# Patient Record
Sex: FEMALE | Race: WHITE | Employment: PART TIME | URBAN - METROPOLITAN AREA
[De-identification: names, ages, dates, MRNs, and addresses within clinical notes are randomized per-mention and may not be internally consistent; named-entity substitution may affect disease eponyms.]

---

## 2017-02-02 ENCOUNTER — HOSPITAL ENCOUNTER (EMERGENCY)
Age: 47
Discharge: HOME | End: 2017-02-02
Attending: EMERGENCY MEDICINE
Payer: COMMERCIAL

## 2017-02-02 VITALS
SYSTOLIC BLOOD PRESSURE: 132 MMHG | HEIGHT: 62 IN | OXYGEN SATURATION: 94 % | DIASTOLIC BLOOD PRESSURE: 71 MMHG | HEART RATE: 70 BPM | BODY MASS INDEX: 38.91 KG/M2 | TEMPERATURE: 97.8 F | WEIGHT: 211.42 LBS

## 2017-02-02 DIAGNOSIS — R10.32 ABDOMINAL PAIN, LLQ (LEFT LOWER QUADRANT): Primary | ICD-10-CM

## 2017-02-02 LAB
ALBUMIN SERPL BCP-MCNC: 3.6 G/DL (ref 3.5–5)
ALBUMIN/GLOB SERPL: 1.1 {RATIO} (ref 1.1–2.2)
ALP SERPL-CCNC: 63 U/L (ref 45–117)
ALT SERPL-CCNC: 42 U/L (ref 12–78)
ANION GAP BLD CALC-SCNC: 9 MMOL/L (ref 5–15)
APPEARANCE UR: CLEAR
AST SERPL W P-5'-P-CCNC: 40 U/L (ref 15–37)
BACTERIA URNS QL MICRO: NEGATIVE /HPF
BASOPHILS # BLD AUTO: 0 K/UL (ref 0–0.1)
BASOPHILS # BLD: 0 % (ref 0–1)
BILIRUB SERPL-MCNC: 0.3 MG/DL (ref 0.2–1)
BILIRUB UR QL: NEGATIVE
BUN SERPL-MCNC: 8 MG/DL (ref 6–20)
BUN/CREAT SERPL: 12 (ref 12–20)
CALCIUM SERPL-MCNC: 8.4 MG/DL (ref 8.5–10.1)
CHLORIDE SERPL-SCNC: 104 MMOL/L (ref 97–108)
CO2 SERPL-SCNC: 28 MMOL/L (ref 21–32)
COLOR UR: NORMAL
CREAT SERPL-MCNC: 0.69 MG/DL (ref 0.55–1.02)
DIFFERENTIAL METHOD BLD: ABNORMAL
EOSINOPHIL # BLD: 0.2 K/UL (ref 0–0.4)
EOSINOPHIL NFR BLD: 4 % (ref 0–7)
EPITH CASTS URNS QL MICRO: NORMAL /LPF
ERYTHROCYTE [DISTWIDTH] IN BLOOD BY AUTOMATED COUNT: 16.6 % (ref 11.5–14.5)
GLOBULIN SER CALC-MCNC: 3.4 G/DL (ref 2–4)
GLUCOSE BLD STRIP.AUTO-MCNC: 163 MG/DL (ref 65–100)
GLUCOSE SERPL-MCNC: 194 MG/DL (ref 65–100)
GLUCOSE UR STRIP.AUTO-MCNC: NEGATIVE MG/DL
HCT VFR BLD AUTO: 28.6 % (ref 35–47)
HGB BLD-MCNC: 9 G/DL (ref 11.5–16)
HGB UR QL STRIP: NEGATIVE
HYALINE CASTS URNS QL MICRO: NORMAL /LPF (ref 0–5)
KETONES UR QL STRIP.AUTO: NEGATIVE MG/DL
LEUKOCYTE ESTERASE UR QL STRIP.AUTO: NEGATIVE
LIPASE SERPL-CCNC: 159 U/L (ref 73–393)
LYMPHOCYTES # BLD AUTO: 29 % (ref 12–49)
LYMPHOCYTES # BLD: 1.4 K/UL (ref 0.8–3.5)
MAGNESIUM SERPL-MCNC: 2 MG/DL (ref 1.6–2.4)
MCH RBC QN AUTO: 21.2 PG (ref 26–34)
MCHC RBC AUTO-ENTMCNC: 31.5 G/DL (ref 30–36.5)
MCV RBC AUTO: 67.5 FL (ref 80–99)
MONOCYTES # BLD: 0.2 K/UL (ref 0–1)
MONOCYTES NFR BLD AUTO: 5 % (ref 5–13)
NEUTS SEG # BLD: 2.9 K/UL (ref 1.8–8)
NEUTS SEG NFR BLD AUTO: 62 % (ref 32–75)
NITRITE UR QL STRIP.AUTO: NEGATIVE
PH UR STRIP: 6.5 [PH] (ref 5–8)
PLATELET # BLD AUTO: 164 K/UL (ref 150–400)
POTASSIUM SERPL-SCNC: 3.6 MMOL/L (ref 3.5–5.1)
PROT SERPL-MCNC: 7 G/DL (ref 6.4–8.2)
PROT UR STRIP-MCNC: NEGATIVE MG/DL
RBC # BLD AUTO: 4.24 M/UL (ref 3.8–5.2)
RBC #/AREA URNS HPF: NORMAL /HPF (ref 0–5)
RBC MORPH BLD: ABNORMAL
RBC MORPH BLD: ABNORMAL
SERVICE CMNT-IMP: ABNORMAL
SODIUM SERPL-SCNC: 141 MMOL/L (ref 136–145)
SP GR UR REFRACTOMETRY: 1.01 (ref 1–1.03)
UA: UC IF INDICATED,UAUC: NORMAL
UROBILINOGEN UR QL STRIP.AUTO: 0.2 EU/DL (ref 0.2–1)
WBC # BLD AUTO: 4.7 K/UL (ref 3.6–11)
WBC URNS QL MICRO: NORMAL /HPF (ref 0–4)

## 2017-02-02 PROCEDURE — 96361 HYDRATE IV INFUSION ADD-ON: CPT

## 2017-02-02 PROCEDURE — 96375 TX/PRO/DX INJ NEW DRUG ADDON: CPT

## 2017-02-02 PROCEDURE — 81001 URINALYSIS AUTO W/SCOPE: CPT

## 2017-02-02 PROCEDURE — 96374 THER/PROPH/DIAG INJ IV PUSH: CPT

## 2017-02-02 PROCEDURE — 83690 ASSAY OF LIPASE: CPT | Performed by: EMERGENCY MEDICINE

## 2017-02-02 PROCEDURE — 74011250636 HC RX REV CODE- 250/636: Performed by: EMERGENCY MEDICINE

## 2017-02-02 PROCEDURE — 36415 COLL VENOUS BLD VENIPUNCTURE: CPT

## 2017-02-02 PROCEDURE — 83735 ASSAY OF MAGNESIUM: CPT

## 2017-02-02 PROCEDURE — 99284 EMERGENCY DEPT VISIT MOD MDM: CPT

## 2017-02-02 PROCEDURE — 82962 GLUCOSE BLOOD TEST: CPT

## 2017-02-02 PROCEDURE — 80053 COMPREHEN METABOLIC PANEL: CPT

## 2017-02-02 PROCEDURE — 96376 TX/PRO/DX INJ SAME DRUG ADON: CPT

## 2017-02-02 PROCEDURE — 85025 COMPLETE CBC W/AUTO DIFF WBC: CPT

## 2017-02-02 RX ORDER — HYDROMORPHONE HYDROCHLORIDE 1 MG/ML
1 INJECTION, SOLUTION INTRAMUSCULAR; INTRAVENOUS; SUBCUTANEOUS ONCE
Status: COMPLETED | OUTPATIENT
Start: 2017-02-02 | End: 2017-02-02

## 2017-02-02 RX ORDER — HYDROMORPHONE HYDROCHLORIDE 1 MG/ML
1 INJECTION, SOLUTION INTRAMUSCULAR; INTRAVENOUS; SUBCUTANEOUS
Status: DISCONTINUED | OUTPATIENT
Start: 2017-02-02 | End: 2017-02-02 | Stop reason: CLARIF

## 2017-02-02 RX ORDER — METRONIDAZOLE 500 MG/1
500 TABLET ORAL 2 TIMES DAILY
Qty: 20 TAB | Refills: 0 | Status: SHIPPED | OUTPATIENT
Start: 2017-02-02 | End: 2017-02-12

## 2017-02-02 RX ORDER — HYDROMORPHONE HYDROCHLORIDE 1 MG/ML
1 INJECTION, SOLUTION INTRAMUSCULAR; INTRAVENOUS; SUBCUTANEOUS
Status: COMPLETED | OUTPATIENT
Start: 2017-02-02 | End: 2017-02-02

## 2017-02-02 RX ORDER — CIPROFLOXACIN 500 MG/1
500 TABLET ORAL 2 TIMES DAILY
Qty: 20 TAB | Refills: 0 | Status: SHIPPED | OUTPATIENT
Start: 2017-02-02 | End: 2017-02-12

## 2017-02-02 RX ORDER — SODIUM CHLORIDE 0.9 % (FLUSH) 0.9 %
5-10 SYRINGE (ML) INJECTION AS NEEDED
Status: DISCONTINUED | OUTPATIENT
Start: 2017-02-02 | End: 2017-02-02 | Stop reason: HOSPADM

## 2017-02-02 RX ORDER — KETOROLAC TROMETHAMINE 30 MG/ML
30 INJECTION, SOLUTION INTRAMUSCULAR; INTRAVENOUS ONCE
Status: COMPLETED | OUTPATIENT
Start: 2017-02-02 | End: 2017-02-02

## 2017-02-02 RX ORDER — ONDANSETRON 2 MG/ML
4 INJECTION INTRAMUSCULAR; INTRAVENOUS
Status: COMPLETED | OUTPATIENT
Start: 2017-02-02 | End: 2017-02-02

## 2017-02-02 RX ADMIN — ONDANSETRON HYDROCHLORIDE 4 MG: 2 INJECTION, SOLUTION INTRAMUSCULAR; INTRAVENOUS at 09:12

## 2017-02-02 RX ADMIN — HYDROMORPHONE HYDROCHLORIDE 1 MG: 1 INJECTION, SOLUTION INTRAMUSCULAR; INTRAVENOUS; SUBCUTANEOUS at 11:25

## 2017-02-02 RX ADMIN — HYDROMORPHONE HYDROCHLORIDE 1 MG: 1 INJECTION, SOLUTION INTRAMUSCULAR; INTRAVENOUS; SUBCUTANEOUS at 10:04

## 2017-02-02 RX ADMIN — KETOROLAC TROMETHAMINE 30 MG: 30 INJECTION, SOLUTION INTRAMUSCULAR at 09:12

## 2017-02-02 RX ADMIN — SODIUM CHLORIDE 1000 ML: 900 INJECTION, SOLUTION INTRAVENOUS at 09:13

## 2017-02-02 NOTE — DISCHARGE INSTRUCTIONS
Follow up with your primary care doctor in the next week. Take the ciprofloxacin and flagyl as prescribed. Return to the ED if you are unable to be seen in clinic or if your symptoms change or worsen in nature. Abdominal Pain: Care Instructions  Your Care Instructions    Abdominal pain has many possible causes. Some aren't serious and get better on their own in a few days. Others need more testing and treatment. If your pain continues or gets worse, you need to be rechecked and may need more tests to find out what is wrong. You may need surgery to correct the problem. Don't ignore new symptoms, such as fever, nausea and vomiting, urination problems, pain that gets worse, and dizziness. These may be signs of a more serious problem. Your doctor may have recommended a follow-up visit in the next 8 to 12 hours. If you are not getting better, you may need more tests or treatment. The doctor has checked you carefully, but problems can develop later. If you notice any problems or new symptoms, get medical treatment right away. Follow-up care is a key part of your treatment and safety. Be sure to make and go to all appointments, and call your doctor if you are having problems. It's also a good idea to know your test results and keep a list of the medicines you take. How can you care for yourself at home? · Rest until you feel better. · To prevent dehydration, drink plenty of fluids, enough so that your urine is light yellow or clear like water. Choose water and other caffeine-free clear liquids until you feel better. If you have kidney, heart, or liver disease and have to limit fluids, talk with your doctor before you increase the amount of fluids you drink. · If your stomach is upset, eat mild foods, such as rice, dry toast or crackers, bananas, and applesauce. Try eating several small meals instead of two or three large ones.   · Wait until 48 hours after all symptoms have gone away before you have spicy foods, alcohol, and drinks that contain caffeine. · Do not eat foods that are high in fat. · Avoid anti-inflammatory medicines such as aspirin, ibuprofen (Advil, Motrin), and naproxen (Aleve). These can cause stomach upset. Talk to your doctor if you take daily aspirin for another health problem. When should you call for help? Call 911 anytime you think you may need emergency care. For example, call if:  · You passed out (lost consciousness). · You pass maroon or very bloody stools. · You vomit blood or what looks like coffee grounds. · You have new, severe belly pain. Call your doctor now or seek immediate medical care if:  · Your pain gets worse, especially if it becomes focused in one area of your belly. · You have a new or higher fever. · Your stools are black and look like tar, or they have streaks of blood. · You have unexpected vaginal bleeding. · You have symptoms of a urinary tract infection. These may include:  ¨ Pain when you urinate. ¨ Urinating more often than usual.  ¨ Blood in your urine. · You are dizzy or lightheaded, or you feel like you may faint. Watch closely for changes in your health, and be sure to contact your doctor if:  · You are not getting better after 1 day (24 hours). Where can you learn more? Go to http://gladys-alia.info/. Enter I631 in the search box to learn more about \"Abdominal Pain: Care Instructions. \"  Current as of: May 27, 2016  Content Version: 11.1  © 9258-3597 [employer]. Care instructions adapted under license by [employer] (which disclaims liability or warranty for this information). If you have questions about a medical condition or this instruction, always ask your healthcare professional. [address] any warranty or liability for your use of this information.

## 2017-02-02 NOTE — LETTER
[facility] EMERGENCY DEPT 
[address] [place] [address] [address] [id number] [phone] Work/School Note Date: 2/2/2017 To Whom It May concern: 
 
Cyndee Marte was seen and treated today in the emergency room by the following provider(s): 
Attending Provider: Jermaine Beavers MD 
Resident: Christian Mann. Cnydee Marte may return to work on 2/6/17. Sincerely, 
 
 
 
 
Christian Mann

## 2017-02-02 NOTE — ED PROVIDER NOTES
HPI Comments: 56 yo female with hx of diverticulitis, DMII, colon resection, and GERD presents with abdominal pain and nausea/vomiting of 3 days duration. She says abdominal pain is in LLQ and midepigastric region, described as crampy. No relieving factors and pain is exacerbated by movement. Pain does not radiate. She has had multiple abdominal CT scans in the past as she has had multiple intra-abdominal abnormalities, including, but not limited to fistulas, colon resection, C. Diff infection, etc.    Patient is a 55 y.o. female presenting with abdominal pain and allergic reaction. The history is provided by the patient. No  was used. Abdominal Pain    This is a new problem. The current episode started more than 2 days ago. The problem has been gradually worsening. The pain is associated with vomiting. The pain is located in the LLQ and epigastric region. The quality of the pain is dull and aching. Associated symptoms include a fever, diarrhea, nausea and vomiting. Pertinent negatives include no dysuria, no headaches, no arthralgias and no chest pain. The pain is worsened by certain positions. The pain is relieved by nothing. Allergic Reaction    Associated symptoms include nausea and vomiting. Pertinent negatives include no confusion and no shortness of breath.         Past Medical History:   Diagnosis Date    Anal fissure     Anisocoria     Asthma      LAST EPISODE     Back pain     Cerumen impaction     Chronic kidney disease      hx uti in past    Coagulation defects      ocassional rectal bleeding due to anal fissure    Colovaginal fistula     Diabetes (HCC)      NIDDM    Diabetes ([facility] [address]     Diverticulitis     Diverticulosis     Enlarged tonsils     Frequent UTI     GERD (gastroesophageal reflux disease)     H/O endoscopy      with dilation    HA (headache)     Hepatic steatosis     Hx of colonoscopy with polypectomy      benign    Hypertension     Ill-defined condition      FREQUENT HIVES    Ill-defined condition      HX ELEVATED LIVER ENZYMES    Morbid obesity (HCC)     Nausea & vomiting      during diverticulitis flare    Obesity     Otitis media     Pneumonia      about 15 yrs ago    Psychiatric disorder      ANXIETY    Recurrent tonsillitis     Sinusitis     Transfusion history ~ age 35     postop hysterectomy    Unspecified sleep apnea      snores ( not diagnosed yet)     Urticaria     Urticaria        Past Surgical History:   Procedure Laterality Date    Hx breast reduction       blake.  Hx gyn            Hx gyn       cervical conization    Hx pelvic laparoscopy      Hx farheen and bso      Hx other surgical       Sphincterotomy    Hx heent       SINUS SURGERY LEFT X2    Hx heent       SINUS SURGERY ON RIGHT X2    Hx gi  12     LAPAROSCOPIC HAND ASSISTED  POSS OPEN SIGMOID COLECTOMY POSS TEMPORARY DIVERTING LOOP ILEOSTOMY;  (no illeostomy needed)    Hx urological  12      CYSTOSCOPY INSERTION URETERAL CATHETERS - Cystoscopy Insertion of bilateral ureteral stents         Family History:   Problem Relation Age of Onset    Diabetes Mother     Cancer Mother      NON-HODGKINS LYMPHOMA    Anesth Problems Mother      PONV    Diabetes Father     Heart Disease Father      CAD - STENTS, PACEMAKER    Arrhythmia Father        Social History     Social History    Marital status:      Spouse name: N/A    Number of children: N/A    Years of education: N/A     Occupational History    Not on file. Social History Main Topics    Smoking status: Never Smoker    Smokeless tobacco: Never Used    Alcohol use Yes      Comment: occ.  Drug use: No    Sexual activity: Not on file     Other Topics Concern    Not on file     Social History Narrative         ALLERGIES: Aspirin; Prilosec [omeprazole magnesium];  Codeine; Morphine; and Percocet [oxycodone-acetaminophen]    Review of Systems   Constitutional: Positive for chills and fever. HENT: Negative for congestion and sore throat. Eyes: Negative for pain. Respiratory: Negative for cough and shortness of breath. Cardiovascular: Negative for chest pain. Gastrointestinal: Positive for abdominal pain, diarrhea, nausea and vomiting. Negative for blood in stool. (-) hematemesis    Endocrine: Negative for polyuria. Genitourinary: Negative for dysuria. Musculoskeletal: Negative for arthralgias. Skin: Positive for rash. Allergic/Immunologic: Negative for environmental allergies. Neurological: Negative for headaches. Psychiatric/Behavioral: Negative for confusion. All other systems reviewed and are negative. Vitals:    02/02/17 0945 02/02/17 1000 02/02/17 1045 02/02/17 1115   BP: 154/89 163/75 (!) 155/123 (!) 166/96   Pulse:       Temp:       SpO2: 95% 94% 95% 97%   Weight:       Height:                Physical Exam   Constitutional: She is oriented to person, place, and time. She appears well-developed and well-nourished. No distress. HENT:   Head: Normocephalic and atraumatic. Eyes: Pupils are equal, round, and reactive to light. Neck: Neck supple. Cardiovascular: Normal rate and regular rhythm. Pulmonary/Chest: Effort normal and breath sounds normal. No respiratory distress. She has no wheezes. Abdominal:   Obese, mild epigastric and LLQ tenderness without rebound or guarding, +BS   Musculoskeletal: Normal range of motion. She exhibits no edema. Neurological: She is alert and oriented to person, place, and time. Skin: No rash noted. She is not diaphoretic. Nursing note and vitals reviewed. MDM  Number of Diagnoses or Management Options  Diagnosis management comments: DDX: diverticulitis, colitis, C.diff colitis, viral syndrome    Will treat pain and nausea, obtain basic labs, send C.diff if pt gives us stool sample.   Will defer on imaging at this point as she has had multiple CT scans recently and does not clinically have a surgical abdomen. S/S likely suggestive of colitis, will plan to treat accordingly. Amount and/or Complexity of Data Reviewed  Clinical lab tests: ordered  Tests in the radiology section of CPT®: ordered  Tests in the medicine section of CPT®: ordered      ED Course   12:00  Pt's symptoms have improved. Work-up negative for abnormality. Discharged with antibiotics for colitis. Procedures    LABORATORY TESTS:  Recent Results (from the past 12 hour(s))   METABOLIC PANEL, COMPREHENSIVE    Collection Time: 02/02/17  8:57 AM   Result Value Ref Range    Sodium 141 136 - 145 mmol/L    Potassium 3.6 3.5 - 5.1 mmol/L    Chloride 104 97 - 108 mmol/L    CO2 28 21 - 32 mmol/L    Anion gap 9 5 - 15 mmol/L    Glucose 194 (H) 65 - 100 mg/dL    BUN 8 6 - 20 MG/DL    Creatinine 0.69 0.55 - 1.02 MG/DL    BUN/Creatinine ratio 12 12 - 20      GFR est AA >60 >60 ml/min/1.73m2    GFR est non-AA >60 >60 ml/min/1.73m2    Calcium 8.4 (L) 8.5 - 10.1 MG/DL    Bilirubin, total 0.3 0.2 - 1.0 MG/DL    ALT (SGPT) 42 12 - 78 U/L    AST (SGOT) 40 (H) 15 - 37 U/L    Alk. phosphatase 63 45 - 117 U/L    Protein, total 7.0 6.4 - 8.2 g/dL    Albumin 3.6 3.5 - 5.0 g/dL    Globulin 3.4 2.0 - 4.0 g/dL    A-G Ratio 1.1 1.1 - 2.2     CBC WITH AUTOMATED DIFF    Collection Time: 02/02/17  8:57 AM   Result Value Ref Range    WBC 4.7 3.6 - 11.0 K/uL    RBC 4.24 3.80 - 5.20 M/uL    HGB 9.0 (L) 11.5 - 16.0 g/dL    HCT 28.6 (L) 35.0 - 47.0 %    MCV 67.5 (L) 80.0 - 99.0 FL    MCH 21.2 (L) 26.0 - 34.0 PG    MCHC 31.5 30.0 - 36.5 g/dL    RDW 16.6 (H) 11.5 - 14.5 %    PLATELET 476 125 - 056 K/uL    NEUTROPHILS 62 32 - 75 %    LYMPHOCYTES 29 12 - 49 %    MONOCYTES 5 5 - 13 %    EOSINOPHILS 4 0 - 7 %    BASOPHILS 0 0 - 1 %    ABS. NEUTROPHILS 2.9 1.8 - 8.0 K/UL    ABS. LYMPHOCYTES 1.4 0.8 - 3.5 K/UL    ABS. MONOCYTES 0.2 0.0 - 1.0 K/UL    ABS. EOSINOPHILS 0.2 0.0 - 0.4 K/UL    ABS.  BASOPHILS 0.0 0.0 - 0.1 K/UL    RBC COMMENTS MICROCYTOSIS  1+        RBC COMMENTS ANISOCYTOSIS  1+        DF SMEAR SCANNED     MAGNESIUM    Collection Time: 02/02/17  8:57 AM   Result Value Ref Range    Magnesium 2.0 1.6 - 2.4 mg/dL   LIPASE    Collection Time: 02/02/17  8:57 AM   Result Value Ref Range    Lipase 159 73 - 393 U/L   URINALYSIS W/ REFLEX CULTURE    Collection Time: 02/02/17 11:11 AM   Result Value Ref Range    Color YELLOW/STRAW      Appearance CLEAR CLEAR      Specific gravity 1.009 1.003 - 1.030      pH (UA) 6.5 5.0 - 8.0      Protein NEGATIVE  NEG mg/dL    Glucose NEGATIVE  NEG mg/dL    Ketone NEGATIVE  NEG mg/dL    Bilirubin NEGATIVE  NEG      Blood NEGATIVE  NEG      Urobilinogen 0.2 0.2 - 1.0 EU/dL    Nitrites NEGATIVE  NEG      Leukocyte Esterase NEGATIVE  NEG      WBC 0-4 0 - 4 /hpf    RBC 0-5 0 - 5 /hpf    Epithelial cells FEW FEW /lpf    Bacteria NEGATIVE  NEG /hpf    UA:UC IF INDICATED CULTURE NOT INDICATED BY UA RESULT CNI      Hyaline cast 0-2 0 - 5 /lpf   GLUCOSE, POC    Collection Time: 02/02/17 11:46 AM   Result Value Ref Range    Glucose (POC) 163 (H) 65 - 100 mg/dL    Performed by Harwood Buerger (ED tech)        IMAGING RESULTS:  No orders to display       MEDICATIONS GIVEN:  Medications   sodium chloride (NS) flush 5-10 mL (not administered)   ketorolac (TORADOL) injection 30 mg (30 mg IntraVENous Given 2/2/17 0912)   sodium chloride 0.9 % bolus infusion 1,000 mL (1,000 mL IntraVENous New Bag 2/2/17 0913)   ondansetron (ZOFRAN) injection 4 mg (4 mg IntraVENous Given 2/2/17 0912)   HYDROmorphone (PF) (DILAUDID) injection 1 mg (1 mg IntraVENous Given 2/2/17 1004)   HYDROmorphone (PF) (DILAUDID) injection 1 mg (1 mg IntraVENous Given 2/2/17 1125)       IMPRESSION:  1. Abdominal pain, LLQ (left lower quadrant)        PLAN:  1. Cipro and Flagyl for 10 days  2.  Follow up with PCP    Current Discharge Medication List      START taking these medications    Details   ciprofloxacin HCl (CIPRO) 500 mg tablet Take 1 Tab by mouth two (2) times a day for 10 days. Qty: 20 Tab, Refills: 0      metroNIDAZOLE (FLAGYL) 500 mg tablet Take 1 Tab by mouth two (2) times a day for 10 days. Indications: colitis  Qty: 20 Tab, Refills: 0         CONTINUE these medications which have NOT CHANGED    Details   famotidine (PEPCID) 20 mg tablet Take 20 mg by mouth daily. fexofenadine (ALLEGRA) 60 mg tablet Take 120 mg by mouth daily. cetirizine (ZYRTEC) 10 mg tablet Take 20 mg by mouth every evening. hydrocortisone-pramoxine (ANALPRAM HC 2.5%-1%) 2.5-1 % rectal cream Insert  into rectum three (3) times daily. Qty: 30 g, Refills: 0      diphenhydrAMINE (BENADRYL) 25 mg capsule Take 50 mg by mouth every four (4) hours as needed. EPINEPHrine (EPIPEN) 0.3 mg/0.3 mL (1:1,000) injection 0.3 mL by IntraMUSCular route once as needed for up to 1 dose. Qty: 0.3 mL, Refills: 1      estradiol (ESTRACE) 1 mg tablet Take 1 mg by mouth daily. omeprazole (PRILOSEC) 20 mg capsule Take 40 mg by mouth daily. amLODIPine (NORVASC) 5 mg tablet Take 5 mg by mouth daily. hydrochlorothiazide (HYDRODIURIL) 25 mg tablet Take 25 mg by mouth daily. metFORMIN (GLUCOPHAGE) 500 mg tablet Take 500 mg by mouth daily (with breakfast). albuterol (PROVENTIL, VENTOLIN) 90 mcg/Actuation inhaler Take 2 Puffs by inhalation every six (6) hours as needed. 2.   Follow-up Information     Follow up With Details Comments [address] In 1 week If symptoms worsen [id number] [facility] [id number] [phone]          Return to ED if worse               ------------------------------------------  Begin Attending Documentation  ------------------------------------------    Attending Attestation: I was not present during the patient's evaluation by the resident.   I personally evaluated the patient including the history and physical. I have read the resident's note and agree with their history, physical and plan.    HPI: Donna Danielle is a 55 y.o. female with a PMHx of HTN, Diverticulosis, GERD, CKD, Morbid obesity, Urticaria, Diverticulitis, DM, and Anxiety who presents ambulatory to the ED c/o gradually worsening, L-sided abdominal pain that radiates to her L-sided back with associated diarrhea, nausea, vomiting, fever, and chills x 3 days. Pt reports her abdominal pain worsened today which prompted ED visit. Pt admits she has had this pain before when she had a colovaginal fistula. Pt notes she was passing stool through her vaginal canal at that time but denies any abnormal BM's at this time besides the diarrhea. Pt states she has also been presenting with diffuse pruritic hives x 2 days noting she saw her PCP yesterday who gave her topical solution that has moderately helped clear up the rash. Of note, pt admits to having history of chronic hives which has caused her to have \"an upset stomach\" noting she has been prescribed prednisone multiple times. Pt states she had a cholecystectomy at the end of November 2016 noting she was told her liver was enlarged at that time. Pt admits to also have a hx of diverticulitis and CDiff noting her last time being admitted for CDiff was in September of 2016. Pt denies any hematemesis, blood in stool, constipation, dysuria, dizziness, chest pain, dyspnea, recent antibiotic use. PCP: BEBA Joel    There are no other complaints, changes or physical findings at this time. PE:  Gen: NAD, WD/WN   Heart: nl S1, S2, no m/r/g   Lungs: CTAB, no w/r/r   Abd: soft, diffuse left abdominal tenderness, epigastric tenderness, no guarding or rebound tenderness   Ext: no swelling   Skin: no rashes or hives   Neuro: grossly intact, no focal deficits    Assessment: Patient presents to ED with abdominal pain and diarrhea. Differential includes colitis, diverticulitis, UTI, pancreatitis, gastritis, choledocholithiasis, c dff.   She appears well on exam.  Doubt acute intraabdominal surgical process at this time including obstruction and abscess. Patient has had numerous CT scans in the past and would like to avoid additional imaging at this time if possible. I think this is reasonable given current exam.  Will check CBC, CMP, lipase, UA and provide symptomatic treatment and reevaluate.     Diagnosis:    Brittany Funez MD.    ------------------------------------------  End Attending Documentation  ------------------------------------------

## 2017-03-16 ENCOUNTER — HOSPITAL ENCOUNTER (EMERGENCY)
Age: 47
Discharge: HOME | End: 2017-03-16
Attending: EMERGENCY MEDICINE | Admitting: EMERGENCY MEDICINE
Payer: COMMERCIAL

## 2017-03-16 ENCOUNTER — APPOINTMENT (OUTPATIENT)
Dept: CT IMAGING | Age: 47
End: 2017-03-16
Attending: EMERGENCY MEDICINE
Payer: COMMERCIAL

## 2017-03-16 VITALS
RESPIRATION RATE: 16 BRPM | TEMPERATURE: 97.9 F | BODY MASS INDEX: 38.72 KG/M2 | WEIGHT: 210.4 LBS | DIASTOLIC BLOOD PRESSURE: 85 MMHG | HEIGHT: 62 IN | OXYGEN SATURATION: 96 % | SYSTOLIC BLOOD PRESSURE: 153 MMHG | HEART RATE: 77 BPM

## 2017-03-16 DIAGNOSIS — R10.11 ABDOMINAL PAIN, RIGHT UPPER QUADRANT: Primary | ICD-10-CM

## 2017-03-16 DIAGNOSIS — N39.0 URINARY TRACT INFECTION WITHOUT HEMATURIA, SITE UNSPECIFIED: ICD-10-CM

## 2017-03-16 DIAGNOSIS — R10.9 ACUTE ABDOMINAL PAIN IN RIGHT FLANK: ICD-10-CM

## 2017-03-16 DIAGNOSIS — R21 RASH: ICD-10-CM

## 2017-03-16 LAB
ALBUMIN SERPL BCP-MCNC: 3.8 G/DL (ref 3.5–5)
ALBUMIN/GLOB SERPL: 1 {RATIO} (ref 1.1–2.2)
ALP SERPL-CCNC: 75 U/L (ref 45–117)
ALT SERPL-CCNC: 50 U/L (ref 12–78)
AMMONIA PLAS-SCNC: 32 UMOL/L
ANION GAP BLD CALC-SCNC: 9 MMOL/L (ref 5–15)
APPEARANCE UR: CLEAR
APTT PPP: 28.5 SEC (ref 22.1–32.5)
AST SERPL W P-5'-P-CCNC: 44 U/L (ref 15–37)
ATRIAL RATE: 70 BPM
BACTERIA URNS QL MICRO: NEGATIVE /HPF
BASOPHILS # BLD AUTO: 0 K/UL (ref 0–0.1)
BASOPHILS # BLD: 0 % (ref 0–1)
BILIRUB SERPL-MCNC: 0.4 MG/DL (ref 0.2–1)
BILIRUB UR QL: NEGATIVE
BUN SERPL-MCNC: 15 MG/DL (ref 6–20)
BUN/CREAT SERPL: 23 (ref 12–20)
CALCIUM SERPL-MCNC: 9.5 MG/DL (ref 8.5–10.1)
CALCULATED P AXIS, ECG09: -7 DEGREES
CALCULATED R AXIS, ECG10: 7 DEGREES
CALCULATED T AXIS, ECG11: -19 DEGREES
CHLORIDE SERPL-SCNC: 101 MMOL/L (ref 97–108)
CO2 SERPL-SCNC: 28 MMOL/L (ref 21–32)
COLOR UR: ABNORMAL
CREAT SERPL-MCNC: 0.64 MG/DL (ref 0.55–1.02)
DIAGNOSIS, 93000: NORMAL
DIFFERENTIAL METHOD BLD: ABNORMAL
EOSINOPHIL # BLD: 0.2 K/UL (ref 0–0.4)
EOSINOPHIL NFR BLD: 4 % (ref 0–7)
EPITH CASTS URNS QL MICRO: ABNORMAL /LPF
ERYTHROCYTE [DISTWIDTH] IN BLOOD BY AUTOMATED COUNT: 16.9 % (ref 11.5–14.5)
GLOBULIN SER CALC-MCNC: 4 G/DL (ref 2–4)
GLUCOSE SERPL-MCNC: 162 MG/DL (ref 65–100)
GLUCOSE UR STRIP.AUTO-MCNC: NEGATIVE MG/DL
HCT VFR BLD AUTO: 30.5 % (ref 35–47)
HGB BLD-MCNC: 9.4 G/DL (ref 11.5–16)
HGB UR QL STRIP: NEGATIVE
HYALINE CASTS URNS QL MICRO: ABNORMAL /LPF (ref 0–5)
INR PPP: 1 (ref 0.9–1.1)
KETONES UR QL STRIP.AUTO: NEGATIVE MG/DL
LEUKOCYTE ESTERASE UR QL STRIP.AUTO: ABNORMAL
LIPASE SERPL-CCNC: 190 U/L (ref 73–393)
LYMPHOCYTES # BLD AUTO: 31 % (ref 12–49)
LYMPHOCYTES # BLD: 1.9 K/UL (ref 0.8–3.5)
MCH RBC QN AUTO: 20.7 PG (ref 26–34)
MCHC RBC AUTO-ENTMCNC: 30.8 G/DL (ref 30–36.5)
MCV RBC AUTO: 67.2 FL (ref 80–99)
MONOCYTES # BLD: 0.4 K/UL (ref 0–1)
MONOCYTES NFR BLD AUTO: 6 % (ref 5–13)
NEUTS SEG # BLD: 3.5 K/UL (ref 1.8–8)
NEUTS SEG NFR BLD AUTO: 59 % (ref 32–75)
NITRITE UR QL STRIP.AUTO: NEGATIVE
P-R INTERVAL, ECG05: 156 MS
PH UR STRIP: 6 [PH] (ref 5–8)
PLATELET # BLD AUTO: 209 K/UL (ref 150–400)
POTASSIUM SERPL-SCNC: 3.4 MMOL/L (ref 3.5–5.1)
PROT SERPL-MCNC: 7.8 G/DL (ref 6.4–8.2)
PROT UR STRIP-MCNC: NEGATIVE MG/DL
PROTHROMBIN TIME: 10.4 SEC (ref 9–11.1)
Q-T INTERVAL, ECG07: 572 MS
QRS DURATION, ECG06: 96 MS
QTC CALCULATION (BEZET), ECG08: 617 MS
RBC # BLD AUTO: 4.54 M/UL (ref 3.8–5.2)
RBC #/AREA URNS HPF: ABNORMAL /HPF (ref 0–5)
RBC MORPH BLD: ABNORMAL
SODIUM SERPL-SCNC: 138 MMOL/L (ref 136–145)
SP GR UR REFRACTOMETRY: 1.01 (ref 1–1.03)
THERAPEUTIC RANGE,PTTT: NORMAL SECS (ref 58–77)
TROPONIN I SERPL-MCNC: <0.04 NG/ML
UROBILINOGEN UR QL STRIP.AUTO: 0.2 EU/DL (ref 0.2–1)
VENTRICULAR RATE, ECG03: 70 BPM
WBC # BLD AUTO: 6 K/UL (ref 3.6–11)
WBC URNS QL MICRO: ABNORMAL /HPF (ref 0–4)

## 2017-03-16 PROCEDURE — 74011250636 HC RX REV CODE- 250/636: Performed by: EMERGENCY MEDICINE

## 2017-03-16 PROCEDURE — 81001 URINALYSIS AUTO W/SCOPE: CPT | Performed by: EMERGENCY MEDICINE

## 2017-03-16 PROCEDURE — 84484 ASSAY OF TROPONIN QUANT: CPT | Performed by: EMERGENCY MEDICINE

## 2017-03-16 PROCEDURE — 83690 ASSAY OF LIPASE: CPT | Performed by: EMERGENCY MEDICINE

## 2017-03-16 PROCEDURE — 99284 EMERGENCY DEPT VISIT MOD MDM: CPT

## 2017-03-16 PROCEDURE — 74177 CT ABD & PELVIS W/CONTRAST: CPT

## 2017-03-16 PROCEDURE — 96376 TX/PRO/DX INJ SAME DRUG ADON: CPT

## 2017-03-16 PROCEDURE — 74011250637 HC RX REV CODE- 250/637: Performed by: EMERGENCY MEDICINE

## 2017-03-16 PROCEDURE — 87086 URINE CULTURE/COLONY COUNT: CPT | Performed by: EMERGENCY MEDICINE

## 2017-03-16 PROCEDURE — 36415 COLL VENOUS BLD VENIPUNCTURE: CPT | Performed by: EMERGENCY MEDICINE

## 2017-03-16 PROCEDURE — 80053 COMPREHEN METABOLIC PANEL: CPT | Performed by: EMERGENCY MEDICINE

## 2017-03-16 PROCEDURE — 96375 TX/PRO/DX INJ NEW DRUG ADDON: CPT

## 2017-03-16 PROCEDURE — 85025 COMPLETE CBC W/AUTO DIFF WBC: CPT | Performed by: EMERGENCY MEDICINE

## 2017-03-16 PROCEDURE — 85730 THROMBOPLASTIN TIME PARTIAL: CPT | Performed by: EMERGENCY MEDICINE

## 2017-03-16 PROCEDURE — 74011636637 HC RX REV CODE- 636/637: Performed by: EMERGENCY MEDICINE

## 2017-03-16 PROCEDURE — 74011000258 HC RX REV CODE- 258: Performed by: EMERGENCY MEDICINE

## 2017-03-16 PROCEDURE — 96374 THER/PROPH/DIAG INJ IV PUSH: CPT

## 2017-03-16 PROCEDURE — 74011636320 HC RX REV CODE- 636/320: Performed by: EMERGENCY MEDICINE

## 2017-03-16 PROCEDURE — 93005 ELECTROCARDIOGRAM TRACING: CPT

## 2017-03-16 PROCEDURE — 85610 PROTHROMBIN TIME: CPT | Performed by: EMERGENCY MEDICINE

## 2017-03-16 PROCEDURE — 82140 ASSAY OF AMMONIA: CPT | Performed by: EMERGENCY MEDICINE

## 2017-03-16 RX ORDER — PREDNISONE 20 MG/1
60 TABLET ORAL DAILY
Qty: 12 TAB | Refills: 0 | Status: SHIPPED | OUTPATIENT
Start: 2017-03-16 | End: 2017-03-20

## 2017-03-16 RX ORDER — SODIUM CHLORIDE 0.9 % (FLUSH) 0.9 %
10 SYRINGE (ML) INJECTION
Status: COMPLETED | OUTPATIENT
Start: 2017-03-16 | End: 2017-03-16

## 2017-03-16 RX ORDER — ONDANSETRON 2 MG/ML
4 INJECTION INTRAMUSCULAR; INTRAVENOUS
Status: COMPLETED | OUTPATIENT
Start: 2017-03-16 | End: 2017-03-16

## 2017-03-16 RX ORDER — DIPHENHYDRAMINE HCL 25 MG
25 CAPSULE ORAL
Qty: 20 CAP | Refills: 0 | Status: SHIPPED | OUTPATIENT
Start: 2017-03-16 | End: 2017-03-21

## 2017-03-16 RX ORDER — PREDNISONE 20 MG/1
60 TABLET ORAL
Status: COMPLETED | OUTPATIENT
Start: 2017-03-16 | End: 2017-03-16

## 2017-03-16 RX ORDER — CEPHALEXIN 500 MG/1
500 CAPSULE ORAL 3 TIMES DAILY
Qty: 21 CAP | Refills: 0 | Status: SHIPPED | OUTPATIENT
Start: 2017-03-16 | End: 2017-03-23

## 2017-03-16 RX ORDER — DIPHENHYDRAMINE HCL 25 MG
25 CAPSULE ORAL
Status: COMPLETED | OUTPATIENT
Start: 2017-03-16 | End: 2017-03-16

## 2017-03-16 RX ORDER — HYDROMORPHONE HYDROCHLORIDE 1 MG/ML
1 INJECTION, SOLUTION INTRAMUSCULAR; INTRAVENOUS; SUBCUTANEOUS
Status: COMPLETED | OUTPATIENT
Start: 2017-03-16 | End: 2017-03-16

## 2017-03-16 RX ORDER — HYDROMORPHONE HYDROCHLORIDE 1 MG/ML
0.5 INJECTION, SOLUTION INTRAMUSCULAR; INTRAVENOUS; SUBCUTANEOUS
Status: COMPLETED | OUTPATIENT
Start: 2017-03-16 | End: 2017-03-16

## 2017-03-16 RX ADMIN — HYDROMORPHONE HYDROCHLORIDE 1 MG: 1 INJECTION, SOLUTION INTRAMUSCULAR; INTRAVENOUS; SUBCUTANEOUS at 06:50

## 2017-03-16 RX ADMIN — HYDROMORPHONE HYDROCHLORIDE 0.5 MG: 1 INJECTION, SOLUTION INTRAMUSCULAR; INTRAVENOUS; SUBCUTANEOUS at 09:33

## 2017-03-16 RX ADMIN — Medication 10 ML: at 08:11

## 2017-03-16 RX ADMIN — SODIUM CHLORIDE 100 ML: 900 INJECTION, SOLUTION INTRAVENOUS at 08:11

## 2017-03-16 RX ADMIN — PREDNISONE 60 MG: 20 TABLET ORAL at 06:50

## 2017-03-16 RX ADMIN — DIPHENHYDRAMINE HYDROCHLORIDE 25 MG: 25 CAPSULE ORAL at 06:50

## 2017-03-16 RX ADMIN — IOPAMIDOL 100 ML: 755 INJECTION, SOLUTION INTRAVENOUS at 08:11

## 2017-03-16 RX ADMIN — ONDANSETRON 4 MG: 2 INJECTION INTRAMUSCULAR; INTRAVENOUS at 06:50

## 2017-03-16 NOTE — DISCHARGE INSTRUCTIONS
Abdominal Pain: Care Instructions  Your Care Instructions    Abdominal pain has many possible causes. Some aren't serious and get better on their own in a few days. Others need more testing and treatment. If your pain continues or gets worse, you need to be rechecked and may need more tests to find out what is wrong. You may need surgery to correct the problem. Don't ignore new symptoms, such as fever, nausea and vomiting, urination problems, pain that gets worse, and dizziness. These may be signs of a more serious problem. Your doctor may have recommended a follow-up visit in the next 8 to 12 hours. If you are not getting better, you may need more tests or treatment. The doctor has checked you carefully, but problems can develop later. If you notice any problems or new symptoms, get medical treatment right away. Follow-up care is a key part of your treatment and safety. Be sure to make and go to all appointments, and call your doctor if you are having problems. It's also a good idea to know your test results and keep a list of the medicines you take. How can you care for yourself at home? · Rest until you feel better. · To prevent dehydration, drink plenty of fluids, enough so that your urine is light yellow or clear like water. Choose water and other caffeine-free clear liquids until you feel better. If you have kidney, heart, or liver disease and have to limit fluids, talk with your doctor before you increase the amount of fluids you drink. · If your stomach is upset, eat mild foods, such as rice, dry toast or crackers, bananas, and applesauce. Try eating several small meals instead of two or three large ones. · Wait until 48 hours after all symptoms have gone away before you have spicy foods, alcohol, and drinks that contain caffeine. · Do not eat foods that are high in fat. · Avoid anti-inflammatory medicines such as aspirin, ibuprofen (Advil, Motrin), and naproxen (Aleve).  These can cause stomach upset. Talk to your doctor if you take daily aspirin for another health problem. When should you call for help? Call 911 anytime you think you may need emergency care. For example, call if:  · You passed out (lost consciousness). · You pass maroon or very bloody stools. · You vomit blood or what looks like coffee grounds. · You have new, severe belly pain. Call your doctor now or seek immediate medical care if:  · Your pain gets worse, especially if it becomes focused in one area of your belly. · You have a new or higher fever. · Your stools are black and look like tar, or they have streaks of blood. · You have unexpected vaginal bleeding. · You have symptoms of a urinary tract infection. These may include:  ¨ Pain when you urinate. ¨ Urinating more often than usual.  ¨ Blood in your urine. · You are dizzy or lightheaded, or you feel like you may faint. Watch closely for changes in your health, and be sure to contact your doctor if:  · You are not getting better after 1 day (24 hours). Where can you learn more? Go to http://gladysRevettoalia.info/. Enter H254 in the search box to learn more about \"Abdominal Pain: Care Instructions. \"  Current as of: May 27, 2016  Content Version: 11.1  © 3892-3256 [employer]. Care instructions adapted under license by [employer] (which disclaims liability or warranty for this information). If you have questions about a medical condition or this instruction, always ask your healthcare professional. [place] [address] any warranty or liability for your use of this information. Rash: Care Instructions  Your Care Instructions  A rash is any irritation or inflammation of the skin. Rashes have many possible causes, including allergy, infection, illness, heat, and emotional stress. Follow-up care is a key part of your treatment and safety.  Be sure to make and go to all appointments, and call your doctor if you are having problems. Its also a good idea to know your test results and keep a list of the medicines you take. How can you care for yourself at home? · Wash the area with water only. Soap can make dryness and itching worse. Pat dry. · Put cold, wet cloths on the rash to reduce itching. · Keep cool, and stay out of the sun. · Leave the rash open to the air as much of the time as possible. · Sometimes petroleum jelly (Vaseline) can help relieve the discomfort caused by a rash. A moisturizing lotion, such as Cetaphil, also may help. Calamine lotion may help for rashes caused by contact with something (such as a plant or soap) that irritated the skin. Use it 3 or 4 times a day. · If your doctor prescribed a cream, use it as directed. If your doctor prescribed medicine, take it exactly as directed. · If your rash itches so badly that it interferes with your normal activities, take an over-the-counter antihistamine, such as diphenhydramine (Benadryl) or loratadine (Claritin). Read and follow all instructions on the label. When should you call for help? Call your doctor now or seek immediate medical care if:  · You have signs of infection, such as:  ¨ Increased pain, swelling, warmth, or redness. ¨ Red streaks leading from the area. ¨ Pus draining from the area. ¨ A fever. · You have joint pain along with the rash. Watch closely for changes in your health, and be sure to contact your doctor if:  · Your rash is changing or getting worse. For example, call if you have pain along with the rash, the rash is spreading, or you have new blisters. · You do not get better after 1 week. Where can you learn more? Go to http://gladys-alia.info/. Enter K558 in the search box to learn more about \"Rash: Care Instructions. \"  Current as of: February 5, 2016  Content Version: 11.1  © 9722-3326 [employer].  Care instructions adapted under license by [employer] (which disclaims liability or warranty for this information). If you have questions about a medical condition or this instruction, always ask your healthcare professional. [address] any warranty or liability for your use of this information.

## 2017-03-16 NOTE — ED TRIAGE NOTES
Patient arrives to ED with c/o right side flank pain which radiates to her lower back, patient describes pain as sharp to dull, patient also c/o decreased urine out put, at this time patient now c/o a red rash to bilat hands, arms and vaginal area.  + nausea

## 2017-03-16 NOTE — ED PROVIDER NOTES
Patient is a 55 y.o. female presenting with flank pain and vomiting. Flank Pain      Vomiting           55y F with recent diagnosis of liver disease, anemia here with R sided abd pain. Is mostly in the R flank and R upper abd. Pain is constant. Nothing makes it better or worse. No vomiting or nausea. No hematuria. Is having some decreased urine output. Pain does not radiate to the left side. Pain is sharp at times as well as dull. No chest pain. No trouble breathing. Has a hx of recurrent hives and feels that she has just started to break out in an itchy rash today c/w prior episodes of hives. No breathing problems. No funny feelings in her throat. No leg pain/swelling. Past Medical History:   Diagnosis Date    Anal fissure     Anisocoria     Asthma     LAST EPISODE     Back pain     Cerumen impaction     Chronic kidney disease     hx uti in past    Coagulation defects     ocassional rectal bleeding due to anal fissure    Colovaginal fistula     Diabetes (HCC)     NIDDM    Diabetes ([facility] [address]     Diverticulitis     Diverticulosis     Enlarged tonsils     Frequent UTI     GERD (gastroesophageal reflux disease)     H/O endoscopy     with dilation    HA (headache)     Hepatic steatosis     Hx of colonoscopy with polypectomy     benign    Hypertension     Ill-defined condition     FREQUENT HIVES    Ill-defined condition     HX ELEVATED LIVER ENZYMES    Morbid obesity (HCC)     Nausea & vomiting     during diverticulitis flare    Obesity     Otitis media     Pneumonia     about 15 yrs ago    Psychiatric disorder     ANXIETY    Recurrent tonsillitis     Sinusitis     Transfusion history ~ age 35    postop hysterectomy    Unspecified sleep apnea     snores ( not diagnosed yet)     Urticaria     Urticaria        Past Surgical History:   Procedure Laterality Date    HX BREAST REDUCTION  1992    blake.     HX GI  7/31/12    LAPAROSCOPIC HAND ASSISTED  POSS OPEN SIGMOID COLECTOMY POSS TEMPORARY DIVERTING LOOP ILEOSTOMY;  (no illeostomy needed)    HX GYN           HX GYN      cervical conization    HX HEENT      SINUS SURGERY LEFT X2    HX HEENT      SINUS SURGERY ON RIGHT X2    HX OTHER SURGICAL      Sphincterotomy    HX PELVIC LAPAROSCOPY      HX EMMANUEL AND BSO      HX UROLOGICAL  12     CYSTOSCOPY INSERTION URETERAL CATHETERS - Cystoscopy Insertion of bilateral ureteral stents         Family History:   Problem Relation Age of Onset    Diabetes Mother     Cancer Mother      NON-HODGKINS LYMPHOMA    Anesth Problems Mother      PONV    Diabetes Father     Heart Disease Father      CAD - STENTS, PACEMAKER    Arrhythmia Father        Social History     Social History    Marital status:      Spouse name: N/A    Number of children: N/A    Years of education: N/A     Occupational History    Not on file. Social History Main Topics    Smoking status: Never Smoker    Smokeless tobacco: Never Used    Alcohol use Yes      Comment: occ.  Drug use: No    Sexual activity: Not on file     Other Topics Concern    Not on file     Social History Narrative         ALLERGIES: Aspirin; Prilosec [omeprazole magnesium]; Codeine; Morphine; and Percocet [oxycodone-acetaminophen]    Review of Systems   Gastrointestinal: Positive for vomiting. Genitourinary: Positive for flank pain. Review of Systems   Constitutional: (-) weight loss. HEENT: (-) stiff neck   Eyes: (-) discharge. Respiratory: (-) for cough. Cardiovascular: (-) syncope. Gastrointestinal: (-) blood in stool. Genitourinary: (-) hematuria. Musculoskeletal: (-) myalgias. Neurological: (-) seizure. Skin: (-) petechiae  Lymph/Immunologic: (-) enlarged lymph nodes  All other systems reviewed and are negative.         Vitals:    17 0541   Pulse: 77   Resp: 16   Temp: 97.9 °F (36.6 °C)   SpO2: 96%   Weight: 95.4 kg (210 lb 6.4 oz)   Height: 5' 2\" (1.575 m)            Physical Exam Nursing note and vitals reviewed. Constitutional: oriented to person, place, and time. appears well-developed and obese. No distress. Head: Normocephalic and atraumatic. Sclera anicteric  Nose: No rhinorrhea  Mouth/Throat: Oropharynx is clear and moist. Pharynx normal  Eyes: Conjunctivae are normal. Pupils are equal, round, and reactive to light. Right eye exhibits no discharge. Left eye exhibits no discharge. Neck: Painless normal range of motion. Neck supple. No LAD. Cardiovascular: Normal rate, regular rhythm, normal heart sounds and intact distal pulses. Exam reveals no gallop and no friction rub. No murmur heard. Pulmonary/Chest:  No respiratory distress. No wheezes. No rales. No rhonchi. No increased work of breathing. No accessory muscle use. Good air exchange throughout. Abdominal: soft, mildly tender in the RUQ, no rebound or guarding. No hepatosplenomegaly. Normal bowel sounds throughout. Back: no tenderness to palpation, no deformities, no CVA tenderness  Extremities/Musculoskeletal: Normal range of motion. no tenderness. No edema. Distal extremities are neurovasc intact. Lymphadenopathy:   No adenopathy. Neurological:  Alert and oriented to person, place, and time. Coordination normal. CN 2-12 intact. Motor and sensory function intact. Skin: Skin is warm and dry. No rash noted. No pallor. MDM 55y F here with R-sided abd pain and R flank pain. Recent dx of liver disease. Unclear etiology. Will need labs and imaging. ED Course       Procedures      ED EKG interpretation:  Rhythm: normal sinus rhythm; and regular . Rate (approx.): 70; Axis: normal; P wave: normal; QRS interval: normal ; ST/T wave: non-specific changes; This EKG was interpreted by Jackson Spencer MD,ED Provider.

## 2017-03-16 NOTE — LETTER
[address] 
[address] [phone] [id number] 
[phone] Work/School Note Date: 3/16/2017 To Whom It May concern: 
 
Kiko Herman was seen and treated today in the emergency room by the following provider(s): 
Attending Provider: Bobo Turk MD. Kiko Herman may return to work on 3/20/2017. Sincerely, Bobo Turk MD

## 2017-03-17 LAB
BACTERIA SPEC CULT: NORMAL
CC UR VC: NORMAL
SERVICE CMNT-IMP: NORMAL

## 2017-04-20 ENCOUNTER — HOSPITAL ENCOUNTER (EMERGENCY)
Age: 47
Discharge: HOME | End: 2017-04-20
Attending: EMERGENCY MEDICINE
Payer: COMMERCIAL

## 2017-04-20 VITALS
SYSTOLIC BLOOD PRESSURE: 167 MMHG | BODY MASS INDEX: 38.05 KG/M2 | RESPIRATION RATE: 16 BRPM | OXYGEN SATURATION: 99 % | HEART RATE: 82 BPM | WEIGHT: 206.79 LBS | TEMPERATURE: 98.1 F | HEIGHT: 62 IN | DIASTOLIC BLOOD PRESSURE: 87 MMHG

## 2017-04-20 DIAGNOSIS — R51.9 NONINTRACTABLE HEADACHE, UNSPECIFIED CHRONICITY PATTERN, UNSPECIFIED HEADACHE TYPE: Primary | ICD-10-CM

## 2017-04-20 DIAGNOSIS — B34.9 VIRAL ILLNESS: ICD-10-CM

## 2017-04-20 DIAGNOSIS — J02.9 SORE THROAT: ICD-10-CM

## 2017-04-20 LAB
APPEARANCE UR: CLEAR
BACTERIA URNS QL MICRO: ABNORMAL /HPF
BILIRUB UR QL: NEGATIVE
COLOR UR: ABNORMAL
DEPRECATED S PYO AG THROAT QL EIA: NEGATIVE
EPITH CASTS URNS QL MICRO: ABNORMAL /LPF
FLUAV AG NPH QL IA: NEGATIVE
FLUBV AG NOSE QL IA: NEGATIVE
GLUCOSE UR STRIP.AUTO-MCNC: 250 MG/DL
HGB UR QL STRIP: NEGATIVE
KETONES UR QL STRIP.AUTO: NEGATIVE MG/DL
LEUKOCYTE ESTERASE UR QL STRIP.AUTO: ABNORMAL
NITRITE UR QL STRIP.AUTO: NEGATIVE
PH UR STRIP: 6.5 [PH] (ref 5–8)
PROT UR STRIP-MCNC: NEGATIVE MG/DL
RBC #/AREA URNS HPF: ABNORMAL /HPF (ref 0–5)
SP GR UR REFRACTOMETRY: 1.02 (ref 1–1.03)
UA: UC IF INDICATED,UAUC: ABNORMAL
UROBILINOGEN UR QL STRIP.AUTO: 0.2 EU/DL (ref 0.2–1)
WBC URNS QL MICRO: ABNORMAL /HPF (ref 0–4)

## 2017-04-20 PROCEDURE — 96372 THER/PROPH/DIAG INJ SC/IM: CPT

## 2017-04-20 PROCEDURE — 87086 URINE CULTURE/COLONY COUNT: CPT | Performed by: EMERGENCY MEDICINE

## 2017-04-20 PROCEDURE — 74011250636 HC RX REV CODE- 250/636: Performed by: EMERGENCY MEDICINE

## 2017-04-20 PROCEDURE — 87070 CULTURE OTHR SPECIMN AEROBIC: CPT | Performed by: EMERGENCY MEDICINE

## 2017-04-20 PROCEDURE — 74011250637 HC RX REV CODE- 250/637: Performed by: EMERGENCY MEDICINE

## 2017-04-20 PROCEDURE — 87880 STREP A ASSAY W/OPTIC: CPT | Performed by: EMERGENCY MEDICINE

## 2017-04-20 PROCEDURE — 81001 URINALYSIS AUTO W/SCOPE: CPT | Performed by: EMERGENCY MEDICINE

## 2017-04-20 PROCEDURE — 87804 INFLUENZA ASSAY W/OPTIC: CPT | Performed by: EMERGENCY MEDICINE

## 2017-04-20 PROCEDURE — 99283 EMERGENCY DEPT VISIT LOW MDM: CPT

## 2017-04-20 RX ORDER — HYDROMORPHONE HYDROCHLORIDE 1 MG/ML
1 INJECTION, SOLUTION INTRAMUSCULAR; INTRAVENOUS; SUBCUTANEOUS
Status: COMPLETED | OUTPATIENT
Start: 2017-04-20 | End: 2017-04-20

## 2017-04-20 RX ORDER — ONDANSETRON 4 MG/1
4 TABLET, ORALLY DISINTEGRATING ORAL
Status: COMPLETED | OUTPATIENT
Start: 2017-04-20 | End: 2017-04-20

## 2017-04-20 RX ORDER — BUTALBITAL, ACETAMINOPHEN AND CAFFEINE 300; 40; 50 MG/1; MG/1; MG/1
1 CAPSULE ORAL
Qty: 12 CAP | Refills: 0 | Status: SHIPPED | OUTPATIENT
Start: 2017-04-20 | End: 2017-04-27

## 2017-04-20 RX ADMIN — ONDANSETRON 4 MG: 4 TABLET, ORALLY DISINTEGRATING ORAL at 04:56

## 2017-04-20 RX ADMIN — HYDROMORPHONE HYDROCHLORIDE 1 MG: 1 INJECTION, SOLUTION INTRAMUSCULAR; INTRAVENOUS; SUBCUTANEOUS at 06:39

## 2017-04-20 RX ADMIN — HYDROMORPHONE HYDROCHLORIDE 1 MG: 1 INJECTION, SOLUTION INTRAMUSCULAR; INTRAVENOUS; SUBCUTANEOUS at 04:56

## 2017-04-20 NOTE — ED PROVIDER NOTES
HPI Comments: Sigifredo Cunha is a 52 y.o. female with PMhx significant for HTN, DM, and asthma who presents ambulatory to the ED with cc of 8/10 gradually worsening constant throbbing anterior HA upon waking this morning. She also c/o associated sore throat, productive cough, right lower back pain, difficulty swallowing, bilateral ear pain, nausea, nasal congestion, generalized body aches, chills, and increased urinary frequency. The pt notes that she was recently treated for UTI, stating that she completed both cephalexin and Levaquin which were prescribed at different times. She notes that she completed the Levaquin 4 days ago. She notes taking motrin, tylenol, and benadryl for her symptoms with no relief. She states that she used her inhaler at 2 AM this morning. She states that her blood pressure has recently been elevated. The pt reports recent sick contact with her daughter who was recently diagnosed with influenza. She states that she receives iron infusions, noting that she last received it 2 days ago. She states that she is given prednisone for her infusions and reports h/o body aches with them. She also reports that she recently received a liver biopsy, noting that she has an enlarged liver. She state that she has a h/o HA. The pt specifically denies fever, abdominal pain, or dysuria at this time. SHx: -tobacco, +EtOH, -illicit drug use    PCP: BEBA Marquez    There are no other complaints, changes or physical findings at this time. The history is provided by the patient. No  was used.         Past Medical History:   Diagnosis Date    Anal fissure     Anisocoria     Asthma     LAST EPISODE     Back pain     Cerumen impaction     Chronic kidney disease     hx uti in past    Coagulation defects     ocassional rectal bleeding due to anal fissure    Colovaginal fistula     Diabetes (HCC)     NIDDM    Diabetes ([facility] [address]     Diverticulitis     Diverticulosis  Enlarged tonsils     Frequent UTI     GERD (gastroesophageal reflux disease)     H/O endoscopy     with dilation    HA (headache)     Hepatic steatosis     Hx of colonoscopy with polypectomy     benign    Hypertension     Ill-defined condition     FREQUENT HIVES    Ill-defined condition     HX ELEVATED LIVER ENZYMES    Morbid obesity (HCC)     Nausea & vomiting     during diverticulitis flare    Obesity     Otitis media     Pneumonia     about 15 yrs ago    Psychiatric disorder     ANXIETY    Recurrent tonsillitis     Sinusitis     Transfusion history ~ age 35    postop hysterectomy    Unspecified sleep apnea     snores ( not diagnosed yet)     Urticaria     Urticaria        Past Surgical History:   Procedure Laterality Date    HX BREAST REDUCTION      blake.  HX GI  12    LAPAROSCOPIC HAND ASSISTED  POSS OPEN SIGMOID COLECTOMY POSS TEMPORARY DIVERTING LOOP ILEOSTOMY;  (no illeostomy needed)    HX GYN           HX GYN      cervical conization    HX HEENT      SINUS SURGERY LEFT X2    HX HEENT      SINUS SURGERY ON RIGHT X2    HX OTHER SURGICAL      Sphincterotomy    HX PELVIC LAPAROSCOPY      HX EMMANUEL AND BSO      HX UROLOGICAL  12     CYSTOSCOPY INSERTION URETERAL CATHETERS - Cystoscopy Insertion of bilateral ureteral stents         Family History:   Problem Relation Age of Onset    Diabetes Mother     Cancer Mother      NON-HODGKINS LYMPHOMA    Anesth Problems Mother      PONV    Diabetes Father     Heart Disease Father      CAD - STENTS, PACEMAKER    Arrhythmia Father        Social History     Social History    Marital status:      Spouse name: N/A    Number of children: N/A    Years of education: N/A     Occupational History    Not on file. Social History Main Topics    Smoking status: Never Smoker    Smokeless tobacco: Never Used    Alcohol use Yes      Comment: occ.     Drug use: Not on file    Sexual activity: Not on file Other Topics Concern    Not on file     Social History Narrative         ALLERGIES: Aspirin; Prilosec [omeprazole magnesium]; Codeine; Morphine; and Percocet [oxycodone-acetaminophen]    Review of Systems   Constitutional: Positive for chills. Negative for activity change, appetite change, fatigue and fever. HENT: Positive for congestion (nasal), ear pain (bilateral), sore throat and trouble swallowing. Negative for rhinorrhea. Respiratory: Positive for cough. Negative for shortness of breath and wheezing. Cardiovascular: Negative. Negative for chest pain and leg swelling. Gastrointestinal: Positive for nausea. Negative for abdominal distention, abdominal pain, constipation, diarrhea and vomiting. Endocrine: Negative. Genitourinary: Positive for frequency. Negative for difficulty urinating, dysuria, menstrual problem, vaginal bleeding and vaginal discharge. Musculoskeletal: Positive for back pain (right lower) and myalgias (generalized). Negative for arthralgias and joint swelling. Skin: Negative. Negative for rash. Neurological: Positive for headaches (anterior). Negative for dizziness, weakness and light-headedness. Psychiatric/Behavioral: Negative. Vitals:    04/20/17 0305 04/20/17 0548   BP: (!) 172/96 167/87   Pulse: 79 82   Resp: 18 16   Temp: 98.1 °F (36.7 °C)    SpO2: 100% 99%   Weight: 93.8 kg (206 lb 12.7 oz)    Height: 5' 2\" (1.575 m)             Physical Exam   Constitutional: She is oriented to person, place, and time. She appears well-developed and well-nourished. Non-toxic, in no acute distress. Afebrile on exam. Maintaining stable vital signs. HENT:   Head: Atraumatic. Mouth/Throat: No uvula swelling. No oropharyngeal exudate. Uvula midline. No tonsillar enlargement or asymmetry. Eyes: EOM are normal.   Cardiovascular: Normal rate, regular rhythm, normal heart sounds and intact distal pulses. Exam reveals no gallop and no friction rub.     No murmur heard.  Pulmonary/Chest: Effort normal and breath sounds normal. No respiratory distress. She has no wheezes. She has no rales. She exhibits no tenderness. Abdominal: Soft. Bowel sounds are normal. She exhibits no distension and no mass. There is no tenderness. There is no rebound and no guarding. Musculoskeletal: Normal range of motion. She exhibits no edema or tenderness. Lymphadenopathy:     She has cervical adenopathy (mild). Neurological: She is oriented to person, place, and time. EOM intact, pupils direct and consensual, reflexes intact, CN II-XII grossly intact, strength equal and symmetric, alert and oriented. No photophobia noted. Skin: Skin is warm. Psychiatric: She has a normal mood and affect. Nursing note and vitals reviewed. MDM  Number of Diagnoses or Management Options  Nonintractable headache, unspecified chronicity pattern, unspecified headache type:   Sore throat:   Viral illness:   Diagnosis management comments:   DDx: resolving UTI, influenza, viral syndrome with myalgias and HA likely due to former. Low suspicion for strep throat. Will obtain urine, nasal swab, throat swab, and treat patient's HA and myalgias. Amount and/or Complexity of Data Reviewed  Clinical lab tests: ordered and reviewed  Review and summarize past medical records: yes    Patient Progress  Patient progress: stable    ED Course       Procedures    PROGRESS NOTE:  Pt had improvement in her symptoms prior to discharge.   Yovani Osborne MD    LABORATORY TESTS:  Recent Results (from the past 12 hour(s))   URINALYSIS W/ REFLEX CULTURE    Collection Time: 04/20/17  3:45 AM   Result Value Ref Range    Color YELLOW/STRAW      Appearance CLEAR CLEAR      Specific gravity 1.022 1.003 - 1.030      pH (UA) 6.5 5.0 - 8.0      Protein NEGATIVE  NEG mg/dL    Glucose 250 (A) NEG mg/dL    Ketone NEGATIVE  NEG mg/dL    Bilirubin NEGATIVE  NEG      Blood NEGATIVE  NEG      Urobilinogen 0.2 0.2 - 1.0 EU/dL Nitrites NEGATIVE  NEG      Leukocyte Esterase SMALL (A) NEG      WBC 5-10 0 - 4 /hpf    RBC 0-5 0 - 5 /hpf    Epithelial cells MODERATE (A) FEW /lpf    Bacteria 1+ (A) NEG /hpf    UA:UC IF INDICATED URINE CULTURE ORDERED (A) CNI     STREP AG SCREEN, GROUP A    Collection Time: 04/20/17  4:37 AM   Result Value Ref Range    Group A Strep Ag ID NEGATIVE  NEG     INFLUENZA A & B AG (RAPID TEST)    Collection Time: 04/20/17  4:37 AM   Result Value Ref Range    Influenza A Antigen NEGATIVE  NEG      Influenza B Antigen NEGATIVE  NEG         MEDICATIONS GIVEN:  Medications   HYDROmorphone (PF) (DILAUDID) injection 1 mg (1 mg IntraMUSCular Given 4/20/17 0456)   ondansetron (ZOFRAN ODT) tablet 4 mg (4 mg Oral Given 4/20/17 0456)   HYDROmorphone (PF) (DILAUDID) injection 1 mg (1 mg IntraMUSCular Given 4/20/17 0639)       IMPRESSION:  1. Nonintractable headache, unspecified chronicity pattern, unspecified headache type    2. Viral illness    3. Sore throat        PLAN:  1. Discharge Medication List as of 4/20/2017  7:08 AM      START taking these medications    Details   butalbital-acetaminophen-caff (FIORICET) -40 mg per capsule Take 1 Cap by mouth every six (6) hours as needed for Headache. Max Daily Amount: 4 Caps., Print, Disp-12 Cap, R-0         CONTINUE these medications which have NOT CHANGED    Details   famotidine (PEPCID) 20 mg tablet Take 20 mg by mouth daily. , Historical Med      fexofenadine (ALLEGRA) 60 mg tablet Take 120 mg by mouth daily. , Historical Med      cetirizine (ZYRTEC) 10 mg tablet Take 20 mg by mouth every evening., Historical Med      hydrocortisone-pramoxine (ANALPRAM HC 2.5%-1%) 2.5-1 % rectal cream Insert  into rectum three (3) times daily. , Print, Disp-30 g, R-0      EPINEPHrine (EPIPEN) 0.3 mg/0.3 mL (1:1,000) injection 0.3 mL by IntraMUSCular route once as needed for up to 1 dose., Print, Disp-0.3 mL, R-1      estradiol (ESTRACE) 1 mg tablet Take 1 mg by mouth daily. , Historical Med omeprazole (PRILOSEC) 20 mg capsule Take 40 mg by mouth daily. , Historical Med      amLODIPine (NORVASC) 5 mg tablet Take 5 mg by mouth daily. , Historical Med      hydrochlorothiazide (HYDRODIURIL) 25 mg tablet Take 25 mg by mouth daily. , Historical Med      metFORMIN (GLUCOPHAGE) 500 mg tablet Take 500 mg by mouth daily (with breakfast). , Historical Med      albuterol (PROVENTIL, VENTOLIN) 90 mcg/Actuation inhaler Take 2 Puffs by inhalation every six (6) hours as needed., Historical Med           2. Follow-up Information     Follow up With Details Comments [address] BEBA Aparicio In 3 days  [address] [facility]  [address]  [phone]      [facility] EMERGENCY DEPT  If symptoms worsen [address]  [facility] [address]   [address] [address] [phone]        Return to ED if worse       DISCHARGE NOTE:  7:10 AM  The patient has been re-evaluated and is ready for discharge. Reviewed available results with patient. Counseled patient on diagnosis and care plan. Patient has expressed understanding, and all questions have been answered. Patient agrees with plan and agrees to follow up as recommended, or return to the ED if their symptoms worsen. Discharge instructions have been provided and explained to the patient, along with reasons to return to the ED. This note is prepared by Shine Murillo, acting as Scribe for Zeus Almodovar MD.    Zeus Almodovar MD: The scribe's documentation has been prepared under my direction and personally reviewed by me in its entirety. I confirm that the note above accurately reflects all work, treatment, procedures, and medical decision making performed by me.

## 2017-04-20 NOTE — DISCHARGE INSTRUCTIONS
Headache: Care Instructions  Your Care Instructions    Headaches have many possible causes. Most headaches aren't a sign of a more serious problem, and they will get better on their own. Home treatment may help you feel better faster. The doctor has checked you carefully, but problems can develop later. If you notice any problems or new symptoms, get medical treatment right away. Follow-up care is a key part of your treatment and safety. Be sure to make and go to all appointments, and call your doctor if you are having problems. It's also a good idea to know your test results and keep a list of the medicines you take. How can you care for yourself at home? · Do not drive if you have taken a prescription pain medicine. · Rest in a quiet, dark room until your headache is gone. Close your eyes and try to relax or go to sleep. Don't watch TV or read. · Put a cold, moist cloth or cold pack on the painful area for 10 to 20 minutes at a time. Put a thin cloth between the cold pack and your skin. · Use a warm, moist towel or a heating pad set on low to relax tight shoulder and neck muscles. · Have someone gently massage your neck and shoulders. · Take pain medicines exactly as directed. ¨ If the doctor gave you a prescription medicine for pain, take it as prescribed. ¨ If you are not taking a prescription pain medicine, ask your doctor if you can take an over-the-counter medicine. · Be careful not to take pain medicine more often than the instructions allow, because you may get worse or more frequent headaches when the medicine wears off. · Do not ignore new symptoms that occur with a headache, such as a fever, weakness or numbness, vision changes, or confusion. These may be signs of a more serious problem. To prevent headaches  · Keep a headache diary so you can figure out what triggers your headaches. Avoiding triggers may help you prevent headaches.  Record when each headache began, how long it lasted, and what the pain was like (throbbing, aching, stabbing, or dull). Write down any other symptoms you had with the headache, such as nausea, flashing lights or dark spots, or sensitivity to bright light or loud noise. Note if the headache occurred near your period. List anything that might have triggered the headache, such as certain foods (chocolate, cheese, wine) or odors, smoke, bright light, stress, or lack of sleep. · Find healthy ways to deal with stress. Headaches are most common during or right after stressful times. Take time to relax before and after you do something that has caused a headache in the past.  · Try to keep your muscles relaxed by keeping good posture. Check your jaw, face, neck, and shoulder muscles for tension, and try relaxing them. When sitting at a desk, change positions often, and stretch for 30 seconds each hour. · Get plenty of sleep and exercise. · Eat regularly and well. Long periods without food can trigger a headache. · Treat yourself to a massage. Some people find that regular massages are very helpful in relieving tension. · Limit caffeine by not drinking too much coffee, tea, or soda. But don't quit caffeine suddenly, because that can also give you headaches. · Reduce eyestrain from computers by blinking frequently and looking away from the computer screen every so often. Make sure you have proper eyewear and that your monitor is set up properly, about an arm's length away. · Seek help if you have depression or anxiety. Your headaches may be linked to these conditions. Treatment can both prevent headaches and help with symptoms of anxiety or depression. When should you call for help? Call 911 anytime you think you may need emergency care. For example, call if:  · You have signs of a stroke. These may include:  ¨ Sudden numbness, paralysis, or weakness in your face, arm, or leg, especially on only one side of your body. ¨ Sudden vision changes.   ¨ Sudden trouble speaking. ¨ Sudden confusion or trouble understanding simple statements. ¨ Sudden problems with walking or balance. ¨ A sudden, severe headache that is different from past headaches. Call your doctor now or seek immediate medical care if:  · You have a new or worse headache. · Your headache gets much worse. Where can you learn more? Go to http://gladsy-alia.info/. Enter M271 in the search box to learn more about \"Headache: Care Instructions. \"  Current as of: October 14, 2016  Content Version: 11.2  © 9198-3555 [employer]. Care instructions adapted under license by [employer] (which disclaims liability or warranty for this information). If you have questions about a medical condition or this instruction, always ask your healthcare professional. [address] any warranty or liability for your use of this information. Rapid Strep Test: About This Test  What is it? A rapid strep test checks the bacteria in your throat to see if strep is the cause of your sore throat. Why is this test done? It may be done so your doctor can find out right away whether you have strep throat. There is another test for strep, called a throat culture, but that test takes a few days to get the results. How can you prepare for the test?  You don't need to do anything before you have this test.  What happens during the test?  · You will be asked to tilt your head back and open your mouth as wide as possible. · Your doctor will press your tongue down with a flat stick (tongue depressor) and then examine your mouth and throat. · A clean cotton swab will be rubbed over the back of your throat, around your tonsils, and over any red areas or sores to collect a sample. How long does the test take? · The test takes less than a minute. · Results are available in 10 to 15 minutes. When should you call for help?   Call your doctor now or seek immediate medical care if:  · Your pain gets worse on one side of your throat, or you have trouble opening your mouth. · You have a new or higher fever, or you have a fever with a stiff neck or severe headache. · Swallowing becomes harder, or you have any trouble breathing. · You are sensitive to light or feel very sleepy or confused. Watch closely for changes in your health, and be sure to contact your doctor if:  · You do not start to feel better after 2 days. Follow-up care is a key part of your treatment and safety. Be sure to make and go to all appointments, and call your doctor if you are having problems. It's also a good idea to keep a list of the medicines you take. Ask your doctor when you can expect to have your test results. Where can you learn more? Go to http://gladys-alia.info/. Enter B356 in the search box to learn more about \"Rapid Strep Test: About This Test.\"  Current as of: July 29, 2016  Content Version: 11.2  © 0046-8705 [employer]. Care instructions adapted under license by [employer] (which disclaims liability or warranty for this information). If you have questions about a medical condition or this instruction, always ask your healthcare professional. [address] any warranty or liability for your use of this information.

## 2017-04-20 NOTE — ED NOTES
Dr. Emily Damon reviewed discharge instructions with the patient. The patient verbalized understanding. Pt understands she is unable to drive d/t narcotics. Pt states her daughter is asleep in the car but will not have her daughter walk into the ER. Pt ambulatory out of department with steady gait.

## 2017-04-20 NOTE — LETTER
[address] 
[facility] EMERGENCY DEPT 
[address] [place] [address] [id number] [phone] Work/School Note Date: 4/20/2017 To Whom It May concern: 
 
Dieter Joseph was seen and treated today in the emergency room by the following provider(s): 
Attending Provider: Nazia Garvin MD. Dieter Joseph will be out of work today and tomorrow, April 20-21st. 
 
 
Sincerely, Nazia Garvin MD

## 2017-04-21 LAB
BACTERIA SPEC CULT: NORMAL
CC UR VC: NORMAL
SERVICE CMNT-IMP: NORMAL

## 2017-04-22 LAB
BACTERIA SPEC CULT: NORMAL
SERVICE CMNT-IMP: NORMAL

## 2017-04-27 ENCOUNTER — HOSPITAL ENCOUNTER (EMERGENCY)
Age: 47
Discharge: HOME | End: 2017-04-27
Attending: EMERGENCY MEDICINE
Payer: COMMERCIAL

## 2017-04-27 VITALS
SYSTOLIC BLOOD PRESSURE: 156 MMHG | HEART RATE: 72 BPM | DIASTOLIC BLOOD PRESSURE: 93 MMHG | RESPIRATION RATE: 18 BRPM | BODY MASS INDEX: 37.85 KG/M2 | TEMPERATURE: 98 F | HEIGHT: 62 IN | OXYGEN SATURATION: 96 % | WEIGHT: 205.69 LBS

## 2017-04-27 DIAGNOSIS — R10.84 ABDOMINAL PAIN, GENERALIZED: Primary | ICD-10-CM

## 2017-04-27 LAB
ALBUMIN SERPL BCP-MCNC: 4 G/DL (ref 3.5–5)
ALBUMIN/GLOB SERPL: 1.1 {RATIO} (ref 1.1–2.2)
ALP SERPL-CCNC: 69 U/L (ref 45–117)
ALT SERPL-CCNC: 52 U/L (ref 12–78)
ANION GAP BLD CALC-SCNC: 12 MMOL/L (ref 5–15)
APPEARANCE UR: CLEAR
AST SERPL W P-5'-P-CCNC: 49 U/L (ref 15–37)
BASOPHILS # BLD AUTO: 0.1 K/UL (ref 0–0.1)
BASOPHILS # BLD: 1 % (ref 0–1)
BILIRUB SERPL-MCNC: 0.5 MG/DL (ref 0.2–1)
BILIRUB UR QL: NEGATIVE
BUN SERPL-MCNC: 5 MG/DL (ref 6–20)
BUN/CREAT SERPL: 8 (ref 12–20)
CALCIUM SERPL-MCNC: 9.6 MG/DL (ref 8.5–10.1)
CHLORIDE SERPL-SCNC: 102 MMOL/L (ref 97–108)
CO2 SERPL-SCNC: 26 MMOL/L (ref 21–32)
COLOR UR: ABNORMAL
CREAT SERPL-MCNC: 0.64 MG/DL (ref 0.55–1.02)
DIFFERENTIAL METHOD BLD: ABNORMAL
EOSINOPHIL # BLD: 0.2 K/UL (ref 0–0.4)
EOSINOPHIL NFR BLD: 4 % (ref 0–7)
ERYTHROCYTE [DISTWIDTH] IN BLOOD BY AUTOMATED COUNT: 24.5 % (ref 11.5–14.5)
GLOBULIN SER CALC-MCNC: 3.7 G/DL (ref 2–4)
GLUCOSE SERPL-MCNC: 194 MG/DL (ref 65–100)
GLUCOSE UR STRIP.AUTO-MCNC: 100 MG/DL
HCT VFR BLD AUTO: 35.5 % (ref 35–47)
HGB BLD-MCNC: 11.6 G/DL (ref 11.5–16)
HGB UR QL STRIP: NEGATIVE
KETONES UR QL STRIP.AUTO: NEGATIVE MG/DL
LEUKOCYTE ESTERASE UR QL STRIP.AUTO: NEGATIVE
LIPASE SERPL-CCNC: 158 U/L (ref 73–393)
LYMPHOCYTES # BLD AUTO: 26 % (ref 12–49)
LYMPHOCYTES # BLD: 1.5 K/UL (ref 0.8–3.5)
MCH RBC QN AUTO: 24.2 PG (ref 26–34)
MCHC RBC AUTO-ENTMCNC: 32.7 G/DL (ref 30–36.5)
MCV RBC AUTO: 74 FL (ref 80–99)
MONOCYTES # BLD: 0.3 K/UL (ref 0–1)
MONOCYTES NFR BLD AUTO: 5 % (ref 5–13)
NEUTS SEG # BLD: 3.8 K/UL (ref 1.8–8)
NEUTS SEG NFR BLD AUTO: 64 % (ref 32–75)
NITRITE UR QL STRIP.AUTO: NEGATIVE
PH UR STRIP: 6 [PH] (ref 5–8)
PLATELET # BLD AUTO: 190 K/UL (ref 150–400)
POTASSIUM SERPL-SCNC: 3.3 MMOL/L (ref 3.5–5.1)
PROT SERPL-MCNC: 7.7 G/DL (ref 6.4–8.2)
PROT UR STRIP-MCNC: NEGATIVE MG/DL
RBC # BLD AUTO: 4.8 M/UL (ref 3.8–5.2)
RBC MORPH BLD: ABNORMAL
SODIUM SERPL-SCNC: 140 MMOL/L (ref 136–145)
SP GR UR REFRACTOMETRY: 1.01 (ref 1–1.03)
UROBILINOGEN UR QL STRIP.AUTO: 0.2 EU/DL (ref 0.2–1)
WBC # BLD AUTO: 5.9 K/UL (ref 3.6–11)

## 2017-04-27 PROCEDURE — 81003 URINALYSIS AUTO W/O SCOPE: CPT | Performed by: EMERGENCY MEDICINE

## 2017-04-27 PROCEDURE — 36415 COLL VENOUS BLD VENIPUNCTURE: CPT | Performed by: EMERGENCY MEDICINE

## 2017-04-27 PROCEDURE — 96376 TX/PRO/DX INJ SAME DRUG ADON: CPT

## 2017-04-27 PROCEDURE — 83690 ASSAY OF LIPASE: CPT | Performed by: PHYSICIAN ASSISTANT

## 2017-04-27 PROCEDURE — 80053 COMPREHEN METABOLIC PANEL: CPT | Performed by: EMERGENCY MEDICINE

## 2017-04-27 PROCEDURE — 96361 HYDRATE IV INFUSION ADD-ON: CPT

## 2017-04-27 PROCEDURE — 85025 COMPLETE CBC W/AUTO DIFF WBC: CPT | Performed by: EMERGENCY MEDICINE

## 2017-04-27 PROCEDURE — 96375 TX/PRO/DX INJ NEW DRUG ADDON: CPT

## 2017-04-27 PROCEDURE — 96374 THER/PROPH/DIAG INJ IV PUSH: CPT

## 2017-04-27 PROCEDURE — 99283 EMERGENCY DEPT VISIT LOW MDM: CPT

## 2017-04-27 PROCEDURE — 74011250636 HC RX REV CODE- 250/636: Performed by: PHYSICIAN ASSISTANT

## 2017-04-27 RX ORDER — ONDANSETRON 2 MG/ML
4 INJECTION INTRAMUSCULAR; INTRAVENOUS ONCE
Status: COMPLETED | OUTPATIENT
Start: 2017-04-27 | End: 2017-04-27

## 2017-04-27 RX ORDER — ONDANSETRON 4 MG/1
4 TABLET, ORALLY DISINTEGRATING ORAL
Qty: 20 TAB | Refills: 0 | Status: SHIPPED | OUTPATIENT
Start: 2017-04-27 | End: 2017-07-11

## 2017-04-27 RX ORDER — DIPHENHYDRAMINE HYDROCHLORIDE 50 MG/ML
25 INJECTION, SOLUTION INTRAMUSCULAR; INTRAVENOUS
Status: COMPLETED | OUTPATIENT
Start: 2017-04-27 | End: 2017-04-27

## 2017-04-27 RX ORDER — FAMOTIDINE 20 MG/1
20 TABLET, FILM COATED ORAL 2 TIMES DAILY
Qty: 20 TAB | Refills: 0 | Status: SHIPPED | OUTPATIENT
Start: 2017-04-27 | End: 2017-05-07

## 2017-04-27 RX ORDER — DICYCLOMINE HYDROCHLORIDE 20 MG/1
20 TABLET ORAL EVERY 6 HOURS
Qty: 20 TAB | Refills: 0 | Status: SHIPPED | OUTPATIENT
Start: 2017-04-27 | End: 2017-05-02

## 2017-04-27 RX ORDER — HYDROMORPHONE HYDROCHLORIDE 1 MG/ML
1 INJECTION, SOLUTION INTRAMUSCULAR; INTRAVENOUS; SUBCUTANEOUS ONCE
Status: COMPLETED | OUTPATIENT
Start: 2017-04-27 | End: 2017-04-27

## 2017-04-27 RX ADMIN — DIPHENHYDRAMINE HYDROCHLORIDE 25 MG: 50 INJECTION, SOLUTION INTRAMUSCULAR; INTRAVENOUS at 15:10

## 2017-04-27 RX ADMIN — HYDROMORPHONE HYDROCHLORIDE 1 MG: 1 INJECTION, SOLUTION INTRAMUSCULAR; INTRAVENOUS; SUBCUTANEOUS at 16:13

## 2017-04-27 RX ADMIN — HYDROMORPHONE HYDROCHLORIDE 1 MG: 1 INJECTION, SOLUTION INTRAMUSCULAR; INTRAVENOUS; SUBCUTANEOUS at 14:43

## 2017-04-27 RX ADMIN — ONDANSETRON HYDROCHLORIDE 4 MG: 2 INJECTION, SOLUTION INTRAMUSCULAR; INTRAVENOUS at 14:40

## 2017-04-27 RX ADMIN — SODIUM CHLORIDE 1000 ML: 900 INJECTION, SOLUTION INTRAVENOUS at 14:46

## 2017-04-27 NOTE — LETTER
[address] 
[facility] EMERGENCY DEPT 
[address] [place] [address] Danna Pacheco [id number] 
[phone] Work/School Note Date: 4/27/2017 To Whom It May concern: 
 
Farrukh Hilton was seen and treated today in the emergency room by the following provider(s): 
Attending Provider: Shavon Suárez. MD Reginaldo 
Physician Assistant: Luis Ureña. Shilpa Li. Farrukh Hilton may return to work on 4/29/2017. Sincerely, 
 
 
 
 
Luis Ureña.  Shilpa Li

## 2017-04-27 NOTE — DISCHARGE INSTRUCTIONS

## 2017-04-27 NOTE — ED NOTES
Patient discharged by [facility] Negre[facility]. Patient provided with discharge instructions Rx and instructions on follow up care. Patient out of ED ambulatory accompanied by family.

## 2017-04-27 NOTE — ED NOTES
Pt presents with stabbing pain to abd since yesterday evening. She states that while at work she noted hives on her arms and face and she took benadryl. Recent UTI and was treated with levaquin. Completed therapy 1 week ago. She reports several episodes of diarrhea today with nausea and bloating. She has been seeing a hematologist d/t low H&H and taking therapy for increasing rbcs. Patient unaware of drug name.

## 2017-04-27 NOTE — ED PROVIDER NOTES
HPI Comments: Yosef Justin is a 52 y.o. female with PMhx significant for HTN, diverticulosis, GERD, recurrent UTI's, NIDDM, and hepatic steatosis who presents ambulatory to the ED with cc of acute on chronic RUQ abdominal pain since yesterday with associated nausea and diarrhea. Pt states that she has been experiencing chronic RUQ abdominal pain of unknown etiology x \"months. \" She states that she has previously undergone a liver biopsy and multiple workups with no definitive diagnosis, however, she was informed symptoms may be due to pancreatitis. She states yesterday the pain began to progressively worsen, described as becoming sharp/stabby and wrapping around to the back, for which she took a leftover pain pill she had from a prior visit with moderate relief. However, pt states symptoms returned this morning and have remained constant since. She reports additional symptom of hives that began today but have since resolved PTA without medicinal intervention, noting she has been prescribed Prednisone multiple times in the near past for similar symptoms in addition to several instances of anaphylaxis. Additionally, she reports recent use of Cephalexin and Levaquin following prior diagnoses of UTI's, which she gets frequently. She notes she has been experiencing some bilateral lower extremity swelling upon waking in the morning that improves over the course of the day. She denies any vomiting, CP, SOB, or fever, melena, constipation, inability to pass flatus. PShx significant for cholecystectomy     PCP: BEBA Carl    There are no other complaints, changes or physical findings at this time. The history is provided by the patient. No  was used.         Past Medical History:   Diagnosis Date    Anal fissure     Anisocoria     Asthma     LAST EPISODE     Back pain     Cerumen impaction     Chronic kidney disease     hx uti in past    Coagulation defects     ocassional rectal bleeding due to anal fissure    Colovaginal fistula     Diabetes (HCC)     NIDDM    Diabetes ([facility] [address]     Diverticulitis     Diverticulosis     Enlarged tonsils     Frequent UTI     GERD (gastroesophageal reflux disease)     H/O endoscopy     with dilation    HA (headache)     Hepatic steatosis     Hx of colonoscopy with polypectomy     benign    Hypertension     Ill-defined condition     FREQUENT HIVES    Ill-defined condition     HX ELEVATED LIVER ENZYMES    Morbid obesity (HCC)     Nausea & vomiting     during diverticulitis flare    Obesity     Otitis media     Pneumonia     about 15 yrs ago    Psychiatric disorder     ANXIETY    Recurrent tonsillitis     Sinusitis     Transfusion history ~ age 35    postop hysterectomy    Unspecified sleep apnea     snores ( not diagnosed yet)     Urticaria     Urticaria        Past Surgical History:   Procedure Laterality Date    HX BREAST REDUCTION      blake.  HX GI  12    LAPAROSCOPIC HAND ASSISTED  POSS OPEN SIGMOID COLECTOMY POSS TEMPORARY DIVERTING LOOP ILEOSTOMY;  (no illeostomy needed)    HX GYN           HX GYN      cervical conization    HX HEENT      SINUS SURGERY LEFT X2    HX HEENT      SINUS SURGERY ON RIGHT X2    HX OTHER SURGICAL      Sphincterotomy    HX PELVIC LAPAROSCOPY      HX EMMANUEL AND BSO      HX UROLOGICAL  12     CYSTOSCOPY INSERTION URETERAL CATHETERS - Cystoscopy Insertion of bilateral ureteral stents         Family History:   Problem Relation Age of Onset    Diabetes Mother     Cancer Mother      NON-HODGKINS LYMPHOMA    Anesth Problems Mother      PONV    Diabetes Father     Heart Disease Father      CAD - STENTS, PACEMAKER    Arrhythmia Father        Social History     Social History    Marital status:      Spouse name: N/A    Number of children: N/A    Years of education: N/A     Occupational History    Not on file.      Social History Main Topics    Smoking status: Never Smoker    Smokeless tobacco: Never Used    Alcohol use Yes      Comment: Rarely    Drug use: No    Sexual activity: No     Other Topics Concern    Not on file     Social History Narrative         ALLERGIES: Aspirin; Prilosec [omeprazole magnesium]; Codeine; Morphine; and Percocet [oxycodone-acetaminophen]    Review of Systems   Constitutional: Negative. Negative for activity change, appetite change, chills, diaphoresis, fever and unexpected weight change. HENT: Negative for congestion, hearing loss, rhinorrhea, sinus pressure, sneezing, sore throat and trouble swallowing. Eyes: Negative for pain, redness, itching and visual disturbance. Respiratory: Negative for cough, shortness of breath and wheezing. Cardiovascular: Positive for leg swelling (in am). Negative for chest pain and palpitations. Gastrointestinal: Positive for abdominal pain, diarrhea and nausea. Negative for abdominal distention, constipation and vomiting. Genitourinary: Negative for dysuria. Musculoskeletal: Negative for arthralgias, gait problem and myalgias. Skin: Positive for rash (this am, resolved). Negative for color change, pallor and wound. Neurological: Negative for tremors, weakness, light-headedness, numbness and headaches. All other systems reviewed and are negative. Vitals:    04/27/17 1212 04/27/17 1323   BP: 167/89 (!) 156/93   Pulse:  72   Resp: 18 18   Temp: 98 °F (36.7 °C)    SpO2: 98% 96%   Weight: 93.3 kg (205 lb 11 oz)    Height: 5' 2\" (1.575 m)             Physical Exam   Constitutional: She is oriented to person, place, and time. Vital signs are normal. She appears well-developed and well-nourished. No distress. 52 y.o.  female in NAD  Communicates appropriately and in full sentences   HENT:   Head: Normocephalic and atraumatic.    Right Ear: External ear normal.   Left Ear: External ear normal.   Eyes: Conjunctivae are normal. Pupils are equal, round, and reactive to light. Right eye exhibits no discharge. Left eye exhibits no discharge. No scleral icterus. Neck: Normal range of motion. Neck supple. No tracheal deviation present. Cardiovascular: Normal rate, regular rhythm, normal heart sounds and intact distal pulses. Exam reveals no gallop and no friction rub. No murmur heard. Pulmonary/Chest: Effort normal and breath sounds normal. No respiratory distress. She has no wheezes. Abdominal: Soft. Bowel sounds are normal. She exhibits no distension. There is tenderness (minimum) in the right upper quadrant. There is no rigidity, no rebound, no guarding, no CVA tenderness, no tenderness at McBurney's point and negative Rutherford's sign. No hernia. There are well healing cholecystectomy scars; no grimacing throughout exam; normoactive bowel sounds x 4  Minimal RUQ tenderness elicited - per chart review similar to past presentations   Musculoskeletal: Normal range of motion. She exhibits no edema, tenderness or deformity. No neurologic, motor, vascular, or compartment embarrassment observed on exam. No focal neurologic deficits. Neurological: She is alert and oriented to person, place, and time. No cranial nerve deficit. Coordination normal.   Skin: Skin is warm and dry. No rash noted. She is not diaphoretic. No erythema. No pallor. Psychiatric: She has a normal mood and affect. Nursing note and vitals reviewed. MDM  Number of Diagnoses or Management Options  Abdominal pain, generalized:   Diagnosis management comments: DDx: hepatitis, pancreatitis, cholecystitis, appendicitis, diverticulitis, obstruction, UTI, pyelonephritis, gastroenteritis, gastritis, SBO, colitis, IBD, mesenteric ischemia, AAA, ureteral stone, constipation. Though some of these considerations can be excluded by history and physical exam, others may require additional testing such as laboratory and imaging.  Will begin by assessing a CBC, CMP, UA and reevaluating patient to perform serial abdominal exams to determine whether a CT is indicated. Upon re-examination, the patient has no focal abdominal tenderness to palpation. The patient has a normal WBC and signs and symptoms are consistent with a non-surgical cause of abdominal pain. The patient has been instructed to return to the ER if the abdominal pain has not improved within 24 hours or if they have any further change in condition for a CT scan of the abdomen and pelvis. The diagnosis, test results, medications, return instructions and follow up have been discussed with the patient. The patient has been given the opportunity to ask questions. The patient agrees and expresses understanding of the diagnosis, follow up and return instructions. The patient expresses understanding that although testing today is negative that a surgical issue could still develop and that follow up for a CT scan is essential if the symptoms have not improved in 24 hours. Amount and/or Complexity of Data Reviewed  Clinical lab tests: ordered and reviewed  Review and summarize past medical records: yes    Patient Progress  Patient progress: stable    ED Course       Procedures    I reviewed our electronic medical record system for any past medical records that were available that may contribute to the patients current condition, the nursing notes and and vital signs from today's visit     Nursing notes will be reviewed as they become available in realtime while the pt is in the ED. Progress Note:  2:04 PM  The patients presenting problems have been discussed, and they are in agreement with the care plan formulated and outlined with them. I have encouraged them to ask questions as they arise throughout their visit. Pt does have a ride home, so will provide a dose of  Dilaudid and Zofran while in the ED for pain. Progress Note:  3:10 PM  Pt states that her pain has moderate improved. Awaiting lipase results. Will continue to monitor.     4:00 PM  Provider re-evaluated pt. Patient appears well,with improvement of presenting symptoms  in ED. Provider discussed all available diagnostics, diagnosis, and treatment plan. Thoroughly discussed worrisome signs/symptoms in which pt should immediately return to ED, otherwise follow up with PCP- list given. Patient conveys understanding and agreement to all of the above. All patient's questions were answered by provider. LABORATORY TESTS:  Recent Results (from the past 12 hour(s))   URINALYSIS W/ RFLX MICROSCOPIC    Collection Time: 04/27/17 12:23 PM   Result Value Ref Range    Color YELLOW/STRAW      Appearance CLEAR CLEAR      Specific gravity 1.006 1.003 - 1.030      pH (UA) 6.0 5.0 - 8.0      Protein NEGATIVE  NEG mg/dL    Glucose 100 (A) NEG mg/dL    Ketone NEGATIVE  NEG mg/dL    Bilirubin NEGATIVE  NEG      Blood NEGATIVE  NEG      Urobilinogen 0.2 0.2 - 1.0 EU/dL    Nitrites NEGATIVE  NEG      Leukocyte Esterase NEGATIVE  NEG     CBC WITH AUTOMATED DIFF    Collection Time: 04/27/17  2:19 PM   Result Value Ref Range    WBC 5.9 3.6 - 11.0 K/uL    RBC 4.80 3.80 - 5.20 M/uL    HGB 11.6 11.5 - 16.0 g/dL    HCT 35.5 35.0 - 47.0 %    MCV 74.0 (L) 80.0 - 99.0 FL    MCH 24.2 (L) 26.0 - 34.0 PG    MCHC 32.7 30.0 - 36.5 g/dL    RDW 24.5 (H) 11.5 - 14.5 %    PLATELET 395 770 - 810 K/uL    NEUTROPHILS 64 32 - 75 %    LYMPHOCYTES 26 12 - 49 %    MONOCYTES 5 5 - 13 %    EOSINOPHILS 4 0 - 7 %    BASOPHILS 1 0 - 1 %    ABS. NEUTROPHILS 3.8 1.8 - 8.0 K/UL    ABS. LYMPHOCYTES 1.5 0.8 - 3.5 K/UL    ABS. MONOCYTES 0.3 0.0 - 1.0 K/UL    ABS. EOSINOPHILS 0.2 0.0 - 0.4 K/UL    ABS.  BASOPHILS 0.1 0.0 - 0.1 K/UL    RBC COMMENTS ANISOCYTOSIS  3+        RBC COMMENTS MICROCYTOSIS  PRESENT        RBC COMMENTS HYPOCHROMIA  1+        DF SMEAR SCANNED     METABOLIC PANEL, COMPREHENSIVE    Collection Time: 04/27/17  2:19 PM   Result Value Ref Range    Sodium 140 136 - 145 mmol/L    Potassium 3.3 (L) 3.5 - 5.1 mmol/L    Chloride 102 97 - 108 mmol/L    CO2 26 21 - 32 mmol/L    Anion gap 12 5 - 15 mmol/L    Glucose 194 (H) 65 - 100 mg/dL    BUN 5 (L) 6 - 20 MG/DL    Creatinine 0.64 0.55 - 1.02 MG/DL    BUN/Creatinine ratio 8 (L) 12 - 20      GFR est AA >60 >60 ml/min/1.73m2    GFR est non-AA >60 >60 ml/min/1.73m2    Calcium 9.6 8.5 - 10.1 MG/DL    Bilirubin, total 0.5 0.2 - 1.0 MG/DL    ALT (SGPT) 52 12 - 78 U/L    AST (SGOT) 49 (H) 15 - 37 U/L    Alk. phosphatase 69 45 - 117 U/L    Protein, total 7.7 6.4 - 8.2 g/dL    Albumin 4.0 3.5 - 5.0 g/dL    Globulin 3.7 2.0 - 4.0 g/dL    A-G Ratio 1.1 1.1 - 2.2     LIPASE    Collection Time: 04/27/17  2:19 PM   Result Value Ref Range    Lipase 158 73 - 393 U/L       MEDICATIONS GIVEN:  Medications   ondansetron (ZOFRAN) injection 4 mg (4 mg IntraVENous Given 4/27/17 1440)   sodium chloride 0.9 % bolus infusion 1,000 mL (0 mL IntraVENous IV Completed 4/27/17 1546)   HYDROmorphone (PF) (DILAUDID) injection 1 mg (1 mg IntraVENous Given 4/27/17 1443)   diphenhydrAMINE (BENADRYL) injection 25 mg (25 mg IntraVENous Given 4/27/17 1510)   HYDROmorphone (PF) (DILAUDID) injection 1 mg (1 mg IntraVENous Given 4/27/17 1613)       IMPRESSION:  1. Abdominal pain, generalized        PLAN:  1. Discharge Medication List as of 4/27/2017  3:52 PM      START taking these medications    Details   dicyclomine (BENTYL) 20 mg tablet Take 1 Tab by mouth every six (6) hours for 20 doses. , Normal, Disp-20 Tab, R-0      ondansetron (ZOFRAN ODT) 4 mg disintegrating tablet Take 1 Tab by mouth every eight (8) hours as needed for Nausea., Normal, Disp-20 Tab, R-0         CONTINUE these medications which have CHANGED    Details   famotidine (PEPCID) 20 mg tablet Take 1 Tab by mouth two (2) times a day for 10 days. , Normal, Disp-20 Tab, R-0         CONTINUE these medications which have NOT CHANGED    Details   fexofenadine (ALLEGRA) 60 mg tablet Take 120 mg by mouth daily. , Historical Med      cetirizine (ZYRTEC) 10 mg tablet Take 20 mg by mouth every evening., Historical Med      estradiol (ESTRACE) 1 mg tablet Take 1 mg by mouth daily. , Historical Med      omeprazole (PRILOSEC) 20 mg capsule Take 40 mg by mouth daily. , Historical Med      amLODIPine (NORVASC) 5 mg tablet Take 5 mg by mouth daily. , Historical Med      hydrochlorothiazide (HYDRODIURIL) 25 mg tablet Take 25 mg by mouth daily. , Historical Med      metFORMIN (GLUCOPHAGE) 500 mg tablet Take 500 mg by mouth daily (with breakfast). , Historical Med      albuterol (PROVENTIL, VENTOLIN) 90 mcg/Actuation inhaler Take 2 Puffs by inhalation every six (6) hours as needed., Historical Med      EPINEPHrine (EPIPEN) 0.3 mg/0.3 mL (1:1,000) injection 0.3 mL by IntraMUSCular route once as needed for up to 1 dose., Print, Disp-0.3 mL, R-1           2. Follow-up Information     Follow up With Details Comments [address] BE[facility] Aparicio Schedule an appointment as soon as possible for a visit in 2 days As needed, If symptoms worsen, Possible further evaluation and treatment [id number] [id number]      [facility] EMERGENCY DEPT Go to As needed, If symptoms worsen [address] [facility] [address] [facility] [address] Pradeep Villar MD Schedule an appointment as soon as possible for a visit in 2 days As needed, If symptoms worsen, Possible further evaluation and treatment [facility] [address] [address]  [phone]          Return to ED if worse     Discharge Note:  4:01 PM  The patient has been re-evaluated and is ready for discharge. Reviewed available results with patient. Counseled patient on diagnosis and care plan. Patient has expressed understanding, and all questions have been answered. Patient agrees with plan and agrees to follow up as recommended, or return to the ED if their symptoms worsen.  Discharge instructions have been provided and explained to the patient, along with reasons to return to the ED. Attestation: This note is prepared by David Ocampo, acting as Scribe for LanternCRMRAMONITA. LanternCRMRAMONITA: The scribe's documentation has been prepared under my direction and personally reviewed by me in its entirety. I confirm that the note above accurately reflects all work, treatment, procedures, and medical decision making performed by me. This note will not be viewable in [address]

## 2017-07-10 ENCOUNTER — HOSPITAL ENCOUNTER (EMERGENCY)
Age: 47
Discharge: HOME | End: 2017-07-11
Attending: EMERGENCY MEDICINE | Admitting: EMERGENCY MEDICINE
Payer: SELF-PAY

## 2017-07-10 ENCOUNTER — APPOINTMENT (OUTPATIENT)
Dept: CT IMAGING | Age: 47
End: 2017-07-10
Attending: EMERGENCY MEDICINE
Payer: SELF-PAY

## 2017-07-10 DIAGNOSIS — R19.7 DIARRHEA, UNSPECIFIED TYPE: Primary | ICD-10-CM

## 2017-07-10 DIAGNOSIS — R10.84 ABDOMINAL PAIN, GENERALIZED: ICD-10-CM

## 2017-07-10 LAB
ALBUMIN SERPL BCP-MCNC: 3.9 G/DL (ref 3.5–5)
ALBUMIN/GLOB SERPL: 1 {RATIO} (ref 1.1–2.2)
ALP SERPL-CCNC: 69 U/L (ref 45–117)
ALT SERPL-CCNC: 67 U/L (ref 12–78)
ANION GAP BLD CALC-SCNC: 12 MMOL/L (ref 5–15)
AST SERPL W P-5'-P-CCNC: 51 U/L (ref 15–37)
BASOPHILS # BLD AUTO: 0 K/UL (ref 0–0.1)
BASOPHILS # BLD: 0 % (ref 0–1)
BILIRUB SERPL-MCNC: 0.7 MG/DL (ref 0.2–1)
BUN SERPL-MCNC: 6 MG/DL (ref 6–20)
BUN/CREAT SERPL: 8 (ref 12–20)
CALCIUM SERPL-MCNC: 9.2 MG/DL (ref 8.5–10.1)
CHLORIDE SERPL-SCNC: 101 MMOL/L (ref 97–108)
CO2 SERPL-SCNC: 23 MMOL/L (ref 21–32)
CREAT SERPL-MCNC: 0.8 MG/DL (ref 0.55–1.02)
EOSINOPHIL # BLD: 0.2 K/UL (ref 0–0.4)
EOSINOPHIL NFR BLD: 4 % (ref 0–7)
ERYTHROCYTE [DISTWIDTH] IN BLOOD BY AUTOMATED COUNT: 18 % (ref 11.5–14.5)
GLOBULIN SER CALC-MCNC: 4 G/DL (ref 2–4)
GLUCOSE SERPL-MCNC: 254 MG/DL (ref 65–100)
HCT VFR BLD AUTO: 41.9 % (ref 35–47)
HGB BLD-MCNC: 14.2 G/DL (ref 11.5–16)
LYMPHOCYTES # BLD AUTO: 20 % (ref 12–49)
LYMPHOCYTES # BLD: 1.1 K/UL (ref 0.8–3.5)
MCH RBC QN AUTO: 27.9 PG (ref 26–34)
MCHC RBC AUTO-ENTMCNC: 33.9 G/DL (ref 30–36.5)
MCV RBC AUTO: 82.3 FL (ref 80–99)
MONOCYTES # BLD: 0.2 K/UL (ref 0–1)
MONOCYTES NFR BLD AUTO: 4 % (ref 5–13)
NEUTS SEG # BLD: 4 K/UL (ref 1.8–8)
NEUTS SEG NFR BLD AUTO: 72 % (ref 32–75)
PLATELET # BLD AUTO: 134 K/UL (ref 150–400)
POTASSIUM SERPL-SCNC: 3.3 MMOL/L (ref 3.5–5.1)
PROT SERPL-MCNC: 7.9 G/DL (ref 6.4–8.2)
RBC # BLD AUTO: 5.09 M/UL (ref 3.8–5.2)
SODIUM SERPL-SCNC: 136 MMOL/L (ref 136–145)
WBC # BLD AUTO: 5.5 K/UL (ref 3.6–11)

## 2017-07-10 PROCEDURE — 99284 EMERGENCY DEPT VISIT MOD MDM: CPT

## 2017-07-10 PROCEDURE — 36415 COLL VENOUS BLD VENIPUNCTURE: CPT | Performed by: EMERGENCY MEDICINE

## 2017-07-10 PROCEDURE — 74011250636 HC RX REV CODE- 250/636: Performed by: EMERGENCY MEDICINE

## 2017-07-10 PROCEDURE — 74177 CT ABD & PELVIS W/CONTRAST: CPT

## 2017-07-10 PROCEDURE — 74011636320 HC RX REV CODE- 636/320: Performed by: EMERGENCY MEDICINE

## 2017-07-10 PROCEDURE — 74011000258 HC RX REV CODE- 258: Performed by: EMERGENCY MEDICINE

## 2017-07-10 PROCEDURE — 96375 TX/PRO/DX INJ NEW DRUG ADDON: CPT

## 2017-07-10 PROCEDURE — 96374 THER/PROPH/DIAG INJ IV PUSH: CPT

## 2017-07-10 PROCEDURE — 85025 COMPLETE CBC W/AUTO DIFF WBC: CPT | Performed by: EMERGENCY MEDICINE

## 2017-07-10 PROCEDURE — 96361 HYDRATE IV INFUSION ADD-ON: CPT

## 2017-07-10 PROCEDURE — 80053 COMPREHEN METABOLIC PANEL: CPT | Performed by: EMERGENCY MEDICINE

## 2017-07-10 RX ORDER — DIPHENHYDRAMINE HYDROCHLORIDE 50 MG/ML
25 INJECTION, SOLUTION INTRAMUSCULAR; INTRAVENOUS
Status: COMPLETED | OUTPATIENT
Start: 2017-07-10 | End: 2017-07-10

## 2017-07-10 RX ORDER — ONDANSETRON 2 MG/ML
4 INJECTION INTRAMUSCULAR; INTRAVENOUS
Status: COMPLETED | OUTPATIENT
Start: 2017-07-10 | End: 2017-07-10

## 2017-07-10 RX ORDER — SODIUM CHLORIDE 0.9 % (FLUSH) 0.9 %
10 SYRINGE (ML) INJECTION
Status: COMPLETED | OUTPATIENT
Start: 2017-07-10 | End: 2017-07-10

## 2017-07-10 RX ORDER — HYDROMORPHONE HYDROCHLORIDE 1 MG/ML
0.5 INJECTION, SOLUTION INTRAMUSCULAR; INTRAVENOUS; SUBCUTANEOUS
Status: COMPLETED | OUTPATIENT
Start: 2017-07-10 | End: 2017-07-10

## 2017-07-10 RX ADMIN — DIPHENHYDRAMINE HYDROCHLORIDE 25 MG: 50 INJECTION, SOLUTION INTRAMUSCULAR; INTRAVENOUS at 23:48

## 2017-07-10 RX ADMIN — SODIUM CHLORIDE 1000 ML: 900 INJECTION, SOLUTION INTRAVENOUS at 23:08

## 2017-07-10 RX ADMIN — HYDROMORPHONE HYDROCHLORIDE 0.5 MG: 1 INJECTION, SOLUTION INTRAMUSCULAR; INTRAVENOUS; SUBCUTANEOUS at 23:09

## 2017-07-10 RX ADMIN — SODIUM CHLORIDE 100 ML: 900 INJECTION, SOLUTION INTRAVENOUS at 23:23

## 2017-07-10 RX ADMIN — Medication 10 ML: at 23:23

## 2017-07-10 RX ADMIN — ONDANSETRON 4 MG: 2 INJECTION INTRAMUSCULAR; INTRAVENOUS at 23:09

## 2017-07-10 RX ADMIN — IOPAMIDOL 100 ML: 755 INJECTION, SOLUTION INTRAVENOUS at 23:23

## 2017-07-11 VITALS
OXYGEN SATURATION: 95 % | BODY MASS INDEX: 38.83 KG/M2 | SYSTOLIC BLOOD PRESSURE: 159 MMHG | RESPIRATION RATE: 16 BRPM | WEIGHT: 211 LBS | HEART RATE: 84 BPM | TEMPERATURE: 98.1 F | HEIGHT: 62 IN | DIASTOLIC BLOOD PRESSURE: 79 MMHG

## 2017-07-11 RX ORDER — DICYCLOMINE HYDROCHLORIDE 10 MG/1
10 CAPSULE ORAL 4 TIMES DAILY
Qty: 20 CAP | Refills: 0 | Status: SHIPPED | OUTPATIENT
Start: 2017-07-11 | End: 2017-07-16

## 2017-07-11 RX ORDER — ONDANSETRON 4 MG/1
4 TABLET, ORALLY DISINTEGRATING ORAL
Qty: 8 TAB | Refills: 0 | Status: SHIPPED | OUTPATIENT
Start: 2017-07-11 | End: 2017-08-04

## 2017-07-11 NOTE — ED NOTES
Back from CT. Pt. C/O itching on hands and feet. Few red hives noted on upper chest and feet. No respiratory distress. Dr. Deangelo Montenegro notified and order for Benadryl received.

## 2017-07-11 NOTE — ED TRIAGE NOTES
Pt arrives from home c/o generalized abd pain with nausea and diarrhea that started last Thursday. Pt reports she has been treated twice in the past month for Diverticulitis. Pt reports diarrhea x5 daily. Pt states her s/s are similar to when she had C-diff.

## 2017-07-11 NOTE — DISCHARGE INSTRUCTIONS
Abdominal Pain: Care Instructions  Your Care Instructions    Abdominal pain has many possible causes. Some aren't serious and get better on their own in a few days. Others need more testing and treatment. If your pain continues or gets worse, you need to be rechecked and may need more tests to find out what is wrong. You may need surgery to correct the problem. Don't ignore new symptoms, such as fever, nausea and vomiting, urination problems, pain that gets worse, and dizziness. These may be signs of a more serious problem. Your doctor may have recommended a follow-up visit in the next 8 to 12 hours. If you are not getting better, you may need more tests or treatment. The doctor has checked you carefully, but problems can develop later. If you notice any problems or new symptoms, get medical treatment right away. Follow-up care is a key part of your treatment and safety. Be sure to make and go to all appointments, and call your doctor if you are having problems. It's also a good idea to know your test results and keep a list of the medicines you take. How can you care for yourself at home? · Rest until you feel better. · To prevent dehydration, drink plenty of fluids, enough so that your urine is light yellow or clear like water. Choose water and other caffeine-free clear liquids until you feel better. If you have kidney, heart, or liver disease and have to limit fluids, talk with your doctor before you increase the amount of fluids you drink. · If your stomach is upset, eat mild foods, such as rice, dry toast or crackers, bananas, and applesauce. Try eating several small meals instead of two or three large ones. · Wait until 48 hours after all symptoms have gone away before you have spicy foods, alcohol, and drinks that contain caffeine. · Do not eat foods that are high in fat. · Avoid anti-inflammatory medicines such as aspirin, ibuprofen (Advil, Motrin), and naproxen (Aleve).  These can cause stomach upset. Talk to your doctor if you take daily aspirin for another health problem. When should you call for help? Call 911 anytime you think you may need emergency care. For example, call if:  · You passed out (lost consciousness). · You pass maroon or very bloody stools. · You vomit blood or what looks like coffee grounds. · You have new, severe belly pain. Call your doctor now or seek immediate medical care if:  · Your pain gets worse, especially if it becomes focused in one area of your belly. · You have a new or higher fever. · Your stools are black and look like tar, or they have streaks of blood. · You have unexpected vaginal bleeding. · You have symptoms of a urinary tract infection. These may include:  ¨ Pain when you urinate. ¨ Urinating more often than usual.  ¨ Blood in your urine. · You are dizzy or lightheaded, or you feel like you may faint. Watch closely for changes in your health, and be sure to contact your doctor if:  · You are not getting better after 1 day (24 hours). Where can you learn more? Go to http://gladysGL 2oursalia.info/. Enter H141 in the search box to learn more about \"Abdominal Pain: Care Instructions. \"  Current as of: March 20, 2017  Content Version: 11.3  © 1533-7092 [employer]. Care instructions adapted under license by [employer] (which disclaims liability or warranty for this information). If you have questions about a medical condition or this instruction, always ask your healthcare professional. [address] any warranty or liability for your use of this information. Diarrhea: Care Instructions  Your Care Instructions    Diarrhea is loose, watery stools (bowel movements). The exact cause is often hard to find. Sometimes diarrhea is your body's way of getting rid of what caused an upset stomach. Viruses, food poisoning, and many medicines can cause diarrhea.  Some people get diarrhea in response to emotional stress, anxiety, or certain foods. Almost everyone has diarrhea now and then. It usually isn't serious, and your stools will return to normal soon. The important thing to do is replace the fluids you have lost, so you can prevent dehydration. The doctor has checked you carefully, but problems can develop later. If you notice any problems or new symptoms, get medical treatment right away. Follow-up care is a key part of your treatment and safety. Be sure to make and go to all appointments, and call your doctor if you are having problems. It's also a good idea to know your test results and keep a list of the medicines you take. How can you care for yourself at home? · Watch for signs of dehydration, which means your body has lost too much water. Dehydration is a serious condition and should be treated right away. Signs of dehydration are:  ¨ Increasing thirst and dry eyes and mouth. ¨ Feeling faint or lightheaded. ¨ Darker urine, and a smaller amount of urine than normal.  · To prevent dehydration, drink plenty of fluids, enough so that your urine is light yellow or clear like water. Choose water and other caffeine-free clear liquids until you feel better. If you have kidney, heart, or liver disease and have to limit fluids, talk with your doctor before you increase the amount of fluids you drink. · Begin eating small amounts of mild foods the next day, if you feel like it. ¨ Try yogurt that has live cultures of Lactobacillus. (Check the label.)  ¨ Avoid spicy foods, fruits, alcohol, and caffeine until 48 hours after all symptoms are gone. ¨ Avoid chewing gum that contains sorbitol. ¨ Avoid dairy products (except for yogurt with Lactobacillus) while you have diarrhea and for 3 days after symptoms are gone. · The doctor may recommend that you take over-the-counter medicine, such as loperamide (Imodium), if you still have diarrhea after 6 hours.  Read and follow all instructions on the label. Do not use this medicine if you have bloody diarrhea, a high fever, or other signs of serious illness. Call your doctor if you think you are having a problem with your medicine. When should you call for help? Call 911 anytime you think you may need emergency care. For example, call if:  · You passed out (lost consciousness). · Your stools are maroon or very bloody. Call your doctor now or seek immediate medical care if:  · You are dizzy or lightheaded, or you feel like you may faint. · Your stools are black and look like tar, or they have streaks of blood. · You have new or worse belly pain. · You have symptoms of dehydration, such as:  ¨ Dry eyes and a dry mouth. ¨ Passing only a little dark urine. ¨ Feeling thirstier than usual.  · You have a new or higher fever. Watch closely for changes in your health, and be sure to contact your doctor if:  · Your diarrhea is getting worse. · You see pus in the diarrhea. · You are not getting better after 2 days (48 hours). Where can you learn more? Go to http://gladys-alia.info/. Enter U853 in the search box to learn more about \"Diarrhea: Care Instructions. \"  Current as of: March 20, 2017  Content Version: 11.3  © 9684-5316 [employer]. Care instructions adapted under license by [employer] (which disclaims liability or warranty for this information). If you have questions about a medical condition or this instruction, always ask your healthcare professional. [place] [address] any warranty or liability for your use of this information.

## 2017-07-11 NOTE — ED NOTES
Pt. States itching has resolved; no hives noted. Discharge  Instructions given and reviewed with pt. Pt. Verbalized understanding. Steady gait on discharge. Home with family whom is driving.

## 2017-07-11 NOTE — ED PROVIDER NOTES
HPI Comments: 52 y.o. female with past medical history significant for diverticulitis, colon resection, anal fissures, c-diff, DM, Asthma, CKD,  who presents from home with chief complaint of abdominal pain. Pt reports 1.5 month hx of abdominal pain. She notes the pain is most significant to LLQ radiating up, yousif umbilically and described as \"tightness\". Symptoms have gradually worsened over the past 2 weeks and is most severe tonight. Pt also notes an acute onset of diarrhea x 4 days ago. She reports that the stool is dark but not black/tarry. She has had 3 episodes of diarrhea this evening. She has taken 5 Immodium today without relief. Pt states she has been on a couple courses of Cipro and Flagyl within the last few weeks. She completed her most recent course 10 days ago. Pt further c/o fever, nausea and elevated blood pressure. her BP was \"200/108\" this evening. She denies fever, vomiting, blood in stool or any other acute medical concerns at this time. Old chart review: Pt evaluated in ED multiple times for symptoms significant for abdominal pain/LLQ pain. Pt most recently evaluated 17 at [facility]. No imaging performed due to lack of tenderness on exam. Pt discharged home on Bentyl, Pepcid and Zofran. PCP: BEBA Wang  GI: Wandy Acevedo M.D. Note written by Nalini Owens, as dictated by Miles Reeves MD 12:01 AM    The history is provided by the patient. No  was used.         Past Medical History:   Diagnosis Date    Anal fissure     Anisocoria     Asthma     LAST EPISODE     Back pain     Cerumen impaction     Chronic kidney disease     hx uti in past    Coagulation defects     ocassional rectal bleeding due to anal fissure    Colovaginal fistula     Diabetes (HCC)     NIDDM    Diabetes ([facility] [address]     Diverticulitis     Diverticulosis     Enlarged tonsils     Frequent UTI     GERD (gastroesophageal reflux disease)     H/O endoscopy     with dilation    HA (headache)     Hepatic steatosis     Hx of colonoscopy with polypectomy     benign    Hypertension     Ill-defined condition     FREQUENT HIVES    Ill-defined condition     HX ELEVATED LIVER ENZYMES    Morbid obesity (HCC)     Nausea & vomiting     during diverticulitis flare    Obesity     Otitis media     Pneumonia     about 15 yrs ago    Psychiatric disorder     ANXIETY    Recurrent tonsillitis     Sinusitis     Transfusion history ~ age 35    postop hysterectomy    Unspecified sleep apnea     snores ( not diagnosed yet)     Urticaria     Urticaria        Past Surgical History:   Procedure Laterality Date    HX BREAST REDUCTION      blake.  HX GI  12    LAPAROSCOPIC HAND ASSISTED  POSS OPEN SIGMOID COLECTOMY POSS TEMPORARY DIVERTING LOOP ILEOSTOMY;  (no illeostomy needed)    HX GYN           HX GYN      cervical conization    HX HEENT      SINUS SURGERY LEFT X2    HX HEENT      SINUS SURGERY ON RIGHT X2    HX OTHER SURGICAL      Sphincterotomy    HX PELVIC LAPAROSCOPY      HX EMMANUEL AND BSO      HX UROLOGICAL  12     CYSTOSCOPY INSERTION URETERAL CATHETERS - Cystoscopy Insertion of bilateral ureteral stents         Family History:   Problem Relation Age of Onset    Diabetes Mother     Cancer Mother      NON-HODGKINS LYMPHOMA    Anesth Problems Mother      PONV    Diabetes Father     Heart Disease Father      CAD - STENTS, PACEMAKER    Arrhythmia Father        Social History     Social History    Marital status:      Spouse name: N/A    Number of children: N/A    Years of education: N/A     Occupational History    Not on file.      Social History Main Topics    Smoking status: Never Smoker    Smokeless tobacco: Never Used    Alcohol use Yes      Comment: Rarely    Drug use: No    Sexual activity: No     Other Topics Concern    Not on file     Social History Narrative         ALLERGIES: Aspirin; Prilosec [omeprazole magnesium]; Codeine; Morphine; and Percocet [oxycodone-acetaminophen]    Review of Systems   Constitutional: Positive for chills. Negative for fever. HENT: Negative for facial swelling. Eyes: Negative for visual disturbance. Respiratory: Negative for chest tightness. Cardiovascular: Negative for chest pain. Gastrointestinal: Positive for abdominal pain (LLQ), diarrhea and nausea. Genitourinary: Negative for dysuria. Musculoskeletal: Negative for arthralgias. Skin: Negative for rash. Neurological: Negative for dizziness. Hematological: Negative for adenopathy. Psychiatric/Behavioral: Negative for suicidal ideas. Vitals:    07/10/17 2157 07/10/17 2233 07/10/17 2300   BP: (!) 192/95 (!) 152/94 161/80   Pulse: 99 84    Resp: 20 18 16   Temp: 97.9 °F (36.6 °C)     SpO2: 97% 97% 97%   Weight: 95.7 kg (211 lb)     Height: 5' 2\" (1.575 m)              Physical Exam   Constitutional: She is oriented to person, place, and time. She appears well-developed and well-nourished. No distress. Obese   HENT:   Head: Normocephalic and atraumatic. Mouth/Throat: Oropharynx is clear and moist.   Eyes: Pupils are equal, round, and reactive to light. No scleral icterus. Neck: Normal range of motion. Neck supple. No thyromegaly present. Cardiovascular: Normal rate, regular rhythm, normal heart sounds and intact distal pulses. No murmur heard. Pulmonary/Chest: Effort normal and breath sounds normal. No respiratory distress. Abdominal: Soft. Bowel sounds are normal. She exhibits no distension. There is generalized tenderness. No focal tenderness   Musculoskeletal: Normal range of motion. She exhibits no edema. Neurological: She is alert and oriented to person, place, and time. Skin: Skin is warm and dry. No rash noted. She is not diaphoretic. Nursing note and vitals reviewed.    Note written by Nalini Patel, as dictated by Juana Jessica MD 12:04 AM    [facility]  Number of Diagnoses or Management Options  Abdominal pain, generalized:   Diarrhea, unspecified type:   Diagnosis management comments: A:  abd pain and diarrhea. Completed cipro/flagyl 10 days ago for diverticulitis. Pt worried she could have c. Diff. ED Course       Procedures     Labs and CT scan in ED unremarkable. Unable to produce stool sample in ED. Highly doubt c. Diff. Likely abx induced diarrhea. Stable for discharge home. No add'l abx  Try probiotics. F/u with PCP.

## 2017-07-11 NOTE — ED NOTES
Pt resting on stretcher with call bell within reach awaiting provider. Family at the bedside. Will continue to monitor.

## 2017-08-03 ENCOUNTER — HOSPITAL ENCOUNTER (EMERGENCY)
Age: 47
Discharge: HOME | End: 2017-08-04
Attending: EMERGENCY MEDICINE
Payer: SELF-PAY

## 2017-08-03 ENCOUNTER — APPOINTMENT (OUTPATIENT)
Dept: CT IMAGING | Age: 47
End: 2017-08-03
Attending: PHYSICIAN ASSISTANT
Payer: SELF-PAY

## 2017-08-03 VITALS
OXYGEN SATURATION: 99 % | HEIGHT: 62 IN | WEIGHT: 20.84 LBS | TEMPERATURE: 97.8 F | DIASTOLIC BLOOD PRESSURE: 93 MMHG | SYSTOLIC BLOOD PRESSURE: 168 MMHG | BODY MASS INDEX: 3.83 KG/M2 | HEART RATE: 70 BPM | RESPIRATION RATE: 16 BRPM

## 2017-08-03 DIAGNOSIS — R10.84 ABDOMINAL PAIN, GENERALIZED: ICD-10-CM

## 2017-08-03 DIAGNOSIS — K62.5 RECTAL BLEEDING: Primary | ICD-10-CM

## 2017-08-03 LAB
ALBUMIN SERPL BCP-MCNC: 4.2 G/DL (ref 3.5–5)
ALBUMIN/GLOB SERPL: 1.1 {RATIO} (ref 1.1–2.2)
ALP SERPL-CCNC: 65 U/L (ref 45–117)
ALT SERPL-CCNC: 71 U/L (ref 12–78)
ANION GAP BLD CALC-SCNC: 10 MMOL/L (ref 5–15)
APPEARANCE UR: CLEAR
APTT PPP: 28.7 SEC (ref 22.1–32.5)
AST SERPL W P-5'-P-CCNC: 58 U/L (ref 15–37)
BACTERIA URNS QL MICRO: NEGATIVE /HPF
BASOPHILS # BLD AUTO: 0 K/UL (ref 0–0.1)
BASOPHILS # BLD: 0 % (ref 0–1)
BILIRUB SERPL-MCNC: 0.4 MG/DL (ref 0.2–1)
BILIRUB UR QL: NEGATIVE
BUN SERPL-MCNC: 8 MG/DL (ref 6–20)
BUN/CREAT SERPL: 11 (ref 12–20)
CALCIUM SERPL-MCNC: 9.6 MG/DL (ref 8.5–10.1)
CHLORIDE SERPL-SCNC: 104 MMOL/L (ref 97–108)
CO2 SERPL-SCNC: 27 MMOL/L (ref 21–32)
COLOR UR: NORMAL
CREAT SERPL-MCNC: 0.71 MG/DL (ref 0.55–1.02)
EOSINOPHIL # BLD: 0.2 K/UL (ref 0–0.4)
EOSINOPHIL NFR BLD: 3 % (ref 0–7)
EPITH CASTS URNS QL MICRO: NORMAL /LPF
ERYTHROCYTE [DISTWIDTH] IN BLOOD BY AUTOMATED COUNT: 15 % (ref 11.5–14.5)
GLOBULIN SER CALC-MCNC: 3.7 G/DL (ref 2–4)
GLUCOSE SERPL-MCNC: 203 MG/DL (ref 65–100)
GLUCOSE UR STRIP.AUTO-MCNC: NEGATIVE MG/DL
HCT VFR BLD AUTO: 38.9 % (ref 35–47)
HGB BLD-MCNC: 13.6 G/DL (ref 11.5–16)
HGB UR QL STRIP: NEGATIVE
HYALINE CASTS URNS QL MICRO: NORMAL /LPF (ref 0–5)
INR PPP: 1 (ref 0.9–1.1)
KETONES UR QL STRIP.AUTO: NEGATIVE MG/DL
LEUKOCYTE ESTERASE UR QL STRIP.AUTO: NEGATIVE
LIPASE SERPL-CCNC: 265 U/L (ref 73–393)
LYMPHOCYTES # BLD AUTO: 32 % (ref 12–49)
LYMPHOCYTES # BLD: 2 K/UL (ref 0.8–3.5)
MCH RBC QN AUTO: 30.5 PG (ref 26–34)
MCHC RBC AUTO-ENTMCNC: 35 G/DL (ref 30–36.5)
MCV RBC AUTO: 87.2 FL (ref 80–99)
MONOCYTES # BLD: 0.3 K/UL (ref 0–1)
MONOCYTES NFR BLD AUTO: 5 % (ref 5–13)
NEUTS SEG # BLD: 3.5 K/UL (ref 1.8–8)
NEUTS SEG NFR BLD AUTO: 60 % (ref 32–75)
NITRITE UR QL STRIP.AUTO: NEGATIVE
PH UR STRIP: 5.5 [PH] (ref 5–8)
PLATELET # BLD AUTO: 177 K/UL (ref 150–400)
POTASSIUM SERPL-SCNC: 3.6 MMOL/L (ref 3.5–5.1)
PROT SERPL-MCNC: 7.9 G/DL (ref 6.4–8.2)
PROT UR STRIP-MCNC: NEGATIVE MG/DL
PROTHROMBIN TIME: 10.4 SEC (ref 9–11.1)
RBC # BLD AUTO: 4.46 M/UL (ref 3.8–5.2)
RBC #/AREA URNS HPF: NORMAL /HPF (ref 0–5)
SODIUM SERPL-SCNC: 141 MMOL/L (ref 136–145)
SP GR UR REFRACTOMETRY: 1.02 (ref 1–1.03)
THERAPEUTIC RANGE,PTTT: NORMAL SECS (ref 58–77)
UA: UC IF INDICATED,UAUC: NORMAL
UROBILINOGEN UR QL STRIP.AUTO: 0.2 EU/DL (ref 0.2–1)
WBC # BLD AUTO: 6 K/UL (ref 3.6–11)
WBC URNS QL MICRO: NORMAL /HPF (ref 0–4)

## 2017-08-03 PROCEDURE — 85025 COMPLETE CBC W/AUTO DIFF WBC: CPT | Performed by: EMERGENCY MEDICINE

## 2017-08-03 PROCEDURE — 74176 CT ABD & PELVIS W/O CONTRAST: CPT

## 2017-08-03 PROCEDURE — 96374 THER/PROPH/DIAG INJ IV PUSH: CPT

## 2017-08-03 PROCEDURE — 96376 TX/PRO/DX INJ SAME DRUG ADON: CPT

## 2017-08-03 PROCEDURE — 81001 URINALYSIS AUTO W/SCOPE: CPT | Performed by: PHYSICIAN ASSISTANT

## 2017-08-03 PROCEDURE — 99283 EMERGENCY DEPT VISIT LOW MDM: CPT

## 2017-08-03 PROCEDURE — 74011250636 HC RX REV CODE- 250/636: Performed by: PHYSICIAN ASSISTANT

## 2017-08-03 PROCEDURE — 80053 COMPREHEN METABOLIC PANEL: CPT | Performed by: EMERGENCY MEDICINE

## 2017-08-03 PROCEDURE — 96375 TX/PRO/DX INJ NEW DRUG ADDON: CPT

## 2017-08-03 PROCEDURE — 85730 THROMBOPLASTIN TIME PARTIAL: CPT | Performed by: EMERGENCY MEDICINE

## 2017-08-03 PROCEDURE — 85610 PROTHROMBIN TIME: CPT | Performed by: EMERGENCY MEDICINE

## 2017-08-03 PROCEDURE — 96361 HYDRATE IV INFUSION ADD-ON: CPT

## 2017-08-03 PROCEDURE — 36415 COLL VENOUS BLD VENIPUNCTURE: CPT | Performed by: EMERGENCY MEDICINE

## 2017-08-03 PROCEDURE — 83690 ASSAY OF LIPASE: CPT | Performed by: PHYSICIAN ASSISTANT

## 2017-08-03 RX ORDER — SODIUM CHLORIDE 0.9 % (FLUSH) 0.9 %
10 SYRINGE (ML) INJECTION
Status: DISCONTINUED | OUTPATIENT
Start: 2017-08-03 | End: 2017-08-04

## 2017-08-03 RX ADMIN — SODIUM CHLORIDE 1000 ML: 900 INJECTION, SOLUTION INTRAVENOUS at 23:23

## 2017-08-03 NOTE — LETTER
[address] 
[address] [facility] [id number] 
[phone] Work/School Note Date: 8/3/2017 To Whom It May concern: 
 
Lisa Frias was seen and treated today in the emergency room by the following provider(s): 
Attending Provider: Umair Clay MD 
Physician Assistant: BEBA Martinez. Lisa Frias may return to work on 8/5/17 or sooner if symptoms improve. Sincerely, Nicanor Reddy RN

## 2017-08-04 LAB — HEMOCCULT STL QL: POSITIVE

## 2017-08-04 PROCEDURE — 82272 OCCULT BLD FECES 1-3 TESTS: CPT | Performed by: PHYSICIAN ASSISTANT

## 2017-08-04 PROCEDURE — 74011250636 HC RX REV CODE- 250/636: Performed by: PHYSICIAN ASSISTANT

## 2017-08-04 RX ORDER — DIPHENHYDRAMINE HYDROCHLORIDE 50 MG/ML
INJECTION, SOLUTION INTRAMUSCULAR; INTRAVENOUS
Status: DISCONTINUED
Start: 2017-08-04 | End: 2017-08-04 | Stop reason: HOSPADM

## 2017-08-04 RX ORDER — ONDANSETRON 4 MG/1
4 TABLET, ORALLY DISINTEGRATING ORAL
Qty: 12 TAB | Refills: 0 | Status: SHIPPED | OUTPATIENT
Start: 2017-08-04 | End: 2017-08-14

## 2017-08-04 RX ORDER — DIPHENHYDRAMINE HYDROCHLORIDE 50 MG/ML
50 INJECTION, SOLUTION INTRAMUSCULAR; INTRAVENOUS ONCE
Status: COMPLETED | OUTPATIENT
Start: 2017-08-04 | End: 2017-08-04

## 2017-08-04 RX ORDER — ONDANSETRON 2 MG/ML
4 INJECTION INTRAMUSCULAR; INTRAVENOUS
Status: COMPLETED | OUTPATIENT
Start: 2017-08-04 | End: 2017-08-04

## 2017-08-04 RX ORDER — HYDROCODONE BITARTRATE AND ACETAMINOPHEN 5; 325 MG/1; MG/1
1 TABLET ORAL
Qty: 15 TAB | Refills: 0 | Status: SHIPPED | OUTPATIENT
Start: 2017-08-04 | End: 2017-10-06

## 2017-08-04 RX ORDER — HYDROMORPHONE HYDROCHLORIDE 1 MG/ML
1 INJECTION, SOLUTION INTRAMUSCULAR; INTRAVENOUS; SUBCUTANEOUS
Status: COMPLETED | OUTPATIENT
Start: 2017-08-04 | End: 2017-08-04

## 2017-08-04 RX ADMIN — ONDANSETRON 4 MG: 2 INJECTION INTRAMUSCULAR; INTRAVENOUS at 02:12

## 2017-08-04 RX ADMIN — DIPHENHYDRAMINE HYDROCHLORIDE 50 MG: 50 INJECTION, SOLUTION INTRAMUSCULAR; INTRAVENOUS at 01:15

## 2017-08-04 RX ADMIN — ONDANSETRON 4 MG: 2 INJECTION INTRAMUSCULAR; INTRAVENOUS at 00:18

## 2017-08-04 RX ADMIN — HYDROMORPHONE HYDROCHLORIDE 1 MG: 1 INJECTION, SOLUTION INTRAMUSCULAR; INTRAVENOUS; SUBCUTANEOUS at 02:12

## 2017-08-04 RX ADMIN — HYDROMORPHONE HYDROCHLORIDE 1 MG: 1 INJECTION, SOLUTION INTRAMUSCULAR; INTRAVENOUS; SUBCUTANEOUS at 00:18

## 2017-08-04 NOTE — ED NOTES
Patient given discharge instructions and verbalized understanding; PIV discontinued. Patient ambulatory out of department with steady gait accompanied by daughter.

## 2017-08-04 NOTE — DISCHARGE INSTRUCTIONS
Abdominal Pain: Care Instructions  Your Care Instructions    Abdominal pain has many possible causes. Some aren't serious and get better on their own in a few days. Others need more testing and treatment. If your pain continues or gets worse, you need to be rechecked and may need more tests to find out what is wrong. You may need surgery to correct the problem. Don't ignore new symptoms, such as fever, nausea and vomiting, urination problems, pain that gets worse, and dizziness. These may be signs of a more serious problem. Your doctor may have recommended a follow-up visit in the next 8 to 12 hours. If you are not getting better, you may need more tests or treatment. The doctor has checked you carefully, but problems can develop later. If you notice any problems or new symptoms, get medical treatment right away. Follow-up care is a key part of your treatment and safety. Be sure to make and go to all appointments, and call your doctor if you are having problems. It's also a good idea to know your test results and keep a list of the medicines you take. How can you care for yourself at home? · Rest until you feel better. · To prevent dehydration, drink plenty of fluids, enough so that your urine is light yellow or clear like water. Choose water and other caffeine-free clear liquids until you feel better. If you have kidney, heart, or liver disease and have to limit fluids, talk with your doctor before you increase the amount of fluids you drink. · If your stomach is upset, eat mild foods, such as rice, dry toast or crackers, bananas, and applesauce. Try eating several small meals instead of two or three large ones. · Wait until 48 hours after all symptoms have gone away before you have spicy foods, alcohol, and drinks that contain caffeine. · Do not eat foods that are high in fat. · Avoid anti-inflammatory medicines such as aspirin, ibuprofen (Advil, Motrin), and naproxen (Aleve).  These can cause stomach upset. Talk to your doctor if you take daily aspirin for another health problem. When should you call for help? Call 911 anytime you think you may need emergency care. For example, call if:  · You passed out (lost consciousness). · You pass maroon or very bloody stools. · You vomit blood or what looks like coffee grounds. · You have new, severe belly pain. Call your doctor now or seek immediate medical care if:  · Your pain gets worse, especially if it becomes focused in one area of your belly. · You have a new or higher fever. · Your stools are black and look like tar, or they have streaks of blood. · You have unexpected vaginal bleeding. · You have symptoms of a urinary tract infection. These may include:  ¨ Pain when you urinate. ¨ Urinating more often than usual.  ¨ Blood in your urine. · You are dizzy or lightheaded, or you feel like you may faint. Watch closely for changes in your health, and be sure to contact your doctor if:  · You are not getting better after 1 day (24 hours). Where can you learn more? Go to http://gladys-alia.info/. Enter D091 in the search box to learn more about \"Abdominal Pain: Care Instructions. \"  Current as of: March 20, 2017  Content Version: 11.3  © 8805-1566 [employer]. Care instructions adapted under license by [employer] (which disclaims liability or warranty for this information). If you have questions about a medical condition or this instruction, always ask your healthcare professional. [place] [address] any warranty or liability for your use of this information. Rectal Bleeding: Care Instructions  Your Care Instructions  Rectal bleeding in small amounts is common. You may see red spotting on toilet paper or drops of blood in the toilet. Rectal bleeding has many possible causes, from something as minor as hemorrhoids to something as serious as colon cancer.  You may need more tests to find the cause of your bleeding. Follow-up care is a key part of your treatment and safety. Be sure to make and go to all appointments, and call your doctor if you are having problems. Its also a good idea to know your test results and keep a list of the medicines you take. How can you care for yourself at home? · Avoid aspirin and other nonsteroidal anti-inflammatory drugs (NSAIDs), such as ibuprofen (Advil, Motrin) and naproxen (Aleve). They can cause you to bleed more. Ask your doctor if you can take acetaminophen (Tylenol). Read and follow all instructions on the label. · Use a stool softener that contains bran or psyllium. You can save money by buying bran or psyllium (available in bulk at most health food stores) and sprinkling it on foods or stirring it into fruit juice. You can also use a product such as Metamucil or Citrucel. · Take your medicines exactly as directed. Call your doctor if you think you are having a problem with your medicine. When should you call for help? Call 911 anytime you think you may need emergency care. For example, call if:  · You passed out (lost consciousness). · You pass maroon or very bloody stools. · You vomit blood or what looks like coffee grounds. Call your doctor now or seek immediate medical care if:  · You have severe belly pain. · You have increased bleeding. · You are dizzy or lightheaded, or you feel like you may faint. · Your stools are black and tarlike. Watch closely for changes in your health, and be sure to contact your doctor if:  · You lose weight and do not know why. · You feel tired all the time. · Your bleeding does not decrease after 2 days. Where can you learn more? Go to http://gladys-alia.info/. Enter I891 in the search box to learn more about \"Rectal Bleeding: Care Instructions. \"  Current as of: August 9, 2016  Content Version: 11.3  © 9736-2240 [employer].  Care instructions adapted under license by [employer] (which disclaims liability or warranty for this information). If you have questions about a medical condition or this instruction, always ask your healthcare professional. [address] any warranty or liability for your use of this information. We hope that we have addressed all of your medical concerns. The examination and treatment you received in the Emergency Department were for an emergent problem and were not intended as complete care. It is important that you follow up with your healthcare provider(s) for ongoing care. If your symptoms worsen or do not improve as expected, and you are unable to reach your usual health care provider(s), you should return to the Emergency Department. Today's healthcare is undergoing tremendous change, and patient satisfaction surveys are one of the many tools to assess the quality of medical care. You may receive a survey from the [employer] regarding your experience in the Emergency Department. I hope that your experience has been completely positive, particularly the medical care that I provided. As such, please participate in the survey; anything less than excellent does not meet my expectations or intentions. [address] [facility] and [address] [place] [address] participate in nationally recognized quality of care measures. If your blood pressure is greater than 120/80, as reported below, we urge that you seek medical care to address the potential of high blood pressure, commonly known as hypertension. Hypertension can be hereditary or can be caused by certain medical conditions, pain, stress, or \"white coat syndrome. \"       Please make an appointment with your health care provider(s) for follow up of your Emergency Department visit.        VITALS:   Patient Vitals for the past 8 hrs:   Temp Pulse Resp BP SpO2   08/03/17 2234 97.8 °F (36.6 °C) 70 16 (!) 168/93 99 %          Thank you for allowing us to provide you with medical care today. We realize that you have many choices for your emergency care needs. Please choose us in the future for any continued health care needs. Judge Ana Heltonmarcos Jimenez, [address] [place] [address] 20.   Office: [phone]            Recent Results (from the past 24 hour(s))   CBC WITH AUTOMATED DIFF    Collection Time: 08/03/17 10:40 PM   Result Value Ref Range    WBC 6.0 3.6 - 11.0 K/uL    RBC 4.46 3.80 - 5.20 M/uL    HGB 13.6 11.5 - 16.0 g/dL    HCT 38.9 35.0 - 47.0 %    MCV 87.2 80.0 - 99.0 FL    MCH 30.5 26.0 - 34.0 PG    MCHC 35.0 30.0 - 36.5 g/dL    RDW 15.0 (H) 11.5 - 14.5 %    PLATELET 000 441 - 840 K/uL    NEUTROPHILS 60 32 - 75 %    LYMPHOCYTES 32 12 - 49 %    MONOCYTES 5 5 - 13 %    EOSINOPHILS 3 0 - 7 %    BASOPHILS 0 0 - 1 %    ABS. NEUTROPHILS 3.5 1.8 - 8.0 K/UL    ABS. LYMPHOCYTES 2.0 0.8 - 3.5 K/UL    ABS. MONOCYTES 0.3 0.0 - 1.0 K/UL    ABS. EOSINOPHILS 0.2 0.0 - 0.4 K/UL    ABS. BASOPHILS 0.0 0.0 - 0.1 K/UL   METABOLIC PANEL, COMPREHENSIVE    Collection Time: 08/03/17 10:40 PM   Result Value Ref Range    Sodium 141 136 - 145 mmol/L    Potassium 3.6 3.5 - 5.1 mmol/L    Chloride 104 97 - 108 mmol/L    CO2 27 21 - 32 mmol/L    Anion gap 10 5 - 15 mmol/L    Glucose 203 (H) 65 - 100 mg/dL    BUN 8 6 - 20 MG/DL    Creatinine 0.71 0.55 - 1.02 MG/DL    BUN/Creatinine ratio 11 (L) 12 - 20      GFR est AA >60 >60 ml/min/1.73m2    GFR est non-AA >60 >60 ml/min/1.73m2    Calcium 9.6 8.5 - 10.1 MG/DL    Bilirubin, total 0.4 0.2 - 1.0 MG/DL    ALT (SGPT) 71 12 - 78 U/L    AST (SGOT) 58 (H) 15 - 37 U/L    Alk.  phosphatase 65 45 - 117 U/L    Protein, total 7.9 6.4 - 8.2 g/dL    Albumin 4.2 3.5 - 5.0 g/dL    Globulin 3.7 2.0 - 4.0 g/dL    A-G Ratio 1.1 1.1 - 2.2     PROTHROMBIN TIME + INR    Collection Time: 08/03/17 10:40 PM   Result Value Ref Range    INR 1.0 0.9 - 1.1      Prothrombin time 10.4 9.0 - 11.1 sec   PTT    Collection Time: 08/03/17 10:40 PM   Result Value Ref Range    aPTT 28.7 22.1 - 32.5 sec    aPTT, therapeutic range     58.0 - 77.0 SECS   LIPASE    Collection Time: 08/03/17 10:40 PM   Result Value Ref Range    Lipase 265 73 - 393 U/L   URINALYSIS W/ REFLEX CULTURE    Collection Time: 08/03/17 11:24 PM   Result Value Ref Range    Color YELLOW/STRAW      Appearance CLEAR CLEAR      Specific gravity 1.021 1.003 - 1.030      pH (UA) 5.5 5.0 - 8.0      Protein NEGATIVE  NEG mg/dL    Glucose NEGATIVE  NEG mg/dL    Ketone NEGATIVE  NEG mg/dL    Bilirubin NEGATIVE  NEG      Blood NEGATIVE  NEG      Urobilinogen 0.2 0.2 - 1.0 EU/dL    Nitrites NEGATIVE  NEG      Leukocyte Esterase NEGATIVE  NEG      WBC 0-4 0 - 4 /hpf    RBC 0-5 0 - 5 /hpf    Epithelial cells FEW FEW /lpf    Bacteria NEGATIVE  NEG /hpf    UA:UC IF INDICATED CULTURE NOT INDICATED BY UA RESULT CNI      Hyaline cast 2-5 0 - 5 /lpf   OCCULT BLOOD, STOOL    Collection Time: 08/04/17 12:42 AM   Result Value Ref Range    Occult blood, stool POSITIVE (A) NEG         Ct Abd Pelv Wo Cont    Result Date: 8/4/2017  INDICATION: ABD PAIN COMPARISON: July 10, 2017 TECHNIQUE: Noncontrast thin axial images were obtained through the abdomen and pelvis. Coronal and sagittal reconstructions were generated. CT dose reduction was achieved through use of a standardized protocol tailored for this examination and automatic exposure control for dose modulation. Adaptive statistical iterative reconstruction (ASIR) was utilized. FINDINGS: LUNG BASES: No abnormality. LIVER: Borderline enlarged, with underlying steatosis. GALLBLADDER: Surgically absent. SPLEEN: Prominent but unchanged. PANCREAS: No mass or ductal dilatation. ADRENALS: No mass. KIDNEYS: No mass, calculus, or hydronephrosis. GI TRACT:  Surgical anastomosis sigmoid colon. No bowel obstruction. Difficult to assess bowel wall thickening without oral contrast material. PERITONEUM: No free air or free fluid. APPENDIX: Unremarkable. RETROPERITONEUM: No lymphadenopathy or aortic aneurysm. ADDITIONAL COMMENTS: N/A. URINARY BLADDER: Unremarkable. REPRODUCTIVE ORGANS: Unremarkable. LYMPH NODES:  None enlarged. FREE FLUID:  None. BONES: No destructive bone lesion. ADDITIONAL COMMENTS: N/A. IMPRESSION: Unchanged hepatic steatosis and borderline hepatic enlargement. Postsurgical changes sigmoid colon.  No nephrolithiasis or hydronephrosis

## 2017-08-04 NOTE — ED TRIAGE NOTES
Triage note: Patient arriving c/o rectal bleeding and abdominal pain. Patient reports maroon colored stools with blood clots. Patient is not on blood thinners. Patient reports taking Immodium and Bentyl without relief. Has been on Flagyl and Cipro for diverticulitis flare for the last month.

## 2017-08-04 NOTE — ED PROVIDER NOTES
HPI Comments: 52 y.o. female with past medical history significant for diverticulitis, colon resection, anal fissures, c-diff, DM, Asthma, CKD,  who presents from home with chief complaint of abdominal pain and rectal bleeding. Pt reports 1.5 month hx of abdominal pain; pain is generalized. Pt has hx of anal fissures and chronic diarrhea and intermittent bleeding which she states is worse. Was on Bactrim and Flagyl for diverticulosis as a precaution but stopped secondary to diarrhea. Attempted to call GI today but was not able to be seen. Also followed by Dr Kimo Connolly after colon resection. Denies fever, vomiting, blood in stool or any other acute medical concerns at this time.      Old chart review: Pt evaluated in ED multiple times for symptoms significant for abdominal pain/LLQ pain.      PCP: BEBA Low  GI: Sarah Teixeira M.D. Colon rectal Dr Kimo Connolly. Patient is a 52 y.o. female presenting with abdominal pain and anal bleeding. The history is provided by the patient. Abdominal Pain    This is a recurrent problem. The problem occurs constantly. The problem has been gradually worsening. The pain is located in the generalized abdominal region. The pain is at a severity of 8/10. The pain is moderate. Associated symptoms include diarrhea, nausea and myalgias. Pertinent negatives include no fever, no dysuria, no frequency, no hematuria, no headaches and no chest pain. Nothing worsens the pain. The pain is relieved by nothing. Rectal Bleeding    Associated symptoms include abdominal pain, nausea and diarrhea. Pertinent negatives include no dysuria, no abdominal distention and no fever.         Past Medical History:   Diagnosis Date    Anal fissure     Anisocoria     Asthma     LAST EPISODE     Back pain     Cerumen impaction     Chronic kidney disease     hx uti in past    Coagulation defects     ocassional rectal bleeding due to anal fissure    Colovaginal fistula  Diabetes ([facility] [address]     NIDDM    Diabetes ([facility] [address]     Diverticulitis     Diverticulosis     Enlarged tonsils     Frequent UTI     GERD (gastroesophageal reflux disease)     H/O endoscopy     with dilation    HA (headache)     Hepatic steatosis     Hx of colonoscopy with polypectomy     benign    Hypertension     Ill-defined condition     FREQUENT HIVES    Ill-defined condition     HX ELEVATED LIVER ENZYMES    Morbid obesity (HCC)     Nausea & vomiting     during diverticulitis flare    Obesity     Otitis media     Pneumonia     about 15 yrs ago    Psychiatric disorder     ANXIETY    Recurrent tonsillitis     Sinusitis     Transfusion history ~ age 35    postop hysterectomy    Unspecified sleep apnea     snores ( not diagnosed yet)     Urticaria     Urticaria        Past Surgical History:   Procedure Laterality Date    HX BREAST REDUCTION      blake.  HX GI  12    LAPAROSCOPIC HAND ASSISTED  POSS OPEN SIGMOID COLECTOMY POSS TEMPORARY DIVERTING LOOP ILEOSTOMY;  (no illeostomy needed)    HX GYN           HX GYN      cervical conization    HX HEENT      SINUS SURGERY LEFT X2    HX HEENT      SINUS SURGERY ON RIGHT X2    HX OTHER SURGICAL      Sphincterotomy    HX PELVIC LAPAROSCOPY      HX EMMANUEL AND BSO      HX UROLOGICAL  12     CYSTOSCOPY INSERTION URETERAL CATHETERS - Cystoscopy Insertion of bilateral ureteral stents         Family History:   Problem Relation Age of Onset    Diabetes Mother     Cancer Mother      NON-HODGKINS LYMPHOMA    Anesth Problems Mother      PONV    Diabetes Father     Heart Disease Father      CAD - STENTS, PACEMAKER    Arrhythmia Father        Social History     Social History    Marital status:      Spouse name: N/A    Number of children: N/A    Years of education: N/A     Occupational History    Not on file.      Social History Main Topics    Smoking status: Never Smoker    Smokeless tobacco: Never Used   Martín Mano Alcohol use Yes      Comment: Rarely    Drug use: No    Sexual activity: No     Other Topics Concern    Not on file     Social History Narrative         ALLERGIES: Aspirin; Prilosec [omeprazole magnesium]; Codeine; Contrast agent [iodine]; Morphine; and Percocet [oxycodone-acetaminophen]    Review of Systems   Constitutional: Negative for fever. HENT: Negative for ear discharge. Eyes: Negative for photophobia, pain, discharge and visual disturbance. Respiratory: Negative for apnea, cough, chest tightness and shortness of breath. Cardiovascular: Negative for chest pain, palpitations and leg swelling. Gastrointestinal: Positive for abdominal pain, anal bleeding, blood in stool, diarrhea and nausea. Negative for abdominal distention. Genitourinary: Negative for difficulty urinating, dysuria, flank pain, frequency and hematuria. Musculoskeletal: Positive for myalgias. Negative for gait problem, joint swelling and neck pain. Skin: Negative for color change and pallor. Neurological: Negative for syncope, weakness, numbness and headaches. Psychiatric/Behavioral: Negative for behavioral problems and confusion. The patient is not nervous/anxious. Vitals:    08/03/17 2234   BP: (!) 168/93   Pulse: 70   Resp: 16   Temp: 97.8 °F (36.6 °C)   SpO2: 99%   Weight: 9.453 kg (20 lb 13.4 oz)   Height: 5' 2\" (1.575 m)            Physical Exam   Constitutional: She is oriented to person, place, and time. She appears well-developed and well-nourished. HENT:   Head: Normocephalic and atraumatic. Right Ear: External ear normal.   Left Ear: External ear normal.   Nose: Nose normal.   Mouth/Throat: Oropharynx is clear and moist.   Eyes: Conjunctivae and EOM are normal. Pupils are equal, round, and reactive to light. Right eye exhibits no discharge. Left eye exhibits no discharge. Neck: Normal range of motion. Neck supple.    Cardiovascular: Normal rate, regular rhythm, normal heart sounds and intact distal pulses. Pulmonary/Chest: Effort normal and breath sounds normal.   Abdominal: Soft. Bowel sounds are normal. She exhibits no distension. There is tenderness (Generalized). There is no rebound and no guarding. Genitourinary: Rectal exam shows guaiac positive stool. Genitourinary Comments: Rectal exam: +hemorrhoid; non tender; no ruddy blood noted on exam    Musculoskeletal: Normal range of motion. She exhibits no edema or tenderness. Neurological: She is alert and oriented to person, place, and time. No cranial nerve deficit. Coordination normal.   Skin: Skin is warm and dry. No rash noted. Psychiatric: She has a normal mood and affect. Her behavior is normal. Judgment and thought content normal.   Nursing note and vitals reviewed. MDM  Number of Diagnoses or Management Options  Abdominal pain, generalized:   Rectal bleeding:   Diagnosis management comments: 51 yo female with rectal bleeding and abd pain; labs and imaging with no acute findings; pt with +guiac; discussed with Colon rectal who will see pt in am; pt initially wishing for admission but then feels she can be discharged home to see colon rectal; VSS; appears well; abd soft. BEBA Marley         Amount and/or Complexity of Data Reviewed  Clinical lab tests: ordered and reviewed  Tests in the radiology section of CPT®: ordered and reviewed  Decide to obtain previous medical records or to obtain history from someone other than the patient: yes  Review and summarize past medical records: yes  Discuss the patient with other providers: yes  Independent visualization of images, tracings, or specimens: yes      ED Course       Procedures    Patient has been reassessed. Feeling better. Awaiting labs. BEBA Marley     Pt feels she needs to be admitted if she is unable to be seen in followup; will call on call for Dr Cruz Price. Spoke to Dr Nely Mayfield; he is able to see in office tomorrow.      Pt wishes to be admitted; discussed with her we can consult Hospitalist but she may be admitted to observation; pt agrees. Pt has not changed her mind and will be discharged to followup with Colon rectal in am.     Discussed case with attending Physician David Campbell. Agrees with care and will D/C with follow up. Patient has been reassessed. Reviewed labs, medications and radiographics with patient. Ready to discharge home. 2:48 AM  Patient's results have been reviewed with them. Patient and/or family have verbally conveyed their understanding and agreement of the patient's signs, symptoms, diagnosis, treatment and prognosis and additionally agree to follow up as recommended or return to the Emergency Room should their condition change prior to follow-up. Discharge instructions have also been provided to the patient with some educational information regarding their diagnosis as well a list of reasons why they would want to return to the ER prior to their follow-up appointment should their condition change.   BEBA Muro

## 2017-10-05 ENCOUNTER — HOSPITAL ENCOUNTER (OUTPATIENT)
Age: 47
Setting detail: OBSERVATION
Discharge: HOME | End: 2017-10-06
Attending: EMERGENCY MEDICINE | Admitting: INTERNAL MEDICINE
Payer: COMMERCIAL

## 2017-10-05 ENCOUNTER — APPOINTMENT (OUTPATIENT)
Dept: CT IMAGING | Age: 47
End: 2017-10-05
Attending: EMERGENCY MEDICINE
Payer: COMMERCIAL

## 2017-10-05 DIAGNOSIS — E87.20 LACTIC ACIDOSIS: ICD-10-CM

## 2017-10-05 DIAGNOSIS — R10.84 ABDOMINAL PAIN, GENERALIZED: ICD-10-CM

## 2017-10-05 DIAGNOSIS — A04.72 PSEUDOMEMBRANOUS COLITIS: Primary | ICD-10-CM

## 2017-10-05 PROBLEM — R19.7 DIARRHEA: Status: ACTIVE | Noted: 2017-10-05

## 2017-10-05 LAB
ALBUMIN SERPL-MCNC: 3.9 G/DL (ref 3.5–5)
ALBUMIN/GLOB SERPL: 1 {RATIO} (ref 1.1–2.2)
ALP SERPL-CCNC: 52 U/L (ref 45–117)
ALT SERPL-CCNC: 52 U/L (ref 12–78)
ANION GAP SERPL CALC-SCNC: 10 MMOL/L (ref 5–15)
APPEARANCE UR: CLEAR
AST SERPL-CCNC: 20 U/L (ref 15–37)
BACTERIA URNS QL MICRO: NEGATIVE /HPF
BASOPHILS # BLD: 0 K/UL (ref 0–0.1)
BASOPHILS NFR BLD: 0 % (ref 0–1)
BILIRUB SERPL-MCNC: 0.4 MG/DL (ref 0.2–1)
BILIRUB UR QL: NEGATIVE
BUN SERPL-MCNC: 12 MG/DL (ref 6–20)
BUN/CREAT SERPL: 15 (ref 12–20)
CALCIUM SERPL-MCNC: 9.7 MG/DL (ref 8.5–10.1)
CHLORIDE SERPL-SCNC: 99 MMOL/L (ref 97–108)
CO2 SERPL-SCNC: 24 MMOL/L (ref 21–32)
COLOR UR: ABNORMAL
CREAT SERPL-MCNC: 0.8 MG/DL (ref 0.55–1.02)
EOSINOPHIL # BLD: 0 K/UL (ref 0–0.4)
EOSINOPHIL NFR BLD: 0 % (ref 0–7)
EPITH CASTS URNS QL MICRO: ABNORMAL /LPF
ERYTHROCYTE [DISTWIDTH] IN BLOOD BY AUTOMATED COUNT: 12.8 % (ref 11.5–14.5)
GLOBULIN SER CALC-MCNC: 3.9 G/DL (ref 2–4)
GLUCOSE SERPL-MCNC: 275 MG/DL (ref 65–100)
GLUCOSE UR STRIP.AUTO-MCNC: >1000 MG/DL
HCT VFR BLD AUTO: 36.5 % (ref 35–47)
HGB BLD-MCNC: 13.1 G/DL (ref 11.5–16)
HGB UR QL STRIP: NEGATIVE
HYALINE CASTS URNS QL MICRO: ABNORMAL /LPF (ref 0–5)
KETONES UR QL STRIP.AUTO: NEGATIVE MG/DL
LACTATE SERPL-SCNC: 3.4 MMOL/L (ref 0.4–2)
LEUKOCYTE ESTERASE UR QL STRIP.AUTO: NEGATIVE
LIPASE SERPL-CCNC: 283 U/L (ref 73–393)
LYMPHOCYTES # BLD: 0.7 K/UL (ref 0.8–3.5)
LYMPHOCYTES NFR BLD: 7 % (ref 12–49)
MCH RBC QN AUTO: 29.8 PG (ref 26–34)
MCHC RBC AUTO-ENTMCNC: 35.9 G/DL (ref 30–36.5)
MCV RBC AUTO: 83.1 FL (ref 80–99)
MONOCYTES # BLD: 0.2 K/UL (ref 0–1)
MONOCYTES NFR BLD: 2 % (ref 5–13)
NEUTS SEG # BLD: 8.6 K/UL (ref 1.8–8)
NEUTS SEG NFR BLD: 91 % (ref 32–75)
NITRITE UR QL STRIP.AUTO: NEGATIVE
PH UR STRIP: 6 [PH] (ref 5–8)
PLATELET # BLD AUTO: 196 K/UL (ref 150–400)
POTASSIUM SERPL-SCNC: 3.7 MMOL/L (ref 3.5–5.1)
PROT SERPL-MCNC: 7.8 G/DL (ref 6.4–8.2)
PROT UR STRIP-MCNC: NEGATIVE MG/DL
RBC # BLD AUTO: 4.39 M/UL (ref 3.8–5.2)
RBC #/AREA URNS HPF: ABNORMAL /HPF (ref 0–5)
RBC MORPH BLD: ABNORMAL
SODIUM SERPL-SCNC: 133 MMOL/L (ref 136–145)
SP GR UR REFRACTOMETRY: >1.03 (ref 1–1.03)
UA: UC IF INDICATED,UAUC: ABNORMAL
UROBILINOGEN UR QL STRIP.AUTO: 0.2 EU/DL (ref 0.2–1)
WBC # BLD AUTO: 9.5 K/UL (ref 3.6–11)
WBC URNS QL MICRO: ABNORMAL /HPF (ref 0–4)

## 2017-10-05 PROCEDURE — 96375 TX/PRO/DX INJ NEW DRUG ADDON: CPT

## 2017-10-05 PROCEDURE — 80053 COMPREHEN METABOLIC PANEL: CPT | Performed by: EMERGENCY MEDICINE

## 2017-10-05 PROCEDURE — 96374 THER/PROPH/DIAG INJ IV PUSH: CPT

## 2017-10-05 PROCEDURE — 74011250636 HC RX REV CODE- 250/636: Performed by: EMERGENCY MEDICINE

## 2017-10-05 PROCEDURE — 85025 COMPLETE CBC W/AUTO DIFF WBC: CPT | Performed by: EMERGENCY MEDICINE

## 2017-10-05 PROCEDURE — 65270000029 HC RM PRIVATE

## 2017-10-05 PROCEDURE — 74011250637 HC RX REV CODE- 250/637: Performed by: EMERGENCY MEDICINE

## 2017-10-05 PROCEDURE — 94640 AIRWAY INHALATION TREATMENT: CPT

## 2017-10-05 PROCEDURE — 96365 THER/PROPH/DIAG IV INF INIT: CPT

## 2017-10-05 PROCEDURE — 36415 COLL VENOUS BLD VENIPUNCTURE: CPT | Performed by: EMERGENCY MEDICINE

## 2017-10-05 PROCEDURE — 99218 HC RM OBSERVATION: CPT

## 2017-10-05 PROCEDURE — 74011000250 HC RX REV CODE- 250: Performed by: EMERGENCY MEDICINE

## 2017-10-05 PROCEDURE — 81001 URINALYSIS AUTO W/SCOPE: CPT | Performed by: EMERGENCY MEDICINE

## 2017-10-05 PROCEDURE — 99284 EMERGENCY DEPT VISIT MOD MDM: CPT

## 2017-10-05 PROCEDURE — 83605 ASSAY OF LACTIC ACID: CPT | Performed by: EMERGENCY MEDICINE

## 2017-10-05 PROCEDURE — 83690 ASSAY OF LIPASE: CPT | Performed by: EMERGENCY MEDICINE

## 2017-10-05 PROCEDURE — 74011000250 HC RX REV CODE- 250: Performed by: INTERNAL MEDICINE

## 2017-10-05 PROCEDURE — 74176 CT ABD & PELVIS W/O CONTRAST: CPT

## 2017-10-05 PROCEDURE — 96361 HYDRATE IV INFUSION ADD-ON: CPT

## 2017-10-05 PROCEDURE — 74011250636 HC RX REV CODE- 250/636: Performed by: INTERNAL MEDICINE

## 2017-10-05 RX ORDER — SODIUM CHLORIDE 0.9 % (FLUSH) 0.9 %
5-10 SYRINGE (ML) INJECTION AS NEEDED
Status: DISCONTINUED | OUTPATIENT
Start: 2017-10-05 | End: 2017-10-06 | Stop reason: HOSPADM

## 2017-10-05 RX ORDER — SODIUM CHLORIDE 9 MG/ML
125 INJECTION, SOLUTION INTRAVENOUS CONTINUOUS
Status: DISCONTINUED | OUTPATIENT
Start: 2017-10-05 | End: 2017-10-06 | Stop reason: HOSPADM

## 2017-10-05 RX ORDER — ACETYLCYSTEINE 200 MG/ML
200 SOLUTION ORAL; RESPIRATORY (INHALATION) EVERY 12 HOURS
Status: DISCONTINUED | OUTPATIENT
Start: 2017-10-05 | End: 2017-10-06 | Stop reason: HOSPADM

## 2017-10-05 RX ORDER — SODIUM CHLORIDE 9 MG/ML
100 INJECTION, SOLUTION INTRAVENOUS CONTINUOUS
Status: DISCONTINUED | OUTPATIENT
Start: 2017-10-05 | End: 2017-10-05

## 2017-10-05 RX ORDER — ACETAMINOPHEN 500 MG
1000 TABLET ORAL
Status: COMPLETED | OUTPATIENT
Start: 2017-10-05 | End: 2017-10-05

## 2017-10-05 RX ORDER — HYDROCHLOROTHIAZIDE 25 MG/1
25 TABLET ORAL DAILY
Status: DISCONTINUED | OUTPATIENT
Start: 2017-10-06 | End: 2017-10-05

## 2017-10-05 RX ORDER — FAMOTIDINE 20 MG/1
20 TABLET, FILM COATED ORAL 2 TIMES DAILY
Status: DISCONTINUED | OUTPATIENT
Start: 2017-10-06 | End: 2017-10-06 | Stop reason: HOSPADM

## 2017-10-05 RX ORDER — AMLODIPINE BESYLATE 5 MG/1
5 TABLET ORAL DAILY
Status: DISCONTINUED | OUTPATIENT
Start: 2017-10-06 | End: 2017-10-06 | Stop reason: HOSPADM

## 2017-10-05 RX ORDER — IPRATROPIUM BROMIDE AND ALBUTEROL SULFATE 2.5; .5 MG/3ML; MG/3ML
3 SOLUTION RESPIRATORY (INHALATION)
Status: DISCONTINUED | OUTPATIENT
Start: 2017-10-06 | End: 2017-10-06 | Stop reason: HOSPADM

## 2017-10-05 RX ORDER — INSULIN LISPRO 100 [IU]/ML
INJECTION, SOLUTION INTRAVENOUS; SUBCUTANEOUS
Status: DISCONTINUED | OUTPATIENT
Start: 2017-10-06 | End: 2017-10-06 | Stop reason: HOSPADM

## 2017-10-05 RX ORDER — HYDROCODONE BITARTRATE AND ACETAMINOPHEN 5; 325 MG/1; MG/1
1 TABLET ORAL
Status: DISCONTINUED | OUTPATIENT
Start: 2017-10-05 | End: 2017-10-06 | Stop reason: HOSPADM

## 2017-10-05 RX ORDER — PANTOPRAZOLE SODIUM 40 MG/1
40 TABLET, DELAYED RELEASE ORAL
Status: DISCONTINUED | OUTPATIENT
Start: 2017-10-06 | End: 2017-10-05

## 2017-10-05 RX ORDER — SODIUM CHLORIDE 9 MG/ML
1000 INJECTION, SOLUTION INTRAVENOUS ONCE
Status: COMPLETED | OUTPATIENT
Start: 2017-10-05 | End: 2017-10-05

## 2017-10-05 RX ORDER — FACIAL-BODY WIPES
10 EACH TOPICAL DAILY PRN
Status: DISCONTINUED | OUTPATIENT
Start: 2017-10-05 | End: 2017-10-05

## 2017-10-05 RX ORDER — METRONIDAZOLE 500 MG/100ML
500 INJECTION, SOLUTION INTRAVENOUS EVERY 8 HOURS
Status: DISCONTINUED | OUTPATIENT
Start: 2017-10-06 | End: 2017-10-06 | Stop reason: HOSPADM

## 2017-10-05 RX ORDER — ONDANSETRON 2 MG/ML
4 INJECTION INTRAMUSCULAR; INTRAVENOUS
Status: DISCONTINUED | OUTPATIENT
Start: 2017-10-05 | End: 2017-10-06 | Stop reason: HOSPADM

## 2017-10-05 RX ORDER — SODIUM CHLORIDE 0.9 % (FLUSH) 0.9 %
5-10 SYRINGE (ML) INJECTION EVERY 8 HOURS
Status: DISCONTINUED | OUTPATIENT
Start: 2017-10-05 | End: 2017-10-06 | Stop reason: HOSPADM

## 2017-10-05 RX ORDER — ALBUTEROL SULFATE 0.83 MG/ML
2.5 SOLUTION RESPIRATORY (INHALATION)
Status: DISCONTINUED | OUTPATIENT
Start: 2017-10-05 | End: 2017-10-06 | Stop reason: HOSPADM

## 2017-10-05 RX ORDER — ONDANSETRON 2 MG/ML
4 INJECTION INTRAMUSCULAR; INTRAVENOUS
Status: COMPLETED | OUTPATIENT
Start: 2017-10-05 | End: 2017-10-05

## 2017-10-05 RX ORDER — METRONIDAZOLE 500 MG/100ML
500 INJECTION, SOLUTION INTRAVENOUS
Status: DISCONTINUED | OUTPATIENT
Start: 2017-10-05 | End: 2017-10-05

## 2017-10-05 RX ORDER — IPRATROPIUM BROMIDE AND ALBUTEROL SULFATE 2.5; .5 MG/3ML; MG/3ML
3 SOLUTION RESPIRATORY (INHALATION)
Status: COMPLETED | OUTPATIENT
Start: 2017-10-05 | End: 2017-10-05

## 2017-10-05 RX ORDER — SODIUM CHLORIDE 9 MG/ML
1000 INJECTION, SOLUTION INTRAVENOUS CONTINUOUS
Status: DISCONTINUED | OUTPATIENT
Start: 2017-10-05 | End: 2017-10-06 | Stop reason: HOSPADM

## 2017-10-05 RX ORDER — MAGNESIUM SULFATE 100 %
4 CRYSTALS MISCELLANEOUS AS NEEDED
Status: DISCONTINUED | OUTPATIENT
Start: 2017-10-05 | End: 2017-10-06 | Stop reason: HOSPADM

## 2017-10-05 RX ORDER — HYDRALAZINE HYDROCHLORIDE 20 MG/ML
10 INJECTION INTRAMUSCULAR; INTRAVENOUS
Status: DISCONTINUED | OUTPATIENT
Start: 2017-10-05 | End: 2017-10-06 | Stop reason: HOSPADM

## 2017-10-05 RX ORDER — DEXTROSE 50 % IN WATER (D50W) INTRAVENOUS SYRINGE
12.5-25 AS NEEDED
Status: DISCONTINUED | OUTPATIENT
Start: 2017-10-05 | End: 2017-10-06 | Stop reason: HOSPADM

## 2017-10-05 RX ORDER — HYDROMORPHONE HYDROCHLORIDE 2 MG/ML
1 INJECTION, SOLUTION INTRAMUSCULAR; INTRAVENOUS; SUBCUTANEOUS ONCE
Status: COMPLETED | OUTPATIENT
Start: 2017-10-05 | End: 2017-10-05

## 2017-10-05 RX ORDER — HYDROMORPHONE HYDROCHLORIDE 2 MG/ML
0.5 INJECTION, SOLUTION INTRAMUSCULAR; INTRAVENOUS; SUBCUTANEOUS
Status: DISCONTINUED | OUTPATIENT
Start: 2017-10-05 | End: 2017-10-06

## 2017-10-05 RX ADMIN — IPRATROPIUM BROMIDE AND ALBUTEROL SULFATE 3 ML: .5; 3 SOLUTION RESPIRATORY (INHALATION) at 21:18

## 2017-10-05 RX ADMIN — ONDANSETRON 4 MG: 2 INJECTION INTRAMUSCULAR; INTRAVENOUS at 21:18

## 2017-10-05 RX ADMIN — SODIUM CHLORIDE 1000 ML: 900 INJECTION, SOLUTION INTRAVENOUS at 20:41

## 2017-10-05 RX ADMIN — ACETAMINOPHEN 1000 MG: 500 TABLET, FILM COATED ORAL at 21:18

## 2017-10-05 RX ADMIN — ONDANSETRON 4 MG: 2 INJECTION INTRAMUSCULAR; INTRAVENOUS at 23:20

## 2017-10-05 RX ADMIN — SODIUM CHLORIDE 1000 ML: 900 INJECTION, SOLUTION INTRAVENOUS at 23:20

## 2017-10-05 RX ADMIN — METRONIDAZOLE 500 MG: 500 INJECTION, SOLUTION INTRAVENOUS at 23:21

## 2017-10-05 RX ADMIN — HYDROMORPHONE HYDROCHLORIDE 1 MG: 2 INJECTION INTRAMUSCULAR; INTRAVENOUS; SUBCUTANEOUS at 20:41

## 2017-10-05 RX ADMIN — HYDROMORPHONE HYDROCHLORIDE 0.5 MG: 2 INJECTION INTRAMUSCULAR; INTRAVENOUS; SUBCUTANEOUS at 23:21

## 2017-10-05 NOTE — IP AVS SNAPSHOT
[address] [facility] [address] 
[phone] Patient: Ailin Murdock MRN: [id number] YZM:[id number]/44/7932 You are allergic to the following Allergen Reactions Aspirin Shortness of Breath Prilosec (Omeprazole Magnesium) Anaphylaxis CHERRY FLAVORED; PT TAKES REGULAR PRILOSEC AND IS OK Codeine Hives Itching Contrast Agent (Iodine) Itching Pt. Had itching after IV contrast with the last exam.  Benadryl was given Morphine Itching Severe itching Percocet (Oxycodone-Acetaminophen) Hives Recent Documentation Height Weight BMI OB Status Smoking Status 1.575 m 94.5 kg 38.1 kg/m2 Hysterectomy Never Smoker Unresulted Labs Order Current Status CULTURE, MRSA In process Emergency Contacts Name Discharge Info Relation Home Work Mobile Velton Quick N/A  AT THIS TIME [6] Parent [1] [phone] [address] [facility] CAREGIVER [3] Child [2] [phone] About your hospitalization You were admitted on:  October 5, 2017 You last received care in the:  [facility] 3 MED TELE You were discharged on:  October 6, 2017 Unit phone number:  [phone] Why you were hospitalized Your primary diagnosis was:  Not on File Your diagnoses also included:  Diarrhea Providers Seen During Your Hospitalizations Provider Role Specialty Primary office phone Concepción Rodriguez DO Attending Provider Emergency Medicine [phone] Rashi Singleton MD Attending Provider Internal Medicine [phone] Your Primary Care Physician (PCP) Primary Care Physician Office Phone Office Fax Danielle German [phone] Follow-up Information Follow up With Details Comments Contact Info [address] [address] 
[phone] Current Discharge Medication List  
  
 START taking these medications Dose & Instructions Dispensing Information Comments Morning Noon Evening Bedtime  
 acetylcysteine 200 mg/mL (20 %) solution Commonly known as:  MUCOMYST Your last dose was: Your next dose is:    
   
   
 Dose:  200 mg  
1 mL by Nebulization route every twelve (12) hours as needed. Quantity:  10 mL Refills:  0  
     
   
   
   
  
 butalbital-acetaminophen-caff -40 mg per capsule Commonly known as:  Modacruz Your last dose was: Your next dose is:    
   
   
 Dose:  1-2 Cap Take 1-2 Caps by mouth every eight (8) hours as needed for Pain or Headache. Max Daily Amount: 6 Caps. Quantity:  20 Cap Refills:  0 CONTINUE these medications which have NOT CHANGED Dose & Instructions Dispensing Information Comments Morning Noon Evening Bedtime  
 albuterol 90 mcg/actuation inhaler Commonly known as:  Zeeshan Sterling Your last dose was: Your next dose is:    
   
   
 Dose:  2 Puff Take 2 Puffs by inhalation every six (6) hours as needed. Refills:  0  
     
   
   
   
  
 cetirizine 10 mg tablet Commonly known as:  ZYRTEC Your last dose was: Your next dose is:    
   
   
 Dose:  20 mg Take 20 mg by mouth every evening. Refills:  0 EPINEPHrine 0.3 mg/0.3 mL injection Commonly known as:  Marshall Gage Your last dose was: Your next dose is:    
   
   
 Dose:  0.3 mg  
0.3 mL by IntraMUSCular route once as needed for up to 1 dose. Quantity:  0.3 mL Refills:  1  
     
   
   
   
  
 estradiol 1 mg tablet Commonly known as:  ESTRACE Your last dose was: Your next dose is:    
   
   
 Dose:  1 mg Take 1 mg by mouth daily. Refills:  0  
     
   
   
   
  
 fexofenadine 60 mg tablet Commonly known as:  Pedro Pablo Crespo Your last dose was:     
   
Your next dose is:    
   
   
 Dose:  120 mg  
 Take 120 mg by mouth daily. Refills:  0  
     
   
   
   
  
 hydroCHLOROthiazide 25 mg tablet Commonly known as:  HYDRODIURIL Your last dose was: Your next dose is:    
   
   
 Dose:  25 mg Take 25 mg by mouth daily. Refills:  0  
     
   
   
   
  
 metFORMIN 500 mg tablet Commonly known as:  GLUCOPHAGE Your last dose was: Your next dose is:    
   
   
 Dose:  500 mg Take 500 mg by mouth daily (with breakfast). Refills:  0 NORVASC 5 mg tablet Generic drug:  amLODIPine Your last dose was: Your next dose is:    
   
   
 Dose:  5 mg Take 5 mg by mouth daily. Refills:  0 PriLOSEC 20 mg capsule Generic drug:  omeprazole Your last dose was: Your next dose is:    
   
   
 Dose:  40 mg Take 40 mg by mouth daily. Refills:  0 STOP taking these medications HYDROcodone-acetaminophen 5-325 mg per tablet Commonly known as:  Wayna Glaze Where to Get Your Medications Information on where to get these meds will be given to you by the nurse or doctor. ! Ask your nurse or doctor about these medications  
  acetylcysteine 200 mg/mL (20 %) solution  
 butalbital-acetaminophen-caff -40 mg per capsule Discharge Instructions Continue taking your vancomycin that you have at home, you can continue taking your prednisone for your bronchitis as well, avoid all other antibiotics if you can. Discharge Orders None Introducing [facility]! Marcellus Cabrera introduces [email or web address] patient portal. Now you can access parts of your medical record, email your doctor's office, and request medication refills online. 1. In your internet browser, go to [email or web address] 2. Click on the First Time User? Click Here link in the Sign In box. You will see the New Member Sign Up page. 3. Enter your [employer] Access Code exactly as it appears below. You will not need to use this code after youve completed the sign-up process. If you do not sign up before the expiration date, you must request a new code. · [employer] Access Code: [facility] Expires: 10/8/2017 11:04 PM 
 
4. Enter the last four digits of your Social Security Number (xxxx) and Date of Birth (mm/dd/yyyy) as indicated and click Submit. You will be taken to the next sign-up page. 5. Create a [employer] ID. This will be your [employer] login ID and cannot be changed, so think of one that is secure and easy to remember. 6. Create a [employer] password. You can change your password at any time. 7. Enter your Password Reset Question and Answer. This can be used at a later time if you forget your password. 8. Enter your e-mail address. You will receive e-mail notification when new information is available in [address] 9. Click Sign Up. You can now view and download portions of your medical record. 10. Click the Download Summary menu link to download a portable copy of your medical information. If you have questions, please visit the Frequently Asked Questions section of the [employer] website. Remember, [employer] is NOT to be used for urgent needs. For medical emergencies, dial 911. Now available from your iPhone and Android! General Information Please provide this summary of care documentation to your next provider. Patient Signature:  ____________________________________________________________ Date:  ____________________________________________________________  
  
[employer] Provider Signature:  ____________________________________________________________ Date:  ____________________________________________________________

## 2017-10-06 VITALS
BODY MASS INDEX: 38.34 KG/M2 | RESPIRATION RATE: 18 BRPM | SYSTOLIC BLOOD PRESSURE: 122 MMHG | HEART RATE: 74 BPM | DIASTOLIC BLOOD PRESSURE: 63 MMHG | WEIGHT: 208.34 LBS | TEMPERATURE: 97.7 F | HEIGHT: 62 IN | OXYGEN SATURATION: 95 %

## 2017-10-06 LAB
ANION GAP SERPL CALC-SCNC: 9 MMOL/L (ref 5–15)
BASOPHILS # BLD: 0 K/UL (ref 0–0.1)
BASOPHILS NFR BLD: 0 % (ref 0–1)
BUN SERPL-MCNC: 11 MG/DL (ref 6–20)
BUN/CREAT SERPL: 15 (ref 12–20)
CALCIUM SERPL-MCNC: 8.6 MG/DL (ref 8.5–10.1)
CHLORIDE SERPL-SCNC: 103 MMOL/L (ref 97–108)
CO2 SERPL-SCNC: 23 MMOL/L (ref 21–32)
CREAT SERPL-MCNC: 0.71 MG/DL (ref 0.55–1.02)
EOSINOPHIL # BLD: 0 K/UL (ref 0–0.4)
EOSINOPHIL NFR BLD: 0 % (ref 0–7)
ERYTHROCYTE [DISTWIDTH] IN BLOOD BY AUTOMATED COUNT: 12.9 % (ref 11.5–14.5)
GLUCOSE BLD STRIP.AUTO-MCNC: 219 MG/DL (ref 65–100)
GLUCOSE BLD STRIP.AUTO-MCNC: 235 MG/DL (ref 65–100)
GLUCOSE BLD STRIP.AUTO-MCNC: 279 MG/DL (ref 65–100)
GLUCOSE SERPL-MCNC: 251 MG/DL (ref 65–100)
HCT VFR BLD AUTO: 32.2 % (ref 35–47)
HGB BLD-MCNC: 11.5 G/DL (ref 11.5–16)
LYMPHOCYTES # BLD: 0.7 K/UL (ref 0.8–3.5)
LYMPHOCYTES NFR BLD: 9 % (ref 12–49)
MCH RBC QN AUTO: 30.2 PG (ref 26–34)
MCHC RBC AUTO-ENTMCNC: 35.7 G/DL (ref 30–36.5)
MCV RBC AUTO: 84.5 FL (ref 80–99)
MONOCYTES # BLD: 0.4 K/UL (ref 0–1)
MONOCYTES NFR BLD: 4 % (ref 5–13)
NEUTS SEG # BLD: 7.1 K/UL (ref 1.8–8)
NEUTS SEG NFR BLD: 87 % (ref 32–75)
PLATELET # BLD AUTO: 177 K/UL (ref 150–400)
POTASSIUM SERPL-SCNC: 3.4 MMOL/L (ref 3.5–5.1)
RBC # BLD AUTO: 3.81 M/UL (ref 3.8–5.2)
SERVICE CMNT-IMP: ABNORMAL
SODIUM SERPL-SCNC: 135 MMOL/L (ref 136–145)
WBC # BLD AUTO: 8.2 K/UL (ref 3.6–11)

## 2017-10-06 PROCEDURE — 74011250637 HC RX REV CODE- 250/637: Performed by: INTERNAL MEDICINE

## 2017-10-06 PROCEDURE — 99218 HC RM OBSERVATION: CPT

## 2017-10-06 PROCEDURE — 85025 COMPLETE CBC W/AUTO DIFF WBC: CPT | Performed by: INTERNAL MEDICINE

## 2017-10-06 PROCEDURE — 74011000250 HC RX REV CODE- 250: Performed by: INTERNAL MEDICINE

## 2017-10-06 PROCEDURE — 96366 THER/PROPH/DIAG IV INF ADDON: CPT

## 2017-10-06 PROCEDURE — 94640 AIRWAY INHALATION TREATMENT: CPT

## 2017-10-06 PROCEDURE — 96376 TX/PRO/DX INJ SAME DRUG ADON: CPT

## 2017-10-06 PROCEDURE — 74011250636 HC RX REV CODE- 250/636: Performed by: INTERNAL MEDICINE

## 2017-10-06 PROCEDURE — 82962 GLUCOSE BLOOD TEST: CPT

## 2017-10-06 PROCEDURE — 36415 COLL VENOUS BLD VENIPUNCTURE: CPT | Performed by: INTERNAL MEDICINE

## 2017-10-06 PROCEDURE — 77030029684 HC NEB SM VOL KT MONA -A

## 2017-10-06 PROCEDURE — 94664 DEMO&/EVAL PT USE INHALER: CPT

## 2017-10-06 PROCEDURE — 74011636637 HC RX REV CODE- 636/637: Performed by: INTERNAL MEDICINE

## 2017-10-06 PROCEDURE — 80048 BASIC METABOLIC PNL TOTAL CA: CPT | Performed by: INTERNAL MEDICINE

## 2017-10-06 RX ORDER — BUTALBITAL, ACETAMINOPHEN AND CAFFEINE 300; 40; 50 MG/1; MG/1; MG/1
1-2 CAPSULE ORAL
Qty: 20 CAP | Refills: 0 | Status: SHIPPED | OUTPATIENT
Start: 2017-10-06 | End: 2017-10-11

## 2017-10-06 RX ORDER — BUTALBITAL, ACETAMINOPHEN AND CAFFEINE 50; 325; 40 MG/1; MG/1; MG/1
1 TABLET ORAL
Status: DISCONTINUED | OUTPATIENT
Start: 2017-10-06 | End: 2017-10-06 | Stop reason: HOSPADM

## 2017-10-06 RX ORDER — HYDROMORPHONE HYDROCHLORIDE 1 MG/ML
0.5 INJECTION, SOLUTION INTRAMUSCULAR; INTRAVENOUS; SUBCUTANEOUS
Status: DISCONTINUED | OUTPATIENT
Start: 2017-10-06 | End: 2017-10-06 | Stop reason: HOSPADM

## 2017-10-06 RX ORDER — ACETYLCYSTEINE 200 MG/ML
200 SOLUTION ORAL; RESPIRATORY (INHALATION)
Qty: 10 ML | Refills: 0 | Status: SHIPPED | OUTPATIENT
Start: 2017-10-06 | End: 2017-10-11

## 2017-10-06 RX ADMIN — HYDROCODONE BITARTRATE AND ACETAMINOPHEN 1 TABLET: 5; 325 TABLET ORAL at 06:26

## 2017-10-06 RX ADMIN — VANCOMYCIN HYDROCHLORIDE 125 MG: 1 INJECTION, POWDER, LYOPHILIZED, FOR SOLUTION INTRAVENOUS at 07:03

## 2017-10-06 RX ADMIN — Medication 10 ML: at 13:45

## 2017-10-06 RX ADMIN — Medication 1 CAPSULE: at 08:24

## 2017-10-06 RX ADMIN — Medication 10 ML: at 07:04

## 2017-10-06 RX ADMIN — SODIUM CHLORIDE 125 ML/HR: 900 INJECTION, SOLUTION INTRAVENOUS at 11:28

## 2017-10-06 RX ADMIN — FAMOTIDINE 20 MG: 20 TABLET, FILM COATED ORAL at 08:24

## 2017-10-06 RX ADMIN — ONDANSETRON 4 MG: 2 INJECTION INTRAMUSCULAR; INTRAVENOUS at 03:49

## 2017-10-06 RX ADMIN — HYDROMORPHONE HYDROCHLORIDE 0.5 MG: 1 INJECTION, SOLUTION INTRAMUSCULAR; INTRAVENOUS; SUBCUTANEOUS at 09:14

## 2017-10-06 RX ADMIN — METRONIDAZOLE 500 MG: 500 INJECTION, SOLUTION INTRAVENOUS at 08:26

## 2017-10-06 RX ADMIN — VANCOMYCIN HYDROCHLORIDE 125 MG: 1 INJECTION, POWDER, LYOPHILIZED, FOR SOLUTION INTRAVENOUS at 00:49

## 2017-10-06 RX ADMIN — IPRATROPIUM BROMIDE AND ALBUTEROL SULFATE 3 ML: .5; 3 SOLUTION RESPIRATORY (INHALATION) at 11:50

## 2017-10-06 RX ADMIN — BUTALBITAL, ACETAMINOPHEN AND CAFFEINE 1 TABLET: 50; 325; 40 TABLET ORAL at 13:42

## 2017-10-06 RX ADMIN — INSULIN LISPRO 3 UNITS: 100 INJECTION, SOLUTION INTRAVENOUS; SUBCUTANEOUS at 12:55

## 2017-10-06 RX ADMIN — INSULIN LISPRO 3 UNITS: 100 INJECTION, SOLUTION INTRAVENOUS; SUBCUTANEOUS at 08:23

## 2017-10-06 RX ADMIN — HYDROCODONE BITARTRATE AND ACETAMINOPHEN 1 TABLET: 5; 325 TABLET ORAL at 00:28

## 2017-10-06 RX ADMIN — IPRATROPIUM BROMIDE AND ALBUTEROL SULFATE 3 ML: .5; 3 SOLUTION RESPIRATORY (INHALATION) at 03:02

## 2017-10-06 RX ADMIN — HYDROMORPHONE HYDROCHLORIDE 0.5 MG: 1 INJECTION, SOLUTION INTRAMUSCULAR; INTRAVENOUS; SUBCUTANEOUS at 04:39

## 2017-10-06 RX ADMIN — IPRATROPIUM BROMIDE AND ALBUTEROL SULFATE 3 ML: .5; 3 SOLUTION RESPIRATORY (INHALATION) at 07:43

## 2017-10-06 RX ADMIN — SODIUM CHLORIDE 125 ML/HR: 900 INJECTION, SOLUTION INTRAVENOUS at 02:03

## 2017-10-06 RX ADMIN — ACETYLCYSTEINE 200 MG: 200 SOLUTION ORAL; RESPIRATORY (INHALATION) at 07:43

## 2017-10-06 RX ADMIN — AMLODIPINE BESYLATE 5 MG: 5 TABLET ORAL at 08:24

## 2017-10-06 NOTE — ED NOTES
Medicated PO for headache at 7/10. Updated on plan of care and patient denies any further questions at this time.

## 2017-10-06 NOTE — PROGRESS NOTES
Primary Nurse Quique Mathew RN and Lisette Amanda RN performed a dual skin assessment on this patient No impairment noted  Niall score is 21

## 2017-10-06 NOTE — DISCHARGE INSTRUCTIONS
Continue taking your vancomycin that you have at home, you can continue taking your prednisone for your bronchitis as well, avoid all other antibiotics if you can.

## 2017-10-06 NOTE — ED TRIAGE NOTES
The patient reports abdominal cramping and pain, recently diagnosed with bronchitis, and treated with PO Prednisone. Patient has had C Diff as well, for to months. Reports she thinks the Prednisone has caused a flare up. Denies urinary sx. Patient is taking PO Vanc at home for treatment, and remains on the steroid taper. Patient denies chest pain or shortness of breath, and denies dizziness. Patient placed on contact precautions. Patient is resting comfortably, bed in the lowest position, side rails raised, call bell in hand, lights dim. Instructed patient to not get up without assistance, and to ring the call bell for any questions or concerns. Updated patient on the plan of care.

## 2017-10-06 NOTE — PROGRESS NOTES
ADULT PROTOCOL: JET AEROSOL ASSESSMENT    Patient  Cheryl Mullins     52 y.o.   female     10/6/2017  3:37 AM    Breath Sounds Pre Procedure: Right Breath Sounds: Diminished                               Left Breath Sounds: Diminished    Breath Sounds Post Procedure: Right Breath Sounds: Diminished                                 Left Breath Sounds: Diminished    Breathing pattern: Pre procedure Breathing Pattern: Regular          Post procedure Breathing Pattern: Regular    Heart Rate: Pre procedure Pulse: 82           Post procedure Pulse: 101    Resp Rate: Pre procedure Respirations: 20           Post procedure Respirations: 20    Peak Flow: Pre bronchodilator   no          Post bronchodilator   no    FVC/FEV1: n/a    Incentive Spirometry:   n/a          Cough: Pre procedure Cough: Non-productive               Post procedure Cough: Non-productive    Suctioned: NO    Sputum: Pre procedure  none                 Post procedure  none    Oxygen: O2 Device: Room air   FiO2 (%) room air     Changed: NO    SpO2: Pre procedure SpO2: 97 %   without oxygen              Post procedure SpO2: 98 %  without oxygen    Nebulizer Therapy: Current medications Aerosolized Medications: DuoNeb      Changed: NO    Smoking History:unknown    Problem List:   Patient Active Problem List   Diagnosis Code    Thrush B37.0    Postoperative complication S19. 9XXA    Acute respiratory failure ([facility] [address] J96.00    Hypertension I10    Steroid-induced diabetes ([facility] [address] E09.9, T38.0X5A    Diarrhea R19.7       Respiratory Therapist: RT Darío

## 2017-10-06 NOTE — H&P
Hospitalist Admission Note    NAME: Ameena Kearney   :  1970   MRN:  [id number]     Date/Time:  10/5/2017 11:34 PM    Patient PCP: BEBA Camilo  ________________________________________________________________________    My assessment of this patient's clinical condition and my plan of care is as follows. Assessment / Plan:  Diarrhea concerning for recurrent Cdiff  Abd pain  Lactic acidosis  -completed tapered PO vancomycin several days ago. Pt started on Zpack and PO steroids for acute bronchitis. 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  -adding gladis-q, IV dilaudid/norco prn  -repeat lactate  -GI consultation ordered    Acute bronchitis  -lungs clear on CT A/P  -will hold abx and steroids given clear lungs and likely recurrent cdiff infection  -scheduled and prn nebs, mucomyst nebs  -IVF    GERD  -hold PPI due to hx of cdiff  -pepcid prn    HTN  -hold HCTZ due to mild hyponatremia  -cont' amlodipine, hydralazine prn    T2DM  -hold metformin  -SSI    Code Status: Full  Surrogate Decision Maker: daughter  DVT Prophylaxis: SCDs  GI Prophylaxis: not indicated          Subjective:   CHIEF COMPLAINT: abd pain and diarrhae    HISTORY OF PRESENT ILLNESS:     Otilio Jhaveri is a 52 y.o.  female w pmhx significant for hx of Cdiff x2, recently completed tapered PO vancomycin several days ago with stool starting to formed. Pt present to ED complaining of recurrent watery diarrhea, associated with campy abd pain. Pt states she recently went to her PCP and was started on Zpack and prednisone 60mg daily 2 days ago.   Pt started having diarrhea earlier today with abd pain and symptoms very similar to when she was diagnosed with cdiff in the past.  Pt since then had discontinued her steroid and abx today. She denied any associated fever, chills, cp, sob, palpitations, n/v/d. In the ED, vitals: T 98.2, P 87, /84, SpO2 99% on RA. Pertinent labs:  Na 133, Glc 275, lactate 3.4, wbc 9.5, . CT abd/pelv showed splenomegaly and milt hepatomegaly and hepatic steatosis. We were asked to admit for work up and evaluation of the above problems. Past Medical History:   Diagnosis Date    Anal fissure     Anisocoria     Asthma     LAST EPISODE     Back pain     Cerumen impaction     Chronic kidney disease     hx uti in past    Coagulation defects     ocassional rectal bleeding due to anal fissure    Colovaginal fistula     Diabetes (HCC)     NIDDM    Diabetes ([facility] [address]     Diverticulitis     Diverticulosis     Enlarged tonsils     Frequent UTI     GERD (gastroesophageal reflux disease)     H/O endoscopy     with dilation    HA (headache)     Hepatic steatosis     Hx of colonoscopy with polypectomy     benign    Hypertension     Ill-defined condition     FREQUENT HIVES    Ill-defined condition     HX ELEVATED LIVER ENZYMES    Morbid obesity (HCC)     Nausea & vomiting     during diverticulitis flare    Obesity     Otitis media     Pneumonia     about 15 yrs ago    Psychiatric disorder     ANXIETY    Recurrent tonsillitis     Sinusitis     Transfusion history ~ age 35    postop hysterectomy    Unspecified sleep apnea     snores ( not diagnosed yet)     Urticaria     Urticaria         Past Surgical History:   Procedure Laterality Date    HX BREAST REDUCTION      blake.     HX GI  12    LAPAROSCOPIC HAND ASSISTED  POSS OPEN SIGMOID COLECTOMY POSS TEMPORARY DIVERTING LOOP ILEOSTOMY;  (no illeostomy needed)    HX GYN           HX GYN      cervical conization    HX HEENT      SINUS SURGERY LEFT X2    HX HEENT      SINUS SURGERY ON RIGHT X2    HX OTHER SURGICAL  11/12    Sphincterotomy    HX PELVIC LAPAROSCOPY      HX EMMANUEL AND BSO      HX UROLOGICAL  7/31/12     CYSTOSCOPY INSERTION URETERAL CATHETERS - Cystoscopy Insertion of bilateral ureteral stents       Social History   Substance Use Topics    Smoking status: Never Smoker    Smokeless tobacco: Never Used    Alcohol use Yes      Comment: Rarely        Family History   Problem Relation Age of Onset    Diabetes Mother     Cancer Mother      NON-HODGKINS LYMPHOMA    Anesth Problems Mother      PONV    Diabetes Father     Heart Disease Father      CAD - STENTS, PACEMAKER    Arrhythmia Father      Allergies   Allergen Reactions    Aspirin Shortness of Breath    Prilosec [Omeprazole Magnesium] Anaphylaxis     CHERRY FLAVORED; PT TAKES REGULAR PRILOSEC AND IS OK    Codeine Hives and Itching    Contrast Agent [Iodine] Itching     Pt. Had itching after IV contrast with the last exam.  Benadryl was given    Morphine Itching     Severe itching    Percocet [Oxycodone-Acetaminophen] Hives        Prior to Admission medications    Medication Sig Start Date End Date Taking? Authorizing Provider   HYDROcodone-acetaminophen (NORCO) 5-325 mg per tablet Take 1 Tab by mouth every four (4) hours as needed for Pain. Max Daily Amount: 6 Tabs. 8/4/17   BEBA Livingston   fexofenadine (ALLEGRA) 60 mg tablet Take 120 mg by mouth daily. Historical Provider   cetirizine (ZYRTEC) 10 mg tablet Take 20 mg by mouth every evening. Historical Provider   EPINEPHrine (EPIPEN) 0.3 mg/0.3 mL (1:1,000) injection 0.3 mL by IntraMUSCular route once as needed for up to 1 dose. 11/2/15   April ALISE Yao MD   estradiol (ESTRACE) 1 mg tablet Take 1 mg by mouth daily. Historical Provider   omeprazole (PRILOSEC) 20 mg capsule Take 40 mg by mouth daily. Historical Provider   amLODIPine (NORVASC) 5 mg tablet Take 5 mg by mouth daily.     Historical Provider   hydrochlorothiazide (HYDRODIURIL) 25 mg tablet Take 25 mg by mouth daily. Historical Provider   metFORMIN (GLUCOPHAGE) 500 mg tablet Take 500 mg by mouth daily (with breakfast). Historical Provider   albuterol (PROVENTIL, VENTOLIN) 90 mcg/Actuation inhaler Take 2 Puffs by inhalation every six (6) hours as needed. Indy Marin MD       REVIEW OF SYSTEMS:     I am not able to complete the review of systems because:    The patient is intubated and sedated    The patient has altered mental status due to his acute medical problems    The patient has baseline aphasia from prior stroke(s)    The patient has baseline dementia and is not reliable historian    The patient is in acute medical distress and unable to provide information           Total of 12 systems reviewed as follows:       POSITIVE= BOLD text  Negative = text not underlined  General:  fever, chills, sweats, generalized weakness, weight loss/gain,      loss of appetite   Eyes:    blurred vision, eye pain, loss of vision, double vision  ENT:    rhinorrhea, pharyngitis   Respiratory:   cough, sputum production, SOB, VALENTE, wheezing, pleuritic pain   Cardiology:   chest pain, palpitations, orthopnea, PND, edema, syncope   Gastrointestinal:  abdominal pain , N/V, diarrhea, dysphagia, constipation, bleeding   Genitourinary:  frequency, urgency, dysuria, hematuria, incontinence   Muskuloskeletal :  arthralgia, myalgia, back pain  Hematology:  easy bruising, nose or gum bleeding, lymphadenopathy   Dermatological: rash, ulceration, pruritis, color change / jaundice  Endocrine:   hot flashes or polydipsia   Neurological:  headache, dizziness, confusion, focal weakness, paresthesia,     Speech difficulties, memory loss, gait difficulty  Psychological: Feelings of anxiety, depression, agitation    Objective:   VITALS:    Visit Vitals    /50    Pulse 87    Temp 98.2 °F (36.8 °C)    Resp 18    Ht 5' 2\" (1.575 m)    Wt 94.5 kg (208 lb 5.4 oz)    SpO2 97%    BMI 38.1 kg/m2       PHYSICAL EXAM:    General:    Alert, cooperative, no distress, appears stated age. HEENT: Atraumatic, anicteric sclerae, pink conjunctivae     No oral ulcers, mucosa moist, throat clear, dentition fair  Neck:  Supple, symmetrical,  thyroid: non tender  Lungs:   Clear to auscultation bilaterally. No Wheezing or Rhonchi. No rales. Chest wall:  No tenderness  No Accessory muscle use. Heart:   Regular  rhythm,  No  murmur   No edema  Abdomen:   +tender on mild palpation. BS+  Extremities: No cyanosis. No clubbing,      Skin turgor normal, Capillary refill normal, Radial dial pulse 2+  Skin:     Not pale. Not Jaundiced  No rashes   Psych:  Good insight. Not depressed. Not anxious or agitated. Neurologic: EOMs intact. No facial asymmetry. No aphasia or slurred speech. Symmetrical strength, Sensation grossly intact. Alert and oriented X 4.     _______________________________________________________________________  Care Plan discussed with:    Comments   Patient x    Family  x daughter   RN     Care Manager                    Consultant:  x ED physician   _______________________________________________________________________  Expected  Disposition:   Home with Family    HH/PT/OT/RN    SNF/LTC    EILEEN    ________________________________________________________________________  TOTAL TIME:  72 Minutes    Critical Care Provided     Minutes non procedure based      Comments     Reviewed previous records   >50% of visit spent in counseling and coordination of care  Discussion with patient and/or family and questions answered       ________________________________________________________________________  Signed: Rose Magaña MD    Procedures: see electronic medical records for all procedures/Xrays and details which were not copied into this note but were reviewed prior to creation of Plan.     LAB DATA REVIEWED:    Recent Results (from the past 24 hour(s))   CBC WITH AUTOMATED DIFF    Collection Time: 10/05/17  8:40 PM Result Value Ref Range    WBC 9.5 3.6 - 11.0 K/uL    RBC 4.39 3.80 - 5.20 M/uL    HGB 13.1 11.5 - 16.0 g/dL    HCT 36.5 35.0 - 47.0 %    MCV 83.1 80.0 - 99.0 FL    MCH 29.8 26.0 - 34.0 PG    MCHC 35.9 30.0 - 36.5 g/dL    RDW 12.8 11.5 - 14.5 %    PLATELET 910 066 - 111 K/uL    NEUTROPHILS 91 (H) 32 - 75 %    LYMPHOCYTES 7 (L) 12 - 49 %    MONOCYTES 2 (L) 5 - 13 %    EOSINOPHILS 0 0 - 7 %    BASOPHILS 0 0 - 1 %    ABS. NEUTROPHILS 8.6 (H) 1.8 - 8.0 K/UL    ABS. LYMPHOCYTES 0.7 (L) 0.8 - 3.5 K/UL    ABS. MONOCYTES 0.2 0.0 - 1.0 K/UL    ABS. EOSINOPHILS 0.0 0.0 - 0.4 K/UL    ABS. BASOPHILS 0.0 0.0 - 0.1 K/UL    RBC COMMENTS NORMOCYTIC, NORMOCHROMIC     METABOLIC PANEL, COMPREHENSIVE    Collection Time: 10/05/17  8:40 PM   Result Value Ref Range    Sodium 133 (L) 136 - 145 mmol/L    Potassium 3.7 3.5 - 5.1 mmol/L    Chloride 99 97 - 108 mmol/L    CO2 24 21 - 32 mmol/L    Anion gap 10 5 - 15 mmol/L    Glucose 275 (H) 65 - 100 mg/dL    BUN 12 6 - 20 MG/DL    Creatinine 0.80 0.55 - 1.02 MG/DL    BUN/Creatinine ratio 15 12 - 20      GFR est AA >60 >60 ml/min/1.73m2    GFR est non-AA >60 >60 ml/min/1.73m2    Calcium 9.7 8.5 - 10.1 MG/DL    Bilirubin, total 0.4 0.2 - 1.0 MG/DL    ALT (SGPT) 52 12 - 78 U/L    AST (SGOT) 20 15 - 37 U/L    Alk.  phosphatase 52 45 - 117 U/L    Protein, total 7.8 6.4 - 8.2 g/dL    Albumin 3.9 3.5 - 5.0 g/dL    Globulin 3.9 2.0 - 4.0 g/dL    A-G Ratio 1.0 (L) 1.1 - 2.2     LACTIC ACID    Collection Time: 10/05/17  8:40 PM   Result Value Ref Range    Lactic acid 3.4 (HH) 0.4 - 2.0 MMOL/L   LIPASE    Collection Time: 10/05/17  8:40 PM   Result Value Ref Range    Lipase 283 73 - 393 U/L   URINALYSIS W/ REFLEX CULTURE    Collection Time: 10/05/17 10:34 PM   Result Value Ref Range    Color YELLOW/STRAW      Appearance CLEAR CLEAR      Specific gravity >1.030 (H) 1.003 - 1.030    pH (UA) 6.0 5.0 - 8.0      Protein NEGATIVE  NEG mg/dL    Glucose >1000 (A) NEG mg/dL    Ketone NEGATIVE  NEG mg/dL Bilirubin NEGATIVE  NEG      Blood NEGATIVE  NEG      Urobilinogen 0.2 0.2 - 1.0 EU/dL    Nitrites NEGATIVE  NEG      Leukocyte Esterase NEGATIVE  NEG      UA:UC IF INDICATED CULTURE NOT INDICATED BY UA RESULT CNI      WBC 0-4 0 - 4 /hpf    RBC 0-5 0 - 5 /hpf    Epithelial cells FEW FEW /lpf    Bacteria NEGATIVE  NEG /hpf    Hyaline cast 0-2 0 - 5 /lpf

## 2017-10-06 NOTE — CONSULTS
GI Consultation Note Darleen Minaya)    NAME: Iwona Youssef : 1970 MRN: [id number]   ATTG: Umang Smith MD PCP: BEBA Paige  Date/Time:  10/6/2017 12:44 PM  Subjective:   REASON FOR CONSULT:      Madhuri Cruz is a 52 y.o.  female with hx recent C. Dif infection treated with Vancomycin following diverticulitis treated previously with Cipro/Flagyl, who I was asked to see for evaluation of abd pain and diarrhea. She presented to ER with c/o recurrent watery diarrhea associated with lower abdominal cramping without F/C, N/V, GIB, or dysphagia. She denies ameliorating or exacerbating factors. She had initiated a taper of her recently prescribed Vancomycin on her own given that she had completed a normal appearing colonoscopy 10/3/17 noting only sigmoid diverticulosis. Biopsies done randomly in the colon then are still pending. She notes recent start 2d earlier of abx as Z-pack and course of steroids started for bronchitis by her PCP. She is also on PPI for her GERD. She felt her sx were similar to how hwer C.dif occurred previously so she stopped her abx and Prednisone. ER eval here noted VSS, afebrile WBC 9.5, Hgb 13.1. CT was unremarkable except for HMG and hepatic steatosis. Here she has had a semi-formed BM and is tolerating PO. Her pain is better and well controlled.     Past Medical History:   Diagnosis Date    Anal fissure     Anisocoria     Asthma     LAST EPISODE     Back pain     Cerumen impaction     Chronic kidney disease     hx uti in past    Coagulation defects     ocassional rectal bleeding due to anal fissure    Colovaginal fistula     Diabetes (HCC)     NIDDM    Diabetes ([facility] [address]     Diverticulitis     Diverticulosis     Enlarged tonsils     Frequent UTI     GERD (gastroesophageal reflux disease)     H/O endoscopy     with dilation    HA (headache)     Hepatic steatosis     Hx of colonoscopy with polypectomy     benign    Hypertension     Ill-defined condition     FREQUENT HIVES    Ill-defined condition     HX ELEVATED LIVER ENZYMES    Morbid obesity (HCC)     Nausea & vomiting     during diverticulitis flare    Obesity     Otitis media     Pneumonia     about 15 yrs ago    Psychiatric disorder     ANXIETY    Recurrent tonsillitis     Sinusitis     Transfusion history ~ age 35    postop hysterectomy    Unspecified sleep apnea     snores ( not diagnosed yet)     Urticaria     Urticaria       Past Surgical History:   Procedure Laterality Date    HX BREAST REDUCTION      blake.  HX GI  12    LAPAROSCOPIC HAND ASSISTED  POSS OPEN SIGMOID COLECTOMY POSS TEMPORARY DIVERTING LOOP ILEOSTOMY;  (no illeostomy needed)    HX GYN           HX GYN      cervical conization    HX HEENT      SINUS SURGERY LEFT X2    HX HEENT      SINUS SURGERY ON RIGHT X2    HX OTHER SURGICAL      Sphincterotomy    HX PELVIC LAPAROSCOPY      HX EMMANUEL AND BSO      HX UROLOGICAL  12     CYSTOSCOPY INSERTION URETERAL CATHETERS - Cystoscopy Insertion of bilateral ureteral stents     Social History   Substance Use Topics    Smoking status: Never Smoker    Smokeless tobacco: Never Used    Alcohol use Yes      Comment: Rarely      Family History   Problem Relation Age of Onset    Diabetes Mother     Cancer Mother      NON-HODGKINS LYMPHOMA    Anesth Problems Mother      PONV    Diabetes Father     Heart Disease Father      CAD - STENTS, PACEMAKER    Arrhythmia Father       Allergies   Allergen Reactions    Aspirin Shortness of Breath    Prilosec [Omeprazole Magnesium] Anaphylaxis     CHERRY FLAVORED; PT TAKES REGULAR PRILOSEC AND IS OK    Codeine Hives and Itching    Contrast Agent [Iodine] Itching     Pt.  Had itching after IV contrast with the last exam.  Benadryl was given    Morphine Itching     Severe itching    Percocet [Oxycodone-Acetaminophen] Hives      Home Medications:  Prior to Admission Medications   Prescriptions Last Dose Informant Patient Reported? Taking? EPINEPHrine (EPIPEN) 0.3 mg/0.3 mL (1:1,000) injection   No No   Si.3 mL by IntraMUSCular route once as needed for up to 1 dose. HYDROcodone-acetaminophen (NORCO) 5-325 mg per tablet   No No   Sig: Take 1 Tab by mouth every four (4) hours as needed for Pain. Max Daily Amount: 6 Tabs. albuterol (PROVENTIL, VENTOLIN) 90 mcg/Actuation inhaler   Yes No   Sig: Take 2 Puffs by inhalation every six (6) hours as needed. amLODIPine (NORVASC) 5 mg tablet   Yes No   Sig: Take 5 mg by mouth daily. cetirizine (ZYRTEC) 10 mg tablet   Yes No   Sig: Take 20 mg by mouth every evening. estradiol (ESTRACE) 1 mg tablet   Yes No   Sig: Take 1 mg by mouth daily. fexofenadine (ALLEGRA) 60 mg tablet   Yes No   Sig: Take 120 mg by mouth daily. hydrochlorothiazide (HYDRODIURIL) 25 mg tablet   Yes No   Sig: Take 25 mg by mouth daily. metFORMIN (GLUCOPHAGE) 500 mg tablet   Yes No   Sig: Take 500 mg by mouth daily (with breakfast). omeprazole (PRILOSEC) 20 mg capsule   Yes No   Sig: Take 40 mg by mouth daily.       Facility-Administered Medications: None     Hospital medications:  Current Facility-Administered Medications   Medication Dose Route Frequency    HYDROmorphone (PF) (DILAUDID) injection 0.5 mg  0.5 mg IntraVENous Q4H PRN    vancomycin 50 mg/mL oral solution (compounded) 125 mg  125 mg Oral Q6H    0.9% sodium chloride infusion 1,000 mL  1,000 mL IntraVENous CONTINUOUS    albuterol-ipratropium (DUO-NEB) 2.5 MG-0.5 MG/3 ML  3 mL Nebulization Q4H RT    albuterol (PROVENTIL VENTOLIN) nebulizer solution 2.5 mg  2.5 mg Nebulization Q4H PRN    acetylcysteine (MUCOMYST) 200 mg/mL (20 %) solution 200 mg  200 mg Nebulization Q12H    metroNIDAZOLE (FLAGYL) IVPB premix 500 mg  500 mg IntraVENous Q8H    sodium chloride (NS) flush 5-10 mL  5-10 mL IntraVENous Q8H    sodium chloride (NS) flush 5-10 mL  5-10 mL IntraVENous PRN    ondansetron (ZOFRAN) injection 4 mg  4 mg IntraVENous Q4H PRN    amLODIPine (NORVASC) tablet 5 mg  5 mg Oral DAILY    HYDROcodone-acetaminophen (NORCO) 5-325 mg per tablet 1 Tab  1 Tab Oral Q6H PRN    insulin lispro (HUMALOG) injection   SubCUTAneous AC&HS    glucose chewable tablet 16 g  4 Tab Oral PRN    dextrose (D50W) injection syrg 12.5-25 g  12.5-25 g IntraVENous PRN    glucagon (GLUCAGEN) injection 1 mg  1 mg IntraMUSCular PRN    famotidine (PEPCID) tablet 20 mg  20 mg Oral BID    lactobac ac& pc-s.therm-b.anim (RICHARD Q/RISAQUAD)  1 Cap Oral DAILY    0.9% sodium chloride infusion  125 mL/hr IntraVENous CONTINUOUS    hydrALAZINE (APRESOLINE) 20 mg/mL injection 10 mg  10 mg IntraVENous Q6H PRN     REVIEW OF SYSTEMS:     [x]    Total of 11 systems reviewed as follows:  Const:   negative fever, negative chills, negative weight loss  Eyes:   negative diplopia or visual changes, negative eye pain  ENT:   negative coryza, negative sore throat  Resp:   negative cough, hemoptysis, dyspnea  Cards:  negative for chest pain, palpitations, lower extremity edema  :  negative for frequency, dysuria and hematuria  Skin:   negative for rash and pruritus  Heme:   negative for easy bruising and gum/nose bleeding  MS:  negative for myalgias, arthralgias, back pain and muscle weakness  Neurolo:  negative for headaches, dizziness, vertigo, memory problems   Psych:  negative for feelings of anxiety, depression     Pertinent Positives include :    Objective:   VITALS:    Visit Vitals    /63 (BP 1 Location: Left arm, BP Patient Position: At rest)    Pulse 74    Temp 97.7 °F (36.5 °C)    Resp 18    Ht 5' 2\" (1.575 m)    Wt 94.5 kg (208 lb 5.4 oz)    SpO2 95%    BMI 38.1 kg/m2     Temp (24hrs), Av.9 °F (36.6 °C), Min:97.6 °F (36.4 °C), Max:98.2 °F (36.8 °C)    PHYSICAL EXAM:   General:    Alert, cooperative, no distress, appears stated age. Head:   Normocephalic, without obvious abnormality, atraumatic. Eyes:   Conjunctivae clear, anicteric sclerae. Pupils are equal  Nose:  Nares normal. No drainage or sinus tenderness. Throat:    Lips, mucosa, and tongue normal.  No Thrush  Neck:  Supple, symmetrical,  no adenopathy, thyroid: non tender  Back:    Symmetric,  No CVA tenderness. Lungs:   CTA bilaterally. No wheezing/rhonchi/rales. Chest wall:  No tenderness or deformity. No Accessory muscle use. Heart:   Regular rate and rhythm,  no murmur, rub or gallop. Abdomen:   Soft, non-tender. Not distended. Bowel sounds normal. No masses  Extremities: Atraumatic, No cyanosis. No edema. No clubbing  Skin:     Texture, turgor normal. No rashes/lesions/jaundice  Lymph: Cervical, supraclavicular normal.  Psych:  Good insight. Not depressed. Not anxious or agitated. Neurologic: EOMs intact. Normal  strength, A/O X 3. LAB DATA REVIEWED:    Recent Results (from the past 48 hour(s))   CBC WITH AUTOMATED DIFF    Collection Time: 10/05/17  8:40 PM   Result Value Ref Range    WBC 9.5 3.6 - 11.0 K/uL    RBC 4.39 3.80 - 5.20 M/uL    HGB 13.1 11.5 - 16.0 g/dL    HCT 36.5 35.0 - 47.0 %    MCV 83.1 80.0 - 99.0 FL    MCH 29.8 26.0 - 34.0 PG    MCHC 35.9 30.0 - 36.5 g/dL    RDW 12.8 11.5 - 14.5 %    PLATELET 593 679 - 633 K/uL    NEUTROPHILS 91 (H) 32 - 75 %    LYMPHOCYTES 7 (L) 12 - 49 %    MONOCYTES 2 (L) 5 - 13 %    EOSINOPHILS 0 0 - 7 %    BASOPHILS 0 0 - 1 %    ABS. NEUTROPHILS 8.6 (H) 1.8 - 8.0 K/UL    ABS. LYMPHOCYTES 0.7 (L) 0.8 - 3.5 K/UL    ABS. MONOCYTES 0.2 0.0 - 1.0 K/UL    ABS. EOSINOPHILS 0.0 0.0 - 0.4 K/UL    ABS.  BASOPHILS 0.0 0.0 - 0.1 K/UL    RBC COMMENTS NORMOCYTIC, NORMOCHROMIC     METABOLIC PANEL, COMPREHENSIVE    Collection Time: 10/05/17  8:40 PM   Result Value Ref Range    Sodium 133 (L) 136 - 145 mmol/L    Potassium 3.7 3.5 - 5.1 mmol/L    Chloride 99 97 - 108 mmol/L    CO2 24 21 - 32 mmol/L    Anion gap 10 5 - 15 mmol/L    Glucose 275 (H) 65 - 100 mg/dL    BUN 12 6 - 20 MG/DL    Creatinine 0.80 0.55 - 1.02 MG/DL    BUN/Creatinine ratio 15 12 - 20 GFR est AA >60 >60 ml/min/1.73m2    GFR est non-AA >60 >60 ml/min/1.73m2    Calcium 9.7 8.5 - 10.1 MG/DL    Bilirubin, total 0.4 0.2 - 1.0 MG/DL    ALT (SGPT) 52 12 - 78 U/L    AST (SGOT) 20 15 - 37 U/L    Alk.  phosphatase 52 45 - 117 U/L    Protein, total 7.8 6.4 - 8.2 g/dL    Albumin 3.9 3.5 - 5.0 g/dL    Globulin 3.9 2.0 - 4.0 g/dL    A-G Ratio 1.0 (L) 1.1 - 2.2     LACTIC ACID    Collection Time: 10/05/17  8:40 PM   Result Value Ref Range    Lactic acid 3.4 (HH) 0.4 - 2.0 MMOL/L   LIPASE    Collection Time: 10/05/17  8:40 PM   Result Value Ref Range    Lipase 283 73 - 393 U/L   URINALYSIS W/ REFLEX CULTURE    Collection Time: 10/05/17 10:34 PM   Result Value Ref Range    Color YELLOW/STRAW      Appearance CLEAR CLEAR      Specific gravity >1.030 (H) 1.003 - 1.030    pH (UA) 6.0 5.0 - 8.0      Protein NEGATIVE  NEG mg/dL    Glucose >1000 (A) NEG mg/dL    Ketone NEGATIVE  NEG mg/dL    Bilirubin NEGATIVE  NEG      Blood NEGATIVE  NEG      Urobilinogen 0.2 0.2 - 1.0 EU/dL    Nitrites NEGATIVE  NEG      Leukocyte Esterase NEGATIVE  NEG      UA:UC IF INDICATED CULTURE NOT INDICATED BY UA RESULT CNI      WBC 0-4 0 - 4 /hpf    RBC 0-5 0 - 5 /hpf    Epithelial cells FEW FEW /lpf    Bacteria NEGATIVE  NEG /hpf    Hyaline cast 0-2 0 - 5 /lpf   GLUCOSE, POC    Collection Time: 10/06/17 12:59 AM   Result Value Ref Range    Glucose (POC) 279 (H) 65 - 100 mg/dL    Performed by [address] [place] [address] ([facility])    METABOLIC PANEL, BASIC    Collection Time: 10/06/17  3:51 AM   Result Value Ref Range    Sodium 135 (L) 136 - 145 mmol/L    Potassium 3.4 (L) 3.5 - 5.1 mmol/L    Chloride 103 97 - 108 mmol/L    CO2 23 21 - 32 mmol/L    Anion gap 9 5 - 15 mmol/L    Glucose 251 (H) 65 - 100 mg/dL    BUN 11 6 - 20 MG/DL    Creatinine 0.71 0.55 - 1.02 MG/DL    BUN/Creatinine ratio 15 12 - 20      GFR est AA >60 >60 ml/min/1.73m2    GFR est non-AA >60 >60 ml/min/1.73m2    Calcium 8.6 8.5 - 10.1 MG/DL   CBC WITH AUTOMATED DIFF    Collection Time: 10/06/17  3:51 AM   Result Value Ref Range    WBC 8.2 3.6 - 11.0 K/uL    RBC 3.81 3.80 - 5.20 M/uL    HGB 11.5 11.5 - 16.0 g/dL    HCT 32.2 (L) 35.0 - 47.0 %    MCV 84.5 80.0 - 99.0 FL    MCH 30.2 26.0 - 34.0 PG    MCHC 35.7 30.0 - 36.5 g/dL    RDW 12.9 11.5 - 14.5 %    PLATELET 806 547 - 786 K/uL    NEUTROPHILS 87 (H) 32 - 75 %    LYMPHOCYTES 9 (L) 12 - 49 %    MONOCYTES 4 (L) 5 - 13 %    EOSINOPHILS 0 0 - 7 %    BASOPHILS 0 0 - 1 %    ABS. NEUTROPHILS 7.1 1.8 - 8.0 K/UL    ABS. LYMPHOCYTES 0.7 (L) 0.8 - 3.5 K/UL    ABS. MONOCYTES 0.4 0.0 - 1.0 K/UL    ABS. EOSINOPHILS 0.0 0.0 - 0.4 K/UL    ABS. BASOPHILS 0.0 0.0 - 0.1 K/UL   GLUCOSE, POC    Collection Time: 10/06/17  7:47 AM   Result Value Ref Range    Glucose (POC) 219 (H) 65 - 100 mg/dL    Performed by Lavern Treviño    GLUCOSE, POC    Collection Time: 10/06/17 11:55 AM   Result Value Ref Range    Glucose (POC) 235 (H) 65 - 100 mg/dL    Performed by [facility]       IMAGING RESULTS:   [x]      I have personally reviewed the actual   []    CXR  [x]    CT  []     US  CT ABD/PELV with PO Contrast only 10/5/17  FINDINGS:   LUNG BASES: Clear. INCIDENTALLY IMAGED HEART AND MEDIASTINUM: Unremarkable. LIVER: The liver is isodense to spleen which is related to mild hepatic  steatosis. It is mildly enlarged. GALLBLADDER: Status post cholecystectomy. SPLEEN: Mild enlargement. A calcifications likely related to incidental  granulomas disease. PANCREAS: No mass or ductal dilatation. ADRENALS: Unremarkable. KIDNEYS/URETERS: No mass, calculus, or hydronephrosis. STOMACH: Unremarkable. SMALL BOWEL: No dilatation or wall thickening. COLON: No dilatation or wall thickening. A surgical anastomosis is present in  the sigmoid colon. APPENDIX: Unremarkable. PERITONEUM: No ascites or pneumoperitoneum. RETROPERITONEUM: No lymphadenopathy or aortic aneurysm. REPRODUCTIVE ORGANS: Status post hysterectomy. URINARY BLADDER: No mass or calculus.   BONES: No destructive bone lesion. IMPRESSION:  1. Mild hepatomegaly and hepatic steatosis. 2. Splenomegaly. Recommendations/Plan:      Active Problems:    Diarrhea (10/5/2017)       ___________________________________________________  RECOMMENDATIONS:    53yo F with hx c.diff colitis associated with variable lower abd pain. Her treatment is incomplete as she started tapering her Vancomycin as OP prior to her colonoscopy, which was unremarkable on review, but pathology bx are pending. Ct and labs here do not support active infection and she is having near normal BMs now. I do not think this is recurrence of her C.dif at this time. Plan:  1) Complete prior Vancomycin taper  2) Avoid other abx including Z-pack  3) May complete steroid taper for her presumed viral bronchitis  4) Continue PO  5) Continue Probiotics as OP  6) Check pending stool studies (C.dif, O&P)  7) Will need f/u with her Primary GI Michelle Sanders MD) in next 2-3 weeks to assess for improvement  8) Stop IV narcotic analgesics  9) Hold PPI if possible    Could be discharged to home from GI standpoint.     Discussed Code Status:    [x]    Full Code      []    DNR    ___________________________________________________  Care Plan discussed with:    [x]    Patient   []    Family   [x]    Nursing   [x]    Attending  Total Time :  50  minutes   ___________________________________________________  GI: Flo Freed MD

## 2017-10-06 NOTE — CDMP QUERY
Dr. Onofre Clarke :    The [address] [place] [address] has issued a statement indicating that, \"Individuals who are overweight, obese, or morbidly obese are at an increased risk for certain medical conditions when compared to persons of normal weight. Therefore, these conditions are always clinically significant and reportable when documented by the provider. \"    Review of the documentation for this patient demonstrates the clinical indicators of a BMI of 38.1, height of 5'2\" and a weight of 208 lbs. Please clarify if the patient has a diagnosis associated with those findings:    =>Obesity  (BMI: 30-39.9)  =>Morbid Obesity (BMI: >40.0)  =>Overweight  (BMI: 25-29.9)  =>Other weight status (specify status)  =>Unable to determine    Thank You,   [email or web address]  [phone]

## 2017-10-06 NOTE — DISCHARGE SUMMARY
Hospitalist Discharge Summary     Patient ID:  Mc Silvestre  [id number]  76 y.o.  1970    PCP on record: BEBA Hilario    Admit date: 10/5/2017  Discharge date: 10/6/2017      DISCHARGE DIAGNOSES  DISCHARGE SUMMARY/HOSPITAL COURSE: for full details see H&P, daily progress notes, labs, consult notes. Diarrhea   Hx of C diff  Abd pain  Lactic acidosis  -improved, patient had formed stool  -per GI less likely recurrence of C diff, recommend continuing outpatient vancomycin taper as prescribed  -avoid antibiotics, stop azithromycin that was prescribed  -f/u with Dr. Ranjit Crooks from GI in few weeks  -avoid opiates for abdominal pain     Acute bronchitis  -patient having more upper respiratory symptoms currently  -continue prescribed prednisone taper  -stop azithromycin  -patient responded well to mucolytic nebs    Headache  -migraine vs sinus related  -trial fioricet      GERD  HTN  T2DM    Code Status: Full      CONSULTATIONS:  IP CONSULT TO GASTROENTEROLOGY    Excerpted HPI from H&P of Elton Baez MD:  Aurelio Palencia is a 52 y.o.  female w pmhx significant for hx of Cdiff x2, recently completed tapered PO vancomycin several days ago with stool starting to formed. Pt present to ED complaining of recurrent watery diarrhea, associated with campy abd pain. Pt states she recently went to her PCP and was started on Zpack and prednisone 60mg daily 2 days ago. Pt started having diarrhea earlier today with abd pain and symptoms very similar to when she was diagnosed with cdiff in the past.  Pt since then had discontinued her steroid and abx today. She denied any associated fever, chills, cp, sob, palpitations, n/v/d.  _______________________________________________________________________  Patient seen and examined by me on discharge day.   Pertinent Findings:  Gen:    Not in distress  Neuro:  Alert, calm  _______________________________________________________________________  Pj Miller MEDICATIONS:   Discharge Medication List as of 10/6/2017  1:52 PM      START taking these medications    Details   acetylcysteine (MUCOMYST) 200 mg/mL (20 %) solution 1 mL by Nebulization route every twelve (12) hours as needed. , Print, Disp-10 mL, R-0      butalbital-acetaminophen-caff (FIORICET) -40 mg per capsule Take 1-2 Caps by mouth every eight (8) hours as needed for Pain or Headache. Max Daily Amount: 6 Caps., Print, Disp-20 Cap, R-0         CONTINUE these medications which have NOT CHANGED    Details   fexofenadine (ALLEGRA) 60 mg tablet Take 120 mg by mouth daily. , Historical Med      cetirizine (ZYRTEC) 10 mg tablet Take 20 mg by mouth every evening., Historical Med      EPINEPHrine (EPIPEN) 0.3 mg/0.3 mL (1:1,000) injection 0.3 mL by IntraMUSCular route once as needed for up to 1 dose., Print, Disp-0.3 mL, R-1      estradiol (ESTRACE) 1 mg tablet Take 1 mg by mouth daily. , Historical Med      omeprazole (PRILOSEC) 20 mg capsule Take 40 mg by mouth daily. , Historical Med      amLODIPine (NORVASC) 5 mg tablet Take 5 mg by mouth daily. , Historical Med      hydrochlorothiazide (HYDRODIURIL) 25 mg tablet Take 25 mg by mouth daily. , Historical Med      metFORMIN (GLUCOPHAGE) 500 mg tablet Take 500 mg by mouth daily (with breakfast). , Historical Med      albuterol (PROVENTIL, VENTOLIN) 90 mcg/Actuation inhaler Take 2 Puffs by inhalation every six (6) hours as needed., Historical Med         STOP taking these medications       HYDROcodone-acetaminophen (NORCO) 5-325 mg per tablet Comments:   Reason for Stopping:               My Recommended Diet, Activity, Wound Care, and follow-up labs are listed in the patient's Discharge Insturctions which I have personally completed and reviewed.     _______________________________________________________________________  DISPOSITION:    Home with Family: x   Home with HH/PT/OT/RN:    SNF/LTC:    EILEEN:    OTHER:        Condition at Discharge: Stable  _______________________________________________________________________  Follow up with:   PCP : BEBA Hernandez  Follow-up Information     Follow up With Details Comments [address] [id number]  [phone]                Total time in minutes spent coordinating this discharge (includes going over instructions, follow-up, prescriptions, and preparing report for sign off to her PCP) :  40 minutes    Signed:  Sarthak Castillo MD

## 2017-10-06 NOTE — ED NOTES
TRANSFER - OUT REPORT:    Verbal report given to [facility] RN(name) on Angy Herring  being transferred to [facility] (unit) for routine progression of care       Report consisted of patients Situation, Background, Assessment and   Recommendations(SBAR). Information from the following report(s) SBAR, Kardex, ED Summary and MAR was reviewed with the receiving nurse. Lines:   Peripheral IV 10/05/17 Right Antecubital (Active)   Site Assessment Clean, dry, & intact 10/5/2017 11:20 PM   Phlebitis Assessment 0 10/5/2017 11:20 PM   Infiltration Assessment 0 10/5/2017 11:20 PM   Dressing Status Clean, dry, & intact 10/5/2017 11:20 PM   Hub Color/Line Status Pink 10/5/2017 11:20 PM        Opportunity for questions and clarification was provided.       Patient transported with:   [place]

## 2017-10-06 NOTE — ED PROVIDER NOTES
[facility].  EMERGENCY DEPARTMENT HISTORY AND PHYSICAL EXAM       Date of Service: 10/5/2017   Patient Name: Cierra James   YOB: 1970  Medical Record Number: [id number]    History of Presenting Illness     Chief Complaint   Patient presents with    Abdominal Pain     pt reports \"mid abdominal pain since this am 8/10\"     Diarrhea     x6 today ; pt states \"I have had C Diff for the past 2 months and I had been doing better but I was put on steroids for bronchitis by my MD 2 days ago and since then I think it has messed with my stomach and C -Diff seems to have come back. \"         History Provided By:  patient    Additional History:   Cierra James is a 52 y.o. female with PMHx significant for HTN, CKD, and DM who presents ambulatory to the ED with cc of 6-7 episodes of diarrhea that started this morning. She also c/o associated cramping low abdominal pain. She reports that her current symptoms feel similar to her recent diagnosis of C.diff, but her abdominal pain has been more severe. She states that she has been recently hospitalized x 2 for C.diff, and she finished a course of Vancomycin PO 3-4 days ago. She states that she was feeling relief until she was started on Prednisone and a Z-pack by her PCP for a diagnosis of bronchitis 3 days ago. She specifically denies fevers or other complaints at this time. Social Hx: - Tobacco, + EtOH, - Illicit Drugs    There are no other complaints, changes or physical findings at this time.     Primary Care Provider: BEBA Marie   Specialist: N/A    Past History     Past Medical History:   Past Medical History:   Diagnosis Date    Anal fissure     Anisocoria     Asthma     LAST EPISODE     Back pain     Cerumen impaction     Chronic kidney disease     hx uti in past    Coagulation defects     ocassional rectal bleeding due to anal fissure    Colovaginal fistula     Diabetes (HCC)     NIDDM  Diabetes ([facility] [address]     Diverticulitis     Diverticulosis     Enlarged tonsils     Frequent UTI     GERD (gastroesophageal reflux disease)     H/O endoscopy     with dilation    HA (headache)     Hepatic steatosis     Hx of colonoscopy with polypectomy     benign    Hypertension     Ill-defined condition     FREQUENT HIVES    Ill-defined condition     HX ELEVATED LIVER ENZYMES    Morbid obesity (HCC)     Nausea & vomiting     during diverticulitis flare    Obesity     Otitis media     Pneumonia     about 15 yrs ago    Psychiatric disorder     ANXIETY    Recurrent tonsillitis     Sinusitis     Transfusion history ~ age 35    postop hysterectomy    Unspecified sleep apnea     snores ( not diagnosed yet)     Urticaria     Urticaria         Past Surgical History:   Past Surgical History:   Procedure Laterality Date    HX BREAST REDUCTION      blake.  HX GI  12    LAPAROSCOPIC HAND ASSISTED  POSS OPEN SIGMOID COLECTOMY POSS TEMPORARY DIVERTING LOOP ILEOSTOMY;  (no illeostomy needed)    HX GYN           HX GYN      cervical conization    HX HEENT      SINUS SURGERY LEFT X2    HX HEENT      SINUS SURGERY ON RIGHT X2    HX OTHER SURGICAL      Sphincterotomy    HX PELVIC LAPAROSCOPY      HX EMMANUEL AND BSO      HX UROLOGICAL  12     CYSTOSCOPY INSERTION URETERAL CATHETERS - Cystoscopy Insertion of bilateral ureteral stents        Family History:   Family History   Problem Relation Age of Onset    Diabetes Mother     Cancer Mother      NON-HODGKINS LYMPHOMA    Anesth Problems Mother      PONV    Diabetes Father     Heart Disease Father      CAD - STENTS, PACEMAKER    Arrhythmia Father         Social History:   Social History   Substance Use Topics    Smoking status: Never Smoker    Smokeless tobacco: Never Used    Alcohol use Yes      Comment: Rarely        Allergies:    Allergies   Allergen Reactions    Aspirin Shortness of Breath    Prilosec [Omeprazole Magnesium] Anaphylaxis     CHERRY FLAVORED; PT TAKES REGULAR PRILOSEC AND IS OK    Codeine Hives and Itching    Contrast Agent [Iodine] Itching     Pt. Had itching after IV contrast with the last exam.  Benadryl was given    Morphine Itching     Severe itching    Percocet [Oxycodone-Acetaminophen] Hives         Review of Systems   Review of Systems   Constitutional: Negative for chills and fever. HENT: Negative for congestion and sore throat. Eyes: Negative for visual disturbance. Respiratory: Negative for cough and shortness of breath. Cardiovascular: Negative for chest pain and leg swelling. Gastrointestinal: Positive for abdominal pain and diarrhea. Negative for blood in stool and nausea. Endocrine: Negative for polyuria. Genitourinary: Negative for dysuria, flank pain, vaginal bleeding and vaginal discharge. Musculoskeletal: Negative for myalgias. Skin: Negative for rash. Allergic/Immunologic: Negative for immunocompromised state. Neurological: Negative for weakness and headaches. Psychiatric/Behavioral: Negative for confusion. Physical Exam  Physical Exam   Constitutional: She is oriented to person, place, and time. She appears well-developed and well-nourished. HENT:   Head: Normocephalic and atraumatic. Moist mucous membranes   Eyes: Conjunctivae are normal. Pupils are equal, round, and reactive to light. Right eye exhibits no discharge. Left eye exhibits no discharge. Neck: Normal range of motion. Neck supple. No tracheal deviation present. Cardiovascular: Normal rate, regular rhythm and normal heart sounds. No murmur heard. Pulmonary/Chest: Effort normal and breath sounds normal. No respiratory distress. She has no wheezes. She has no rales. Speaking in full sentences. Lungs are clear. Abdominal: Soft. Bowel sounds are normal. She exhibits distension. There is generalized tenderness. There is no rebound and no guarding.    Musculoskeletal: Normal range of motion. She exhibits no edema, tenderness or deformity. Neurological: She is alert and oriented to person, place, and time. Skin: Skin is warm and dry. No rash noted. No erythema. Psychiatric: Her behavior is normal.   Nursing note and vitals reviewed. Medical Decision Making   I am the first provider for this patient. I reviewed the vital signs, available nursing notes, past medical history, past surgical history, family history and social history. Old Medical Records: Old medical records. Nursing notes. Provider Notes:   Pt here with recurrent C.diff and no acute abdomen. Will order CT to rule out other acute toxic processes or toxic megacolon. Disposition pending lab work up, symptomatic management. Pt will likely need PO Vancomycin and Flagyl again. Will need hospitalization if patient's symptoms are not controlled or if there is lab abnormalities. ED Course:  7:36 PM   Initial assessment performed. The patients presenting problems have been discussed, and they are in agreement with the care plan formulated and outlined with them. I have encouraged them to ask questions as they arise throughout their visit. Progress Note:  7:40 PM  The patient was offered a cxray to evaluate her lingering bronchitis symptoms, but she declines at this time. Pt agrees with the plan for a CT at this time. Recommended that the patient stop her Z-pack given the patient had no recorded fever, abnormal lung sounds, or a definitive diagnosis of PNA. Pt was also recommended to stop her prescription of steroids. Written by SCOOBY Shaw, as dictated by Maurice Ignacio DO. Consult Note:  7:56 PM  Maurice Ignacio DO spoke with Noemi Santizo M.D.,  Specialty: GI  Discussed pt's hx, disposition, and available diagnostic and imaging results. Reviewed care plans. Consultant agrees with plans as outlined.  Dr. Milton Dorado states that it would not be unreasonable to admit the patient for observation and possibly set up a fecal transplant. He reports if the patient's symptoms have improved, and her CT is negative, she may be discharged with close follow up. Progress Note:  9:40 PM  The patient was re-evaluated, and she states that she continues to feel unwell. Pt's lactic acid is 3.0. Will consult hospitalist for admission. Will start the patient on oral Vancomycin. Written by Patrick Pereira ED Scribe, as dictated by Mich Bush DO.    CONSULT NOTE:   9:44 PM  Mich Bush DO spoke with José Luis Ruggiero MD,   Specialty: Hospitalist  Discussed pt's hx, disposition, and available diagnostic and imaging results. Reviewed care plans. Consultant will evaluate pt for admission. Written by SCOOBY Miles, as dictated by Mich Bush DO. Diagnostic Study Results     Labs -      Recent Results (from the past 12 hour(s))   CBC WITH AUTOMATED DIFF    Collection Time: 10/05/17  8:40 PM   Result Value Ref Range    WBC 9.5 3.6 - 11.0 K/uL    RBC 4.39 3.80 - 5.20 M/uL    HGB 13.1 11.5 - 16.0 g/dL    HCT 36.5 35.0 - 47.0 %    MCV 83.1 80.0 - 99.0 FL    MCH 29.8 26.0 - 34.0 PG    MCHC 35.9 30.0 - 36.5 g/dL    RDW 12.8 11.5 - 14.5 %    PLATELET 153 810 - 208 K/uL    NEUTROPHILS 91 (H) 32 - 75 %    LYMPHOCYTES 7 (L) 12 - 49 %    MONOCYTES 2 (L) 5 - 13 %    EOSINOPHILS 0 0 - 7 %    BASOPHILS 0 0 - 1 %    ABS. NEUTROPHILS 8.6 (H) 1.8 - 8.0 K/UL    ABS. LYMPHOCYTES 0.7 (L) 0.8 - 3.5 K/UL    ABS. MONOCYTES 0.2 0.0 - 1.0 K/UL    ABS. EOSINOPHILS 0.0 0.0 - 0.4 K/UL    ABS.  BASOPHILS 0.0 0.0 - 0.1 K/UL    RBC COMMENTS NORMOCYTIC, NORMOCHROMIC     METABOLIC PANEL, COMPREHENSIVE    Collection Time: 10/05/17  8:40 PM   Result Value Ref Range    Sodium 133 (L) 136 - 145 mmol/L    Potassium 3.7 3.5 - 5.1 mmol/L    Chloride 99 97 - 108 mmol/L    CO2 24 21 - 32 mmol/L    Anion gap 10 5 - 15 mmol/L    Glucose 275 (H) 65 - 100 mg/dL    BUN 12 6 - 20 MG/DL    Creatinine 0.80 0.55 - 1.02 MG/DL    BUN/Creatinine ratio 15 12 - 20      GFR est AA >60 >60 ml/min/1.73m2    GFR est non-AA >60 >60 ml/min/1.73m2    Calcium 9.7 8.5 - 10.1 MG/DL    Bilirubin, total 0.4 0.2 - 1.0 MG/DL    ALT (SGPT) 52 12 - 78 U/L    AST (SGOT) 20 15 - 37 U/L    Alk. phosphatase 52 45 - 117 U/L    Protein, total 7.8 6.4 - 8.2 g/dL    Albumin 3.9 3.5 - 5.0 g/dL    Globulin 3.9 2.0 - 4.0 g/dL    A-G Ratio 1.0 (L) 1.1 - 2.2     LACTIC ACID    Collection Time: 10/05/17  8:40 PM   Result Value Ref Range    Lactic acid 3.4 (HH) 0.4 - 2.0 MMOL/L   LIPASE    Collection Time: 10/05/17  8:40 PM   Result Value Ref Range    Lipase 283 73 - 393 U/L   URINALYSIS W/ REFLEX CULTURE    Collection Time: 10/05/17 10:34 PM   Result Value Ref Range    Color YELLOW/STRAW      Appearance CLEAR CLEAR      Specific gravity >1.030 (H) 1.003 - 1.030    pH (UA) 6.0 5.0 - 8.0      Protein NEGATIVE  NEG mg/dL    Glucose >1000 (A) NEG mg/dL    Ketone NEGATIVE  NEG mg/dL    Bilirubin NEGATIVE  NEG      Blood NEGATIVE  NEG      Urobilinogen 0.2 0.2 - 1.0 EU/dL    Nitrites NEGATIVE  NEG      Leukocyte Esterase NEGATIVE  NEG      UA:UC IF INDICATED CULTURE NOT INDICATED BY UA RESULT CNI      WBC 0-4 0 - 4 /hpf    RBC 0-5 0 - 5 /hpf    Epithelial cells FEW FEW /lpf    Bacteria NEGATIVE  NEG /hpf    Hyaline cast 0-2 0 - 5 /lpf   GLUCOSE, POC    Collection Time: 10/06/17 12:59 AM   Result Value Ref Range    Glucose (POC) 279 (H) 65 - 100 mg/dL    Performed by Lyudmila Espino (PCT)        Radiologic Studies -  The following have been ordered and reviewed:  CT Results  (Last 48 hours)               10/05/17 2026  CT ABD PELV WO CONT Final result    Impression:  IMPRESSION:       1. Mild hepatomegaly and hepatic steatosis. 2. Splenomegaly. Narrative:  EXAM:  CT ABD PELV WO CONT       INDICATION: Abdominal pain       COMPARISON: 8/3/2017       CONTRAST:  None. TECHNIQUE:    Thin axial images were obtained through the abdomen and pelvis. Coronal and   sagittal reconstructions were generated.  Oral contrast was not administered. CT   dose reduction was achieved through use of a standardized protocol tailored for   this examination and automatic exposure control for dose modulation. The absence of intravenous contrast material reduces the sensitivity for   evaluation of the solid parenchymal organs of the abdomen. FINDINGS:    LUNG BASES: Clear. INCIDENTALLY IMAGED HEART AND MEDIASTINUM: Unremarkable. LIVER: The liver is isodense to spleen which is related to mild hepatic   steatosis. It is mildly enlarged. GALLBLADDER: Status post cholecystectomy. SPLEEN: Mild enlargement. A calcifications likely related to incidental   granulomas disease. PANCREAS: No mass or ductal dilatation. ADRENALS: Unremarkable. KIDNEYS/URETERS: No mass, calculus, or hydronephrosis. STOMACH: Unremarkable. SMALL BOWEL: No dilatation or wall thickening. COLON: No dilatation or wall thickening. A surgical anastomosis is present in   the sigmoid colon. APPENDIX: Unremarkable. PERITONEUM: No ascites or pneumoperitoneum. RETROPERITONEUM: No lymphadenopathy or aortic aneurysm. REPRODUCTIVE ORGANS: Status post hysterectomy. URINARY BLADDER: No mass or calculus. BONES: No destructive bone lesion. ADDITIONAL COMMENTS: N/A                 Vital Signs-Reviewed the patient's vital signs.    Patient Vitals for the past 12 hrs:   Temp Pulse Resp BP SpO2   10/06/17 0015 - - - 150/80 94 %   10/06/17 0001 - - - 153/86 94 %   10/05/17 2345 - - - 138/79 93 %   10/05/17 2330 - - - 134/76 90 %   10/05/17 2323 - - 18 154/50 97 %   10/05/17 2322 - - - 154/80 91 %   10/05/17 1834 98.2 °F (36.8 °C) 87 18 158/84 99 %       Medications Given in the ED:  Medications   0.9% sodium chloride infusion 1,000 mL (1,000 mL IntraVENous New Bag 10/5/17 2320)   albuterol-ipratropium (DUO-NEB) 2.5 MG-0.5 MG/3 ML (not administered)   albuterol (PROVENTIL VENTOLIN) nebulizer solution 2.5 mg (not administered)   acetylcysteine (MUCOMYST) 200 mg/mL (20 %) solution 200 mg (not administered)   vancomycin 50 mg/mL oral solution (compounded) 125 mg (125 mg Oral Given 10/6/17 0049)   metroNIDAZOLE (FLAGYL) IVPB premix 500 mg (500 mg IntraVENous New Bag 10/5/17 2321)   sodium chloride (NS) flush 5-10 mL ( IntraVENous Canceled Entry 10/5/17 2300)   sodium chloride (NS) flush 5-10 mL (not administered)   HYDROmorphone (DILAUDID) injection 0.5 mg (0.5 mg IntraVENous Given 10/5/17 2321)   ondansetron (ZOFRAN) injection 4 mg (4 mg IntraVENous Given 10/5/17 2320)   amLODIPine (NORVASC) tablet 5 mg (not administered)   HYDROcodone-acetaminophen (NORCO) 5-325 mg per tablet 1 Tab (1 Tab Oral Given 10/6/17 0028)   insulin lispro (HUMALOG) injection (not administered)   glucose chewable tablet 16 g (not administered)   dextrose (D50W) injection syrg 12.5-25 g (not administered)   glucagon (GLUCAGEN) injection 1 mg (not administered)   famotidine (PEPCID) tablet 20 mg (not administered)   lactobac ac& pc-s.therm-b.anim (RICHARD Q/RISAQUAD) (not administered)   0.9% sodium chloride infusion (not administered)   hydrALAZINE (APRESOLINE) 20 mg/mL injection 10 mg (not administered)   0.9% sodium chloride infusion 1,000 mL (0 mL IntraVENous IV Completed 10/5/17 2320)   HYDROmorphone (DILAUDID) injection 1 mg (1 mg IntraVENous Given 10/5/17 2041)   albuterol-ipratropium (DUO-NEB) 2.5 MG-0.5 MG/3 ML (3 mL Nebulization Given 10/5/17 2118)   ondansetron (ZOFRAN) injection 4 mg (4 mg IntraVENous Given 10/5/17 2118)   acetaminophen (TYLENOL) tablet 1,000 mg (1,000 mg Oral Given 10/5/17 2118)       Diagnosis   Clinical Impression:   1. Pseudomembranous colitis    2. Abdominal pain, generalized    3. Lactic acidosis         Plan:  1: Admit to Hospitalist    Disposition Note:  Admit Note:  9:44 PM  Pt is being admitted by Reina Estrada MD. The results of their tests and reason(s) for their admission have been discussed with pt and/or available family.  They convey agreement and understanding for the need to be admitted and for admission diagnosis. Attestations:     Rosina Pastrana, attest that this documentation has been prepared under the direction and in the presence of [employer]DO.  10/5/2017 9:42 PM    I, [employer] Data CorporationDO, personally performed the services described in this documentation. All medical record entries made by the scribe were at my direction and in my presence. I have reviewed the chart and discharge instructions and agree that the record reflects my personal performance and is accurate and complete.

## 2017-10-06 NOTE — PROGRESS NOTES
Bedside and Verbal shift change report given to Ronn Thompson (oncoming nurse) by Aileen Ewing (offgoing nurse). Report included the following information SBAR, Kardex, Intake/Output, MAR and Recent Results. Zone Phone for oncoming shift:   [phone]    Shift Summary: Patient rested quietly throughout the night. C/O pain to ABD, PRN pain medication given. LDAs               Peripheral IV 10/05/17 Right Antecubital (Active)   Site Assessment Clean, dry, & intact 10/6/2017  1:15 AM   Phlebitis Assessment 0 10/6/2017  1:15 AM   Infiltration Assessment 0 10/6/2017  1:15 AM   Dressing Status Clean, dry, & intact 10/6/2017  1:15 AM   Dressing Type Tape;Transparent 10/6/2017  1:15 AM   Hub Color/Line Status Pink; Infusing 10/6/2017  1:15 AM                        Intake & Output     Last Bowel Movement Last Bowel Movement Date: 10/05/17   Glucose Checks [] N/A  [x] AC/HS  [] Q6  Concerns:   Nutrition Active Orders   Diet    DIET DIABETIC CONSISTENT CARB Regular       Consults []PT  []OT  []Speech  []Case Management   Cardiac Monitoring [x]N/A []Yes Expires:

## 2017-10-06 NOTE — PROGRESS NOTES
Pharmacy - Clostridium Difficile MUE    Pharmacy review of clostridium difficile therapy for this 52 y.o. female. Current Antibiotics: Vancomycin 125 mg Q8H  Significant Cultures: ND  Recent Labs      10/06/17   0351  10/05/17   2040   CREA  0.71  0.80   BUN  11  12   WBC  8.2  9.5     Temp (24hrs), Av.9 °F (36.6 °C), Min:97.6 °F (36.4 °C), Max:98.2 °F (36.8 °C)            Impression/Plan: Vancomycin oral changed to 125 Q6H.       Thank you,  Ramonita Stephens, [facility]

## 2017-10-07 LAB
BACTERIA SPEC CULT: NORMAL
BACTERIA SPEC CULT: NORMAL
SERVICE CMNT-IMP: NORMAL

## 2017-10-10 ENCOUNTER — HOSPITAL ENCOUNTER (OUTPATIENT)
Age: 47
Setting detail: OBSERVATION
Discharge: HOME | End: 2017-10-11
Attending: EMERGENCY MEDICINE | Admitting: INTERNAL MEDICINE
Payer: COMMERCIAL

## 2017-10-10 ENCOUNTER — APPOINTMENT (OUTPATIENT)
Dept: GENERAL RADIOLOGY | Age: 47
End: 2017-10-10
Attending: EMERGENCY MEDICINE
Payer: COMMERCIAL

## 2017-10-10 DIAGNOSIS — R10.84 ABDOMINAL PAIN, GENERALIZED: ICD-10-CM

## 2017-10-10 DIAGNOSIS — A04.72 C. DIFFICILE DIARRHEA: Primary | ICD-10-CM

## 2017-10-10 PROBLEM — E11.9 TYPE 2 DIABETES MELLITUS (HCC): Status: ACTIVE | Noted: 2017-10-10

## 2017-10-10 PROBLEM — J40 BRONCHITIS: Status: ACTIVE | Noted: 2017-10-10

## 2017-10-10 PROBLEM — I10 ACCELERATED HYPERTENSION: Status: ACTIVE | Noted: 2017-10-10

## 2017-10-10 LAB
ALBUMIN SERPL-MCNC: 3.7 G/DL (ref 3.5–5)
ALBUMIN/GLOB SERPL: 0.9 {RATIO} (ref 1.1–2.2)
ALP SERPL-CCNC: 72 U/L (ref 45–117)
ALT SERPL-CCNC: 59 U/L (ref 12–78)
ANION GAP SERPL CALC-SCNC: 12 MMOL/L (ref 5–15)
APPEARANCE UR: CLEAR
AST SERPL-CCNC: 22 U/L (ref 15–37)
BACTERIA URNS QL MICRO: NEGATIVE /HPF
BASOPHILS # BLD: 0 K/UL (ref 0–0.1)
BASOPHILS NFR BLD: 0 % (ref 0–1)
BILIRUB SERPL-MCNC: 0.4 MG/DL (ref 0.2–1)
BILIRUB UR QL: NEGATIVE
BUN SERPL-MCNC: 13 MG/DL (ref 6–20)
BUN/CREAT SERPL: 16 (ref 12–20)
CALCIUM SERPL-MCNC: 9.8 MG/DL (ref 8.5–10.1)
CHLORIDE SERPL-SCNC: 98 MMOL/L (ref 97–108)
CO2 SERPL-SCNC: 24 MMOL/L (ref 21–32)
COLOR UR: ABNORMAL
CREAT SERPL-MCNC: 0.79 MG/DL (ref 0.55–1.02)
EOSINOPHIL # BLD: 0.1 K/UL (ref 0–0.4)
EOSINOPHIL NFR BLD: 1 % (ref 0–7)
EPITH CASTS URNS QL MICRO: ABNORMAL /LPF
ERYTHROCYTE [DISTWIDTH] IN BLOOD BY AUTOMATED COUNT: 12.7 % (ref 11.5–14.5)
GLOBULIN SER CALC-MCNC: 4.2 G/DL (ref 2–4)
GLUCOSE BLD STRIP.AUTO-MCNC: 205 MG/DL (ref 65–100)
GLUCOSE BLD STRIP.AUTO-MCNC: 247 MG/DL (ref 65–100)
GLUCOSE SERPL-MCNC: 275 MG/DL (ref 65–100)
GLUCOSE UR STRIP.AUTO-MCNC: >1000 MG/DL
HCG UR QL: NEGATIVE
HCT VFR BLD AUTO: 39.8 % (ref 35–47)
HGB BLD-MCNC: 14.3 G/DL (ref 11.5–16)
HGB UR QL STRIP: NEGATIVE
KETONES UR QL STRIP.AUTO: NEGATIVE MG/DL
LACTATE SERPL-SCNC: 1.3 MMOL/L (ref 0.4–2)
LACTATE SERPL-SCNC: 4.5 MMOL/L (ref 0.4–2)
LEUKOCYTE ESTERASE UR QL STRIP.AUTO: NEGATIVE
LIPASE SERPL-CCNC: 352 U/L (ref 73–393)
LYMPHOCYTES # BLD: 1.7 K/UL (ref 0.8–3.5)
LYMPHOCYTES NFR BLD: 14 % (ref 12–49)
MAGNESIUM SERPL-MCNC: 2 MG/DL (ref 1.6–2.4)
MCH RBC QN AUTO: 29.8 PG (ref 26–34)
MCHC RBC AUTO-ENTMCNC: 35.9 G/DL (ref 30–36.5)
MCV RBC AUTO: 82.9 FL (ref 80–99)
MONOCYTES # BLD: 0.7 K/UL (ref 0–1)
MONOCYTES NFR BLD: 5 % (ref 5–13)
MUCOUS THREADS URNS QL MICRO: ABNORMAL /LPF
NEUTS SEG # BLD: 9.6 K/UL (ref 1.8–8)
NEUTS SEG NFR BLD: 80 % (ref 32–75)
NITRITE UR QL STRIP.AUTO: NEGATIVE
PH UR STRIP: 6.5 [PH] (ref 5–8)
PLATELET # BLD AUTO: 248 K/UL (ref 150–400)
POTASSIUM SERPL-SCNC: 3.3 MMOL/L (ref 3.5–5.1)
PROT SERPL-MCNC: 7.9 G/DL (ref 6.4–8.2)
PROT UR STRIP-MCNC: NEGATIVE MG/DL
RBC # BLD AUTO: 4.8 M/UL (ref 3.8–5.2)
RBC #/AREA URNS HPF: ABNORMAL /HPF (ref 0–5)
SERVICE CMNT-IMP: ABNORMAL
SERVICE CMNT-IMP: ABNORMAL
SODIUM SERPL-SCNC: 134 MMOL/L (ref 136–145)
SP GR UR REFRACTOMETRY: 1.03 (ref 1–1.03)
UA: UC IF INDICATED,UAUC: ABNORMAL
UROBILINOGEN UR QL STRIP.AUTO: 0.2 EU/DL (ref 0.2–1)
WBC # BLD AUTO: 12.1 K/UL (ref 3.6–11)
WBC URNS QL MICRO: ABNORMAL /HPF (ref 0–4)
YEAST URNS QL MICRO: PRESENT

## 2017-10-10 PROCEDURE — 96372 THER/PROPH/DIAG INJ SC/IM: CPT

## 2017-10-10 PROCEDURE — 96376 TX/PRO/DX INJ SAME DRUG ADON: CPT

## 2017-10-10 PROCEDURE — 74011250636 HC RX REV CODE- 250/636: Performed by: EMERGENCY MEDICINE

## 2017-10-10 PROCEDURE — 36415 COLL VENOUS BLD VENIPUNCTURE: CPT | Performed by: EMERGENCY MEDICINE

## 2017-10-10 PROCEDURE — 81001 URINALYSIS AUTO W/SCOPE: CPT | Performed by: EMERGENCY MEDICINE

## 2017-10-10 PROCEDURE — 99218 HC RM OBSERVATION: CPT

## 2017-10-10 PROCEDURE — 99284 EMERGENCY DEPT VISIT MOD MDM: CPT

## 2017-10-10 PROCEDURE — 74000 XR ABD (KUB): CPT

## 2017-10-10 PROCEDURE — 82962 GLUCOSE BLOOD TEST: CPT

## 2017-10-10 PROCEDURE — 96375 TX/PRO/DX INJ NEW DRUG ADDON: CPT

## 2017-10-10 PROCEDURE — 74011250637 HC RX REV CODE- 250/637: Performed by: INTERNAL MEDICINE

## 2017-10-10 PROCEDURE — 94640 AIRWAY INHALATION TREATMENT: CPT

## 2017-10-10 PROCEDURE — 83735 ASSAY OF MAGNESIUM: CPT | Performed by: EMERGENCY MEDICINE

## 2017-10-10 PROCEDURE — 85025 COMPLETE CBC W/AUTO DIFF WBC: CPT | Performed by: EMERGENCY MEDICINE

## 2017-10-10 PROCEDURE — 83605 ASSAY OF LACTIC ACID: CPT | Performed by: EMERGENCY MEDICINE

## 2017-10-10 PROCEDURE — 74011250637 HC RX REV CODE- 250/637: Performed by: EMERGENCY MEDICINE

## 2017-10-10 PROCEDURE — 74011000250 HC RX REV CODE- 250: Performed by: INTERNAL MEDICINE

## 2017-10-10 PROCEDURE — 65660000000 HC RM CCU STEPDOWN

## 2017-10-10 PROCEDURE — 96361 HYDRATE IV INFUSION ADD-ON: CPT

## 2017-10-10 PROCEDURE — 80053 COMPREHEN METABOLIC PANEL: CPT | Performed by: EMERGENCY MEDICINE

## 2017-10-10 PROCEDURE — 81025 URINE PREGNANCY TEST: CPT | Performed by: EMERGENCY MEDICINE

## 2017-10-10 PROCEDURE — 74011250636 HC RX REV CODE- 250/636: Performed by: INTERNAL MEDICINE

## 2017-10-10 PROCEDURE — 96374 THER/PROPH/DIAG INJ IV PUSH: CPT

## 2017-10-10 PROCEDURE — 83690 ASSAY OF LIPASE: CPT | Performed by: EMERGENCY MEDICINE

## 2017-10-10 PROCEDURE — 74011636637 HC RX REV CODE- 636/637: Performed by: INTERNAL MEDICINE

## 2017-10-10 RX ORDER — CETIRIZINE HCL 10 MG
10 TABLET ORAL
Status: DISCONTINUED | OUTPATIENT
Start: 2017-10-10 | End: 2017-10-11 | Stop reason: HOSPADM

## 2017-10-10 RX ORDER — ALBUTEROL SULFATE 2.5 MG/.5ML
2.5 SOLUTION RESPIRATORY (INHALATION)
Status: DISCONTINUED | OUTPATIENT
Start: 2017-10-10 | End: 2017-10-10 | Stop reason: SDUPTHER

## 2017-10-10 RX ORDER — HYDROMORPHONE HYDROCHLORIDE 1 MG/ML
0.5 INJECTION, SOLUTION INTRAMUSCULAR; INTRAVENOUS; SUBCUTANEOUS
Status: DISCONTINUED | OUTPATIENT
Start: 2017-10-10 | End: 2017-10-10

## 2017-10-10 RX ORDER — PREDNISONE 10 MG/1
10 TABLET ORAL SEE ADMIN INSTRUCTIONS
COMMUNITY
End: 2018-06-19

## 2017-10-10 RX ORDER — DIPHENHYDRAMINE HCL 25 MG
25 CAPSULE ORAL
COMMUNITY
End: 2018-06-19

## 2017-10-10 RX ORDER — LOSARTAN POTASSIUM 50 MG/1
100 TABLET ORAL DAILY
Status: DISCONTINUED | OUTPATIENT
Start: 2017-10-11 | End: 2017-10-10 | Stop reason: CLARIF

## 2017-10-10 RX ORDER — SODIUM CHLORIDE 9 MG/ML
50 INJECTION, SOLUTION INTRAVENOUS CONTINUOUS
Status: DISCONTINUED | OUTPATIENT
Start: 2017-10-10 | End: 2017-10-10

## 2017-10-10 RX ORDER — ACETAMINOPHEN 500 MG
1000 TABLET ORAL
COMMUNITY
End: 2019-02-07

## 2017-10-10 RX ORDER — HYDROMORPHONE HYDROCHLORIDE 2 MG/ML
1 INJECTION, SOLUTION INTRAMUSCULAR; INTRAVENOUS; SUBCUTANEOUS
Status: DISCONTINUED | OUTPATIENT
Start: 2017-10-10 | End: 2017-10-10

## 2017-10-10 RX ORDER — HYDROCODONE BITARTRATE AND ACETAMINOPHEN 5; 325 MG/1; MG/1
1 TABLET ORAL
Status: COMPLETED | OUTPATIENT
Start: 2017-10-10 | End: 2017-10-10

## 2017-10-10 RX ORDER — ENOXAPARIN SODIUM 100 MG/ML
40 INJECTION SUBCUTANEOUS EVERY 24 HOURS
Status: DISCONTINUED | OUTPATIENT
Start: 2017-10-10 | End: 2017-10-11 | Stop reason: HOSPADM

## 2017-10-10 RX ORDER — HYDROMORPHONE HYDROCHLORIDE 2 MG/ML
1 INJECTION, SOLUTION INTRAMUSCULAR; INTRAVENOUS; SUBCUTANEOUS
Status: COMPLETED | OUTPATIENT
Start: 2017-10-10 | End: 2017-10-10

## 2017-10-10 RX ORDER — ALBUTEROL SULFATE 2.5 MG/.5ML
2.5 SOLUTION RESPIRATORY (INHALATION) EVERY 12 HOURS
COMMUNITY
End: 2019-02-07

## 2017-10-10 RX ORDER — HYDROCODONE BITARTRATE AND ACETAMINOPHEN 5; 325 MG/1; MG/1
1 TABLET ORAL
Status: DISCONTINUED | OUTPATIENT
Start: 2017-10-10 | End: 2017-10-11

## 2017-10-10 RX ORDER — BUTALBITAL, ACETAMINOPHEN AND CAFFEINE 50; 325; 40 MG/1; MG/1; MG/1
1 TABLET ORAL
Status: DISCONTINUED | OUTPATIENT
Start: 2017-10-10 | End: 2017-10-11 | Stop reason: HOSPADM

## 2017-10-10 RX ORDER — LOSARTAN POTASSIUM AND HYDROCHLOROTHIAZIDE 12.5; 1 MG/1; MG/1
1 TABLET ORAL DAILY
COMMUNITY
End: 2021-03-26 | Stop reason: SDUPTHER

## 2017-10-10 RX ORDER — ACETAMINOPHEN 325 MG/1
650 TABLET ORAL
Status: DISCONTINUED | OUTPATIENT
Start: 2017-10-10 | End: 2017-10-11 | Stop reason: HOSPADM

## 2017-10-10 RX ORDER — SODIUM CHLORIDE 0.9 % (FLUSH) 0.9 %
5-10 SYRINGE (ML) INJECTION AS NEEDED
Status: DISCONTINUED | OUTPATIENT
Start: 2017-10-10 | End: 2017-10-11 | Stop reason: HOSPADM

## 2017-10-10 RX ORDER — HYDROMORPHONE HYDROCHLORIDE 2 MG/ML
0.5 INJECTION, SOLUTION INTRAMUSCULAR; INTRAVENOUS; SUBCUTANEOUS
Status: DISCONTINUED | OUTPATIENT
Start: 2017-10-10 | End: 2017-10-10

## 2017-10-10 RX ORDER — FAMOTIDINE 20 MG/1
20 TABLET, FILM COATED ORAL 2 TIMES DAILY
Status: DISCONTINUED | OUTPATIENT
Start: 2017-10-10 | End: 2017-10-11 | Stop reason: HOSPADM

## 2017-10-10 RX ORDER — SODIUM CHLORIDE 0.9 % (FLUSH) 0.9 %
5-10 SYRINGE (ML) INJECTION EVERY 8 HOURS
Status: DISCONTINUED | OUTPATIENT
Start: 2017-10-10 | End: 2017-10-11 | Stop reason: HOSPADM

## 2017-10-10 RX ORDER — DEXTROSE 50 % IN WATER (D50W) INTRAVENOUS SYRINGE
12.5-25 AS NEEDED
Status: DISCONTINUED | OUTPATIENT
Start: 2017-10-10 | End: 2017-10-11 | Stop reason: HOSPADM

## 2017-10-10 RX ORDER — HYDROMORPHONE HYDROCHLORIDE 2 MG/ML
0.5 INJECTION, SOLUTION INTRAMUSCULAR; INTRAVENOUS; SUBCUTANEOUS ONCE
Status: COMPLETED | OUTPATIENT
Start: 2017-10-10 | End: 2017-10-10

## 2017-10-10 RX ORDER — ONDANSETRON 2 MG/ML
4 INJECTION INTRAMUSCULAR; INTRAVENOUS
Status: COMPLETED | OUTPATIENT
Start: 2017-10-10 | End: 2017-10-10

## 2017-10-10 RX ORDER — ONDANSETRON 2 MG/ML
4 INJECTION INTRAMUSCULAR; INTRAVENOUS
Status: DISCONTINUED | OUTPATIENT
Start: 2017-10-10 | End: 2017-10-11 | Stop reason: HOSPADM

## 2017-10-10 RX ORDER — HYDRALAZINE HYDROCHLORIDE 20 MG/ML
5 INJECTION INTRAMUSCULAR; INTRAVENOUS
Status: DISCONTINUED | OUTPATIENT
Start: 2017-10-10 | End: 2017-10-11 | Stop reason: HOSPADM

## 2017-10-10 RX ORDER — INSULIN LISPRO 100 [IU]/ML
INJECTION, SOLUTION INTRAVENOUS; SUBCUTANEOUS
Status: DISCONTINUED | OUTPATIENT
Start: 2017-10-10 | End: 2017-10-11 | Stop reason: HOSPADM

## 2017-10-10 RX ORDER — VANCOMYCIN HYDROCHLORIDE 125 MG/1
125 CAPSULE ORAL
COMMUNITY
End: 2018-03-13

## 2017-10-10 RX ORDER — MAGNESIUM SULFATE 100 %
4 CRYSTALS MISCELLANEOUS AS NEEDED
Status: DISCONTINUED | OUTPATIENT
Start: 2017-10-10 | End: 2017-10-11 | Stop reason: HOSPADM

## 2017-10-10 RX ORDER — VALSARTAN 160 MG/1
160 TABLET ORAL DAILY
Status: DISCONTINUED | OUTPATIENT
Start: 2017-10-11 | End: 2017-10-11 | Stop reason: HOSPADM

## 2017-10-10 RX ORDER — ALBUTEROL SULFATE 0.83 MG/ML
2.5 SOLUTION RESPIRATORY (INHALATION)
Status: DISCONTINUED | OUTPATIENT
Start: 2017-10-10 | End: 2017-10-11

## 2017-10-10 RX ADMIN — ONDANSETRON 4 MG: 2 INJECTION INTRAMUSCULAR; INTRAVENOUS at 15:01

## 2017-10-10 RX ADMIN — HYDROCODONE BITARTRATE AND ACETAMINOPHEN 1 TABLET: 5; 325 TABLET ORAL at 23:50

## 2017-10-10 RX ADMIN — ONDANSETRON 4 MG: 2 INJECTION INTRAMUSCULAR; INTRAVENOUS at 10:08

## 2017-10-10 RX ADMIN — HYDROCODONE BITARTRATE AND ACETAMINOPHEN 1 TABLET: 5; 325 TABLET ORAL at 18:59

## 2017-10-10 RX ADMIN — VANCOMYCIN HYDROCHLORIDE 500 MG: 1 INJECTION, POWDER, LYOPHILIZED, FOR SOLUTION INTRAVENOUS at 18:59

## 2017-10-10 RX ADMIN — ENOXAPARIN SODIUM 40 MG: 40 INJECTION SUBCUTANEOUS at 18:03

## 2017-10-10 RX ADMIN — ALBUTEROL SULFATE 2.5 MG: 2.5 SOLUTION RESPIRATORY (INHALATION) at 15:07

## 2017-10-10 RX ADMIN — ALBUTEROL SULFATE 2.5 MG: 2.5 SOLUTION RESPIRATORY (INHALATION) at 20:47

## 2017-10-10 RX ADMIN — Medication 10 ML: at 21:48

## 2017-10-10 RX ADMIN — HYDROMORPHONE HYDROCHLORIDE 0.5 MG: 2 INJECTION INTRAMUSCULAR; INTRAVENOUS; SUBCUTANEOUS at 15:00

## 2017-10-10 RX ADMIN — HYDROCODONE BITARTRATE AND ACETAMINOPHEN 1 TABLET: 5; 325 TABLET ORAL at 12:00

## 2017-10-10 RX ADMIN — SODIUM CHLORIDE 1000 ML: 900 INJECTION, SOLUTION INTRAVENOUS at 10:08

## 2017-10-10 RX ADMIN — FAMOTIDINE 20 MG: 20 TABLET, FILM COATED ORAL at 18:03

## 2017-10-10 RX ADMIN — HYDROMORPHONE HYDROCHLORIDE 0.5 MG: 2 INJECTION INTRAMUSCULAR; INTRAVENOUS; SUBCUTANEOUS at 12:56

## 2017-10-10 RX ADMIN — HYDROMORPHONE HYDROCHLORIDE 1 MG: 2 INJECTION INTRAMUSCULAR; INTRAVENOUS; SUBCUTANEOUS at 10:08

## 2017-10-10 RX ADMIN — INSULIN LISPRO 2 UNITS: 100 INJECTION, SOLUTION INTRAVENOUS; SUBCUTANEOUS at 21:47

## 2017-10-10 RX ADMIN — HYDROMORPHONE HYDROCHLORIDE 1 MG: 2 INJECTION INTRAMUSCULAR; INTRAVENOUS; SUBCUTANEOUS at 11:07

## 2017-10-10 RX ADMIN — INSULIN LISPRO 3 UNITS: 100 INJECTION, SOLUTION INTRAVENOUS; SUBCUTANEOUS at 16:43

## 2017-10-10 RX ADMIN — VANCOMYCIN HYDROCHLORIDE 500 MG: 1 INJECTION, POWDER, LYOPHILIZED, FOR SOLUTION INTRAVENOUS at 23:49

## 2017-10-10 RX ADMIN — SODIUM CHLORIDE 50 ML/HR: 900 INJECTION, SOLUTION INTRAVENOUS at 14:54

## 2017-10-10 RX ADMIN — Medication 10 ML: at 18:07

## 2017-10-10 NOTE — IP AVS SNAPSHOT
[address] [facility] 
[phone] Patient: Conor Brewster MRN: [id number] You are allergic to the following Allergen Reactions Aspirin Shortness of Breath Prilosec (Omeprazole Magnesium) Anaphylaxis CHERRY FLAVORED; PT TAKES REGULAR PRILOSEC AND IS OK Codeine Hives Itching Contrast Agent (Iodine) Itching Pt. Had itching after IV contrast with the last exam.  Benadryl was given Morphine Itching Severe itching Percocet (Oxycodone-Acetaminophen) Hives Recent Documentation Weight BMI OB Status Smoking Status 95 kg 38.31 kg/m2 Hysterectomy Never Smoker Emergency Contacts Name Discharge Info Relation Home Work Mobile Steve Hankins N/A  AT THIS TIME [6] Parent [1] [phone] [address] [facility] CAREGIVER [3] Child [2] [phone] About your hospitalization You were admitted on:  October 10, 2017 You last received care in the:  [facility] 2 CARDIOPULMONARY CARE You were discharged on:  October 11, 2017 Unit phone number:  [phone] Why you were hospitalized Your primary diagnosis was:  Clostridium Difficile Diarrhea Your diagnoses also included:  Bronchitis, Accelerated Hypertension, Type 2 Diabetes Mellitus (Hcc), Diarrhea, Hypertension, Acute Respiratory Failure (Hcc) Providers Seen During Your Hospitalizations Provider Role Specialty Primary office phone Delphine Mock MD Attending Provider Emergency Medicine [phone] Octavio Her MD Attending Provider Internal Medicine [phone] Your Primary Care Physician (PCP) Primary Care Physician Office Phone Office Fax Elisabeth Slaughter [phone] Follow-up Information Follow up With Details Comments Contact Info BEBA Paige Schedule an appointment as soon as possible for a visit to be seen within 2 weeks (Patient to call and schedule a 2-week follow-up appointment) [address] [place] [address] [address] [address] 
[phone] Rosas Pereira In 2 weeks Patient to call and schedule a 2 week follow-up appointment [phone] Current Discharge Medication List  
  
START taking these medications Dose & Instructions Dispensing Information Comments Morning Noon Evening Bedtime  
 dicyclomine 10 mg capsule Commonly known as:  BENTYL Your next dose is:  TODAY Dose:  10 mg Take 1 Cap by mouth three (3) times daily. Quantity:  90 Cap Refills:  0  
     
   
   
  
   
  
  
 famotidine 20 mg tablet Commonly known as:  PEPCID Your next dose is:  TODAY Dose:  20 mg Take 1 Tab by mouth two (2) times a day. Quantity:  60 Tab Refills:  0 CONTINUE these medications which have NOT CHANGED Dose & Instructions Dispensing Information Comments Morning Noon Evening Bedtime  
 acetaminophen 500 mg tablet Commonly known as:  TYLENOL Dose:  1000 mg Take 1,000 mg by mouth every six (6) hours as needed for Pain. Refills:  0  
     
   
   
   
  
 albuterol 90 mcg/actuation inhaler Commonly known as:  Jayne Moreau Dose:  2 Puff Take 2 Puffs by inhalation every six (6) hours as needed. Refills:  0  
     
   
   
   
  
 albuterol sulfate 2.5 mg/0.5 mL Nebu nebulizer solution Commonly known as:  PROVENTIL;VENTOLIN Your next dose is:  TODAY Dose:  2.5 mg  
2.5 mg by Nebulization route every twelve (12) hours. Patient mixes this agent with acetylcysteine (20%) nebulizer solution for breathing treatment. Refills:  0  
     
   
   
  
   
  
 BENADRYL 25 mg capsule Generic drug:  diphenhydrAMINE Dose:  25 mg Take 25 mg by mouth every six (6) hours as needed (congestion). Refills:  0 cetirizine 10 mg tablet Commonly known as:  ZYRTEC Dose:  10 mg Take 10 mg by mouth nightly as needed for Allergies. Refills:  0 EPINEPHrine 0.3 mg/0.3 mL injection Commonly known as:  Katheren Spindle Dose:  0.3 mg  
0.3 mL by IntraMUSCular route once as needed for up to 1 dose. Quantity:  0.3 mL Refills:  1  
     
   
   
   
  
 estradiol 1 mg tablet Commonly known as:  ESTRACE Your next dose is:  TODAY Dose:  1 mg Take 1 mg by mouth daily. Refills:  0  
     
   
   
  
   
  
 losartan-hydroCHLOROthiazide 100-12.5 mg per tablet Commonly known as:  HYZAAR Your next dose is:  TODAY Dose:  1 Tab Take 1 Tab by mouth daily. Refills:  0  
     
   
   
  
   
  
 metFORMIN 500 mg tablet Commonly known as:  GLUCOPHAGE Dose:  500 mg Take 500 mg by mouth daily (with breakfast). Refills:  0  
     
   
   
   
  
 predniSONE 10 mg dose pack Commonly known as:  STERAPRED DS Your next dose is:  TOMORROW Dose:  10 mg Take 10 mg by mouth See Admin Instructions. See administration instruction per 10mg dose pack Refills:  0  
     
  
   
   
   
  
 vancomycin 125 mg capsule Commonly known as:  [employer] Dose:  125 mg Take 125 mg by mouth every three (3) days. Refills:  0  
     
   
   
   
  
 VSL#3 PO Your next dose is:  TODAY Dose:  1 Packet Take 1 Packet by mouth daily. Refills:  0 STOP taking these medications   
 acetylcysteine 200 mg/mL (20 %) solution Commonly known as:  MUCOMYST  
   
  
 butalbital-acetaminophen-caff -40 mg per capsule Commonly known as:  FIORICET  
   
  
 hydroCHLOROthiazide 25 mg tablet Commonly known as:  HYDRODIURIL PriLOSEC 20 mg capsule Generic drug:  omeprazole Where to Get Your Medications Information on where to get these meds will be given to you by the nurse or doctor. ! Ask your nurse or doctor about these medications  
  dicyclomine 10 mg capsule  
 famotidine 20 mg tablet Discharge Instructions Patient Discharge Instructions Pt Name  Sd Prasad Date of Birth 1970 Age  52 y.o. Medical Record Number  [id number] PCP BEBA Walton Admit date:  10/10/2017 @    [address] [place] [address] Room Number  5406/64 Date of Discharge 10/11/2017 Admission Diagnoses:     Clostridium difficile diarrhea Allergies Allergen Reactions  Aspirin Shortness of Breath  Prilosec [Omeprazole Magnesium] Anaphylaxis CHERRY FLAVORED; PT TAKES REGULAR PRILOSEC AND IS OK  Codeine Hives and Itching  Contrast Agent [Iodine] Itching Pt. Had itching after IV contrast with the last exam.  Benadryl was given  Morphine Itching Severe itching  Percocet [Oxycodone-Acetaminophen] Hives You were admitted to [address] [place] [address] for  Clostridium difficile diarrhea YOUR OTHER MEDICAL DIAGNOSES INCLUDE (BUT NOT LIMITED TO ): 
Present on Admission:  Clostridium difficile diarrhea  Bronchitis  Accelerated hypertension  Type 2 diabetes mellitus ([address]  Diarrhea  Hypertension  Acute respiratory failure ([address] DIET:  Diabetic Diet Recommended activity: Activity as tolerated Follow up : Follow-up Information Follow up With Details Comments Contact Info Dolores Adhikari MD Schedule an appointment as soon as possible for a visit to be seen within 2 weeks [address] 
[phone] BEBA Walton Schedule an appointment as soon as possible for a visit to be seen within 2 weeks [address] [place] [address] 
[phone] Dicyclomine (By mouth) Dicyclomine (dye-SYE-kloe-meen) Treats irritable bowel syndrome. Brand Name(s): Bentyl There may be other brand names for this medicine. When This Medicine Should Not Be Used: You should not use this medicine if you have had an allergic reaction to dicyclomine. Do not use this medicine if you have glaucoma, myasthenia gravis, or trouble urinating because of a blockage (such as an enlarged prostate). Make sure your doctor knows about all digestion problems you have, including reflux esophagitis (GERD), or severe ulcerative colitis. You should not use this medicine if you are breast feeding. This medicine should not be given to infants less than 6 months old. How to Use This Medicine:  
Capsule, Tablet, Long Acting Tablet, Liquid · Take your medicine as directed. Your dose may need to be changed several times to find what works best for you. · Swallow the extended-release tablet whole. Do not break, crush or chew. · Measure the oral liquid medicine with a marked measuring spoon, oral syringe, or medicine cup. If a dose is missed: · Take a dose as soon as you remember. If it is almost time for your next dose, wait until then and take a regular dose. Do not take extra medicine to make up for a missed dose. How to Store and Dispose of This Medicine: · Store the medicine in a closed container at room temperature, away from heat, moisture, and direct light. Do not freeze the oral liquid. · Ask your pharmacist, doctor, or health caregiver about the best way to dispose of any outdated medicine or medicine no longer needed. · Keep all medicine out of the reach of children. Never share your medicine with anyone. Drugs and Foods to Avoid: Ask your doctor or pharmacist before using any other medicine, including over-the-counter medicines, vitamins, and herbal products. · Make sure your doctor knows if you are using amantadine (Symmetrel®), digoxin (Lanoxin®), or a belladonna medicine such as atropine, hyoscyamine, scopolamine, Anaspaz®, Arco-Lase® Plus, or Donnatal®.  Tell your doctor if you are using an MAO inhibitor such as Eldepryl®, Marplan®, [place], or Parnate®. · Tell your doctor if you are also using narcotic pain medicine, medicine for heart rhythm problems, an antihistamine, medicine to treat depression, phenothiazines (medicines to treat certain mental problems or severe nausea or vomiting), or a steroid medicine. Meperidine (Demerol®) is a narcotic pain medicine. Some medicines for heart rhythm problems are disopyramide, procainamide, quinidine, Cardioquin®, Norpace®, Procanbid®, or [address]. Diphenhydramine (Benadryl®) is an antihistamine. Some phenothiazine medicines are Compazine®, Mellaril®, Phenergan®, Serentil®, Tacaryl®, Thorazine®, or Trilafon®. Some medicines to treat depression are amitriptyline, nortriptyline, Norpramin®, or Vivactil®. Cortisone and prednisone are steroid medicines. · You should not use dicyclomine within 2 to 3 hours of taking antacids (Maalox®, Mylanta®) or medicine to stop diarrhea. Warnings While Using This Medicine: · Make sure your doctor knows if you are pregnant, if you have liver disease, kidney disease, or overactive thyroid. Tell your doctor about any heart or blood vessel problems you have, including heart rhythm problems, congestive heart failure, or high blood pressure. Make sure your doctor knows if you have ongoing diarrhea, or an ileostomy or colostomy. Tell your doctor if you have autonomic neuropathy (a nerve problem), or a hiatal hernia (problems with your esophagus). · This medicine may make you sweat less. Your body could get too hot if you do not sweat enough. Stay out of hot places. Try to stay indoors or somewhere cool during hot weather. If you have a fever, call your doctor for advice. If your body gets too hot, you might feel dizzy, weak, tired, or confused. You might have an upset stomach or vomit. Call your doctor if you are too hot and cannot cool down. · This medicine may make you drowsy.  Avoid driving, using machines, or doing anything else that could be dangerous if you are not alert. · Your eyes may be more sensitive to bright light while you are using this medicine. You may want to wear sunglasses in bright sunlight. Possible Side Effects While Using This Medicine:  
Call your doctor right away if you notice any of these side effects: · Allergic reaction: Itching or hives, swelling in your face or hands, swelling or tingling in your mouth or throat, chest tightness, trouble breathing · Fast or uneven heartbeat. · Restlessness, agitation, or confusion. · Severe dizziness or light-headedness. If you notice these less serious side effects, talk with your doctor: · Decrease in how much or how often you urinate. · Drowsiness or weakness. · Dry mouth. · Nausea, vomiting, constipation, or stomach pain. · Trouble focusing or other vision changes. If you notice other side effects that you think are caused by this medicine, tell your doctor. Call your doctor for medical advice about side effects. You may report side effects to FDA at 4-479-UTM-1522 © 2017 [facility] Information is for End User's use only and may not be sold, redistributed or otherwise used for commercial purposes. The above information is an  only. It is not intended as medical advice for individual conditions or treatments. Talk to your doctor, nurse or pharmacist before following any medical regimen to see if it is safe and effective for you. · It is important that you take the medication exactly as they are prescribed. · Keep your medication in the bottles provided by the pharmacist and keep a list of the medication names, dosages, and times to be taken in your wallet. · Do not take other medications without consulting your doctor.   
 
 
ADDITIONAL INFORMATION: If you experience any of the following symptoms or have any health problem not listed below, then please call your primary care physician or return to the emergency room if you cannot get hold of your doctor: Fever, chills, nausea, vomiting, diarrhea, change in mentation, falling, bleeding, shortness of breath. I understand that if any problems occur once I am discharged, I am supposed to call my Primary care physician for further care or seek help in the Emergency Department at the nearest Healthcare facility. I have had an opportunity to discuss my clinical issues with my doctor and nursing staff. I understand and acknowledge receipt of the above instructions. Physician's or R.N.'s Signature                                                            Date/Time Patient or Representative Signature                                                 Date/Time Discharge Orders None [employer] Announcement We are excited to announce that we are making your provider's discharge notes available to you in [employer]. You will see these notes when they are completed and signed by the physician that discharged you from your recent hospital stay. If you have any questions or concerns about any information you see in Z[employer]ish[employer], please call the Health Information Department where you were seen or reach out to your Primary Care Provider for more information about your plan of care. Introducing hospitals & HEALTH SERVICES! Dear Rinku Love: Thank you for requesting a [employer] account. Our records indicate that you already have an active [employer] account. You can access your account anytime at [email or web address] Did you know that you can access your hospital and ER discharge instructions at any time in MyChart? You can also review all of your test results from your hospital stay or ER visit. Additional Information If you have questions, please visit the Frequently Asked Questions section of the [employer] website at [email or web address] "[employer]"/Oviceversat/. Remember, MyChart is NOT to be used for urgent needs. For medical emergencies, dial 911. Now available from your iPhone and Android! General Information Please provide this summary of care documentation to your next provider. Patient Signature:  ____________________________________________________________ Date:  ____________________________________________________________  
  
Rei Sport Provider Signature:  ____________________________________________________________ Date:  ____________________________________________________________

## 2017-10-10 NOTE — ED NOTES
TRANSFER - OUT REPORT:    Verbal report given to Cynthia (name) on Ameena Kearney  being transferred to [address] [place] [address] (unit) for routine progression of care       Report consisted of patients Situation, Background, Assessment and   Recommendations(SBAR). Information from the following report(s) SBAR, Kardex, [facility] and Recent Results was reviewed with the receiving nurse. Lines:   Peripheral IV 10/10/17 Left Antecubital (Active)   Site Assessment Clean, dry, & intact 10/10/2017  9:56 AM   Phlebitis Assessment 0 10/10/2017  9:56 AM   Infiltration Assessment 0 10/10/2017  9:56 AM   Dressing Status Clean, dry, & intact 10/10/2017  9:56 AM   Dressing Type Transparent 10/10/2017  9:56 AM   Hub Color/Line Status Pink;Capped;Flushed 10/10/2017  9:56 AM        Opportunity for questions and clarification was provided.

## 2017-10-10 NOTE — PROGRESS NOTES
Pharmacy Clarification of Prior to Admission Medication Regimen     The patient was interviewed regarding clarification of the prior to admission medication regimen. Daughter was present in room and obtained permission from patient to discuss drug regimen with visitor(s) present. Patient was questioned regarding use of any other inhalers, topical products, over the counter medications, herbal medications, vitamin products or ophthalmic/nasal/otic medication use. Information Obtained From: Patient, Prescription Bottles, RX Query    Pertinent Pharmacy Findings:   vancomycin (VANCOCIN) 125 mg capsule: Patient stated that she started this regimen on 2017. Patient stated that she has been on a 'tapered dose' for this agent. Patient stated 'I have been taking this [agent] for about 5 weeks now'. The patient is now taking 125 mg (1 capsule) every 3 days (for 15 days). The patient's previous (and completed) taper instructions were: 125 mg QID for 14 days, 125 mg BID for 7 days, 125 mg daily for 7 days, and 125 mg every other day for 6 days.  predniSONE (STERAPRED DS) 10 mg dose pack: Patient stated she took 60 mg of this agent on 10/9/2017.  butalbital-acetaminophen-caff (FIORICET) -40 mg per capsule: Patient stated that she has the paper prescription for this agent at home, but has not gotten this agent filled as of 10/10/2017.  EPINEPHrine (EPIPEN) 0.3 mg/0.3 mL (1:1,000) injection: Patient stated that she has this agent at home, but is not currently taking.  cetirizine (ZYRTEC) 10 mg tablet: Patient stated that she has this OTC agent at home, but is not currently taking. PTA medication list was corrected to the following:     Prior to Admission Medications   Prescriptions Last Dose Informant Patient Reported? Taking? EPINEPHrine (EPIPEN) 0.3 mg/0.3 mL (1:1,000) injection Not Taking at Unknown time Self No No   Si.3 mL by IntraMUSCular route once as needed for up to 1 dose.    LACTOBACIL 2-S.THERMO-BIFIDO 1 (VSL#3 PO) 10/9/2017 at Unknown time Self Yes Yes   Sig: Take 1 Packet by mouth daily. acetaminophen (TYLENOL) 500 mg tablet 10/10/2017 at Unknown time Self Yes Yes   Sig: Take 1,000 mg by mouth every six (6) hours as needed for Pain. acetylcysteine (MUCOMYST) 200 mg/mL (20 %) solution 10/10/2017 at Unknown time Self No Yes   Si mL by Nebulization route every twelve (12) hours as needed. albuterol (PROVENTIL, VENTOLIN) 90 mcg/Actuation inhaler 10/9/2017 at Unknown time Self Yes Yes   Sig: Take 2 Puffs by inhalation every six (6) hours as needed. albuterol sulfate (PROVENTIL;VENTOLIN) 2.5 mg/0.5 mL nebu nebulizer solution 10/10/2017 at Unknown time Self Yes Yes   Si.5 mg by Nebulization route every twelve (12) hours. Patient mixes this agent with acetylcysteine (20%) nebulizer solution for breathing treatment. butalbital-acetaminophen-caff (FIORICET) -40 mg per capsule Not Taking at Unknown time Self No No   Sig: Take 1-2 Caps by mouth every eight (8) hours as needed for Pain or Headache. Max Daily Amount: 6 Caps. cetirizine (ZYRTEC) 10 mg tablet Not Taking at Unknown time Self Yes No   Sig: Take 10 mg by mouth nightly as needed for Allergies. diphenhydrAMINE (BENADRYL) 25 mg capsule 10/10/2017 at Unknown time Self Yes Yes   Sig: Take 25 mg by mouth every six (6) hours as needed (congestion). estradiol (ESTRACE) 1 mg tablet 10/9/2017 at Unknown time Self Yes Yes   Sig: Take 1 mg by mouth daily. losartan-hydroCHLOROthiazide (HYZAAR) 100-12.5 mg per tablet 10/10/2017 at Unknown time Self Yes Yes   Sig: Take 1 Tab by mouth daily. metFORMIN (GLUCOPHAGE) 500 mg tablet 10/9/2017 at Unknown time Self Yes Yes   Sig: Take 500 mg by mouth daily (with breakfast). omeprazole (PRILOSEC) 20 mg capsule 10/10/2017 at Unknown time Self Yes Yes   Sig: Take 40 mg by mouth daily.    predniSONE (STERAPRED DS) 10 mg dose pack 10/9/2017 at Unknown time Self Yes Yes   Sig: Take 10 mg by mouth See Admin Instructions. See administration instruction per 10mg dose pack   vancomycin (VANCOCIN) 125 mg capsule 10/9/2017 at Unknown time Self Yes Yes   Sig: Take 125 mg by mouth every three (3) days.       Facility-Administered Medications: None          Thank you,  Loc Herrera [facility]  Medication History Pharmacy Technician

## 2017-10-10 NOTE — ED NOTES
Pt states she received pain medications that lasted for \"minute\" but reports pain is back. Dr Antonella Frankel aware.

## 2017-10-10 NOTE — ED PROVIDER NOTES
[facility].  EMERGENCY DEPARTMENT HISTORY AND PHYSICAL EXAM       Date of Service: 10/10/2017   Patient Name: Val Duncan   YOB: 1970  Medical Record Number: [id number]    History of Presenting Illness     Chief Complaint   Patient presents with    Diarrhea     reports recently diagnosed with c diff and feels like the diarrhea has returned        History Provided By:  patient    Additional History:   Val Duncan is a 52 y.o. female with PMhx significant for HTN, DM, GERD, CKD, C diff, and recurrent diverticulitis who presents ambulatory to the ED with cc of generalized ABD pain and diarrhea x ~4-6 weeks. She also c/o mild intermittent rectal bleeding since onset. Pt reports she was diagnosed with diverticulitis ~2 months ago after having similar symptoms, and was given Cipro and flagyl, which she took with temporary improvement. She states she was then admitted to [facility] ~1 month ago after being evaluated for return of symptoms, was diagnosed with C diff, and started on PO vancomycin. Pt states she has been taking PO vancomycin with intermittent improvement in her symptoms since, and began occasionally having formed stool with BM's ~2 weeks ago. She reports she was diagnosed with bronchitis by her PCP (10/5/17) and started on azithromycin and prednisone 60 mg daily; pt reports return of diarrhea after starting her medication. Pt notes hx of colon resection. She states she took Tylenol ~1.5 hours ago with mild improvement in her symptoms. Pt specifically denies nausea and vomiting. Social Hx: - Tobacco, + EtOH, - Illicit Drugs    There are no other complaints, changes or physical findings at this time.     Primary Care Provider: BEBA Horne   GI: Reymundo Villalta MD    Past History     Past Medical History:   Past Medical History:   Diagnosis Date    Anal fissure     Anisocoria     Asthma     LAST EPISODE     Back pain  Cerumen impaction     Chronic kidney disease     hx uti in past    Coagulation defects     ocassional rectal bleeding due to anal fissure    Colovaginal fistula     Diabetes (HCC)     NIDDM    Diabetes ([address]     Diverticulitis     Diverticulosis     Enlarged tonsils     Frequent UTI     GERD (gastroesophageal reflux disease)     H/O endoscopy     with dilation    HA (headache)     Hepatic steatosis     Hx of colonoscopy with polypectomy     benign    Hypertension     Ill-defined condition     FREQUENT HIVES    Ill-defined condition     HX ELEVATED LIVER ENZYMES    Morbid obesity (HCC)     Nausea & vomiting     during diverticulitis flare    Obesity     Otitis media     Pneumonia     about 15 yrs ago    Psychiatric disorder     ANXIETY    Recurrent tonsillitis     Sinusitis     Transfusion history ~ age 35    postop hysterectomy    Unspecified sleep apnea     snores ( not diagnosed yet)     Urticaria     Urticaria         Past Surgical History:   Past Surgical History:   Procedure Laterality Date    HX BREAST REDUCTION      blake.     HX GI  12    LAPAROSCOPIC HAND ASSISTED  POSS OPEN SIGMOID COLECTOMY POSS TEMPORARY DIVERTING LOOP ILEOSTOMY;  (no illeostomy needed)    HX GYN           HX GYN      cervical conization    HX HEENT      SINUS SURGERY LEFT X2    HX HEENT      SINUS SURGERY ON RIGHT X2    HX OTHER SURGICAL      Sphincterotomy    HX PELVIC LAPAROSCOPY      HX EMMANUEL AND BSO      HX UROLOGICAL  12     CYSTOSCOPY INSERTION URETERAL CATHETERS - Cystoscopy Insertion of bilateral ureteral stents        Family History:   Family History   Problem Relation Age of Onset    Diabetes Mother     Cancer Mother      NON-HODGKINS LYMPHOMA    Anesth Problems Mother      PONV    Diabetes Father     Heart Disease Father      CAD - STENTS, PACEMAKER    Arrhythmia Father         Social History:   Social History   Substance Use Topics    Smoking status: Never Smoker    Smokeless tobacco: Never Used    Alcohol use Yes      Comment: Rarely        Allergies: Allergies   Allergen Reactions    Aspirin Shortness of Breath    Prilosec [Omeprazole Magnesium] Anaphylaxis     CHERRY FLAVORED; PT TAKES REGULAR PRILOSEC AND IS OK    Codeine Hives and Itching    Contrast Agent [Iodine] Itching     Pt. Had itching after IV contrast with the last exam.  Benadryl was given    Morphine Itching     Severe itching    Percocet [Oxycodone-Acetaminophen] Hives         Review of Systems   Review of Systems   Constitutional: Negative for chills, diaphoresis, fever and unexpected weight change. HENT: Negative for rhinorrhea and sore throat. Eyes: Negative for pain. Respiratory: Negative for shortness of breath, wheezing and stridor. Cardiovascular: Negative for chest pain and leg swelling. Gastrointestinal: Positive for abdominal pain (generalized), anal bleeding (intermittent) and diarrhea. Negative for blood in stool, nausea and vomiting. Genitourinary: Negative for difficulty urinating, dysuria and flank pain. Musculoskeletal: Negative for back pain and neck stiffness. Skin: Negative for rash. Neurological: Negative for seizures, syncope, weakness, light-headedness and headaches. Psychiatric/Behavioral: Negative for confusion. Physical Exam  Physical Exam   Constitutional: She is oriented to person, place, and time. She appears well-developed and well-nourished. No distress. Elevated BMI   HENT:   Nose: Nose normal.   Mouth/Throat: Oropharynx is clear and moist. No oropharyngeal exudate. Eyes: Conjunctivae and EOM are normal. Pupils are equal, round, and reactive to light. Right eye exhibits no discharge. Left eye exhibits no discharge. No scleral icterus. Neck: Normal range of motion. Neck supple. No JVD present. Cardiovascular: Normal rate, regular rhythm, normal heart sounds and intact distal pulses. No murmur heard.   Pulmonary/Chest: Effort normal and breath sounds normal. No stridor. No respiratory distress. She has no wheezes. She has no rales. Abdominal: Soft. Bowel sounds are normal. She exhibits no distension. There is generalized tenderness. There is no rebound and no guarding. Musculoskeletal: She exhibits no edema or tenderness. Neurological: She is alert and oriented to person, place, and time. Skin: Skin is warm and dry. No rash noted. She is not diaphoretic. Psychiatric: She has a normal mood and affect. Nursing note and vitals reviewed. Medical Decision Making   I am the first provider for this patient. I reviewed the vital signs, available nursing notes, past medical history, past surgical history, family history and social history. Old Medical Records: Old medical records. Nursing notes. Previous radiology studies. ED Course:  9:08 AM   Initial assessment performed. The patients presenting problems have been discussed, and they are in agreement with the care plan formulated and outlined with them. I have encouraged them to ask questions as they arise throughout their visit. Progress Notes:     CONSULT NOTE:   12:10 PM  Marcelo Pretty MD spoke with Tobi Puga MD,   Specialty: Hospitalist  Discussed pt's hx, disposition, and available diagnostic and imaging results. Reviewed care plans. Consultant will evaluate pt for admission. Diagnostic Study Results     Labs -      Recent Results (from the past 12 hour(s))   CBC WITH AUTOMATED DIFF    Collection Time: 10/10/17  9:51 AM   Result Value Ref Range    WBC 12.1 (H) 3.6 - 11.0 K/uL    RBC 4.80 3.80 - 5.20 M/uL    HGB 14.3 11.5 - 16.0 g/dL    HCT 39.8 35.0 - 47.0 %    MCV 82.9 80.0 - 99.0 FL    MCH 29.8 26.0 - 34.0 PG    MCHC 35.9 30.0 - 36.5 g/dL    RDW 12.7 11.5 - 14.5 %    PLATELET 499 431 - 818 K/uL    NEUTROPHILS 80 (H) 32 - 75 %    LYMPHOCYTES 14 12 - 49 %    MONOCYTES 5 5 - 13 %    EOSINOPHILS 1 0 - 7 %    BASOPHILS 0 0 - 1 %    ABS. NEUTROPHILS 9.6 (H) 1.8 - 8.0 K/UL    ABS. LYMPHOCYTES 1.7 0.8 - 3.5 K/UL    ABS. MONOCYTES 0.7 0.0 - 1.0 K/UL    ABS. EOSINOPHILS 0.1 0.0 - 0.4 K/UL    ABS. BASOPHILS 0.0 0.0 - 0.1 K/UL   METABOLIC PANEL, COMPREHENSIVE    Collection Time: 10/10/17  9:51 AM   Result Value Ref Range    Sodium 134 (L) 136 - 145 mmol/L    Potassium 3.3 (L) 3.5 - 5.1 mmol/L    Chloride 98 97 - 108 mmol/L    CO2 24 21 - 32 mmol/L    Anion gap 12 5 - 15 mmol/L    Glucose 275 (H) 65 - 100 mg/dL    BUN 13 6 - 20 MG/DL    Creatinine 0.79 0.55 - 1.02 MG/DL    BUN/Creatinine ratio 16 12 - 20      GFR est AA >60 >60 ml/min/1.73m2    GFR est non-AA >60 >60 ml/min/1.73m2    Calcium 9.8 8.5 - 10.1 MG/DL    Bilirubin, total 0.4 0.2 - 1.0 MG/DL    ALT (SGPT) 59 12 - 78 U/L    AST (SGOT) 22 15 - 37 U/L    Alk.  phosphatase 72 45 - 117 U/L    Protein, total 7.9 6.4 - 8.2 g/dL    Albumin 3.7 3.5 - 5.0 g/dL    Globulin 4.2 (H) 2.0 - 4.0 g/dL    A-G Ratio 0.9 (L) 1.1 - 2.2     LACTIC ACID    Collection Time: 10/10/17  9:51 AM   Result Value Ref Range    Lactic acid 4.5 (HH) 0.4 - 2.0 MMOL/L   LIPASE    Collection Time: 10/10/17  9:51 AM   Result Value Ref Range    Lipase 352 73 - 393 U/L   MAGNESIUM    Collection Time: 10/10/17  9:51 AM   Result Value Ref Range    Magnesium 2.0 1.6 - 2.4 mg/dL   URINALYSIS W/ REFLEX CULTURE    Collection Time: 10/10/17  9:51 AM   Result Value Ref Range    Color YELLOW/STRAW      Appearance CLEAR CLEAR      Specific gravity 1.027 1.003 - 1.030      pH (UA) 6.5 5.0 - 8.0      Protein NEGATIVE  NEG mg/dL    Glucose >1000 (A) NEG mg/dL    Ketone NEGATIVE  NEG mg/dL    Bilirubin NEGATIVE  NEG      Blood NEGATIVE  NEG      Urobilinogen 0.2 0.2 - 1.0 EU/dL    Nitrites NEGATIVE  NEG      Leukocyte Esterase NEGATIVE  NEG      WBC 0-4 0 - 4 /hpf    RBC 0-5 0 - 5 /hpf    Epithelial cells FEW FEW /lpf    Bacteria NEGATIVE  NEG /hpf    UA:UC IF INDICATED CULTURE NOT INDICATED BY UA RESULT CNI      Mucus 1+ (A) NEG /lpf    Yeast PRESENT (A) NEG     HCG URINE, QL    Collection Time: 10/10/17  9:51 AM   Result Value Ref Range    HCG urine, Ql. NEGATIVE  NEG         Radiologic Studies -  The following have been ordered and reviewed:  XR ABD (KUB)    (Results Pending)     CXR Results  (Last 48 hours)    None          Vital Signs-Reviewed the patient's vital signs. Patient Vitals for the past 12 hrs:   Temp Pulse Resp BP SpO2   10/10/17 1202 - 78 18 (!) 171/109 -   10/10/17 0930 - - - (!) 185/100 98 %   10/10/17 0915 - - - (!) 182/98 100 %   10/10/17 0857 - 76 - - 100 %   10/10/17 0855 98 °F (36.7 °C) - 18 (!) 194/94 -       Medications Given in the ED:  Medications   sodium chloride 0.9 % bolus infusion 1,000 mL (0 mL IntraVENous IV Completed 10/10/17 1108)   ondansetron (ZOFRAN) injection 4 mg (4 mg IntraVENous Given 10/10/17 1008)   HYDROmorphone (DILAUDID) injection 1 mg (1 mg IntraVENous Given 10/10/17 1008)   HYDROmorphone (DILAUDID) injection 1 mg (1 mg IntraVENous Given 10/10/17 1107)   HYDROcodone-acetaminophen (NORCO) 5-325 mg per tablet 1 Tab (1 Tab Oral Given 10/10/17 1200)       Pulse Oximetry Analysis - Normal 100% on RA     Diagnosis   Clinical Impression:   1. C. difficile diarrhea    2. Abdominal pain, generalized         Plan:  1: Admit to Hospitalist    Disposition Note:  ADMIT NOTE:  12:10 PM  The patient is being admitted to the hospital.  The results of their tests and reasons for their admission have been discussed with the patient and/or available family. They convey agreement and understanding for the need to be admitted and for their admission diagnosis. _______________________________   Attestations: This note is prepared by Sunil Huerta, acting as Scribe for Jovan Hicks MD.    Jovan Hicks MD: The scribe's documentation has been prepared under my direction and personally reviewed by me in its entirety.  I confirm that the note above accurately reflects all work, treatment, procedures, and medical decision making performed by me.  _______________________________

## 2017-10-10 NOTE — ED NOTES
Pt reports pain decreased to 4/10 in abd but reports 7/10 headache Pt states \"whatever they gave me last week worked great. \"

## 2017-10-10 NOTE — PROGRESS NOTES
Bedside and Verbal shift change report received from [place] [place] [address] (offgoing nurse). Report included the following information SBAR, Kardex, ED Summary, Intake/Output, MAR, Recent Results and Cardiac Rhythm NSR.

## 2017-10-10 NOTE — PROGRESS NOTES
TRANSFER - IN REPORT:    Verbal report received from Christie(name) on Paulina Cano  being received from ED(unit) for routine progression of care      Report consisted of patients Situation, Background, Assessment and   Recommendations(SBAR). Information from the following report(s) SBAR, Kardex, ED Summary, Procedure Summary, Intake/Output, MAR and Accordion was reviewed with the receiving nurse. Opportunity for questions and clarification was provided. Assessment completed upon patients arrival to unit and care assumed. Will prepare room      1730    Primary Nurse Fay Jain and Florencia Chavez RN performed a dual skin assessment on this patient No impairment noted  Niall score is 21    Patient arrived in room with complaints of abdominal pain and headache. Patient has Daryel Biddle available for pain management. Patient was under the impression that dilaudid was available, nurse explained that there were no longer orders for dilaudid she requested that the MD be called. Dr. Chrissie Haas paged, no new orders for pain medication. Explained to patient that dilaudid cannot be administered until we have stool samples and understand her medical issues. Patient very understanding and accepted Norco for pain relief.

## 2017-10-10 NOTE — ED TRIAGE NOTES
Pt. Presents to Ed today for complaints of dirrahea and abdominal pain. Pt. Has been diagnosed with c-diff and is being treated. Pt. With recent admission last week. Pt. Alert and oriented x4. Pt. Resting comfortably in bed with call bell in reach.

## 2017-10-10 NOTE — ED NOTES
Pt. Ambulated to bathroom at this time. Patient with no difficulties. PT. Placed back in position of comfort with call bell in reach.

## 2017-10-10 NOTE — H&P
Hospitalist Admission Note    NAME: Eliza Mckeon   :  1970   MRN:  [id number]     Date/Time:  10/10/2017 1:59 PM    Patient PCP: BEBA Hatfield  ________________________________________________________________________    My assessment of this patient's clinical condition and my plan of care is as follows. Assessment / Plan:  Leukocytosis & lactic acidosis with Diarrhea POA  In settings of h/o recurrent c.diff. She was recently admitted to hospital and was discharged with continuing her tapering dose of her PO vancomycin  Will restart treatment dose of vancomycin at 500mg Q6H  IVF (gentle due to acc HTN)  Awaiting KUB  Repeat stool studies  GI consult, may need stool transplant  Serial LA    Accelerated HTN  Continue ARBs  Hold HCTZ while on IVF  PRN IV hydralazine  Treat pain as this could be related to pain    Recent  Bronchitis   Continue to taper steroids   nebs    Type 2 diabetes mellitus   Hold metformin  SSI for now    Code Status: Full  Surrogate Decision Maker: Mother    DVT Prophylaxis: Lovenox    Baseline: functional        Subjective:   CHIEF COMPLAINT: diarrhea and abdominal pain    HISTORY OF PRESENT ILLNESS:     Travis Sevilla is a 52 y.o.  female who presents with abdominal pain and diarrhea. As per patient, she was treated for diverticulitis in August of this years and same month later she was admitted at [facility] for c.diff. Considering 2nd episode of c.diff (first one 1 year ago), she was discharged on prolong taper of PO vancomycin. She then was recently admitted to [facility] for c.diff but closer to weekend, she wanted to go home and sent home with completion of her PO vancomycin. Since then patient's pain got worse and complaints of 9/10 pain, all over the belly, intermittent. Pt also reported worsening diarrhea with 5-6 episodes per day associated with nausea. Pt reported fever around 100 over the weekend.  Pt also reported to be diagnosed with bronchitis recently and on tapering dose of steroids. Pt denies any chest pain, problems urination, shortness of breath. We were asked to admit for work up and evaluation of the above problems. Past Medical History:   Diagnosis Date    Anal fissure     Anisocoria     Asthma     LAST EPISODE     Back pain     Cerumen impaction     Chronic kidney disease     hx uti in past    Coagulation defects     ocassional rectal bleeding due to anal fissure    Colovaginal fistula     Diabetes (HCC)     NIDDM    Diabetes ([address]     Diverticulitis     Diverticulosis     Enlarged tonsils     Frequent UTI     GERD (gastroesophageal reflux disease)     H/O endoscopy     with dilation    HA (headache)     Hepatic steatosis     Hx of colonoscopy with polypectomy     benign    Hypertension     Ill-defined condition     FREQUENT HIVES    Ill-defined condition     HX ELEVATED LIVER ENZYMES    Morbid obesity (HCC)     Nausea & vomiting     during diverticulitis flare    Obesity     Otitis media     Pneumonia     about 15 yrs ago    Psychiatric disorder     ANXIETY    Recurrent tonsillitis     Sinusitis     Transfusion history ~ age 35    postop hysterectomy    Unspecified sleep apnea     snores ( not diagnosed yet)     Urticaria     Urticaria         Past Surgical History:   Procedure Laterality Date    HX BREAST REDUCTION      blake.     HX GI  12    LAPAROSCOPIC HAND ASSISTED  POSS OPEN SIGMOID COLECTOMY POSS TEMPORARY DIVERTING LOOP ILEOSTOMY;  (no illeostomy needed)    HX GYN           HX GYN      cervical conization    HX HEENT      SINUS SURGERY LEFT X2    HX HEENT      SINUS SURGERY ON RIGHT X2    HX OTHER SURGICAL      Sphincterotomy    HX PELVIC LAPAROSCOPY      HX EMMANUEL AND BSO      HX UROLOGICAL  12     CYSTOSCOPY INSERTION URETERAL CATHETERS - Cystoscopy Insertion of bilateral ureteral stents       Social History   Substance Use Topics    Smoking status: Never Smoker    Smokeless tobacco: Never Used    Alcohol use Yes      Comment: Rarely        Family History   Problem Relation Age of Onset    Diabetes Mother     Cancer Mother      NON-HODGKINS LYMPHOMA    Anesth Problems Mother      PONV    Diabetes Father     Heart Disease Father      CAD - STENTS, PACEMAKER    Arrhythmia Father      Allergies   Allergen Reactions    Aspirin Shortness of Breath    Prilosec [Omeprazole Magnesium] Anaphylaxis     CHERRY FLAVORED; PT TAKES REGULAR PRILOSEC AND IS OK    Codeine Hives and Itching    Contrast Agent [Iodine] Itching     Pt. Had itching after IV contrast with the last exam.  Benadryl was given    Morphine Itching     Severe itching    Percocet [Oxycodone-Acetaminophen] Hives        Prior to Admission medications    Medication Sig Start Date End Date Taking? Authorizing Provider   losartan-hydroCHLOROthiazide (HYZAAR) 100-12.5 mg per tablet Take 1 Tab by mouth daily. Yes Historical Provider   albuterol sulfate (PROVENTIL;VENTOLIN) 2.5 mg/0.5 mL nebu nebulizer solution 2.5 mg by Nebulization route every twelve (12) hours. Patient mixes this agent with acetylcysteine (20%) nebulizer solution for breathing treatment. Yes Historical Provider   acetaminophen (TYLENOL) 500 mg tablet Take 1,000 mg by mouth every six (6) hours as needed for Pain. Yes Historical Provider   diphenhydrAMINE (BENADRYL) 25 mg capsule Take 25 mg by mouth every six (6) hours as needed (congestion). Yes Historical Provider   vancomycin (VANCOCIN) 125 mg capsule Take 125 mg by mouth every three (3) days. Yes Historical Provider   predniSONE (STERAPRED DS) 10 mg dose pack Take 10 mg by mouth See Admin Instructions. See administration instruction per 10mg dose pack   Yes Historical Provider   LACTOBACIL 2-S. THERMO-BIFIDO 1 (VSL#3 PO) Take 1 Packet by mouth daily.    Yes Historical Provider   acetylcysteine (MUCOMYST) 200 mg/mL (20 %) solution 1 mL by Nebulization route every twelve (12) hours as needed. 10/6/17  Yes Brown Fink MD   estradiol (ESTRACE) 1 mg tablet Take 1 mg by mouth daily. Yes Historical Provider   omeprazole (PRILOSEC) 20 mg capsule Take 40 mg by mouth daily. Yes Historical Provider   metFORMIN (GLUCOPHAGE) 500 mg tablet Take 500 mg by mouth daily (with breakfast). Yes Historical Provider   albuterol (PROVENTIL, VENTOLIN) 90 mcg/Actuation inhaler Take 2 Puffs by inhalation every six (6) hours as needed. Yes Indy Marin MD   butalbital-acetaminophen-caff (FIORICET) -40 mg per capsule Take 1-2 Caps by mouth every eight (8) hours as needed for Pain or Headache. Max Daily Amount: 6 Caps. 10/6/17   Brown Fink MD   cetirizine (ZYRTEC) 10 mg tablet Take 10 mg by mouth nightly as needed for Allergies. Historical Provider   EPINEPHrine (EPIPEN) 0.3 mg/0.3 mL (1:1,000) injection 0.3 mL by IntraMUSCular route once as needed for up to 1 dose. 11/2/15   April N MD Najma       REVIEW OF SYSTEMS:     I am not able to complete the review of systems because:    The patient is intubated and sedated    The patient has altered mental status due to his acute medical problems    The patient has baseline aphasia from prior stroke(s)    The patient has baseline dementia and is not reliable historian    The patient is in acute medical distress and unable to provide information           Total of 12 systems reviewed as follows:       POSITIVE= underlined text  Negative = text not underlined  General:  fever, chills, sweats, generalized weakness, weight loss/gain,      loss of appetite   Eyes:    blurred vision, eye pain, loss of vision, double vision  ENT:    rhinorrhea, pharyngitis   Respiratory:   cough, sputum production, SOB, VALENTE, wheezing, pleuritic pain   Cardiology:   chest pain, palpitations, orthopnea, PND, edema, syncope   Gastrointestinal:  abdominal pain , N/V, diarrhea, dysphagia, constipation, bleeding   Genitourinary:  frequency, urgency, dysuria, hematuria, incontinence   Muskuloskeletal :  arthralgia, myalgia, back pain  Hematology:  easy bruising, nose or gum bleeding, lymphadenopathy   Dermatological: rash, ulceration, pruritis, color change / jaundice  Endocrine:   hot flashes or polydipsia   Neurological:  headache, dizziness, confusion, focal weakness, paresthesia,     Speech difficulties, memory loss, gait difficulty  Psychological: Feelings of anxiety, depression, agitation    Objective:   VITALS:    Visit Vitals    BP (!) 171/109 (BP 1 Location: Right arm, BP Patient Position: Sitting)    Pulse 78    Temp 98 °F (36.7 °C)    Resp 18    Wt 95 kg (209 lb 7 oz)    SpO2 98%    BMI 38.31 kg/m2       PHYSICAL EXAM:    General:    Alert, cooperative, no distress, appears stated age. HEENT: Atraumatic, anicteric sclerae, pink conjunctivae     No oral ulcers, mucosa dry, throat clear, dentition fair  Neck:  Supple, symmetrical,  thyroid: non tender  Lungs:   Clear to auscultation bilaterally. No Wheezing or Rhonchi. No rales. Chest wall:  No tenderness  No Accessory muscle use. Heart:   Regular  rhythm,  No  murmur   No edema  Abdomen:   Soft, non-tender. Not distended. Bowel sounds normal  Extremities: No cyanosis. No clubbing,      Skin turgor normal, Capillary refill normal, Radial dial pulse 2+  Skin:     Not pale. Not Jaundiced  No rashes   Psych:  Good insight. Not depressed. Not anxious or agitated. Neurologic: EOMs intact. No facial asymmetry. No aphasia or slurred speech. Symmetrical strength, Sensation grossly intact.  Alert and oriented X 4.     _______________________________________________________________________  Care Plan discussed with:    Comments   Patient y    Family      RN y    Care Manager                    Consultant:  binu ED physician   _______________________________________________________________________  Expected  Disposition:   Home with Family y   HH/PT/OT/RN    SNF/LTC    EILEEN ________________________________________________________________________  TOTAL TIME: 60 Minutes    Critical Care Provided     Minutes non procedure based      Comments    y Reviewed previous records   >50% of visit spent in counseling and coordination of care y Discussion with patient and questions answered       ________________________________________________________________________  Signed: Theresa Fragoso MD    Procedures: see electronic medical records for all procedures/Xrays and details which were not copied into this note but were reviewed prior to creation of Plan. LAB DATA REVIEWED:    Recent Results (from the past 24 hour(s))   CBC WITH AUTOMATED DIFF    Collection Time: 10/10/17  9:51 AM   Result Value Ref Range    WBC 12.1 (H) 3.6 - 11.0 K/uL    RBC 4.80 3.80 - 5.20 M/uL    HGB 14.3 11.5 - 16.0 g/dL    HCT 39.8 35.0 - 47.0 %    MCV 82.9 80.0 - 99.0 FL    MCH 29.8 26.0 - 34.0 PG    MCHC 35.9 30.0 - 36.5 g/dL    RDW 12.7 11.5 - 14.5 %    PLATELET 486 664 - 610 K/uL    NEUTROPHILS 80 (H) 32 - 75 %    LYMPHOCYTES 14 12 - 49 %    MONOCYTES 5 5 - 13 %    EOSINOPHILS 1 0 - 7 %    BASOPHILS 0 0 - 1 %    ABS. NEUTROPHILS 9.6 (H) 1.8 - 8.0 K/UL    ABS. LYMPHOCYTES 1.7 0.8 - 3.5 K/UL    ABS. MONOCYTES 0.7 0.0 - 1.0 K/UL    ABS. EOSINOPHILS 0.1 0.0 - 0.4 K/UL    ABS. BASOPHILS 0.0 0.0 - 0.1 K/UL   METABOLIC PANEL, COMPREHENSIVE    Collection Time: 10/10/17  9:51 AM   Result Value Ref Range    Sodium 134 (L) 136 - 145 mmol/L    Potassium 3.3 (L) 3.5 - 5.1 mmol/L    Chloride 98 97 - 108 mmol/L    CO2 24 21 - 32 mmol/L    Anion gap 12 5 - 15 mmol/L    Glucose 275 (H) 65 - 100 mg/dL    BUN 13 6 - 20 MG/DL    Creatinine 0.79 0.55 - 1.02 MG/DL    BUN/Creatinine ratio 16 12 - 20      GFR est AA >60 >60 ml/min/1.73m2    GFR est non-AA >60 >60 ml/min/1.73m2    Calcium 9.8 8.5 - 10.1 MG/DL    Bilirubin, total 0.4 0.2 - 1.0 MG/DL    ALT (SGPT) 59 12 - 78 U/L    AST (SGOT) 22 15 - 37 U/L    Alk.  phosphatase 72 45 - 117 U/L Protein, total 7.9 6.4 - 8.2 g/dL    Albumin 3.7 3.5 - 5.0 g/dL    Globulin 4.2 (H) 2.0 - 4.0 g/dL    A-G Ratio 0.9 (L) 1.1 - 2.2     LACTIC ACID    Collection Time: 10/10/17  9:51 AM   Result Value Ref Range    Lactic acid 4.5 (HH) 0.4 - 2.0 MMOL/L   LIPASE    Collection Time: 10/10/17  9:51 AM   Result Value Ref Range    Lipase 352 73 - 393 U/L   MAGNESIUM    Collection Time: 10/10/17  9:51 AM   Result Value Ref Range    Magnesium 2.0 1.6 - 2.4 mg/dL   URINALYSIS W/ REFLEX CULTURE    Collection Time: 10/10/17  9:51 AM   Result Value Ref Range    Color YELLOW/STRAW      Appearance CLEAR CLEAR      Specific gravity 1.027 1.003 - 1.030      pH (UA) 6.5 5.0 - 8.0      Protein NEGATIVE  NEG mg/dL    Glucose >1000 (A) NEG mg/dL    Ketone NEGATIVE  NEG mg/dL    Bilirubin NEGATIVE  NEG      Blood NEGATIVE  NEG      Urobilinogen 0.2 0.2 - 1.0 EU/dL    Nitrites NEGATIVE  NEG      Leukocyte Esterase NEGATIVE  NEG      WBC 0-4 0 - 4 /hpf    RBC 0-5 0 - 5 /hpf    Epithelial cells FEW FEW /lpf    Bacteria NEGATIVE  NEG /hpf    UA:UC IF INDICATED CULTURE NOT INDICATED BY UA RESULT CNI      Mucus 1+ (A) NEG /lpf    Yeast PRESENT (A) NEG     HCG URINE, QL    Collection Time: 10/10/17  9:51 AM   Result Value Ref Range    HCG urine, Ql. NEGATIVE  NEG

## 2017-10-10 NOTE — ED NOTES
Pt. With complaints of increasing pain at this time. Verbal orders from Dr. Nico Ray for 0.5 g IV dilaudid at this time.

## 2017-10-10 NOTE — CONSULTS
GI Consultation Note Norma Saleh)    NAME: Eliza Mckeon : 1970 MRN: [id number]   ATTG: SHERYL Katz MD Prim Gerson Luis MD PCP: BEBA Hatfield  Date/Time:  10/10/2017 5:58 PM  Subjective:   REASON FOR CONSULT:      Brigida Mcgregor is a 52 y.o.  female with hx recent C. Dif infection treated with Vancomycin following diverticulitis treated previously with Cipro/Flagyl, who I was asked to see for evaluation of possible recurrent c.dif induced diarrhea. She presented to ER with c/o recurrent watery diarrhea associated with lower abdominal cramping without N/V, GIB, or dysphagia. She states she has had fevers. She denies ameliorating or exacerbating factors. She is noted to have undergone a normal appearing colonoscopy 10/3/17 noting only sigmoid diverticulosis with normal biopsies done randomly in the colon showing no colitis or pseudomembranes. She is also on PPI for her GERD. She had restarted her taper of recently prescribed Vancomycin at our behest at time of her discharge 10/6/17. CT then on 10/6/17 was unremarkable except for HMG and hepatic steatosis. She was not told she needed to stay as IP then as she was tolerating PO and was not having diarrhea. She felt her sx this time were similar to how her C.dif occurred previously. ER eval here noted hypertension, afebrile WBC 9.5, Hgb 12. An abdominal image noted suggestion of constipation. Here she has not a BM and is requesting pain medication as narcotic analgesia. She is noted to have an extensive history of abdominal pain complaints that have been repeatedly evaluated and told she had abdominal scarring at time of her prior cholecystectomy.       Past Medical History:   Diagnosis Date    Anal fissure     Anisocoria     Asthma     LAST EPISODE     Back pain     Cerumen impaction     Chronic kidney disease     hx uti in past    Coagulation defects     ocassional rectal bleeding due to anal fissure    Colovaginal fistula  Diabetes ([facility] [address]     NIDDM    Diabetes ([facility] [address]     Diverticulitis     Diverticulosis     Enlarged tonsils     Frequent UTI     GERD (gastroesophageal reflux disease)     H/O endoscopy     with dilation    HA (headache)     Hepatic steatosis     Hx of colonoscopy with polypectomy     benign    Hypertension     Ill-defined condition     FREQUENT HIVES    Ill-defined condition     HX ELEVATED LIVER ENZYMES    Morbid obesity (HCC)     Nausea & vomiting     during diverticulitis flare    Obesity     Otitis media     Pneumonia     about 15 yrs ago    Psychiatric disorder     ANXIETY    Recurrent tonsillitis     Sinusitis     Transfusion history ~ age 35    postop hysterectomy    Unspecified sleep apnea     snores ( not diagnosed yet)     Urticaria     Urticaria       Past Surgical History:   Procedure Laterality Date    HX BREAST REDUCTION      blake.     HX GI  12    LAPAROSCOPIC HAND ASSISTED  POSS OPEN SIGMOID COLECTOMY POSS TEMPORARY DIVERTING LOOP ILEOSTOMY;  (no illeostomy needed)    HX GYN           HX GYN      cervical conization    HX HEENT      SINUS SURGERY LEFT X2    HX HEENT      SINUS SURGERY ON RIGHT X2    HX OTHER SURGICAL      Sphincterotomy    HX PELVIC LAPAROSCOPY      HX EMMANUEL AND BSO      HX UROLOGICAL  12     CYSTOSCOPY INSERTION URETERAL CATHETERS - Cystoscopy Insertion of bilateral ureteral stents     Social History   Substance Use Topics    Smoking status: Never Smoker    Smokeless tobacco: Never Used    Alcohol use Yes      Comment: Rarely      Family History   Problem Relation Age of Onset    Diabetes Mother     Cancer Mother      NON-HODGKINS LYMPHOMA    Anesth Problems Mother      PONV    Diabetes Father     Heart Disease Father      CAD - STENTS, PACEMAKER    Arrhythmia Father       Allergies   Allergen Reactions    Aspirin Shortness of Breath    Prilosec [Omeprazole Magnesium] Anaphylaxis     CHERRY FLAVORED; PT TAKES REGULAR PRILOSEC AND IS OK    Codeine Hives and Itching    Contrast Agent [Iodine] Itching     Pt. Had itching after IV contrast with the last exam.  Benadryl was given    Morphine Itching     Severe itching    Percocet [Oxycodone-Acetaminophen] Hives      Home Medications:  Prior to Admission Medications   Prescriptions Last Dose Informant Patient Reported? Taking? EPINEPHrine (EPIPEN) 0.3 mg/0.3 mL (1:1,000) injection Not Taking at Unknown time Self No No   Si.3 mL by IntraMUSCular route once as needed for up to 1 dose. LACTOBACIL 2-S. THERMO-BIFIDO 1 (VSL#3 PO) 10/9/2017 at Unknown time Self Yes Yes   Sig: Take 1 Packet by mouth daily. acetaminophen (TYLENOL) 500 mg tablet 10/10/2017 at Unknown time Self Yes Yes   Sig: Take 1,000 mg by mouth every six (6) hours as needed for Pain. acetylcysteine (MUCOMYST) 200 mg/mL (20 %) solution 10/10/2017 at Unknown time Self No Yes   Si mL by Nebulization route every twelve (12) hours as needed. albuterol (PROVENTIL, VENTOLIN) 90 mcg/Actuation inhaler 10/9/2017 at Unknown time Self Yes Yes   Sig: Take 2 Puffs by inhalation every six (6) hours as needed. albuterol sulfate (PROVENTIL;VENTOLIN) 2.5 mg/0.5 mL nebu nebulizer solution 10/10/2017 at Unknown time Self Yes Yes   Si.5 mg by Nebulization route every twelve (12) hours. Patient mixes this agent with acetylcysteine (20%) nebulizer solution for breathing treatment. butalbital-acetaminophen-caff (FIORICET) -40 mg per capsule Not Taking at Unknown time Self No No   Sig: Take 1-2 Caps by mouth every eight (8) hours as needed for Pain or Headache. Max Daily Amount: 6 Caps. cetirizine (ZYRTEC) 10 mg tablet Not Taking at Unknown time Self Yes No   Sig: Take 10 mg by mouth nightly as needed for Allergies. diphenhydrAMINE (BENADRYL) 25 mg capsule 10/10/2017 at Unknown time Self Yes Yes   Sig: Take 25 mg by mouth every six (6) hours as needed (congestion).    estradiol (ESTRACE) 1 mg tablet 10/9/2017 at Unknown time Self Yes Yes   Sig: Take 1 mg by mouth daily. losartan-hydroCHLOROthiazide (HYZAAR) 100-12.5 mg per tablet 10/10/2017 at Unknown time Self Yes Yes   Sig: Take 1 Tab by mouth daily. metFORMIN (GLUCOPHAGE) 500 mg tablet 10/9/2017 at Unknown time Self Yes Yes   Sig: Take 500 mg by mouth daily (with breakfast). omeprazole (PRILOSEC) 20 mg capsule 10/10/2017 at Unknown time Self Yes Yes   Sig: Take 40 mg by mouth daily. predniSONE (STERAPRED DS) 10 mg dose pack 10/9/2017 at Unknown time Self Yes Yes   Sig: Take 10 mg by mouth See Admin Instructions. See administration instruction per 10mg dose pack   vancomycin (VANCOCIN) 125 mg capsule 10/9/2017 at Unknown time Self Yes Yes   Sig: Take 125 mg by mouth every three (3) days.       Facility-Administered Medications: None     Hospital medications:  Current Facility-Administered Medications   Medication Dose Route Frequency    vancomycin 50 mg/mL oral solution (compounded) 500 mg  500 mg Oral Q6H    hydrALAZINE (APRESOLINE) 20 mg/mL injection 5 mg  5 mg IntraVENous Q6H PRN    butalbital-acetaminophen-caffeine (FIORICET, ESGIC) -40 mg per tablet 1 Tab  1 Tab Oral Q6H PRN    albuterol CONCENTRATE 2.5mg/0.5 mL neb soln  2.5 mg Nebulization Q6H RT    cetirizine (ZYRTEC) tablet 10 mg  10 mg Oral QHS PRN    insulin lispro (HUMALOG) injection   SubCUTAneous AC&HS    glucose chewable tablet 16 g  4 Tab Oral PRN    dextrose (D50W) injection syrg 12.5-25 g  12.5-25 g IntraVENous PRN    glucagon (GLUCAGEN) injection 1 mg  1 mg IntraMUSCular PRN    famotidine (PEPCID) tablet 20 mg  20 mg Oral BID    [START ON 10/11/2017] predniSONE (DELTASONE) tablet 30 mg  30 mg Oral DAILY WITH BREAKFAST    sodium chloride (NS) flush 5-10 mL  5-10 mL IntraVENous Q8H    sodium chloride (NS) flush 5-10 mL  5-10 mL IntraVENous PRN    acetaminophen (TYLENOL) tablet 650 mg  650 mg Oral Q6H PRN    ondansetron (ZOFRAN) injection 4 mg  4 mg IntraVENous Q4H PRN    enoxaparin (LOVENOX) injection 40 mg  40 mg SubCUTAneous Q24H    [START ON 10/11/2017] valsartan (DIOVAN) tablet 160 mg  160 mg Oral DAILY    HYDROcodone-acetaminophen (NORCO) 5-325 mg per tablet 1 Tab  1 Tab Oral Q6H PRN     REVIEW OF SYSTEMS:     [x]    Total of 11 systems reviewed as follows:  Const:   negative negative chills, negative weight loss  Eyes:   negative diplopia or visual changes, negative eye pain  ENT:   negative coryza, negative sore throat  Resp:   negative cough, hemoptysis, dyspnea  Cards:  negative for chest pain, palpitations, lower extremity edema  :  negative for frequency, dysuria and hematuria  Skin:   negative for rash and pruritus  Heme:   negative for easy bruising and gum/nose bleeding  MS:  negative for myalgias, arthralgias, back pain and muscle weakness  Neurolo:  negative for headaches, dizziness, vertigo, memory problems   Psych:  negative for feelings of anxiety, depression     Pertinent Positives include : subjective fever    Objective:   VITALS:    Visit Vitals    /80 (BP 1 Location: Right arm, BP Patient Position: At rest)    Pulse 64    Temp 97.9 °F (36.6 °C)    Resp 18    Wt 95 kg (209 lb 7 oz)    SpO2 97%    BMI 38.31 kg/m2     Temp (24hrs), Av °F (36.7 °C), Min:97.9 °F (36.6 °C), Max:98 °F (36.7 °C)    PHYSICAL EXAM:   General:    Alert, cooperative, no distress, appears stated age. Head:   Normocephalic, without obvious abnormality, atraumatic. Eyes:   Conjunctivae clear, anicteric sclerae. Pupils are equal  Nose:  Nares normal. No drainage or sinus tenderness. Throat:    Lips, mucosa, and tongue normal.  No Thrush  Neck:  Supple, symmetrical,  no adenopathy, thyroid: non tender  Back:    Symmetric,  No CVA tenderness. Lungs:   CTA bilaterally. No wheezing/rhonchi/rales. Chest wall:  No tenderness or deformity. No Accessory muscle use. Heart:   Regular rate and rhythm,  no murmur, rub or gallop.   Abdomen:   Soft, periumbilical/LLQ tenderness. Not distended. NABS. No masses  Extremities: Atraumatic, No cyanosis. No edema. No clubbing  Skin:     Texture, turgor normal. No rashes/lesions/jaundice  Lymph: Cervical, supraclavicular normal.  Psych:  Good insight. Not depressed. Not anxious or agitated. Neurologic: EOMs intact. Normal  strength, A/O X 3. LAB DATA REVIEWED:    Recent Results (from the past 48 hour(s))   CBC WITH AUTOMATED DIFF    Collection Time: 10/10/17  9:51 AM   Result Value Ref Range    WBC 12.1 (H) 3.6 - 11.0 K/uL    RBC 4.80 3.80 - 5.20 M/uL    HGB 14.3 11.5 - 16.0 g/dL    HCT 39.8 35.0 - 47.0 %    MCV 82.9 80.0 - 99.0 FL    MCH 29.8 26.0 - 34.0 PG    MCHC 35.9 30.0 - 36.5 g/dL    RDW 12.7 11.5 - 14.5 %    PLATELET 595 572 - 419 K/uL    NEUTROPHILS 80 (H) 32 - 75 %    LYMPHOCYTES 14 12 - 49 %    MONOCYTES 5 5 - 13 %    EOSINOPHILS 1 0 - 7 %    BASOPHILS 0 0 - 1 %    ABS. NEUTROPHILS 9.6 (H) 1.8 - 8.0 K/UL    ABS. LYMPHOCYTES 1.7 0.8 - 3.5 K/UL    ABS. MONOCYTES 0.7 0.0 - 1.0 K/UL    ABS. EOSINOPHILS 0.1 0.0 - 0.4 K/UL    ABS. BASOPHILS 0.0 0.0 - 0.1 K/UL   METABOLIC PANEL, COMPREHENSIVE    Collection Time: 10/10/17  9:51 AM   Result Value Ref Range    Sodium 134 (L) 136 - 145 mmol/L    Potassium 3.3 (L) 3.5 - 5.1 mmol/L    Chloride 98 97 - 108 mmol/L    CO2 24 21 - 32 mmol/L    Anion gap 12 5 - 15 mmol/L    Glucose 275 (H) 65 - 100 mg/dL    BUN 13 6 - 20 MG/DL    Creatinine 0.79 0.55 - 1.02 MG/DL    BUN/Creatinine ratio 16 12 - 20      GFR est AA >60 >60 ml/min/1.73m2    GFR est non-AA >60 >60 ml/min/1.73m2    Calcium 9.8 8.5 - 10.1 MG/DL    Bilirubin, total 0.4 0.2 - 1.0 MG/DL    ALT (SGPT) 59 12 - 78 U/L    AST (SGOT) 22 15 - 37 U/L    Alk.  phosphatase 72 45 - 117 U/L    Protein, total 7.9 6.4 - 8.2 g/dL    Albumin 3.7 3.5 - 5.0 g/dL    Globulin 4.2 (H) 2.0 - 4.0 g/dL    A-G Ratio 0.9 (L) 1.1 - 2.2     LACTIC ACID    Collection Time: 10/10/17  9:51 AM   Result Value Ref Range    Lactic acid 4.5 (HH) 0.4 - 2.0 MMOL/L   LIPASE    Collection Time: 10/10/17  9:51 AM   Result Value Ref Range    Lipase 352 73 - 393 U/L   MAGNESIUM    Collection Time: 10/10/17  9:51 AM   Result Value Ref Range    Magnesium 2.0 1.6 - 2.4 mg/dL   URINALYSIS W/ REFLEX CULTURE    Collection Time: 10/10/17  9:51 AM   Result Value Ref Range    Color YELLOW/STRAW      Appearance CLEAR CLEAR      Specific gravity 1.027 1.003 - 1.030      pH (UA) 6.5 5.0 - 8.0      Protein NEGATIVE  NEG mg/dL    Glucose >1000 (A) NEG mg/dL    Ketone NEGATIVE  NEG mg/dL    Bilirubin NEGATIVE  NEG      Blood NEGATIVE  NEG      Urobilinogen 0.2 0.2 - 1.0 EU/dL    Nitrites NEGATIVE  NEG      Leukocyte Esterase NEGATIVE  NEG      WBC 0-4 0 - 4 /hpf    RBC 0-5 0 - 5 /hpf    Epithelial cells FEW FEW /lpf    Bacteria NEGATIVE  NEG /hpf    UA:UC IF INDICATED CULTURE NOT INDICATED BY UA RESULT CNI      Mucus 1+ (A) NEG /lpf    Yeast PRESENT (A) NEG     HCG URINE, QL    Collection Time: 10/10/17  9:51 AM   Result Value Ref Range    HCG urine, Ql. NEGATIVE  NEG     LACTIC ACID    Collection Time: 10/10/17  1:46 PM   Result Value Ref Range    Lactic acid 1.3 0.4 - 2.0 MMOL/L   GLUCOSE, POC    Collection Time: 10/10/17  4:29 PM   Result Value Ref Range    Glucose (POC) 205 (H) 65 - 100 mg/dL    Performed by Wale Hawkins      IMAGING RESULTS:   [x]      I have personally reviewed the actual   [x]    AbdXR  []    CT  []     US  XR ABD (KUB) 10/10/17   FINDINGS: A supine radiograph of the abdomen shows mild constipation. No soft  tissue masses or pathologic calcifications are identified. The bones and soft  tissues are within normal limits. Clips are present in both upper quadrants. In  addition, a ring of staples overlies the upper mid pelvis. IMPRESSION: Mild constipation.   No evidence for obstruction    Recommendations/Plan:      Active Problems:    Clostridium difficile diarrhea (10/10/2017)      Bronchitis (10/10/2017)      Accelerated hypertension (10/10/2017)      Type 2 diabetes mellitus ([address] (10/10/2017)       ___________________________________________________  RECOMMENDATIONS:    55yo F with hx c.diff colitis associated with variable lower/periumbilical abd pain. Her colonoscopy, on 10/3/17 was unremarkable with normal  bx her recent CT without inflammation and today's Abd Xray suggest constipation, not diarrhea. The cumulative findings do not support active infection. I do not think this is recurrence of her C.dif at this time. I am concerned that her repeated admissions here are requiring narcotic analgesia with no findings to support their need by imaging or recent colonoscopy. I suspect that her pain is more likely related to chronic abdominal pain in association with adhesions/scarring and functional bowel disorder  Plan:  1) Complete Vancomycin taper  2) Avoid other abx including Z-pack  3) May complete steroid taper for her presumed viral bronchitis  4) Continue PO  5) Continue Probiotics as OP  6) Check pending stool studies (C.dif, O&P)  7) Will need f/u with her Primary GI Marcellus Hernandez MD) in next 2-3 weeks to assess for improvement  8) Stop IV narcotic analgesics and I  against narcotic analgesia in any form. I have told the patient the same.    9) Hold PPI if possible    Discussed Code Status:    [x]    Full Code      []    DNR    ___________________________________________________  Care Plan discussed with:    [x]    Patient   []    Family   [x]    Nursing   [x]    Attending  Total Time :  50   minutes   ___________________________________________________  GI: Silverio Harris MD

## 2017-10-11 VITALS
OXYGEN SATURATION: 97 % | DIASTOLIC BLOOD PRESSURE: 96 MMHG | WEIGHT: 209.44 LBS | RESPIRATION RATE: 18 BRPM | BODY MASS INDEX: 38.31 KG/M2 | SYSTOLIC BLOOD PRESSURE: 165 MMHG | HEART RATE: 75 BPM | TEMPERATURE: 97.9 F

## 2017-10-11 LAB
ALBUMIN SERPL-MCNC: 3.1 G/DL (ref 3.5–5)
ALBUMIN/GLOB SERPL: 0.9 {RATIO} (ref 1.1–2.2)
ALP SERPL-CCNC: 52 U/L (ref 45–117)
ALT SERPL-CCNC: 51 U/L (ref 12–78)
ANION GAP SERPL CALC-SCNC: 10 MMOL/L (ref 5–15)
AST SERPL-CCNC: 36 U/L (ref 15–37)
BASOPHILS # BLD: 0 K/UL (ref 0–0.1)
BASOPHILS NFR BLD: 0 % (ref 0–1)
BILIRUB SERPL-MCNC: 0.3 MG/DL (ref 0.2–1)
BUN SERPL-MCNC: 10 MG/DL (ref 6–20)
BUN/CREAT SERPL: 16 (ref 12–20)
CALCIUM SERPL-MCNC: 8.9 MG/DL (ref 8.5–10.1)
CHLORIDE SERPL-SCNC: 103 MMOL/L (ref 97–108)
CO2 SERPL-SCNC: 26 MMOL/L (ref 21–32)
CREAT SERPL-MCNC: 0.61 MG/DL (ref 0.55–1.02)
EOSINOPHIL # BLD: 0.1 K/UL (ref 0–0.4)
EOSINOPHIL NFR BLD: 1 % (ref 0–7)
ERYTHROCYTE [DISTWIDTH] IN BLOOD BY AUTOMATED COUNT: 12.9 % (ref 11.5–14.5)
GLOBULIN SER CALC-MCNC: 3.4 G/DL (ref 2–4)
GLUCOSE BLD STRIP.AUTO-MCNC: 185 MG/DL (ref 65–100)
GLUCOSE BLD STRIP.AUTO-MCNC: 364 MG/DL (ref 65–100)
GLUCOSE SERPL-MCNC: 209 MG/DL (ref 65–100)
HCT VFR BLD AUTO: 35.3 % (ref 35–47)
HGB BLD-MCNC: 12.5 G/DL (ref 11.5–16)
LYMPHOCYTES # BLD: 2.3 K/UL (ref 0.8–3.5)
LYMPHOCYTES NFR BLD: 28 % (ref 12–49)
MCH RBC QN AUTO: 29.8 PG (ref 26–34)
MCHC RBC AUTO-ENTMCNC: 35.4 G/DL (ref 30–36.5)
MCV RBC AUTO: 84.2 FL (ref 80–99)
MONOCYTES # BLD: 0.5 K/UL (ref 0–1)
MONOCYTES NFR BLD: 6 % (ref 5–13)
NEUTS SEG # BLD: 5.2 K/UL (ref 1.8–8)
NEUTS SEG NFR BLD: 65 % (ref 32–75)
PLATELET # BLD AUTO: 197 K/UL (ref 150–400)
POTASSIUM SERPL-SCNC: 3.3 MMOL/L (ref 3.5–5.1)
PROT SERPL-MCNC: 6.5 G/DL (ref 6.4–8.2)
RBC # BLD AUTO: 4.19 M/UL (ref 3.8–5.2)
SERVICE CMNT-IMP: ABNORMAL
SERVICE CMNT-IMP: ABNORMAL
SODIUM SERPL-SCNC: 139 MMOL/L (ref 136–145)
WBC # BLD AUTO: 8.1 K/UL (ref 3.6–11)

## 2017-10-11 PROCEDURE — 82962 GLUCOSE BLOOD TEST: CPT

## 2017-10-11 PROCEDURE — 74011250636 HC RX REV CODE- 250/636: Performed by: INTERNAL MEDICINE

## 2017-10-11 PROCEDURE — 74011250637 HC RX REV CODE- 250/637: Performed by: INTERNAL MEDICINE

## 2017-10-11 PROCEDURE — 74011636637 HC RX REV CODE- 636/637: Performed by: NURSE PRACTITIONER

## 2017-10-11 PROCEDURE — 94640 AIRWAY INHALATION TREATMENT: CPT

## 2017-10-11 PROCEDURE — 36415 COLL VENOUS BLD VENIPUNCTURE: CPT | Performed by: INTERNAL MEDICINE

## 2017-10-11 PROCEDURE — 99218 HC RM OBSERVATION: CPT

## 2017-10-11 PROCEDURE — 80053 COMPREHEN METABOLIC PANEL: CPT | Performed by: INTERNAL MEDICINE

## 2017-10-11 PROCEDURE — 96376 TX/PRO/DX INJ SAME DRUG ADON: CPT

## 2017-10-11 PROCEDURE — 74011636637 HC RX REV CODE- 636/637: Performed by: INTERNAL MEDICINE

## 2017-10-11 PROCEDURE — 85025 COMPLETE CBC W/AUTO DIFF WBC: CPT | Performed by: INTERNAL MEDICINE

## 2017-10-11 PROCEDURE — 74011000250 HC RX REV CODE- 250: Performed by: INTERNAL MEDICINE

## 2017-10-11 PROCEDURE — 74011250637 HC RX REV CODE- 250/637: Performed by: NURSE PRACTITIONER

## 2017-10-11 RX ORDER — ALBUTEROL SULFATE 0.83 MG/ML
2.5 SOLUTION RESPIRATORY (INHALATION)
Status: DISCONTINUED | OUTPATIENT
Start: 2017-10-11 | End: 2017-10-11 | Stop reason: HOSPADM

## 2017-10-11 RX ORDER — DICYCLOMINE HYDROCHLORIDE 10 MG/1
10 CAPSULE ORAL 3 TIMES DAILY
Status: DISCONTINUED | OUTPATIENT
Start: 2017-10-11 | End: 2017-10-11 | Stop reason: HOSPADM

## 2017-10-11 RX ORDER — DICYCLOMINE HYDROCHLORIDE 10 MG/1
10 CAPSULE ORAL 3 TIMES DAILY
Qty: 90 CAP | Refills: 0 | Status: SHIPPED | OUTPATIENT
Start: 2017-10-11 | End: 2018-08-09

## 2017-10-11 RX ORDER — FAMOTIDINE 20 MG/1
20 TABLET, FILM COATED ORAL 2 TIMES DAILY
Qty: 60 TAB | Refills: 0 | Status: SHIPPED | OUTPATIENT
Start: 2017-10-11 | End: 2018-08-09

## 2017-10-11 RX ORDER — POTASSIUM CHLORIDE 20 MEQ/1
20 TABLET, EXTENDED RELEASE ORAL
Status: COMPLETED | OUTPATIENT
Start: 2017-10-11 | End: 2017-10-11

## 2017-10-11 RX ADMIN — FAMOTIDINE 20 MG: 20 TABLET, FILM COATED ORAL at 08:32

## 2017-10-11 RX ADMIN — PREDNISONE 30 MG: 20 TABLET ORAL at 08:32

## 2017-10-11 RX ADMIN — HYDROCODONE BITARTRATE AND ACETAMINOPHEN 1 TABLET: 5; 325 TABLET ORAL at 13:16

## 2017-10-11 RX ADMIN — HYDROCODONE BITARTRATE AND ACETAMINOPHEN 1 TABLET: 5; 325 TABLET ORAL at 05:30

## 2017-10-11 RX ADMIN — INSULIN HUMAN 10 UNITS: 100 INJECTION, SOLUTION PARENTERAL at 13:15

## 2017-10-11 RX ADMIN — POTASSIUM CHLORIDE 20 MEQ: 1500 TABLET, EXTENDED RELEASE ORAL at 08:31

## 2017-10-11 RX ADMIN — VALSARTAN 160 MG: 160 TABLET ORAL at 08:32

## 2017-10-11 RX ADMIN — ONDANSETRON 4 MG: 2 INJECTION INTRAMUSCULAR; INTRAVENOUS at 13:16

## 2017-10-11 RX ADMIN — INSULIN LISPRO 2 UNITS: 100 INJECTION, SOLUTION INTRAVENOUS; SUBCUTANEOUS at 08:32

## 2017-10-11 RX ADMIN — Medication 10 ML: at 05:30

## 2017-10-11 RX ADMIN — BUTALBITAL, ACETAMINOPHEN AND CAFFEINE 1 TABLET: 50; 325; 40 TABLET ORAL at 08:37

## 2017-10-11 RX ADMIN — VANCOMYCIN HYDROCHLORIDE 500 MG: 1 INJECTION, POWDER, LYOPHILIZED, FOR SOLUTION INTRAVENOUS at 05:29

## 2017-10-11 RX ADMIN — Medication 10 ML: at 13:28

## 2017-10-11 RX ADMIN — ALBUTEROL SULFATE 2.5 MG: 2.5 SOLUTION RESPIRATORY (INHALATION) at 09:00

## 2017-10-11 RX ADMIN — VANCOMYCIN HYDROCHLORIDE 500 MG: 1 INJECTION, POWDER, LYOPHILIZED, FOR SOLUTION INTRAVENOUS at 13:22

## 2017-10-11 NOTE — PROGRESS NOTES
Bora Wang To whom it may concern:       RE: Jose Rafael Duran (YOB: 1970)        Dear Sir/:    This is to certify that the above referenced patient was hospitalized at [facility] from 10/10/2017 through 10/12/2017. She is expected to go home and follow up with her Primary Care physician. It is recommended that Ms. Jose Rafael Duran should be excused from work until October 12, 2017. She may return to work on October 13, 2017 without any restrictions. If you have questions please feel to contact the [facility] hospitalist office at [facility] at [phone].      Sincerely       ________________________________________  ELISEO Clemons, FNP-C, APRN-BC  Hospitalist, [address], [place], [address] [place]  Tel: [phone]

## 2017-10-11 NOTE — DISCHARGE SUMMARY
Hospitalist Discharge Summary     Patient ID:  Dieter Ortega  [id number]  82 y.o.  1970    PCP on record: Kash Quinton, [address]    Admit date: 10/10/2017  Discharge date and time: 10/11/2017      DISCHARGE DIAGNOSIS:  Leukocytosis & lactic acidosis with Diarrhea POA  recurrent c.diff. Accelerated HTN  Recent  Bronchitis   Type 2 diabetes mellitus     CONSULTATIONS:  IP CONSULT TO HOSPITALIST  IP CONSULT TO GASTROENTEROLOGY    Excerpted HPI from H&P of Desmond Oliva MD:  Hodan Raya is a 52 y.o.  female who presents with abdominal pain and diarrhea. As per patient, she was treated for diverticulitis in August of this years and same month later she was admitted at [facility] for c.diff. Considering 2nd episode of c.diff (first one 1 year ago), she was discharged on prolong taper of PO vancomycin. She then was recently admitted to [facility] for c.diff but closer to weekend, she wanted to go home and sent home with completion of her PO vancomycin. Since then patient's pain got worse and complaints of 9/10 pain, all over the belly, intermittent. Pt also reported worsening diarrhea with 5-6 episodes per day associated with nausea. Pt reported fever around 100 over the weekend. Pt also reported to be diagnosed with bronchitis recently and on tapering dose of steroids. Pt denies any chest pain, problems urination, shortness of breath.    ______________________________________________________________________  DISCHARGE SUMMARY/HOSPITAL COURSE:  for full details see H&P, daily progress notes, labs, consult notes. Leukocytosis & lactic acidosis with Diarrhea POA  In settings of h/o recurrent c.diff. ABD Pain  - She was recently admitted to hospital and was discharged with continuing her tapering dose of her PO vancomycin  * vancomycin (VANCOCIN) 125 mg capsule: Patient stated that she started this regimen on 9/7/2017. Patient stated that she has been on a 'tapered dose' for this agent. Patient stated 'I have been taking this [agent] for about 5 weeks now'. The patient is now taking 125 mg (1 capsule) every 3 days (for 15 days). The patient's previous (and completed) taper instructions were: 125 mg QID for 14 days, 125 mg BID for 7 days, 125 mg daily for 7 days, and 125 mg every other day for 6 days. *  predniSONE (STERAPRED DS) 10 mg dose pack: Patient stated she took 60 mg of this agent on 10/9/2017.  - recommend the pt to complete the medication as instructed. - pt is tolerating diabetic diet without difficulty. No n/v   - KUB: Mild constipation. No evidence for obstruction. No diarrhea since the admission. One small bm. Pt indicated that it looked really dark. Advised the pt to follow up with Dr. Domi Longo  - may take Bentyl 10mg 1 tab 2-3 times a day  - avoid PPI. - continue with probiotic  - 10/3 bx from colonoscopy was normal  - Chronic abdominal pain r/t prior surgeries and functional etiology per Dr. Romeo Garcia     Accelerated HTN  - continue with Hyzaar     Recent  Bronchitis   - Continue to taper steroids      Type 2 diabetes mellitus   - metformin    Left Ear pain  - exam within normal range. Advised to take motrin with food as need. She is also on prednisone. Remind the pt that steroid will help with controlling inflammation        _______________________________________________________________________  Patient seen and examined by me on discharge day. Pertinent Findings:  Gen:    Not in distress  Chest: Clear lungs  CVS:   Regular rhythm. No edema  Abd:  Soft, not distended, not tender  Neuro:  Alert, orient x 4  _______________________________________________________________________  DISCHARGE MEDICATIONS:   Current Discharge Medication List      START taking these medications    Details   dicyclomine (BENTYL) 10 mg capsule Take 1 Cap by mouth three (3) times daily.   Qty: 90 Cap, Refills: 0      famotidine (PEPCID) 20 mg tablet Take 1 Tab by mouth two (2) times a day.  Qty: 60 Tab, Refills: 0         CONTINUE these medications which have NOT CHANGED    Details   losartan-hydroCHLOROthiazide (HYZAAR) 100-12.5 mg per tablet Take 1 Tab by mouth daily. albuterol sulfate (PROVENTIL;VENTOLIN) 2.5 mg/0.5 mL nebu nebulizer solution 2.5 mg by Nebulization route every twelve (12) hours. Patient mixes this agent with acetylcysteine (20%) nebulizer solution for breathing treatment. acetaminophen (TYLENOL) 500 mg tablet Take 1,000 mg by mouth every six (6) hours as needed for Pain. diphenhydrAMINE (BENADRYL) 25 mg capsule Take 25 mg by mouth every six (6) hours as needed (congestion). vancomycin (VANCOCIN) 125 mg capsule Take 125 mg by mouth every three (3) days. predniSONE (STERAPRED DS) 10 mg dose pack Take 10 mg by mouth See Admin Instructions. See administration instruction per 10mg dose pack      LACTOBACIL 2-S. THERMO-BIFIDO 1 (VSL#3 PO) Take 1 Packet by mouth daily. estradiol (ESTRACE) 1 mg tablet Take 1 mg by mouth daily. metFORMIN (GLUCOPHAGE) 500 mg tablet Take 500 mg by mouth daily (with breakfast). albuterol (PROVENTIL, VENTOLIN) 90 mcg/Actuation inhaler Take 2 Puffs by inhalation every six (6) hours as needed. cetirizine (ZYRTEC) 10 mg tablet Take 10 mg by mouth nightly as needed for Allergies. EPINEPHrine (EPIPEN) 0.3 mg/0.3 mL (1:1,000) injection 0.3 mL by IntraMUSCular route once as needed for up to 1 dose.   Qty: 0.3 mL, Refills: 1         STOP taking these medications       acetylcysteine (MUCOMYST) 200 mg/mL (20 %) solution Comments:   Reason for Stopping:         omeprazole (PRILOSEC) 20 mg capsule Comments:   Reason for Stopping:         butalbital-acetaminophen-caff (FIORICET) -40 mg per capsule Comments:   Reason for Stopping:         hydrochlorothiazide (HYDRODIURIL) 25 mg tablet Comments:   Reason for Stopping:               My Recommended Diet, Activity, Wound Care, and follow-up labs are listed in the patient's Discharge Insturctions which I have personally completed and reviewed.     _______________________________________________________________________  DISPOSITION:    Home with Family: y   Home with HH/PT/OT/RN:    SNF/LTC:    EILEEN:    OTHER:        Condition at Discharge:  Stable  _______________________________________________________________________  Follow up with:   PCP : BEBA Horne  Follow-up Information     Follow up With Details Comments [address] BE[address] Schedule an appointment as soon as possible for a visit to be seen within 2 weeks (Patient to call and schedule a 2-week follow-up appointment) [facility] [address] In 2 weeks Patient to call and schedule a 2 week follow-up appointment [phone]              Total time in minutes spent coordinating this discharge (includes going over instructions, follow-up, prescriptions, and preparing report for sign off to her PCP) :  30 minutes    Signed:  Belkis Henry NP

## 2017-10-11 NOTE — PROGRESS NOTES
Problem: Falls - Risk of  Goal: *Absence of Falls  Document Eric Fall Risk and appropriate interventions in the flowsheet.    Outcome: Progressing Towards Goal  Fall Risk Interventions:              Medication Interventions: Evaluate medications/consider consulting pharmacy, Teach patient to arise slowly, Patient to call before getting OOB

## 2017-10-11 NOTE — PROGRESS NOTES
Bedside and Verbal shift change report received from [facility]. Report included the following information SBAR, Kardex and Recent Results.

## 2017-10-11 NOTE — PROGRESS NOTES
Discharge instructions reviewed with pt to her satisfaction. rxs for new meds given to pt. Iv and tele removed.

## 2017-10-11 NOTE — PROGRESS NOTES
Problem: Falls - Risk of  Goal: *Absence of Falls  Document Eric Fall Risk and appropriate interventions in the flowsheet.    Outcome: Progressing Towards Goal  Fall Risk Interventions:              Medication Interventions: Patient to call before getting OOB

## 2017-10-11 NOTE — DISCHARGE INSTRUCTIONS
Patient Discharge Instructions     Pt Name  Angel Luis Mcintosh   Date of Birth 1970   Age  52 y.o. Medical Record Number  [id number]   PCP BEBA Manley    Admit date:  10/10/2017 @    [address] [place] [address]    Room Number  7600/52   Date of Discharge 10/11/2017     Admission Diagnoses:     Clostridium difficile diarrhea          Allergies   Allergen Reactions    Aspirin Shortness of Breath    Prilosec [Omeprazole Magnesium] Anaphylaxis     CHERRY FLAVORED; PT TAKES REGULAR PRILOSEC AND IS OK    Codeine Hives and Itching    Contrast Agent [Iodine] Itching     Pt. Had itching after IV contrast with the last exam.  Benadryl was given    Morphine Itching     Severe itching    Percocet [Oxycodone-Acetaminophen] Hives        You were admitted to [address] [place] [address] for  Clostridium difficile diarrhea    YOUR OTHER MEDICAL DIAGNOSES INCLUDE (BUT NOT LIMITED TO ):  Present on Admission:   Clostridium difficile diarrhea   Bronchitis   Accelerated hypertension   Type 2 diabetes mellitus ([address]   Diarrhea   Hypertension   Acute respiratory failure (HCC)      DIET:  Diabetic Diet   Recommended activity: Activity as tolerated  Follow up : Follow-up Information     Follow up With Details Comments Neil Rock MD Schedule an appointment as soon as possible for a visit to be seen within 2 weeks [address]unn [address] [facility] [address]  [address] BEBA Ruiz Schedule an appointment as soon as possible for a visit to be seen within 2 weeks [place] [phone]          Dicyclomine (By mouth)   Dicyclomine (dye-SYE-kloe-meen)  Treats irritable bowel syndrome. Brand Name(s): Bentyl   There may be other brand names for this medicine. When This Medicine Should Not Be Used: You should not use this medicine if you have had an allergic reaction to dicyclomine.  Do not use this medicine if you have glaucoma, myasthenia gravis, or trouble urinating because of a blockage (such as an enlarged prostate). Make sure your doctor knows about all digestion problems you have, including reflux esophagitis (GERD), or severe ulcerative colitis. You should not use this medicine if you are breast feeding. This medicine should not be given to infants less than 6 months old. How to Use This Medicine:   Capsule, Tablet, Long Acting Tablet, Liquid  · Take your medicine as directed. Your dose may need to be changed several times to find what works best for you. · Swallow the extended-release tablet whole. Do not break, crush or chew. · Measure the oral liquid medicine with a marked measuring spoon, oral syringe, or medicine cup. If a dose is missed:   · Take a dose as soon as you remember. If it is almost time for your next dose, wait until then and take a regular dose. Do not take extra medicine to make up for a missed dose. How to Store and Dispose of This Medicine:   · Store the medicine in a closed container at room temperature, away from heat, moisture, and direct light. Do not freeze the oral liquid. · Ask your pharmacist, doctor, or health caregiver about the best way to dispose of any outdated medicine or medicine no longer needed. · Keep all medicine out of the reach of children. Never share your medicine with anyone. Drugs and Foods to Avoid:   Ask your doctor or pharmacist before using any other medicine, including over-the-counter medicines, vitamins, and herbal products. · Make sure your doctor knows if you are using amantadine (Symmetrel®), digoxin (Lanoxin®), or a belladonna medicine such as atropine, hyoscyamine, scopolamine, Anaspaz®, Arco-Lase® Plus, or Donnatal®. Tell your doctor if you are using an MAO inhibitor such as Eldepryl®, Marplan®, [place], or Parnate®.   · Tell your doctor if you are also using narcotic pain medicine, medicine for heart rhythm problems, an antihistamine, medicine to treat depression, phenothiazines (medicines to treat certain mental problems or severe nausea or vomiting), or a steroid medicine. Meperidine (Demerol®) is a narcotic pain medicine. Some medicines for heart rhythm problems are disopyramide, procainamide, quinidine, Cardioquin®, Norpace®, Procanbid®, or Synetta Feathers. Diphenhydramine (Benadryl®) is an antihistamine. Some phenothiazine medicines are Compazine®, Mellaril®, Phenergan®, Serentil®, Tacaryl®, Thorazine®, or Trilafon®. Some medicines to treat depression are amitriptyline, nortriptyline, Norpramin®, or Vivactil®. Cortisone and prednisone are steroid medicines. · You should not use dicyclomine within 2 to 3 hours of taking antacids (Maalox®, Mylanta®) or medicine to stop diarrhea. Warnings While Using This Medicine:   · Make sure your doctor knows if you are pregnant, if you have liver disease, kidney disease, or overactive thyroid. Tell your doctor about any heart or blood vessel problems you have, including heart rhythm problems, congestive heart failure, or high blood pressure. Make sure your doctor knows if you have ongoing diarrhea, or an ileostomy or colostomy. Tell your doctor if you have autonomic neuropathy (a nerve problem), or a hiatal hernia (problems with your esophagus). · This medicine may make you sweat less. Your body could get too hot if you do not sweat enough. Stay out of hot places. Try to stay indoors or somewhere cool during hot weather. If you have a fever, call your doctor for advice. If your body gets too hot, you might feel dizzy, weak, tired, or confused. You might have an upset stomach or vomit. Call your doctor if you are too hot and cannot cool down. · This medicine may make you drowsy. Avoid driving, using machines, or doing anything else that could be dangerous if you are not alert. · Your eyes may be more sensitive to bright light while you are using this medicine. You may want to wear sunglasses in bright sunlight.   Possible Side Effects While Using This Medicine:   Call your doctor right away if you notice any of these side effects:  · Allergic reaction: Itching or hives, swelling in your face or hands, swelling or tingling in your mouth or throat, chest tightness, trouble breathing  · Fast or uneven heartbeat. · Restlessness, agitation, or confusion. · Severe dizziness or light-headedness. If you notice these less serious side effects, talk with your doctor:   · Decrease in how much or how often you urinate. · Drowsiness or weakness. · Dry mouth. · Nausea, vomiting, constipation, or stomach pain. · Trouble focusing or other vision changes. If you notice other side effects that you think are caused by this medicine, tell your doctor. Call your doctor for medical advice about side effects. You may report side effects to FDA at 7-594-FDA-6387  © 2017 [address] Information is for End User's use only and may not be sold, redistributed or otherwise used for commercial purposes. The above information is an  only. It is not intended as medical advice for individual conditions or treatments. Talk to your doctor, nurse or pharmacist before following any medical regimen to see if it is safe and effective for you. · It is important that you take the medication exactly as they are prescribed. · Keep your medication in the bottles provided by the pharmacist and keep a list of the medication names, dosages, and times to be taken in your wallet. · Do not take other medications without consulting your doctor. ADDITIONAL INFORMATION: If you experience any of the following symptoms or have any health problem not listed below, then please call your primary care physician or return to the emergency room if you cannot get hold of your doctor: Fever, chills, nausea, vomiting, diarrhea, change in mentation, falling, bleeding, shortness of breath.       I understand that if any problems occur once I am discharged, I am supposed to call my Primary care physician for further care or seek help in the Emergency Department at the nearest Healthcare facility. I have had an opportunity to discuss my clinical issues with my doctor and nursing staff. I understand and acknowledge receipt of the above instructions.                                                                                                                                            Physician's or R.N.'s Signature                                                            Date/Time                                                                                                                                              Patient or Representative Signature                                                 Date/Time

## 2017-10-11 NOTE — PROGRESS NOTES
GI Progress Note Vita Guerrier  NAME:Rose Marie Hoover :1970 DUM:[id number]   ATTG: SHERYL Hare MD  Prim GI: Ca Reyes MD PCP: Raquel Collet, PA  Date/Time:  10/11/2017 1:58 PM   Assessment:   · Chronic abdominal pain- related to prior surgeries and functional etiology. No colitis or infection noted at this time. Bx on colonoscopy 10/3/17 were normal and imaging 10/6/17 was normal     Plan:   · Stop narcotic analgesia  · Provide dicyclomine 10mg 1 tab PO BID-TID  · Will need f/u with her usual GI Ca Reyes MD)  · Could be discharged home today   Will sign off  Subjective:   Discussed with RN events overnight. Feels better. Single semi-formed BM today. Tolerating all PO    Complaint Y/N Description   Abdominal Pain y improving   Hematemesis n    Hematochezia n    Melena n    Constipation n    Diarrhea n    Dyspepsia n    Dysphagia n    Jaundiced n    Nausea/vomiting n      Review of Systems:  Symptom Y/N Comments  Symptom Y/N Comments   Fever/Chills n   Chest Pain n    Cough n   Headaches n    Sputum n   Joint Pain n    SOB/VALENTE n   Pruritis/Rash n    Tolerating Diet y   Other       Could NOT obtain due to:      Objective:   VITALS:   Last 24hrs VS reviewed since prior progress note. Most recent are:  Visit Vitals    BP (!) 165/96 (BP 1 Location: Left arm, BP Patient Position: At rest)    Pulse 75    Temp 97.9 °F (36.6 °C)    Resp 18    Wt 95 kg (209 lb 7 oz)    SpO2 97%    BMI 38.31 kg/m2       Intake/Output Summary (Last 24 hours) at 10/11/17 1358  Last data filed at 10/11/17 0837   Gross per 24 hour   Intake              240 ml   Output                0 ml   Net              240 ml     PHYSICAL EXAM:  General: WD, WN. Alert, cooperative, no acute distress    HEENT: NC, Atraumatic. PERRL. Anicteric sclerae. Lungs:  CTA Bilaterally. No Wheezing/Rhonchi/Rales.   Heart:  Regular  rhythm,  No murmur/Rub/Gallops  Abdomen: Soft, Non distended, Non tender.  +Bowel sounds  Extremities: No c/c/e  Neurologic:  CN 2-12 gi, A/O X 3. No acute neurological distress   Psych:   Good insight. Not anxious nor agitated. Lab and Radiology Data Reviewed: (see below)    Medications Reviewed: (see below)  PMH/SH reviewed - no change compared to H&P  ________________________________________________________________________  Total time spent with patient: 15 minutes ________________________________________________________________________  Care Plan discussed with:  Patient y   Family  y   RN y              Consultant:  binu Mitchell MD     Procedures: see electronic medical records for all procedures/Xrays and details which were not copied into this note but were reviewed prior to creation of Plan. LABS:  Recent Labs      10/11/17   0411  10/10/17   0951   WBC  8.1  12.1*   HGB  12.5  14.3   HCT  35.3  39.8   PLT  197  248     Recent Labs      10/11/17   0411  10/10/17   0951   NA  139  134*   K  3.3*  3.3*   CL  103  98   CO2  26  24   BUN  10  13   CREA  0.61  0.79   GLU  209*  275*   CA  8.9  9.8   MG   --   2.0     Recent Labs      10/11/17   0411  10/10/17   0951   SGOT  36  22   AP  52  72   TP  6.5  7.9   ALB  3.1*  3.7   GLOB  3.4  4.2*   LPSE   --   352     No results for input(s): INR, PTP, APTT in the last 72 hours. No lab exists for component: INREXT   No results for input(s): FE, TIBC, PSAT, FERR in the last 72 hours. No results found for: FOL, RBCF  No results for input(s): PH, PCO2, PO2 in the last 72 hours. No results for input(s): CPK, CKMB in the last 72 hours.     No lab exists for component: TROPONINI  Lab Results   Component Value Date/Time    Color YELLOW/STRAW 10/10/2017 09:51 AM    Appearance CLEAR 10/10/2017 09:51 AM    Specific gravity 1.027 10/10/2017 09:51 AM    Specific gravity >1.030 10/05/2017 10:34 PM    pH (UA) 6.5 10/10/2017 09:51 AM    Protein NEGATIVE  10/10/2017 09:51 AM    Glucose >1000 10/10/2017 09:51 AM    Ketone NEGATIVE  10/10/2017 09:51 AM    Bilirubin NEGATIVE  10/10/2017 09:51 AM    Urobilinogen 0.2 10/10/2017 09:51 AM    Nitrites NEGATIVE  10/10/2017 09:51 AM    Leukocyte Esterase NEGATIVE  10/10/2017 09:51 AM    Epithelial cells FEW 10/10/2017 09:51 AM    Bacteria NEGATIVE  10/10/2017 09:51 AM    WBC 0-4 10/10/2017 09:51 AM    RBC 0-5 10/10/2017 09:51 AM       MEDICATIONS:  Current Facility-Administered Medications   Medication Dose Route Frequency    albuterol (PROVENTIL VENTOLIN) nebulizer solution 2.5 mg  2.5 mg Nebulization Q4H PRN    vancomycin 50 mg/mL oral solution (compounded) 500 mg  500 mg Oral Q6H    hydrALAZINE (APRESOLINE) 20 mg/mL injection 5 mg  5 mg IntraVENous Q6H PRN    butalbital-acetaminophen-caffeine (FIORICET, ESGIC) -40 mg per tablet 1 Tab  1 Tab Oral Q6H PRN    cetirizine (ZYRTEC) tablet 10 mg  10 mg Oral QHS PRN    insulin lispro (HUMALOG) injection   SubCUTAneous AC&HS    glucose chewable tablet 16 g  4 Tab Oral PRN    dextrose (D50W) injection syrg 12.5-25 g  12.5-25 g IntraVENous PRN    glucagon (GLUCAGEN) injection 1 mg  1 mg IntraMUSCular PRN    famotidine (PEPCID) tablet 20 mg  20 mg Oral BID    predniSONE (DELTASONE) tablet 30 mg  30 mg Oral DAILY WITH BREAKFAST    sodium chloride (NS) flush 5-10 mL  5-10 mL IntraVENous Q8H    sodium chloride (NS) flush 5-10 mL  5-10 mL IntraVENous PRN    acetaminophen (TYLENOL) tablet 650 mg  650 mg Oral Q6H PRN    ondansetron (ZOFRAN) injection 4 mg  4 mg IntraVENous Q4H PRN    enoxaparin (LOVENOX) injection 40 mg  40 mg SubCUTAneous Q24H    valsartan (DIOVAN) tablet 160 mg  160 mg Oral DAILY    HYDROcodone-acetaminophen (NORCO) 5-325 mg per tablet 1 Tab  1 Tab Oral Q6H PRN

## 2017-10-11 NOTE — PROGRESS NOTES
ADULT PROTOCOL: JET AEROSOL ASSESSMENT    Patient  Ranjit Ferguson     52 y.o.   female     10/11/2017  9:29 AM    Breath Sounds Pre Procedure: Right Breath Sounds: Clear                               Left Breath Sounds: Clear    Breath Sounds Post Procedure: Right Breath Sounds: Clear                                 Left Breath Sounds: Clear    Breathing pattern: Pre procedure Breathing Pattern: Regular          Post procedure Breathing Pattern: Regular    Heart Rate: Pre procedure Pulse: 68           Post procedure Pulse: 80    Resp Rate: Pre procedure Respirations: 16           Post procedure Respirations: 16            Cough: Pre procedure Cough: Non-productive               Post procedure Cough: Non-productive      Oxygen: O2 Device: Room air   room air     Changed: NO    SpO2: Pre procedure SpO2: 97 %   without oxygen              Post procedure SpO2: 97 %  without oxygen    Nebulizer Therapy: Current medications Aerosolized Medications: Albuterol      Changed: NO    Smoking History: never    Problem List:   Patient Active Problem List   Diagnosis Code    Thrush B37.0    Postoperative complication G29. 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

## 2018-01-20 ENCOUNTER — HOSPITAL ENCOUNTER (EMERGENCY)
Age: 48
Discharge: HOME | End: 2018-01-20
Attending: EMERGENCY MEDICINE
Payer: COMMERCIAL

## 2018-01-20 ENCOUNTER — APPOINTMENT (OUTPATIENT)
Dept: CT IMAGING | Age: 48
End: 2018-01-20
Attending: EMERGENCY MEDICINE
Payer: COMMERCIAL

## 2018-01-20 VITALS
HEART RATE: 79 BPM | WEIGHT: 214.51 LBS | BODY MASS INDEX: 39.47 KG/M2 | HEIGHT: 62 IN | SYSTOLIC BLOOD PRESSURE: 149 MMHG | DIASTOLIC BLOOD PRESSURE: 79 MMHG | TEMPERATURE: 98.2 F | RESPIRATION RATE: 18 BRPM | OXYGEN SATURATION: 96 %

## 2018-01-20 LAB
ALBUMIN SERPL-MCNC: 3.9 G/DL (ref 3.5–5)
ALBUMIN/GLOB SERPL: 1.1 {RATIO} (ref 1.1–2.2)
ALP SERPL-CCNC: 56 U/L (ref 45–117)
ALT SERPL-CCNC: 43 U/L (ref 12–78)
ANION GAP SERPL CALC-SCNC: 10 MMOL/L (ref 5–15)
APPEARANCE UR: CLEAR
AST SERPL-CCNC: 32 U/L (ref 15–37)
BACTERIA URNS QL MICRO: NEGATIVE /HPF
BASOPHILS # BLD: 0 K/UL (ref 0–0.1)
BASOPHILS NFR BLD: 0 % (ref 0–1)
BILIRUB SERPL-MCNC: 0.5 MG/DL (ref 0.2–1)
BILIRUB UR QL: NEGATIVE
BUN SERPL-MCNC: 4 MG/DL (ref 6–20)
BUN/CREAT SERPL: 7 (ref 12–20)
CALCIUM SERPL-MCNC: 9.4 MG/DL (ref 8.5–10.1)
CHLORIDE SERPL-SCNC: 103 MMOL/L (ref 97–108)
CO2 SERPL-SCNC: 25 MMOL/L (ref 21–32)
COLOR UR: ABNORMAL
CREAT SERPL-MCNC: 0.55 MG/DL (ref 0.55–1.02)
EOSINOPHIL # BLD: 0.3 K/UL (ref 0–0.4)
EOSINOPHIL NFR BLD: 5 % (ref 0–7)
EPITH CASTS URNS QL MICRO: ABNORMAL /LPF
ERYTHROCYTE [DISTWIDTH] IN BLOOD BY AUTOMATED COUNT: 12.9 % (ref 11.5–14.5)
GLOBULIN SER CALC-MCNC: 3.4 G/DL (ref 2–4)
GLUCOSE SERPL-MCNC: 177 MG/DL (ref 65–100)
GLUCOSE UR STRIP.AUTO-MCNC: 500 MG/DL
HCT VFR BLD AUTO: 34.1 % (ref 35–47)
HGB BLD-MCNC: 12.1 G/DL (ref 11.5–16)
HGB UR QL STRIP: NEGATIVE
HYALINE CASTS URNS QL MICRO: ABNORMAL /LPF (ref 0–5)
KETONES UR QL STRIP.AUTO: NEGATIVE MG/DL
LACTATE SERPL-SCNC: 2.6 MMOL/L (ref 0.4–2)
LEUKOCYTE ESTERASE UR QL STRIP.AUTO: ABNORMAL
LYMPHOCYTES # BLD: 2 K/UL (ref 0.8–3.5)
LYMPHOCYTES NFR BLD: 29 % (ref 12–49)
MCH RBC QN AUTO: 29.6 PG (ref 26–34)
MCHC RBC AUTO-ENTMCNC: 35.5 G/DL (ref 30–36.5)
MCV RBC AUTO: 83.4 FL (ref 80–99)
MONOCYTES # BLD: 0.4 K/UL (ref 0–1)
MONOCYTES NFR BLD: 6 % (ref 5–13)
NEUTS SEG # BLD: 4.1 K/UL (ref 1.8–8)
NEUTS SEG NFR BLD: 60 % (ref 32–75)
NITRITE UR QL STRIP.AUTO: NEGATIVE
PH UR STRIP: 6 [PH] (ref 5–8)
PLATELET # BLD AUTO: 178 K/UL (ref 150–400)
POTASSIUM SERPL-SCNC: 3.4 MMOL/L (ref 3.5–5.1)
PROT SERPL-MCNC: 7.3 G/DL (ref 6.4–8.2)
PROT UR STRIP-MCNC: NEGATIVE MG/DL
RBC # BLD AUTO: 4.09 M/UL (ref 3.8–5.2)
RBC #/AREA URNS HPF: ABNORMAL /HPF (ref 0–5)
SODIUM SERPL-SCNC: 138 MMOL/L (ref 136–145)
SP GR UR REFRACTOMETRY: 1.01 (ref 1–1.03)
UA: UC IF INDICATED,UAUC: ABNORMAL
UROBILINOGEN UR QL STRIP.AUTO: 0.2 EU/DL (ref 0.2–1)
WBC # BLD AUTO: 6.8 K/UL (ref 3.6–11)
WBC URNS QL MICRO: ABNORMAL /HPF (ref 0–4)

## 2018-01-20 PROCEDURE — 81001 URINALYSIS AUTO W/SCOPE: CPT | Performed by: EMERGENCY MEDICINE

## 2018-01-20 PROCEDURE — 80053 COMPREHEN METABOLIC PANEL: CPT | Performed by: EMERGENCY MEDICINE

## 2018-01-20 PROCEDURE — 83605 ASSAY OF LACTIC ACID: CPT | Performed by: EMERGENCY MEDICINE

## 2018-01-20 PROCEDURE — 96375 TX/PRO/DX INJ NEW DRUG ADDON: CPT

## 2018-01-20 PROCEDURE — 74011250636 HC RX REV CODE- 250/636

## 2018-01-20 PROCEDURE — 36415 COLL VENOUS BLD VENIPUNCTURE: CPT | Performed by: EMERGENCY MEDICINE

## 2018-01-20 PROCEDURE — 87086 URINE CULTURE/COLONY COUNT: CPT | Performed by: EMERGENCY MEDICINE

## 2018-01-20 PROCEDURE — 74176 CT ABD & PELVIS W/O CONTRAST: CPT

## 2018-01-20 PROCEDURE — 99285 EMERGENCY DEPT VISIT HI MDM: CPT

## 2018-01-20 PROCEDURE — 85025 COMPLETE CBC W/AUTO DIFF WBC: CPT | Performed by: EMERGENCY MEDICINE

## 2018-01-20 PROCEDURE — 74011250636 HC RX REV CODE- 250/636: Performed by: EMERGENCY MEDICINE

## 2018-01-20 PROCEDURE — 96374 THER/PROPH/DIAG INJ IV PUSH: CPT

## 2018-01-20 RX ORDER — HYDROMORPHONE HYDROCHLORIDE 2 MG/ML
1 INJECTION, SOLUTION INTRAMUSCULAR; INTRAVENOUS; SUBCUTANEOUS
Status: DISCONTINUED | OUTPATIENT
Start: 2018-01-20 | End: 2018-01-20 | Stop reason: HOSPADM

## 2018-01-20 RX ORDER — ONDANSETRON 2 MG/ML
4 INJECTION INTRAMUSCULAR; INTRAVENOUS
Status: COMPLETED | OUTPATIENT
Start: 2018-01-20 | End: 2018-01-20

## 2018-01-20 RX ORDER — HYDROMORPHONE HYDROCHLORIDE 2 MG/ML
1 INJECTION, SOLUTION INTRAMUSCULAR; INTRAVENOUS; SUBCUTANEOUS
Status: COMPLETED | OUTPATIENT
Start: 2018-01-20 | End: 2018-01-20

## 2018-01-20 RX ORDER — DOXYCYCLINE HYCLATE 100 MG
100 TABLET ORAL 2 TIMES DAILY
Qty: 20 TAB | Refills: 0 | Status: SHIPPED | OUTPATIENT
Start: 2018-01-20 | End: 2018-01-30

## 2018-01-20 RX ORDER — DIPHENHYDRAMINE HYDROCHLORIDE 50 MG/ML
25 INJECTION, SOLUTION INTRAMUSCULAR; INTRAVENOUS
Status: COMPLETED | OUTPATIENT
Start: 2018-01-20 | End: 2018-01-20

## 2018-01-20 RX ORDER — HYDROMORPHONE HYDROCHLORIDE 2 MG/ML
INJECTION, SOLUTION INTRAMUSCULAR; INTRAVENOUS; SUBCUTANEOUS
Status: COMPLETED
Start: 2018-01-20 | End: 2018-01-20

## 2018-01-20 RX ORDER — HYDROMORPHONE HYDROCHLORIDE 2 MG/1
2 TABLET ORAL
Qty: 15 TAB | Refills: 0 | Status: SHIPPED | OUTPATIENT
Start: 2018-01-20 | End: 2018-01-28

## 2018-01-20 RX ADMIN — HYDROMORPHONE HYDROCHLORIDE 1 MG: 2 INJECTION INTRAMUSCULAR; INTRAVENOUS; SUBCUTANEOUS at 04:16

## 2018-01-20 RX ADMIN — HYDROMORPHONE HYDROCHLORIDE 1 MG: 2 INJECTION, SOLUTION INTRAMUSCULAR; INTRAVENOUS; SUBCUTANEOUS at 05:58

## 2018-01-20 RX ADMIN — ONDANSETRON HYDROCHLORIDE 4 MG: 2 INJECTION, SOLUTION INTRAMUSCULAR; INTRAVENOUS at 04:16

## 2018-01-20 RX ADMIN — DIPHENHYDRAMINE HYDROCHLORIDE 25 MG: 50 INJECTION, SOLUTION INTRAMUSCULAR; INTRAVENOUS at 04:59

## 2018-01-20 NOTE — DISCHARGE INSTRUCTIONS
Infection After Surgery: Care Instructions  Your Care Instructions  After surgery, an infection is always possible. It doesn't mean that the surgery didn't go well. Because an infection can be serious, your doctor has taken steps to manage it. Your doctor checked the infection and cleaned it if necessary. He or she may have made an opening in the area so that the pus can drain out. You may have gauze in the cut so that the area will stay open and keep draining. You may need antibiotics. You will need to follow up with your doctor to make sure the infection has gone away. Follow-up care is a key part of your treatment and safety. Be sure to make and go to all appointments, and call your doctor if you are having problems. It's also a good idea to know your test results and keep a list of the medicines you take. How can you care for yourself at home? · Make sure your surgeon knows that you saw a doctor about the infection. · If your doctor prescribed antibiotics, take them as directed. Do not stop taking them just because you feel better. You need to take the full course of antibiotics. · Ask your doctor if you can take an over-the-counter pain medicine, such as acetaminophen (Tylenol), ibuprofen (Advil, Motrin), or naproxen (Aleve). Be safe with medicines. Read and follow all instructions on the label. · Do not take two or more pain medicines at the same time unless the doctor told you to. Many pain medicines have acetaminophen, which is Tylenol. Too much acetaminophen (Tylenol) can be harmful. · Prop up the area on a pillow anytime you sit or lie down during the next 3 days. Try to keep it above the level of your heart. This will help reduce swelling. · Keep the skin clean and dry. · If you have a bandage, keep it clean and dry. · You may have a dressing over the cut (incision). A dressing helps the incision heal and protects it. Your doctor will tell you how to take care of this.  You can expect drainage from the wound. · If your doctor told you how to care for your incision, follow your doctor's instructions. If you did not get instructions, follow this general advice:  ¨ Wash around the incision with clean water 2 times a day. Don't use hydrogen peroxide or alcohol, which can slow healing. When should you call for help? Call your doctor now or seek immediate medical care if:  ? · You have signs that your infection is getting worse, such as:  ¨ Increased pain, swelling, warmth, or redness in the area. ¨ Red streaks leading from the area. ¨ Pus draining from the wound. ¨ A new or higher fever. ? Watch closely for changes in your health, and be sure to contact your doctor if you have any problems. Where can you learn more? Go to http://gladys-alia.info/. Enter C340 in the search box to learn more about \"Infection After Surgery: Care Instructions. \"  Current as of: March 20, 2017  Content Version: 11.4  © 0418-1113 [employer]. Care instructions adapted under license by [employer] (which disclaims liability or warranty for this information). If you have questions about a medical condition or this instruction, always ask your healthcare professional. [address] any warranty or liability for your use of this information.

## 2018-01-20 NOTE — ED NOTES
Dr. Colin Winston reviewed discharge instructions with the patient. The patient verbalized understanding.

## 2018-01-20 NOTE — ED PROVIDER NOTES
EMERGENCY DEPARTMENT HISTORY AND PHYSICAL EXAM      Date: 1/20/2018  Patient Name: Shanelle Estrada    History of Presenting Illness     Chief Complaint   Patient presents with    Abdominal Pain     Patient had surgery 2 weeks ago, hernia repair. Broke out with hives right after the surgery on prednisone dose christine. consequently her diabetes was out of control and now her incision does not seem to be healing and have pus coming out of it. She spoke to her surgeon who told her to come to the ED. History Provided By: Patient    HPI: Shanelle Estrada, 52 y.o. female with PMHx significant for HTN, diverticulosis, GERD, asthma, DM, diverticulitis, presents ambulatory to the ED with cc of a periumbilical abdominal pain at her incision site radiating up to her epigastrium today. She reports associated symptoms of nausea and abdominal distension with bleeding and pus at the incision site. The pt had an umbilical hernia repair x 2 weeks ago by Dr. Jasbir Weinstein at [facility]. She states the first week after, she had felt better, but then started experiencing diffuse hives \"from my head to toe\" which she had experienced before. She was given prednisone 60 mg x 5 days (non-taper) to which she had finished yesterday. She notes after the use of prednisone, it had elevated her blood sugar, and she feels worse. She had called her PCP office, who had wanted to rx her an antibiotic, but the pt was hesitant, noting she had recently had C. Diff a few months ago. She denies any known allergies to antibiotics. PCP: BEBA Ramírez    There are no other complaints, changes, or physical findings at this time.     Current Facility-Administered Medications   Medication Dose Route Frequency Provider Last Rate Last Dose    diphenhydrAMINE (BENADRYL) injection 25 mg  25 mg IntraVENous NOW Twila Romano MD         Current Outpatient Prescriptions   Medication Sig Dispense Refill    doxycycline (VIBRA-TABS) 100 mg tablet Take 1 Tab by mouth two (2) times a day for 10 days. 20 Tab 0    dicyclomine (BENTYL) 10 mg capsule Take 1 Cap by mouth three (3) times daily. 90 Cap 0    famotidine (PEPCID) 20 mg tablet Take 1 Tab by mouth two (2) times a day. 60 Tab 0    losartan-hydroCHLOROthiazide (HYZAAR) 100-12.5 mg per tablet Take 1 Tab by mouth daily.  albuterol sulfate (PROVENTIL;VENTOLIN) 2.5 mg/0.5 mL nebu nebulizer solution 2.5 mg by Nebulization route every twelve (12) hours. Patient mixes this agent with acetylcysteine (20%) nebulizer solution for breathing treatment.  acetaminophen (TYLENOL) 500 mg tablet Take 1,000 mg by mouth every six (6) hours as needed for Pain.  diphenhydrAMINE (BENADRYL) 25 mg capsule Take 25 mg by mouth every six (6) hours as needed (congestion).  vancomycin (VANCOCIN) 125 mg capsule Take 125 mg by mouth every three (3) days.  predniSONE (STERAPRED DS) 10 mg dose pack Take 10 mg by mouth See Admin Instructions. See administration instruction per 10mg dose pack      LACTOBACIL 2-S. THERMO-BIFIDO 1 (VSL#3 PO) Take 1 Packet by mouth daily.  cetirizine (ZYRTEC) 10 mg tablet Take 10 mg by mouth nightly as needed for Allergies.  EPINEPHrine (EPIPEN) 0.3 mg/0.3 mL (1:1,000) injection 0.3 mL by IntraMUSCular route once as needed for up to 1 dose. 0.3 mL 1    estradiol (ESTRACE) 1 mg tablet Take 1 mg by mouth daily.  metFORMIN (GLUCOPHAGE) 500 mg tablet Take 500 mg by mouth daily (with breakfast).  albuterol (PROVENTIL, VENTOLIN) 90 mcg/Actuation inhaler Take 2 Puffs by inhalation every six (6) hours as needed.          Past History     Past Medical History:  Past Medical History:   Diagnosis Date    Anal fissure     Anisocoria     Asthma     LAST EPISODE     Back pain     Cerumen impaction     Chronic kidney disease     hx uti in past    Coagulation defects     ocassional rectal bleeding due to anal fissure    Colovaginal fistula     Diabetes (HCC)     NIDDM  Diabetes ([address]     Diverticulitis     Diverticulosis     Enlarged tonsils     Frequent UTI     GERD (gastroesophageal reflux disease)     H/O endoscopy     with dilation    HA (headache)     Hepatic steatosis     Hx of colonoscopy with polypectomy     benign    Hypertension     Ill-defined condition     FREQUENT HIVES    Ill-defined condition     HX ELEVATED LIVER ENZYMES    Morbid obesity (HCC)     Nausea & vomiting     during diverticulitis flare    Obesity     Otitis media     Pneumonia     about 15 yrs ago    Psychiatric disorder     ANXIETY    Recurrent tonsillitis     Sinusitis     Transfusion history ~ age 35    postop hysterectomy    Unspecified sleep apnea     snores ( not diagnosed yet)     Urticaria     Urticaria        Past Surgical History:  Past Surgical History:   Procedure Laterality Date    ABDOMEN SURGERY PROC UNLISTED  2018    hernia repair at [facility]    [address]    [place].  HX GI  12    LAPAROSCOPIC HAND ASSISTED  POSS OPEN SIGMOID COLECTOMY POSS TEMPORARY DIVERTING LOOP ILEOSTOMY;  (no illeostomy needed)    HX GYN           HX GYN      cervical conization    HX HEENT      SINUS SURGERY LEFT X2    HX HEENT      SINUS SURGERY ON RIGHT X2    HX OTHER SURGICAL      Sphincterotomy    HX PELVIC LAPAROSCOPY      HX EMMANUEL AND BSO      HX UROLOGICAL  12     CYSTOSCOPY INSERTION URETERAL CATHETERS - Cystoscopy Insertion of bilateral ureteral stents       Family History:  Family History   Problem Relation Age of Onset    Diabetes Mother     Cancer Mother      NON-HODGKINS LYMPHOMA    Anesth Problems Mother      PONV    Diabetes Father     Heart Disease Father      CAD - STENTS, PACEMAKER    Arrhythmia Father        Social History:  Social History   Substance Use Topics    Smoking status: Never Smoker    Smokeless tobacco: Never Used    Alcohol use Yes      Comment: Rarely       Allergies:   Allergies   Allergen Reactions  Aspirin Shortness of Breath    Prilosec [Omeprazole Magnesium] Anaphylaxis     CHERRY FLAVORED; PT TAKES REGULAR PRILOSEC AND IS OK    Codeine Hives and Itching    Contrast Agent [Iodine] Itching     Pt. Had itching after IV contrast with the last exam.  Benadryl was given    Morphine Itching     Severe itching    Percocet [Oxycodone-Acetaminophen] Hives       Review of Systems   Review of Systems   Constitutional: Negative for activity change, chills and fever. HENT: Negative for congestion and sore throat. Eyes: Negative for pain and redness. Respiratory: Negative for cough, chest tightness and shortness of breath. Cardiovascular: Negative for chest pain and palpitations. Gastrointestinal: Positive for abdominal distention, abdominal pain and nausea. Negative for diarrhea and vomiting. Genitourinary: Negative for dysuria, frequency and urgency. Musculoskeletal: Negative for back pain and neck pain. Skin: Negative for rash. Neurological: Negative for syncope, light-headedness and headaches. Psychiatric/Behavioral: Negative for confusion. All other systems reviewed and are negative. Physical Exam   Physical Exam   Constitutional: She is oriented to person, place, and time. No distress. Obese   HENT:   Head: Normocephalic. Nose: Nose normal.   Mouth/Throat: Oropharynx is clear and moist. No oropharyngeal exudate. Eyes: Conjunctivae are normal. Pupils are equal, round, and reactive to light. No scleral icterus. Neck: Normal range of motion. Neck supple. No JVD present. No tracheal deviation present. No thyromegaly present. Cardiovascular: Normal rate, regular rhythm and intact distal pulses. Exam reveals no gallop and no friction rub. No murmur heard. Pulmonary/Chest: Effort normal and breath sounds normal. No stridor. No respiratory distress. She has no wheezes. She has no rales. Abdominal: Soft. Bowel sounds are normal. She exhibits no distension.  There is tenderness. There is no rebound and no guarding. 3 cm vertical surgical incision in periumbilical area; mild erythema at superior aspect with some crusting; no active draining or bleeding; mild diffuse tenderness   Musculoskeletal: Normal range of motion. She exhibits no edema. Lymphadenopathy:     She has no cervical adenopathy. Neurological: She is alert and oriented to person, place, and time. No cranial nerve deficit. She exhibits normal muscle tone. Coordination normal.   Skin: Skin is warm and dry. No rash noted. She is not diaphoretic. No erythema. Psychiatric: She has a normal mood and affect. Her behavior is normal.   Nursing note and vitals reviewed. Diagnostic Study Results     Labs -  Recent Results (from the past 12 hour(s))   URINALYSIS W/ REFLEX CULTURE    Collection Time: 01/20/18  2:59 AM   Result Value Ref Range    Color YELLOW/STRAW      Appearance CLEAR CLEAR      Specific gravity 1.014 1.003 - 1.030      pH (UA) 6.0 5.0 - 8.0      Protein NEGATIVE  NEG mg/dL    Glucose 500 (A) NEG mg/dL    Ketone NEGATIVE  NEG mg/dL    Bilirubin NEGATIVE  NEG      Blood NEGATIVE  NEG      Urobilinogen 0.2 0.2 - 1.0 EU/dL    Nitrites NEGATIVE  NEG      Leukocyte Esterase TRACE (A) NEG      WBC 5-10 0 - 4 /hpf    RBC 0-5 0 - 5 /hpf    Epithelial cells FEW FEW /lpf    Bacteria NEGATIVE  NEG /hpf    UA:UC IF INDICATED URINE CULTURE ORDERED (A) CNI      Hyaline cast 0-2 0 - 5 /lpf   CBC WITH AUTOMATED DIFF    Collection Time: 01/20/18  3:59 AM   Result Value Ref Range    WBC 6.8 3.6 - 11.0 K/uL    RBC 4.09 3.80 - 5.20 M/uL    HGB 12.1 11.5 - 16.0 g/dL    HCT 34.1 (L) 35.0 - 47.0 %    MCV 83.4 80.0 - 99.0 FL    MCH 29.6 26.0 - 34.0 PG    MCHC 35.5 30.0 - 36.5 g/dL    RDW 12.9 11.5 - 14.5 %    PLATELET 513 881 - 407 K/uL    NEUTROPHILS 60 32 - 75 %    LYMPHOCYTES 29 12 - 49 %    MONOCYTES 6 5 - 13 %    EOSINOPHILS 5 0 - 7 %    BASOPHILS 0 0 - 1 %    ABS. NEUTROPHILS 4.1 1.8 - 8.0 K/UL    ABS.  LYMPHOCYTES 2.0 0.8 - 3.5 K/UL    ABS. MONOCYTES 0.4 0.0 - 1.0 K/UL    ABS. EOSINOPHILS 0.3 0.0 - 0.4 K/UL    ABS. BASOPHILS 0.0 0.0 - 0.1 K/UL   METABOLIC PANEL, COMPREHENSIVE    Collection Time: 01/20/18  3:59 AM   Result Value Ref Range    Sodium 138 136 - 145 mmol/L    Potassium 3.4 (L) 3.5 - 5.1 mmol/L    Chloride 103 97 - 108 mmol/L    CO2 25 21 - 32 mmol/L    Anion gap 10 5 - 15 mmol/L    Glucose 177 (H) 65 - 100 mg/dL    BUN 4 (L) 6 - 20 MG/DL    Creatinine 0.55 0.55 - 1.02 MG/DL    BUN/Creatinine ratio 7 (L) 12 - 20      GFR est AA >60 >60 ml/min/1.73m2    GFR est non-AA >60 >60 ml/min/1.73m2    Calcium 9.4 8.5 - 10.1 MG/DL    Bilirubin, total 0.5 0.2 - 1.0 MG/DL    ALT (SGPT) 43 12 - 78 U/L    AST (SGOT) 32 15 - 37 U/L    Alk. phosphatase 56 45 - 117 U/L    Protein, total 7.3 6.4 - 8.2 g/dL    Albumin 3.9 3.5 - 5.0 g/dL    Globulin 3.4 2.0 - 4.0 g/dL    A-G Ratio 1.1 1.1 - 2.2         Radiologic Studies -   CT Results  (Last 48 hours)               01/20/18 0433  CT ABD PELV WO CONT Final result    Impression:  IMPRESSION:   1. No nephrolithiasis or hydronephrosis. 2. Small amount of ill-defined fluid and subcutaneous stranding the anterior   abdominal wall at the incision site. This may represent cellulitis. 3. Hepatic steatosis. Narrative:  INDICATION: Abd pain, fever, post-op, no recent CT       COMPARISON: October 5, 2017       TECHNIQUE:    Noncontrast thin axial images were obtained through the abdomen and pelvis. Coronal and sagittal reconstructions were generated. CT dose reduction was   achieved through use of a standardized protocol tailored for this examination   and automatic exposure control for dose modulation. FINDINGS:    LUNG BASES: No abnormality. LIVER: Enlarged. Diffuse hepatic steatosis. GALLBLADDER: Surgically absent. SPLEEN: Upper normal for size. PANCREAS: No mass or ductal dilatation. ADRENALS: No mass. KIDNEYS: No mass, calculus, or hydronephrosis.    GI TRACT:  No bowel obstruction. Difficult to assess bowel wall thickening given   lack of oral contrast material.   PERITONEUM: No free air or free fluid. APPENDIX: Unremarkable. RETROPERITONEUM: No lymphadenopathy or aortic aneurysm. ADDITIONAL COMMENTS: Small amount of ill-defined fluid density in the anterior   abdominal wall at the incision site/scar. URINARY BLADDER: Unremarkable. REPRODUCTIVE ORGANS: Unremarkable. LYMPH NODES:  None enlarged. FREE FLUID:  None. BONES: No destructive bone lesion. ADDITIONAL COMMENTS: N/A. Breana Gutierrez Medical Decision Making   I am the first provider for this patient. I reviewed the vital signs, available nursing notes, past medical history, past surgical history, family history and social history. Vital Signs-Reviewed the patient's vital signs. Patient Vitals for the past 12 hrs:   Temp Pulse Resp BP SpO2   01/20/18 0252 98.2 °F (36.8 °C) 79 18 (!) 156/129 100 %     Records Reviewed: Nursing Notes and Old Medical Records    Provider Notes (Medical Decision Making):   DDx: Wound dehiscence, post op infection    ED Course:   Initial assessment performed. The patients presenting problems have been discussed, and they are in agreement with the care plan formulated and outlined with them. I have encouraged them to ask questions as they arise throughout their visit. PROGRESS NOTE:  4:52 AM  Nursing staff states the pt has hives from the dilaudid and would like something for the itching. Disposition:  DISCHARGE NOTE  4:56 AM  The patient has been re-evaluated and is ready for discharge. Reviewed available results with patient. Counseled patient on diagnosis and care plan. Patient has expressed understanding, and all questions have been answered. Patient agrees with plan and agrees to follow up as recommended, or return to the ED if their symptoms worsen.  Discharge instructions have been provided and explained to the patient, along with reasons to return to the ED. Pt well appearing and afebrile; abd soft; vss; ct abd pelvis with cellulitis at wound site; no increased wbc; dc with doxy x 10 days; pt agreed to follow up with her surgeon on Monday. Sonya Frye MD      PLAN:  1. Discharge  Current Discharge Medication List      START taking these medications    Details   doxycycline (VIBRA-TABS) 100 mg tablet Take 1 Tab by mouth two (2) times a day for 10 days. Qty: 20 Tab, Refills: 0           2. Follow-up Information     Follow up With Details Comments [address] [facility] Mell Jones MD Schedule an appointment as soon as possible for a visit in 2 days  [address] [place], [place] [address]anai [facility]Saint [facility]  [phone]          Return to ED if worse     Diagnosis     Clinical Impression:   1. Cellulitis, wound, post-operative, initial encounter        Attestations: This note is prepared by Claudette Lone, acting as Scribe for Sonya Frye MD.    Sonya Frye MD: The scribe's documentation has been prepared under my direction and personally reviewed by me in its entirety. I confirm that the note above accurately reflects all work, treatment, procedures, and medical decision making performed by me.

## 2018-01-21 LAB
BACTERIA SPEC CULT: NORMAL
CC UR VC: NORMAL
SERVICE CMNT-IMP: NORMAL

## 2018-01-28 ENCOUNTER — HOSPITAL ENCOUNTER (EMERGENCY)
Age: 48
Discharge: HOME | End: 2018-01-28
Attending: EMERGENCY MEDICINE
Payer: COMMERCIAL

## 2018-01-28 ENCOUNTER — APPOINTMENT (OUTPATIENT)
Dept: CT IMAGING | Age: 48
End: 2018-01-28
Attending: EMERGENCY MEDICINE
Payer: COMMERCIAL

## 2018-01-28 VITALS
RESPIRATION RATE: 16 BRPM | HEART RATE: 81 BPM | BODY MASS INDEX: 38.83 KG/M2 | OXYGEN SATURATION: 100 % | SYSTOLIC BLOOD PRESSURE: 148 MMHG | DIASTOLIC BLOOD PRESSURE: 71 MMHG | WEIGHT: 212.3 LBS | TEMPERATURE: 97.7 F

## 2018-01-28 DIAGNOSIS — L03.311 CELLULITIS OF ABDOMINAL WALL: Primary | ICD-10-CM

## 2018-01-28 LAB
ALBUMIN SERPL-MCNC: 3.5 G/DL (ref 3.5–5)
ALBUMIN/GLOB SERPL: 0.9 {RATIO} (ref 1.1–2.2)
ALP SERPL-CCNC: 55 U/L (ref 45–117)
ALT SERPL-CCNC: 35 U/L (ref 12–78)
ANION GAP SERPL CALC-SCNC: 8 MMOL/L (ref 5–15)
APPEARANCE UR: CLEAR
AST SERPL-CCNC: 23 U/L (ref 15–37)
BACTERIA URNS QL MICRO: NEGATIVE /HPF
BASOPHILS # BLD: 0 K/UL (ref 0–0.1)
BASOPHILS NFR BLD: 0 % (ref 0–1)
BILIRUB SERPL-MCNC: 0.6 MG/DL (ref 0.2–1)
BILIRUB UR QL: NEGATIVE
BUN SERPL-MCNC: 4 MG/DL (ref 6–20)
BUN/CREAT SERPL: 7 (ref 12–20)
CALCIUM SERPL-MCNC: 9 MG/DL (ref 8.5–10.1)
CHLORIDE SERPL-SCNC: 101 MMOL/L (ref 97–108)
CO2 SERPL-SCNC: 27 MMOL/L (ref 21–32)
COLOR UR: ABNORMAL
CREAT SERPL-MCNC: 0.61 MG/DL (ref 0.55–1.02)
DIFFERENTIAL METHOD BLD: ABNORMAL
EOSINOPHIL # BLD: 0.2 K/UL (ref 0–0.4)
EOSINOPHIL NFR BLD: 3 % (ref 0–7)
EPITH CASTS URNS QL MICRO: ABNORMAL /LPF
ERYTHROCYTE [DISTWIDTH] IN BLOOD BY AUTOMATED COUNT: 12.9 % (ref 11.5–14.5)
GLOBULIN SER CALC-MCNC: 3.8 G/DL (ref 2–4)
GLUCOSE SERPL-MCNC: 200 MG/DL (ref 65–100)
GLUCOSE UR STRIP.AUTO-MCNC: 100 MG/DL
HCT VFR BLD AUTO: 33.3 % (ref 35–47)
HGB BLD-MCNC: 12 G/DL (ref 11.5–16)
HGB UR QL STRIP: NEGATIVE
HYALINE CASTS URNS QL MICRO: ABNORMAL /LPF (ref 0–5)
IMM GRANULOCYTES # BLD: 0 K/UL (ref 0–0.04)
IMM GRANULOCYTES NFR BLD AUTO: 0 % (ref 0–0.5)
KETONES UR QL STRIP.AUTO: NEGATIVE MG/DL
LACTATE SERPL-SCNC: 1.7 MMOL/L (ref 0.4–2)
LEUKOCYTE ESTERASE UR QL STRIP.AUTO: ABNORMAL
LYMPHOCYTES # BLD: 2 K/UL (ref 0.8–3.5)
LYMPHOCYTES NFR BLD: 28 % (ref 12–49)
MCH RBC QN AUTO: 30.2 PG (ref 26–34)
MCHC RBC AUTO-ENTMCNC: 36 G/DL (ref 30–36.5)
MCV RBC AUTO: 83.7 FL (ref 80–99)
MONOCYTES # BLD: 0.4 K/UL (ref 0–1)
MONOCYTES NFR BLD: 5 % (ref 5–13)
NEUTS SEG # BLD: 4.4 K/UL (ref 1.8–8)
NEUTS SEG NFR BLD: 63 % (ref 32–75)
NITRITE UR QL STRIP.AUTO: NEGATIVE
NRBC # BLD: 0 K/UL (ref 0–0.01)
NRBC BLD-RTO: 0 PER 100 WBC
PH UR STRIP: 6 [PH] (ref 5–8)
PLATELET # BLD AUTO: 153 K/UL (ref 150–400)
PMV BLD AUTO: 9.3 FL (ref 8.9–12.9)
POTASSIUM SERPL-SCNC: 3.5 MMOL/L (ref 3.5–5.1)
PROT SERPL-MCNC: 7.3 G/DL (ref 6.4–8.2)
PROT UR STRIP-MCNC: NEGATIVE MG/DL
RBC # BLD AUTO: 3.98 M/UL (ref 3.8–5.2)
RBC #/AREA URNS HPF: ABNORMAL /HPF (ref 0–5)
SODIUM SERPL-SCNC: 136 MMOL/L (ref 136–145)
SP GR UR REFRACTOMETRY: 1.01 (ref 1–1.03)
UA: UC IF INDICATED,UAUC: ABNORMAL
UROBILINOGEN UR QL STRIP.AUTO: 0.2 EU/DL (ref 0.2–1)
WBC # BLD AUTO: 7.1 K/UL (ref 3.6–11)
WBC URNS QL MICRO: ABNORMAL /HPF (ref 0–4)

## 2018-01-28 PROCEDURE — 81001 URINALYSIS AUTO W/SCOPE: CPT | Performed by: EMERGENCY MEDICINE

## 2018-01-28 PROCEDURE — 96375 TX/PRO/DX INJ NEW DRUG ADDON: CPT

## 2018-01-28 PROCEDURE — 36415 COLL VENOUS BLD VENIPUNCTURE: CPT | Performed by: EMERGENCY MEDICINE

## 2018-01-28 PROCEDURE — 87086 URINE CULTURE/COLONY COUNT: CPT | Performed by: EMERGENCY MEDICINE

## 2018-01-28 PROCEDURE — 74011250636 HC RX REV CODE- 250/636: Performed by: EMERGENCY MEDICINE

## 2018-01-28 PROCEDURE — 74176 CT ABD & PELVIS W/O CONTRAST: CPT

## 2018-01-28 PROCEDURE — 80053 COMPREHEN METABOLIC PANEL: CPT | Performed by: EMERGENCY MEDICINE

## 2018-01-28 PROCEDURE — 74011000258 HC RX REV CODE- 258: Performed by: EMERGENCY MEDICINE

## 2018-01-28 PROCEDURE — 85025 COMPLETE CBC W/AUTO DIFF WBC: CPT | Performed by: EMERGENCY MEDICINE

## 2018-01-28 PROCEDURE — 96374 THER/PROPH/DIAG INJ IV PUSH: CPT

## 2018-01-28 PROCEDURE — 99284 EMERGENCY DEPT VISIT MOD MDM: CPT

## 2018-01-28 PROCEDURE — 96376 TX/PRO/DX INJ SAME DRUG ADON: CPT

## 2018-01-28 PROCEDURE — 83605 ASSAY OF LACTIC ACID: CPT | Performed by: EMERGENCY MEDICINE

## 2018-01-28 RX ORDER — HYDROMORPHONE HYDROCHLORIDE 2 MG/ML
1 INJECTION, SOLUTION INTRAMUSCULAR; INTRAVENOUS; SUBCUTANEOUS
Status: COMPLETED | OUTPATIENT
Start: 2018-01-28 | End: 2018-01-28

## 2018-01-28 RX ORDER — ONDANSETRON 2 MG/ML
4 INJECTION INTRAMUSCULAR; INTRAVENOUS
Status: COMPLETED | OUTPATIENT
Start: 2018-01-28 | End: 2018-01-28

## 2018-01-28 RX ORDER — HYDROMORPHONE HYDROCHLORIDE 2 MG/1
2 TABLET ORAL
Qty: 10 TAB | Refills: 0 | Status: SHIPPED | OUTPATIENT
Start: 2018-01-28 | End: 2018-02-27 | Stop reason: ALTCHOICE

## 2018-01-28 RX ADMIN — CEFTRIAXONE SODIUM 1 G: 1 INJECTION, POWDER, FOR SOLUTION INTRAVENOUS at 12:53

## 2018-01-28 RX ADMIN — HYDROMORPHONE HYDROCHLORIDE 1 MG: 2 INJECTION, SOLUTION INTRAMUSCULAR; INTRAVENOUS; SUBCUTANEOUS at 10:44

## 2018-01-28 RX ADMIN — HYDROMORPHONE HYDROCHLORIDE 1 MG: 2 INJECTION, SOLUTION INTRAMUSCULAR; INTRAVENOUS; SUBCUTANEOUS at 12:53

## 2018-01-28 RX ADMIN — ONDANSETRON HYDROCHLORIDE 4 MG: 2 INJECTION, SOLUTION INTRAMUSCULAR; INTRAVENOUS at 10:12

## 2018-01-28 NOTE — DISCHARGE INSTRUCTIONS
Cellulitis: Care Instructions  Your Care Instructions    Cellulitis is a skin infection. It often occurs after a break in the skin from a scrape, cut, bite, or puncture, or after a rash. The doctor has checked you carefully, but problems can develop later. If you notice any problems or new symptoms, get medical treatment right away. Follow-up care is a key part of your treatment and safety. Be sure to make and go to all appointments, and call your doctor if you are having problems. It's also a good idea to know your test results and keep a list of the medicines you take. How can you care for yourself at home? · Take your antibiotics as directed. Do not stop taking them just because you feel better. You need to take the full course of antibiotics. · Prop up the infected area on pillows to reduce pain and swelling. Try to keep the area above the level of your heart as often as you can. · If your doctor told you how to care for your wound, follow your doctor's instructions. If you did not get instructions, follow this general advice:  ¨ Wash the wound with clean water 2 times a day. Don't use hydrogen peroxide or alcohol, which can slow healing. ¨ You may cover the wound with a thin layer of petroleum jelly, such as Vaseline, and a nonstick bandage. ¨ Apply more petroleum jelly and replace the bandage as needed. · Be safe with medicines. Take pain medicines exactly as directed. ¨ If the doctor gave you a prescription medicine for pain, take it as prescribed. ¨ If you are not taking a prescription pain medicine, ask your doctor if you can take an over-the-counter medicine. To prevent cellulitis in the future  · Try to prevent cuts, scrapes, or other injuries to your skin. Cellulitis most often occurs where there is a break in the skin. · If you get a scrape, cut, mild burn, or bite, wash the wound with clean water as soon as you can to help avoid infection.  Don't use hydrogen peroxide or alcohol, which can slow healing. · If you have swelling in your legs (edema), support stockings and good skin care may help prevent leg sores and cellulitis. · Take care of your feet, especially if you have diabetes or other conditions that increase the risk of infection. Wear shoes and socks. Do not go barefoot. If you have athlete's foot or other skin problems on your feet, talk to your doctor about how to treat them. When should you call for help? Call your doctor now or seek immediate medical care if:  ? · You have signs that your infection is getting worse, such as:  ¨ Increased pain, swelling, warmth, or redness. ¨ Red streaks leading from the area. ¨ Pus draining from the area. ¨ A fever. ? · You get a rash. ? Watch closely for changes in your health, and be sure to contact your doctor if:  ? · You are not getting better after 1 day (24 hours). ? · You do not get better as expected. Where can you learn more? Go to http://gladys-alia.info/. Lahoma Claude in the search box to learn more about \"Cellulitis: Care Instructions. \"  Current as of: October 13, 2016  Content Version: 11.4  © 1378-5525 [employer]. Care instructions adapted under license by [employer] (which disclaims liability or warranty for this information). If you have questions about a medical condition or this instruction, always ask your healthcare professional. [place] [address] any warranty or liability for your use of this information.

## 2018-01-28 NOTE — ED NOTES
Pt has had negative cdiff samples through her gi doctor and through lab bill  But we are practicing contact for pt due to her diarrhea loose

## 2018-01-28 NOTE — ED NOTES
Patient identified and read over and explained discharge instructions with time for questions by attending MD/PA. Patient has verbalized understanding of discharge instructions.    Await ride

## 2018-01-28 NOTE — ED PROVIDER NOTES
EMERGENCY DEPARTMENT HISTORY AND PHYSICAL EXAM      Date: 1/28/2018  Patient Name: Sheba Bocanegra    History of Presenting Illness     Chief Complaint   Patient presents with    Fever     x one week; post op three weeks from hernia repair       History Provided By: Patient    HPI: Sheba Bocanegra, 52 y.o. female with PMHx significant for HTN, and DM, presents ambulatory to the ED with cc of fever for a couple days. Pt notes additional symptoms of nausea and diarrhea. She reports that last night she had a fever of 102.8 F. Pt notes that she has been rotating Tylenol and Ibuprofen, and that she is out of pain medication. She reports that she is 3 weeks post op from a ventral hernia repair with Dr. Janay Haley. Pt notes that she is abdominal abdominal pain along the incision site, and notes that she has redness around the area. She reports that she was at [facility] ED on 1/20/2018 for similar symptoms. Pt notes that her symptoms are not improving. After the ED visit she reports that she called the office where she had her surgery done and saw a PA. Pt removed the packing and had an ultrasound done which showed that she had an infection and fluid. The PA switched pt from doxycycline to keflex which pt is still taking. Pt called today and was told that she may need IV antibiotics so she came to ED. She reports that she had a bout of c diff a couple months ago. Pt denies vomiting, coughing, flu like symptoms, dysuria, or urinary issues. PCP: BEBA Chapman     There are no other complaints, changes, or physical findings at this time.     Current Facility-Administered Medications   Medication Dose Route Frequency Provider Last Rate Last Dose    cefTRIAXone (ROCEPHIN) 1 g in lidocaine (PF) (XYLOCAINE) 10 mg/mL (1 %) IM injection  1 g IntraMUSCular ONCE Delfin Martínez MD         Current Outpatient Prescriptions   Medication Sig Dispense Refill    HYDROmorphone (DILAUDID) 2 mg tablet Take 1 Tab by mouth every four (4) hours as needed for Pain. Max Daily Amount: 12 mg. 10 Tab 0    doxycycline (VIBRA-TABS) 100 mg tablet Take 1 Tab by mouth two (2) times a day for 10 days. 20 Tab 0    dicyclomine (BENTYL) 10 mg capsule Take 1 Cap by mouth three (3) times daily. 90 Cap 0    famotidine (PEPCID) 20 mg tablet Take 1 Tab by mouth two (2) times a day. 60 Tab 0    losartan-hydroCHLOROthiazide (HYZAAR) 100-12.5 mg per tablet Take 1 Tab by mouth daily.  albuterol sulfate (PROVENTIL;VENTOLIN) 2.5 mg/0.5 mL nebu nebulizer solution 2.5 mg by Nebulization route every twelve (12) hours. Patient mixes this agent with acetylcysteine (20%) nebulizer solution for breathing treatment.  acetaminophen (TYLENOL) 500 mg tablet Take 1,000 mg by mouth every six (6) hours as needed for Pain.  diphenhydrAMINE (BENADRYL) 25 mg capsule Take 25 mg by mouth every six (6) hours as needed (congestion).  vancomycin (VANCOCIN) 125 mg capsule Take 125 mg by mouth every three (3) days.  predniSONE (STERAPRED DS) 10 mg dose pack Take 10 mg by mouth See Admin Instructions. See administration instruction per 10mg dose pack      LACTOBACIL 2-S. THERMO-BIFIDO 1 (VSL#3 PO) Take 1 Packet by mouth daily.  cetirizine (ZYRTEC) 10 mg tablet Take 10 mg by mouth nightly as needed for Allergies.  EPINEPHrine (EPIPEN) 0.3 mg/0.3 mL (1:1,000) injection 0.3 mL by IntraMUSCular route once as needed for up to 1 dose. 0.3 mL 1    estradiol (ESTRACE) 1 mg tablet Take 1 mg by mouth daily.  metFORMIN (GLUCOPHAGE) 500 mg tablet Take 500 mg by mouth daily (with breakfast).  albuterol (PROVENTIL, VENTOLIN) 90 mcg/Actuation inhaler Take 2 Puffs by inhalation every six (6) hours as needed.          Past History     Past Medical History:  Past Medical History:   Diagnosis Date    Anal fissure     Anisocoria     Asthma     LAST EPISODE     Back pain     Cerumen impaction     Chronic kidney disease     hx uti in past    Coagulation defects     ocassional rectal bleeding due to anal fissure    Colovaginal fistula     Diabetes (HCC)     NIDDM    Diabetes (HCC)     Diverticulitis     Diverticulosis     Enlarged tonsils     Frequent UTI     GERD (gastroesophageal reflux disease)     H/O endoscopy     with dilation    HA (headache)     Hepatic steatosis     Hx of colonoscopy with polypectomy     benign    Hypertension     Ill-defined condition     FREQUENT HIVES    Ill-defined condition     HX ELEVATED LIVER ENZYMES    Morbid obesity (HCC)     Nausea & vomiting     during diverticulitis flare    Obesity     Otitis media     Pneumonia     about 15 yrs ago    Psychiatric disorder     ANXIETY    Recurrent tonsillitis     Sinusitis     Transfusion history ~ age 35    postop hysterectomy    Unspecified sleep apnea     snores ( not diagnosed yet)     Urticaria     Urticaria        Past Surgical History:  Past Surgical History:   Procedure Laterality Date    ABDOMEN SURGERY PROC UNLISTED  2018    hernia repair at [facility]    [address]    [place].     HX GI  12    LAPAROSCOPIC HAND ASSISTED  POSS OPEN SIGMOID COLECTOMY POSS TEMPORARY DIVERTING LOOP ILEOSTOMY;  (no illeostomy needed)    HX GYN           HX GYN      cervical conization    HX HEENT      SINUS SURGERY LEFT X2    HX HEENT      SINUS SURGERY ON RIGHT X2    HX OTHER SURGICAL      Sphincterotomy    HX PELVIC LAPAROSCOPY      HX EMMANUEL AND BSO      HX UROLOGICAL  12     CYSTOSCOPY INSERTION URETERAL CATHETERS - Cystoscopy Insertion of bilateral ureteral stents       Family History:  Family History   Problem Relation Age of Onset    Diabetes Mother     Cancer Mother      NON-HODGKINS LYMPHOMA    Anesth Problems Mother      PONV    Diabetes Father     Heart Disease Father      CAD - STENTS, PACEMAKER    Arrhythmia Father        Social History:  Social History   Substance Use Topics    Smoking status: Never Smoker    Smokeless tobacco: Never Used    Alcohol use Yes      Comment: Rarely       Allergies: Allergies   Allergen Reactions    Aspirin Shortness of Breath    Prilosec [Omeprazole Magnesium] Anaphylaxis     CHERRY FLAVORED; PT TAKES REGULAR PRILOSEC AND IS OK    Codeine Hives and Itching    Contrast Agent [Iodine] Itching     Pt. Had itching after IV contrast with the last exam.  Benadryl was given    Morphine Itching     Severe itching    Percocet [Oxycodone-Acetaminophen] Hives         Review of Systems   Review of Systems   Constitutional: Positive for fever. Negative for activity change and chills. HENT: Negative for congestion and sore throat. Eyes: Negative for pain and redness. Respiratory: Negative for cough, chest tightness and shortness of breath. Cardiovascular: Negative for chest pain and palpitations. Gastrointestinal: Positive for abdominal pain, diarrhea and nausea. Negative for vomiting. Genitourinary: Negative for dysuria, frequency and urgency. Musculoskeletal: Negative for back pain and neck pain. Skin: Positive for color change (redness around abdomen). Negative for rash. Neurological: Negative for syncope, light-headedness and headaches. Psychiatric/Behavioral: Negative for confusion. All other systems reviewed and are negative. Physical Exam   Physical Exam   Constitutional: She is oriented to person, place, and time. She appears well-developed and well-nourished. No distress. HENT:   Head: Normocephalic. Nose: Nose normal.   Mouth/Throat: Oropharynx is clear and moist. No oropharyngeal exudate. Eyes: Conjunctivae are normal. Pupils are equal, round, and reactive to light. No scleral icterus. Neck: Normal range of motion. Neck supple. No JVD present. No tracheal deviation present. No thyromegaly present. Cardiovascular: Normal rate, regular rhythm and intact distal pulses. Exam reveals no gallop and no friction rub. No murmur heard.   Pulmonary/Chest: Effort normal and breath sounds normal. No stridor. No respiratory distress. She has no wheezes. She has no rales. Abdominal: Soft. Bowel sounds are normal. She exhibits no distension. There is no tenderness. There is no rebound and no guarding. 4 cm vertical surgical incision just superior to her umbilicus  Small 5 mm portion of superior aspect of the wound is open with purulent drainage  10 cm halo of erythema surrounding the wound with moderate tenderness over the entire area   Musculoskeletal: Normal range of motion. She exhibits no edema. Lymphadenopathy:     She has no cervical adenopathy. Neurological: She is alert and oriented to person, place, and time. No cranial nerve deficit. She exhibits normal muscle tone. Coordination normal.   Skin: Skin is warm and dry. No rash noted. She is not diaphoretic. There is erythema. Psychiatric: She has a normal mood and affect. Her behavior is normal.   Nursing note and vitals reviewed. Diagnostic Study Results     Labs -     Recent Results (from the past 12 hour(s))   CBC WITH AUTOMATED DIFF    Collection Time: 01/28/18  9:54 AM   Result Value Ref Range    WBC 7.1 3.6 - 11.0 K/uL    RBC 3.98 3.80 - 5.20 M/uL    HGB 12.0 11.5 - 16.0 g/dL    HCT 33.3 (L) 35.0 - 47.0 %    MCV 83.7 80.0 - 99.0 FL    MCH 30.2 26.0 - 34.0 PG    MCHC 36.0 30.0 - 36.5 g/dL    RDW 12.9 11.5 - 14.5 %    PLATELET 618 882 - 293 K/uL    MPV 9.3 8.9 - 12.9 FL    NRBC 0.0 0  WBC    ABSOLUTE NRBC 0.00 0.00 - 0.01 K/uL    NEUTROPHILS 63 32 - 75 %    LYMPHOCYTES 28 12 - 49 %    MONOCYTES 5 5 - 13 %    EOSINOPHILS 3 0 - 7 %    BASOPHILS 0 0 - 1 %    IMMATURE GRANULOCYTES 0 0.0 - 0.5 %    ABS. NEUTROPHILS 4.4 1.8 - 8.0 K/UL    ABS. LYMPHOCYTES 2.0 0.8 - 3.5 K/UL    ABS. MONOCYTES 0.4 0.0 - 1.0 K/UL    ABS. EOSINOPHILS 0.2 0.0 - 0.4 K/UL    ABS. BASOPHILS 0.0 0.0 - 0.1 K/UL    ABS. IMM.  GRANS. 0.0 0.00 - 0.04 K/UL    DF AUTOMATED     METABOLIC PANEL, COMPREHENSIVE    Collection Time: 01/28/18  9:54 AM   Result Value Ref Range    Sodium 136 136 - 145 mmol/L    Potassium 3.5 3.5 - 5.1 mmol/L    Chloride 101 97 - 108 mmol/L    CO2 27 21 - 32 mmol/L    Anion gap 8 5 - 15 mmol/L    Glucose 200 (H) 65 - 100 mg/dL    BUN 4 (L) 6 - 20 MG/DL    Creatinine 0.61 0.55 - 1.02 MG/DL    BUN/Creatinine ratio 7 (L) 12 - 20      GFR est AA >60 >60 ml/min/1.73m2    GFR est non-AA >60 >60 ml/min/1.73m2    Calcium 9.0 8.5 - 10.1 MG/DL    Bilirubin, total 0.6 0.2 - 1.0 MG/DL    ALT (SGPT) 35 12 - 78 U/L    AST (SGOT) 23 15 - 37 U/L    Alk. phosphatase 55 45 - 117 U/L    Protein, total 7.3 6.4 - 8.2 g/dL    Albumin 3.5 3.5 - 5.0 g/dL    Globulin 3.8 2.0 - 4.0 g/dL    A-G Ratio 0.9 (L) 1.1 - 2.2     LACTIC ACID    Collection Time: 01/28/18  9:54 AM   Result Value Ref Range    Lactic acid 1.7 0.4 - 2.0 MMOL/L       Radiologic Studies -     CT Results  (Last 48 hours)               01/28/18 1023  CT ABD PELV WO CONT Final result    Impression:  IMPRESSION:   Small periumbilical anterior abdominal wall fluid collection has increased in   size. Hepatic steatosis. Status post hysterectomy. No acute intra-abdominal   abnormality. Of technical note, this is the patient's 19th abdomen CT in the [facility]   system. Narrative:  EXAM:  CT ABD PELV WO CONT       INDICATION: Abdominal pain; worsening abdominal wall cellulitis and pain, recent   hernia repair, fever       COMPARISON: 1/20/2018       CONTRAST:  None. TECHNIQUE:    Thin axial images were obtained through the abdomen and pelvis. Coronal and   sagittal reconstructions were generated. Oral contrast was not administered. CT   dose reduction was achieved through use of a standardized protocol tailored for   this examination and automatic exposure control for dose modulation. The absence of intravenous contrast material reduces the sensitivity for   evaluation of the solid parenchymal organs of the abdomen.         FINDINGS:    LUNG BASES: Clear.   INCIDENTALLY IMAGED HEART AND MEDIASTINUM: Unremarkable. LIVER: Hepatic steatosis is noted. GALLBLADDER: Surgically absent. SPLEEN: Calcified granuloma is noted. PANCREAS: No mass or ductal dilatation. ADRENALS: Unremarkable. KIDNEYS/URETERS: No mass, calculus, or hydronephrosis. STOMACH: Unremarkable. SMALL BOWEL: No dilatation or wall thickening. COLON: No dilatation or wall thickening. APPENDIX: Unremarkable. PERITONEUM: No ascites or pneumoperitoneum. RETROPERITONEUM: No lymphadenopathy or aortic aneurysm. REPRODUCTIVE ORGANS: The uterus is surgically absent. URINARY BLADDER: No mass or calculus. BONES: No destructive bone lesion. ADDITIONAL COMMENTS: Periumbilical subcutaneous fluid collection now measures   2.7 x 3.6 cm, previously measuring 2.3 x 2.8 cm. Medical Decision Making   I am the first provider for this patient. I reviewed the vital signs, available nursing notes, past medical history, past surgical history, family history and social history. Vital Signs-Reviewed the patient's vital signs. Patient Vitals for the past 12 hrs:   Temp Pulse Resp BP SpO2   01/28/18 1051 - 81 16 146/74 100 %   01/28/18 1015 - - - - 99 %   01/28/18 1000 - - - 146/89 97 %   01/28/18 0946 - 82 - - 100 %   01/28/18 0937 97.7 °F (36.5 °C) 82 18 (!) 173/95 99 %       Pulse Oximetry Analysis - 99% on room air    Cardiac Monitor:   Rate: 82 bpm  Rhythm: Normal Sinus Rhythm     Records Reviewed: Old Medical Records    Provider Notes (Medical Decision Making):   DDx: cellulitis, intraabdominal abscess, UTI    ED Course:   Initial assessment performed. The patients presenting problems have been discussed, and they are in agreement with the care plan formulated and outlined with them. I have encouraged them to ask questions as they arise throughout their visit. 9:58 AM  Old records reviewed.  Tabatha Hwang MD saw pt on 1/20 for similar symptoms and did a CT abdomen which showed only small amount of ill-defined fluid and subcutaneous stranding over anterior abdominal wall of the incision site. WBC was 6.8. Pt was sent home with doxy 100 mg BID. Written by SCOOBY Hernandez, as dictated by Tabatha Hwang MD.     CONSULT NOTE:  10:58 AM  Tabatha Hwang MD spoke with Dr. Fine Kg: General Surgery  Discussed pt's hx, disposition, and available diagnostic and imaging results. Dr. Robert Monique will call Dr. Rosanna Dillon. Written by SCOOBY Hernandez, as dictated by Tabatha Hwang MD.    CONSULT NOTE:  11:02 Melida Coelho MD spoke with Dr. Lolita Almodovar  Specialty: General Surgery  Discussed pt's hx, disposition, and available diagnostic and imaging results. Reviewed care plans. Consultant agrees with plans as outlined. Dr. Rosanna Dillon recommends to have pt continue antibiotics and to follow up in the office. Dr. Rosanna Dillon suspects that the fluid collection is a seroma, and also suspects drug seeking behavior in patient. CT results and normal labs were reviewed with Dr. Rosanna Dillon. Written by SCOOBY Hernandez, as dictated by Tabatha Hwang MD.    Disposition:  DISCHARGE NOTE:  11:19 AM  The patient is ready for discharge. The patient's signs, symptoms, diagnosis, and discharge instructions have been discussed and the patient has conveyed their understanding. The patient is to follow up as recommended or return to the ER should their symptoms worsen. Plan has been discussed and the patient is in agreement. Pt well appearing; afebrile here; normal vs at discharge; no increased wbc; mild abdominal wall cellulitis; no sig increase in size of abd wall fluid collection; d/w surgery; d/c home with gen surg follow up; Tabatha Hwang MD      PLAN:  1. Current Discharge Medication List      CONTINUE these medications which have CHANGED    Details   HYDROmorphone (DILAUDID) 2 mg tablet Take 1 Tab by mouth every four (4) hours as needed for Pain.  Max Daily Amount: 12 mg.  Qty: 10 Tab, Refills: 0    Associated Diagnoses: Cellulitis, wound, post-operative, initial encounter           2. Follow-up Information     Follow up With Details Comments [address] J[address] Jones MD Call in 1 day  [address] [facility]  [address]  [phone]          Return to ED if worse     Diagnosis     Clinical Impression:   1. Cellulitis of abdominal wall    2. Cellulitis, wound, post-operative, initial encounter        Attestations: This note is prepared by Mich Ryder, acting as Scribe for MD Alex Underwood MD: The scribe's documentation has been prepared under my direction and personally reviewed by me in its entirety. I confirm that the note above accurately reflects all work, treatment, procedures, and medical decision making performed by me. This note will not be viewable in [address]

## 2018-01-29 LAB
BACTERIA SPEC CULT: NORMAL
CC UR VC: NORMAL
SERVICE CMNT-IMP: NORMAL

## 2018-02-27 ENCOUNTER — HOSPITAL ENCOUNTER (EMERGENCY)
Age: 48
Discharge: HOME | End: 2018-02-27
Attending: EMERGENCY MEDICINE
Payer: COMMERCIAL

## 2018-02-27 ENCOUNTER — APPOINTMENT (OUTPATIENT)
Dept: CT IMAGING | Age: 48
End: 2018-02-27
Attending: EMERGENCY MEDICINE
Payer: COMMERCIAL

## 2018-02-27 VITALS
BODY MASS INDEX: 38.26 KG/M2 | HEART RATE: 76 BPM | WEIGHT: 207.89 LBS | TEMPERATURE: 98.9 F | RESPIRATION RATE: 14 BRPM | HEIGHT: 62 IN | SYSTOLIC BLOOD PRESSURE: 137 MMHG | OXYGEN SATURATION: 97 % | DIASTOLIC BLOOD PRESSURE: 76 MMHG

## 2018-02-27 DIAGNOSIS — M54.12 ACUTE CERVICAL RADICULOPATHY: ICD-10-CM

## 2018-02-27 DIAGNOSIS — R10.12 ABDOMINAL PAIN, LUQ (LEFT UPPER QUADRANT): ICD-10-CM

## 2018-02-27 DIAGNOSIS — K76.0 HEPATIC STEATOSIS: ICD-10-CM

## 2018-02-27 DIAGNOSIS — E87.20 LACTIC ACIDOSIS: ICD-10-CM

## 2018-02-27 DIAGNOSIS — R10.11 ABDOMINAL PAIN, RIGHT UPPER QUADRANT: Primary | ICD-10-CM

## 2018-02-27 DIAGNOSIS — L03.311 CELLULITIS, ABDOMINAL WALL: ICD-10-CM

## 2018-02-27 LAB
ALBUMIN SERPL-MCNC: 3.7 G/DL (ref 3.5–5)
ALBUMIN/GLOB SERPL: 1 {RATIO} (ref 1.1–2.2)
ALP SERPL-CCNC: 56 U/L (ref 45–117)
ALT SERPL-CCNC: 40 U/L (ref 12–78)
ANION GAP SERPL CALC-SCNC: 8 MMOL/L (ref 5–15)
APPEARANCE UR: ABNORMAL
AST SERPL-CCNC: 36 U/L (ref 15–37)
BACTERIA URNS QL MICRO: NEGATIVE /HPF
BASOPHILS # BLD: 0 K/UL (ref 0–0.1)
BASOPHILS NFR BLD: 1 % (ref 0–1)
BILIRUB SERPL-MCNC: 0.6 MG/DL (ref 0.2–1)
BILIRUB UR QL: NEGATIVE
BUN SERPL-MCNC: 7 MG/DL (ref 6–20)
BUN/CREAT SERPL: 10 (ref 12–20)
CALCIUM SERPL-MCNC: 9.1 MG/DL (ref 8.5–10.1)
CHLORIDE SERPL-SCNC: 102 MMOL/L (ref 97–108)
CO2 SERPL-SCNC: 28 MMOL/L (ref 21–32)
COLOR UR: ABNORMAL
CREAT SERPL-MCNC: 0.68 MG/DL (ref 0.55–1.02)
DIFFERENTIAL METHOD BLD: NORMAL
EOSINOPHIL # BLD: 0.3 K/UL (ref 0–0.4)
EOSINOPHIL NFR BLD: 5 % (ref 0–7)
EPITH CASTS URNS QL MICRO: ABNORMAL /LPF
ERYTHROCYTE [DISTWIDTH] IN BLOOD BY AUTOMATED COUNT: 13.1 % (ref 11.5–14.5)
GLOBULIN SER CALC-MCNC: 3.6 G/DL (ref 2–4)
GLUCOSE SERPL-MCNC: 182 MG/DL (ref 65–100)
GLUCOSE UR STRIP.AUTO-MCNC: NEGATIVE MG/DL
HCT VFR BLD AUTO: 35.2 % (ref 35–47)
HGB BLD-MCNC: 12.7 G/DL (ref 11.5–16)
HGB UR QL STRIP: NEGATIVE
HYALINE CASTS URNS QL MICRO: ABNORMAL /LPF (ref 0–5)
IMM GRANULOCYTES # BLD: 0 K/UL (ref 0–0.04)
IMM GRANULOCYTES NFR BLD AUTO: 0 % (ref 0–0.5)
INR PPP: 1 (ref 0.9–1.1)
KETONES UR QL STRIP.AUTO: NEGATIVE MG/DL
LACTATE SERPL-SCNC: 2.4 MMOL/L (ref 0.4–2)
LACTATE SERPL-SCNC: 2.6 MMOL/L (ref 0.4–2)
LEUKOCYTE ESTERASE UR QL STRIP.AUTO: NEGATIVE
LIPASE SERPL-CCNC: 183 U/L (ref 73–393)
LYMPHOCYTES # BLD: 1.6 K/UL (ref 0.8–3.5)
LYMPHOCYTES NFR BLD: 30 % (ref 12–49)
MCH RBC QN AUTO: 29.8 PG (ref 26–34)
MCHC RBC AUTO-ENTMCNC: 36.1 G/DL (ref 30–36.5)
MCV RBC AUTO: 82.6 FL (ref 80–99)
MONOCYTES # BLD: 0.3 K/UL (ref 0–1)
MONOCYTES NFR BLD: 5 % (ref 5–13)
NEUTS SEG # BLD: 3.1 K/UL (ref 1.8–8)
NEUTS SEG NFR BLD: 59 % (ref 32–75)
NITRITE UR QL STRIP.AUTO: POSITIVE
NRBC # BLD: 0 K/UL (ref 0–0.01)
NRBC BLD-RTO: 0 PER 100 WBC
PH UR STRIP: 6 [PH] (ref 5–8)
PLATELET # BLD AUTO: 156 K/UL (ref 150–400)
PMV BLD AUTO: 9.5 FL (ref 8.9–12.9)
POTASSIUM SERPL-SCNC: 3.5 MMOL/L (ref 3.5–5.1)
PROT SERPL-MCNC: 7.3 G/DL (ref 6.4–8.2)
PROT UR STRIP-MCNC: NEGATIVE MG/DL
PROTHROMBIN TIME: 10.2 SEC (ref 9–11.1)
RBC # BLD AUTO: 4.26 M/UL (ref 3.8–5.2)
RBC #/AREA URNS HPF: ABNORMAL /HPF (ref 0–5)
SODIUM SERPL-SCNC: 138 MMOL/L (ref 136–145)
SP GR UR REFRACTOMETRY: 1.01 (ref 1–1.03)
UROBILINOGEN UR QL STRIP.AUTO: 1 EU/DL (ref 0.2–1)
WBC # BLD AUTO: 5.3 K/UL (ref 3.6–11)
WBC URNS QL MICRO: ABNORMAL /HPF (ref 0–4)

## 2018-02-27 PROCEDURE — 96375 TX/PRO/DX INJ NEW DRUG ADDON: CPT

## 2018-02-27 PROCEDURE — 87040 BLOOD CULTURE FOR BACTERIA: CPT | Performed by: EMERGENCY MEDICINE

## 2018-02-27 PROCEDURE — 99284 EMERGENCY DEPT VISIT MOD MDM: CPT

## 2018-02-27 PROCEDURE — 81001 URINALYSIS AUTO W/SCOPE: CPT | Performed by: EMERGENCY MEDICINE

## 2018-02-27 PROCEDURE — 74011250636 HC RX REV CODE- 250/636: Performed by: EMERGENCY MEDICINE

## 2018-02-27 PROCEDURE — 74011636320 HC RX REV CODE- 636/320: Performed by: EMERGENCY MEDICINE

## 2018-02-27 PROCEDURE — 74176 CT ABD & PELVIS W/O CONTRAST: CPT

## 2018-02-27 PROCEDURE — 83690 ASSAY OF LIPASE: CPT | Performed by: EMERGENCY MEDICINE

## 2018-02-27 PROCEDURE — 36415 COLL VENOUS BLD VENIPUNCTURE: CPT | Performed by: EMERGENCY MEDICINE

## 2018-02-27 PROCEDURE — 85610 PROTHROMBIN TIME: CPT | Performed by: EMERGENCY MEDICINE

## 2018-02-27 PROCEDURE — 96376 TX/PRO/DX INJ SAME DRUG ADON: CPT

## 2018-02-27 PROCEDURE — 96361 HYDRATE IV INFUSION ADD-ON: CPT

## 2018-02-27 PROCEDURE — 80053 COMPREHEN METABOLIC PANEL: CPT | Performed by: EMERGENCY MEDICINE

## 2018-02-27 PROCEDURE — 96374 THER/PROPH/DIAG INJ IV PUSH: CPT

## 2018-02-27 PROCEDURE — 85025 COMPLETE CBC W/AUTO DIFF WBC: CPT | Performed by: EMERGENCY MEDICINE

## 2018-02-27 PROCEDURE — 83605 ASSAY OF LACTIC ACID: CPT | Performed by: EMERGENCY MEDICINE

## 2018-02-27 RX ORDER — FENTANYL CITRATE 50 UG/ML
50 INJECTION, SOLUTION INTRAMUSCULAR; INTRAVENOUS
Status: COMPLETED | OUTPATIENT
Start: 2018-02-27 | End: 2018-02-27

## 2018-02-27 RX ORDER — MEPERIDINE HYDROCHLORIDE 50 MG/1
50 TABLET ORAL
Qty: 20 TAB | Refills: 0 | Status: SHIPPED | OUTPATIENT
Start: 2018-02-27 | End: 2018-06-19

## 2018-02-27 RX ORDER — DOXYCYCLINE HYCLATE 100 MG
100 TABLET ORAL 2 TIMES DAILY
Qty: 14 TAB | Refills: 0 | Status: SHIPPED | OUTPATIENT
Start: 2018-02-27 | End: 2018-03-06

## 2018-02-27 RX ORDER — DIPHENHYDRAMINE HYDROCHLORIDE 50 MG/ML
25 INJECTION, SOLUTION INTRAMUSCULAR; INTRAVENOUS
Status: COMPLETED | OUTPATIENT
Start: 2018-02-27 | End: 2018-02-27

## 2018-02-27 RX ORDER — ONDANSETRON 2 MG/ML
4 INJECTION INTRAMUSCULAR; INTRAVENOUS
Status: COMPLETED | OUTPATIENT
Start: 2018-02-27 | End: 2018-02-27

## 2018-02-27 RX ORDER — METHOCARBAMOL 750 MG/1
750 TABLET, FILM COATED ORAL
Qty: 20 TAB | Refills: 0 | Status: SHIPPED | OUTPATIENT
Start: 2018-02-27 | End: 2018-06-19

## 2018-02-27 RX ADMIN — ONDANSETRON 4 MG: 2 INJECTION INTRAMUSCULAR; INTRAVENOUS at 16:21

## 2018-02-27 RX ADMIN — FENTANYL CITRATE 50 MCG: 50 INJECTION, SOLUTION INTRAMUSCULAR; INTRAVENOUS at 13:33

## 2018-02-27 RX ADMIN — ONDANSETRON HYDROCHLORIDE 4 MG: 2 INJECTION, SOLUTION INTRAMUSCULAR; INTRAVENOUS at 13:33

## 2018-02-27 RX ADMIN — DIPHENHYDRAMINE HYDROCHLORIDE 25 MG: 50 INJECTION, SOLUTION INTRAMUSCULAR; INTRAVENOUS at 14:00

## 2018-02-27 RX ADMIN — MEPERIDINE HYDROCHLORIDE 50 MG: 25 INJECTION, SOLUTION INTRAMUSCULAR; INTRAVENOUS; SUBCUTANEOUS at 16:21

## 2018-02-27 RX ADMIN — DIATRIZOATE MEGLUMINE AND DIATRIZOATE SODIUM 30 ML: 660; 100 LIQUID ORAL; RECTAL at 13:32

## 2018-02-27 RX ADMIN — METHYLPREDNISOLONE SODIUM SUCCINATE 125 MG: 125 INJECTION, POWDER, FOR SOLUTION INTRAMUSCULAR; INTRAVENOUS at 14:00

## 2018-02-27 RX ADMIN — SODIUM CHLORIDE 1000 ML: 900 INJECTION, SOLUTION INTRAVENOUS at 14:46

## 2018-02-27 NOTE — ED NOTES
Pt c/o itching and hives after receiving fentanyl. Fentanyl added as an allergy. Pt denies difficulty swallowing.

## 2018-02-27 NOTE — LETTER
[facility] EMER[facility]ENCY [facility]PT 
[address] [place] [address] [address] [id number] [phone] Work/School Note Date: 2/27/2018 To Whom It May concern: 
 
Wendy Sebastian was seen and treated today in the emergency room by the following provider(s): 
Attending Provider: Adi Buckley MD. Wendy Sebastian may return to work on 03/01/2018. Sincerely, Adi Buckley MD

## 2018-02-27 NOTE — ED NOTES
Pt ambulatory to the ER c/o abdominal pain behind her hernia incision site. Pt states she has had an infection behind her incision site before and this feels similar. Pt c/o redness and pain x 3 days.

## 2018-02-27 NOTE — DISCHARGE INSTRUCTIONS
Abdominal Pain: Care Instructions  Your Care Instructions    Abdominal pain has many possible causes. Some aren't serious and get better on their own in a few days. Others need more testing and treatment. If your pain continues or gets worse, you need to be rechecked and may need more tests to find out what is wrong. You may need surgery to correct the problem. Don't ignore new symptoms, such as fever, nausea and vomiting, urination problems, pain that gets worse, and dizziness. These may be signs of a more serious problem. Your doctor may have recommended a follow-up visit in the next 8 to 12 hours. If you are not getting better, you may need more tests or treatment. The doctor has checked you carefully, but problems can develop later. If you notice any problems or new symptoms, get medical treatment right away. Follow-up care is a key part of your treatment and safety. Be sure to make and go to all appointments, and call your doctor if you are having problems. It's also a good idea to know your test results and keep a list of the medicines you take. How can you care for yourself at home? · Rest until you feel better. · To prevent dehydration, drink plenty of fluids, enough so that your urine is light yellow or clear like water. Choose water and other caffeine-free clear liquids until you feel better. If you have kidney, heart, or liver disease and have to limit fluids, talk with your doctor before you increase the amount of fluids you drink. · If your stomach is upset, eat mild foods, such as rice, dry toast or crackers, bananas, and applesauce. Try eating several small meals instead of two or three large ones. · Wait until 48 hours after all symptoms have gone away before you have spicy foods, alcohol, and drinks that contain caffeine. · Do not eat foods that are high in fat. · Avoid anti-inflammatory medicines such as aspirin, ibuprofen (Advil, Motrin), and naproxen (Aleve).  These can cause stomach upset. Talk to your doctor if you take daily aspirin for another health problem. When should you call for help? Call 911 anytime you think you may need emergency care. For example, call if:  ? · You passed out (lost consciousness). ? · You pass maroon or very bloody stools. ? · You vomit blood or what looks like coffee grounds. ? · You have new, severe belly pain. ?Call your doctor now or seek immediate medical care if:  ? · Your pain gets worse, especially if it becomes focused in one area of your belly. ? · You have a new or higher fever. ? · Your stools are black and look like tar, or they have streaks of blood. ? · You have unexpected vaginal bleeding. ? · You have symptoms of a urinary tract infection. These may include:  ¨ Pain when you urinate. ¨ Urinating more often than usual.  ¨ Blood in your urine. ? · You are dizzy or lightheaded, or you feel like you may faint. ? Watch closely for changes in your health, and be sure to contact your doctor if:  ? · You are not getting better after 1 day (24 hours). Where can you learn more? Go to http://gladysKaraokeSmart.coalia.info/. Enter D519 in the search box to learn more about \"Abdominal Pain: Care Instructions. \"  Current as of: March 20, 2017  Content Version: 11.4  © 3830-4976 [employer]. Care instructions adapted under license by [employer] (which disclaims liability or warranty for this information). If you have questions about a medical condition or this instruction, always ask your healthcare professional. [place] [address] any warranty or liability for your use of this information. Cellulitis: Care Instructions  Your Care Instructions    Cellulitis is a skin infection. It often occurs after a break in the skin from a scrape, cut, bite, or puncture, or after a rash. The doctor has checked you carefully, but problems can develop later.  If you notice any problems or new symptoms, get medical treatment right away. Follow-up care is a key part of your treatment and safety. Be sure to make and go to all appointments, and call your doctor if you are having problems. It's also a good idea to know your test results and keep a list of the medicines you take. How can you care for yourself at home? · Take your antibiotics as directed. Do not stop taking them just because you feel better. You need to take the full course of antibiotics. · Prop up the infected area on pillows to reduce pain and swelling. Try to keep the area above the level of your heart as often as you can. · If your doctor told you how to care for your wound, follow your doctor's instructions. If you did not get instructions, follow this general advice:  ¨ Wash the wound with clean water 2 times a day. Don't use hydrogen peroxide or alcohol, which can slow healing. ¨ You may cover the wound with a thin layer of petroleum jelly, such as Vaseline, and a nonstick bandage. ¨ Apply more petroleum jelly and replace the bandage as needed. · Be safe with medicines. Take pain medicines exactly as directed. ¨ If the doctor gave you a prescription medicine for pain, take it as prescribed. ¨ If you are not taking a prescription pain medicine, ask your doctor if you can take an over-the-counter medicine. To prevent cellulitis in the future  · Try to prevent cuts, scrapes, or other injuries to your skin. Cellulitis most often occurs where there is a break in the skin. · If you get a scrape, cut, mild burn, or bite, wash the wound with clean water as soon as you can to help avoid infection. Don't use hydrogen peroxide or alcohol, which can slow healing. · If you have swelling in your legs (edema), support stockings and good skin care may help prevent leg sores and cellulitis. · Take care of your feet, especially if you have diabetes or other conditions that increase the risk of infection. Wear shoes and socks.  Do not go barefoot. If you have athlete's foot or other skin problems on your feet, talk to your doctor about how to treat them. When should you call for help? Call your doctor now or seek immediate medical care if:  ? · You have signs that your infection is getting worse, such as:  ¨ Increased pain, swelling, warmth, or redness. ¨ Red streaks leading from the area. ¨ Pus draining from the area. ¨ A fever. ? · You get a rash. ? Watch closely for changes in your health, and be sure to contact your doctor if:  ? · You are not getting better after 1 day (24 hours). ? · You do not get better as expected. Where can you learn more? Go to http://gladys-alia.info/. Manoj Merino in the search box to learn more about \"Cellulitis: Care Instructions. \"  Current as of: October 13, 2016  Content Version: 11.4  © 2166-9990 [employer]. Care instructions adapted under license by [employer] (which disclaims liability or warranty for this information). If you have questions about a medical condition or this instruction, always ask your healthcare professional. [place] [address] any warranty or liability for your use of this information. Pinched Nerve in the Neck: Care Instructions  Your Care Instructions  A pinched nerve in the neck happens when a vertebra or disc in the upper part of your spine is damaged. This damage can happen because of an injury. Or it can just happen with age. The changes caused by the damage may put pressure on a nearby nerve root, pinching it. This causes symptoms such as sharp pain in your neck, shoulder, arm, hand, or back. You may also have tingling or numbness. Sometimes it makes your arm weaker. The symptoms are usually worse when you turn your head or strain your neck. For many people, the symptoms get better over time and finally go away. Early treatment usually includes medicines for pain and swelling.  Sometimes physical therapy and special exercises may help. Follow-up care is a key part of your treatment and safety. Be sure to make and go to all appointments, and call your doctor if you are having problems. It's also a good idea to know your test results and keep a list of the medicines you take. How can you care for yourself at home? · Be safe with medicines. Read and follow all instructions on the label. ¨ If the doctor gave you a prescription medicine for pain, take it as prescribed. ¨ If you are not taking a prescription pain medicine, ask your doctor if you can take an over-the-counter medicine. · Try using a heating pad on a low or medium setting for 15 to 20 minutes every 2 or 3 hours. Try a warm shower in place of one session with the heating pad. You can also buy single-use heat wraps that last up to 8 hours. · You can also try an ice pack for 10 to 15 minutes every 2 to 3 hours. There isn't strong evidence that either heat or ice will help. But you can try them to see if they help you. · Don't spend too long in one position. Take short breaks to move around and change positions. · Wear a seat belt and shoulder harness when you are in a car. · Sleep with a pillow under your head and neck that keeps your neck straight. · If you were given a neck brace (cervical collar) to limit neck motion, wear it as instructed for as many days as your doctor tells you to. Do not wear it longer than you were told to. Wearing a brace for too long can lead to neck stiffness and can weaken the neck muscles. · Follow your doctor's instructions for gentle neck-stretching exercises. · Do not smoke. Smoking can slow healing of your discs. If you need help quitting, talk to your doctor about stop-smoking programs and medicines. These can increase your chances of quitting for good. · Avoid strenuous work or exercise until your doctor says it is okay. When should you call for help? Call 911 anytime you think you may need emergency care.  For example, call if:  ? · You are unable to move an arm or a leg at all. ?Call your doctor now or seek immediate medical care if:  ? · You have new or worse symptoms in your arms, legs, chest, belly, or buttocks. Symptoms may include:  ¨ Numbness or tingling. ¨ Weakness. ¨ Pain. ? · You lose bladder or bowel control. ? Watch closely for changes in your health, and be sure to contact your doctor if:  ? · You are not getting better as expected. Where can you learn more? Go to http://gladys-alia.info/. Enter C631 in the search box to learn more about \"Pinched Nerve in the Neck: Care Instructions. \"  Current as of: March 21, 2017  Content Version: 11.4  © 9679-4043 [employer]. Care instructions adapted under license by [employer] (which disclaims liability or warranty for this information). If you have questions about a medical condition or this instruction, always ask your healthcare professional. [place] [address] any warranty or liability for your use of this information. Nonalcoholic Steatohepatitis (ROME): Care Instructions  Your Care Instructions    Nonalcoholic steatohepatitis (ROME) is liver inflammation. It is caused by a buildup of fat in the liver. The fat buildup is not caused by drinking alcohol. Because of the inflammation, the liver does not work as well as it should. ROME is part of a group of liver diseases called nonalcoholic fatty liver disease. In these diseases, fat builds up in the liver and sometimes causes liver damage. This damage can get worse over time. Follow-up care is a key part of your treatment and safety. Be sure to make and go to all appointments, and call your doctor if you are having problems. It's also a good idea to know your test results and keep a list of the medicines you take. How can you care for yourself at home? · Stay at a healthy weight.  Or if you need to, slowly get to a healthy weight. · Control your cholesterol. Talk to your doctor about ways to lower your cholesterol, if needed. You might try getting active, taking medicines, and making healthy changes to your diet. · Eat healthy foods. This includes fruits, vegetables, lean meats and dairy, and whole grains. · If you have diabetes, keep your blood sugar at your target level. · Get at least 30 minutes of exercise on most days of the week. Walking is a good choice. You also may want to do other activities, such as running, swimming, cycling, or playing tennis or team sports. · Limit alcohol, or do not drink. Alcohol can damage the liver and cause health problems. When should you call for help? Call 911 anytime you think you may need emergency care. For example, call if:  ? · You have trouble breathing. ? · You vomit blood or what looks like coffee grounds. ?Call your doctor now or seek immediate medical care if:  ? · You feel very sleepy or confused. ? · You have new or worse belly pain. ? · You have a fever. ? · There is a new or increasing yellow tint to your skin or the whites of your eyes. ? · You have any abnormal bleeding, such as:  ¨ Nosebleeds. ¨ Vaginal bleeding that is different (heavier, more frequent, at a different time of the month) than what you are used to. ¨ Bloody or black stools, or rectal bleeding. ¨ Bloody or pink urine. ? Watch closely for changes in your health, and be sure to contact your doctor if:  ? · Your belly is getting bigger. ? · You are gaining weight. ? · You have any problems. Where can you learn more? Go to http://gladys-alia.info/. Enter A561 in the search box to learn more about \"Nonalcoholic Steatohepatitis (ROME): Care Instructions. \"  Current as of: May 12, 2017  Content Version: 11.4  © 2017-6182 [employer]. Care instructions adapted under license by [employer] (which disclaims liability or warranty for this information). If you have questions about a medical condition or this instruction, always ask your healthcare professional. [address] any warranty or liability for your use of this information. Thank you! Thank you for allowing us to provide you with excellent care today. We hope we addressed all of your concerns and needs. We strive to provide excellent quality care in the Emergency Department. You will receive a survey after your visit to evaluate the care you were provided. Please rate us a level 5 (excellent), as anything less than excellent does not meet our goals. If you feel that you have not received excellent quality care or timely care, please ask to speak to the nurse manager. Please choose us in the future for your continued health care needs. ------------------------------------------------------------------------------------------------------------  The exam and treatment you received in the Emergency Department were for an urgent problem and are not intended as complete care. It is important that you follow up with a doctor, nurse practitioner, or physician assistant for ongoing care. If your symptoms become worse or you do not improve as expected and you are unable to reach your usual health care provider, you should return to the Emergency Department. We are available 24 hours a day. Please take your discharge instructions with you when you go to your follow-up appointment. If you have any problem arranging a follow-up appointment, contact the Emergency Department immediately. If a prescription has been provided, please have it filled as soon as possible to prevent a delay in treatment. Read the entire medication instruction sheet provided to you by the pharmacy.  If you have any questions or reservations about taking the medication due to side effects or interactions with other medications, please call your primary care physician or contact the ER to speak with the charge nurse. Make an appointment with your family doctor or the physician you were referred to for follow-up of this visit as instructed on your discharge paperwork, as this is mandatory follow-up. Return to the ER if you are unable to be seen or if you are unable to be seen in a timely manner. If you have any problem arranging the follow-up visit, contact the Emergency Department immediately.

## 2018-02-27 NOTE — ED PROVIDER NOTES
EMERGENCY DEPARTMENT HISTORY AND PHYSICAL EXAM      Date: 2/27/2018  Patient Name: Lito Shaw    History of Presenting Illness     Chief Complaint   Patient presents with    Post OP Complication     pt reported she had a hernia repair that got infected so she had a second surgery x3 weeks ago. She is now having pain to the area with intermittent fevers and believes she has developed another infection. History Provided By: Patient    HPI: Lito Shaw, 52 y.o. female with PMHx significant for hypertension, diverticulosis, diverticulitis, GERD, frequent UTIs, hysterectomy, CKD, and NIDDM, presents ambulatory to the ED with cc of fever of 101 and redness and tenderness around her umbilical hernia incision site. Patient states she had the surgery 7 weeks ago for her umbilical hernia. She states 3 weeks ago (4 weeks after the surgery) she started to notice fever, nausea, tenderness around her incision site, and green/yellow drainage from the site. Patient states she sought evaluation and had a CT at that time and was diagnosed with an abcess that required surgery. Patient states over the past couple of days, she has had fever as high as 101, increasing redness around the incision site, and shooting 9/10 pain in the incision site. Patient states she now changes her packing herself now and has also noticed some slight yellow/green discharge on the bandages. Patient is additionally complaining of right arm weakness and mild numbness over the past week with some right upper arm pain. She denies any neck or back pain. Patient reports some \"pressure\" with urination, but denies any diarrhea, constipation, chest pain, or shortness of breath. PCP: BEBA Armenta    There are no other complaints, changes, or physical findings at this time.     Current Outpatient Prescriptions   Medication Sig Dispense Refill    meperidine (DEMEROL) 50 mg tablet Take 1 Tab by mouth every four (4) hours as needed for Pain. Max Daily Amount: 300 mg. 20 Tab 0    methocarbamol (ROBAXIN-750) 750 mg tablet Take 1 Tab by mouth four (4) times daily as needed. 20 Tab 0    doxycycline (VIBRA-TABS) 100 mg tablet Take 1 Tab by mouth two (2) times a day for 7 days. 14 Tab 0    dicyclomine (BENTYL) 10 mg capsule Take 1 Cap by mouth three (3) times daily. 90 Cap 0    famotidine (PEPCID) 20 mg tablet Take 1 Tab by mouth two (2) times a day. 60 Tab 0    losartan-hydroCHLOROthiazide (HYZAAR) 100-12.5 mg per tablet Take 1 Tab by mouth daily.  albuterol sulfate (PROVENTIL;VENTOLIN) 2.5 mg/0.5 mL nebu nebulizer solution 2.5 mg by Nebulization route every twelve (12) hours. Patient mixes this agent with acetylcysteine (20%) nebulizer solution for breathing treatment.  acetaminophen (TYLENOL) 500 mg tablet Take 1,000 mg by mouth every six (6) hours as needed for Pain.  diphenhydrAMINE (BENADRYL) 25 mg capsule Take 25 mg by mouth every six (6) hours as needed (congestion).  vancomycin (VANCOCIN) 125 mg capsule Take 125 mg by mouth every three (3) days.  predniSONE (STERAPRED DS) 10 mg dose pack Take 10 mg by mouth See Admin Instructions. See administration instruction per 10mg dose pack      LACTOBACIL 2-S. THERMO-BIFIDO 1 (VSL#3 PO) Take 1 Packet by mouth daily.  cetirizine (ZYRTEC) 10 mg tablet Take 10 mg by mouth nightly as needed for Allergies.  EPINEPHrine (EPIPEN) 0.3 mg/0.3 mL (1:1,000) injection 0.3 mL by IntraMUSCular route once as needed for up to 1 dose. 0.3 mL 1    estradiol (ESTRACE) 1 mg tablet Take 1 mg by mouth daily.  metFORMIN (GLUCOPHAGE) 500 mg tablet Take 500 mg by mouth daily (with breakfast).  albuterol (PROVENTIL, VENTOLIN) 90 mcg/Actuation inhaler Take 2 Puffs by inhalation every six (6) hours as needed.        Past History     Past Medical History:  Past Medical History:   Diagnosis Date    Anal fissure     Anisocoria     Asthma     LAST EPISODE     Back pain  Cerumen impaction     Chronic kidney disease     hx uti in past    Coagulation defects     ocassional rectal bleeding due to anal fissure    Colovaginal fistula     Diabetes (HCC)     NIDDM    Diabetes ([address]     Diverticulitis     Diverticulosis     Enlarged tonsils     Frequent UTI     GERD (gastroesophageal reflux disease)     H/O endoscopy     with dilation    HA (headache)     Hepatic steatosis     Hx of colonoscopy with polypectomy     benign    Hypertension     Ill-defined condition     FREQUENT HIVES    Ill-defined condition     HX ELEVATED LIVER ENZYMES    Morbid obesity (HCC)     Nausea & vomiting     during diverticulitis flare    Obesity     Otitis media     Pneumonia     about 15 yrs ago    Psychiatric disorder     ANXIETY    Recurrent tonsillitis     Sinusitis     Transfusion history ~ age 35    postop hysterectomy    Unspecified sleep apnea     snores ( not diagnosed yet)     Urticaria     Urticaria        Past Surgical History:  Past Surgical History:   Procedure Laterality Date    ABDOMEN SURGERY PROC UNLISTED  2018    [facility] repair at [facility]    [address]    [place].     HX GI  12    LAPAROSCOPIC HAND ASSISTED  POSS OPEN SIGMOID COLECTOMY POSS TEMPORARY DIVERTING LOOP ILEOSTOMY;  (no illeostomy needed)    HX GYN           HX GYN      cervical conization    HX HEENT      SINUS SURGERY LEFT X2    HX HEENT      SINUS SURGERY ON RIGHT X2    HX OTHER SURGICAL      Sphincterotomy    HX PELVIC LAPAROSCOPY      HX EMMANUEL AND BSO      HX UROLOGICAL  12     CYSTOSCOPY INSERTION URETERAL CATHETERS - Cystoscopy Insertion of bilateral ureteral stents       Family History:  Family History   Problem Relation Age of Onset    Diabetes Mother     Cancer Mother      NON-HODGKINS LYMPHOMA    Anesth Problems Mother      PONV    Diabetes Father     Heart Disease Father      CAD - STENTS, PACEMAKER    Arrhythmia Father        Social History:  Social History   Substance Use Topics    Smoking status: Never Smoker    Smokeless tobacco: Never Used    Alcohol use Yes      Comment: Rarely       Allergies: Allergies   Allergen Reactions    Aspirin Shortness of Breath    Prilosec [Omeprazole Magnesium] Anaphylaxis     CHERRY FLAVORED; PT TAKES REGULAR PRILOSEC AND IS OK    Codeine Hives and Itching    Contrast Agent [Iodine] Itching     Pt. Had itching after IV contrast with the last exam.  Benadryl was given    Morphine Itching     Severe itching    Fentanyl Rash    Percocet [Oxycodone-Acetaminophen] Hives     Review of Systems   Review of Systems   Constitutional: Positive for fever. HENT: Negative for congestion. Eyes: Negative for visual disturbance. Respiratory: Negative for shortness of breath. Cardiovascular: Negative for chest pain. Gastrointestinal: Negative for constipation and diarrhea. Endocrine: Negative for heat intolerance. Genitourinary: Negative. Musculoskeletal: Negative for back pain. Positive for right upper arm pain   Skin:        Positive for pain and redness around umbilical hernia incision site   Allergic/Immunologic: Negative for immunocompromised state. Neurological: Positive for weakness (right arm) and numbness (mild, right arm). Hematological: Does not bruise/bleed easily. Psychiatric/Behavioral: Negative. All other systems reviewed and are negative. Physical Exam   Physical Exam   Constitutional: She is oriented to person, place, and time. She appears well-developed and well-nourished. No distress. Elevated BMI   HENT:   Head: Normocephalic and atraumatic. Eyes: EOM are normal. Pupils are equal, round, and reactive to light. Neck: Normal range of motion. Neck supple. Cardiovascular: Normal rate, regular rhythm and normal heart sounds. Pulmonary/Chest: Effort normal and breath sounds normal. No respiratory distress. Abdominal: Soft.  Bowel sounds are normal. She exhibits no mass. There is tenderness in the right upper quadrant and left upper quadrant. Musculoskeletal: Normal range of motion. She exhibits no edema. Tenderness to right upper arm   Neurological: She is alert and oriented to person, place, and time. Coordination normal.   Right hand with slightly decreased  strength, Decreased sensation right upper extremity   Skin: Skin is warm and dry. Umbilical hernia site clean and dry, tender to palpation with erythema around the wound   Psychiatric: She has a normal mood and affect. Her behavior is normal.   Nursing note and vitals reviewed. Diagnostic Study Results     Labs -     Recent Results (from the past 12 hour(s))   CBC WITH AUTOMATED DIFF    Collection Time: 02/27/18  1:25 PM   Result Value Ref Range    WBC 5.3 3.6 - 11.0 K/uL    RBC 4.26 3.80 - 5.20 M/uL    HGB 12.7 11.5 - 16.0 g/dL    HCT 35.2 35.0 - 47.0 %    MCV 82.6 80.0 - 99.0 FL    MCH 29.8 26.0 - 34.0 PG    MCHC 36.1 30.0 - 36.5 g/dL    RDW 13.1 11.5 - 14.5 %    PLATELET 075 714 - 545 K/uL    MPV 9.5 8.9 - 12.9 FL    NRBC 0.0 0  WBC    ABSOLUTE NRBC 0.00 0.00 - 0.01 K/uL    NEUTROPHILS 59 32 - 75 %    LYMPHOCYTES 30 12 - 49 %    MONOCYTES 5 5 - 13 %    EOSINOPHILS 5 0 - 7 %    BASOPHILS 1 0 - 1 %    IMMATURE GRANULOCYTES 0 0.0 - 0.5 %    ABS. NEUTROPHILS 3.1 1.8 - 8.0 K/UL    ABS. LYMPHOCYTES 1.6 0.8 - 3.5 K/UL    ABS. MONOCYTES 0.3 0.0 - 1.0 K/UL    ABS. EOSINOPHILS 0.3 0.0 - 0.4 K/UL    ABS. BASOPHILS 0.0 0.0 - 0.1 K/UL    ABS. IMM.  GRANS. 0.0 0.00 - 0.04 K/UL    DF AUTOMATED     PROTHROMBIN TIME + INR    Collection Time: 02/27/18  1:25 PM   Result Value Ref Range    INR 1.0 0.9 - 1.1      Prothrombin time 10.2 9.0 - 99.1 sec   METABOLIC PANEL, COMPREHENSIVE    Collection Time: 02/27/18  1:25 PM   Result Value Ref Range    Sodium 138 136 - 145 mmol/L    Potassium 3.5 3.5 - 5.1 mmol/L    Chloride 102 97 - 108 mmol/L    CO2 28 21 - 32 mmol/L    Anion gap 8 5 - 15 mmol/L    Glucose 182 (H) 65 - 100 mg/dL    BUN 7 6 - 20 MG/DL    Creatinine 0.68 0.55 - 1.02 MG/DL    BUN/Creatinine ratio 10 (L) 12 - 20      GFR est AA >60 >60 ml/min/1.73m2    GFR est non-AA >60 >60 ml/min/1.73m2    Calcium 9.1 8.5 - 10.1 MG/DL    Bilirubin, total 0.6 0.2 - 1.0 MG/DL    ALT (SGPT) 40 12 - 78 U/L    AST (SGOT) 36 15 - 37 U/L    Alk. phosphatase 56 45 - 117 U/L    Protein, total 7.3 6.4 - 8.2 g/dL    Albumin 3.7 3.5 - 5.0 g/dL    Globulin 3.6 2.0 - 4.0 g/dL    A-G Ratio 1.0 (L) 1.1 - 2.2     LIPASE    Collection Time: 02/27/18  1:25 PM   Result Value Ref Range    Lipase 183 73 - 393 U/L   LACTIC ACID    Collection Time: 02/27/18  1:25 PM   Result Value Ref Range    Lactic acid 2.6 (HH) 0.4 - 2.0 MMOL/L   URINALYSIS W/ RFLX MICROSCOPIC    Collection Time: 02/27/18  2:50 PM   Result Value Ref Range    Color EBONY      Appearance CLOUDY (A) CLEAR      Specific gravity 1.009 1.003 - 1.030      pH (UA) 6.0 5.0 - 8.0      Protein NEGATIVE  NEG mg/dL    Glucose NEGATIVE  NEG mg/dL    Ketone NEGATIVE  NEG mg/dL    Bilirubin NEGATIVE  NEG      Blood NEGATIVE  NEG      Urobilinogen 1.0 0.2 - 1.0 EU/dL    Nitrites POSITIVE (A) NEG      Leukocyte Esterase NEGATIVE  NEG      WBC 0-4 0 - 4 /hpf    RBC 0-5 0 - 5 /hpf    Epithelial cells MODERATE (A) FEW /lpf    Bacteria NEGATIVE  NEG /hpf    Hyaline cast 0-2 0 - 5 /lpf       Radiologic Studies -   CT ABD PELV WO CONT   Final Result        CT Results  (Last 48 hours)               02/27/18 1423  CT ABD PELV WO CONT Final result    Impression:  IMPRESSION:   Decreased periumbilical subcutaneous fluid collection. No acute intra-abdominal   abnormality. Hepatic steatosis. Status post cholecystectomy and hysterectomy. Of technical note, this the patient's 20th abdomen CT in the [facility].            Narrative:  EXAM:  CT ABD PELV WO CONT       INDICATION: Recent hernia repair and infection, now with abdominal pain and   intermittent fevers       COMPARISON: 1/28/2018 CONTRAST:  None. TECHNIQUE:    Thin axial images were obtained through the abdomen and pelvis. Coronal and   sagittal reconstructions were generated. Oral contrast was administered. CT dose   reduction was achieved through use of a standardized protocol tailored for this   examination and automatic exposure control for dose modulation. The absence of intravenous contrast material reduces the sensitivity for   evaluation of the solid parenchymal organs of the abdomen. FINDINGS:    LUNG BASES: Clear. INCIDENTALLY IMAGED HEART AND MEDIASTINUM: Unremarkable. LIVER: Hepatic steatosis is noted. GALLBLADDER: Surgically absent. SPLEEN: No mass. PANCREAS: No mass or ductal dilatation. ADRENALS: Unremarkable. KIDNEYS/URETERS: No mass, calculus, or hydronephrosis. STOMACH: Unremarkable. SMALL BOWEL: No dilatation or wall thickening. COLON: No dilatation or wall thickening. APPENDIX: Unremarkable. PERITONEUM: No ascites or pneumoperitoneum. RETROPERITONEUM: No lymphadenopathy or aortic aneurysm. REPRODUCTIVE ORGANS: The uterus is surgically absent. URINARY BLADDER: No mass or calculus. BONES: No destructive bone lesion. ADDITIONAL COMMENTS: The periumbilical subcutaneous fluid collection has   decreased in size, now measuring 11 mm. Medical Decision Making   I am the first provider for this patient. I reviewed the vital signs, available nursing notes, past medical history, past surgical history, family history and social history. Vital Signs-Reviewed the patient's vital signs.   Patient Vitals for the past 12 hrs:   Temp Pulse Resp BP SpO2   02/27/18 1430 - - - (!) 151/95 96 %   02/27/18 1400 - - - 152/87 98 %   02/27/18 1344 - 78 16 163/77 -   02/27/18 1343 - 72 16 165/74 -   02/27/18 1342 - 71 16 160/75 -   02/27/18 1334 - 72 16 135/86 98 %   02/27/18 1330 - - - 135/86 96 %   02/27/18 1230 - - - 150/82 96 %   02/27/18 1202 98.9 °F (37.2 °C) 74 16 (!) 162/91 99 %     Records Reviewed: Nursing Notes and Old Medical Records    Provider Notes (Medical Decision Making):   DDx: Cellulitis, Infected wound, UTI, Diverticulitis, Radiculopathy, Musculoskeletal pain    CRITICAL CARE NOTE :    5:03 PM  IMPENDING DETERIORATION -Metabolic and Renal  ASSOCIATED RISK FACTORS - Hypotension, Metabolic changes and Dehydration  MANAGEMENT- Bedside Assessment and Supervision of Care  INTERPRETATION -  CT Scan and Blood Pressure  INTERVENTIONS - hemodynamic mngmt and Metobolic interventions  CASE REVIEW - Nursing=  TREATMENT RESPONSE -Improved  PERFORMED BY - Self    NOTES   :  I have spent 35 minutes of critical care time involved in lab review, consultations with specialist, family decision- making, bedside attention and documentation. During this entire length of time I was immediately available to the patient . Matias Vazquez MD     ED Course:   Initial assessment performed. The patients presenting problems have been discussed, and they are in agreement with the care plan formulated and outlined with them. I have encouraged them to ask questions as they arise throughout their visit. Progress Note:  3:30 PM  Re-evaluated patient. Patient is still having pain. Will attempt to give her Demerol. Disposition:  Discharge Note:  5:02 PM  The pt is ready for discharge. The pt's signs, symptoms, diagnosis, and discharge instructions have been discussed and pt has conveyed their understanding. The pt is to follow up as recommended or return to ER should their symptoms worsen. Plan has been discussed and pt is in agreement. PLAN:  1. Discharge  Current Discharge Medication List      START taking these medications    Details   meperidine (DEMEROL) 50 mg tablet Take 1 Tab by mouth every four (4) hours as needed for Pain. Max Daily Amount: 300 mg. Qty: 20 Tab, Refills: 0    Associated Diagnoses: Abdominal pain, right upper quadrant;  Abdominal pain, LUQ (left upper quadrant) methocarbamol (ROBAXIN-750) 750 mg tablet Take 1 Tab by mouth four (4) times daily as needed. Qty: 20 Tab, Refills: 0      doxycycline (VIBRA-TABS) 100 mg tablet Take 1 Tab by mouth two (2) times a day for 7 days. Qty: 14 Tab, Refills: 0           2. Follow-up Information     Follow up With Details Comments [address] Aparicio In 2 days As needed [place] [id number]      see your Surgeon  As needed     [facility] EMERGENCY DEPT  If symptoms worsen [address] [place] [address]  [phone]        Return to ED if worse     Diagnosis     Clinical Impression:   1. Abdominal pain, right upper quadrant    2. Abdominal pain, LUQ (left upper quadrant)    3. Cellulitis, abdominal wall    4. Acute cervical radiculopathy    5. Hepatic steatosis    6. Lactic acidosis        Attestations: This note is prepared by Ana Rosa Gupta, acting as Scribe for MD Matias Jolly MD: The scribe's documentation has been prepared under my direction and personally reviewed by me in its entirety. I confirm that the note above accurately reflects all work, treatment, procedures, and medical decision making performed by me.

## 2018-03-05 LAB
BACTERIA SPEC CULT: NORMAL
BACTERIA SPEC CULT: NORMAL
SERVICE CMNT-IMP: NORMAL
SERVICE CMNT-IMP: NORMAL

## 2018-03-13 ENCOUNTER — HOSPITAL ENCOUNTER (EMERGENCY)
Age: 48
Discharge: HOME | End: 2018-03-13
Attending: EMERGENCY MEDICINE
Payer: COMMERCIAL

## 2018-03-13 ENCOUNTER — APPOINTMENT (OUTPATIENT)
Dept: CT IMAGING | Age: 48
End: 2018-03-13
Attending: EMERGENCY MEDICINE
Payer: COMMERCIAL

## 2018-03-13 VITALS
WEIGHT: 200 LBS | HEART RATE: 64 BPM | DIASTOLIC BLOOD PRESSURE: 108 MMHG | HEIGHT: 62 IN | SYSTOLIC BLOOD PRESSURE: 149 MMHG | TEMPERATURE: 97.4 F | OXYGEN SATURATION: 94 % | RESPIRATION RATE: 18 BRPM | BODY MASS INDEX: 36.8 KG/M2

## 2018-03-13 DIAGNOSIS — R11.2 NON-INTRACTABLE VOMITING WITH NAUSEA, UNSPECIFIED VOMITING TYPE: ICD-10-CM

## 2018-03-13 DIAGNOSIS — R10.84 ABDOMINAL PAIN, GENERALIZED: Primary | ICD-10-CM

## 2018-03-13 LAB
ALBUMIN SERPL-MCNC: 3.4 G/DL (ref 3.5–5)
ALBUMIN/GLOB SERPL: 1 {RATIO} (ref 1.1–2.2)
ALP SERPL-CCNC: 61 U/L (ref 45–117)
ALT SERPL-CCNC: 39 U/L (ref 12–78)
ANION GAP SERPL CALC-SCNC: 11 MMOL/L (ref 5–15)
AST SERPL-CCNC: 33 U/L (ref 15–37)
BASOPHILS # BLD: 0 K/UL (ref 0–0.1)
BASOPHILS NFR BLD: 0 % (ref 0–1)
BILIRUB SERPL-MCNC: 0.4 MG/DL (ref 0.2–1)
BUN SERPL-MCNC: 7 MG/DL (ref 6–20)
BUN/CREAT SERPL: 11 (ref 12–20)
CALCIUM SERPL-MCNC: 8.9 MG/DL (ref 8.5–10.1)
CHLORIDE SERPL-SCNC: 103 MMOL/L (ref 97–108)
CO2 SERPL-SCNC: 24 MMOL/L (ref 21–32)
CREAT SERPL-MCNC: 0.66 MG/DL (ref 0.55–1.02)
DIFFERENTIAL METHOD BLD: ABNORMAL
EOSINOPHIL # BLD: 0.2 K/UL (ref 0–0.4)
EOSINOPHIL NFR BLD: 4 % (ref 0–7)
ERYTHROCYTE [DISTWIDTH] IN BLOOD BY AUTOMATED COUNT: 12.8 % (ref 11.5–14.5)
GLOBULIN SER CALC-MCNC: 3.4 G/DL (ref 2–4)
GLUCOSE SERPL-MCNC: 207 MG/DL (ref 65–100)
HCT VFR BLD AUTO: 33.7 % (ref 35–47)
HGB BLD-MCNC: 12.4 G/DL (ref 11.5–16)
IMM GRANULOCYTES # BLD: 0 K/UL (ref 0–0.04)
IMM GRANULOCYTES NFR BLD AUTO: 1 % (ref 0–0.5)
LACTATE SERPL-SCNC: 1.9 MMOL/L (ref 0.4–2)
LACTATE SERPL-SCNC: 2.4 MMOL/L (ref 0.4–2)
LIPASE SERPL-CCNC: 239 U/L (ref 73–393)
LYMPHOCYTES # BLD: 1.6 K/UL (ref 0.8–3.5)
LYMPHOCYTES NFR BLD: 32 % (ref 12–49)
MAGNESIUM SERPL-MCNC: 1.8 MG/DL (ref 1.6–2.4)
MCH RBC QN AUTO: 30.6 PG (ref 26–34)
MCHC RBC AUTO-ENTMCNC: 36.8 G/DL (ref 30–36.5)
MCV RBC AUTO: 83.2 FL (ref 80–99)
MONOCYTES # BLD: 0.3 K/UL (ref 0–1)
MONOCYTES NFR BLD: 5 % (ref 5–13)
NEUTS SEG # BLD: 2.8 K/UL (ref 1.8–8)
NEUTS SEG NFR BLD: 57 % (ref 32–75)
NRBC # BLD: 0 K/UL (ref 0–0.01)
NRBC BLD-RTO: 0 PER 100 WBC
PLATELET # BLD AUTO: 171 K/UL (ref 150–400)
PMV BLD AUTO: 9.5 FL (ref 8.9–12.9)
POTASSIUM SERPL-SCNC: 3.4 MMOL/L (ref 3.5–5.1)
PROT SERPL-MCNC: 6.8 G/DL (ref 6.4–8.2)
RBC # BLD AUTO: 4.05 M/UL (ref 3.8–5.2)
SODIUM SERPL-SCNC: 138 MMOL/L (ref 136–145)
WBC # BLD AUTO: 5 K/UL (ref 3.6–11)

## 2018-03-13 PROCEDURE — 74176 CT ABD & PELVIS W/O CONTRAST: CPT

## 2018-03-13 PROCEDURE — 74011250636 HC RX REV CODE- 250/636: Performed by: EMERGENCY MEDICINE

## 2018-03-13 PROCEDURE — 99283 EMERGENCY DEPT VISIT LOW MDM: CPT

## 2018-03-13 PROCEDURE — 85025 COMPLETE CBC W/AUTO DIFF WBC: CPT | Performed by: EMERGENCY MEDICINE

## 2018-03-13 PROCEDURE — 83735 ASSAY OF MAGNESIUM: CPT | Performed by: EMERGENCY MEDICINE

## 2018-03-13 PROCEDURE — 96375 TX/PRO/DX INJ NEW DRUG ADDON: CPT

## 2018-03-13 PROCEDURE — 83605 ASSAY OF LACTIC ACID: CPT | Performed by: EMERGENCY MEDICINE

## 2018-03-13 PROCEDURE — 80053 COMPREHEN METABOLIC PANEL: CPT | Performed by: EMERGENCY MEDICINE

## 2018-03-13 PROCEDURE — 96376 TX/PRO/DX INJ SAME DRUG ADON: CPT

## 2018-03-13 PROCEDURE — 36415 COLL VENOUS BLD VENIPUNCTURE: CPT | Performed by: EMERGENCY MEDICINE

## 2018-03-13 PROCEDURE — 87040 BLOOD CULTURE FOR BACTERIA: CPT | Performed by: EMERGENCY MEDICINE

## 2018-03-13 PROCEDURE — 96361 HYDRATE IV INFUSION ADD-ON: CPT

## 2018-03-13 PROCEDURE — 96374 THER/PROPH/DIAG INJ IV PUSH: CPT

## 2018-03-13 PROCEDURE — 83690 ASSAY OF LIPASE: CPT | Performed by: EMERGENCY MEDICINE

## 2018-03-13 RX ORDER — ONDANSETRON 2 MG/ML
4 INJECTION INTRAMUSCULAR; INTRAVENOUS
Status: COMPLETED | OUTPATIENT
Start: 2018-03-13 | End: 2018-03-13

## 2018-03-13 RX ORDER — KETOROLAC TROMETHAMINE 30 MG/ML
15 INJECTION, SOLUTION INTRAMUSCULAR; INTRAVENOUS
Status: COMPLETED | OUTPATIENT
Start: 2018-03-13 | End: 2018-03-13

## 2018-03-13 RX ORDER — ACETAMINOPHEN 500 MG
1000 TABLET ORAL ONCE
Status: DISCONTINUED | OUTPATIENT
Start: 2018-03-13 | End: 2018-03-13 | Stop reason: HOSPADM

## 2018-03-13 RX ORDER — HYDROMORPHONE HYDROCHLORIDE 1 MG/ML
0.5 INJECTION, SOLUTION INTRAMUSCULAR; INTRAVENOUS; SUBCUTANEOUS
Status: COMPLETED | OUTPATIENT
Start: 2018-03-13 | End: 2018-03-13

## 2018-03-13 RX ORDER — ONDANSETRON 4 MG/1
4 TABLET, ORALLY DISINTEGRATING ORAL
Qty: 10 TAB | Refills: 0 | Status: SHIPPED | OUTPATIENT
Start: 2018-03-13 | End: 2018-05-05

## 2018-03-13 RX ADMIN — ONDANSETRON 4 MG: 2 INJECTION INTRAMUSCULAR; INTRAVENOUS at 07:32

## 2018-03-13 RX ADMIN — ONDANSETRON 4 MG: 2 INJECTION INTRAMUSCULAR; INTRAVENOUS at 09:55

## 2018-03-13 RX ADMIN — HYDROMORPHONE HYDROCHLORIDE 0.5 MG: 1 INJECTION, SOLUTION INTRAMUSCULAR; INTRAVENOUS; SUBCUTANEOUS at 07:32

## 2018-03-13 RX ADMIN — SODIUM CHLORIDE 1000 ML: 900 INJECTION, SOLUTION INTRAVENOUS at 09:55

## 2018-03-13 RX ADMIN — SODIUM CHLORIDE 1000 ML: 900 INJECTION, SOLUTION INTRAVENOUS at 07:33

## 2018-03-13 RX ADMIN — KETOROLAC TROMETHAMINE 15 MG: 30 INJECTION, SOLUTION INTRAMUSCULAR at 09:55

## 2018-03-13 NOTE — ED NOTES
Pt resting in bed, on monitor, IVs est and blood work sent to lab, provider at bedside for eval, NADN, VSS, WCTM.

## 2018-03-13 NOTE — DISCHARGE INSTRUCTIONS
Abdominal Pain: Care Instructions  Your Care Instructions    Abdominal pain has many possible causes. Some aren't serious and get better on their own in a few days. Others need more testing and treatment. If your pain continues or gets worse, you need to be rechecked and may need more tests to find out what is wrong. You may need surgery to correct the problem. Don't ignore new symptoms, such as fever, nausea and vomiting, urination problems, pain that gets worse, and dizziness. These may be signs of a more serious problem. Your doctor may have recommended a follow-up visit in the next 8 to 12 hours. If you are not getting better, you may need more tests or treatment. The doctor has checked you carefully, but problems can develop later. If you notice any problems or new symptoms, get medical treatment right away. Follow-up care is a key part of your treatment and safety. Be sure to make and go to all appointments, and call your doctor if you are having problems. It's also a good idea to know your test results and keep a list of the medicines you take. How can you care for yourself at home? · Rest until you feel better. · To prevent dehydration, drink plenty of fluids, enough so that your urine is light yellow or clear like water. Choose water and other caffeine-free clear liquids until you feel better. If you have kidney, heart, or liver disease and have to limit fluids, talk with your doctor before you increase the amount of fluids you drink. · If your stomach is upset, eat mild foods, such as rice, dry toast or crackers, bananas, and applesauce. Try eating several small meals instead of two or three large ones. · Wait until 48 hours after all symptoms have gone away before you have spicy foods, alcohol, and drinks that contain caffeine. · Do not eat foods that are high in fat. · Avoid anti-inflammatory medicines such as aspirin, ibuprofen (Advil, Motrin), and naproxen (Aleve).  These can cause stomach upset. Talk to your doctor if you take daily aspirin for another health problem. When should you call for help? Call 911 anytime you think you may need emergency care. For example, call if:  ? · You passed out (lost consciousness). ? · You pass maroon or very bloody stools. ? · You vomit blood or what looks like coffee grounds. ? · You have new, severe belly pain. ?Call your doctor now or seek immediate medical care if:  ? · Your pain gets worse, especially if it becomes focused in one area of your belly. ? · You have a new or higher fever. ? · Your stools are black and look like tar, or they have streaks of blood. ? · You have unexpected vaginal bleeding. ? · You have symptoms of a urinary tract infection. These may include:  ¨ Pain when you urinate. ¨ Urinating more often than usual.  ¨ Blood in your urine. ? · You are dizzy or lightheaded, or you feel like you may faint. ? Watch closely for changes in your health, and be sure to contact your doctor if:  ? · You are not getting better after 1 day (24 hours). Where can you learn more? Go to http://gl[employer]ys[employer] Eventsalia.info/. Enter Z207 in the search box to learn more about \"Abdominal Pain: Care Instructions. \"  Current as of: March 20, 2017  Content Version: 11.4  © 2022-5323 [employer]. Care instructions adapted under license by [employer] (which disclaims liability or warranty for this information). If you have questions about a medical condition or this instruction, always ask your healthcare professional. [place] [address] any warranty or liability for your use of this information. Nausea and Vomiting: Care Instructions  Your Care Instructions    When you are nauseated, you may feel weak and sweaty and notice a lot of saliva in your mouth. Nausea often leads to vomiting.  Most of the time you do not need to worry about nausea and vomiting, but they can be signs of other illnesses. Two common causes of nausea and vomiting are stomach flu and food poisoning. Nausea and vomiting from viral stomach flu will usually start to improve within 24 hours. Nausea and vomiting from food poisoning may last from 12 to 48 hours. The doctor has checked you carefully, but problems can develop later. If you notice any problems or new symptoms, get medical treatment right away. Follow-up care is a key part of your treatment and safety. Be sure to make and go to all appointments, and call your doctor if you are having problems. It's also a good idea to know your test results and keep a list of the medicines you take. How can you care for yourself at home? · To prevent dehydration, drink plenty of fluids, enough so that your urine is light yellow or clear like water. Choose water and other caffeine-free clear liquids until you feel better. If you have kidney, heart, or liver disease and have to limit fluids, talk with your doctor before you increase the amount of fluids you drink. · Rest in bed until you feel better. · When you are able to eat, try clear soups, mild foods, and liquids until all symptoms are gone for 12 to 48 hours. Other good choices include dry toast, crackers, cooked cereal, and gelatin dessert, such as Jell-O. When should you call for help? Call 911 anytime you think you may need emergency care. For example, call if:  ? · You passed out (lost consciousness). ?Call your doctor now or seek immediate medical care if:  ? · You have symptoms of dehydration, such as:  ¨ Dry eyes and a dry mouth. ¨ Passing only a little dark urine. ¨ Feeling thirstier than usual.   ? · You have new or worsening belly pain. ? · You have a new or higher fever. ? · You vomit blood or what looks like coffee grounds. ? Watch closely for changes in your health, and be sure to contact your doctor if:  ? · You have ongoing nausea and vomiting. ? · Your vomiting is getting worse.    ? · Your vomiting lasts longer than 2 days. ? · You are not getting better as expected. Where can you learn more? Go to http://gladys-alia.info/. Enter 25 [id number] in the search box to learn more about \"Nausea and Vomiting: Care Instructions. \"  Current as of: March 20, 2017  Content Version: 11.4  © 9539-4082 [employer]. Care instructions adapted under license by [employer] (which disclaims liability or warranty for this information). If you have questions about a medical condition or this instruction, always ask your healthcare professional. [place] [address] any warranty or liability for your use of this information.

## 2018-03-13 NOTE — LETTER
NOTIFICATION RETURN TO WORK / SCHOOL 
 
3/13/2018 12:00 PM 
 
Ms. [place] [address] [facility] [place] [id number] To Whom It May Concern: 
 
Frank Brandon is currently under the care of [facility]. She will return to work/school on: 3/14/2018 If there are questions or concerns please have the patient contact our office. Sincerely, 
 
 
 
Dr. Tamela Mcmanus

## 2018-03-13 NOTE — ED TRIAGE NOTES
Patient comes in from home. She states she has had nausea, vomiting and abdominal pain for the past two days.  Of note she had an umbilical hernia that MD stated they weren't sure what it was so he placed a needle in and kayden back pus.     :Hx: colon resection from diverticulitis

## 2018-03-13 NOTE — ED PROVIDER NOTES
HPI Comments: Ms. Hillary Paul is a 40-year-old female with extensive past medical history including hypertension, diverticulosis, status post colectomy, and obesity, GERD, CKD, anxiety, asthma, diabetes, hepatic steatosis, colovaginal fistula, chronically poor healing umbilical hernia, presenting with complaints of abdominal pain, nausea, vomiting, and bowel movements alternating between firm and liquid stool. The patient endorses that nausea, began 2 days ago, vomiting approximately every 2 hours, she states she's been taking loperamide for liquid stool, with moderate response. She states she continues to pack her chronically healing umbilical hernia, denies fevers, chills, chest pain or shortness of breath. Patient denies hematemesis, or blood in her stool. She states abdominal pain is generalized, nonradiating. Patient is a 52 y.o. female presenting with nausea and vomiting. The history is provided by the patient. No  was used. Nausea    This is a new problem. The current episode started more than 2 days ago. The problem occurs more than 10 times per day. The problem has not changed since onset. The emesis has an appearance of stomach contents. There has been no fever. Associated symptoms include abdominal pain and diarrhea. Pertinent negatives include no chills, no fever, no sweats, no headaches, no arthralgias, no myalgias, no cough, no URI and no headaches. The patient is not pregnant. Risk factors include recent antibiotics. Her past medical history is significant for bowel resection, recent abdominal surgery and DM. Her pertinent negatives include no irritable bowel syndrome, no inflammatory bowel disease, no short gut syndrome and no gastric bypass. Vomiting    This is a new problem. The current episode started more than 2 days ago. The problem occurs more than 10 times per day. The problem has not changed since onset. The emesis has an appearance of stomach contents.  There has been no fever. Associated symptoms include abdominal pain and diarrhea. Pertinent negatives include no chills, no fever, no sweats, no headaches, no arthralgias, no myalgias, no cough, no URI and no headaches. The patient is not pregnant. Her past medical history is significant for bowel resection, recent abdominal surgery and DM. Her pertinent negatives include no irritable bowel syndrome, no inflammatory bowel disease, no short gut syndrome and no gastric bypass. Past Medical History:   Diagnosis Date    Anal fissure     Anisocoria     Asthma     LAST EPISODE     Back pain     Cerumen impaction     Chronic kidney disease     hx uti in past    Coagulation defects     ocassional rectal bleeding due to anal fissure    Colovaginal fistula     Diabetes (HCC)     NIDDM    Diabetes ([facility] [address]     Diverticulitis     Diverticulosis     Enlarged tonsils     Frequent UTI     GERD (gastroesophageal reflux disease)     H/O endoscopy     with dilation    HA (headache)     Hepatic steatosis     Hx of colonoscopy with polypectomy     benign    Hypertension     Ill-defined condition     FREQUENT HIVES    Ill-defined condition     HX ELEVATED LIVER ENZYMES    Morbid obesity (HCC)     Nausea & vomiting     during diverticulitis flare    Obesity     Otitis media     Pneumonia     about 15 yrs ago    Psychiatric disorder     ANXIETY    Recurrent tonsillitis     Sinusitis     Transfusion history ~ age 35    postop hysterectomy    Unspecified sleep apnea     snores ( not diagnosed yet)     Urticaria     Urticaria        Past Surgical History:   Procedure Laterality Date    ABDOMEN SURGERY PROC UNLISTED  2018    hernia repair at [facility]    [address]    [place].     HX GI  12    LAPAROSCOPIC HAND ASSISTED  POSS OPEN SIGMOID COLECTOMY POSS TEMPORARY DIVERTING LOOP ILEOSTOMY;  (no illeostomy needed)    HX GYN           HX GYN      cervical conization    HX HEENT      SINUS SURGERY LEFT X2    HX HEENT      SINUS SURGERY ON RIGHT X2    HX OTHER SURGICAL  11/12    Sphincterotomy    HX PELVIC LAPAROSCOPY      HX EMMANUEL AND BSO      HX UROLOGICAL  7/31/12     CYSTOSCOPY INSERTION URETERAL CATHETERS - Cystoscopy Insertion of bilateral ureteral stents         Family History:   Problem Relation Age of Onset    Diabetes Mother     Cancer Mother      NON-HODGKINS LYMPHOMA    Anesth Problems Mother      PONV    Diabetes Father     Heart Disease Father      CAD - STENTS, PACEMAKER    Arrhythmia Father        Social History     Social History    Marital status:      Spouse name: N/A    Number of children: N/A    Years of education: N/A     Occupational History    Not on file. Social History Main Topics    Smoking status: Never Smoker    Smokeless tobacco: Never Used    Alcohol use Yes      Comment: Rarely    Drug use: No    Sexual activity: No     Other Topics Concern    Not on file     Social History Narrative         ALLERGIES: Aspirin; Prilosec [omeprazole magnesium]; Codeine; Contrast agent [iodine]; Morphine; Fentanyl; and Percocet [oxycodone-acetaminophen]    Review of Systems   Constitutional: Negative for activity change, chills and fever. HENT: Negative for nosebleeds, sore throat, trouble swallowing and voice change. Eyes: Negative for visual disturbance. Respiratory: Negative for cough, chest tightness and shortness of breath. Cardiovascular: Negative for chest pain and palpitations. Gastrointestinal: Positive for abdominal pain, diarrhea, nausea and vomiting. Negative for abdominal distention, anal bleeding, blood in stool, constipation and rectal pain. Genitourinary: Negative for difficulty urinating, dysuria, hematuria, urgency and vaginal discharge. Musculoskeletal: Negative for arthralgias, back pain, myalgias, neck pain and neck stiffness. Skin: Negative for color change. Allergic/Immunologic: Negative for immunocompromised state. Neurological: Negative for dizziness, seizures, syncope, weakness, light-headedness, numbness and headaches. Psychiatric/Behavioral: Negative for behavioral problems, confusion, hallucinations, self-injury and suicidal ideas. Vitals:    03/13/18 0721   BP: 151/87   Pulse: 65   Resp: 18   Temp: 97.4 °F (36.3 °C)   SpO2: 98%   Weight: 90.7 kg (200 lb)   Height: 5' 2\" (1.575 m)            Physical Exam   Constitutional: She is oriented to person, place, and time. She appears well-developed and well-nourished. No distress. HENT:   Head: Normocephalic and atraumatic. Eyes: Pupils are equal, round, and reactive to light. Neck: Normal range of motion. Neck supple. Cardiovascular: Normal rate, regular rhythm and normal heart sounds. Exam reveals no gallop and no friction rub. No murmur heard. Pulmonary/Chest: Effort normal and breath sounds normal. No respiratory distress. She has no wheezes. Abdominal: Soft. Normal appearance and bowel sounds are normal. She exhibits no distension. There is generalized tenderness. There is no rebound and no guarding. Musculoskeletal: Normal range of motion. Neurological: She is alert and oriented to person, place, and time. Skin: Skin is warm. No rash noted. She is not diaphoretic. Psychiatric: She has a normal mood and affect. Her behavior is normal. Judgment and thought content normal.   Nursing note and vitals reviewed. [facility]      ED Course     This is a 59-year-old female with past medical history, review of systems, physical exam as above, presenting with complaints of abdominal pain, nausea, vomiting, diarrhea, this history of diverticulosis, diverticulitis, status post colectomy, with chronically healing umbilical hernia scar. Physical exam remarkable for a middle-aged female in no acute distress, noted be afebrile, without tachycardia.  She has generalized abdominal tenderness, without rebound or guarding, periumbilical hernia, packed with fresh gauze, no surrounding erythema, or significant discharge. Differential includes gastroenteritis, colitis, C. difficile, UTI, worsening abscess formation. Plan to provide pain control, antiemetics, fluid bolus, obtain CMP, CBC, UA, lipase, lactic acid and a CT scan of the abdomen. We will make a disposition based on the patient's diagnostics and response to therapy. Procedures      9:09 AM  Patient with elevated lactate, otherwise unremarkable imaging and lab work, continues to complain of abdominal pain. Waiting BM, will provide APAP and reassess. 11:37 AM  Repeated lactate wnl, patient w/u additional vomiting or diarrhea. Will Dc home with PCP follow up and return precautions.

## 2018-03-18 LAB
BACTERIA SPEC CULT: NORMAL
SERVICE CMNT-IMP: NORMAL

## 2018-03-26 ENCOUNTER — HOSPITAL ENCOUNTER (EMERGENCY)
Age: 48
Discharge: HOME | End: 2018-03-26
Attending: EMERGENCY MEDICINE
Payer: COMMERCIAL

## 2018-03-26 ENCOUNTER — APPOINTMENT (OUTPATIENT)
Dept: CT IMAGING | Age: 48
End: 2018-03-26
Attending: EMERGENCY MEDICINE
Payer: COMMERCIAL

## 2018-03-26 VITALS
HEIGHT: 62 IN | RESPIRATION RATE: 12 BRPM | OXYGEN SATURATION: 100 % | DIASTOLIC BLOOD PRESSURE: 77 MMHG | WEIGHT: 210.32 LBS | BODY MASS INDEX: 38.7 KG/M2 | TEMPERATURE: 97.8 F | HEART RATE: 69 BPM | SYSTOLIC BLOOD PRESSURE: 140 MMHG

## 2018-03-26 DIAGNOSIS — R03.0 ELEVATED BLOOD PRESSURE READING: ICD-10-CM

## 2018-03-26 DIAGNOSIS — K57.32 DIVERTICULITIS OF LARGE INTESTINE WITHOUT PERFORATION OR ABSCESS WITHOUT BLEEDING: Primary | ICD-10-CM

## 2018-03-26 LAB
ALBUMIN SERPL-MCNC: 3.6 G/DL (ref 3.5–5)
ALBUMIN/GLOB SERPL: 1.1 {RATIO} (ref 1.1–2.2)
ALP SERPL-CCNC: 51 U/L (ref 45–117)
ALT SERPL-CCNC: 30 U/L (ref 12–78)
ANION GAP SERPL CALC-SCNC: 8 MMOL/L (ref 5–15)
AST SERPL-CCNC: 37 U/L (ref 15–37)
ATRIAL RATE: 73 BPM
BASOPHILS # BLD: 0 K/UL (ref 0–0.1)
BASOPHILS NFR BLD: 0 % (ref 0–1)
BILIRUB SERPL-MCNC: 0.4 MG/DL (ref 0.2–1)
BUN SERPL-MCNC: 10 MG/DL (ref 6–20)
BUN/CREAT SERPL: 11 (ref 12–20)
CALCIUM SERPL-MCNC: 9.2 MG/DL (ref 8.5–10.1)
CALCULATED P AXIS, ECG09: 0 DEGREES
CALCULATED R AXIS, ECG10: 8 DEGREES
CALCULATED T AXIS, ECG11: 17 DEGREES
CHLORIDE SERPL-SCNC: 103 MMOL/L (ref 97–108)
CO2 SERPL-SCNC: 26 MMOL/L (ref 21–32)
CREAT SERPL-MCNC: 0.87 MG/DL (ref 0.55–1.02)
DIAGNOSIS, 93000: NORMAL
DIFFERENTIAL METHOD BLD: ABNORMAL
EOSINOPHIL # BLD: 0.2 K/UL (ref 0–0.4)
EOSINOPHIL NFR BLD: 4 % (ref 0–7)
ERYTHROCYTE [DISTWIDTH] IN BLOOD BY AUTOMATED COUNT: 13.2 % (ref 11.5–14.5)
GLOBULIN SER CALC-MCNC: 3.3 G/DL (ref 2–4)
GLUCOSE SERPL-MCNC: 280 MG/DL (ref 65–100)
HCT VFR BLD AUTO: 33.8 % (ref 35–47)
HGB BLD-MCNC: 12 G/DL (ref 11.5–16)
IMM GRANULOCYTES # BLD: 0 K/UL (ref 0–0.04)
IMM GRANULOCYTES NFR BLD AUTO: 1 % (ref 0–0.5)
LIPASE SERPL-CCNC: 359 U/L (ref 73–393)
LYMPHOCYTES # BLD: 1.5 K/UL (ref 0.8–3.5)
LYMPHOCYTES NFR BLD: 32 % (ref 12–49)
MCH RBC QN AUTO: 29.9 PG (ref 26–34)
MCHC RBC AUTO-ENTMCNC: 35.5 G/DL (ref 30–36.5)
MCV RBC AUTO: 84.1 FL (ref 80–99)
MONOCYTES # BLD: 0.2 K/UL (ref 0–1)
MONOCYTES NFR BLD: 5 % (ref 5–13)
NEUTS SEG # BLD: 2.7 K/UL (ref 1.8–8)
NEUTS SEG NFR BLD: 58 % (ref 32–75)
NRBC # BLD: 0 K/UL (ref 0–0.01)
NRBC BLD-RTO: 0 PER 100 WBC
P-R INTERVAL, ECG05: 162 MS
PLATELET # BLD AUTO: 195 K/UL (ref 150–400)
PMV BLD AUTO: 10.2 FL (ref 8.9–12.9)
POTASSIUM SERPL-SCNC: 3.5 MMOL/L (ref 3.5–5.1)
PROT SERPL-MCNC: 6.9 G/DL (ref 6.4–8.2)
Q-T INTERVAL, ECG07: 426 MS
QRS DURATION, ECG06: 96 MS
QTC CALCULATION (BEZET), ECG08: 469 MS
RBC # BLD AUTO: 4.02 M/UL (ref 3.8–5.2)
SODIUM SERPL-SCNC: 137 MMOL/L (ref 136–145)
VENTRICULAR RATE, ECG03: 73 BPM
WBC # BLD AUTO: 4.7 K/UL (ref 3.6–11)

## 2018-03-26 PROCEDURE — 74176 CT ABD & PELVIS W/O CONTRAST: CPT

## 2018-03-26 PROCEDURE — 96376 TX/PRO/DX INJ SAME DRUG ADON: CPT

## 2018-03-26 PROCEDURE — 36415 COLL VENOUS BLD VENIPUNCTURE: CPT | Performed by: EMERGENCY MEDICINE

## 2018-03-26 PROCEDURE — 74011250637 HC RX REV CODE- 250/637: Performed by: EMERGENCY MEDICINE

## 2018-03-26 PROCEDURE — 83690 ASSAY OF LIPASE: CPT | Performed by: EMERGENCY MEDICINE

## 2018-03-26 PROCEDURE — 93005 ELECTROCARDIOGRAM TRACING: CPT

## 2018-03-26 PROCEDURE — 96365 THER/PROPH/DIAG IV INF INIT: CPT

## 2018-03-26 PROCEDURE — 74011250636 HC RX REV CODE- 250/636: Performed by: EMERGENCY MEDICINE

## 2018-03-26 PROCEDURE — 74011636320 HC RX REV CODE- 636/320: Performed by: EMERGENCY MEDICINE

## 2018-03-26 PROCEDURE — 74011000258 HC RX REV CODE- 258: Performed by: EMERGENCY MEDICINE

## 2018-03-26 PROCEDURE — 96375 TX/PRO/DX INJ NEW DRUG ADDON: CPT

## 2018-03-26 PROCEDURE — 74011250636 HC RX REV CODE- 250/636

## 2018-03-26 PROCEDURE — 99285 EMERGENCY DEPT VISIT HI MDM: CPT

## 2018-03-26 PROCEDURE — 85025 COMPLETE CBC W/AUTO DIFF WBC: CPT | Performed by: EMERGENCY MEDICINE

## 2018-03-26 PROCEDURE — 74011000250 HC RX REV CODE- 250: Performed by: EMERGENCY MEDICINE

## 2018-03-26 PROCEDURE — 80053 COMPREHEN METABOLIC PANEL: CPT | Performed by: EMERGENCY MEDICINE

## 2018-03-26 RX ORDER — METRONIDAZOLE 500 MG/1
500 TABLET ORAL 2 TIMES DAILY
Qty: 22 TAB | Refills: 0 | Status: SHIPPED | OUTPATIENT
Start: 2018-03-26 | End: 2018-04-06

## 2018-03-26 RX ORDER — ONDANSETRON 2 MG/ML
INJECTION INTRAMUSCULAR; INTRAVENOUS
Status: COMPLETED
Start: 2018-03-26 | End: 2018-03-26

## 2018-03-26 RX ORDER — CIPROFLOXACIN 500 MG/1
500 TABLET ORAL 2 TIMES DAILY
Qty: 22 TAB | Refills: 0 | Status: SHIPPED | OUTPATIENT
Start: 2018-03-26 | End: 2018-04-06

## 2018-03-26 RX ORDER — ONDANSETRON 2 MG/ML
4 INJECTION INTRAMUSCULAR; INTRAVENOUS
Status: COMPLETED | OUTPATIENT
Start: 2018-03-26 | End: 2018-03-26

## 2018-03-26 RX ORDER — IBUPROFEN 400 MG/1
800 TABLET ORAL
Status: COMPLETED | OUTPATIENT
Start: 2018-03-26 | End: 2018-03-26

## 2018-03-26 RX ORDER — METRONIDAZOLE 250 MG/1
500 TABLET ORAL
Status: COMPLETED | OUTPATIENT
Start: 2018-03-26 | End: 2018-03-26

## 2018-03-26 RX ORDER — CIPROFLOXACIN 500 MG/1
500 TABLET ORAL
Status: COMPLETED | OUTPATIENT
Start: 2018-03-26 | End: 2018-03-26

## 2018-03-26 RX ORDER — KETOROLAC TROMETHAMINE 30 MG/ML
15 INJECTION, SOLUTION INTRAMUSCULAR; INTRAVENOUS
Status: DISCONTINUED | OUTPATIENT
Start: 2018-03-26 | End: 2018-03-26 | Stop reason: HOSPADM

## 2018-03-26 RX ORDER — CLONIDINE HYDROCHLORIDE 0.1 MG/1
0.1 TABLET ORAL
Status: COMPLETED | OUTPATIENT
Start: 2018-03-26 | End: 2018-03-26

## 2018-03-26 RX ORDER — DIPHENHYDRAMINE HYDROCHLORIDE 50 MG/ML
12.5 INJECTION, SOLUTION INTRAMUSCULAR; INTRAVENOUS
Status: DISCONTINUED | OUTPATIENT
Start: 2018-03-26 | End: 2018-03-26 | Stop reason: HOSPADM

## 2018-03-26 RX ADMIN — CIPROFLOXACIN HYDROCHLORIDE 500 MG: 500 TABLET, FILM COATED ORAL at 11:35

## 2018-03-26 RX ADMIN — IBUPROFEN 800 MG: 400 TABLET ORAL at 11:35

## 2018-03-26 RX ADMIN — ONDANSETRON 4 MG: 2 INJECTION INTRAMUSCULAR; INTRAVENOUS at 08:55

## 2018-03-26 RX ADMIN — ONDANSETRON 4 MG: 2 INJECTION INTRAMUSCULAR; INTRAVENOUS at 11:35

## 2018-03-26 RX ADMIN — DIATRIZOATE MEGLUMINE AND DIATRIZOATE SODIUM 30 ML: 600; 100 SOLUTION ORAL; RECTAL at 08:30

## 2018-03-26 RX ADMIN — LIDOCAINE HYDROCHLORIDE 96 MG: 20 INJECTION, SOLUTION INTRAVENOUS at 09:38

## 2018-03-26 RX ADMIN — CLONIDINE HYDROCHLORIDE 0.1 MG: 0.1 TABLET ORAL at 11:00

## 2018-03-26 RX ADMIN — METRONIDAZOLE 500 MG: 250 TABLET ORAL at 11:35

## 2018-03-26 NOTE — ED TRIAGE NOTES
Patient arrives to ED with a report of 2 hernia repairs over the last few months and complaint of abdominal pain at the incision site with some drainage and blood. Patient reports it is sore at site Patient reports having C. Diff due to the antibiotics and continues to have nausea and diarrhea. Patient states that her \"blood pressures out of control and swelling to face x a few weeks\" and unsure if this is related.

## 2018-03-26 NOTE — ED NOTES
MD Nayeli Guillory reviewed discharge instructions with the patient. The patient verbalized understanding. Patient discharged home with vitals at baseline. Patient ambulatory out of ED with steady gait. No further complaints noted at this time. Still unable to obtain urine specimen, however, MD is okay with patient being discharged.

## 2018-03-26 NOTE — LETTER
[address] 
[facility] EMERGENCY DEPT 
[address] [place] [address] [id number] [phone] Work/School Note Date: 3/26/2018 To Whom It May concern: 
 
Srinivasan Bosch was seen and treated today in the emergency room by the following provider(s): 
Attending Provider: Ashley Laird. Jess Shelley MD. Srinivasan Bosch may return to work on 3/28/18. Sincerely, Ashley Laird.  Jess Shelley MD

## 2018-03-26 NOTE — ED PROVIDER NOTES
EMERGENCY DEPARTMENT HISTORY AND PHYSICAL EXAM      Date: 3/26/2018  Patient Name: Sheba Bocanegra    History of Presenting Illness     Chief Complaint   Patient presents with    Abdominal Pain     Pt ambulatory to triage with c/o abdominal pain. Pt had hernia repair surgery in January and February. Pt states she has had many complications related to the surgery. Pt reports nausea and diarrhea.  Hypertension     Pt states she took her bp this morning and she was 200s/100s. Reports headaches and feels like her face is \"swollen. History Provided By: Patient    HPI: Sheba Bocanegra, 50 y.o. female with PMHx significant for HTN, diverticulosis, CKD, DM, urticaria, C-diff, and hepatic steatosis, and PSHx of hernia repair and colectomy, presents ambulatory to the ED with cc of constant abdominal pain above the umbilicus at the hernia repair incision site with serosanguinous drainage, as well as LLQ abdominal pain x several weeks. Pt rates current abdominal pain 7/10, describes as achy, and denies modifying factors. She notes associated nausea, diarrhea, and constant mild generalized achy HA. Pt reports she has been taking imodium for diarrhea with some relief, denies further medication for sx. She endorses hx of hernia repair in January 2018 with post-op complications/pain since, including abscess and C-diff. Pt reports she had her hernia surgery at [facility] per Dr. Janay Haley with general surgery. She notes she saw one of Dr. Mariola Velez colleagues last week and was advised to clean out the surgical opening in her abdomen with a Q-tip, which she has been doing every day for the past week. Pt reports she has an appointment with Dr. Janay Haley in two weeks. She endorses hx of similar LLQ abdominal pain with diverticulitis requiring colon resection. Pt also c/o elevated BP, 200/111 today per home measure, x one week. She notes her PCP just added amlodipine to her regime of one other HTN medication.  Pt states she took her HTN medication this morning. She notes allergy to IV contrast. Pt specifically denies fever/chills, cold sx, SOB, cough, CP, constipation, vomiting, or urinary sx. PCP: BEBA Cox    There are no other complaints, changes, or physical findings at this time. Current Outpatient Prescriptions   Medication Sig Dispense Refill    ondansetron (ZOFRAN ODT) 4 mg disintegrating tablet Take 1 Tab by mouth every eight (8) hours as needed for Nausea. 10 Tab 0    meperidine (DEMEROL) 50 mg tablet Take 1 Tab by mouth every four (4) hours as needed for Pain. Max Daily Amount: 300 mg. 20 Tab 0    methocarbamol (ROBAXIN-750) 750 mg tablet Take 1 Tab by mouth four (4) times daily as needed. 20 Tab 0    dicyclomine (BENTYL) 10 mg capsule Take 1 Cap by mouth three (3) times daily. 90 Cap 0    famotidine (PEPCID) 20 mg tablet Take 1 Tab by mouth two (2) times a day. 60 Tab 0    losartan-hydroCHLOROthiazide (HYZAAR) 100-12.5 mg per tablet Take 1 Tab by mouth daily.  albuterol sulfate (PROVENTIL;VENTOLIN) 2.5 mg/0.5 mL nebu nebulizer solution 2.5 mg by Nebulization route every twelve (12) hours. Patient mixes this agent with acetylcysteine (20%) nebulizer solution for breathing treatment.  acetaminophen (TYLENOL) 500 mg tablet Take 1,000 mg by mouth every six (6) hours as needed for Pain.  diphenhydrAMINE (BENADRYL) 25 mg capsule Take 25 mg by mouth every six (6) hours as needed (congestion).  predniSONE (STERAPRED DS) 10 mg dose pack Take 10 mg by mouth See Admin Instructions. See administration instruction per 10mg dose pack      LACTOBACIL 2-S. THERMO-BIFIDO 1 (VSL#3 PO) Take 1 Packet by mouth daily.  cetirizine (ZYRTEC) 10 mg tablet Take 10 mg by mouth nightly as needed for Allergies.  EPINEPHrine (EPIPEN) 0.3 mg/0.3 mL (1:1,000) injection 0.3 mL by IntraMUSCular route once as needed for up to 1 dose. 0.3 mL 1    estradiol (ESTRACE) 1 mg tablet Take 1 mg by mouth daily.       albuterol (PROVENTIL, VENTOLIN) 90 mcg/Actuation inhaler Take 2 Puffs by inhalation every six (6) hours as needed. Past History     Past Medical History:  Past Medical History:   Diagnosis Date    Anal fissure     Anisocoria     Asthma     LAST EPISODE     Back pain     Cerumen impaction     Chronic kidney disease     hx uti in past    Coagulation defects     ocassional rectal bleeding due to anal fissure    Colovaginal fistula     Diabetes (HCC)     NIDDM    Diabetes ([address]     Diverticulitis     Diverticulosis     Enlarged tonsils     Frequent UTI     GERD (gastroesophageal reflux disease)     H/O endoscopy     with dilation    HA (headache)     Hepatic steatosis     Hx of colonoscopy with polypectomy     benign    Hypertension     Ill-defined condition     FREQUENT HIVES    Ill-defined condition     HX ELEVATED LIVER ENZYMES    Morbid obesity (HCC)     Nausea & vomiting     during diverticulitis flare    Obesity     Otitis media     Pneumonia     about 15 yrs ago    Psychiatric disorder     ANXIETY    Recurrent tonsillitis     Sinusitis     Transfusion history ~ age 35    postop hysterectomy    Unspecified sleep apnea     snores ( not diagnosed yet)     Urticaria     Urticaria        Past Surgical History:  Past Surgical History:   Procedure Laterality Date    ABDOMEN SURGERY PROC UNLISTED  2018    hernia repair at [facility]    [address]    [place]     HX GI  12    LAPAROSCOPIC HAND ASSISTED  POSS OPEN SIGMOID COLECTOMY POSS TEMPORARY DIVERTING LOOP ILEOSTOMY;  (no illeostomy needed)    HX GYN           HX GYN      cervical conization    HX HEENT      SINUS SURGERY LEFT X2    HX HEENT      SINUS SURGERY ON RIGHT X2    HX OTHER SURGICAL      Sphincterotomy    HX PELVIC LAPAROSCOPY      HX EMMANUEL AND BSO      HX UROLOGICAL  12     CYSTOSCOPY INSERTION URETERAL CATHETERS - Cystoscopy Insertion of bilateral ureteral stents Family History:  Family History   Problem Relation Age of Onset    Diabetes Mother     Cancer Mother      NON-HODGKINS LYMPHOMA    Anesth Problems Mother      PONV    Diabetes Father     Heart Disease Father      CAD - STENTS, PACEMAKER    Arrhythmia Father        Social History:  Social History   Substance Use Topics    Smoking status: Never Smoker    Smokeless tobacco: Never Used    Alcohol use Yes      Comment: Rarely       Allergies: Allergies   Allergen Reactions    Aspirin Shortness of Breath    Prilosec [Omeprazole Magnesium] Anaphylaxis     CHERRY FLAVORED; PT TAKES REGULAR PRILOSEC AND IS OK    Codeine Hives and Itching    Contrast Agent [Iodine] Itching     Pt. Had itching after IV contrast with the last exam.  Benadryl was given    Morphine Itching     Severe itching    Fentanyl Rash    Percocet [Oxycodone-Acetaminophen] Hives         Review of Systems   Review of Systems   Constitutional: Negative for chills and fever. HENT: Negative for congestion. Eyes: Negative for visual disturbance. Respiratory: Negative for cough, chest tightness and shortness of breath. Cardiovascular: Negative for chest pain and leg swelling.        + elevated BP   Gastrointestinal: Positive for abdominal pain (above umbilicus (with serosanguinous drainage), LLQ), diarrhea and nausea. Negative for constipation and vomiting. Endocrine: Negative for polyuria. Genitourinary: Negative for dysuria and frequency. Musculoskeletal: Negative for myalgias. Skin: Negative for color change. See GI   Allergic/Immunologic: Negative for immunocompromised state. Neurological: Positive for headaches. Negative for numbness. All other systems reviewed and are negative. Physical Exam   Physical Exam    Nursing note and vitals reviewed.   General appearance: non-toxic, NAD  Eyes: PERRL, EOMI, conjunctiva normal, anicteric sclera  HEENT: mucous membranes tacky, lips dry, oropharynx is clear, neck supple  Pulmonary: clear to auscultation bilaterally  Cardiac: normal rate and regular rhythm, no murmurs, gallops, or rubs, 2+DP pulses, 2+ radial pulses  Abdomen: soft, LLQ TTP, nondistended, bowel sounds present; pinpoint defect cephalad to the umbilicus with minimal bloody to serosanguinous discharge, no surrounding erythema or cellulitis   MSK: no pre-tibial edema  Neuro: Alert, answers questions appropriately, CN II-XII intact, moves all 4 extremities  Skin: capillary refill brisk; skin dry    Diagnostic Study Results     Labs -     Recent Results (from the past 12 hour(s))   CBC WITH AUTOMATED DIFF    Collection Time: 03/26/18  7:08 AM   Result Value Ref Range    WBC 4.7 3.6 - 11.0 K/uL    RBC 4.02 3.80 - 5.20 M/uL    HGB 12.0 11.5 - 16.0 g/dL    HCT 33.8 (L) 35.0 - 47.0 %    MCV 84.1 80.0 - 99.0 FL    MCH 29.9 26.0 - 34.0 PG    MCHC 35.5 30.0 - 36.5 g/dL    RDW 13.2 11.5 - 14.5 %    PLATELET 394 761 - 217 K/uL    MPV 10.2 8.9 - 12.9 FL    NRBC 0.0 0  WBC    ABSOLUTE NRBC 0.00 0.00 - 0.01 K/uL    NEUTROPHILS 58 32 - 75 %    LYMPHOCYTES 32 12 - 49 %    MONOCYTES 5 5 - 13 %    EOSINOPHILS 4 0 - 7 %    BASOPHILS 0 0 - 1 %    IMMATURE GRANULOCYTES 1 (H) 0.0 - 0.5 %    ABS. NEUTROPHILS 2.7 1.8 - 8.0 K/UL    ABS. LYMPHOCYTES 1.5 0.8 - 3.5 K/UL    ABS. MONOCYTES 0.2 0.0 - 1.0 K/UL    ABS. EOSINOPHILS 0.2 0.0 - 0.4 K/UL    ABS. BASOPHILS 0.0 0.0 - 0.1 K/UL    ABS. IMM.  GRANS. 0.0 0.00 - 0.04 K/UL    DF AUTOMATED     METABOLIC PANEL, COMPREHENSIVE    Collection Time: 03/26/18  7:08 AM   Result Value Ref Range    Sodium 137 136 - 145 mmol/L    Potassium 3.5 3.5 - 5.1 mmol/L    Chloride 103 97 - 108 mmol/L    CO2 26 21 - 32 mmol/L    Anion gap 8 5 - 15 mmol/L    Glucose 280 (H) 65 - 100 mg/dL    BUN 10 6 - 20 MG/DL    Creatinine 0.87 0.55 - 1.02 MG/DL    BUN/Creatinine ratio 11 (L) 12 - 20      GFR est AA >60 >60 ml/min/1.73m2    GFR est non-AA >60 >60 ml/min/1.73m2    Calcium 9.2 8.5 - 10.1 MG/DL    Bilirubin, total 0.4 0.2 - 1.0 MG/DL    ALT (SGPT) 30 12 - 78 U/L    AST (SGOT) 37 15 - 37 U/L    Alk. phosphatase 51 45 - 117 U/L    Protein, total 6.9 6.4 - 8.2 g/dL    Albumin 3.6 3.5 - 5.0 g/dL    Globulin 3.3 2.0 - 4.0 g/dL    A-G Ratio 1.1 1.1 - 2.2     LIPASE    Collection Time: 03/26/18  7:08 AM   Result Value Ref Range    Lipase 359 73 - 393 U/L   EKG, 12 LEAD, INITIAL    Collection Time: 03/26/18  7:59 AM   Result Value Ref Range    Ventricular Rate 73 BPM    Atrial Rate 73 BPM    P-R Interval 162 ms    QRS Duration 96 ms    Q-T Interval 426 ms    QTC Calculation (Bezet) 469 ms    Calculated P Axis 0 degrees    Calculated R Axis 8 degrees    Calculated T Axis 17 degrees    Diagnosis       Normal sinus rhythm  Nonspecific T wave abnormality  Prolonged QT  Abnormal ECG         Radiologic Studies -   CT Results  (Last 48 hours)               03/26/18 0929  CT ABD PELV WO CONT Final result    Impression:  IMPRESSION: Diverticulitis. Narrative:  INDICATION:  LLQ pain, hx diverticulitis, hx ventral hernia repair,   ?post-operative fluid collection  Abdominal pain        EXAM: CT Abdomen and CT Pelvis without contrast.   CT dose reduction was achieved through use of a standardized protocol tailored   for this examination and automatic exposure control for dose modulation. COMPARISON: 3/13/2018. FINDINGS:        There is new minor diverticulitis in the left lower quadrant with no abscess or   perforation. No urinary tract stones are seen. There is no hydroureteronephrosis. The kidneys   are normal in size. There is no perirenal fluid or ascites. Liver shows steatosis. Pancreas, adrenal glands, spleen and aorta show no   significant enlargement. No inflammation is seen. There is no pneumoperitoneum   or significant adenopathy. The bladder is not distended. The distal ureters are not dilated. There is no   apparent pelvic mass.  The appendix is normal.       There is no ventral hernia repair fluid collection. Medical Decision Making   I am the first provider for this patient. I reviewed the vital signs, available nursing notes, past medical history, past surgical history, family history and social history. Vital Signs-Reviewed the patient's vital signs. Patient Vitals for the past 12 hrs:   Temp Pulse Resp BP SpO2   03/26/18 1200 - 69 - 144/70 -   03/26/18 1102 - - - 169/90 -   03/26/18 1100 - 69 - 169/90 -   03/26/18 1045 - 74 - (!) 181/93 -   03/26/18 1015 - 74 - (!) 163/96 -   03/26/18 1000 - 73 - (!) 160/97 -   03/26/18 0937 - 76 12 (!) 175/96 -   03/26/18 0915 - 69 - 155/85 -   03/26/18 0901 - 75 13 - -   03/26/18 0900 - - - 153/86 -   03/26/18 0859 - 72 12 153/83 -   03/26/18 0651 97.8 °F (36.6 °C) 72 18 (!) 176/92 100 %     EKG interpretation: (Preliminary) 0803  Rhythm: normal sinus rhythm; and regular . Rate (approx.): 73; Axis: normal; ST/T wave: nonspecific T wave abnormality; no ST elevation, no ST depression; unchanged since 3/16/17 EKG. NH interval 162 ms, QRS 96 ms,  ms, QTc 469 ms. Written by Venancio Astorga ED Scribe, as dictated by Manuel Lizama MD.    Records Reviewed: Nursing Notes and Old Medical Records    Provider Notes (Medical Decision Making):   DDx: dehydration, accelerated HTN, chronic abdominal pain, intraabdominal adhesion, diverticulitis. Lower suspicion for colitis, abdominal wall abscess, or SBO. Given IV lidocaine for pain control; declined toradol, multiple drug allergies listed, and pt advised that ED is now hydromorphone free. ED Course:   Initial assessment performed. The patients presenting problems have been discussed, and they are in agreement with the care plan formulated and outlined with them. I have encouraged them to ask questions as they arise throughout their visit.     CONSULT NOTE:   11:10 AM  Luis Alfredo Tam MD spoke with Dr. Damari Velez,   Specialty: GI  Discussed pt's hx, disposition, and available diagnostic and imaging results. Reviewed care plans. Consultant agrees with plans as outlined. Dr. Josee Lopez recommends treating diverticulitis 12 days, cipro and flagyl, with probiotics beginning 2-3 days before Abx finish to decrease risk of recurrent c diff, but should treat diverticulitis findings. Written by Key Duran, ED Scribe, as dictated by Zully James MD.    Disposition:  DISCHARGE NOTE  12:48 PM  The patient has been re-evaluated and is ready for discharge. Reviewed available results with patient. Counseled pt on diagnosis and care plan. Pt has expressed understanding, and all questions have been answered. Pt agrees with plan and agrees to F/U as recommended, or return to the ED if their sxs worsen. Discharge instructions have been provided and explained to the pt, along with reasons to return to the ED. Written by Key Duran, SCOOBY Scribe, as dictated by Zully James MD.    PLAN:  1. Current Discharge Medication List      CONTINUE these medications which have NOT CHANGED    Details   ondansetron (ZOFRAN ODT) 4 mg disintegrating tablet Take 1 Tab by mouth every eight (8) hours as needed for Nausea. Qty: 10 Tab, Refills: 0      meperidine (DEMEROL) 50 mg tablet Take 1 Tab by mouth every four (4) hours as needed for Pain. Max Daily Amount: 300 mg. Qty: 20 Tab, Refills: 0    Associated Diagnoses: Abdominal pain, right upper quadrant; Abdominal pain, LUQ (left upper quadrant)      methocarbamol (ROBAXIN-750) 750 mg tablet Take 1 Tab by mouth four (4) times daily as needed. Qty: 20 Tab, Refills: 0      dicyclomine (BENTYL) 10 mg capsule Take 1 Cap by mouth three (3) times daily. Qty: 90 Cap, Refills: 0      famotidine (PEPCID) 20 mg tablet Take 1 Tab by mouth two (2) times a day. Qty: 60 Tab, Refills: 0      losartan-hydroCHLOROthiazide (HYZAAR) 100-12.5 mg per tablet Take 1 Tab by mouth daily.       albuterol sulfate (PROVENTIL;VENTOLIN) 2.5 mg/0.5 mL nebu nebulizer solution 2.5 mg by Nebulization route every twelve (12) hours. Patient mixes this agent with acetylcysteine (20%) nebulizer solution for breathing treatment. acetaminophen (TYLENOL) 500 mg tablet Take 1,000 mg by mouth every six (6) hours as needed for Pain. diphenhydrAMINE (BENADRYL) 25 mg capsule Take 25 mg by mouth every six (6) hours as needed (congestion). predniSONE (STERAPRED DS) 10 mg dose pack Take 10 mg by mouth See Admin Instructions. See administration instruction per 10mg dose pack      LACTOBACIL 2-S. THERMO-BIFIDO 1 (VSL#3 PO) Take 1 Packet by mouth daily. cetirizine (ZYRTEC) 10 mg tablet Take 10 mg by mouth nightly as needed for Allergies. EPINEPHrine (EPIPEN) 0.3 mg/0.3 mL (1:1,000) injection 0.3 mL by IntraMUSCular route once as needed for up to 1 dose. Qty: 0.3 mL, Refills: 1      estradiol (ESTRACE) 1 mg tablet Take 1 mg by mouth daily. albuterol (PROVENTIL, VENTOLIN) 90 mcg/Actuation inhaler Take 2 Puffs by inhalation every six (6) hours as needed. 2.   Follow-up Information     Follow up With Details Comments [address] [facility] [address] Schedule an appointment as soon as possible for a visit in 3 days As needed [address] EMERGENCY DEPT Go in 1 day If symptoms worsen [facility] [address] Schedule an appointment as soon as possible for a visit in 1 week          Return to ED if worse     Diagnosis     Clinical Impression:   1. Diverticulitis of large intestine without perforation or abscess without bleeding    2. Elevated blood pressure reading        Attestations: This note is prepared by Diandra Del Rio, acting as Scribe for Emmanuel Jackson MD.    Emmanuel Jackson MD: The scribe's documentation has been prepared under my direction and personally reviewed by me in its entirety.  I confirm that the note above accurately reflects all work, treatment, procedures, and medical decision making performed by me.

## 2018-03-26 NOTE — ED NOTES
Attempting to medicate pt for pain, states she is allergic to Toradol, it \"makes my eyes spasm and gives me a rash on my hands\", pt states she was at [address] last week when this happened, informed dr Dave Forte and new orders received, pt repeatedly asking for other pain medications such as Fentanyl, informed pt that she listed this as an allergy previously, pt states \"i think I can take that\", pt states she will think about whether or not she wants the medicines prescribed by MD and let me know; pt updated on plan of care, states she cannot void at this time

## 2018-03-26 NOTE — DISCHARGE INSTRUCTIONS
Learning About Diverticulosis and Diverticulitis  What are diverticulosis and diverticulitis? In diverticulosis and diverticulitis, pouches called diverticula form in the wall of the large intestine, or colon. · In diverticulosis, the pouches do not cause any pain or other symptoms. · In diverticulitis, the pouches get inflamed or infected and cause symptoms. Doctors aren't sure what causes these pouches in the colon. But they think that a low-fiber diet may play a role. Without fiber to add bulk to the stool, the colon has to work harder than normal to push the stool forward. The pressure from this may cause pouches to form in weak spots along the colon. Some people with diverticulosis get diverticulitis. But experts don't know why this happens. What are the symptoms? · In diverticulosis, most people don't have symptoms. But pouches sometimes bleed. · In diverticulitis, symptoms may last from a few hours to a week or more. They include:  ¨ Belly pain. This is usually in the lower left side. It is sometimes worse when you move. This is the most common symptom. ¨ Fever and chills. ¨ Bloating and gas. ¨ Diarrhea or constipation. ¨ Nausea and sometimes vomiting. ¨ Not feeling like eating. How can you prevent these problems? You may be able to lower your chance of getting diverticulitis. You can do this by taking steps to prevent constipation. · Eat fruits, vegetables, beans, and whole grains every day. These foods are high in fiber. · Drink plenty of fluids (enough so that your urine is light yellow or clear like water). If you have kidney, heart, or liver disease and have to limit fluids, talk with your doctor before you increase the amount of fluids you drink. · Get at least 30 minutes of exercise on most days of the week. Walking is a good choice. You also may want to do other activities, such as running, swimming, cycling, or playing tennis or team sports.   · Take a fiber supplement, such as Citrucel or Metamucil, every day if needed. Read and follow all instructions on the label. · Schedule time each day for a bowel movement. Having a daily routine may help. Take your time and do not strain when having a bowel movement. Some people avoid nuts, seeds, berries, and popcorn. They believe that these foods might get trapped in the diverticula and cause pain. But there is no proof that these foods cause diverticulitis or make it worse. How are these problems treated? · The best way to treat diverticulosis is to avoid constipation. (See the tips above.)  · Treatment for diverticulitis includes antibiotics and often a change in your diet. You may need only liquids at first. Your doctor may suggest pain medicines for pain or belly cramps. In some cases, surgery may be needed. Follow-up care is a key part of your treatment and safety. Be sure to make and go to all appointments, and call your doctor if you are having problems. It's also a good idea to know your test results and keep a list of the medicines you take. Where can you learn more? Go to http://gladys-alia.info/. Enter X305 in the search box to learn more about \"Learning About Diverticulosis and Diverticulitis. \"  Current as of: May 12, 2017  Content Version: 11.4  © 6462-3676 [employer]. Care instructions adapted under license by [employer] (which disclaims liability or warranty for this information). If you have questions about a medical condition or this instruction, always ask your healthcare professional. [place] [address] any warranty or liability for your use of this information. Elevated Blood Pressure: Care Instructions  Your Care Instructions    Blood pressure is a measure of how hard the blood pushes against the walls of your arteries. It's normal for blood pressure to go up and down throughout the day.  But if it stays up over time, you have high blood pressure. Two numbers tell you your blood pressure. The first number is the systolic pressure. It shows how hard the blood pushes when your heart is pumping. The second number is the diastolic pressure. It shows how hard the blood pushes between heartbeats, when your heart is relaxed and filling with blood. An ideal blood pressure in adults is less than 120/80 (say \"120 over 80\"). High blood pressure is 140/90 or higher. You have high blood pressure if your top number is 140 or higher or your bottom number is 90 or higher, or both. The main test for high blood pressure is simple, fast, and painless. To diagnose high blood pressure, your doctor will test your blood pressure at different times. After testing your blood pressure, your doctor may ask you to test it again when you are home. If you are diagnosed with high blood pressure, you can work with your doctor to make a long-term plan to manage it. Follow-up care is a key part of your treatment and safety. Be sure to make and go to all appointments, and call your doctor if you are having problems. It's also a good idea to know your test results and keep a list of the medicines you take. How can you care for yourself at home? · Do not smoke. Smoking increases your risk for heart attack and stroke. If you need help quitting, talk to your doctor about stop-smoking programs and medicines. These can increase your chances of quitting for good. · Stay at a healthy weight. · Try to limit how much sodium you eat to less than 2,300 milligrams (mg) a day. Your doctor may ask you to try to eat less than 1,500 mg a day. · Be physically active. Get at least 30 minutes of exercise on most days of the week. Walking is a good choice. You also may want to do other activities, such as running, swimming, cycling, or playing tennis or team sports. · Avoid or limit alcohol. Talk to your doctor about whether you can drink any alcohol.   · Eat plenty of fruits, vegetables, and low-fat dairy products. Eat less saturated and total fats. · Learn how to check your blood pressure at home. When should you call for help? Call your doctor now or seek immediate medical care if:  ? · Your blood pressure is much higher than normal (such as 180/110 or higher). ? · You think high blood pressure is causing symptoms such as:  ¨ Severe headache. ¨ Blurry vision. ? Watch closely for changes in your health, and be sure to contact your doctor if:  ? · You do not get better as expected. Where can you learn more? Go to http://gladys-alia.info/. Enter O861 in the search box to learn more about \"Elevated Blood Pressure: Care Instructions. \"  Current as of: September 21, 2016  Content Version: 11.4  © 2265-3303 [employer]. Care instructions adapted under license by [employer] (which disclaims liability or warranty for this information). If you have questions about a medical condition or this instruction, always ask your healthcare professional. [address] any warranty or liability for your use of this information.

## 2018-04-18 ENCOUNTER — HOSPITAL ENCOUNTER (EMERGENCY)
Age: 48
Discharge: HOME | End: 2018-04-18
Attending: EMERGENCY MEDICINE
Payer: COMMERCIAL

## 2018-04-18 ENCOUNTER — APPOINTMENT (OUTPATIENT)
Dept: CT IMAGING | Age: 48
End: 2018-04-18
Attending: PHYSICIAN ASSISTANT
Payer: COMMERCIAL

## 2018-04-18 VITALS
HEIGHT: 62 IN | DIASTOLIC BLOOD PRESSURE: 78 MMHG | TEMPERATURE: 98.2 F | RESPIRATION RATE: 16 BRPM | OXYGEN SATURATION: 95 % | BODY MASS INDEX: 38.62 KG/M2 | SYSTOLIC BLOOD PRESSURE: 128 MMHG | HEART RATE: 70 BPM | WEIGHT: 209.88 LBS

## 2018-04-18 DIAGNOSIS — N39.0 URINARY TRACT INFECTION WITHOUT HEMATURIA, SITE UNSPECIFIED: Primary | ICD-10-CM

## 2018-04-18 LAB
ALBUMIN SERPL-MCNC: 4.2 G/DL (ref 3.5–5)
ALBUMIN/GLOB SERPL: 1.3 {RATIO} (ref 1.1–2.2)
ALP SERPL-CCNC: 60 U/L (ref 45–117)
ALT SERPL-CCNC: 34 U/L (ref 12–78)
ANION GAP SERPL CALC-SCNC: 8 MMOL/L (ref 5–15)
APPEARANCE UR: CLEAR
AST SERPL-CCNC: 31 U/L (ref 15–37)
BACTERIA URNS QL MICRO: NEGATIVE /HPF
BASOPHILS # BLD: 0 K/UL (ref 0–0.1)
BASOPHILS NFR BLD: 0 % (ref 0–1)
BILIRUB SERPL-MCNC: 0.6 MG/DL (ref 0.2–1)
BILIRUB UR QL: NEGATIVE
BUN SERPL-MCNC: 7 MG/DL (ref 6–20)
BUN/CREAT SERPL: 9 (ref 12–20)
CALCIUM SERPL-MCNC: 9.9 MG/DL (ref 8.5–10.1)
CHLORIDE SERPL-SCNC: 102 MMOL/L (ref 97–108)
CO2 SERPL-SCNC: 27 MMOL/L (ref 21–32)
COLOR UR: ABNORMAL
CREAT SERPL-MCNC: 0.76 MG/DL (ref 0.55–1.02)
DIFFERENTIAL METHOD BLD: ABNORMAL
EOSINOPHIL # BLD: 0.3 K/UL (ref 0–0.4)
EOSINOPHIL NFR BLD: 5 % (ref 0–7)
EPITH CASTS URNS QL MICRO: NORMAL /LPF
ERYTHROCYTE [DISTWIDTH] IN BLOOD BY AUTOMATED COUNT: 13.4 % (ref 11.5–14.5)
GLOBULIN SER CALC-MCNC: 3.3 G/DL (ref 2–4)
GLUCOSE SERPL-MCNC: 220 MG/DL (ref 65–100)
GLUCOSE UR STRIP.AUTO-MCNC: 100 MG/DL
HCT VFR BLD AUTO: 34.3 % (ref 35–47)
HGB BLD-MCNC: 12.4 G/DL (ref 11.5–16)
HGB UR QL STRIP: NEGATIVE
IMM GRANULOCYTES # BLD: 0 K/UL (ref 0–0.04)
IMM GRANULOCYTES NFR BLD AUTO: 1 % (ref 0–0.5)
KETONES UR QL STRIP.AUTO: NEGATIVE MG/DL
LACTATE BLD-SCNC: 1.4 MMOL/L (ref 0.4–2)
LACTATE SERPL-SCNC: 2.8 MMOL/L (ref 0.4–2)
LEUKOCYTE ESTERASE UR QL STRIP.AUTO: ABNORMAL
LIPASE SERPL-CCNC: 239 U/L (ref 73–393)
LYMPHOCYTES # BLD: 1.7 K/UL (ref 0.8–3.5)
LYMPHOCYTES NFR BLD: 30 % (ref 12–49)
MCH RBC QN AUTO: 30 PG (ref 26–34)
MCHC RBC AUTO-ENTMCNC: 36.2 G/DL (ref 30–36.5)
MCV RBC AUTO: 82.9 FL (ref 80–99)
MONOCYTES # BLD: 0.3 K/UL (ref 0–1)
MONOCYTES NFR BLD: 5 % (ref 5–13)
NEUTS SEG # BLD: 3.3 K/UL (ref 1.8–8)
NEUTS SEG NFR BLD: 60 % (ref 32–75)
NITRITE UR QL STRIP.AUTO: POSITIVE
NRBC # BLD: 0 K/UL (ref 0–0.01)
NRBC BLD-RTO: 0 PER 100 WBC
PH UR STRIP: 5 [PH] (ref 5–8)
PLATELET # BLD AUTO: 185 K/UL (ref 150–400)
PMV BLD AUTO: 9.7 FL (ref 8.9–12.9)
POTASSIUM SERPL-SCNC: 3.1 MMOL/L (ref 3.5–5.1)
PROT SERPL-MCNC: 7.5 G/DL (ref 6.4–8.2)
PROT UR STRIP-MCNC: ABNORMAL MG/DL
RBC # BLD AUTO: 4.14 M/UL (ref 3.8–5.2)
RBC #/AREA URNS HPF: NORMAL /HPF (ref 0–5)
SODIUM SERPL-SCNC: 137 MMOL/L (ref 136–145)
SP GR UR REFRACTOMETRY: 1.01 (ref 1–1.03)
UROBILINOGEN UR QL STRIP.AUTO: 2 EU/DL (ref 0.2–1)
WBC # BLD AUTO: 5.5 K/UL (ref 3.6–11)
WBC URNS QL MICRO: NORMAL /HPF (ref 0–4)

## 2018-04-18 PROCEDURE — 99284 EMERGENCY DEPT VISIT MOD MDM: CPT

## 2018-04-18 PROCEDURE — 74011000250 HC RX REV CODE- 250: Performed by: EMERGENCY MEDICINE

## 2018-04-18 PROCEDURE — 36415 COLL VENOUS BLD VENIPUNCTURE: CPT | Performed by: PHYSICIAN ASSISTANT

## 2018-04-18 PROCEDURE — 85025 COMPLETE CBC W/AUTO DIFF WBC: CPT | Performed by: PHYSICIAN ASSISTANT

## 2018-04-18 PROCEDURE — 81001 URINALYSIS AUTO W/SCOPE: CPT | Performed by: PHYSICIAN ASSISTANT

## 2018-04-18 PROCEDURE — 74011250636 HC RX REV CODE- 250/636: Performed by: PHYSICIAN ASSISTANT

## 2018-04-18 PROCEDURE — 83605 ASSAY OF LACTIC ACID: CPT

## 2018-04-18 PROCEDURE — 96375 TX/PRO/DX INJ NEW DRUG ADDON: CPT

## 2018-04-18 PROCEDURE — 80053 COMPREHEN METABOLIC PANEL: CPT | Performed by: PHYSICIAN ASSISTANT

## 2018-04-18 PROCEDURE — 74011250637 HC RX REV CODE- 250/637: Performed by: EMERGENCY MEDICINE

## 2018-04-18 PROCEDURE — 83605 ASSAY OF LACTIC ACID: CPT | Performed by: PHYSICIAN ASSISTANT

## 2018-04-18 PROCEDURE — 83690 ASSAY OF LIPASE: CPT | Performed by: PHYSICIAN ASSISTANT

## 2018-04-18 PROCEDURE — 96361 HYDRATE IV INFUSION ADD-ON: CPT

## 2018-04-18 PROCEDURE — 96374 THER/PROPH/DIAG INJ IV PUSH: CPT

## 2018-04-18 PROCEDURE — 74176 CT ABD & PELVIS W/O CONTRAST: CPT

## 2018-04-18 PROCEDURE — 96376 TX/PRO/DX INJ SAME DRUG ADON: CPT

## 2018-04-18 PROCEDURE — 74011250636 HC RX REV CODE- 250/636: Performed by: EMERGENCY MEDICINE

## 2018-04-18 RX ORDER — SODIUM CHLORIDE 9 MG/ML
50 INJECTION, SOLUTION INTRAVENOUS
Status: DISCONTINUED | OUTPATIENT
Start: 2018-04-18 | End: 2018-04-18

## 2018-04-18 RX ORDER — POTASSIUM CHLORIDE 20 MEQ/1
40 TABLET, EXTENDED RELEASE ORAL
Status: COMPLETED | OUTPATIENT
Start: 2018-04-18 | End: 2018-04-18

## 2018-04-18 RX ORDER — MORPHINE SULFATE 10 MG/ML
4 INJECTION, SOLUTION INTRAMUSCULAR; INTRAVENOUS
Status: COMPLETED | OUTPATIENT
Start: 2018-04-18 | End: 2018-04-18

## 2018-04-18 RX ORDER — SODIUM CHLORIDE 0.9 % (FLUSH) 0.9 %
10 SYRINGE (ML) INJECTION
Status: DISCONTINUED | OUTPATIENT
Start: 2018-04-18 | End: 2018-04-18 | Stop reason: HOSPADM

## 2018-04-18 RX ORDER — CEFDINIR 300 MG/1
300 CAPSULE ORAL 2 TIMES DAILY
Qty: 14 CAP | Refills: 0 | Status: SHIPPED | OUTPATIENT
Start: 2018-04-18 | End: 2018-06-19

## 2018-04-18 RX ORDER — DIPHENHYDRAMINE HYDROCHLORIDE 50 MG/ML
50 INJECTION, SOLUTION INTRAMUSCULAR; INTRAVENOUS
Status: COMPLETED | OUTPATIENT
Start: 2018-04-18 | End: 2018-04-18

## 2018-04-18 RX ORDER — ONDANSETRON 2 MG/ML
4 INJECTION INTRAMUSCULAR; INTRAVENOUS
Status: COMPLETED | OUTPATIENT
Start: 2018-04-18 | End: 2018-04-18

## 2018-04-18 RX ORDER — CEPHALEXIN 250 MG/1
500 CAPSULE ORAL
Status: COMPLETED | OUTPATIENT
Start: 2018-04-18 | End: 2018-04-18

## 2018-04-18 RX ORDER — SODIUM CHLORIDE 9 MG/ML
1000 INJECTION, SOLUTION INTRAVENOUS ONCE
Status: COMPLETED | OUTPATIENT
Start: 2018-04-18 | End: 2018-04-18

## 2018-04-18 RX ORDER — MORPHINE SULFATE 4 MG/ML
2 INJECTION INTRAVENOUS
Status: COMPLETED | OUTPATIENT
Start: 2018-04-18 | End: 2018-04-18

## 2018-04-18 RX ADMIN — ONDANSETRON 4 MG: 2 INJECTION INTRAMUSCULAR; INTRAVENOUS at 11:10

## 2018-04-18 RX ADMIN — SODIUM CHLORIDE 1000 ML: 900 INJECTION, SOLUTION INTRAVENOUS at 12:25

## 2018-04-18 RX ADMIN — CEPHALEXIN 500 MG: 250 CAPSULE ORAL at 13:13

## 2018-04-18 RX ADMIN — POTASSIUM CHLORIDE 40 MEQ: 20 TABLET, EXTENDED RELEASE ORAL at 13:13

## 2018-04-18 RX ADMIN — SODIUM CHLORIDE 10 MG: 9 INJECTION INTRAMUSCULAR; INTRAVENOUS; SUBCUTANEOUS at 13:13

## 2018-04-18 RX ADMIN — MORPHINE SULFATE 2 MG: 4 INJECTION INTRAVENOUS at 11:12

## 2018-04-18 RX ADMIN — SODIUM CHLORIDE 1000 ML: 900 INJECTION, SOLUTION INTRAVENOUS at 11:06

## 2018-04-18 RX ADMIN — MORPHINE SULFATE 4 MG: 10 INJECTION INTRAVENOUS at 13:13

## 2018-04-18 RX ADMIN — DIPHENHYDRAMINE HYDROCHLORIDE 50 MG: 50 INJECTION, SOLUTION INTRAMUSCULAR; INTRAVENOUS at 11:08

## 2018-04-18 NOTE — ED NOTES
Patient states abdominal pain for the past 4 days, states feels like a recent episode of diverticulitis.

## 2018-04-18 NOTE — DISCHARGE INSTRUCTIONS

## 2018-04-18 NOTE — ED NOTES
Dr. Briseida Hampton reviewed discharge instructions with the patient. The patient verbalized understanding. Patient ambulatory out of ED with discharge paperwork in hand.

## 2018-04-18 NOTE — LETTER
[address] 
[facility] EMERGENCY DEPT 
[address] [place] [address] [id number] [phone] Work/School Note Date: 4/18/2018 To Whom It May concern: 
 
Priyanka Reid was seen and treated today in the emergency room by the following provider(s): 
Attending Provider: Rossy Milner DO. Priyanka Reid may return to work on Friday, 4/20/18. Sincerely, Rossy Milner DO

## 2018-05-04 ENCOUNTER — HOSPITAL ENCOUNTER (EMERGENCY)
Age: 48
Discharge: HOME | End: 2018-05-05
Attending: EMERGENCY MEDICINE
Payer: COMMERCIAL

## 2018-05-04 ENCOUNTER — APPOINTMENT (OUTPATIENT)
Dept: CT IMAGING | Age: 48
End: 2018-05-04
Attending: EMERGENCY MEDICINE
Payer: COMMERCIAL

## 2018-05-04 DIAGNOSIS — K42.9 UMBILICAL HERNIA WITHOUT OBSTRUCTION AND WITHOUT GANGRENE: ICD-10-CM

## 2018-05-04 DIAGNOSIS — R11.2 NAUSEA AND VOMITING, INTRACTABILITY OF VOMITING NOT SPECIFIED, UNSPECIFIED VOMITING TYPE: Primary | ICD-10-CM

## 2018-05-04 DIAGNOSIS — K76.0 HEPATIC STEATOSIS: ICD-10-CM

## 2018-05-04 LAB
ALBUMIN SERPL-MCNC: 4.2 G/DL (ref 3.5–5)
ALBUMIN/GLOB SERPL: 1.1 {RATIO} (ref 1.1–2.2)
ALP SERPL-CCNC: 61 U/L (ref 45–117)
ALT SERPL-CCNC: 59 U/L (ref 12–78)
ANION GAP SERPL CALC-SCNC: 11 MMOL/L (ref 5–15)
APPEARANCE UR: CLEAR
AST SERPL-CCNC: 59 U/L (ref 15–37)
BACTERIA URNS QL MICRO: NEGATIVE /HPF
BASOPHILS # BLD: 0 K/UL (ref 0–0.1)
BASOPHILS NFR BLD: 0 % (ref 0–1)
BILIRUB SERPL-MCNC: 0.7 MG/DL (ref 0.2–1)
BILIRUB UR QL: NEGATIVE
BUN SERPL-MCNC: 15 MG/DL (ref 6–20)
BUN/CREAT SERPL: 18 (ref 12–20)
CALCIUM SERPL-MCNC: 9.7 MG/DL (ref 8.5–10.1)
CHLORIDE SERPL-SCNC: 103 MMOL/L (ref 97–108)
CO2 SERPL-SCNC: 25 MMOL/L (ref 21–32)
COLOR UR: ABNORMAL
CREAT SERPL-MCNC: 0.85 MG/DL (ref 0.55–1.02)
DIFFERENTIAL METHOD BLD: ABNORMAL
EOSINOPHIL # BLD: 0.1 K/UL (ref 0–0.4)
EOSINOPHIL NFR BLD: 1 % (ref 0–7)
EPITH CASTS URNS QL MICRO: ABNORMAL /LPF
ERYTHROCYTE [DISTWIDTH] IN BLOOD BY AUTOMATED COUNT: 13.4 % (ref 11.5–14.5)
GLOBULIN SER CALC-MCNC: 3.8 G/DL (ref 2–4)
GLUCOSE SERPL-MCNC: 248 MG/DL (ref 65–100)
GLUCOSE UR STRIP.AUTO-MCNC: 500 MG/DL
HCT VFR BLD AUTO: 41.6 % (ref 35–47)
HGB BLD-MCNC: 14.9 G/DL (ref 11.5–16)
HGB UR QL STRIP: NEGATIVE
HYALINE CASTS URNS QL MICRO: ABNORMAL /LPF (ref 0–5)
IMM GRANULOCYTES # BLD: 0 K/UL (ref 0–0.04)
IMM GRANULOCYTES NFR BLD AUTO: 0 % (ref 0–0.5)
KETONES UR QL STRIP.AUTO: ABNORMAL MG/DL
LACTATE SERPL-SCNC: 3.4 MMOL/L (ref 0.4–2)
LEUKOCYTE ESTERASE UR QL STRIP.AUTO: ABNORMAL
LIPASE SERPL-CCNC: 332 U/L (ref 73–393)
LYMPHOCYTES # BLD: 0.6 K/UL (ref 0.8–3.5)
LYMPHOCYTES NFR BLD: 7 % (ref 12–49)
MCH RBC QN AUTO: 29.7 PG (ref 26–34)
MCHC RBC AUTO-ENTMCNC: 35.8 G/DL (ref 30–36.5)
MCV RBC AUTO: 82.9 FL (ref 80–99)
MONOCYTES # BLD: 0.2 K/UL (ref 0–1)
MONOCYTES NFR BLD: 3 % (ref 5–13)
NEUTS SEG # BLD: 7.7 K/UL (ref 1.8–8)
NEUTS SEG NFR BLD: 89 % (ref 32–75)
NITRITE UR QL STRIP.AUTO: NEGATIVE
NRBC # BLD: 0 K/UL (ref 0–0.01)
NRBC BLD-RTO: 0 PER 100 WBC
PH UR STRIP: 6 [PH] (ref 5–8)
PLATELET # BLD AUTO: 190 K/UL (ref 150–400)
PMV BLD AUTO: 9.4 FL (ref 8.9–12.9)
POTASSIUM SERPL-SCNC: 4.4 MMOL/L (ref 3.5–5.1)
PROT SERPL-MCNC: 8 G/DL (ref 6.4–8.2)
PROT UR STRIP-MCNC: NEGATIVE MG/DL
RBC # BLD AUTO: 5.02 M/UL (ref 3.8–5.2)
RBC #/AREA URNS HPF: ABNORMAL /HPF (ref 0–5)
SODIUM SERPL-SCNC: 139 MMOL/L (ref 136–145)
SP GR UR REFRACTOMETRY: 1.02 (ref 1–1.03)
UROBILINOGEN UR QL STRIP.AUTO: 0.2 EU/DL (ref 0.2–1)
WBC # BLD AUTO: 8.7 K/UL (ref 3.6–11)
WBC URNS QL MICRO: ABNORMAL /HPF (ref 0–4)

## 2018-05-04 PROCEDURE — 96361 HYDRATE IV INFUSION ADD-ON: CPT

## 2018-05-04 PROCEDURE — 36415 COLL VENOUS BLD VENIPUNCTURE: CPT | Performed by: EMERGENCY MEDICINE

## 2018-05-04 PROCEDURE — 81001 URINALYSIS AUTO W/SCOPE: CPT | Performed by: EMERGENCY MEDICINE

## 2018-05-04 PROCEDURE — 85025 COMPLETE CBC W/AUTO DIFF WBC: CPT | Performed by: EMERGENCY MEDICINE

## 2018-05-04 PROCEDURE — 83605 ASSAY OF LACTIC ACID: CPT | Performed by: EMERGENCY MEDICINE

## 2018-05-04 PROCEDURE — 83690 ASSAY OF LIPASE: CPT | Performed by: EMERGENCY MEDICINE

## 2018-05-04 PROCEDURE — 99284 EMERGENCY DEPT VISIT MOD MDM: CPT

## 2018-05-04 PROCEDURE — 74176 CT ABD & PELVIS W/O CONTRAST: CPT

## 2018-05-04 PROCEDURE — 74011250636 HC RX REV CODE- 250/636: Performed by: EMERGENCY MEDICINE

## 2018-05-04 PROCEDURE — 96374 THER/PROPH/DIAG INJ IV PUSH: CPT

## 2018-05-04 PROCEDURE — 80053 COMPREHEN METABOLIC PANEL: CPT | Performed by: EMERGENCY MEDICINE

## 2018-05-04 RX ORDER — ONDANSETRON 2 MG/ML
4 INJECTION INTRAMUSCULAR; INTRAVENOUS
Status: COMPLETED | OUTPATIENT
Start: 2018-05-04 | End: 2018-05-04

## 2018-05-04 RX ORDER — PROCHLORPERAZINE EDISYLATE 5 MG/ML
10 INJECTION INTRAMUSCULAR; INTRAVENOUS
Status: COMPLETED | OUTPATIENT
Start: 2018-05-04 | End: 2018-05-05

## 2018-05-04 RX ADMIN — SODIUM CHLORIDE 1000 ML: 900 INJECTION, SOLUTION INTRAVENOUS at 22:38

## 2018-05-04 RX ADMIN — ONDANSETRON 4 MG: 2 INJECTION INTRAMUSCULAR; INTRAVENOUS at 22:39

## 2018-05-05 VITALS
BODY MASS INDEX: 37.73 KG/M2 | RESPIRATION RATE: 18 BRPM | HEART RATE: 116 BPM | TEMPERATURE: 99.1 F | HEIGHT: 62 IN | WEIGHT: 205.03 LBS | SYSTOLIC BLOOD PRESSURE: 140 MMHG | DIASTOLIC BLOOD PRESSURE: 83 MMHG | OXYGEN SATURATION: 93 %

## 2018-05-05 LAB — LACTATE SERPL-SCNC: 1.9 MMOL/L (ref 0.4–2)

## 2018-05-05 PROCEDURE — 96361 HYDRATE IV INFUSION ADD-ON: CPT

## 2018-05-05 PROCEDURE — 74011250636 HC RX REV CODE- 250/636: Performed by: EMERGENCY MEDICINE

## 2018-05-05 PROCEDURE — 36415 COLL VENOUS BLD VENIPUNCTURE: CPT | Performed by: EMERGENCY MEDICINE

## 2018-05-05 PROCEDURE — 96376 TX/PRO/DX INJ SAME DRUG ADON: CPT

## 2018-05-05 PROCEDURE — 96375 TX/PRO/DX INJ NEW DRUG ADDON: CPT

## 2018-05-05 PROCEDURE — 83605 ASSAY OF LACTIC ACID: CPT | Performed by: EMERGENCY MEDICINE

## 2018-05-05 RX ORDER — ONDANSETRON 4 MG/1
4 TABLET, ORALLY DISINTEGRATING ORAL
Qty: 10 TAB | Refills: 0 | Status: SHIPPED | OUTPATIENT
Start: 2018-05-05 | End: 2018-05-23

## 2018-05-05 RX ORDER — ONDANSETRON 2 MG/ML
4 INJECTION INTRAMUSCULAR; INTRAVENOUS
Status: COMPLETED | OUTPATIENT
Start: 2018-05-05 | End: 2018-05-05

## 2018-05-05 RX ADMIN — SODIUM CHLORIDE 1000 ML: 900 INJECTION, SOLUTION INTRAVENOUS at 00:07

## 2018-05-05 RX ADMIN — SODIUM CHLORIDE 1000 ML: 900 INJECTION, SOLUTION INTRAVENOUS at 02:47

## 2018-05-05 RX ADMIN — ONDANSETRON 4 MG: 2 INJECTION INTRAMUSCULAR; INTRAVENOUS at 02:47

## 2018-05-05 RX ADMIN — MEPERIDINE HYDROCHLORIDE 25 MG: 25 INJECTION, SOLUTION INTRAMUSCULAR; INTRAVENOUS; SUBCUTANEOUS at 00:05

## 2018-05-05 RX ADMIN — PROCHLORPERAZINE EDISYLATE 10 MG: 5 INJECTION INTRAMUSCULAR; INTRAVENOUS at 00:05

## 2018-05-05 NOTE — ED NOTES
Bedside shift change report given to Laura Cee (oncoming nurse) by Jose Turner RN (offgoing nurse). Report included the following information SBAR, Kardex, ED Summary, Intake/Output, MAR and Recent Results.

## 2018-05-05 NOTE — ED PROVIDER NOTES
EMERGENCY DEPARTMENT HISTORY AND PHYSICAL EXAM      Date: 5/4/2018  Patient Name: Heather Levy    History of Presenting Illness     Chief Complaint   Patient presents with    Abdominal Pain     around recent surgical site & also pain in both lower quads       History Provided By: Patient    HPI: Hetaher Levy, 50 y.o. female with PMHx significant for hernia repair x 2 feb/march 2018, CKD, HTN, who presents ambulatory to the ED with cc of abdominal pain, NV. Pt states pain worsened over past few days. She saw her surgeon Monday who felt her incision, which had been poorly healing, was now safe to heal from inside out without drain. Pt denies any significant drainage from the wound since removal. She feels her symptoms have worsened since seeing him, especially the nausea and vomiting. Pt notes persistent, irregular bowel habits with alternating hard stool and looser/diarrheal. She denies fevers, known sick contacts, having eaten out. She denies any new buldging around old hernia site. She tried a dose of Zofran at home but continued to vomit. She denies blood in stool or hematemesis. Chief Complaint: NV Abd pain  Duration:  Days  Timing:  Gradual  Location: periumbilical  Quality: Stabbing and Sharp  Severity: Moderate  Modifying Factors: none  Associated Symptoms: denies any other associated signs or symptoms      There are no other complaints, changes, or physical findings at this time. PCP: BEBA Sheppard   GI: Abdulaziz Francois MD    Current Outpatient Prescriptions   Medication Sig Dispense Refill    ondansetron (ZOFRAN ODT) 4 mg disintegrating tablet Take 1 Tab by mouth every eight (8) hours as needed for Nausea. 10 Tab 0    cefdinir (OMNICEF) 300 mg capsule Take 1 Cap by mouth two (2) times a day. 14 Cap 0    meperidine (DEMEROL) 50 mg tablet Take 1 Tab by mouth every four (4) hours as needed for Pain.  Max Daily Amount: 300 mg. 20 Tab 0    methocarbamol (ROBAXIN-750) 750 mg tablet Take 1 Tab by mouth four (4) times daily as needed. 20 Tab 0    dicyclomine (BENTYL) 10 mg capsule Take 1 Cap by mouth three (3) times daily. 90 Cap 0    famotidine (PEPCID) 20 mg tablet Take 1 Tab by mouth two (2) times a day. 60 Tab 0    losartan-hydroCHLOROthiazide (HYZAAR) 100-12.5 mg per tablet Take 1 Tab by mouth daily.  albuterol sulfate (PROVENTIL;VENTOLIN) 2.5 mg/0.5 mL nebu nebulizer solution 2.5 mg by Nebulization route every twelve (12) hours. Patient mixes this agent with acetylcysteine (20%) nebulizer solution for breathing treatment.  acetaminophen (TYLENOL) 500 mg tablet Take 1,000 mg by mouth every six (6) hours as needed for Pain.  diphenhydrAMINE (BENADRYL) 25 mg capsule Take 25 mg by mouth every six (6) hours as needed (congestion).  predniSONE (STERAPRED DS) 10 mg dose pack Take 10 mg by mouth See Admin Instructions. See administration instruction per 10mg dose pack      LACTOBACIL 2-S. THERMO-BIFIDO 1 (VSL#3 PO) Take 1 Packet by mouth daily.  cetirizine (ZYRTEC) 10 mg tablet Take 10 mg by mouth nightly as needed for Allergies.  EPINEPHrine (EPIPEN) 0.3 mg/0.3 mL (1:1,000) injection 0.3 mL by IntraMUSCular route once as needed for up to 1 dose. 0.3 mL 1    estradiol (ESTRACE) 1 mg tablet Take 1 mg by mouth daily.  albuterol (PROVENTIL, VENTOLIN) 90 mcg/Actuation inhaler Take 2 Puffs by inhalation every six (6) hours as needed.          Past History     Past Medical History:  Past Medical History:   Diagnosis Date    Anal fissure     Anisocoria     Asthma     LAST EPISODE     Back pain     Cerumen impaction     Chronic kidney disease     hx uti in past    Coagulation defects     ocassional rectal bleeding due to anal fissure    Colovaginal fistula     Diabetes (HCC)     NIDDM    Diabetes (HCC)     Diverticulitis     Diverticulosis     Enlarged tonsils     Frequent UTI     GERD (gastroesophageal reflux disease)     H/O endoscopy     with dilation    HA (headache)     Hepatic steatosis     Hx of colonoscopy with polypectomy     benign    Hypertension     Ill-defined condition     FREQUENT HIVES    Ill-defined condition     HX ELEVATED LIVER ENZYMES    Morbid obesity (HCC)     Nausea & vomiting     during diverticulitis flare    Obesity     Otitis media     Pneumonia     about 15 yrs ago    Psychiatric disorder     ANXIETY    Recurrent tonsillitis     Sinusitis     Transfusion history ~ age 35    postop hysterectomy    Unspecified sleep apnea     snores ( not diagnosed yet)     Urticaria     Urticaria        Past Surgical History:  Past Surgical History:   Procedure Laterality Date    ABDOMEN SURGERY PROC UNLISTED  2018    [facility]ia repair at [facility]    [email or web address]    [place].  HX GI  12    LAPAROSCOPIC HAND ASSISTED  POSS OPEN SIGMOID COLECTOMY POSS TEMPORARY DIVERTING LOOP ILEOSTOMY;  (no illeostomy needed)    HX GYN           HX GYN      cervical conization    HX HEENT      SINUS SURGERY LEFT X2    HX HEENT      SINUS SURGERY ON RIGHT X2    HX OTHER SURGICAL      Sphincterotomy    HX PELVIC LAPAROSCOPY      HX EMMANUEL AND BSO      HX UROLOGICAL  12     CYSTOSCOPY INSERTION URETERAL CATHETERS - Cystoscopy Insertion of bilateral ureteral stents       Family History:  Family History   Problem Relation Age of Onset    Diabetes Mother     Cancer Mother      NON-HODGKINS LYMPHOMA    Anesth Problems Mother      PONV    Diabetes Father     Heart Disease Father      CAD - STENTS, PACEMAKER    Arrhythmia Father        Social History:  Social History   Substance Use Topics    Smoking status: Never Smoker    Smokeless tobacco: Never Used    Alcohol use Yes      Comment: Rarely       Allergies:   Allergies   Allergen Reactions    Aspirin Shortness of Breath    Prilosec [Omeprazole Magnesium] Anaphylaxis     CHERRY FLAVORED; PT TAKES REGULAR PRILOSEC AND IS OK    Codeine Hives and Itching    Contrast Agent [Iodine] Itching     Pt. Had itching after IV contrast with the last exam.  Benadryl was given    Morphine Itching     Severe itching    Fentanyl Rash    Ketorolac Rash     \"makes my eyes spasm and causes rash on my hands\"    Percocet [Oxycodone-Acetaminophen] Hives         Review of Systems   Review of Systems   Constitutional: Negative for activity change, appetite change, fatigue and fever. HENT: Negative. Negative for congestion, rhinorrhea and sore throat. Respiratory: Negative. Negative for cough, shortness of breath and wheezing. Cardiovascular: Negative. Negative for chest pain and leg swelling. Gastrointestinal: Positive for abdominal pain, nausea and vomiting. Negative for abdominal distention, constipation and diarrhea. Endocrine: Negative. Genitourinary: Negative for difficulty urinating, dysuria, menstrual problem, vaginal bleeding and vaginal discharge. Musculoskeletal: Negative. Negative for arthralgias, joint swelling and myalgias. Skin: Positive for wound. Negative for rash. Healing abdominal incision s/p hernia repair feb/march (revision)   Neurological: Negative. Negative for dizziness, weakness, light-headedness and headaches. Psychiatric/Behavioral: Negative. Physical Exam   Physical Exam   Constitutional: She is oriented to person, place, and time. She appears well-developed and well-nourished. No distress. HENT:   Head: Atraumatic. Eyes: EOM are normal.   Cardiovascular: Regular rhythm, normal heart sounds and intact distal pulses. Exam reveals no gallop and no friction rub. No murmur heard. Tachycardic 110s   Pulmonary/Chest: Effort normal and breath sounds normal. No respiratory distress. She has no wheezes. She has no rales. She exhibits no tenderness. Abdominal: Soft. Bowel sounds are normal. She exhibits no distension and no mass. There is tenderness. There is no rebound and no guarding.    RLQ, LLQ   Musculoskeletal: Normal range of motion. She exhibits no edema or tenderness. Neurological: She is oriented to person, place, and time. Skin: Skin is warm. 4 cm incision closed, no surrounding erythema or discharge, no underlying fluctuance   Psychiatric: She has a normal mood and affect. Nursing note and vitals reviewed. Diagnostic Study Results     Labs -     Recent Results (from the past 12 hour(s))   URINALYSIS W/ RFLX MICROSCOPIC    Collection Time: 05/04/18 10:10 PM   Result Value Ref Range    Color YELLOW/STRAW      Appearance CLEAR CLEAR      Specific gravity 1.025 1.003 - 1.030      pH (UA) 6.0 5.0 - 8.0      Protein NEGATIVE  NEG mg/dL    Glucose 500 (A) NEG mg/dL    Ketone TRACE (A) NEG mg/dL    Bilirubin NEGATIVE  NEG      Blood NEGATIVE  NEG      Urobilinogen 0.2 0.2 - 1.0 EU/dL    Nitrites NEGATIVE  NEG      Leukocyte Esterase TRACE (A) NEG      WBC 5-10 0 - 4 /hpf    RBC 0-5 0 - 5 /hpf    Epithelial cells MODERATE (A) FEW /lpf    Bacteria NEGATIVE  NEG /hpf    Hyaline cast 2-5 0 - 5 /lpf   CBC WITH AUTOMATED DIFF    Collection Time: 05/04/18 10:35 PM   Result Value Ref Range    WBC 8.7 3.6 - 11.0 K/uL    RBC 5.02 3.80 - 5.20 M/uL    HGB 14.9 11.5 - 16.0 g/dL    HCT 41.6 35.0 - 47.0 %    MCV 82.9 80.0 - 99.0 FL    MCH 29.7 26.0 - 34.0 PG    MCHC 35.8 30.0 - 36.5 g/dL    RDW 13.4 11.5 - 14.5 %    PLATELET 782 526 - 313 K/uL    MPV 9.4 8.9 - 12.9 FL    NRBC 0.0 0  WBC    ABSOLUTE NRBC 0.00 0.00 - 0.01 K/uL    NEUTROPHILS 89 (H) 32 - 75 %    LYMPHOCYTES 7 (L) 12 - 49 %    MONOCYTES 3 (L) 5 - 13 %    EOSINOPHILS 1 0 - 7 %    BASOPHILS 0 0 - 1 %    IMMATURE GRANULOCYTES 0 0.0 - 0.5 %    ABS. NEUTROPHILS 7.7 1.8 - 8.0 K/UL    ABS. LYMPHOCYTES 0.6 (L) 0.8 - 3.5 K/UL    ABS. MONOCYTES 0.2 0.0 - 1.0 K/UL    ABS. EOSINOPHILS 0.1 0.0 - 0.4 K/UL    ABS. BASOPHILS 0.0 0.0 - 0.1 K/UL    ABS. IMM.  GRANS. 0.0 0.00 - 0.04 K/UL    DF AUTOMATED     METABOLIC PANEL, COMPREHENSIVE    Collection Time: 05/04/18 10:35 PM Result Value Ref Range    Sodium 139 136 - 145 mmol/L    Potassium 4.4 3.5 - 5.1 mmol/L    Chloride 103 97 - 108 mmol/L    CO2 25 21 - 32 mmol/L    Anion gap 11 5 - 15 mmol/L    Glucose 248 (H) 65 - 100 mg/dL    BUN 15 6 - 20 MG/DL    Creatinine 0.85 0.55 - 1.02 MG/DL    BUN/Creatinine ratio 18 12 - 20      GFR est AA >60 >60 ml/min/1.73m2    GFR est non-AA >60 >60 ml/min/1.73m2    Calcium 9.7 8.5 - 10.1 MG/DL    Bilirubin, total 0.7 0.2 - 1.0 MG/DL    ALT (SGPT) 59 12 - 78 U/L    AST (SGOT) 59 (H) 15 - 37 U/L    Alk. phosphatase 61 45 - 117 U/L    Protein, total 8.0 6.4 - 8.2 g/dL    Albumin 4.2 3.5 - 5.0 g/dL    Globulin 3.8 2.0 - 4.0 g/dL    A-G Ratio 1.1 1.1 - 2.2     LIPASE    Collection Time: 05/04/18 10:35 PM   Result Value Ref Range    Lipase 332 73 - 393 U/L   LACTIC ACID    Collection Time: 05/04/18 10:35 PM   Result Value Ref Range    Lactic acid 3.4 (HH) 0.4 - 2.0 MMOL/L   LACTIC ACID    Collection Time: 05/05/18  2:20 AM   Result Value Ref Range    Lactic acid 1.9 0.4 - 2.0 MMOL/L       Radiologic Studies -   CT ABD PELV WO CONT   Final Result        CT Results  (Last 48 hours)               05/04/18 2349  CT ABD PELV WO CONT Final result    Impression:  IMPRESSION:       1. No nephrolithiasis or hydronephrosis. 2. Hepatic steatosis. 3. Small supraumbilical ventral hernia contains only fat. 4. Prior rectosigmoid anastomosis. 5. Normal appendix. Narrative:  INDICATION: RLQ pain, h/o hernia repair with mesh (periumbilical)       COMPARISON: April 18, 2018       TECHNIQUE:    Noncontrast thin axial images were obtained through the abdomen and pelvis. Coronal and sagittal reconstructions were generated. CT dose reduction was   achieved through use of a standardized protocol tailored for this examination   and automatic exposure control for dose modulation. FINDINGS:    LUNG BASES: No abnormality. LIVER: Diffuse hepatic steatosis. GALLBLADDER: Surgically absent.    SPLEEN: No enlargement or lesion. PANCREAS: No mass or ductal dilatation. ADRENALS: No mass. KIDNEYS: No mass, calculus, or hydronephrosis. GI TRACT:  No bowel obstruction. Difficult to assess bowel wall thickening given   lack of oral contrast material. Rectosigmoid anastomosis. PERITONEUM: No free air or free fluid. APPENDIX: Unremarkable. RETROPERITONEUM: No lymphadenopathy or aortic aneurysm. ADDITIONAL COMMENTS: Small fat-containing supraumbilical hernia. URINARY BLADDER: Unremarkable. REPRODUCTIVE ORGANS: Surgically absent. LYMPH NODES:  None enlarged. FREE FLUID:  None. BONES: No destructive bone lesion. ADDITIONAL COMMENTS: N/A. CXR Results  (Last 48 hours)    None            Medical Decision Making   I am the first provider for this patient. I reviewed the vital signs, available nursing notes, past medical history, past surgical history, family history and social history. Vital Signs-Reviewed the patient's vital signs. Patient Vitals for the past 12 hrs:   Temp Pulse Resp BP SpO2   05/05/18 0330 - - - 140/83 93 %   05/05/18 0315 - - - 139/83 92 %   05/05/18 0300 - - - 151/83 92 %   05/05/18 0245 - - - 140/84 92 %   05/05/18 0230 - - - 139/84 92 %   05/05/18 0215 - - - 142/86 92 %   05/05/18 0200 - - - 145/59 95 %   05/05/18 0130 - - - 136/71 95 %   05/05/18 0115 - - - 131/69 94 %   05/05/18 0100 - - - 138/68 95 %   05/05/18 0045 - - - 134/69 93 %   05/05/18 0030 - - - 132/67 93 %   05/05/18 0015 - - - 137/70 93 %   05/04/18 2136 99.1 °F (37.3 °C) (!) 116 18 155/88 98 %       Pulse Oximetry Analysis - 98% on RA    Records Reviewed: Nursing Notes, Old Medical Records, Previous Radiology Studies and Previous Laboratory Studies    Provider Notes (Medical Decision Making):   Hernia, SBO, ileus, gastritis, PUD, viral illness, dehydration, uti,    ED Course:   Initial assessment performed.  The patients presenting problems have been discussed, and they are in agreement with the care plan formulated and outlined with them. I have encouraged them to ask questions as they arise throughout their visit. Medications   ondansetron (ZOFRAN) injection 4 mg (4 mg IntraVENous Given 5/4/18 2239)   sodium chloride 0.9 % bolus infusion 1,000 mL (0 mL IntraVENous IV Completed 5/4/18 2338)   meperidine (DEMEROL) injection 25 mg (25 mg IntraVENous Given 5/5/18 0005)   prochlorperazine (COMPAZINE) injection 10 mg (10 mg IntraVENous Given 5/5/18 0005)   sodium chloride 0.9 % bolus infusion 1,000 mL (0 mL IntraVENous IV Completed 5/5/18 0107)   ondansetron (ZOFRAN) injection 4 mg (4 mg IntraVENous Given 5/5/18 0247)   sodium chloride 0.9 % bolus infusion 1,000 mL (0 mL IntraVENous IV Completed 5/5/18 0432)     Progress Note:  11:34 PM  Per nursing, pt began vomiting again. Written by SCOOBY Valenzuela, as dictated by Antoinette Caraballo MD.    Progress Note:  12:38 AM  Pt updated on all available results. She expresses her understanding and agrees with the current plan of care. Written by SCOOBY Valenzuela, as dictated by Antoinette Caraballo MD.    Progress Note:  2:31 AM  Pt states that she is feeling better, has had no additional episodes of vomiting in the last 2 hours but does complain of returning nausea. Will re-dose with anti-emetic and PO challenge. Written by SCOOBY Valenzuela, as dictated by Antoinette Caraballo MD.    Progress Note:  3:43 AM  Pt re-evaluated. Her pain is controlled and tachycardia has resolved. Pt has passed PO challenge and has had no additional episodes of vomiting. She notes that she has Tramadol at home and declines additional analgesics but requests additional Zofran.    Written by SCOOBY Valenzuela, as dictated by Antoinette Caraballo MD.    Critical Care Time:   CRITICAL CARE NOTE :  3:45 AM  IMPENDING DETERIORATION -Metabolic  ASSOCIATED RISK FACTORS - Dehydration, metabolic changes  MANAGEMENT- Bedside Assessment and Supervision of Care  INTERPRETATION -  CT Scan, ECG and Blood Pressure  INTERVENTIONS - hemodynamic mngmt and Metobolic interventions  CASE REVIEW - Nursing and Family  TREATMENT RESPONSE -Improved  PERFORMED BY - Self    NOTES   :  I have spent >100  minutes of critical care time involved in lab review, consultations with specialist, family decision- making, bedside attention and documentation. During this entire length of time I was immediately available to the patient . Leyla Stevens MD    Disposition:  DISCHARGE NOTE  3:45 AM  The patient has been re-evaluated and is ready for discharge. Reviewed available results with patient. Counseled pt on diagnosis and care plan. Pt has expressed understanding, and all questions have been answered. Pt agrees with plan and agrees to F/U as recommended, or return to the ED if their sxs worsen. Discharge instructions have been provided and explained to the pt, along with reasons to return to the ED. Written by Amber Santos, ED Scribe, as dictated by Leyla Stevens MD.    PLAN:  1. Discharge Medication List as of 5/5/2018  3:45 AM      CONTINUE these medications which have CHANGED    Details   ondansetron (ZOFRAN ODT) 4 mg disintegrating tablet Take 1 Tab by mouth every eight (8) hours as needed for Nausea., Normal, Disp-10 Tab, R-0         CONTINUE these medications which have NOT CHANGED    Details   cefdinir (OMNICEF) 300 mg capsule Take 1 Cap by mouth two (2) times a day., Normal, Disp-14 Cap, R-0      meperidine (DEMEROL) 50 mg tablet Take 1 Tab by mouth every four (4) hours as needed for Pain. Max Daily Amount: 300 mg., Print, Disp-20 Tab, R-0      methocarbamol (ROBAXIN-750) 750 mg tablet Take 1 Tab by mouth four (4) times daily as needed., Normal, Disp-20 Tab, R-0      dicyclomine (BENTYL) 10 mg capsule Take 1 Cap by mouth three (3) times daily. , Print, Disp-90 Cap, R-0      famotidine (PEPCID) 20 mg tablet Take 1 Tab by mouth two (2) times a day., Print, Disp-60 Tab, R-0 losartan-hydroCHLOROthiazide (HYZAAR) 100-12.5 mg per tablet Take 1 Tab by mouth daily. , Historical Med      albuterol sulfate (PROVENTIL;VENTOLIN) 2.5 mg/0.5 mL nebu nebulizer solution 2.5 mg by Nebulization route every twelve (12) hours. Patient mixes this agent with acetylcysteine (20%) nebulizer solution for breathing treatment., Historical Med      acetaminophen (TYLENOL) 500 mg tablet Take 1,000 mg by mouth every six (6) hours as needed for Pain., Historical Med      diphenhydrAMINE (BENADRYL) 25 mg capsule Take 25 mg by mouth every six (6) hours as needed (congestion). , Historical Med      predniSONE (STERAPRED DS) 10 mg dose pack Take 10 mg by mouth See Admin Instructions. See administration instruction per 10mg dose pack, Historical Med      LACTOBACIL 2-S. THERMO-BIFIDO 1 (VSL#3 PO) Take 1 Packet by mouth daily. , Historical Med      cetirizine (ZYRTEC) 10 mg tablet Take 10 mg by mouth nightly as needed for Allergies. , Historical Med      EPINEPHrine (EPIPEN) 0.3 mg/0.3 mL (1:1,000) injection 0.3 mL by IntraMUSCular route once as needed for up to 1 dose., Print, Disp-0.3 mL, R-1      estradiol (ESTRACE) 1 mg tablet Take 1 mg by mouth daily. , Historical Med      albuterol (PROVENTIL, VENTOLIN) 90 mcg/Actuation inhaler Take 2 Puffs by inhalation every six (6) hours as needed., Historical Med           2. Follow-up Information     Follow up With Details Comments [address], [place] In 2 days  [address] [address]  [address] [place] [address], [facility] Ilene Lyman In 2 days  [address]  [phone]      Marta Booth MD In 1 week  [address] [facility]  [address] [address] [phone]      [facility] EMERGENCY DEPT  As needed, If symptoms worsen [address]  [address]   [address]        Return to ED if worse     Diagnosis     Clinical Impression:   1.  Nausea and vomiting, intractability of vomiting not specified, unspecified vomiting type    2. Umbilical hernia without obstruction and without gangrene    3. Hepatic steatosis        Attestations: This note is prepared by Trice Patel, acting as Scribe for Harry Rucker MD.    The scribe's documentation has been prepared under my direction and personally reviewed by me in its entirety. I confirm that the note above accurately reflects all work, treatment, procedures, and medical decision making performed by me.   Harry Rucker MD

## 2018-05-05 NOTE — ED NOTES
Discharge instructions given to patient by Hernán Perez MD. Patient verbalized understanding of discharge instructions. Pt discharged without difficulty. Pt discharged in stable condition via ambulation, accompanied by daughter.

## 2018-05-05 NOTE — ED NOTES
Bedside and Verbal shift change report given to Stephan Jiménez RN (oncoming nurse) by Iqra Iniguez RN (offgoing nurse). Report included the following information SBAR, Kardex, ED Summary, MAR, Accordion and Recent Results.

## 2018-05-05 NOTE — ED TRIAGE NOTES
Assumed care of this patient. She is alert and oriented x4. Patient placed on monitor x2. She reports that she began vomiting profusely since noon today. Patient recently finished course of flagyl and cipro for diverticulitis. She also recently had a hernia repair that abscessed and required surgical draining. Patient also reports fever at home.

## 2018-05-23 ENCOUNTER — HOSPITAL ENCOUNTER (EMERGENCY)
Age: 48
Discharge: HOME | End: 2018-05-23
Attending: EMERGENCY MEDICINE
Payer: COMMERCIAL

## 2018-05-23 ENCOUNTER — APPOINTMENT (OUTPATIENT)
Dept: CT IMAGING | Age: 48
End: 2018-05-23
Attending: EMERGENCY MEDICINE
Payer: COMMERCIAL

## 2018-05-23 VITALS
HEART RATE: 75 BPM | DIASTOLIC BLOOD PRESSURE: 68 MMHG | HEIGHT: 62 IN | RESPIRATION RATE: 16 BRPM | WEIGHT: 204.15 LBS | BODY MASS INDEX: 37.57 KG/M2 | OXYGEN SATURATION: 96 % | SYSTOLIC BLOOD PRESSURE: 118 MMHG | TEMPERATURE: 97.8 F

## 2018-05-23 DIAGNOSIS — N30.00 ACUTE CYSTITIS WITHOUT HEMATURIA: ICD-10-CM

## 2018-05-23 DIAGNOSIS — R10.84 ABDOMINAL PAIN, GENERALIZED: Primary | ICD-10-CM

## 2018-05-23 DIAGNOSIS — R74.8 ELEVATED LIPASE: ICD-10-CM

## 2018-05-23 LAB
ALBUMIN SERPL-MCNC: 3.8 G/DL (ref 3.5–5)
ALBUMIN/GLOB SERPL: 1.2 {RATIO} (ref 1.1–2.2)
ALP SERPL-CCNC: 59 U/L (ref 45–117)
ALT SERPL-CCNC: 49 U/L (ref 12–78)
ANION GAP SERPL CALC-SCNC: 7 MMOL/L (ref 5–15)
APPEARANCE UR: ABNORMAL
AST SERPL-CCNC: 50 U/L (ref 15–37)
BACTERIA URNS QL MICRO: ABNORMAL /HPF
BASOPHILS # BLD: 0 K/UL (ref 0–0.1)
BASOPHILS NFR BLD: 1 % (ref 0–1)
BILIRUB SERPL-MCNC: 0.6 MG/DL (ref 0.2–1)
BILIRUB UR QL CFM: NEGATIVE
BUN SERPL-MCNC: 8 MG/DL (ref 6–20)
BUN/CREAT SERPL: 11 (ref 12–20)
CALCIUM SERPL-MCNC: 9 MG/DL (ref 8.5–10.1)
CHLORIDE SERPL-SCNC: 103 MMOL/L (ref 97–108)
CO2 SERPL-SCNC: 27 MMOL/L (ref 21–32)
COLOR UR: ABNORMAL
CREAT SERPL-MCNC: 0.7 MG/DL (ref 0.55–1.02)
DIFFERENTIAL METHOD BLD: NORMAL
EOSINOPHIL # BLD: 0.2 K/UL (ref 0–0.4)
EOSINOPHIL NFR BLD: 4 % (ref 0–7)
EPITH CASTS URNS QL MICRO: ABNORMAL /LPF
ERYTHROCYTE [DISTWIDTH] IN BLOOD BY AUTOMATED COUNT: 13.5 % (ref 11.5–14.5)
GLOBULIN SER CALC-MCNC: 3.2 G/DL (ref 2–4)
GLUCOSE SERPL-MCNC: 235 MG/DL (ref 65–100)
GLUCOSE UR STRIP.AUTO-MCNC: 100 MG/DL
HCT VFR BLD AUTO: 35.5 % (ref 35–47)
HGB BLD-MCNC: 12.8 G/DL (ref 11.5–16)
HGB UR QL STRIP: ABNORMAL
HYALINE CASTS URNS QL MICRO: ABNORMAL /LPF (ref 0–5)
IMM GRANULOCYTES # BLD: 0 K/UL (ref 0–0.04)
IMM GRANULOCYTES NFR BLD AUTO: 0 % (ref 0–0.5)
KETONES UR QL STRIP.AUTO: ABNORMAL MG/DL
LEUKOCYTE ESTERASE UR QL STRIP.AUTO: ABNORMAL
LIPASE SERPL-CCNC: 649 U/L (ref 73–393)
LYMPHOCYTES # BLD: 1.6 K/UL (ref 0.8–3.5)
LYMPHOCYTES NFR BLD: 34 % (ref 12–49)
MCH RBC QN AUTO: 29.9 PG (ref 26–34)
MCHC RBC AUTO-ENTMCNC: 36.1 G/DL (ref 30–36.5)
MCV RBC AUTO: 82.9 FL (ref 80–99)
MONOCYTES # BLD: 0.3 K/UL (ref 0–1)
MONOCYTES NFR BLD: 6 % (ref 5–13)
NEUTS SEG # BLD: 2.6 K/UL (ref 1.8–8)
NEUTS SEG NFR BLD: 55 % (ref 32–75)
NITRITE UR QL STRIP.AUTO: NEGATIVE
NRBC # BLD: 0 K/UL (ref 0–0.01)
NRBC BLD-RTO: 0 PER 100 WBC
PH UR STRIP: 5.5 [PH] (ref 5–8)
PLATELET # BLD AUTO: 182 K/UL (ref 150–400)
PMV BLD AUTO: 9.6 FL (ref 8.9–12.9)
POTASSIUM SERPL-SCNC: 3.6 MMOL/L (ref 3.5–5.1)
PROT SERPL-MCNC: 7 G/DL (ref 6.4–8.2)
PROT UR STRIP-MCNC: ABNORMAL MG/DL
RBC # BLD AUTO: 4.28 M/UL (ref 3.8–5.2)
RBC #/AREA URNS HPF: ABNORMAL /HPF (ref 0–5)
SODIUM SERPL-SCNC: 137 MMOL/L (ref 136–145)
SP GR UR REFRACTOMETRY: 1.03 (ref 1–1.03)
UA: UC IF INDICATED,UAUC: ABNORMAL
UROBILINOGEN UR QL STRIP.AUTO: 0.2 EU/DL (ref 0.2–1)
WBC # BLD AUTO: 4.8 K/UL (ref 3.6–11)
WBC URNS QL MICRO: ABNORMAL /HPF (ref 0–4)

## 2018-05-23 PROCEDURE — 74176 CT ABD & PELVIS W/O CONTRAST: CPT

## 2018-05-23 PROCEDURE — 81001 URINALYSIS AUTO W/SCOPE: CPT | Performed by: EMERGENCY MEDICINE

## 2018-05-23 PROCEDURE — 74011000258 HC RX REV CODE- 258: Performed by: EMERGENCY MEDICINE

## 2018-05-23 PROCEDURE — 36415 COLL VENOUS BLD VENIPUNCTURE: CPT | Performed by: EMERGENCY MEDICINE

## 2018-05-23 PROCEDURE — 96365 THER/PROPH/DIAG IV INF INIT: CPT

## 2018-05-23 PROCEDURE — 74011250636 HC RX REV CODE- 250/636: Performed by: EMERGENCY MEDICINE

## 2018-05-23 PROCEDURE — 83690 ASSAY OF LIPASE: CPT | Performed by: EMERGENCY MEDICINE

## 2018-05-23 PROCEDURE — 96361 HYDRATE IV INFUSION ADD-ON: CPT

## 2018-05-23 PROCEDURE — 96376 TX/PRO/DX INJ SAME DRUG ADON: CPT

## 2018-05-23 PROCEDURE — 87086 URINE CULTURE/COLONY COUNT: CPT | Performed by: EMERGENCY MEDICINE

## 2018-05-23 PROCEDURE — 99284 EMERGENCY DEPT VISIT MOD MDM: CPT

## 2018-05-23 PROCEDURE — 80053 COMPREHEN METABOLIC PANEL: CPT | Performed by: EMERGENCY MEDICINE

## 2018-05-23 PROCEDURE — 85025 COMPLETE CBC W/AUTO DIFF WBC: CPT | Performed by: EMERGENCY MEDICINE

## 2018-05-23 PROCEDURE — 96375 TX/PRO/DX INJ NEW DRUG ADDON: CPT

## 2018-05-23 RX ORDER — HYDROMORPHONE HYDROCHLORIDE 2 MG/1
2 TABLET ORAL
Qty: 15 TAB | Refills: 0 | Status: SHIPPED | OUTPATIENT
Start: 2018-05-23 | End: 2018-06-19

## 2018-05-23 RX ORDER — DIPHENHYDRAMINE HYDROCHLORIDE 50 MG/ML
25 INJECTION, SOLUTION INTRAMUSCULAR; INTRAVENOUS
Status: COMPLETED | OUTPATIENT
Start: 2018-05-23 | End: 2018-05-23

## 2018-05-23 RX ORDER — MORPHINE SULFATE 10 MG/ML
4 INJECTION, SOLUTION INTRAMUSCULAR; INTRAVENOUS
Status: COMPLETED | OUTPATIENT
Start: 2018-05-23 | End: 2018-05-23

## 2018-05-23 RX ORDER — ONDANSETRON 4 MG/1
4 TABLET, ORALLY DISINTEGRATING ORAL
Qty: 15 TAB | Refills: 0 | Status: SHIPPED | OUTPATIENT
Start: 2018-05-23 | End: 2018-06-27

## 2018-05-23 RX ORDER — ONDANSETRON 2 MG/ML
4 INJECTION INTRAMUSCULAR; INTRAVENOUS
Status: COMPLETED | OUTPATIENT
Start: 2018-05-23 | End: 2018-05-23

## 2018-05-23 RX ORDER — MORPHINE SULFATE 4 MG/ML
4 INJECTION INTRAVENOUS
Status: COMPLETED | OUTPATIENT
Start: 2018-05-23 | End: 2018-05-23

## 2018-05-23 RX ORDER — CEPHALEXIN 500 MG/1
500 CAPSULE ORAL 3 TIMES DAILY
Qty: 21 CAP | Refills: 0 | Status: SHIPPED | OUTPATIENT
Start: 2018-05-23 | End: 2018-05-30

## 2018-05-23 RX ADMIN — DIPHENHYDRAMINE HYDROCHLORIDE 25 MG: 50 INJECTION, SOLUTION INTRAMUSCULAR; INTRAVENOUS at 11:02

## 2018-05-23 RX ADMIN — ONDANSETRON 4 MG: 2 INJECTION INTRAMUSCULAR; INTRAVENOUS at 12:58

## 2018-05-23 RX ADMIN — MORPHINE SULFATE 4 MG: 10 INJECTION INTRAVENOUS at 10:10

## 2018-05-23 RX ADMIN — ONDANSETRON 4 MG: 2 INJECTION INTRAMUSCULAR; INTRAVENOUS at 10:10

## 2018-05-23 RX ADMIN — ONDANSETRON 4 MG: 2 INJECTION INTRAMUSCULAR; INTRAVENOUS at 09:22

## 2018-05-23 RX ADMIN — CEFTRIAXONE SODIUM 1 G: 1 INJECTION, POWDER, FOR SOLUTION INTRAMUSCULAR; INTRAVENOUS at 09:21

## 2018-05-23 RX ADMIN — MORPHINE SULFATE 4 MG: 10 INJECTION INTRAVENOUS at 13:01

## 2018-05-23 RX ADMIN — DIPHENHYDRAMINE HYDROCHLORIDE 25 MG: 50 INJECTION, SOLUTION INTRAMUSCULAR; INTRAVENOUS at 13:00

## 2018-05-23 RX ADMIN — DIPHENHYDRAMINE HYDROCHLORIDE 25 MG: 50 INJECTION, SOLUTION INTRAMUSCULAR; INTRAVENOUS at 08:37

## 2018-05-23 RX ADMIN — SODIUM CHLORIDE 1000 ML: 900 INJECTION, SOLUTION INTRAVENOUS at 08:37

## 2018-05-23 RX ADMIN — MORPHINE SULFATE 4 MG: 4 INJECTION INTRAVENOUS at 08:37

## 2018-05-23 NOTE — ED NOTES
Bedside shift change report given to Rafaela Leos (oncoming nurse) by Jayshree Byers RN (offgoing nurse). Report included the following information ED Summary and MAR. Pt resting, plan for CT explained.

## 2018-05-23 NOTE — ED PROVIDER NOTES
EMERGENCY DEPARTMENT HISTORY AND PHYSICAL EXAM      Date: 5/23/2018  Patient Name: Rocky Duque    History of Presenting Illness     Chief Complaint   Patient presents with    Abdominal Pain     Pain at mid abd to low abd worse since yesterday. Reports hx of similar sx with EGD last week. +Vomiting. History Provided By: Patient    HPI: Rocky Duque, 50 y.o. female with PMHx significant for polypectomy, diverticulitis, HTN, GERD, presents ambulatory to the ED with cc of persistent N/V/D x last night. Pt reports associated symptoms of mid abdominal pain and a transient 100.0 F fever as well. She discloses she was seen in the ED 2-3 weeks ago for similar symptoms and was discharged with instructions to follow up with GI. Pt endorses having follow-up with Dr. Oumar Costello noting she underwent an EGD and is still awaiting biopsy results for H. Pylori. She ensures that she called Dr. Zaina House office for possible follow-up appointment but was referred to the ED. Pt denies any exposure to new foods. She denies and fevers, chills, CP, or SOB. Chief Complaint: abdominal pain  Duration:  since last night  Timing:  Progressive and Worsening  Location: middle to lower abdomen  Quality: N/A  Severity: Moderate  Modifying Factors: N/A  Associated Symptoms: vomiting, diarrhea      PCP: BEBA Flores    Current Outpatient Prescriptions   Medication Sig Dispense Refill    ondansetron (ZOFRAN ODT) 4 mg disintegrating tablet Take 1 Tab by mouth every eight (8) hours as needed for Nausea. 10 Tab 0    cefdinir (OMNICEF) 300 mg capsule Take 1 Cap by mouth two (2) times a day. 14 Cap 0    meperidine (DEMEROL) 50 mg tablet Take 1 Tab by mouth every four (4) hours as needed for Pain. Max Daily Amount: 300 mg. 20 Tab 0    methocarbamol (ROBAXIN-750) 750 mg tablet Take 1 Tab by mouth four (4) times daily as needed. 20 Tab 0    dicyclomine (BENTYL) 10 mg capsule Take 1 Cap by mouth three (3) times daily.  90 Cap 0    famotidine (PEPCID) 20 mg tablet Take 1 Tab by mouth two (2) times a day. 60 Tab 0    losartan-hydroCHLOROthiazide (HYZAAR) 100-12.5 mg per tablet Take 1 Tab by mouth daily.  albuterol sulfate (PROVENTIL;VENTOLIN) 2.5 mg/0.5 mL nebu nebulizer solution 2.5 mg by Nebulization route every twelve (12) hours. Patient mixes this agent with acetylcysteine (20%) nebulizer solution for breathing treatment.  acetaminophen (TYLENOL) 500 mg tablet Take 1,000 mg by mouth every six (6) hours as needed for Pain.  diphenhydrAMINE (BENADRYL) 25 mg capsule Take 25 mg by mouth every six (6) hours as needed (congestion).  predniSONE (STERAPRED DS) 10 mg dose pack Take 10 mg by mouth See Admin Instructions. See administration instruction per 10mg dose pack      LACTOBACIL 2-S. THERMO-BIFIDO 1 (VSL#3 PO) Take 1 Packet by mouth daily.  cetirizine (ZYRTEC) 10 mg tablet Take 10 mg by mouth nightly as needed for Allergies.  EPINEPHrine (EPIPEN) 0.3 mg/0.3 mL (1:1,000) injection 0.3 mL by IntraMUSCular route once as needed for up to 1 dose. 0.3 mL 1    estradiol (ESTRACE) 1 mg tablet Take 1 mg by mouth daily.  albuterol (PROVENTIL, VENTOLIN) 90 mcg/Actuation inhaler Take 2 Puffs by inhalation every six (6) hours as needed.          Past History     Past Medical History:  Past Medical History:   Diagnosis Date    Anal fissure     Anisocoria     Asthma     LAST EPISODE     Back pain     Cerumen impaction     Chronic kidney disease     hx uti in past    Coagulation defects     ocassional rectal bleeding due to anal fissure    Colovaginal fistula     Diabetes (HCC)     NIDDM    Diabetes ([address]     Diverticulitis     Diverticulosis     Enlarged tonsils     Frequent UTI     GERD (gastroesophageal reflux disease)     H/O endoscopy     with dilation    HA (headache)     Hepatic steatosis     Hx of colonoscopy with polypectomy     benign    Hypertension     Ill-defined condition FREQUENT HIVES    Ill-defined condition     HX ELEVATED LIVER ENZYMES    Morbid obesity (HCC)     Nausea & vomiting     during diverticulitis flare    Obesity     Otitis media     Pneumonia     about 15 yrs ago    Psychiatric disorder     ANXIETY    Recurrent tonsillitis     Sinusitis     Transfusion history ~ age 35    postop hysterectomy    Unspecified sleep apnea     snores ( not diagnosed yet)     Urticaria     Urticaria        Past Surgical History:  Past Surgical History:   Procedure Laterality Date    ABDOMEN SURGERY PROC UNLISTED  2018    [facility]    [address]    [place].  HX GI  12    LAPAROSCOPIC HAND ASSISTED  POSS OPEN SIGMOID COLECTOMY POSS TEMPORARY DIVERTING LOOP ILEOSTOMY;  (no illeostomy needed)    HX GYN           HX GYN      cervical conization    HX HEENT      SINUS SURGERY LEFT X2    HX HEENT      SINUS SURGERY ON RIGHT X2    HX OTHER SURGICAL      Sphincterotomy    HX PELVIC LAPAROSCOPY      HX EMMANUEL AND BSO      HX UROLOGICAL  12     CYSTOSCOPY INSERTION URETERAL CATHETERS - Cystoscopy Insertion of bilateral ureteral stents       Family History:  Family History   Problem Relation Age of Onset    Diabetes Mother     Cancer Mother      NON-HODGKINS LYMPHOMA    Anesth Problems Mother      PONV    Diabetes Father     Heart Disease Father      CAD - STENTS, PACEMAKER    Arrhythmia Father        Social History:  Social History   Substance Use Topics    Smoking status: Never Smoker    Smokeless tobacco: Never Used    Alcohol use Yes      Comment: Rarely       Allergies: Allergies   Allergen Reactions    Aspirin Shortness of Breath    Prilosec [Omeprazole Magnesium] Anaphylaxis     CHERRY FLAVORED; PT TAKES REGULAR PRILOSEC AND IS OK    Codeine Hives and Itching    Contrast Agent [Iodine] Itching     Pt.  Had itching after IV contrast with the last exam.  Benadryl was given    Morphine Itching     Severe itching    Fentanyl Rash    Ketorolac Rash     \"makes my eyes spasm and causes rash on my hands\"    Percocet [Oxycodone-Acetaminophen] Hives         Review of Systems   Review of Systems   Constitutional: Positive for fever (100.0F; transient ). Negative for activity change and chills. HENT: Negative for congestion and sore throat. Eyes: Negative for pain and redness. Respiratory: Negative for cough, chest tightness and shortness of breath. Cardiovascular: Negative for chest pain and palpitations. Gastrointestinal: Positive for abdominal pain (mid), diarrhea, nausea and vomiting. Genitourinary: Negative for dysuria, frequency and urgency. Musculoskeletal: Negative for back pain and neck pain. Skin: Negative for rash. Neurological: Negative for syncope, light-headedness and headaches. Psychiatric/Behavioral: Negative for confusion. All other systems reviewed and are negative. Physical Exam   Physical Exam   Constitutional: She is oriented to person, place, and time. She appears well-developed and well-nourished. No distress. HENT:   Head: Normocephalic. Nose: Nose normal.   Mouth/Throat: Oropharynx is clear and moist. No oropharyngeal exudate. Eyes: Conjunctivae are normal. Pupils are equal, round, and reactive to light. No scleral icterus. Neck: Normal range of motion. Neck supple. No JVD present. No tracheal deviation present. No thyromegaly present. Cardiovascular: Normal rate, regular rhythm and intact distal pulses. Exam reveals no gallop and no friction rub. No murmur heard. Pulmonary/Chest: Effort normal and breath sounds normal. No stridor. No respiratory distress. She has no wheezes. She has no rales. Abdominal: Soft. Bowel sounds are normal. She exhibits no distension. There is tenderness (minimal diffuse lower abdominal tenderness; left greater than right ). There is no rebound and no guarding. Musculoskeletal: Normal range of motion.  She exhibits no edema.   Lymphadenopathy:     She has no cervical adenopathy. Neurological: She is alert and oriented to person, place, and time. No cranial nerve deficit. She exhibits normal muscle tone. Coordination normal.   Skin: Skin is warm and dry. No rash noted. She is not diaphoretic. No erythema. Psychiatric: She has a normal mood and affect. Her behavior is normal.   Nursing note and vitals reviewed. Diagnostic Study Results     Labs -     Recent Results (from the past 12 hour(s))   CBC WITH AUTOMATED DIFF    Collection Time: 05/23/18  8:22 AM   Result Value Ref Range    WBC 4.8 3.6 - 11.0 K/uL    RBC 4.28 3.80 - 5.20 M/uL    HGB 12.8 11.5 - 16.0 g/dL    HCT 35.5 35.0 - 47.0 %    MCV 82.9 80.0 - 99.0 FL    MCH 29.9 26.0 - 34.0 PG    MCHC 36.1 30.0 - 36.5 g/dL    RDW 13.5 11.5 - 14.5 %    PLATELET 013 328 - 778 K/uL    MPV 9.6 8.9 - 12.9 FL    NRBC 0.0 0  WBC    ABSOLUTE NRBC 0.00 0.00 - 0.01 K/uL    NEUTROPHILS 55 32 - 75 %    LYMPHOCYTES 34 12 - 49 %    MONOCYTES 6 5 - 13 %    EOSINOPHILS 4 0 - 7 %    BASOPHILS 1 0 - 1 %    IMMATURE GRANULOCYTES 0 0.0 - 0.5 %    ABS. NEUTROPHILS 2.6 1.8 - 8.0 K/UL    ABS. LYMPHOCYTES 1.6 0.8 - 3.5 K/UL    ABS. MONOCYTES 0.3 0.0 - 1.0 K/UL    ABS. EOSINOPHILS 0.2 0.0 - 0.4 K/UL    ABS. BASOPHILS 0.0 0.0 - 0.1 K/UL    ABS. IMM. GRANS. 0.0 0.00 - 0.04 K/UL    DF AUTOMATED     METABOLIC PANEL, COMPREHENSIVE    Collection Time: 05/23/18  8:22 AM   Result Value Ref Range    Sodium 137 136 - 145 mmol/L    Potassium 3.6 3.5 - 5.1 mmol/L    Chloride 103 97 - 108 mmol/L    CO2 27 21 - 32 mmol/L    Anion gap 7 5 - 15 mmol/L    Glucose 235 (H) 65 - 100 mg/dL    BUN 8 6 - 20 MG/DL    Creatinine 0.70 0.55 - 1.02 MG/DL    BUN/Creatinine ratio 11 (L) 12 - 20      GFR est AA >60 >60 ml/min/1.73m2    GFR est non-AA >60 >60 ml/min/1.73m2    Calcium 9.0 8.5 - 10.1 MG/DL    Bilirubin, total 0.6 0.2 - 1.0 MG/DL    ALT (SGPT) 49 12 - 78 U/L    AST (SGOT) 50 (H) 15 - 37 U/L    Alk. phosphatase 59 45 - 117 U/L    Protein, total 7.0 6.4 - 8.2 g/dL    Albumin 3.8 3.5 - 5.0 g/dL    Globulin 3.2 2.0 - 4.0 g/dL    A-G Ratio 1.2 1.1 - 2.2     LIPASE    Collection Time: 05/23/18  8:22 AM   Result Value Ref Range    Lipase 649 (H) 73 - 393 U/L   URINALYSIS W/ REFLEX CULTURE    Collection Time: 05/23/18  8:40 AM   Result Value Ref Range    Color DARK YELLOW      Appearance CLOUDY (A) CLEAR      Specific gravity 1.029 1.003 - 1.030      pH (UA) 5.5 5.0 - 8.0      Protein TRACE (A) NEG mg/dL    Glucose 100 (A) NEG mg/dL    Ketone TRACE (A) NEG mg/dL    Blood SMALL (A) NEG      Urobilinogen 0.2 0.2 - 1.0 EU/dL    Nitrites NEGATIVE  NEG      Leukocyte Esterase MODERATE (A) NEG      WBC  0 - 4 /hpf    RBC 10-20 0 - 5 /hpf    Epithelial cells MODERATE (A) FEW /lpf    Bacteria 2+ (A) NEG /hpf    UA:UC IF INDICATED URINE CULTURE ORDERED (A) CNI      Hyaline cast 5-10 0 - 5 /lpf   BILIRUBIN, CONFIRM    Collection Time: 05/23/18  8:40 AM   Result Value Ref Range    Bilirubin UA, confirm NEGATIVE  NEG         Radiologic Studies -   CT Results  (Last 48 hours)               05/23/18 1146  CT ABD PELV WO CONT Final result    Impression:  IMPRESSION:       1. Enlarged fatty liver   2. Splenomegaly   3. There is slight stranding within the small bowel mesentery but there is no   stranding around the pancreas. No drainable peripancreatic fluid collection   4. Diverticulosis left colon status post sigmoid resection. No evidence of acute   diverticulitis   5. Small midline hernia containing fat,           Narrative:  EXAM:  CT ABD PELV WO CONT       INDICATION: Diverticulitis; lower abd pain; incidental elevated lipase; s/p   recent egd       COMPARISON: May 4, 2018       CONTRAST:  None. TECHNIQUE:    Thin axial images were obtained through the abdomen and pelvis. Coronal and   sagittal reconstructions were generated. Oral contrast was not administered.  CT   dose reduction was achieved through use of a standardized protocol tailored for   this examination and automatic exposure control for dose modulation. The absence of intravenous contrast material reduces the sensitivity for   evaluation of the solid parenchymal organs of the abdomen. FINDINGS:    LUNG BASES: Clear. INCIDENTALLY IMAGED HEART AND MEDIASTINUM: Unremarkable. LIVER: Liver is enlarged and decreased in density compatible with fatty   infiltration   GALLBLADDER: Surgically absent   SPLEEN: Spleen is enlarged measuring 18.8 cm   PANCREAS: No mass or ductal dilatation. There is slight stranding in the small   bowel mesentery but no discrete stranding surrounding the pancreas   ADRENALS: Unremarkable. KIDNEYS/URETERS: No mass, calculus, or hydronephrosis. STOMACH: Unremarkable. SMALL BOWEL: No dilatation or wall thickening. COLON: Patient is post sigmoid resection. Few scattered colonic diverticula. No   evidence of acute diverticulitis   APPENDIX: Unremarkable. PERITONEUM: No ascites or pneumoperitoneum. RETROPERITONEUM: No lymphadenopathy or aortic aneurysm. Aorta is atherosclerotic   REPRODUCTIVE ORGANS: Surgically absent   URINARY BLADDER: Decompressed   BONES: No destructive bone lesion. Hemangioma L4 unchanged   ADDITIONAL COMMENTS: Small midline hernia containing fat likely at site of   previous incision                 Medical Decision Making   I am the first provider for this patient. I reviewed the vital signs, available nursing notes, past medical history, past surgical history, family history and social history. Vital Signs-Reviewed the patient's vital signs.   Patient Vitals for the past 12 hrs:   Temp Pulse Resp BP SpO2   05/23/18 1100 - 75 16 128/78 99 %   05/23/18 1000 - - - 118/66 96 %   05/23/18 0900 - - - 123/68 96 %   05/23/18 0824 - - - - 99 %   05/23/18 0812 - - - 127/81 -   05/23/18 0802 97.8 °F (36.6 °C) 68 16 126/49 99 %       Pulse Oximetry Analysis - 99% on room air    Cardiac Monitor:   Rate: 68 bpm  Rhythm: Normal Sinus Rhythm        Records Reviewed: Nursing Notes, Old Medical Records, Previous Radiology Studies and Previous Laboratory Studies    Provider Notes (Medical Decision Making):     DDx: Diverticulitis, Chronic abdominal pain, Constipation, C-diff    ED Course:   Initial assessment performed. The patients presenting problems have been discussed, and they are in agreement with the care plan formulated and outlined with them. I have encouraged them to ask questions as they arise throughout their visit. Progress Notes:     12:58 PM   The pt has been re-evaluated. She expresses that her sx have improved with tx in the ED and is requesting to be discharged home at this time. Pt well appearing; afebrilect abd pelvis without acute process; +uti; dc with abx; pt tolerating po without difficulty; unknown cause of mild elevation of lipase; pancreas normal appearing on ct; vss; dc home with gi and pcp follow up; Tremayne Rocha MD      Critical Care Time: 0 Minutes    Disposition:  Discharge Note:  12:58 PM  The patient is ready for discharge. The patient's signs, symptoms, diagnosis, and discharge instruction have been discussed and the patient has conveyed their understanding. The patient is to follow up as recommended or return to the ER should their symptoms worsen. Plan has been discussed and the patient is in agreement. Written by Carlos Sheikh ED Scribe, as dictated by Tremayne Rocha MD    PLAN:  1. Current Discharge Medication List        2. Follow-up Information     Follow up With Details Comments [address], [place] Schedule an appointment as soon as possible for a visit in 1 week  [address]      BEBA Eckert Schedule an appointment as soon as possible for a visit in 1 day  [place] [phone]          Return to ED if worse     Diagnosis     Clinical Impression:   1.  Abdominal pain, generalized    2. Acute cystitis without hematuria    3. Elevated lipase        Attestations:    Attestation: This note is prepared by Julián Sheikh, acting as Scribe for Damon Gloria MD.      Damon Gloria MD: The scribe's documentation has been prepared under my direction and personally reviewed by me in its entirety. I confirm that the note above accurately reflects all work, treatment, procedures, and medical decision making performed by me.

## 2018-05-23 NOTE — LETTER
[address] 
\[facility] [place] [facility]\"" EMERGENCY DEPT 
[address] [facility] [place] [address] 
[phone] Work/School Note Date: 5/23/2018 To Whom It May concern: 
 
Adam Ny was seen and treated today in the emergency room by the following provider(s): 
Attending Provider: Satinder Schmid MD. Adam Ny may return to work on 05/28/2018. Sincerely, Satinder Schmid MD

## 2018-05-23 NOTE — DISCHARGE INSTRUCTIONS
Abdominal Pain: Care Instructions  Your Care Instructions    Abdominal pain has many possible causes. Some aren't serious and get better on their own in a few days. Others need more testing and treatment. If your pain continues or gets worse, you need to be rechecked and may need more tests to find out what is wrong. You may need surgery to correct the problem. Don't ignore new symptoms, such as fever, nausea and vomiting, urination problems, pain that gets worse, and dizziness. These may be signs of a more serious problem. Your doctor may have recommended a follow-up visit in the next 8 to 12 hours. If you are not getting better, you may need more tests or treatment. The doctor has checked you carefully, but problems can develop later. If you notice any problems or new symptoms, get medical treatment right away. Follow-up care is a key part of your treatment and safety. Be sure to make and go to all appointments, and call your doctor if you are having problems. It's also a good idea to know your test results and keep a list of the medicines you take. How can you care for yourself at home? · Rest until you feel better. · To prevent dehydration, drink plenty of fluids, enough so that your urine is light yellow or clear like water. Choose water and other caffeine-free clear liquids until you feel better. If you have kidney, heart, or liver disease and have to limit fluids, talk with your doctor before you increase the amount of fluids you drink. · If your stomach is upset, eat mild foods, such as rice, dry toast or crackers, bananas, and applesauce. Try eating several small meals instead of two or three large ones. · Wait until 48 hours after all symptoms have gone away before you have spicy foods, alcohol, and drinks that contain caffeine. · Do not eat foods that are high in fat. · Avoid anti-inflammatory medicines such as aspirin, ibuprofen (Advil, Motrin), and naproxen (Aleve).  These can cause stomach upset. Talk to your doctor if you take daily aspirin for another health problem. When should you call for help? Call 911 anytime you think you may need emergency care. For example, call if:  ? · You passed out (lost consciousness). ? · You pass maroon or very bloody stools. ? · You vomit blood or what looks like coffee grounds. ? · You have new, severe belly pain. ?Call your doctor now or seek immediate medical care if:  ? · Your pain gets worse, especially if it becomes focused in one area of your belly. ? · You have a new or higher fever. ? · Your stools are black and look like tar, or they have streaks of blood. ? · You have unexpected vaginal bleeding. ? · You have symptoms of a urinary tract infection. These may include:  ¨ Pain when you urinate. ¨ Urinating more often than usual.  ¨ Blood in your urine. ? · You are dizzy or lightheaded, or you feel like you may faint. ? Watch closely for changes in your health, and be sure to contact your doctor if:  ? · You are not getting better after 1 day (24 hours). Where can you learn more? Go to http://[employer]alia.info/. Enter Y633 in the search box to learn more about \"Abdominal Pain: Care Instructions. \"  Current as of: March 20, 2017  Content Version: 11.4  © 0639-8851 [employer]. Care instructions adapted under license by [employer] (which disclaims liability or warranty for this information). If you have questions about a medical condition or this instruction, always ask your healthcare professional. [place] [address] any warranty or liability for your use of this information. Urinary Tract Infection in Women: Care Instructions  Your Care Instructions    A urinary tract infection, or UTI, is a general term for an infection anywhere between the kidneys and the urethra (where urine comes out). Most UTIs are bladder infections.  They often cause pain or burning when you urinate. UTIs are caused by bacteria and can be cured with antibiotics. Be sure to complete your treatment so that the infection goes away. Follow-up care is a key part of your treatment and safety. Be sure to make and go to all appointments, and call your doctor if you are having problems. It's also a good idea to know your test results and keep a list of the medicines you take. How can you care for yourself at home? · Take your antibiotics as directed. Do not stop taking them just because you feel better. You need to take the full course of antibiotics. · Drink extra water and other fluids for the next day or two. This may help wash out the bacteria that are causing the infection. (If you have kidney, heart, or liver disease and have to limit fluids, talk with your doctor before you increase your fluid intake.)  · Avoid drinks that are carbonated or have caffeine. They can irritate the bladder. · Urinate often. Try to empty your bladder each time. · To relieve pain, take a hot bath or lay a heating pad set on low over your lower belly or genital area. Never go to sleep with a heating pad in place. To prevent UTIs  · Drink plenty of water each day. This helps you urinate often, which clears bacteria from your system. (If you have kidney, heart, or liver disease and have to limit fluids, talk with your doctor before you increase your fluid intake.)  · Urinate when you need to. · Urinate right after you have sex. · Change sanitary pads often. · Avoid douches, bubble baths, feminine hygiene sprays, and other feminine hygiene products that have deodorants. · After going to the bathroom, wipe from front to back. When should you call for help? Call your doctor now or seek immediate medical care if:  ? · Symptoms such as fever, chills, nausea, or vomiting get worse or appear for the first time. ? · You have new pain in your back just below your rib cage. This is called flank pain.    ? · There is new blood or pus in your urine. ? · You have any problems with your antibiotic medicine. ? Watch closely for changes in your health, and be sure to contact your doctor if:  ? · You are not getting better after taking an antibiotic for 2 days. ? · Your symptoms go away but then come back. Where can you learn more? Go to http://gladys-alia.info/. Enter Z078 in the search box to learn more about \"Urinary Tract Infection in Women: Care Instructions. \"  Current as of: May 12, 2017  Content Version: 11.4  © 7867-8958 [employer]. Care instructions adapted under license by [employer] (which disclaims liability or warranty for this information). If you have questions about a medical condition or this instruction, always ask your healthcare professional. [address] any warranty or liability for your use of this information.

## 2018-05-23 NOTE — ED NOTES
Pt reports sx seem worse again with nausea and pain across mid upper abdomen; Dr Sariah Santiago notified.

## 2018-05-23 NOTE — ROUTINE PROCESS
Dr Chuck Marie reviewed discharge instructions with the patient. The patient verbalized understanding. Alert and stable for discharge. Will assist to wheelchair and to [address] [place] [address] for daughter to drive home.

## 2018-05-24 LAB
BACTERIA SPEC CULT: NORMAL
CC UR VC: NORMAL
SERVICE CMNT-IMP: NORMAL

## 2018-05-30 ENCOUNTER — HOSPITAL ENCOUNTER (EMERGENCY)
Age: 48
Discharge: HOME | End: 2018-05-30
Attending: EMERGENCY MEDICINE
Payer: COMMERCIAL

## 2018-05-30 VITALS
TEMPERATURE: 98.4 F | DIASTOLIC BLOOD PRESSURE: 78 MMHG | BODY MASS INDEX: 38.05 KG/M2 | SYSTOLIC BLOOD PRESSURE: 137 MMHG | HEART RATE: 76 BPM | HEIGHT: 62 IN | OXYGEN SATURATION: 97 % | RESPIRATION RATE: 18 BRPM | WEIGHT: 206.79 LBS

## 2018-05-30 DIAGNOSIS — R11.2 NON-INTRACTABLE VOMITING WITH NAUSEA, UNSPECIFIED VOMITING TYPE: Primary | ICD-10-CM

## 2018-05-30 LAB
ALBUMIN SERPL-MCNC: 4.2 G/DL (ref 3.5–5)
ALBUMIN/GLOB SERPL: 1.1 {RATIO} (ref 1.1–2.2)
ALP SERPL-CCNC: 61 U/L (ref 45–117)
ALT SERPL-CCNC: 49 U/L (ref 12–78)
ANION GAP SERPL CALC-SCNC: 10 MMOL/L (ref 5–15)
APPEARANCE UR: CLEAR
AST SERPL-CCNC: 45 U/L (ref 15–37)
BACTERIA URNS QL MICRO: NEGATIVE /HPF
BILIRUB SERPL-MCNC: 0.7 MG/DL (ref 0.2–1)
BILIRUB UR QL: NEGATIVE
BUN SERPL-MCNC: 6 MG/DL (ref 6–20)
BUN/CREAT SERPL: 8 (ref 12–20)
CALCIUM SERPL-MCNC: 10.1 MG/DL (ref 8.5–10.1)
CHLORIDE SERPL-SCNC: 101 MMOL/L (ref 97–108)
CO2 SERPL-SCNC: 27 MMOL/L (ref 21–32)
COLOR UR: NORMAL
CREAT SERPL-MCNC: 0.71 MG/DL (ref 0.55–1.02)
EPITH CASTS URNS QL MICRO: NORMAL /LPF
ERYTHROCYTE [DISTWIDTH] IN BLOOD BY AUTOMATED COUNT: 13.5 % (ref 11.5–14.5)
GLOBULIN SER CALC-MCNC: 3.7 G/DL (ref 2–4)
GLUCOSE SERPL-MCNC: 196 MG/DL (ref 65–100)
GLUCOSE UR STRIP.AUTO-MCNC: NEGATIVE MG/DL
HCT VFR BLD AUTO: 38.5 % (ref 35–47)
HGB BLD-MCNC: 13.5 G/DL (ref 11.5–16)
HGB UR QL STRIP: NEGATIVE
HYALINE CASTS URNS QL MICRO: NORMAL /LPF (ref 0–5)
KETONES UR QL STRIP.AUTO: NEGATIVE MG/DL
LEUKOCYTE ESTERASE UR QL STRIP.AUTO: NEGATIVE
LIPASE SERPL-CCNC: 522 U/L (ref 73–393)
MCH RBC QN AUTO: 29.4 PG (ref 26–34)
MCHC RBC AUTO-ENTMCNC: 35.1 G/DL (ref 30–36.5)
MCV RBC AUTO: 83.9 FL (ref 80–99)
NITRITE UR QL STRIP.AUTO: NEGATIVE
NRBC # BLD: 0 K/UL (ref 0–0.01)
NRBC BLD-RTO: 0 PER 100 WBC
PH UR STRIP: 7.5 [PH] (ref 5–8)
PLATELET # BLD AUTO: 176 K/UL (ref 150–400)
PMV BLD AUTO: 9.3 FL (ref 8.9–12.9)
POTASSIUM SERPL-SCNC: 4 MMOL/L (ref 3.5–5.1)
PROT SERPL-MCNC: 7.9 G/DL (ref 6.4–8.2)
PROT UR STRIP-MCNC: NEGATIVE MG/DL
RBC # BLD AUTO: 4.59 M/UL (ref 3.8–5.2)
RBC #/AREA URNS HPF: NORMAL /HPF (ref 0–5)
SODIUM SERPL-SCNC: 138 MMOL/L (ref 136–145)
SP GR UR REFRACTOMETRY: 1.01 (ref 1–1.03)
UA: UC IF INDICATED,UAUC: NORMAL
UROBILINOGEN UR QL STRIP.AUTO: 0.2 EU/DL (ref 0.2–1)
WBC # BLD AUTO: 5.5 K/UL (ref 3.6–11)
WBC URNS QL MICRO: NORMAL /HPF (ref 0–4)

## 2018-05-30 PROCEDURE — 99283 EMERGENCY DEPT VISIT LOW MDM: CPT

## 2018-05-30 PROCEDURE — 85027 COMPLETE CBC AUTOMATED: CPT

## 2018-05-30 PROCEDURE — 74011250636 HC RX REV CODE- 250/636: Performed by: EMERGENCY MEDICINE

## 2018-05-30 PROCEDURE — 74011250636 HC RX REV CODE- 250/636: Performed by: PHYSICIAN ASSISTANT

## 2018-05-30 PROCEDURE — 96374 THER/PROPH/DIAG INJ IV PUSH: CPT

## 2018-05-30 PROCEDURE — 83690 ASSAY OF LIPASE: CPT

## 2018-05-30 PROCEDURE — 80053 COMPREHEN METABOLIC PANEL: CPT

## 2018-05-30 PROCEDURE — 96375 TX/PRO/DX INJ NEW DRUG ADDON: CPT

## 2018-05-30 PROCEDURE — 74011250637 HC RX REV CODE- 250/637: Performed by: PHYSICIAN ASSISTANT

## 2018-05-30 PROCEDURE — 81001 URINALYSIS AUTO W/SCOPE: CPT

## 2018-05-30 PROCEDURE — 36415 COLL VENOUS BLD VENIPUNCTURE: CPT

## 2018-05-30 PROCEDURE — 96361 HYDRATE IV INFUSION ADD-ON: CPT

## 2018-05-30 RX ORDER — PROMETHAZINE HYDROCHLORIDE 25 MG/1
25 TABLET ORAL
Status: COMPLETED | OUTPATIENT
Start: 2018-05-30 | End: 2018-05-30

## 2018-05-30 RX ORDER — PROMETHAZINE HYDROCHLORIDE 25 MG/1
25 TABLET ORAL
Qty: 12 TAB | Refills: 0 | Status: SHIPPED | OUTPATIENT
Start: 2018-05-30 | End: 2018-06-19

## 2018-05-30 RX ORDER — DIPHENHYDRAMINE HYDROCHLORIDE 50 MG/ML
25 INJECTION, SOLUTION INTRAMUSCULAR; INTRAVENOUS
Status: COMPLETED | OUTPATIENT
Start: 2018-05-30 | End: 2018-05-30

## 2018-05-30 RX ORDER — FENTANYL CITRATE 50 UG/ML
100 INJECTION, SOLUTION INTRAMUSCULAR; INTRAVENOUS
Status: DISCONTINUED | OUTPATIENT
Start: 2018-05-30 | End: 2018-05-30

## 2018-05-30 RX ORDER — METOCLOPRAMIDE HYDROCHLORIDE 5 MG/ML
10 INJECTION INTRAMUSCULAR; INTRAVENOUS
Status: COMPLETED | OUTPATIENT
Start: 2018-05-30 | End: 2018-05-30

## 2018-05-30 RX ORDER — DIPHENHYDRAMINE HCL 50 MG
50 CAPSULE ORAL
Status: DISCONTINUED | OUTPATIENT
Start: 2018-05-30 | End: 2018-05-30

## 2018-05-30 RX ORDER — PROMETHAZINE HYDROCHLORIDE 25 MG/1
25 TABLET ORAL
Status: DISCONTINUED | OUTPATIENT
Start: 2018-05-30 | End: 2018-05-30 | Stop reason: HOSPADM

## 2018-05-30 RX ORDER — FENTANYL CITRATE 50 UG/ML
50 INJECTION, SOLUTION INTRAMUSCULAR; INTRAVENOUS
Status: COMPLETED | OUTPATIENT
Start: 2018-05-30 | End: 2018-05-30

## 2018-05-30 RX ORDER — ONDANSETRON 4 MG/1
4 TABLET, ORALLY DISINTEGRATING ORAL
Status: COMPLETED | OUTPATIENT
Start: 2018-05-30 | End: 2018-05-30

## 2018-05-30 RX ADMIN — FENTANYL CITRATE 50 MCG: 50 INJECTION, SOLUTION INTRAMUSCULAR; INTRAVENOUS at 15:55

## 2018-05-30 RX ADMIN — ONDANSETRON 4 MG: 4 TABLET, ORALLY DISINTEGRATING ORAL at 14:08

## 2018-05-30 RX ADMIN — PROMETHAZINE HYDROCHLORIDE 25 MG: 25 TABLET ORAL at 14:31

## 2018-05-30 RX ADMIN — METOCLOPRAMIDE 10 MG: 5 INJECTION, SOLUTION INTRAMUSCULAR; INTRAVENOUS at 15:59

## 2018-05-30 RX ADMIN — SODIUM CHLORIDE 1000 ML: 900 INJECTION, SOLUTION INTRAVENOUS at 14:48

## 2018-05-30 RX ADMIN — DIPHENHYDRAMINE HYDROCHLORIDE 25 MG: 50 INJECTION, SOLUTION INTRAMUSCULAR; INTRAVENOUS at 15:58

## 2018-05-30 NOTE — ED TRIAGE NOTES
Pt brought back to triage for vitals reassessment     BEBA Soriano into triage to update pt on current status.

## 2018-05-30 NOTE — ED NOTES
Assumed care of pt from triage. Pt is A&O x 4. Pt reports CC of abdominal pain with N/V on and of for 3 weeks. Pt reports pain has started to radiate to bilat flank areas. Pt reports she has been treated for a UTI over the past week and is currently taking abx. Pt resting on stretcher in POC on monitor x 2 VSS. No acute distress noted at this time.

## 2018-05-30 NOTE — DISCHARGE INSTRUCTIONS
Nausea and Vomiting: Care Instructions  Your Care Instructions    When you are nauseated, you may feel weak and sweaty and notice a lot of saliva in your mouth. Nausea often leads to vomiting. Most of the time you do not need to worry about nausea and vomiting, but they can be signs of other illnesses. Two common causes of nausea and vomiting are stomach flu and food poisoning. Nausea and vomiting from viral stomach flu will usually start to improve within 24 hours. Nausea and vomiting from food poisoning may last from 12 to 48 hours. The doctor has checked you carefully, but problems can develop later. If you notice any problems or new symptoms, get medical treatment right away. Follow-up care is a key part of your treatment and safety. Be sure to make and go to all appointments, and call your doctor if you are having problems. It's also a good idea to know your test results and keep a list of the medicines you take. How can you care for yourself at home? · To prevent dehydration, drink plenty of fluids, enough so that your urine is light yellow or clear like water. Choose water and other caffeine-free clear liquids until you feel better. If you have kidney, heart, or liver disease and have to limit fluids, talk with your doctor before you increase the amount of fluids you drink. · Rest in bed until you feel better. · When you are able to eat, try clear soups, mild foods, and liquids until all symptoms are gone for 12 to 48 hours. Other good choices include dry toast, crackers, cooked cereal, and gelatin dessert, such as Jell-O. When should you call for help? Call 911 anytime you think you may need emergency care. For example, call if:  ? · You passed out (lost consciousness). ?Call your doctor now or seek immediate medical care if:  ? · You have symptoms of dehydration, such as:  ¨ Dry eyes and a dry mouth. ¨ Passing only a little dark urine.   ¨ Feeling thirstier than usual.   ? · You have new or worsening belly pain. ? · You have a new or higher fever. ? · You vomit blood or what looks like coffee grounds. ? Watch closely for changes in your health, and be sure to contact your doctor if:  ? · You have ongoing nausea and vomiting. ? · Your vomiting is getting worse. ? · Your vomiting lasts longer than 2 days. ? · You are not getting better as expected. Where can you learn more? Go to http://gladys-alia.info/. Enter 25 [id number] in the search box to learn more about \"Nausea and Vomiting: Care Instructions. \"  Current as of: March 20, 2017  Content Version: 11.4  © 6501-8734 [employer]. Care instructions adapted under license by [employer] (which disclaims liability or warranty for this information). If you have questions about a medical condition or this instruction, always ask your healthcare professional. [address] any warranty or liability for your use of this information.

## 2018-05-30 NOTE — ED NOTES
Pt discharged by Dr. Michelle Martinez. Pt provided with discharge instructions Rx and instructions on follow up care. Pt out of ED ambulatory without difficulty.

## 2018-05-30 NOTE — ED PROVIDER NOTES
PROVIDER IN TRIAGE NOTE:  12:37 PM  Lalitha Huddleston PA-C has evaluated the patient as the Provider in Triage (PIT) for vomiting and back pain. They have reviewed the vital signs and the triage nurse assessment. They have talked with the patient and any available family and advised that the appropriate studies have been ordered to initiate the work up based on the clinical presentation during the assessment. The pt has been advised that they will be accommodated in the Main ED as soon as possible. The pt has been requested to contact the triage nurse or PIT immediately if they experiences any changes in their condition during this brief waiting period. EMERGENCY DEPARTMENT HISTORY AND PHYSICAL EXAM      Date: 5/30/2018  Patient Name: Kye Negro    History of Presenting Illness     Chief Complaint   Patient presents with    Vomiting     pt reports she was seen in ED recently for UTI, began vomiting again yesterday and back pain that radiates to front    Back Pain       History Provided By: Patient and medical records    HPI: Kye Negro, 50 y.o. female with PMHx significant for HTN, diabetes, and CKD, presents ambulatory to ED [facility] ED with cc of persistent nausea, multiple episodes of vomiting, and associated generalized back pain and generalized abdominal pain since yesterday. She reports having a history of similar symptoms, and she was evaluated in the ED multiple times for the symptoms. Per medical records, the patient was evaluated on 5/23/2018 for the same symptoms, and she had a full abdominal work up, including a CT A/P with no acute findings. The patient was diagnosed with a UTI, and she was discharged home with Keflex, Zofran, and Dilaudid 2 mg with no relief. She reports taking OTC Azo, with no relief in symptoms. She reports a history of GERD, and she takes Prilosec daily.  She specifically denies fevers, vaginal bleeding, vaginal discharge, or other complaints at this time.    Pt has been evaluated by GI, and her work up has been inconclusive thus far. There are no other complaints, changes, or physical findings at this time. PCP: BEBA Flores    Current Outpatient Prescriptions   Medication Sig Dispense Refill    promethazine (PHENERGAN) 25 mg tablet Take 1 Tab by mouth every six (6) hours as needed for Nausea. 12 Tab 0    HYDROmorphone (DILAUDID) 2 mg tablet Take 1 Tab by mouth every four (4) hours as needed for Pain. Max Daily Amount: 12 mg. 15 Tab 0    cephALEXin (KEFLEX) 500 mg capsule Take 1 Cap by mouth three (3) times daily for 7 days. 21 Cap 0    ondansetron (ZOFRAN ODT) 4 mg disintegrating tablet Take 1 Tab by mouth every eight (8) hours as needed for Nausea. 15 Tab 0    cefdinir (OMNICEF) 300 mg capsule Take 1 Cap by mouth two (2) times a day. 14 Cap 0    meperidine (DEMEROL) 50 mg tablet Take 1 Tab by mouth every four (4) hours as needed for Pain. Max Daily Amount: 300 mg. 20 Tab 0    methocarbamol (ROBAXIN-750) 750 mg tablet Take 1 Tab by mouth four (4) times daily as needed. 20 Tab 0    dicyclomine (BENTYL) 10 mg capsule Take 1 Cap by mouth three (3) times daily. 90 Cap 0    famotidine (PEPCID) 20 mg tablet Take 1 Tab by mouth two (2) times a day. 60 Tab 0    losartan-hydroCHLOROthiazide (HYZAAR) 100-12.5 mg per tablet Take 1 Tab by mouth daily.  albuterol sulfate (PROVENTIL;VENTOLIN) 2.5 mg/0.5 mL nebu nebulizer solution 2.5 mg by Nebulization route every twelve (12) hours. Patient mixes this agent with acetylcysteine (20%) nebulizer solution for breathing treatment.  acetaminophen (TYLENOL) 500 mg tablet Take 1,000 mg by mouth every six (6) hours as needed for Pain.  diphenhydrAMINE (BENADRYL) 25 mg capsule Take 25 mg by mouth every six (6) hours as needed (congestion).  predniSONE (STERAPRED DS) 10 mg dose pack Take 10 mg by mouth See Admin Instructions.  See administration instruction per 10mg dose pack      LACTOBACIL 2-S.THERMO-BIFIDO 1 (VSL#3 PO) Take 1 Packet by mouth daily.  cetirizine (ZYRTEC) 10 mg tablet Take 10 mg by mouth nightly as needed for Allergies.  EPINEPHrine (EPIPEN) 0.3 mg/0.3 mL (1:1,000) injection 0.3 mL by IntraMUSCular route once as needed for up to 1 dose. 0.3 mL 1    estradiol (ESTRACE) 1 mg tablet Take 1 mg by mouth daily.  albuterol (PROVENTIL, VENTOLIN) 90 mcg/Actuation inhaler Take 2 Puffs by inhalation every six (6) hours as needed. Past History     Past Medical History:  Past Medical History:   Diagnosis Date    Anal fissure     Anisocoria     Asthma     LAST EPISODE     Back pain     Cerumen impaction     Chronic kidney disease     hx uti in past    Coagulation defects     ocassional rectal bleeding due to anal fissure    Colovaginal fistula     Diabetes (HCC)     NIDDM    Diabetes ([facility] [address]     Diverticulitis     Diverticulosis     Enlarged tonsils     Frequent UTI     GERD (gastroesophageal reflux disease)     H/O endoscopy     with dilation    HA (headache)     Hepatic steatosis     Hx of colonoscopy with polypectomy     benign    Hypertension     Ill-defined condition     FREQUENT HIVES    Ill-defined condition     HX ELEVATED LIVER ENZYMES    Morbid obesity (HCC)     Nausea & vomiting     during diverticulitis flare    Obesity     Otitis media     Pneumonia     about 15 yrs ago    Psychiatric disorder     ANXIETY    Recurrent tonsillitis     Sinusitis     Transfusion history ~ age 35    postop hysterectomy    Unspecified sleep apnea     snores ( not diagnosed yet)     Urticaria     Urticaria        Past Surgical History:  Past Surgical History:   Procedure Laterality Date    ABDOMEN SURGERY PROC UNLISTED  01/06/2018    hernia repair at [facility]    [address]    [place].     HX GI  7/31/12    LAPAROSCOPIC HAND ASSISTED  POSS OPEN SIGMOID COLECTOMY POSS TEMPORARY DIVERTING LOOP ILEOSTOMY;  (no illeostomy needed)    HX GYN     HX GYN      cervical conization    HX HEENT      SINUS SURGERY LEFT X2    HX HEENT      SINUS SURGERY ON RIGHT X2    HX OTHER SURGICAL      Sphincterotomy    HX PELVIC LAPAROSCOPY      HX EMMANUEL AND BSO      HX UROLOGICAL  12     CYSTOSCOPY INSERTION URETERAL CATHETERS - Cystoscopy Insertion of bilateral ureteral stents       Family History:  Family History   Problem Relation Age of Onset    Diabetes Mother     Cancer Mother      NON-HODGKINS LYMPHOMA    Anesth Problems Mother      PONV    Diabetes Father     Heart Disease Father      CAD - STENTS, PACEMAKER    Arrhythmia Father        Social History:  Social History   Substance Use Topics    Smoking status: Never Smoker    Smokeless tobacco: Never Used    Alcohol use Yes      Comment: Rarely       Allergies: Allergies   Allergen Reactions    Aspirin Shortness of Breath    Prilosec [Omeprazole Magnesium] Anaphylaxis     CHERRY FLAVORED; PT TAKES REGULAR PRILOSEC AND IS OK    Codeine Hives and Itching    Contrast Agent [Iodine] Itching     Pt. Had itching after IV contrast with the last exam.  Benadryl was given    Morphine Itching     Severe itching    Fentanyl Rash    Ketorolac Rash     \"makes my eyes spasm and causes rash on my hands\"    Percocet [Oxycodone-Acetaminophen] Hives       Review of Systems   Review of Systems   Constitutional: Negative for chills and fever. HENT: Negative. Eyes: Negative. Respiratory: Negative for cough and shortness of breath. Cardiovascular: Negative for chest pain. Gastrointestinal: Positive for abdominal pain, nausea and vomiting. Negative for constipation and diarrhea. Genitourinary: Negative. Negative for vaginal bleeding and vaginal discharge. Musculoskeletal: Positive for back pain. Skin: Negative. Neurological: Negative for weakness and numbness. All other systems reviewed and are negative.     Physical Exam   Physical Exam   Constitutional: She is oriented to person, place, and time. She appears well-developed and well-nourished. HENT:   Head: Normocephalic and atraumatic. Eyes: Conjunctivae and EOM are normal.   Neck: Normal range of motion. Neck supple. Cardiovascular: Normal rate and regular rhythm. Pulmonary/Chest: Effort normal and breath sounds normal. No respiratory distress. Abdominal: Soft. She exhibits no distension. There is tenderness in the right upper quadrant, right lower quadrant, epigastric area and suprapubic area. There is CVA tenderness (BL). Musculoskeletal: Normal range of motion. Neurological: She is alert and oriented to person, place, and time. Skin: Skin is warm and dry. Psychiatric: She has a normal mood and affect. Nursing note and vitals reviewed.     Diagnostic Study Results   Labs -     Recent Results (from the past 12 hour(s))   URINALYSIS W/ REFLEX CULTURE    Collection Time: 05/30/18 12:52 PM   Result Value Ref Range    Color YELLOW/STRAW      Appearance CLEAR CLEAR      Specific gravity 1.008 1.003 - 1.030      pH (UA) 7.5 5.0 - 8.0      Protein NEGATIVE  NEG mg/dL    Glucose NEGATIVE  NEG mg/dL    Ketone NEGATIVE  NEG mg/dL    Bilirubin NEGATIVE  NEG      Blood NEGATIVE  NEG      Urobilinogen 0.2 0.2 - 1.0 EU/dL    Nitrites NEGATIVE  NEG      Leukocyte Esterase NEGATIVE  NEG      WBC 0-4 0 - 4 /hpf    RBC 0-5 0 - 5 /hpf    Epithelial cells FEW FEW /lpf    Bacteria NEGATIVE  NEG /hpf    UA:UC IF INDICATED CULTURE NOT INDICATED BY UA RESULT CNI      Hyaline cast 0-2 0 - 5 /lpf   CBC W/O DIFF    Collection Time: 05/30/18  1:35 PM   Result Value Ref Range    WBC 5.5 3.6 - 11.0 K/uL    RBC 4.59 3.80 - 5.20 M/uL    HGB 13.5 11.5 - 16.0 g/dL    HCT 38.5 35.0 - 47.0 %    MCV 83.9 80.0 - 99.0 FL    MCH 29.4 26.0 - 34.0 PG    MCHC 35.1 30.0 - 36.5 g/dL    RDW 13.5 11.5 - 14.5 %    PLATELET 389 234 - 738 K/uL    MPV 9.3 8.9 - 12.9 FL    NRBC 0.0 0  WBC    ABSOLUTE NRBC 0.00 0.00 - 4.96 K/uL   METABOLIC PANEL, COMPREHENSIVE    Collection Time: 05/30/18  1:35 PM   Result Value Ref Range    Sodium 138 136 - 145 mmol/L    Potassium 4.0 3.5 - 5.1 mmol/L    Chloride 101 97 - 108 mmol/L    CO2 27 21 - 32 mmol/L    Anion gap 10 5 - 15 mmol/L    Glucose 196 (H) 65 - 100 mg/dL    BUN 6 6 - 20 MG/DL    Creatinine 0.71 0.55 - 1.02 MG/DL    BUN/Creatinine ratio 8 (L) 12 - 20      GFR est AA >60 >60 ml/min/1.73m2    GFR est non-AA >60 >60 ml/min/1.73m2    Calcium 10.1 8.5 - 10.1 MG/DL    Bilirubin, total 0.7 0.2 - 1.0 MG/DL    ALT (SGPT) 49 12 - 78 U/L    AST (SGOT) 45 (H) 15 - 37 U/L    Alk. phosphatase 61 45 - 117 U/L    Protein, total 7.9 6.4 - 8.2 g/dL    Albumin 4.2 3.5 - 5.0 g/dL    Globulin 3.7 2.0 - 4.0 g/dL    A-G Ratio 1.1 1.1 - 2.2     LIPASE    Collection Time: 05/30/18  1:35 PM   Result Value Ref Range    Lipase 522 (H) 73 - 393 U/L       Medical Decision Making   I am the first provider for this patient. I reviewed the vital signs, available nursing notes, past medical history, past surgical history, family history and social history. Vital Signs-Reviewed the patient's vital signs. Patient Vitals for the past 12 hrs:   Temp Pulse Resp BP SpO2   05/30/18 1420 98.4 °F (36.9 °C) - 18 137/78 97 %   05/30/18 1246 97.9 °F (36.6 °C) 76 16 (!) 149/98 99 %       Records Reviewed: Nursing Notes, Old Medical Records, Previous Radiology Studies and Previous Laboratory Studies    Provider Notes (Medical Decision Making):   Patient presents with nausea and vomiting. Differential includes gastritis/GERD, pancreatitis cholelithiasis, cholecystitis, hepatitis, renal pathology, ACS, gastroenteritis, infection such as UTI/PNA. Will obtain EKG, labs and possibly imaging. ED Course:   Initial assessment performed. The patients presenting problems have been discussed, and they are in agreement with the care plan formulated and outlined with them.   I have encouraged them to ask questions as they arise throughout their visit.    Progress Note:  2:49 PM  Updated the patient on her ED work up. The patient's UA is negative for infection, her labs including her lipase, have improved from prior ED visit. Pt's LFTs are negative. Pt has had multiple CT scans in the past, and there have been no acute findings. Pt's abdominal exam is non-focal. Provided the patient with a lot of re-assurance about her negative work up. Pt conveys her understanding of these findings. Will treat symptomatically and re-assess. Written by Luis Berman ED Scribe, as dictated by Nando Brady MD.    Progress Note:  3:53 PM  The patient was re-evaluated, and she has had improvement in symptoms after ED treatment. Will discharge. Written by SCOOBY Arriaga, as dictated by Nando Brady MD.    Critical Care Time: 0 minutes    Disposition:  Discharge Note:  3:53 PM  The pt is ready for discharge. The pt's signs, symptoms, diagnosis, and discharge instructions have been discussed and pt has conveyed their understanding. The pt is to follow up as recommended or return to ER should their symptoms worsen. Plan has been discussed and pt is in agreement. PLAN:  1. Discharge Medication List as of 5/30/2018  3:51 PM      START taking these medications    Details   promethazine (PHENERGAN) 25 mg tablet Take 1 Tab by mouth every six (6) hours as needed for Nausea., Normal, Disp-12 Tab, R-0         CONTINUE these medications which have NOT CHANGED    Details   HYDROmorphone (DILAUDID) 2 mg tablet Take 1 Tab by mouth every four (4) hours as needed for Pain. Max Daily Amount: 12 mg., Print, Disp-15 Tab, R-0      cephALEXin (KEFLEX) 500 mg capsule Take 1 Cap by mouth three (3) times daily for 7 days. , Normal, Disp-21 Cap, R-0      ondansetron (ZOFRAN ODT) 4 mg disintegrating tablet Take 1 Tab by mouth every eight (8) hours as needed for Nausea., Normal, Disp-15 Tab, R-0      cefdinir (OMNICEF) 300 mg capsule Take 1 Cap by mouth two (2) times a day., Normal, Disp-14 Cap, R-0      meperidine (DEMEROL) 50 mg tablet Take 1 Tab by mouth every four (4) hours as needed for Pain. Max Daily Amount: 300 mg., Print, Disp-20 Tab, R-0      methocarbamol (ROBAXIN-750) 750 mg tablet Take 1 Tab by mouth four (4) times daily as needed., Normal, Disp-20 Tab, R-0      dicyclomine (BENTYL) 10 mg capsule Take 1 Cap by mouth three (3) times daily. , Print, Disp-90 Cap, R-0      famotidine (PEPCID) 20 mg tablet Take 1 Tab by mouth two (2) times a day., Print, Disp-60 Tab, R-0      losartan-hydroCHLOROthiazide (HYZAAR) 100-12.5 mg per tablet Take 1 Tab by mouth daily. , Historical Med      albuterol sulfate (PROVENTIL;VENTOLIN) 2.5 mg/0.5 mL nebu nebulizer solution 2.5 mg by Nebulization route every twelve (12) hours. Patient mixes this agent with acetylcysteine (20%) nebulizer solution for breathing treatment., Historical Med      acetaminophen (TYLENOL) 500 mg tablet Take 1,000 mg by mouth every six (6) hours as needed for Pain., Historical Med      diphenhydrAMINE (BENADRYL) 25 mg capsule Take 25 mg by mouth every six (6) hours as needed (congestion). , Historical Med      predniSONE (STERAPRED DS) 10 mg dose pack Take 10 mg by mouth See Admin Instructions. See administration instruction per 10mg dose pack, Historical Med      LACTOBACIL 2-S. THERMO-BIFIDO 1 (VSL#3 PO) Take 1 Packet by mouth daily. , Historical Med      cetirizine (ZYRTEC) 10 mg tablet Take 10 mg by mouth nightly as needed for Allergies. , Historical Med      EPINEPHrine (EPIPEN) 0.3 mg/0.3 mL (1:1,000) injection 0.3 mL by IntraMUSCular route once as needed for up to 1 dose., Print, Disp-0.3 mL, R-1      estradiol (ESTRACE) 1 mg tablet Take 1 mg by mouth daily. , Historical Med      albuterol (PROVENTIL, VENTOLIN) 90 mcg/Actuation inhaler Take 2 Puffs by inhalation every six (6) hours as needed., Historical Med           2.    Follow-up Information     Follow up With Details Comments [address]  As needed [id number] [place] [address]  [phone]          Return to ED if worse     Diagnosis     Clinical Impression:   1. Non-intractable vomiting with nausea, unspecified vomiting type        Attestations: This note is prepared by Dotty Austin, acting as a Scribe for Mars Mackey MD.    Mars Mackey MD: The scribe's documentation has been prepared under my direction and personally reviewed by me in its entirety. I confirm that the notes above accurately reflects all work, treatment, procedures, and medical decision making performed by me. This note will not be viewable in [address]

## 2018-06-19 ENCOUNTER — APPOINTMENT (OUTPATIENT)
Dept: CT IMAGING | Age: 48
End: 2018-06-19
Attending: EMERGENCY MEDICINE
Payer: COMMERCIAL

## 2018-06-19 ENCOUNTER — HOSPITAL ENCOUNTER (EMERGENCY)
Age: 48
Discharge: HOME | End: 2018-06-19
Attending: EMERGENCY MEDICINE
Payer: COMMERCIAL

## 2018-06-19 VITALS
TEMPERATURE: 98.1 F | RESPIRATION RATE: 20 BRPM | HEIGHT: 62 IN | HEART RATE: 72 BPM | BODY MASS INDEX: 37.36 KG/M2 | WEIGHT: 203.04 LBS | OXYGEN SATURATION: 92 % | DIASTOLIC BLOOD PRESSURE: 71 MMHG | SYSTOLIC BLOOD PRESSURE: 119 MMHG

## 2018-06-19 DIAGNOSIS — N30.00 ACUTE CYSTITIS WITHOUT HEMATURIA: ICD-10-CM

## 2018-06-19 DIAGNOSIS — R10.84 ABDOMINAL PAIN, GENERALIZED: Primary | ICD-10-CM

## 2018-06-19 LAB
ALBUMIN SERPL-MCNC: 4.1 G/DL (ref 3.5–5)
ALBUMIN/GLOB SERPL: 1.2 {RATIO} (ref 1.1–2.2)
ALP SERPL-CCNC: 61 U/L (ref 45–117)
ALT SERPL-CCNC: 57 U/L (ref 12–78)
ANION GAP SERPL CALC-SCNC: 10 MMOL/L (ref 5–15)
APPEARANCE UR: ABNORMAL
AST SERPL-CCNC: 60 U/L (ref 15–37)
BACTERIA URNS QL MICRO: ABNORMAL /HPF
BASOPHILS # BLD: 0 K/UL (ref 0–0.1)
BASOPHILS NFR BLD: 1 % (ref 0–1)
BILIRUB SERPL-MCNC: 0.8 MG/DL (ref 0.2–1)
BILIRUB UR QL: NEGATIVE
BUN SERPL-MCNC: 9 MG/DL (ref 6–20)
BUN/CREAT SERPL: 12 (ref 12–20)
CALCIUM SERPL-MCNC: 9.9 MG/DL (ref 8.5–10.1)
CHLORIDE SERPL-SCNC: 104 MMOL/L (ref 97–108)
CO2 SERPL-SCNC: 26 MMOL/L (ref 21–32)
COLOR UR: ABNORMAL
CREAT SERPL-MCNC: 0.73 MG/DL (ref 0.55–1.02)
DIFFERENTIAL METHOD BLD: NORMAL
EOSINOPHIL # BLD: 0.2 K/UL (ref 0–0.4)
EOSINOPHIL NFR BLD: 4 % (ref 0–7)
EPITH CASTS URNS QL MICRO: ABNORMAL /LPF
ERYTHROCYTE [DISTWIDTH] IN BLOOD BY AUTOMATED COUNT: 12.6 % (ref 11.5–14.5)
GLOBULIN SER CALC-MCNC: 3.5 G/DL (ref 2–4)
GLUCOSE SERPL-MCNC: 242 MG/DL (ref 65–100)
GLUCOSE UR STRIP.AUTO-MCNC: 100 MG/DL
HCT VFR BLD AUTO: 36.1 % (ref 35–47)
HGB BLD-MCNC: 13.1 G/DL (ref 11.5–16)
HGB UR QL STRIP: NEGATIVE
IMM GRANULOCYTES # BLD: 0 K/UL (ref 0–0.04)
IMM GRANULOCYTES NFR BLD AUTO: 0 % (ref 0–0.5)
KETONES UR QL STRIP.AUTO: NEGATIVE MG/DL
LACTATE SERPL-SCNC: 1.4 MMOL/L (ref 0.4–2)
LEUKOCYTE ESTERASE UR QL STRIP.AUTO: ABNORMAL
LIPASE SERPL-CCNC: 236 U/L (ref 73–393)
LYMPHOCYTES # BLD: 1.4 K/UL (ref 0.8–3.5)
LYMPHOCYTES NFR BLD: 30 % (ref 12–49)
MCH RBC QN AUTO: 29.5 PG (ref 26–34)
MCHC RBC AUTO-ENTMCNC: 36.3 G/DL (ref 30–36.5)
MCV RBC AUTO: 81.3 FL (ref 80–99)
MONOCYTES # BLD: 0.3 K/UL (ref 0–1)
MONOCYTES NFR BLD: 6 % (ref 5–13)
NEUTS SEG # BLD: 2.8 K/UL (ref 1.8–8)
NEUTS SEG NFR BLD: 59 % (ref 32–75)
NITRITE UR QL STRIP.AUTO: POSITIVE
NRBC # BLD: 0 K/UL (ref 0–0.01)
NRBC BLD-RTO: 0 PER 100 WBC
PH UR STRIP: 6.5 [PH] (ref 5–8)
PLATELET # BLD AUTO: 169 K/UL (ref 150–400)
PMV BLD AUTO: 9.8 FL (ref 8.9–12.9)
POTASSIUM SERPL-SCNC: 3.5 MMOL/L (ref 3.5–5.1)
PROT SERPL-MCNC: 7.6 G/DL (ref 6.4–8.2)
PROT UR STRIP-MCNC: NEGATIVE MG/DL
RBC # BLD AUTO: 4.44 M/UL (ref 3.8–5.2)
RBC #/AREA URNS HPF: ABNORMAL /HPF (ref 0–5)
SODIUM SERPL-SCNC: 140 MMOL/L (ref 136–145)
SP GR UR REFRACTOMETRY: 1.02 (ref 1–1.03)
UA: UC IF INDICATED,UAUC: ABNORMAL
UROBILINOGEN UR QL STRIP.AUTO: 1 EU/DL (ref 0.2–1)
WBC # BLD AUTO: 4.7 K/UL (ref 3.6–11)
WBC URNS QL MICRO: ABNORMAL /HPF (ref 0–4)

## 2018-06-19 PROCEDURE — 96367 TX/PROPH/DG ADDL SEQ IV INF: CPT

## 2018-06-19 PROCEDURE — 74176 CT ABD & PELVIS W/O CONTRAST: CPT

## 2018-06-19 PROCEDURE — 85025 COMPLETE CBC W/AUTO DIFF WBC: CPT | Performed by: PHYSICIAN ASSISTANT

## 2018-06-19 PROCEDURE — 96375 TX/PRO/DX INJ NEW DRUG ADDON: CPT

## 2018-06-19 PROCEDURE — 74011250636 HC RX REV CODE- 250/636

## 2018-06-19 PROCEDURE — 81001 URINALYSIS AUTO W/SCOPE: CPT | Performed by: EMERGENCY MEDICINE

## 2018-06-19 PROCEDURE — 96376 TX/PRO/DX INJ SAME DRUG ADON: CPT

## 2018-06-19 PROCEDURE — 96365 THER/PROPH/DIAG IV INF INIT: CPT

## 2018-06-19 PROCEDURE — 36415 COLL VENOUS BLD VENIPUNCTURE: CPT | Performed by: PHYSICIAN ASSISTANT

## 2018-06-19 PROCEDURE — 83690 ASSAY OF LIPASE: CPT

## 2018-06-19 PROCEDURE — 83605 ASSAY OF LACTIC ACID: CPT | Performed by: EMERGENCY MEDICINE

## 2018-06-19 PROCEDURE — 80053 COMPREHEN METABOLIC PANEL: CPT

## 2018-06-19 PROCEDURE — 74011000258 HC RX REV CODE- 258: Performed by: EMERGENCY MEDICINE

## 2018-06-19 PROCEDURE — 87086 URINE CULTURE/COLONY COUNT: CPT | Performed by: EMERGENCY MEDICINE

## 2018-06-19 PROCEDURE — 99284 EMERGENCY DEPT VISIT MOD MDM: CPT

## 2018-06-19 PROCEDURE — 74011250636 HC RX REV CODE- 250/636: Performed by: EMERGENCY MEDICINE

## 2018-06-19 PROCEDURE — 96361 HYDRATE IV INFUSION ADD-ON: CPT

## 2018-06-19 RX ORDER — NALOXONE HYDROCHLORIDE 0.4 MG/ML
0.2 INJECTION, SOLUTION INTRAMUSCULAR; INTRAVENOUS; SUBCUTANEOUS ONCE
Status: COMPLETED | OUTPATIENT
Start: 2018-06-19 | End: 2018-06-19

## 2018-06-19 RX ORDER — ONDANSETRON 2 MG/ML
4 INJECTION INTRAMUSCULAR; INTRAVENOUS
Status: COMPLETED | OUTPATIENT
Start: 2018-06-19 | End: 2018-06-19

## 2018-06-19 RX ORDER — DIPHENHYDRAMINE HYDROCHLORIDE 50 MG/ML
25 INJECTION, SOLUTION INTRAMUSCULAR; INTRAVENOUS
Status: COMPLETED | OUTPATIENT
Start: 2018-06-19 | End: 2018-06-19

## 2018-06-19 RX ORDER — HYDROMORPHONE HYDROCHLORIDE 1 MG/ML
0.5 INJECTION, SOLUTION INTRAMUSCULAR; INTRAVENOUS; SUBCUTANEOUS
Status: COMPLETED | OUTPATIENT
Start: 2018-06-19 | End: 2018-06-19

## 2018-06-19 RX ORDER — HYDROMORPHONE HYDROCHLORIDE 2 MG/1
2 TABLET ORAL
Qty: 10 TAB | Refills: 0 | Status: SHIPPED | OUTPATIENT
Start: 2018-06-19 | End: 2018-08-09

## 2018-06-19 RX ORDER — NALOXONE HYDROCHLORIDE 0.4 MG/ML
0.4 INJECTION, SOLUTION INTRAMUSCULAR; INTRAVENOUS; SUBCUTANEOUS ONCE
Status: COMPLETED | OUTPATIENT
Start: 2018-06-19 | End: 2018-06-19

## 2018-06-19 RX ORDER — MORPHINE SULFATE 4 MG/ML
4 INJECTION INTRAVENOUS
Status: COMPLETED | OUTPATIENT
Start: 2018-06-19 | End: 2018-06-19

## 2018-06-19 RX ORDER — NALOXONE HYDROCHLORIDE 0.4 MG/ML
INJECTION, SOLUTION INTRAMUSCULAR; INTRAVENOUS; SUBCUTANEOUS
Status: COMPLETED
Start: 2018-06-19 | End: 2018-06-19

## 2018-06-19 RX ORDER — CEPHALEXIN 500 MG/1
500 CAPSULE ORAL 4 TIMES DAILY
Qty: 28 CAP | Refills: 0 | Status: SHIPPED | OUTPATIENT
Start: 2018-06-19 | End: 2018-06-27

## 2018-06-19 RX ORDER — NALOXONE HYDROCHLORIDE 1 MG/ML
0.2 INJECTION INTRAMUSCULAR; INTRAVENOUS; SUBCUTANEOUS
Status: DISCONTINUED | OUTPATIENT
Start: 2018-06-19 | End: 2018-06-19

## 2018-06-19 RX ORDER — HYDROMORPHONE HYDROCHLORIDE 1 MG/ML
1 INJECTION, SOLUTION INTRAMUSCULAR; INTRAVENOUS; SUBCUTANEOUS
Status: COMPLETED | OUTPATIENT
Start: 2018-06-19 | End: 2018-06-19

## 2018-06-19 RX ADMIN — CEFTRIAXONE SODIUM 1 G: 1 INJECTION, POWDER, FOR SOLUTION INTRAMUSCULAR; INTRAVENOUS at 16:39

## 2018-06-19 RX ADMIN — SODIUM CHLORIDE 1000 ML: 900 INJECTION, SOLUTION INTRAVENOUS at 15:13

## 2018-06-19 RX ADMIN — PROMETHAZINE HYDROCHLORIDE 25 MG: 25 INJECTION, SOLUTION INTRAMUSCULAR; INTRAVENOUS at 17:12

## 2018-06-19 RX ADMIN — MORPHINE SULFATE 4 MG: 4 INJECTION INTRAVENOUS at 15:09

## 2018-06-19 RX ADMIN — NALOXONE HYDROCHLORIDE 0.2 MG: 0.4 INJECTION, SOLUTION INTRAMUSCULAR; INTRAVENOUS; SUBCUTANEOUS at 19:41

## 2018-06-19 RX ADMIN — NALOXONE HYDROCHLORIDE 0.4 MG: 0.4 INJECTION, SOLUTION INTRAMUSCULAR; INTRAVENOUS; SUBCUTANEOUS at 20:51

## 2018-06-19 RX ADMIN — MORPHINE SULFATE 4 MG: 4 INJECTION INTRAVENOUS at 16:13

## 2018-06-19 RX ADMIN — ONDANSETRON 4 MG: 2 INJECTION, SOLUTION INTRAMUSCULAR; INTRAVENOUS at 15:06

## 2018-06-19 RX ADMIN — NALOXONE HYDROCHLORIDE 0.2 MG: 0.4 INJECTION, SOLUTION INTRAMUSCULAR; INTRAVENOUS; SUBCUTANEOUS at 20:16

## 2018-06-19 RX ADMIN — HYDROMORPHONE HYDROCHLORIDE 0.5 MG: 1 INJECTION, SOLUTION INTRAMUSCULAR; INTRAVENOUS; SUBCUTANEOUS at 19:07

## 2018-06-19 RX ADMIN — HYDROMORPHONE HYDROCHLORIDE 1 MG: 1 INJECTION, SOLUTION INTRAMUSCULAR; INTRAVENOUS; SUBCUTANEOUS at 16:42

## 2018-06-19 RX ADMIN — DIPHENHYDRAMINE HYDROCHLORIDE 25 MG: 50 INJECTION, SOLUTION INTRAMUSCULAR; INTRAVENOUS at 15:10

## 2018-06-19 NOTE — ED NOTES
Patient's O2 dropping into low-mid 80s following Dilaudid administration. Patient placed on 2L/min NC. Patient very lethargic, will continue to monitor carefully.

## 2018-06-19 NOTE — ED NOTES
Assumed care of pt from triage. Pt reports she had a colon resection was a couple years ago. Had cdiff 6 months ago due to diverticulitits and was on several antibitotics. Constipation and diarrhea intermittently. Last BM last night soft. Pt has been vomiting since yesterday. Have only ate toast but vomited that. Pt has hx of UTI's, 2 weeks ago. Does not have urologist.  Still having burning on urination. Pt reports last episode was a few minutes ago yellow/white in color.   Left lower abd pain radiating to back/

## 2018-06-19 NOTE — ED NOTES
Bedside and Verbal shift change received from [facility], RN. Report included the following information: SBAR, ED Summary, MAR and Recent Results.

## 2018-06-19 NOTE — ED NOTES
Bedside shift change report given to Zunilda Marie RN (oncoming nurse) by Mehdi Olea (offgoing nurse). Report included the following information SBAR, Kardex, ED Summary and MAR     .

## 2018-06-19 NOTE — DISCHARGE INSTRUCTIONS
Abdominal Pain: Care Instructions  Your Care Instructions    Abdominal pain has many possible causes. Some aren't serious and get better on their own in a few days. Others need more testing and treatment. If your pain continues or gets worse, you need to be rechecked and may need more tests to find out what is wrong. You may need surgery to correct the problem. Don't ignore new symptoms, such as fever, nausea and vomiting, urination problems, pain that gets worse, and dizziness. These may be signs of a more serious problem. Your doctor may have recommended a follow-up visit in the next 8 to 12 hours. If you are not getting better, you may need more tests or treatment. The doctor has checked you carefully, but problems can develop later. If you notice any problems or new symptoms, get medical treatment right away. Follow-up care is a key part of your treatment and safety. Be sure to make and go to all appointments, and call your doctor if you are having problems. It's also a good idea to know your test results and keep a list of the medicines you take. How can you care for yourself at home? · Rest until you feel better. · To prevent dehydration, drink plenty of fluids, enough so that your urine is light yellow or clear like water. Choose water and other caffeine-free clear liquids until you feel better. If you have kidney, heart, or liver disease and have to limit fluids, talk with your doctor before you increase the amount of fluids you drink. · If your stomach is upset, eat mild foods, such as rice, dry toast or crackers, bananas, and applesauce. Try eating several small meals instead of two or three large ones. · Wait until 48 hours after all symptoms have gone away before you have spicy foods, alcohol, and drinks that contain caffeine. · Do not eat foods that are high in fat. · Avoid anti-inflammatory medicines such as aspirin, ibuprofen (Advil, Motrin), and naproxen (Aleve).  These can cause stomach upset. Talk to your doctor if you take daily aspirin for another health problem. When should you call for help? Call 911 anytime you think you may need emergency care. For example, call if:  ? · You passed out (lost consciousness). ? · You pass maroon or very bloody stools. ? · You vomit blood or what looks like coffee grounds. ? · You have new, severe belly pain. ?Call your doctor now or seek immediate medical care if:  ? · Your pain gets worse, especially if it becomes focused in one area of your belly. ? · You have a new or higher fever. ? · Your stools are black and look like tar, or they have streaks of blood. ? · You have unexpected vaginal bleeding. ? · You have symptoms of a urinary tract infection. These may include:  ¨ Pain when you urinate. ¨ Urinating more often than usual.  ¨ Blood in your urine. ? · You are dizzy or lightheaded, or you feel like you may faint. ? Watch closely for changes in your health, and be sure to contact your doctor if:  ? · You are not getting better after 1 day (24 hours). Where can you learn more? Go to http://gladys-alia.info/. Enter P258 in the search box to learn more about \"Abdominal Pain: Care Instructions. \"  Current as of: March 20, 2017  Content Version: 11.4  © 2455-6871 [employer]. Care instructions adapted under license by [employer] (which disclaims liability or warranty for this information). If you have questions about a medical condition or this instruction, always ask your healthcare professional. [place] [address] any warranty or liability for your use of this information.

## 2018-06-19 NOTE — ED PROVIDER NOTES
I have seen and evaluated this patient in the Express Care portion of triage for abdominal and flank pain. The patients care will begin now and orders have been placed. This patient will be seen and provided further care in the Emergency Room. Jessica Peña PA-C    EMERGENCY DEPARTMENT HISTORY AND PHYSICAL EXAM      Date: 6/19/2018  Patient Name: Yovani Vaughn    History of Presenting Illness     Chief Complaint   Patient presents with    Abdominal Pain     pt reports left lower abdominal pain x1 week     Flank Pain     left sided flank pain x1 week     Bladder Infection     pt reports history of UTIs with dysuria POA       History Provided By: Patient    HPI: Yovani Vaughn, 50 y.o. female with PMHx significant for diverticulosis, chronic kidney disease and colonoscopy, presents to the ED with cc of left abdominal and left flank pain over the past couple weeks. She has been on antibiotics but the pain came back after 1-2 days. Pt reports chills, vomiting since yesterday and diarrhea with approximately 10 bowel movements yesterday. She specifically denies any fevers, nausea,chest pain, shortness of breath, headache, rash, sweating or weight loss. Yovani Vaughn is a 50 y.o. female with hx of diverticulitis, HTN, GERD, CKD, and DM who presents ambulatory to ED [facility] ED with CC of LLQ pain and left flank pain x ~1 week. She reports nausea, vomiting, and intermittent diarrhea since onset; pt notes she had ~10 BM's yesterday (6/18/18). Pt reports hx of similar symptoms \"a few weeks ago,\" states she was diagnosed with UTI, and finished a course of antibiotic with ~1-2 days of improvement. She also reports hx of c diff. Pt reports hx of hernia repair. She specifically denies any fevers, chills, chest pain, shortness of breath, headache, rash, sweating or weight loss. There are no other complaints, changes, or physical findings at this time.     PCP: BEBA Goyal    Current Outpatient Prescriptions   Medication Sig Dispense Refill    ondansetron (ZOFRAN ODT) 4 mg disintegrating tablet Take 1 Tab by mouth every eight (8) hours as needed for Nausea. 15 Tab 0    losartan-hydroCHLOROthiazide (HYZAAR) 100-12.5 mg per tablet Take 1 Tab by mouth daily.  albuterol sulfate (PROVENTIL;VENTOLIN) 2.5 mg/0.5 mL nebu nebulizer solution 2.5 mg by Nebulization route every twelve (12) hours. Patient mixes this agent with acetylcysteine (20%) nebulizer solution for breathing treatment.  cetirizine (ZYRTEC) 10 mg tablet Take 10 mg by mouth nightly as needed for Allergies.  estradiol (ESTRACE) 1 mg tablet Take 1 mg by mouth daily.  albuterol (PROVENTIL, VENTOLIN) 90 mcg/Actuation inhaler Take 2 Puffs by inhalation every six (6) hours as needed.  dicyclomine (BENTYL) 10 mg capsule Take 1 Cap by mouth three (3) times daily. 90 Cap 0    famotidine (PEPCID) 20 mg tablet Take 1 Tab by mouth two (2) times a day. 60 Tab 0    acetaminophen (TYLENOL) 500 mg tablet Take 1,000 mg by mouth every six (6) hours as needed for Pain.  LACTOBACIL 2-S. THERMO-BIFIDO 1 (VSL#3 PO) Take 1 Packet by mouth daily.  EPINEPHrine (EPIPEN) 0.3 mg/0.3 mL (1:1,000) injection 0.3 mL by IntraMUSCular route once as needed for up to 1 dose.  0.3 mL 1       Past History     Past Medical History:  Past Medical History:   Diagnosis Date    Anal fissure     Anisocoria     Asthma     LAST EPISODE     Back pain     Cerumen impaction     Chronic kidney disease     hx uti in past    Coagulation defects     ocassional rectal bleeding due to anal fissure    Colovaginal fistula     Diabetes (HCC)     NIDDM    Diabetes ([facility] [address]     Diverticulitis     Diverticulosis     Enlarged tonsils     Frequent UTI     GERD (gastroesophageal reflux disease)     H/O endoscopy     with dilation    HA (headache)     Hepatic steatosis     Hx of colonoscopy with polypectomy     benign    Hypertension     Ill-defined condition     FREQUENT HIVES    Ill-defined condition     HX ELEVATED LIVER ENZYMES    Morbid obesity (HCC)     Nausea & vomiting     during diverticulitis flare    Obesity     Otitis media     Pneumonia     about 15 yrs ago    Psychiatric disorder     ANXIETY    Recurrent tonsillitis     Sinusitis     Transfusion history ~ age 35    postop hysterectomy    Unspecified sleep apnea     snores ( not diagnosed yet)     Urticaria     Urticaria        Past Surgical History:  Past Surgical History:   Procedure Laterality Date    ABDOMEN SURGERY PROC UNLISTED  2018    hernia repair at [facility]    [address]    [place].  HX GI  12    LAPAROSCOPIC HAND ASSISTED  POSS OPEN SIGMOID COLECTOMY POSS TEMPORARY DIVERTING LOOP ILEOSTOMY;  (no illeostomy needed)    HX GYN           HX GYN      cervical conization    HX HEENT      SINUS SURGERY LEFT X2    HX HEENT      SINUS SURGERY ON RIGHT X2    HX OTHER SURGICAL      Sphincterotomy    HX PELVIC LAPAROSCOPY      HX EMMANUEL AND BSO      HX UROLOGICAL  12     CYSTOSCOPY INSERTION URETERAL CATHETERS - Cystoscopy Insertion of bilateral ureteral stents       Family History:  Family History   Problem Relation Age of Onset    Diabetes Mother     Cancer Mother      NON-HODGKINS LYMPHOMA    Anesth Problems Mother      PONV    Diabetes Father     Heart Disease Father      CAD - STENTS, PACEMAKER    Arrhythmia Father        Social History:  Social History   Substance Use Topics    Smoking status: Never Smoker    Smokeless tobacco: Never Used    Alcohol use Yes      Comment: Rarely       Allergies: Allergies   Allergen Reactions    Aspirin Shortness of Breath    Prilosec [Omeprazole Magnesium] Anaphylaxis     CHERRY FLAVORED; PT TAKES REGULAR PRILOSEC AND IS OK    Codeine Hives and Itching    Contrast Agent [Iodine] Itching     Pt.  Had itching after IV contrast with the last exam.  Benadryl was given    Morphine Itching     Severe itching    Fentanyl Rash    Ketorolac Rash     \"makes my eyes spasm and causes rash on my hands\"    Percocet [Oxycodone-Acetaminophen] Hives         Review of Systems   Review of Systems   Constitutional: Negative. Negative for activity change, appetite change, chills, fatigue, fever and unexpected weight change. HENT: Negative. Negative for congestion, hearing loss, rhinorrhea, sneezing and voice change. Eyes: Negative. Negative for pain and visual disturbance. Respiratory: Negative. Negative for apnea, cough, choking, chest tightness and shortness of breath. Cardiovascular: Negative. Negative for chest pain and palpitations. Gastrointestinal: Positive for abdominal pain (LLQ), diarrhea, nausea and vomiting. Negative for abdominal distention and blood in stool. Genitourinary: Positive for flank pain (left). Negative for difficulty urinating, frequency and urgency. No discharge   Musculoskeletal: Negative for arthralgias, myalgias and neck stiffness. Skin: Negative. Negative for color change and rash. Neurological: Negative. Negative for dizziness, seizures, syncope, speech difficulty, weakness, numbness and headaches. Hematological: Negative for adenopathy. Psychiatric/Behavioral: Negative. Negative for agitation, behavioral problems, dysphoric mood and suicidal ideas. The patient is not nervous/anxious. Physical Exam   Physical Exam   Constitutional: She is oriented to person, place, and time. She appears well-developed and well-nourished. No distress. HENT:   Head: Normocephalic and atraumatic. Mouth/Throat: Oropharynx is clear and moist. No oropharyngeal exudate. Eyes: Conjunctivae and EOM are normal. Pupils are equal, round, and reactive to light. Right eye exhibits no discharge. Left eye exhibits no discharge. Neck: Normal range of motion. Neck supple. Cardiovascular: Normal rate, regular rhythm and intact distal pulses.   Exam reveals no gallop and no friction rub. No murmur heard. Pulmonary/Chest: Effort normal and breath sounds normal. No respiratory distress. She has no wheezes. She has no rales. She exhibits no tenderness. Abdominal: Soft. Bowel sounds are normal. She exhibits no distension and no mass. There is tenderness in the left lower quadrant. There is no rebound and no guarding. Musculoskeletal: Normal range of motion. She exhibits no edema. Lymphadenopathy:     She has no cervical adenopathy. Neurological: She is alert and oriented to person, place, and time. No cranial nerve deficit. Coordination normal.   Skin: Skin is warm and dry. No rash noted. No erythema. Psychiatric: She has a normal mood and affect. Nursing note and vitals reviewed. Diagnostic Study Results     Labs -     Recent Results (from the past 12 hour(s))   CBC WITH AUTOMATED DIFF    Collection Time: 06/19/18  1:26 PM   Result Value Ref Range    WBC 4.7 3.6 - 11.0 K/uL    RBC 4.44 3.80 - 5.20 M/uL    HGB 13.1 11.5 - 16.0 g/dL    HCT 36.1 35.0 - 47.0 %    MCV 81.3 80.0 - 99.0 FL    MCH 29.5 26.0 - 34.0 PG    MCHC 36.3 30.0 - 36.5 g/dL    RDW 12.6 11.5 - 14.5 %    PLATELET 910 407 - 883 K/uL    MPV 9.8 8.9 - 12.9 FL    NRBC 0.0 0  WBC    ABSOLUTE NRBC 0.00 0.00 - 0.01 K/uL    NEUTROPHILS 59 32 - 75 %    LYMPHOCYTES 30 12 - 49 %    MONOCYTES 6 5 - 13 %    EOSINOPHILS 4 0 - 7 %    BASOPHILS 1 0 - 1 %    IMMATURE GRANULOCYTES 0 0.0 - 0.5 %    ABS. NEUTROPHILS 2.8 1.8 - 8.0 K/UL    ABS. LYMPHOCYTES 1.4 0.8 - 3.5 K/UL    ABS. MONOCYTES 0.3 0.0 - 1.0 K/UL    ABS. EOSINOPHILS 0.2 0.0 - 0.4 K/UL    ABS. BASOPHILS 0.0 0.0 - 0.1 K/UL    ABS. IMM.  GRANS. 0.0 0.00 - 0.04 K/UL    DF AUTOMATED     LIPASE    Collection Time: 06/19/18  1:26 PM   Result Value Ref Range    Lipase 236 73 - 765 U/L   METABOLIC PANEL, COMPREHENSIVE    Collection Time: 06/19/18  1:26 PM   Result Value Ref Range    Sodium 140 136 - 145 mmol/L    Potassium 3.5 3.5 - 5.1 mmol/L Chloride 104 97 - 108 mmol/L    CO2 26 21 - 32 mmol/L    Anion gap 10 5 - 15 mmol/L    Glucose 242 (H) 65 - 100 mg/dL    BUN 9 6 - 20 MG/DL    Creatinine 0.73 0.55 - 1.02 MG/DL    BUN/Creatinine ratio 12 12 - 20      GFR est AA >60 >60 ml/min/1.73m2    GFR est non-AA >60 >60 ml/min/1.73m2    Calcium 9.9 8.5 - 10.1 MG/DL    Bilirubin, total 0.8 0.2 - 1.0 MG/DL    ALT (SGPT) 57 12 - 78 U/L    AST (SGOT) 60 (H) 15 - 37 U/L    Alk. phosphatase 61 45 - 117 U/L    Protein, total 7.6 6.4 - 8.2 g/dL    Albumin 4.1 3.5 - 5.0 g/dL    Globulin 3.5 2.0 - 4.0 g/dL    A-G Ratio 1.2 1.1 - 2.2     URINALYSIS W/ REFLEX CULTURE    Collection Time: 06/19/18  2:26 PM   Result Value Ref Range    Color EBONY      Appearance CLOUDY (A) CLEAR      Specific gravity 1.021 1.003 - 1.030      pH (UA) 6.5 5.0 - 8.0      Protein NEGATIVE  NEG mg/dL    Glucose 100 (A) NEG mg/dL    Ketone NEGATIVE  NEG mg/dL    Bilirubin NEGATIVE  NEG      Blood NEGATIVE  NEG      Urobilinogen 1.0 0.2 - 1.0 EU/dL    Nitrites POSITIVE (A) NEG      Leukocyte Esterase MODERATE (A) NEG      WBC 5-10 0 - 4 /hpf    RBC 5-10 0 - 5 /hpf    Epithelial cells MODERATE (A) FEW /lpf    Bacteria 1+ (A) NEG /hpf    UA:UC IF INDICATED URINE CULTURE ORDERED (A) CNI     LACTIC ACID    Collection Time: 06/19/18  3:04 PM   Result Value Ref Range    Lactic acid 1.4 0.4 - 2.0 MMOL/L       Radiologic Studies -   CT Results  (Last 48 hours)               06/19/18 1621  CT ABD PELV WO CONT Final result    Impression:  IMPRESSION:   Stable splenomegaly   No acute abdominal or pelvic process seen. Narrative:  EXAM:  CT ABD PELV WO CONT       INDICATION: Lower Abdominal and left flank pain for one week. History of UTIs   with dysuria. COMPARISON: 5/23/2018       CONTRAST:  None. TECHNIQUE:    Thin axial images were obtained through the abdomen and pelvis. Coronal and   sagittal reconstructions were generated. Oral contrast was not administered.  CT   dose reduction was achieved through use of a standardized protocol tailored for   this examination and automatic exposure control for dose modulation. The absence of intravenous contrast material reduces the sensitivity for   evaluation of the solid parenchymal organs of the abdomen. FINDINGS:    LUNG BASES: Clear. INCIDENTALLY IMAGED HEART AND MEDIASTINUM: Unremarkable. LIVER: No mass or biliary dilatation. GALLBLADDER: Prior cholecystectomy. SPLEEN: Splenic granuloma. Mild splenomegaly, unchanged   PANCREAS: No mass or ductal dilatation. ADRENALS: Unremarkable. KIDNEYS/URETERS: No mass, calculus, or hydronephrosis. STOMACH: Unremarkable. SMALL BOWEL: No dilatation or wall thickening. COLON: No dilatation or wall thickening. Sigmoid anastomosis on image 70. APPENDIX: Unremarkable. Axial image 60. PERITONEUM: No ascites or pneumoperitoneum. RETROPERITONEUM: No lymphadenopathy or aortic aneurysm. REPRODUCTIVE ORGANS: Prior hysterectomy   URINARY BLADDER: No mass or calculus. BONES: No destructive bone lesion. ADDITIONAL COMMENTS: Fat-containing umbilical hernia with neck measurement of 13   mm and outer diameter of 3.6 cm. Medical Decision Making   I am the first provider for this patient. I reviewed the vital signs, available nursing notes, past medical history, past surgical history, family history and social history. Vital Signs-Reviewed the patient's vital signs. Patient Vitals for the past 12 hrs:   Temp Pulse Resp BP SpO2   06/19/18 1455 - 79 - (!) 146/93 95 %   06/19/18 1300 98.1 °F (36.7 °C) 81 20 (!) 143/93 96 %       Pulse Oximetry Analysis - 96% on RA    Records Reviewed: Nursing Notes, Old Medical Records, Previous Radiology Studies and Previous Laboratory Studies    Provider Notes (Medical Decision Making):     DDx: gastritis, gastroenteritis, electrolyte abnormality, c dif, diverticulitis    Will assess with basic labs, lipase, and lactate.     ED Course: Initial assessment performed. The patients presenting problems have been discussed, and they are in agreement with the care plan formulated and outlined with them. I have encouraged them to ask questions as they arise throughout their visit. 3:50 PM   Pt re-evaluated, results reviewed; pt has UTI, will treat with Rocephin.    3:52 PM  I have just reevaluated the patient. I have reviewed Her vital signs and determined there is currently no worsening in their condition or physical exam.  Results have been reviewed with them and their questions have been answered. We will continue to review further results as they come available. 4:02 PM  Pt re-evaluated, states her pain is starting to return. Will re-dose with additional pain medication. 7:02 PM  Pt re-evaluated; no additional episodes of diarrhea in ED. Pt currently pain free. Stable for discharge. 7:44 PM  Pt becoming somnolent, falling asleep and dropping O2 saturation. Will order dose of narcan and re-evaluate. 8:14 PM  Pt re-evaluated, still somnolent, dropping O2 saturation. Will order additional dose of narcan. Disposition:  7:03 PM  Theotis Stair  results have been reviewed with her. She has been counseled regarding her diagnosis. She verbally conveys understanding and agreement of the signs, symptoms, diagnosis, treatment and prognosis and additionally agrees to follow up as recommended with BEBA Shoemaker in 24 - 48 hours. She also agrees with the care-plan and conveys that all of her questions have been answered. I have also put together some discharge instructions for her that include: 1) educational information regarding their diagnosis, 2) how to care for their diagnosis at home, as well a 3) list of reasons why they would want to return to the ED prior to their follow-up appointment, should their condition change. PLAN:  1.    Current Discharge Medication List      START taking these medications    Details HYDROmorphone (DILAUDID) 2 mg tablet Take 1 Tab by mouth every six (6) hours as needed for Pain for up to 10 doses. Max Daily Amount: 8 mg. Indications: Severe Pain  Qty: 10 Tab, Refills: 0    Associated Diagnoses: Abdominal pain, generalized      cephALEXin (KEFLEX) 500 mg capsule Take 1 Cap by mouth four (4) times daily for 7 days. Qty: 28 Cap, Refills: 0           2. Follow-up Information     Follow up With Details Comments [address] BEBA Aparicio Call in 2 days As needed, If symptoms worsen [place] [id number]  [phone]          Return to ED if worse     Diagnosis     Clinical Impression:   1. Abdominal pain, generalized    2. Acute cystitis without hematuria        Attestations: This note is prepared by Shayla Nolen, acting as Scribe for Spencer Fragoso MD.    Spencer Fragoso MD: The scribe's documentation has been prepared under my direction and personally reviewed by me in its entirety. I confirm that the note above accurately reflects all work, treatment, procedures, and medical decision making performed by me.

## 2018-06-20 NOTE — ED NOTES
Patient did not tolerate room air. Additional dose of Narcan administered. Patient on 1L/min O2 at this time. MD aware. Will continue to monitor.

## 2018-06-20 NOTE — ED NOTES
Patient maintaining O2 sats on room air and is more alert/oriented upon final assessment. Discharge instructions provided by Dr. Diogenes Khanna, patient verbalizes understanding.

## 2018-06-21 LAB
BACTERIA SPEC CULT: NORMAL
CC UR VC: NORMAL
SERVICE CMNT-IMP: NORMAL

## 2018-06-27 ENCOUNTER — HOSPITAL ENCOUNTER (EMERGENCY)
Age: 48
Discharge: HOME | End: 2018-06-27
Attending: EMERGENCY MEDICINE
Payer: COMMERCIAL

## 2018-06-27 VITALS
SYSTOLIC BLOOD PRESSURE: 138 MMHG | DIASTOLIC BLOOD PRESSURE: 82 MMHG | HEART RATE: 65 BPM | TEMPERATURE: 98.6 F | BODY MASS INDEX: 37.32 KG/M2 | WEIGHT: 202.82 LBS | HEIGHT: 62 IN | RESPIRATION RATE: 18 BRPM | OXYGEN SATURATION: 97 %

## 2018-06-27 DIAGNOSIS — K64.9 HEMORRHOIDS, UNSPECIFIED HEMORRHOID TYPE: ICD-10-CM

## 2018-06-27 DIAGNOSIS — R10.9 LEFT LATERAL ABDOMINAL PAIN: Primary | ICD-10-CM

## 2018-06-27 DIAGNOSIS — R11.2 NON-INTRACTABLE VOMITING WITH NAUSEA, UNSPECIFIED VOMITING TYPE: ICD-10-CM

## 2018-06-27 LAB
ALBUMIN SERPL-MCNC: 4.4 G/DL (ref 3.5–5)
ALBUMIN/GLOB SERPL: 1.2 {RATIO} (ref 1.1–2.2)
ALP SERPL-CCNC: 65 U/L (ref 45–117)
ALT SERPL-CCNC: 57 U/L (ref 12–78)
ANION GAP SERPL CALC-SCNC: 7 MMOL/L (ref 5–15)
AST SERPL-CCNC: 53 U/L (ref 15–37)
BASOPHILS # BLD: 0 K/UL (ref 0–0.1)
BASOPHILS NFR BLD: 0 % (ref 0–1)
BILIRUB SERPL-MCNC: 0.7 MG/DL (ref 0.2–1)
BUN SERPL-MCNC: 7 MG/DL (ref 6–20)
BUN/CREAT SERPL: 9 (ref 12–20)
CALCIUM SERPL-MCNC: 10.6 MG/DL (ref 8.5–10.1)
CHLORIDE SERPL-SCNC: 103 MMOL/L (ref 97–108)
CO2 SERPL-SCNC: 28 MMOL/L (ref 21–32)
CREAT SERPL-MCNC: 0.74 MG/DL (ref 0.55–1.02)
DIFFERENTIAL METHOD BLD: ABNORMAL
EOSINOPHIL # BLD: 0.2 K/UL (ref 0–0.4)
EOSINOPHIL NFR BLD: 4 % (ref 0–7)
ERYTHROCYTE [DISTWIDTH] IN BLOOD BY AUTOMATED COUNT: 12.8 % (ref 11.5–14.5)
GLOBULIN SER CALC-MCNC: 3.6 G/DL (ref 2–4)
GLUCOSE SERPL-MCNC: 163 MG/DL (ref 65–100)
HCT VFR BLD AUTO: 38.5 % (ref 35–47)
HGB BLD-MCNC: 13.7 G/DL (ref 11.5–16)
IMM GRANULOCYTES # BLD: 0 K/UL (ref 0–0.04)
IMM GRANULOCYTES NFR BLD AUTO: 1 % (ref 0–0.5)
LIPASE SERPL-CCNC: 298 U/L (ref 73–393)
LYMPHOCYTES # BLD: 1.8 K/UL (ref 0.8–3.5)
LYMPHOCYTES NFR BLD: 27 % (ref 12–49)
MCH RBC QN AUTO: 29.1 PG (ref 26–34)
MCHC RBC AUTO-ENTMCNC: 35.6 G/DL (ref 30–36.5)
MCV RBC AUTO: 81.9 FL (ref 80–99)
MONOCYTES # BLD: 0.3 K/UL (ref 0–1)
MONOCYTES NFR BLD: 5 % (ref 5–13)
NEUTS SEG # BLD: 4.3 K/UL (ref 1.8–8)
NEUTS SEG NFR BLD: 64 % (ref 32–75)
NRBC # BLD: 0 K/UL (ref 0–0.01)
NRBC BLD-RTO: 0 PER 100 WBC
PLATELET # BLD AUTO: 191 K/UL (ref 150–400)
PMV BLD AUTO: 9.5 FL (ref 8.9–12.9)
POTASSIUM SERPL-SCNC: 3.7 MMOL/L (ref 3.5–5.1)
PROT SERPL-MCNC: 8 G/DL (ref 6.4–8.2)
RBC # BLD AUTO: 4.7 M/UL (ref 3.8–5.2)
SODIUM SERPL-SCNC: 138 MMOL/L (ref 136–145)
WBC # BLD AUTO: 6.7 K/UL (ref 3.6–11)

## 2018-06-27 PROCEDURE — 96361 HYDRATE IV INFUSION ADD-ON: CPT

## 2018-06-27 PROCEDURE — 99284 EMERGENCY DEPT VISIT MOD MDM: CPT

## 2018-06-27 PROCEDURE — 85025 COMPLETE CBC W/AUTO DIFF WBC: CPT | Performed by: PHYSICIAN ASSISTANT

## 2018-06-27 PROCEDURE — 80053 COMPREHEN METABOLIC PANEL: CPT | Performed by: PHYSICIAN ASSISTANT

## 2018-06-27 PROCEDURE — 74011250636 HC RX REV CODE- 250/636: Performed by: EMERGENCY MEDICINE

## 2018-06-27 PROCEDURE — 36415 COLL VENOUS BLD VENIPUNCTURE: CPT | Performed by: PHYSICIAN ASSISTANT

## 2018-06-27 PROCEDURE — 83690 ASSAY OF LIPASE: CPT | Performed by: EMERGENCY MEDICINE

## 2018-06-27 PROCEDURE — 96374 THER/PROPH/DIAG INJ IV PUSH: CPT

## 2018-06-27 RX ORDER — ONDANSETRON 2 MG/ML
4 INJECTION INTRAMUSCULAR; INTRAVENOUS
Status: COMPLETED | OUTPATIENT
Start: 2018-06-27 | End: 2018-06-27

## 2018-06-27 RX ORDER — METFORMIN HYDROCHLORIDE 500 MG/1
500 TABLET ORAL
COMMUNITY
End: 2019-02-12

## 2018-06-27 RX ORDER — ONDANSETRON 4 MG/1
4 TABLET, ORALLY DISINTEGRATING ORAL
Qty: 10 TAB | Refills: 0 | Status: SHIPPED | OUTPATIENT
Start: 2018-06-27 | End: 2018-08-09

## 2018-06-27 RX ORDER — PRAMOXINE HYDROCHLORIDE 10 MG/G
AEROSOL, FOAM TOPICAL
Qty: 1 CAN | Refills: 0 | Status: SHIPPED | OUTPATIENT
Start: 2018-06-27 | End: 2019-04-01

## 2018-06-27 RX ADMIN — SODIUM CHLORIDE 1000 ML: 900 INJECTION, SOLUTION INTRAVENOUS at 20:51

## 2018-06-27 RX ADMIN — ONDANSETRON 4 MG: 2 INJECTION INTRAMUSCULAR; INTRAVENOUS at 20:51

## 2018-06-27 NOTE — ED PROVIDER NOTES
PROVIDER IN TRIAGE NOTE:  5:50 PM  Charla Jasmine PA-C has evaluated the patient as the Provider in Triage (PIT). Pt presents to the Emergency Dept with c/o abdominal pain, N/V, and rectal bleeding. Pt with h/o diverticulitis and h/o abdominal surgery. They have reviewed the vital signs and the triage nurse assessment. They have talked with the patient and any available family and advised that the appropriate studies have been ordered to initiate the work up based on the clinical presentation during the assessment. The pt has been advised that they will be accommodated in the Main ED as soon as possible. The pt has been requested to contact the triage nurse or PIT immediately if they experiences any changes in their condition during this brief waiting period. EMERGENCY DEPARTMENT HISTORY AND PHYSICAL EXAM      Date: 6/27/2018  Patient Name: Rocky Duque    History of Presenting Illness     Chief Complaint   Patient presents with    Abdominal Pain     pt reports I \"have diverticulitis and I think this is a flare up\" ; pt reports pain as LLQ x3 weeks radiating into mid abdomen and left back     Rectal Bleeding     onset last night; pt reports \"drops in the toliet that are bright red\"    Diarrhea     pt reports n/v/d all day ; pt reports history of C-Diff    Vomiting       History Provided By: Patient and medical records    HPI: Rocky Duque, 50 y.o. female with PMHx significant for HTN, DM, and CKD, presents ambulatory to the ED with cc of intermittent episodes of nagging, sharp, left-sided and periumbilical abdominal pain with associated nausea and multiple episodes of vomiting since yesterday. She reports having 5 episodes of vomiting yesterday. She also c/o intermittent episodes of diarrhea x 1 week. She reports that her last bowel movement was formed.  Per medical records, the patient has a history of the same complaints, and she has been diagnosed with a UTI several times over the past 3 months and prescribed Keflex. Per medical records, the patient's most recent CT A/P was negative for any significant findings. She also c/o intermittent episodes of rectal bleeding with associated rectal pain x 1 week. She reports symptoms associated with her hemorrhoids. She reports a history of anal fissures. She reports that she has a surgical history significant for a sigmoid colon resection and hernia repair x 2. She notes that she was also recently diagnosed with diverticulitis 3 months ago, and she was treated with Cipro and Flagyl. She states that she is closely followed by Urology. She reports a history of C diff in 10/2017. She specifically denies chest pain, cough, diarrhea, fevers, dysuria, hematuria, or other complaints at this time. There are no other complaints, changes, or physical findings at this time. PCP: BEBA Omalley    Current Outpatient Prescriptions   Medication Sig Dispense Refill    metFORMIN (GLUCOPHAGE) 500 mg tablet Take 500 mg by mouth daily (with breakfast).  ondansetron (ZOFRAN ODT) 4 mg disintegrating tablet Take 1 Tab by mouth every eight (8) hours as needed for Nausea. 10 Tab 0    Pramoxine (PROCTOFOAM) 1 % topical foam Apply  to affected area three (3) times daily as needed for Hemorrhoids. 1 Can 0    cephALEXin (KEFLEX) 500 mg capsule Take 1 Cap by mouth four (4) times daily for 7 days. 28 Cap 0    dicyclomine (BENTYL) 10 mg capsule Take 1 Cap by mouth three (3) times daily. 90 Cap 0    famotidine (PEPCID) 20 mg tablet Take 1 Tab by mouth two (2) times a day. 60 Tab 0    losartan-hydroCHLOROthiazide (HYZAAR) 100-12.5 mg per tablet Take 1 Tab by mouth daily.  albuterol sulfate (PROVENTIL;VENTOLIN) 2.5 mg/0.5 mL nebu nebulizer solution 2.5 mg by Nebulization route every twelve (12) hours. Patient mixes this agent with acetylcysteine (20%) nebulizer solution for breathing treatment.       acetaminophen (TYLENOL) 500 mg tablet Take 1,000 mg by mouth every six (6) hours as needed for Pain.  LACTOBACIL 2-S. THERMO-BIFIDO 1 (VSL#3 PO) Take 1 Packet by mouth daily.  cetirizine (ZYRTEC) 10 mg tablet Take 10 mg by mouth nightly as needed for Allergies.  EPINEPHrine (EPIPEN) 0.3 mg/0.3 mL (1:1,000) injection 0.3 mL by IntraMUSCular route once as needed for up to 1 dose. 0.3 mL 1    estradiol (ESTRACE) 1 mg tablet Take 1 mg by mouth daily.  albuterol (PROVENTIL, VENTOLIN) 90 mcg/Actuation inhaler Take 2 Puffs by inhalation every six (6) hours as needed.  HYDROmorphone (DILAUDID) 2 mg tablet Take 1 Tab by mouth every six (6) hours as needed for Pain for up to 10 doses. Max Daily Amount: 8 mg. Indications: Severe Pain 10 Tab 0    naloxone (NARCAN) 2 mg/actuation spry Use 1 spray intranasally into 1 nostril. Use a new Narcan nasal spray for subsequent doses and administer into alternating nostrils. May repeat every 2 to 3 minutes as needed.  1 Device 2       Past History     Past Medical History:  Past Medical History:   Diagnosis Date    Anal fissure     Anisocoria     Asthma     LAST EPISODE     Back pain     Cerumen impaction     Chronic kidney disease     hx uti in past    Coagulation defects     ocassional rectal bleeding due to anal fissure    Colovaginal fistula     Diabetes (HCC)     NIDDM    Diabetes ([facility] [address]     Diverticulitis     Diverticulosis     Enlarged tonsils     Frequent UTI     GERD (gastroesophageal reflux disease)     H/O endoscopy     with dilation    HA (headache)     Hepatic steatosis     Hx of colonoscopy with polypectomy     benign    Hypertension     Ill-defined condition     FREQUENT HIVES    Ill-defined condition     HX ELEVATED LIVER ENZYMES    Morbid obesity (HCC)     Nausea & vomiting     during diverticulitis flare    Obesity     Otitis media     Pneumonia     about 15 yrs ago    Psychiatric disorder     ANXIETY    Recurrent tonsillitis     Sinusitis     Transfusion history ~ age 35 postop hysterectomy    Unspecified sleep apnea     snores ( not diagnosed yet)     Urticaria     Urticaria        Past Surgical History:  Past Surgical History:   Procedure Laterality Date    ABDOMEN SURGERY PROC UNLISTED  2018    hernia repair at [facility]    [address]    [place].  HX GI  12    LAPAROSCOPIC HAND ASSISTED  POSS OPEN SIGMOID COLECTOMY POSS TEMPORARY DIVERTING LOOP ILEOSTOMY;  (no illeostomy needed)    HX GYN           HX GYN      cervical conization    HX HEENT      SINUS SURGERY LEFT X2    HX HEENT      SINUS SURGERY ON RIGHT X2    HX OTHER SURGICAL      Sphincterotomy    HX PELVIC LAPAROSCOPY      HX EMMANUEL AND BSO      HX UROLOGICAL  12     CYSTOSCOPY INSERTION URETERAL CATHETERS - Cystoscopy Insertion of bilateral ureteral stents       Family History:  Family History   Problem Relation Age of Onset    Diabetes Mother     Cancer Mother      NON-HODGKINS LYMPHOMA    Anesth Problems Mother      PONV    Diabetes Father     Heart Disease Father      CAD - STENTS, PACEMAKER    Arrhythmia Father        Social History:  Social History   Substance Use Topics    Smoking status: Never Smoker    Smokeless tobacco: Never Used    Alcohol use Yes      Comment: Rarely       Allergies: Allergies   Allergen Reactions    Aspirin Shortness of Breath    Prilosec [Omeprazole Magnesium] Anaphylaxis     CHERRY FLAVORED; PT TAKES REGULAR PRILOSEC AND IS OK    Codeine Hives and Itching    Contrast Agent [Iodine] Itching     Pt. Had itching after IV contrast with the last exam.  Benadryl was given    Morphine Itching     Severe itching    Fentanyl Rash    Ketorolac Rash     \"makes my eyes spasm and causes rash on my hands\"    Percocet [Oxycodone-Acetaminophen] Hives       Review of Systems   Review of Systems   Constitutional: Negative for chills, fatigue and fever. HENT: Negative for congestion, rhinorrhea and sore throat.     Eyes: Negative for pain, discharge and visual disturbance. Respiratory: Negative for cough, chest tightness, shortness of breath and wheezing. Cardiovascular: Negative for chest pain, palpitations and leg swelling. Gastrointestinal: Positive for abdominal pain, anal bleeding, diarrhea, nausea, rectal pain and vomiting. Negative for constipation. Genitourinary: Negative for dysuria, frequency and hematuria. Musculoskeletal: Negative for arthralgias, back pain and myalgias. Skin: Negative for rash. Neurological: Negative for dizziness, weakness, light-headedness and headaches. Psychiatric/Behavioral: Negative. Physical Exam   Physical Exam   Constitutional: She is oriented to person, place, and time. She appears well-developed and well-nourished. No distress. HENT:   Head: Normocephalic and atraumatic. Eyes: EOM are normal. Right eye exhibits no discharge. Left eye exhibits no discharge. No scleral icterus. Neck: Normal range of motion. Neck supple. No tracheal deviation present. Cardiovascular: Normal rate, regular rhythm, normal heart sounds and intact distal pulses. Exam reveals no gallop and no friction rub. No murmur heard. Pulmonary/Chest: Effort normal and breath sounds normal. No respiratory distress. She has no wheezes. She has no rales. Abdominal: Soft. She exhibits no distension. There is tenderness in the epigastric area. There is no rebound and no guarding. Genitourinary:   Genitourinary Comments: Rectal Exam: Skin tags; one external hemorrhoid, nonthrombosed. Musculoskeletal: Normal range of motion. She exhibits no edema. Lymphadenopathy:     She has no cervical adenopathy. Neurological: She is alert and oriented to person, place, and time. No focal neuro deficits   Skin: Skin is warm and dry. No rash noted. Psychiatric: She has a normal mood and affect. Nursing note and vitals reviewed.     Diagnostic Study Results   Labs -     Recent Results (from the past 12 hour(s))   CBC WITH AUTOMATED DIFF    Collection Time: 06/27/18  6:59 PM   Result Value Ref Range    WBC 6.7 3.6 - 11.0 K/uL    RBC 4.70 3.80 - 5.20 M/uL    HGB 13.7 11.5 - 16.0 g/dL    HCT 38.5 35.0 - 47.0 %    MCV 81.9 80.0 - 99.0 FL    MCH 29.1 26.0 - 34.0 PG    MCHC 35.6 30.0 - 36.5 g/dL    RDW 12.8 11.5 - 14.5 %    PLATELET 775 841 - 407 K/uL    MPV 9.5 8.9 - 12.9 FL    NRBC 0.0 0  WBC    ABSOLUTE NRBC 0.00 0.00 - 0.01 K/uL    NEUTROPHILS 64 32 - 75 %    LYMPHOCYTES 27 12 - 49 %    MONOCYTES 5 5 - 13 %    EOSINOPHILS 4 0 - 7 %    BASOPHILS 0 0 - 1 %    IMMATURE GRANULOCYTES 1 (H) 0.0 - 0.5 %    ABS. NEUTROPHILS 4.3 1.8 - 8.0 K/UL    ABS. LYMPHOCYTES 1.8 0.8 - 3.5 K/UL    ABS. MONOCYTES 0.3 0.0 - 1.0 K/UL    ABS. EOSINOPHILS 0.2 0.0 - 0.4 K/UL    ABS. BASOPHILS 0.0 0.0 - 0.1 K/UL    ABS. IMM. GRANS. 0.0 0.00 - 0.04 K/UL    DF AUTOMATED     METABOLIC PANEL, COMPREHENSIVE    Collection Time: 06/27/18  6:59 PM   Result Value Ref Range    Sodium 138 136 - 145 mmol/L    Potassium 3.7 3.5 - 5.1 mmol/L    Chloride 103 97 - 108 mmol/L    CO2 28 21 - 32 mmol/L    Anion gap 7 5 - 15 mmol/L    Glucose 163 (H) 65 - 100 mg/dL    BUN 7 6 - 20 MG/DL    Creatinine 0.74 0.55 - 1.02 MG/DL    BUN/Creatinine ratio 9 (L) 12 - 20      GFR est AA >60 >60 ml/min/1.73m2    GFR est non-AA >60 >60 ml/min/1.73m2    Calcium 10.6 (H) 8.5 - 10.1 MG/DL    Bilirubin, total 0.7 0.2 - 1.0 MG/DL    ALT (SGPT) 57 12 - 78 U/L    AST (SGOT) 53 (H) 15 - 37 U/L    Alk. phosphatase 65 45 - 117 U/L    Protein, total 8.0 6.4 - 8.2 g/dL    Albumin 4.4 3.5 - 5.0 g/dL    Globulin 3.6 2.0 - 4.0 g/dL    A-G Ratio 1.2 1.1 - 2.2     LIPASE    Collection Time: 06/27/18  6:59 PM   Result Value Ref Range    Lipase 298 73 - 393 U/L       Medical Decision Making   I am the first provider for this patient. I reviewed the vital signs, available nursing notes, past medical history, past surgical history, family history and social history.     Vital Signs-Reviewed the patient's vital signs. Patient Vitals for the past 12 hrs:   Temp Pulse Resp BP SpO2   06/27/18 2200 - 65 18 138/82 97 %   06/27/18 2130 - 67 18 125/78 96 %   06/27/18 2100 - 69 18 124/81 98 %   06/27/18 2048 - 73 18 130/72 97 %   06/27/18 1750 98.6 °F (37 °C) 86 20 (!) 177/107 99 %       Records Reviewed: Nursing Notes, Old Medical Records, Previous Radiology Studies and Previous Laboratory Studies    Provider Notes (Medical Decision Making):   DDx: GERD, gastritis, PUD, diverticulitis, nephrolithiasis, UTI, cholecystitis, choledocholithiasis, pancreatitis, GI bleed, hemorrhoids. Disposition: 50 y.o. F presents to the ED with abdominal pain and nausea/vomiting. She is afebrile and well-appearing on exam. Labs are unremarkable. She has had multiple presentations to ED for similar symptoms with 9 CT scans in the past 1 year. Most recent CT A/P was on 6/19/2018 with no acute findings. Low suspicion for acute intra abdominal process at this time including diverticulitis. Will have the patient follow up with her GI physician for further management. ED Course:   Initial assessment performed. The patients presenting problems have been discussed, and they are in agreement with the care plan formulated and outlined with them. I have encouraged them to ask questions as they arise throughout their visit. Procedure Note - Rectal Exam:   9:56 PM  Performed by: Dickson Gaviria MD  Chaperoned by: ED RN  Rectal exam performed. The procedure took 1-15 minutes, and pt tolerated well. Progress Note:  9:56 PM  The patient was re-evaluated, and she reports that she is feeling better. Will discharge home with symptomatic treatment. She states that she will follow up with her GI physician. Discussed results, prescriptions and follow up plan with patient. Provided customary return to ED instructions. Patient expressed understanding.   Lam Hayes MD    Critical Care Time: 0 minutes    Disposition:  Discharge Note:  9:57 PM  The pt is ready for discharge. The pt's signs, symptoms, diagnosis, and discharge instructions have been discussed and pt has conveyed their understanding. The pt is to follow up as recommended or return to ER should their symptoms worsen. Plan has been discussed and pt is in agreement. PLAN:  1. Discharge Medication List as of 6/27/2018  9:53 PM      START taking these medications    Details   Pramoxine (PROCTOFOAM) 1 % topical foam Apply  to affected area three (3) times daily as needed for Hemorrhoids. , Normal, Disp-1 Can, R-0         CONTINUE these medications which have CHANGED    Details   ondansetron (ZOFRAN ODT) 4 mg disintegrating tablet Take 1 Tab by mouth every eight (8) hours as needed for Nausea., Normal, Disp-10 Tab, R-0         CONTINUE these medications which have NOT CHANGED    Details   metFORMIN (GLUCOPHAGE) 500 mg tablet Take 500 mg by mouth daily (with breakfast). , Historical Med      cephALEXin (KEFLEX) 500 mg capsule Take 1 Cap by mouth four (4) times daily for 7 days. , Normal, Disp-28 Cap, R-0      dicyclomine (BENTYL) 10 mg capsule Take 1 Cap by mouth three (3) times daily. , Print, Disp-90 Cap, R-0      famotidine (PEPCID) 20 mg tablet Take 1 Tab by mouth two (2) times a day., Print, Disp-60 Tab, R-0      losartan-hydroCHLOROthiazide (HYZAAR) 100-12.5 mg per tablet Take 1 Tab by mouth daily. , Historical Med      albuterol sulfate (PROVENTIL;VENTOLIN) 2.5 mg/0.5 mL nebu nebulizer solution 2.5 mg by Nebulization route every twelve (12) hours. Patient mixes this agent with acetylcysteine (20%) nebulizer solution for breathing treatment., Historical Med      acetaminophen (TYLENOL) 500 mg tablet Take 1,000 mg by mouth every six (6) hours as needed for Pain., Historical Med      LACTOBACIL 2-S. THERMO-BIFIDO 1 (VSL#3 PO) Take 1 Packet by mouth daily. , Historical Med      cetirizine (ZYRTEC) 10 mg tablet Take 10 mg by mouth nightly as needed for Allergies. , Historical Med      EPINEPHrine (EPIPEN) 0.3 mg/0.3 mL (1:1,000) injection 0.3 mL by IntraMUSCular route once as needed for up to 1 dose., Print, Disp-0.3 mL, R-1      estradiol (ESTRACE) 1 mg tablet Take 1 mg by mouth daily. , Historical Med      albuterol (PROVENTIL, VENTOLIN) 90 mcg/Actuation inhaler Take 2 Puffs by inhalation every six (6) hours as needed., Historical Med      HYDROmorphone (DILAUDID) 2 mg tablet Take 1 Tab by mouth every six (6) hours as needed for Pain for up to 10 doses. Max Daily Amount: 8 mg. Indications: Severe Pain, Print, Disp-10 Tab, R-0      naloxone (NARCAN) 2 mg/actuation spry Use 1 spray intranasally into 1 nostril. Use a new Narcan nasal spray for subsequent doses and administer into alternating nostrils. May repeat every 2 to 3 minutes as needed. , Print, Disp-1 Device, R-2           2. Follow-up Information     Follow up With Details Comments [address], [place] In 3 days GI [address] In 3 days  [place] [address]  [phone]      [facility] EMERGENCY DEPT  As needed, If symptoms worsen [address] [place] [address]  [phone]        Return to ED if worse     Diagnosis     Clinical Impression:   1. Left lateral abdominal pain    2. Non-intractable vomiting with nausea, unspecified vomiting type    3. Hemorrhoids, unspecified hemorrhoid type        Attestations: This note is prepared by Abbie Salas, acting as a Scribe for Aries Purcell MD.    Aries Purcell MD: The scribe's documentation has been prepared under my direction and personally reviewed by me in its entirety. I confirm that the notes above accurately reflects all work, treatment, procedures, and medical decision making performed by me. This note will not be viewable in [address]

## 2018-06-28 NOTE — ED NOTES
Pt c/o LLQ abd pain x3 weeks worse last night. H/o diverticulitis. Also reports N/V/D, \"loose and then more formed again, with some drops of blood in the toilet today that were bright red. \" h/o Cdiffe per patient. Recently on abt per urologist fro bladder infection.

## 2018-06-28 NOTE — DISCHARGE INSTRUCTIONS
Abdominal Pain: Care Instructions  Your Care Instructions    Abdominal pain has many possible causes. Some aren't serious and get better on their own in a few days. Others need more testing and treatment. If your pain continues or gets worse, you need to be rechecked and may need more tests to find out what is wrong. You may need surgery to correct the problem. Don't ignore new symptoms, such as fever, nausea and vomiting, urination problems, pain that gets worse, and dizziness. These may be signs of a more serious problem. Your doctor may have recommended a follow-up visit in the next 8 to 12 hours. If you are not getting better, you may need more tests or treatment. The doctor has checked you carefully, but problems can develop later. If you notice any problems or new symptoms, get medical treatment right away. Follow-up care is a key part of your treatment and safety. Be sure to make and go to all appointments, and call your doctor if you are having problems. It's also a good idea to know your test results and keep a list of the medicines you take. How can you care for yourself at home? · Rest until you feel better. · To prevent dehydration, drink plenty of fluids, enough so that your urine is light yellow or clear like water. Choose water and other caffeine-free clear liquids until you feel better. If you have kidney, heart, or liver disease and have to limit fluids, talk with your doctor before you increase the amount of fluids you drink. · If your stomach is upset, eat mild foods, such as rice, dry toast or crackers, bananas, and applesauce. Try eating several small meals instead of two or three large ones. · Wait until 48 hours after all symptoms have gone away before you have spicy foods, alcohol, and drinks that contain caffeine. · Do not eat foods that are high in fat. · Avoid anti-inflammatory medicines such as aspirin, ibuprofen (Advil, Motrin), and naproxen (Aleve).  These can cause stomach upset. Talk to your doctor if you take daily aspirin for another health problem. When should you call for help? Call 911 anytime you think you may need emergency care. For example, call if:  ? · You passed out (lost consciousness). ? · You pass maroon or very bloody stools. ? · You vomit blood or what looks like coffee grounds. ? · You have new, severe belly pain. ?Call your doctor now or seek immediate medical care if:  ? · Your pain gets worse, especially if it becomes focused in one area of your belly. ? · You have a new or higher fever. ? · Your stools are black and look like tar, or they have streaks of blood. ? · You have unexpected vaginal bleeding. ? · You have symptoms of a urinary tract infection. These may include:  ¨ Pain when you urinate. ¨ Urinating more often than usual.  ¨ Blood in your urine. ? · You are dizzy or lightheaded, or you feel like you may faint. ? Watch closely for changes in your health, and be sure to contact your doctor if:  ? · You are not getting better after 1 day (24 hours). Where can you learn more? Go to http://gladysWicronalia.info/. Enter P874 in the search box to learn more about \"Abdominal Pain: Care Instructions. \"  Current as of: March 20, 2017  Content Version: 11.4  © 0338-7786 [employer]. Care instructions adapted under license by [employer] (which disclaims liability or warranty for this information). If you have questions about a medical condition or this instruction, always ask your healthcare professional. [place] [address] any warranty or liability for your use of this information. Nausea and Vomiting: Care Instructions  Your Care Instructions    When you are nauseated, you may feel weak and sweaty and notice a lot of saliva in your mouth. Nausea often leads to vomiting.  Most of the time you do not need to worry about nausea and vomiting, but they can be signs of other illnesses. Two common causes of nausea and vomiting are stomach flu and food poisoning. Nausea and vomiting from viral stomach flu will usually start to improve within 24 hours. Nausea and vomiting from food poisoning may last from 12 to 48 hours. The doctor has checked you carefully, but problems can develop later. If you notice any problems or new symptoms, get medical treatment right away. Follow-up care is a key part of your treatment and safety. Be sure to make and go to all appointments, and call your doctor if you are having problems. It's also a good idea to know your test results and keep a list of the medicines you take. How can you care for yourself at home? · To prevent dehydration, drink plenty of fluids, enough so that your urine is light yellow or clear like water. Choose water and other caffeine-free clear liquids until you feel better. If you have kidney, heart, or liver disease and have to limit fluids, talk with your doctor before you increase the amount of fluids you drink. · Rest in bed until you feel better. · When you are able to eat, try clear soups, mild foods, and liquids until all symptoms are gone for 12 to 48 hours. Other good choices include dry toast, crackers, cooked cereal, and gelatin dessert, such as Jell-O. When should you call for help? Call 911 anytime you think you may need emergency care. For example, call if:  ? · You passed out (lost consciousness). ?Call your doctor now or seek immediate medical care if:  ? · You have symptoms of dehydration, such as:  ¨ Dry eyes and a dry mouth. ¨ Passing only a little dark urine. ¨ Feeling thirstier than usual.   ? · You have new or worsening belly pain. ? · You have a new or higher fever. ? · You vomit blood or what looks like coffee grounds. ? Watch closely for changes in your health, and be sure to contact your doctor if:  ? · You have ongoing nausea and vomiting. ? · Your vomiting is getting worse.    ? · Your vomiting lasts longer than 2 days. ? · You are not getting better as expected. Where can you learn more? Go to http://gladys-alia.info/. Enter 25 [id number] in the search box to learn more about \"Nausea and Vomiting: Care Instructions. \"  Current as of: March 20, 2017  Content Version: 11.4  © 0627-8064 [employer]. Care instructions adapted under license by [employer] (which disclaims liability or warranty for this information). If you have questions about a medical condition or this instruction, always ask your healthcare professional. [place] [address] any warranty or liability for your use of this information. Hemorrhoids: Care Instructions  Your Care Instructions    Hemorrhoids are enlarged veins that develop in the anal canal. Bleeding during bowel movements, itching, swelling, and rectal pain are the most common symptoms. They can be uncomfortable at times, but hemorrhoids rarely are a serious problem. You can treat most hemorrhoids with simple changes to your diet and bowel habits. These changes include eating more fiber and not straining to pass stools. Most hemorrhoids do not need surgery or other treatment unless they are very large and painful or bleed a lot. Follow-up care is a key part of your treatment and safety. Be sure to make and go to all appointments, and call your doctor if you are having problems. It's also a good idea to know your test results and keep a list of the medicines you take. How can you care for yourself at home? · Sit in a few inches of warm water (sitz bath) 3 times a day and after bowel movements. The warm water helps with pain and itching. · Put ice on your anal area several times a day for 10 minutes at a time. Put a thin cloth between the ice and your skin. Follow this by placing a warm, wet towel on the area for another 10 to 20 minutes. · Take pain medicines exactly as directed.   ¨ If the doctor gave you a prescription medicine for pain, take it as prescribed. ¨ If you are not taking a prescription pain medicine, ask your doctor if you can take an over-the-counter medicine. · Keep the anal area clean, but be gentle. Use water and a fragrance-free soap, such as [place], or use baby wipes or medicated pads, such as Tucks. · Wear cotton underwear and loose clothing to decrease moisture in the anal area. · Eat more fiber. Include foods such as whole-grain breads and cereals, raw vegetables, raw and dried fruits, and beans. · Drink plenty of fluids, enough so that your urine is light yellow or clear like water. If you have kidney, heart, or liver disease and have to limit fluids, talk with your doctor before you increase the amount of fluids you drink. · Use a stool softener that contains bran or psyllium. You can save money by buying bran or psyllium (available in bulk at most health food stores) and sprinkling it on foods or stirring it into fruit juice. Or you can use a product such as Metamucil or Hydrocil. · Practice healthy bowel habits. ¨ Go to the bathroom as soon as you have the urge. ¨ Avoid straining to pass stools. Relax and give yourself time to let things happen naturally. ¨ Do not hold your breath while passing stools. ¨ Do not read while sitting on the toilet. Get off the toilet as soon as you have finished. · Take your medicines exactly as prescribed. Call your doctor if you think you are having a problem with your medicine. When should you call for help? Call 911 anytime you think you may need emergency care. For example, call if:  ? · You pass maroon or very bloody stools. ?Call your doctor now or seek immediate medical care if:  ? · You have increased pain. ? · You have increased bleeding. ? Watch closely for changes in your health, and be sure to contact your doctor if:  ? · Your symptoms have not improved after 3 or 4 days. Where can you learn more?   Go to http://gladys-alia.info/. Enter F228 in the search box to learn more about \"Hemorrhoids: Care Instructions. \"  Current as of: May 12, 2017  Content Version: 11.4  © 9061-5214 [employer]. Care instructions adapted under license by [employer] (which disclaims liability or warranty for this information). If you have questions about a medical condition or this instruction, always ask your healthcare professional. [place] [address] any warranty or liability for your use of this information.

## 2018-06-28 NOTE — ED NOTES
.Discharge instructions reviewed with patient and given to pt per MD Carmelina Leal. Pt able to return/verbalize discharge instructions. Copy of discharge instructions given. RX given to pt. Pt condition stable, no further complaints. Pt out of ER, accompanied by self. Ambulatory, steady gait. Wheelchair offered & pt declined. Patient out of ED prior to obtaining discharge vitals. Belongings & discharge paperwork out of ED with patient.

## 2018-08-09 ENCOUNTER — HOSPITAL ENCOUNTER (EMERGENCY)
Age: 48
Discharge: HOME | End: 2018-08-09
Attending: EMERGENCY MEDICINE
Payer: SELF-PAY

## 2018-08-09 VITALS
HEIGHT: 62 IN | WEIGHT: 202.82 LBS | RESPIRATION RATE: 16 BRPM | TEMPERATURE: 98.2 F | HEART RATE: 74 BPM | BODY MASS INDEX: 37.32 KG/M2 | OXYGEN SATURATION: 93 % | SYSTOLIC BLOOD PRESSURE: 116 MMHG | DIASTOLIC BLOOD PRESSURE: 72 MMHG

## 2018-08-09 DIAGNOSIS — N30.00 ACUTE CYSTITIS WITHOUT HEMATURIA: Primary | ICD-10-CM

## 2018-08-09 LAB
ALBUMIN SERPL-MCNC: 4.1 G/DL (ref 3.5–5)
ALBUMIN/GLOB SERPL: 1.2 {RATIO} (ref 1.1–2.2)
ALP SERPL-CCNC: 63 U/L (ref 45–117)
ALT SERPL-CCNC: 52 U/L (ref 12–78)
ANION GAP SERPL CALC-SCNC: 10 MMOL/L (ref 5–15)
APPEARANCE UR: ABNORMAL
AST SERPL-CCNC: 45 U/L (ref 15–37)
BACTERIA URNS QL MICRO: ABNORMAL /HPF
BASOPHILS # BLD: 0 K/UL (ref 0–0.1)
BASOPHILS NFR BLD: 1 % (ref 0–1)
BILIRUB SERPL-MCNC: 0.5 MG/DL (ref 0.2–1)
BILIRUB UR QL CFM: NEGATIVE
BUN SERPL-MCNC: 11 MG/DL (ref 6–20)
BUN/CREAT SERPL: 13 (ref 12–20)
CALCIUM SERPL-MCNC: 9.4 MG/DL (ref 8.5–10.1)
CHLORIDE SERPL-SCNC: 100 MMOL/L (ref 97–108)
CO2 SERPL-SCNC: 27 MMOL/L (ref 21–32)
COLOR UR: ABNORMAL
CREAT SERPL-MCNC: 0.85 MG/DL (ref 0.55–1.02)
DIFFERENTIAL METHOD BLD: NORMAL
EOSINOPHIL # BLD: 0.3 K/UL (ref 0–0.4)
EOSINOPHIL NFR BLD: 4 % (ref 0–7)
EPITH CASTS URNS QL MICRO: ABNORMAL /LPF
ERYTHROCYTE [DISTWIDTH] IN BLOOD BY AUTOMATED COUNT: 13.3 % (ref 11.5–14.5)
GLOBULIN SER CALC-MCNC: 3.5 G/DL (ref 2–4)
GLUCOSE SERPL-MCNC: 201 MG/DL (ref 65–100)
GLUCOSE UR STRIP.AUTO-MCNC: 100 MG/DL
HCT VFR BLD AUTO: 36.7 % (ref 35–47)
HGB BLD-MCNC: 13.1 G/DL (ref 11.5–16)
HGB UR QL STRIP: NEGATIVE
HYALINE CASTS URNS QL MICRO: ABNORMAL /LPF (ref 0–5)
IMM GRANULOCYTES # BLD: 0 K/UL (ref 0–0.04)
IMM GRANULOCYTES NFR BLD AUTO: 0 % (ref 0–0.5)
KETONES UR QL STRIP.AUTO: 15 MG/DL
LEUKOCYTE ESTERASE UR QL STRIP.AUTO: ABNORMAL
LYMPHOCYTES # BLD: 2.1 K/UL (ref 0.8–3.5)
LYMPHOCYTES NFR BLD: 32 % (ref 12–49)
MCH RBC QN AUTO: 29 PG (ref 26–34)
MCHC RBC AUTO-ENTMCNC: 35.7 G/DL (ref 30–36.5)
MCV RBC AUTO: 81.2 FL (ref 80–99)
MONOCYTES # BLD: 0.3 K/UL (ref 0–1)
MONOCYTES NFR BLD: 5 % (ref 5–13)
NEUTS SEG # BLD: 3.8 K/UL (ref 1.8–8)
NEUTS SEG NFR BLD: 59 % (ref 32–75)
NITRITE UR QL STRIP.AUTO: POSITIVE
NRBC # BLD: 0 K/UL (ref 0–0.01)
NRBC BLD-RTO: 0 PER 100 WBC
PH UR STRIP: 5 [PH] (ref 5–8)
PLATELET # BLD AUTO: 182 K/UL (ref 150–400)
PMV BLD AUTO: 9.9 FL (ref 8.9–12.9)
POTASSIUM SERPL-SCNC: 3.8 MMOL/L (ref 3.5–5.1)
PROT SERPL-MCNC: 7.6 G/DL (ref 6.4–8.2)
PROT UR STRIP-MCNC: ABNORMAL MG/DL
RBC # BLD AUTO: 4.52 M/UL (ref 3.8–5.2)
RBC #/AREA URNS HPF: ABNORMAL /HPF (ref 0–5)
SODIUM SERPL-SCNC: 137 MMOL/L (ref 136–145)
SP GR UR REFRACTOMETRY: 1.02 (ref 1–1.03)
UA: UC IF INDICATED,UAUC: ABNORMAL
UROBILINOGEN UR QL STRIP.AUTO: 2 EU/DL (ref 0.2–1)
WBC # BLD AUTO: 6.5 K/UL (ref 3.6–11)
WBC URNS QL MICRO: ABNORMAL /HPF (ref 0–4)

## 2018-08-09 PROCEDURE — 36415 COLL VENOUS BLD VENIPUNCTURE: CPT | Performed by: EMERGENCY MEDICINE

## 2018-08-09 PROCEDURE — 74011250636 HC RX REV CODE- 250/636: Performed by: EMERGENCY MEDICINE

## 2018-08-09 PROCEDURE — 85025 COMPLETE CBC W/AUTO DIFF WBC: CPT | Performed by: EMERGENCY MEDICINE

## 2018-08-09 PROCEDURE — 96365 THER/PROPH/DIAG IV INF INIT: CPT

## 2018-08-09 PROCEDURE — 99284 EMERGENCY DEPT VISIT MOD MDM: CPT

## 2018-08-09 PROCEDURE — 80053 COMPREHEN METABOLIC PANEL: CPT | Performed by: EMERGENCY MEDICINE

## 2018-08-09 PROCEDURE — 74011250637 HC RX REV CODE- 250/637: Performed by: EMERGENCY MEDICINE

## 2018-08-09 PROCEDURE — 81001 URINALYSIS AUTO W/SCOPE: CPT | Performed by: EMERGENCY MEDICINE

## 2018-08-09 PROCEDURE — 96375 TX/PRO/DX INJ NEW DRUG ADDON: CPT

## 2018-08-09 PROCEDURE — 87086 URINE CULTURE/COLONY COUNT: CPT | Performed by: EMERGENCY MEDICINE

## 2018-08-09 PROCEDURE — 74011250636 HC RX REV CODE- 250/636

## 2018-08-09 RX ORDER — ONDANSETRON 2 MG/ML
INJECTION INTRAMUSCULAR; INTRAVENOUS
Status: COMPLETED
Start: 2018-08-09 | End: 2018-08-09

## 2018-08-09 RX ORDER — CIPROFLOXACIN 500 MG/1
500 TABLET ORAL 2 TIMES DAILY
Qty: 20 TAB | Refills: 0 | Status: SHIPPED | OUTPATIENT
Start: 2018-08-09 | End: 2018-08-19

## 2018-08-09 RX ORDER — LEVOFLOXACIN 500 MG/1
500 TABLET, FILM COATED ORAL
Status: COMPLETED | OUTPATIENT
Start: 2018-08-09 | End: 2018-08-09

## 2018-08-09 RX ORDER — METRONIDAZOLE 500 MG/1
500 TABLET ORAL 2 TIMES DAILY
Qty: 20 TAB | Refills: 0 | Status: SHIPPED | OUTPATIENT
Start: 2018-08-09 | End: 2018-08-19

## 2018-08-09 RX ORDER — METRONIDAZOLE 500 MG/100ML
500 INJECTION, SOLUTION INTRAVENOUS
Status: COMPLETED | OUTPATIENT
Start: 2018-08-09 | End: 2018-08-09

## 2018-08-09 RX ORDER — MORPHINE SULFATE 4 MG/ML
4 INJECTION INTRAVENOUS
Status: COMPLETED | OUTPATIENT
Start: 2018-08-09 | End: 2018-08-09

## 2018-08-09 RX ORDER — HYDROCODONE BITARTRATE AND ACETAMINOPHEN 5; 325 MG/1; MG/1
1 TABLET ORAL
Qty: 10 TAB | Refills: 0 | Status: SHIPPED | OUTPATIENT
Start: 2018-08-09 | End: 2018-09-24

## 2018-08-09 RX ORDER — DIPHENHYDRAMINE HYDROCHLORIDE 50 MG/ML
25 INJECTION, SOLUTION INTRAMUSCULAR; INTRAVENOUS
Status: COMPLETED | OUTPATIENT
Start: 2018-08-09 | End: 2018-08-09

## 2018-08-09 RX ORDER — ONDANSETRON 2 MG/ML
4 INJECTION INTRAMUSCULAR; INTRAVENOUS
Status: COMPLETED | OUTPATIENT
Start: 2018-08-09 | End: 2018-08-09

## 2018-08-09 RX ADMIN — DIPHENHYDRAMINE HYDROCHLORIDE 25 MG: 50 INJECTION, SOLUTION INTRAMUSCULAR; INTRAVENOUS at 11:48

## 2018-08-09 RX ADMIN — METRONIDAZOLE 500 MG: 500 INJECTION, SOLUTION INTRAVENOUS at 11:48

## 2018-08-09 RX ADMIN — MORPHINE SULFATE 4 MG: 4 INJECTION INTRAVENOUS at 11:47

## 2018-08-09 RX ADMIN — ONDANSETRON 4 MG: 2 INJECTION, SOLUTION INTRAMUSCULAR; INTRAVENOUS at 11:56

## 2018-08-09 RX ADMIN — ONDANSETRON 4 MG: 2 INJECTION INTRAMUSCULAR; INTRAVENOUS at 11:56

## 2018-08-09 RX ADMIN — LEVOFLOXACIN 500 MG: 500 TABLET, FILM COATED ORAL at 11:47

## 2018-08-09 NOTE — DISCHARGE INSTRUCTIONS
Urinary Tract Infection in Women: Care Instructions  Your Care Instructions    A urinary tract infection, or UTI, is a general term for an infection anywhere between the kidneys and the urethra (where urine comes out). Most UTIs are bladder infections. They often cause pain or burning when you urinate. UTIs are caused by bacteria and can be cured with antibiotics. Be sure to complete your treatment so that the infection goes away. Follow-up care is a key part of your treatment and safety. Be sure to make and go to all appointments, and call your doctor if you are having problems. It's also a good idea to know your test results and keep a list of the medicines you take. How can you care for yourself at home? · Take your antibiotics as directed. Do not stop taking them just because you feel better. You need to take the full course of antibiotics. · Drink extra water and other fluids for the next day or two. This may help wash out the bacteria that are causing the infection. (If you have kidney, heart, or liver disease and have to limit fluids, talk with your doctor before you increase your fluid intake.)  · Avoid drinks that are carbonated or have caffeine. They can irritate the bladder. · Urinate often. Try to empty your bladder each time. · To relieve pain, take a hot bath or lay a heating pad set on low over your lower belly or genital area. Never go to sleep with a heating pad in place. To prevent UTIs  · Drink plenty of water each day. This helps you urinate often, which clears bacteria from your system. (If you have kidney, heart, or liver disease and have to limit fluids, talk with your doctor before you increase your fluid intake.)  · Urinate when you need to. · Urinate right after you have sex. · Change sanitary pads often. · Avoid douches, bubble baths, feminine hygiene sprays, and other feminine hygiene products that have deodorants.   · After going to the bathroom, wipe from front to back.  When should you call for help? Call your doctor now or seek immediate medical care if:    · Symptoms such as fever, chills, nausea, or vomiting get worse or appear for the first time.     · You have new pain in your back just below your rib cage. This is called flank pain.     · There is new blood or pus in your urine.     · You have any problems with your antibiotic medicine.    Watch closely for changes in your health, and be sure to contact your doctor if:    · You are not getting better after taking an antibiotic for 2 days.     · Your symptoms go away but then come back. Where can you learn more? Go to http://gladys-alia.info/. Enter D170 in the search box to learn more about \"Urinary Tract Infection in Women: Care Instructions. \"  Current as of: May 12, 2017  Content Version: 11.7  © 6638-1823 [employer]. Care instructions adapted under license by [employer] (which disclaims liability or warranty for this information). If you have questions about a medical condition or this instruction, always ask your healthcare professional. [address] any warranty or liability for your use of this information.

## 2018-08-09 NOTE — ED PROVIDER NOTES
EMERGENCY DEPARTMENT HISTORY AND PHYSICAL EXAM      Date: 8/9/2018  Patient Name: Conor Brewster    History of Presenting Illness     Chief Complaint   Patient presents with    Suprapubic Pain     Onset 2 weeks ago with increase in pain 2 days ago. Pain radiates to lower back. History Provided By: Patient    HPI: Conor Brewster, 50 y.o. female with PMHx significant for HTN, diverticulosis, GERD, DM, colovaginal fistula, presents ambulatory to the ED with cc of urinary and suprapubic abdominal pain x 2 weeks, worsening x 2 days. She reports associated symptoms of back pain and chills. Per medical records, the pt had been seen 10 times within the past 6 months and notes she was dx'd with UTI and pyelonephritis. She states she had f/u'd with a urologist, who had performed a cystoscopy, noting the results were reassuring. The urologist had then sent her urine for a culture and noted bacteria had grown. She was then placed on Macrobid and given 2 doses of fluconazole. The pt had then been told she had a colovaginal fistula, to which she had contacted her surgeon (Dr. Ramses Laws), who had scheduled a \"vaginal dye scan\" on 8/13/18. Pt had attempted to treat her pain with tramadol with no relief. Pt denies any fevers. Of note, the pt reports she has an available ride home after treatment today. There are no other complaints, changes, or physical findings at this time. PCP: BEBA Guido    Current Facility-Administered Medications   Medication Dose Route Frequency Provider Last Rate Last Dose    metroNIDAZOLE (FLAGYL) IVPB premix 500 mg  500 mg IntraVENous NOW Joe Morales  mL/hr at 08/09/18 1148 500 mg at 08/09/18 1148     Current Outpatient Prescriptions   Medication Sig Dispense Refill    ciprofloxacin HCl (CIPRO) 500 mg tablet Take 1 Tab by mouth two (2) times a day for 10 days. 20 Tab 0    metroNIDAZOLE (FLAGYL) 500 mg tablet Take 1 Tab by mouth two (2) times a day for 10 days. 20 Tab 0    HYDROcodone-acetaminophen (NORCO) 5-325 mg per tablet Take 1 Tab by mouth every six (6) hours as needed for Pain. Max Daily Amount: 4 Tabs. 10 Tab 0    metFORMIN (GLUCOPHAGE) 500 mg tablet Take 500 mg by mouth daily (with breakfast).  ondansetron (ZOFRAN ODT) 4 mg disintegrating tablet Take 1 Tab by mouth every eight (8) hours as needed for Nausea. 10 Tab 0    Pramoxine (PROCTOFOAM) 1 % topical foam Apply  to affected area three (3) times daily as needed for Hemorrhoids. 1 Can 0    HYDROmorphone (DILAUDID) 2 mg tablet Take 1 Tab by mouth every six (6) hours as needed for Pain for up to 10 doses. Max Daily Amount: 8 mg. Indications: Severe Pain 10 Tab 0    naloxone (NARCAN) 2 mg/actuation spry Use 1 spray intranasally into 1 nostril. Use a new Narcan nasal spray for subsequent doses and administer into alternating nostrils. May repeat every 2 to 3 minutes as needed. 1 Device 2    dicyclomine (BENTYL) 10 mg capsule Take 1 Cap by mouth three (3) times daily. 90 Cap 0    famotidine (PEPCID) 20 mg tablet Take 1 Tab by mouth two (2) times a day. 60 Tab 0    losartan-hydroCHLOROthiazide (HYZAAR) 100-12.5 mg per tablet Take 1 Tab by mouth daily.  albuterol sulfate (PROVENTIL;VENTOLIN) 2.5 mg/0.5 mL nebu nebulizer solution 2.5 mg by Nebulization route every twelve (12) hours. Patient mixes this agent with acetylcysteine (20%) nebulizer solution for breathing treatment.  acetaminophen (TYLENOL) 500 mg tablet Take 1,000 mg by mouth every six (6) hours as needed for Pain.  LACTOBACIL 2-S. THERMO-BIFIDO 1 (VSL#3 PO) Take 1 Packet by mouth daily.  cetirizine (ZYRTEC) 10 mg tablet Take 10 mg by mouth nightly as needed for Allergies.  EPINEPHrine (EPIPEN) 0.3 mg/0.3 mL (1:1,000) injection 0.3 mL by IntraMUSCular route once as needed for up to 1 dose. 0.3 mL 1    estradiol (ESTRACE) 1 mg tablet Take 1 mg by mouth daily.       albuterol (PROVENTIL, VENTOLIN) 90 mcg/Actuation inhaler Take 2 Puffs by inhalation every six (6) hours as needed. Past History     Past Medical History:  Past Medical History:   Diagnosis Date    Anal fissure     Anisocoria     Asthma     LAST EPISODE     Back pain     Cerumen impaction     Chronic kidney disease     hx uti in past    Coagulation defects     ocassional rectal bleeding due to anal fissure    Colovaginal fistula     Diabetes (HCC)     NIDDM    Diabetes ([address]     Diverticulitis     Diverticulosis     Enlarged tonsils     Frequent UTI     GERD (gastroesophageal reflux disease)     H/O endoscopy     with dilation    HA (headache)     Hepatic steatosis     Hx of colonoscopy with polypectomy     benign    Hypertension     Ill-defined condition     FREQUENT HIVES    Ill-defined condition     HX ELEVATED LIVER ENZYMES    Morbid obesity (HCC)     Nausea & vomiting     during diverticulitis flare    Obesity     Otitis media     Pneumonia     about 15 yrs ago    Psychiatric disorder     ANXIETY    Recurrent tonsillitis     Sinusitis     Transfusion history ~ age 35    postop hysterectomy    Unspecified sleep apnea     snores ( not diagnosed yet)     Urticaria     Urticaria        Past Surgical History:  Past Surgical History:   Procedure Laterality Date    ABDOMEN SURGERY PROC UNLISTED  2018    hernia repair at [facility]    [address]    [place].     HX GI  12    LAPAROSCOPIC HAND ASSISTED  POSS OPEN SIGMOID COLECTOMY POSS TEMPORARY DIVERTING LOOP ILEOSTOMY;  (no illeostomy needed)    HX GYN           HX GYN      cervical conization    HX HEENT      SINUS SURGERY LEFT X2    HX HEENT      SINUS SURGERY ON RIGHT X2    HX OTHER SURGICAL      Sphincterotomy    HX PELVIC LAPAROSCOPY      HX EMMANUEL AND BSO      HX UROLOGICAL  12     CYSTOSCOPY INSERTION URETERAL CATHETERS - Cystoscopy Insertion of bilateral ureteral stents       Family History:  Family History   Problem Relation Age of Onset    Diabetes Mother     Cancer Mother      NON-HODGKINS LYMPHOMA    Anesth Problems Mother      PONV    Diabetes Father     Heart Disease Father      CAD - STENTS, PACEMAKER    Arrhythmia Father        Social History:  Social History   Substance Use Topics    Smoking status: Never Smoker    Smokeless tobacco: Never Used    Alcohol use Yes      Comment: Rarely       Allergies: Allergies   Allergen Reactions    Aspirin Shortness of Breath    Prilosec [Omeprazole Magnesium] Anaphylaxis     CHERRY FLAVORED; PT TAKES REGULAR PRILOSEC AND IS OK    Codeine Hives and Itching    Contrast Agent [Iodine] Itching     Pt. Had itching after IV contrast with the last exam.  Benadryl was given    Morphine Itching     Severe itching. Fine to take benadryl    Fentanyl Rash    Ketorolac Rash     \"makes my eyes spasm and causes rash on my hands\"    Percocet [Oxycodone-Acetaminophen] Hives     Review of Systems   Review of Systems   Constitutional: Positive for chills. Negative for fever. Respiratory: Negative for cough and shortness of breath. Cardiovascular: Negative for chest pain. Gastrointestinal: Positive for abdominal pain. Negative for constipation, diarrhea, nausea and vomiting. Genitourinary: Positive for dysuria. Musculoskeletal: Positive for back pain. Neurological: Negative for weakness and numbness. All other systems reviewed and are negative. Physical Exam   Physical Exam   Constitutional: She is oriented to person, place, and time. She appears well-developed and well-nourished. HENT:   Head: Normocephalic and atraumatic. Eyes: Conjunctivae and EOM are normal.   Neck: Normal range of motion. Neck supple. Cardiovascular: Normal rate and regular rhythm. Pulmonary/Chest: Effort normal and breath sounds normal. No respiratory distress. Abdominal: Soft. She exhibits no distension. There is tenderness in the suprapubic area. Musculoskeletal: Normal range of motion. Neurological: She is alert and oriented to person, place, and time. Skin: Skin is warm and dry. Psychiatric: She has a normal mood and affect. Nursing note and vitals reviewed. Diagnostic Study Results     Labs -  Recent Results (from the past 12 hour(s))   URINALYSIS W/ REFLEX CULTURE    Collection Time: 08/09/18  9:59 AM   Result Value Ref Range    Color ORANGE      Appearance CLOUDY (A) CLEAR      Specific gravity 1.018 1.003 - 1.030      pH (UA) 5.0 5.0 - 8.0      Protein TRACE (A) NEG mg/dL    Glucose 100 (A) NEG mg/dL    Ketone 15 (A) NEG mg/dL    Blood NEGATIVE  NEG      Urobilinogen 2.0 (H) 0.2 - 1.0 EU/dL    Nitrites POSITIVE (A) NEG      Leukocyte Esterase MODERATE (A) NEG      UA:UC IF INDICATED URINE CULTURE ORDERED (A) CNI      WBC 10-20 0 - 4 /hpf    RBC 0-5 0 - 5 /hpf    Epithelial cells FEW FEW /lpf    Bacteria 1+ (A) NEG /hpf    Hyaline cast 0-2 0 - 5 /lpf   BILIRUBIN, CONFIRM    Collection Time: 08/09/18  9:59 AM   Result Value Ref Range    Bilirubin UA, confirm NEGATIVE  NEG     CBC WITH AUTOMATED DIFF    Collection Time: 08/09/18 10:55 AM   Result Value Ref Range    WBC 6.5 3.6 - 11.0 K/uL    RBC 4.52 3.80 - 5.20 M/uL    HGB 13.1 11.5 - 16.0 g/dL    HCT 36.7 35.0 - 47.0 %    MCV 81.2 80.0 - 99.0 FL    MCH 29.0 26.0 - 34.0 PG    MCHC 35.7 30.0 - 36.5 g/dL    RDW 13.3 11.5 - 14.5 %    PLATELET 378 586 - 768 K/uL    MPV 9.9 8.9 - 12.9 FL    NRBC 0.0 0  WBC    ABSOLUTE NRBC 0.00 0.00 - 0.01 K/uL    NEUTROPHILS 59 32 - 75 %    LYMPHOCYTES 32 12 - 49 %    MONOCYTES 5 5 - 13 %    EOSINOPHILS 4 0 - 7 %    BASOPHILS 1 0 - 1 %    IMMATURE GRANULOCYTES 0 0.0 - 0.5 %    ABS. NEUTROPHILS 3.8 1.8 - 8.0 K/UL    ABS. LYMPHOCYTES 2.1 0.8 - 3.5 K/UL    ABS. MONOCYTES 0.3 0.0 - 1.0 K/UL    ABS. EOSINOPHILS 0.3 0.0 - 0.4 K/UL    ABS. BASOPHILS 0.0 0.0 - 0.1 K/UL    ABS. IMM.  GRANS. 0.0 0.00 - 0.04 K/UL    DF AUTOMATED     METABOLIC PANEL, COMPREHENSIVE    Collection Time: 08/09/18 10:55 AM   Result Value Ref Range    Sodium 137 136 - 145 mmol/L    Potassium 3.8 3.5 - 5.1 mmol/L    Chloride 100 97 - 108 mmol/L    CO2 27 21 - 32 mmol/L    Anion gap 10 5 - 15 mmol/L    Glucose 201 (H) 65 - 100 mg/dL    BUN 11 6 - 20 MG/DL    Creatinine 0.85 0.55 - 1.02 MG/DL    BUN/Creatinine ratio 13 12 - 20      GFR est AA >60 >60 ml/min/1.73m2    GFR est non-AA >60 >60 ml/min/1.73m2    Calcium 9.4 8.5 - 10.1 MG/DL    Bilirubin, total 0.5 0.2 - 1.0 MG/DL    ALT (SGPT) 52 12 - 78 U/L    AST (SGOT) 45 (H) 15 - 37 U/L    Alk. phosphatase 63 45 - 117 U/L    Protein, total 7.6 6.4 - 8.2 g/dL    Albumin 4.1 3.5 - 5.0 g/dL    Globulin 3.5 2.0 - 4.0 g/dL    A-G Ratio 1.2 1.1 - 2.2       Medical Decision Making   I am the first provider for this patient. I reviewed the vital signs, available nursing notes, past medical history, past surgical history, family history and social history. Vital Signs-Reviewed the patient's vital signs. Patient Vitals for the past 12 hrs:   Temp Pulse Resp BP SpO2   08/09/18 1130 - - - 111/73 96 %   08/09/18 1118 - - - 113/65 95 %   08/09/18 0951 98.2 °F (36.8 °C) 74 16 141/88 100 %      Pulse Oximetry Analysis - 94% on RA    Cardiac Monitor:   Rate: 71 bpm  Rhythm: Normal Sinus Rhythm     Records Reviewed: Nursing Notes, Old Medical Records, Previous Radiology Studies and Previous Laboratory Studies    Provider Notes (Medical Decision Making):   Patient was presents with dysuria. DDx: Acute cystitis, pyleonephritis, ureteral stone, STI. Will obtain UA. Discussed with the patient diagnosis and test results and all questioned fully answered. They understand the importance of staying well hydrated, taking antibiotics as prescribed to completion and using OTC pyridium as desired. She will PCP if any problems arise. ED Course:   Initial assessment performed. The patients presenting problems have been discussed, and they are in agreement with the care plan formulated and outlined with them.   I have encouraged them to ask questions as they arise throughout their visit. Critical Care Time:   0    Disposition:  DISCHARGE NOTE  12:11 PM  The patient has been re-evaluated and is ready for discharge. Reviewed available results with patient. Counseled patient on diagnosis and care plan. Patient has expressed understanding, and all questions have been answered. Patient agrees with plan and agrees to follow up as recommended, or return to the ED if their symptoms worsen. Discharge instructions have been provided and explained to the patient, along with reasons to return to the ED. PLAN:  1. Discharge  Current Discharge Medication List      START taking these medications    Details   ciprofloxacin HCl (CIPRO) 500 mg tablet Take 1 Tab by mouth two (2) times a day for 10 days. Qty: 20 Tab, Refills: 0    Associated Diagnoses: Acute cystitis without hematuria      metroNIDAZOLE (FLAGYL) 500 mg tablet Take 1 Tab by mouth two (2) times a day for 10 days. Qty: 20 Tab, Refills: 0    Associated Diagnoses: Acute cystitis without hematuria      HYDROcodone-acetaminophen (NORCO) 5-325 mg per tablet Take 1 Tab by mouth every six (6) hours as needed for Pain. Max Daily Amount: 4 Tabs. Qty: 10 Tab, Refills: 0    Associated Diagnoses: Acute cystitis without hematuria           2. Follow-up Information     Follow up With Details Comments [address] Schedule an appointment as soon as possible for a visit  [place] [phone]          Return to ED if worse     Diagnosis     Clinical Impression:   1. Acute cystitis without hematuria        Attestations: This note is prepared by Jacqueline Herbert, acting as Scribe for Lobo Read M.D. Lobo Read M.D: The scribe's documentation has been prepared under my direction and personally reviewed by me in its entirety.  I confirm that the note above accurately reflects all work, treatment, procedures, and medical decision making performed by me.

## 2018-08-10 ENCOUNTER — HOSPITAL ENCOUNTER (EMERGENCY)
Age: 48
Discharge: HOME | End: 2018-08-10
Attending: EMERGENCY MEDICINE
Payer: SELF-PAY

## 2018-08-10 VITALS
OXYGEN SATURATION: 94 % | SYSTOLIC BLOOD PRESSURE: 115 MMHG | BODY MASS INDEX: 37.53 KG/M2 | DIASTOLIC BLOOD PRESSURE: 63 MMHG | HEART RATE: 85 BPM | HEIGHT: 62 IN | TEMPERATURE: 98.2 F | WEIGHT: 203.93 LBS | RESPIRATION RATE: 18 BRPM

## 2018-08-10 DIAGNOSIS — M54.9 BILATERAL BACK PAIN, UNSPECIFIED BACK LOCATION, UNSPECIFIED CHRONICITY: ICD-10-CM

## 2018-08-10 DIAGNOSIS — N82.4 COLOVAGINAL FISTULA: ICD-10-CM

## 2018-08-10 DIAGNOSIS — R10.2 SUPRAPUBIC PAIN: Primary | ICD-10-CM

## 2018-08-10 DIAGNOSIS — R73.9 ELEVATED BLOOD SUGAR: ICD-10-CM

## 2018-08-10 DIAGNOSIS — R30.0 DYSURIA: ICD-10-CM

## 2018-08-10 LAB
ALBUMIN SERPL-MCNC: 3.9 G/DL (ref 3.5–5)
ALBUMIN/GLOB SERPL: 1.1 {RATIO} (ref 1.1–2.2)
ALP SERPL-CCNC: 66 U/L (ref 45–117)
ALT SERPL-CCNC: 51 U/L (ref 12–78)
ANION GAP SERPL CALC-SCNC: 10 MMOL/L (ref 5–15)
APPEARANCE UR: CLEAR
AST SERPL-CCNC: 37 U/L (ref 15–37)
BACTERIA SPEC CULT: NORMAL
BACTERIA URNS QL MICRO: NEGATIVE /HPF
BASOPHILS # BLD: 0 K/UL (ref 0–0.1)
BASOPHILS NFR BLD: 0 % (ref 0–1)
BILIRUB SERPL-MCNC: 0.5 MG/DL (ref 0.2–1)
BILIRUB UR QL: NEGATIVE
BUN SERPL-MCNC: 11 MG/DL (ref 6–20)
BUN/CREAT SERPL: 11 (ref 12–20)
CALCIUM SERPL-MCNC: 9.5 MG/DL (ref 8.5–10.1)
CC UR VC: NORMAL
CHLORIDE SERPL-SCNC: 99 MMOL/L (ref 97–108)
CO2 SERPL-SCNC: 27 MMOL/L (ref 21–32)
COLOR UR: YELLOW
CREAT SERPL-MCNC: 0.97 MG/DL (ref 0.55–1.02)
DIFFERENTIAL METHOD BLD: NORMAL
EOSINOPHIL # BLD: 0.1 K/UL (ref 0–0.4)
EOSINOPHIL NFR BLD: 3 % (ref 0–7)
EPITH CASTS URNS QL MICRO: ABNORMAL /LPF
ERYTHROCYTE [DISTWIDTH] IN BLOOD BY AUTOMATED COUNT: 13.4 % (ref 11.5–14.5)
GLOBULIN SER CALC-MCNC: 3.6 G/DL (ref 2–4)
GLUCOSE SERPL-MCNC: 277 MG/DL (ref 65–100)
GLUCOSE UR STRIP.AUTO-MCNC: >1000 MG/DL
HCG UR QL: NEGATIVE
HCT VFR BLD AUTO: 36.8 % (ref 35–47)
HGB BLD-MCNC: 13 G/DL (ref 11.5–16)
HGB UR QL STRIP: NEGATIVE
HYALINE CASTS URNS QL MICRO: ABNORMAL /LPF (ref 0–5)
IMM GRANULOCYTES # BLD: 0 K/UL (ref 0–0.04)
IMM GRANULOCYTES NFR BLD AUTO: 0 % (ref 0–0.5)
KETONES UR QL STRIP.AUTO: NEGATIVE MG/DL
LEUKOCYTE ESTERASE UR QL STRIP.AUTO: ABNORMAL
LYMPHOCYTES # BLD: 1.5 K/UL (ref 0.8–3.5)
LYMPHOCYTES NFR BLD: 30 % (ref 12–49)
MCH RBC QN AUTO: 28.9 PG (ref 26–34)
MCHC RBC AUTO-ENTMCNC: 35.3 G/DL (ref 30–36.5)
MCV RBC AUTO: 81.8 FL (ref 80–99)
MONOCYTES # BLD: 0.2 K/UL (ref 0–1)
MONOCYTES NFR BLD: 5 % (ref 5–13)
NEUTS SEG # BLD: 3 K/UL (ref 1.8–8)
NEUTS SEG NFR BLD: 62 % (ref 32–75)
NITRITE UR QL STRIP.AUTO: POSITIVE
NRBC # BLD: 0 K/UL (ref 0–0.01)
NRBC BLD-RTO: 0 PER 100 WBC
PH UR STRIP: 6 [PH] (ref 5–8)
PLATELET # BLD AUTO: 178 K/UL (ref 150–400)
PMV BLD AUTO: 9.5 FL (ref 8.9–12.9)
POTASSIUM SERPL-SCNC: 3.3 MMOL/L (ref 3.5–5.1)
PROT SERPL-MCNC: 7.5 G/DL (ref 6.4–8.2)
PROT UR STRIP-MCNC: NEGATIVE MG/DL
RBC # BLD AUTO: 4.5 M/UL (ref 3.8–5.2)
RBC #/AREA URNS HPF: ABNORMAL /HPF (ref 0–5)
SERVICE CMNT-IMP: NORMAL
SODIUM SERPL-SCNC: 136 MMOL/L (ref 136–145)
SP GR UR REFRACTOMETRY: 1.02 (ref 1–1.03)
UA: UC IF INDICATED,UAUC: ABNORMAL
UROBILINOGEN UR QL STRIP.AUTO: 1 EU/DL (ref 0.2–1)
WBC # BLD AUTO: 4.9 K/UL (ref 3.6–11)
WBC URNS QL MICRO: ABNORMAL /HPF (ref 0–4)

## 2018-08-10 PROCEDURE — 99284 EMERGENCY DEPT VISIT MOD MDM: CPT

## 2018-08-10 PROCEDURE — 81001 URINALYSIS AUTO W/SCOPE: CPT | Performed by: EMERGENCY MEDICINE

## 2018-08-10 PROCEDURE — 96374 THER/PROPH/DIAG INJ IV PUSH: CPT

## 2018-08-10 PROCEDURE — 96361 HYDRATE IV INFUSION ADD-ON: CPT

## 2018-08-10 PROCEDURE — 80053 COMPREHEN METABOLIC PANEL: CPT | Performed by: EMERGENCY MEDICINE

## 2018-08-10 PROCEDURE — 36415 COLL VENOUS BLD VENIPUNCTURE: CPT | Performed by: EMERGENCY MEDICINE

## 2018-08-10 PROCEDURE — 96375 TX/PRO/DX INJ NEW DRUG ADDON: CPT

## 2018-08-10 PROCEDURE — 74011250636 HC RX REV CODE- 250/636: Performed by: EMERGENCY MEDICINE

## 2018-08-10 PROCEDURE — 85025 COMPLETE CBC W/AUTO DIFF WBC: CPT | Performed by: EMERGENCY MEDICINE

## 2018-08-10 PROCEDURE — 74011250637 HC RX REV CODE- 250/637: Performed by: EMERGENCY MEDICINE

## 2018-08-10 PROCEDURE — 96376 TX/PRO/DX INJ SAME DRUG ADON: CPT

## 2018-08-10 PROCEDURE — 81025 URINE PREGNANCY TEST: CPT

## 2018-08-10 RX ORDER — PHENAZOPYRIDINE HYDROCHLORIDE 200 MG/1
200 TABLET, FILM COATED ORAL 3 TIMES DAILY
Qty: 6 TAB | Refills: 0 | Status: SHIPPED | OUTPATIENT
Start: 2018-08-10 | End: 2018-08-12

## 2018-08-10 RX ORDER — ONDANSETRON 2 MG/ML
4 INJECTION INTRAMUSCULAR; INTRAVENOUS
Status: COMPLETED | OUTPATIENT
Start: 2018-08-10 | End: 2018-08-10

## 2018-08-10 RX ORDER — MORPHINE SULFATE 4 MG/ML
4 INJECTION INTRAVENOUS
Status: COMPLETED | OUTPATIENT
Start: 2018-08-10 | End: 2018-08-10

## 2018-08-10 RX ORDER — DIPHENHYDRAMINE HYDROCHLORIDE 50 MG/ML
25 INJECTION, SOLUTION INTRAMUSCULAR; INTRAVENOUS
Status: COMPLETED | OUTPATIENT
Start: 2018-08-10 | End: 2018-08-10

## 2018-08-10 RX ORDER — HYDROCODONE BITARTRATE AND ACETAMINOPHEN 7.5; 325 MG/1; MG/1
1 TABLET ORAL
Status: COMPLETED | OUTPATIENT
Start: 2018-08-10 | End: 2018-08-10

## 2018-08-10 RX ORDER — MORPHINE SULFATE 2 MG/ML
2 INJECTION, SOLUTION INTRAMUSCULAR; INTRAVENOUS
Status: COMPLETED | OUTPATIENT
Start: 2018-08-10 | End: 2018-08-10

## 2018-08-10 RX ADMIN — MORPHINE SULFATE 4 MG: 4 INJECTION INTRAVENOUS at 06:50

## 2018-08-10 RX ADMIN — SODIUM CHLORIDE 1000 ML: 900 INJECTION, SOLUTION INTRAVENOUS at 06:00

## 2018-08-10 RX ADMIN — MORPHINE SULFATE 2 MG: 2 INJECTION, SOLUTION INTRAMUSCULAR; INTRAVENOUS at 07:16

## 2018-08-10 RX ADMIN — ONDANSETRON HYDROCHLORIDE 4 MG: 2 INJECTION, SOLUTION INTRAMUSCULAR; INTRAVENOUS at 05:49

## 2018-08-10 RX ADMIN — HYDROCODONE BITARTRATE AND ACETAMINOPHEN 1 TABLET: 7.5; 325 TABLET ORAL at 05:49

## 2018-08-10 RX ADMIN — DIPHENHYDRAMINE HYDROCHLORIDE 25 MG: 50 INJECTION, SOLUTION INTRAMUSCULAR; INTRAVENOUS at 06:50

## 2018-08-10 NOTE — ED NOTES
MD Leola Obrien reviewed discharge instructions with the patient. The patient verbalized understanding. Patient discharged home on previous shift. Patient leaving with stable vitals. Patient ambulatory out of ED with discharge instructions in hand. Opportunity for questions and clarification provided. No further complaints noted at this time.

## 2018-08-10 NOTE — ED PROVIDER NOTES
EMERGENCY DEPARTMENT HISTORY AND PHYSICAL EXAM      Date: 8/10/2018  Patient Name: Malcolm Schumacher    History of Presenting Illness     Chief Complaint   Patient presents with    Flank Pain       History Provided By: Patient    HPI: Malcolm Schumacher, 50 y.o. female with PMHx significant for CKD, ulcerative colitis, diverticulitis, DM, presents ambulatory to the ED with cc of sharp, intermittent flank pain that has been on and off for the past few days. Pt reports she has chills and generalized abdominal pain. She notes that she has a  \"specks of feces\" in her urine. The patient reports that she recently had a cytoscopy done by Dr. Lolis Cope with no significant findings. She states that Dr. Blondie Koyanagi found feces on her speculum in a f/u appointment. She notes that she is currently taking fluconazole, Macrobid and started taking cipro yesterday. Per Chart Review, pt was seen yesterday by Dr. Marcella Jacob and given IV and oral antibiotics. Pt adds that she was referred to surgeon Dr. Sheba Staley for a vagogram this Monday, 8/13/18, but Dr. Sheba Staley was unreachable as he is out of town. She relays that the plan for Monday's appointment was to locate the colovaginal fistula. Pt  States she had a similar procedure done 5 years ago, when she had a sigmoid colon removed due to a large fistula. She specifically denies any fever, V/D, cough, chest pain and SOB. Chief Complaint: Flank Pain  Duration: Few Days  Timing:  Intermittent  Location: Flank  Quality: Sharp  Severity: Moderate  Associated Symptoms: Chills, abdominal pain    There are no other complaints, changes, or physical findings at this time. PCP: BEBA Scruggs    Current Outpatient Prescriptions   Medication Sig Dispense Refill    phenazopyridine (PYRIDIUM) 200 mg tablet Take 1 Tab by mouth three (3) times daily for 6 doses. 6 Tab 0    ciprofloxacin HCl (CIPRO) 500 mg tablet Take 1 Tab by mouth two (2) times a day for 10 days.  20 Tab 0    metroNIDAZOLE (FLAGYL) 500 mg tablet Take 1 Tab by mouth two (2) times a day for 10 days. 20 Tab 0    HYDROcodone-acetaminophen (NORCO) 5-325 mg per tablet Take 1 Tab by mouth every six (6) hours as needed for Pain. Max Daily Amount: 4 Tabs. 10 Tab 0    metFORMIN (GLUCOPHAGE) 500 mg tablet Take 500 mg by mouth daily (with breakfast).  Pramoxine (PROCTOFOAM) 1 % topical foam Apply  to affected area three (3) times daily as needed for Hemorrhoids. 1 Can 0    naloxone (NARCAN) 2 mg/actuation spry Use 1 spray intranasally into 1 nostril. Use a new Narcan nasal spray for subsequent doses and administer into alternating nostrils. May repeat every 2 to 3 minutes as needed. 1 Device 2    losartan-hydroCHLOROthiazide (HYZAAR) 100-12.5 mg per tablet Take 1 Tab by mouth daily.  albuterol sulfate (PROVENTIL;VENTOLIN) 2.5 mg/0.5 mL nebu nebulizer solution 2.5 mg by Nebulization route every twelve (12) hours. Patient mixes this agent with acetylcysteine (20%) nebulizer solution for breathing treatment.  acetaminophen (TYLENOL) 500 mg tablet Take 1,000 mg by mouth every six (6) hours as needed for Pain.  LACTOBACIL 2-S. THERMO-BIFIDO 1 (VSL#3 PO) Take 1 Packet by mouth daily.  cetirizine (ZYRTEC) 10 mg tablet Take 10 mg by mouth nightly as needed for Allergies.  EPINEPHrine (EPIPEN) 0.3 mg/0.3 mL (1:1,000) injection 0.3 mL by IntraMUSCular route once as needed for up to 1 dose. 0.3 mL 1    estradiol (ESTRACE) 1 mg tablet Take 1 mg by mouth daily.  albuterol (PROVENTIL, VENTOLIN) 90 mcg/Actuation inhaler Take 2 Puffs by inhalation every six (6) hours as needed.          Past History     Past Medical History:  Past Medical History:   Diagnosis Date    Anal fissure     Anisocoria     Asthma     LAST EPISODE     Back pain     Cerumen impaction     Chronic kidney disease     hx uti in past    Coagulation defects     ocassional rectal bleeding due to anal fissure    Colovaginal fistula     Diabetes (HCC)     NIDDM    Diabetes ([facility] [address]     Diverticulitis     Diverticulosis     Enlarged tonsils     Frequent UTI     GERD (gastroesophageal reflux disease)     H/O endoscopy     with dilation    HA (headache)     Hepatic steatosis     Hx of colonoscopy with polypectomy     benign    Hypertension     Ill-defined condition     FREQUENT HIVES    Ill-defined condition     HX ELEVATED LIVER ENZYMES    Morbid obesity (HCC)     Nausea & vomiting     during diverticulitis flare    Obesity     Otitis media     Pneumonia     about 15 yrs ago    Psychiatric disorder     ANXIETY    Recurrent tonsillitis     Sinusitis     Transfusion history ~ age 35    postop hysterectomy    Unspecified sleep apnea     snores ( not diagnosed yet)     Urticaria     Urticaria        Past Surgical History:  Past Surgical History:   Procedure Laterality Date    ABDOMEN SURGERY PROC UNLISTED  2018    hernia repair at [facility]    [address]    [place]     HX GI  12    LAPAROSCOPIC HAND ASSISTED  POSS OPEN SIGMOID COLECTOMY POSS TEMPORARY DIVERTING LOOP ILEOSTOMY;  (no illeostomy needed)    HX GYN           HX GYN      cervical conization    HX HEENT      SINUS SURGERY LEFT X2    HX HEENT      SINUS SURGERY ON RIGHT X2    HX OTHER SURGICAL      Sphincterotomy    HX PELVIC LAPAROSCOPY      HX EMMANUEL AND BSO      HX UROLOGICAL  12     CYSTOSCOPY INSERTION URETERAL CATHETERS - Cystoscopy Insertion of bilateral ureteral stents       Family History:  Family History   Problem Relation Age of Onset    Diabetes Mother     Cancer Mother      NON-HODGKINS LYMPHOMA    Anesth Problems Mother      PONV    Diabetes Father     Heart Disease Father      CAD - STENTS, PACEMAKER    Arrhythmia Father        Social History:  Social History   Substance Use Topics    Smoking status: Never Smoker    Smokeless tobacco: Never Used    Alcohol use Yes      Comment: Rarely Allergies: Allergies   Allergen Reactions    Aspirin Shortness of Breath    Prilosec [Omeprazole Magnesium] Anaphylaxis     CHERRY FLAVORED; PT TAKES REGULAR PRILOSEC AND IS OK    Codeine Hives and Itching    Contrast Agent [Iodine] Itching     Pt. Had itching after IV contrast with the last exam.  Benadryl was given    Morphine Itching     Severe itching. Fine to take benadryl    Fentanyl Rash    Ketorolac Rash     \"makes my eyes spasm and causes rash on my hands\"    Percocet [Oxycodone-Acetaminophen] Hives         Review of Systems   Review of Systems   Constitutional: Positive for chills. Negative for activity change, appetite change, fatigue and fever. HENT: Negative. Negative for congestion, rhinorrhea and sore throat. Respiratory: Negative. Negative for cough, shortness of breath and wheezing. Cardiovascular: Negative. Negative for chest pain and leg swelling. Gastrointestinal: Positive for abdominal pain (generalized) and nausea. Negative for abdominal distention, constipation, diarrhea and vomiting. Endocrine: Negative. Genitourinary: Positive for flank pain. Negative for difficulty urinating, dysuria, menstrual problem, vaginal bleeding and vaginal discharge. Musculoskeletal: Negative for arthralgias, joint swelling and myalgias. Skin: Negative. Negative for rash. Neurological: Negative. Negative for dizziness, weakness, light-headedness and headaches. Psychiatric/Behavioral: Negative. Physical Exam   Physical Exam   Constitutional: She is oriented to person, place, and time. Vital signs are normal. She appears well-developed and well-nourished. Non-toxic appearance. Afebrile. Stable vitals. Non-toxic. HENT:   Head: Atraumatic. Eyes: EOM are normal.   Cardiovascular: Normal rate, regular rhythm, normal heart sounds and intact distal pulses. Exam reveals no gallop and no friction rub. No murmur heard.   Pulmonary/Chest: Effort normal and breath sounds normal. No respiratory distress. She has no wheezes. She has no rales. She exhibits no tenderness. Abdominal: Soft. Bowel sounds are normal. She exhibits no distension and no mass. There is no tenderness. There is no rebound and no guarding. Very mild distal suprapubic tenderness. Musculoskeletal: Normal range of motion. She exhibits no edema or tenderness. Neurological: She is oriented to person, place, and time. Skin: Skin is warm. Psychiatric: She has a normal mood and affect. Nursing note and vitals reviewed.       Diagnostic Study Results     Labs -     Recent Results (from the past 12 hour(s))   URINALYSIS W/ REFLEX CULTURE    Collection Time: 08/10/18  5:31 AM   Result Value Ref Range    Color YELLOW      Appearance CLEAR CLEAR      Specific gravity 1.022 1.003 - 1.030      pH (UA) 6.0 5.0 - 8.0      Protein NEGATIVE  NEG mg/dL    Glucose >1000 (A) NEG mg/dL    Ketone NEGATIVE  NEG mg/dL    Bilirubin NEGATIVE  NEG      Blood NEGATIVE  NEG      Urobilinogen 1.0 0.2 - 1.0 EU/dL    Nitrites POSITIVE (A) NEG      Leukocyte Esterase TRACE (A) NEG      WBC 0-4 0 - 4 /hpf    RBC 0-5 0 - 5 /hpf    Epithelial cells FEW FEW /lpf    Bacteria NEGATIVE  NEG /hpf    UA:UC IF INDICATED CULTURE NOT INDICATED BY UA RESULT CNI      Hyaline cast 0-2 0 - 5 /lpf   HCG URINE, QL. - POC    Collection Time: 08/10/18  5:48 AM   Result Value Ref Range    Pregnancy test,urine (POC) NEGATIVE  NEG     CBC WITH AUTOMATED DIFF    Collection Time: 08/10/18  6:03 AM   Result Value Ref Range    WBC 4.9 3.6 - 11.0 K/uL    RBC 4.50 3.80 - 5.20 M/uL    HGB 13.0 11.5 - 16.0 g/dL    HCT 36.8 35.0 - 47.0 %    MCV 81.8 80.0 - 99.0 FL    MCH 28.9 26.0 - 34.0 PG    MCHC 35.3 30.0 - 36.5 g/dL    RDW 13.4 11.5 - 14.5 %    PLATELET 282 143 - 859 K/uL    MPV 9.5 8.9 - 12.9 FL    NRBC 0.0 0  WBC    ABSOLUTE NRBC 0.00 0.00 - 0.01 K/uL    NEUTROPHILS 62 32 - 75 %    LYMPHOCYTES 30 12 - 49 %    MONOCYTES 5 5 - 13 %    EOSINOPHILS 3 0 - 7 %    BASOPHILS 0 0 - 1 %    IMMATURE GRANULOCYTES 0 0.0 - 0.5 %    ABS. NEUTROPHILS 3.0 1.8 - 8.0 K/UL    ABS. LYMPHOCYTES 1.5 0.8 - 3.5 K/UL    ABS. MONOCYTES 0.2 0.0 - 1.0 K/UL    ABS. EOSINOPHILS 0.1 0.0 - 0.4 K/UL    ABS. BASOPHILS 0.0 0.0 - 0.1 K/UL    ABS. IMM. GRANS. 0.0 0.00 - 0.04 K/UL    DF AUTOMATED     METABOLIC PANEL, COMPREHENSIVE    Collection Time: 08/10/18  6:03 AM   Result Value Ref Range    Sodium 136 136 - 145 mmol/L    Potassium 3.3 (L) 3.5 - 5.1 mmol/L    Chloride 99 97 - 108 mmol/L    CO2 27 21 - 32 mmol/L    Anion gap 10 5 - 15 mmol/L    Glucose 277 (H) 65 - 100 mg/dL    BUN 11 6 - 20 MG/DL    Creatinine 0.97 0.55 - 1.02 MG/DL    BUN/Creatinine ratio 11 (L) 12 - 20      GFR est AA >60 >60 ml/min/1.73m2    GFR est non-AA >60 >60 ml/min/1.73m2    Calcium 9.5 8.5 - 10.1 MG/DL    Bilirubin, total 0.5 0.2 - 1.0 MG/DL    ALT (SGPT) 51 12 - 78 U/L    AST (SGOT) 37 15 - 37 U/L    Alk. phosphatase 66 45 - 117 U/L    Protein, total 7.5 6.4 - 8.2 g/dL    Albumin 3.9 3.5 - 5.0 g/dL    Globulin 3.6 2.0 - 4.0 g/dL    A-G Ratio 1.1 1.1 - 2.2         Radiologic Studies -   No orders to display     CT Results  (Last 48 hours)    None        CXR Results  (Last 48 hours)    None            Medical Decision Making   I am the first provider for this patient. I reviewed the vital signs, available nursing notes, past medical history, past surgical history, family history and social history. Vital Signs-Reviewed the patient's vital signs. Patient Vitals for the past 12 hrs:   Temp Pulse Resp BP SpO2   08/10/18 0715 - - - 115/63 94 %   08/10/18 0600 - - - 115/90 96 %   08/10/18 0522 98.2 °F (36.8 °C) 85 18 135/83 97 %       Pulse Oximetry Analysis - 97% on RA    Cardiac Monitor:   Rate: 97 bpm  Rhythm: Normal Sinus Rhythm      Records Reviewed: Nursing Notes and Old Medical Records    Provider Notes (Medical Decision Making):   Pt with known colovaginal fistula here with concerns for possible UTI.  Pt is well tied into UROGYN with upcoming procedure with Dr Amie Zheng Monday to further delineate fistula. We discussed focusing on any acute changes such as significant UTI rather than addressing her chronic issues unless indicated. Pt very comfortable with plan to obtain UA and labs and provide supportive care in the ED. ED Course:   Initial assessment performed. The patients presenting problems have been discussed, and they are in agreement with the care plan formulated and outlined with them. I have encouraged them to ask questions as they arise throughout their visit. Disposition:  Discharge Note:  7:13 AM  The pt is ready for discharge. The pt's signs, symptoms, diagnosis, and discharge instructions have been discussed and pt has conveyed their understanding. The pt is to follow up as recommended or return to ER should their symptoms worsen. Plan has been discussed and pt is in agreement. PLAN:  1. Discharge  Discharge Medication List as of 8/10/2018  7:11 AM      START taking these medications    Details   phenazopyridine (PYRIDIUM) 200 mg tablet Take 1 Tab by mouth three (3) times daily for 6 doses. , Normal, Disp-6 Tab, R-0         CONTINUE these medications which have NOT CHANGED    Details   ciprofloxacin HCl (CIPRO) 500 mg tablet Take 1 Tab by mouth two (2) times a day for 10 days. , Normal, Disp-20 Tab, R-0      metroNIDAZOLE (FLAGYL) 500 mg tablet Take 1 Tab by mouth two (2) times a day for 10 days. , Normal, Disp-20 Tab, R-0      HYDROcodone-acetaminophen (NORCO) 5-325 mg per tablet Take 1 Tab by mouth every six (6) hours as needed for Pain. Max Daily Amount: 4 Tabs., Print, Disp-10 Tab, R-0      metFORMIN (GLUCOPHAGE) 500 mg tablet Take 500 mg by mouth daily (with breakfast). , Historical Med      Pramoxine (PROCTOFOAM) 1 % topical foam Apply  to affected area three (3) times daily as needed for Hemorrhoids. , Normal, Disp-1 Can, R-0      naloxone (NARCAN) 2 mg/actuation spry Use 1 spray intranasally into 1 nostril. Use a new Narcan nasal spray for subsequent doses and administer into alternating nostrils. May repeat every 2 to 3 minutes as needed. , Print, Disp-1 Device, R-2      losartan-hydroCHLOROthiazide (HYZAAR) 100-12.5 mg per tablet Take 1 Tab by mouth daily. , Historical Med      albuterol sulfate (PROVENTIL;VENTOLIN) 2.5 mg/0.5 mL nebu nebulizer solution 2.5 mg by Nebulization route every twelve (12) hours. Patient mixes this agent with acetylcysteine (20%) nebulizer solution for breathing treatment., Historical Med      acetaminophen (TYLENOL) 500 mg tablet Take 1,000 mg by mouth every six (6) hours as needed for Pain., Historical Med      LACTOBACIL 2-S. THERMO-BIFIDO 1 (VSL#3 PO) Take 1 Packet by mouth daily. , Historical Med      cetirizine (ZYRTEC) 10 mg tablet Take 10 mg by mouth nightly as needed for Allergies. , Historical Med      EPINEPHrine (EPIPEN) 0.3 mg/0.3 mL (1:1,000) injection 0.3 mL by IntraMUSCular route once as needed for up to 1 dose., Print, Disp-0.3 mL, R-1      estradiol (ESTRACE) 1 mg tablet Take 1 mg by mouth daily. , Historical Med      albuterol (PROVENTIL, VENTOLIN) 90 mcg/Actuation inhaler Take 2 Puffs by inhalation every six (6) hours as needed., Historical Med           2. Follow-up Information     Follow up With Details Comments Contact Info    Carlos Alvarado MD  Monday as scheduled [address] [facility]  [phone] [id number] [phone]      [facility] EMERGENCY DEPT  As needed, If symptoms worsen or you develop fevers [address]   [address] [phone]        Return to ED if worse     Diagnosis     Clinical Impression:   1. Suprapubic pain    2. Bilateral back pain, unspecified back location, unspecified chronicity    3. Elevated blood sugar    4. Colovaginal fistula    5. Dysuria        Attestations:     This note is prepared by Malou Cueva, acting as scribe for MD Wilian Keys MD: The scribe's documentation has been prepared under my direction and personally reviewed by me in its entirety. I confirm that the note above accurately reflects all work, treatment, procedures, and medical decision making performed by me.

## 2018-08-13 ENCOUNTER — HOSPITAL ENCOUNTER (OUTPATIENT)
Dept: GENERAL RADIOLOGY | Age: 48
Discharge: HOME | End: 2018-08-13
Attending: COLON & RECTAL SURGERY

## 2018-08-13 DIAGNOSIS — N82.4 COLOVAGINAL FISTULA: ICD-10-CM

## 2018-08-13 DIAGNOSIS — N39.0 CHRONIC UTI: ICD-10-CM

## 2018-08-15 ENCOUNTER — HOSPITAL ENCOUNTER (EMERGENCY)
Age: 48
Discharge: HOME | End: 2018-08-15
Attending: EMERGENCY MEDICINE
Payer: SELF-PAY

## 2018-08-15 ENCOUNTER — APPOINTMENT (OUTPATIENT)
Dept: CT IMAGING | Age: 48
End: 2018-08-15
Attending: EMERGENCY MEDICINE
Payer: SELF-PAY

## 2018-08-15 VITALS
HEART RATE: 72 BPM | SYSTOLIC BLOOD PRESSURE: 116 MMHG | OXYGEN SATURATION: 93 % | HEIGHT: 62 IN | WEIGHT: 204.37 LBS | DIASTOLIC BLOOD PRESSURE: 66 MMHG | RESPIRATION RATE: 14 BRPM | TEMPERATURE: 98.1 F | BODY MASS INDEX: 37.61 KG/M2

## 2018-08-15 DIAGNOSIS — R10.30 LOWER ABDOMINAL PAIN: Primary | ICD-10-CM

## 2018-08-15 LAB
ALBUMIN SERPL-MCNC: 4 G/DL (ref 3.5–5)
ALBUMIN/GLOB SERPL: 1.1 {RATIO} (ref 1.1–2.2)
ALP SERPL-CCNC: 69 U/L (ref 45–117)
ALT SERPL-CCNC: 45 U/L (ref 12–78)
ANION GAP SERPL CALC-SCNC: 9 MMOL/L (ref 5–15)
APPEARANCE UR: CLEAR
AST SERPL-CCNC: 37 U/L (ref 15–37)
BACTERIA URNS QL MICRO: NEGATIVE /HPF
BASOPHILS # BLD: 0 K/UL (ref 0–0.1)
BASOPHILS NFR BLD: 0 % (ref 0–1)
BILIRUB SERPL-MCNC: 0.5 MG/DL (ref 0.2–1)
BILIRUB UR QL: NEGATIVE
BUN SERPL-MCNC: 6 MG/DL (ref 6–20)
BUN/CREAT SERPL: 6 (ref 12–20)
CALCIUM SERPL-MCNC: 10.1 MG/DL (ref 8.5–10.1)
CHLORIDE SERPL-SCNC: 100 MMOL/L (ref 97–108)
CO2 SERPL-SCNC: 28 MMOL/L (ref 21–32)
COLOR UR: ABNORMAL
CREAT SERPL-MCNC: 0.95 MG/DL (ref 0.55–1.02)
DIFFERENTIAL METHOD BLD: NORMAL
EOSINOPHIL # BLD: 0.2 K/UL (ref 0–0.4)
EOSINOPHIL NFR BLD: 3 % (ref 0–7)
EPITH CASTS URNS QL MICRO: ABNORMAL /LPF
ERYTHROCYTE [DISTWIDTH] IN BLOOD BY AUTOMATED COUNT: 13.5 % (ref 11.5–14.5)
GLOBULIN SER CALC-MCNC: 3.7 G/DL (ref 2–4)
GLUCOSE SERPL-MCNC: 309 MG/DL (ref 65–100)
GLUCOSE UR STRIP.AUTO-MCNC: >1000 MG/DL
HCT VFR BLD AUTO: 35.2 % (ref 35–47)
HGB BLD-MCNC: 12.2 G/DL (ref 11.5–16)
HGB UR QL STRIP: NEGATIVE
IMM GRANULOCYTES # BLD: 0 K/UL (ref 0–0.04)
IMM GRANULOCYTES NFR BLD AUTO: 0 % (ref 0–0.5)
KETONES UR QL STRIP.AUTO: NEGATIVE MG/DL
LACTATE SERPL-SCNC: 2.4 MMOL/L (ref 0.4–2)
LACTATE SERPL-SCNC: 3.1 MMOL/L (ref 0.4–2)
LEUKOCYTE ESTERASE UR QL STRIP.AUTO: NEGATIVE
LYMPHOCYTES # BLD: 1.5 K/UL (ref 0.8–3.5)
LYMPHOCYTES NFR BLD: 33 % (ref 12–49)
MCH RBC QN AUTO: 28.7 PG (ref 26–34)
MCHC RBC AUTO-ENTMCNC: 34.7 G/DL (ref 30–36.5)
MCV RBC AUTO: 82.8 FL (ref 80–99)
MONOCYTES # BLD: 0.2 K/UL (ref 0–1)
MONOCYTES NFR BLD: 5 % (ref 5–13)
NEUTS SEG # BLD: 2.7 K/UL (ref 1.8–8)
NEUTS SEG NFR BLD: 58 % (ref 32–75)
NITRITE UR QL STRIP.AUTO: NEGATIVE
NRBC # BLD: 0 K/UL (ref 0–0.01)
NRBC BLD-RTO: 0 PER 100 WBC
PH UR STRIP: 6 [PH] (ref 5–8)
PLATELET # BLD AUTO: 168 K/UL (ref 150–400)
PMV BLD AUTO: 9.5 FL (ref 8.9–12.9)
POTASSIUM SERPL-SCNC: 3.4 MMOL/L (ref 3.5–5.1)
PROT SERPL-MCNC: 7.7 G/DL (ref 6.4–8.2)
PROT UR STRIP-MCNC: NEGATIVE MG/DL
RBC # BLD AUTO: 4.25 M/UL (ref 3.8–5.2)
RBC #/AREA URNS HPF: ABNORMAL /HPF (ref 0–5)
SODIUM SERPL-SCNC: 137 MMOL/L (ref 136–145)
SP GR UR REFRACTOMETRY: 1.01 (ref 1–1.03)
UA: UC IF INDICATED,UAUC: ABNORMAL
UROBILINOGEN UR QL STRIP.AUTO: 0.2 EU/DL (ref 0.2–1)
WBC # BLD AUTO: 4.6 K/UL (ref 3.6–11)
WBC URNS QL MICRO: ABNORMAL /HPF (ref 0–4)

## 2018-08-15 PROCEDURE — 96374 THER/PROPH/DIAG INJ IV PUSH: CPT

## 2018-08-15 PROCEDURE — 36415 COLL VENOUS BLD VENIPUNCTURE: CPT | Performed by: EMERGENCY MEDICINE

## 2018-08-15 PROCEDURE — 96375 TX/PRO/DX INJ NEW DRUG ADDON: CPT

## 2018-08-15 PROCEDURE — 81001 URINALYSIS AUTO W/SCOPE: CPT | Performed by: EMERGENCY MEDICINE

## 2018-08-15 PROCEDURE — 74176 CT ABD & PELVIS W/O CONTRAST: CPT

## 2018-08-15 PROCEDURE — 83605 ASSAY OF LACTIC ACID: CPT | Performed by: EMERGENCY MEDICINE

## 2018-08-15 PROCEDURE — 99285 EMERGENCY DEPT VISIT HI MDM: CPT

## 2018-08-15 PROCEDURE — 80053 COMPREHEN METABOLIC PANEL: CPT | Performed by: EMERGENCY MEDICINE

## 2018-08-15 PROCEDURE — 74011250636 HC RX REV CODE- 250/636: Performed by: EMERGENCY MEDICINE

## 2018-08-15 PROCEDURE — 74011636320 HC RX REV CODE- 636/320: Performed by: EMERGENCY MEDICINE

## 2018-08-15 PROCEDURE — 96361 HYDRATE IV INFUSION ADD-ON: CPT

## 2018-08-15 PROCEDURE — 85025 COMPLETE CBC W/AUTO DIFF WBC: CPT | Performed by: EMERGENCY MEDICINE

## 2018-08-15 RX ORDER — ONDANSETRON 2 MG/ML
4 INJECTION INTRAMUSCULAR; INTRAVENOUS
Status: COMPLETED | OUTPATIENT
Start: 2018-08-15 | End: 2018-08-15

## 2018-08-15 RX ORDER — MORPHINE SULFATE 2 MG/ML
4 INJECTION, SOLUTION INTRAMUSCULAR; INTRAVENOUS
Status: COMPLETED | OUTPATIENT
Start: 2018-08-15 | End: 2018-08-15

## 2018-08-15 RX ORDER — DICYCLOMINE HYDROCHLORIDE 20 MG/1
20 TABLET ORAL EVERY 6 HOURS
Qty: 20 TAB | Refills: 0 | Status: SHIPPED | OUTPATIENT
Start: 2018-08-15 | End: 2018-08-20

## 2018-08-15 RX ORDER — ONDANSETRON 4 MG/1
4 TABLET, ORALLY DISINTEGRATING ORAL
Qty: 16 TAB | Refills: 0 | Status: SHIPPED | OUTPATIENT
Start: 2018-08-15 | End: 2018-10-08

## 2018-08-15 RX ORDER — DIPHENHYDRAMINE HYDROCHLORIDE 50 MG/ML
25 INJECTION, SOLUTION INTRAMUSCULAR; INTRAVENOUS
Status: COMPLETED | OUTPATIENT
Start: 2018-08-15 | End: 2018-08-15

## 2018-08-15 RX ORDER — PROCHLORPERAZINE EDISYLATE 5 MG/ML
10 INJECTION INTRAMUSCULAR; INTRAVENOUS
Status: COMPLETED | OUTPATIENT
Start: 2018-08-15 | End: 2018-08-15

## 2018-08-15 RX ADMIN — DIPHENHYDRAMINE HYDROCHLORIDE 25 MG: 50 INJECTION, SOLUTION INTRAMUSCULAR; INTRAVENOUS at 12:25

## 2018-08-15 RX ADMIN — SODIUM CHLORIDE 1000 ML: 900 INJECTION, SOLUTION INTRAVENOUS at 13:26

## 2018-08-15 RX ADMIN — ONDANSETRON 4 MG: 2 INJECTION, SOLUTION INTRAMUSCULAR; INTRAVENOUS at 12:23

## 2018-08-15 RX ADMIN — PROCHLORPERAZINE EDISYLATE 10 MG: 5 INJECTION INTRAMUSCULAR; INTRAVENOUS at 13:26

## 2018-08-15 RX ADMIN — MORPHINE SULFATE 4 MG: 2 INJECTION, SOLUTION INTRAMUSCULAR; INTRAVENOUS at 12:36

## 2018-08-15 RX ADMIN — DIATRIZOATE MEGLUMINE AND DIATRIZOATE SODIUM 30 ML: 600; 100 SOLUTION ORAL; RECTAL at 13:30

## 2018-08-15 NOTE — ED PROVIDER NOTES
EMERGENCY DEPARTMENT HISTORY AND PHYSICAL EXAM      Date: 8/15/2018  Patient Name: Cathy Recinos    History of Presenting Illness     Chief Complaint   Patient presents with    Other     reports that she has a uro-rectal fistula and has been experiencing a lot of vaginal discharge which is mucous, reports that that she is having increased back pain and was supposed to have surgery this past Monday, but states that she was not able to have it done because she has an iodine allergy and needs to be pre-medicated       History Provided By: Patient    HPI: Cathy Recinos, 50 y.o. female with PMHx significant for HTN, diverticulosis, anal fissure, NIDDM, colovaginal fistula, UTI, presents ambulatory to the ED with cc of yellow mucous vaginal discharge with associated constant diarrhea, urinary urgency, increased back pain and 9/10 aching lower abdominal pain with distention and dysuria. Pt states that she was supposed to have a vagogram 2 days ago but wasn't able to due to her iodine allergy and needed to be pre-medicated. She also has a hx of C. Diff. There are no other complaints, changes, or physical findings at this time. PCP: BEBA Silver    Current Outpatient Prescriptions   Medication Sig Dispense Refill    ondansetron (ZOFRAN ODT) 4 mg disintegrating tablet Take 1 Tab by mouth every eight (8) hours as needed for Nausea. 16 Tab 0    dicyclomine (BENTYL) 20 mg tablet Take 1 Tab by mouth every six (6) hours for 20 doses. 20 Tab 0    ciprofloxacin HCl (CIPRO) 500 mg tablet Take 1 Tab by mouth two (2) times a day for 10 days. 20 Tab 0    metroNIDAZOLE (FLAGYL) 500 mg tablet Take 1 Tab by mouth two (2) times a day for 10 days. 20 Tab 0    metFORMIN (GLUCOPHAGE) 500 mg tablet Take 500 mg by mouth daily (with breakfast).  losartan-hydroCHLOROthiazide (HYZAAR) 100-12.5 mg per tablet Take 1 Tab by mouth daily.       albuterol sulfate (PROVENTIL;VENTOLIN) 2.5 mg/0.5 mL nebu nebulizer solution 2.5 mg by Nebulization route every twelve (12) hours. Patient mixes this agent with acetylcysteine (20%) nebulizer solution for breathing treatment.  EPINEPHrine (EPIPEN) 0.3 mg/0.3 mL (1:1,000) injection 0.3 mL by IntraMUSCular route once as needed for up to 1 dose. 0.3 mL 1    albuterol (PROVENTIL, VENTOLIN) 90 mcg/Actuation inhaler Take 2 Puffs by inhalation every six (6) hours as needed.  HYDROcodone-acetaminophen (NORCO) 5-325 mg per tablet Take 1 Tab by mouth every six (6) hours as needed for Pain. Max Daily Amount: 4 Tabs. 10 Tab 0    Pramoxine (PROCTOFOAM) 1 % topical foam Apply  to affected area three (3) times daily as needed for Hemorrhoids. 1 Can 0    naloxone (NARCAN) 2 mg/actuation spry Use 1 spray intranasally into 1 nostril. Use a new Narcan nasal spray for subsequent doses and administer into alternating nostrils. May repeat every 2 to 3 minutes as needed. 1 Device 2    acetaminophen (TYLENOL) 500 mg tablet Take 1,000 mg by mouth every six (6) hours as needed for Pain.  LACTOBACIL 2-S. THERMO-BIFIDO 1 (VSL#3 PO) Take 1 Packet by mouth daily.  estradiol (ESTRACE) 1 mg tablet Take 1 mg by mouth daily.          Past History     Past Medical History:  Past Medical History:   Diagnosis Date    Anal fissure     Anisocoria     Asthma     LAST EPISODE     Back pain     Cerumen impaction     Chronic kidney disease     hx uti in past    Coagulation defects     ocassional rectal bleeding due to anal fissure    Colovaginal fistula     Diabetes (HCC)     NIDDM    Diabetes ([facility] [address]     Diverticulitis     Diverticulosis     Enlarged tonsils     Frequent UTI     GERD (gastroesophageal reflux disease)     H/O endoscopy     with dilation    HA (headache)     Hepatic steatosis     Hx of colonoscopy with polypectomy     benign    Hypertension     Ill-defined condition     FREQUENT HIVES    Ill-defined condition     HX ELEVATED LIVER ENZYMES    Morbid obesity ([address]     Nausea & vomiting     during diverticulitis flare    Obesity     Otitis media     Pneumonia     about 15 yrs ago    Psychiatric disorder     ANXIETY    Recurrent tonsillitis     Sinusitis     Transfusion history ~ age 35    postop hysterectomy    Unspecified sleep apnea     snores ( not diagnosed yet)     Urticaria     Urticaria        Past Surgical History:  Past Surgical History:   Procedure Laterality Date    ABDOMEN SURGERY PROC UNLISTED  2018    hernia repair at [address]    [address]    [place].  HX GI  12    LAPAROSCOPIC HAND ASSISTED  POSS OPEN SIGMOID COLECTOMY POSS TEMPORARY DIVERTING LOOP ILEOSTOMY;  (no illeostomy needed)    HX GYN           HX GYN      cervical conization    HX HEENT      SINUS SURGERY LEFT X2    HX HEENT      SINUS SURGERY ON RIGHT X2    HX OTHER SURGICAL      Sphincterotomy    HX PELVIC LAPAROSCOPY      HX EMMANUEL AND BSO      HX UROLOGICAL  12     CYSTOSCOPY INSERTION URETERAL CATHETERS - Cystoscopy Insertion of bilateral ureteral stents       Family History:  Family History   Problem Relation Age of Onset    Diabetes Mother     Cancer Mother      NON-HODGKINS LYMPHOMA    Anesth Problems Mother      PONV    Diabetes Father     Heart Disease Father      CAD - STENTS, PACEMAKER    Arrhythmia Father        Social History:  Social History   Substance Use Topics    Smoking status: Never Smoker    Smokeless tobacco: Never Used    Alcohol use Yes      Comment: Rarely       Allergies: Allergies   Allergen Reactions    Aspirin Shortness of Breath    Prilosec [Omeprazole Magnesium] Anaphylaxis     CHERRY FLAVORED; PT TAKES REGULAR PRILOSEC AND IS OK    Codeine Hives and Itching    Contrast Agent [Iodine] Itching     Pt. Had itching after IV contrast with the last exam.  Benadryl was given    Morphine Itching     Severe itching.  Fine to take benadryl    Fentanyl Rash    Ketorolac Rash     \"makes my eyes spasm and causes rash on my hands\"    Percocet [Oxycodone-Acetaminophen] Hives         Review of Systems   Review of Systems   Constitutional: Negative for fatigue and fever. HENT: Negative. Eyes: Negative. Respiratory: Negative for shortness of breath and wheezing. Cardiovascular: Negative for chest pain and leg swelling. Gastrointestinal: Positive for abdominal distention, abdominal pain (lower) and diarrhea. Negative for blood in stool, constipation, nausea and vomiting. Endocrine: Negative. Genitourinary: Positive for dysuria, urgency and vaginal discharge. Negative for difficulty urinating. Musculoskeletal: Positive for back pain (lower). Skin: Negative for rash. Allergic/Immunologic: Negative. Neurological: Negative for weakness and numbness. Hematological: Negative. Psychiatric/Behavioral: Negative. Physical Exam   Physical Exam   Constitutional: She is oriented to person, place, and time. She appears well-developed and well-nourished. HENT:   Head: Normocephalic and atraumatic. Mouth/Throat: Mucous membranes are normal.   Eyes: EOM are normal. Pupils are equal, round, and reactive to light. Neck: Normal range of motion. No JVD present. No tracheal deviation present. Cardiovascular: Normal rate, regular rhythm, normal heart sounds and intact distal pulses. Exam reveals no gallop and no friction rub. No murmur heard. Pulmonary/Chest: Effort normal and breath sounds normal. No stridor. She has no wheezes. She has no rales. Abdominal: Soft. Bowel sounds are normal. She exhibits no distension and no mass. There is tenderness (generalized lower). There is no guarding. Musculoskeletal: Normal range of motion. She exhibits no edema or tenderness. Neurological: She is alert and oriented to person, place, and time. Skin: Skin is warm and dry. No rash noted. Psychiatric: She has a normal mood and affect.  Her behavior is normal. Judgment and thought content normal. Diagnostic Study Results     Labs -     Recent Results (from the past 12 hour(s))   METABOLIC PANEL, COMPREHENSIVE    Collection Time: 08/15/18 12:08 PM   Result Value Ref Range    Sodium 137 136 - 145 mmol/L    Potassium 3.4 (L) 3.5 - 5.1 mmol/L    Chloride 100 97 - 108 mmol/L    CO2 28 21 - 32 mmol/L    Anion gap 9 5 - 15 mmol/L    Glucose 309 (H) 65 - 100 mg/dL    BUN 6 6 - 20 MG/DL    Creatinine 0.95 0.55 - 1.02 MG/DL    BUN/Creatinine ratio 6 (L) 12 - 20      GFR est AA >60 >60 ml/min/1.73m2    GFR est non-AA >60 >60 ml/min/1.73m2    Calcium 10.1 8.5 - 10.1 MG/DL    Bilirubin, total 0.5 0.2 - 1.0 MG/DL    ALT (SGPT) 45 12 - 78 U/L    AST (SGOT) 37 15 - 37 U/L    Alk. phosphatase 69 45 - 117 U/L    Protein, total 7.7 6.4 - 8.2 g/dL    Albumin 4.0 3.5 - 5.0 g/dL    Globulin 3.7 2.0 - 4.0 g/dL    A-G Ratio 1.1 1.1 - 2.2     CBC WITH AUTOMATED DIFF    Collection Time: 08/15/18 12:08 PM   Result Value Ref Range    WBC 4.6 3.6 - 11.0 K/uL    RBC 4.25 3.80 - 5.20 M/uL    HGB 12.2 11.5 - 16.0 g/dL    HCT 35.2 35.0 - 47.0 %    MCV 82.8 80.0 - 99.0 FL    MCH 28.7 26.0 - 34.0 PG    MCHC 34.7 30.0 - 36.5 g/dL    RDW 13.5 11.5 - 14.5 %    PLATELET 581 074 - 847 K/uL    MPV 9.5 8.9 - 12.9 FL    NRBC 0.0 0  WBC    ABSOLUTE NRBC 0.00 0.00 - 0.01 K/uL    NEUTROPHILS 58 32 - 75 %    LYMPHOCYTES 33 12 - 49 %    MONOCYTES 5 5 - 13 %    EOSINOPHILS 3 0 - 7 %    BASOPHILS 0 0 - 1 %    IMMATURE GRANULOCYTES 0 0.0 - 0.5 %    ABS. NEUTROPHILS 2.7 1.8 - 8.0 K/UL    ABS. LYMPHOCYTES 1.5 0.8 - 3.5 K/UL    ABS. MONOCYTES 0.2 0.0 - 1.0 K/UL    ABS. EOSINOPHILS 0.2 0.0 - 0.4 K/UL    ABS. BASOPHILS 0.0 0.0 - 0.1 K/UL    ABS. IMM.  GRANS. 0.0 0.00 - 0.04 K/UL    DF AUTOMATED     LACTIC ACID    Collection Time: 08/15/18 12:08 PM   Result Value Ref Range    Lactic acid 3.1 (HH) 0.4 - 2.0 MMOL/L   URINALYSIS W/ REFLEX CULTURE    Collection Time: 08/15/18 12:08 PM   Result Value Ref Range    Color YELLOW/STRAW      Appearance CLEAR CLEAR      Specific gravity 1.010 1.003 - 1.030      pH (UA) 6.0 5.0 - 8.0      Protein NEGATIVE  NEG mg/dL    Glucose >1000 (A) NEG mg/dL    Ketone NEGATIVE  NEG mg/dL    Bilirubin NEGATIVE  NEG      Blood NEGATIVE  NEG      Urobilinogen 0.2 0.2 - 1.0 EU/dL    Nitrites NEGATIVE  NEG      Leukocyte Esterase NEGATIVE  NEG      WBC 0-4 0 - 4 /hpf    RBC 0-5 0 - 5 /hpf    Epithelial cells FEW FEW /lpf    Bacteria NEGATIVE  NEG /hpf    UA:UC IF INDICATED CULTURE NOT INDICATED BY UA RESULT CNI     LACTIC ACID    Collection Time: 08/15/18  4:03 PM   Result Value Ref Range    Lactic acid 2.4 (HH) 0.4 - 2.0 MMOL/L       Radiologic Studies -     CT Results  (Last 48 hours)               08/15/18 1527  CT ABD PELV WO CONT Final result    Impression:  IMPRESSION:   No significant change. No acute abnormality identified. Narrative:  EXAM:  CT ABD PELV WO CONT       INDICATION: Abdominal pain       COMPARISON: 6/19/2018       CONTRAST:  None. TECHNIQUE:    Thin axial images were obtained through the abdomen and pelvis. Coronal and   sagittal reconstructions were generated. Oral contrast was not administered. CT   dose reduction was achieved through use of a standardized protocol tailored for   this examination and automatic exposure control for dose modulation. The absence of intravenous contrast material reduces the sensitivity for   evaluation of the solid parenchymal organs of the abdomen. FINDINGS:    LUNG BASES: Clear. INCIDENTALLY IMAGED HEART AND MEDIASTINUM: Unremarkable. LIVER: No mass or biliary dilatation. Mild hepatomegaly is stable   GALLBLADDER: Surgically absent. SPLEEN: No mass. Mild splenomegaly is stable   PANCREAS: No mass or ductal dilatation. ADRENALS: Unremarkable. KIDNEYS/URETERS: No mass, calculus, or hydronephrosis. STOMACH: Unremarkable. SMALL BOWEL: No dilatation or wall thickening. COLON: Patient status post sigmoid colectomy   APPENDIX: Unremarkable. PERITONEUM: No ascites or pneumoperitoneum. RETROPERITONEUM: No lymphadenopathy or aortic aneurysm. REPRODUCTIVE ORGANS: Patient status post hysterectomy   URINARY BLADDER: No mass or calculus. BONES: No destructive bone lesion. ADDITIONAL COMMENTS: There is a small umbilical hernia containing fat                 Medical Decision Making   I am the first provider for this patient. I reviewed the vital signs, available nursing notes, past medical history, past surgical history, family history and social history. Vital Signs-Reviewed the patient's vital signs. Patient Vitals for the past 12 hrs:   Temp Pulse Resp BP SpO2   08/15/18 1543 - - - 116/66 -   08/15/18 1500 - - - 109/59 93 %   08/15/18 1430 - - - 113/65 92 %   08/15/18 1400 - - - 124/74 93 %   08/15/18 1330 - - - 136/68 94 %   08/15/18 1300 - - - 139/75 92 %   08/15/18 1230 - - - 144/79 96 %   08/15/18 1202 - - - - 97 %   08/15/18 1200 - - - 151/79 -   08/15/18 1144 98.1 °F (36.7 °C) 72 14 146/82 99 %       Pulse Oximetry Analysis - 99% on room air    Cardiac Monitor:   Rate: 72 bpm  Rhythm: Normal Sinus Rhythm 146/82     Records Reviewed: Nursing Notes and Old Medical Records    Provider Notes (Medical Decision Making):   Pt presenting with acute on chronic lower abdominal pain, has been back and forth with diverticulitis, recurrent UTI, possible colovaginal fistula that is being worked up at this time by colorectal surgery. Pt with lower abdominal tenderness but no acute abdomen. DDx include recurrent colitis, UTI, pyelonephritis, intraabdominal abscess, chronic abdominal pain. ED Course:   Initial assessment performed. The patients presenting problems have been discussed, and they are in agreement with the care plan formulated and outlined with them. I have encouraged them to ask questions as they arise throughout their visit.     CONSULT NOTE:  1:23 PM  Rhea Marin DO spoke with Elvis Sims MD  Specialty: Colorectal Surgery  Discussed patient's hx, disposition, and available diagnostic and imaging results. Reviewed care plans. Consultant agrees with plans as outlined. If labwork and CT scan negative today, patient can be seen at her outpatient follow up. Disposition:  DISCHARGE NOTE  4:47 PM  The patient has been re-evaluated and is ready for discharge. Reviewed available results with patient. Counseled patient on diagnosis and care plan. Patient has expressed understanding, and all questions have been answered. Patient agrees with plan and agrees to follow up as recommended, or return to the ED if their symptoms worsen. Discharge instructions have been provided and explained to the patient, along with reasons to return to the ED. PLAN:  1. Current Discharge Medication List      START taking these medications    Details   ondansetron (ZOFRAN ODT) 4 mg disintegrating tablet Take 1 Tab by mouth every eight (8) hours as needed for Nausea. Qty: 16 Tab, Refills: 0      dicyclomine (BENTYL) 20 mg tablet Take 1 Tab by mouth every six (6) hours for 20 doses. Qty: 20 Tab, Refills: 0           2. Follow-up Information     Follow up With Details Comments Contact Info    Lissette Alanis MD Schedule an appointment as soon as possible for a visit  [facility] Schedule an appointment as soon as possible for a visit  [place] [address]          Return to ED if worse     Diagnosis     Clinical Impression:   1. Lower abdominal pain        Attestations: This note is prepared by Stephen Gonzales, acting as Scribe for Kiarra Salas DO. Kiarra Salas DO: The scribe's documentation has been prepared under my direction and personally reviewed by me in its entirety. I confirm that the note above accurately reflects all work, treatment, procedures, and medical decision making performed by me.

## 2018-08-15 NOTE — ED NOTES
Jordyn Duran MD and notified that patient said she was feeling nauseous. Tali Orlando MD to put in orders.

## 2018-08-22 ENCOUNTER — HOSPITAL ENCOUNTER (OUTPATIENT)
Dept: GENERAL RADIOLOGY | Age: 48
Discharge: HOME | End: 2018-08-22
Attending: COLON & RECTAL SURGERY
Payer: SELF-PAY

## 2018-08-22 PROCEDURE — 74011636320 HC RX REV CODE- 636/320: Performed by: RADIOLOGY

## 2018-08-22 PROCEDURE — 74775 X-RAY EXAM OF PERINEUM: CPT

## 2018-08-22 RX ADMIN — IOHEXOL 140 ML: 240 INJECTION, SOLUTION INTRATHECAL; INTRAVASCULAR; INTRAVENOUS; ORAL at 14:00

## 2018-09-18 ENCOUNTER — HOSPITAL ENCOUNTER (EMERGENCY)
Age: 48
Discharge: HOME | End: 2018-09-19
Attending: EMERGENCY MEDICINE
Payer: SELF-PAY

## 2018-09-18 DIAGNOSIS — R10.2 PELVIC PAIN IN FEMALE: Primary | ICD-10-CM

## 2018-09-18 DIAGNOSIS — N30.00 ACUTE CYSTITIS WITHOUT HEMATURIA: ICD-10-CM

## 2018-09-18 LAB
APPEARANCE UR: CLEAR
BACTERIA URNS QL MICRO: NEGATIVE /HPF
BASOPHILS # BLD: 0 K/UL (ref 0–0.1)
BASOPHILS NFR BLD: 1 % (ref 0–1)
BILIRUB UR QL: NEGATIVE
COLOR UR: ABNORMAL
DIFFERENTIAL METHOD BLD: ABNORMAL
EOSINOPHIL # BLD: 0.2 K/UL (ref 0–0.4)
EOSINOPHIL NFR BLD: 3 % (ref 0–7)
EPITH CASTS URNS QL MICRO: ABNORMAL /LPF
ERYTHROCYTE [DISTWIDTH] IN BLOOD BY AUTOMATED COUNT: 13.6 % (ref 11.5–14.5)
GLUCOSE UR STRIP.AUTO-MCNC: 100 MG/DL
HCT VFR BLD AUTO: 36.9 % (ref 35–47)
HEMOCCULT STL QL: NEGATIVE
HGB BLD-MCNC: 13.5 G/DL (ref 11.5–16)
HGB UR QL STRIP: NEGATIVE
HYALINE CASTS URNS QL MICRO: ABNORMAL /LPF (ref 0–5)
IMM GRANULOCYTES # BLD: 0 K/UL (ref 0–0.04)
IMM GRANULOCYTES NFR BLD AUTO: 0 % (ref 0–0.5)
KETONES UR QL STRIP.AUTO: ABNORMAL MG/DL
LEUKOCYTE ESTERASE UR QL STRIP.AUTO: ABNORMAL
LYMPHOCYTES # BLD: 1.7 K/UL (ref 0.8–3.5)
LYMPHOCYTES NFR BLD: 26 % (ref 12–49)
MCH RBC QN AUTO: 29.6 PG (ref 26–34)
MCHC RBC AUTO-ENTMCNC: 36.6 G/DL (ref 30–36.5)
MCV RBC AUTO: 80.9 FL (ref 80–99)
MONOCYTES # BLD: 0.3 K/UL (ref 0–1)
MONOCYTES NFR BLD: 5 % (ref 5–13)
NEUTS SEG # BLD: 4.3 K/UL (ref 1.8–8)
NEUTS SEG NFR BLD: 66 % (ref 32–75)
NITRITE UR QL STRIP.AUTO: POSITIVE
NRBC # BLD: 0 K/UL (ref 0–0.01)
NRBC BLD-RTO: 0 PER 100 WBC
PH UR STRIP: 5 [PH] (ref 5–8)
PLATELET # BLD AUTO: 222 K/UL (ref 150–400)
PMV BLD AUTO: 9.9 FL (ref 8.9–12.9)
PROT UR STRIP-MCNC: NEGATIVE MG/DL
RBC # BLD AUTO: 4.56 M/UL (ref 3.8–5.2)
RBC #/AREA URNS HPF: ABNORMAL /HPF (ref 0–5)
SP GR UR REFRACTOMETRY: 1.02 (ref 1–1.03)
UA: UC IF INDICATED,UAUC: ABNORMAL
UROBILINOGEN UR QL STRIP.AUTO: 1 EU/DL (ref 0.2–1)
WBC # BLD AUTO: 6.6 K/UL (ref 3.6–11)
WBC URNS QL MICRO: ABNORMAL /HPF (ref 0–4)

## 2018-09-18 PROCEDURE — 82272 OCCULT BLD FECES 1-3 TESTS: CPT | Performed by: EMERGENCY MEDICINE

## 2018-09-18 PROCEDURE — 36415 COLL VENOUS BLD VENIPUNCTURE: CPT | Performed by: EMERGENCY MEDICINE

## 2018-09-18 PROCEDURE — 74011000255 HC RX REV CODE- 255: Performed by: EMERGENCY MEDICINE

## 2018-09-18 PROCEDURE — 74011250636 HC RX REV CODE- 250/636: Performed by: EMERGENCY MEDICINE

## 2018-09-18 PROCEDURE — 96361 HYDRATE IV INFUSION ADD-ON: CPT

## 2018-09-18 PROCEDURE — 87086 URINE CULTURE/COLONY COUNT: CPT | Performed by: EMERGENCY MEDICINE

## 2018-09-18 PROCEDURE — 96375 TX/PRO/DX INJ NEW DRUG ADDON: CPT

## 2018-09-18 PROCEDURE — 83605 ASSAY OF LACTIC ACID: CPT | Performed by: EMERGENCY MEDICINE

## 2018-09-18 PROCEDURE — 80053 COMPREHEN METABOLIC PANEL: CPT | Performed by: EMERGENCY MEDICINE

## 2018-09-18 PROCEDURE — 85025 COMPLETE CBC W/AUTO DIFF WBC: CPT | Performed by: EMERGENCY MEDICINE

## 2018-09-18 PROCEDURE — 99285 EMERGENCY DEPT VISIT HI MDM: CPT

## 2018-09-18 PROCEDURE — 81001 URINALYSIS AUTO W/SCOPE: CPT | Performed by: EMERGENCY MEDICINE

## 2018-09-18 RX ORDER — DIPHENHYDRAMINE HYDROCHLORIDE 50 MG/ML
25 INJECTION, SOLUTION INTRAMUSCULAR; INTRAVENOUS
Status: COMPLETED | OUTPATIENT
Start: 2018-09-18 | End: 2018-09-18

## 2018-09-18 RX ORDER — BARIUM SULFATE 20 MG/ML
900 SUSPENSION ORAL
Status: COMPLETED | OUTPATIENT
Start: 2018-09-18 | End: 2018-09-18

## 2018-09-18 RX ORDER — FENTANYL CITRATE 50 UG/ML
50 INJECTION, SOLUTION INTRAMUSCULAR; INTRAVENOUS
Status: COMPLETED | OUTPATIENT
Start: 2018-09-18 | End: 2018-09-18

## 2018-09-18 RX ORDER — ONDANSETRON 2 MG/ML
4 INJECTION INTRAMUSCULAR; INTRAVENOUS
Status: COMPLETED | OUTPATIENT
Start: 2018-09-18 | End: 2018-09-18

## 2018-09-18 RX ADMIN — FENTANYL CITRATE 50 MCG: 50 INJECTION, SOLUTION INTRAMUSCULAR; INTRAVENOUS at 23:22

## 2018-09-18 RX ADMIN — DIPHENHYDRAMINE HYDROCHLORIDE 25 MG: 50 INJECTION, SOLUTION INTRAMUSCULAR; INTRAVENOUS at 23:21

## 2018-09-18 RX ADMIN — ONDANSETRON 4 MG: 2 INJECTION INTRAMUSCULAR; INTRAVENOUS at 23:21

## 2018-09-18 RX ADMIN — SODIUM CHLORIDE 1000 ML: 900 INJECTION, SOLUTION INTRAVENOUS at 23:19

## 2018-09-18 RX ADMIN — BARIUM SULFATE 900 ML: 20 SUSPENSION ORAL at 23:35

## 2018-09-18 NOTE — LETTER
[address] 
\[facility] [place] [facility]\"" EMERGENCY DEPT 
[address] [id number] [phone] Work/School Note Date: 9/18/2018 To Whom It May concern: 
 
Rika Newsome was seen and treated today in the emergency room by the following provider(s): 
Attending Provider: John Jimenez MD. Rika Newsome may return to work on 9/20/18.  
 
Sincerely, 
 
 
 
 
John Jimenez MD

## 2018-09-19 ENCOUNTER — APPOINTMENT (OUTPATIENT)
Dept: CT IMAGING | Age: 48
End: 2018-09-19
Attending: EMERGENCY MEDICINE
Payer: SELF-PAY

## 2018-09-19 VITALS
TEMPERATURE: 97.8 F | SYSTOLIC BLOOD PRESSURE: 124 MMHG | WEIGHT: 202.6 LBS | HEART RATE: 82 BPM | BODY MASS INDEX: 37.28 KG/M2 | HEIGHT: 62 IN | RESPIRATION RATE: 16 BRPM | DIASTOLIC BLOOD PRESSURE: 96 MMHG | OXYGEN SATURATION: 97 %

## 2018-09-19 LAB
ALBUMIN SERPL-MCNC: 4.3 G/DL (ref 3.5–5)
ALBUMIN/GLOB SERPL: 1.3 {RATIO} (ref 1.1–2.2)
ALP SERPL-CCNC: 67 U/L (ref 45–117)
ALT SERPL-CCNC: 48 U/L (ref 12–78)
ANION GAP SERPL CALC-SCNC: 11 MMOL/L (ref 5–15)
AST SERPL-CCNC: 41 U/L (ref 15–37)
BILIRUB SERPL-MCNC: 0.6 MG/DL (ref 0.2–1)
BUN SERPL-MCNC: 9 MG/DL (ref 6–20)
BUN/CREAT SERPL: 12 (ref 12–20)
CALCIUM SERPL-MCNC: 9.5 MG/DL (ref 8.5–10.1)
CHLORIDE SERPL-SCNC: 102 MMOL/L (ref 97–108)
CO2 SERPL-SCNC: 23 MMOL/L (ref 21–32)
CREAT SERPL-MCNC: 0.75 MG/DL (ref 0.55–1.02)
GLOBULIN SER CALC-MCNC: 3.4 G/DL (ref 2–4)
GLUCOSE SERPL-MCNC: 227 MG/DL (ref 65–100)
LACTATE SERPL-SCNC: 1.6 MMOL/L (ref 0.4–2)
POTASSIUM SERPL-SCNC: 3.6 MMOL/L (ref 3.5–5.1)
PROT SERPL-MCNC: 7.7 G/DL (ref 6.4–8.2)
SODIUM SERPL-SCNC: 136 MMOL/L (ref 136–145)

## 2018-09-19 PROCEDURE — 74011000258 HC RX REV CODE- 258: Performed by: EMERGENCY MEDICINE

## 2018-09-19 PROCEDURE — 74176 CT ABD & PELVIS W/O CONTRAST: CPT

## 2018-09-19 PROCEDURE — 96376 TX/PRO/DX INJ SAME DRUG ADON: CPT

## 2018-09-19 PROCEDURE — 96365 THER/PROPH/DIAG IV INF INIT: CPT

## 2018-09-19 PROCEDURE — 74011250636 HC RX REV CODE- 250/636

## 2018-09-19 PROCEDURE — 74011250636 HC RX REV CODE- 250/636: Performed by: EMERGENCY MEDICINE

## 2018-09-19 PROCEDURE — 96375 TX/PRO/DX INJ NEW DRUG ADDON: CPT

## 2018-09-19 PROCEDURE — 74011250637 HC RX REV CODE- 250/637

## 2018-09-19 RX ORDER — ONDANSETRON 4 MG/1
4 TABLET, ORALLY DISINTEGRATING ORAL
Qty: 10 TAB | Refills: 0 | Status: SHIPPED | OUTPATIENT
Start: 2018-09-19 | End: 2018-10-08

## 2018-09-19 RX ORDER — CEPHALEXIN 500 MG/1
500 CAPSULE ORAL 4 TIMES DAILY
Qty: 28 CAP | Refills: 0 | Status: SHIPPED | OUTPATIENT
Start: 2018-09-19 | End: 2018-09-26

## 2018-09-19 RX ORDER — ONDANSETRON 2 MG/ML
INJECTION INTRAMUSCULAR; INTRAVENOUS
Status: COMPLETED
Start: 2018-09-19 | End: 2018-09-19

## 2018-09-19 RX ORDER — HYDROCODONE BITARTRATE AND ACETAMINOPHEN 7.5; 325 MG/1; MG/1
1 TABLET ORAL
Qty: 20 TAB | Refills: 0 | Status: SHIPPED
Start: 2018-09-19 | End: 2018-09-24

## 2018-09-19 RX ORDER — MORPHINE SULFATE 2 MG/ML
4 INJECTION, SOLUTION INTRAMUSCULAR; INTRAVENOUS
Status: COMPLETED | OUTPATIENT
Start: 2018-09-19 | End: 2018-09-19

## 2018-09-19 RX ORDER — DIPHENHYDRAMINE HCL 25 MG
CAPSULE ORAL
Status: COMPLETED
Start: 2018-09-19 | End: 2018-09-19

## 2018-09-19 RX ORDER — HYDROCODONE BITARTRATE AND ACETAMINOPHEN 7.5; 325 MG/1; MG/1
1 TABLET ORAL
Qty: 20 TAB | Refills: 0 | Status: SHIPPED | OUTPATIENT
Start: 2018-09-19 | End: 2018-09-19

## 2018-09-19 RX ORDER — MORPHINE SULFATE 4 MG/ML
INJECTION, SOLUTION INTRAMUSCULAR; INTRAVENOUS
Status: COMPLETED
Start: 2018-09-19 | End: 2018-09-19

## 2018-09-19 RX ADMIN — ONDANSETRON HYDROCHLORIDE: 2 INJECTION, SOLUTION INTRAMUSCULAR; INTRAVENOUS at 01:24

## 2018-09-19 RX ADMIN — CEFTRIAXONE 1 G: 1 INJECTION, POWDER, FOR SOLUTION INTRAMUSCULAR; INTRAVENOUS at 00:34

## 2018-09-19 RX ADMIN — DIPHENHYDRAMINE HYDROCHLORIDE: 25 CAPSULE ORAL at 02:36

## 2018-09-19 RX ADMIN — MORPHINE SULFATE 4 MG: 2 INJECTION, SOLUTION INTRAMUSCULAR; INTRAVENOUS at 01:24

## 2018-09-19 NOTE — ED NOTES
Asssumed care of pt . Pt placed on monitors x 3. Pt reports pain increasing. Pt reports mild nausea. Pt medicated per MAR. Needs met at this time. WCTM.

## 2018-09-19 NOTE — DISCHARGE INSTRUCTIONS
Pelvic Pain: Care Instructions  Your Care Instructions    Pelvic pain, or pain in the lower belly, can have many causes. Often pelvic pain is not serious and gets better in a few days. If your pain continues or gets worse, you may need tests and treatment. Tell your doctor about any new symptoms. These may be signs of a serious problem. Follow-up care is a key part of your treatment and safety. Be sure to make and go to all appointments, and call your doctor if you are having problems. It's also a good idea to know your test results and keep a list of the medicines you take. How can you care for yourself at home? · Rest until you feel better. Lie down, and raise your legs by placing a pillow under your knees. · Drink plenty of fluids. You may find that small, frequent sips are easier on your stomach than if you drink a lot at once. Avoid drinks with carbonation or caffeine, such as soda pop, tea, or coffee. · Try eating several small meals instead of 2 or 3 large ones. Eat mild foods, such as rice, dry toast or crackers, bananas, and applesauce. Avoid fatty and spicy foods, other fruits, and alcohol until 48 hours after your symptoms have gone away. · Take an over-the-counter pain medicine, such as acetaminophen (Tylenol), ibuprofen (Advil, Motrin), or naproxen (Aleve). Read and follow all instructions on the label. · Do not take two or more pain medicines at the same time unless the doctor told you to. Many pain medicines have acetaminophen, which is Tylenol. Too much acetaminophen (Tylenol) can be harmful. · You can put a heating pad, a warm cloth, or moist heat on your belly to relieve pain. When should you call for help?   Call your doctor now or seek immediate medical care if:    · You have a new or higher fever.     · You have unusual vaginal bleeding.     · You have new or worse belly or pelvic pain.     · You have vaginal discharge that has increased in amount or smells bad.    Watch closely for changes in your health, and be sure to contact your doctor if:    · You do not get better as expected. Where can you learn more? Go to http://gladys-alia.info/. Enter [id number] in the search box to learn more about \"Pelvic Pain: Care Instructions. \"  Current as of: October 6, 2017  Content Version: 11.7  © 7570-3287 [employer]. Care instructions adapted under license by [employer] (which disclaims liability or warranty for this information). If you have questions about a medical condition or this instruction, always ask your healthcare professional. [place] [address] any warranty or liability for your use of this information. Urinary Tract Infection in Women: Care Instructions  Your Care Instructions    A urinary tract infection, or UTI, is a general term for an infection anywhere between the kidneys and the urethra (where urine comes out). Most UTIs are bladder infections. They often cause pain or burning when you urinate. UTIs are caused by bacteria and can be cured with antibiotics. Be sure to complete your treatment so that the infection goes away. Follow-up care is a key part of your treatment and safety. Be sure to make and go to all appointments, and call your doctor if you are having problems. It's also a good idea to know your test results and keep a list of the medicines you take. How can you care for yourself at home? · Take your antibiotics as directed. Do not stop taking them just because you feel better. You need to take the full course of antibiotics. · Drink extra water and other fluids for the next day or two. This may help wash out the bacteria that are causing the infection. (If you have kidney, heart, or liver disease and have to limit fluids, talk with your doctor before you increase your fluid intake.)  · Avoid drinks that are carbonated or have caffeine. They can irritate the bladder. · Urinate often.  Try to empty your bladder each time. · To relieve pain, take a hot bath or lay a heating pad set on low over your lower belly or genital area. Never go to sleep with a heating pad in place. To prevent UTIs  · Drink plenty of water each day. This helps you urinate often, which clears bacteria from your system. (If you have kidney, heart, or liver disease and have to limit fluids, talk with your doctor before you increase your fluid intake.)  · Urinate when you need to. · Urinate right after you have sex. · Change sanitary pads often. · Avoid douches, bubble baths, feminine hygiene sprays, and other feminine hygiene products that have deodorants. · After going to the bathroom, wipe from front to back. When should you call for help? Call your doctor now or seek immediate medical care if:    · Symptoms such as fever, chills, nausea, or vomiting get worse or appear for the first time.     · You have new pain in your back just below your rib cage. This is called flank pain.     · There is new blood or pus in your urine.     · You have any problems with your antibiotic medicine.    Watch closely for changes in your health, and be sure to contact your doctor if:    · You are not getting better after taking an antibiotic for 2 days.     · Your symptoms go away but then come back. Where can you learn more? Go to http://gladys-alia.info/. Enter Y410 in the search box to learn more about \"Urinary Tract Infection in Women: Care Instructions. \"  Current as of: May 12, 2017  Content Version: 11.7  © 4197-6895 [employer]. Care instructions adapted under license by [employer] (which disclaims liability or warranty for this information). If you have questions about a medical condition or this instruction, always ask your healthcare professional. [address] any warranty or liability for your use of this information.

## 2018-09-19 NOTE — ED NOTES
Dr Sulaiman Betancur has reviewed discharge instructions with the patient. The patient verbalized understanding. Pt. A&Ox4, respirations even and unlabored. VS stable as noted in flowsheet. Pt Declined wheelchair assist from department; paperwork in hand.

## 2018-09-19 NOTE — ED PROVIDER NOTES
EMERGENCY DEPARTMENT HISTORY AND PHYSICAL EXAM           Date: 2018  Patient Name: Gaetano Perez    History of Presenting Illness     Chief Complaint   Patient presents with    Rectal Bleeding     x 3 days. Patient currently being treated withPO medication for colon-rectal fistula.  Urinary Pain       History Provided By: Patient    HPI: Gaetano Perez is a 50 y.o. female, pmhx colovaginal fistula / HTN / DM / anal fissures / hemorrhoids, who presents ambulatory to the ED c/o intermittent episodes of bloody diarrhea with associated nausea over the last 3 days. Pt reports additional diffuse suprapubic abd pain and dysuria. She states her sxs are similar to her previous UTIs. Pt reports a hx of ongoing GI issues secondary to colovaginal fistula. She additionally reports a hx of diverticulitis, noting she was C.diff positive after 2 rounds of Cipro / Flagyl in 2017. She denies any current ABX tx. Pt states \"I had 4 months of UTIs and was then dx'd with a colovaginal fistula on cystoscopy. \" She reports having surgery to fix the fistula, but expresses concern it may have returned or worsened. Pt otherwise specifically denies any recent fevers, chills, vomiting, diarrhea, CP, SOB, or headache. PCP: BEBA Jay   OB/GYN: Krunal Cruz  GI: Frandy Kyle    PMHx: Significant for colovaginal fistula, DM, HTN, asthma, diverticulosis, GERD, UTI, hepatic steatosis, anal fissures, hemorrhoids  PSHx: Significant for sigmoid colectomy, hysterectomy, , cervical conization, hernia repair, cystoscopy  Social Hx: -tobacco, -EtOH, -Illicit Drugs    There are no other complaints, changes, or physical findings at this time.      Current Facility-Administered Medications   Medication Dose Route Frequency Provider Last Rate Last Dose    ondansetron (ZOFRAN) 4 mg/2 mL injection             morphine 4 mg/mL injection             diphenhydrAMINE (BENADRYL) 25 mg capsule              Current Outpatient Prescriptions   Medication Sig Dispense Refill    cephALEXin (KEFLEX) 500 mg capsule Take 1 Cap by mouth four (4) times daily for 7 days. 28 Cap 0    ondansetron (ZOFRAN ODT) 4 mg disintegrating tablet Take 1 Tab by mouth every eight (8) hours as needed for Nausea. 10 Tab 0    HYDROcodone-acetaminophen (NORCO) 7.5-325 mg per tablet Take 1 Tab by mouth every six (6) hours as needed for Pain. Max Daily Amount: 4 Tabs. 20 Tab 0    ondansetron (ZOFRAN ODT) 4 mg disintegrating tablet Take 1 Tab by mouth every eight (8) hours as needed for Nausea. 16 Tab 0    HYDROcodone-acetaminophen (NORCO) 5-325 mg per tablet Take 1 Tab by mouth every six (6) hours as needed for Pain. Max Daily Amount: 4 Tabs. 10 Tab 0    metFORMIN (GLUCOPHAGE) 500 mg tablet Take 500 mg by mouth daily (with breakfast).  Pramoxine (PROCTOFOAM) 1 % topical foam Apply  to affected area three (3) times daily as needed for Hemorrhoids. 1 Can 0    naloxone (NARCAN) 2 mg/actuation spry Use 1 spray intranasally into 1 nostril. Use a new Narcan nasal spray for subsequent doses and administer into alternating nostrils. May repeat every 2 to 3 minutes as needed. 1 Device 2    losartan-hydroCHLOROthiazide (HYZAAR) 100-12.5 mg per tablet Take 1 Tab by mouth daily.  albuterol sulfate (PROVENTIL;VENTOLIN) 2.5 mg/0.5 mL nebu nebulizer solution 2.5 mg by Nebulization route every twelve (12) hours. Patient mixes this agent with acetylcysteine (20%) nebulizer solution for breathing treatment.  acetaminophen (TYLENOL) 500 mg tablet Take 1,000 mg by mouth every six (6) hours as needed for Pain.  LACTOBACIL 2-S. THERMO-BIFIDO 1 (VSL#3 PO) Take 1 Packet by mouth daily.  EPINEPHrine (EPIPEN) 0.3 mg/0.3 mL (1:1,000) injection 0.3 mL by IntraMUSCular route once as needed for up to 1 dose. 0.3 mL 1    estradiol (ESTRACE) 1 mg tablet Take 1 mg by mouth daily.       albuterol (PROVENTIL, VENTOLIN) 90 mcg/Actuation inhaler Take 2 Puffs by inhalation every six (6) hours as needed. Past History     Past Medical History:  Past Medical History:   Diagnosis Date    Anal fissure     Anisocoria     Asthma     LAST EPISODE     Back pain     Cerumen impaction     Chronic kidney disease     hx uti in past    Coagulation defects     ocassional rectal bleeding due to anal fissure    Colovaginal fistula     Diabetes (HCC)     NIDDM    Diabetes ([facility] [address]     Diverticulitis     Diverticulosis     Enlarged tonsils     Frequent UTI     GERD (gastroesophageal reflux disease)     H/O endoscopy     with dilation    HA (headache)     Hepatic steatosis     Hx of colonoscopy with polypectomy     benign    Hypertension     Ill-defined condition     FREQUENT HIVES    Ill-defined condition     HX ELEVATED LIVER ENZYMES    Morbid obesity (HCC)     Nausea & vomiting     during diverticulitis flare    Obesity     Otitis media     Pneumonia     about 15 yrs ago    Psychiatric disorder     ANXIETY    Recurrent tonsillitis     Sinusitis     Transfusion history ~ age 35    postop hysterectomy    Unspecified sleep apnea     snores ( not diagnosed yet)     Urticaria     Urticaria        Past Surgical History:  Past Surgical History:   Procedure Laterality Date    ABDOMEN SURGERY PROC UNLISTED  2018    hernia repair at [facility]    [address]    [place].     HX GI  12    LAPAROSCOPIC HAND ASSISTED  POSS OPEN SIGMOID COLECTOMY POSS TEMPORARY DIVERTING LOOP ILEOSTOMY;  (no illeostomy needed)    HX GYN           HX GYN      cervical conization    HX HEENT      SINUS SURGERY LEFT X2    HX HEENT      SINUS SURGERY ON RIGHT X2    HX OTHER SURGICAL      Sphincterotomy    HX PELVIC LAPAROSCOPY      HX EMMANUEL AND BSO      HX UROLOGICAL  12     CYSTOSCOPY INSERTION URETERAL CATHETERS - Cystoscopy Insertion of bilateral ureteral stents       Family History:  Family History   Problem Relation Age of Onset    Diabetes Mother     Cancer Mother      NON-HODGKINS LYMPHOMA    Anesth Problems Mother      PONV    Diabetes Father     Heart Disease Father      CAD - STENTS, PACEMAKER    Arrhythmia Father        Social History:  Social History   Substance Use Topics    Smoking status: Never Smoker    Smokeless tobacco: Never Used    Alcohol use Yes      Comment: Rarely       Allergies: Allergies   Allergen Reactions    Aspirin Shortness of Breath    Prilosec [Omeprazole Magnesium] Anaphylaxis     CHERRY FLAVORED; PT TAKES REGULAR PRILOSEC AND IS OK    Codeine Hives and Itching    Contrast Agent [Iodine] Itching     Pt. Had itching after IV contrast with the last exam.  Benadryl was given    Morphine Itching     Severe itching. Fine to take benadryl    Fentanyl Rash    Ketorolac Rash     \"makes my eyes spasm and causes rash on my hands\"    Percocet [Oxycodone-Acetaminophen] Hives         Review of Systems   Review of Systems   Constitutional: Negative. Negative for fever. Eyes: Negative. Respiratory: Negative. Negative for shortness of breath. Cardiovascular: Negative for chest pain. Gastrointestinal: Positive for abdominal pain, anal bleeding, diarrhea and nausea. Negative for vomiting. Endocrine: Negative. Genitourinary: Positive for dysuria. Negative for difficulty urinating and hematuria. Musculoskeletal: Negative. Skin: Negative. Allergic/Immunologic: Negative. Neurological: Negative. Psychiatric/Behavioral: Negative for suicidal ideas. All other systems reviewed and are negative. Physical Exam   Physical Exam   Constitutional: She is oriented to person, place, and time. She appears well-developed and well-nourished. No distress. HENT:   Head: Normocephalic and atraumatic. Nose: Nose normal.   Eyes: Conjunctivae and EOM are normal. No scleral icterus. Neck: Normal range of motion. No tracheal deviation present.    Cardiovascular: Normal rate, regular rhythm, normal heart sounds and intact distal pulses. Exam reveals no friction rub. No murmur heard. Pulmonary/Chest: Effort normal and breath sounds normal. No stridor. No respiratory distress. She has no wheezes. She has no rales. Abdominal: Soft. Bowel sounds are normal. She exhibits no distension. There is tenderness in the suprapubic area. There is no rigidity, no rebound and no guarding. Genitourinary: Rectal exam shows external hemorrhoid and tenderness. Rectal exam shows no fissure and anal tone normal.         Musculoskeletal: Normal range of motion. She exhibits no tenderness. Neurological: She is alert and oriented to person, place, and time. No cranial nerve deficit. Skin: Skin is warm and dry. No rash noted. She is not diaphoretic. Psychiatric: She has a normal mood and affect. Her speech is normal and behavior is normal. Judgment and thought content normal. Cognition and memory are normal.   Nursing note and vitals reviewed. Diagnostic Study Results     Labs -     Recent Results (from the past 12 hour(s))   CBC WITH AUTOMATED DIFF    Collection Time: 09/18/18 11:16 PM   Result Value Ref Range    WBC 6.6 3.6 - 11.0 K/uL    RBC 4.56 3.80 - 5.20 M/uL    HGB 13.5 11.5 - 16.0 g/dL    HCT 36.9 35.0 - 47.0 %    MCV 80.9 80.0 - 99.0 FL    MCH 29.6 26.0 - 34.0 PG    MCHC 36.6 (H) 30.0 - 36.5 g/dL    RDW 13.6 11.5 - 14.5 %    PLATELET 803 419 - 171 K/uL    MPV 9.9 8.9 - 12.9 FL    NRBC 0.0 0  WBC    ABSOLUTE NRBC 0.00 0.00 - 0.01 K/uL    NEUTROPHILS 66 32 - 75 %    LYMPHOCYTES 26 12 - 49 %    MONOCYTES 5 5 - 13 %    EOSINOPHILS 3 0 - 7 %    BASOPHILS 1 0 - 1 %    IMMATURE GRANULOCYTES 0 0.0 - 0.5 %    ABS. NEUTROPHILS 4.3 1.8 - 8.0 K/UL    ABS. LYMPHOCYTES 1.7 0.8 - 3.5 K/UL    ABS. MONOCYTES 0.3 0.0 - 1.0 K/UL    ABS. EOSINOPHILS 0.2 0.0 - 0.4 K/UL    ABS. BASOPHILS 0.0 0.0 - 0.1 K/UL    ABS. IMM.  GRANS. 0.0 0.00 - 0.04 K/UL    DF AUTOMATED     METABOLIC PANEL, COMPREHENSIVE    Collection Time: 09/18/18 11:16 PM   Result Value Ref Range    Sodium 136 136 - 145 mmol/L    Potassium 3.6 3.5 - 5.1 mmol/L    Chloride 102 97 - 108 mmol/L    CO2 23 21 - 32 mmol/L    Anion gap 11 5 - 15 mmol/L    Glucose 227 (H) 65 - 100 mg/dL    BUN 9 6 - 20 MG/DL    Creatinine 0.75 0.55 - 1.02 MG/DL    BUN/Creatinine ratio 12 12 - 20      GFR est AA >60 >60 ml/min/1.73m2    GFR est non-AA >60 >60 ml/min/1.73m2    Calcium 9.5 8.5 - 10.1 MG/DL    Bilirubin, total 0.6 0.2 - 1.0 MG/DL    ALT (SGPT) 48 12 - 78 U/L    AST (SGOT) 41 (H) 15 - 37 U/L    Alk. phosphatase 67 45 - 117 U/L    Protein, total 7.7 6.4 - 8.2 g/dL    Albumin 4.3 3.5 - 5.0 g/dL    Globulin 3.4 2.0 - 4.0 g/dL    A-G Ratio 1.3 1.1 - 2.2     URINALYSIS W/ REFLEX CULTURE    Collection Time: 09/18/18 11:16 PM   Result Value Ref Range    Color ORANGE      Appearance CLEAR CLEAR      Specific gravity 1.017 1.003 - 1.030      pH (UA) 5.0 5.0 - 8.0      Protein NEGATIVE  NEG mg/dL    Glucose 100 (A) NEG mg/dL    Ketone TRACE (A) NEG mg/dL    Bilirubin NEGATIVE  NEG      Blood NEGATIVE  NEG      Urobilinogen 1.0 0.2 - 1.0 EU/dL    Nitrites POSITIVE (A) NEG      Leukocyte Esterase MODERATE (A) NEG      UA:UC IF INDICATED URINE CULTURE ORDERED (A) CNI      WBC 5-10 0 - 4 /hpf    RBC 0-5 0 - 5 /hpf    Epithelial cells FEW FEW /lpf    Bacteria NEGATIVE  NEG /hpf    Hyaline cast 2-5 0 - 5 /lpf   LACTIC ACID    Collection Time: 09/18/18 11:16 PM   Result Value Ref Range    Lactic acid 1.6 0.4 - 2.0 MMOL/L   OCCULT BLOOD, STOOL    Collection Time: 09/18/18 11:16 PM   Result Value Ref Range    Occult blood, stool NEGATIVE  NEG         Radiologic Studies -     CT Results  (Last 48 hours)               09/19/18 0231  CT ABD PELV WO CONT Final result    Impression:  IMPRESSION:   No evidence of acute abdominal or pelvic process. No significant change from the   prior study. Narrative:  INDICATION: Pelvic pain. History of rectovaginal fistula.        COMPARISON: August 15, 2018       TECHNIQUE:    Thin axial images were obtained through the abdomen and pelvis. Coronal and   sagittal reconstructions were generated. Oral contrast was administered. CT dose   reduction was achieved through use of a standardized protocol tailored for this   examination and automatic exposure control for dose modulation. The absence of intravenous contrast material reduces the sensitivity for   evaluation of the solid parenchymal organs of the abdomen. FINDINGS:    LUNG BASES: Clear. INCIDENTALLY IMAGED HEART AND MEDIASTINUM: Unremarkable. LIVER: No mass or biliary dilatation. GALLBLADDER: Surgically absent. SPLEEN: No mass. PANCREAS: No mass or ductal dilatation. ADRENALS: Unremarkable. KIDNEYS/URETERS: No mass, calculus, or hydronephrosis. STOMACH: Unremarkable. SMALL BOWEL: No dilatation or wall thickening. COLON: No dilatation or wall thickening. APPENDIX: Unremarkable. PERITONEUM: No ascites or pneumoperitoneum. Small fat-containing ventral hernia,   unchanged. RETROPERITONEUM: No lymphadenopathy or aortic aneurysm. REPRODUCTIVE ORGANS: Status post hysterectomy. URINARY BLADDER: No mass or calculus. BONES: No destructive bone lesion. ADDITIONAL COMMENTS: N/A                 Medical Decision Making   I am the first provider for this patient. I reviewed the vital signs, available nursing notes, past medical history, past surgical history, family history and social history. Vital Signs-Reviewed the patient's vital signs.   Patient Vitals for the past 12 hrs:   Temp Pulse Resp BP SpO2   09/19/18 0030 - 74 21 (!) 147/91 95 %   09/19/18 0015 - 72 22 133/81 96 %   09/19/18 0010 - - - 139/74 -   09/18/18 2331 - - - 131/70 96 %   09/18/18 2230 - - - 134/78 97 %   09/18/18 2212 98.7 °F (37.1 °C) 81 16 (!) 160/96 98 %       Pulse Oximetry Analysis - 98% on RA    Cardiac Monitor:   Rate: 86 bpm  Rhythm: Normal Sinus Rhythm     Records Reviewed: Nursing Notes, Old Medical Records, Previous electrocardiograms, Previous Radiology Studies and Previous Laboratory Studies    Provider Notes (Medical Decision Making):     DDX:  Recurrent colo-vaginal fistula, uti, colitis    Plan:  Ua, labs, abd ct, analgesic, antiemetic    Impression:  Pelvic pain, uti    ED Course:   Initial assessment performed. The patients presenting problems have been discussed, and they are in agreement with the care plan formulated and outlined with them. I have encouraged them to ask questions as they arise throughout their visit. I reviewed our electronic medical record system for any past medical records that were available that may contribute to the patients current condition, the nursing notes and and vital signs from today's visit    Nursing notes will be reviewed as they become available in realtime while the pt has been in the ED. Kemar Echols MD    I personally reviewed pt's imaging. Official read by radiology noted above. Kemar Echols MD    Procedure Note - Rectal Exam:   11:07 PM  Performed by: Kemar Echols MD   Chaperoned by: Renée Lamas RN  Rectal exam performed. Minimal brown stool was collected. Stool was collected and sent to the lab for Hemoccult testing. Other findings:  Rectal skin tag/decompressed external hemorrhoids  The procedure took 1-15 minutes, and pt tolerated well. PROGRESS NOTE:  12:23 AM  Pt's stool study negative. UA with evidence of UTI; will tx with ceftriaxone while awaiting pending CT study. Written by SCOOBY Hollidayibshena, as dictated by Kemar Echols MD    3:54 AM  Progress note:  Pt noted to be feeling better, ready for discharge. Discussed lab and imaging findings with pt, specifically noting no evidence of fistula on ct, + uti. Pt will follow up as instructed. All questions have been answered, pt voiced understanding and agreement with plan.      If narcotics were prescribed, pt was advised not to drive or operate heavy machinery. If abx were prescribed, pt advised that diarrhea and rash are possible side effects of the medications. Specific return precautions provided in addition to instructions for pt to return to the ED immediately should sx worsen at any time. Linn Angulo MD    Downtime notation:    Pt noted to receive care in the ED during monthly downtime. Imaging and lab work may not be readily available in EMR. Hard copy print outs of labs have been reviewed, imaging results (via \"sticky notes\" from Radiologist) have also been reviewed. Linn Angulo MD    Diagnosis     Clinical Impression:   1. Pelvic pain in female    2. Acute cystitis without hematuria        PLAN:  1. Current Discharge Medication List      START taking these medications    Details   cephALEXin (KEFLEX) 500 mg capsule Take 1 Cap by mouth four (4) times daily for 7 days. Qty: 28 Cap, Refills: 0      !! ondansetron (ZOFRAN ODT) 4 mg disintegrating tablet Take 1 Tab by mouth every eight (8) hours as needed for Nausea. Qty: 10 Tab, Refills: 0      HYDROcodone-acetaminophen (NORCO) 7.5-325 mg per tablet Take 1 Tab by mouth every six (6) hours as needed for Pain. Max Daily Amount: 4 Tabs. Qty: 20 Tab, Refills: 0    Associated Diagnoses: Pelvic pain in female       !! - Potential duplicate medications found. Please discuss with provider. CONTINUE these medications which have NOT CHANGED    Details   !! ondansetron (ZOFRAN ODT) 4 mg disintegrating tablet Take 1 Tab by mouth every eight (8) hours as needed for Nausea. Qty: 16 Tab, Refills: 0      HYDROcodone-acetaminophen (NORCO) 5-325 mg per tablet Take 1 Tab by mouth every six (6) hours as needed for Pain. Max Daily Amount: 4 Tabs. Qty: 10 Tab, Refills: 0    Associated Diagnoses: Acute cystitis without hematuria       !! - Potential duplicate medications found. Please discuss with provider.         2.   Follow-up Information     Follow up With Details Comments Contact Info    Emeka Jj [place], [place] Schedule an appointment as soon as possible for a visit in 2 days  [address]      Judy Waddell MD Call today  [address] [facility]  [phone]      Your Uro-gyn MD Call today          Return to ED if worse     Disposition:    DISCHARGE NOTE:  3:57 AM  The patient's results have been reviewed with family and/or caregiver. They verbally convey their understanding and agreement of the patient's signs, symptoms, diagnosis, treatment, and prognosis. They additionally agree to follow up as recommended in the discharge instructions or to return to the Emergency Room should the patient's condition change prior to their follow-up appointment. The family and/or caregiver verbally agrees with the care-plan and all of their questions have been answered. The discharge instructions have also been provided to the them along with educational information regarding the patient's diagnosis and a list of reasons why the patient would want to return to the ER prior to their follow-up appointment should their condition change. Written by SCOOBY Ordoñez, as dictated by Hari Vanegas MD.             Attestations: This note is prepared by Holly Velarde, acting as MD Hari Reeves MD : The scribe's documentation has been prepared under my direction and personally reviewed by me in its entirety. I confirm that the note above accurately reflects all work, treatment, procedures, and medical decision making performed by me. This note will not be viewable in [address]

## 2018-09-20 LAB
BACTERIA SPEC CULT: NORMAL
CC UR VC: NORMAL
SERVICE CMNT-IMP: NORMAL

## 2018-09-24 ENCOUNTER — HOSPITAL ENCOUNTER (EMERGENCY)
Age: 48
Discharge: HOME | End: 2018-09-24
Attending: EMERGENCY MEDICINE
Payer: SELF-PAY

## 2018-09-24 VITALS
OXYGEN SATURATION: 94 % | DIASTOLIC BLOOD PRESSURE: 87 MMHG | SYSTOLIC BLOOD PRESSURE: 136 MMHG | TEMPERATURE: 98.4 F | HEIGHT: 61 IN | WEIGHT: 202.6 LBS | RESPIRATION RATE: 16 BRPM | BODY MASS INDEX: 38.25 KG/M2 | HEART RATE: 77 BPM

## 2018-09-24 DIAGNOSIS — R30.0 DYSURIA: ICD-10-CM

## 2018-09-24 DIAGNOSIS — R10.32 ABDOMINAL PAIN, LLQ (LEFT LOWER QUADRANT): Primary | ICD-10-CM

## 2018-09-24 LAB
APPEARANCE UR: CLEAR
BACTERIA URNS QL MICRO: NEGATIVE /HPF
BILIRUB UR QL: NEGATIVE
COLOR UR: ABNORMAL
EPITH CASTS URNS QL MICRO: ABNORMAL /LPF
GLUCOSE UR STRIP.AUTO-MCNC: NEGATIVE MG/DL
HGB UR QL STRIP: NEGATIVE
HYALINE CASTS URNS QL MICRO: ABNORMAL /LPF (ref 0–5)
KETONES UR QL STRIP.AUTO: ABNORMAL MG/DL
LEUKOCYTE ESTERASE UR QL STRIP.AUTO: ABNORMAL
NITRITE UR QL STRIP.AUTO: POSITIVE
PH UR STRIP: 5.5 [PH] (ref 5–8)
PROT UR STRIP-MCNC: NEGATIVE MG/DL
RBC #/AREA URNS HPF: ABNORMAL /HPF (ref 0–5)
SP GR UR REFRACTOMETRY: 1.01 (ref 1–1.03)
UA: UC IF INDICATED,UAUC: ABNORMAL
UROBILINOGEN UR QL STRIP.AUTO: 1 EU/DL (ref 0.2–1)
WBC URNS QL MICRO: ABNORMAL /HPF (ref 0–4)

## 2018-09-24 PROCEDURE — 74011250637 HC RX REV CODE- 250/637: Performed by: EMERGENCY MEDICINE

## 2018-09-24 PROCEDURE — 81001 URINALYSIS AUTO W/SCOPE: CPT | Performed by: EMERGENCY MEDICINE

## 2018-09-24 PROCEDURE — 96374 THER/PROPH/DIAG INJ IV PUSH: CPT

## 2018-09-24 PROCEDURE — 99283 EMERGENCY DEPT VISIT LOW MDM: CPT

## 2018-09-24 PROCEDURE — 74011250636 HC RX REV CODE- 250/636: Performed by: EMERGENCY MEDICINE

## 2018-09-24 RX ORDER — TRAMADOL HYDROCHLORIDE 50 MG/1
50 TABLET ORAL
Qty: 10 TAB | Refills: 0 | Status: SHIPPED | OUTPATIENT
Start: 2018-09-24 | End: 2019-01-08

## 2018-09-24 RX ORDER — ONDANSETRON 2 MG/ML
4 INJECTION INTRAMUSCULAR; INTRAVENOUS
Status: COMPLETED | OUTPATIENT
Start: 2018-09-24 | End: 2018-09-24

## 2018-09-24 RX ORDER — TRAMADOL HYDROCHLORIDE 50 MG/1
50 TABLET ORAL
Status: COMPLETED | OUTPATIENT
Start: 2018-09-24 | End: 2018-09-24

## 2018-09-24 RX ADMIN — TRAMADOL HYDROCHLORIDE 50 MG: 50 TABLET, FILM COATED ORAL at 04:37

## 2018-09-24 RX ADMIN — ONDANSETRON 4 MG: 2 INJECTION, SOLUTION INTRAMUSCULAR; INTRAVENOUS at 03:29

## 2018-09-24 NOTE — ED PROVIDER NOTES
EMERGENCY DEPARTMENT HISTORY AND PHYSICAL EXAM      Date: 2018  Patient Name: Sheldon Christianson    History of Presenting Illness     Chief Complaint   Patient presents with    Back Pain     seen in ED 1 week ago and dx'd with UTI (4th month of UTI's - just finished round of Keflex), saw Urologist who believes pt has colo-vaginal fistula and was referred back to her GI but has not had appt yet    Diarrhea    Urinary Pain       History Provided By: Patient    HPI: Sheldon Christianson is a 50 y.o. female, pmhx colovaginal fistula, HTN, DM, anal fissures, hemorrhoids, who presents ambulatory to the ED c/o persistent, burning dysuria x2 days. Pt reports associated persistent nausea, chills and back pain. The pt was recently evaluated in the ED last week, where she was placed on Keflex and Zofran for an UTI. Per chart review, pt's cultures were negative. She additionally reports x10 episodes of diarrhea yesterday. She endorses taking probiotics with the recent antibiotics, noting she has a hx of C diff. specifically denies any recent fever, vomiting, diarrhea, abd pain, CP, SOB, lightheadedness, dizziness, numbness, weakness, tingling, BLE swelling, HA, heart palpitations, changes in BM, changes in PO intake, melena, hematochezia, cough, or congestion. PCP: BEBA Man   OB/GYN: Yancy Norton  GI: Solomon Maciel    PMHx: Significant for colovaginal fistula, HTN, diverticulosis, GERD, CKD, sleep apnea, DM, diverticulitis, hepatic steatosis, anal fissures, hemorrhoids  PSHx: Significant for sigmoid colectomy,  Section, Sphincterotomy, cystoscopy, hernia repair  Social Hx: -tobacco, -EtOH, -Illicit Drugs     There are no other complaints, changes, or physical findings at this time. Current Outpatient Prescriptions   Medication Sig Dispense Refill    traMADol (ULTRAM) 50 mg tablet Take 1 Tab by mouth every six (6) hours as needed for Pain.  Max Daily Amount: 200 mg. 10 Tab 0    cephALEXin (KEFLEX) 500 mg capsule Take 1 Cap by mouth four (4) times daily for 7 days. 28 Cap 0    ondansetron (ZOFRAN ODT) 4 mg disintegrating tablet Take 1 Tab by mouth every eight (8) hours as needed for Nausea. 10 Tab 0    ondansetron (ZOFRAN ODT) 4 mg disintegrating tablet Take 1 Tab by mouth every eight (8) hours as needed for Nausea. 16 Tab 0    metFORMIN (GLUCOPHAGE) 500 mg tablet Take 500 mg by mouth daily (with breakfast).  Pramoxine (PROCTOFOAM) 1 % topical foam Apply  to affected area three (3) times daily as needed for Hemorrhoids. 1 Can 0    naloxone (NARCAN) 2 mg/actuation spry Use 1 spray intranasally into 1 nostril. Use a new Narcan nasal spray for subsequent doses and administer into alternating nostrils. May repeat every 2 to 3 minutes as needed. 1 Device 2    losartan-hydroCHLOROthiazide (HYZAAR) 100-12.5 mg per tablet Take 1 Tab by mouth daily.  albuterol sulfate (PROVENTIL;VENTOLIN) 2.5 mg/0.5 mL nebu nebulizer solution 2.5 mg by Nebulization route every twelve (12) hours. Patient mixes this agent with acetylcysteine (20%) nebulizer solution for breathing treatment.  acetaminophen (TYLENOL) 500 mg tablet Take 1,000 mg by mouth every six (6) hours as needed for Pain.  LACTOBACIL 2-S. THERMO-BIFIDO 1 (VSL#3 PO) Take 1 Packet by mouth daily.  EPINEPHrine (EPIPEN) 0.3 mg/0.3 mL (1:1,000) injection 0.3 mL by IntraMUSCular route once as needed for up to 1 dose. 0.3 mL 1    estradiol (ESTRACE) 1 mg tablet Take 1 mg by mouth daily.  albuterol (PROVENTIL, VENTOLIN) 90 mcg/Actuation inhaler Take 2 Puffs by inhalation every six (6) hours as needed.          Past History     Past Medical History:  Past Medical History:   Diagnosis Date    Anal fissure     Anisocoria     Asthma     LAST EPISODE     Back pain     Cerumen impaction     Chronic kidney disease     hx uti in past    Coagulation defects     ocassional rectal bleeding due to anal fissure    Colovaginal fistula     Diabetes (HCC)     NIDDM    Diabetes ([facility] [address]     Diverticulitis     Diverticulosis     Enlarged tonsils     Frequent UTI     GERD (gastroesophageal reflux disease)     H/O endoscopy     with dilation    HA (headache)     Hepatic steatosis     Hx of colonoscopy with polypectomy     benign    Hypertension     Ill-defined condition     FREQUENT HIVES    Ill-defined condition     HX ELEVATED LIVER ENZYMES    Morbid obesity (HCC)     Nausea & vomiting     during diverticulitis flare    Obesity     Otitis media     Pneumonia     about 15 yrs ago    Psychiatric disorder     ANXIETY    Recurrent tonsillitis     Sinusitis     Transfusion history ~ age 35    postop hysterectomy    Unspecified sleep apnea     snores ( not diagnosed yet)     Urticaria     Urticaria        Past Surgical History:  Past Surgical History:   Procedure Laterality Date    ABDOMEN SURGERY PROC UNLISTED  2018    hernia repair at [facility]    [address]    [place].     HX GI  12    LAPAROSCOPIC HAND ASSISTED  POSS OPEN SIGMOID COLECTOMY POSS TEMPORARY DIVERTING LOOP ILEOSTOMY;  (no illeostomy needed)    HX GYN           HX GYN      cervical conization    HX HEENT      SINUS SURGERY LEFT X2    HX HEENT      SINUS SURGERY ON RIGHT X2    HX OTHER SURGICAL      Sphincterotomy    HX PELVIC LAPAROSCOPY      HX EMMANUEL AND BSO      HX UROLOGICAL  12     CYSTOSCOPY INSERTION URETERAL CATHETERS - Cystoscopy Insertion of bilateral ureteral stents       Family History:  Family History   Problem Relation Age of Onset    Diabetes Mother     Cancer Mother      NON-HODGKINS LYMPHOMA    Anesth Problems Mother      PONV    Diabetes Father     Heart Disease Father      CAD - STENTS, PACEMAKER    Arrhythmia Father        Social History:  Social History   Substance Use Topics    Smoking status: Never Smoker    Smokeless tobacco: Never Used    Alcohol use Yes      Comment: Rarely Allergies: Allergies   Allergen Reactions    Aspirin Shortness of Breath    Prilosec [Omeprazole Magnesium] Anaphylaxis     CHERRY FLAVORED; PT TAKES REGULAR PRILOSEC AND IS OK    Codeine Hives and Itching    Contrast Agent [Iodine] Itching     Pt. Had itching after IV contrast with the last exam.  Benadryl was given    Morphine Itching     Severe itching. Fine to take benadryl    Fentanyl Rash    Ketorolac Rash     \"makes my eyes spasm and causes rash on my hands\"    Percocet [Oxycodone-Acetaminophen] Hives         Review of Systems   Review of Systems   Constitutional: Positive for chills. Negative for fever. HENT: Negative for congestion, ear pain, rhinorrhea and sore throat. Respiratory: Negative for cough and shortness of breath. Cardiovascular: Negative for chest pain, palpitations and leg swelling. Gastrointestinal: Positive for diarrhea and nausea. Negative for abdominal pain, constipation and vomiting. No melena  No hematochezia   Endocrine: Negative for polyuria. Genitourinary: Positive for dysuria. Negative for frequency and hematuria. Musculoskeletal: Positive for back pain. Neurological: Negative for dizziness, weakness, light-headedness, numbness and headaches. No tingling   All other systems reviewed and are negative. Physical Exam   Physical Exam   Nursing note and vitals reviewed.     General appearance - overweight, well appearing, and in no distress  Eyes - pupils equal and reactive, extraocular eye movements intact  ENT - mucous membranes moist, pharynx normal without lesions  Neck - supple, no significant adenopathy; non-tender to palpation  Chest - clear to auscultation, no wheezes, rales or rhonchi; non-tender to palpation  Heart - normal rate and regular rhythm, S1 and S2 normal, no murmurs noted  Abdomen - soft, tender LLQ, nondistended, no masses or organomegaly  Musculoskeletal - no joint tenderness, deformity or swelling; normal ROM  Extremities - peripheral pulses normal, no pedal edema  Skin - normal coloration and turgor, no rashes  Neurological - alert, oriented x3, normal speech, no focal findings or movement disorder noted  Written by Filiberto Beltre ED Scribe, as dictated by Rafita Carlson MD    Diagnostic Study Results     Labs -     Recent Results (from the past 12 hour(s))   URINALYSIS W/ REFLEX CULTURE    Collection Time: 09/24/18  3:13 AM   Result Value Ref Range    Color ORANGE      Appearance CLEAR CLEAR      Specific gravity 1.013 1.003 - 1.030      pH (UA) 5.5 5.0 - 8.0      Protein NEGATIVE  NEG mg/dL    Glucose NEGATIVE  NEG mg/dL    Ketone TRACE (A) NEG mg/dL    Bilirubin NEGATIVE  NEG      Blood NEGATIVE  NEG      Urobilinogen 1.0 0.2 - 1.0 EU/dL    Nitrites POSITIVE (A) NEG      Leukocyte Esterase SMALL (A) NEG      WBC 0-4 0 - 4 /hpf    RBC 0-5 0 - 5 /hpf    Epithelial cells FEW FEW /lpf    Bacteria NEGATIVE  NEG /hpf    UA:UC IF INDICATED CULTURE NOT INDICATED BY UA RESULT CNI      Hyaline cast 0-2 0 - 5 /lpf       Radiologic Studies -   No orders to display     CT Results  (Last 48 hours)    None        CXR Results  (Last 48 hours)    None            Medical Decision Making   I am the first provider for this patient. I reviewed the vital signs, available nursing notes, past medical history, past surgical history, family history and social history. Vital Signs-Reviewed the patient's vital signs. Patient Vitals for the past 12 hrs:   Temp Pulse Resp BP SpO2   09/24/18 0534 - - - 136/87 94 %   09/24/18 0224 98.4 °F (36.9 °C) 77 16 138/88 98 %       Pulse Oximetry Analysis - 98% on RA    Cardiac Monitor:   Rate: 77bpm  Rhythm: Normal Sinus Rhythm        Records Reviewed: Nursing Notes, Old Medical Records, Previous Radiology Studies and Previous Laboratory Studies    Provider Notes (Medical Decision Making):     DDx: UTI, Muscle strain, chronic pain     ED Course:   Initial assessment performed.  The patients presenting problems have been discussed, and they are in agreement with the care plan formulated and outlined with them. I have encouraged them to ask questions as they arise throughout their visit. Inquiry completed in prescription monitoring database. Patient information reveals 30 of controlled substance prescriptions written by 20 providers over the preceding 24 months. Progress note:  6:02 AM  Pt noted to be feeling better, ready for discharge. Updated pt and/or family on all final lab findings. Advised the pt to continue to take the prescribed antibiotic from her previous ED visit. Will follow up as instructed. All questions have been answered, pt voiced understanding and agreement with plan. Specific return precautions provided as well as instructions to return to the ED should sx worsen at any time. Vital signs stable for discharge. Written by Donna Bernal ED Scribe, as dictated by Fabiana Yao MD    Critical Care Time:     none    Disposition:    Discharge Note:  6:04 AM  The pt is ready for discharge. The pt's signs, symptoms, diagnosis, and discharge instructions have been discussed and pt has conveyed their understanding. The pt is to follow up as recommended or return to ER should their symptoms worsen. Plan has been discussed and pt is in agreement. PLAN:  1. Current Discharge Medication List      START taking these medications    Details   traMADol (ULTRAM) 50 mg tablet Take 1 Tab by mouth every six (6) hours as needed for Pain. Max Daily Amount: 200 mg.   Qty: 10 Tab, Refills: 0    Associated Diagnoses: Abdominal pain, LLQ (left lower quadrant)         STOP taking these medications       HYDROcodone-acetaminophen (NORCO) 7.5-325 mg per tablet Comments:   Reason for Stopping:         HYDROcodone-acetaminophen (NORCO) 5-325 mg per tablet Comments:   Reason for Stoppin.   Follow-up Information     Follow up With Details Comments Contact Info    [facility] EMERGENCY DEPT  If symptoms worsen [address] [address]  [place], [place] In 2 days  [address]  [phone]          Return to ED if worse     Diagnosis     Clinical Impression:   1. Abdominal pain, LLQ (left lower quadrant)    2. Dysuria        Attestations: This note is prepared by Good Gil, acting as Scribe for MD Fabiana Richardson MD : The scribe's documentation has been prepared under my direction and personally reviewed by me in its entirety. I confirm that the note above accurately reflects all work, treatment, procedures, and medical decision making performed by me. This note will not be viewable in [address]

## 2018-09-24 NOTE — ED NOTES
Bedside and Verbal shift change report given to Corrine Romero RN (oncoming nurse) by Kari Jules Rn (offgoing nurse). Report included the following information SBAR, Kardex, ED Summary, MAR and Med Rec Status.

## 2018-09-24 NOTE — LETTER
[address] 
[facility] EMERGENCY DEPT 
[address] [place] [address] [address] [id number] [phone] Work/School Note Date: 9/24/2018 To Whom It May concern: 
 
Gaetano Perez was seen and treated today in the emergency room by the following provider(s): 
Attending Provider: Shira Olmstead MD. Gaetano Perez should be excused from work on 9/24/2018. Sincerely, Shira Olmstead MD

## 2018-09-24 NOTE — DISCHARGE INSTRUCTIONS
Abdominal Pain: Care Instructions  Your Care Instructions    Abdominal pain has many possible causes. Some aren't serious and get better on their own in a few days. Others need more testing and treatment. If your pain continues or gets worse, you need to be rechecked and may need more tests to find out what is wrong. You may need surgery to correct the problem. Don't ignore new symptoms, such as fever, nausea and vomiting, urination problems, pain that gets worse, and dizziness. These may be signs of a more serious problem. Your doctor may have recommended a follow-up visit in the next 8 to 12 hours. If you are not getting better, you may need more tests or treatment. The doctor has checked you carefully, but problems can develop later. If you notice any problems or new symptoms, get medical treatment right away. Follow-up care is a key part of your treatment and safety. Be sure to make and go to all appointments, and call your doctor if you are having problems. It's also a good idea to know your test results and keep a list of the medicines you take. How can you care for yourself at home? · Rest until you feel better. · To prevent dehydration, drink plenty of fluids, enough so that your urine is light yellow or clear like water. Choose water and other caffeine-free clear liquids until you feel better. If you have kidney, heart, or liver disease and have to limit fluids, talk with your doctor before you increase the amount of fluids you drink. · If your stomach is upset, eat mild foods, such as rice, dry toast or crackers, bananas, and applesauce. Try eating several small meals instead of two or three large ones. · Wait until 48 hours after all symptoms have gone away before you have spicy foods, alcohol, and drinks that contain caffeine. · Do not eat foods that are high in fat. · Avoid anti-inflammatory medicines such as aspirin, ibuprofen (Advil, Motrin), and naproxen (Aleve).  These can cause stomach upset. Talk to your doctor if you take daily aspirin for another health problem. When should you call for help? Call 911 anytime you think you may need emergency care. For example, call if:    · You passed out (lost consciousness).     · You pass maroon or very bloody stools.     · You vomit blood or what looks like coffee grounds.     · You have new, severe belly pain.    Call your doctor now or seek immediate medical care if:    · Your pain gets worse, especially if it becomes focused in one area of your belly.     · You have a new or higher fever.     · Your stools are black and look like tar, or they have streaks of blood.     · You have unexpected vaginal bleeding.     · You have symptoms of a urinary tract infection. These may include:  ¨ Pain when you urinate. ¨ Urinating more often than usual.  ¨ Blood in your urine.     · You are dizzy or lightheaded, or you feel like you may faint.    Watch closely for changes in your health, and be sure to contact your doctor if:    · You are not getting better after 1 day (24 hours). Where can you learn more? Go to http://gladysSynferencealia.info/. Enter T734 in the search box to learn more about \"Abdominal Pain: Care Instructions. \"  Current as of: November 20, 2017  Content Version: 11.7  © 0983-7550 [employer]. Care instructions adapted under license by [employer] (which disclaims liability or warranty for this information). If you have questions about a medical condition or this instruction, always ask your healthcare professional. [place] [address] any warranty or liability for your use of this information. Painful Urination (Dysuria): Care Instructions  Your Care Instructions  Burning pain with urination (dysuria) is a common symptom of a urinary tract infection or other urinary problems. The bladder may become inflamed. This can cause pain when the bladder fills and empties.  You may also feel pain if the tube that carries urine from the bladder to the outside of the body (urethra) gets irritated or infected. Sexually transmitted infections (STIs) also may cause pain when you urinate. Sometimes the pain can be caused by things other than an infection. The urethra can be irritated by soaps, perfumes, or foreign objects in the urethra. Kidney stones can cause pain when they pass through the urethra. The cause may be hard to find. You may need tests. Treatment for painful urination depends on the cause. Follow-up care is a key part of your treatment and safety. Be sure to make and go to all appointments, and call your doctor if you are having problems. It's also a good idea to know your test results and keep a list of the medicines you take. How can you care for yourself at home? · Drink extra water for the next day or two. This will help make the urine less concentrated. (If you have kidney, heart, or liver disease and have to limit fluids, talk with your doctor before you increase the amount of fluids you drink.)  · Avoid drinks that are carbonated or have caffeine. They can irritate the bladder. · Urinate often. Try to empty your bladder each time. For women:  · Urinate right after you have sex. · After going to the bathroom, wipe from front to back. · Avoid douches, bubble baths, and feminine hygiene sprays. And avoid other feminine hygiene products that have deodorants. When should you call for help? Call your doctor now or seek immediate medical care if:    · You have new symptoms, such as fever, nausea, or vomiting.     · You have new or worse symptoms of a urinary problem. For example:  ¨ You have blood or pus in your urine. ¨ You have chills or body aches. ¨ It hurts worse to urinate. ¨ You have groin or belly pain.   ¨ You have pain in your back just below your rib cage (the flank area).    Watch closely for changes in your health, and be sure to contact your doctor if you have any problems. Where can you learn more? Go to http://gladys-alia.info/. Enter C788 in the search box to learn more about \"Painful Urination (Dysuria): Care Instructions. \"  Current as of: May 12, 2017  Content Version: 11.7  © 5490-4976 [employer]. Care instructions adapted under license by [employer] (which disclaims liability or warranty for this information). If you have questions about a medical condition or this instruction, always ask your healthcare professional. [address] any warranty or liability for your use of this information.

## 2018-09-24 NOTE — ED NOTES
Pt discharged by MD Yao. Pt provided with discharge instructions Rx and instructions on follow up care. Pt out of ED in stable condition accompanied by daughter.

## 2018-10-08 ENCOUNTER — HOSPITAL ENCOUNTER (EMERGENCY)
Age: 48
Discharge: HOME | End: 2018-10-08
Attending: EMERGENCY MEDICINE
Payer: SELF-PAY

## 2018-10-08 ENCOUNTER — APPOINTMENT (OUTPATIENT)
Dept: ULTRASOUND IMAGING | Age: 48
End: 2018-10-08
Attending: EMERGENCY MEDICINE
Payer: SELF-PAY

## 2018-10-08 VITALS
HEIGHT: 62 IN | BODY MASS INDEX: 37.77 KG/M2 | SYSTOLIC BLOOD PRESSURE: 122 MMHG | OXYGEN SATURATION: 96 % | TEMPERATURE: 97.8 F | WEIGHT: 205.25 LBS | HEART RATE: 76 BPM | DIASTOLIC BLOOD PRESSURE: 68 MMHG | RESPIRATION RATE: 16 BRPM

## 2018-10-08 DIAGNOSIS — R10.11 ABDOMINAL PAIN, RIGHT UPPER QUADRANT: Primary | ICD-10-CM

## 2018-10-08 DIAGNOSIS — K76.0 HEPATIC STEATOSIS: ICD-10-CM

## 2018-10-08 DIAGNOSIS — K52.9 GASTROENTERITIS: ICD-10-CM

## 2018-10-08 DIAGNOSIS — R21 RASH: ICD-10-CM

## 2018-10-08 LAB
ALBUMIN SERPL-MCNC: 4 G/DL (ref 3.5–5)
ALBUMIN/GLOB SERPL: 1.1 {RATIO} (ref 1.1–2.2)
ALP SERPL-CCNC: 60 U/L (ref 45–117)
ALT SERPL-CCNC: 38 U/L (ref 12–78)
ANION GAP SERPL CALC-SCNC: 13 MMOL/L (ref 5–15)
APPEARANCE UR: CLEAR
AST SERPL-CCNC: 33 U/L (ref 15–37)
BACTERIA URNS QL MICRO: ABNORMAL /HPF
BASOPHILS # BLD: 0 K/UL (ref 0–0.1)
BASOPHILS NFR BLD: 1 % (ref 0–1)
BILIRUB SERPL-MCNC: 0.5 MG/DL (ref 0.2–1)
BILIRUB UR QL CFM: NEGATIVE
BUN SERPL-MCNC: 8 MG/DL (ref 6–20)
BUN/CREAT SERPL: 11 (ref 12–20)
CALCIUM SERPL-MCNC: 9.3 MG/DL (ref 8.5–10.1)
CHLORIDE SERPL-SCNC: 102 MMOL/L (ref 97–108)
CK MB CFR SERPL CALC: NORMAL % (ref 0–2.5)
CK MB SERPL-MCNC: <1 NG/ML (ref 5–25)
CK SERPL-CCNC: 109 U/L (ref 26–192)
CO2 SERPL-SCNC: 23 MMOL/L (ref 21–32)
COLOR UR: ABNORMAL
CREAT SERPL-MCNC: 0.75 MG/DL (ref 0.55–1.02)
DIFFERENTIAL METHOD BLD: NORMAL
EOSINOPHIL # BLD: 0.3 K/UL (ref 0–0.4)
EOSINOPHIL NFR BLD: 5 % (ref 0–7)
EPITH CASTS URNS QL MICRO: ABNORMAL /LPF
ERYTHROCYTE [DISTWIDTH] IN BLOOD BY AUTOMATED COUNT: 13.8 % (ref 11.5–14.5)
GLOBULIN SER CALC-MCNC: 3.8 G/DL (ref 2–4)
GLUCOSE SERPL-MCNC: 209 MG/DL (ref 65–100)
GLUCOSE UR STRIP.AUTO-MCNC: 100 MG/DL
HCT VFR BLD AUTO: 38.2 % (ref 35–47)
HGB BLD-MCNC: 13.3 G/DL (ref 11.5–16)
HGB UR QL STRIP: NEGATIVE
IMM GRANULOCYTES # BLD: 0 K/UL (ref 0–0.04)
IMM GRANULOCYTES NFR BLD AUTO: 0 % (ref 0–0.5)
KETONES UR QL STRIP.AUTO: 15 MG/DL
LEUKOCYTE ESTERASE UR QL STRIP.AUTO: ABNORMAL
LIPASE SERPL-CCNC: 199 U/L (ref 73–393)
LYMPHOCYTES # BLD: 1.4 K/UL (ref 0.8–3.5)
LYMPHOCYTES NFR BLD: 27 % (ref 12–49)
MAGNESIUM SERPL-MCNC: 1.8 MG/DL (ref 1.6–2.4)
MCH RBC QN AUTO: 28.9 PG (ref 26–34)
MCHC RBC AUTO-ENTMCNC: 34.8 G/DL (ref 30–36.5)
MCV RBC AUTO: 83 FL (ref 80–99)
MONOCYTES # BLD: 0.3 K/UL (ref 0–1)
MONOCYTES NFR BLD: 6 % (ref 5–13)
NEUTS SEG # BLD: 3.2 K/UL (ref 1.8–8)
NEUTS SEG NFR BLD: 61 % (ref 32–75)
NITRITE UR QL STRIP.AUTO: POSITIVE
NRBC # BLD: 0 K/UL (ref 0–0.01)
NRBC BLD-RTO: 0 PER 100 WBC
PH UR STRIP: 5 [PH] (ref 5–8)
PLATELET # BLD AUTO: 179 K/UL (ref 150–400)
PMV BLD AUTO: 9.6 FL (ref 8.9–12.9)
POTASSIUM SERPL-SCNC: 3.6 MMOL/L (ref 3.5–5.1)
PROT SERPL-MCNC: 7.8 G/DL (ref 6.4–8.2)
PROT UR STRIP-MCNC: NEGATIVE MG/DL
RBC # BLD AUTO: 4.6 M/UL (ref 3.8–5.2)
RBC #/AREA URNS HPF: ABNORMAL /HPF (ref 0–5)
SODIUM SERPL-SCNC: 138 MMOL/L (ref 136–145)
SP GR UR REFRACTOMETRY: 1.02 (ref 1–1.03)
TROPONIN I SERPL-MCNC: <0.05 NG/ML
UROBILINOGEN UR QL STRIP.AUTO: 2 EU/DL (ref 0.2–1)
WBC # BLD AUTO: 5.2 K/UL (ref 3.6–11)
WBC URNS QL MICRO: ABNORMAL /HPF (ref 0–4)

## 2018-10-08 PROCEDURE — 84484 ASSAY OF TROPONIN QUANT: CPT | Performed by: EMERGENCY MEDICINE

## 2018-10-08 PROCEDURE — 83690 ASSAY OF LIPASE: CPT | Performed by: EMERGENCY MEDICINE

## 2018-10-08 PROCEDURE — 74011250637 HC RX REV CODE- 250/637: Performed by: EMERGENCY MEDICINE

## 2018-10-08 PROCEDURE — 96374 THER/PROPH/DIAG INJ IV PUSH: CPT

## 2018-10-08 PROCEDURE — 36415 COLL VENOUS BLD VENIPUNCTURE: CPT | Performed by: EMERGENCY MEDICINE

## 2018-10-08 PROCEDURE — 96376 TX/PRO/DX INJ SAME DRUG ADON: CPT

## 2018-10-08 PROCEDURE — 76705 ECHO EXAM OF ABDOMEN: CPT

## 2018-10-08 PROCEDURE — 81001 URINALYSIS AUTO W/SCOPE: CPT | Performed by: EMERGENCY MEDICINE

## 2018-10-08 PROCEDURE — 82550 ASSAY OF CK (CPK): CPT | Performed by: EMERGENCY MEDICINE

## 2018-10-08 PROCEDURE — 83735 ASSAY OF MAGNESIUM: CPT | Performed by: EMERGENCY MEDICINE

## 2018-10-08 PROCEDURE — 74011250636 HC RX REV CODE- 250/636: Performed by: EMERGENCY MEDICINE

## 2018-10-08 PROCEDURE — 96361 HYDRATE IV INFUSION ADD-ON: CPT

## 2018-10-08 PROCEDURE — 96375 TX/PRO/DX INJ NEW DRUG ADDON: CPT

## 2018-10-08 PROCEDURE — 99284 EMERGENCY DEPT VISIT MOD MDM: CPT

## 2018-10-08 PROCEDURE — 85025 COMPLETE CBC W/AUTO DIFF WBC: CPT | Performed by: EMERGENCY MEDICINE

## 2018-10-08 PROCEDURE — 80053 COMPREHEN METABOLIC PANEL: CPT | Performed by: EMERGENCY MEDICINE

## 2018-10-08 RX ORDER — MORPHINE SULFATE 4 MG/ML
4 INJECTION, SOLUTION INTRAMUSCULAR; INTRAVENOUS
Status: COMPLETED | OUTPATIENT
Start: 2018-10-08 | End: 2018-10-08

## 2018-10-08 RX ORDER — MORPHINE SULFATE 2 MG/ML
2 INJECTION, SOLUTION INTRAMUSCULAR; INTRAVENOUS
Status: COMPLETED | OUTPATIENT
Start: 2018-10-08 | End: 2018-10-08

## 2018-10-08 RX ORDER — DIPHENHYDRAMINE HYDROCHLORIDE 50 MG/ML
25 INJECTION, SOLUTION INTRAMUSCULAR; INTRAVENOUS
Status: COMPLETED | OUTPATIENT
Start: 2018-10-08 | End: 2018-10-08

## 2018-10-08 RX ORDER — HYDROXYZINE 25 MG/1
25 TABLET, FILM COATED ORAL
Status: COMPLETED | OUTPATIENT
Start: 2018-10-08 | End: 2018-10-08

## 2018-10-08 RX ORDER — ONDANSETRON 2 MG/ML
4 INJECTION INTRAMUSCULAR; INTRAVENOUS
Status: DISCONTINUED | OUTPATIENT
Start: 2018-10-08 | End: 2018-10-08 | Stop reason: HOSPADM

## 2018-10-08 RX ORDER — HYDROXYZINE 25 MG/1
25 TABLET, FILM COATED ORAL
Qty: 20 TAB | Refills: 0 | Status: SHIPPED | OUTPATIENT
Start: 2018-10-08 | End: 2018-10-18

## 2018-10-08 RX ORDER — BUTALBITAL, ACETAMINOPHEN AND CAFFEINE 300; 40; 50 MG/1; MG/1; MG/1
1 CAPSULE ORAL
Qty: 20 CAP | Refills: 0 | Status: SHIPPED | OUTPATIENT
Start: 2018-10-08 | End: 2019-02-07

## 2018-10-08 RX ORDER — ONDANSETRON 2 MG/ML
4 INJECTION INTRAMUSCULAR; INTRAVENOUS
Status: COMPLETED | OUTPATIENT
Start: 2018-10-08 | End: 2018-10-08

## 2018-10-08 RX ORDER — ONDANSETRON 4 MG/1
4 TABLET, ORALLY DISINTEGRATING ORAL
Qty: 10 TAB | Refills: 0 | Status: SHIPPED | OUTPATIENT
Start: 2018-10-08 | End: 2019-01-08

## 2018-10-08 RX ADMIN — DIPHENHYDRAMINE HYDROCHLORIDE 25 MG: 50 INJECTION, SOLUTION INTRAMUSCULAR; INTRAVENOUS at 08:56

## 2018-10-08 RX ADMIN — HYDROXYZINE HYDROCHLORIDE 25 MG: 25 TABLET, FILM COATED ORAL at 11:41

## 2018-10-08 RX ADMIN — SODIUM CHLORIDE 1000 ML: 900 INJECTION, SOLUTION INTRAVENOUS at 08:56

## 2018-10-08 RX ADMIN — MORPHINE SULFATE 2 MG: 2 INJECTION, SOLUTION INTRAMUSCULAR; INTRAVENOUS at 11:13

## 2018-10-08 RX ADMIN — ONDANSETRON 4 MG: 2 INJECTION, SOLUTION INTRAMUSCULAR; INTRAVENOUS at 08:56

## 2018-10-08 RX ADMIN — MORPHINE SULFATE 2 MG: 2 INJECTION, SOLUTION INTRAMUSCULAR; INTRAVENOUS at 09:56

## 2018-10-08 RX ADMIN — MORPHINE SULFATE 4 MG: 4 INJECTION, SOLUTION INTRAMUSCULAR; INTRAVENOUS at 08:56

## 2018-10-08 NOTE — ED PROVIDER NOTES
EMERGENCY DEPARTMENT HISTORY AND PHYSICAL EXAM      Date: 10/8/2018  Patient Name: Radhika Hendrickson    History of Presenting Illness     Chief Complaint   Patient presents with    Abdominal Pain     pt reports diarrhea, vomiting RUQ pain x2 days, also has rash to face    Vomiting    Diarrhea    Skin Problem       History Provided By: Patient    HPI: Radhika Hendrickson, 50 y.o. female with PMHx significant for HTN, GERD, CKD, asthma, DM, presents ambulatory to the ED with cc of acute on chronic, intermittent episodes of all-over abdominal pain that is worse in her RUQ, she rates it an 8/10 on the pain scale, present x months but worse over the past 2 weeks. She reports associated sx of diarrhea x 2 weeks (10 episodes per day), nausea, vomiting (last vomited at 3 AM yesterday), chills, and on-going dysuria x 4 months. She also reports a rash to her face and groin x a few days. Pt reports a hx of similar episodes of abdominal pain over the past few months and has multiple ED visits for the same cc. She does have a GI specialist and a Urologist. 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 She reports hx of cholecystectomy and umbilical hernia repair. She states that she feels dehydrated. She reports hx of C diff multiple times, was on Abx recently for a UTI (keflex and bactrim). She denies any new lotions or topical creams or new medications. She says the rash does not itch. She also reports hx of fatty liver disease. She does drink occasionally but does not smoke. She has a ride home. She denies weakness, lightheadedness, fever, CP, cough. There are no other complaints, changes, or physical findings at this time.     PCP: BEBA Contreras    Current Facility-Administered Medications   Medication Dose Route Frequency Provider Last Rate Last Dose    ondansetron (ZOFRAN) injection 4 mg  4 mg IntraVENous NOW Red Mccoy MD        hydrOXYzine HCl (ATARAX) tablet 25 mg  25 mg Oral NOW Cruz Nieves MD         Current Outpatient Prescriptions   Medication Sig Dispense Refill    butalbital-acetaminophen-caff (FIORICET) -40 mg per capsule Take 1 Cap by mouth every four (4) hours as needed for Pain. 20 Cap 0    ondansetron (ZOFRAN ODT) 4 mg disintegrating tablet Take 1 Tab by mouth every eight (8) hours as needed for Nausea. 10 Tab 0    hydrOXYzine HCl (ATARAX) 25 mg tablet Take 1 Tab by mouth every six (6) hours as needed for Itching for up to 10 days. 20 Tab 0    traMADol (ULTRAM) 50 mg tablet Take 1 Tab by mouth every six (6) hours as needed for Pain. Max Daily Amount: 200 mg. 10 Tab 0    metFORMIN (GLUCOPHAGE) 500 mg tablet Take 500 mg by mouth daily (with breakfast).  Pramoxine (PROCTOFOAM) 1 % topical foam Apply  to affected area three (3) times daily as needed for Hemorrhoids. 1 Can 0    naloxone (NARCAN) 2 mg/actuation spry Use 1 spray intranasally into 1 nostril. Use a new Narcan nasal spray for subsequent doses and administer into alternating nostrils. May repeat every 2 to 3 minutes as needed. 1 Device 2    losartan-hydroCHLOROthiazide (HYZAAR) 100-12.5 mg per tablet Take 1 Tab by mouth daily.  albuterol sulfate (PROVENTIL;VENTOLIN) 2.5 mg/0.5 mL nebu nebulizer solution 2.5 mg by Nebulization route every twelve (12) hours. Patient mixes this agent with acetylcysteine (20%) nebulizer solution for breathing treatment.  acetaminophen (TYLENOL) 500 mg tablet Take 1,000 mg by mouth every six (6) hours as needed for Pain.  LACTOBACIL 2-S. THERMO-BIFIDO 1 (VSL#3 PO) Take 1 Packet by mouth daily.  EPINEPHrine (EPIPEN) 0.3 mg/0.3 mL (1:1,000) injection 0.3 mL by IntraMUSCular route once as needed for up to 1 dose. 0.3 mL 1    estradiol (ESTRACE) 1 mg tablet Take 1 mg by mouth daily.       albuterol (PROVENTIL, VENTOLIN) 90 mcg/Actuation inhaler Take 2 Puffs by inhalation every six (6) hours as needed. Past History     Past Medical History:  Past Medical History:   Diagnosis Date    Anal fissure     Anisocoria     Asthma     LAST EPISODE     Back pain     Cerumen impaction     Chronic kidney disease     hx uti in past    Coagulation defects     ocassional rectal bleeding due to anal fissure    Colovaginal fistula     Diabetes (HCC)     NIDDM    Diabetes ([facility] [address]     Diverticulitis     Diverticulosis     Enlarged tonsils     Frequent UTI     GERD (gastroesophageal reflux disease)     H/O endoscopy     with dilation    HA (headache)     Hepatic steatosis     Hx of colonoscopy with polypectomy     benign    Hypertension     Ill-defined condition     FREQUENT HIVES    Ill-defined condition     HX ELEVATED LIVER ENZYMES    Morbid obesity (HCC)     Nausea & vomiting     during diverticulitis flare    Obesity     Otitis media     Pneumonia     about 15 yrs ago    Psychiatric disorder     ANXIETY    Recurrent tonsillitis     Sinusitis     Transfusion history ~ age 35    postop hysterectomy    Unspecified sleep apnea     snores ( not diagnosed yet)     Urticaria     Urticaria        Past Surgical History:  Past Surgical History:   Procedure Laterality Date    ABDOMEN SURGERY PROC UNLISTED  2018    hernia repair at [facility]    [address]    [place]     HX GI  12    LAPAROSCOPIC HAND ASSISTED  POSS OPEN SIGMOID COLECTOMY POSS TEMPORARY DIVERTING LOOP ILEOSTOMY;  (no illeostomy needed)    HX GYN           HX GYN      cervical conization    HX HEENT      SINUS SURGERY LEFT X2    HX HEENT      SINUS SURGERY ON RIGHT X2    HX OTHER SURGICAL      Sphincterotomy    HX PELVIC LAPAROSCOPY      HX EMMANUEL AND BSO      HX UROLOGICAL  12     CYSTOSCOPY INSERTION URETERAL CATHETERS - Cystoscopy Insertion of bilateral ureteral stents       Family History:  Family History   Problem Relation Age of Onset    Diabetes Mother     Cancer Mother NON-HODGKINS LYMPHOMA    Anesth Problems Mother      PONV    Diabetes Father     Heart Disease Father      CAD - STENTS, PACEMAKER    Arrhythmia Father        Social History:  Social History   Substance Use Topics    Smoking status: Never Smoker    Smokeless tobacco: Never Used    Alcohol use Yes      Comment: Rarely       Allergies: Allergies   Allergen Reactions    Aspirin Shortness of Breath    Prilosec [Omeprazole Magnesium] Anaphylaxis     CHERRY FLAVORED; PT TAKES REGULAR PRILOSEC AND IS OK    Codeine Hives and Itching    Contrast Agent [Iodine] Itching     Pt. Had itching after IV contrast with the last exam.  Benadryl was given    Morphine Itching     Severe itching. Fine to take benadryl    Fentanyl Rash    Ketorolac Rash     \"makes my eyes spasm and causes rash on my hands\"    Percocet [Oxycodone-Acetaminophen] Hives         Review of Systems   Review of Systems   Constitutional: Positive for chills. HENT: Negative for congestion. Eyes: Negative for visual disturbance. Respiratory: Negative for chest tightness. Cardiovascular: Negative for chest pain. Gastrointestinal: Positive for abdominal pain, diarrhea, nausea and vomiting. Endocrine: Negative for heat intolerance. Genitourinary: Positive for dysuria. Musculoskeletal: Negative for back pain. Skin: Positive for rash. Allergic/Immunologic: Negative for immunocompromised state. Neurological: Negative for dizziness. Hematological: Does not bruise/bleed easily. Psychiatric/Behavioral: Negative. All other systems reviewed and are negative. Physical Exam   Physical Exam   Constitutional: She is oriented to person, place, and time. She appears well-developed and well-nourished. No distress. NAD   HENT:   Head: Normocephalic and atraumatic. Eyes: EOM are normal. Pupils are equal, round, and reactive to light. Neck: Normal range of motion. Neck supple.    Cardiovascular: Normal rate, regular rhythm and normal heart sounds. Pulmonary/Chest: Effort normal and breath sounds normal. No respiratory distress. Abdominal: Soft. Bowel sounds are normal. She exhibits no mass. RUQ tenderness   Musculoskeletal: Normal range of motion. She exhibits no edema. Neurological: She is alert and oriented to person, place, and time. Coordination normal.   Skin: Skin is warm and dry. Erythematous, macular rash to frontal scalp and R inner thigh   Psychiatric: She has a normal mood and affect. Her behavior is normal.   Nursing note and vitals reviewed. Diagnostic Study Results     Labs -     Recent Results (from the past 12 hour(s))   CBC WITH AUTOMATED DIFF    Collection Time: 10/08/18  8:55 AM   Result Value Ref Range    WBC 5.2 3.6 - 11.0 K/uL    RBC 4.60 3.80 - 5.20 M/uL    HGB 13.3 11.5 - 16.0 g/dL    HCT 38.2 35.0 - 47.0 %    MCV 83.0 80.0 - 99.0 FL    MCH 28.9 26.0 - 34.0 PG    MCHC 34.8 30.0 - 36.5 g/dL    RDW 13.8 11.5 - 14.5 %    PLATELET 469 106 - 124 K/uL    MPV 9.6 8.9 - 12.9 FL    NRBC 0.0 0  WBC    ABSOLUTE NRBC 0.00 0.00 - 0.01 K/uL    NEUTROPHILS 61 32 - 75 %    LYMPHOCYTES 27 12 - 49 %    MONOCYTES 6 5 - 13 %    EOSINOPHILS 5 0 - 7 %    BASOPHILS 1 0 - 1 %    IMMATURE GRANULOCYTES 0 0.0 - 0.5 %    ABS. NEUTROPHILS 3.2 1.8 - 8.0 K/UL    ABS. LYMPHOCYTES 1.4 0.8 - 3.5 K/UL    ABS. MONOCYTES 0.3 0.0 - 1.0 K/UL    ABS. EOSINOPHILS 0.3 0.0 - 0.4 K/UL    ABS. BASOPHILS 0.0 0.0 - 0.1 K/UL    ABS. IMM.  GRANS. 0.0 0.00 - 0.04 K/UL    DF AUTOMATED     METABOLIC PANEL, COMPREHENSIVE    Collection Time: 10/08/18  8:55 AM   Result Value Ref Range    Sodium 138 136 - 145 mmol/L    Potassium 3.6 3.5 - 5.1 mmol/L    Chloride 102 97 - 108 mmol/L    CO2 23 21 - 32 mmol/L    Anion gap 13 5 - 15 mmol/L    Glucose 209 (H) 65 - 100 mg/dL    BUN 8 6 - 20 MG/DL    Creatinine 0.75 0.55 - 1.02 MG/DL    BUN/Creatinine ratio 11 (L) 12 - 20      GFR est AA >60 >60 ml/min/1.73m2    GFR est non-AA >60 >60 ml/min/1.73m2 Calcium 9.3 8.5 - 10.1 MG/DL    Bilirubin, total 0.5 0.2 - 1.0 MG/DL    ALT (SGPT) 38 12 - 78 U/L    AST (SGOT) 33 15 - 37 U/L    Alk. phosphatase 60 45 - 117 U/L    Protein, total 7.8 6.4 - 8.2 g/dL    Albumin 4.0 3.5 - 5.0 g/dL    Globulin 3.8 2.0 - 4.0 g/dL    A-G Ratio 1.1 1.1 - 2.2     LIPASE    Collection Time: 10/08/18  8:55 AM   Result Value Ref Range    Lipase 199 73 - 393 U/L   CK W/ CKMB & INDEX    Collection Time: 10/08/18  8:55 AM   Result Value Ref Range     26 - 192 U/L    CK - MB <1.0 <3.6 NG/ML    CK-MB Index Cannot be calculated 0 - 2.5     TROPONIN I    Collection Time: 10/08/18  8:55 AM   Result Value Ref Range    Troponin-I, Qt. <0.05 <0.05 ng/mL   MAGNESIUM    Collection Time: 10/08/18  8:55 AM   Result Value Ref Range    Magnesium 1.8 1.6 - 2.4 mg/dL   URINALYSIS W/ RFLX MICROSCOPIC    Collection Time: 10/08/18  9:58 AM   Result Value Ref Range    Color RED      Appearance CLEAR CLEAR      Specific gravity 1.018 1.003 - 1.030      pH (UA) 5.0 5.0 - 8.0      Protein NEGATIVE  NEG mg/dL    Glucose 100 (A) NEG mg/dL    Ketone 15 (A) NEG mg/dL    Blood NEGATIVE  NEG      Urobilinogen 2.0 (H) 0.2 - 1.0 EU/dL    Nitrites POSITIVE (A) NEG      Leukocyte Esterase MODERATE (A) NEG      WBC 0-4 0 - 4 /hpf    RBC 0-5 0 - 5 /hpf    Epithelial cells FEW FEW /lpf    Bacteria 1+ (A) NEG /hpf   BILIRUBIN, CONFIRM    Collection Time: 10/08/18  9:58 AM   Result Value Ref Range    Bilirubin UA, confirm NEGATIVE  NEG         Radiologic Studies -   US ABD LTD   Final Result     Initial Result:     Impression:     Impression: Hepatic steatosis. Status post cholecystectomy. No acute  abnormality.       Narrative:     Indication: Right upper quadrant pain, vomiting    Real-time sonographic imaging of the right upper quadrant was performed. The  liver is echogenic compatible with hepatic steatosis. There is no evidence of  mass lesion or biliary dilatation. The gallbladder is surgically absent.  Common  bile duct is normal in size.  The right kidney is normal in size and appearance  without evidence of mass lesion, hydronephrosis, or calcification. The  visualized portion of the head and body of the pancreas is unremarkable. No free  fluid. Medical Decision Making   I am the first provider for this patient. I reviewed the vital signs, available nursing notes, past medical history, past surgical history, family history and social history. Vital Signs-Reviewed the patient's vital signs. Patient Vitals for the past 12 hrs:   Temp Pulse Resp BP SpO2   10/08/18 1030 - - - 141/83 95 %   10/08/18 1000 - - - 147/85 98 %   10/08/18 0930 - - - 139/75 94 %   10/08/18 0900 - - - (!) 141/91 98 %   10/08/18 0806 97.8 °F (36.6 °C) 76 16 135/79 99 %       Records Reviewed: Old Medical Records    Provider Notes (Medical Decision Making):   DDx: gastritis, pancreatitis, reflux, PUD, UTI, kidney stones, contact dermatitis, allergic reaction, gastroenteritis, C difficile    ED Course:   Initial assessment performed. The patients presenting problems have been discussed, and they are in agreement with the care plan formulated and outlined with them. I have encouraged them to ask questions as they arise throughout their visit.     Medications   ondansetron (ZOFRAN) injection 4 mg (not administered)   hydrOXYzine HCl (ATARAX) tablet 25 mg (not administered)   sodium chloride 0.9 % bolus infusion 1,000 mL (0 mL IntraVENous IV Completed 10/8/18 1000)   ondansetron (ZOFRAN) injection 4 mg (4 mg IntraVENous Given 10/8/18 0856)   morphine injection 4 mg (4 mg IntraVENous Given 10/8/18 0856)   diphenhydrAMINE (BENADRYL) injection 25 mg (25 mg IntraVENous Given 10/8/18 0856)   morphine injection 2 mg (2 mg IntraVENous Given 10/8/18 0956)   morphine injection 2 mg (2 mg IntraVENous Given 10/8/18 1113)        Progress Note:  8:13 AM  Per chart review, patient has recent ED visits as follows:  Seen at [facility] ED on 9/18 for pelvic pain, had an CT scan done at that time  Seen at ED [facility] ED 9/24 for LLQ pain  Se[facility] on 9/27 for diarrhea  Seen at [facility] on 10/4, reason unknown    Progress Note:  9:26 AM  Her pain is now down to a 6/10. Progress Note:  11:02 AM  Pt states her pain has increased again. Disposition:  Discharge Note:  11:40 AM  The pt is ready for discharge. The pt's signs, symptoms, diagnosis, and discharge instructions have been discussed and pt has conveyed their understanding. The pt is to follow up as recommended or return to ER should their symptoms worsen. Plan has been discussed and pt is in agreement. This note is prepared by Dennie Pallas, acting as a Scribe for MD Supriya Rucker MD: The scribe's documentation has been prepared under my direction and personally reviewed by me in its entirety. I confirm that the notes above accurately reflects all work, treatment, procedures, and medical decision making performed by me. PLAN:  1. Current Discharge Medication List      START taking these medications    Details   butalbital-acetaminophen-caff (FIORICET) -40 mg per capsule Take 1 Cap by mouth every four (4) hours as needed for Pain. Qty: 20 Cap, Refills: 0      hydrOXYzine HCl (ATARAX) 25 mg tablet Take 1 Tab by mouth every six (6) hours as needed for Itching for up to 10 days. Qty: 20 Tab, Refills: 0         CONTINUE these medications which have CHANGED    Details   ondansetron (ZOFRAN ODT) 4 mg disintegrating tablet Take 1 Tab by mouth every eight (8) hours as needed for Nausea. Qty: 10 Tab, Refills: 0           2.    Follow-up Information     Follow up With Details Comments [address] BEBA Aparicio In 2 days As needed [address]  [phone]      Mathieu Knight MD  As needed [address]  [address]  [facility] [address], DO  As needed [address]  [address] [address]  [phone]      [facility] EMERGENCY DEPT  If symptoms worsen [address] [address]  [phone]        Return to ED if worse     Diagnosis     Clinical Impression:   1. Abdominal pain, right upper quadrant    2. Gastroenteritis    3. Rash    4. Hepatic steatosis        Attestations: This note is prepared by Dennie Pallas, acting as a Scribe for MD Supriya Rucker MD: The scribe's documentation has been prepared under my direction and personally reviewed by me in its entirety. I confirm that the notes above accurately reflects all work, treatment, procedures, and medical decision making performed by me.

## 2018-10-08 NOTE — LETTER
[facility] EMERGENCY DEPT 
[address] [id number] [phone] Work/School Note Date: 10/8/2018 To Whom It May concern: 
 
Radhika Hendrickson was seen and treated today in the emergency room by the following provider(s): 
Attending Provider: Red Mccoy MD. Radhika Hendrickson may return to work on 10/10/2018. Sincerely, Red Mccoy MD

## 2018-10-08 NOTE — LETTER
[address] 
[facility] EMERGENCY DEPT 
[address] [place] [address] [address] [id number] [phone] Work/School Note Date: 10/8/2018 To Whom It May concern: 
 
Anai López was seen and treated today in the emergency room by the following provider(s): 
Attending Provider: Irina Quinonez MD. Anai López may return to work on 10/09/2018. Sincerely, Irina Quinonez MD

## 2018-10-08 NOTE — DISCHARGE INSTRUCTIONS
Abdominal Pain: Care Instructions  Your Care Instructions    Abdominal pain has many possible causes. Some aren't serious and get better on their own in a few days. Others need more testing and treatment. If your pain continues or gets worse, you need to be rechecked and may need more tests to find out what is wrong. You may need surgery to correct the problem. Don't ignore new symptoms, such as fever, nausea and vomiting, urination problems, pain that gets worse, and dizziness. These may be signs of a more serious problem. Your doctor may have recommended a follow-up visit in the next 8 to 12 hours. If you are not getting better, you may need more tests or treatment. The doctor has checked you carefully, but problems can develop later. If you notice any problems or new symptoms, get medical treatment right away. Follow-up care is a key part of your treatment and safety. Be sure to make and go to all appointments, and call your doctor if you are having problems. It's also a good idea to know your test results and keep a list of the medicines you take. How can you care for yourself at home? · Rest until you feel better. · To prevent dehydration, drink plenty of fluids, enough so that your urine is light yellow or clear like water. Choose water and other caffeine-free clear liquids until you feel better. If you have kidney, heart, or liver disease and have to limit fluids, talk with your doctor before you increase the amount of fluids you drink. · If your stomach is upset, eat mild foods, such as rice, dry toast or crackers, bananas, and applesauce. Try eating several small meals instead of two or three large ones. · Wait until 48 hours after all symptoms have gone away before you have spicy foods, alcohol, and drinks that contain caffeine. · Do not eat foods that are high in fat. · Avoid anti-inflammatory medicines such as aspirin, ibuprofen (Advil, Motrin), and naproxen (Aleve).  These can cause stomach upset. Talk to your doctor if you take daily aspirin for another health problem. When should you call for help? Call 911 anytime you think you may need emergency care. For example, call if:    · You passed out (lost consciousness).     · You pass maroon or very bloody stools.     · You vomit blood or what looks like coffee grounds.     · You have new, severe belly pain.    Call your doctor now or seek immediate medical care if:    · Your pain gets worse, especially if it becomes focused in one area of your belly.     · You have a new or higher fever.     · Your stools are black and look like tar, or they have streaks of blood.     · You have unexpected vaginal bleeding.     · You have symptoms of a urinary tract infection. These may include:  ¨ Pain when you urinate. ¨ Urinating more often than usual.  ¨ Blood in your urine.     · You are dizzy or lightheaded, or you feel like you may faint.    Watch closely for changes in your health, and be sure to contact your doctor if:    · You are not getting better after 1 day (24 hours). Where can you learn more? Go to http://gladysDaojiaalia.info/. Enter I221 in the search box to learn more about \"Abdominal Pain: Care Instructions. \"  Current as of: November 20, 2017  Content Version: 11.8  © 0699-1416 [employer]. Care instructions adapted under license by [employer] (which disclaims liability or warranty for this information). If you have questions about a medical condition or this instruction, always ask your healthcare professional. [place] [address] any warranty or liability for your use of this information. Nonalcoholic Steatohepatitis (ROME): Care Instructions  Your Care Instructions    Nonalcoholic steatohepatitis (ROME) is liver inflammation. It is caused by a buildup of fat in the liver. The fat buildup is not caused by drinking alcohol.  Because of the inflammation, the liver does not work as well as it should. ROME is part of a group of liver diseases called nonalcoholic fatty liver disease. In these diseases, fat builds up in the liver and sometimes causes liver damage. This damage can get worse over time. Follow-up care is a key part of your treatment and safety. Be sure to make and go to all appointments, and call your doctor if you are having problems. It's also a good idea to know your test results and keep a list of the medicines you take. How can you care for yourself at home? · Stay at a healthy weight. Or if you need to, slowly get to a healthy weight. · Control your cholesterol. Talk to your doctor about ways to lower your cholesterol, if needed. You might try getting active, taking medicines, and making healthy changes to your diet. · Eat healthy foods. This includes fruits, vegetables, lean meats and dairy, and whole grains. · If you have diabetes, keep your blood sugar at your target level. · Get at least 30 minutes of exercise on most days of the week. Walking is a good choice. You also may want to do other activities, such as running, swimming, cycling, or playing tennis or team sports. · Limit alcohol, or do not drink. Alcohol can damage the liver and cause health problems. When should you call for help? Call 911 anytime you think you may need emergency care. For example, call if:    · You have trouble breathing.     · You vomit blood or what looks like coffee grounds.    Call your doctor now or seek immediate medical care if:    · You feel very sleepy or confused.     · You have new or worse belly pain.     · You have a fever.     · There is a new or increasing yellow tint to your skin or the whites of your eyes.     · You have any abnormal bleeding, such as:  ¨ Nosebleeds. ¨ Vaginal bleeding that is different (heavier, more frequent, at a different time of the month) than what you are used to. ¨ Bloody or black stools, or rectal bleeding. ¨ Bloody or pink urine.  Watch closely for changes in your health, and be sure to contact your doctor if:    · Your belly is getting bigger.     · You are gaining weight.     · You have any problems. Where can you learn more? Go to http://gladys-alia.info/. Enter J944 in the search box to learn more about \"Nonalcoholic Steatohepatitis ([place]): Care Instructions. \"  Current as of: March 28, 2018  Content Version: 11.8  © 9367-9683 [employer]. Care instructions adapted under license by [employer] (which disclaims liability or warranty for this information). If you have questions about a medical condition or this instruction, always ask your healthcare professional. [address] any warranty or liability for your use of this information. Gastroenteritis: Care Instructions  Your Care Instructions    Gastroenteritis is an illness that may cause nausea, vomiting, and diarrhea. It is sometimes called \"stomach flu. \" It can be caused by bacteria or a virus. You will probably begin to feel better in 1 to 2 days. In the meantime, get plenty of rest and make sure you do not become dehydrated. Dehydration occurs when your body loses too much fluid. Follow-up care is a key part of your treatment and safety. Be sure to make and go to all appointments, and call your doctor if you are having problems. It's also a good idea to know your test results and keep a list of the medicines you take. How can you care for yourself at home? · If your doctor prescribed antibiotics, take them as directed. Do not stop taking them just because you feel better. You need to take the full course of antibiotics. · Drink plenty of fluids to prevent dehydration, enough so that your urine is light yellow or clear like water. Choose water and other caffeine-free clear liquids until you feel better.  If you have kidney, heart, or liver disease and have to limit fluids, talk with your doctor before you increase your fluid intake. · Drink fluids slowly, in frequent, small amounts, because drinking too much too fast can cause vomiting. · Begin eating mild foods, such as dry toast, yogurt, applesauce, bananas, and rice. Avoid spicy, hot, or high-fat foods, and do not drink alcohol or caffeine for a day or two. Do not drink milk or eat ice cream until you are feeling better. How to prevent gastroenteritis  · Keep hot foods hot and cold foods cold. · Do not eat meats, dressings, salads, or other foods that have been kept at room temperature for more than 2 hours. · Use a thermometer to check your refrigerator. It should be between 34°F and 40°F.  · Defrost meats in the refrigerator or microwave, not on the kitchen counter. · Keep your hands and your kitchen clean. Wash your hands, cutting boards, and countertops with hot soapy water frequently. · Cook meat until it is well done. · Do not eat raw eggs or uncooked sauces made with raw eggs. · Do not take chances. If food looks or tastes spoiled, throw it out. When should you call for help? Call 911 anytime you think you may need emergency care. For example, call if:    · You vomit blood or what looks like coffee grounds.     · You passed out (lost consciousness).     · You pass maroon or very bloody stools.    Call your doctor now or seek immediate medical care if:    · You have severe belly pain.     · You have signs of needing more fluids.  You have sunken eyes, a dry mouth, and pass only a little dark urine.     · You feel like you are going to faint.     · You have increased belly pain that does not go away in 1 to 2 days.     · You have new or increased nausea, or you are vomiting.     · You have a new or higher fever.     · Your stools are black and tarlike or have streaks of blood.    Watch closely for changes in your health, and be sure to contact your doctor if:    · You are dizzy or lightheaded.     · You urinate less than usual, or your urine is dark yellow or brown.     · You do not feel better with each day that goes by. Where can you learn more? Go to http://gladys-alia.info/. Enter N142 in the search box to learn more about \"Gastroenteritis: Care Instructions. \"  Current as of: November 18, 2017  Content Version: 11.8  © 2234-1930 [employer]. Care instructions adapted under license by [employer] (which disclaims liability or warranty for this information). If you have questions about a medical condition or this instruction, always ask your healthcare professional. [place] [address] any warranty or liability for your use of this information. Rash: Care Instructions  Your Care Instructions  A rash is any irritation or inflammation of the skin. Rashes have many possible causes, including allergy, infection, illness, heat, and emotional stress. Follow-up care is a key part of your treatment and safety. Be sure to make and go to all appointments, and call your doctor if you are having problems. It's also a good idea to know your test results and keep a list of the medicines you take. How can you care for yourself at home? · Wash the area with water only. Soap can make dryness and itching worse. Pat dry. · Put cold, wet cloths on the rash to reduce itching. · Keep cool, and stay out of the sun. · Leave the rash open to the air as much of the time as possible. · Sometimes petroleum jelly (Vaseline) can help relieve the discomfort caused by a rash. A moisturizing lotion, such as Cetaphil, also may help. Calamine lotion may help for rashes caused by contact with something (such as a plant or soap) that irritated the skin. Use it 3 or 4 times a day. · If your doctor prescribed a cream, use it as directed. If your doctor prescribed medicine, take it exactly as directed.   · If your rash itches so badly that it interferes with your normal activities, take an over-the-counter antihistamine, such as diphenhydramine (Benadryl) or loratadine (Claritin). Read and follow all instructions on the label. When should you call for help? Call your doctor now or seek immediate medical care if:    · You have signs of infection, such as:  ¨ Increased pain, swelling, warmth, or redness. ¨ Red streaks leading from the area. ¨ Pus draining from the area. ¨ A fever.     · You have joint pain along with the rash.    Watch closely for changes in your health, and be sure to contact your doctor if:    · Your rash is changing or getting worse. For example, call if you have pain along with the rash, the rash is spreading, or you have new blisters.     · You do not get better after 1 week. Where can you learn more? Go to http://gladys-alia.info/. Enter W068 in the search box to learn more about \"Rash: Care Instructions. \"  Current as of: April 18, 2018  Content Version: 11.8  © 6253-5344 [employer]. Care instructions adapted under license by [employer] (which disclaims liability or warranty for this information). If you have questions about a medical condition or this instruction, always ask your healthcare professional. [address] any warranty or liability for your use of this information. Thank you! Thank you for allowing us to provide you with excellent care today. We hope we addressed all of your concerns and needs. We strive to provide excellent quality care in the Emergency Department. You may receive a survey after your visit to evaluate the care you were provided. Should you receive a survey from us, we invite you to share your experience and tell us what made it excellent. It was a pleasure serving you, we invite you to share your experience with us, in our pursuit for excellence, should you be selected to receive a survey.     If you feel that you have not received excellent quality care or timely care, please ask to speak to the nurse manager. Please choose us in the future for your continued health care needs. ------------------------------------------------------------------------------------------------------------  The exam and treatment you received in the Emergency Department were for an urgent problem and are not intended as complete care. It is important that you follow up with a doctor, nurse practitioner, or physician assistant for ongoing care. If your symptoms become worse or you do not improve as expected and you are unable to reach your usual health care provider, you should return to the Emergency Department. We are available 24 hours a day. Please take your discharge instructions with you when you go to your follow-up appointment. If you have any problem arranging a follow-up appointment, contact the Emergency Department immediately. If a prescription has been provided, please have it filled as soon as possible to prevent a delay in treatment. Read the entire medication instruction sheet provided to you by the pharmacy. If you have any questions or reservations about taking the medication due to side effects or interactions with other medications, please call your primary care physician or contact the ER to speak with the charge nurse. Make an appointment with your family doctor or the physician you were referred to for follow-up of this visit as instructed on your discharge paperwork, as this is mandatory follow-up. Return to the ER if you are unable to be seen or if you are unable to be seen in a timely manner. If you have any problem arranging the follow-up visit, contact the Emergency Department immediately.

## 2019-01-07 LAB
GLUCOSE BLD STRIP.AUTO-MCNC: 296 MG/DL (ref 65–100)
SERVICE CMNT-IMP: ABNORMAL

## 2019-01-07 PROCEDURE — 93005 ELECTROCARDIOGRAM TRACING: CPT

## 2019-01-07 PROCEDURE — 99284 EMERGENCY DEPT VISIT MOD MDM: CPT

## 2019-01-07 PROCEDURE — 82962 GLUCOSE BLOOD TEST: CPT

## 2019-01-08 ENCOUNTER — HOSPITAL ENCOUNTER (EMERGENCY)
Age: 49
Discharge: HOME | End: 2019-01-08
Attending: EMERGENCY MEDICINE
Payer: SELF-PAY

## 2019-01-08 ENCOUNTER — APPOINTMENT (OUTPATIENT)
Dept: GENERAL RADIOLOGY | Age: 49
End: 2019-01-08
Attending: EMERGENCY MEDICINE
Payer: SELF-PAY

## 2019-01-08 ENCOUNTER — APPOINTMENT (OUTPATIENT)
Dept: CT IMAGING | Age: 49
End: 2019-01-08
Attending: EMERGENCY MEDICINE
Payer: SELF-PAY

## 2019-01-08 VITALS
HEIGHT: 62 IN | SYSTOLIC BLOOD PRESSURE: 127 MMHG | WEIGHT: 207.67 LBS | OXYGEN SATURATION: 93 % | BODY MASS INDEX: 38.22 KG/M2 | HEART RATE: 105 BPM | RESPIRATION RATE: 18 BRPM | TEMPERATURE: 98.2 F | DIASTOLIC BLOOD PRESSURE: 67 MMHG

## 2019-01-08 DIAGNOSIS — R10.32 ABDOMINAL PAIN, LLQ (LEFT LOWER QUADRANT): ICD-10-CM

## 2019-01-08 DIAGNOSIS — R10.84 ABDOMINAL PAIN, GENERALIZED: ICD-10-CM

## 2019-01-08 DIAGNOSIS — R19.7 DIARRHEA, UNSPECIFIED TYPE: Primary | ICD-10-CM

## 2019-01-08 DIAGNOSIS — K42.9 UMBILICAL HERNIA WITHOUT OBSTRUCTION AND WITHOUT GANGRENE: ICD-10-CM

## 2019-01-08 LAB
ALBUMIN SERPL-MCNC: 3.9 G/DL (ref 3.5–5)
ALBUMIN/GLOB SERPL: 1 {RATIO} (ref 1.1–2.2)
ALP SERPL-CCNC: 65 U/L (ref 45–117)
ALT SERPL-CCNC: 39 U/L (ref 12–78)
ANION GAP SERPL CALC-SCNC: 14 MMOL/L (ref 5–15)
APPEARANCE UR: ABNORMAL
AST SERPL-CCNC: 41 U/L (ref 15–37)
ATRIAL RATE: 97 BPM
BACTERIA URNS QL MICRO: ABNORMAL /HPF
BASOPHILS # BLD: 0 K/UL (ref 0–0.1)
BASOPHILS NFR BLD: 0 % (ref 0–1)
BILIRUB SERPL-MCNC: 0.5 MG/DL (ref 0.2–1)
BILIRUB UR QL: NEGATIVE
BUN SERPL-MCNC: 10 MG/DL (ref 6–20)
BUN/CREAT SERPL: 13 (ref 12–20)
CALCIUM SERPL-MCNC: 9.5 MG/DL (ref 8.5–10.1)
CALCULATED P AXIS, ECG09: -4 DEGREES
CALCULATED R AXIS, ECG10: -3 DEGREES
CALCULATED T AXIS, ECG11: 11 DEGREES
CHLORIDE SERPL-SCNC: 100 MMOL/L (ref 97–108)
CK MB CFR SERPL CALC: 0.9 % (ref 0–2.5)
CK MB SERPL-MCNC: 2.3 NG/ML (ref 5–25)
CK SERPL-CCNC: 266 U/L (ref 26–192)
CO2 SERPL-SCNC: 23 MMOL/L (ref 21–32)
COLOR UR: ABNORMAL
CREAT SERPL-MCNC: 0.8 MG/DL (ref 0.55–1.02)
DIAGNOSIS, 93000: NORMAL
DIFFERENTIAL METHOD BLD: NORMAL
EOSINOPHIL # BLD: 0.2 K/UL (ref 0–0.4)
EOSINOPHIL NFR BLD: 3 % (ref 0–7)
EPITH CASTS URNS QL MICRO: ABNORMAL /LPF
ERYTHROCYTE [DISTWIDTH] IN BLOOD BY AUTOMATED COUNT: 13.3 % (ref 11.5–14.5)
GLOBULIN SER CALC-MCNC: 3.9 G/DL (ref 2–4)
GLUCOSE SERPL-MCNC: 249 MG/DL (ref 65–100)
GLUCOSE UR STRIP.AUTO-MCNC: 500 MG/DL
HCT VFR BLD AUTO: 38.6 % (ref 35–47)
HGB BLD-MCNC: 13.6 G/DL (ref 11.5–16)
HGB UR QL STRIP: NEGATIVE
HYALINE CASTS URNS QL MICRO: ABNORMAL /LPF (ref 0–5)
IMM GRANULOCYTES # BLD: 0 K/UL (ref 0–0.04)
IMM GRANULOCYTES NFR BLD AUTO: 0 % (ref 0–0.5)
KETONES UR QL STRIP.AUTO: ABNORMAL MG/DL
LEUKOCYTE ESTERASE UR QL STRIP.AUTO: ABNORMAL
LYMPHOCYTES # BLD: 2.1 K/UL (ref 0.8–3.5)
LYMPHOCYTES NFR BLD: 30 % (ref 12–49)
MCH RBC QN AUTO: 29 PG (ref 26–34)
MCHC RBC AUTO-ENTMCNC: 35.2 G/DL (ref 30–36.5)
MCV RBC AUTO: 82.3 FL (ref 80–99)
MONOCYTES # BLD: 0.4 K/UL (ref 0–1)
MONOCYTES NFR BLD: 6 % (ref 5–13)
NEUTS SEG # BLD: 4.2 K/UL (ref 1.8–8)
NEUTS SEG NFR BLD: 61 % (ref 32–75)
NITRITE UR QL STRIP.AUTO: POSITIVE
NRBC # BLD: 0 K/UL (ref 0–0.01)
NRBC BLD-RTO: 0 PER 100 WBC
P-R INTERVAL, ECG05: 140 MS
PH UR STRIP: 5 [PH] (ref 5–8)
PLATELET # BLD AUTO: 184 K/UL (ref 150–400)
PMV BLD AUTO: 9.8 FL (ref 8.9–12.9)
POTASSIUM SERPL-SCNC: 3.4 MMOL/L (ref 3.5–5.1)
PROT SERPL-MCNC: 7.8 G/DL (ref 6.4–8.2)
PROT UR STRIP-MCNC: NEGATIVE MG/DL
Q-T INTERVAL, ECG07: 370 MS
QRS DURATION, ECG06: 88 MS
QTC CALCULATION (BEZET), ECG08: 469 MS
RBC # BLD AUTO: 4.69 M/UL (ref 3.8–5.2)
RBC #/AREA URNS HPF: ABNORMAL /HPF (ref 0–5)
SODIUM SERPL-SCNC: 137 MMOL/L (ref 136–145)
SP GR UR REFRACTOMETRY: 1.02 (ref 1–1.03)
TROPONIN I SERPL-MCNC: <0.05 NG/ML
UA: UC IF INDICATED,UAUC: ABNORMAL
UROBILINOGEN UR QL STRIP.AUTO: 1 EU/DL (ref 0.2–1)
VENTRICULAR RATE, ECG03: 97 BPM
WBC # BLD AUTO: 6.9 K/UL (ref 3.6–11)
WBC URNS QL MICRO: ABNORMAL /HPF (ref 0–4)

## 2019-01-08 PROCEDURE — 74011250636 HC RX REV CODE- 250/636: Performed by: EMERGENCY MEDICINE

## 2019-01-08 PROCEDURE — 82550 ASSAY OF CK (CPK): CPT

## 2019-01-08 PROCEDURE — 84484 ASSAY OF TROPONIN QUANT: CPT

## 2019-01-08 PROCEDURE — 96375 TX/PRO/DX INJ NEW DRUG ADDON: CPT

## 2019-01-08 PROCEDURE — 71046 X-RAY EXAM CHEST 2 VIEWS: CPT

## 2019-01-08 PROCEDURE — 74176 CT ABD & PELVIS W/O CONTRAST: CPT

## 2019-01-08 PROCEDURE — 82553 CREATINE MB FRACTION: CPT

## 2019-01-08 PROCEDURE — 85025 COMPLETE CBC W/AUTO DIFF WBC: CPT

## 2019-01-08 PROCEDURE — 87086 URINE CULTURE/COLONY COUNT: CPT

## 2019-01-08 PROCEDURE — 96374 THER/PROPH/DIAG INJ IV PUSH: CPT

## 2019-01-08 PROCEDURE — 80053 COMPREHEN METABOLIC PANEL: CPT

## 2019-01-08 PROCEDURE — 81001 URINALYSIS AUTO W/SCOPE: CPT

## 2019-01-08 PROCEDURE — 36415 COLL VENOUS BLD VENIPUNCTURE: CPT

## 2019-01-08 RX ORDER — ONDANSETRON 2 MG/ML
4 INJECTION INTRAMUSCULAR; INTRAVENOUS
Status: COMPLETED | OUTPATIENT
Start: 2019-01-08 | End: 2019-01-08

## 2019-01-08 RX ORDER — DIPHENHYDRAMINE HYDROCHLORIDE 50 MG/ML
25 INJECTION, SOLUTION INTRAMUSCULAR; INTRAVENOUS
Status: COMPLETED | OUTPATIENT
Start: 2019-01-08 | End: 2019-01-08

## 2019-01-08 RX ORDER — ONDANSETRON 4 MG/1
4 TABLET, ORALLY DISINTEGRATING ORAL
Qty: 10 TAB | Refills: 0 | Status: SHIPPED | OUTPATIENT
Start: 2019-01-08 | End: 2019-02-07

## 2019-01-08 RX ORDER — TRAMADOL HYDROCHLORIDE 50 MG/1
50 TABLET ORAL
Status: DISCONTINUED | OUTPATIENT
Start: 2019-01-08 | End: 2019-01-08 | Stop reason: HOSPADM

## 2019-01-08 RX ORDER — MORPHINE SULFATE 4 MG/ML
2 INJECTION INTRAVENOUS
Status: COMPLETED | OUTPATIENT
Start: 2019-01-08 | End: 2019-01-08

## 2019-01-08 RX ORDER — TRAMADOL HYDROCHLORIDE 50 MG/1
50 TABLET ORAL
Qty: 10 TAB | Refills: 0 | Status: SHIPPED | OUTPATIENT
Start: 2019-01-08 | End: 2019-02-07

## 2019-01-08 RX ORDER — FAMOTIDINE 10 MG/ML
20 INJECTION INTRAVENOUS
Status: COMPLETED | OUTPATIENT
Start: 2019-01-08 | End: 2019-01-08

## 2019-01-08 RX ORDER — METRONIDAZOLE 500 MG/1
500 TABLET ORAL 3 TIMES DAILY
Qty: 21 TAB | Refills: 0 | Status: SHIPPED | OUTPATIENT
Start: 2019-01-08 | End: 2019-01-15

## 2019-01-08 RX ADMIN — MORPHINE SULFATE 2 MG: 4 INJECTION INTRAVENOUS at 03:34

## 2019-01-08 RX ADMIN — ONDANSETRON 4 MG: 2 INJECTION INTRAMUSCULAR; INTRAVENOUS at 02:46

## 2019-01-08 RX ADMIN — DIPHENHYDRAMINE HYDROCHLORIDE 25 MG: 50 INJECTION, SOLUTION INTRAMUSCULAR; INTRAVENOUS at 03:35

## 2019-01-08 RX ADMIN — FAMOTIDINE 20 MG: 10 INJECTION, SOLUTION INTRAVENOUS at 02:45

## 2019-01-08 NOTE — ED PROVIDER NOTES
EMERGENCY DEPARTMENT HISTORY AND PHYSICAL EXAM          Date: 2019  Patient Name: Adam Ny    History of Presenting Illness     Chief Complaint   Patient presents with    Diarrhea     ambulatory to triage, states that she has had diarrhea abdominal pain for a week and high blood sugars. Has a hx of c-diff    High Blood Sugar    Abdominal Pain    Vomiting    Chest Pain       History Provided By: Patient    HPI: Adam Ny is a 50 y.o. female, pmhx DM / HTN / asthma / diverticulosis, who presents ambulatory to the ED c/o gradually worsening diffuse cramping epigastric abd pain with multiple episodes of nausea, bilious vomiting, and watery diarrhea over the last week. Pt reports a hx of C. Diff and expresses concern for similar sxs. She notes taking \"a lot of Imodium\" with no relief of sxs. Pt states she was recently treated with Keflex and Prednisone for a URI ~2 weeks ago. She notes calling a GI specialist, but can't get in for \"a while\", prompting her visit to the ED this evening. Pt otherwise specifically denies any recent fever, chills, CP, SOB, lightheadedness, dizziness, numbness, weakness, tingling, BLE swelling, HA, heart palpitations, urinary sxs, changes in BM, changes in PO intake, melena, hematochezia, cough, or congestion. PCP: BEBA Ty   OB/GYN: Awa Ahn  GI: Drew David    PMHx: Significant for colovaginal fistula, DM, HTN, asthma, diverticulosis, GERD, UTI, hepatic steatosis, anal fissures, hemorrhoids  PSHx: Significant for sigmoid colectomy, hysterectomy, , cervical conization, hernia repair, cystoscopy  Social Hx: -tobacco, -EtOH, -Illicit Drugs    There are no other complaints, changes, or physical findings at this time.      Current Facility-Administered Medications   Medication Dose Route Frequency Provider Last Rate Last Dose    traMADol (ULTRAM) tablet 50 mg  50 mg Oral NOW Fabiana Yao MD         Current Outpatient Medications Medication Sig Dispense Refill    ondansetron (ZOFRAN ODT) 4 mg disintegrating tablet Take 1 Tab by mouth every eight (8) hours as needed for Nausea. 10 Tab 0    traMADol (ULTRAM) 50 mg tablet Take 1 Tab by mouth every six (6) hours as needed for Pain. Max Daily Amount: 200 mg. 10 Tab 0    metroNIDAZOLE (FLAGYL) 500 mg tablet Take 1 Tab by mouth three (3) times daily for 7 days. 21 Tab 0    butalbital-acetaminophen-caff (FIORICET) -40 mg per capsule Take 1 Cap by mouth every four (4) hours as needed for Pain. 20 Cap 0    metFORMIN (GLUCOPHAGE) 500 mg tablet Take 500 mg by mouth daily (with breakfast).  Pramoxine (PROCTOFOAM) 1 % topical foam Apply  to affected area three (3) times daily as needed for Hemorrhoids. 1 Can 0    naloxone (NARCAN) 2 mg/actuation spry Use 1 spray intranasally into 1 nostril. Use a new Narcan nasal spray for subsequent doses and administer into alternating nostrils. May repeat every 2 to 3 minutes as needed. 1 Device 2    losartan-hydroCHLOROthiazide (HYZAAR) 100-12.5 mg per tablet Take 1 Tab by mouth daily.  albuterol sulfate (PROVENTIL;VENTOLIN) 2.5 mg/0.5 mL nebu nebulizer solution 2.5 mg by Nebulization route every twelve (12) hours. Patient mixes this agent with acetylcysteine (20%) nebulizer solution for breathing treatment.  acetaminophen (TYLENOL) 500 mg tablet Take 1,000 mg by mouth every six (6) hours as needed for Pain.  LACTOBACIL 2-S. THERMO-BIFIDO 1 (VSL#3 PO) Take 1 Packet by mouth daily.  EPINEPHrine (EPIPEN) 0.3 mg/0.3 mL (1:1,000) injection 0.3 mL by IntraMUSCular route once as needed for up to 1 dose. 0.3 mL 1    estradiol (ESTRACE) 1 mg tablet Take 1 mg by mouth daily.  albuterol (PROVENTIL, VENTOLIN) 90 mcg/Actuation inhaler Take 2 Puffs by inhalation every six (6) hours as needed.          Past History     Past Medical History:  Past Medical History:   Diagnosis Date    Anal fissure     Anisocoria     Asthma     LAST EPISODE 10-    Back pain     Cerumen impaction     Chronic kidney disease     hx uti in past    Coagulation defects     ocassional rectal bleeding due to anal fissure    Colovaginal fistula     Diabetes (HCC)     NIDDM    Diabetes ([address]     Diverticulitis     Diverticulosis     Enlarged tonsils     Frequent UTI     GERD (gastroesophageal reflux disease)     H/O endoscopy     with dilation    HA (headache)     Hepatic steatosis     Hx of colonoscopy with polypectomy     benign    Hypertension     Ill-defined condition     FREQUENT HIVES    Ill-defined condition     HX ELEVATED LIVER ENZYMES    Morbid obesity ([address]     Nausea & vomiting     during diverticulitis flare    Obesity     Otitis media     Pneumonia     about 15 yrs ago    Psychiatric disorder     ANXIETY    Recurrent tonsillitis     Sinusitis     Transfusion history ~ age 35    postop hysterectomy    Unspecified sleep apnea     snores ( not diagnosed yet)     Urticaria     Urticaria        Past Surgical History:  Past Surgical History:   Procedure Laterality Date    ABDOMEN SURGERY PROC UNLISTED  2018    hernia repair at [facility]    [address]    [place]     HX GI  12    LAPAROSCOPIC HAND ASSISTED  POSS OPEN SIGMOID COLECTOMY POSS TEMPORARY DIVERTING LOOP ILEOSTOMY;  (no illeostomy needed)    HX GYN           HX GYN      cervical conization    HX HEENT      SINUS SURGERY LEFT X2    HX HEENT      SINUS SURGERY ON RIGHT X2    HX OTHER SURGICAL      Sphincterotomy    HX PELVIC LAPAROSCOPY      HX EMMANUEL AND BSO      HX UROLOGICAL  12     CYSTOSCOPY INSERTION URETERAL CATHETERS - Cystoscopy Insertion of bilateral ureteral stents       Family History:  Family History   Problem Relation Age of Onset    Diabetes Mother     Cancer Mother         NON-HODGKINS LYMPHOMA    Anesth Problems Mother         PONV    Diabetes Father     Heart Disease Father         CAD - STENTS, PACEMAKER  Arrhythmia Father        Social History:  Social History     Tobacco Use    Smoking status: Never Smoker    Smokeless tobacco: Never Used   Substance Use Topics    Alcohol use: Yes     Comment: Rarely    Drug use: No       Allergies: Allergies   Allergen Reactions    Aspirin Shortness of Breath    Prilosec [Omeprazole Magnesium] Anaphylaxis     CHERRY FLAVORED; PT TAKES REGULAR PRILOSEC AND IS OK    Codeine Hives and Itching    Contrast Agent [Iodine] Itching     Pt. Had itching after IV contrast with the last exam.  Benadryl was given    Morphine Itching     Severe itching. Fine to take benadryl    Fentanyl Rash    Ketorolac Rash     \"makes my eyes spasm and causes rash on my hands\"    Percocet [Oxycodone-Acetaminophen] Hives         Review of Systems   Review of Systems   Constitutional: Negative for chills and fever. HENT: Negative for congestion, ear pain, rhinorrhea and sore throat. Respiratory: Negative for cough and shortness of breath. Cardiovascular: Negative. Negative for chest pain, palpitations and leg swelling. Gastrointestinal: Positive for abdominal pain, diarrhea, nausea and vomiting. Negative for constipation. No melena  No hematochezia   Endocrine: Negative for polyuria. Genitourinary: Negative for dysuria, frequency and hematuria. Neurological: Negative for dizziness, weakness, light-headedness, numbness and headaches. No tingling   All other systems reviewed and are negative. Physical Exam   Physical Exam   Nursing note and vitals reviewed.     General appearance - well nourished, well appearing, and in no distress  Eyes - pupils equal and reactive, extraocular eye movements intact  ENT - mucous membranes moist, pharynx normal without lesions  Neck - supple, no significant adenopathy; non-tender to palpation  Chest - clear to auscultation, no wheezes, rales or rhonchi; non-tender to palpation  Heart - normal rate and regular rhythm, S1 and S2 normal, no murmurs noted  Abdomen - soft, 2cm slightly tender ventral hernia just superior to her umbilicus, mild generalized abd tenderness to palpation, nondistended, no organomegaly  Musculoskeletal - no joint tenderness, deformity or swelling; normal ROM  Extremities - peripheral pulses normal, no pedal edema  Skin - normal coloration and turgor, no rashes  Neurological - alert, oriented x3, normal speech, no focal findings or movement disorder noted  Written by Rosales Fabina ED Scribe, as dictated by Fabiana Yao MD    Diagnostic Study Results     Labs -     Recent Results (from the past 12 hour(s))   GLUCOSE, POC    Collection Time: 01/07/19 11:38 PM   Result Value Ref Range    Glucose (POC) 296 (H) 65 - 100 mg/dL    Performed by Radha Gibson (PCT)    EKG, 12 LEAD, INITIAL    Collection Time: 01/07/19 11:41 PM   Result Value Ref Range    Ventricular Rate 97 BPM    Atrial Rate 97 BPM    P-R Interval 140 ms    QRS Duration 88 ms    Q-T Interval 370 ms    QTC Calculation (Bezet) 469 ms    Calculated P Axis -4 degrees    Calculated R Axis -3 degrees    Calculated T Axis 11 degrees    Diagnosis       Normal sinus rhythm  Minimal voltage criteria for LVH, may be normal variant  Nonspecific T wave abnormality  Prolonged QT  When compared with ECG of 26-MAR-2018 07:59,  Nonspecific T wave abnormality, worse in Lateral leads     CBC WITH AUTOMATED DIFF    Collection Time: 01/08/19 12:29 AM   Result Value Ref Range    WBC 6.9 3.6 - 11.0 K/uL    RBC 4.69 3.80 - 5.20 M/uL    HGB 13.6 11.5 - 16.0 g/dL    HCT 38.6 35.0 - 47.0 %    MCV 82.3 80.0 - 99.0 FL    MCH 29.0 26.0 - 34.0 PG    MCHC 35.2 30.0 - 36.5 g/dL    RDW 13.3 11.5 - 14.5 %    PLATELET 819 674 - 561 K/uL    MPV 9.8 8.9 - 12.9 FL    NRBC 0.0 0  WBC    ABSOLUTE NRBC 0.00 0.00 - 0.01 K/uL    NEUTROPHILS 61 32 - 75 %    LYMPHOCYTES 30 12 - 49 %    MONOCYTES 6 5 - 13 %    EOSINOPHILS 3 0 - 7 %    BASOPHILS 0 0 - 1 %    IMMATURE GRANULOCYTES 0 0.0 - 0.5 %    ABS. NEUTROPHILS 4.2 1.8 - 8.0 K/UL    ABS. LYMPHOCYTES 2.1 0.8 - 3.5 K/UL    ABS. MONOCYTES 0.4 0.0 - 1.0 K/UL    ABS. EOSINOPHILS 0.2 0.0 - 0.4 K/UL    ABS. BASOPHILS 0.0 0.0 - 0.1 K/UL    ABS. IMM. GRANS. 0.0 0.00 - 0.04 K/UL    DF AUTOMATED     METABOLIC PANEL, COMPREHENSIVE    Collection Time: 01/08/19 12:29 AM   Result Value Ref Range    Sodium 137 136 - 145 mmol/L    Potassium 3.4 (L) 3.5 - 5.1 mmol/L    Chloride 100 97 - 108 mmol/L    CO2 23 21 - 32 mmol/L    Anion gap 14 5 - 15 mmol/L    Glucose 249 (H) 65 - 100 mg/dL    BUN 10 6 - 20 MG/DL    Creatinine 0.80 0.55 - 1.02 MG/DL    BUN/Creatinine ratio 13 12 - 20      GFR est AA >60 >60 ml/min/1.73m2    GFR est non-AA >60 >60 ml/min/1.73m2    Calcium 9.5 8.5 - 10.1 MG/DL    Bilirubin, total 0.5 0.2 - 1.0 MG/DL    ALT (SGPT) 39 12 - 78 U/L    AST (SGOT) 41 (H) 15 - 37 U/L    Alk. phosphatase 65 45 - 117 U/L    Protein, total 7.8 6.4 - 8.2 g/dL    Albumin 3.9 3.5 - 5.0 g/dL    Globulin 3.9 2.0 - 4.0 g/dL    A-G Ratio 1.0 (L) 1.1 - 2.2     TROPONIN I    Collection Time: 01/08/19 12:29 AM   Result Value Ref Range    Troponin-I, Qt. <0.05 <0.05 ng/mL   CK W/ REFLX CKMB    Collection Time: 01/08/19 12:29 AM   Result Value Ref Range     (H) 26 - 192 U/L   CK-MB,QUANT.     Collection Time: 01/08/19 12:29 AM   Result Value Ref Range    CK - MB 2.3 <3.6 NG/ML    CK-MB Index 0.9 0 - 2.5     URINALYSIS W/ REFLEX CULTURE    Collection Time: 01/08/19 12:35 AM   Result Value Ref Range    Color ORANGE      Appearance CLOUDY (A) CLEAR      Specific gravity 1.017 1.003 - 1.030      pH (UA) 5.0 5.0 - 8.0      Protein NEGATIVE  NEG mg/dL    Glucose 500 (A) NEG mg/dL    Ketone TRACE (A) NEG mg/dL    Bilirubin NEGATIVE  NEG      Blood NEGATIVE  NEG      Urobilinogen 1.0 0.2 - 1.0 EU/dL    Nitrites POSITIVE (A) NEG      Leukocyte Esterase MODERATE (A) NEG      WBC 5-10 0 - 4 /hpf    RBC 0-5 0 - 5 /hpf    Epithelial cells FEW FEW /lpf    Bacteria 1+ (A) NEG /hpf    UA:UC IF INDICATED URINE CULTURE ORDERED (A) CNI      Hyaline cast 0-2 0 - 5 /lpf       Radiologic Studies -     CT Results  (Last 48 hours)               01/08/19 0349  CT ABD PELV WO CONT Final result    Impression:  IMPRESSION:       1. No acute process on CT. 2. Hepatomegaly, hepatic steatosis, and splenomegaly are unchanged. 3. Fat-containing ventral abdominal hernia is unchanged. Narrative:  EXAM: CT ABD PELV WO CONT       INDICATION: Abdominal pain and diarrhea for one week. COMPARISON: CT abdomen/pelvis on 9/18/2018. CONTRAST:  None. TECHNIQUE:    Thin axial images were obtained through the abdomen and pelvis. Coronal and   sagittal reconstructions were generated. Oral contrast was not administered. CT   dose reduction was achieved through use of a standardized protocol tailored for   this examination and automatic exposure control for dose modulation. The absence of intravenous contrast material reduces the sensitivity for   evaluation of the solid parenchymal organs of the abdomen. FINDINGS:    LUNG BASES: Clear. INCIDENTALLY IMAGED HEART AND MEDIASTINUM: Unremarkable. LIVER: Hepatomegaly and hepatic steatosis are unchanged. GALLBLADDER: Cholecystectomy. CBD is not dilated. SPLEEN: Splenomegaly is unchanged. PANCREAS: No inflammation. ADRENALS: Unremarkable. KIDNEYS/URETERS: No mass, calculus, or hydronephrosis. STOMACH: Nondistention. SMALL BOWEL: No dilatation or wall thickening. COLON: Sigmoid partial colectomy is unchanged. APPENDIX: Unremarkable. PERITONEUM: No ascites or pneumoperitoneum. RETROPERITONEUM: No lymphadenopathy or aortic aneurysm. REPRODUCTIVE ORGANS: Hysterectomy. No adnexal mass. URINARY BLADDER: Nondistended. BONES: No destructive bone lesion. ADDITIONAL COMMENTS: Supraumbilical ventral hernia contains fat.                CXR Results  (Last 48 hours)               01/08/19 0011  XR CHEST PA LAT Final result    Impression:  IMPRESSION:       Normal chest views. No change. Narrative:  EXAM: XR CHEST PA LAT       INDICATION: Abdominal pain and diarrhea for one week, hyperglycemia. COMPARISON: Chest views on 11/13/2016       TECHNIQUE: PA and lateral chest views       FINDINGS: The cardiomediastinal and hilar contours are within normal limits. The   pulmonary vasculature is within normal limits. The lungs and pleural spaces are clear. The visualized bones and upper abdomen   are age-appropriate. Medical Decision Making   I am the first provider for this patient. I reviewed the vital signs, available nursing notes, past medical history, past surgical history, family history and social history. Vital Signs-Reviewed the patient's vital signs. Patient Vitals for the past 12 hrs:   Temp Pulse Resp BP SpO2   01/08/19 0430 -- -- -- 127/67 93 %   01/08/19 0415 -- -- -- 129/60 94 %   01/08/19 0400 -- -- -- 121/71 95 %   01/08/19 0354 -- -- -- 109/68 95 %   01/08/19 0341 -- -- -- -- 96 %   01/08/19 0338 -- -- -- 131/80 --   01/07/19 2337 98.2 °F (36.8 °C) (!) 105 18 131/82 100 %     Records Reviewed: Nursing Notes, Old Medical Records, Previous electrocardiograms, Previous Radiology Studies and Previous Laboratory Studies    Provider Notes (Medical Decision Making):     DDx: gastritis, gastroenteritis, viral syndrome, C. Diff, bowel obstruction, dehydration, electrolyte abnormality, diverticulitis    ED Course:   Initial assessment performed. The patients presenting problems have been discussed, and they are in agreement with the care plan formulated and outlined with them. I have encouraged them to ask questions as they arise throughout their visit. EKG interpretation: (Preliminary  Rhythm: normal sinus rhythm.  Rate (approx.): 97bpm; Axis: normal; Normal SD, QRS, QTc intervals; ST/T wave: non-specific changes;    Written by Fozia Trevino, ED Scribe, as dictated by Jian Ramsey MD    PROGRESS NOTE:  3:59 AM  Pt resting comfortably at this time. No further episodes of vomiting or diarrhea throughout ED stay. Will continue to monitor. Written by Sandee Jc, ED Scribe, as dictated by Fabiana Yao MD    Progress note:  4:58 AM  Pt noted to be feeling better, ready for discharge. Updated pt and/or family on all final lab and imaging findings. Will follow up as instructed. All questions have been answered, pt voiced understanding and agreement with plan. Abx were prescribed, pt advised that diarrhea and rash are possible side effects of the medications. Specific return precautions provided as well as instructions to return to the ED should sx worsen at any time. Vital signs stable for discharge. Written by Sandee Jc, SCOOBY Scribe, as dictated by Fabiana Yao MD    Critical Care Time:     none      Diagnosis     Clinical Impression:   1. Diarrhea, unspecified type    2. Abdominal pain, generalized    3. Umbilical hernia without obstruction and without gangrene    4. Abdominal pain, LLQ (left lower quadrant)        PLAN:  1. Discharge Medication List as of 1/8/2019  4:34 AM      START taking these medications    Details   metroNIDAZOLE (FLAGYL) 500 mg tablet Take 1 Tab by mouth three (3) times daily for 7 days. , Print, Disp-21 Tab, R-0         CONTINUE these medications which have CHANGED    Details   ondansetron (ZOFRAN ODT) 4 mg disintegrating tablet Take 1 Tab by mouth every eight (8) hours as needed for Nausea. , Print, Disp-10 Tab, R-0      traMADol (ULTRAM) 50 mg tablet Take 1 Tab by mouth every six (6) hours as needed for Pain.  Max Daily Amount: 200 mg., Print, Disp-10 Tab, R-0         CONTINUE these medications which have NOT CHANGED    Details   butalbital-acetaminophen-caff (FIORICET) -40 mg per capsule Take 1 Cap by mouth every four (4) hours as needed for Pain., Normal, Disp-20 Cap, R-0      metFORMIN (GLUCOPHAGE) 500 mg tablet Take 500 mg by mouth daily (with breakfast). , Historical Med      Pramoxine (PROCTOFOAM) 1 % topical foam Apply  to affected area three (3) times daily as needed for Hemorrhoids. , Normal, Disp-1 Can, R-0      naloxone (NARCAN) 2 mg/actuation spry Use 1 spray intranasally into 1 nostril. Use a new Narcan nasal spray for subsequent doses and administer into alternating nostrils. May repeat every 2 to 3 minutes as needed. , Print, Disp-1 Device, R-2      losartan-hydroCHLOROthiazide (HYZAAR) 100-12.5 mg per tablet Take 1 Tab by mouth daily. , Historical Med      albuterol sulfate (PROVENTIL;VENTOLIN) 2.5 mg/0.5 mL nebu nebulizer solution 2.5 mg by Nebulization route every twelve (12) hours. Patient mixes this agent with acetylcysteine (20%) nebulizer solution for breathing treatment., Historical Med      acetaminophen (TYLENOL) 500 mg tablet Take 1,000 mg by mouth every six (6) hours as needed for Pain., Historical Med      LACTOBACIL 2-S. THERMO-BIFIDO 1 (VSL#3 PO) Take 1 Packet by mouth daily. , Historical Med      EPINEPHrine (EPIPEN) 0.3 mg/0.3 mL (1:1,000) injection 0.3 mL by IntraMUSCular route once as needed for up to 1 dose., Print, Disp-0.3 mL, R-1      estradiol (ESTRACE) 1 mg tablet Take 1 mg by mouth daily. , Historical Med      albuterol (PROVENTIL, VENTOLIN) 90 mcg/Actuation inhaler Take 2 Puffs by inhalation every six (6) hours as needed., Historical Med           2. Follow-up Information     Follow up With Specialties Details Why Contact Info    BEBA Hightower Physician Assistant Call today  [place] [address] [phone]      [facility] EMERGENCY DEPT Emergency Medicine  If symptoms worsen [address]  [address] [facility]  [phone]        Return to ED if worse     Disposition:    DISCHARGE NOTE:  5:01 AM  The patient's results have been reviewed with family and/or caregiver.  They verbally convey their understanding and agreement of the patient's signs, symptoms, diagnosis, treatment, and prognosis. They additionally agree to follow up as recommended in the discharge instructions or to return to the Emergency Room should the patient's condition change prior to their follow-up appointment. The family and/or caregiver verbally agrees with the care-plan and all of their questions have been answered. The discharge instructions have also been provided to the them along with educational information regarding the patient's diagnosis and a list of reasons why the patient would want to return to the ER prior to their follow-up appointment should their condition change. Written by SCOOBY Baldwinibshena, as dictated by Ren Mccloud MD.             Attestations: This note is prepared by Gilles Lyn, acting as Scribe for MD Fabiana Pacheco MD : The scribe's documentation has been prepared under my direction and personally reviewed by me in its entirety. I confirm that the note above accurately reflects all work, treatment, procedures, and medical decision making performed by me. This note will not be viewable in [address]

## 2019-01-08 NOTE — DISCHARGE INSTRUCTIONS
Patient Education        Abdominal Pain: Care Instructions  Your Care Instructions    Abdominal pain has many possible causes. Some aren't serious and get better on their own in a few days. Others need more testing and treatment. If your pain continues or gets worse, you need to be rechecked and may need more tests to find out what is wrong. You may need surgery to correct the problem. Don't ignore new symptoms, such as fever, nausea and vomiting, urination problems, pain that gets worse, and dizziness. These may be signs of a more serious problem. Your doctor may have recommended a follow-up visit in the next 8 to 12 hours. If you are not getting better, you may need more tests or treatment. The doctor has checked you carefully, but problems can develop later. If you notice any problems or new symptoms, get medical treatment right away. Follow-up care is a key part of your treatment and safety. Be sure to make and go to all appointments, and call your doctor if you are having problems. It's also a good idea to know your test results and keep a list of the medicines you take. How can you care for yourself at home? · Rest until you feel better. · To prevent dehydration, drink plenty of fluids, enough so that your urine is light yellow or clear like water. Choose water and other caffeine-free clear liquids until you feel better. If you have kidney, heart, or liver disease and have to limit fluids, talk with your doctor before you increase the amount of fluids you drink. · If your stomach is upset, eat mild foods, such as rice, dry toast or crackers, bananas, and applesauce. Try eating several small meals instead of two or three large ones. · Wait until 48 hours after all symptoms have gone away before you have spicy foods, alcohol, and drinks that contain caffeine. · Do not eat foods that are high in fat. · Avoid anti-inflammatory medicines such as aspirin, ibuprofen (Advil, Motrin), and naproxen (Aleve). These can cause stomach upset. Talk to your doctor if you take daily aspirin for another health problem. When should you call for help? Call 911 anytime you think you may need emergency care. For example, call if:    · You passed out (lost consciousness).     · You pass maroon or very bloody stools.     · You vomit blood or what looks like coffee grounds.     · You have new, severe belly pain.    Call your doctor now or seek immediate medical care if:    · Your pain gets worse, especially if it becomes focused in one area of your belly.     · You have a new or higher fever.     · Your stools are black and look like tar, or they have streaks of blood.     · You have unexpected vaginal bleeding.     · You have symptoms of a urinary tract infection. These may include:  ? Pain when you urinate. ? Urinating more often than usual.  ? Blood in your urine.     · You are dizzy or lightheaded, or you feel like you may faint.    Watch closely for changes in your health, and be sure to contact your doctor if:    · You are not getting better after 1 day (24 hours). Where can you learn more? Go to http://gladys3DLT.comalia.info/. Enter U602 in the search box to learn more about \"Abdominal Pain: Care Instructions. \"  Current as of: November 20, 2017  Content Version: 11.8  © 6382-7264 [employer]. Care instructions adapted under license by [employer] (which disclaims liability or warranty for this information). If you have questions about a medical condition or this instruction, always ask your healthcare professional. [place] [address] any warranty or liability for your use of this information. Patient Education        Diarrhea: Care Instructions  Your Care Instructions    Diarrhea is loose, watery stools (bowel movements). The exact cause is often hard to find. Sometimes diarrhea is your body's way of getting rid of what caused an upset stomach.  Viruses, food poisoning, and many medicines can cause diarrhea. Some people get diarrhea in response to emotional stress, anxiety, or certain foods. Almost everyone has diarrhea now and then. It usually isn't serious, and your stools will return to normal soon. The important thing to do is replace the fluids you have lost, so you can prevent dehydration. The doctor has checked you carefully, but problems can develop later. If you notice any problems or new symptoms, get medical treatment right away. Follow-up care is a key part of your treatment and safety. Be sure to make and go to all appointments, and call your doctor if you are having problems. It's also a good idea to know your test results and keep a list of the medicines you take. How can you care for yourself at home? · Watch for signs of dehydration, which means your body has lost too much water. Dehydration is a serious condition and should be treated right away. Signs of dehydration are:  ? Increasing thirst and dry eyes and mouth. ? Feeling faint or lightheaded. ? Darker urine, and a smaller amount of urine than normal.  · To prevent dehydration, drink plenty of fluids, enough so that your urine is light yellow or clear like water. Choose water and other caffeine-free clear liquids until you feel better. If you have kidney, heart, or liver disease and have to limit fluids, talk with your doctor before you increase the amount of fluids you drink. · Begin eating small amounts of mild foods the next day, if you feel like it. ? Try yogurt that has live cultures of Lactobacillus. (Check the label.)  ? Avoid spicy foods, fruits, alcohol, and caffeine until 48 hours after all symptoms are gone. ? Avoid chewing gum that contains sorbitol. ? Avoid dairy products (except for yogurt with Lactobacillus) while you have diarrhea and for 3 days after symptoms are gone.   · The doctor may recommend that you take over-the-counter medicine, such as loperamide (Imodium), if you still have diarrhea after 6 hours. Read and follow all instructions on the label. Do not use this medicine if you have bloody diarrhea, a high fever, or other signs of serious illness. Call your doctor if you think you are having a problem with your medicine. When should you call for help? Call 911 anytime you think you may need emergency care. For example, call if:    · You passed out (lost consciousness).     · Your stools are maroon or very bloody.    Call your doctor now or seek immediate medical care if:    · You are dizzy or lightheaded, or you feel like you may faint.     · Your stools are black and look like tar, or they have streaks of blood.     · You have new or worse belly pain.     · You have symptoms of dehydration, such as:  ? Dry eyes and a dry mouth. ? Passing only a little dark urine. ? Feeling thirstier than usual.     · You have a new or higher fever.    Watch closely for changes in your health, and be sure to contact your doctor if:    · Your diarrhea is getting worse.     · You see pus in the diarrhea.     · You are not getting better after 2 days (48 hours). Where can you learn more? Go to http://gladys-alia.info/. Enter Y550 in the search box to learn more about \"Diarrhea: Care Instructions. \"  Current as of: November 20, 2017  Content Version: 11.8  © 3373-0556 [employer]. Care instructions adapted under license by [employer] (which disclaims liability or warranty for this information). If you have questions about a medical condition or this instruction, always ask your healthcare professional. [place] [address] any warranty or liability for your use of this information.

## 2019-01-09 LAB
BACTERIA SPEC CULT: NORMAL
CC UR VC: NORMAL
SERVICE CMNT-IMP: NORMAL

## 2019-02-05 ENCOUNTER — APPOINTMENT (OUTPATIENT)
Dept: CT IMAGING | Age: 49
DRG: 248 | End: 2019-02-05
Attending: EMERGENCY MEDICINE
Payer: MEDICAID

## 2019-02-05 ENCOUNTER — HOSPITAL ENCOUNTER (EMERGENCY)
Age: 49
Discharge: HOME | DRG: 248 | End: 2019-02-06
Attending: EMERGENCY MEDICINE
Payer: MEDICAID

## 2019-02-05 DIAGNOSIS — R19.7 DIARRHEA, UNSPECIFIED TYPE: ICD-10-CM

## 2019-02-05 DIAGNOSIS — R10.84 ABDOMINAL PAIN, GENERALIZED: Primary | ICD-10-CM

## 2019-02-05 LAB
ALBUMIN SERPL-MCNC: 4.1 G/DL (ref 3.5–5)
ALBUMIN/GLOB SERPL: 1.1 {RATIO} (ref 1.1–2.2)
ALP SERPL-CCNC: 65 U/L (ref 45–117)
ALT SERPL-CCNC: 40 U/L (ref 12–78)
ANION GAP SERPL CALC-SCNC: 13 MMOL/L (ref 5–15)
AST SERPL-CCNC: 34 U/L (ref 15–37)
BASOPHILS # BLD: 0 K/UL (ref 0–0.1)
BASOPHILS NFR BLD: 0 % (ref 0–1)
BILIRUB SERPL-MCNC: 0.6 MG/DL (ref 0.2–1)
BUN SERPL-MCNC: 5 MG/DL (ref 6–20)
BUN/CREAT SERPL: 6 (ref 12–20)
CALCIUM SERPL-MCNC: 9.7 MG/DL (ref 8.5–10.1)
CHLORIDE SERPL-SCNC: 101 MMOL/L (ref 97–108)
CK MB CFR SERPL CALC: NORMAL % (ref 0–2.5)
CK MB SERPL-MCNC: <1 NG/ML (ref 5–25)
CK SERPL-CCNC: 217 U/L (ref 26–192)
CO2 SERPL-SCNC: 23 MMOL/L (ref 21–32)
CREAT SERPL-MCNC: 0.83 MG/DL (ref 0.55–1.02)
DIFFERENTIAL METHOD BLD: ABNORMAL
EOSINOPHIL # BLD: 0.2 K/UL (ref 0–0.4)
EOSINOPHIL NFR BLD: 2 % (ref 0–7)
ERYTHROCYTE [DISTWIDTH] IN BLOOD BY AUTOMATED COUNT: 13.8 % (ref 11.5–14.5)
GLOBULIN SER CALC-MCNC: 3.9 G/DL (ref 2–4)
GLUCOSE SERPL-MCNC: 227 MG/DL (ref 65–100)
HCT VFR BLD AUTO: 39.1 % (ref 35–47)
HEMOCCULT STL QL: NEGATIVE
HGB BLD-MCNC: 14 G/DL (ref 11.5–16)
IMM GRANULOCYTES # BLD AUTO: 0 K/UL (ref 0–0.04)
IMM GRANULOCYTES NFR BLD AUTO: 1 % (ref 0–0.5)
LIPASE SERPL-CCNC: 155 U/L (ref 73–393)
LYMPHOCYTES # BLD: 1.3 K/UL (ref 0.8–3.5)
LYMPHOCYTES NFR BLD: 15 % (ref 12–49)
MCH RBC QN AUTO: 29.1 PG (ref 26–34)
MCHC RBC AUTO-ENTMCNC: 35.8 G/DL (ref 30–36.5)
MCV RBC AUTO: 81.3 FL (ref 80–99)
MONOCYTES # BLD: 0.3 K/UL (ref 0–1)
MONOCYTES NFR BLD: 4 % (ref 5–13)
NEUTS SEG # BLD: 6.9 K/UL (ref 1.8–8)
NEUTS SEG NFR BLD: 79 % (ref 32–75)
NRBC # BLD: 0 K/UL (ref 0–0.01)
NRBC BLD-RTO: 0 PER 100 WBC
PLATELET # BLD AUTO: 191 K/UL (ref 150–400)
PMV BLD AUTO: 9.7 FL (ref 8.9–12.9)
POTASSIUM SERPL-SCNC: 3.7 MMOL/L (ref 3.5–5.1)
PROT SERPL-MCNC: 8 G/DL (ref 6.4–8.2)
RBC # BLD AUTO: 4.81 M/UL (ref 3.8–5.2)
SODIUM SERPL-SCNC: 137 MMOL/L (ref 136–145)
TROPONIN I SERPL-MCNC: <0.05 NG/ML
WBC # BLD AUTO: 8.8 K/UL (ref 3.6–11)

## 2019-02-05 PROCEDURE — 96375 TX/PRO/DX INJ NEW DRUG ADDON: CPT

## 2019-02-05 PROCEDURE — 93005 ELECTROCARDIOGRAM TRACING: CPT

## 2019-02-05 PROCEDURE — 87493 C DIFF AMPLIFIED PROBE: CPT

## 2019-02-05 PROCEDURE — 85025 COMPLETE CBC W/AUTO DIFF WBC: CPT

## 2019-02-05 PROCEDURE — 87045 FECES CULTURE AEROBIC BACT: CPT

## 2019-02-05 PROCEDURE — 36415 COLL VENOUS BLD VENIPUNCTURE: CPT

## 2019-02-05 PROCEDURE — 87077 CULTURE AEROBIC IDENTIFY: CPT

## 2019-02-05 PROCEDURE — 74011250636 HC RX REV CODE- 250/636: Performed by: EMERGENCY MEDICINE

## 2019-02-05 PROCEDURE — 96374 THER/PROPH/DIAG INJ IV PUSH: CPT

## 2019-02-05 PROCEDURE — 84484 ASSAY OF TROPONIN QUANT: CPT

## 2019-02-05 PROCEDURE — 74011250637 HC RX REV CODE- 250/637: Performed by: EMERGENCY MEDICINE

## 2019-02-05 PROCEDURE — 82553 CREATINE MB FRACTION: CPT

## 2019-02-05 PROCEDURE — 74176 CT ABD & PELVIS W/O CONTRAST: CPT

## 2019-02-05 PROCEDURE — 82550 ASSAY OF CK (CPK): CPT

## 2019-02-05 PROCEDURE — 80053 COMPREHEN METABOLIC PANEL: CPT

## 2019-02-05 PROCEDURE — 83690 ASSAY OF LIPASE: CPT

## 2019-02-05 PROCEDURE — 82272 OCCULT BLD FECES 1-3 TESTS: CPT

## 2019-02-05 PROCEDURE — 99285 EMERGENCY DEPT VISIT HI MDM: CPT

## 2019-02-05 PROCEDURE — 87449 NOS EACH ORGANISM AG IA: CPT

## 2019-02-05 RX ORDER — ONDANSETRON 2 MG/ML
4 INJECTION INTRAMUSCULAR; INTRAVENOUS
Status: COMPLETED | OUTPATIENT
Start: 2019-02-05 | End: 2019-02-05

## 2019-02-05 RX ORDER — HALOPERIDOL 5 MG/ML
2.5 INJECTION INTRAMUSCULAR
Status: COMPLETED | OUTPATIENT
Start: 2019-02-05 | End: 2019-02-05

## 2019-02-05 RX ORDER — MORPHINE SULFATE 4 MG/ML
4 INJECTION INTRAVENOUS
Status: COMPLETED | OUTPATIENT
Start: 2019-02-05 | End: 2019-02-05

## 2019-02-05 RX ORDER — DIPHENHYDRAMINE HCL 25 MG
25 CAPSULE ORAL
Status: COMPLETED | OUTPATIENT
Start: 2019-02-05 | End: 2019-02-05

## 2019-02-05 RX ADMIN — MORPHINE SULFATE 4 MG: 4 INJECTION INTRAVENOUS at 23:53

## 2019-02-05 RX ADMIN — ONDANSETRON 4 MG: 2 INJECTION INTRAMUSCULAR; INTRAVENOUS at 22:15

## 2019-02-05 RX ADMIN — DIPHENHYDRAMINE HYDROCHLORIDE 25 MG: 25 CAPSULE ORAL at 23:53

## 2019-02-05 RX ADMIN — HALOPERIDOL LACTATE 2.5 MG: 5 INJECTION INTRAMUSCULAR at 22:59

## 2019-02-05 NOTE — LETTER
[address] 
[facility] EMERGENCY DEPT 
[address] [place] [address] [id number] [phone] Work/School Note Date: 2/5/2019 To Whom It May concern: 
 
Jose Rafael Duran was seen and treated today in the emergency room by the following provider(s): 
Physician: Hollis Obrien MD. Jose Rafael Duran may return to work on 2/11/19 without restrictions. Sincerely, 
 
 
 
 
Chandu Garcia

## 2019-02-06 VITALS
RESPIRATION RATE: 20 BRPM | BODY MASS INDEX: 37.73 KG/M2 | TEMPERATURE: 98.3 F | OXYGEN SATURATION: 98 % | HEART RATE: 98 BPM | HEIGHT: 62 IN | DIASTOLIC BLOOD PRESSURE: 63 MMHG | WEIGHT: 205 LBS | SYSTOLIC BLOOD PRESSURE: 127 MMHG

## 2019-02-06 LAB
APPEARANCE UR: CLEAR
ATRIAL RATE: 103 BPM
BACTERIA URNS QL MICRO: NEGATIVE /HPF
BILIRUB UR QL: NEGATIVE
C DIFF TOX GENS STL QL NAA+PROBE: POSITIVE
CALCULATED P AXIS, ECG09: 49 DEGREES
CALCULATED R AXIS, ECG10: 37 DEGREES
CALCULATED T AXIS, ECG11: 51 DEGREES
COLOR UR: ABNORMAL
DIAGNOSIS, 93000: NORMAL
EPITH CASTS URNS QL MICRO: ABNORMAL /LPF
GLUCOSE UR STRIP.AUTO-MCNC: 250 MG/DL
HCG UR QL: NEGATIVE
HGB UR QL STRIP: NEGATIVE
INTERPRETATION: ABNORMAL
KETONES UR QL STRIP.AUTO: NEGATIVE MG/DL
LEUKOCYTE ESTERASE UR QL STRIP.AUTO: NEGATIVE
NITRITE UR QL STRIP.AUTO: NEGATIVE
P-R INTERVAL, ECG05: 152 MS
PCR REFLEX: ABNORMAL
PH UR STRIP: 5.5 [PH] (ref 5–8)
PROT UR STRIP-MCNC: ABNORMAL MG/DL
Q-T INTERVAL, ECG07: 346 MS
QRS DURATION, ECG06: 80 MS
QTC CALCULATION (BEZET), ECG08: 453 MS
RBC #/AREA URNS HPF: ABNORMAL /HPF (ref 0–5)
SP GR UR REFRACTOMETRY: 1.02 (ref 1–1.03)
UA: UC IF INDICATED,UAUC: ABNORMAL
UROBILINOGEN UR QL STRIP.AUTO: 0.2 EU/DL (ref 0.2–1)
VENTRICULAR RATE, ECG03: 103 BPM
WBC URNS QL MICRO: ABNORMAL /HPF (ref 0–4)

## 2019-02-06 PROCEDURE — 74011250636 HC RX REV CODE- 250/636: Performed by: EMERGENCY MEDICINE

## 2019-02-06 PROCEDURE — 81025 URINE PREGNANCY TEST: CPT

## 2019-02-06 PROCEDURE — 81001 URINALYSIS AUTO W/SCOPE: CPT

## 2019-02-06 PROCEDURE — 96376 TX/PRO/DX INJ SAME DRUG ADON: CPT

## 2019-02-06 RX ORDER — PROCHLORPERAZINE EDISYLATE 5 MG/ML
5 INJECTION INTRAMUSCULAR; INTRAVENOUS
Status: COMPLETED | OUTPATIENT
Start: 2019-02-06 | End: 2019-02-06

## 2019-02-06 RX ORDER — MORPHINE SULFATE 4 MG/ML
4 INJECTION INTRAVENOUS
Status: COMPLETED | OUTPATIENT
Start: 2019-02-06 | End: 2019-02-06

## 2019-02-06 RX ORDER — PROCHLORPERAZINE MALEATE 5 MG
5 TABLET ORAL
Qty: 12 TAB | Refills: 0 | Status: ON HOLD | OUTPATIENT
Start: 2019-02-06 | End: 2019-02-12 | Stop reason: SDUPTHER

## 2019-02-06 RX ORDER — DICYCLOMINE HYDROCHLORIDE 20 MG/1
20 TABLET ORAL
Qty: 20 TAB | Refills: 0 | Status: SHIPPED | OUTPATIENT
Start: 2019-02-06 | End: 2019-04-01

## 2019-02-06 RX ADMIN — MORPHINE SULFATE 4 MG: 4 INJECTION INTRAVENOUS at 02:13

## 2019-02-06 RX ADMIN — PROCHLORPERAZINE EDISYLATE 5 MG: 5 INJECTION INTRAMUSCULAR; INTRAVENOUS at 02:12

## 2019-02-06 NOTE — DISCHARGE INSTRUCTIONS

## 2019-02-06 NOTE — ED PROVIDER NOTES
EMERGENCY DEPARTMENT HISTORY AND PHYSICAL EXAM      Date: 2/5/2019  Patient Name: Sheldon Christianson    History of Presenting Illness     Chief Complaint   Patient presents with    Abdominal Pain     Middle abdominal pain that started yesterday. Hx of diverticulitis. Hx of resection    Rectal Bleeding     Pt noticed bright red blood with diarrhea.  Vomiting     Vomitng since Sunday. Seen at [facility] and dx home with no relief    Diarrhea     Hx of c diff. Started on flagyl for c diff and keflex for kidney infection on sunday.  Rash     Pt noticed hives on arms. Unsure origin. PT states \"it gets real bad real quick. \"       History Provided By: Patient    HPI: Sheldon Christianson, 50 y.o. female with PMHx significant for HTN, NIDDM, diverticulitis, presents via EMS transport to the ED with cc of acute on chronic epigastric to periumbilical abdominal pain radiating to the LUQ/LLQ and N/V/D x 3 days, worse today, as well as BRBPR x today. She explains that she has had 15-20 episodes of diarrhea for the past 3 days. Pt also notes that her \"burps smell weird\" stating they smell like \"rotten eggs and poop. \" She reports a hx of C. Diff. Pt notes that he has been seen at multiple ED's recently for her sx's, most recently at [facility] 3 days ago, where she had a CT scan. She reports that CT scan was normal. Pt also notes that she was recently dx'd with a UTI and was started on Keflex, stating she also mentioned her loose stools and was started on Flagyl. She denies any OTC medications or any other notable modifying factors for her sx's. She specifically denies any fever, chills, SOB, CP, HA, dysuria, hematuria, or rash. There are no other complaints, changes, or physical findings at this time.     Social Hx: + EtOH (rare); - Smoker; - Illicit Drugs     PCP: BEBA Arriola    Current Outpatient Medications   Medication Sig Dispense Refill    prochlorperazine (COMPAZINE) 5 mg tablet Take 1 Tab by mouth every eight (8) hours as needed for Nausea. 12 Tab 0    dicyclomine (BENTYL) 20 mg tablet Take 1 Tab by mouth every six (6) hours as needed (abdominal cramps). 20 Tab 0    ondansetron (ZOFRAN ODT) 4 mg disintegrating tablet Take 1 Tab by mouth every eight (8) hours as needed for Nausea. 10 Tab 0    traMADol (ULTRAM) 50 mg tablet Take 1 Tab by mouth every six (6) hours as needed for Pain. Max Daily Amount: 200 mg. 10 Tab 0    butalbital-acetaminophen-caff (FIORICET) -40 mg per capsule Take 1 Cap by mouth every four (4) hours as needed for Pain. 20 Cap 0    metFORMIN (GLUCOPHAGE) 500 mg tablet Take 500 mg by mouth daily (with breakfast).  Pramoxine (PROCTOFOAM) 1 % topical foam Apply  to affected area three (3) times daily as needed for Hemorrhoids. 1 Can 0    naloxone (NARCAN) 2 mg/actuation spry Use 1 spray intranasally into 1 nostril. Use a new Narcan nasal spray for subsequent doses and administer into alternating nostrils. May repeat every 2 to 3 minutes as needed. 1 Device 2    losartan-hydroCHLOROthiazide (HYZAAR) 100-12.5 mg per tablet Take 1 Tab by mouth daily.  albuterol sulfate (PROVENTIL;VENTOLIN) 2.5 mg/0.5 mL nebu nebulizer solution 2.5 mg by Nebulization route every twelve (12) hours. Patient mixes this agent with acetylcysteine (20%) nebulizer solution for breathing treatment.  acetaminophen (TYLENOL) 500 mg tablet Take 1,000 mg by mouth every six (6) hours as needed for Pain.  LACTOBACIL 2-S. THERMO-BIFIDO 1 (VSL#3 PO) Take 1 Packet by mouth daily.  EPINEPHrine (EPIPEN) 0.3 mg/0.3 mL (1:1,000) injection 0.3 mL by IntraMUSCular route once as needed for up to 1 dose. 0.3 mL 1    estradiol (ESTRACE) 1 mg tablet Take 1 mg by mouth daily.  albuterol (PROVENTIL, VENTOLIN) 90 mcg/Actuation inhaler Take 2 Puffs by inhalation every six (6) hours as needed.          Past History     Past Medical History:  Past Medical History:   Diagnosis Date    Anal fissure     Anisocoria     Asthma     LAST EPISODE     Back pain     Cerumen impaction     Chronic kidney disease     hx uti in past    Coagulation defects     ocassional rectal bleeding due to anal fissure    Colovaginal fistula     Diabetes (HCC)     NIDDM    Diabetes ([facility] [address]     Diverticulitis     Diverticulosis     Enlarged tonsils     Frequent UTI     GERD (gastroesophageal reflux disease)     H/O endoscopy     with dilation    HA (headache)     Hepatic steatosis     Hx of colonoscopy with polypectomy     benign    Hypertension     Ill-defined condition     FREQUENT HIVES    Ill-defined condition     HX ELEVATED LIVER ENZYMES    Morbid obesity (HCC)     Nausea & vomiting     during diverticulitis flare    Obesity     Otitis media     Pneumonia     about 15 yrs ago    Psychiatric disorder     ANXIETY    Recurrent tonsillitis     Sinusitis     Transfusion history ~ age 35    postop hysterectomy    Unspecified sleep apnea     snores ( not diagnosed yet)     Urticaria     Urticaria        Past Surgical History:  Past Surgical History:   Procedure Laterality Date    ABDOMEN SURGERY PROC UNLISTED  2018    hernia repair at [facility]    [address]    [place].     HX GI  12    LAPAROSCOPIC HAND ASSISTED  POSS OPEN SIGMOID COLECTOMY POSS TEMPORARY DIVERTING LOOP ILEOSTOMY;  (no illeostomy needed)    HX GYN           HX GYN      cervical conization    HX HEENT      SINUS SURGERY LEFT X2    HX HEENT      SINUS SURGERY ON RIGHT X2    HX OTHER SURGICAL      Sphincterotomy    HX PELVIC LAPAROSCOPY      HX EMMANUEL AND BSO      HX UROLOGICAL  12     CYSTOSCOPY INSERTION URETERAL CATHETERS - Cystoscopy Insertion of bilateral ureteral stents       Family History:  Family History   Problem Relation Age of Onset    Diabetes Mother     Cancer Mother         NON-HODGKINS LYMPHOMA    Anesth Problems Mother         PONV    Diabetes Father     Heart Disease Father         CAD - STENTS, PACEMAKER    Arrhythmia Father        Social History:  Social History     Tobacco Use    Smoking status: Never Smoker    Smokeless tobacco: Never Used   Substance Use Topics    Alcohol use: Yes     Comment: Rarely    Drug use: No       Allergies: Allergies   Allergen Reactions    Aspirin Shortness of Breath    Prilosec [Omeprazole Magnesium] Anaphylaxis     CHERRY FLAVORED; PT TAKES REGULAR PRILOSEC AND IS OK    Codeine Hives and Itching    Contrast Agent [Iodine] Itching     Pt. Had itching after IV contrast with the last exam.  Benadryl was given    Morphine Itching     Severe itching. Fine to take benadryl    Fentanyl Rash    Ketorolac Rash     \"makes my eyes spasm and causes rash on my hands\"    Percocet [Oxycodone-Acetaminophen] Hives       Review of Systems   Review of Systems   Constitutional: Negative for chills and fever. HENT: Negative for congestion, rhinorrhea and sore throat. Respiratory: Negative for cough and shortness of breath. Cardiovascular: Negative for chest pain. Gastrointestinal: Positive for abdominal pain, blood in stool (BRBPR), diarrhea, nausea and vomiting. Genitourinary: Negative for dysuria, hematuria and urgency. Skin: Negative for rash. Neurological: Negative for dizziness, light-headedness and headaches. All other systems reviewed and are negative. Physical Exam   Physical Exam   Constitutional: She is oriented to person, place, and time. She appears well-developed and well-nourished. No distress. Tearful, anxious   HENT:   Head: Normocephalic and atraumatic. Eyes: Conjunctivae and EOM are normal. Pupils are equal, round, and reactive to light. Neck: Normal range of motion. Cardiovascular: Normal rate, regular rhythm and intact distal pulses. Pulmonary/Chest: Effort normal and breath sounds normal. No stridor. No respiratory distress. Abdominal: Soft. She exhibits no distension. There is tenderness.    Mild diffuse abdominal tenderness that is distractable   Musculoskeletal: Normal range of motion. Neurological: She is alert and oriented to person, place, and time. Skin: Skin is warm and dry. Psychiatric: She has a normal mood and affect. Nursing note and vitals reviewed. Diagnostic Study Results     Labs -     Recent Results (from the past 12 hour(s))   EKG, 12 LEAD, INITIAL    Collection Time: 02/05/19  9:31 PM   Result Value Ref Range    Ventricular Rate 103 BPM    Atrial Rate 103 BPM    P-R Interval 152 ms    QRS Duration 80 ms    Q-T Interval 346 ms    QTC Calculation (Bezet) 453 ms    Calculated P Axis 49 degrees    Calculated R Axis 37 degrees    Calculated T Axis 51 degrees    Diagnosis       Sinus tachycardia  When compared with ECG of 07-JAN-2019 23:41,  Nonspecific T wave abnormality, improved in Inferior leads  Nonspecific T wave abnormality no longer evident in Lateral leads     CBC WITH AUTOMATED DIFF    Collection Time: 02/05/19 10:17 PM   Result Value Ref Range    WBC 8.8 3.6 - 11.0 K/uL    RBC 4.81 3.80 - 5.20 M/uL    HGB 14.0 11.5 - 16.0 g/dL    HCT 39.1 35.0 - 47.0 %    MCV 81.3 80.0 - 99.0 FL    MCH 29.1 26.0 - 34.0 PG    MCHC 35.8 30.0 - 36.5 g/dL    RDW 13.8 11.5 - 14.5 %    PLATELET 815 851 - 881 K/uL    MPV 9.7 8.9 - 12.9 FL    NRBC 0.0 0  WBC    ABSOLUTE NRBC 0.00 0.00 - 0.01 K/uL    NEUTROPHILS 79 (H) 32 - 75 %    LYMPHOCYTES 15 12 - 49 %    MONOCYTES 4 (L) 5 - 13 %    EOSINOPHILS 2 0 - 7 %    BASOPHILS 0 0 - 1 %    IMMATURE GRANULOCYTES 1 (H) 0.0 - 0.5 %    ABS. NEUTROPHILS 6.9 1.8 - 8.0 K/UL    ABS. LYMPHOCYTES 1.3 0.8 - 3.5 K/UL    ABS. MONOCYTES 0.3 0.0 - 1.0 K/UL    ABS. EOSINOPHILS 0.2 0.0 - 0.4 K/UL    ABS. BASOPHILS 0.0 0.0 - 0.1 K/UL    ABS. IMM.  GRANS. 0.0 0.00 - 0.04 K/UL    DF AUTOMATED     METABOLIC PANEL, COMPREHENSIVE    Collection Time: 02/05/19 10:17 PM   Result Value Ref Range    Sodium 137 136 - 145 mmol/L    Potassium 3.7 3.5 - 5.1 mmol/L    Chloride 101 97 - 108 mmol/L CO2 23 21 - 32 mmol/L    Anion gap 13 5 - 15 mmol/L    Glucose 227 (H) 65 - 100 mg/dL    BUN 5 (L) 6 - 20 MG/DL    Creatinine 0.83 0.55 - 1.02 MG/DL    BUN/Creatinine ratio 6 (L) 12 - 20      GFR est AA >60 >60 ml/min/1.73m2    GFR est non-AA >60 >60 ml/min/1.73m2    Calcium 9.7 8.5 - 10.1 MG/DL    Bilirubin, total 0.6 0.2 - 1.0 MG/DL    ALT (SGPT) 40 12 - 78 U/L    AST (SGOT) 34 15 - 37 U/L    Alk. phosphatase 65 45 - 117 U/L    Protein, total 8.0 6.4 - 8.2 g/dL    Albumin 4.1 3.5 - 5.0 g/dL    Globulin 3.9 2.0 - 4.0 g/dL    A-G Ratio 1.1 1.1 - 2.2     TROPONIN I    Collection Time: 02/05/19 10:17 PM   Result Value Ref Range    Troponin-I, Qt. <0.05 <0.05 ng/mL   CK W/ REFLX CKMB    Collection Time: 02/05/19 10:17 PM   Result Value Ref Range     (H) 26 - 192 U/L   LIPASE    Collection Time: 02/05/19 10:17 PM   Result Value Ref Range    Lipase 155 73 - 393 U/L   CK-MB,QUANT.     Collection Time: 02/05/19 10:17 PM   Result Value Ref Range    CK - MB <1.0 <3.6 NG/ML    CK-MB Index Cannot be calculated 0 - 2.5     OCCULT BLOOD, STOOL    Collection Time: 02/05/19 11:05 PM   Result Value Ref Range    Occult blood, stool NEGATIVE  NEG     HCG URINE, QL. - POC    Collection Time: 02/06/19 12:45 AM   Result Value Ref Range    Pregnancy test,urine (POC) NEGATIVE  NEG     URINALYSIS W/ REFLEX CULTURE    Collection Time: 02/06/19 12:46 AM   Result Value Ref Range    Color DARK YELLOW      Appearance CLEAR CLEAR      Specific gravity 1.025 1.003 - 1.030      pH (UA) 5.5 5.0 - 8.0      Protein TRACE (A) NEG mg/dL    Glucose 250 (A) NEG mg/dL    Ketone NEGATIVE  NEG mg/dL    Bilirubin NEGATIVE  NEG      Blood NEGATIVE  NEG      Urobilinogen 0.2 0.2 - 1.0 EU/dL    Nitrites NEGATIVE  NEG      Leukocyte Esterase NEGATIVE  NEG      WBC 0-4 0 - 4 /hpf    RBC 0-5 0 - 5 /hpf    Epithelial cells FEW FEW /lpf    Bacteria NEGATIVE  NEG /hpf    UA:UC IF INDICATED CULTURE NOT INDICATED BY UA RESULT CNI         Radiologic Studies -   CT ABD PELV WO CONT   Final Result   IMPRESSION:    No acute abnormality in the abdomen or pelvis. Stable splenomegaly. CT Results  (Last 48 hours)               02/05/19 2341  CT ABD PELV WO CONT Final result    Impression:  IMPRESSION:    No acute abnormality in the abdomen or pelvis. Stable splenomegaly. Narrative:  EXAM:  CT abdomen pelvis with contrast       INDICATION: Mid abdominal pain. COMPARISON: CT 1/8/2019. TECHNIQUE: Helical CT of the abdomen  and pelvis  without contrast. Coronal and   sagittal reformats are performed. CT dose reduction was achieved through use of   a standardized protocol tailored for this examination and automatic exposure   control for dose modulation. FINDINGS:    Solid organ evaluation is limited without contrast.        The visualized lung bases demonstrate no mass or consolidation. The heart size   is normal. There is no pericardial or pleural effusion. There is no renal, ureteral, or bladder calculus. The kidneys are symmetric   without hydronephrosis. There is no perinephric fluid or fat stranding. There is stable spleen enlargement. The liver, pancreas, and adrenal glands are   normal.  The gall bladder is present  without intra- or extra-hepatic biliary   dilatation. Surgical changes are seen following distal colon resection. There are no dilated   bowel loops. The appendix is normal. There is mild distal colonic   diverticulosis without focal adjacent inflammation. There are no enlarged lymph nodes. There is no free fluid or free air. The   aorta tapers without aneurysm. The urinary bladder is unremarkable. There is no pelvic mass. The uterus is   surgically absent. There is a stable small fat-containing umbilical hernia. The bony structures are   age-appropriate. Medical Decision Making   I am the first provider for this patient.     I reviewed the vital signs, available nursing notes, past medical history, past surgical history, family history and social history. Vital Signs-Reviewed the patient's vital signs. Patient Vitals for the past 12 hrs:   Temp Pulse Resp BP SpO2   02/06/19 0200 -- -- -- 145/68 94 %   02/06/19 0145 -- -- -- 130/77 95 %   02/06/19 0130 -- -- -- 136/71 95 %   02/06/19 0115 -- -- -- 153/88 94 %   02/06/19 0100 -- -- -- 146/74 94 %   02/06/19 0047 -- -- -- 141/83 97 %   02/06/19 0015 -- -- -- 143/84 92 %   02/06/19 0001 98.3 °F (36.8 °C) 98 20 150/88 --   02/05/19 2353 -- -- -- 149/90 --   02/05/19 2330 -- -- -- -- 96 %   02/05/19 2315 -- -- -- (!) 144/91 96 %   02/05/19 2301 -- -- -- 148/89 --   02/05/19 2120 97.9 °F (36.6 °C) 100 16 112/65 100 %       Pulse Oximetry Analysis - 100% on RA    Cardiac Monitor:   Rate: 100 bpm  Rhythm: Normal Sinus Rhythm      Records Reviewed: Nursing Notes, Old Medical Records, Ambulance Run Sheet, Previous Radiology Studies and Previous Laboratory Studies    EKG interpretation: (Preliminary)2135  Rhythm: sinus tachycardia; and regular . Rate (approx.): 103; Axis: normal; FL interval: normal; QRS interval: normal ; ST/T wave: normal. Written by Patrick Cheadle. Glenna Doll, ED Scribe, as dictated by Mera Casper. MD Jorge    Provider Notes (Medical Decision Making):   DDx: diverticulitis, UTI, infectious diarrhea, Cdiff, gastroenteritis, pancreatitis, chronic abdominal pain    Plan for basic labs, UA, stool culture, stool Cdiff. Given that patient states the pain is much worse today, will repeat imaging. On chart review, patient has multiple visits for abdominal pain with negative imaging. ED Course:   Initial assessment performed. The patients presenting problems have been discussed, and they are in agreement with the care plan formulated and outlined with them. I have encouraged them to ask questions as they arise throughout their visit. 12:03 AM  Pt has been reassessed by Mera Casper.  MD Jorge.  Pt states that she is feeling better. Updated on CT results. Still awaiting urine. SIGN OUT:  12:04 AM  Patient's presentation, labs/imaging and plan of care was reviewed with Jose E Johnson MD as part of sign out. They will review UA and dispo pt as part of the plan discussed with the patient. Jose E Johnson MD assistance in completion of this plan is greatly appreciated but it should be noted that I will be the provider of record for this patient. Angely Patel MD    2:30 AM  Pt UA negative. Anticipate discharge. Critical Care Time:   None    Disposition:  DISCHARGE  2:35 AM  The patient has been re-evaluated and is ready for discharge. Reviewed available results with patient. Counseled pt on diagnosis and care plan. Pt has expressed understanding, and all questions have been answered. Pt agrees with plan and agrees to follow up as recommended, or return to the ED if their symptoms worsen. Discharge instructions have been provided and explained to the pt, along with reasons to return to the ED. PLAN:  1. Current Discharge Medication List      START taking these medications    Details   prochlorperazine (COMPAZINE) 5 mg tablet Take 1 Tab by mouth every eight (8) hours as needed for Nausea. Qty: 12 Tab, Refills: 0      dicyclomine (BENTYL) 20 mg tablet Take 1 Tab by mouth every six (6) hours as needed (abdominal cramps). Qty: 20 Tab, Refills: 0           2.    Follow-up Information     Follow up With Specialties Details Why Contact Info    BEBA Riddle Physician Assistant Schedule an appointment as soon as possible for a visit  [address] EMERGENCY DEPT Emergency Medicine  As needed, If symptoms worsen [address] [facility] [address] [facility] [address] Corrine Gonzalez., MD Gastroenterology Schedule an appointment as soon as possible for a visit  [address]  [address] [facility]  [phone] Return to ED if worse     Diagnosis     Clinical Impression:   1. Abdominal pain, generalized    2. Diarrhea, unspecified type        This note is prepared by Harriet Self, acting as Scribe for PASCUAL Patel MD.    PASCUAL Patel MD: The scribe's documentation has been prepared under my direction and personally reviewed by me in its entirety. I confirm that the note above accurately reflects all work, treatment, procedures, and medical decision making performed by me. This note will not be viewable in [address]

## 2019-02-06 NOTE — PROGRESS NOTES
Lab called and informed of positive c. Diff culture. Called patient. Verified demographics. Informed of positive culture. Will start Abx, which she has at home. Will follow up with GI or return to the ED for any deterioration.

## 2019-02-07 ENCOUNTER — HOSPITAL ENCOUNTER (INPATIENT)
Age: 49
LOS: 1 days | Discharge: HOME | DRG: 248 | End: 2019-02-08
Attending: EMERGENCY MEDICINE | Admitting: INTERNAL MEDICINE
Payer: MEDICAID

## 2019-02-07 ENCOUNTER — APPOINTMENT (OUTPATIENT)
Dept: GENERAL RADIOLOGY | Age: 49
DRG: 248 | End: 2019-02-07
Attending: EMERGENCY MEDICINE
Payer: MEDICAID

## 2019-02-07 ENCOUNTER — HOSPITAL ENCOUNTER (EMERGENCY)
Age: 49
Discharge: LEFT BEFORE DISCHARGE | DRG: 248 | End: 2019-02-07
Attending: EMERGENCY MEDICINE
Payer: MEDICAID

## 2019-02-07 VITALS
OXYGEN SATURATION: 98 % | RESPIRATION RATE: 16 BRPM | HEIGHT: 62 IN | WEIGHT: 208.11 LBS | SYSTOLIC BLOOD PRESSURE: 147 MMHG | TEMPERATURE: 98.2 F | BODY MASS INDEX: 38.3 KG/M2 | DIASTOLIC BLOOD PRESSURE: 76 MMHG | HEART RATE: 82 BPM

## 2019-02-07 DIAGNOSIS — E87.20 LACTIC ACIDOSIS: ICD-10-CM

## 2019-02-07 DIAGNOSIS — R19.5 STOOL CULTURE POSITIVE FOR CLOSTRIDIUM DIFFICILE: ICD-10-CM

## 2019-02-07 DIAGNOSIS — R10.84 ABDOMINAL PAIN, GENERALIZED: Primary | ICD-10-CM

## 2019-02-07 PROBLEM — A04.72 C. DIFFICILE COLITIS: Status: ACTIVE | Noted: 2019-02-07

## 2019-02-07 LAB
ALBUMIN SERPL-MCNC: 3.6 G/DL (ref 3.5–5)
ALBUMIN SERPL-MCNC: 3.7 G/DL (ref 3.5–5)
ALBUMIN/GLOB SERPL: 1 {RATIO} (ref 1.1–2.2)
ALBUMIN/GLOB SERPL: 1 {RATIO} (ref 1.1–2.2)
ALP SERPL-CCNC: 61 U/L (ref 45–117)
ALP SERPL-CCNC: 63 U/L (ref 45–117)
ALT SERPL-CCNC: 33 U/L (ref 12–78)
ALT SERPL-CCNC: 40 U/L (ref 12–78)
ANION GAP SERPL CALC-SCNC: 8 MMOL/L (ref 5–15)
ANION GAP SERPL CALC-SCNC: 8 MMOL/L (ref 5–15)
APPEARANCE UR: CLEAR
AST SERPL-CCNC: 26 U/L (ref 15–37)
AST SERPL-CCNC: 50 U/L (ref 15–37)
BACTERIA URNS QL MICRO: NEGATIVE /HPF
BASOPHILS # BLD: 0 K/UL (ref 0–0.1)
BASOPHILS # BLD: 0 K/UL (ref 0–0.1)
BASOPHILS NFR BLD: 0 % (ref 0–1)
BASOPHILS NFR BLD: 0 % (ref 0–1)
BILIRUB SERPL-MCNC: 0.4 MG/DL (ref 0.2–1)
BILIRUB SERPL-MCNC: 0.5 MG/DL (ref 0.2–1)
BILIRUB UR QL: NEGATIVE
BUN SERPL-MCNC: 5 MG/DL (ref 6–20)
BUN SERPL-MCNC: 5 MG/DL (ref 6–20)
BUN/CREAT SERPL: 7 (ref 12–20)
BUN/CREAT SERPL: 8 (ref 12–20)
CALCIUM SERPL-MCNC: 8.6 MG/DL (ref 8.5–10.1)
CALCIUM SERPL-MCNC: 8.9 MG/DL (ref 8.5–10.1)
CHLORIDE SERPL-SCNC: 101 MMOL/L (ref 97–108)
CHLORIDE SERPL-SCNC: 102 MMOL/L (ref 97–108)
CO2 SERPL-SCNC: 25 MMOL/L (ref 21–32)
CO2 SERPL-SCNC: 26 MMOL/L (ref 21–32)
COLOR UR: NORMAL
CREAT SERPL-MCNC: 0.65 MG/DL (ref 0.55–1.02)
CREAT SERPL-MCNC: 0.67 MG/DL (ref 0.55–1.02)
DIFFERENTIAL METHOD BLD: ABNORMAL
DIFFERENTIAL METHOD BLD: ABNORMAL
EOSINOPHIL # BLD: 0.2 K/UL (ref 0–0.4)
EOSINOPHIL # BLD: 0.2 K/UL (ref 0–0.4)
EOSINOPHIL NFR BLD: 2 % (ref 0–7)
EOSINOPHIL NFR BLD: 4 % (ref 0–7)
EPITH CASTS URNS QL MICRO: NORMAL /LPF
ERYTHROCYTE [DISTWIDTH] IN BLOOD BY AUTOMATED COUNT: 13.9 % (ref 11.5–14.5)
ERYTHROCYTE [DISTWIDTH] IN BLOOD BY AUTOMATED COUNT: 14.1 % (ref 11.5–14.5)
GLOBULIN SER CALC-MCNC: 3.5 G/DL (ref 2–4)
GLOBULIN SER CALC-MCNC: 3.7 G/DL (ref 2–4)
GLUCOSE BLD STRIP.AUTO-MCNC: 143 MG/DL (ref 65–100)
GLUCOSE SERPL-MCNC: 220 MG/DL (ref 65–100)
GLUCOSE SERPL-MCNC: 245 MG/DL (ref 65–100)
GLUCOSE UR STRIP.AUTO-MCNC: NEGATIVE MG/DL
HCT VFR BLD AUTO: 36 % (ref 35–47)
HCT VFR BLD AUTO: 36.1 % (ref 35–47)
HGB BLD-MCNC: 12.7 G/DL (ref 11.5–16)
HGB BLD-MCNC: 12.8 G/DL (ref 11.5–16)
HGB UR QL STRIP: NEGATIVE
HYALINE CASTS URNS QL MICRO: NORMAL /LPF (ref 0–5)
IMM GRANULOCYTES # BLD AUTO: 0 K/UL (ref 0–0.04)
IMM GRANULOCYTES # BLD AUTO: 0 K/UL (ref 0–0.04)
IMM GRANULOCYTES NFR BLD AUTO: 1 % (ref 0–0.5)
IMM GRANULOCYTES NFR BLD AUTO: 1 % (ref 0–0.5)
KETONES UR QL STRIP.AUTO: NEGATIVE MG/DL
LACTATE BLD-SCNC: 2.68 MMOL/L (ref 0.4–2)
LACTATE SERPL-SCNC: 1.5 MMOL/L (ref 0.4–2)
LEUKOCYTE ESTERASE UR QL STRIP.AUTO: NEGATIVE
LIPASE SERPL-CCNC: 180 U/L (ref 73–393)
LYMPHOCYTES # BLD: 1.4 K/UL (ref 0.8–3.5)
LYMPHOCYTES # BLD: 1.5 K/UL (ref 0.8–3.5)
LYMPHOCYTES NFR BLD: 23 % (ref 12–49)
LYMPHOCYTES NFR BLD: 24 % (ref 12–49)
MAGNESIUM SERPL-MCNC: 2.2 MG/DL (ref 1.6–2.4)
MCH RBC QN AUTO: 29 PG (ref 26–34)
MCH RBC QN AUTO: 29.1 PG (ref 26–34)
MCHC RBC AUTO-ENTMCNC: 35.3 G/DL (ref 30–36.5)
MCHC RBC AUTO-ENTMCNC: 35.5 G/DL (ref 30–36.5)
MCV RBC AUTO: 82 FL (ref 80–99)
MCV RBC AUTO: 82.2 FL (ref 80–99)
MONOCYTES # BLD: 0.3 K/UL (ref 0–1)
MONOCYTES # BLD: 0.4 K/UL (ref 0–1)
MONOCYTES NFR BLD: 5 % (ref 5–13)
MONOCYTES NFR BLD: 6 % (ref 5–13)
NEUTS SEG # BLD: 3.8 K/UL (ref 1.8–8)
NEUTS SEG # BLD: 4.5 K/UL (ref 1.8–8)
NEUTS SEG NFR BLD: 66 % (ref 32–75)
NEUTS SEG NFR BLD: 69 % (ref 32–75)
NITRITE UR QL STRIP.AUTO: NEGATIVE
NRBC # BLD: 0 K/UL (ref 0–0.01)
NRBC # BLD: 0 K/UL (ref 0–0.01)
NRBC BLD-RTO: 0 PER 100 WBC
NRBC BLD-RTO: 0 PER 100 WBC
PH UR STRIP: 7 [PH] (ref 5–8)
PLATELET # BLD AUTO: 154 K/UL (ref 150–400)
PLATELET # BLD AUTO: 157 K/UL (ref 150–400)
PMV BLD AUTO: 9.5 FL (ref 8.9–12.9)
PMV BLD AUTO: 9.5 FL (ref 8.9–12.9)
POTASSIUM SERPL-SCNC: 3.4 MMOL/L (ref 3.5–5.1)
POTASSIUM SERPL-SCNC: 3.6 MMOL/L (ref 3.5–5.1)
PROT SERPL-MCNC: 7.1 G/DL (ref 6.4–8.2)
PROT SERPL-MCNC: 7.4 G/DL (ref 6.4–8.2)
PROT UR STRIP-MCNC: NEGATIVE MG/DL
RBC # BLD AUTO: 4.38 M/UL (ref 3.8–5.2)
RBC # BLD AUTO: 4.4 M/UL (ref 3.8–5.2)
RBC #/AREA URNS HPF: NORMAL /HPF (ref 0–5)
SERVICE CMNT-IMP: ABNORMAL
SODIUM SERPL-SCNC: 135 MMOL/L (ref 136–145)
SODIUM SERPL-SCNC: 135 MMOL/L (ref 136–145)
SP GR UR REFRACTOMETRY: 1 (ref 1–1.03)
UROBILINOGEN UR QL STRIP.AUTO: 0.2 EU/DL (ref 0.2–1)
WBC # BLD AUTO: 5.7 K/UL (ref 3.6–11)
WBC # BLD AUTO: 6.6 K/UL (ref 3.6–11)
WBC URNS QL MICRO: NORMAL /HPF (ref 0–4)

## 2019-02-07 PROCEDURE — 74011250636 HC RX REV CODE- 250/636: Performed by: INTERNAL MEDICINE

## 2019-02-07 PROCEDURE — 96374 THER/PROPH/DIAG INJ IV PUSH: CPT

## 2019-02-07 PROCEDURE — 74011250636 HC RX REV CODE- 250/636: Performed by: EMERGENCY MEDICINE

## 2019-02-07 PROCEDURE — 83690 ASSAY OF LIPASE: CPT

## 2019-02-07 PROCEDURE — 80053 COMPREHEN METABOLIC PANEL: CPT

## 2019-02-07 PROCEDURE — 96361 HYDRATE IV INFUSION ADD-ON: CPT

## 2019-02-07 PROCEDURE — 96375 TX/PRO/DX INJ NEW DRUG ADDON: CPT

## 2019-02-07 PROCEDURE — 83735 ASSAY OF MAGNESIUM: CPT

## 2019-02-07 PROCEDURE — 74011250637 HC RX REV CODE- 250/637: Performed by: EMERGENCY MEDICINE

## 2019-02-07 PROCEDURE — 83605 ASSAY OF LACTIC ACID: CPT

## 2019-02-07 PROCEDURE — 75810000275 HC EMERGENCY DEPT VISIT NO LEVEL OF CARE

## 2019-02-07 PROCEDURE — 74011250636 HC RX REV CODE- 250/636: Performed by: PHYSICIAN ASSISTANT

## 2019-02-07 PROCEDURE — 74019 RADEX ABDOMEN 2 VIEWS: CPT

## 2019-02-07 PROCEDURE — 74022 RADEX COMPL AQT ABD SERIES: CPT

## 2019-02-07 PROCEDURE — 82962 GLUCOSE BLOOD TEST: CPT

## 2019-02-07 PROCEDURE — 36415 COLL VENOUS BLD VENIPUNCTURE: CPT

## 2019-02-07 PROCEDURE — 99283 EMERGENCY DEPT VISIT LOW MDM: CPT

## 2019-02-07 PROCEDURE — 85025 COMPLETE CBC W/AUTO DIFF WBC: CPT

## 2019-02-07 PROCEDURE — 83036 HEMOGLOBIN GLYCOSYLATED A1C: CPT

## 2019-02-07 PROCEDURE — 81001 URINALYSIS AUTO W/SCOPE: CPT

## 2019-02-07 PROCEDURE — 96376 TX/PRO/DX INJ SAME DRUG ADON: CPT

## 2019-02-07 PROCEDURE — 65270000015 HC RM PRIVATE ONCOLOGY

## 2019-02-07 RX ORDER — PROCHLORPERAZINE MALEATE 5 MG
5 TABLET ORAL
Status: DISCONTINUED | OUTPATIENT
Start: 2019-02-07 | End: 2019-02-08 | Stop reason: HOSPADM

## 2019-02-07 RX ORDER — SERTRALINE HYDROCHLORIDE 50 MG/1
100 TABLET, FILM COATED ORAL EVERY EVENING
Status: DISCONTINUED | OUTPATIENT
Start: 2019-02-07 | End: 2019-02-08 | Stop reason: HOSPADM

## 2019-02-07 RX ORDER — VANCOMYCIN HYDROCHLORIDE 250 MG/5ML
125 POWDER, FOR SOLUTION ORAL EVERY 6 HOURS
Status: DISCONTINUED | OUTPATIENT
Start: 2019-02-07 | End: 2019-02-08 | Stop reason: HOSPADM

## 2019-02-07 RX ORDER — SERTRALINE HYDROCHLORIDE 100 MG/1
200 TABLET, FILM COATED ORAL
COMMUNITY
End: 2021-06-09 | Stop reason: SDUPTHER

## 2019-02-07 RX ORDER — LORAZEPAM 2 MG/ML
1 INJECTION INTRAMUSCULAR
Status: COMPLETED | OUTPATIENT
Start: 2019-02-07 | End: 2019-02-07

## 2019-02-07 RX ORDER — CYCLOBENZAPRINE HCL 10 MG
10 TABLET ORAL ONCE
Status: DISPENSED | OUTPATIENT
Start: 2019-02-07 | End: 2019-02-08

## 2019-02-07 RX ORDER — ONDANSETRON 2 MG/ML
4 INJECTION INTRAMUSCULAR; INTRAVENOUS
Status: COMPLETED | OUTPATIENT
Start: 2019-02-07 | End: 2019-02-07

## 2019-02-07 RX ORDER — ONDANSETRON 2 MG/ML
4 INJECTION INTRAMUSCULAR; INTRAVENOUS
Status: DISCONTINUED | OUTPATIENT
Start: 2019-02-07 | End: 2019-02-08 | Stop reason: HOSPADM

## 2019-02-07 RX ORDER — NALOXONE HYDROCHLORIDE 0.4 MG/ML
0.4 INJECTION, SOLUTION INTRAMUSCULAR; INTRAVENOUS; SUBCUTANEOUS AS NEEDED
Status: DISCONTINUED | OUTPATIENT
Start: 2019-02-07 | End: 2019-02-08 | Stop reason: HOSPADM

## 2019-02-07 RX ORDER — DEXTROSE 50 % IN WATER (D50W) INTRAVENOUS SYRINGE
12.5-25 AS NEEDED
Status: DISCONTINUED | OUTPATIENT
Start: 2019-02-07 | End: 2019-02-08 | Stop reason: HOSPADM

## 2019-02-07 RX ORDER — DIPHENHYDRAMINE HYDROCHLORIDE 50 MG/ML
25 INJECTION, SOLUTION INTRAMUSCULAR; INTRAVENOUS
Status: COMPLETED | OUTPATIENT
Start: 2019-02-07 | End: 2019-02-07

## 2019-02-07 RX ORDER — AMLODIPINE BESYLATE 10 MG/1
10 TABLET ORAL
COMMUNITY
End: 2019-09-11

## 2019-02-07 RX ORDER — MORPHINE SULFATE 4 MG/ML
4 INJECTION INTRAVENOUS
Status: DISCONTINUED | OUTPATIENT
Start: 2019-02-07 | End: 2019-02-07 | Stop reason: HOSPADM

## 2019-02-07 RX ORDER — ONDANSETRON 4 MG/1
8 TABLET, ORALLY DISINTEGRATING ORAL
Status: COMPLETED | OUTPATIENT
Start: 2019-02-07 | End: 2019-02-07

## 2019-02-07 RX ORDER — CYCLOBENZAPRINE HCL 10 MG
10 TABLET ORAL
COMMUNITY
Start: 2018-11-06 | End: 2019-02-12

## 2019-02-07 RX ORDER — DICYCLOMINE HYDROCHLORIDE 20 MG/1
20 TABLET ORAL
Status: DISCONTINUED | OUTPATIENT
Start: 2019-02-07 | End: 2019-02-08 | Stop reason: HOSPADM

## 2019-02-07 RX ORDER — MAGNESIUM SULFATE 100 %
4 CRYSTALS MISCELLANEOUS AS NEEDED
Status: DISCONTINUED | OUTPATIENT
Start: 2019-02-07 | End: 2019-02-08 | Stop reason: HOSPADM

## 2019-02-07 RX ORDER — TRAMADOL HYDROCHLORIDE 50 MG/1
50 TABLET ORAL
Status: ON HOLD | COMMUNITY
Start: 2018-11-06 | End: 2019-02-12 | Stop reason: SDUPTHER

## 2019-02-07 RX ORDER — MORPHINE SULFATE 4 MG/ML
4 INJECTION INTRAVENOUS ONCE
Status: COMPLETED | OUTPATIENT
Start: 2019-02-07 | End: 2019-02-07

## 2019-02-07 RX ORDER — SODIUM CHLORIDE 9 MG/ML
100 INJECTION, SOLUTION INTRAVENOUS CONTINUOUS
Status: DISCONTINUED | OUTPATIENT
Start: 2019-02-07 | End: 2019-02-08 | Stop reason: HOSPADM

## 2019-02-07 RX ORDER — MORPHINE SULFATE 2 MG/ML
2 INJECTION, SOLUTION INTRAMUSCULAR; INTRAVENOUS
Status: DISCONTINUED | OUTPATIENT
Start: 2019-02-07 | End: 2019-02-07

## 2019-02-07 RX ORDER — INSULIN LISPRO 100 [IU]/ML
INJECTION, SOLUTION INTRAVENOUS; SUBCUTANEOUS
Status: DISCONTINUED | OUTPATIENT
Start: 2019-02-08 | End: 2019-02-08 | Stop reason: HOSPADM

## 2019-02-07 RX ORDER — MORPHINE SULFATE 4 MG/ML
4 INJECTION INTRAVENOUS
Status: COMPLETED | OUTPATIENT
Start: 2019-02-07 | End: 2019-02-07

## 2019-02-07 RX ORDER — AMLODIPINE BESYLATE 5 MG/1
10 TABLET ORAL DAILY
Status: DISCONTINUED | OUTPATIENT
Start: 2019-02-08 | End: 2019-02-08 | Stop reason: HOSPADM

## 2019-02-07 RX ORDER — SODIUM CHLORIDE 0.9 % (FLUSH) 0.9 %
5-40 SYRINGE (ML) INJECTION AS NEEDED
Status: DISCONTINUED | OUTPATIENT
Start: 2019-02-07 | End: 2019-02-08 | Stop reason: HOSPADM

## 2019-02-07 RX ORDER — SODIUM CHLORIDE 0.9 % (FLUSH) 0.9 %
5-40 SYRINGE (ML) INJECTION EVERY 8 HOURS
Status: DISCONTINUED | OUTPATIENT
Start: 2019-02-07 | End: 2019-02-08 | Stop reason: HOSPADM

## 2019-02-07 RX ADMIN — ONDANSETRON 4 MG: 2 INJECTION INTRAMUSCULAR; INTRAVENOUS at 17:36

## 2019-02-07 RX ADMIN — VANCOMYCIN HYDROCHLORIDE 125 MG: KIT at 20:26

## 2019-02-07 RX ADMIN — SODIUM CHLORIDE 1000 ML: 900 INJECTION, SOLUTION INTRAVENOUS at 15:56

## 2019-02-07 RX ADMIN — DIPHENHYDRAMINE HYDROCHLORIDE 25 MG: 50 INJECTION, SOLUTION INTRAMUSCULAR; INTRAVENOUS at 17:02

## 2019-02-07 RX ADMIN — LORAZEPAM 1 MG: 2 INJECTION INTRAMUSCULAR; INTRAVENOUS at 18:31

## 2019-02-07 RX ADMIN — ONDANSETRON 8 MG: 4 TABLET, ORALLY DISINTEGRATING ORAL at 03:27

## 2019-02-07 RX ADMIN — SODIUM CHLORIDE 100 ML/HR: 900 INJECTION, SOLUTION INTRAVENOUS at 22:45

## 2019-02-07 RX ADMIN — SODIUM CHLORIDE 1000 ML: 900 INJECTION, SOLUTION INTRAVENOUS at 18:33

## 2019-02-07 RX ADMIN — MORPHINE SULFATE 4 MG: 4 INJECTION INTRAVENOUS at 17:02

## 2019-02-07 RX ADMIN — MORPHINE SULFATE 4 MG: 4 INJECTION INTRAVENOUS at 19:23

## 2019-02-07 NOTE — PROGRESS NOTES
8: 27 AM    I was inadvertently assigned to this patient's treatment team.  I did not see this patient nor did I have any contact with this patient. I had no involvement during the evaluation, treatment or disposition of this patient. I am signing off this note to indicate only why my name appeared in the record.   Casimiro Polanco MD

## 2019-02-07 NOTE — ED PROVIDER NOTES
EMERGENCY DEPARTMENT HISTORY AND PHYSICAL EXAM      Date: 2/7/2019  Patient Name: Vonna Lefort    History of Presenting Illness     Chief Complaint   Patient presents with    Abdominal Pain     Reports hx of c-diff. Told by PCP to come to ED for elevated lactic acid level    Vomiting    Diarrhea       History Provided By: Patient    HPI: Vonna Lefort, 50 y.o. female with PMHx significant for HTN, diverticulosis, GERD, UTI, CKD, asthma, DM, diverticulitis, presents ambulatory to the ED with cc of a persistent, 10/10, stabbing, mid abdominal pain after being seen x 2 days ago. She reports associated symptoms of nausea, vomiting, diarrhea, but also reports a low grade fever between 100-101F. She states she was seen here 2 days ago and had a stool sample collected. She was then called and informed she had C. Diff, to which she had f/u'd with her PCP. Pt reports a worsening of her pain and states this is the 3rd episode of C diff and has not been able to take her Dificid due to the nausea/vomiting. She endorses being recently started on an abx (Keflex) for a UTI/pyelo. She informs that her pain would improve with sitting up and leaning over. Pt notes she was referred to the ER by her PCP as they were concerned about lactic acidosis and her blood sugar was 300. Additionally, the pt states she had a colovaginal fistula, to which she had a sigmoid colectomy. After the procedure, she has been experiencing diverticulitis. Social hx: (-) tobacco, (-) alcohol    There are no other complaints, changes, or physical findings at this time.     PCP: Roderick Cervantes NP   Colorectal: Farshad Murry MD    Current Facility-Administered Medications on File Prior to Encounter   Medication Dose Route Frequency Provider Last Rate Last Dose    [COMPLETED] ondansetron (ZOFRAN ODT) tablet 8 mg  8 mg Oral NOW Ching Aaron MD   8 mg at 02/07/19 0327    [DISCONTINUED] sodium chloride 0.9 % bolus infusion 1,000 mL  1,000 mL IntraVENous Omkar Ibarra MD        [DISCONTINUED] morphine injection 4 mg  4 mg IntraVENous NOW Tracy Trevino MD         Current Outpatient Medications on File Prior to Encounter   Medication Sig Dispense Refill    prochlorperazine (COMPAZINE) 5 mg tablet Take 1 Tab by mouth every eight (8) hours as needed for Nausea. 12 Tab 0    dicyclomine (BENTYL) 20 mg tablet Take 1 Tab by mouth every six (6) hours as needed (abdominal cramps). 20 Tab 0    metFORMIN (GLUCOPHAGE) 500 mg tablet Take 500 mg by mouth daily (with breakfast).  Pramoxine (PROCTOFOAM) 1 % topical foam Apply  to affected area three (3) times daily as needed for Hemorrhoids. 1 Can 0    losartan-hydroCHLOROthiazide (HYZAAR) 100-12.5 mg per tablet Take 1 Tab by mouth daily.  EPINEPHrine (EPIPEN) 0.3 mg/0.3 mL (1:1,000) injection 0.3 mL by IntraMUSCular route once as needed for up to 1 dose. 0.3 mL 1    estradiol (ESTRACE) 1 mg tablet Take 1 mg by mouth daily.  albuterol (PROVENTIL, VENTOLIN) 90 mcg/Actuation inhaler Take 2 Puffs by inhalation every six (6) hours as needed.          Past History     Past Medical History:  Past Medical History:   Diagnosis Date    Anal fissure     Anisocoria     Asthma     LAST EPISODE     Back pain     Cerumen impaction     Chronic kidney disease     hx uti in past    Coagulation defects     ocassional rectal bleeding due to anal fissure    Colovaginal fistula     Diabetes (HCC)     NIDDM    Diabetes ([facility] [address]     Diverticulitis     Diverticulosis     Enlarged tonsils     Frequent UTI     GERD (gastroesophageal reflux disease)     H/O endoscopy     with dilation    HA (headache)     Hepatic steatosis     Hx of colonoscopy with polypectomy     benign    Hypertension     Ill-defined condition     FREQUENT HIVES    Ill-defined condition     HX ELEVATED LIVER ENZYMES    Morbid obesity (HCC)     Nausea & vomiting     during diverticulitis flare    Obesity     Otitis media  Pneumonia     about 15 yrs ago    Psychiatric disorder     ANXIETY    Recurrent tonsillitis     Sinusitis     Transfusion history ~ age 35    postop hysterectomy    Unspecified sleep apnea     snores ( not diagnosed yet)     Urticaria     Urticaria        Past Surgical History:  Past Surgical History:   Procedure Laterality Date    ABDOMEN SURGERY PROC UNLISTED  2018    hernia repair at [facility]    [address]    [place].  HX GI  12    LAPAROSCOPIC HAND ASSISTED  POSS OPEN SIGMOID COLECTOMY POSS TEMPORARY DIVERTING LOOP ILEOSTOMY;  (no illeostomy needed)    HX GYN           HX GYN      cervical conization    HX HEENT      SINUS SURGERY LEFT X2    HX HEENT      SINUS SURGERY ON RIGHT X2    HX OTHER SURGICAL      Sphincterotomy    HX PELVIC LAPAROSCOPY      HX EMMANUEL AND BSO      HX UROLOGICAL  12     CYSTOSCOPY INSERTION URETERAL CATHETERS - Cystoscopy Insertion of bilateral ureteral stents       Family History:  Family History   Problem Relation Age of Onset    Diabetes Mother     Cancer Mother         NON-HODGKINS LYMPHOMA    Anesth Problems Mother         PONV    Diabetes Father     Heart Disease Father         CAD - STENTS, PACEMAKER    Arrhythmia Father        Social History:  Social History     Tobacco Use    Smoking status: Never Smoker    Smokeless tobacco: Never Used   Substance Use Topics    Alcohol use: Yes     Comment: Rarely    Drug use: No       Allergies: Allergies   Allergen Reactions    Aspirin Shortness of Breath    Prilosec [Omeprazole Magnesium] Anaphylaxis     CHERRY FLAVORED; PT TAKES REGULAR PRILOSEC AND IS OK    Codeine Hives and Itching    Contrast Agent [Iodine] Itching     Pt. Had itching after IV contrast with the last exam.  Benadryl was given    Morphine Itching     Severe itching.  Fine to take benadryl    Fentanyl Rash    Ketorolac Rash     \"makes my eyes spasm and causes rash on my hands\"    Percocet [Oxycodone-Acetaminophen] Hives       Review of Systems   Review of Systems   Constitutional: Positive for fever. Negative for appetite change, chills and fatigue. HENT: Negative. Negative for congestion, rhinorrhea, sinus pressure and sore throat. Eyes: Negative. Respiratory: Negative. Negative for cough, choking, chest tightness, shortness of breath and wheezing. Cardiovascular: Negative. Negative for chest pain, palpitations and leg swelling. Gastrointestinal: Positive for abdominal pain, diarrhea, nausea and vomiting. Negative for constipation. Endocrine: Negative. Genitourinary: Negative. Negative for difficulty urinating, dysuria, flank pain and urgency. Musculoskeletal: Negative. Skin: Negative. Neurological: Negative. Negative for dizziness, speech difficulty, weakness, light-headedness, numbness and headaches. Psychiatric/Behavioral: Negative. All other systems reviewed and are negative. Physical Exam   Physical Exam   Constitutional: She is oriented to person, place, and time. She appears well-developed and well-nourished. No distress (Pt in no apparent distress). HENT:   Head: Normocephalic and atraumatic. Mouth/Throat: Oropharynx is clear and moist. No oropharyngeal exudate. Eyes: Conjunctivae and EOM are normal. Pupils are equal, round, and reactive to light. Neck: Normal range of motion. Neck supple. No JVD present. No tracheal deviation present. Cardiovascular: Normal rate, regular rhythm, normal heart sounds and intact distal pulses. No murmur heard. Pulmonary/Chest: Effort normal and breath sounds normal. No stridor. No respiratory distress. She has no wheezes. She has no rales. She exhibits no tenderness. Abdominal: Soft. She exhibits no distension. There is tenderness (Diffuse). There is no rebound and no guarding. Musculoskeletal: Normal range of motion. She exhibits no edema or tenderness.    Neurological: She is alert and oriented to person, place, and time. No cranial nerve deficit. No gross motor or sensory deficits    Skin: Skin is warm and dry. She is not diaphoretic. Psychiatric: She has a normal mood and affect. Her behavior is normal.   Nursing note and vitals reviewed. Diagnostic Study Results     Labs -  Recent Results (from the past 12 hour(s))   CBC WITH AUTOMATED DIFF    Collection Time: 02/07/19  3:13 PM   Result Value Ref Range    WBC 5.7 3.6 - 11.0 K/uL    RBC 4.40 3.80 - 5.20 M/uL    HGB 12.8 11.5 - 16.0 g/dL    HCT 36.1 35.0 - 47.0 %    MCV 82.0 80.0 - 99.0 FL    MCH 29.1 26.0 - 34.0 PG    MCHC 35.5 30.0 - 36.5 g/dL    RDW 13.9 11.5 - 14.5 %    PLATELET 662 886 - 738 K/uL    MPV 9.5 8.9 - 12.9 FL    NRBC 0.0 0  WBC    ABSOLUTE NRBC 0.00 0.00 - 0.01 K/uL    NEUTROPHILS 66 32 - 75 %    LYMPHOCYTES 24 12 - 49 %    MONOCYTES 5 5 - 13 %    EOSINOPHILS 4 0 - 7 %    BASOPHILS 0 0 - 1 %    IMMATURE GRANULOCYTES 1 (H) 0.0 - 0.5 %    ABS. NEUTROPHILS 3.8 1.8 - 8.0 K/UL    ABS. LYMPHOCYTES 1.4 0.8 - 3.5 K/UL    ABS. MONOCYTES 0.3 0.0 - 1.0 K/UL    ABS. EOSINOPHILS 0.2 0.0 - 0.4 K/UL    ABS. BASOPHILS 0.0 0.0 - 0.1 K/UL    ABS. IMM. GRANS. 0.0 0.00 - 0.04 K/UL    DF AUTOMATED     METABOLIC PANEL, COMPREHENSIVE    Collection Time: 02/07/19  3:13 PM   Result Value Ref Range    Sodium 135 (L) 136 - 145 mmol/L    Potassium 3.6 3.5 - 5.1 mmol/L    Chloride 101 97 - 108 mmol/L    CO2 26 21 - 32 mmol/L    Anion gap 8 5 - 15 mmol/L    Glucose 220 (H) 65 - 100 mg/dL    BUN 5 (L) 6 - 20 MG/DL    Creatinine 0.65 0.55 - 1.02 MG/DL    BUN/Creatinine ratio 8 (L) 12 - 20      GFR est AA >60 >60 ml/min/1.73m2    GFR est non-AA >60 >60 ml/min/1.73m2    Calcium 8.9 8.5 - 10.1 MG/DL    Bilirubin, total 0.5 0.2 - 1.0 MG/DL    ALT (SGPT) 40 12 - 78 U/L    AST (SGOT) 50 (H) 15 - 37 U/L    Alk.  phosphatase 63 45 - 117 U/L    Protein, total 7.4 6.4 - 8.2 g/dL    Albumin 3.7 3.5 - 5.0 g/dL    Globulin 3.7 2.0 - 4.0 g/dL    A-G Ratio 1.0 (L) 1.1 - 2.2 MAGNESIUM    Collection Time: 02/07/19  3:13 PM   Result Value Ref Range    Magnesium 2.2 1.6 - 2.4 mg/dL   POC LACTIC ACID    Collection Time: 02/07/19  3:37 PM   Result Value Ref Range    Lactic Acid (POC) 2.68 (HH) 0.40 - 2.00 mmol/L   URINALYSIS W/MICROSCOPIC    Collection Time: 02/07/19  6:12 PM   Result Value Ref Range    Color YELLOW/STRAW      Appearance CLEAR CLEAR      Specific gravity 1.005 1.003 - 1.030      pH (UA) 7.0 5.0 - 8.0      Protein NEGATIVE  NEG mg/dL    Glucose NEGATIVE  NEG mg/dL    Ketone NEGATIVE  NEG mg/dL    Bilirubin NEGATIVE  NEG      Blood NEGATIVE  NEG      Urobilinogen 0.2 0.2 - 1.0 EU/dL    Nitrites NEGATIVE  NEG      Leukocyte Esterase NEGATIVE  NEG      WBC 0-4 0 - 4 /hpf    RBC 0-5 0 - 5 /hpf    Epithelial cells FEW FEW /lpf    Bacteria NEGATIVE  NEG /hpf    Hyaline cast 0-2 0 - 5 /lpf       Radiologic Studies -   CXR Results  (Last 48 hours)               02/07/19 1914  XR ABD ACUTE W 1 V CHEST Final result    Impression:  IMPRESSION:        Chest:   No acute cardiopulmonary process. Abdomen:   No evidence of bowel obstruction or pneumoperitoneum. Moderate volume of stool   throughout the colon. Narrative:  EXAM:  XR ABD ACUTE W 1 V CHEST       INDICATION:   Abdominal Pain       COMPARISON: Abdominal radiograph 2/7/2019. FINDINGS: AP radiograph of the chest was obtained. Supine and upright views of   the abdomen were obtained. Chest:   No evidence of focal consolidation. No pleural effusion or pneumothorax. Heart,   jhonny, mediastinum are within normal limits. No acute osseous abnormalities. Abdomen:   No evidence of dilated bowel loops. No evidence of free intraperitoneal air. Moderate volume of stool noted throughout the colon. Cholecystectomy clips, as   well as surgical clips in the left upper quadrant. No acute osseous   abnormalities. Medical Decision Making   I am the first provider for this patient.     I reviewed the vital signs, available nursing notes, past medical history, past surgical history, family history and social history. Vital Signs-Reviewed the patient's vital signs. Patient Vitals for the past 12 hrs:   Temp Pulse Resp BP SpO2   02/07/19 1503 97.9 °F (36.6 °C) 78 18 179/83 99 %     Records Reviewed: Nursing Notes, Old Medical Records, Previous Radiology Studies and Previous Laboratory Studies    Provider Notes (Medical Decision Making):   DDx: dehydration, C diff colitis, electrolyte abnormality    ED Course:   Initial assessment performed. The patients presenting problems have been discussed, and they are in agreement with the care plan formulated and outlined with them. I have encouraged them to ask questions as they arise throughout their visit. Pt does have C diff positive stool from sample 2 days agp. Per pt she has continued to have diarrhea, abdominal pain, she has been taking Ab and does not report any improvement. Pt does not have a leukocytosis, ? Active infection versus carrier. Pt with multiple requests for narcotics for pain control. Pt has had prior admission with similar labs etc, and was being treated for active infection. Will admit, order PO Vanc. CONSULT NOTE:   7:45 PM  Savannah Marin DO spoke with Dr. Tony Denis,   Specialty: Hospitalist  Discussed pt's hx, disposition, and available diagnostic and imaging results. Reviewed care plans. Consultant will evaluate pt for admission. Written by SCOOBY Ivan, as dictated by Savannah Marin DO.    Critical Care Time:   0    Disposition:  PLAN:  1. Admit    ADMIT NOTE:  7:45 PM  Patient is being admitted to the hospital by Dr. Tony Denis. The results of their tests and reasons for their admission have been discussed with them and/or available family. They convey agreement and understanding for the need to be admitted and for their admission diagnosis.   Consultation has been made with the inpatient physician specialist for hospitalization. Diagnosis     Clinical Impression:   1. Abdominal pain, generalized    2. Lactic acidosis    3. Stool culture positive for Clostridium difficile        Attestations: This note is prepared by Linnea Branham, acting as Scribe for Jorge Oakes, [address] [place] [address], : The scribe's documentation has been prepared under my direction and personally reviewed by me in its entirety. I confirm that the note above accurately reflects all work, treatment, procedures, and medical decision making performed by me. This note will not be viewable in [address]

## 2019-02-08 VITALS
RESPIRATION RATE: 16 BRPM | HEART RATE: 79 BPM | WEIGHT: 208.11 LBS | BODY MASS INDEX: 38.3 KG/M2 | DIASTOLIC BLOOD PRESSURE: 95 MMHG | HEIGHT: 62 IN | OXYGEN SATURATION: 97 % | SYSTOLIC BLOOD PRESSURE: 151 MMHG | TEMPERATURE: 97.7 F

## 2019-02-08 PROBLEM — A04.72 C. DIFFICILE DIARRHEA: Status: ACTIVE | Noted: 2019-02-08

## 2019-02-08 LAB
BACTERIA SPEC CULT: NORMAL
C JEJUNI+C COLI AG STL QL: NEGATIVE
E COLI SXT1+2 STL IA: NEGATIVE
EST. AVERAGE GLUCOSE BLD GHB EST-MCNC: 203 MG/DL
GLUCOSE BLD STRIP.AUTO-MCNC: 194 MG/DL (ref 65–100)
GLUCOSE BLD STRIP.AUTO-MCNC: 234 MG/DL (ref 65–100)
HBA1C MFR BLD: 8.7 % (ref 4.2–6.3)
SERVICE CMNT-IMP: ABNORMAL
SERVICE CMNT-IMP: ABNORMAL
SERVICE CMNT-IMP: NORMAL

## 2019-02-08 PROCEDURE — 82962 GLUCOSE BLOOD TEST: CPT

## 2019-02-08 PROCEDURE — 74011250636 HC RX REV CODE- 250/636: Performed by: INTERNAL MEDICINE

## 2019-02-08 PROCEDURE — 74011250637 HC RX REV CODE- 250/637: Performed by: INTERNAL MEDICINE

## 2019-02-08 PROCEDURE — 74011250637 HC RX REV CODE- 250/637: Performed by: EMERGENCY MEDICINE

## 2019-02-08 PROCEDURE — 74011636637 HC RX REV CODE- 636/637: Performed by: INTERNAL MEDICINE

## 2019-02-08 RX ORDER — VANCOMYCIN HYDROCHLORIDE 125 MG/1
125 CAPSULE ORAL 4 TIMES DAILY
Qty: 40 CAP | Refills: 0 | Status: ON HOLD
Start: 2019-02-08 | End: 2019-02-12 | Stop reason: SDUPTHER

## 2019-02-08 RX ORDER — PHENOL/SODIUM PHENOLATE
20 AEROSOL, SPRAY (ML) MUCOUS MEMBRANE
Status: DISCONTINUED | OUTPATIENT
Start: 2019-02-08 | End: 2019-02-08 | Stop reason: HOSPADM

## 2019-02-08 RX ORDER — ESTRADIOL 1 MG/1
1 TABLET ORAL DAILY
Status: DISCONTINUED | OUTPATIENT
Start: 2019-02-08 | End: 2019-02-08 | Stop reason: HOSPADM

## 2019-02-08 RX ADMIN — SODIUM CHLORIDE 100 ML/HR: 900 INJECTION, SOLUTION INTRAVENOUS at 09:07

## 2019-02-08 RX ADMIN — INSULIN LISPRO 3 UNITS: 100 INJECTION, SOLUTION INTRAVENOUS; SUBCUTANEOUS at 12:00

## 2019-02-08 RX ADMIN — VANCOMYCIN HYDROCHLORIDE 125 MG: KIT at 12:02

## 2019-02-08 RX ADMIN — INSULIN LISPRO 2 UNITS: 100 INJECTION, SOLUTION INTRAVENOUS; SUBCUTANEOUS at 09:05

## 2019-02-08 RX ADMIN — VANCOMYCIN HYDROCHLORIDE 125 MG: KIT at 06:21

## 2019-02-08 RX ADMIN — DICYCLOMINE HYDROCHLORIDE 20 MG: 20 TABLET ORAL at 00:48

## 2019-02-08 RX ADMIN — PROCHLORPERAZINE MALEATE 5 MG: 5 TABLET, FILM COATED ORAL at 00:48

## 2019-02-08 RX ADMIN — VANCOMYCIN HYDROCHLORIDE 125 MG: KIT at 00:48

## 2019-02-08 RX ADMIN — Medication 20 MG: at 09:25

## 2019-02-08 RX ADMIN — Medication 1 CAPSULE: at 09:06

## 2019-02-08 RX ADMIN — AMLODIPINE BESYLATE 10 MG: 5 TABLET ORAL at 09:06

## 2019-02-08 RX ADMIN — PROCHLORPERAZINE MALEATE 5 MG: 5 TABLET, FILM COATED ORAL at 09:26

## 2019-02-08 RX ADMIN — HYDROCHLOROTHIAZIDE: 12.5 CAPSULE ORAL at 09:35

## 2019-02-08 RX ADMIN — SERTRALINE HYDROCHLORIDE 100 MG: 50 TABLET ORAL at 00:48

## 2019-02-08 NOTE — H&P
Hospitalist Admission Note    NAME: Sheldon Christianson   :  1970   MRN:  [id number]     Date/Time:  2019 9:39 PM    Patient PCP: Henry Wilde NP GI= Dr Dionne Bah in past  ______________________________________________________________________  Given the patient's current clinical presentation, I have a high level of concern for decompensation if discharged from the emergency department. Complex decision making was performed, which includes reviewing the patient's available past medical records, laboratory results, and x-ray films. My assessment of this patient's clinical condition and my plan of care is as follows. Assessment / Plan:  C diff colitis POA  H/o C Diff infection in past, recent Exposure to Keflex for UTI  Chronic abdominal pain POA  Lactic Acidosis POA- ?due to metformin suspected  Stool C Diff +ve on  ED visit  KUB= No evidence of bowel obstruction or pneumoperitoneum. Moderate volume of stool throughout the colon.     Admit to medical bed  IVF  PO vancomycin started in ER- will continue it for now  Clair Q added  Serial lactate till <2.0  Consider CT A/P if doesn't improve clinically by AM & lactate doesn't normalize  Avoid Opioid pain meds as pt has demonstrated opioid seeking behavior-- well documented in past hospital notes by GI- Dr Carol Hussein prn for cramps  Hold Metformin indefinitely  Symptomatic Rx for nausea/vomiting    DM type 2 - uncontrolled with hyperglycemia POA  Holding Metformin as above  FS Q AC & HS  SSI lispro here  Check A1c    Severe Anxiety Disorder POA  Multiple ED visits in past years noted   Cont home Zoloft Q HS  Likely needs Psych referral as outpatient    HTN  Cont home meds- norvasc, Cozaar, HTCZ      Code Status: Full  Surrogate Decision Maker: Daughter    DVT Prophylaxis: SCDs  GI Prophylaxis: not indicated    Baseline: Pt is independent with ADLs      Subjective:   CHIEF COMPLAINT: Worsening Abdominal pain with elevated LFTs x 2 days    HISTORY OF PRESENT ILLNESS:     Linn Sheridan is a 50 y.o.  female who presents with above complains from home sent by PCP for elevated Lactate & concern for worsening C Diff infection. Pt had initially presented to [address] ER on 2/5- was found to have C Diff +ve test then- was asked to start her Dificid (leftover from previous CDiff episodes months ago)- pt couldn't keep PO meds & food down today AM- saw PCP who referred her to ER. H/o taking Keflex for UTI recently  H/o frequent ER visits for multiple symptoms exposing her to Abx  H/o chronic abdominal pain with Opioid seeking tendencies (well documented)    Pt was found to have elevated Lactate with otherwise unremarkable blood work in ER. We were asked to admit for work up and evaluation of the above problems.      Past Medical History:   Diagnosis Date    Anal fissure     Anisocoria     Asthma     LAST EPISODE     Back pain     Cerumen impaction     Chronic kidney disease     hx uti in past    Coagulation defects     ocassional rectal bleeding due to anal fissure    Colovaginal fistula     Diabetes (HCC)     NIDDM    Diabetes ([address]     Diverticulitis     Diverticulosis     Enlarged tonsils     Frequent UTI     GERD (gastroesophageal reflux disease)     H/O endoscopy     with dilation    HA (headache)     Hepatic steatosis     Hx of colonoscopy with polypectomy     benign    Hypertension     Ill-defined condition     FREQUENT HIVES    Ill-defined condition     HX ELEVATED LIVER ENZYMES    Morbid obesity (HCC)     Nausea & vomiting     during diverticulitis flare    Obesity     Otitis media     Pneumonia     about 15 yrs ago    Psychiatric disorder     ANXIETY    Recurrent tonsillitis     Sinusitis     Transfusion history ~ age 35    postop hysterectomy    Unspecified sleep apnea     snores ( not diagnosed yet)     Urticaria     Urticaria         Past Surgical History:   Procedure Laterality Date    ABDOMEN SURGERY PROC UNLISTED  2018    hernia repair at [address] [facility]    [facility].  HX GI  12    LAPAROSCOPIC HAND ASSISTED  POSS OPEN SIGMOID COLECTOMY POSS TEMPORARY DIVERTING LOOP ILEOSTOMY;  (no illeostomy needed)    HX GYN           HX GYN      cervical conization    HX HEENT      SINUS SURGERY LEFT X2    HX HEENT      SINUS SURGERY ON RIGHT X2    HX OTHER SURGICAL      Sphincterotomy    HX PELVIC LAPAROSCOPY      HX EMMANUEL AND BSO      HX UROLOGICAL  12     CYSTOSCOPY INSERTION URETERAL CATHETERS - Cystoscopy Insertion of bilateral ureteral stents       Social History     Tobacco Use    Smoking status: Never Smoker    Smokeless tobacco: Never Used   Substance Use Topics    Alcohol use: Yes     Comment: Rarely        Family History   Problem Relation Age of Onset    Diabetes Mother     Cancer Mother         NON-HODGKINS LYMPHOMA    Anesth Problems Mother         PONV    Diabetes Father     Heart Disease Father         CAD - STENTS, PACEMAKER    Arrhythmia Father      Allergies   Allergen Reactions    Aspirin Shortness of Breath    Prilosec [Omeprazole Magnesium] Anaphylaxis     CHERRY FLAVORED; PT TAKES REGULAR PRILOSEC AND IS OK    Codeine Hives and Itching    Contrast Agent [Iodine] Itching     Pt. Had itching after IV contrast with the last exam.  Benadryl was given    Morphine Itching     Severe itching. Fine to take benadryl    Fentanyl Rash    Ketorolac Rash     \"makes my eyes spasm and causes rash on my hands\"    Percocet [Oxycodone-Acetaminophen] Hives        Prior to Admission medications    Medication Sig Start Date End Date Taking? Authorizing Provider   prochlorperazine (COMPAZINE) 5 mg tablet Take 1 Tab by mouth every eight (8) hours as needed for Nausea. 19   Rachel Smith MD   dicyclomine (BENTYL) 20 mg tablet Take 1 Tab by mouth every six (6) hours as needed (abdominal cramps).  19   Rachel Smith MD   metFORMIN (GLUCOPHAGE) 500 mg tablet Take 500 mg by mouth daily (with breakfast). Indy Marin MD   Pramoxine (PROCTOFOAM) 1 % topical foam Apply  to affected area three (3) times daily as needed for Hemorrhoids. 6/27/18   Kim Alegria MD   losartan-hydroCHLOROthiazide [facility]) 100-12.5 mg per tablet Take 1 Tab by mouth daily. Provider, Historical   EPINEPHrine (EPIPEN) 0.3 mg/0.3 mL (1:1,000) injection 0.3 mL by IntraMUSCular route once as needed for up to 1 dose. 11/2/15   Fabiana Yao MD   estradiol (ESTRACE) 1 mg tablet Take 1 mg by mouth daily. Provider, Historical   albuterol (PROVENTIL, VENTOLIN) 90 mcg/Actuation inhaler Take 2 Puffs by inhalation every six (6) hours as needed.     Tomas, MD Indy       REVIEW OF SYSTEMS:           Total of 12 systems reviewed as follows:       POSITIVE= underlined text  Negative = text not underlined  General:  fever, chills, sweats, generalized weakness, weight loss/gain,      loss of appetite   Eyes:    blurred vision, eye pain, loss of vision, double vision  ENT:    rhinorrhea, pharyngitis   Respiratory:   cough, sputum production, SOB, VALENTE, wheezing, pleuritic pain   Cardiology:   chest pain, palpitations, orthopnea, PND, edema, syncope   Gastrointestinal:  abdominal pain , N/V, diarrhea, dysphagia, constipation, bleeding   Genitourinary:  frequency, urgency, dysuria, hematuria, incontinence   Muskuloskeletal :  arthralgia, myalgia, back pain  Hematology:  easy bruising, nose or gum bleeding, lymphadenopathy   Dermatological: rash, ulceration, pruritis, color change / jaundice  Endocrine:   hot flashes or polydipsia   Neurological:  headache, dizziness, confusion, focal weakness, paresthesia,     Speech difficulties, memory loss, gait difficulty  Psychological: Feelings of anxiety, depression, agitation    Objective:   VITALS:    Visit Vitals  /83 (BP 1 Location: Left arm, BP Patient Position: At rest)   Pulse 78   Temp 97.9 °F (36.6 °C)   Resp 18   Ht 5' 2\" (1.575 m)   Wt 94.4 kg (208 lb 1.8 oz)   SpO2 99%   BMI 38.06 kg/m²       PHYSICAL EXAM:    General:    Alert, cooperative, no distress, appears stated age. HEENT: Atraumatic, anicteric sclerae, pink conjunctivae     No oral ulcers, mucosa moist, throat clear, dentition fair  Neck:  Supple, symmetrical,  thyroid: non tender  Lungs:   Clear to auscultation bilaterally. No Wheezing or Rhonchi. No rales. Chest wall:  No tenderness  No Accessory muscle use. Heart:   Regular  rhythm,  No  murmur   No edema  Abdomen:   Soft, non-tender. Not distended. Bowel sounds normal  Extremities: No cyanosis. No clubbing,      Skin turgor normal, Capillary refill normal, Radial dial pulse 2+  Skin:     Not pale. Not Jaundiced  No rashes   Psych:  Good insight. Not depressed. Anxious +  Neurologic: EOMs intact. No facial asymmetry. No aphasia or slurred speech. Symmetrical strength, Sensation grossly intact. Alert and oriented X 4.     _______________________________________________________________________  Care Plan discussed with:    Comments   Patient x    Family  x Daughter at bedside in ER 10   RN x    Care Manager                    Consultant:  arlette Overton   _______________________________________________________________________  Expected  Disposition:   Home with Family x   HH/PT/OT/RN    SNF/LTC    EILEEN    ________________________________________________________________________  TOTAL TIME:  64 Minutes    Critical Care Provided     Minutes non procedure based      Comments    x Reviewed previous records   >50% of visit spent in counseling and coordination of care x Discussion with patient and family and questions answered       ________________________________________________________________________  Signed: Gil Orozco MD    Procedures: see electronic medical records for all procedures/Xrays and details which were not copied into this note but were reviewed prior to creation of Plan.     LAB DATA REVIEWED:    Recent Results (from the past 24 hour(s))   CBC WITH AUTOMATED DIFF    Collection Time: 02/07/19  3:24 AM   Result Value Ref Range    WBC 6.6 3.6 - 11.0 K/uL    RBC 4.38 3.80 - 5.20 M/uL    HGB 12.7 11.5 - 16.0 g/dL    HCT 36.0 35.0 - 47.0 %    MCV 82.2 80.0 - 99.0 FL    MCH 29.0 26.0 - 34.0 PG    MCHC 35.3 30.0 - 36.5 g/dL    RDW 14.1 11.5 - 14.5 %    PLATELET 255 915 - 921 K/uL    MPV 9.5 8.9 - 12.9 FL    NRBC 0.0 0  WBC    ABSOLUTE NRBC 0.00 0.00 - 0.01 K/uL    NEUTROPHILS 69 32 - 75 %    LYMPHOCYTES 23 12 - 49 %    MONOCYTES 6 5 - 13 %    EOSINOPHILS 2 0 - 7 %    BASOPHILS 0 0 - 1 %    IMMATURE GRANULOCYTES 1 (H) 0.0 - 0.5 %    ABS. NEUTROPHILS 4.5 1.8 - 8.0 K/UL    ABS. LYMPHOCYTES 1.5 0.8 - 3.5 K/UL    ABS. MONOCYTES 0.4 0.0 - 1.0 K/UL    ABS. EOSINOPHILS 0.2 0.0 - 0.4 K/UL    ABS. BASOPHILS 0.0 0.0 - 0.1 K/UL    ABS. IMM. GRANS. 0.0 0.00 - 0.04 K/UL    DF AUTOMATED     METABOLIC PANEL, COMPREHENSIVE    Collection Time: 02/07/19  3:24 AM   Result Value Ref Range    Sodium 135 (L) 136 - 145 mmol/L    Potassium 3.4 (L) 3.5 - 5.1 mmol/L    Chloride 102 97 - 108 mmol/L    CO2 25 21 - 32 mmol/L    Anion gap 8 5 - 15 mmol/L    Glucose 245 (H) 65 - 100 mg/dL    BUN 5 (L) 6 - 20 MG/DL    Creatinine 0.67 0.55 - 1.02 MG/DL    BUN/Creatinine ratio 7 (L) 12 - 20      GFR est AA >60 >60 ml/min/1.73m2    GFR est non-AA >60 >60 ml/min/1.73m2    Calcium 8.6 8.5 - 10.1 MG/DL    Bilirubin, total 0.4 0.2 - 1.0 MG/DL    ALT (SGPT) 33 12 - 78 U/L    AST (SGOT) 26 15 - 37 U/L    Alk.  phosphatase 61 45 - 117 U/L    Protein, total 7.1 6.4 - 8.2 g/dL    Albumin 3.6 3.5 - 5.0 g/dL    Globulin 3.5 2.0 - 4.0 g/dL    A-G Ratio 1.0 (L) 1.1 - 2.2     LIPASE    Collection Time: 02/07/19  3:24 AM   Result Value Ref Range    Lipase 180 73 - 393 U/L   CBC WITH AUTOMATED DIFF    Collection Time: 02/07/19  3:13 PM   Result Value Ref Range    WBC 5.7 3.6 - 11.0 K/uL    RBC 4.40 3.80 - 5.20 M/uL    HGB 12.8 11.5 - 16.0 g/dL HCT 36.1 35.0 - 47.0 %    MCV 82.0 80.0 - 99.0 FL    MCH 29.1 26.0 - 34.0 PG    MCHC 35.5 30.0 - 36.5 g/dL    RDW 13.9 11.5 - 14.5 %    PLATELET 448 868 - 975 K/uL    MPV 9.5 8.9 - 12.9 FL    NRBC 0.0 0  WBC    ABSOLUTE NRBC 0.00 0.00 - 0.01 K/uL    NEUTROPHILS 66 32 - 75 %    LYMPHOCYTES 24 12 - 49 %    MONOCYTES 5 5 - 13 %    EOSINOPHILS 4 0 - 7 %    BASOPHILS 0 0 - 1 %    IMMATURE GRANULOCYTES 1 (H) 0.0 - 0.5 %    ABS. NEUTROPHILS 3.8 1.8 - 8.0 K/UL    ABS. LYMPHOCYTES 1.4 0.8 - 3.5 K/UL    ABS. MONOCYTES 0.3 0.0 - 1.0 K/UL    ABS. EOSINOPHILS 0.2 0.0 - 0.4 K/UL    ABS. BASOPHILS 0.0 0.0 - 0.1 K/UL    ABS. IMM. GRANS. 0.0 0.00 - 0.04 K/UL    DF AUTOMATED     METABOLIC PANEL, COMPREHENSIVE    Collection Time: 02/07/19  3:13 PM   Result Value Ref Range    Sodium 135 (L) 136 - 145 mmol/L    Potassium 3.6 3.5 - 5.1 mmol/L    Chloride 101 97 - 108 mmol/L    CO2 26 21 - 32 mmol/L    Anion gap 8 5 - 15 mmol/L    Glucose 220 (H) 65 - 100 mg/dL    BUN 5 (L) 6 - 20 MG/DL    Creatinine 0.65 0.55 - 1.02 MG/DL    BUN/Creatinine ratio 8 (L) 12 - 20      GFR est AA >60 >60 ml/min/1.73m2    GFR est non-AA >60 >60 ml/min/1.73m2    Calcium 8.9 8.5 - 10.1 MG/DL    Bilirubin, total 0.5 0.2 - 1.0 MG/DL    ALT (SGPT) 40 12 - 78 U/L    AST (SGOT) 50 (H) 15 - 37 U/L    Alk.  phosphatase 63 45 - 117 U/L    Protein, total 7.4 6.4 - 8.2 g/dL    Albumin 3.7 3.5 - 5.0 g/dL    Globulin 3.7 2.0 - 4.0 g/dL    A-G Ratio 1.0 (L) 1.1 - 2.2     MAGNESIUM    Collection Time: 02/07/19  3:13 PM   Result Value Ref Range    Magnesium 2.2 1.6 - 2.4 mg/dL   POC LACTIC ACID    Collection Time: 02/07/19  3:37 PM   Result Value Ref Range    Lactic Acid (POC) 2.68 (HH) 0.40 - 2.00 mmol/L   URINALYSIS W/MICROSCOPIC    Collection Time: 02/07/19  6:12 PM   Result Value Ref Range    Color YELLOW/STRAW      Appearance CLEAR CLEAR      Specific gravity 1.005 1.003 - 1.030      pH (UA) 7.0 5.0 - 8.0      Protein NEGATIVE  NEG mg/dL    Glucose NEGATIVE  NEG mg/dL    Ketone NEGATIVE  NEG mg/dL    Bilirubin NEGATIVE  NEG      Blood NEGATIVE  NEG      Urobilinogen 0.2 0.2 - 1.0 EU/dL    Nitrites NEGATIVE  NEG      Leukocyte Esterase NEGATIVE  NEG      WBC 0-4 0 - 4 /hpf    RBC 0-5 0 - 5 /hpf    Epithelial cells FEW FEW /lpf    Bacteria NEGATIVE  NEG /hpf    Hyaline cast 0-2 0 - 5 /lpf

## 2019-02-08 NOTE — PROGRESS NOTES
TRANSFER - IN REPORT:    Verbal report received from Diana(name) on Sheldon Christianson  being received from ED(unit) for routine progression of care      Report consisted of patients Situation, Background, Assessment and   Recommendations(SBAR). Information from the following report(s) SBAR, Kardex, [facility] and Recent Results was reviewed with the receiving nurse. Opportunity for questions and clarification was provided. Assessment completed upon patients arrival to unit and care assumed.

## 2019-02-08 NOTE — PROGRESS NOTES
Problem: Falls - Risk of  Goal: *Absence of Falls  Document Eric Fall Risk and appropriate interventions in the flowsheet.   Outcome: Progressing Towards Goal  Fall Risk Interventions:            Medication Interventions: Evaluate medications/consider consulting pharmacy

## 2019-02-08 NOTE — PROGRESS NOTES
Oncology End of Shift Note      Bedside shift change report given to Luis Felipe Lutz RN (incoming nurse) by Jesus Rogel RN (outgoing nurse) on Si Rover. Report included the following information SBAR, Kardex, MAR and Accordion. Shift Summary: Pt slept through most of the shift, did not ask for any narcotics, had a BM but it was too formed to send down for C Diff      Issues for Physician to Address:  None     Patient on Cardiac Monitoring?     [] Yes  [x] No    Rhythm:          Shift Events        Jesus Rogel RN

## 2019-02-08 NOTE — ED NOTES
Pt reports pain has come back and that she would like more pain medication. Called Dr. Amber Chen and spoke with Elisa Cole.

## 2019-02-08 NOTE — ED NOTES
TRANSFER - OUT REPORT:    Verbal report given to Shelley MATOS(name) on Kylie Presbrii  being transferred to Oncology(unit) for routine progression of care       Report consisted of patients Situation, Background, Assessment and   Recommendations(SBAR). Information from the following report(s) SBAR, ED Summary, [place] and Recent Results was reviewed with the receiving nurse. Lines:   Peripheral IV 02/07/19 Right Antecubital (Active)   Site Assessment Clean, dry, & intact 2/7/2019  5:02 PM   Phlebitis Assessment 0 2/7/2019  5:02 PM   Infiltration Assessment 0 2/7/2019  5:02 PM   Dressing Status Clean, dry, & intact 2/7/2019  5:02 PM   Hub Color/Line Status Pink 2/7/2019  5:02 PM   Action Taken Blood drawn 2/7/2019  5:02 PM        Opportunity for questions and clarification was provided.       Patient transported with:   [employer]

## 2019-02-08 NOTE — DISCHARGE INSTRUCTIONS
HOSPITALIST DISCHARGE INSTRUCTIONS    NAME: Jaqueline Church   :  1970   MRN:  [id number]     Date/Time:  2019 10:34 AM    ADMIT DATE: 2019     DISCHARGE DATE: 2019     DISCHARGE DIAGNOSIS:  C. Diff diarrhea  Elevated Lactic Acid  Diabetes with Hyperglycemia  Vomiting, resolved  Abdominal Cramps      MEDICATIONS:  As per medication reconciliation  list  · It is important that you take the medication exactly as they are prescribed. · Keep your medication in the bottles provided by the pharmacist and keep a list of the medication names, dosages, and times to be taken in your wallet. · Do not take other medications without consulting your doctor. Pain Management: per above medications    What to do at Home    Recommended diet:  As tolerated    Recommended activity: Activity as tolerated    If you experience any of the following symptoms then please call your primary care physician or return to the emergency room if you cannot get hold of your doctor:  Fever, chills, nausea, vomiting, worsening diarrhea, change in mentation, falling, bleeding, shortness of breath or any other concerning symptoms. Follow Up: With you GI Physician to discuss your recurrent C. Diff infection. Information obtained by :  I understand that if any problems occur once I am at home I am to contact my physician. I understand and acknowledge receipt of the instructions indicated above.                                                                                                                                            Physician's or R.N.'s Signature                                                                  Date/Time                                                                                                                                              Patient or Representative Signature                                                          Date/Time

## 2019-02-08 NOTE — PROGRESS NOTES
PCP SHON appt scheduled with Dr. Norman Clark on 2/12/2019 at 1:00pm. Appt added to AVS. Al Amanda CM Specialist

## 2019-02-08 NOTE — DISCHARGE SUMMARY
Hospitalist Discharge Summary     Patient ID:  Malcolm Schumacher  [id number]  14 y.o.  1970    PCP on record: Eduard Johnson NP    Admit date: 2/7/2019  Discharge date and time: 2/8/2019      DISCHARGE DIAGNOSIS:  C. Diff diarrhea  Elevated Lactic Acid  Diabetes with Hyperglycemia  Vomiting, resolved  Abdominal Cramps    CONSULTATIONS:  None    See HPI from H&P of Opal Treviño MD:  Essentially Patient has treatment for C diff at home (still had Difficid and PO Vancomycin tabs-->had some in purse for me to see, Expiration date April 2019). She was vomiting thus worried she could not keep them down and her PCP was worried about lactic acidosis thus she was referred to the ED. Was having about 15 BM's per day when started back up.  ______________________________________________________________________  DISCHARGE SUMMARY/HOSPITAL COURSE:  for full details see H&P, daily progress notes, labs, consult notes. C diff diarrhea: based on the evidence obtained while patient was in hospital there was no indication that patient had colitis (thus colitis ruled out)  -CT A/P:\"IMPRESSION: No acute abnormality in the abdomen or pelvis. Stable splenomegaly. \"  -Leukocytosis normal  -did have fever measured to 101 at home but because she was able to start treatment early was afebrile here  -patient asking to go home today and since was tolerating PO and already had adequate treatment at home (PO Vancomycin 125 mg tabs-->had some in purse for me to see, Expiration date April 2019), I discharged her    Patient did not have lactic acidosis: her Lactate was 2.6 on POC check and this is better defined as elevated Lactic acid (POA): improved with hydration    DM type 2 - uncontrolled with hyperglycemia POA  -metformin continued on discharge since no IV contrast with 2/7 CT and since A1c 8.7; will need to follow-up with PCP for DM     Severe Anxiety Disorder POA  -no changes made to PTA regimen     HTN  Cont home meds- norvasc, Cozaar, HTCZ      ______________________________________________________________________  Patient seen and examined by me on discharge day. Pertinent Findings:  Gen:    Not in distress  Chest: Clear lungs  CVS:   Regular rhythm. No edema  Abd:  Soft, mildly distended, mildly tender  Neuro:  Alert  _______________________________________________________________________  DISCHARGE MEDICATIONS:   Current Discharge Medication List      START taking these medications    Details   vancomycin (VANCOCIN) 125 mg capsule Take 1 Cap by mouth four (4) times daily for 10 days. You already have this medication at home  Qty: 40 Cap, Refills: 0         CONTINUE these medications which have NOT CHANGED    Details   sertraline (ZOLOFT) 100 mg tablet Take 100 mg by mouth. amLODIPine (NORVASC) 10 mg tablet Take 10 mg by mouth.      prochlorperazine (COMPAZINE) 5 mg tablet Take 1 Tab by mouth every eight (8) hours as needed for Nausea. Qty: 12 Tab, Refills: 0      dicyclomine (BENTYL) 20 mg tablet Take 1 Tab by mouth every six (6) hours as needed (abdominal cramps). Qty: 20 Tab, Refills: 0      metFORMIN (GLUCOPHAGE) 500 mg tablet Take 500 mg by mouth daily (with breakfast). losartan-hydroCHLOROthiazide (HYZAAR) 100-12.5 mg per tablet Take 1 Tab by mouth daily. estradiol (ESTRACE) 1 mg tablet Take 1 mg by mouth daily. cyclobenzaprine (FLEXERIL) 10 mg tablet Take 10 mg by mouth. traMADol (ULTRAM) 50 mg tablet Take 50 mg by mouth. Pramoxine (PROCTOFOAM) 1 % topical foam Apply  to affected area three (3) times daily as needed for Hemorrhoids. Qty: 1 Can, Refills: 0      EPINEPHrine (EPIPEN) 0.3 mg/0.3 mL (1:1,000) injection 0.3 mL by IntraMUSCular route once as needed for up to 1 dose. Qty: 0.3 mL, Refills: 1      albuterol (PROVENTIL, VENTOLIN) 90 mcg/Actuation inhaler Take 2 Puffs by inhalation every six (6) hours as needed.              My Recommended Diet, Activity, Wound Care, and follow-up labs are listed in the patient's Discharge Insturctions which I have personally completed and reviewed.     ______________________________________________________________________    Risk of deterioration: Low    Condition at Discharge:  Stable  ______________________________________________________________________    Disposition  Home with family, no needs  ______________________________________________________________________    Care Plan discussed with:   Patient, Family, RN, Care Manager    ______________________________________________________________________    Code Status: Full Code  ______________________________________________________________________      Follow up with:   PCP : Vivek Kurtz NP  Follow-up Information     Follow up With Specialties Details Why Contact Info    Patient's Own Medication is located in the Patient's:  Riana Story NP Nurse Practitioner   [address]  [place] [address] [facility]  [phone]                Total time in minutes spent coordinating this discharge (includes going over instructions, follow-up, prescriptions, and preparing report for sign off to her PCP) :  35 minutes    Signed:  Michelle Aaron MD

## 2019-02-09 ENCOUNTER — HOSPITAL ENCOUNTER (INPATIENT)
Age: 49
LOS: 2 days | Discharge: HOME | DRG: 248 | End: 2019-02-12
Attending: EMERGENCY MEDICINE | Admitting: FAMILY MEDICINE
Payer: MEDICAID

## 2019-02-09 ENCOUNTER — APPOINTMENT (OUTPATIENT)
Dept: CT IMAGING | Age: 49
DRG: 248 | End: 2019-02-09
Attending: EMERGENCY MEDICINE
Payer: MEDICAID

## 2019-02-09 DIAGNOSIS — K62.5 RECTAL BLEEDING: ICD-10-CM

## 2019-02-09 DIAGNOSIS — A04.72 C. DIFFICILE DIARRHEA: Primary | ICD-10-CM

## 2019-02-09 DIAGNOSIS — R10.84 ABDOMINAL PAIN, GENERALIZED: ICD-10-CM

## 2019-02-09 DIAGNOSIS — K64.4 EXTERNAL HEMORRHOIDS: ICD-10-CM

## 2019-02-09 DIAGNOSIS — E87.20 LACTIC ACIDOSIS: ICD-10-CM

## 2019-02-09 PROBLEM — F39 MOOD DISORDER (HCC): Status: ACTIVE | Noted: 2019-02-09

## 2019-02-09 LAB
ALBUMIN SERPL-MCNC: 3.9 G/DL (ref 3.5–5)
ALBUMIN/GLOB SERPL: 1 {RATIO} (ref 1.1–2.2)
ALP SERPL-CCNC: 63 U/L (ref 45–117)
ALT SERPL-CCNC: 50 U/L (ref 12–78)
ANION GAP SERPL CALC-SCNC: 8 MMOL/L (ref 5–15)
APPEARANCE UR: CLEAR
AST SERPL-CCNC: 44 U/L (ref 15–37)
BASOPHILS # BLD: 0 K/UL (ref 0–0.1)
BASOPHILS NFR BLD: 0 % (ref 0–1)
BILIRUB SERPL-MCNC: 0.4 MG/DL (ref 0.2–1)
BILIRUB UR QL: NEGATIVE
BUN SERPL-MCNC: 4 MG/DL (ref 6–20)
BUN/CREAT SERPL: 6 (ref 12–20)
CALCIUM SERPL-MCNC: 9.7 MG/DL (ref 8.5–10.1)
CHLORIDE SERPL-SCNC: 101 MMOL/L (ref 97–108)
CO2 SERPL-SCNC: 27 MMOL/L (ref 21–32)
COLOR UR: NORMAL
CREAT SERPL-MCNC: 0.69 MG/DL (ref 0.55–1.02)
DIFFERENTIAL METHOD BLD: ABNORMAL
EOSINOPHIL # BLD: 0.2 K/UL (ref 0–0.4)
EOSINOPHIL NFR BLD: 4 % (ref 0–7)
ERYTHROCYTE [DISTWIDTH] IN BLOOD BY AUTOMATED COUNT: 13.7 % (ref 11.5–14.5)
GLOBULIN SER CALC-MCNC: 3.8 G/DL (ref 2–4)
GLUCOSE SERPL-MCNC: 188 MG/DL (ref 65–100)
GLUCOSE UR STRIP.AUTO-MCNC: NEGATIVE MG/DL
HCG UR QL: NEGATIVE
HCT VFR BLD AUTO: 38.5 % (ref 35–47)
HEMOCCULT STL QL: POSITIVE
HGB BLD-MCNC: 13.3 G/DL (ref 11.5–16)
HGB UR QL STRIP: NEGATIVE
IMM GRANULOCYTES # BLD AUTO: 0 K/UL (ref 0–0.04)
IMM GRANULOCYTES NFR BLD AUTO: 1 % (ref 0–0.5)
INR PPP: 1 (ref 0.9–1.1)
KETONES UR QL STRIP.AUTO: NEGATIVE MG/DL
LACTATE SERPL-SCNC: 2.3 MMOL/L (ref 0.4–2)
LEUKOCYTE ESTERASE UR QL STRIP.AUTO: NEGATIVE
LIPASE SERPL-CCNC: 151 U/L (ref 73–393)
LYMPHOCYTES # BLD: 1.6 K/UL (ref 0.8–3.5)
LYMPHOCYTES NFR BLD: 28 % (ref 12–49)
MAGNESIUM SERPL-MCNC: 1.8 MG/DL (ref 1.6–2.4)
MCH RBC QN AUTO: 28.2 PG (ref 26–34)
MCHC RBC AUTO-ENTMCNC: 34.5 G/DL (ref 30–36.5)
MCV RBC AUTO: 81.7 FL (ref 80–99)
MONOCYTES # BLD: 0.3 K/UL (ref 0–1)
MONOCYTES NFR BLD: 5 % (ref 5–13)
NEUTS SEG # BLD: 3.6 K/UL (ref 1.8–8)
NEUTS SEG NFR BLD: 62 % (ref 32–75)
NITRITE UR QL STRIP.AUTO: NEGATIVE
NRBC # BLD: 0 K/UL (ref 0–0.01)
NRBC BLD-RTO: 0 PER 100 WBC
PH UR STRIP: 6 [PH] (ref 5–8)
PLATELET # BLD AUTO: 178 K/UL (ref 150–400)
PMV BLD AUTO: 9.7 FL (ref 8.9–12.9)
POTASSIUM SERPL-SCNC: 3.6 MMOL/L (ref 3.5–5.1)
PROT SERPL-MCNC: 7.7 G/DL (ref 6.4–8.2)
PROT UR STRIP-MCNC: NEGATIVE MG/DL
PROTHROMBIN TIME: 10.2 SEC (ref 9–11.1)
RBC # BLD AUTO: 4.71 M/UL (ref 3.8–5.2)
SODIUM SERPL-SCNC: 136 MMOL/L (ref 136–145)
SP GR UR REFRACTOMETRY: 1.01 (ref 1–1.03)
UROBILINOGEN UR QL STRIP.AUTO: 0.2 EU/DL (ref 0.2–1)
WBC # BLD AUTO: 5.8 K/UL (ref 3.6–11)

## 2019-02-09 PROCEDURE — 74011250636 HC RX REV CODE- 250/636: Performed by: EMERGENCY MEDICINE

## 2019-02-09 PROCEDURE — 85025 COMPLETE CBC W/AUTO DIFF WBC: CPT

## 2019-02-09 PROCEDURE — 82272 OCCULT BLD FECES 1-3 TESTS: CPT

## 2019-02-09 PROCEDURE — 99285 EMERGENCY DEPT VISIT HI MDM: CPT

## 2019-02-09 PROCEDURE — 96365 THER/PROPH/DIAG IV INF INIT: CPT

## 2019-02-09 PROCEDURE — 81003 URINALYSIS AUTO W/O SCOPE: CPT

## 2019-02-09 PROCEDURE — 74176 CT ABD & PELVIS W/O CONTRAST: CPT

## 2019-02-09 PROCEDURE — 36415 COLL VENOUS BLD VENIPUNCTURE: CPT

## 2019-02-09 PROCEDURE — 85610 PROTHROMBIN TIME: CPT

## 2019-02-09 PROCEDURE — 74011250637 HC RX REV CODE- 250/637: Performed by: EMERGENCY MEDICINE

## 2019-02-09 PROCEDURE — 83735 ASSAY OF MAGNESIUM: CPT

## 2019-02-09 PROCEDURE — 74011250636 HC RX REV CODE- 250/636: Performed by: FAMILY MEDICINE

## 2019-02-09 PROCEDURE — 74011636320 HC RX REV CODE- 636/320: Performed by: EMERGENCY MEDICINE

## 2019-02-09 PROCEDURE — 96361 HYDRATE IV INFUSION ADD-ON: CPT

## 2019-02-09 PROCEDURE — 96375 TX/PRO/DX INJ NEW DRUG ADDON: CPT

## 2019-02-09 PROCEDURE — 74011000250 HC RX REV CODE- 250: Performed by: EMERGENCY MEDICINE

## 2019-02-09 PROCEDURE — 83690 ASSAY OF LIPASE: CPT

## 2019-02-09 PROCEDURE — 83605 ASSAY OF LACTIC ACID: CPT

## 2019-02-09 PROCEDURE — 81025 URINE PREGNANCY TEST: CPT

## 2019-02-09 PROCEDURE — 96376 TX/PRO/DX INJ SAME DRUG ADON: CPT

## 2019-02-09 PROCEDURE — 80053 COMPREHEN METABOLIC PANEL: CPT

## 2019-02-09 RX ORDER — MORPHINE SULFATE 2 MG/ML
2 INJECTION, SOLUTION INTRAMUSCULAR; INTRAVENOUS
Status: COMPLETED | OUTPATIENT
Start: 2019-02-09 | End: 2019-02-09

## 2019-02-09 RX ORDER — MORPHINE SULFATE 2 MG/ML
4 INJECTION, SOLUTION INTRAMUSCULAR; INTRAVENOUS
Status: COMPLETED | OUTPATIENT
Start: 2019-02-09 | End: 2019-02-09

## 2019-02-09 RX ORDER — HYDROCORTISONE ACETATE PRAMOXINE HCL 2.5; 1 G/100G; G/100G
CREAM TOPICAL
Status: DISCONTINUED | OUTPATIENT
Start: 2019-02-09 | End: 2019-02-12 | Stop reason: HOSPADM

## 2019-02-09 RX ORDER — AMLODIPINE BESYLATE 5 MG/1
10 TABLET ORAL DAILY
Status: DISCONTINUED | OUTPATIENT
Start: 2019-02-10 | End: 2019-02-12 | Stop reason: HOSPADM

## 2019-02-09 RX ORDER — DIPHENHYDRAMINE HYDROCHLORIDE 50 MG/ML
25 INJECTION, SOLUTION INTRAMUSCULAR; INTRAVENOUS
Status: COMPLETED | OUTPATIENT
Start: 2019-02-09 | End: 2019-02-09

## 2019-02-09 RX ORDER — ALBUTEROL SULFATE 90 UG/1
AEROSOL, METERED RESPIRATORY (INHALATION)
Status: DISCONTINUED | OUTPATIENT
Start: 2019-02-09 | End: 2019-02-12 | Stop reason: HOSPADM

## 2019-02-09 RX ORDER — LOSARTAN POTASSIUM 100 MG/1
100 TABLET ORAL DAILY
Status: DISCONTINUED | OUTPATIENT
Start: 2019-02-10 | End: 2019-02-12 | Stop reason: HOSPADM

## 2019-02-09 RX ORDER — VANCOMYCIN HYDROCHLORIDE 250 MG/5ML
125 POWDER, FOR SOLUTION ORAL EVERY 6 HOURS
Status: DISCONTINUED | OUTPATIENT
Start: 2019-02-10 | End: 2019-02-10

## 2019-02-09 RX ORDER — VANCOMYCIN HYDROCHLORIDE 250 MG/5ML
125 POWDER, FOR SOLUTION ORAL EVERY 6 HOURS
Status: DISCONTINUED | OUTPATIENT
Start: 2019-02-10 | End: 2019-02-09

## 2019-02-09 RX ORDER — VANCOMYCIN HYDROCHLORIDE 250 MG/5ML
125 POWDER, FOR SOLUTION ORAL
Status: COMPLETED | OUTPATIENT
Start: 2019-02-09 | End: 2019-02-09

## 2019-02-09 RX ORDER — ONDANSETRON 2 MG/ML
4 INJECTION INTRAMUSCULAR; INTRAVENOUS
Status: COMPLETED | OUTPATIENT
Start: 2019-02-09 | End: 2019-02-09

## 2019-02-09 RX ORDER — LIDOCAINE HYDROCHLORIDE 20 MG/ML
JELLY TOPICAL
Status: COMPLETED | OUTPATIENT
Start: 2019-02-09 | End: 2019-02-09

## 2019-02-09 RX ORDER — MORPHINE SULFATE 2 MG/ML
2 INJECTION, SOLUTION INTRAMUSCULAR; INTRAVENOUS
Status: DISPENSED | OUTPATIENT
Start: 2019-02-09 | End: 2019-02-10

## 2019-02-09 RX ORDER — ESTRADIOL 1 MG/1
1 TABLET ORAL DAILY
Status: DISCONTINUED | OUTPATIENT
Start: 2019-02-10 | End: 2019-02-12 | Stop reason: HOSPADM

## 2019-02-09 RX ORDER — METRONIDAZOLE 500 MG/100ML
500 INJECTION, SOLUTION INTRAVENOUS EVERY 8 HOURS
Status: DISCONTINUED | OUTPATIENT
Start: 2019-02-09 | End: 2019-02-11

## 2019-02-09 RX ORDER — SERTRALINE HYDROCHLORIDE 50 MG/1
100 TABLET, FILM COATED ORAL DAILY
Status: DISCONTINUED | OUTPATIENT
Start: 2019-02-10 | End: 2019-02-12 | Stop reason: HOSPADM

## 2019-02-09 RX ADMIN — MORPHINE SULFATE 4 MG: 2 INJECTION, SOLUTION INTRAMUSCULAR; INTRAVENOUS at 18:47

## 2019-02-09 RX ADMIN — SODIUM CHLORIDE 1000 ML: 900 INJECTION, SOLUTION INTRAVENOUS at 21:20

## 2019-02-09 RX ADMIN — SODIUM CHLORIDE 1000 ML: 900 INJECTION, SOLUTION INTRAVENOUS at 17:07

## 2019-02-09 RX ADMIN — ONDANSETRON 4 MG: 2 INJECTION INTRAMUSCULAR; INTRAVENOUS at 17:08

## 2019-02-09 RX ADMIN — LIDOCAINE HYDROCHLORIDE: 20 JELLY TOPICAL at 17:07

## 2019-02-09 RX ADMIN — METRONIDAZOLE 500 MG: 500 INJECTION, SOLUTION INTRAVENOUS at 22:40

## 2019-02-09 RX ADMIN — PROCHLORPERAZINE EDISYLATE 10 MG: 5 INJECTION INTRAMUSCULAR; INTRAVENOUS at 21:22

## 2019-02-09 RX ADMIN — DIPHENHYDRAMINE HYDROCHLORIDE 25 MG: 50 INJECTION, SOLUTION INTRAMUSCULAR; INTRAVENOUS at 17:20

## 2019-02-09 RX ADMIN — MORPHINE SULFATE 2 MG: 2 INJECTION, SOLUTION INTRAMUSCULAR; INTRAVENOUS at 21:20

## 2019-02-09 RX ADMIN — DIPHENHYDRAMINE HYDROCHLORIDE 25 MG: 50 INJECTION, SOLUTION INTRAMUSCULAR; INTRAVENOUS at 18:47

## 2019-02-09 RX ADMIN — DIATRIZOATE MEGLUMINE AND DIATRIZOATE SODIUM 30 ML: 660; 100 LIQUID ORAL; RECTAL at 18:46

## 2019-02-09 RX ADMIN — MORPHINE SULFATE 4 MG: 2 INJECTION, SOLUTION INTRAMUSCULAR; INTRAVENOUS at 17:08

## 2019-02-09 RX ADMIN — VANCOMYCIN HYDROCHLORIDE 125 MG: KIT at 18:14

## 2019-02-09 NOTE — ED PROVIDER NOTES
Patient Name: Sarah Coulter    History of Presenting Illness     Chief Complaint   Patient presents with    Abdominal Pain     pt was just discharged from hospital for the same, but sx are persisting    Melena    Vomiting       History Provided By: Patient    HPI: Sarah Coulter, 50 y.o. female with PMHx significant for diabetes, asthma, CKD, and HTN, presents ambulatory to the ED with cc of new onset of worsening, diffuse abdominal pain with accompanying sxs of n/v, worsening of rectal pain, and worsening of blood in stool/melena with additional cc of new onset rash on bilateral forearms and hands (with itchiness). Pt rates current abdominal pain as 9/10 in intensity and describes as a burning sensation. She adds there is improvement in pain when she is in a sitting position. Pt clarifies she did have some mild pain when she was discharged yesterday (was anxious to leave). The pain she was discharged with did improve, but new abdominal pain is different and more diffuse --notes significant pain RUQ and LLQ. When at stool, pt notes it was dark red/brown in coloring and also notes that there is mucous present. Pt is still taking prescribed vancomycin. Pt ate a normal meal last night and was fine, but n/v onset occurred today after eating apple sauce & crackers. Pt denies any new medicines that she has not taken before or and allergy to latex. Family hx includes diabetes and non hodgkin's lymphoma (mother). Pt specifically denies dysuria, hematuria, and fever. Social Hx: - Tobacco, + EtOH, - Illicit Drugs     There are no other complaints, changes, or physical findings at this time.     PCP: Rosas Pete NP    Current Facility-Administered Medications   Medication Dose Route Frequency Provider Last Rate Last Dose    sodium chloride 0.9 % bolus infusion 1,000 mL  1,000 mL IntraVENous NOW Herson Cadena MD 1,000 mL/hr at 02/09/19 1707 1,000 mL at 02/09/19 1707    vancomycin (FIRVANQ) 50 mg/mL oral solution 125 mg  125 mg Oral NOW Gisela Minaya MD        diatrizoate hema-diatrizoat sod (MD-GASTROVIEW,GASTROGRAFIN) 66-10 % contrast solution 30 mL  30 mL Oral NOW Gisela Minaya MD         Current Outpatient Medications   Medication Sig Dispense Refill    vancomycin (VANCOCIN) 125 mg capsule Take 1 Cap by mouth four (4) times daily for 10 days. You already have this medication at home 40 Cap 0    sertraline (ZOLOFT) 100 mg tablet Take 100 mg by mouth.  amLODIPine (NORVASC) 10 mg tablet Take 10 mg by mouth.  cyclobenzaprine (FLEXERIL) 10 mg tablet Take 10 mg by mouth.  traMADol (ULTRAM) 50 mg tablet Take 50 mg by mouth.  prochlorperazine (COMPAZINE) 5 mg tablet Take 1 Tab by mouth every eight (8) hours as needed for Nausea. 12 Tab 0    dicyclomine (BENTYL) 20 mg tablet Take 1 Tab by mouth every six (6) hours as needed (abdominal cramps). 20 Tab 0    metFORMIN (GLUCOPHAGE) 500 mg tablet Take 500 mg by mouth daily (with breakfast).  Pramoxine (PROCTOFOAM) 1 % topical foam Apply  to affected area three (3) times daily as needed for Hemorrhoids. 1 Can 0    losartan-hydroCHLOROthiazide (HYZAAR) 100-12.5 mg per tablet Take 1 Tab by mouth daily.  EPINEPHrine (EPIPEN) 0.3 mg/0.3 mL (1:1,000) injection 0.3 mL by IntraMUSCular route once as needed for up to 1 dose. 0.3 mL 1    estradiol (ESTRACE) 1 mg tablet Take 1 mg by mouth daily.  albuterol (PROVENTIL, VENTOLIN) 90 mcg/Actuation inhaler Take 2 Puffs by inhalation every six (6) hours as needed.          Past History     Past Medical History:  Past Medical History:   Diagnosis Date    Anal fissure     Anisocoria     Asthma     LAST EPISODE     Back pain     Cerumen impaction     Chronic kidney disease     hx uti in past    Coagulation defects     ocassional rectal bleeding due to anal fissure    Colovaginal fistula     Diabetes (HCC)     NIDDM    Diabetes ([address]     Diverticulitis     Diverticulosis     Enlarged tonsils     Frequent UTI     GERD (gastroesophageal reflux disease)     H/O endoscopy     with dilation    HA (headache)     Hepatic steatosis     Hx of colonoscopy with polypectomy     benign    Hypertension     Ill-defined condition     FREQUENT HIVES    Ill-defined condition     HX ELEVATED LIVER ENZYMES    Morbid obesity (HCC)     Nausea & vomiting     during diverticulitis flare    Obesity     Otitis media     Pneumonia     about 15 yrs ago    Psychiatric disorder     ANXIETY    Recurrent tonsillitis     Sinusitis     Transfusion history ~ age 35    postop hysterectomy    Unspecified sleep apnea     snores ( not diagnosed yet)     Urticaria     Urticaria        Past Surgical History:  Past Surgical History:   Procedure Laterality Date    ABDOMEN SURGERY PROC UNLISTED  2018    hernia repair at [address]    [address]    [place].  HX GI  12    LAPAROSCOPIC HAND ASSISTED  POSS OPEN SIGMOID COLECTOMY POSS TEMPORARY DIVERTING LOOP ILEOSTOMY;  (no illeostomy needed)    HX GYN           HX GYN      cervical conization    HX HEENT      SINUS SURGERY LEFT X2    HX HEENT      SINUS SURGERY ON RIGHT X2    HX OTHER SURGICAL      Sphincterotomy    HX PELVIC LAPAROSCOPY      HX EMMANUEL AND BSO      HX UROLOGICAL  12     CYSTOSCOPY INSERTION URETERAL CATHETERS - Cystoscopy Insertion of bilateral ureteral stents       Family History:  Family History   Problem Relation Age of Onset    Diabetes Mother     Cancer Mother         NON-HODGKINS LYMPHOMA    Anesth Problems Mother         PONV    Diabetes Father     Heart Disease Father         CAD - STENTS, PACEMAKER    Arrhythmia Father        Social History:  Social History     Tobacco Use    Smoking status: Never Smoker    Smokeless tobacco: Never Used   Substance Use Topics    Alcohol use: Yes     Comment: Rarely    Drug use: No       Allergies:   Allergies   Allergen Reactions    Aspirin Shortness of Breath    Prilosec [Omeprazole Magnesium] Anaphylaxis     CHERRY FLAVORED; PT TAKES REGULAR PRILOSEC AND IS OK    Codeine Hives and Itching    Contrast Agent [Iodine] Itching     Pt. Had itching after IV contrast with the last exam.  Benadryl was given    Morphine Itching     Severe itching. Fine to take benadryl    Fentanyl Rash    Ketorolac Rash     \"makes my eyes spasm and causes rash on my hands\"    Percocet [Oxycodone-Acetaminophen] Hives         Review of Systems   Review of Systems   Constitutional: Negative. Negative for fever. HENT: Negative. Eyes: Negative. Respiratory: Negative. Cardiovascular: Negative. Gastrointestinal: Positive for abdominal pain (diffuse), blood in stool (melena), nausea, rectal pain and vomiting. Endocrine: Negative. Negative for heat intolerance. Genitourinary: Negative for dysuria and hematuria. Musculoskeletal: Negative. Skin: Positive for rash (bilateral hands & forearms). Allergic/Immunologic: Negative. Negative for immunocompromised state. Neurological: Negative. Hematological: Negative. Does not bruise/bleed easily. Psychiatric/Behavioral: Negative. All other systems reviewed and are negative. Physical Exam   Physical Exam   Constitutional: She is oriented to person, place, and time. She appears well-developed and well-nourished. She appears distressed (moderate). HENT:   Head: Normocephalic and atraumatic. Eyes: EOM are normal. Pupils are equal, round, and reactive to light. Neck: Normal range of motion. Cardiovascular: Normal rate, regular rhythm and normal heart sounds. Pulmonary/Chest: Effort normal and breath sounds normal. No respiratory distress. Abdominal: Soft. Bowel sounds are normal. She exhibits no mass. There is tenderness (diffuse). There is no rebound and no guarding. Musculoskeletal: Normal range of motion. She exhibits no edema.    Neurological: She is alert and oriented to person, place, and time. Coordination normal.   Skin: Skin is warm and dry. Psychiatric: She has a normal mood and affect. Her behavior is normal.   Nursing note and vitals reviewed. Diagnostic Study Results     Labs -     Recent Results (from the past 12 hour(s))   CBC WITH AUTOMATED DIFF    Collection Time: 02/09/19  5:00 PM   Result Value Ref Range    WBC 5.8 3.6 - 11.0 K/uL    RBC 4.71 3.80 - 5.20 M/uL    HGB 13.3 11.5 - 16.0 g/dL    HCT 38.5 35.0 - 47.0 %    MCV 81.7 80.0 - 99.0 FL    MCH 28.2 26.0 - 34.0 PG    MCHC 34.5 30.0 - 36.5 g/dL    RDW 13.7 11.5 - 14.5 %    PLATELET 135 379 - 946 K/uL    MPV 9.7 8.9 - 12.9 FL    NRBC 0.0 0  WBC    ABSOLUTE NRBC 0.00 0.00 - 0.01 K/uL    NEUTROPHILS 62 32 - 75 %    LYMPHOCYTES 28 12 - 49 %    MONOCYTES 5 5 - 13 %    EOSINOPHILS 4 0 - 7 %    BASOPHILS 0 0 - 1 %    IMMATURE GRANULOCYTES 1 (H) 0.0 - 0.5 %    ABS. NEUTROPHILS 3.6 1.8 - 8.0 K/UL    ABS. LYMPHOCYTES 1.6 0.8 - 3.5 K/UL    ABS. MONOCYTES 0.3 0.0 - 1.0 K/UL    ABS. EOSINOPHILS 0.2 0.0 - 0.4 K/UL    ABS. BASOPHILS 0.0 0.0 - 0.1 K/UL    ABS. IMM. GRANS. 0.0 0.00 - 0.04 K/UL    DF AUTOMATED     METABOLIC PANEL, COMPREHENSIVE    Collection Time: 02/09/19  5:00 PM   Result Value Ref Range    Sodium 136 136 - 145 mmol/L    Potassium 3.6 3.5 - 5.1 mmol/L    Chloride 101 97 - 108 mmol/L    CO2 27 21 - 32 mmol/L    Anion gap 8 5 - 15 mmol/L    Glucose 188 (H) 65 - 100 mg/dL    BUN 4 (L) 6 - 20 MG/DL    Creatinine 0.69 0.55 - 1.02 MG/DL    BUN/Creatinine ratio 6 (L) 12 - 20      GFR est AA >60 >60 ml/min/1.73m2    GFR est non-AA >60 >60 ml/min/1.73m2    Calcium 9.7 8.5 - 10.1 MG/DL    Bilirubin, total 0.4 0.2 - 1.0 MG/DL    ALT (SGPT) 50 12 - 78 U/L    AST (SGOT) 44 (H) 15 - 37 U/L    Alk.  phosphatase 63 45 - 117 U/L    Protein, total 7.7 6.4 - 8.2 g/dL    Albumin 3.9 3.5 - 5.0 g/dL    Globulin 3.8 2.0 - 4.0 g/dL    A-G Ratio 1.0 (L) 1.1 - 2.2     PROTHROMBIN TIME + INR    Collection Time: 02/09/19  5:00 PM   Result Value Ref Range    INR 1.0 0.9 - 1.1      Prothrombin time 10.2 9.0 - 11.1 sec   MAGNESIUM    Collection Time: 02/09/19  5:00 PM   Result Value Ref Range    Magnesium 1.8 1.6 - 2.4 mg/dL   LIPASE    Collection Time: 02/09/19  5:00 PM   Result Value Ref Range    Lipase 151 73 - 393 U/L   LACTIC ACID    Collection Time: 02/09/19  5:00 PM   Result Value Ref Range    Lactic acid 2.3 (HH) 0.4 - 2.0 MMOL/L   OCCULT BLOOD, STOOL    Collection Time: 02/09/19  5:26 PM   Result Value Ref Range    Occult blood, stool POSITIVE (A) NEG     HCG URINE, QL. - POC    Collection Time: 02/09/19  5:54 PM   Result Value Ref Range    Pregnancy test,urine (POC) NEGATIVE  NEG         Radiologic Studies -   CT ABD PELV WO CONT    (Results Pending)     CT Results  (Last 48 hours)    None        CXR Results  (Last 48 hours)               02/07/19 1914  XR ABD ACUTE W 1 V CHEST Final result    Impression:  IMPRESSION:        Chest:   No acute cardiopulmonary process. Abdomen:   No evidence of bowel obstruction or pneumoperitoneum. Moderate volume of stool   throughout the colon. Narrative:  EXAM:  XR ABD ACUTE W 1 V CHEST       INDICATION:   Abdominal Pain       COMPARISON: Abdominal radiograph 2/7/2019. FINDINGS: AP radiograph of the chest was obtained. Supine and upright views of   the abdomen were obtained. Chest:   No evidence of focal consolidation. No pleural effusion or pneumothorax. Heart,   jhonny, mediastinum are within normal limits. No acute osseous abnormalities. Abdomen:   No evidence of dilated bowel loops. No evidence of free intraperitoneal air. Moderate volume of stool noted throughout the colon. Cholecystectomy clips, as   well as surgical clips in the left upper quadrant. No acute osseous   abnormalities. Medical Decision Making   I am the first provider for this patient.     I reviewed the vital signs, available nursing notes, past medical history, past surgical history, family history and social history. Vital Signs-Reviewed the patient's vital signs. Patient Vitals for the past 12 hrs:   Temp Pulse Resp BP SpO2   02/09/19 1730 -- 72 16 151/89 97 %   02/09/19 1546 97.7 °F (36.5 °C) 79 16 (!) 182/91 100 %       Pulse Oximetry Analysis - 100% on RA    Cardiac Monitor:   Rate: 79 bpm  Rhythm: Normal Sinus Rhythm      Records Reviewed: Nursing Notes, Old Medical Records, Previous Radiology Studies and Previous Laboratory Studies    Provider Notes (Medical Decision Making):   DDx: Gastroenteritis, rectal bleeding, hemorrhoids, electrolyte abnormality, dehydration, c-diff, pancreatitis    ED Course:   Initial assessment performed. The patients presenting problems have been discussed, and they are in agreement with the care plan formulated and outlined with them. I have encouraged them to ask questions as they arise throughout their visit. Procedure Note - Rectal Exam:   5:26 PM  Performed by: Vanessa Glover MD  Chaperoned by: Gabriel Beaver  Rectal exam performed. <1ml stool was collected. Light brown stool was collected and sent to the lab for Hemoccult testing. Other findings: tender external hemorrhoids, no active bleeding   The procedure took 1-15 minutes, and pt tolerated moderately. ED Course as of Feb 09 2044   Sat Feb 09, 2019   1728 Pt notes she has not yet taken her vancomycin today. Written by Denys Herr ED Scribe as dictated by Vanessa Glover MD  [KW]   4485 Pt states abdominal pain has not improved much and rates it as 7/10 in intensity. Written by Denys Hrer ED Scribe as dictated by Vanessa Glover MD  [KW]   2068 Pt rates current pain 6/10. Written by Denys Herr ED Scribe as dictated by Vansesa Glover MD  [KW]      ED Course User Index  [KW] [facility]     Consult Note:  8:44 PM  Vanessa Glover MD spoke with Sheron Dumont,  Specialty: hospitalist  Discussed pt's hx, disposition, and available diagnostic and imaging results. Reviewed care plans. Consultant agrees with plans as outlined. Suzette Reyna will admit for observation. Critical Care Time:   CRITICAL CARE NOTE:  6:03 PM  IMPENDING DETERIORATION -Respiratory, Metabolic and Renal  ASSOCIATED RISK FACTORS - Hypotension, Dysrhythmia, Metabolic changes and Dehydration  MANAGEMENT- Bedside Assessment and Supervision of Care  INTERPRETATION -  CT Scan, ECG and Blood Pressure  INTERVENTIONS - hemodynamic mngmt and Metobolic interventions  CASE REVIEW - Hospitalist, Nursing and Family  TREATMENT RESPONSE -Improved  PERFORMED BY - Self  NOTES   :  I have spent 60 minutes of critical care time involved in lab review, consultations with specialist, family decision- making, bedside attention and documentation. During this entire length of time I was immediately available to the patient . Mai Galeazzi, MD    Disposition:  Admit Note:  8:45 PM  Pt is being admitted by Suzette Reyna The results of their tests and reason(s) for their admission have been discussed with pt and/or available family. They convey agreement and understanding for the need to be admitted and for admission diagnosis. PLAN:  1. Admit to Hospital      Diagnosis     Clinical Impression:   1. C. difficile diarrhea    2. Lactic acidosis    3. Abdominal pain, generalized    4. Rectal bleeding    5. External hemorrhoids        Attestations: This note is prepared by Karolina Henry, acting as Scribe for Mai Galeazzi, MD.    The scribe's documentation has been prepared under my direction and personally reviewed by me in its entirety. I confirm that the note above accurately reflects all work, treatment, procedures, and medical decision making performed by me.   Mai Galeazzi, MD

## 2019-02-10 LAB
C DIFF GDH STL QL: NEGATIVE
C DIFF TOX A+B STL QL IA: NEGATIVE
EST. AVERAGE GLUCOSE BLD GHB EST-MCNC: 183 MG/DL
GLUCOSE BLD STRIP.AUTO-MCNC: 183 MG/DL (ref 65–100)
GLUCOSE BLD STRIP.AUTO-MCNC: 215 MG/DL (ref 65–100)
GLUCOSE BLD STRIP.AUTO-MCNC: 222 MG/DL (ref 65–100)
GLUCOSE BLD STRIP.AUTO-MCNC: 250 MG/DL (ref 65–100)
HBA1C MFR BLD: 8 % (ref 4.2–6.3)
INTERPRETATION: NORMAL
SERVICE CMNT-IMP: ABNORMAL

## 2019-02-10 PROCEDURE — 74011250636 HC RX REV CODE- 250/636: Performed by: INTERNAL MEDICINE

## 2019-02-10 PROCEDURE — 74011250636 HC RX REV CODE- 250/636: Performed by: FAMILY MEDICINE

## 2019-02-10 PROCEDURE — 65270000029 HC RM PRIVATE

## 2019-02-10 PROCEDURE — 74011000250 HC RX REV CODE- 250: Performed by: INTERNAL MEDICINE

## 2019-02-10 PROCEDURE — 82962 GLUCOSE BLOOD TEST: CPT

## 2019-02-10 PROCEDURE — 74011250637 HC RX REV CODE- 250/637: Performed by: FAMILY MEDICINE

## 2019-02-10 PROCEDURE — 74011636637 HC RX REV CODE- 636/637: Performed by: FAMILY MEDICINE

## 2019-02-10 PROCEDURE — 83036 HEMOGLOBIN GLYCOSYLATED A1C: CPT

## 2019-02-10 PROCEDURE — 87449 NOS EACH ORGANISM AG IA: CPT

## 2019-02-10 PROCEDURE — 74011250637 HC RX REV CODE- 250/637: Performed by: INTERNAL MEDICINE

## 2019-02-10 PROCEDURE — 96375 TX/PRO/DX INJ NEW DRUG ADDON: CPT

## 2019-02-10 PROCEDURE — 36415 COLL VENOUS BLD VENIPUNCTURE: CPT

## 2019-02-10 RX ORDER — VANCOMYCIN HYDROCHLORIDE 250 MG/5ML
500 POWDER, FOR SOLUTION ORAL EVERY 6 HOURS
Status: DISCONTINUED | OUTPATIENT
Start: 2019-02-10 | End: 2019-02-10 | Stop reason: SDUPTHER

## 2019-02-10 RX ORDER — MORPHINE SULFATE 2 MG/ML
4 INJECTION, SOLUTION INTRAMUSCULAR; INTRAVENOUS
Status: DISCONTINUED | OUTPATIENT
Start: 2019-02-10 | End: 2019-02-10

## 2019-02-10 RX ORDER — MORPHINE SULFATE 2 MG/ML
2 INJECTION, SOLUTION INTRAMUSCULAR; INTRAVENOUS
Status: DISCONTINUED | OUTPATIENT
Start: 2019-02-10 | End: 2019-02-10

## 2019-02-10 RX ORDER — MORPHINE SULFATE 2 MG/ML
4 INJECTION, SOLUTION INTRAMUSCULAR; INTRAVENOUS
Status: DISPENSED | OUTPATIENT
Start: 2019-02-10 | End: 2019-02-11

## 2019-02-10 RX ORDER — INSULIN LISPRO 100 [IU]/ML
INJECTION, SOLUTION INTRAVENOUS; SUBCUTANEOUS
Status: DISCONTINUED | OUTPATIENT
Start: 2019-02-10 | End: 2019-02-12 | Stop reason: HOSPADM

## 2019-02-10 RX ORDER — DIPHENHYDRAMINE HYDROCHLORIDE 50 MG/ML
25 INJECTION, SOLUTION INTRAMUSCULAR; INTRAVENOUS
Status: COMPLETED | OUTPATIENT
Start: 2019-02-10 | End: 2019-02-10

## 2019-02-10 RX ORDER — HYDRALAZINE HYDROCHLORIDE 20 MG/ML
10 INJECTION INTRAMUSCULAR; INTRAVENOUS
Status: DISCONTINUED | OUTPATIENT
Start: 2019-02-10 | End: 2019-02-12 | Stop reason: HOSPADM

## 2019-02-10 RX ORDER — FAMOTIDINE 10 MG/ML
20 INJECTION INTRAVENOUS EVERY 12 HOURS
Status: DISCONTINUED | OUTPATIENT
Start: 2019-02-10 | End: 2019-02-12 | Stop reason: HOSPADM

## 2019-02-10 RX ORDER — VANCOMYCIN HYDROCHLORIDE 250 MG/5ML
500 POWDER, FOR SOLUTION ORAL EVERY 6 HOURS
Status: DISCONTINUED | OUTPATIENT
Start: 2019-02-11 | End: 2019-02-11

## 2019-02-10 RX ORDER — DEXTROSE 50 % IN WATER (D50W) INTRAVENOUS SYRINGE
25-50 AS NEEDED
Status: DISCONTINUED | OUTPATIENT
Start: 2019-02-10 | End: 2019-02-12 | Stop reason: HOSPADM

## 2019-02-10 RX ORDER — MAGNESIUM SULFATE 100 %
4 CRYSTALS MISCELLANEOUS AS NEEDED
Status: DISCONTINUED | OUTPATIENT
Start: 2019-02-10 | End: 2019-02-12 | Stop reason: HOSPADM

## 2019-02-10 RX ORDER — ONDANSETRON 2 MG/ML
8 INJECTION INTRAMUSCULAR; INTRAVENOUS
Status: DISCONTINUED | OUTPATIENT
Start: 2019-02-10 | End: 2019-02-12 | Stop reason: HOSPADM

## 2019-02-10 RX ADMIN — VANCOMYCIN HYDROCHLORIDE 125 MG: KIT at 00:15

## 2019-02-10 RX ADMIN — PROCHLORPERAZINE EDISYLATE 5 MG: 5 INJECTION INTRAMUSCULAR; INTRAVENOUS at 19:37

## 2019-02-10 RX ADMIN — MORPHINE SULFATE 4 MG: 2 INJECTION, SOLUTION INTRAMUSCULAR; INTRAVENOUS at 11:57

## 2019-02-10 RX ADMIN — DIPHENHYDRAMINE HYDROCHLORIDE 25 MG: 50 INJECTION, SOLUTION INTRAMUSCULAR; INTRAVENOUS at 01:04

## 2019-02-10 RX ADMIN — METRONIDAZOLE 500 MG: 500 INJECTION, SOLUTION INTRAVENOUS at 21:28

## 2019-02-10 RX ADMIN — MORPHINE SULFATE 4 MG: 2 INJECTION, SOLUTION INTRAMUSCULAR; INTRAVENOUS at 21:29

## 2019-02-10 RX ADMIN — VANCOMYCIN HYDROCHLORIDE 500 MG: KIT at 14:48

## 2019-02-10 RX ADMIN — INSULIN LISPRO 3 UNITS: 100 INJECTION, SOLUTION INTRAVENOUS; SUBCUTANEOUS at 18:45

## 2019-02-10 RX ADMIN — INSULIN LISPRO 3 UNITS: 100 INJECTION, SOLUTION INTRAVENOUS; SUBCUTANEOUS at 12:25

## 2019-02-10 RX ADMIN — PROCHLORPERAZINE EDISYLATE 5 MG: 5 INJECTION INTRAMUSCULAR; INTRAVENOUS at 11:57

## 2019-02-10 RX ADMIN — MORPHINE SULFATE 2 MG: 2 INJECTION, SOLUTION INTRAMUSCULAR; INTRAVENOUS at 00:04

## 2019-02-10 RX ADMIN — HYDRALAZINE HYDROCHLORIDE 10 MG: 20 INJECTION INTRAMUSCULAR; INTRAVENOUS at 11:58

## 2019-02-10 RX ADMIN — ONDANSETRON 8 MG: 2 INJECTION INTRAMUSCULAR; INTRAVENOUS at 14:32

## 2019-02-10 RX ADMIN — ONDANSETRON 8 MG: 2 INJECTION INTRAMUSCULAR; INTRAVENOUS at 23:49

## 2019-02-10 RX ADMIN — MORPHINE SULFATE 4 MG: 2 INJECTION, SOLUTION INTRAMUSCULAR; INTRAVENOUS at 16:38

## 2019-02-10 RX ADMIN — VANCOMYCIN HYDROCHLORIDE 125 MG: KIT at 06:59

## 2019-02-10 RX ADMIN — METRONIDAZOLE 500 MG: 500 INJECTION, SOLUTION INTRAVENOUS at 07:08

## 2019-02-10 RX ADMIN — MORPHINE SULFATE 2 MG: 2 INJECTION, SOLUTION INTRAMUSCULAR; INTRAVENOUS at 09:18

## 2019-02-10 RX ADMIN — INSULIN LISPRO 5 UNITS: 100 INJECTION, SOLUTION INTRAVENOUS; SUBCUTANEOUS at 10:53

## 2019-02-10 RX ADMIN — MORPHINE SULFATE 2 MG: 2 INJECTION, SOLUTION INTRAMUSCULAR; INTRAVENOUS at 04:12

## 2019-02-10 RX ADMIN — FAMOTIDINE 20 MG: 10 INJECTION, SOLUTION INTRAVENOUS at 21:28

## 2019-02-10 RX ADMIN — VANCOMYCIN HYDROCHLORIDE 500 MG: KIT at 19:37

## 2019-02-10 RX ADMIN — METRONIDAZOLE 500 MG: 500 INJECTION, SOLUTION INTRAVENOUS at 14:32

## 2019-02-10 NOTE — PROGRESS NOTES
Paged Dr. Wisam Caro. Patient is complaining of nausea making dry heave and vomit. Has had 5 episodes since the start of shift. Patient is also requesting more pain medication. Stated \" the 4mg that was given to me in the ED worked better\". Patient vital signs do show that patient is in pain. Patient said that pain is constant in mid abdomen radiating inwards.  Call back given [phone]

## 2019-02-10 NOTE — ED NOTES
TRANSFER - OUT REPORT:    Verbal report given to Neha RN(name) on Vonna Lefort  being transferred to Ortho(unit) for routine progression of care       Report consisted of patients Situation, Background, Assessment and   Recommendations(SBAR). Information from the following report(s) SBAR, ED Summary, [facility] and Recent Results was reviewed with the receiving nurse. Lines:   Peripheral IV 02/10/19 Right Antecubital (Active)   Site Assessment Clean, dry, & intact 2/10/2019  2:49 AM   Phlebitis Assessment 0 2/10/2019  2:49 AM   Infiltration Assessment 0 2/10/2019  2:49 AM   Dressing Status Clean, dry, & intact 2/10/2019  2:49 AM   Dressing Type Transparent 2/10/2019  2:49 AM        Opportunity for questions and clarification was provided.       Patient transported with:   [employer]

## 2019-02-10 NOTE — ED NOTES
Pt requesting benadryl with morphine.  Pt requests 4mg of morphine \"because that was what she was receiving before and 2 is not cutting it\"

## 2019-02-10 NOTE — PROGRESS NOTES
Bedside and Verbal shift change report given to Chuckie Evans (oncoming nurse) by Celina Paz (offgoing nurse). Report included the following information SBAR, Kardex, ED Summary, Procedure Summary, Intake/Output, MAR, Accordion, Recent Results and Med Rec Status.

## 2019-02-10 NOTE — PROGRESS NOTES
Hospitalist Progress Note    NAME: Sarah Coulter   :  1970   MRN:  [id number]       Assessment / Plan:  C diff Colitis with BRBPR/Hematochezia (?internal hemorrhoid and/or colitis):   -Increase PO Vancomycin to 500 mg q6h  -continue IV flagyl  -hold IVF's with patient hypertension  -IV morphine prn pain (increased to 4 mg IV q4h prn)  -IV zofran prn N/V and IV compazine prn second line  -diabetic clear diet tolerated  -will consult GI tomorrow, patient wants to see Dr. Elba Lr thus I will consult him tomorrow  -monitor Hgb, stable today    Patient did not have lactic acidosis: her Lactate was 2.3 thus this is Elevated Lactic Acid  -hold IVF's with patient hypertension  -hold home HCTZ and mefformin    DM type 2 - uncontrolled with hyperglycemia POA  -SSI, hold home metformin     Severe Anxiety Disorder POA  -Continue Zoloft     HTN: worse, ? From pain?  -Continue home Norvasc, Cozaar  -hold HCTZ  -IV hydralazine prn    GERD: patient on PPI at home  -IV H2B started with N/V    Obesity: Body mass index is 38.23 kg/m². Code status: Full  Prophylaxis: SCD's  Recommended Disposition: Home w/Family     Subjective:     Chief Complaint / Reason for Physician Visit: follow-up c diff  Patient felt bad about asking to go home on Friday am  States that vomiting recurred, bilious  Diarrhea persists with blood in stool, still with frequent BMs  No sure if she has an anal fissure but does report significant hemorrhoids    Review of Systems:  Symptom Y/N Comments  Symptom Y/N Comments   Fever/Chills n   Chest Pain n    Poor Appetite    Edema n    Cough    Abdominal Pain y    Sputum    Joint Pain     SOB/VALENTE n   Pruritis/Rash     Nausea/vomit    Tolerating PT/OT     Diarrhea    Tolerating Diet     Constipation    Other       Could NOT obtain due to:      Objective:     VITALS:   Last 24hrs VS reviewed since prior progress note.  Most recent are:  Patient Vitals for the past 24 hrs:   Temp Pulse Resp BP SpO2 02/10/19 1259 98.4 °F (36.9 °C) 100 18 167/89 93 %   02/10/19 0858 98.1 °F (36.7 °C) 97 18 (!) 191/94 93 %   02/10/19 0321 98.1 °F (36.7 °C) 84 18 176/79 96 %   02/10/19 0245 -- -- -- -- 96 %   02/10/19 0230 -- -- -- -- 96 %   02/09/19 2230 -- -- -- 155/73 92 %   02/09/19 2200 -- -- -- 148/75 93 %   02/09/19 2000 -- -- -- (!) 158/95 97 %   02/09/19 1941 -- 84 16 156/90 98 %   02/09/19 1930 -- -- -- 156/90 96 %   02/09/19 1900 -- -- -- 135/69 96 %   02/09/19 1830 -- -- -- 151/88 94 %   02/09/19 1800 -- -- -- 145/75 --   02/09/19 1730 -- 72 16 151/89 97 %   02/09/19 1730 -- -- -- 151/89 95 %       Intake/Output Summary (Last 24 hours) at 2/10/2019 1601  Last data filed at 2/9/2019 1939  Gross per 24 hour   Intake 1000 ml   Output --   Net 1000 ml        PHYSICAL EXAM:  General: WD, WN. Alert, cooperative, no acute distress    EENT:  EOMI. Anicteric sclerae. MM dry  Resp:  CTA bilaterally, no wheezing or rales. No accessory muscle use  CV:  Regular  rhythm,  No edema  GI:  Soft, mildly distended, + tenderness.  hypoactive Bowel sounds; see Picture of blood in the toilet on patient's phone  Neurologic:  Alert and oriented X 3, normal speech,   Psych:   Good insight. Not anxious nor agitated  Skin:  No rashes. No jaundice                Reviewed most current lab test results and cultures  YES  Reviewed most current radiology test results   YES  Review and summation of old records today    NO  Reviewed patient's current orders and MAR    YES  PMH/SH reviewed - no change compared to H&P  ________________________________________________________________________  Care Plan discussed with:    Comments   Patient x    Family      RN x    Care Manager     Consultant                        Multidiciplinary team rounds were held today with , nursing, pharmacist and clinical coordinator. Patient's plan of care was discussed; medications were reviewed and discharge planning was addressed. ________________________________________________________________________  Total NON critical care TIME:  35   Minutes    Total CRITICAL CARE TIME Spent:   Minutes non procedure based      Comments   >50% of visit spent in counseling and coordination of care x    ________________________________________________________________________  Zenia Denver, MD     Procedures: see electronic medical records for all procedures/Xrays and details which were not copied into this note but were reviewed prior to creation of Plan. LABS:  I reviewed today's most current labs and imaging studies.   Pertinent labs include:  Recent Labs     02/09/19  1700   WBC 5.8   HGB 13.3   HCT 38.5        Recent Labs     02/09/19  1700      K 3.6      CO2 27   *   BUN 4*   CREA 0.69   CA 9.7   MG 1.8   ALB 3.9   TBILI 0.4   SGOT 44*   ALT 50   INR 1.0       Signed: Zenia Denver, MD

## 2019-02-10 NOTE — H&P
Hospitalist Admission Note    NAME: Conor Brewster   :  1970   MRN:  [id number]     Date/Time:  2019 10:57 PM    Patient PCP: Tasha Olmedo NP  ________________________________________________________________________    My assessment of this patient's clinical condition and my plan of care is as follows. Assessment / Plan: Active Problems:    Hypertension (2015)     Resume home meds   DM :stopped Metformin due to lactic acidosis, will use SSI if hyperglycemic    Clostridium difficile diarrhea (10/10/2017)    On PO vancomycin, start  flagyl IV and vanc po she had two more episode of Cdiff colitis, will continue abx    Lactic acidosis (2019)     Mild,   hydrate monitor     Mood disorder ([facility] [address] (2019)    Stable continue Zoloft  Code Status: Full code   Surrogate Decision Maker:    DVT Prophylaxis: SCD   GI Prophylaxis: not indicated    Baseline: Independent         Subjective:   CHIEF COMPLAINT: abdominal pain ,diarrhea     HISTORY OF PRESENT ILLNESS:     Derw Agustin is a 50 y.o.  female with PMH of DM,  HTN, Cdiff,who presents with worsening abdominal pain. Onset of symptoms was gradual with gradually worsening course since that time. The pain is located lower abdomen and LLQ without radiation. The pain is rated as severe. The pain is made worse when has diarrhea  and is relieved by Morphine given in ER . The patient also complains of diarrhea and worsening rectal bleeding . The patient denies, dysuria, fever, melena and vomiting. The past workup has included CT scan. The pertinent past history includes diverticulitis, colovaginal fistula two episodes of abx associated colitis, with C diff. The patient denies Crohn's disease, ulcerative colitis, irritable bowel. Home care consisted of continuation of PO Vancomycine with worsening symptoms and no  relief. We were asked to admit for work up and evaluation of the above problems.      Past Medical History:   Diagnosis Date    Anal fissure     Anisocoria     Asthma     LAST EPISODE     Back pain     Cerumen impaction     Chronic kidney disease     hx uti in past    Coagulation defects     ocassional rectal bleeding due to anal fissure    Colovaginal fistula     Diabetes (HCC)     NIDDM    Diabetes ([facility] [address]     Diverticulitis     Diverticulosis     Enlarged tonsils     Frequent UTI     GERD (gastroesophageal reflux disease)     H/O endoscopy     with dilation    HA (headache)     Hepatic steatosis     Hx of colonoscopy with polypectomy     benign    Hypertension     Ill-defined condition     FREQUENT HIVES    Ill-defined condition     HX ELEVATED LIVER ENZYMES    Morbid obesity (HCC)     Nausea & vomiting     during diverticulitis flare    Obesity     Otitis media     Pneumonia     about 15 yrs ago    Psychiatric disorder     ANXIETY    Recurrent tonsillitis     Sinusitis     Transfusion history ~ age 35    postop hysterectomy    Unspecified sleep apnea     snores ( not diagnosed yet)     Urticaria     Urticaria         Past Surgical History:   Procedure Laterality Date    ABDOMEN SURGERY PROC UNLISTED  2018    hernia repair at [address]    [address]    [place].     HX GI  12    LAPAROSCOPIC HAND ASSISTED  POSS OPEN SIGMOID COLECTOMY POSS TEMPORARY DIVERTING LOOP ILEOSTOMY;  (no illeostomy needed)    HX GYN           HX GYN      cervical conization    HX HEENT      SINUS SURGERY LEFT X2    HX HEENT      SINUS SURGERY ON RIGHT X2    HX OTHER SURGICAL      Sphincterotomy    HX PELVIC LAPAROSCOPY      HX EMMANUEL AND BSO      HX UROLOGICAL  12     CYSTOSCOPY INSERTION URETERAL CATHETERS - Cystoscopy Insertion of bilateral ureteral stents       Social History     Tobacco Use    Smoking status: Never Smoker    Smokeless tobacco: Never Used   Substance Use Topics    Alcohol use: Yes     Comment: Rarely        Family History   Problem Relation Age of Onset    Diabetes Mother     Cancer Mother         NON-HODGKINS LYMPHOMA    Anesth Problems Mother         PONV    Diabetes Father     Heart Disease Father         CAD - STENTS, PACEMAKER    Arrhythmia Father      Allergies   Allergen Reactions    Aspirin Shortness of Breath    Prilosec [Omeprazole Magnesium] Anaphylaxis     CHERRY FLAVORED; PT TAKES REGULAR PRILOSEC AND IS OK    Codeine Hives and Itching    Contrast Agent [Iodine] Itching     Pt. Had itching after IV contrast with the last exam.  Benadryl was given    Morphine Itching     Severe itching. Fine to take benadryl    Fentanyl Rash    Ketorolac Rash     \"makes my eyes spasm and causes rash on my hands\"    Percocet [Oxycodone-Acetaminophen] Hives        Prior to Admission medications    Medication Sig Start Date End Date Taking? Authorizing Provider   vancomycin (VANCOCIN) 125 mg capsule Take 1 Cap by mouth four (4) times daily for 10 days. You already have this medication at home 2/8/19 2/18/19  Conner Benedict MD   sertraline (ZOLOFT) 100 mg tablet Take 100 mg by mouth. Provider, Historical   amLODIPine (NORVASC) 10 mg tablet Take 10 mg by mouth. Provider, Historical   cyclobenzaprine (FLEXERIL) 10 mg tablet Take 10 mg by mouth. 11/6/18   Provider, Historical   traMADol (ULTRAM) 50 mg tablet Take 50 mg by mouth. 11/6/18   Provider, Historical   prochlorperazine (COMPAZINE) 5 mg tablet Take 1 Tab by mouth every eight (8) hours as needed for Nausea. 2/6/19   Anuj Ayala MD   dicyclomine (BENTYL) 20 mg tablet Take 1 Tab by mouth every six (6) hours as needed (abdominal cramps). 2/6/19   Anuj Ayala MD   metFORMIN (GLUCOPHAGE) 500 mg tablet Take 500 mg by mouth daily (with breakfast). Other, MD Indy   Pramoxine (PROCTOFOAM) 1 % topical foam Apply  to affected area three (3) times daily as needed for Hemorrhoids.  6/27/18   Andrei Ramirez MD   losartan-hydroCHLOROthiazide [facility]) 100-12.5 mg per tablet Take 1 Tab by mouth daily. Provider, Historical   EPINEPHrine (EPIPEN) 0.3 mg/0.3 mL (1:1,000) injection 0.3 mL by IntraMUSCular route once as needed for up to 1 dose. 11/2/15   Fabiana Yao MD   estradiol (ESTRACE) 1 mg tablet Take 1 mg by mouth daily. Provider, Historical   albuterol (PROVENTIL, VENTOLIN) 90 mcg/Actuation inhaler Take 2 Puffs by inhalation every six (6) hours as needed. Other, MD Indy       REVIEW OF SYSTEMS:     I am not able to complete the review of systems because:    The patient is intubated and sedated    The patient has altered mental status due to his acute medical problems    The patient has baseline aphasia from prior stroke(s)    The patient has baseline dementia and is not reliable historian    The patient is in acute medical distress and unable to provide information           Total of 12 systems reviewed as follows:       POSITIVE= underlined text  Negative = text not underlined  General:  fever, chills, sweats, generalized weakness, weight loss/gain,      loss of appetite   Eyes:    blurred vision, eye pain, loss of vision, double vision  ENT:    rhinorrhea, pharyngitis   Respiratory:   cough, sputum production, SOB, VALENTE, wheezing, pleuritic pain   Cardiology:   chest pain, palpitations, orthopnea, PND, edema, syncope   Gastrointestinal:  abdominal pain , N/V, diarrhea, dysphagia, constipation, bleeding   Genitourinary:  frequency, urgency, dysuria, hematuria, incontinence   Muskuloskeletal :  arthralgia, myalgia, back pain  Hematology:  easy bruising, nose or gum bleeding, lymphadenopathy   Dermatological: rash, ulceration, pruritis, color change / jaundice  Endocrine:   hot flashes or polydipsia   Neurological:  headache, dizziness, confusion, focal weakness, paresthesia,     Speech difficulties, memory loss, gait difficulty  Psychological: Feelings of anxiety, depression, agitation    Objective:   VITALS:    Visit Vitals  /90   Pulse 84   Temp 97.7 °F (36.5 °C)   Resp 16   Ht 5' 2\" (1.575 m)   Wt 208 lb 15.9 oz (94.8 kg)   SpO2 98%   BMI 38.23 kg/m²       PHYSICAL EXAM:    General:    Alert, cooperative, no distress, appears stated age. HEENT: Atraumatic, anicteric sclerae, pink conjunctivae     No oral ulcers, mucosa moist, throat clear, dentition fair  Neck:  Supple, symmetrical,  thyroid: non tender  Lungs:   Clear to auscultation bilaterally. No Wheezing or Rhonchi. No rales. Chest wall:  No tenderness  No Accessory muscle use. Heart:   Regular  rhythm,  No  murmur   No edema  Abdomen:   Soft, tender lower abdomen . Not distended. Bowel sounds normal  Extremities: No cyanosis. No clubbing      Capillary refill normal,  Radial pulse 2+,  DP 2+  Skin:     Not pale. Not Jaundiced  No rashes   Psych:  Good insight. Not depressed. Not anxious or agitated. Neurologic: EOMs intact. No facial asymmetry. No aphasia or slurred speech. Symmetrical strength, Sensation grossly intact. Alert and oriented X 4.     _______________________________________________________________________  Care Plan discussed with:    Comments   Patient x    Family      RN x    Care Manager                    Consultant:      _______________________________________________________________________  Expected  Disposition:   Home with Family    HH/PT/OT/RN    SNF/LTC    EILEEN    ________________________________________________________________________  TOTAL TIME:  36 Minutes    Critical Care Provided     Minutes non procedure based      Comments   >50% of visit spent in counseling and coordination of care  Chart review  Discussion with patient and/or family and questions answered     ________________________________________________________________________  Sylvia Nava MD    Procedures: see electronic medical records for all procedures/Xrays and details which were not copied into this note but were reviewed prior to creation of Plan.     LAB DATA REVIEWED:    Recent Results (from the past 24 hour(s))   CBC WITH AUTOMATED DIFF    Collection Time: 02/09/19  5:00 PM   Result Value Ref Range    WBC 5.8 3.6 - 11.0 K/uL    RBC 4.71 3.80 - 5.20 M/uL    HGB 13.3 11.5 - 16.0 g/dL    HCT 38.5 35.0 - 47.0 %    MCV 81.7 80.0 - 99.0 FL    MCH 28.2 26.0 - 34.0 PG    MCHC 34.5 30.0 - 36.5 g/dL    RDW 13.7 11.5 - 14.5 %    PLATELET 222 818 - 975 K/uL    MPV 9.7 8.9 - 12.9 FL    NRBC 0.0 0  WBC    ABSOLUTE NRBC 0.00 0.00 - 0.01 K/uL    NEUTROPHILS 62 32 - 75 %    LYMPHOCYTES 28 12 - 49 %    MONOCYTES 5 5 - 13 %    EOSINOPHILS 4 0 - 7 %    BASOPHILS 0 0 - 1 %    IMMATURE GRANULOCYTES 1 (H) 0.0 - 0.5 %    ABS. NEUTROPHILS 3.6 1.8 - 8.0 K/UL    ABS. LYMPHOCYTES 1.6 0.8 - 3.5 K/UL    ABS. MONOCYTES 0.3 0.0 - 1.0 K/UL    ABS. EOSINOPHILS 0.2 0.0 - 0.4 K/UL    ABS. BASOPHILS 0.0 0.0 - 0.1 K/UL    ABS. IMM. GRANS. 0.0 0.00 - 0.04 K/UL    DF AUTOMATED     METABOLIC PANEL, COMPREHENSIVE    Collection Time: 02/09/19  5:00 PM   Result Value Ref Range    Sodium 136 136 - 145 mmol/L    Potassium 3.6 3.5 - 5.1 mmol/L    Chloride 101 97 - 108 mmol/L    CO2 27 21 - 32 mmol/L    Anion gap 8 5 - 15 mmol/L    Glucose 188 (H) 65 - 100 mg/dL    BUN 4 (L) 6 - 20 MG/DL    Creatinine 0.69 0.55 - 1.02 MG/DL    BUN/Creatinine ratio 6 (L) 12 - 20      GFR est AA >60 >60 ml/min/1.73m2    GFR est non-AA >60 >60 ml/min/1.73m2    Calcium 9.7 8.5 - 10.1 MG/DL    Bilirubin, total 0.4 0.2 - 1.0 MG/DL    ALT (SGPT) 50 12 - 78 U/L    AST (SGOT) 44 (H) 15 - 37 U/L    Alk.  phosphatase 63 45 - 117 U/L    Protein, total 7.7 6.4 - 8.2 g/dL    Albumin 3.9 3.5 - 5.0 g/dL    Globulin 3.8 2.0 - 4.0 g/dL    A-G Ratio 1.0 (L) 1.1 - 2.2     PROTHROMBIN TIME + INR    Collection Time: 02/09/19  5:00 PM   Result Value Ref Range    INR 1.0 0.9 - 1.1      Prothrombin time 10.2 9.0 - 11.1 sec   MAGNESIUM    Collection Time: 02/09/19  5:00 PM   Result Value Ref Range    Magnesium 1.8 1.6 - 2.4 mg/dL   LIPASE    Collection Time: 02/09/19  5:00 PM   Result Value Ref Range    Lipase 151 73 - 393 U/L   LACTIC ACID    Collection Time: 02/09/19  5:00 PM   Result Value Ref Range    Lactic acid 2.3 (HH) 0.4 - 2.0 MMOL/L   OCCULT BLOOD, STOOL    Collection Time: 02/09/19  5:26 PM   Result Value Ref Range    Occult blood, stool POSITIVE (A) NEG     URINALYSIS W/ RFLX MICROSCOPIC    Collection Time: 02/09/19  5:53 PM   Result Value Ref Range    Color YELLOW/STRAW      Appearance CLEAR CLEAR      Specific gravity 1.006 1.003 - 1.030      pH (UA) 6.0 5.0 - 8.0      Protein NEGATIVE  NEG mg/dL    Glucose NEGATIVE  NEG mg/dL    Ketone NEGATIVE  NEG mg/dL    Bilirubin NEGATIVE  NEG      Blood NEGATIVE  NEG      Urobilinogen 0.2 0.2 - 1.0 EU/dL    Nitrites NEGATIVE  NEG      Leukocyte Esterase NEGATIVE  NEG     HCG URINE, QL. - POC    Collection Time: 02/09/19  5:54 PM   Result Value Ref Range    Pregnancy test,urine (POC) NEGATIVE  NEG

## 2019-02-10 NOTE — PROGRESS NOTES
TRANSFER - IN REPORT:    Verbal report received from Uzma RN(name) on Alma Delia Bauman  being received from ED(unit) for routine progression of care      Report consisted of patients Situation, Background, Assessment and   Recommendations(SBAR). Information from the following report(s) SBAR, Kardex, ED Summary, Intake/Output, MAR, Accordion, Recent Results and Med Rec Status was reviewed with the receiving nurse. Opportunity for questions and clarification was provided. Assessment completed upon patients arrival to unit and care assumed.

## 2019-02-11 LAB
ALBUMIN SERPL-MCNC: 3.2 G/DL (ref 3.5–5)
ALBUMIN/GLOB SERPL: 0.9 {RATIO} (ref 1.1–2.2)
ALP SERPL-CCNC: 58 U/L (ref 45–117)
ALT SERPL-CCNC: 28 U/L (ref 12–78)
ANION GAP SERPL CALC-SCNC: 7 MMOL/L (ref 5–15)
AST SERPL-CCNC: 16 U/L (ref 15–37)
BASOPHILS # BLD: 0 K/UL (ref 0–0.1)
BASOPHILS NFR BLD: 0 % (ref 0–1)
BILIRUB SERPL-MCNC: 0.6 MG/DL (ref 0.2–1)
BUN SERPL-MCNC: 4 MG/DL (ref 6–20)
BUN/CREAT SERPL: 6 (ref 12–20)
CALCIUM SERPL-MCNC: 8.7 MG/DL (ref 8.5–10.1)
CHLORIDE SERPL-SCNC: 104 MMOL/L (ref 97–108)
CO2 SERPL-SCNC: 25 MMOL/L (ref 21–32)
CREAT SERPL-MCNC: 0.67 MG/DL (ref 0.55–1.02)
DIFFERENTIAL METHOD BLD: ABNORMAL
EOSINOPHIL # BLD: 0.2 K/UL (ref 0–0.4)
EOSINOPHIL NFR BLD: 2 % (ref 0–7)
ERYTHROCYTE [DISTWIDTH] IN BLOOD BY AUTOMATED COUNT: 14.3 % (ref 11.5–14.5)
GLOBULIN SER CALC-MCNC: 3.4 G/DL (ref 2–4)
GLUCOSE BLD STRIP.AUTO-MCNC: 194 MG/DL (ref 65–100)
GLUCOSE BLD STRIP.AUTO-MCNC: 197 MG/DL (ref 65–100)
GLUCOSE BLD STRIP.AUTO-MCNC: 220 MG/DL (ref 65–100)
GLUCOSE BLD STRIP.AUTO-MCNC: 236 MG/DL (ref 65–100)
GLUCOSE SERPL-MCNC: 199 MG/DL (ref 65–100)
HCT VFR BLD AUTO: 39.9 % (ref 35–47)
HGB BLD-MCNC: 14.2 G/DL (ref 11.5–16)
IMM GRANULOCYTES # BLD AUTO: 0 K/UL (ref 0–0.04)
IMM GRANULOCYTES NFR BLD AUTO: 0 % (ref 0–0.5)
LYMPHOCYTES # BLD: 1.9 K/UL (ref 0.8–3.5)
LYMPHOCYTES NFR BLD: 16 % (ref 12–49)
MCH RBC QN AUTO: 29.1 PG (ref 26–34)
MCHC RBC AUTO-ENTMCNC: 35.6 G/DL (ref 30–36.5)
MCV RBC AUTO: 81.8 FL (ref 80–99)
MONOCYTES # BLD: 0.6 K/UL (ref 0–1)
MONOCYTES NFR BLD: 5 % (ref 5–13)
NEUTS SEG # BLD: 9 K/UL (ref 1.8–8)
NEUTS SEG NFR BLD: 77 % (ref 32–75)
NRBC # BLD: 0 K/UL (ref 0–0.01)
NRBC BLD-RTO: 0 PER 100 WBC
PLATELET # BLD AUTO: 194 K/UL (ref 150–400)
PMV BLD AUTO: 9 FL (ref 8.9–12.9)
POTASSIUM SERPL-SCNC: 3.3 MMOL/L (ref 3.5–5.1)
PROT SERPL-MCNC: 6.6 G/DL (ref 6.4–8.2)
RBC # BLD AUTO: 4.88 M/UL (ref 3.8–5.2)
SERVICE CMNT-IMP: ABNORMAL
SODIUM SERPL-SCNC: 136 MMOL/L (ref 136–145)
WBC # BLD AUTO: 11.7 K/UL (ref 3.6–11)

## 2019-02-11 PROCEDURE — 74011250636 HC RX REV CODE- 250/636: Performed by: FAMILY MEDICINE

## 2019-02-11 PROCEDURE — 74011250636 HC RX REV CODE- 250/636: Performed by: HOSPITALIST

## 2019-02-11 PROCEDURE — 80053 COMPREHEN METABOLIC PANEL: CPT

## 2019-02-11 PROCEDURE — 74011250637 HC RX REV CODE- 250/637: Performed by: FAMILY MEDICINE

## 2019-02-11 PROCEDURE — 85025 COMPLETE CBC W/AUTO DIFF WBC: CPT

## 2019-02-11 PROCEDURE — 74011000250 HC RX REV CODE- 250: Performed by: INTERNAL MEDICINE

## 2019-02-11 PROCEDURE — 74011250637 HC RX REV CODE- 250/637: Performed by: NURSE PRACTITIONER

## 2019-02-11 PROCEDURE — 74011636637 HC RX REV CODE- 636/637: Performed by: FAMILY MEDICINE

## 2019-02-11 PROCEDURE — 36415 COLL VENOUS BLD VENIPUNCTURE: CPT

## 2019-02-11 PROCEDURE — 74011250636 HC RX REV CODE- 250/636: Performed by: INTERNAL MEDICINE

## 2019-02-11 PROCEDURE — 65270000029 HC RM PRIVATE

## 2019-02-11 PROCEDURE — 82962 GLUCOSE BLOOD TEST: CPT

## 2019-02-11 PROCEDURE — 74011250637 HC RX REV CODE- 250/637: Performed by: INTERNAL MEDICINE

## 2019-02-11 RX ORDER — MORPHINE SULFATE 2 MG/ML
4 INJECTION, SOLUTION INTRAMUSCULAR; INTRAVENOUS
Status: DISCONTINUED | OUTPATIENT
Start: 2019-02-11 | End: 2019-02-12 | Stop reason: HOSPADM

## 2019-02-11 RX ORDER — POTASSIUM CHLORIDE 750 MG/1
40 TABLET, FILM COATED, EXTENDED RELEASE ORAL
Status: COMPLETED | OUTPATIENT
Start: 2019-02-11 | End: 2019-02-11

## 2019-02-11 RX ORDER — VANCOMYCIN HYDROCHLORIDE 250 MG/5ML
125 POWDER, FOR SOLUTION ORAL EVERY 6 HOURS
Status: DISCONTINUED | OUTPATIENT
Start: 2019-02-11 | End: 2019-02-12 | Stop reason: HOSPADM

## 2019-02-11 RX ADMIN — ONDANSETRON 8 MG: 2 INJECTION INTRAMUSCULAR; INTRAVENOUS at 21:38

## 2019-02-11 RX ADMIN — MORPHINE SULFATE 4 MG: 2 INJECTION, SOLUTION INTRAMUSCULAR; INTRAVENOUS at 02:35

## 2019-02-11 RX ADMIN — POTASSIUM CHLORIDE 40 MEQ: 750 TABLET, FILM COATED, EXTENDED RELEASE ORAL at 13:30

## 2019-02-11 RX ADMIN — MORPHINE SULFATE 4 MG: 2 INJECTION, SOLUTION INTRAMUSCULAR; INTRAVENOUS at 17:51

## 2019-02-11 RX ADMIN — AMLODIPINE BESYLATE 10 MG: 5 TABLET ORAL at 09:22

## 2019-02-11 RX ADMIN — PROCHLORPERAZINE EDISYLATE 5 MG: 5 INJECTION INTRAMUSCULAR; INTRAVENOUS at 17:10

## 2019-02-11 RX ADMIN — VANCOMYCIN HYDROCHLORIDE 500 MG: KIT at 05:26

## 2019-02-11 RX ADMIN — MORPHINE SULFATE 4 MG: 2 INJECTION, SOLUTION INTRAMUSCULAR; INTRAVENOUS at 11:20

## 2019-02-11 RX ADMIN — MORPHINE SULFATE 4 MG: 2 INJECTION, SOLUTION INTRAMUSCULAR; INTRAVENOUS at 22:36

## 2019-02-11 RX ADMIN — METRONIDAZOLE 500 MG: 500 INJECTION, SOLUTION INTRAVENOUS at 05:26

## 2019-02-11 RX ADMIN — MORPHINE SULFATE 4 MG: 2 INJECTION, SOLUTION INTRAMUSCULAR; INTRAVENOUS at 06:54

## 2019-02-11 RX ADMIN — INSULIN LISPRO 2 UNITS: 100 INJECTION, SOLUTION INTRAVENOUS; SUBCUTANEOUS at 17:13

## 2019-02-11 RX ADMIN — VANCOMYCIN HYDROCHLORIDE 125 MG: KIT at 13:40

## 2019-02-11 RX ADMIN — SERTRALINE HYDROCHLORIDE 100 MG: 50 TABLET ORAL at 09:22

## 2019-02-11 RX ADMIN — Medication 1 CAPSULE: at 13:29

## 2019-02-11 RX ADMIN — VANCOMYCIN HYDROCHLORIDE 500 MG: KIT at 02:40

## 2019-02-11 RX ADMIN — ESTRADIOL 1 MG: 1 TABLET ORAL at 09:21

## 2019-02-11 RX ADMIN — ONDANSETRON 8 MG: 2 INJECTION INTRAMUSCULAR; INTRAVENOUS at 11:14

## 2019-02-11 RX ADMIN — VANCOMYCIN HYDROCHLORIDE 125 MG: KIT at 17:14

## 2019-02-11 RX ADMIN — INSULIN LISPRO 3 UNITS: 100 INJECTION, SOLUTION INTRAVENOUS; SUBCUTANEOUS at 09:23

## 2019-02-11 RX ADMIN — LOSARTAN POTASSIUM 100 MG: 100 TABLET ORAL at 09:22

## 2019-02-11 RX ADMIN — FAMOTIDINE 20 MG: 10 INJECTION, SOLUTION INTRAVENOUS at 09:22

## 2019-02-11 RX ADMIN — PROCHLORPERAZINE EDISYLATE 5 MG: 5 INJECTION INTRAMUSCULAR; INTRAVENOUS at 05:26

## 2019-02-11 RX ADMIN — INSULIN LISPRO 3 UNITS: 100 INJECTION, SOLUTION INTRAVENOUS; SUBCUTANEOUS at 13:30

## 2019-02-11 RX ADMIN — FAMOTIDINE 20 MG: 10 INJECTION, SOLUTION INTRAVENOUS at 21:38

## 2019-02-11 RX ADMIN — VANCOMYCIN HYDROCHLORIDE 125 MG: KIT at 23:49

## 2019-02-11 NOTE — CDMP QUERY
Patient admitted with c-diff, noted to have low K level. If possible, please document in progress notes and d/c summary if you are evaluating and/or treating any of the following:  
 
=> Mild Hypokalemia in the setting of drop in K level requiring po KCL replacement and lab monitoring 
=> Other explanation of clinical findings  
=> Clinically Undetermined (no explanation for clinical findings) The medical record reflects the following: 
   Risk Factors: c-diff Clinical Indicators: K Level dropped to 3.3 on 02/11. Treatment: KCL 40 po, lab monitoring Thank you, Diana Colon, RN, MSN, [facility] [address], [address] [place] 
[phone]

## 2019-02-11 NOTE — PROGRESS NOTES
Hospitalist Progress Note    NAME: Kylie Presbrii   :  1970   MRN:  [id number]       Assessment / Plan:  C diff Colitis with BRBPR/Hematochezia (?internal hemorrhoid and/or colitis):   -GI consulted this am, I placed consult to Dr. Skyler Christianson per patient request  -continue PO Vancomycin to 500 mg q6h  -IV flagyl stopped by GI  -IV morphine prn pain (patient still needing)  -IV zofran prn N/V and IV compazine prn second line  -GI advanced to GI lite diet  -monitor Hgb, stable today    Patient did not have lactic acidosis: her Lactate was 2.3 thus this is Elevated Lactic Acid  -hold IVF's with patient hypertension  -hold home HCTZ and mefformin    DM type 2 - uncontrolled with hyperglycemia POA  -SSI, hold home metformin     Severe Anxiety Disorder POA  -Continue Zoloft     HTN: better today  -Continue home Norvasc, Cozaar  -hold HCTZ  -IV hydralazine prn    GERD: patient on PPI at home  -IV H2B started with N/V    Hypokalemia:  -replete and monitor    Obesity: Body mass index is 38.23 kg/m².  -plans to lose weight    Code status: Full  Prophylaxis: SCD's  Recommended Disposition: Home w/Family     Subjective:     Chief Complaint / Reason for Physician Visit: follow-up c diff  Patient seen for follow-up  Reports pain a bottom a little better, vomiting better and wants to advance diet but still having abdominal pain  Still with hematochezia    Review of Systems:  Symptom Y/N Comments  Symptom Y/N Comments   Fever/Chills    Chest Pain n    Poor Appetite    Edema n    Cough    Abdominal Pain     Sputum    Joint Pain     SOB/VALENTE n   Pruritis/Rash     Nausea/vomit    Tolerating PT/OT     Diarrhea    Tolerating Diet     Constipation    Other       Could NOT obtain due to:      Objective:     VITALS:   Last 24hrs VS reviewed since prior progress note.  Most recent are:  Patient Vitals for the past 24 hrs:   Temp Pulse Resp BP SpO2   19 1223 97.9 °F (36.6 °C) 85 16 129/74 92 %   19 0755 98.4 °F (36.9 °C) 92 16 113/66 97 %   02/11/19 0523 96.9 °F (36.1 °C) 94 18 130/77 93 %   02/10/19 2046 98.6 °F (37 °C) 88 16 171/83 97 %   02/10/19 1259 98.4 °F (36.9 °C) 100 18 167/89 93 %     No intake or output data in the 24 hours ending 02/11/19 1228     PHYSICAL EXAM:  General: WD, WN. Alert, cooperative, no acute distress    EENT:  EOMI. Anicteric sclerae. MM dry  Resp:  CTA bilaterally, no wheezing or rales. No accessory muscle use  CV:  Regular  rhythm,  No edema  GI:  Soft, mildly distended, + tenderness.  hypoactive Bowel sounds persist  Neurologic:  Alert and oriented X 3, normal speech,   Psych:   Good insight. Not anxious nor agitated  Skin:  No rashes. No jaundice    Reviewed most current lab test results and cultures  YES  Reviewed most current radiology test results   YES  Review and summation of old records today    NO  Reviewed patient's current orders and MAR    YES  PMH/ reviewed - no change compared to H&P  ________________________________________________________________________  Care Plan discussed with:    Comments   Patient x    Family      RN x    Care Manager     Consultant  x Dr. Va Baldwin team rounds were held today with , nursing, pharmacist and clinical coordinator. Patient's plan of care was discussed; medications were reviewed and discharge planning was addressed. ________________________________________________________________________  Total NON critical care TIME:  25   Minutes    Total CRITICAL CARE TIME Spent:   Minutes non procedure based      Comments   >50% of visit spent in counseling and coordination of care     ________________________________________________________________________  Michelle Gallegos MD     Procedures: see electronic medical records for all procedures/Xrays and details which were not copied into this note but were reviewed prior to creation of Plan.       LABS:  I reviewed today's most current labs and imaging studies.   Pertinent labs include:  Recent Labs     02/11/19  0243 02/09/19  1700   WBC 11.7* 5.8   HGB 14.2 13.3   HCT 39.9 38.5    178     Recent Labs     02/11/19  0243 02/09/19  1700    136   K 3.3* 3.6    101   CO2 25 27   * 188*   BUN 4* 4*   CREA 0.67 0.69   CA 8.7 9.7   MG  --  1.8   ALB 3.2* 3.9   TBILI 0.6 0.4   SGOT 16 44*   ALT 28 50   INR  --  1.0       Signed: Saeed Mariano MD

## 2019-02-11 NOTE — CONSULTS
[facility]  [address]  [place], [address]              GI PROGRESS NOTE  Len Benitez, ALLI  [phone] NP in-hospital cell phone M-F until 4:30  After 5pm or on weekends, please call  for physician on call    NAME: Cathy Recinos   :  1970   MRN:  [id number]     Primary GI: Dr Keerthi Cadena - Dr. Davis haque. Date/Time:  2019 11:26 AM  Assessment:   Ms. Eula Smith is a 50 y.o. Female who has recurrent c.diff (3rd occurence) since 2016 with intractable abdominal pain s/p taking PO keflex for UTI. Dx on 2019 and started taking vancomycin 125mg PO \"a few times a day\" of old prescription since diagnosis. No improvement in abdominal pain or diarrhea. Still having 12 BM's per day with BRB mixed in stool. Hx of colovaginal fistulas although normal colonoscopy with normal whole colon bx in 2017. Mild leukocytosis with stable hemoglobin and no acute findings on CT scan. Will need to treat Cdiff with PO vancomycin, no additional benefit from IV flagyl. Will start her on 14 day course as she was not consistently taking it prior to this admission. Hematochezia is likely related to bleeding hemorrhoids, no acute concern with stable hgb. Will treat with oral abx for now, if does not cause remission, will consider fecal transplant at later date. Plan:   1. Vancomycin 125mg q6 hr x 14 days  2. Discontinue flagyl  3. COntinue diabetic CLD today, may consider advancing tomorrow as nausea is persistent  4. Supportive care   5. Added florastor  6. Monitor hgb  7. No procedures planned at this time. 8. Consider fecal transplant if no resolution of C.diff s/p 14 days  9. Ordered desitin  10. May need repeat colonoscopy to rule out crohn's disease with TI biopsies -- had normal whole colon biopsies in 2017. Unclear cause for colovaginal fistulas, could be contributing to recurrent UTI leading to abx use.          Subjective:     HISTORY OF PRESENT ILLNESS:     Bertha Arnold is an 50 y.o.  female who we are asked to see for complaint of recurrent C.diff. Medical history includes c.diff, diverticulitis, colovaginal fistula, DM, and HTN. She presented to ER on 2/5/19 for abdominal pain, dx with c.diff and wanted to go home but returned yesterday with recurrent intractable abdominal pain. She states she had some vancomycin 125mg at home from last episode of c.diff in 2017, she took a couple per day but has not taken any q6h since diagnosis. She had c.diff in 2016, 10/2017, and now. Endorses she was recently taking Keflex for difficult to treat UTI. She finished the Keflex on 2/2 and immediately began having diarrhea and lower abdominal pain. She has been having about 12 episodes of diarrhea a day, especially, multiple after eating or drinking. Discomfort doesn't seem to improve with passing a BM. Reports BRB mixed in liquid stool and very bad hemorrhoids right now. Some nausea, but denies any fevers, chills, or vomiting. She denies Crohn's disease, ulcerative colitis, irritable bowel but her sister had crohn's disease. WBC 11.7. Hgb 14.2. CT scan on 2/10 shows no changed from CT scan on 2/5 - no acute findings, only splenomegaly.      C. Dif infection (2016) treated with Vancomycin following diverticulitis treated previously with Cipro/Flagyl  Colonoscopy (10/3/17) - Dr Isela Blas - sigmoid diverticulosis with normal biopsies done randomly in the colon showing no colitis or pseudomembranes         Past Medical History:   Diagnosis Date    Anal fissure     Anisocoria     Asthma     LAST EPISODE     Back pain     Cerumen impaction     Chronic kidney disease     hx uti in past    Coagulation defects     ocassional rectal bleeding due to anal fissure    Colovaginal fistula     Diabetes (HCC)     NIDDM    Diabetes ([facility]     Diverticulitis     Diverticulosis     Enlarged tonsils     Frequent UTI     GERD (gastroesophageal reflux disease)     H/O endoscopy     with dilation    HA (headache)     Hepatic steatosis     Hx of colonoscopy with polypectomy     benign    Hypertension     Ill-defined condition     FREQUENT HIVES    Ill-defined condition     HX ELEVATED LIVER ENZYMES    Morbid obesity ([address]     Nausea & vomiting     during diverticulitis flare    Obesity     Otitis media     Pneumonia     about 15 yrs ago    Psychiatric disorder     ANXIETY    Recurrent tonsillitis     Sinusitis     Transfusion history ~ age 35    postop hysterectomy    Unspecified sleep apnea     snores ( not diagnosed yet)     Urticaria     Urticaria       Past Surgical History:   Procedure Laterality Date    ABDOMEN SURGERY PROC UNLISTED  2018    hernia repair at [address]    [address]    [place].     HX GI  12    LAPAROSCOPIC HAND ASSISTED  POSS OPEN SIGMOID COLECTOMY POSS TEMPORARY DIVERTING LOOP ILEOSTOMY;  (no illeostomy needed)    HX GYN           HX GYN      cervical conization    HX HEENT      SINUS SURGERY LEFT X2    HX HEENT      SINUS SURGERY ON RIGHT X2    HX OTHER SURGICAL      Sphincterotomy    HX PELVIC LAPAROSCOPY      HX EMMANUEL AND BSO      HX UROLOGICAL  12     CYSTOSCOPY INSERTION URETERAL CATHETERS - Cystoscopy Insertion of bilateral ureteral stents     Social History     Tobacco Use    Smoking status: Never Smoker    Smokeless tobacco: Never Used   Substance Use Topics    Alcohol use: Yes     Comment: Rarely      Family History   Problem Relation Age of Onset    Diabetes Mother     Cancer Mother         NON-HODGKINS LYMPHOMA    Anesth Problems Mother         PONV    Diabetes Father     Heart Disease Father         CAD - STENTS, PACEMAKER    Arrhythmia Father       Allergies   Allergen Reactions    Aspirin Shortness of Breath    Prilosec [Omeprazole Magnesium] Anaphylaxis     CHERRY FLAVORED; PT TAKES REGULAR PRILOSEC AND IS OK    Codeine Hives and Itching    Contrast Agent [Iodine] Itching     Pt. Had itching after IV contrast with the last exam.  Benadryl was given    Morphine Itching     Severe itching. Fine to take benadryl    Fentanyl Rash    Ketorolac Rash     \"makes my eyes spasm and causes rash on my hands\"    Percocet [Oxycodone-Acetaminophen] Hives      Prior to Admission medications    Medication Sig Start Date End Date Taking? Authorizing Provider   vancomycin (VANCOCIN) 125 mg capsule Take 1 Cap by mouth four (4) times daily for 10 days. You already have this medication at home 2/8/19 2/18/19  Conner Benedict MD   sertraline (ZOLOFT) 100 mg tablet Take 100 mg by mouth. Provider, Historical   amLODIPine (NORVASC) 10 mg tablet Take 10 mg by mouth. Provider, Historical   cyclobenzaprine (FLEXERIL) 10 mg tablet Take 10 mg by mouth. 11/6/18   Provider, Historical   traMADol (ULTRAM) 50 mg tablet Take 50 mg by mouth. 11/6/18   Provider, Historical   prochlorperazine (COMPAZINE) 5 mg tablet Take 1 Tab by mouth every eight (8) hours as needed for Nausea. 2/6/19   Anuj Ayala MD   dicyclomine (BENTYL) 20 mg tablet Take 1 Tab by mouth every six (6) hours as needed (abdominal cramps). 2/6/19   Anuj Ayala MD   metFORMIN (GLUCOPHAGE) 500 mg tablet Take 500 mg by mouth daily (with breakfast). Indy Marin MD   Pramoxine (PROCTOFOAM) 1 % topical foam Apply  to affected area three (3) times daily as needed for Hemorrhoids. 6/27/18   Andrei Ramirez MD   losartan-hydroCHLOROthiazide [facility]) 100-12.5 mg per tablet Take 1 Tab by mouth daily. Provider, Historical   EPINEPHrine (EPIPEN) 0.3 mg/0.3 mL (1:1,000) injection 0.3 mL by IntraMUSCular route once as needed for up to 1 dose. 11/2/15   Fabiana Yao MD   estradiol (ESTRACE) 1 mg tablet Take 1 mg by mouth daily. Provider, Historical   albuterol (PROVENTIL, VENTOLIN) 90 mcg/Actuation inhaler Take 2 Puffs by inhalation every six (6) hours as needed.     Indy Marin MD       Patient Active Problem List   Diagnosis Code    Thrush B37.0    Postoperative complication I15. 9XXA    Acute respiratory failure ([facility] J96.00    Hypertension I10    Steroid-induced diabetes ([facility] E09.9, T38.0X5A    Diarrhea R19.7    Clostridium difficile diarrhea A04.72    Bronchitis J40    Accelerated hypertension I10    Type 2 diabetes mellitus (HCC) E11.9    Lactic acidosis E87.2    C. difficile colitis A04.72    C. difficile diarrhea A04.72    Mood disorder ([facility]) F39       REVIEW OF SYSTEMS:    Constitutional: negative fever, negative chills, negative weight loss  Eyes:   negative visual changes  ENT:   negative sore throat, tongue or lip swelling   Respiratory:  negative cough, negative dyspnea  Cards:  negative for chest pain, palpitations, lower extremity edema  GI:   See HPI  :  negative for frequency, dysuria  Integument:  negative for rash and pruritus  Heme:  negative for easy bruising  Musculoskel: negative for myalgias,  back pain and muscle weakness  Neuro: negative for headaches, dizziness  Psych:  negative for feelings of anxiety, depression     Pertinent Positives: abdominal pain      Objective:   VITALS:    Visit Vitals  /66 (BP 1 Location: Left arm, BP Patient Position: At rest)   Pulse 92   Temp 98.4 °F (36.9 °C)   Resp 16   Ht 5' 2\" (1.575 m)   Wt 94.8 kg (208 lb 15.9 oz)   SpO2 97%   Breastfeeding? No   BMI 38.23 kg/m²       PHYSICAL EXAM:   General:          Alert, WD, WN, cooperative, no distress, appears stated age. Head:               Normocephalic, without obvious abnormality, atraumatic. Eyes:               Conjunctivae clear and pale, anicteric sclerae. Pupils are equal  Nose:               Nares normal. No drainage or sinus tenderness. Throat:             Lips, mucosa, and tongue normal  Neck:               Supple, symmetrical,  no adenopathy  Back:               Symmetric,  No CVA tenderness. Lungs:             CTA bilaterally. No wheezing/rhonchi/rales.   Chest wall:      No tenderness or deformity. No Accessory muscle use. Heart:              Regular rate and rhythm,  no murmur, rub or gallop. Abdomen:        Soft, diffuse tenderness, worse LUQ. Not distended. Bowel sounds normal.  Extremities:     Atraumatic, No cyanosis. No edema. No clubbing  Skin:                Texture, turgor normal. No rashes/lesions/jaundice  Lymph:            Cervical, supraclavicular normal.  Psych:             Good insight. Not depressed. Not anxious or agitated. Neurologic:      EOMs intact. No facial asymmetry. No aphasia or slurred speech. Normal strength, A/O X 3. LAB DATA REVIEWED:    Recent Results (from the past 24 hour(s))   GLUCOSE, POC    Collection Time: 02/10/19 12:15 PM   Result Value Ref Range    Glucose (POC) 215 (H) 65 - 100 mg/dL    Performed by [address] [place] [address], POC    Collection Time: 02/10/19  4:45 PM   Result Value Ref Range    Glucose (POC) 222 (H) 65 - 100 mg/dL    Performed by [address] [place] [address], POC    Collection Time: 02/10/19 10:45 PM   Result Value Ref Range    Glucose (POC) 183 (H) 65 - 100 mg/dL    Performed by Yessi Galaviz (PCT)    CBC WITH AUTOMATED DIFF    Collection Time: 02/11/19  2:43 AM   Result Value Ref Range    WBC 11.7 (H) 3.6 - 11.0 K/uL    RBC 4.88 3.80 - 5.20 M/uL    HGB 14.2 11.5 - 16.0 g/dL    HCT 39.9 35.0 - 47.0 %    MCV 81.8 80.0 - 99.0 FL    MCH 29.1 26.0 - 34.0 PG    MCHC 35.6 30.0 - 36.5 g/dL    RDW 14.3 11.5 - 14.5 %    PLATELET 771 010 - 706 K/uL    MPV 9.0 8.9 - 12.9 FL    NRBC 0.0 0  WBC    ABSOLUTE NRBC 0.00 0.00 - 0.01 K/uL    NEUTROPHILS 77 (H) 32 - 75 %    LYMPHOCYTES 16 12 - 49 %    MONOCYTES 5 5 - 13 %    EOSINOPHILS 2 0 - 7 %    BASOPHILS 0 0 - 1 %    IMMATURE GRANULOCYTES 0 0.0 - 0.5 %    ABS. NEUTROPHILS 9.0 (H) 1.8 - 8.0 K/UL    ABS. LYMPHOCYTES 1.9 0.8 - 3.5 K/UL    ABS. MONOCYTES 0.6 0.0 - 1.0 K/UL    ABS. EOSINOPHILS 0.2 0.0 - 0.4 K/UL    ABS. BASOPHILS 0.0 0.0 - 0.1 K/UL    ABS. IMM. GRANS. 0.0 0.00 - 0.04 K/UL    DF AUTOMATED     METABOLIC PANEL, COMPREHENSIVE    Collection Time: 02/11/19  2:43 AM   Result Value Ref Range    Sodium 136 136 - 145 mmol/L    Potassium 3.3 (L) 3.5 - 5.1 mmol/L    Chloride 104 97 - 108 mmol/L    CO2 25 21 - 32 mmol/L    Anion gap 7 5 - 15 mmol/L    Glucose 199 (H) 65 - 100 mg/dL    BUN 4 (L) 6 - 20 MG/DL    Creatinine 0.67 0.55 - 1.02 MG/DL    BUN/Creatinine ratio 6 (L) 12 - 20      GFR est AA >60 >60 ml/min/1.73m2    GFR est non-AA >60 >60 ml/min/1.73m2    Calcium 8.7 8.5 - 10.1 MG/DL    Bilirubin, total 0.6 0.2 - 1.0 MG/DL    ALT (SGPT) 28 12 - 78 U/L    AST (SGOT) 16 15 - 37 U/L    Alk. phosphatase 58 45 - 117 U/L    Protein, total 6.6 6.4 - 8.2 g/dL    Albumin 3.2 (L) 3.5 - 5.0 g/dL    Globulin 3.4 2.0 - 4.0 g/dL    A-G Ratio 0.9 (L) 1.1 - 2.2     GLUCOSE, POC    Collection Time: 02/11/19  8:13 AM   Result Value Ref Range    Glucose (POC) 220 (H) 65 - 100 mg/dL    Performed by Aiden Fountain        IMAGING RESULTS:  I have personally reviewed the imaging reports    DATE: 2/10/2019  INDICATION: Abdominal pain; Diarrhea; rectal bleeding      EXAM: CT Abdomen and Pelvis without contrast.  CT dose reduction was achieved through use of a standardized protocol tailored  for this examination and automatic exposure control for dose modulation.     FINDINGS:   No urinary tract stones are seen. There is no hydroureteronephrosis. The kidneys  are normal in size. There is no perirenal fluid or ascites.      There is mild hepatosplenomegaly. Liver is fatty. Pancreas, adrenal glands and  aorta show no significant enlargement. No inflammation is seen. There is no  pneumoperitoneum or significant adenopathy.     The bladder is not distended. The distal ureters are not dilated. There is no  apparent pelvic mass. The appendix is normal. There is a small supraumbilical  fat-containing hernia.     IMPRESSION  IMPRESSION: No Acute Disease. No change since 2/5/2019.       Total time spent with patient: 50 minutes ________________________________________________________________________  Care Plan discussed with:  Patient y   Family     RN y- burt              Consultant:       CT  2/11/2019:  ________________________________________________________________________    ___________________________________________________  Consulting Provider:  Shaw Gomez NP      2/11/2019  11:26 AM

## 2019-02-11 NOTE — PROGRESS NOTES
Bedside and Verbal shift change report given to Patsy Meyer RN (oncoming nurse) by Flower Esqueda (offgoing nurse). Report included the following information SBAR, Kardex, Procedure Summary, Intake/Output, MAR and Accordion.

## 2019-02-11 NOTE — PROGRESS NOTES
Bedside shift change report given to Channing Almeida RN (oncoming nurse) by Jade Brian RN (offgoing nurse). Report included the following information SBAR, Kardex, ED Summary, Procedure Summary, Intake/Output, MAR, Accordion, Recent Results and Med Rec Status.

## 2019-02-12 VITALS
SYSTOLIC BLOOD PRESSURE: 124 MMHG | BODY MASS INDEX: 38.46 KG/M2 | TEMPERATURE: 97.4 F | HEART RATE: 85 BPM | DIASTOLIC BLOOD PRESSURE: 59 MMHG | WEIGHT: 209 LBS | RESPIRATION RATE: 16 BRPM | HEIGHT: 62 IN | OXYGEN SATURATION: 91 %

## 2019-02-12 LAB
ALBUMIN SERPL-MCNC: 3.2 G/DL (ref 3.5–5)
ALBUMIN/GLOB SERPL: 0.9 {RATIO} (ref 1.1–2.2)
ALP SERPL-CCNC: 55 U/L (ref 45–117)
ALT SERPL-CCNC: 29 U/L (ref 12–78)
ANION GAP SERPL CALC-SCNC: 8 MMOL/L (ref 5–15)
AST SERPL-CCNC: 31 U/L (ref 15–37)
BASOPHILS # BLD: 0.1 K/UL (ref 0–0.1)
BASOPHILS NFR BLD: 1 % (ref 0–1)
BILIRUB SERPL-MCNC: 0.5 MG/DL (ref 0.2–1)
BUN SERPL-MCNC: 5 MG/DL (ref 6–20)
BUN/CREAT SERPL: 8 (ref 12–20)
CALCIUM SERPL-MCNC: 9.1 MG/DL (ref 8.5–10.1)
CHLORIDE SERPL-SCNC: 104 MMOL/L (ref 97–108)
CO2 SERPL-SCNC: 25 MMOL/L (ref 21–32)
CREAT SERPL-MCNC: 0.62 MG/DL (ref 0.55–1.02)
DIFFERENTIAL METHOD BLD: ABNORMAL
EOSINOPHIL # BLD: 0.3 K/UL (ref 0–0.4)
EOSINOPHIL NFR BLD: 4 % (ref 0–7)
ERYTHROCYTE [DISTWIDTH] IN BLOOD BY AUTOMATED COUNT: 14.1 % (ref 11.5–14.5)
GLOBULIN SER CALC-MCNC: 3.5 G/DL (ref 2–4)
GLUCOSE BLD STRIP.AUTO-MCNC: 164 MG/DL (ref 65–100)
GLUCOSE BLD STRIP.AUTO-MCNC: 182 MG/DL (ref 65–100)
GLUCOSE SERPL-MCNC: 169 MG/DL (ref 65–100)
HCT VFR BLD AUTO: 39 % (ref 35–47)
HGB BLD-MCNC: 13 G/DL (ref 11.5–16)
IMM GRANULOCYTES # BLD AUTO: 0.1 K/UL (ref 0–0.04)
IMM GRANULOCYTES NFR BLD AUTO: 1 % (ref 0–0.5)
LACTATE SERPL-SCNC: 1.8 MMOL/L (ref 0.4–2)
LYMPHOCYTES # BLD: 2.5 K/UL (ref 0.8–3.5)
LYMPHOCYTES NFR BLD: 26 % (ref 12–49)
MAGNESIUM SERPL-MCNC: 1.8 MG/DL (ref 1.6–2.4)
MCH RBC QN AUTO: 28.3 PG (ref 26–34)
MCHC RBC AUTO-ENTMCNC: 33.3 G/DL (ref 30–36.5)
MCV RBC AUTO: 85 FL (ref 80–99)
MONOCYTES # BLD: 0.5 K/UL (ref 0–1)
MONOCYTES NFR BLD: 6 % (ref 5–13)
NEUTS SEG # BLD: 6.1 K/UL (ref 1.8–8)
NEUTS SEG NFR BLD: 64 % (ref 32–75)
NRBC # BLD: 0 K/UL (ref 0–0.01)
NRBC BLD-RTO: 0 PER 100 WBC
PHOSPHATE SERPL-MCNC: 3.5 MG/DL (ref 2.6–4.7)
PLATELET # BLD AUTO: 198 K/UL (ref 150–400)
PMV BLD AUTO: 9.7 FL (ref 8.9–12.9)
POTASSIUM SERPL-SCNC: 3.9 MMOL/L (ref 3.5–5.1)
PROT SERPL-MCNC: 6.7 G/DL (ref 6.4–8.2)
RBC # BLD AUTO: 4.59 M/UL (ref 3.8–5.2)
SERVICE CMNT-IMP: ABNORMAL
SERVICE CMNT-IMP: ABNORMAL
SODIUM SERPL-SCNC: 137 MMOL/L (ref 136–145)
WBC # BLD AUTO: 9.5 K/UL (ref 3.6–11)

## 2019-02-12 PROCEDURE — 80053 COMPREHEN METABOLIC PANEL: CPT

## 2019-02-12 PROCEDURE — 74011250636 HC RX REV CODE- 250/636: Performed by: INTERNAL MEDICINE

## 2019-02-12 PROCEDURE — 74011250637 HC RX REV CODE- 250/637: Performed by: NURSE PRACTITIONER

## 2019-02-12 PROCEDURE — 36415 COLL VENOUS BLD VENIPUNCTURE: CPT

## 2019-02-12 PROCEDURE — 84100 ASSAY OF PHOSPHORUS: CPT

## 2019-02-12 PROCEDURE — 82962 GLUCOSE BLOOD TEST: CPT

## 2019-02-12 PROCEDURE — 74011636637 HC RX REV CODE- 636/637: Performed by: FAMILY MEDICINE

## 2019-02-12 PROCEDURE — 83735 ASSAY OF MAGNESIUM: CPT

## 2019-02-12 PROCEDURE — 85025 COMPLETE CBC W/AUTO DIFF WBC: CPT

## 2019-02-12 PROCEDURE — 74011000250 HC RX REV CODE- 250: Performed by: INTERNAL MEDICINE

## 2019-02-12 PROCEDURE — 74011250637 HC RX REV CODE- 250/637: Performed by: FAMILY MEDICINE

## 2019-02-12 PROCEDURE — 83605 ASSAY OF LACTIC ACID: CPT

## 2019-02-12 RX ORDER — TRAMADOL HYDROCHLORIDE 50 MG/1
50 TABLET ORAL
Qty: 16 TAB | Refills: 0 | Status: SHIPPED | OUTPATIENT
Start: 2019-02-12 | End: 2019-04-01

## 2019-02-12 RX ORDER — PROCHLORPERAZINE MALEATE 5 MG
5 TABLET ORAL
Qty: 12 TAB | Refills: 0 | Status: SHIPPED | OUTPATIENT
Start: 2019-02-12 | End: 2019-04-01

## 2019-02-12 RX ORDER — VANCOMYCIN HYDROCHLORIDE 125 MG/1
125 CAPSULE ORAL 4 TIMES DAILY
Qty: 28 CAP | Refills: 0 | Status: SHIPPED | OUTPATIENT
Start: 2019-02-12 | End: 2019-02-19

## 2019-02-12 RX ADMIN — MORPHINE SULFATE 4 MG: 2 INJECTION, SOLUTION INTRAMUSCULAR; INTRAVENOUS at 11:58

## 2019-02-12 RX ADMIN — PROCHLORPERAZINE EDISYLATE 5 MG: 5 INJECTION INTRAMUSCULAR; INTRAVENOUS at 11:06

## 2019-02-12 RX ADMIN — SERTRALINE HYDROCHLORIDE 100 MG: 50 TABLET ORAL at 08:54

## 2019-02-12 RX ADMIN — INSULIN LISPRO 2 UNITS: 100 INJECTION, SOLUTION INTRAVENOUS; SUBCUTANEOUS at 08:53

## 2019-02-12 RX ADMIN — VANCOMYCIN HYDROCHLORIDE 125 MG: KIT at 11:59

## 2019-02-12 RX ADMIN — ESTRADIOL 1 MG: 1 TABLET ORAL at 08:54

## 2019-02-12 RX ADMIN — Medication 1 CAPSULE: at 08:54

## 2019-02-12 RX ADMIN — LOSARTAN POTASSIUM 100 MG: 100 TABLET ORAL at 08:54

## 2019-02-12 RX ADMIN — FAMOTIDINE 20 MG: 10 INJECTION, SOLUTION INTRAVENOUS at 08:54

## 2019-02-12 RX ADMIN — INSULIN LISPRO 2 UNITS: 100 INJECTION, SOLUTION INTRAVENOUS; SUBCUTANEOUS at 11:30

## 2019-02-12 RX ADMIN — AMLODIPINE BESYLATE 10 MG: 5 TABLET ORAL at 08:54

## 2019-02-12 RX ADMIN — MORPHINE SULFATE 4 MG: 2 INJECTION, SOLUTION INTRAMUSCULAR; INTRAVENOUS at 02:39

## 2019-02-12 RX ADMIN — MORPHINE SULFATE 4 MG: 2 INJECTION, SOLUTION INTRAMUSCULAR; INTRAVENOUS at 08:16

## 2019-02-12 RX ADMIN — VANCOMYCIN HYDROCHLORIDE 125 MG: KIT at 06:19

## 2019-02-12 NOTE — PROGRESS NOTES
Reason for Readmission:     Pt was admited on 2/10/19 d/t Dx of CDIFF; Pt was here from 2/7/19 to 2/8/19 for CDIFF    RRAT Score and Risk Level:     12 MOD     Level of Readmission:    1      Care Conference scheduled:   Not needed at this time. Resources/supports as identified by patient/family:  Pt is self pay. Top Challenges facing patient (as identified by patient/family and CM): Finances/Medication cost?    Pt goes to Clinic in [address] [place] [address] and gets discounted prescription drugs she has already made arrangements to get RX today through them. Transportation      Pt drives herself  Support system or lack thereof? Pt lives with her DTR. Parents live closeby and are supportive. Living arrangements? Pt lives in first floor apt with DTR. One step to enter. Self-care/ADLs/Cognition? Alert and oriented. I W ADLs. Current Advanced Directive/Advance Care Plan:  Full Code. No AMD on file. Not interested in filing out application. Plan for utilizing home health:   Not needed at this time. Likelihood of additional readmission:   LOW to MOD             Transition of Care Plan:    Based on readmission, the patient's previous Plan of Care   has been evaluated and/or modified. The current Transition of Care Plan is:       DC order is in.  CM made PCP appt and placed on AVS.  CM gave Pt a copy of the Care Card Application. Molly from [facility] [employer] was coming in to see Pt as I was leaving. Pt driving self home. No further CM needs. Care Management Interventions  PCP Verified by CM: Yes  Palliative Care Criteria Met (RRAT>21 & CHF Dx)?: No  Mode of Transport at Discharge: Other (see comment)  Transition of Care Consult (CM Consult): Discharge Planning  MyChart Signup: No  Discharge Durable Medical Equipment: No  Physical Therapy Consult: Yes  Occupational Therapy Consult: Yes  Speech Therapy Consult: No  Current Support Network:  Other(Lives with DTR in first floor apt one [address])  Confirm Follow Up Transport: Self  Plan discussed with Pt/Family/Caregiver: Yes  Freedom of Choice Offered: Yes  Discharge Location  Discharge Placement: Home with family assistance    Slim, Nuris Boyd

## 2019-02-12 NOTE — PROGRESS NOTES
GI Progress Note [facility] for Annita Young)  NAME:Rose Marie Crisostomo :1970 RAYMON:[id number]   ATTG: Dr. Margie Barajas  PCP: Teresa Chavis NP  Date/Time:  2019 7:39 AM     Primary GI: Dr. Prudence Nyhan    Reason for following: C.diff colitis    Assessment:   · C.diff colitis  · abd pain and diarrhea, 2/2 above  · Hx of diverticulitis with fisultization to vagina, s/p sigmoidectomy  · CKD, GERD, hepatic steatosis     Plan:   · Continue QID vanc to complete 14 day course from admission  · Continue probiotics  · GI will sign off, reasonable to DC home today from my perspective     Subjective: Tolerating a GI lite diet. Wants to go home and feels mostly ready. Still has twinges of discomfort, but manageable. WBC down. Less BMs, none overnight but some gas. Review of Systems:  Symptom Y/N Comments  Symptom Y/N Comments   Fever/Chills n   Chest Pain n    Cough n   Headaches n    Sputum n   Joint Pain n    SOB/VALENTE n   Pruritis/Rash n    Tolerating Diet y   Other       Could NOT obtain due to:      Objective:   VITALS:   Last 24hrs VS reviewed since prior progress note. Most recent are:  Visit Vitals  /62   Pulse 78   Temp 98.3 °F (36.8 °C)   Resp 18   Ht 5' 2\" (1.575 m)   Wt 94.8 kg (208 lb 15.9 oz)   SpO2 98%   Breastfeeding? No   BMI 38.23 kg/m²       Intake/Output Summary (Last 24 hours) at 2019 0739  Last data filed at 2019 2300  Gross per 24 hour   Intake 300 ml   Output --   Net 300 ml     PHYSICAL EXAM:  General: WD, WN. Alert, cooperative, no acute distress    HEENT: NC, Atraumatic. Anicteric sclerae. Lungs:  CTA Bilaterally. No Wheezing/Rhonchi/Rales. Heart:  Regular  rhythm,  No murmur (), No Rubs, No Gallops  Abdomen: Soft, Non distended. Mild suprapubic/RLQ TTP.  +Bowel sounds, no HSM  Extremities: No c/c/e  Neurologic:  Alert and oriented X 3. No acute neurological distress   Psych:   Good insight. Not anxious nor agitated.     Lab and Radiology Data Reviewed: (see below)    Medications Reviewed: (see below)  PMH/SH reviewed - no change compared to H&P  ________________________________________________________________________  Total time spent with patient: 15 minutes ________________________________________________________________________  Care Plan discussed with:  Patient x   Family     RN               hospitalist:  arlette Harmon MD     Procedures: see electronic medical records for all procedures/Xrays and details which were not copied into this note but were reviewed prior to creation of Plan. LABS:  Recent Labs     02/12/19 0318 02/11/19 0243   WBC 9.5 11.7*   HGB 13.0 14.2   HCT 39.0 39.9    194     Recent Labs     02/12/19 0318 02/11/19 0243 02/09/19  1700    136 136   K 3.9 3.3* 3.6    104 101   CO2 25 25 27   BUN 5* 4* 4*   CREA 0.62 0.67 0.69   * 199* 188*   CA 9.1 8.7 9.7   MG 1.8  --  1.8   PHOS 3.5  --   --      Recent Labs     02/12/19 0318 02/11/19 0243 02/09/19  1700   SGOT 31 16 44*   AP 55 58 63   TP 6.7 6.6 7.7   ALB 3.2* 3.2* 3.9   GLOB 3.5 3.4 3.8   LPSE  --   --  151     Recent Labs     02/09/19  1700   INR 1.0   PTP 10.2      No results for input(s): FE, TIBC, PSAT, FERR in the last 72 hours. No results found for: FOL, RBCF  No results for input(s): PH, PCO2, PO2 in the last 72 hours. No results for input(s): CPK, CKMB in the last 72 hours.     No lab exists for component: TROPONINI  Lab Results   Component Value Date/Time    Color YELLOW/STRAW 02/09/2019 05:53 PM    Appearance CLEAR 02/09/2019 05:53 PM    Specific gravity 1.006 02/09/2019 05:53 PM    Specific gravity 1.010 08/15/2018 12:08 PM    pH (UA) 6.0 02/09/2019 05:53 PM    Protein NEGATIVE  02/09/2019 05:53 PM    Glucose NEGATIVE  02/09/2019 05:53 PM    Ketone NEGATIVE  02/09/2019 05:53 PM    Bilirubin NEGATIVE  02/09/2019 05:53 PM    Urobilinogen 0.2 02/09/2019 05:53 PM    Nitrites NEGATIVE  02/09/2019 05:53 PM    Leukocyte Esterase NEGATIVE  02/09/2019 05:53 PM Epithelial cells FEW 02/07/2019 06:12 PM    Bacteria NEGATIVE  02/07/2019 06:12 PM    WBC 0-4 02/07/2019 06:12 PM    RBC 0-5 02/07/2019 06:12 PM       MEDICATIONS:  Current Facility-Administered Medications   Medication Dose Route Frequency    lactobac ac& pc-s.therm-b.anim (RICHARD Q/RISAQUAD)  1 Cap Oral DAILY    morphine injection 4 mg  4 mg IntraVENous Q4H PRN    vancomycin (FIRVANQ) 50 mg/mL oral solution 125 mg  125 mg Oral Q6H    zinc oxide-cod liver oil (DESITIN) 40 % paste   Topical PRN    glucose chewable tablet 16 g  4 Tab Oral PRN    dextrose (D50W) injection syrg 12.5-25 g  25-50 mL IntraVENous PRN    glucagon (GLUCAGEN) injection 1 mg  1 mg IntraMUSCular PRN    insulin lispro (HUMALOG) injection   SubCUTAneous AC&HS    hydrALAZINE (APRESOLINE) 20 mg/mL injection 10 mg  10 mg IntraVENous Q6H PRN    ondansetron (ZOFRAN) injection 8 mg  8 mg IntraVENous Q8H PRN    prochlorperazine (COMPAZINE) with saline injection 5 mg  5 mg IntraVENous Q4H PRN    famotidine (PF) (PEPCID) injection 20 mg  20 mg IntraVENous Q12H    albuterol (PROVENTIL HFA, VENTOLIN HFA, PROAIR HFA) inhaler   Inhalation Q6H PRN    amLODIPine (NORVASC) tablet 10 mg  10 mg Oral DAILY    estradiol (ESTRACE) tablet 1 mg  1 mg Oral DAILY    hydrocortisone-pramoxine (ANALPRAM-HC) 2.5-1 % (4g) cream   Rectal BID PRN    sertraline (ZOLOFT) tablet 100 mg  100 mg Oral DAILY    losartan (COZAAR) tablet 100 mg  100 mg Oral DAILY

## 2019-02-12 NOTE — PROGRESS NOTES
Orthopedic End of Shift Note    Bedside and Verbal shift change report given to Damon Otero (oncoming nurse) by Marisabel Montes (offgoing nurse). Report included the following information SBAR, Kardex, Intake/Output and MAR. POD# n/a  Significant issues during shift: bloody stools    Issues for Physician to address bloody stool    Nausea/Vomiting [] yes [x] no     Mayo Catheter [] in [] removed voiding   Bowel Movements [x] yes [] no     Foot Pumps or SCD [] yes [x] no    Ice Pack [] yes    [x] no    Incentive Spirometer [] yes [x] no Volume:      Telementary Monitoring   [] yes [x] no Rhythm:   Supplemental O2 [] yes [x] no Sat off O2: 97     Patient Vitals for the past 12 hrs:   Temp Pulse Resp BP SpO2   02/11/19 1716 98.2 °F (36.8 °C) 89 16 116/59 94 %   02/11/19 1223 97.9 °F (36.6 °C) 85 16 129/74 92 %   02/11/19 0755 98.4 °F (36.9 °C) 92 16 113/66 97 %     Lab Results   Component Value Date/Time    HGB 14.2 02/11/2019 02:43 AM    HCT 39.9 02/11/2019 02:43 AM     Lab Results   Component Value Date/Time    INR 1.0 02/09/2019 05:00 PM    INR 1.0 02/27/2018 01:25 PM    INR 1.0 08/03/2017 10:40 PM     No intake or output data in the 24 hours ending 02/11/19 1941        Peripheral Intravenous Line:  Peripheral IV 02/10/19 Right Antecubital (Active)   Site Assessment Clean, dry, & intact 2/11/2019  5:16 PM   Phlebitis Assessment 0 2/11/2019  5:16 PM   Infiltration Assessment 0 2/11/2019  5:16 PM   Dressing Status Clean, dry, & intact 2/11/2019  5:16 PM   Dressing Type Tape;Transparent 2/11/2019  5:16 PM   Hub Color/Line Status Pink; Infusing 2/11/2019  5:16 PM     Drain(s):

## 2019-02-12 NOTE — DISCHARGE SUMMARY
Hospitalist Discharge Note    NAME: La White   :  1970   MRN:  [id number]     Admit date: 2019    Discharge date: 19    PCP: Onesimo Barr NP    Discharge Diagnoses:    Discharge Medications:  Current Discharge Medication List      CONTINUE these medications which have CHANGED    Details   vancomycin (VANCOCIN) 125 mg capsule Take 1 Cap by mouth four (4) times daily for 7 days. You already have this medication at home  Qty: 28 Cap, Refills: 0      prochlorperazine (COMPAZINE) 5 mg tablet Take 1 Tab by mouth every eight (8) hours as needed for Nausea. Qty: 12 Tab, Refills: 0      traMADol (ULTRAM) 50 mg tablet Take 1 Tab by mouth every six (6) hours as needed for Pain. Max Daily Amount: 200 mg. Qty: 16 Tab, Refills: 0    Associated Diagnoses: Abdominal pain, generalized; C. difficile diarrhea         CONTINUE these medications which have NOT CHANGED    Details   sertraline (ZOLOFT) 100 mg tablet Take 100 mg by mouth. amLODIPine (NORVASC) 10 mg tablet Take 10 mg by mouth. dicyclomine (BENTYL) 20 mg tablet Take 1 Tab by mouth every six (6) hours as needed (abdominal cramps). Qty: 20 Tab, Refills: 0      Pramoxine (PROCTOFOAM) 1 % topical foam Apply  to affected area three (3) times daily as needed for Hemorrhoids. Qty: 1 Can, Refills: 0      losartan-hydroCHLOROthiazide (HYZAAR) 100-12.5 mg per tablet Take 1 Tab by mouth daily. EPINEPHrine (EPIPEN) 0.3 mg/0.3 mL (1:1,000) injection 0.3 mL by IntraMUSCular route once as needed for up to 1 dose. Qty: 0.3 mL, Refills: 1      estradiol (ESTRACE) 1 mg tablet Take 1 mg by mouth daily. albuterol (PROVENTIL, VENTOLIN) 90 mcg/Actuation inhaler Take 2 Puffs by inhalation every six (6) hours as needed. STOP taking these medications       cyclobenzaprine (FLEXERIL) 10 mg tablet Comments:   Reason for Stopping:         metFORMIN (GLUCOPHAGE) 500 mg tablet Comments:   Reason for Stopping:                Follow-up Information     Follow up With Specialties Details Why Contact Angy Park NP Nurse Practitioner Go on 2/18/2019 arrive at 10 AM for PCP post hospital follow up appt. [address]  [phone]            Time spent on discharge:   I spent greater than 30 minutes on discharge, seeing and examining the patient, reconciling home meds and new meds, coordinating care with case management, doing the discharge papers and the D/C summary    Discharge disposition:     Discharge Condition: Stable    Summary of admission H+P(copied from Dr Note):       Hospital course:       Clostridium difficile Colitis POA  BRBPR/Hematochezia POA  Dr Pruitt Keep feels ready to discharge  Diarrhea better, 2 episodes today, some Nausea and dry heaves, better now, no ruddy vomiting  Admit CT scan without colitis  C difficile DNA assay positive on 2/5 after indeterminant toxin assay       Toxin assay here was negative(took some doses of Po vanco at home)  Continue PO Vancomycin 125 mg q6h x 12 more days  Still with some abdominal pain, change to PO pain meds  IV zofran prn N/V and IV compazine prn second line  GI advanced to GI lite diet  HgB stable  D/C to home, pt feels she is ready    Elevated Lactic Acid  hold home HCTZ and metformin  Never rechecked in house? ??, will recheck stat  D/C to home once level back  Hold metformin till PCP follow up  No SIRS criteria    DM type 2 uncontrolled with hyperglycemia POA  SSI, hold home metformin till PCP follow up     Severe Anxiety Disorder POA  Continue Zoloft     Essential HTN POA  Continue home Norvasc, Cozaar  hold HCTZ  IV hydralazine prn    GERD: patient on PPI at home  IV H2B started with N/V    Hypokalemia resolved  Replete and monitor    Obesity: Body mass index is 38.23 kg/m².     Code status: Full  Prophylaxis: SCD's  Recommended Disposition: Home w/Family     Subjective:     Chief Complaint / Reason for Physician Visit: follow-up c diff  Patient seen for follow-up  \"2 episodes of diarrhea today\"  Feels diarrhea is better, 2 episodes this AM, yesterday had 5-6 episodes by this time  Nausea, some dry heaves after breakfast, no ruddy vomiting  Still with moderate periumbilical abdominal pain  No further hematochezia    Review of Systems:  Symptom Y/N Comments  Symptom Y/N Comments   Fever/Chills n   Chest Pain n    Poor Appetite    Edema n    Cough n   Abdominal Pain y improving   Sputum    Joint Pain     SOB/VALENTE n   Pruritis/Rash     Nausea/vomit y   Tolerating PT/OT     Diarrhea y improving  Tolerating Diet y    Constipation    Other       Could NOT obtain due to:      Objective:     VITALS:   Last 24hrs VS reviewed since prior progress note. Most recent are:  Patient Vitals for the past 24 hrs:   Temp Pulse Resp BP SpO2   02/12/19 1122 97.4 °F (36.3 °C) 85 16 124/59 91 %   02/12/19 0800 98.3 °F (36.8 °C) 80 18 123/69 --   02/12/19 0340 98.3 °F (36.8 °C) 78 18 114/62 98 %   02/12/19 0000 98.8 °F (37.1 °C) 80 18 120/70 97 %   02/11/19 2030 98.5 °F (36.9 °C) 84 16 117/68 94 %   02/11/19 1716 98.2 °F (36.8 °C) 89 16 116/59 94 %   02/11/19 1223 97.9 °F (36.6 °C) 85 16 129/74 92 %       Intake/Output Summary (Last 24 hours) at 2/12/2019 1218  Last data filed at 2/12/2019 0853  Gross per 24 hour   Intake 540 ml   Output --   Net 540 ml        PHYSICAL EXAM:  General: WD, WN. Alert, cooperative, no acute distress    EENT:  EOMI. Anicteric sclerae. MM dry  Resp:  CTA bilaterally, no wheezing or rales. No accessory muscle use  CV:  Regular  rhythm,  No edema  GI:  Soft, mildly distended, mildly tenderness in periumbilical area and suprapubic area positive BS  Neurologic:  Alert and oriented X 3, normal speech,   Psych:   Good insight. Not anxious nor agitated  Skin:  No rashes.   No jaundice    Reviewed most current lab test results and cultures  YES  Reviewed most current radiology test results   YES  Review and summation of old records today    NO  Reviewed patient's current orders and MAR    YES  PMH/SH reviewed - no change compared to H&P  ________________________________________________________________________  Care Plan discussed with:    Comments   Patient x    Family      RN x    Care Manager     Consultant  x Dr. Elif Mejia team rounds were held today with , nursing, pharmacist and clinical coordinator. Patient's plan of care was discussed; medications were reviewed and discharge planning was addressed. ________________________________________________________________________  Total NON critical care TIME:  25   Minutes    Total CRITICAL CARE TIME Spent:   Minutes non procedure based      Comments   >50% of visit spent in counseling and coordination of care     ________________________________________________________________________  Martita Ramos MD     Procedures: see electronic medical records for all procedures/Xrays and details which were not copied into this note but were reviewed prior to creation of Plan. LABS:  I reviewed today's most current labs and imaging studies.   Pertinent labs include:  Recent Labs     02/12/19 0318 02/11/19  0243 02/09/19  1700   WBC 9.5 11.7* 5.8   HGB 13.0 14.2 13.3   HCT 39.0 39.9 38.5    194 178     Recent Labs     02/12/19 0318 02/11/19  0243 02/09/19  1700    136 136   K 3.9 3.3* 3.6    104 101   CO2 25 25 27   * 199* 188*   BUN 5* 4* 4*   CREA 0.62 0.67 0.69   CA 9.1 8.7 9.7   MG 1.8  --  1.8   PHOS 3.5  --   --    ALB 3.2* 3.2* 3.9   TBILI 0.5 0.6 0.4   SGOT 31 16 44*   ALT 29 28 50   INR  --   --  1.0       Signed: Martita Ramos MD

## 2019-02-12 NOTE — PROGRESS NOTES
Orthopedic End of Shift Note {[facility] BEDSIDE_VERBAL_RECORDED_WRITTEN:21006::\"Bedside\"} shift change report given to *** (oncoming nurse) by *** (offgoing nurse). Report included the following information {SBAR REPORTS BNFB:[id number]}. POD# Significant issues during shift: *** Issues for Physician to address*** Nausea/Vomiting [] yes [] no Mayo Catheter [] in [] removed Bowel Movements [] yes [] no Foot Pumps or SCD [] yes [] no Ice Pack [] yes    [] no Incentive Spirometer [] yes [] no Volume:   *** Supplemental O2 [] yes [] no Sat off O2:   ***% Patient Vitals for the past 12 hrs: 
 Temp Pulse Resp BP SpO2  
02/12/19 0000 98.8 °F (37.1 °C) 80 18 120/70 97 % 02/11/19 2030 98.5 °F (36.9 °C) 84 16 117/68 94 % 02/11/19 1716 98.2 °F (36.8 °C) 89 16 116/59 94 % Lab Results Component Value Date/Time HGB 14.2 02/11/2019 02:43 AM  
 HCT 39.9 02/11/2019 02:43 AM  
 
Lab Results Component Value Date/Time INR 1.0 02/09/2019 05:00 PM  
 INR 1.0 02/27/2018 01:25 PM  
 INR 1.0 08/03/2017 10:40 PM  
 
 
Intake/Output Summary (Last 24 hours) at 2/12/2019 0034 Last data filed at 2/11/2019 2300 Gross per 24 hour Intake 300 ml Output  Net 300 ml Peripheral Intravenous Line: 
Peripheral IV 02/10/19 Right Antecubital (Active) Site Assessment Clean, dry, & intact 2/11/2019 10:37 PM  
Phlebitis Assessment 0 2/11/2019 10:37 PM  
Infiltration Assessment 0 2/11/2019 10:37 PM  
Dressing Status Clean, dry, & intact 2/11/2019 10:37 PM  
Dressing Type Tape;Transparent 2/11/2019 10:37 PM  
Hub Color/Line Status Pink; Infusing 2/11/2019  5:16 PM  
 
Drain(s): 
 Post op End of Shift Nursing Note {[facility] BEDSIDE_VERBAL_RECORDED_WRITTEN:[id number]::\"Bedside\"} shift change report given to *** (oncoming nurse) by *** (offgoing nurse). Report included the following information {SBAR REPORTS XNBW:[id number]}. POD#**** Significant changes during shift:  *** 
 
 Issues for physician to address:  *** 
 
Hgb*** INR*** Telemetry***** 
 
 
IV Flds [] in [] removed Mayo Catheter [] in  [] removed Nausea [x] yes [] no    
Vomiting [] yes [] no Bowel sounds [] absent [] hypoactive [x] active Bowel Movements [x] yes [] no    
Dressing present [] yes [x] no Wound concerns [] yes  [x] no    
Drains [] yes [x] no *** Foot Pumps or SCD [] yes [x] no Ice Pack [] yes    [x] no Incentive Spirometer [] yes [x] no Volume:   *** Supplemental O2 [] yes [x] no Sat off O2:   ***%

## 2019-02-12 NOTE — PROGRESS NOTES
Reviewed discharge instructions, prescriptions, and side effects with patient and her daughter. Reviewed wound care, follow-up instructions, and medication instructions. Answered all questions and provided contact information for future questions. Took IV out per policy, Catheter tip intact. Going home in a car with family.

## 2019-02-14 ENCOUNTER — APPOINTMENT (OUTPATIENT)
Dept: ULTRASOUND IMAGING | Age: 49
End: 2019-02-14
Attending: EMERGENCY MEDICINE
Payer: MEDICAID

## 2019-02-14 ENCOUNTER — HOSPITAL ENCOUNTER (EMERGENCY)
Age: 49
Discharge: HOME | End: 2019-02-14
Attending: EMERGENCY MEDICINE
Payer: MEDICAID

## 2019-02-14 ENCOUNTER — APPOINTMENT (OUTPATIENT)
Dept: GENERAL RADIOLOGY | Age: 49
End: 2019-02-14
Attending: EMERGENCY MEDICINE
Payer: MEDICAID

## 2019-02-14 VITALS
TEMPERATURE: 99.3 F | HEART RATE: 85 BPM | WEIGHT: 205.47 LBS | SYSTOLIC BLOOD PRESSURE: 144 MMHG | DIASTOLIC BLOOD PRESSURE: 82 MMHG | RESPIRATION RATE: 25 BRPM | OXYGEN SATURATION: 95 % | HEIGHT: 62 IN | BODY MASS INDEX: 37.81 KG/M2

## 2019-02-14 DIAGNOSIS — I10 ESSENTIAL HYPERTENSION: ICD-10-CM

## 2019-02-14 DIAGNOSIS — E87.6 POTASSIUM DEFICIENCY: ICD-10-CM

## 2019-02-14 DIAGNOSIS — B34.9 VIRAL SYNDROME: Primary | ICD-10-CM

## 2019-02-14 DIAGNOSIS — R19.7 DIARRHEA OF PRESUMED INFECTIOUS ORIGIN: ICD-10-CM

## 2019-02-14 LAB
ALBUMIN SERPL-MCNC: 3.4 G/DL (ref 3.5–5)
ALBUMIN/GLOB SERPL: 0.9 {RATIO} (ref 1.1–2.2)
ALP SERPL-CCNC: 61 U/L (ref 45–117)
ALT SERPL-CCNC: 35 U/L (ref 12–78)
ANION GAP SERPL CALC-SCNC: 11 MMOL/L (ref 5–15)
AST SERPL-CCNC: 26 U/L (ref 15–37)
BASOPHILS # BLD: 0 K/UL (ref 0–0.1)
BASOPHILS NFR BLD: 0 % (ref 0–1)
BILIRUB SERPL-MCNC: 0.3 MG/DL (ref 0.2–1)
BNP SERPL-MCNC: 18 PG/ML
BUN SERPL-MCNC: 5 MG/DL (ref 6–20)
BUN/CREAT SERPL: 7 (ref 12–20)
CALCIUM SERPL-MCNC: 9.5 MG/DL (ref 8.5–10.1)
CHLORIDE SERPL-SCNC: 102 MMOL/L (ref 97–108)
CO2 SERPL-SCNC: 25 MMOL/L (ref 21–32)
CREAT SERPL-MCNC: 0.67 MG/DL (ref 0.55–1.02)
D DIMER PPP FEU-MCNC: 0.48 MG/L FEU (ref 0–0.65)
DIFFERENTIAL METHOD BLD: NORMAL
EOSINOPHIL # BLD: 0.3 K/UL (ref 0–0.4)
EOSINOPHIL NFR BLD: 5 % (ref 0–7)
ERYTHROCYTE [DISTWIDTH] IN BLOOD BY AUTOMATED COUNT: 13.7 % (ref 11.5–14.5)
GLOBULIN SER CALC-MCNC: 3.7 G/DL (ref 2–4)
GLUCOSE SERPL-MCNC: 264 MG/DL (ref 65–100)
HCT VFR BLD AUTO: 35.1 % (ref 35–47)
HGB BLD-MCNC: 12.4 G/DL (ref 11.5–16)
IMM GRANULOCYTES # BLD AUTO: 0 K/UL (ref 0–0.04)
IMM GRANULOCYTES NFR BLD AUTO: 0 % (ref 0–0.5)
INR PPP: 1 (ref 0.9–1.1)
LIPASE SERPL-CCNC: 174 U/L (ref 73–393)
LYMPHOCYTES # BLD: 1.7 K/UL (ref 0.8–3.5)
LYMPHOCYTES NFR BLD: 25 % (ref 12–49)
MCH RBC QN AUTO: 28.8 PG (ref 26–34)
MCHC RBC AUTO-ENTMCNC: 35.3 G/DL (ref 30–36.5)
MCV RBC AUTO: 81.6 FL (ref 80–99)
MONOCYTES # BLD: 0.4 K/UL (ref 0–1)
MONOCYTES NFR BLD: 5 % (ref 5–13)
NEUTS SEG # BLD: 4.4 K/UL (ref 1.8–8)
NEUTS SEG NFR BLD: 64 % (ref 32–75)
NRBC # BLD: 0 K/UL (ref 0–0.01)
NRBC BLD-RTO: 0 PER 100 WBC
PLATELET # BLD AUTO: 198 K/UL (ref 150–400)
PMV BLD AUTO: 9.6 FL (ref 8.9–12.9)
POTASSIUM SERPL-SCNC: 2.8 MMOL/L (ref 3.5–5.1)
PROT SERPL-MCNC: 7.1 G/DL (ref 6.4–8.2)
PROTHROMBIN TIME: 10.2 SEC (ref 9–11.1)
RBC # BLD AUTO: 4.3 M/UL (ref 3.8–5.2)
SODIUM SERPL-SCNC: 138 MMOL/L (ref 136–145)
TROPONIN I SERPL-MCNC: <0.05 NG/ML
WBC # BLD AUTO: 6.9 K/UL (ref 3.6–11)

## 2019-02-14 PROCEDURE — 85025 COMPLETE CBC W/AUTO DIFF WBC: CPT

## 2019-02-14 PROCEDURE — 74011250636 HC RX REV CODE- 250/636: Performed by: EMERGENCY MEDICINE

## 2019-02-14 PROCEDURE — 80053 COMPREHEN METABOLIC PANEL: CPT

## 2019-02-14 PROCEDURE — 74011250637 HC RX REV CODE- 250/637: Performed by: EMERGENCY MEDICINE

## 2019-02-14 PROCEDURE — 84484 ASSAY OF TROPONIN QUANT: CPT

## 2019-02-14 PROCEDURE — 83690 ASSAY OF LIPASE: CPT

## 2019-02-14 PROCEDURE — 85610 PROTHROMBIN TIME: CPT

## 2019-02-14 PROCEDURE — 99285 EMERGENCY DEPT VISIT HI MDM: CPT

## 2019-02-14 PROCEDURE — 96375 TX/PRO/DX INJ NEW DRUG ADDON: CPT

## 2019-02-14 PROCEDURE — 83880 ASSAY OF NATRIURETIC PEPTIDE: CPT

## 2019-02-14 PROCEDURE — 93005 ELECTROCARDIOGRAM TRACING: CPT

## 2019-02-14 PROCEDURE — 71045 X-RAY EXAM CHEST 1 VIEW: CPT

## 2019-02-14 PROCEDURE — 96374 THER/PROPH/DIAG INJ IV PUSH: CPT

## 2019-02-14 PROCEDURE — 85379 FIBRIN DEGRADATION QUANT: CPT

## 2019-02-14 PROCEDURE — 36415 COLL VENOUS BLD VENIPUNCTURE: CPT

## 2019-02-14 RX ORDER — ONDANSETRON 8 MG/1
8 TABLET, ORALLY DISINTEGRATING ORAL
Qty: 14 TAB | Refills: 0 | Status: SHIPPED | OUTPATIENT
Start: 2019-02-14 | End: 2019-04-01

## 2019-02-14 RX ORDER — OXYCODONE HYDROCHLORIDE 5 MG/1
10 TABLET ORAL
Status: COMPLETED | OUTPATIENT
Start: 2019-02-14 | End: 2019-02-14

## 2019-02-14 RX ORDER — MORPHINE SULFATE 4 MG/ML
4 INJECTION INTRAVENOUS ONCE
Status: COMPLETED | OUTPATIENT
Start: 2019-02-14 | End: 2019-02-14

## 2019-02-14 RX ORDER — ONDANSETRON 2 MG/ML
4 INJECTION INTRAMUSCULAR; INTRAVENOUS
Status: COMPLETED | OUTPATIENT
Start: 2019-02-14 | End: 2019-02-14

## 2019-02-14 RX ORDER — POTASSIUM CHLORIDE 1.5 G/1.77G
40 POWDER, FOR SOLUTION ORAL
Status: COMPLETED | OUTPATIENT
Start: 2019-02-14 | End: 2019-02-14

## 2019-02-14 RX ORDER — DIPHENHYDRAMINE HYDROCHLORIDE 50 MG/ML
25 INJECTION, SOLUTION INTRAMUSCULAR; INTRAVENOUS
Status: COMPLETED | OUTPATIENT
Start: 2019-02-14 | End: 2019-02-14

## 2019-02-14 RX ORDER — POTASSIUM CHLORIDE 1.5 G/1.77G
40 POWDER, FOR SOLUTION ORAL 2 TIMES DAILY WITH MEALS
Status: DISCONTINUED | OUTPATIENT
Start: 2019-02-15 | End: 2019-02-14

## 2019-02-14 RX ORDER — POTASSIUM CHLORIDE 40 MEQ/15ML
20 SOLUTION ORAL 2 TIMES DAILY WITH MEALS
Qty: 480 ML | Refills: 0 | Status: SHIPPED | OUTPATIENT
Start: 2019-02-14 | End: 2019-04-01

## 2019-02-14 RX ORDER — BENZONATATE 100 MG/1
100 CAPSULE ORAL
Qty: 30 CAP | Refills: 0 | Status: SHIPPED | OUTPATIENT
Start: 2019-02-14 | End: 2019-02-21

## 2019-02-14 RX ADMIN — DIPHENHYDRAMINE HYDROCHLORIDE 25 MG: 50 INJECTION, SOLUTION INTRAMUSCULAR; INTRAVENOUS at 20:16

## 2019-02-14 RX ADMIN — OXYCODONE HYDROCHLORIDE 10 MG: 5 TABLET ORAL at 22:32

## 2019-02-14 RX ADMIN — MORPHINE SULFATE 4 MG: 4 INJECTION INTRAVENOUS at 20:16

## 2019-02-14 RX ADMIN — ONDANSETRON 4 MG: 2 INJECTION INTRAMUSCULAR; INTRAVENOUS at 21:25

## 2019-02-14 RX ADMIN — POTASSIUM CHLORIDE 40 MEQ: 1.5 POWDER, FOR SOLUTION ORAL at 22:20

## 2019-02-15 LAB
ATRIAL RATE: 102 BPM
CALCULATED P AXIS, ECG09: 62 DEGREES
CALCULATED R AXIS, ECG10: 59 DEGREES
CALCULATED T AXIS, ECG11: -105 DEGREES
DIAGNOSIS, 93000: NORMAL
P-R INTERVAL, ECG05: 182 MS
Q-T INTERVAL, ECG07: 334 MS
QRS DURATION, ECG06: 98 MS
QTC CALCULATION (BEZET), ECG08: 435 MS
VENTRICULAR RATE, ECG03: 102 BPM

## 2019-02-15 NOTE — ED NOTES
Bedside shift change report given to Roger (oncoming nurse) by self Salome Argue nurse). Report included the following information SBAR, Kardex, ED Summary and MAR.

## 2019-02-15 NOTE — DISCHARGE INSTRUCTIONS
Chief Complaint   Patient presents with     Insomnia       Initial /80 mmHg  Pulse 68  Temp(Src) 97.2  F (36.2  C) (Tympanic)  Resp 16  Ht 6' (1.829 m)  Wt 253 lb 4.8 oz (114.896 kg)  BMI 34.35 kg/m2 Estimated body mass index is 34.35 kg/(m^2) as calculated from the following:    Height as of this encounter: 6' (1.829 m).    Weight as of this encounter: 253 lb 4.8 oz (114.896 kg).  BP completed using cuff size: rossy Patiño CMA(AAMA)     Patient Education        Hypokalemia: Care Instructions  Your Care Instructions    Hypokalemia (say \"nb-[id number]-MARIVEL-deb-uh\") is a low level of potassium. The heart, muscles, kidneys, and nervous system all need potassium to work well. This problem has many different causes. Kidney problems, diet, and medicines like diuretics and laxatives can cause it. So can vomiting or diarrhea. In some cases, cancer is the cause. Your doctor may do tests to find the cause of your low potassium levels. You may need medicines to bring your potassium levels back to normal. You may also need regular blood tests to check your potassium. If you have very low potassium, you may need intravenous (IV) medicines. You also may need tests to check the electrical activity of your heart. Heart problems caused by low potassium levels can be very serious. Follow-up care is a key part of your treatment and safety. Be sure to make and go to all appointments, and call your doctor if you are having problems. It's also a good idea to know your test results and keep a list of the medicines you take. How can you care for yourself at home? · If your doctor recommends it, eat foods that have a lot of potassium. These include fresh fruits, juices, and vegetables. They also include nuts, beans, and milk. · Be safe with medicines. If your doctor prescribes medicines or potassium supplements, take them exactly as directed. Call your doctor if you have any problems with your medicines. · Get your potassium levels tested as often as your doctor tells you. When should you call for help? Call 911 anytime you think you may need emergency care. For example, call if:    · You feel like your heart is missing beats.  Heart problems caused by low potassium can cause death.     · You passed out (lost consciousness).     · You have a seizure.    Call your doctor now or seek immediate medical care if:    · You feel weak or unusually tired.     · You have severe arm or leg cramps.     · You have tingling or numbness.     · You feel sick to your stomach, or you vomit.     · You have belly cramps.     · You feel bloated or constipated.     · You have to urinate a lot.     · You feel very thirsty most of the time.     · You are dizzy or lightheaded, or you feel like you may faint.     · You feel depressed, or you lose touch with reality.    Watch closely for changes in your health, and be sure to contact your doctor if:    · You do not get better as expected. Where can you learn more? Go to http://gladys-alia.info/. Enter G358 in the search box to learn more about \"Hypokalemia: Care Instructions. \"  Current as of: March 14, 2018  Content Version: 11.9  © 9830-8740 [employer]. Care instructions adapted under license by [employer] (which disclaims liability or warranty for this information). If you have questions about a medical condition or this instruction, always ask your healthcare professional. [address] any warranty or liability for your use of this information.

## 2019-02-15 NOTE — ED NOTES
PT discharged no s/s of rash, allergy to medication. PT states airway is patent and no altered sensation. MD aware, Pharmacy aware. VSS.

## 2019-02-15 NOTE — ED PROVIDER NOTES
EMERGENCY DEPARTMENT HISTORY AND PHYSICAL EXAM      Date: 2/14/2019  Patient Name: Juan F Schwab    History of Presenting Illness     Chief Complaint   Patient presents with    Shortness of Breath     Presents to the ED via EMS with c/o SOB x several days. Has been taking Vancomycin x 4 days d/t c-diff. History Provided By: Patient    HPI: Juan F Schwab, 50 y.o. female with PMHx significant for HTN, GERD, presents via EMS transport to the ED with cc of sore throat x 3 days which has resolved but progressed into SOB, cough, congestion, and pleuritic CP x 2 days. Pt also reports that she has had an associated fever with Tmax 101. She notes that she was d/c from the hospital 2 days ago w/ C. Diff on oral vancomycin and states her sore throat started before discharge. Pt denies anticoagulation while in the hospital. She reports that she is also still experiencing abdominal pain and notes her diarrhea has not improved from during her admission. Pt denies taking any OTC medications for her current sx's or any breathing tx's PTA. She specifically denies any chills, HA, N/V dysuria, hematuria, or rash. There are no other complaints, changes, or physical findings at this time. Social Hx: + EtOH (rare); - Smoker; - Illicit Drugs    PCP: Julianna Hatfield NP    Current Facility-Administered Medications   Medication Dose Route Frequency Provider Last Rate Last Dose    ondansetron (ZOFRAN) injection 4 mg  4 mg IntraVENous NOW Connor Ngo DO        [START ON 2/15/2019] potassium chloride (KLOR-CON) packet for solution 40 mEq  40 mEq Oral BID WITH MEALS Connor Ngo DO        sodium chloride 0.9 % bolus infusion 1,000 mL  1,000 mL IntraVENous Cristina Morel DO         Current Outpatient Medications   Medication Sig Dispense Refill    vancomycin (VANCOCIN) 125 mg capsule Take 1 Cap by mouth four (4) times daily for 7 days.  You already have this medication at home 28 Cap 0    prochlorperazine (COMPAZINE) 5 mg tablet Take 1 Tab by mouth every eight (8) hours as needed for Nausea. 12 Tab 0    traMADol (ULTRAM) 50 mg tablet Take 1 Tab by mouth every six (6) hours as needed for Pain. Max Daily Amount: 200 mg. 16 Tab 0    amLODIPine (NORVASC) 10 mg tablet Take 10 mg by mouth.  dicyclomine (BENTYL) 20 mg tablet Take 1 Tab by mouth every six (6) hours as needed (abdominal cramps). 20 Tab 0    Pramoxine (PROCTOFOAM) 1 % topical foam Apply  to affected area three (3) times daily as needed for Hemorrhoids. 1 Can 0    losartan-hydroCHLOROthiazide (HYZAAR) 100-12.5 mg per tablet Take 1 Tab by mouth daily.  EPINEPHrine (EPIPEN) 0.3 mg/0.3 mL (1:1,000) injection 0.3 mL by IntraMUSCular route once as needed for up to 1 dose. 0.3 mL 1    estradiol (ESTRACE) 1 mg tablet Take 1 mg by mouth daily.  albuterol (PROVENTIL, VENTOLIN) 90 mcg/Actuation inhaler Take 2 Puffs by inhalation every six (6) hours as needed.  sertraline (ZOLOFT) 100 mg tablet Take 100 mg by mouth.          Past History     Past Medical History:  Past Medical History:   Diagnosis Date    Anal fissure     Anisocoria     Asthma     LAST EPISODE     Back pain     Cerumen impaction     Chronic kidney disease     hx uti in past    Coagulation defects     ocassional rectal bleeding due to anal fissure    Colovaginal fistula     Diabetes (HCC)     NIDDM    Diabetes ([facility] [address]     Diverticulitis     Diverticulosis     Enlarged tonsils     Frequent UTI     GERD (gastroesophageal reflux disease)     H/O endoscopy     with dilation    HA (headache)     Hepatic steatosis     Hx of colonoscopy with polypectomy     benign    Hypertension     Ill-defined condition     FREQUENT HIVES    Ill-defined condition     HX ELEVATED LIVER ENZYMES    Morbid obesity (HCC)     Nausea & vomiting     during diverticulitis flare    Obesity     Otitis media     Pneumonia     about 15 yrs ago    Psychiatric disorder     ANXIETY    Recurrent tonsillitis     Sinusitis     Transfusion history ~ age 35    postop hysterectomy    Unspecified sleep apnea     snores ( not diagnosed yet)     Urticaria     Urticaria        Past Surgical History:  Past Surgical History:   Procedure Laterality Date    ABDOMEN SURGERY PROC UNLISTED  2018    hernia repair at [facility]    [address]    [place].  HX GI  12    LAPAROSCOPIC HAND ASSISTED  POSS OPEN SIGMOID COLECTOMY POSS TEMPORARY DIVERTING LOOP ILEOSTOMY;  (no illeostomy needed)    HX GYN           HX GYN      cervical conization    HX HEENT      SINUS SURGERY LEFT X2    HX HEENT      SINUS SURGERY ON RIGHT X2    HX OTHER SURGICAL      Sphincterotomy    HX PELVIC LAPAROSCOPY      HX EMMANUEL AND BSO      HX UROLOGICAL  12     CYSTOSCOPY INSERTION URETERAL CATHETERS - Cystoscopy Insertion of bilateral ureteral stents       Family History:  Family History   Problem Relation Age of Onset    Diabetes Mother     Cancer Mother         NON-HODGKINS LYMPHOMA    Anesth Problems Mother         PONV    Diabetes Father     Heart Disease Father         CAD - STENTS, PACEMAKER    Arrhythmia Father        Social History:  Social History     Tobacco Use    Smoking status: Never Smoker    Smokeless tobacco: Never Used   Substance Use Topics    Alcohol use: Yes     Comment: Rarely    Drug use: No       Allergies: Allergies   Allergen Reactions    Aspirin Shortness of Breath    Prilosec [Omeprazole Magnesium] Anaphylaxis     CHERRY FLAVORED; PT TAKES REGULAR PRILOSEC AND IS OK    Codeine Hives and Itching    Contrast Agent [Iodine] Itching     Pt. Had itching after IV contrast with the last exam.  Benadryl was given    Morphine Itching     Severe itching.  Fine to take benadryl    Fentanyl Rash    Ketorolac Rash     \"makes my eyes spasm and causes rash on my hands\"    Percocet [Oxycodone-Acetaminophen] Hives       Review of Systems   Review of Systems Constitutional: Negative for chills and fever. HENT: Positive for congestion and sore throat. Eyes: Negative for visual disturbance. Respiratory: Positive for shortness of breath. Negative for cough. Cardiovascular: Positive for chest pain. Negative for leg swelling. Gastrointestinal: Positive for abdominal pain and diarrhea. Negative for blood in stool, nausea and vomiting. Endocrine: Negative for polyuria. Genitourinary: Negative for dysuria, flank pain, hematuria, vaginal bleeding and vaginal discharge. Musculoskeletal: Negative for myalgias. Skin: Negative for rash. Allergic/Immunologic: Negative for immunocompromised state. Neurological: Negative for weakness and headaches. Psychiatric/Behavioral: Negative for confusion. Physical Exam   Physical Exam   Constitutional: She is oriented to person, place, and time. She appears well-developed and well-nourished. HENT:   Head: Normocephalic and atraumatic. Moist mucous membranes   Eyes: Conjunctivae are normal. Pupils are equal, round, and reactive to light. Right eye exhibits no discharge. Left eye exhibits no discharge. Neck: Normal range of motion. Neck supple. No tracheal deviation present. Cardiovascular: Regular rhythm and normal heart sounds. Tachycardia present. No murmur heard. Pulmonary/Chest: Effort normal and breath sounds normal. No respiratory distress. She has no wheezes. She has no rales. Abdominal: Soft. Bowel sounds are normal. There is no tenderness. There is no rebound and no guarding. Musculoskeletal: Normal range of motion. She exhibits edema. She exhibits no tenderness or deformity. Pitting edema RLE>LLE   Neurological: She is alert and oriented to person, place, and time. Skin: Skin is warm and dry. No rash noted. No erythema. Psychiatric: Her behavior is normal.   Nursing note and vitals reviewed.     Diagnostic Study Results     Labs -     Recent Results (from the past 12 hour(s))   EKG, 12 LEAD, INITIAL    Collection Time: 02/14/19  6:52 PM   Result Value Ref Range    Ventricular Rate 102 BPM    Atrial Rate 102 BPM    P-R Interval 182 ms    QRS Duration 98 ms    Q-T Interval 334 ms    QTC Calculation (Bezet) 435 ms    Calculated P Axis 62 degrees    Calculated R Axis 59 degrees    Calculated T Axis -105 degrees    Diagnosis       Sinus tachycardia  ST & T wave abnormality, consider inferior ischemia  ST & T wave abnormality, consider anterolateral ischemia  When compared with ECG of 05-FEB-2019 21:31,  T wave inversion now evident in Inferior leads  T wave inversion now evident in Anterolateral leads     CBC WITH AUTOMATED DIFF    Collection Time: 02/14/19  8:00 PM   Result Value Ref Range    WBC 6.9 3.6 - 11.0 K/uL    RBC 4.30 3.80 - 5.20 M/uL    HGB 12.4 11.5 - 16.0 g/dL    HCT 35.1 35.0 - 47.0 %    MCV 81.6 80.0 - 99.0 FL    MCH 28.8 26.0 - 34.0 PG    MCHC 35.3 30.0 - 36.5 g/dL    RDW 13.7 11.5 - 14.5 %    PLATELET 087 678 - 223 K/uL    MPV 9.6 8.9 - 12.9 FL    NRBC 0.0 0  WBC    ABSOLUTE NRBC 0.00 0.00 - 0.01 K/uL    NEUTROPHILS 64 32 - 75 %    LYMPHOCYTES 25 12 - 49 %    MONOCYTES 5 5 - 13 %    EOSINOPHILS 5 0 - 7 %    BASOPHILS 0 0 - 1 %    IMMATURE GRANULOCYTES 0 0.0 - 0.5 %    ABS. NEUTROPHILS 4.4 1.8 - 8.0 K/UL    ABS. LYMPHOCYTES 1.7 0.8 - 3.5 K/UL    ABS. MONOCYTES 0.4 0.0 - 1.0 K/UL    ABS. EOSINOPHILS 0.3 0.0 - 0.4 K/UL    ABS. BASOPHILS 0.0 0.0 - 0.1 K/UL    ABS. IMM.  GRANS. 0.0 0.00 - 0.04 K/UL    DF AUTOMATED     PROTHROMBIN TIME + INR    Collection Time: 02/14/19  8:00 PM   Result Value Ref Range    INR 1.0 0.9 - 1.1      Prothrombin time 10.2 9.0 - 11.1 sec   D DIMER    Collection Time: 02/14/19  8:00 PM   Result Value Ref Range    D-dimer 0.48 0.00 - 0.65 mg/L FEU   METABOLIC PANEL, COMPREHENSIVE    Collection Time: 02/14/19  8:00 PM   Result Value Ref Range    Sodium 138 136 - 145 mmol/L    Potassium 2.8 (L) 3.5 - 5.1 mmol/L    Chloride 102 97 - 108 mmol/L    CO2 25 21 - 32 mmol/L    Anion gap 11 5 - 15 mmol/L    Glucose 264 (H) 65 - 100 mg/dL    BUN 5 (L) 6 - 20 MG/DL    Creatinine 0.67 0.55 - 1.02 MG/DL    BUN/Creatinine ratio 7 (L) 12 - 20      GFR est AA >60 >60 ml/min/1.73m2    GFR est non-AA >60 >60 ml/min/1.73m2    Calcium 9.5 8.5 - 10.1 MG/DL    Bilirubin, total 0.3 0.2 - 1.0 MG/DL    ALT (SGPT) 35 12 - 78 U/L    AST (SGOT) 26 15 - 37 U/L    Alk. phosphatase 61 45 - 117 U/L    Protein, total 7.1 6.4 - 8.2 g/dL    Albumin 3.4 (L) 3.5 - 5.0 g/dL    Globulin 3.7 2.0 - 4.0 g/dL    A-G Ratio 0.9 (L) 1.1 - 2.2     LIPASE    Collection Time: 02/14/19  8:00 PM   Result Value Ref Range    Lipase 174 73 - 393 U/L   TROPONIN I    Collection Time: 02/14/19  8:00 PM   Result Value Ref Range    Troponin-I, Qt. <0.05 <0.05 ng/mL   NT-PRO BNP    Collection Time: 02/14/19  8:00 PM   Result Value Ref Range    NT pro-BNP 18 <125 PG/ML       Radiologic Studies -   XR CHEST PORT    (Results Pending)     CT Results  (Last 48 hours)    None        CXR Results  (Last 48 hours)    None          Medical Decision Making   I am the first provider for this patient. I reviewed the vital signs, available nursing notes, past medical history, past surgical history, family history and social history. Vital Signs-Reviewed the patient's vital signs. Patient Vitals for the past 12 hrs:   Temp Pulse Resp BP SpO2   02/14/19 1945 -- (!) 101 26 -- 92 %   02/14/19 1940 -- (!) 106 19 -- 98 %   02/14/19 1930 -- (!) 101 26 -- 92 %   02/14/19 1920 -- (!) 101 26 -- 95 %   02/14/19 1848 99.3 °F (37.4 °C) (!) 105 14 (!) 180/101 98 %       Pulse Oximetry Analysis - 98% on RA    Cardiac Monitor:   Rate: 105 bpm  Rhythm: Sinus Tachycardia      EKG interpretation: (Preliminary)1852  Rhythm: sinus tachycardia; and regular . Rate (approx.): 102; Axis: normal; ME interval: normal; QRS interval: 98ms; ST/T wave: 334/435ms. Written by Alain Sandifer.  [facility], ED Scribe, as dictated by Jeanette Jonas DO    Records Reviewed: Nursing Notes, Old Medical Records, Previous electrocardiograms, Ambulance Run Sheet, Previous Radiology Studies and Previous Laboratory Studies    Provider Notes (Medical Decision Making):   DDx: bronchitis, PNA, CHF, PE    ED Course:   Initial assessment performed. The patients presenting problems have been discussed, and they are in agreement with the care plan formulated and outlined with them. I have encouraged them to ask questions as they arise throughout their visit. 10:29 PM  Discussed listed allergy of oxycodone-acetaminophen w/ pt. Denies hx of lip/tongue/throat swelling or rash. Reports that she just had prior itching and that it resolved with benadryl. She feels comfortable taking the medicine since we have already given her benadryl. Critical Care Time:   None    Disposition:  DISCHARGE NOTE  The patient has been re-evaluated and is ready for discharge. Reviewed available results with patient. Counseled pt on diagnosis and care plan. Pt has expressed understanding, and all questions have been answered. Pt agrees with plan and agrees to F/U as recommended, or return to the ED if their sxs worsen. Discharge instructions have been provided and explained to the pt, along with reasons to return to the ED. PLAN:  1. Current Discharge Medication List        2. Follow-up Information     Follow up With Specialties Details Why Contact Info    Bernard Escalante NP Nurse Practitioner Schedule an appointment as soon as possible for a visit  [address]  [address] [address] [facility] [phone]      [facility] EMERGENCY DEPT Emergency Medicine  If symptoms worsen [address]  [address] [facility]  [phone]        Return to ED if worse     Diagnosis     Clinical Impression:   1. Viral syndrome    2. Essential hypertension    3. Potassium deficiency    4. Diarrhea of presumed infectious origin        This note is prepared by Harriet Romano, acting as Scribe for [employer], DO.     [employer], DO: The scribe's documentation has been prepared under my direction and personally reviewed by me in its entirety. I confirm that the note above accurately reflects all work, treatment, procedures, and medical decision making performed by me. This note will not be viewable in [address]

## 2019-02-18 ENCOUNTER — HOSPITAL ENCOUNTER (EMERGENCY)
Age: 49
Discharge: HOME | End: 2019-02-19
Attending: EMERGENCY MEDICINE
Payer: MEDICAID

## 2019-02-18 ENCOUNTER — APPOINTMENT (OUTPATIENT)
Dept: CT IMAGING | Age: 49
End: 2019-02-18
Attending: EMERGENCY MEDICINE
Payer: MEDICAID

## 2019-02-18 ENCOUNTER — HOSPITAL ENCOUNTER (EMERGENCY)
Age: 49
Discharge: LEFT BEFORE DISCHARGE | End: 2019-02-18
Attending: EMERGENCY MEDICINE
Payer: SELF-PAY

## 2019-02-18 VITALS
BODY MASS INDEX: 37.36 KG/M2 | OXYGEN SATURATION: 99 % | HEIGHT: 62 IN | RESPIRATION RATE: 16 BRPM | HEART RATE: 91 BPM | SYSTOLIC BLOOD PRESSURE: 164 MMHG | DIASTOLIC BLOOD PRESSURE: 91 MMHG | WEIGHT: 203.04 LBS | TEMPERATURE: 97.8 F

## 2019-02-18 DIAGNOSIS — K52.9 NONINFECTIOUS GASTROENTERITIS, UNSPECIFIED TYPE: Primary | ICD-10-CM

## 2019-02-18 LAB
ALBUMIN SERPL-MCNC: 3.9 G/DL (ref 3.5–5)
ALBUMIN/GLOB SERPL: 1.1 {RATIO} (ref 1.1–2.2)
ALP SERPL-CCNC: 74 U/L (ref 45–117)
ALT SERPL-CCNC: 31 U/L (ref 12–78)
ANION GAP SERPL CALC-SCNC: 12 MMOL/L (ref 5–15)
AST SERPL-CCNC: 39 U/L (ref 15–37)
BASOPHILS # BLD: 0.1 K/UL (ref 0–0.1)
BASOPHILS NFR BLD: 1 % (ref 0–1)
BILIRUB SERPL-MCNC: 0.3 MG/DL (ref 0.2–1)
BUN SERPL-MCNC: 6 MG/DL (ref 6–20)
BUN/CREAT SERPL: 9 (ref 12–20)
CALCIUM SERPL-MCNC: 9.8 MG/DL (ref 8.5–10.1)
CHLORIDE SERPL-SCNC: 102 MMOL/L (ref 97–108)
CO2 SERPL-SCNC: 27 MMOL/L (ref 21–32)
COMMENT, HOLDF: NORMAL
CREAT SERPL-MCNC: 0.66 MG/DL (ref 0.55–1.02)
DIFFERENTIAL METHOD BLD: ABNORMAL
EOSINOPHIL # BLD: 0.6 K/UL (ref 0–0.4)
EOSINOPHIL NFR BLD: 6 % (ref 0–7)
ERYTHROCYTE [DISTWIDTH] IN BLOOD BY AUTOMATED COUNT: 13.6 % (ref 11.5–14.5)
GLOBULIN SER CALC-MCNC: 3.7 G/DL (ref 2–4)
GLUCOSE SERPL-MCNC: 223 MG/DL (ref 65–100)
HCT VFR BLD AUTO: 39.2 % (ref 35–47)
HEMOCCULT STL QL: NEGATIVE
HGB BLD-MCNC: 13.2 G/DL (ref 11.5–16)
IMM GRANULOCYTES # BLD AUTO: 0.2 K/UL (ref 0–0.04)
IMM GRANULOCYTES NFR BLD AUTO: 2 % (ref 0–0.5)
LACTATE BLD-SCNC: 2.26 MMOL/L (ref 0.4–2)
LYMPHOCYTES # BLD: 2.1 K/UL (ref 0.8–3.5)
LYMPHOCYTES NFR BLD: 22 % (ref 12–49)
MCH RBC QN AUTO: 28.3 PG (ref 26–34)
MCHC RBC AUTO-ENTMCNC: 33.7 G/DL (ref 30–36.5)
MCV RBC AUTO: 83.9 FL (ref 80–99)
MONOCYTES # BLD: 0.4 K/UL (ref 0–1)
MONOCYTES NFR BLD: 4 % (ref 5–13)
NEUTS SEG # BLD: 6.2 K/UL (ref 1.8–8)
NEUTS SEG NFR BLD: 66 % (ref 32–75)
NRBC # BLD: 0 K/UL (ref 0–0.01)
NRBC BLD-RTO: 0 PER 100 WBC
PLATELET # BLD AUTO: 237 K/UL (ref 150–400)
PMV BLD AUTO: 9.6 FL (ref 8.9–12.9)
POTASSIUM SERPL-SCNC: 3.9 MMOL/L (ref 3.5–5.1)
PROT SERPL-MCNC: 7.6 G/DL (ref 6.4–8.2)
RBC # BLD AUTO: 4.67 M/UL (ref 3.8–5.2)
SAMPLES BEING HELD,HOLD: NORMAL
SODIUM SERPL-SCNC: 141 MMOL/L (ref 136–145)
WBC # BLD AUTO: 9.5 K/UL (ref 3.6–11)

## 2019-02-18 PROCEDURE — 85025 COMPLETE CBC W/AUTO DIFF WBC: CPT

## 2019-02-18 PROCEDURE — 96361 HYDRATE IV INFUSION ADD-ON: CPT

## 2019-02-18 PROCEDURE — 83605 ASSAY OF LACTIC ACID: CPT

## 2019-02-18 PROCEDURE — 96375 TX/PRO/DX INJ NEW DRUG ADDON: CPT

## 2019-02-18 PROCEDURE — 36415 COLL VENOUS BLD VENIPUNCTURE: CPT

## 2019-02-18 PROCEDURE — 99284 EMERGENCY DEPT VISIT MOD MDM: CPT

## 2019-02-18 PROCEDURE — 75810000275 HC EMERGENCY DEPT VISIT NO LEVEL OF CARE

## 2019-02-18 PROCEDURE — 80053 COMPREHEN METABOLIC PANEL: CPT

## 2019-02-18 PROCEDURE — 74176 CT ABD & PELVIS W/O CONTRAST: CPT

## 2019-02-18 PROCEDURE — 74011250636 HC RX REV CODE- 250/636: Performed by: EMERGENCY MEDICINE

## 2019-02-18 PROCEDURE — 82272 OCCULT BLD FECES 1-3 TESTS: CPT

## 2019-02-18 RX ORDER — MORPHINE SULFATE 2 MG/ML
4 INJECTION, SOLUTION INTRAMUSCULAR; INTRAVENOUS
Status: COMPLETED | OUTPATIENT
Start: 2019-02-18 | End: 2019-02-18

## 2019-02-18 RX ORDER — ONDANSETRON 2 MG/ML
8 INJECTION INTRAMUSCULAR; INTRAVENOUS
Status: COMPLETED | OUTPATIENT
Start: 2019-02-18 | End: 2019-02-18

## 2019-02-18 RX ORDER — DIPHENHYDRAMINE HYDROCHLORIDE 50 MG/ML
25 INJECTION, SOLUTION INTRAMUSCULAR; INTRAVENOUS
Status: COMPLETED | OUTPATIENT
Start: 2019-02-18 | End: 2019-02-18

## 2019-02-18 RX ADMIN — DIPHENHYDRAMINE HYDROCHLORIDE 25 MG: 50 INJECTION, SOLUTION INTRAMUSCULAR; INTRAVENOUS at 22:47

## 2019-02-18 RX ADMIN — SODIUM CHLORIDE 1000 ML: 900 INJECTION, SOLUTION INTRAVENOUS at 22:51

## 2019-02-18 RX ADMIN — MORPHINE SULFATE 4 MG: 2 INJECTION, SOLUTION INTRAMUSCULAR; INTRAVENOUS at 22:50

## 2019-02-18 RX ADMIN — ONDANSETRON 8 MG: 2 INJECTION INTRAMUSCULAR; INTRAVENOUS at 22:48

## 2019-02-19 VITALS
SYSTOLIC BLOOD PRESSURE: 116 MMHG | RESPIRATION RATE: 21 BRPM | OXYGEN SATURATION: 96 % | HEART RATE: 71 BPM | TEMPERATURE: 98.1 F | DIASTOLIC BLOOD PRESSURE: 56 MMHG

## 2019-02-19 LAB
C DIFF GDH STL QL: NEGATIVE
C DIFF TOX A+B STL QL IA: NEGATIVE
INTERPRETATION: NORMAL
LACTATE BLD-SCNC: 1.45 MMOL/L (ref 0.4–2)

## 2019-02-19 PROCEDURE — 96376 TX/PRO/DX INJ SAME DRUG ADON: CPT

## 2019-02-19 PROCEDURE — 87077 CULTURE AEROBIC IDENTIFY: CPT

## 2019-02-19 PROCEDURE — 83605 ASSAY OF LACTIC ACID: CPT

## 2019-02-19 PROCEDURE — 87449 NOS EACH ORGANISM AG IA: CPT

## 2019-02-19 PROCEDURE — 96361 HYDRATE IV INFUSION ADD-ON: CPT

## 2019-02-19 PROCEDURE — 74011250637 HC RX REV CODE- 250/637: Performed by: EMERGENCY MEDICINE

## 2019-02-19 PROCEDURE — 96365 THER/PROPH/DIAG IV INF INIT: CPT

## 2019-02-19 PROCEDURE — 87045 FECES CULTURE AEROBIC BACT: CPT

## 2019-02-19 PROCEDURE — 74011250636 HC RX REV CODE- 250/636: Performed by: EMERGENCY MEDICINE

## 2019-02-19 RX ORDER — MORPHINE SULFATE 2 MG/ML
4 INJECTION, SOLUTION INTRAMUSCULAR; INTRAVENOUS
Status: COMPLETED | OUTPATIENT
Start: 2019-02-19 | End: 2019-02-19

## 2019-02-19 RX ORDER — DIPHENHYDRAMINE HYDROCHLORIDE 50 MG/ML
25 INJECTION, SOLUTION INTRAMUSCULAR; INTRAVENOUS
Status: COMPLETED | OUTPATIENT
Start: 2019-02-19 | End: 2019-02-19

## 2019-02-19 RX ORDER — HYDROCODONE BITARTRATE AND ACETAMINOPHEN 5; 325 MG/1; MG/1
1-2 TABLET ORAL
Qty: 12 TAB | Refills: 0 | Status: SHIPPED | OUTPATIENT
Start: 2019-02-19 | End: 2019-04-01

## 2019-02-19 RX ORDER — HYDROCODONE BITARTRATE AND ACETAMINOPHEN 10; 325 MG/1; MG/1
1 TABLET ORAL
Status: COMPLETED | OUTPATIENT
Start: 2019-02-19 | End: 2019-02-19

## 2019-02-19 RX ADMIN — PROMETHAZINE HYDROCHLORIDE 12.5 MG: 25 INJECTION INTRAMUSCULAR; INTRAVENOUS at 03:36

## 2019-02-19 RX ADMIN — HYDROCODONE BITARTRATE AND ACETAMINOPHEN 1 TABLET: 10; 325 TABLET ORAL at 03:36

## 2019-02-19 RX ADMIN — DIPHENHYDRAMINE HYDROCHLORIDE 25 MG: 50 INJECTION, SOLUTION INTRAMUSCULAR; INTRAVENOUS at 00:48

## 2019-02-19 RX ADMIN — SODIUM CHLORIDE 1000 ML: 900 INJECTION, SOLUTION INTRAVENOUS at 00:49

## 2019-02-19 RX ADMIN — MORPHINE SULFATE 4 MG: 2 INJECTION, SOLUTION INTRAMUSCULAR; INTRAVENOUS at 00:49

## 2019-02-19 NOTE — ED TRIAGE NOTES
Was seen at [facility] today and left to F/U with her PCP. Pt. States her PCP told her to go back to ED because her labs were all off and that she has C-Diff. Pt. states she has been having diarrhea and now there is blood in it.

## 2019-02-19 NOTE — ED NOTES
MD reviewed discharge instructions and options with patient and patient verbalized understanding. RN reviewed discharge instructions using teachback method. Pt ambulated to exit without difficulty and in no signs of acute distress escorted by daughter, and she  will drive home. No complaints or needs expressed at this time. Patient was counseled on medications prescribed at discharge. VSS, verbalized relief from most intense pain. Patient to call Dr. Telly Valencia in the morning for appointment.

## 2019-02-19 NOTE — ED PROVIDER NOTES
HPI     50year old female with history of anal fissue, asthma, ckd, colovaginal fistula s/p repair by Casim[facility] Ashton, DM, diverticulitis, gerdHTN, anxiety, recurrent c diff presents with recurrent abdominal pain, nasuea/vomiting/diarrhea and fever. Also with congestion/cough, No chest pain or SOB. Reports 101 fever today. Currently on oral vanc for cdiff. Current episode x 1 1/2 weeks. Was seen 2/14 with low potassium  Went to ED at [facility] again today for symptoms but wait was to long so came here. States 15 episodes of watery stool /day. Is urinating, is keeping some water down. Pain is severe. States she can take morphine if benadryl with it. States blood in stool. Reports recent admission last week for same.      Past Medical History:   Diagnosis Date    Anal fissure     Anisocoria     Asthma     LAST EPISODE     Back pain     Cerumen impaction     Chronic kidney disease     hx uti in past    Coagulation defects     ocassional rectal bleeding due to anal fissure    Colovaginal fistula     Diabetes (HCC)     NIDDM    Diabetes ([facility] [address]     Diverticulitis     Diverticulosis     Enlarged tonsils     Frequent UTI     GERD (gastroesophageal reflux disease)     H/O endoscopy     with dilation    HA (headache)     Hepatic steatosis     Hx of colonoscopy with polypectomy     benign    Hypertension     Ill-defined condition     FREQUENT HIVES    Ill-defined condition     HX ELEVATED LIVER ENZYMES    Morbid obesity (HCC)     Nausea & vomiting     during diverticulitis flare    Obesity     Otitis media     Pneumonia     about 15 yrs ago    Psychiatric disorder     ANXIETY    Recurrent tonsillitis     Sinusitis     Transfusion history ~ age 35    postop hysterectomy    Unspecified sleep apnea     snores ( not diagnosed yet)     Urticaria     Urticaria        Past Surgical History:   Procedure Laterality Date    ABDOMEN SURGERY PROC UNLISTED  01/06/2018    [facility]    HX BREAST REDUCTION      [facility].  HX GI  12    LAPAROSCOPIC HAND ASSISTED  POSS OPEN SIGMOID COLECTOMY POSS TEMPORARY DIVERTING LOOP ILEOSTOMY;  (no illeostomy needed)    HX GYN           HX GYN      cervical conization    HX HEENT      SINUS SURGERY LEFT X2    HX HEENT      SINUS SURGERY ON RIGHT X2    HX OTHER SURGICAL      Sphincterotomy    HX PELVIC LAPAROSCOPY      HX EMMANUEL AND BSO      HX UROLOGICAL  12     CYSTOSCOPY INSERTION URETERAL CATHETERS - Cystoscopy Insertion of bilateral ureteral stents         Family History:   Problem Relation Age of Onset    Diabetes Mother     Cancer Mother         NON-HODGKINS LYMPHOMA    Anesth Problems Mother         PONV    Diabetes Father     Heart Disease Father         CAD - STENTS, PACEMAKER    Arrhythmia Father        Social History     Socioeconomic History    Marital status:      Spouse name: Not on file    Number of children: Not on file    Years of education: Not on file    Highest education level: Not on file   Social Needs    Financial resource strain: Not on file    Food insecurity - worry: Not on file    Food insecurity - inability: Not on file   [employer] needs - medical: Not on file   [employer] needs - non-medical: Not on file   Occupational History    Not on file   Tobacco Use    Smoking status: Never Smoker    Smokeless tobacco: Never Used   Substance and Sexual Activity    Alcohol use: Yes     Comment: Rarely    Drug use: No    Sexual activity: No   Other Topics Concern    Not on file   Social History Narrative    Not on file         ALLERGIES: Aspirin; Prilosec [omeprazole magnesium]; Codeine; Contrast agent [iodine]; Morphine; Fentanyl; Ketorolac; and Percocet [oxycodone-acetaminophen]    Review of Systems   Constitutional: Positive for fever. HENT: Positive for congestion. Eyes: Negative for visual disturbance. Respiratory: Positive for cough. Negative for shortness of breath. Cardiovascular: Negative for chest pain. Gastrointestinal: Positive for abdominal pain, blood in stool, diarrhea, nausea and vomiting. Endocrine: Negative for polyuria. Genitourinary: Negative for dysuria. Musculoskeletal: Negative for gait problem. Skin: Positive for rash. Neurological: Negative for headaches. Psychiatric/Behavioral: Negative for dysphoric mood. Vitals:    02/18/19 2047   BP: 145/89   Pulse: 88   Resp: 16   Temp: 98.2 °F (36.8 °C)   SpO2: 96%            Physical Exam   Constitutional: She is oriented to person, place, and time. She appears well-developed and well-nourished. No distress. HENT:   Head: Normocephalic and atraumatic. Mouth/Throat: No oropharyngeal exudate. Eyes: Pupils are equal, round, and reactive to light. Right eye exhibits no discharge. Left eye exhibits no discharge. No scleral icterus. Neck: Normal range of motion. Neck supple. No JVD present. Cardiovascular: Normal rate, regular rhythm and normal heart sounds. No murmur heard. Pulmonary/Chest: Effort normal and breath sounds normal. No stridor. No respiratory distress. She has no wheezes. She has no rales. She exhibits no tenderness. Abdominal: Soft. Bowel sounds are normal. She exhibits no distension and no mass. There is no tenderness. There is no rebound and no guarding. Mild diffuse tenderness without rebound or guarding   Musculoskeletal: Normal range of motion. Neurological: She is oriented to person, place, and time. Skin: Skin is warm and dry. Capillary refill takes less than 2 seconds. No rash noted. Rash to face and bilateral hands- raised red plagues  Rash on face is scabbed over- ? Herpetic on face   Psychiatric: She has a normal mood and affect. Her behavior is normal. Judgment and thought content normal.        MDM       Procedures     Labs reassuring. Patient without any vomiting in Ed and only small amount of stool. CT shows mild colitis.        Spoke with  Imelda Paz- jessica sanchez, send another c diff (already done) ok for discharge and follow up with GI as outpatient.

## 2019-02-22 LAB
BACTERIA SPEC CULT: NORMAL
C JEJUNI+C COLI AG STL QL: NEGATIVE
E COLI SXT1+2 STL IA: NEGATIVE
SERVICE CMNT-IMP: NORMAL

## 2019-02-26 NOTE — DISCHARGE INSTRUCTIONS

## 2019-03-28 ENCOUNTER — ANESTHESIA (OUTPATIENT)
Dept: ENDOSCOPY | Age: 49
End: 2019-03-28
Payer: MEDICAID

## 2019-03-28 ENCOUNTER — ANESTHESIA EVENT (OUTPATIENT)
Dept: ENDOSCOPY | Age: 49
End: 2019-03-28
Payer: MEDICAID

## 2019-03-28 ENCOUNTER — HOSPITAL ENCOUNTER (OUTPATIENT)
Age: 49
Setting detail: OUTPATIENT SURGERY
Discharge: HOME | End: 2019-03-28
Attending: INTERNAL MEDICINE | Admitting: INTERNAL MEDICINE
Payer: MEDICAID

## 2019-03-28 VITALS
BODY MASS INDEX: 37.54 KG/M2 | HEIGHT: 62 IN | DIASTOLIC BLOOD PRESSURE: 70 MMHG | HEART RATE: 69 BPM | WEIGHT: 204 LBS | TEMPERATURE: 98.2 F | RESPIRATION RATE: 21 BRPM | SYSTOLIC BLOOD PRESSURE: 109 MMHG | OXYGEN SATURATION: 100 %

## 2019-03-28 LAB
GLUCOSE BLD STRIP.AUTO-MCNC: 241 MG/DL (ref 65–100)
SERVICE CMNT-IMP: ABNORMAL

## 2019-03-28 PROCEDURE — 76060000031 HC ANESTHESIA FIRST 0.5 HR: Performed by: INTERNAL MEDICINE

## 2019-03-28 PROCEDURE — 88305 TISSUE EXAM BY PATHOLOGIST: CPT

## 2019-03-28 PROCEDURE — 77030009426 HC FCPS BIOP ENDOSC BSC -B: Performed by: INTERNAL MEDICINE

## 2019-03-28 PROCEDURE — 74011250636 HC RX REV CODE- 250/636

## 2019-03-28 PROCEDURE — 76040000019: Performed by: INTERNAL MEDICINE

## 2019-03-28 PROCEDURE — 77030013992 HC SNR POLYP ENDOSC BSC -B: Performed by: INTERNAL MEDICINE

## 2019-03-28 PROCEDURE — 82962 GLUCOSE BLOOD TEST: CPT

## 2019-03-28 RX ORDER — PROPOFOL 10 MG/ML
INJECTION, EMULSION INTRAVENOUS AS NEEDED
Status: DISCONTINUED | OUTPATIENT
Start: 2019-03-28 | End: 2019-03-28 | Stop reason: HOSPADM

## 2019-03-28 RX ORDER — SODIUM CHLORIDE 9 MG/ML
50 INJECTION, SOLUTION INTRAVENOUS CONTINUOUS
Status: DISCONTINUED | OUTPATIENT
Start: 2019-03-28 | End: 2019-03-28 | Stop reason: HOSPADM

## 2019-03-28 RX ORDER — DEXTROMETHORPHAN/PSEUDOEPHED 2.5-7.5/.8
1.2 DROPS ORAL
Status: DISCONTINUED | OUTPATIENT
Start: 2019-03-28 | End: 2019-03-28 | Stop reason: HOSPADM

## 2019-03-28 RX ORDER — PROPOFOL 10 MG/ML
INJECTION, EMULSION INTRAVENOUS
Status: DISCONTINUED | OUTPATIENT
Start: 2019-03-28 | End: 2019-03-28 | Stop reason: HOSPADM

## 2019-03-28 RX ORDER — MIDAZOLAM HYDROCHLORIDE 1 MG/ML
.25-5 INJECTION, SOLUTION INTRAMUSCULAR; INTRAVENOUS
Status: DISCONTINUED | OUTPATIENT
Start: 2019-03-28 | End: 2019-03-28 | Stop reason: HOSPADM

## 2019-03-28 RX ORDER — SODIUM CHLORIDE 9 MG/ML
INJECTION, SOLUTION INTRAVENOUS
Status: DISCONTINUED | OUTPATIENT
Start: 2019-03-28 | End: 2019-03-28 | Stop reason: HOSPADM

## 2019-03-28 RX ORDER — EPINEPHRINE 0.1 MG/ML
1 INJECTION INTRACARDIAC; INTRAVENOUS
Status: DISCONTINUED | OUTPATIENT
Start: 2019-03-28 | End: 2019-03-28 | Stop reason: HOSPADM

## 2019-03-28 RX ORDER — FLUMAZENIL 0.1 MG/ML
0.2 INJECTION INTRAVENOUS
Status: DISCONTINUED | OUTPATIENT
Start: 2019-03-28 | End: 2019-03-28 | Stop reason: HOSPADM

## 2019-03-28 RX ORDER — INSULIN GLARGINE 100 [IU]/ML
38 INJECTION, SOLUTION SUBCUTANEOUS
COMMUNITY
End: 2020-09-29

## 2019-03-28 RX ORDER — NALOXONE HYDROCHLORIDE 0.4 MG/ML
0.4 INJECTION, SOLUTION INTRAMUSCULAR; INTRAVENOUS; SUBCUTANEOUS
Status: DISCONTINUED | OUTPATIENT
Start: 2019-03-28 | End: 2019-03-28 | Stop reason: HOSPADM

## 2019-03-28 RX ORDER — ATROPINE SULFATE 0.1 MG/ML
0.4 INJECTION INTRAVENOUS
Status: DISCONTINUED | OUTPATIENT
Start: 2019-03-28 | End: 2019-03-28 | Stop reason: HOSPADM

## 2019-03-28 RX ORDER — HYDROCORTISONE SODIUM SUCCINATE 100 MG/2ML
INJECTION, POWDER, FOR SOLUTION INTRAMUSCULAR; INTRAVENOUS AS NEEDED
Status: DISCONTINUED | OUTPATIENT
Start: 2019-03-28 | End: 2019-03-28 | Stop reason: HOSPADM

## 2019-03-28 RX ORDER — HYDROCORTISONE SODIUM SUCCINATE 100 MG/2ML
INJECTION, POWDER, FOR SOLUTION INTRAMUSCULAR; INTRAVENOUS AS NEEDED
Status: DISCONTINUED | OUTPATIENT
Start: 2019-03-28 | End: 2019-03-28

## 2019-03-28 RX ORDER — LIDOCAINE HYDROCHLORIDE 20 MG/ML
INJECTION, SOLUTION EPIDURAL; INFILTRATION; INTRACAUDAL; PERINEURAL AS NEEDED
Status: DISCONTINUED | OUTPATIENT
Start: 2019-03-28 | End: 2019-03-28 | Stop reason: HOSPADM

## 2019-03-28 RX ORDER — HYDROCORTISONE SODIUM SUCCINATE 100 MG/2ML
INJECTION, POWDER, FOR SOLUTION INTRAMUSCULAR; INTRAVENOUS
Status: COMPLETED
Start: 2019-03-28 | End: 2019-03-28

## 2019-03-28 RX ADMIN — SODIUM CHLORIDE: 9 INJECTION, SOLUTION INTRAVENOUS at 10:30

## 2019-03-28 RX ADMIN — LIDOCAINE HYDROCHLORIDE 50 MG: 20 INJECTION, SOLUTION EPIDURAL; INFILTRATION; INTRACAUDAL; PERINEURAL at 10:40

## 2019-03-28 RX ADMIN — HYDROCORTISONE SODIUM SUCCINATE 50 MG: 100 INJECTION, POWDER, FOR SOLUTION INTRAMUSCULAR; INTRAVENOUS at 10:35

## 2019-03-28 RX ADMIN — PROPOFOL 100 MG: 10 INJECTION, EMULSION INTRAVENOUS at 10:43

## 2019-03-28 RX ADMIN — PROPOFOL 100 MG: 10 INJECTION, EMULSION INTRAVENOUS at 10:40

## 2019-03-28 RX ADMIN — PROPOFOL 50 MG: 10 INJECTION, EMULSION INTRAVENOUS at 10:44

## 2019-03-28 RX ADMIN — PROPOFOL 140 MCG/KG/MIN: 10 INJECTION, EMULSION INTRAVENOUS at 10:40

## 2019-03-28 NOTE — ANESTHESIA POSTPROCEDURE EVALUATION
Procedure(s):  COLONOSCOPY  ESOPHAGOGASTRODUODENOSCOPY (EGD)  ESOPHAGOGASTRODUODENAL (EGD) BIOPSY  ENDOSCOPIC POLYPECTOMY. MAC    Anesthesia Post Evaluation      Multimodal analgesia: multimodal analgesia not used between 6 hours prior to anesthesia start to PACU discharge  Patient location during evaluation: PACU  Patient participation: complete - patient participated  Level of consciousness: awake and alert  Pain score: 0  Airway patency: patent  Anesthetic complications: no  Cardiovascular status: acceptable  Respiratory status: acceptable  Hydration status: acceptable        Vitals Value Taken Time   /70 3/28/2019 11:25 AM   Temp 36.8 °C (98.2 °F) 3/28/2019 11:09 AM   Pulse 72 3/28/2019 11:30 AM   Resp 21 3/28/2019 11:30 AM   SpO2 100 % 3/28/2019 11:29 AM   Vitals shown include unvalidated device data.

## 2019-03-28 NOTE — PROCEDURES
Negra Doll M.D.  [phone]            3/28/2019          Colonoscopy Operative Report  Jose Rafael Duran  :  1970  [facility] Medical Record Number:  [id number]      Indications:  Colitis noted on imaging     :  Alin Lubin MD    Assistant -- None  Implants -- None    Referring Provider: Emily Singh NP    Sedation:  MAC anesthesia Propofol    Pre-Procedural Exam:      Airway: clear,  No airway problems anticipated  Heart: RRR, without gallops or rubs  Lungs: clear bilaterally without wheezes, crackles, or rhonchi  Abdomen: soft, nontender, nondistended, bowel sounds present  Mental Status: awake, alert and oriented to person, place and time     Procedure Details:  After informed consent was obtained with all risks and benefits of procedure explained and preoperative exam completed, the patient was taken to the endoscopy suite and placed in the left lateral decubitus position. Upon sequential sedation as per above, a digital rectal exam was performed. The Olympus videocolonoscope  was inserted in the rectum and carefully advanced to the terminal ileum. The quality of preparation was good. The colonoscope was slowly withdrawn with careful inspection and evaluation between folds. Retroflexion in the rectum was performed. Findings:   Terminal Ileum: normal  Cecum: normal  Ascending Colon: 2  Sessile polyp(s), the largest 5 mm in size;  Transverse Colon: normal  Descending Colon: normal  Sigmoid:     - Diverticulosis  Rectum: normal    Interventions:  2 complete polypectomy were performed using cold snare  and the polyps were  retrieved    Specimen Removed:  specimen #1, 5 mm in size, located in the ascending colon removed by cold snare and retrieved for pathology    Complications: None. EBL:  None. Impression:  Moderate diverticulosis noted in the sigmoid colon.  There was no evidence of colitis noted on exam                         2 polyps removed from the ascending colon    Recommendations:  -Await pathology. -If adenoma is present, repeat colonoscopy in 5 years.   -High fiber diet.    -Resume normal medication(s). Discharge Disposition:  Home in the company of a  when able to ambulate.     Dioni Ko MD  3/28/2019  11:02 AM

## 2019-03-28 NOTE — ROUTINE PROCESS
Jaqueline Church  1970  [id number]    Situation:  Verbal report received from: Droie  Procedure: Procedure(s):  COLONOSCOPY  ESOPHAGOGASTRODUODENOSCOPY (EGD)  ESOPHAGOGASTRODUODENAL (EGD) BIOPSY  ENDOSCOPIC POLYPECTOMY    Background:    Preoperative diagnosis: DYSPHAGIA, COLITIS  Postoperative diagnosis: polyps  sigmoid diverticulosis    :  Dr. Vera Stroud  Assistant(s): Endoscopy Technician-1: Meena SHORT  Endoscopy RN-1: Jazlyn Ocasio RN    Specimens:   ID Type Source Tests Collected by Time Destination   1 : gastric biopsies Preservative   Carlos Jo MD 3/28/2019 1044 Pathology   2 : ascending colon polyp x 2 Preservative   Carlos Jo MD 3/28/2019 1054 Pathology     H. Pylori  no    Assessment:  Intra-procedure medications     Anesthesia gave intra-procedure sedation and medications, see anesthesia flow sheet yes    Intravenous fluids: NS@ KVO     Vital signs stable     Abdominal assessment: round and soft     Recommendation:  Discharge patient per MD order.     Family or Friend   Permission to share finding with family or friend yes

## 2019-03-28 NOTE — PROGRESS NOTES
Report received from [facility] ([facility]). See anesthesia note. ABD remains soft and non-tender post procedure. Pt has no complaints at this time and tolerated the procedure well. Endoscope was pre-cleaned at bedside immediately following procedure by [employer].

## 2019-03-28 NOTE — H&P
Jogre Rendon M.D.  [phone]            History and Physical       NAME:  Eliza Mckeon   :   1970   MRN:   [id number]       Referring Physician:  Sarath Villarreal NP      Consult Date: 3/28/2019 8:43 AM    Chief Complaint:  Dysphagia and colitis    History of Present Illness:   Ms. Oj Pantoja is here for follow up today    She had a relapse of C.diff about a month ago. She was started on Abx for an URI and then a UTI. She was treated with vancomycin. Things are better. She has not been taking folastor  She is having recurrent UTI's and is being worked up for a possible colo-vesical fistula. No obvious fistula was noted and evaluated by Dr. Carolee Tee. She was noted to have colitis also recently. Denie any blood in stool. She was having blood while she was admitted. She is still on vancomycin and due to complete it in the next weeks time. She is also having symptoms of dysphagia now. Has a sensation of choking if she drinks something.   She has had a dilation in the past    She is allergic to IV contrast    PMH:  Past Medical History:   Diagnosis Date    Anal fissure     Anisocoria     Asthma     LAST EPISODE     Back pain     Cerumen impaction     Chronic kidney disease     hx uti in past    Coagulation defects     ocassional rectal bleeding due to anal fissure    Colovaginal fistula     Diabetes (HCC)     NIDDM    Diabetes ([facility] [address]     Diverticulitis     Diverticulosis     Enlarged tonsils     Frequent UTI     GERD (gastroesophageal reflux disease)     H/O endoscopy     with dilation    HA (headache)     Hepatic steatosis     Hx of colonoscopy with polypectomy     benign    Hypertension     Ill-defined condition     FREQUENT HIVES    Ill-defined condition     HX ELEVATED LIVER ENZYMES    Morbid obesity (HCC)     Nausea & vomiting     during diverticulitis flare    Obesity     Otitis media     Pneumonia     about 15 yrs ago  Psychiatric disorder     ANXIETY    Recurrent tonsillitis     Sinusitis     Transfusion history ~ age 35    postop hysterectomy    Unspecified sleep apnea     snores ( not diagnosed yet)     Urticaria     Urticaria        PSH:  Past Surgical History:   Procedure Laterality Date    ABDOMEN SURGERY PROC UNLISTED  2018    hernia repair at [facility]    [address]    [place].  HX GI  12    LAPAROSCOPIC HAND ASSISTED  POSS OPEN SIGMOID COLECTOMY POSS TEMPORARY DIVERTING LOOP ILEOSTOMY;  (no illeostomy needed)    HX GYN           HX GYN      cervical conization    HX HEENT      SINUS SURGERY LEFT X2    HX HEENT      SINUS SURGERY ON RIGHT X2    HX OTHER SURGICAL      Sphincterotomy    HX PELVIC LAPAROSCOPY      HX EMMANUEL AND BSO      HX UROLOGICAL  12     CYSTOSCOPY INSERTION URETERAL CATHETERS - Cystoscopy Insertion of bilateral ureteral stents       Allergies: Allergies   Allergen Reactions    Aspirin Shortness of Breath    Contrast Agent [Iodine] Hives and Itching     Pt. had itching after IV contrast with the last exam.  Benadryl was given    Prilosec [Omeprazole Magnesium] Anaphylaxis     CHERRY FLAVORED; PT TAKES REGULAR PRILOSEC AND IS OK    Codeine Hives and Itching    Morphine Itching     Severe itching. Fine to take benadryl    Fentanyl Rash    Ketorolac Rash     \"makes my eyes spasm and causes rash on my hands\"    Percocet [Oxycodone-Acetaminophen] Hives       Home Medications:  Cannot display prior to admission medications because the patient has not been admitted in this contact. Hospital Medications:  No current facility-administered medications for this encounter. Current Outpatient Medications   Medication Sig    HYDROcodone-acetaminophen (NORCO) 5-325 mg per tablet Take 1-2 Tabs by mouth every four (4) hours as needed for Pain. Max Daily Amount: 12 Tabs.     ondansetron (ZOFRAN ODT) 8 mg disintegrating tablet Take 1 Tab by mouth every eight (8) hours as needed for Nausea.  potassium chloride (KAON 20%) 40 mEq/15 mL liqd Take 7.5 mL by mouth two (2) times daily (with meals).  prochlorperazine (COMPAZINE) 5 mg tablet Take 1 Tab by mouth every eight (8) hours as needed for Nausea.  traMADol (ULTRAM) 50 mg tablet Take 1 Tab by mouth every six (6) hours as needed for Pain. Max Daily Amount: 200 mg.  sertraline (ZOLOFT) 100 mg tablet Take 100 mg by mouth.  amLODIPine (NORVASC) 10 mg tablet Take 10 mg by mouth.  dicyclomine (BENTYL) 20 mg tablet Take 1 Tab by mouth every six (6) hours as needed (abdominal cramps).  Pramoxine (PROCTOFOAM) 1 % topical foam Apply  to affected area three (3) times daily as needed for Hemorrhoids.  losartan-hydroCHLOROthiazide (HYZAAR) 100-12.5 mg per tablet Take 1 Tab by mouth daily.  EPINEPHrine (EPIPEN) 0.3 mg/0.3 mL (1:1,000) injection 0.3 mL by IntraMUSCular route once as needed for up to 1 dose.  estradiol (ESTRACE) 1 mg tablet Take 1 mg by mouth daily.  albuterol (PROVENTIL, VENTOLIN) 90 mcg/Actuation inhaler Take 2 Puffs by inhalation every six (6) hours as needed.        Social History:  Social History     Tobacco Use    Smoking status: Never Smoker    Smokeless tobacco: Never Used   Substance Use Topics    Alcohol use: Yes     Comment: Rarely       Family History:  Family History   Problem Relation Age of Onset    Diabetes Mother     Cancer Mother         NON-HODGKINS LYMPHOMA    Anesth Problems Mother         PONV    Diabetes Father     Heart Disease Father         CAD - STENTS, PACEMAKER    Arrhythmia Father              Review of Systems:      Constitutional: negative fever, negative chills, negative weight loss  Eyes:   negative visual changes  ENT:   negative sore throat, tongue or lip swelling  Respiratory:  negative cough, negative dyspnea  Cards:  negative for chest pain, palpitations, lower extremity edema  GI:   See HPI  :  negative for frequency, dysuria  Integument:  negative for rash and pruritus  Heme:  negative for easy bruising and gum/nose bleeding  Musculoskel: negative for myalgias,  back pain and muscle weakness  Neuro: negative for headaches, dizziness, vertigo  Psych:  negative for feelings of anxiety, depression       Objective:   No data found. No intake/output data recorded. No intake/output data recorded. EXAM:     NEURO-a&o   HEENT-wnl   LUNGS-clear    COR-regular rate and rhythym     ABD-soft , no tenderness, no rebound, good bs     EXT-no edema     Data Review     No results for input(s): WBC, HGB, HCT, PLT, HGBEXT, HCTEXT, PLTEXT in the last 72 hours. No results for input(s): NA, K, CL, CO2, BUN, CREA, GLU, PHOS, CA in the last 72 hours. No results for input(s): SGOT, GPT, AP, TBIL, TP, ALB, GLOB, GGT, AML, LPSE in the last 72 hours. No lab exists for component: AMYP, HLPSE  No results for input(s): INR, PTP, APTT in the last 72 hours. No lab exists for component: INREXT    Patient Active Problem List   Diagnosis Code    Thrush B37.0    Postoperative complication I00. 9XXA    Acute respiratory failure ([facility] [address] J96.00    Hypertension I10    Steroid-induced diabetes ([facility] [address] E09.9, T38.0X5A    Diarrhea R19.7    Clostridium difficile diarrhea A04.72    Bronchitis J40    Accelerated hypertension I10    Type 2 diabetes mellitus (HCC) E11.9    Lactic acidosis E87.2    C. difficile colitis A04.72    C. difficile diarrhea A04.72    Mood disorder ([facility]) F39      Assessment:   · Dysphagia  · Colitis -- c.diff   Plan:   · EGD & Colonoscopy today.      Signed By: Toni Hernandez MD     3/28/2019  8:43 AM

## 2019-03-28 NOTE — ANESTHESIA PREPROCEDURE EVALUATION
Relevant Problems   No relevant active problems       Anesthetic History        Pertinent negatives: No PONV       Review of Systems / Medical History  Patient summary reviewed, nursing notes reviewed and pertinent labs reviewed    Pulmonary            Asthma : well controlled  Pertinent negatives: No sleep apnea     Neuro/Psych         Psychiatric history    Comments: Anxiety / depression Cardiovascular    Hypertension: well controlled              Exercise tolerance: >4 METS     GI/Hepatic/Renal     GERD: well controlled        Pertinent negatives: No liver disease   Endo/Other    Diabetes    Obesity  Pertinent negatives: No morbid obesity   Other Findings   Comments: On steroids for 12 months, for allergies, until 1 month ago. Suggest 100 mg  Hydrocortisone supplement, iv, intraoperatively.            Physical Exam    Airway  Mallampati: I    Neck ROM: normal range of motion   Mouth opening: Normal     Cardiovascular    Rhythm: regular  Rate: normal         Dental  No notable dental hx       Pulmonary  Breath sounds clear to auscultation               Abdominal  GI exam deferred       Other Findings            Anesthetic Plan    ASA: 2  Anesthesia type: MAC          Induction: Intravenous  Anesthetic plan and risks discussed with: Patient

## 2019-03-28 NOTE — DISCHARGE INSTRUCTIONS
[address] [facility]. Iman Mckay M.D.  [phone]                 COLON and EGD DISCHARGE INSTRUCTIONS    3/28/2019    Sd Prasad  :  1970  [facility] Medical Record Number:  [id number]      DISCOMFORT:  Sore throat- throat lozenges or warm salt water gargle  Redness at IV site- apply warm compress to area; if redness or soreness persist- contact your physician  There may be a slight amount of blood passed from the rectum  Gaseous discomfort- walking, belching will help relieve any discomfort  You may not operate a vehicle for 12 hours  You may not engage in an occupation involving machinery or appliances for rest of today  You may not drink alcoholic beverages for at least 12 hours  Avoid making any critical decisions for at least 24 hour  DIET:   High fiber diet. - however -  remember your colon is empty and a heavy meal will produce gas. Avoid these foods:  vegetables, fried / greasy foods, carbonated drinks for today     ACTIVITY:  You may  resume your normal daily activities it is recommended that you spend the remainder of the day resting -  avoid any strenuous activity and driving. CALL M.D. ANY SIGN OF:   Increasing pain, nausea, vomiting  Abdominal distension (swelling)  New increased bleeding (oral or rectal)  Fever (chills)  Pain in chest area  Bloody discharge from nose or mouth  Shortness of breath      Follow-up Instructions:   Call Dr. Gregory Deleon if any questions at [phone]. Results of procedure / biopsy in 7 to 10 days, we will call you with these results.   Your endoscopy showed normal exam   Your colonoscopy showed diverticulosis and 2 polyps

## 2019-03-28 NOTE — PROCEDURES
Valentin Klein M.D.  [phone]           3/28/2019                EGD Operative Report  Lizeth Clemens  :  1970  [facility] Medical Record Number:  [id number]      Indication: intermittent dysphagia     : Dioni Ko MD    Assistant -- None  Implants -- None    Referring Provider:  Zeferino Cunha NP      Anesthesia/Sedation:  MAC anesthesia Propofol    Airway assessment: No airway problems anticipated    Pre-Procedural Exam:      Airway: clear, no airway problems anticipated  Heart: RRR, without gallops or rubs  Lungs: clear bilaterally without wheezes, crackles, or rhonchi  Abdomen: soft, nontender, nondistended, bowel sounds present  Mental Status: awake, alert and oriented to person, place and time       Procedure Details     After infomed consent was obtained for the procedure, with all risks and benefits of procedure explained the patient was taken to the endoscopy suite and placed in the left lateral decubitus position. Following sequential administration of sedation as per above, the endoscope was inserted into the mouth and advanced under direct vision to second portion of the duodenum. A careful inspection was made as the gastroscope was withdrawn, including a retroflexed view of the proximal stomach; findings and interventions are described below. Findings:   Esophagus:normal  Stomach: normal   Duodenum/jejunum: normal    Therapies:  biopsy of stomach- rule out H.pylori infection    Specimens: as above           Complications:   None; patient tolerated the procedure well. EBL:  None. Impression:    Normal EGD, no evidence of stenosis noted. No need for dilation during exam      Recommendations:    -Continue acid suppression. ,   -Await pathology.  -Proceed with colonoscopy as planned for today    Dioni Ko MD

## 2019-04-01 ENCOUNTER — APPOINTMENT (OUTPATIENT)
Dept: CT IMAGING | Age: 49
End: 2019-04-01
Attending: EMERGENCY MEDICINE
Payer: COMMERCIAL

## 2019-04-01 ENCOUNTER — HOSPITAL ENCOUNTER (EMERGENCY)
Age: 49
Discharge: HOME | End: 2019-04-01
Attending: EMERGENCY MEDICINE
Payer: COMMERCIAL

## 2019-04-01 VITALS
RESPIRATION RATE: 16 BRPM | HEART RATE: 68 BPM | SYSTOLIC BLOOD PRESSURE: 158 MMHG | OXYGEN SATURATION: 94 % | HEIGHT: 62 IN | TEMPERATURE: 98 F | WEIGHT: 214.29 LBS | DIASTOLIC BLOOD PRESSURE: 87 MMHG | BODY MASS INDEX: 39.43 KG/M2

## 2019-04-01 DIAGNOSIS — R10.84 ABDOMINAL PAIN, GENERALIZED: Primary | ICD-10-CM

## 2019-04-01 DIAGNOSIS — R19.7 DIARRHEA, UNSPECIFIED TYPE: ICD-10-CM

## 2019-04-01 LAB
ALBUMIN SERPL-MCNC: 3.8 G/DL (ref 3.5–5)
ALBUMIN/GLOB SERPL: 1 {RATIO} (ref 1.1–2.2)
ALP SERPL-CCNC: 60 U/L (ref 45–117)
ALT SERPL-CCNC: 34 U/L (ref 12–78)
ANION GAP SERPL CALC-SCNC: 9 MMOL/L (ref 5–15)
AST SERPL-CCNC: 26 U/L (ref 15–37)
BASOPHILS # BLD: 0 K/UL (ref 0–0.1)
BASOPHILS NFR BLD: 0 % (ref 0–1)
BILIRUB SERPL-MCNC: 0.3 MG/DL (ref 0.2–1)
BUN SERPL-MCNC: 9 MG/DL (ref 6–20)
BUN/CREAT SERPL: 11 (ref 12–20)
CALCIUM SERPL-MCNC: 9.4 MG/DL (ref 8.5–10.1)
CHLORIDE SERPL-SCNC: 99 MMOL/L (ref 97–108)
CO2 SERPL-SCNC: 26 MMOL/L (ref 21–32)
CREAT SERPL-MCNC: 0.81 MG/DL (ref 0.55–1.02)
DIFFERENTIAL METHOD BLD: ABNORMAL
EOSINOPHIL # BLD: 0.3 K/UL (ref 0–0.4)
EOSINOPHIL NFR BLD: 5 % (ref 0–7)
ERYTHROCYTE [DISTWIDTH] IN BLOOD BY AUTOMATED COUNT: 13 % (ref 11.5–14.5)
GLOBULIN SER CALC-MCNC: 3.7 G/DL (ref 2–4)
GLUCOSE SERPL-MCNC: 310 MG/DL (ref 65–100)
HCT VFR BLD AUTO: 34.1 % (ref 35–47)
HGB BLD-MCNC: 11.9 G/DL (ref 11.5–16)
IMM GRANULOCYTES # BLD AUTO: 0 K/UL (ref 0–0.04)
IMM GRANULOCYTES NFR BLD AUTO: 1 % (ref 0–0.5)
LIPASE SERPL-CCNC: 292 U/L (ref 73–393)
LYMPHOCYTES # BLD: 1.7 K/UL (ref 0.8–3.5)
LYMPHOCYTES NFR BLD: 32 % (ref 12–49)
MCH RBC QN AUTO: 28.1 PG (ref 26–34)
MCHC RBC AUTO-ENTMCNC: 34.9 G/DL (ref 30–36.5)
MCV RBC AUTO: 80.4 FL (ref 80–99)
MONOCYTES # BLD: 0.3 K/UL (ref 0–1)
MONOCYTES NFR BLD: 5 % (ref 5–13)
NEUTS SEG # BLD: 3 K/UL (ref 1.8–8)
NEUTS SEG NFR BLD: 57 % (ref 32–75)
NRBC # BLD: 0 K/UL (ref 0–0.01)
NRBC BLD-RTO: 0 PER 100 WBC
PLATELET # BLD AUTO: 165 K/UL (ref 150–400)
PMV BLD AUTO: 9.6 FL (ref 8.9–12.9)
POTASSIUM SERPL-SCNC: 3.2 MMOL/L (ref 3.5–5.1)
PROT SERPL-MCNC: 7.5 G/DL (ref 6.4–8.2)
RBC # BLD AUTO: 4.24 M/UL (ref 3.8–5.2)
SODIUM SERPL-SCNC: 134 MMOL/L (ref 136–145)
WBC # BLD AUTO: 5.4 K/UL (ref 3.6–11)

## 2019-04-01 PROCEDURE — 74176 CT ABD & PELVIS W/O CONTRAST: CPT

## 2019-04-01 PROCEDURE — 74011250637 HC RX REV CODE- 250/637: Performed by: EMERGENCY MEDICINE

## 2019-04-01 PROCEDURE — 96375 TX/PRO/DX INJ NEW DRUG ADDON: CPT

## 2019-04-01 PROCEDURE — 80053 COMPREHEN METABOLIC PANEL: CPT

## 2019-04-01 PROCEDURE — 99283 EMERGENCY DEPT VISIT LOW MDM: CPT

## 2019-04-01 PROCEDURE — 83690 ASSAY OF LIPASE: CPT

## 2019-04-01 PROCEDURE — 36415 COLL VENOUS BLD VENIPUNCTURE: CPT

## 2019-04-01 PROCEDURE — 74011250636 HC RX REV CODE- 250/636: Performed by: EMERGENCY MEDICINE

## 2019-04-01 PROCEDURE — 96374 THER/PROPH/DIAG INJ IV PUSH: CPT

## 2019-04-01 PROCEDURE — 85025 COMPLETE CBC W/AUTO DIFF WBC: CPT

## 2019-04-01 RX ORDER — DIPHENHYDRAMINE HYDROCHLORIDE 50 MG/ML
12.5 INJECTION, SOLUTION INTRAMUSCULAR; INTRAVENOUS
Status: COMPLETED | OUTPATIENT
Start: 2019-04-01 | End: 2019-04-01

## 2019-04-01 RX ORDER — ONDANSETRON 4 MG/1
4 TABLET, ORALLY DISINTEGRATING ORAL
Qty: 10 TAB | Refills: 0 | Status: ON HOLD | OUTPATIENT
Start: 2019-04-01 | End: 2019-09-13 | Stop reason: SDUPTHER

## 2019-04-01 RX ORDER — VANCOMYCIN HYDROCHLORIDE 125 MG/1
125 CAPSULE ORAL 4 TIMES DAILY
Qty: 40 CAP | Refills: 0 | Status: SHIPPED | OUTPATIENT
Start: 2019-04-01 | End: 2019-04-11

## 2019-04-01 RX ORDER — POTASSIUM CHLORIDE 750 MG/1
40 TABLET, FILM COATED, EXTENDED RELEASE ORAL
Status: COMPLETED | OUTPATIENT
Start: 2019-04-01 | End: 2019-04-01

## 2019-04-01 RX ORDER — MORPHINE SULFATE 2 MG/ML
4 INJECTION, SOLUTION INTRAMUSCULAR; INTRAVENOUS ONCE
Status: COMPLETED | OUTPATIENT
Start: 2019-04-01 | End: 2019-04-01

## 2019-04-01 RX ORDER — ONDANSETRON 2 MG/ML
4 INJECTION INTRAMUSCULAR; INTRAVENOUS
Status: COMPLETED | OUTPATIENT
Start: 2019-04-01 | End: 2019-04-01

## 2019-04-01 RX ADMIN — ONDANSETRON 4 MG: 2 INJECTION INTRAMUSCULAR; INTRAVENOUS at 05:10

## 2019-04-01 RX ADMIN — POTASSIUM CHLORIDE 40 MEQ: 750 TABLET, EXTENDED RELEASE ORAL at 06:29

## 2019-04-01 RX ADMIN — DIPHENHYDRAMINE HYDROCHLORIDE 12.5 MG: 50 INJECTION, SOLUTION INTRAMUSCULAR; INTRAVENOUS at 05:10

## 2019-04-01 RX ADMIN — MORPHINE SULFATE 4 MG: 2 INJECTION, SOLUTION INTRAMUSCULAR; INTRAVENOUS at 05:10

## 2019-04-01 NOTE — ED NOTES
Patient discharge by Claudene Franks MD - pt sent to the front lobby, with strong and steady gait -  Discharge information / home RX / and reasons to return to the ED were reviewed by the doctor.

## 2019-04-01 NOTE — ED NOTES
Pt continues to rest in bed - unable to produce a stool sample at this time - pending for reeval from MD - will continue to monitor;

## 2019-04-01 NOTE — ED TRIAGE NOTES
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

## 2019-04-01 NOTE — ED PROVIDER NOTES
EMERGENCY DEPARTMENT HISTORY AND PHYSICAL EXAM      Date: 4/1/2019  Patient Name: Ariadna Butterfield    History of Presenting Illness     Chief Complaint   Patient presents with    Abdominal Pain     Patient has a history of c diff which is supposed to be gone now but she had a colonoscopy done on Thursday and since then she ahs ahd lower left abd pain, diarrhea that looks bloody and pain. History Provided By: Patient    HPI: Ariadna Butterfield, 52 y.o. female with PMHx significant for C. difficile colitis, presents via EMS to the ED with cc of left lower quadrant abdominal pain. Patient has a long history of C. difficile, most recently on antibiotics up to about 2-3 weeks ago. She had a colonoscopy done 4 days ago. Since then she has had some blood in her stool and some lower left abdominal discomfort. She reports the emergency department tonight stating that the diarrhea pain and bleeding is worse. She does also report nausea but is not have any vomiting. No fevers. Her colonoscopy was notable for polyps which were removed, and diverticulosis. She has had previous colon resection due to a fistula. She denies urinary symptoms. There are no other complaints, changes, or physical findings at this time. PCP: Tara Heredia NP    No current facility-administered medications on file prior to encounter. Current Outpatient Medications on File Prior to Encounter   Medication Sig Dispense Refill    insulin glargine (LANTUS U-100 INSULIN) 100 unit/mL injection 10 Units by SubCUTAneous route daily.  sertraline (ZOLOFT) 100 mg tablet Take 100 mg by mouth.  amLODIPine (NORVASC) 10 mg tablet Take 10 mg by mouth.  losartan-hydroCHLOROthiazide (HYZAAR) 100-12.5 mg per tablet Take 1 Tab by mouth daily.  EPINEPHrine (EPIPEN) 0.3 mg/0.3 mL (1:1,000) injection 0.3 mL by IntraMUSCular route once as needed for up to 1 dose.  0.3 mL 1    estradiol (ESTRACE) 1 mg tablet Take 1 mg by mouth daily.      albuterol (PROVENTIL, VENTOLIN) 90 mcg/Actuation inhaler Take 2 Puffs by inhalation every six (6) hours as needed. Past History     Past Medical History:  Past Medical History:   Diagnosis Date    Anal fissure     Anisocoria     Asthma     LAST EPISODE     Back pain     Cerumen impaction     Chronic kidney disease     hx uti in past    Coagulation defects     ocassional rectal bleeding due to anal fissure    Colovaginal fistula     Diabetes (HCC)     NIDDM    Diabetes ([address]     Diverticulitis     Diverticulosis     Enlarged tonsils     Frequent UTI     GERD (gastroesophageal reflux disease)     H/O endoscopy     with dilation    HA (headache)     Hepatic steatosis     Hx of colonoscopy with polypectomy     benign    Hypertension     Ill-defined condition     FREQUENT HIVES    Ill-defined condition     HX ELEVATED LIVER ENZYMES    Morbid obesity (HCC)     Nausea & vomiting     during diverticulitis flare    Obesity     Otitis media     Pneumonia     about 15 yrs ago    Psychiatric disorder     ANXIETY    Recurrent tonsillitis     Sinusitis     Transfusion history ~ age 35    postop hysterectomy    Unspecified sleep apnea     snores ( not diagnosed yet)     Urticaria     Urticaria        Past Surgical History:  Past Surgical History:   Procedure Laterality Date    ABDOMEN SURGERY PROC UNLISTED  2018    hern[facility]    COLONOSCOPY N/A 3/28/2019    COLONOSCOPY performed by Natasha Oseguera MD at [address] [place] [address]    [facility].     HX GI  12    LAPAROSCOPIC HAND ASSISTED  POSS OPEN SIGMOID COLECTOMY POSS TEMPORARY DIVERTING LOOP ILEOSTOMY;  (no illeostomy needed)    HX GYN           HX GYN      cervical conization    HX HEENT      SINUS SURGERY LEFT X2    HX HEENT      SINUS SURGERY ON RIGHT X2    HX OTHER SURGICAL      Sphincterotomy    HX PELVIC LAPAROSCOPY      HX EMMANUEL AND BSO      HX UROLOGICAL 7/31/12     CYSTOSCOPY INSERTION URETERAL CATHETERS - Cystoscopy Insertion of bilateral ureteral stents       Family History:  Family History   Problem Relation Age of Onset    Diabetes Mother     Cancer Mother         NON-HODGKINS LYMPHOMA    Anesth Problems Mother         PONV    Diabetes Father     Heart Disease Father         CAD - STENTS, PACEMAKER    Arrhythmia Father        Social History:  Social History     Tobacco Use    Smoking status: Never Smoker    Smokeless tobacco: Never Used   Substance Use Topics    Alcohol use: Yes     Comment: Rarely    Drug use: No     Types: Prescription, OTC       Allergies: Allergies   Allergen Reactions    Aspirin Shortness of Breath    Contrast Agent [Iodine] Hives and Itching     Pt. had itching after IV contrast with the last exam.  Benadryl was given    Prilosec [Omeprazole Magnesium] Anaphylaxis     CHERRY FLAVORED; PT TAKES REGULAR PRILOSEC AND IS OK    Codeine Hives and Itching    Morphine Itching     Severe itching. Fine to take benadryl    Fentanyl Rash    Ketorolac Rash     \"makes my eyes spasm and causes rash on my hands\"    Percocet [Oxycodone-Acetaminophen] Hives         Review of Systems   Review of Systems   Constitutional: Negative for chills and fever. HENT: Negative for congestion, ear pain, rhinorrhea, sore throat and trouble swallowing. Eyes: Negative for visual disturbance. Respiratory: Negative for cough, chest tightness and shortness of breath. Cardiovascular: Negative for chest pain and palpitations. Gastrointestinal: Positive for abdominal pain, blood in stool, diarrhea and nausea. Negative for constipation and vomiting. Genitourinary: Negative for decreased urine volume, difficulty urinating, dysuria and frequency. Musculoskeletal: Negative for back pain and neck pain. Skin: Negative for color change and rash. Neurological: Negative for dizziness, weakness, light-headedness and headaches.        Physical Exam Physical Exam   Constitutional: She is oriented to person, place, and time. She appears well-developed and well-nourished. She does not appear ill. No distress. Obese   HENT:   Mouth/Throat: Oropharynx is clear and moist.   Eyes: Conjunctivae are normal.   Neck: Neck supple. Cardiovascular: Normal rate and regular rhythm. Pulmonary/Chest: Effort normal and breath sounds normal. No accessory muscle usage. No respiratory distress. Abdominal: Soft. She exhibits no distension. There is tenderness in the left upper quadrant and left lower quadrant. There is no rebound and no guarding. Lymphadenopathy:     She has no cervical adenopathy. Neurological: She is alert and oriented to person, place, and time. She has normal strength. No cranial nerve deficit or sensory deficit. Skin: Skin is warm and dry. Nursing note and vitals reviewed. Diagnostic Study Results     Labs -     Recent Results (from the past 12 hour(s))   CBC WITH AUTOMATED DIFF    Collection Time: 04/01/19  5:03 AM   Result Value Ref Range    WBC 5.4 3.6 - 11.0 K/uL    RBC 4.24 3.80 - 5.20 M/uL    HGB 11.9 11.5 - 16.0 g/dL    HCT 34.1 (L) 35.0 - 47.0 %    MCV 80.4 80.0 - 99.0 FL    MCH 28.1 26.0 - 34.0 PG    MCHC 34.9 30.0 - 36.5 g/dL    RDW 13.0 11.5 - 14.5 %    PLATELET 760 932 - 929 K/uL    MPV 9.6 8.9 - 12.9 FL    NRBC 0.0 0  WBC    ABSOLUTE NRBC 0.00 0.00 - 0.01 K/uL    NEUTROPHILS 57 32 - 75 %    LYMPHOCYTES 32 12 - 49 %    MONOCYTES 5 5 - 13 %    EOSINOPHILS 5 0 - 7 %    BASOPHILS 0 0 - 1 %    IMMATURE GRANULOCYTES 1 (H) 0.0 - 0.5 %    ABS. NEUTROPHILS 3.0 1.8 - 8.0 K/UL    ABS. LYMPHOCYTES 1.7 0.8 - 3.5 K/UL    ABS. MONOCYTES 0.3 0.0 - 1.0 K/UL    ABS. EOSINOPHILS 0.3 0.0 - 0.4 K/UL    ABS. BASOPHILS 0.0 0.0 - 0.1 K/UL    ABS. IMM.  GRANS. 0.0 0.00 - 0.04 K/UL    DF AUTOMATED     METABOLIC PANEL, COMPREHENSIVE    Collection Time: 04/01/19  5:03 AM   Result Value Ref Range    Sodium 134 (L) 136 - 145 mmol/L    Potassium 3.2 (L) 3.5 - 5.1 mmol/L    Chloride 99 97 - 108 mmol/L    CO2 26 21 - 32 mmol/L    Anion gap 9 5 - 15 mmol/L    Glucose 310 (H) 65 - 100 mg/dL    BUN 9 6 - 20 MG/DL    Creatinine 0.81 0.55 - 1.02 MG/DL    BUN/Creatinine ratio 11 (L) 12 - 20      GFR est AA >60 >60 ml/min/1.73m2    GFR est non-AA >60 >60 ml/min/1.73m2    Calcium 9.4 8.5 - 10.1 MG/DL    Bilirubin, total 0.3 0.2 - 1.0 MG/DL    ALT (SGPT) 34 12 - 78 U/L    AST (SGOT) 26 15 - 37 U/L    Alk. phosphatase 60 45 - 117 U/L    Protein, total 7.5 6.4 - 8.2 g/dL    Albumin 3.8 3.5 - 5.0 g/dL    Globulin 3.7 2.0 - 4.0 g/dL    A-G Ratio 1.0 (L) 1.1 - 2.2     LIPASE    Collection Time: 04/01/19  5:03 AM   Result Value Ref Range    Lipase 292 73 - 393 U/L       Radiologic Studies -   CT ABD PELV WO CONT   Final Result   IMPRESSION:   No nephrolithiasis or hydronephrosis. Normal appendix. No acute process. CT Results  (Last 48 hours)               04/01/19 0536  CT ABD PELV WO CONT Final result    Impression:  IMPRESSION:   No nephrolithiasis or hydronephrosis. Normal appendix. No acute process. Narrative:  INDICATION: Abdominal pain       COMPARISON: February 18, 2019       TECHNIQUE:    Noncontrast thin axial images were obtained through the abdomen and pelvis. Coronal and sagittal reconstructions were generated. CT dose reduction was   achieved through use of a standardized protocol tailored for this examination   and automatic exposure control for dose modulation. FINDINGS:    LUNG BASES: No abnormality. LIVER: No mass or biliary dilatation. GALLBLADDER: Surgically absent. SPLEEN: Borderline enlarged with calcified granulomas. PANCREAS: No mass or ductal dilatation. ADRENALS: No mass. KIDNEYS: No nephrolithiasis or hydronephrosis. GI TRACT:  Sigmoid anastomosis. No bowel obstruction. PERITONEUM: No free air or free fluid. APPENDIX: Unremarkable. RETROPERITONEUM: No aortic aneurysm. LYMPH NODES:  None enlarged. ADDITIONAL COMMENTS: N/A.       URINARY BLADDER: Unremarkable. REPRODUCTIVE ORGANS: Prior hysterectomy. LYMPH NODES:  None enlarged. FREE FLUID:  None. BONES: No destructive bone lesion. ADDITIONAL COMMENTS: N/A. CXR Results  (Last 48 hours)    None            Medical Decision Making   I am the first provider for this patient. I reviewed the vital signs, available nursing notes, past medical history, past surgical history, family history and social history. Vital Signs-Reviewed the patient's vital signs. Patient Vitals for the past 12 hrs:   Temp Pulse Resp BP SpO2   04/01/19 0545 -- -- -- 158/87 96 %   04/01/19 0540 98 °F (36.7 °C) 68 16 (!) 158/93 94 %   04/01/19 0515 -- -- -- -- 98 %   04/01/19 0431 98.1 °F (36.7 °C) 73 18 (!) 166/92 100 %   04/01/19 0430 -- -- -- (!) 166/92 100 %     Records Reviewed: Nursing Notes and Old Medical Records    Provider Notes (Medical Decision Making):   Patient with a history of C. difficile colitis presents with diarrhea and generalized abdominal pain mostly lateralizing to the left. She had a recent colonoscopy in which polyps were removed and diverticulosis was noted. She has noticed increased bleeding after her colonoscopy. She is not septic, and has a benign abdomen. Vital signs are unremarkable. Labs are unremarkable with the exception of a mild hypokalemia. CT imaging does not show any evidence of diverticulitis. Given her history of C. difficile will recommend that she start on oral vancomycin. Follow-up with her GI doctor. ED Course:   Initial assessment performed. The patients presenting problems have been discussed, and they are in agreement with the care plan formulated and outlined with them. I have encouraged them to ask questions as they arise throughout their visit. Critical Care Time:   None    Disposition:  6:17 AM    The patient's emergency department evaluation is now complete.   I have reviewed all labs, imaging, and pertinent information. I have discussed all results with the patient and/or family. Based on our evaluation today I do believe that the patient is safe to be discharged home. The patient has been provided with at home instructions that are pertinent to their complaint today, although these may not be specific to the exact diagnosis. I have reviewed the patient's home medications and attempted to reconcile if not already done so by pharmacy or nursing staff. I have discussed all new prescriptions with the patient. The patient has been encouraged to follow-up with primary care doctor and/or specialist, and these have been discussed with the patient. The patient has been advised that they may return to the emergency department if they have any worsening symptoms and or new symptoms that are of concern to them. Verbal discharge instructions may have also been provided to the patient that may not be specifically contained in the written discharge instructions. The patient has been given opportunity to ask questions prior to discharge. PLAN:  1. Current Discharge Medication List      START taking these medications    Details   vancomycin (VANCOCIN) 125 mg capsule Take 1 Cap by mouth four (4) times daily for 10 days. Qty: 40 Cap, Refills: 0      ondansetron (ZOFRAN ODT) 4 mg disintegrating tablet Take 1 Tab by mouth every eight (8) hours as needed for Nausea. Qty: 10 Tab, Refills: 0           2. Follow-up Information     Follow up With Specialties Details Why Fahad Childress MD Gastroenterology Call today  [address] [address]  [phone]          Return to ED if worse     Diagnosis     Clinical Impression:   1. Abdominal pain, generalized    2. Diarrhea, unspecified type              This note will not be viewable in MyChart.

## 2019-04-12 ENCOUNTER — APPOINTMENT (OUTPATIENT)
Dept: CT IMAGING | Age: 49
End: 2019-04-12
Attending: EMERGENCY MEDICINE
Payer: COMMERCIAL

## 2019-04-12 ENCOUNTER — HOSPITAL ENCOUNTER (EMERGENCY)
Age: 49
Discharge: HOME | End: 2019-04-12
Attending: EMERGENCY MEDICINE
Payer: COMMERCIAL

## 2019-04-12 VITALS
SYSTOLIC BLOOD PRESSURE: 147 MMHG | RESPIRATION RATE: 16 BRPM | OXYGEN SATURATION: 95 % | BODY MASS INDEX: 38.83 KG/M2 | HEART RATE: 88 BPM | TEMPERATURE: 98.9 F | HEIGHT: 62 IN | DIASTOLIC BLOOD PRESSURE: 52 MMHG | WEIGHT: 210.98 LBS

## 2019-04-12 DIAGNOSIS — R11.2 NAUSEA AND VOMITING, INTRACTABILITY OF VOMITING NOT SPECIFIED, UNSPECIFIED VOMITING TYPE: ICD-10-CM

## 2019-04-12 DIAGNOSIS — R10.84 ABDOMINAL PAIN, GENERALIZED: Primary | ICD-10-CM

## 2019-04-12 DIAGNOSIS — K43.9 VENTRAL HERNIA WITHOUT OBSTRUCTION OR GANGRENE: ICD-10-CM

## 2019-04-12 LAB
ALBUMIN SERPL-MCNC: 3.8 G/DL (ref 3.5–5)
ALBUMIN/GLOB SERPL: 1 {RATIO} (ref 1.1–2.2)
ALP SERPL-CCNC: 59 U/L (ref 45–117)
ALT SERPL-CCNC: 54 U/L (ref 12–78)
ANION GAP SERPL CALC-SCNC: 9 MMOL/L (ref 5–15)
APPEARANCE UR: CLEAR
AST SERPL-CCNC: 41 U/L (ref 15–37)
BILIRUB SERPL-MCNC: 0.5 MG/DL (ref 0.2–1)
BILIRUB UR QL: NEGATIVE
BUN SERPL-MCNC: 6 MG/DL (ref 6–20)
BUN/CREAT SERPL: 8 (ref 12–20)
CALCIUM SERPL-MCNC: 9.6 MG/DL (ref 8.5–10.1)
CHLORIDE SERPL-SCNC: 100 MMOL/L (ref 97–108)
CO2 SERPL-SCNC: 27 MMOL/L (ref 21–32)
COLOR UR: ABNORMAL
CREAT SERPL-MCNC: 0.8 MG/DL (ref 0.55–1.02)
ERYTHROCYTE [DISTWIDTH] IN BLOOD BY AUTOMATED COUNT: 13.4 % (ref 11.5–14.5)
GLOBULIN SER CALC-MCNC: 3.9 G/DL (ref 2–4)
GLUCOSE SERPL-MCNC: 296 MG/DL (ref 65–100)
GLUCOSE UR STRIP.AUTO-MCNC: >1000 MG/DL
HCT VFR BLD AUTO: 35.2 % (ref 35–47)
HGB BLD-MCNC: 12.1 G/DL (ref 11.5–16)
HGB UR QL STRIP: NEGATIVE
KETONES UR QL STRIP.AUTO: NEGATIVE MG/DL
LEUKOCYTE ESTERASE UR QL STRIP.AUTO: NEGATIVE
MCH RBC QN AUTO: 27.7 PG (ref 26–34)
MCHC RBC AUTO-ENTMCNC: 34.4 G/DL (ref 30–36.5)
MCV RBC AUTO: 80.5 FL (ref 80–99)
NITRITE UR QL STRIP.AUTO: NEGATIVE
NRBC # BLD: 0.04 K/UL (ref 0–0.01)
NRBC BLD-RTO: 0.8 PER 100 WBC
PH UR STRIP: 5.5 [PH] (ref 5–8)
PLATELET # BLD AUTO: 170 K/UL (ref 150–400)
PMV BLD AUTO: 9.6 FL (ref 8.9–12.9)
POTASSIUM SERPL-SCNC: 3.7 MMOL/L (ref 3.5–5.1)
PROT SERPL-MCNC: 7.7 G/DL (ref 6.4–8.2)
PROT UR STRIP-MCNC: NEGATIVE MG/DL
RBC # BLD AUTO: 4.37 M/UL (ref 3.8–5.2)
SODIUM SERPL-SCNC: 136 MMOL/L (ref 136–145)
SP GR UR REFRACTOMETRY: 1.03 (ref 1–1.03)
UROBILINOGEN UR QL STRIP.AUTO: 0.2 EU/DL (ref 0.2–1)
WBC # BLD AUTO: 5 K/UL (ref 3.6–11)

## 2019-04-12 PROCEDURE — 80053 COMPREHEN METABOLIC PANEL: CPT

## 2019-04-12 PROCEDURE — 96376 TX/PRO/DX INJ SAME DRUG ADON: CPT

## 2019-04-12 PROCEDURE — 36415 COLL VENOUS BLD VENIPUNCTURE: CPT

## 2019-04-12 PROCEDURE — 96375 TX/PRO/DX INJ NEW DRUG ADDON: CPT

## 2019-04-12 PROCEDURE — 74011636320 HC RX REV CODE- 636/320: Performed by: PHYSICIAN ASSISTANT

## 2019-04-12 PROCEDURE — 81003 URINALYSIS AUTO W/O SCOPE: CPT

## 2019-04-12 PROCEDURE — 96374 THER/PROPH/DIAG INJ IV PUSH: CPT

## 2019-04-12 PROCEDURE — 74011250636 HC RX REV CODE- 250/636: Performed by: PHYSICIAN ASSISTANT

## 2019-04-12 PROCEDURE — 74011250636 HC RX REV CODE- 250/636: Performed by: EMERGENCY MEDICINE

## 2019-04-12 PROCEDURE — 99284 EMERGENCY DEPT VISIT MOD MDM: CPT

## 2019-04-12 PROCEDURE — 74176 CT ABD & PELVIS W/O CONTRAST: CPT

## 2019-04-12 PROCEDURE — 85027 COMPLETE CBC AUTOMATED: CPT

## 2019-04-12 RX ORDER — MORPHINE SULFATE 2 MG/ML
4 INJECTION, SOLUTION INTRAMUSCULAR; INTRAVENOUS ONCE
Status: COMPLETED | OUTPATIENT
Start: 2019-04-12 | End: 2019-04-12

## 2019-04-12 RX ORDER — DIPHENHYDRAMINE HYDROCHLORIDE 50 MG/ML
25 INJECTION, SOLUTION INTRAMUSCULAR; INTRAVENOUS
Status: COMPLETED | OUTPATIENT
Start: 2019-04-12 | End: 2019-04-12

## 2019-04-12 RX ORDER — DICYCLOMINE HYDROCHLORIDE 20 MG/1
20 TABLET ORAL EVERY 6 HOURS
Qty: 20 TAB | Refills: 0 | Status: SHIPPED | OUTPATIENT
Start: 2019-04-12 | End: 2019-04-17

## 2019-04-12 RX ORDER — ONDANSETRON 2 MG/ML
4 INJECTION INTRAMUSCULAR; INTRAVENOUS
Status: COMPLETED | OUTPATIENT
Start: 2019-04-12 | End: 2019-04-12

## 2019-04-12 RX ADMIN — MORPHINE SULFATE 4 MG: 4 INJECTION, SOLUTION INTRAMUSCULAR; INTRAVENOUS at 13:55

## 2019-04-12 RX ADMIN — FAMOTIDINE 20 MG: 10 INJECTION, SOLUTION INTRAVENOUS at 14:37

## 2019-04-12 RX ADMIN — DIPHENHYDRAMINE HYDROCHLORIDE 25 MG: 50 INJECTION, SOLUTION INTRAMUSCULAR; INTRAVENOUS at 14:38

## 2019-04-12 RX ADMIN — ONDANSETRON 4 MG: 2 INJECTION INTRAMUSCULAR; INTRAVENOUS at 11:18

## 2019-04-12 RX ADMIN — DIPHENHYDRAMINE HYDROCHLORIDE 25 MG: 50 INJECTION, SOLUTION INTRAMUSCULAR; INTRAVENOUS at 13:55

## 2019-04-12 RX ADMIN — DIATRIZOATE MEGLUMINE AND DIATRIZOATE SODIUM 30 ML: 600; 100 SOLUTION ORAL; RECTAL at 11:21

## 2019-04-12 NOTE — ED PROVIDER NOTES
EMERGENCY DEPARTMENT HISTORY AND PHYSICAL EXAM      Date: 2019  Patient Name: Cathy Recinos    History of Presenting Illness     Chief Complaint   Patient presents with    Abdominal Pain     \"I just got over having CDiff and I'm having alot of the same symptoms\"       History Provided By: Patient    HPI: Cathy Recinos, 52 y.o. female, with past medical history significant for diverticulitis, C. difficile infection presented the emergency department complaining of abdominal pain. Reports periumbilical pain that has been going on for 3 weeks. Feels as though she has a periumbical hernia. Reports loss and formed stool, does not smell like c-diff. She has tried hydrocodone for pain and tramadol for pain with minimal relief. She reports chills, but no fever. Admits to several episodes of nausea and vomiting. Initially when questioned patient states she has not been seen anywhere except for here on the first for the same complaint. I did question the patient about why she was seen at [facility] 2 days ago, and the patient alerted me that she was seen for the same complaint. When asked why she did not mention this visit, she states she was not sure. She denies any chest pain, shortness of breath, genitourinary symptoms. PCP: Bette Taylor NP    No current facility-administered medications on file prior to encounter. Current Outpatient Medications on File Prior to Encounter   Medication Sig Dispense Refill    [] vancomycin (VANCOCIN) 125 mg capsule Take 1 Cap by mouth four (4) times daily for 10 days. 40 Cap 0    ondansetron (ZOFRAN ODT) 4 mg disintegrating tablet Take 1 Tab by mouth every eight (8) hours as needed for Nausea. 10 Tab 0    insulin glargine (LANTUS U-100 INSULIN) 100 unit/mL injection 10 Units by SubCUTAneous route daily.  sertraline (ZOLOFT) 100 mg tablet Take 100 mg by mouth.  amLODIPine (NORVASC) 10 mg tablet Take 10 mg by mouth.       losartan-hydroCHLOROthiazide (HYZAAR) 100-12.5 mg per tablet Take 1 Tab by mouth daily.  EPINEPHrine (EPIPEN) 0.3 mg/0.3 mL (1:1,000) injection 0.3 mL by IntraMUSCular route once as needed for up to 1 dose. 0.3 mL 1    estradiol (ESTRACE) 1 mg tablet Take 1 mg by mouth daily.  albuterol (PROVENTIL, VENTOLIN) 90 mcg/Actuation inhaler Take 2 Puffs by inhalation every six (6) hours as needed. Past History     Past Medical History:  Past Medical History:   Diagnosis Date    Anal fissure     Anisocoria     Asthma     LAST EPISODE     Back pain     Cerumen impaction     Chronic kidney disease     hx uti in past    Coagulation defects     ocassional rectal bleeding due to anal fissure    Colovaginal fistula     Diabetes (HCC)     NIDDM    Diabetes ([address]     Diverticulitis     Diverticulosis     Enlarged tonsils     Frequent UTI     GERD (gastroesophageal reflux disease)     H/O endoscopy     with dilation    HA (headache)     Hepatic steatosis     Hx of colonoscopy with polypectomy     benign    Hypertension     Ill-defined condition     FREQUENT HIVES    Ill-defined condition     HX ELEVATED LIVER ENZYMES    Morbid obesity (HCC)     Nausea & vomiting     during diverticulitis flare    Obesity     Otitis media     Pneumonia     about 15 yrs ago    Psychiatric disorder     ANXIETY    Recurrent tonsillitis     Sinusitis     Transfusion history ~ age 35    postop hysterectomy    Unspecified sleep apnea     snores ( not diagnosed yet)     Urticaria     Urticaria        Past Surgical History:  Past Surgical History:   Procedure Laterality Date    ABDOMEN SURGERY PROC UNLISTED  01/06/2018    hernia repair at [address]    COLONOSCOPY N/A 3/28/2019    COLONOSCOPY performed by Reid Hickman MD at [address] [facility]    [place].     HX GI  7/31/12    LAPAROSCOPIC HAND ASSISTED  POSS OPEN SIGMOID COLECTOMY POSS TEMPORARY DIVERTING LOOP ILEOSTOMY;  (no illeostomy needed)    HX GYN           HX GYN      cervical conization    HX HEENT      SINUS SURGERY LEFT X2    HX HEENT      SINUS SURGERY ON RIGHT X2    HX OTHER SURGICAL      Sphincterotomy    HX PELVIC LAPAROSCOPY      HX EMMANUEL AND BSO      HX UROLOGICAL  12     CYSTOSCOPY INSERTION URETERAL CATHETERS - Cystoscopy Insertion of bilateral ureteral stents       Family History:  Family History   Problem Relation Age of Onset    Diabetes Mother     Cancer Mother         NON-HODGKINS LYMPHOMA    Anesth Problems Mother         PONV    Diabetes Father     Heart Disease Father         CAD - STENTS, PACEMAKER    Arrhythmia Father        Social History:  Social History     Tobacco Use    Smoking status: Never Smoker    Smokeless tobacco: Never Used   Substance Use Topics    Alcohol use: Yes     Comment: Rarely    Drug use: No     Types: Prescription, OTC       Allergies: Allergies   Allergen Reactions    Aspirin Shortness of Breath    Contrast Agent [Iodine] Hives and Itching     Pt. had itching after IV contrast with the last exam.  Benadryl was given    Prilosec [Omeprazole Magnesium] Anaphylaxis     CHERRY FLAVORED; PT TAKES REGULAR PRILOSEC AND IS OK    Codeine Hives and Itching    Morphine Itching     Severe itching. Fine to take benadryl    Fentanyl Rash    Ketorolac Rash     \"makes my eyes spasm and causes rash on my hands\"    Percocet [Oxycodone-Acetaminophen] Hives         Review of Systems   Review of Systems   Constitutional: Positive for chills. Negative for fever. HENT: Negative for congestion and sore throat. Eyes: Negative for visual disturbance. Respiratory: Negative for cough and shortness of breath. Cardiovascular: Negative for chest pain and leg swelling. Gastrointestinal: Positive for abdominal pain, nausea and vomiting. Negative for blood in stool and diarrhea. Endocrine: Negative for polyuria.    Genitourinary: Negative for dysuria, flank pain, vaginal bleeding and vaginal discharge. Musculoskeletal: Negative for myalgias. Skin: Negative for rash. Allergic/Immunologic: Negative for immunocompromised state. Neurological: Negative for weakness and headaches. Psychiatric/Behavioral: Negative for confusion. Physical Exam   Physical Exam   Constitutional: oriented to person, place, and time. appears well-developed and well-nourished. HENT:   Head: Normocephalic and atraumatic. Moist mucous membranes   Eyes: Pupils are equal, round, and reactive to light. Conjunctivae are normal. Right eye exhibits no discharge. Left eye exhibits no discharge. Neck: Normal range of motion. Neck supple. No tracheal deviation present. Cardiovascular: Normal rate, regular rhythm and normal heart sounds. No murmur heard. Pulmonary/Chest: Effort normal and breath sounds normal. No respiratory distress. no wheezes. no rales. Abdominal: Midline scar above the umbilicus, healing well, no palpable hernia. No guarding or rebound. No acute abdomen on exam.  Musculoskeletal: Normal range of motion. exhibits no edema, tenderness or deformity. Neurological: alert and oriented to person, place, and time. Skin: Skin is warm and dry. No rash noted. No erythema. Psychiatric: behavior is normal.   Nursing note and vitals reviewed.     Diagnostic Study Results     Labs -     Recent Results (from the past 12 hour(s))   CBC W/O DIFF    Collection Time: 04/12/19  9:17 AM   Result Value Ref Range    WBC 5.0 3.6 - 11.0 K/uL    RBC 4.37 3.80 - 5.20 M/uL    HGB 12.1 11.5 - 16.0 g/dL    HCT 35.2 35.0 - 47.0 %    MCV 80.5 80.0 - 99.0 FL    MCH 27.7 26.0 - 34.0 PG    MCHC 34.4 30.0 - 36.5 g/dL    RDW 13.4 11.5 - 14.5 %    PLATELET 609 540 - 844 K/uL    MPV 9.6 8.9 - 12.9 FL    NRBC 0.8 (H) 0  WBC    ABSOLUTE NRBC 0.04 (H) 0.00 - 4.40 K/uL   METABOLIC PANEL, COMPREHENSIVE    Collection Time: 04/12/19  9:17 AM   Result Value Ref Range    Sodium 136 136 - 145 mmol/L Potassium 3.7 3.5 - 5.1 mmol/L    Chloride 100 97 - 108 mmol/L    CO2 27 21 - 32 mmol/L    Anion gap 9 5 - 15 mmol/L    Glucose 296 (H) 65 - 100 mg/dL    BUN 6 6 - 20 MG/DL    Creatinine 0.80 0.55 - 1.02 MG/DL    BUN/Creatinine ratio 8 (L) 12 - 20      GFR est AA >60 >60 ml/min/1.73m2    GFR est non-AA >60 >60 ml/min/1.73m2    Calcium 9.6 8.5 - 10.1 MG/DL    Bilirubin, total 0.5 0.2 - 1.0 MG/DL    ALT (SGPT) 54 12 - 78 U/L    AST (SGOT) 41 (H) 15 - 37 U/L    Alk. phosphatase 59 45 - 117 U/L    Protein, total 7.7 6.4 - 8.2 g/dL    Albumin 3.8 3.5 - 5.0 g/dL    Globulin 3.9 2.0 - 4.0 g/dL    A-G Ratio 1.0 (L) 1.1 - 2.2     URINALYSIS W/ RFLX MICROSCOPIC    Collection Time: 04/12/19  9:56 AM   Result Value Ref Range    Color DARK YELLOW      Appearance CLEAR CLEAR      Specific gravity 1.026 1.003 - 1.030      pH (UA) 5.5 5.0 - 8.0      Protein NEGATIVE  NEG mg/dL    Glucose >1,000 (A) NEG mg/dL    Ketone NEGATIVE  NEG mg/dL    Bilirubin NEGATIVE  NEG      Blood NEGATIVE  NEG      Urobilinogen 0.2 0.2 - 1.0 EU/dL    Nitrites NEGATIVE  NEG      Leukocyte Esterase NEGATIVE  NEG         Radiologic Studies -   CT ABD PELV WO CONT   Final Result   IMPRESSION:   The chronic small, supraumbilical ventral hernia now contains a portion of mid   transverse colon. This is a change from the prior studies. However, there is no   evidence of bowel obstruction or perforation or other complicating features. No   other significant change. CT Results  (Last 48 hours)               04/12/19 1231  CT ABD PELV WO CONT Final result    Impression:  IMPRESSION:   The chronic small, supraumbilical ventral hernia now contains a portion of mid   transverse colon. This is a change from the prior studies. However, there is no   evidence of bowel obstruction or perforation or other complicating features. No   other significant change.        Narrative:  EXAM: CT ABD PELV WO CONT       INDICATION: Abdominal pain       COMPARISON: Numerous prior CT scans, most recently 4/1/2019       CONTRAST:  None. TECHNIQUE:    Thin axial images were obtained through the abdomen and pelvis. Coronal and   sagittal reconstructions were generated. Oral contrast was administered. CT dose   reduction was achieved through use of a standardized protocol tailored for this   examination and automatic exposure control for dose modulation. The absence of intravenous contrast material reduces the sensitivity for   evaluation of the solid parenchymal organs of the abdomen. FINDINGS:    LUNG BASES: Clear. INCIDENTALLY IMAGED HEART AND MEDIASTINUM: Unremarkable. LIVER: No mass or biliary dilatation. Chronic hepatomegaly. GALLBLADDER: Status post cholecystectomy   SPLEEN: Splenomegaly with accessory splenule's, not significantly changed. Spleen measures 17 cm in craniocaudal dimension. PANCREAS: No mass or ductal dilatation. ADRENALS: Unremarkable. KIDNEYS/URETERS: No mass, calculus, or hydronephrosis. STOMACH: Unremarkable. SMALL BOWEL: No dilatation or wall thickening. COLON: No dilatation or wall thickening. There is evidence of a sigmoid colon   anastomosis. APPENDIX: Unremarkable. PERITONEUM: No ascites or pneumoperitoneum. RETROPERITONEUM: No lymphadenopathy or aortic aneurysm. REPRODUCTIVE ORGANS: Status post hysterectomy. No pelvic free fluid or mass. URINARY BLADDER: No mass or calculus. BONES: No destructive bone lesion. ADDITIONAL COMMENTS: The chronic fat-containing supraumbilical ventral hernia   now contains a portion of transverse colon, without evidence of obstruction. CXR Results  (Last 48 hours)    None            Medical Decision Making   I am the first provider for this patient. I reviewed the vital signs, available nursing notes, past medical history, past surgical history, family history and social history. Vital Signs-Reviewed the patient's vital signs.   Patient Vitals for the past 12 hrs:   Temp Pulse Resp BP SpO2   04/12/19 1430 -- -- -- 147/52 95 %   04/12/19 1200 -- -- -- (!) 132/98 95 %   04/12/19 1124 -- -- -- 139/86 99 %   04/12/19 0909 98.9 °F (37.2 °C) 88 16 150/85 98 %         Records Reviewed: Nursing Notes and Old Medical Records    Provider Notes (Medical Decision Making):   51-year-old female here with chronic abdominal pain. No acute abdomen on exam, labs reassuring. CT ordered from triage by initial provider. Low clinical suspicion for acute intra-abdominal pathology based on history and physical.  Ideally I would have preferred not to reimage the patient as she has had multiple recent CAT scans, CAT scans increase her risk for malignancy, and in the setting of chronic pain and normal labs and a reassuring exam, we likely should have considered for going to CAT scan at this time, but this was ordered prior to my evaluation of the patient. ED Course:   Initial assessment performed. The patients presenting problems have been discussed, and they are in agreement with the care plan formulated and outlined with them. I have encouraged them to ask questions as they arise throughout their visit. Time 1341. Discussed with Dr. Sonia Davey, general surgeon. Advises that if the hernia is easily reducible the patient can follow-up in the outpatient setting. No further intervention needed at this time. I agree with this plan. Think the patient is safe for discharge. Patient having rash, will hold on discharge at this time, treat rash. Critical Care Time:   None    Disposition:  DISCHARGE NOTE  Patients results have been reviewed with them. Patient and/or family have verbally conveyed their understanding and agreement of the patient's signs, symptoms, diagnosis, treatment and prognosis and additionally agree to follow up as recommended or return to the Emergency Room should their condition change or have any new concerns prior to their follow-up appointment.  Patient verbally agrees with the care-plan and verbally conveys that all of their questions have been answered. Discharge instructions have also been provided to the patient with some educational information regarding their diagnosis as well a list of reasons why they would want to return to the ER prior to their follow-up appointment should their condition change. PLAN:  1. Discharge Medication List as of 4/12/2019  2:47 PM        2. Follow-up Information     Follow up With Specialties Details Why Contact Info    Avis Tidwell NP Nurse Practitioner Schedule an appointment as soon as possible for a visit  [address] [facility]  [phone]      Your Surgeon as scheduled        [facility] EMERGENCY DEPT Emergency Medicine  If symptoms worsen [address]  [address] [facility]  [phone]          Return to ED if worse     Diagnosis     Clinical Impression:   1. Abdominal pain, generalized    2. Nausea and vomiting, intractability of vomiting not specified, unspecified vomiting type    3. Ventral hernia without obstruction or gangrene        Attestations:   This note was completed by Laurie Mohan DO

## 2019-04-12 NOTE — ED NOTES
Assumed care of this patient. She is alert and oriented x4 and placed on monitor x2. She presents today with periumbilical pain and concern for a hernia. She reports that she recently was diagnosed with C-diff and had diarrhea for a while. She states that her diarrhea is now more intermittent, but she has now developed pain around the umbilical area with a bulge. Prior hx of umbilical hernia.

## 2019-04-12 NOTE — ED NOTES
Abdominal binder placed. Reviewed discharge instructions. Pt declines discharge vitals at this time.

## 2019-04-12 NOTE — ED NOTES
Patient called out reporting that she was becoming itchy and hives on her left arm. MD ordered pepcid and benadryl and states she will need to be observed for a bit longer prior to discharge.

## 2019-08-29 ENCOUNTER — HOSPITAL ENCOUNTER (EMERGENCY)
Age: 49
Discharge: HOME | End: 2019-08-29
Attending: EMERGENCY MEDICINE
Payer: COMMERCIAL

## 2019-08-29 ENCOUNTER — APPOINTMENT (OUTPATIENT)
Dept: CT IMAGING | Age: 49
End: 2019-08-29
Attending: PHYSICIAN ASSISTANT
Payer: COMMERCIAL

## 2019-08-29 VITALS
OXYGEN SATURATION: 94 % | SYSTOLIC BLOOD PRESSURE: 154 MMHG | RESPIRATION RATE: 17 BRPM | DIASTOLIC BLOOD PRESSURE: 87 MMHG | HEART RATE: 91 BPM | TEMPERATURE: 98.1 F

## 2019-08-29 DIAGNOSIS — R19.7 DIARRHEA, UNSPECIFIED TYPE: Primary | ICD-10-CM

## 2019-08-29 LAB
ALBUMIN SERPL-MCNC: 3.9 G/DL (ref 3.5–5)
ALBUMIN/GLOB SERPL: 1 {RATIO} (ref 1.1–2.2)
ALP SERPL-CCNC: 65 U/L (ref 45–117)
ALT SERPL-CCNC: 45 U/L (ref 12–78)
ANION GAP SERPL CALC-SCNC: 9 MMOL/L (ref 5–15)
APPEARANCE UR: CLEAR
AST SERPL-CCNC: 50 U/L (ref 15–37)
BACTERIA URNS QL MICRO: NEGATIVE /HPF
BASOPHILS # BLD: 0 K/UL (ref 0–0.1)
BASOPHILS NFR BLD: 0 % (ref 0–1)
BILIRUB SERPL-MCNC: 0.4 MG/DL (ref 0.2–1)
BILIRUB UR QL: NEGATIVE
BUN SERPL-MCNC: 10 MG/DL (ref 6–20)
BUN/CREAT SERPL: 13 (ref 12–20)
CALCIUM SERPL-MCNC: 9.7 MG/DL (ref 8.5–10.1)
CHLORIDE SERPL-SCNC: 103 MMOL/L (ref 97–108)
CO2 SERPL-SCNC: 25 MMOL/L (ref 21–32)
COLOR UR: ABNORMAL
COMMENT, HOLDF: NORMAL
CREAT SERPL-MCNC: 0.75 MG/DL (ref 0.55–1.02)
DIFFERENTIAL METHOD BLD: ABNORMAL
EOSINOPHIL # BLD: 0.2 K/UL (ref 0–0.4)
EOSINOPHIL NFR BLD: 4 % (ref 0–7)
EPITH CASTS URNS QL MICRO: ABNORMAL /LPF
ERYTHROCYTE [DISTWIDTH] IN BLOOD BY AUTOMATED COUNT: 14.5 % (ref 11.5–14.5)
GLOBULIN SER CALC-MCNC: 3.8 G/DL (ref 2–4)
GLUCOSE SERPL-MCNC: 224 MG/DL (ref 65–100)
GLUCOSE UR STRIP.AUTO-MCNC: NEGATIVE MG/DL
HCT VFR BLD AUTO: 36.6 % (ref 35–47)
HEMOCCULT STL QL: NEGATIVE
HGB BLD-MCNC: 12.4 G/DL (ref 11.5–16)
HGB UR QL STRIP: NEGATIVE
HYALINE CASTS URNS QL MICRO: ABNORMAL /LPF (ref 0–5)
IMM GRANULOCYTES # BLD AUTO: 0 K/UL (ref 0–0.04)
IMM GRANULOCYTES NFR BLD AUTO: 1 % (ref 0–0.5)
KETONES UR QL STRIP.AUTO: NEGATIVE MG/DL
LEUKOCYTE ESTERASE UR QL STRIP.AUTO: ABNORMAL
LIPASE SERPL-CCNC: 195 U/L (ref 73–393)
LYMPHOCYTES # BLD: 1.6 K/UL (ref 0.8–3.5)
LYMPHOCYTES NFR BLD: 31 % (ref 12–49)
MCH RBC QN AUTO: 27.9 PG (ref 26–34)
MCHC RBC AUTO-ENTMCNC: 33.9 G/DL (ref 30–36.5)
MCV RBC AUTO: 82.2 FL (ref 80–99)
MONOCYTES # BLD: 0.3 K/UL (ref 0–1)
MONOCYTES NFR BLD: 6 % (ref 5–13)
NEUTS SEG # BLD: 3.1 K/UL (ref 1.8–8)
NEUTS SEG NFR BLD: 58 % (ref 32–75)
NITRITE UR QL STRIP.AUTO: NEGATIVE
NRBC # BLD: 0 K/UL (ref 0–0.01)
NRBC BLD-RTO: 0 PER 100 WBC
PH UR STRIP: 7 [PH] (ref 5–8)
PLATELET # BLD AUTO: 159 K/UL (ref 150–400)
PMV BLD AUTO: 9.4 FL (ref 8.9–12.9)
POTASSIUM SERPL-SCNC: 3.7 MMOL/L (ref 3.5–5.1)
PROT SERPL-MCNC: 7.7 G/DL (ref 6.4–8.2)
PROT UR STRIP-MCNC: NEGATIVE MG/DL
RBC # BLD AUTO: 4.45 M/UL (ref 3.8–5.2)
RBC #/AREA URNS HPF: ABNORMAL /HPF (ref 0–5)
SAMPLES BEING HELD,HOLD: NORMAL
SODIUM SERPL-SCNC: 137 MMOL/L (ref 136–145)
SP GR UR REFRACTOMETRY: <1.005 (ref 1–1.03)
UR CULT HOLD, URHOLD: NORMAL
UROBILINOGEN UR QL STRIP.AUTO: 0.2 EU/DL (ref 0.2–1)
WBC # BLD AUTO: 5.2 K/UL (ref 3.6–11)
WBC URNS QL MICRO: ABNORMAL /HPF (ref 0–4)

## 2019-08-29 PROCEDURE — 74011250637 HC RX REV CODE- 250/637: Performed by: PHYSICIAN ASSISTANT

## 2019-08-29 PROCEDURE — 99284 EMERGENCY DEPT VISIT MOD MDM: CPT

## 2019-08-29 PROCEDURE — 99285 EMERGENCY DEPT VISIT HI MDM: CPT

## 2019-08-29 PROCEDURE — 85025 COMPLETE CBC W/AUTO DIFF WBC: CPT

## 2019-08-29 PROCEDURE — 82272 OCCULT BLD FECES 1-3 TESTS: CPT

## 2019-08-29 PROCEDURE — 87086 URINE CULTURE/COLONY COUNT: CPT

## 2019-08-29 PROCEDURE — 74176 CT ABD & PELVIS W/O CONTRAST: CPT

## 2019-08-29 PROCEDURE — 81001 URINALYSIS AUTO W/SCOPE: CPT

## 2019-08-29 PROCEDURE — 83690 ASSAY OF LIPASE: CPT

## 2019-08-29 PROCEDURE — 80053 COMPREHEN METABOLIC PANEL: CPT

## 2019-08-29 PROCEDURE — 36415 COLL VENOUS BLD VENIPUNCTURE: CPT

## 2019-08-29 PROCEDURE — 96375 TX/PRO/DX INJ NEW DRUG ADDON: CPT

## 2019-08-29 PROCEDURE — 96374 THER/PROPH/DIAG INJ IV PUSH: CPT

## 2019-08-29 PROCEDURE — 74011250636 HC RX REV CODE- 250/636: Performed by: PHYSICIAN ASSISTANT

## 2019-08-29 PROCEDURE — 96361 HYDRATE IV INFUSION ADD-ON: CPT

## 2019-08-29 RX ORDER — HYDROCODONE BITARTRATE AND ACETAMINOPHEN 5; 325 MG/1; MG/1
1 TABLET ORAL
Status: DISCONTINUED | OUTPATIENT
Start: 2019-08-29 | End: 2019-08-29

## 2019-08-29 RX ORDER — DIPHENHYDRAMINE HCL 25 MG
25 CAPSULE ORAL
Status: COMPLETED | OUTPATIENT
Start: 2019-08-29 | End: 2019-08-29

## 2019-08-29 RX ORDER — MORPHINE SULFATE 2 MG/ML
4 INJECTION, SOLUTION INTRAMUSCULAR; INTRAVENOUS
Status: COMPLETED | OUTPATIENT
Start: 2019-08-29 | End: 2019-08-29

## 2019-08-29 RX ORDER — ONDANSETRON 2 MG/ML
4 INJECTION INTRAMUSCULAR; INTRAVENOUS
Status: COMPLETED | OUTPATIENT
Start: 2019-08-29 | End: 2019-08-29

## 2019-08-29 RX ORDER — DICYCLOMINE HYDROCHLORIDE 20 MG/1
20 TABLET ORAL
Status: COMPLETED | OUTPATIENT
Start: 2019-08-29 | End: 2019-08-29

## 2019-08-29 RX ORDER — DICYCLOMINE HYDROCHLORIDE 20 MG/1
20 TABLET ORAL
Qty: 20 TAB | Refills: 0 | Status: SHIPPED | OUTPATIENT
Start: 2019-08-29 | End: 2021-06-24

## 2019-08-29 RX ADMIN — DICYCLOMINE HYDROCHLORIDE 20 MG: 20 TABLET ORAL at 12:39

## 2019-08-29 RX ADMIN — ONDANSETRON 4 MG: 2 INJECTION INTRAMUSCULAR; INTRAVENOUS at 10:50

## 2019-08-29 RX ADMIN — MORPHINE SULFATE 4 MG: 2 INJECTION, SOLUTION INTRAMUSCULAR; INTRAVENOUS at 11:27

## 2019-08-29 RX ADMIN — DIPHENHYDRAMINE HYDROCHLORIDE 25 MG: 25 CAPSULE ORAL at 11:05

## 2019-08-29 RX ADMIN — SODIUM CHLORIDE 1000 ML: 900 INJECTION, SOLUTION INTRAVENOUS at 10:50

## 2019-08-29 RX ADMIN — DIPHENHYDRAMINE HYDROCHLORIDE 25 MG: 25 CAPSULE ORAL at 12:18

## 2019-08-29 NOTE — LETTER
[address] 
[address] [facility] [address] [id number] 
[phone] Work/School Note Date: 8/29/2019 To Whom It May concern: 
 
Rocky Perez was seen and treated today in the emergency room by the following provider(s): 
Attending Provider: Vanessa Portillo MD 
Physician Assistant: BEBA Daly. Rocky Perez may return to work on 8/30/19. Sincerely, BEBA Cueto

## 2019-08-29 NOTE — DISCHARGE INSTRUCTIONS

## 2019-08-29 NOTE — ED PROVIDER NOTES
51 y/o female with PMHx of DM, HTN, asthma, diverticulitis, colovaginal fistula, hepatic steatosis, GERD, and anxiety, presenting with complaint of abdominal pain. The patient reports an onset of diarrhea 5 days ago, and has noticed some blood in her stool which she attributes to a possible anal fissure. This morning however she began to experience nausea, vomiting, and periumbilical pain. The pain is 9/10, described as cramping, with radiation to the left side of her abdomen. The patient also notes that she saw her PCP last week and was treated for thrush with fluconazole, was also diagnosed with UTI treated with Cipro. Her PCP also prescribed Flagyl due to previous history of C diff. She endorses decreased appetite, dysuria, and headache which she attributes to vomiting. She denies fevers, chest pain, urinary frequency/urgency, hematuria or body aches. The history is provided by the patient.         Past Medical History:   Diagnosis Date    Anal fissure     Anisocoria     Asthma     LAST EPISODE     Back pain     Cerumen impaction     Chronic kidney disease     hx uti in past    Coagulation defects     ocassional rectal bleeding due to anal fissure    Colovaginal fistula     Diabetes (HCC)     NIDDM    Diabetes ([facility] [address]     Diverticulitis     Diverticulosis     Enlarged tonsils     Frequent UTI     GERD (gastroesophageal reflux disease)     H/O endoscopy     with dilation    HA (headache)     Hepatic steatosis     Hx of colonoscopy with polypectomy     benign    Hypertension     Ill-defined condition     FREQUENT HIVES    Ill-defined condition     HX ELEVATED LIVER ENZYMES    Morbid obesity (HCC)     Nausea & vomiting     during diverticulitis flare    Obesity     Otitis media     Pneumonia     about 15 yrs ago    Psychiatric disorder     ANXIETY    Recurrent tonsillitis     Sinusitis     Transfusion history ~ age 35    postop hysterectomy    Unspecified sleep apnea snores ( not diagnosed yet)     Urticaria     Urticaria        Past Surgical History:   Procedure Laterality Date    ABDOMEN SURGERY PROC UNLISTED  2018    [facility]    COLONOSCOPY N/A 3/28/2019    COLONOSCOPY performed by Obey Nugent MD at [address] [facility]     HX GI  12    LAPAROSCOPIC HAND ASSISTED  POSS OPEN SIGMOID COLECTOMY POSS TEMPORARY DIVERTING LOOP ILEOSTOMY;  (no illeostomy needed)    HX GYN           HX GYN      cervical conization    HX HEENT      SINUS SURGERY LEFT X2    HX HEENT      SINUS SURGERY ON RIGHT X2    HX OTHER SURGICAL      Sphincterotomy    HX PELVIC LAPAROSCOPY      HX EMMANUEL AND BSO      HX UROLOGICAL  12     CYSTOSCOPY INSERTION URETERAL CATHETERS - Cystoscopy Insertion of bilateral ureteral stents         Family History:   Problem Relation Age of Onset    Diabetes Mother     Cancer Mother         NON-HODGKINS LYMPHOMA    Anesth Problems Mother         PONV    Diabetes Father     Heart Disease Father         CAD - STENTS, PACEMAKER    Arrhythmia Father        Social History     Socioeconomic History    Marital status:      Spouse name: Not on file    Number of children: Not on file    Years of education: Not on file    Highest education level: Not on file   Occupational History    Not on file   Social Needs    Financial resource strain: Not on file    Food insecurity:     Worry: Not on file     Inability: Not on file    Transportation needs:     Medical: Not on file     Non-medical: Not on file   Tobacco Use    Smoking status: Never Smoker    Smokeless tobacco: Never Used   Substance and Sexual Activity    Alcohol use: Yes     Comment: Rarely    Drug use: No     Types: Prescription, OTC    Sexual activity: Never   Lifestyle    Physical activity:     Days per week: Not on file     Minutes per session: Not on file    Stress: Not on file   Relationships    Social connections:     Talks on phone: Not on file     Gets together: Not on file     Attends [employer] service: Not on file     Active member of club or organization: Not on file     Attends meetings of clubs or organizations: Not on file     Relationship status: Not on file    Intimate partner violence:     Fear of current or ex partner: Not on file     Emotionally abused: Not on file     Physically abused: Not on file     Forced sexual activity: Not on file   Other Topics Concern    Not on file   Social History Narrative    Not on file         ALLERGIES: Aspirin; Contrast agent [iodine]; Prilosec [omeprazole magnesium]; Codeine; Morphine; Fentanyl; Ketorolac; and Percocet [oxycodone-acetaminophen]    Review of Systems   Constitutional: Positive for appetite change (decreased). Negative for chills and fever. Respiratory: Negative for shortness of breath. Cardiovascular: Negative for chest pain. Gastrointestinal: Positive for abdominal pain, blood in stool, diarrhea, nausea and vomiting. Genitourinary: Positive for dysuria. Negative for frequency, hematuria and urgency. Musculoskeletal: Negative for arthralgias and myalgias. Neurological: Positive for headaches (patient attributes to vomiting). All other systems reviewed and are negative. Vitals:    08/29/19 1015 08/29/19 1032   BP:  (!) 162/101   Pulse: 83 81   Resp:  18   Temp:  98.7 °F (37.1 °C)   SpO2: 98% 97%            Physical Exam   Constitutional: She is oriented to person, place, and time. She appears well-developed and well-nourished. No distress. HENT:   Head: Normocephalic and atraumatic. Eyes: Conjunctivae and EOM are normal.   Neck: Normal range of motion. Neck supple. Cardiovascular: Normal rate, regular rhythm and normal heart sounds. Pulmonary/Chest: Effort normal and breath sounds normal.   Abdominal: Soft. Bowel sounds are normal. She exhibits no distension. There is tenderness (periumbilical; mild generalized).  There is no rebound and no guarding. No appreciable umbilical hernia. Genitourinary: Rectal exam shows external hemorrhoid, internal hemorrhoid and tenderness. Rectal exam shows no fissure and anal tone normal.   Genitourinary Comments: Brown stool on DULCE, no ruddy blood. Neurological: She is alert and oriented to person, place, and time. Skin: Skin is warm and dry. She is not diaphoretic. Nursing note and vitals reviewed. MDM  Number of Diagnoses or Management Options  Diarrhea, unspecified type:      Amount and/or Complexity of Data Reviewed  Clinical lab tests: ordered and reviewed  Tests in the radiology section of CPT®: ordered and reviewed    Patient Progress  Patient progress: stable         Procedures        51 y/o female with PMHx of DM, HTN, asthma, diverticulitis, colovaginal fistula, hepatic steatosis, GERD, and anxiety, presenting with complaint of abdominal pain. History and exam suggestive of diarrhea as adverse effect of antibiotics vs viral illness. The patient is already being treated for C diff. She is well-appearing in no acute distress, abdominal exam benign. Stool is hemoccult negative. CBC, CMP and lipase are unremarkable. CT abd/pelvis reveals no evidence of bowel obstruction, hernia, appendicitis, kidney stone, or other acute abnormalities. Safe for discharge home with Rx for Bentyl and instructions for PCP follow-up. Strict ED return precautions discussed and provided in writing at time of discharge. The patient verbalized understanding and agreement with this plan.

## 2019-08-29 NOTE — ED TRIAGE NOTES
Triage Note: patient is coming in with abdominal pain for the past couple of days. + vomiting and diarrhea and some blood in stool now.

## 2019-08-29 NOTE — ED NOTES
Pt states that she feels like she is getting a few hives on her right arm after morphine. Shilpa Lynn made aware. Larry Nelson

## 2019-08-30 LAB
BACTERIA SPEC CULT: NORMAL
CC UR VC: NORMAL
SERVICE CMNT-IMP: NORMAL

## 2019-09-11 ENCOUNTER — APPOINTMENT (OUTPATIENT)
Dept: CT IMAGING | Age: 49
End: 2019-09-11
Attending: PHYSICIAN ASSISTANT
Payer: COMMERCIAL

## 2019-09-11 ENCOUNTER — HOSPITAL ENCOUNTER (OUTPATIENT)
Age: 49
Setting detail: OBSERVATION
Discharge: HOME | End: 2019-09-13
Attending: EMERGENCY MEDICINE | Admitting: FAMILY MEDICINE
Payer: COMMERCIAL

## 2019-09-11 DIAGNOSIS — K62.89 PROCTITIS: ICD-10-CM

## 2019-09-11 DIAGNOSIS — A41.9 SEPSIS, DUE TO UNSPECIFIED ORGANISM: ICD-10-CM

## 2019-09-11 DIAGNOSIS — N39.0 URINARY TRACT INFECTION WITH HEMATURIA, SITE UNSPECIFIED: Primary | ICD-10-CM

## 2019-09-11 DIAGNOSIS — R31.9 URINARY TRACT INFECTION WITH HEMATURIA, SITE UNSPECIFIED: Primary | ICD-10-CM

## 2019-09-11 LAB
ALBUMIN SERPL-MCNC: 3.9 G/DL (ref 3.5–5)
ALBUMIN/GLOB SERPL: 1.1 {RATIO} (ref 1.1–2.2)
ALP SERPL-CCNC: 71 U/L (ref 45–117)
ALT SERPL-CCNC: 52 U/L (ref 12–78)
ANION GAP SERPL CALC-SCNC: 10 MMOL/L (ref 5–15)
APPEARANCE UR: ABNORMAL
AST SERPL-CCNC: 62 U/L (ref 15–37)
BACTERIA URNS QL MICRO: ABNORMAL /HPF
BASOPHILS # BLD: 0 K/UL (ref 0–0.1)
BASOPHILS NFR BLD: 1 % (ref 0–1)
BILIRUB SERPL-MCNC: 0.5 MG/DL (ref 0.2–1)
BILIRUB UR QL: NEGATIVE
BUN SERPL-MCNC: 7 MG/DL (ref 6–20)
BUN/CREAT SERPL: 9 (ref 12–20)
CALCIUM SERPL-MCNC: 9.5 MG/DL (ref 8.5–10.1)
CHLORIDE SERPL-SCNC: 103 MMOL/L (ref 97–108)
CO2 SERPL-SCNC: 24 MMOL/L (ref 21–32)
COLOR UR: ABNORMAL
CREAT SERPL-MCNC: 0.77 MG/DL (ref 0.55–1.02)
DIFFERENTIAL METHOD BLD: NORMAL
EOSINOPHIL # BLD: 0.2 K/UL (ref 0–0.4)
EOSINOPHIL NFR BLD: 4 % (ref 0–7)
EPITH CASTS URNS QL MICRO: ABNORMAL /LPF
ERYTHROCYTE [DISTWIDTH] IN BLOOD BY AUTOMATED COUNT: 14 % (ref 11.5–14.5)
GLOBULIN SER CALC-MCNC: 3.7 G/DL (ref 2–4)
GLUCOSE BLD STRIP.AUTO-MCNC: 166 MG/DL (ref 65–100)
GLUCOSE BLD STRIP.AUTO-MCNC: 190 MG/DL (ref 65–100)
GLUCOSE SERPL-MCNC: 234 MG/DL (ref 65–100)
GLUCOSE UR STRIP.AUTO-MCNC: NEGATIVE MG/DL
HCT VFR BLD AUTO: 38.2 % (ref 35–47)
HGB BLD-MCNC: 12.9 G/DL (ref 11.5–16)
HGB UR QL STRIP: ABNORMAL
IMM GRANULOCYTES # BLD AUTO: 0 K/UL (ref 0–0.04)
IMM GRANULOCYTES NFR BLD AUTO: 0 % (ref 0–0.5)
KETONES UR QL STRIP.AUTO: NEGATIVE MG/DL
LACTATE BLD-SCNC: 1.08 MMOL/L (ref 0.4–2)
LACTATE BLD-SCNC: 2.03 MMOL/L (ref 0.4–2)
LACTATE BLD-SCNC: 2.92 MMOL/L (ref 0.4–2)
LEUKOCYTE ESTERASE UR QL STRIP.AUTO: ABNORMAL
LIPASE SERPL-CCNC: 144 U/L (ref 73–393)
LYMPHOCYTES # BLD: 1.2 K/UL (ref 0.8–3.5)
LYMPHOCYTES NFR BLD: 20 % (ref 12–49)
MCH RBC QN AUTO: 27.6 PG (ref 26–34)
MCHC RBC AUTO-ENTMCNC: 33.8 G/DL (ref 30–36.5)
MCV RBC AUTO: 81.8 FL (ref 80–99)
MONOCYTES # BLD: 0.3 K/UL (ref 0–1)
MONOCYTES NFR BLD: 5 % (ref 5–13)
MUCOUS THREADS URNS QL MICRO: ABNORMAL /LPF
NEUTS SEG # BLD: 4.1 K/UL (ref 1.8–8)
NEUTS SEG NFR BLD: 70 % (ref 32–75)
NITRITE UR QL STRIP.AUTO: NEGATIVE
NRBC # BLD: 0 K/UL (ref 0–0.01)
NRBC BLD-RTO: 0 PER 100 WBC
PH UR STRIP: 6 [PH] (ref 5–8)
PLATELET # BLD AUTO: 180 K/UL (ref 150–400)
PMV BLD AUTO: 10.5 FL (ref 8.9–12.9)
POTASSIUM SERPL-SCNC: 3.9 MMOL/L (ref 3.5–5.1)
PROT SERPL-MCNC: 7.6 G/DL (ref 6.4–8.2)
PROT UR STRIP-MCNC: NEGATIVE MG/DL
RBC # BLD AUTO: 4.67 M/UL (ref 3.8–5.2)
RBC #/AREA URNS HPF: ABNORMAL /HPF (ref 0–5)
SERVICE CMNT-IMP: ABNORMAL
SERVICE CMNT-IMP: ABNORMAL
SODIUM SERPL-SCNC: 137 MMOL/L (ref 136–145)
SP GR UR REFRACTOMETRY: 1.01 (ref 1–1.03)
UR CULT HOLD, URHOLD: NORMAL
UROBILINOGEN UR QL STRIP.AUTO: 0.2 EU/DL (ref 0.2–1)
WBC # BLD AUTO: 5.8 K/UL (ref 3.6–11)
WBC URNS QL MICRO: ABNORMAL /HPF (ref 0–4)

## 2019-09-11 PROCEDURE — 99285 EMERGENCY DEPT VISIT HI MDM: CPT

## 2019-09-11 PROCEDURE — 96372 THER/PROPH/DIAG INJ SC/IM: CPT

## 2019-09-11 PROCEDURE — 36415 COLL VENOUS BLD VENIPUNCTURE: CPT

## 2019-09-11 PROCEDURE — 96361 HYDRATE IV INFUSION ADD-ON: CPT

## 2019-09-11 PROCEDURE — 74011250637 HC RX REV CODE- 250/637: Performed by: FAMILY MEDICINE

## 2019-09-11 PROCEDURE — 74176 CT ABD & PELVIS W/O CONTRAST: CPT

## 2019-09-11 PROCEDURE — 99218 HC RM OBSERVATION: CPT

## 2019-09-11 PROCEDURE — 83605 ASSAY OF LACTIC ACID: CPT

## 2019-09-11 PROCEDURE — 96376 TX/PRO/DX INJ SAME DRUG ADON: CPT

## 2019-09-11 PROCEDURE — 85025 COMPLETE CBC W/AUTO DIFF WBC: CPT

## 2019-09-11 PROCEDURE — 80053 COMPREHEN METABOLIC PANEL: CPT

## 2019-09-11 PROCEDURE — 81001 URINALYSIS AUTO W/SCOPE: CPT

## 2019-09-11 PROCEDURE — 74011250636 HC RX REV CODE- 250/636: Performed by: EMERGENCY MEDICINE

## 2019-09-11 PROCEDURE — 74011000258 HC RX REV CODE- 258: Performed by: PHYSICIAN ASSISTANT

## 2019-09-11 PROCEDURE — 74011250636 HC RX REV CODE- 250/636: Performed by: PHYSICIAN ASSISTANT

## 2019-09-11 PROCEDURE — 82962 GLUCOSE BLOOD TEST: CPT

## 2019-09-11 PROCEDURE — 74011250636 HC RX REV CODE- 250/636: Performed by: FAMILY MEDICINE

## 2019-09-11 PROCEDURE — 83690 ASSAY OF LIPASE: CPT

## 2019-09-11 PROCEDURE — 87086 URINE CULTURE/COLONY COUNT: CPT

## 2019-09-11 PROCEDURE — 74011000250 HC RX REV CODE- 250: Performed by: PHYSICIAN ASSISTANT

## 2019-09-11 PROCEDURE — 96365 THER/PROPH/DIAG IV INF INIT: CPT

## 2019-09-11 PROCEDURE — 74011250637 HC RX REV CODE- 250/637: Performed by: PHYSICIAN ASSISTANT

## 2019-09-11 PROCEDURE — 96375 TX/PRO/DX INJ NEW DRUG ADDON: CPT

## 2019-09-11 PROCEDURE — 74011636637 HC RX REV CODE- 636/637: Performed by: FAMILY MEDICINE

## 2019-09-11 RX ORDER — SODIUM CHLORIDE 0.9 % (FLUSH) 0.9 %
5-40 SYRINGE (ML) INJECTION AS NEEDED
Status: DISCONTINUED | OUTPATIENT
Start: 2019-09-11 | End: 2019-09-13 | Stop reason: HOSPADM

## 2019-09-11 RX ORDER — IBUPROFEN 400 MG/1
400 TABLET ORAL
Status: DISCONTINUED | OUTPATIENT
Start: 2019-09-11 | End: 2019-09-13 | Stop reason: HOSPADM

## 2019-09-11 RX ORDER — SIMVASTATIN 20 MG/1
20 TABLET, FILM COATED ORAL
COMMUNITY
End: 2020-09-29

## 2019-09-11 RX ORDER — SIMVASTATIN 20 MG/1
20 TABLET, FILM COATED ORAL
Status: DISCONTINUED | OUTPATIENT
Start: 2019-09-11 | End: 2019-09-13 | Stop reason: HOSPADM

## 2019-09-11 RX ORDER — MORPHINE SULFATE 2 MG/ML
2 INJECTION, SOLUTION INTRAMUSCULAR; INTRAVENOUS
Status: DISCONTINUED | OUTPATIENT
Start: 2019-09-11 | End: 2019-09-12

## 2019-09-11 RX ORDER — CHOLECALCIFEROL (VITAMIN D3) 125 MCG
5 CAPSULE ORAL
COMMUNITY
End: 2021-03-03

## 2019-09-11 RX ORDER — PHENAZOPYRIDINE HYDROCHLORIDE 100 MG/1
200 TABLET, FILM COATED ORAL
Status: DISCONTINUED | OUTPATIENT
Start: 2019-09-11 | End: 2019-09-13 | Stop reason: HOSPADM

## 2019-09-11 RX ORDER — ALPRAZOLAM 0.25 MG/1
.125-.25 TABLET ORAL
Status: DISCONTINUED | OUTPATIENT
Start: 2019-09-11 | End: 2019-09-13 | Stop reason: HOSPADM

## 2019-09-11 RX ORDER — OMEPRAZOLE 20 MG/1
40 CAPSULE, DELAYED RELEASE ORAL
Status: DISCONTINUED | OUTPATIENT
Start: 2019-09-12 | End: 2019-09-11 | Stop reason: CLARIF

## 2019-09-11 RX ORDER — ACETAMINOPHEN 325 MG/1
650 TABLET ORAL
Status: DISCONTINUED | OUTPATIENT
Start: 2019-09-11 | End: 2019-09-13 | Stop reason: HOSPADM

## 2019-09-11 RX ORDER — LIDOCAINE HYDROCHLORIDE 20 MG/ML
JELLY TOPICAL
Status: COMPLETED | OUTPATIENT
Start: 2019-09-11 | End: 2019-09-11

## 2019-09-11 RX ORDER — INSULIN LISPRO 100 [IU]/ML
INJECTION, SOLUTION INTRAVENOUS; SUBCUTANEOUS
Status: DISCONTINUED | OUTPATIENT
Start: 2019-09-11 | End: 2019-09-13 | Stop reason: HOSPADM

## 2019-09-11 RX ORDER — OMEPRAZOLE 20 MG/1
40 CAPSULE, DELAYED RELEASE ORAL
COMMUNITY

## 2019-09-11 RX ORDER — ONDANSETRON 2 MG/ML
4 INJECTION INTRAMUSCULAR; INTRAVENOUS
Status: DISCONTINUED | OUTPATIENT
Start: 2019-09-11 | End: 2019-09-13 | Stop reason: HOSPADM

## 2019-09-11 RX ORDER — LORAZEPAM 2 MG/ML
1 INJECTION INTRAMUSCULAR
Status: DISCONTINUED | OUTPATIENT
Start: 2019-09-11 | End: 2019-09-13 | Stop reason: HOSPADM

## 2019-09-11 RX ORDER — DIPHENHYDRAMINE HYDROCHLORIDE 50 MG/ML
12.5 INJECTION, SOLUTION INTRAMUSCULAR; INTRAVENOUS
Status: DISCONTINUED | OUTPATIENT
Start: 2019-09-11 | End: 2019-09-13 | Stop reason: HOSPADM

## 2019-09-11 RX ORDER — SODIUM CHLORIDE 0.9 % (FLUSH) 0.9 %
5-40 SYRINGE (ML) INJECTION EVERY 8 HOURS
Status: DISCONTINUED | OUTPATIENT
Start: 2019-09-11 | End: 2019-09-13 | Stop reason: HOSPADM

## 2019-09-11 RX ORDER — METOCLOPRAMIDE HYDROCHLORIDE 5 MG/ML
5 INJECTION INTRAMUSCULAR; INTRAVENOUS EVERY 6 HOURS
Status: DISCONTINUED | OUTPATIENT
Start: 2019-09-11 | End: 2019-09-13 | Stop reason: HOSPADM

## 2019-09-11 RX ORDER — ONDANSETRON 2 MG/ML
4 INJECTION INTRAMUSCULAR; INTRAVENOUS
Status: COMPLETED | OUTPATIENT
Start: 2019-09-11 | End: 2019-09-11

## 2019-09-11 RX ORDER — DICYCLOMINE HYDROCHLORIDE 20 MG/1
20 TABLET ORAL
Status: DISCONTINUED | OUTPATIENT
Start: 2019-09-11 | End: 2019-09-13 | Stop reason: HOSPADM

## 2019-09-11 RX ORDER — MAGNESIUM SULFATE 100 %
4 CRYSTALS MISCELLANEOUS AS NEEDED
Status: DISCONTINUED | OUTPATIENT
Start: 2019-09-11 | End: 2019-09-13 | Stop reason: HOSPADM

## 2019-09-11 RX ORDER — VANCOMYCIN HYDROCHLORIDE 250 MG/5ML
125 POWDER, FOR SOLUTION ORAL EVERY 6 HOURS
Status: DISCONTINUED | OUTPATIENT
Start: 2019-09-11 | End: 2019-09-13 | Stop reason: HOSPADM

## 2019-09-11 RX ORDER — INSULIN GLARGINE 100 [IU]/ML
30 INJECTION, SOLUTION SUBCUTANEOUS
Status: DISCONTINUED | OUTPATIENT
Start: 2019-09-11 | End: 2019-09-13 | Stop reason: HOSPADM

## 2019-09-11 RX ORDER — PANTOPRAZOLE SODIUM 40 MG/1
40 TABLET, DELAYED RELEASE ORAL
Status: DISCONTINUED | OUTPATIENT
Start: 2019-09-12 | End: 2019-09-13 | Stop reason: HOSPADM

## 2019-09-11 RX ORDER — LANOLIN ALCOHOL/MO/W.PET/CERES
5 CREAM (GRAM) TOPICAL
Status: DISCONTINUED | OUTPATIENT
Start: 2019-09-11 | End: 2019-09-13 | Stop reason: HOSPADM

## 2019-09-11 RX ORDER — INSULIN GLARGINE 100 [IU]/ML
38 INJECTION, SOLUTION SUBCUTANEOUS
Status: DISCONTINUED | OUTPATIENT
Start: 2019-09-11 | End: 2019-09-11

## 2019-09-11 RX ORDER — ALPRAZOLAM 0.25 MG/1
.125-.25 TABLET ORAL
COMMUNITY
End: 2021-01-05

## 2019-09-11 RX ORDER — DICYCLOMINE HYDROCHLORIDE 10 MG/ML
10 INJECTION INTRAMUSCULAR
Status: COMPLETED | OUTPATIENT
Start: 2019-09-11 | End: 2019-09-11

## 2019-09-11 RX ADMIN — CEFTRIAXONE SODIUM 1 G: 1 INJECTION, POWDER, FOR SOLUTION INTRAMUSCULAR; INTRAVENOUS at 14:09

## 2019-09-11 RX ADMIN — METOCLOPRAMIDE 5 MG: 5 INJECTION, SOLUTION INTRAMUSCULAR; INTRAVENOUS at 18:42

## 2019-09-11 RX ADMIN — SODIUM CHLORIDE 1000 ML: 900 INJECTION, SOLUTION INTRAVENOUS at 08:33

## 2019-09-11 RX ADMIN — LIDOCAINE HYDROCHLORIDE: 20 JELLY TOPICAL at 08:42

## 2019-09-11 RX ADMIN — DICYCLOMINE HYDROCHLORIDE 10 MG: 20 INJECTION, SOLUTION INTRAMUSCULAR at 11:55

## 2019-09-11 RX ADMIN — DIPHENHYDRAMINE HYDROCHLORIDE 12.5 MG: 50 INJECTION, SOLUTION INTRAMUSCULAR; INTRAVENOUS at 20:21

## 2019-09-11 RX ADMIN — ONDANSETRON 4 MG: 2 INJECTION INTRAMUSCULAR; INTRAVENOUS at 08:33

## 2019-09-11 RX ADMIN — ONDANSETRON 4 MG: 2 INJECTION INTRAMUSCULAR; INTRAVENOUS at 16:20

## 2019-09-11 RX ADMIN — INSULIN LISPRO 2 UNITS: 100 INJECTION, SOLUTION INTRAVENOUS; SUBCUTANEOUS at 18:42

## 2019-09-11 RX ADMIN — SODIUM CHLORIDE 500 ML: 900 INJECTION, SOLUTION INTRAVENOUS at 09:43

## 2019-09-11 RX ADMIN — Medication 10 ML: at 22:33

## 2019-09-11 RX ADMIN — MORPHINE SULFATE 2 MG: 2 INJECTION, SOLUTION INTRAMUSCULAR; INTRAVENOUS at 20:22

## 2019-09-11 RX ADMIN — FAMOTIDINE 20 MG: 10 INJECTION, SOLUTION INTRAVENOUS at 08:33

## 2019-09-11 RX ADMIN — INSULIN GLARGINE 30 UNITS: 100 INJECTION, SOLUTION SUBCUTANEOUS at 22:31

## 2019-09-11 RX ADMIN — MORPHINE SULFATE 2 MG: 2 INJECTION, SOLUTION INTRAMUSCULAR; INTRAVENOUS at 15:38

## 2019-09-11 RX ADMIN — SODIUM CHLORIDE 1000 ML: 900 INJECTION, SOLUTION INTRAVENOUS at 11:37

## 2019-09-11 RX ADMIN — DIPHENHYDRAMINE HYDROCHLORIDE 12.5 MG: 50 INJECTION, SOLUTION INTRAMUSCULAR; INTRAVENOUS at 16:21

## 2019-09-11 RX ADMIN — MELATONIN 4.5 MG: at 22:31

## 2019-09-11 RX ADMIN — SIMVASTATIN 20 MG: 20 TABLET, FILM COATED ORAL at 22:31

## 2019-09-11 RX ADMIN — VANCOMYCIN HYDROCHLORIDE 125 MG: KIT at 18:43

## 2019-09-11 RX ADMIN — DICYCLOMINE HYDROCHLORIDE 20 MG: 20 TABLET ORAL at 20:21

## 2019-09-11 RX ADMIN — LIDOCAINE HYDROCHLORIDE 40 ML: 20 SOLUTION ORAL; TOPICAL at 09:23

## 2019-09-11 NOTE — ED TRIAGE NOTES
Patient reports taking an antibiotic for possible UTI. She now reports frequent loose stools. History of C.diff. Reports also possible anal fissure related to frequent diarrhea. Daughter has dropped her off today, will call for a ride once discharged.

## 2019-09-11 NOTE — PROGRESS NOTES
Admission Medication Reconciliation:    Information obtained from:  Patient/RxQuery/Food [employer] in [place], [facility] [address] available¹:  YES    Comments/Recommendations: Updated PTA meds/reviewed patient's allergies. 1)  Patient is an excellent historian. Last doses recorded below. 2)  Medication changes (since last review): Added  - Simvastatin  - Xanax  - Melatonin  - Omeprazole    Adjusted  - Lantus now 38 units QHS (vs. 10 mg QHS)  - Zoloft to 200 mg QHS (vs 100 mg daily)    Removed  - Estradiol 1 mg daily (patient reports being off for 2 weeks. Has a f/u appointment with OBGYN)   - Amlodipine    3)  Reviewed allergies     ¹RxQuery pharmacy benefit data reflects medications filled and processed through the patient's insurance, however   this data does NOT capture whether the medication was picked up or is currently being taken by the patient. Allergies:  Aspirin; Contrast agent [iodine]; Prilosec [omeprazole magnesium]; Ketorolac; Codeine;  Fentanyl; Morphine; and Percocet [oxycodone-acetaminophen]    Significant PMH/Disease States:   Past Medical History:   Diagnosis Date    Anal fissure     Anisocoria     Asthma     LAST EPISODE     Back pain     Cerumen impaction     Chronic kidney disease     hx uti in past    Coagulation defects     ocassional rectal bleeding due to anal fissure    Colovaginal fistula     Diabetes (HCC)     NIDDM    Diabetes (HCC)     Diverticulitis     Diverticulosis     Enlarged tonsils     Frequent UTI     GERD (gastroesophageal reflux disease)     H/O endoscopy     with dilation    HA (headache)     Hepatic steatosis     Hx of colonoscopy with polypectomy     benign    Hypertension     Ill-defined condition     FREQUENT HIVES    Ill-defined condition     HX ELEVATED LIVER ENZYMES    Morbid obesity (HCC)     Nausea & vomiting     during diverticulitis flare    Obesity     Otitis media     Pneumonia     about 15 yrs ago    Psychiatric disorder     ANXIETY Recurrent tonsillitis     Sinusitis     Transfusion history ~ age 35    postop hysterectomy    Unspecified sleep apnea     snores ( not diagnosed yet)     Urticaria     Urticaria      Chief Complaint for this Admission:    Chief Complaint   Patient presents with    Diarrhea     Prior to Admission Medications:   Prior to Admission Medications   Prescriptions Last Dose Informant Patient Reported? Taking? ALPRAZolam (XANAX) 0.25 mg tablet 9/10/2019 at HS  Yes Yes   Sig: Take 0.125-0.25 mg by mouth every twelve (12) hours as needed for Anxiety or Sleep. EPINEPHrine (EPIPEN) 0.3 mg/0.3 mL (1:1,000) injection  Self No Yes   Si.3 mL by IntraMUSCular route once as needed for up to 1 dose. albuterol (PROVENTIL, VENTOLIN) 90 mcg/Actuation inhaler  Self Yes Yes   Sig: Take 2 Puffs by inhalation every six (6) hours as needed. dicyclomine (BENTYL) 20 mg tablet   No Yes   Sig: Take 1 Tab by mouth every six (6) hours as needed (abdominal cramps) for up to 20 doses. insulin glargine (LANTUS U-100 INSULIN) 100 unit/mL injection 9/10/2019 at HS  Yes Yes   Si Units by SubCUTAneous route nightly. losartan-hydroCHLOROthiazide (HYZAAR) 100-12.5 mg per tablet 2019 at AM Self Yes Yes   Sig: Take 1 Tab by mouth daily. melatonin tab tablet 9/10/2019 at HS  Yes Yes   Sig: Take 5 mg by mouth nightly. omeprazole (PRILOSEC) 20 mg capsule 2019 at [facility]  Yes Yes   Sig: Take 40 mg by mouth Daily (before breakfast). ondansetron (ZOFRAN ODT) 4 mg disintegrating tablet   No Yes   Sig: Take 1 Tab by mouth every eight (8) hours as needed for Nausea. sertraline (ZOLOFT) 100 mg tablet 9/10/2019 at HS  Yes Yes   Sig: Take 200 mg by mouth nightly. simvastatin (ZOCOR) 20 mg tablet 9/10/2019 at HS  Yes Yes   Sig: Take 20 mg by mouth nightly.       Facility-Administered Medications: None       Please contact the main inpatient pharmacy with any questions or concerns at [phone] and we will direct you to the clinical pharmacist covering this patient's care while in-house.    Yanira Peralta, MSOESD

## 2019-09-11 NOTE — H&P
History and Physical  Primary Care Provider: Lena Garg NP    Subjective:     Aleksander Chiu is a 52 y.o. female who presents with dysuria and rectal pain. Onset of symptoms was started about 10-14 ago and started with dysuria/UTI- she was put on cipro for 10 days- followed by nystatin for thrush and flagyl for C Diff prophylaxis- She has had some diarrhea for a few days- finished her flagyl yesterday- and presents today with significant rectal pain. She also worries about C Diff- which she has had in the past- also worries about a rectal fissure. The pain is located in the perineal area- she says that is feels like in the vaginal and rectal area- She is also concerned that she may be developing another colovaginal fistula- for which she had a sigmoid colectomy done in the past by Dr Ghada Butcher. . Patient describes the pain as severe- continuous, non radiating, and worse with bowel movements and emptying her bladder. She also reports mild diffuse abdominal pain and nausea/vomiting-     Review of Systems:    A comprehensive review of systems was negative except for that written in the History of Present Illness.      Past Medical History:   Diagnosis Date    Anal fissure     Anisocoria     Asthma     LAST EPISODE     Back pain     Cerumen impaction     Chronic kidney disease     hx uti in past    Coagulation defects     ocassional rectal bleeding due to anal fissure    Colovaginal fistula     Diabetes (HCC)     NIDDM    Diabetes ([facility] [address]     Diverticulitis     Diverticulosis     Enlarged tonsils     Frequent UTI     GERD (gastroesophageal reflux disease)     H/O endoscopy     with dilation    HA (headache)     Hepatic steatosis     Hx of colonoscopy with polypectomy     benign    Hypertension     Ill-defined condition     FREQUENT HIVES    Ill-defined condition     HX ELEVATED LIVER ENZYMES    Morbid obesity (HCC)     Nausea & vomiting     during diverticulitis flare    Obesity  Otitis media     Pneumonia     about 15 yrs ago    Psychiatric disorder     ANXIETY    Recurrent tonsillitis     Sinusitis     Transfusion history ~ age 35    postop hysterectomy    Unspecified sleep apnea     snores ( not diagnosed yet)     Urticaria     Urticaria       Past Surgical History:   Procedure Laterality Date    ABDOMEN SURGERY PROC UNLISTED  2018    hernia repair at [facility]    COLONOSCOPY N/A 3/28/2019    COLONOSCOPY performed by Carmen Gill MD at [address] [facility]    [place].  HX GI  12    LAPAROSCOPIC HAND ASSISTED  POSS OPEN SIGMOID COLECTOMY POSS TEMPORARY DIVERTING LOOP ILEOSTOMY;  (no illeostomy needed)    HX GYN           HX GYN      cervical conization    HX HEENT      SINUS SURGERY LEFT X2    HX HEENT      SINUS SURGERY ON RIGHT X2    HX OTHER SURGICAL      Sphincterotomy    HX PELVIC LAPAROSCOPY      HX EMMANUEL AND BSO      HX UROLOGICAL  12     CYSTOSCOPY INSERTION URETERAL CATHETERS - Cystoscopy Insertion of bilateral ureteral stents     Prior to Admission medications    Medication Sig Start Date End Date Taking? Authorizing Provider   ALPRAZolam Hye Seek) 0.25 mg tablet Take 0.125-0.25 mg by mouth every twelve (12) hours as needed for Anxiety or Sleep. Yes Provider, Historical   melatonin tab tablet Take 5 mg by mouth nightly. Yes Provider, Historical   simvastatin (ZOCOR) 20 mg tablet Take 20 mg by mouth nightly. Yes Provider, Historical   omeprazole (PRILOSEC) 20 mg capsule Take 40 mg by mouth Daily (before breakfast). Yes Provider, Historical   dicyclomine (BENTYL) 20 mg tablet Take 1 Tab by mouth every six (6) hours as needed (abdominal cramps) for up to 20 doses. 19  Yes Minor Larson PA   ondansetron (ZOFRAN ODT) 4 mg disintegrating tablet Take 1 Tab by mouth every eight (8) hours as needed for Nausea.  19  Yes Damion Burnett MD   insulin glargine (LANTUS U-100 INSULIN) 100 unit/mL injection 38 Units by SubCUTAneous route nightly. Yes Provider, Historical   sertraline (ZOLOFT) 100 mg tablet Take 200 mg by mouth nightly. Yes Provider, Historical   losartan-hydroCHLOROthiazide (HYZAAR) 100-12.5 mg per tablet Take 1 Tab by mouth daily. Yes Provider, Historical   EPINEPHrine (EPIPEN) 0.3 mg/0.3 mL (1:1,000) injection 0.3 mL by IntraMUSCular route once as needed for up to 1 dose. 11/2/15  Yes Fabiana Yao MD   albuterol (PROVENTIL, VENTOLIN) 90 mcg/Actuation inhaler Take 2 Puffs by inhalation every six (6) hours as needed. Yes Other, MD [place]     Allergies   Allergen Reactions    Aspirin Hives and Shortness of Breath    Codeine Hives, Itching and Angioedema     Pt had itching in mouth, on face and lips    Contrast Agent [Iodine] Hives, Itching and Angioedema     Pt. had itching in mouth, on face and lips after IV contrast with the last exam.  Benadryl was given    Percocet [Oxycodone-Acetaminophen] Hives, Itching and Angioedema     Pt had itching in mouth, on face and lips    Prilosec [Omeprazole Magnesium] Anaphylaxis     CHERRY FLAVORED ODT; PT TAKES REGULAR PRILOSEC AND IS OK    Ketorolac Rash     \"makes my eyes spasm and causes rash on my hands\" and \"makes my skin itch and makes me nervous\"    Fentanyl Rash and Itching    Morphine Itching     Severe itching. 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 Patient ambulates independently.    Smoking history: none  Alcohol history: rare- social  Surrogate decision maker- Terryann Felty- her mother- [phone]    Objective:       Physical Exam:   Visit Vitals  /90   Pulse 88   Temp 98.1 °F (36.7 °C)   Resp 16   Ht 5' 2\" (1.575 m)   Wt 95.3 kg (210 lb)   SpO2 96%   BMI 38.41 kg/m²     General:  Alert, cooperative, no distress, appears stated age. Head:  Normocephalic, without obvious abnormality, atraumatic. Eyes:  Conjunctivae/corneas clear. PERRL, EOMs intact. Fundi benign. Throat: Lips, mucosa, and tongue normal. Teeth and gums normal.   Neck: Supple, symmetrical, trachea midline, no adenopathy, thyroid:.   Back:   Symmetric, no curvature. ROM normal. No CVA tenderness. Lungs:   Clear to auscultation bilaterally. Chest wall:  No tenderness or deformity. Heart:  Regular rate and rhythm, S1, S2 normal, no murmur, click, rub or gallop. Abdomen:   Mild distension, diffuse mild tenderness. Rectal:  Exam deferred due to pain   Extremities: Extremities normal, atraumatic, no cyanosis or edema. Pulses: 2+ and symmetric all extremities. Skin: Skin color, texture, turgor normal. No rashes or lesions. Neurologic: CNII-XII intact. Normal strength, sensation and reflexes throughout. Data Review: All diagnostic labs and studies have been reviewed. Recent Results (from the past 24 hour(s))   CBC WITH AUTOMATED DIFF    Collection Time: 09/11/19  8:19 AM   Result Value Ref Range    WBC 5.8 3.6 - 11.0 K/uL    RBC 4.67 3.80 - 5.20 M/uL    HGB 12.9 11.5 - 16.0 g/dL    HCT 38.2 35.0 - 47.0 %    MCV 81.8 80.0 - 99.0 FL    MCH 27.6 26.0 - 34.0 PG    MCHC 33.8 30.0 - 36.5 g/dL    RDW 14.0 11.5 - 14.5 %    PLATELET 145 956 - 961 K/uL    MPV 10.5 8.9 - 12.9 FL    NRBC 0.0 0  WBC    ABSOLUTE NRBC 0.00 0.00 - 0.01 K/uL    NEUTROPHILS 70 32 - 75 %    LYMPHOCYTES 20 12 - 49 %    MONOCYTES 5 5 - 13 %    EOSINOPHILS 4 0 - 7 %    BASOPHILS 1 0 - 1 %    IMMATURE GRANULOCYTES 0 0.0 - 0.5 %    ABS. NEUTROPHILS 4.1 1.8 - 8.0 K/UL    ABS. LYMPHOCYTES 1.2 0.8 - 3.5 K/UL    ABS. MONOCYTES 0.3 0.0 - 1.0 K/UL    ABS. EOSINOPHILS 0.2 0.0 - 0.4 K/UL    ABS. BASOPHILS 0.0 0.0 - 0.1 K/UL    ABS. IMM. GRANS. 0.0 0.00 - 0.04 K/UL    DF AUTOMATED     METABOLIC PANEL, COMPREHENSIVE    Collection Time: 09/11/19  8:19 AM   Result Value Ref Range    Sodium 137 136 - 145 mmol/L    Potassium 3.9 3.5 - 5.1 mmol/L    Chloride 103 97 - 108 mmol/L    CO2 24 21 - 32 mmol/L    Anion gap 10 5 - 15 mmol/L    Glucose 234 (H) 65 - 100 mg/dL    BUN 7 6 - 20 MG/DL    Creatinine 0.77 0.55 - 1.02 MG/DL    BUN/Creatinine ratio 9 (L) 12 - 20      GFR est AA >60 >60 ml/min/1.73m2    GFR est non-AA >60 >60 ml/min/1.73m2    Calcium 9.5 8.5 - 10.1 MG/DL    Bilirubin, total 0.5 0.2 - 1.0 MG/DL    ALT (SGPT) 52 12 - 78 U/L    AST (SGOT) 62 (H) 15 - 37 U/L    Alk.  phosphatase 71 45 - 117 U/L    Protein, total 7.6 6.4 - 8.2 g/dL    Albumin 3.9 3.5 - 5.0 g/dL    Globulin 3.7 2.0 - 4.0 g/dL    A-G Ratio 1.1 1.1 - 2.2     LIPASE    Collection Time: 09/11/19  8:19 AM   Result Value Ref Range    Lipase 144 73 - 393 U/L   POC LACTIC ACID    Collection Time: 09/11/19  8:24 AM   Result Value Ref Range    Lactic Acid (POC) 2.92 (HH) 0.40 - 2.00 mmol/L   POC LACTIC ACID    Collection Time: 09/11/19  9:29 AM   Result Value Ref Range    Lactic Acid (POC) 2.03 (HH) 0.40 - 2.00 mmol/L   URINALYSIS W/MICROSCOPIC    Collection Time: 09/11/19 11:45 AM   Result Value Ref Range    Color YELLOW/STRAW      Appearance CLOUDY (A) CLEAR      Specific gravity 1.012 1.003 - 1.030      pH (UA) 6.0 5.0 - 8.0      Protein NEGATIVE  NEG mg/dL    Glucose NEGATIVE  NEG mg/dL    Ketone NEGATIVE  NEG mg/dL    Bilirubin NEGATIVE  NEG      Blood TRACE (A) NEG      Urobilinogen 0.2 0.2 - 1.0 EU/dL    Nitrites NEGATIVE  NEG      Leukocyte Esterase MODERATE (A) NEG      WBC 5-10 0 - 4 /hpf    RBC 0-5 0 - 5 /hpf    Epithelial cells MODERATE (A) FEW /lpf    Bacteria 1+ (A) NEG /hpf    Mucus TRACE (A) NEG /lpf   URINE CULTURE HOLD SAMPLE    Collection Time: 09/11/19 11:45 AM   Result Value Ref Range    Urine culture hold        URINE ON HOLD IN MICROBIOLOGY DEPT FOR 3 DAYS. IF UNPRESERVED URINE IS SUBMITTED, IT CANNOT BE USED FOR ADDITIONAL TESTING AFTER 24 HRS, RECOLLECTION WILL BE REQUIRED. POC LACTIC ACID    Collection Time: 09/11/19  2:15 PM   Result Value Ref Range    Lactic Acid (POC) 1.08 0.40 - 2.00 mmol/L       Assessment:     Active Problems:    UTI (urinary tract infection) (9/11/2019)      Proctitis (9/11/2019)        Plan:     1. Rectal pain- CT scan showing evidence of proctitis- significant amount of inflammation in perineal region  Prior hx of C Diff; prior hx of Colovaginal fistula with sigmoid colectomy  Consult Colorectal surgery for eval and tx recs  Stool for C Diff- start PO vanc  Consider hydrocortisone suppositories  IV pain meds  2. UTI- tx with 3 D of IV ceftriaxone  3. GERD- omeprazole  4. N/V- no acute GI pathology on abd CT- reglan and zofran  5.  Elevated lactate on admission- resolved- no evidence of sepsis on admission- afebrile, normal WBC, normal BP and HR    Full Code      FUNCTIONAL STATUS PRIOR TO HOSPITALIZATION independent    Signed By: Lefty Bennett MD     September 11, 2019

## 2019-09-11 NOTE — ED NOTES
Pt requesting stronger pain medication, specifically morphine with benadryl. Notified provider who states he will come talk to the patient.

## 2019-09-12 LAB
BACTERIA SPEC CULT: ABNORMAL
CC UR VC: ABNORMAL
GLUCOSE BLD STRIP.AUTO-MCNC: 156 MG/DL (ref 65–100)
GLUCOSE BLD STRIP.AUTO-MCNC: 199 MG/DL (ref 65–100)
GLUCOSE BLD STRIP.AUTO-MCNC: 205 MG/DL (ref 65–100)
GLUCOSE BLD STRIP.AUTO-MCNC: 228 MG/DL (ref 65–100)
SERVICE CMNT-IMP: ABNORMAL

## 2019-09-12 PROCEDURE — 74011000258 HC RX REV CODE- 258: Performed by: FAMILY MEDICINE

## 2019-09-12 PROCEDURE — 96366 THER/PROPH/DIAG IV INF ADDON: CPT

## 2019-09-12 PROCEDURE — 74011636637 HC RX REV CODE- 636/637: Performed by: FAMILY MEDICINE

## 2019-09-12 PROCEDURE — 99218 HC RM OBSERVATION: CPT

## 2019-09-12 PROCEDURE — 74011250636 HC RX REV CODE- 250/636: Performed by: FAMILY MEDICINE

## 2019-09-12 PROCEDURE — 74011250637 HC RX REV CODE- 250/637: Performed by: FAMILY MEDICINE

## 2019-09-12 PROCEDURE — 96376 TX/PRO/DX INJ SAME DRUG ADON: CPT

## 2019-09-12 PROCEDURE — 82962 GLUCOSE BLOOD TEST: CPT

## 2019-09-12 RX ORDER — MORPHINE SULFATE 2 MG/ML
2 INJECTION, SOLUTION INTRAMUSCULAR; INTRAVENOUS
Status: DISCONTINUED | OUTPATIENT
Start: 2019-09-12 | End: 2019-09-13 | Stop reason: HOSPADM

## 2019-09-12 RX ADMIN — MELATONIN 4.5 MG: at 21:02

## 2019-09-12 RX ADMIN — METOCLOPRAMIDE 5 MG: 5 INJECTION, SOLUTION INTRAMUSCULAR; INTRAVENOUS at 18:02

## 2019-09-12 RX ADMIN — DIPHENHYDRAMINE HYDROCHLORIDE 12.5 MG: 50 INJECTION, SOLUTION INTRAMUSCULAR; INTRAVENOUS at 21:01

## 2019-09-12 RX ADMIN — Medication 10 ML: at 13:13

## 2019-09-12 RX ADMIN — METOCLOPRAMIDE 5 MG: 5 INJECTION, SOLUTION INTRAMUSCULAR; INTRAVENOUS at 11:51

## 2019-09-12 RX ADMIN — MORPHINE SULFATE 2 MG: 2 INJECTION, SOLUTION INTRAMUSCULAR; INTRAVENOUS at 21:01

## 2019-09-12 RX ADMIN — MORPHINE SULFATE 2 MG: 2 INJECTION, SOLUTION INTRAMUSCULAR; INTRAVENOUS at 08:47

## 2019-09-12 RX ADMIN — PHENAZOPYRIDINE 200 MG: 100 TABLET ORAL at 13:13

## 2019-09-12 RX ADMIN — MORPHINE SULFATE 2 MG: 2 INJECTION, SOLUTION INTRAMUSCULAR; INTRAVENOUS at 04:44

## 2019-09-12 RX ADMIN — DIPHENHYDRAMINE HYDROCHLORIDE 12.5 MG: 50 INJECTION, SOLUTION INTRAMUSCULAR; INTRAVENOUS at 17:22

## 2019-09-12 RX ADMIN — INSULIN LISPRO 3 UNITS: 100 INJECTION, SOLUTION INTRAVENOUS; SUBCUTANEOUS at 11:51

## 2019-09-12 RX ADMIN — ONDANSETRON 4 MG: 2 INJECTION INTRAMUSCULAR; INTRAVENOUS at 12:45

## 2019-09-12 RX ADMIN — PHENAZOPYRIDINE 200 MG: 100 TABLET ORAL at 18:02

## 2019-09-12 RX ADMIN — ONDANSETRON 4 MG: 2 INJECTION INTRAMUSCULAR; INTRAVENOUS at 17:22

## 2019-09-12 RX ADMIN — ONDANSETRON 4 MG: 2 INJECTION INTRAMUSCULAR; INTRAVENOUS at 21:02

## 2019-09-12 RX ADMIN — MORPHINE SULFATE 2 MG: 2 INJECTION, SOLUTION INTRAMUSCULAR; INTRAVENOUS at 17:22

## 2019-09-12 RX ADMIN — METOCLOPRAMIDE 5 MG: 5 INJECTION, SOLUTION INTRAMUSCULAR; INTRAVENOUS at 06:30

## 2019-09-12 RX ADMIN — VANCOMYCIN HYDROCHLORIDE 125 MG: KIT at 18:38

## 2019-09-12 RX ADMIN — CEFTRIAXONE 1 G: 1 INJECTION, POWDER, FOR SOLUTION INTRAMUSCULAR; INTRAVENOUS at 13:13

## 2019-09-12 RX ADMIN — VANCOMYCIN HYDROCHLORIDE 125 MG: KIT at 00:30

## 2019-09-12 RX ADMIN — INSULIN GLARGINE 30 UNITS: 100 INJECTION, SOLUTION SUBCUTANEOUS at 21:45

## 2019-09-12 RX ADMIN — MORPHINE SULFATE 2 MG: 2 INJECTION, SOLUTION INTRAMUSCULAR; INTRAVENOUS at 12:45

## 2019-09-12 RX ADMIN — SIMVASTATIN 20 MG: 20 TABLET, FILM COATED ORAL at 22:00

## 2019-09-12 RX ADMIN — ONDANSETRON 4 MG: 2 INJECTION INTRAMUSCULAR; INTRAVENOUS at 08:50

## 2019-09-12 RX ADMIN — VANCOMYCIN HYDROCHLORIDE 125 MG: KIT at 06:30

## 2019-09-12 RX ADMIN — DIPHENHYDRAMINE HYDROCHLORIDE 12.5 MG: 50 INJECTION, SOLUTION INTRAMUSCULAR; INTRAVENOUS at 04:43

## 2019-09-12 RX ADMIN — INSULIN LISPRO 2 UNITS: 100 INJECTION, SOLUTION INTRAVENOUS; SUBCUTANEOUS at 06:45

## 2019-09-12 RX ADMIN — MORPHINE SULFATE 2 MG: 2 INJECTION, SOLUTION INTRAMUSCULAR; INTRAVENOUS at 00:31

## 2019-09-12 RX ADMIN — DIPHENHYDRAMINE HYDROCHLORIDE 12.5 MG: 50 INJECTION, SOLUTION INTRAMUSCULAR; INTRAVENOUS at 00:30

## 2019-09-12 RX ADMIN — DIPHENHYDRAMINE HYDROCHLORIDE 12.5 MG: 50 INJECTION, SOLUTION INTRAMUSCULAR; INTRAVENOUS at 12:45

## 2019-09-12 RX ADMIN — METOCLOPRAMIDE 5 MG: 5 INJECTION, SOLUTION INTRAMUSCULAR; INTRAVENOUS at 00:31

## 2019-09-12 RX ADMIN — VANCOMYCIN HYDROCHLORIDE 125 MG: KIT at 11:52

## 2019-09-12 RX ADMIN — PHENAZOPYRIDINE 200 MG: 100 TABLET ORAL at 08:47

## 2019-09-12 RX ADMIN — PANTOPRAZOLE SODIUM 40 MG: 40 TABLET, DELAYED RELEASE ORAL at 06:45

## 2019-09-12 RX ADMIN — INSULIN LISPRO 3 UNITS: 100 INJECTION, SOLUTION INTRAVENOUS; SUBCUTANEOUS at 18:01

## 2019-09-12 RX ADMIN — Medication 10 ML: at 06:31

## 2019-09-12 RX ADMIN — DIPHENHYDRAMINE HYDROCHLORIDE 12.5 MG: 50 INJECTION, SOLUTION INTRAMUSCULAR; INTRAVENOUS at 08:50

## 2019-09-12 NOTE — PROGRESS NOTES
Bedside shift change report given to Susanne (oncoming nurse) by Daren Freeman (offgoing nurse). Report included the following information SBAR, Kardex, ED Summary and MAR.

## 2019-09-12 NOTE — PROGRESS NOTES
TRANSFER - IN REPORT:    Verbal report received from Nell(name) on [facility]  being received from ed(unit) for routine progression of care      Report consisted of patients Situation, Background, Assessment and   Recommendations(SBAR). Information from the following report(s) SBAR, Kardex, [facility] and Recent Results was reviewed with the receiving nurse. Opportunity for questions and clarification was provided. Assessment completed upon patients arrival to unit and care assumed.

## 2019-09-12 NOTE — ED NOTES
2006 Bedside shift change report given to Abel Mehta RN (oncoming nurse) by Ashley Stanton RN (offgoing nurse). Report included the following information SBAR and ED Summary. [id number] TRANSFER - OUT REPORT:    Verbal report given to Kayleigh Ramos RN(name) on Aleksander Chiu  being transferred to  (unit) for routine progression of care       Report consisted of patients Situation, Background, Assessment and   Recommendations(SBAR). Information from the following report(s) SBAR and ED Summary was reviewed with the receiving nurse. Lines:   Peripheral IV 09/11/19 Right Arm (Active)   Site Assessment Clean, dry, & intact 9/11/2019  8:00 PM   Phlebitis Assessment 0 9/11/2019  8:00 PM   Infiltration Assessment 0 9/11/2019  8:00 PM   Dressing Status Clean, dry, & intact 9/11/2019  8:00 PM   Dressing Type Transparent 9/11/2019  8:00 PM   Hub Color/Line Status Blue;Patent; Flushed 9/11/2019  8:00 PM        Opportunity for questions and clarification was provided.

## 2019-09-12 NOTE — PROGRESS NOTES
Primary Nurse Mauricio Vallejo RN and SHAWNA mora performed a dual skin assessment on this patient No impairment noted  Niall score is 22

## 2019-09-12 NOTE — ED NOTES
Pt reports 7/10 abdominal cramping and rectal pain. Requesting pain meds and Benadryl. Medicated as ordered. Updated on plan of care.   VSS

## 2019-09-13 VITALS
DIASTOLIC BLOOD PRESSURE: 90 MMHG | OXYGEN SATURATION: 96 % | HEART RATE: 69 BPM | SYSTOLIC BLOOD PRESSURE: 146 MMHG | RESPIRATION RATE: 16 BRPM | BODY MASS INDEX: 38.64 KG/M2 | HEIGHT: 62 IN | WEIGHT: 210 LBS | TEMPERATURE: 98 F

## 2019-09-13 LAB
ANION GAP SERPL CALC-SCNC: 9 MMOL/L (ref 5–15)
BUN SERPL-MCNC: 8 MG/DL (ref 6–20)
BUN/CREAT SERPL: 11 (ref 12–20)
CALCIUM SERPL-MCNC: 9 MG/DL (ref 8.5–10.1)
CHLORIDE SERPL-SCNC: 105 MMOL/L (ref 97–108)
CO2 SERPL-SCNC: 26 MMOL/L (ref 21–32)
CREAT SERPL-MCNC: 0.72 MG/DL (ref 0.55–1.02)
ERYTHROCYTE [DISTWIDTH] IN BLOOD BY AUTOMATED COUNT: 13.9 % (ref 11.5–14.5)
GLUCOSE BLD STRIP.AUTO-MCNC: 160 MG/DL (ref 65–100)
GLUCOSE BLD STRIP.AUTO-MCNC: 269 MG/DL (ref 65–100)
GLUCOSE SERPL-MCNC: 170 MG/DL (ref 65–100)
HCT VFR BLD AUTO: 32.9 % (ref 35–47)
HEMOCCULT STL QL: POSITIVE
HGB BLD-MCNC: 10.8 G/DL (ref 11.5–16)
MCH RBC QN AUTO: 27.2 PG (ref 26–34)
MCHC RBC AUTO-ENTMCNC: 32.8 G/DL (ref 30–36.5)
MCV RBC AUTO: 82.9 FL (ref 80–99)
NRBC # BLD: 0 K/UL (ref 0–0.01)
NRBC BLD-RTO: 0 PER 100 WBC
PLATELET # BLD AUTO: 152 K/UL (ref 150–400)
PMV BLD AUTO: 9.8 FL (ref 8.9–12.9)
POTASSIUM SERPL-SCNC: 3.5 MMOL/L (ref 3.5–5.1)
RBC # BLD AUTO: 3.97 M/UL (ref 3.8–5.2)
SERVICE CMNT-IMP: ABNORMAL
SERVICE CMNT-IMP: ABNORMAL
SODIUM SERPL-SCNC: 140 MMOL/L (ref 136–145)
WBC # BLD AUTO: 4.7 K/UL (ref 3.6–11)

## 2019-09-13 PROCEDURE — 74011250637 HC RX REV CODE- 250/637: Performed by: FAMILY MEDICINE

## 2019-09-13 PROCEDURE — 85027 COMPLETE CBC AUTOMATED: CPT

## 2019-09-13 PROCEDURE — 82272 OCCULT BLD FECES 1-3 TESTS: CPT

## 2019-09-13 PROCEDURE — 82962 GLUCOSE BLOOD TEST: CPT

## 2019-09-13 PROCEDURE — 36415 COLL VENOUS BLD VENIPUNCTURE: CPT

## 2019-09-13 PROCEDURE — 96376 TX/PRO/DX INJ SAME DRUG ADON: CPT

## 2019-09-13 PROCEDURE — 99218 HC RM OBSERVATION: CPT

## 2019-09-13 PROCEDURE — 96375 TX/PRO/DX INJ NEW DRUG ADDON: CPT

## 2019-09-13 PROCEDURE — 96366 THER/PROPH/DIAG IV INF ADDON: CPT

## 2019-09-13 PROCEDURE — 80048 BASIC METABOLIC PNL TOTAL CA: CPT

## 2019-09-13 PROCEDURE — 74011636637 HC RX REV CODE- 636/637: Performed by: FAMILY MEDICINE

## 2019-09-13 PROCEDURE — 74011250636 HC RX REV CODE- 250/636: Performed by: FAMILY MEDICINE

## 2019-09-13 PROCEDURE — 74011000258 HC RX REV CODE- 258: Performed by: FAMILY MEDICINE

## 2019-09-13 RX ORDER — HYDROMORPHONE HYDROCHLORIDE 1 MG/ML
1 INJECTION, SOLUTION INTRAMUSCULAR; INTRAVENOUS; SUBCUTANEOUS ONCE
Status: COMPLETED | OUTPATIENT
Start: 2019-09-13 | End: 2019-09-13

## 2019-09-13 RX ORDER — PHENAZOPYRIDINE HYDROCHLORIDE 200 MG/1
200 TABLET, FILM COATED ORAL
Qty: 20 TAB | Refills: 0 | Status: SHIPPED | OUTPATIENT
Start: 2019-09-13 | End: 2019-09-16

## 2019-09-13 RX ORDER — ONDANSETRON 4 MG/1
8 TABLET, ORALLY DISINTEGRATING ORAL
Qty: 30 TAB | Refills: 0 | OUTPATIENT
Start: 2019-09-13 | End: 2019-10-10

## 2019-09-13 RX ORDER — HYDROMORPHONE HYDROCHLORIDE 2 MG/1
2 TABLET ORAL
Qty: 28 TAB | Refills: 0 | Status: SHIPPED | OUTPATIENT
Start: 2019-09-13 | End: 2019-09-16

## 2019-09-13 RX ORDER — HYDROCORTISONE ACETATE 25 MG/1
25 SUPPOSITORY RECTAL EVERY 12 HOURS
Qty: 14 SUPPOSITORY | Refills: 1 | OUTPATIENT
Start: 2019-09-13 | End: 2019-10-10

## 2019-09-13 RX ORDER — HYDROCORTISONE ACETATE 25 MG/1
25 SUPPOSITORY RECTAL ONCE
Status: COMPLETED | OUTPATIENT
Start: 2019-09-13 | End: 2019-09-13

## 2019-09-13 RX ADMIN — MORPHINE SULFATE 2 MG: 2 INJECTION, SOLUTION INTRAMUSCULAR; INTRAVENOUS at 08:41

## 2019-09-13 RX ADMIN — DIPHENHYDRAMINE HYDROCHLORIDE 12.5 MG: 50 INJECTION, SOLUTION INTRAMUSCULAR; INTRAVENOUS at 04:03

## 2019-09-13 RX ADMIN — MORPHINE SULFATE 2 MG: 2 INJECTION, SOLUTION INTRAMUSCULAR; INTRAVENOUS at 04:03

## 2019-09-13 RX ADMIN — INSULIN LISPRO 5 UNITS: 100 INJECTION, SOLUTION INTRAVENOUS; SUBCUTANEOUS at 12:01

## 2019-09-13 RX ADMIN — DIPHENHYDRAMINE HYDROCHLORIDE 12.5 MG: 50 INJECTION, SOLUTION INTRAMUSCULAR; INTRAVENOUS at 00:58

## 2019-09-13 RX ADMIN — Medication 10 ML: at 00:16

## 2019-09-13 RX ADMIN — DIPHENHYDRAMINE HYDROCHLORIDE 12.5 MG: 50 INJECTION, SOLUTION INTRAMUSCULAR; INTRAVENOUS at 08:41

## 2019-09-13 RX ADMIN — PHENAZOPYRIDINE 200 MG: 100 TABLET ORAL at 12:00

## 2019-09-13 RX ADMIN — MORPHINE SULFATE 2 MG: 2 INJECTION, SOLUTION INTRAMUSCULAR; INTRAVENOUS at 13:00

## 2019-09-13 RX ADMIN — DIPHENHYDRAMINE HYDROCHLORIDE 12.5 MG: 50 INJECTION, SOLUTION INTRAMUSCULAR; INTRAVENOUS at 13:01

## 2019-09-13 RX ADMIN — METOCLOPRAMIDE 5 MG: 5 INJECTION, SOLUTION INTRAMUSCULAR; INTRAVENOUS at 00:16

## 2019-09-13 RX ADMIN — Medication 10 ML: at 13:02

## 2019-09-13 RX ADMIN — PHENAZOPYRIDINE 200 MG: 100 TABLET ORAL at 09:13

## 2019-09-13 RX ADMIN — ONDANSETRON 4 MG: 2 INJECTION INTRAMUSCULAR; INTRAVENOUS at 08:40

## 2019-09-13 RX ADMIN — VANCOMYCIN HYDROCHLORIDE 125 MG: KIT at 12:10

## 2019-09-13 RX ADMIN — METOCLOPRAMIDE 5 MG: 5 INJECTION, SOLUTION INTRAMUSCULAR; INTRAVENOUS at 06:34

## 2019-09-13 RX ADMIN — ONDANSETRON 4 MG: 2 INJECTION INTRAMUSCULAR; INTRAVENOUS at 13:01

## 2019-09-13 RX ADMIN — CEFTRIAXONE 1 G: 1 INJECTION, POWDER, FOR SOLUTION INTRAMUSCULAR; INTRAVENOUS at 12:00

## 2019-09-13 RX ADMIN — INSULIN LISPRO 2 UNITS: 100 INJECTION, SOLUTION INTRAVENOUS; SUBCUTANEOUS at 06:35

## 2019-09-13 RX ADMIN — VANCOMYCIN HYDROCHLORIDE 125 MG: KIT at 06:36

## 2019-09-13 RX ADMIN — HYDROMORPHONE HYDROCHLORIDE 1 MG: 1 INJECTION, SOLUTION INTRAMUSCULAR; INTRAVENOUS; SUBCUTANEOUS at 15:08

## 2019-09-13 RX ADMIN — HYDROCORTISONE ACETATE 25 MG: 25 SUPPOSITORY RECTAL at 13:02

## 2019-09-13 RX ADMIN — METOCLOPRAMIDE 5 MG: 5 INJECTION, SOLUTION INTRAMUSCULAR; INTRAVENOUS at 12:00

## 2019-09-13 RX ADMIN — Medication 10 ML: at 06:37

## 2019-09-13 RX ADMIN — MORPHINE SULFATE 2 MG: 2 INJECTION, SOLUTION INTRAMUSCULAR; INTRAVENOUS at 00:15

## 2019-09-13 RX ADMIN — VANCOMYCIN HYDROCHLORIDE 125 MG: KIT at 00:15

## 2019-09-13 RX ADMIN — ONDANSETRON 4 MG: 2 INJECTION INTRAMUSCULAR; INTRAVENOUS at 04:03

## 2019-09-13 RX ADMIN — PANTOPRAZOLE SODIUM 40 MG: 40 TABLET, DELAYED RELEASE ORAL at 06:36

## 2019-09-13 NOTE — PROGRESS NOTES
[address] September 13, 2019       RE: Rocky Perez      To Whom It May Concern,      This is to certify that Rocky Perez was hospitalized at [facility] from 9/11/19 through 9/13/19    Please excuse her from work due to an acute medical issue    She is cleared to return to work without restrictions on Monday 9/16/19    Please feel free to contact my office if you have any questions or concerns. Thank you for your assistance in this matter.           Sincerely,  Kristan De Leon MD

## 2019-09-13 NOTE — ED PROVIDER NOTES
51 yo female presents to the ED with lower abdominal pain. The pt states that nothing makes the pain better or worse. The pt rates the pain as a 7/10 in severity. There is no radiation of the pain to her back. The pt describes the pain as sharp and intermittent. The pt also complains of diarrhea since the onset of her symptoms. The pt denies taking anything at home for the discomfort. Justin Irvin PA-C           Past Medical History:   Diagnosis Date    Anal fissure     Anisocoria     Asthma     LAST EPISODE     Back pain     Cerumen impaction     Chronic kidney disease     hx uti in past    Coagulation defects     ocassional rectal bleeding due to anal fissure    Colovaginal fistula     Diabetes (HCC)     NIDDM    Diabetes ([facility])     Diverticulitis     Diverticulosis     Enlarged tonsils     Frequent UTI     GERD (gastroesophageal reflux disease)     H/O endoscopy     with dilation    HA (headache)     Hepatic steatosis     Hx of colonoscopy with polypectomy     benign    Hypertension     Ill-defined condition     FREQUENT HIVES    Ill-defined condition     HX ELEVATED LIVER ENZYMES    Morbid obesity (HCC)     Nausea & vomiting     during diverticulitis flare    Obesity     Otitis media     Pneumonia     about 15 yrs ago    Psychiatric disorder     ANXIETY    Recurrent tonsillitis     Sinusitis     Transfusion history ~ age 35    postop hysterectomy    Unspecified sleep apnea     snores ( not diagnosed yet)     Urticaria     Urticaria        Past Surgical History:   Procedure Laterality Date    ABDOMEN SURGERY PROC UNLISTED  2018    hernia repair at [facility]    COLONOSCOPY N/A 3/28/2019    COLONOSCOPY performed by Kelly Stewart MD at [address] [place] [address]    blake.     HX GI  12    LAPAROSCOPIC HAND ASSISTED  POSS OPEN SIGMOID COLECTOMY POSS TEMPORARY DIVERTING LOOP ILEOSTOMY;  (no illeostomy needed)    HX GYN           HX GYN      cervical conization    HX HEENT      SINUS SURGERY LEFT X2    HX HEENT      SINUS SURGERY ON RIGHT X2    HX OTHER SURGICAL  11/12    Sphincterotomy    HX PELVIC LAPAROSCOPY      HX EMMANUEL AND BSO      HX UROLOGICAL  7/31/12     CYSTOSCOPY INSERTION URETERAL CATHETERS - Cystoscopy Insertion of bilateral ureteral stents         Family History:   Problem Relation Age of Onset    Diabetes Mother     Cancer Mother         NON-HODGKINS LYMPHOMA    Anesth Problems Mother         PONV    Diabetes Father     Heart Disease Father         CAD - STENTS, PACEMAKER    Arrhythmia Father        Social History     Socioeconomic History    Marital status:      Spouse name: Not on file    Number of children: Not on file    Years of education: Not on file    Highest education level: Not on file   Occupational History    Not on file   Social Needs    Financial resource strain: Not on file    Food insecurity:     Worry: Not on file     Inability: Not on file    Transportation needs:     Medical: Not on file     Non-medical: Not on file   Tobacco Use    Smoking status: Never Smoker    Smokeless tobacco: Never Used   Substance and Sexual Activity    Alcohol use: Yes     Comment: Rarely    Drug use: No     Types: Prescription, OTC    Sexual activity: Never   Lifestyle    Physical activity:     Days per week: Not on file     Minutes per session: Not on file    Stress: Not on file   Relationships    Social connections:     Talks on phone: Not on file     Gets together: Not on file     Attends Hoahaoism service: Not on file     Active member of club or organization: Not on file     Attends meetings of clubs or organizations: Not on file     Relationship status: Not on file    Intimate partner violence:     Fear of current or ex partner: Not on file     Emotionally abused: Not on file     Physically abused: Not on file     Forced sexual activity: Not on file   Other Topics Concern    Not on file Social History Narrative    Not on file         ALLERGIES: Aspirin; Codeine; Contrast agent [iodine]; Percocet [oxycodone-acetaminophen]; Prilosec [omeprazole magnesium]; Ketorolac; Fentanyl; and Morphine    Review of Systems   Constitutional: Negative for chills, diaphoresis and fever. HENT: Negative for congestion, postnasal drip, rhinorrhea and sore throat. Eyes: Negative for photophobia, discharge, redness and visual disturbance. Respiratory: Negative for cough, chest tightness, shortness of breath and wheezing. Cardiovascular: Negative for chest pain, palpitations and leg swelling. Gastrointestinal: Positive for abdominal pain. Negative for abdominal distention, blood in stool, constipation, diarrhea, nausea and vomiting. Genitourinary: Negative for difficulty urinating, dysuria, frequency, hematuria and urgency. Musculoskeletal: Negative for arthralgias, back pain, joint swelling and myalgias. Skin: Negative for color change and rash. Neurological: Negative for dizziness, speech difficulty, weakness, light-headedness, numbness and headaches. Psychiatric/Behavioral: Negative for confusion. The patient is not nervous/anxious. All other systems reviewed and are negative. Vitals:    09/12/19 1349 09/12/19 2024 09/13/19 0237 09/13/19 0824   BP: 142/89 145/84 145/78 146/90   Pulse: 69 63 62 69   Resp: 16 16 16 16   Temp: 98.2 °F (36.8 °C) 98.7 °F (37.1 °C) 98 °F (36.7 °C) 98 °F (36.7 °C)   SpO2: 92% 95% 95% 96%   Weight:       Height:                Physical Exam   Constitutional: She is oriented to person, place, and time. She appears well-developed and well-nourished. No distress. HENT:   Head: Normocephalic and atraumatic. Head is without raccoon's eyes, without Lynch's sign and without laceration. Right Ear: Hearing, tympanic membrane, external ear and ear canal normal. No foreign bodies. Tympanic membrane is not bulging. No hemotympanum.    Left Ear: Hearing, tympanic membrane, external ear and ear canal normal. No foreign bodies. Tympanic membrane is not bulging. No hemotympanum. Nose: Nose normal. No mucosal edema or rhinorrhea. Right sinus exhibits no maxillary sinus tenderness and no frontal sinus tenderness. Left sinus exhibits no maxillary sinus tenderness and no frontal sinus tenderness. Mouth/Throat: Uvula is midline, oropharynx is clear and moist and mucous membranes are normal. No tonsillar abscesses. Eyes: Pupils are equal, round, and reactive to light. Conjunctivae and EOM are normal. Right eye exhibits no discharge. Left eye exhibits no discharge. Neck: Normal range of motion. Neck supple. Cardiovascular: Normal rate, regular rhythm and normal heart sounds. Exam reveals no gallop and no friction rub. No murmur heard. Regular rate and rhythm. No murmurs, gallops, rubs, or clicks. Pulmonary/Chest: Effort normal and breath sounds normal. No respiratory distress. She has no wheezes. She has no rales. No stridor or wheezes. No accessory muscle usage. No nasal flaring. Breath Sounds equal bilaterally. Abdominal: Soft. Bowel sounds are normal. She exhibits no distension. There is no tenderness. There is no rebound and no guarding. No abdominal Bruits. No pulsatile mass. No abdominal scars. Active bowel sounds. Musculoskeletal: Normal range of motion. She exhibits no edema, tenderness or deformity. Neurological: She is alert and oriented to person, place, and time. Skin: She is not diaphoretic. Nursing note and vitals reviewed. MDM  Number of Diagnoses or Management Options  Sepsis, due to unspecified organism [facility]):   Urinary tract infection with hematuria, site unspecified:   Diagnosis management comments: Pt with elevated lactic and uti. Pt still having intractable abdominal pain. Will admit to hospitalist service. Reviewed treatment plan with attending and they agree.   Sorin Valera PA-C         Procedures

## 2019-09-13 NOTE — DISCHARGE INSTRUCTIONS
Work note to be given to patient- return to work Monday  Continue taking steroid suppositories for 1 week (twice daily)  rx's given for steroids, pain meds, pyridium ( for pain w urination)  Resume all home meds per prior orders  Zofran for nausea    Follow up with Dr Brodie Theodore- call for appt- for further eval of urological issues  [address]  [address] [facility].  [phone]    Schedule a follow up appt with Dr Pualina Mckeon  [address] [facility] [address] [phone]

## 2019-09-13 NOTE — DISCHARGE SUMMARY
Discharge Summary       PATIENT ID: Deyanira Cadena  MRN: [id number]   YOB: 1970    DATE OF ADMISSION: 9/11/2019  8:02 AM    DATE OF DISCHARGE: 9/13/19 12:00   PRIMARY CARE PROVIDER: Tre Bo NP     ATTENDING PHYSICIAN: Erich Severs  DISCHARGING PROVIDER: Judy Panda MD    To contact this individual call [phone] and ask the  to page. If unavailable ask to be transferred the Adult Hospitalist Department. CONSULTATIONS: IP CONSULT TO HOSPITALIST  IP CONSULT TO COLORECTAL SURGERY    PROCEDURES/SURGERIES: * No surgery found *    ADMITTING [address] [place] [address] COURSE:   Kingston Knott a 52 y.o. female who presents with dysuria and rectal pain. Onset of symptoms was started about 10-14 ago and started with dysuria/UTI- she was put on cipro for 10 days- followed by nystatin for thrush and flagyl for C Diff prophylaxis- She has had some diarrhea for a few days- finished her flagyl yesterday- and presents today with significant rectal pain. She also worries about C Diff- which she has had in the past- also worries about a rectal fissure. The pain is located in the perineal area- she says that is feels like in the vaginal and rectal area- She is also concerned that she may be developing another colovaginal fistula- for which she had a sigmoid colectomy done in the past by Dr Arielle Perez. Patient describes the pain as severe- continuous, non radiating, and worse with bowel movements and emptying her bladder.  She also reports mild diffuse abdominal pain and nausea/vomiting    DISCHARGE DIAGNOSES / PLAN:      1. Rectal pain- CT scan suspicious for proctitis-    inflammation in perineal region  prior hx of Colovaginal fistula with sigmoid colectomy  Discussed case w/ Colorectal surgery  started PO vanc inpatient but diarrhea resolved and no C Diff could be sent  Starting a 7D course of hydrocortisone suppositories  PO pain meds given on DC  2.  UTI-culture showing normal gladis- completed tx with 3 D of IV ceftriaxone  3. GERD- omeprazole  4. N/V- no acute GI pathology on abd CT-resolved with reglan and zofran  5. Elevated lactate on admission- resolved- no evidence of sepsis on admission- afebrile, normal WBC, normal BP and HR     ADDITIONAL CARE RECOMMENDATIONS:     Work note to be given to patient- return to work Monday  Continue taking steroid suppositories for 1 week (twice daily)  rx's given for steroids, pain meds, pyridium ( for pain w urination)  Resume all home meds per prior orders  Zofran for nausea    Follow up with Dr Nica Hancock- call for appt- for further eval of urological issues  [address]  [address] [facility]  [phone]    Schedule a follow up appt with Dr Susanne Lai [place] [address]  [phone]    PENDING TEST RESULTS:   At the time of discharge the following test results are still pending: none    FOLLOW UP APPOINTMENTS:    Follow-up Information     Follow up With Specialties Details Why Contact Info    Donovan Colindres MD Urology Schedule an appointment as soon as possible for a visit for further eval of dysuria [address] [facility]  [phone]      Wing Bipin MD Colon and Rectal Surgery Schedule an appointment as soon as possible for a visit for persistent pain [address] [facility]  [address]  [place] [address]  [phone]      Diaz Solares NP Nurse Practitioner   [address]  [place] [id number] [facility]  [phone]               DIET: regular    ACTIVITY: Activity as tolerated, no driving while on pain meds    WOUND CARE: NA    EQUIPMENT needed: none      DISCHARGE MEDICATIONS:  Current Discharge Medication List      START taking these medications    Details   phenazopyridine (PYRIDIUM) 200 mg tablet Take 1 Tab by mouth three (3) times daily as needed (pain with urination) for up to 3 days.   Qty: 20 Tab, Refills: 0      HYDROmorphone (DILAUDID) 2 mg tablet Take 1 Tab by mouth every six (6) hours as needed for Pain for up to 3 days. Max Daily Amount: 8 mg. Qty: 28 Tab, Refills: 0    Associated Diagnoses: Proctitis      hydrocortisone (ANUSOL-HC) 25 mg supp Insert 1 Suppository into rectum every twelve (12) hours. Indications: inflammation of the rectum  Qty: 14 Suppository, Refills: 1         CONTINUE these medications which have CHANGED    Details   ondansetron (ZOFRAN ODT) 4 mg disintegrating tablet Take 2 Tabs by mouth every six (6) hours as needed for Nausea. Qty: 30 Tab, Refills: 0         CONTINUE these medications which have NOT CHANGED    Details   ALPRAZolam (XANAX) 0.25 mg tablet Take 0.125-0.25 mg by mouth every twelve (12) hours as needed for Anxiety or Sleep.      melatonin tab tablet Take 5 mg by mouth nightly. simvastatin (ZOCOR) 20 mg tablet Take 20 mg by mouth nightly. omeprazole (PRILOSEC) 20 mg capsule Take 40 mg by mouth Daily (before breakfast). dicyclomine (BENTYL) 20 mg tablet Take 1 Tab by mouth every six (6) hours as needed (abdominal cramps) for up to 20 doses. Qty: 20 Tab, Refills: 0      insulin glargine (LANTUS U-100 INSULIN) 100 unit/mL injection 38 Units by SubCUTAneous route nightly. sertraline (ZOLOFT) 100 mg tablet Take 200 mg by mouth nightly. losartan-hydroCHLOROthiazide (HYZAAR) 100-12.5 mg per tablet Take 1 Tab by mouth daily. EPINEPHrine (EPIPEN) 0.3 mg/0.3 mL (1:1,000) injection 0.3 mL by IntraMUSCular route once as needed for up to 1 dose. Qty: 0.3 mL, Refills: 1      albuterol (PROVENTIL, VENTOLIN) 90 mcg/Actuation inhaler Take 2 Puffs by inhalation every six (6) hours as needed. NOTIFY YOUR PHYSICIAN FOR ANY OF THE FOLLOWING:   Fever over 101 degrees for 24 hours. Chest pain, shortness of breath, fever, chills, nausea, vomiting, diarrhea, change in mentation, falling, weakness, bleeding. Severe pain or pain not relieved by medications.   Or, any other signs or symptoms that you may have questions about. DISPOSITION:    Home With:   OT  PT  HH  RN       Long term SNF/Inpatient Rehab   X Independent    Hospice    Other:       PATIENT CONDITION AT DISCHARGE:     Functional status    Poor     Deconditioned    X Independent      Cognition   X  Lucid     Forgetful     Dementia      Catheters/lines (plus indication)    [facility]     PICC     PEG    X None      Code status   X  Full code     DNR      PHYSICAL EXAMINATION AT DISCHARGE:  Patient Vitals for the past 24 hrs:   Temp Pulse Resp BP SpO2   09/13/19 0824 98 °F (36.7 °C) 69 16 146/90 96 %   09/13/19 0237 98 °F (36.7 °C) 62 16 145/78 95 %   09/12/19 2024 98.7 °F (37.1 °C) 63 16 145/84 95 %   09/12/19 1349 98.2 °F (36.8 °C) 69 16 142/89 92 %                                                   Constitutional:  No acute distress, cooperative, pleasant    ENT:  Oral mucous moist, oropharynx benign. Neck supple,    Resp:  CTA bilaterally. No wheezing/rhonchi/rales. No accessory muscle use   CV:  Regular rhythm, normal rate, no murmurs, gallops, rubs    GI:  Soft, non distended, non tender. normoactive bowel sounds, no hepatosplenomegaly     Musculoskeletal:  No edema, warm, 2+ pulses throughout    Neurologic:  Moves all extremities. AAOx3, CN II-XII reviewed                         Psych:  Good insight, Not anxious nor agitated.     Recent Results (from the past 24 hour(s))   GLUCOSE, POC    Collection Time: 09/12/19  5:44 PM   Result Value Ref Range    Glucose (POC) 205 (H) 65 - 100 mg/dL    Performed by Asad Rowe, POC    Collection Time: 09/12/19  9:34 PM   Result Value Ref Range    Glucose (POC) 199 (H) 65 - 100 mg/dL    Performed by Rosemary Burgos    CBC W/O DIFF    Collection Time: 09/13/19  4:16 AM   Result Value Ref Range    WBC 4.7 3.6 - 11.0 K/uL    RBC 3.97 3.80 - 5.20 M/uL    HGB 10.8 (L) 11.5 - 16.0 g/dL    HCT 32.9 (L) 35.0 - 47.0 %    MCV 82.9 80.0 - 99.0 FL    MCH 27.2 26.0 - 34.0 PG    MCHC 32.8 30.0 - 36.5 g/dL    RDW 13.9 11.5 - 14.5 %    PLATELET 721 619 - 642 K/uL    MPV 9.8 8.9 - 12.9 FL    NRBC 0.0 0  WBC    ABSOLUTE NRBC 0.00 0.00 - 8.42 K/uL   METABOLIC PANEL, BASIC    Collection Time: 09/13/19  4:16 AM   Result Value Ref Range    Sodium 140 136 - 145 mmol/L    Potassium 3.5 3.5 - 5.1 mmol/L    Chloride 105 97 - 108 mmol/L    CO2 26 21 - 32 mmol/L    Anion gap 9 5 - 15 mmol/L    Glucose 170 (H) 65 - 100 mg/dL    BUN 8 6 - 20 MG/DL    Creatinine 0.72 0.55 - 1.02 MG/DL    BUN/Creatinine ratio 11 (L) 12 - 20      GFR est AA >60 >60 ml/min/1.73m2    GFR est non-AA >60 >60 ml/min/1.73m2    Calcium 9.0 8.5 - 10.1 MG/DL   GLUCOSE, POC    Collection Time: 09/13/19  6:02 AM   Result Value Ref Range    Glucose (POC) 160 (H) 65 - 100 mg/dL    Performed by Destiny Galindo    OCCULT BLOOD, STOOL    Collection Time: 09/13/19  9:16 AM   Result Value Ref Range    Occult blood, stool POSITIVE (A) NEG     GLUCOSE, POC    Collection Time: 09/13/19 11:42 AM   Result Value Ref Range    Glucose (POC) 269 (H) 65 - 100 mg/dL    Performed by Diego Coleman (con)          CHRONIC MEDICAL DIAGNOSES:  Problem List as of 9/13/2019 Date Reviewed: 2/9/2019          Codes Class Noted - Resolved    UTI (urinary tract infection) ICD-10-CM: N39.0  ICD-9-CM: 599.0  9/11/2019 - Present        Proctitis ICD-10-CM: K62.89  ICD-9-CM: 569.49  9/11/2019 - Present        Mood disorder ([facility] ICD-10-CM: F39  ICD-9-CM: 296.90  2/9/2019 - Present        C. difficile diarrhea ICD-10-CM: A04.72  ICD-9-CM: 008.45  2/8/2019 - Present        Lactic acidosis ICD-10-CM: E87.2  ICD-9-CM: 276.2  2/7/2019 - Present        C. difficile colitis ICD-10-CM: A04.72  ICD-9-CM: 008.45  2/7/2019 - Present        Clostridium difficile diarrhea ICD-10-CM: A04.72  ICD-9-CM: 008.45  10/10/2017 - Present        Bronchitis ICD-10-CM: J40  ICD-9-CM: 490  10/10/2017 - Present        Accelerated hypertension ICD-10-CM: I10  ICD-9-CM: 401.0  10/10/2017 - Present        Type 2 diabetes mellitus ([facility] ICD-10-CM: E11.9  ICD-9-CM: 250.00  10/10/2017 - Present        Diarrhea ICD-10-CM: R19.7  ICD-9-CM: 787.91  10/5/2017 - Present        Thrush ICD-10-CM: B37.0  ICD-9-CM: 112.0  11/9/2015 - Present        Postoperative complication CIL-45-RQ: C82. 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

## 2019-09-13 NOTE — PROGRESS NOTES
Hospitalist Progress Note  Lima Norton MD  Answering service: [phone] OR [phone] from in house phone    NAME:  Mervat Ruiz  :  1970  MRN:  [id number]      Admission Summary:   Mervat Ruiz is a 52 y.o. female who presents with dysuria and rectal pain. Onset of symptoms was started about 10-14 ago and started with dysuria/UTI- she was put on cipro for 10 days- followed by nystatin for thrush and flagyl for C Diff prophylaxis- She has had some diarrhea for a few days- finished her flagyl yesterday- and presents today with significant rectal pain. She also worries about C Diff- which she has had in the past- also worries about a rectal fissure. The pain is located in the perineal area- she says that is feels like in the vaginal and rectal area- She is also concerned that she may be developing another colovaginal fistula- for which she had a sigmoid colectomy done in the past by Dr Stefani Wilson. . Patient describes the pain as severe- continuous, non radiating, and worse with bowel movements and emptying her bladder. She also reports mild diffuse abdominal pain and nausea/vomiting    Interval history / Subjective:   Patient reports still having pain  Her diarrhea is improving     Assessment & Plan:     1. Rectal pain- CT scan showing evidence of proctitis- significant amount of inflammation in perineal region  Prior hx of C Diff; prior hx of Colovaginal fistula with sigmoid colectomy  Consult Colorectal surgery for eval and tx recs  Stool for C Diff- start PO vanc  Consider hydrocortisone suppositories  IV pain meds  2. UTI- tx with 3 D of IV ceftriaxone  3. GERD- omeprazole  4. N/V- no acute GI pathology on abd CT- reglan and zofran  5.  Elevated lactate on admission- resolved- no evidence of sepsis on admission- afebrile, normal WBC, normal BP and HR    Code status: full  DVT prophylaxis: [address] discussed with: Patient/Family  Disposition: Home w/Family and TBD     Hospital Problems  Date Reviewed: 2/9/2019          Codes Class Noted POA    UTI (urinary tract infection) ICD-10-CM: N39.0  ICD-9-CM: 599.0  9/11/2019 Unknown        Proctitis ICD-10-CM: K62.89  ICD-9-CM: 569.49  9/11/2019 Unknown                Review of Systems:   A comprehensive review of systems was negative except for that written in the HPI. Vital Signs:    Last 24hrs VS reviewed since prior progress note. Most recent are:  Visit Vitals  /78 (BP 1 Location: Left arm, BP Patient Position: At rest)   Pulse 62   Temp 98 °F (36.7 °C)   Resp 16   Ht 5' 2\" (1.575 m)   Wt 95.3 kg (210 lb)   SpO2 95%   BMI 38.41 kg/m²       No intake or output data in the 24 hours ending 09/13/19 0550     Physical Examination:             Constitutional:  No acute distress, cooperative, pleasant    ENT:  Oral mucous moist, oropharynx benign. Neck supple,    Resp:  CTA bilaterally. No wheezing/rhonchi/rales. No accessory muscle use   CV:  Regular rhythm, normal rate, no murmurs, gallops, rubs    GI:  Soft, non distended, non tender. normoactive bowel sounds, no hepatosplenomegaly     Musculoskeletal:  No edema, warm, 2+ pulses throughout    Neurologic:  Moves all extremities. AAOx3, CN II-XII reviewed     Psych:  Good insight, Not anxious nor agitated. Data Review:    Review and/or order of tests in the radiology section of CPT  Review and/or order of tests in the medicine section of CPT      Labs:     Recent Labs     09/13/19 0416 09/11/19 0819   WBC 4.7 5.8   HGB 10.8* 12.9   HCT 32.9* 38.2    180     Recent Labs     09/13/19 0416 09/11/19 0819    137   K 3.5 3.9    103   CO2 26 24   BUN 8 7   CREA 0.72 0.77   * 234*   CA 9.0 9.5     Recent Labs     09/11/19 0819   SGOT 62*   ALT 52   AP 71   TBILI 0.5   TP 7.6   ALB 3.9   GLOB 3.7   LPSE 144     No results for input(s): INR, PTP, APTT in the last 72 hours.     No lab exists for component: INREXT   No results for input(s): FE, TIBC, PSAT, FERR in the last 72 hours. No results found for: FOL, RBCF   No results for input(s): PH, PCO2, PO2 in the last 72 hours. No results for input(s): CPK, CKNDX, TROIQ in the last 72 hours.     No lab exists for component: CPKMB  No results found for: CHOL, CHOLX, CHLST, CHOLV, HDL, HDLP, LDL, LDLC, DLDLP, TGLX, TRIGL, TRIGP, CHHD, CHHDX  Lab Results   Component Value Date/Time    Glucose (POC) 199 (H) 09/12/2019 09:34 PM    Glucose (POC) 205 (H) 09/12/2019 05:44 PM    Glucose (POC) 228 (H) 09/12/2019 11:33 AM    Glucose (POC) 156 (H) 09/12/2019 06:28 AM    Glucose (POC) 190 (H) 09/11/2019 09:40 PM     Lab Results   Component Value Date/Time    Color YELLOW/STRAW 09/11/2019 11:45 AM    Appearance CLOUDY (A) 09/11/2019 11:45 AM    Specific gravity 1.012 09/11/2019 11:45 AM    Specific gravity 1.010 08/15/2018 12:08 PM    pH (UA) 6.0 09/11/2019 11:45 AM    Protein NEGATIVE  09/11/2019 11:45 AM    Glucose NEGATIVE  09/11/2019 11:45 AM    Ketone NEGATIVE  09/11/2019 11:45 AM    Bilirubin NEGATIVE  09/11/2019 11:45 AM    Urobilinogen 0.2 09/11/2019 11:45 AM    Nitrites NEGATIVE  09/11/2019 11:45 AM    Leukocyte Esterase MODERATE (A) 09/11/2019 11:45 AM    Epithelial cells MODERATE (A) 09/11/2019 11:45 AM    Bacteria 1+ (A) 09/11/2019 11:45 AM    WBC 5-10 09/11/2019 11:45 AM    RBC 0-5 09/11/2019 11:45 AM         Medications Reviewed:     Current Facility-Administered Medications   Medication Dose Route Frequency    morphine injection 2 mg  2 mg IntraVENous Q3H PRN    sodium chloride (NS) flush 5-40 mL  5-40 mL IntraVENous Q8H    sodium chloride (NS) flush 5-40 mL  5-40 mL IntraVENous PRN    ibuprofen (MOTRIN) tablet 400 mg  400 mg Oral Q4H PRN    diphenhydrAMINE (BENADRYL) injection 12.5 mg  12.5 mg IntraVENous Q4H PRN    ondansetron (ZOFRAN) injection 4 mg  4 mg IntraVENous Q4H PRN    LORazepam (ATIVAN) injection 1 mg  1 mg IntraVENous Q6H PRN    acetaminophen (TYLENOL) tablet 650 mg  650 mg Oral Q4H PRN    metoclopramide HCl (REGLAN) injection 5 mg  5 mg IntraVENous Q6H    ALPRAZolam (XANAX) tablet 0.125-0.25 mg  0.125-0.25 mg Oral Q12H PRN    dicyclomine (BENTYL) tablet 20 mg  20 mg Oral Q6H PRN    melatonin tablet 4.5 mg  4.5 mg Oral QHS    simvastatin (ZOCOR) tablet 20 mg  20 mg Oral QHS    insulin glargine (LANTUS) injection 30 Units  30 Units SubCUTAneous QHS    cefTRIAXone (ROCEPHIN) 1 g in 0.9% sodium chloride (MBP/ADV) 50 mL  1 g IntraVENous Q24H    vancomycin (FIRVANQ) 50 mg/mL oral solution 125 mg  125 mg Oral Q6H    glucose chewable tablet 16 g  4 Tab Oral PRN    glucagon (GLUCAGEN) injection 1 mg  1 mg IntraMUSCular PRN    insulin lispro (HUMALOG) injection   SubCUTAneous AC&HS    phenazopyridine (PYRIDIUM) tablet 200 mg  200 mg Oral TIDPC    pantoprazole (PROTONIX) tablet 40 mg  40 mg Oral ACB     ______________________________________________________________________  EXPECTED LENGTH OF STAY: - - -  ACTUAL LENGTH OF STAY:          0                 Sejal Souza MD

## 2019-09-13 NOTE — PROGRESS NOTES
Care Management Interventions  PCP Verified by CM: Yes  Palliative Care Criteria Met (RRAT>21 & CHF Dx)?: No  Mode of Transport at Discharge: Other (see comment)  Transition of Care Consult (CM Consult): Discharge Planning  MyChart Signup: Yes  Discharge Durable Medical Equipment: No  Health Maintenance Reviewed: Yes  Physical Therapy Consult: No  Occupational Therapy Consult: No  Speech Therapy Consult: No  Current Support Network: Family Lives Nearby  Confirm Follow Up Transport: Family  Plan discussed with Pt/Family/Caregiver: Yes   Resource Information Provided?: No  Discharge Location  Discharge Placement: Home with family assistance    Reason for Admission:   UTI                   RRAT Score:          12           Plan for utilizing home health:      Not appropriate at this time. Current Advanced Directive/Advance Care Plan:  Not on file. Transition of Care Plan:                      Reviewed chart for transitions ofcare, and discussed in rounds. CM met with patient at beside to explain role and offer support. Patient is alert and oriented x4, and confirmed demographics. Patient is independent with ADLs and IADLs prior to admission. Her daughter will pick her up this afternoon and she will return home with no home health needs at this time. Observation notice provided in writing to patient and/or caregiver as well as verbal explanation of the policy. Patients who are in outpatient status also receive the Observation notice.     Chirag Li [facility]

## 2019-09-13 NOTE — PROGRESS NOTES
Hospital follow-up PCP transitional care appointment has been scheduled with Elisha Becerra NP  for Thursday, 9/19/19 at 1:00 p.m. Pending patient discharge.   Latisha Ruffin, Care Management Specialist.

## 2019-10-10 ENCOUNTER — HOSPITAL ENCOUNTER (EMERGENCY)
Age: 49
Discharge: HOME | End: 2019-10-10
Attending: EMERGENCY MEDICINE
Payer: COMMERCIAL

## 2019-10-10 ENCOUNTER — APPOINTMENT (OUTPATIENT)
Dept: GENERAL RADIOLOGY | Age: 49
End: 2019-10-10
Attending: PHYSICIAN ASSISTANT
Payer: COMMERCIAL

## 2019-10-10 VITALS
OXYGEN SATURATION: 97 % | TEMPERATURE: 98 F | HEART RATE: 82 BPM | SYSTOLIC BLOOD PRESSURE: 167 MMHG | DIASTOLIC BLOOD PRESSURE: 101 MMHG | RESPIRATION RATE: 18 BRPM

## 2019-10-10 DIAGNOSIS — K62.89 RECTAL PAIN: Primary | ICD-10-CM

## 2019-10-10 DIAGNOSIS — R09.1 PLEURISY: ICD-10-CM

## 2019-10-10 LAB
ALBUMIN SERPL-MCNC: 4 G/DL (ref 3.5–5)
ALBUMIN/GLOB SERPL: 1 {RATIO} (ref 1.1–2.2)
ALP SERPL-CCNC: 68 U/L (ref 45–117)
ALT SERPL-CCNC: 41 U/L (ref 12–78)
ANION GAP SERPL CALC-SCNC: 7 MMOL/L (ref 5–15)
APPEARANCE UR: CLEAR
AST SERPL-CCNC: 37 U/L (ref 15–37)
ATRIAL RATE: 78 BPM
BACTERIA URNS QL MICRO: NEGATIVE /HPF
BASOPHILS # BLD: 0 K/UL (ref 0–0.1)
BASOPHILS NFR BLD: 0 % (ref 0–1)
BILIRUB SERPL-MCNC: 0.4 MG/DL (ref 0.2–1)
BILIRUB UR QL: NEGATIVE
BUN SERPL-MCNC: 7 MG/DL (ref 6–20)
BUN/CREAT SERPL: 9 (ref 12–20)
CALCIUM SERPL-MCNC: 9.7 MG/DL (ref 8.5–10.1)
CALCULATED P AXIS, ECG09: 0 DEGREES
CALCULATED R AXIS, ECG10: 1 DEGREES
CALCULATED T AXIS, ECG11: 7 DEGREES
CHLORIDE SERPL-SCNC: 101 MMOL/L (ref 97–108)
CO2 SERPL-SCNC: 28 MMOL/L (ref 21–32)
COLOR UR: ABNORMAL
COMMENT, HOLDF: NORMAL
CREAT SERPL-MCNC: 0.8 MG/DL (ref 0.55–1.02)
DIAGNOSIS, 93000: NORMAL
DIFFERENTIAL METHOD BLD: ABNORMAL
EOSINOPHIL # BLD: 0.2 K/UL (ref 0–0.4)
EOSINOPHIL NFR BLD: 2 % (ref 0–7)
EPITH CASTS URNS QL MICRO: ABNORMAL /LPF
ERYTHROCYTE [DISTWIDTH] IN BLOOD BY AUTOMATED COUNT: 13.7 % (ref 11.5–14.5)
GLOBULIN SER CALC-MCNC: 4 G/DL (ref 2–4)
GLUCOSE SERPL-MCNC: 252 MG/DL (ref 65–100)
GLUCOSE UR STRIP.AUTO-MCNC: 250 MG/DL
HCT VFR BLD AUTO: 34.6 % (ref 35–47)
HGB BLD-MCNC: 11.8 G/DL (ref 11.5–16)
HGB UR QL STRIP: NEGATIVE
HYALINE CASTS URNS QL MICRO: ABNORMAL /LPF (ref 0–5)
IMM GRANULOCYTES # BLD AUTO: 0 K/UL (ref 0–0.04)
IMM GRANULOCYTES NFR BLD AUTO: 0 % (ref 0–0.5)
KETONES UR QL STRIP.AUTO: NEGATIVE MG/DL
LEUKOCYTE ESTERASE UR QL STRIP.AUTO: NEGATIVE
LIPASE SERPL-CCNC: 153 U/L (ref 73–393)
LYMPHOCYTES # BLD: 1.4 K/UL (ref 0.8–3.5)
LYMPHOCYTES NFR BLD: 20 % (ref 12–49)
MCH RBC QN AUTO: 27.5 PG (ref 26–34)
MCHC RBC AUTO-ENTMCNC: 34.1 G/DL (ref 30–36.5)
MCV RBC AUTO: 80.7 FL (ref 80–99)
MONOCYTES # BLD: 0.3 K/UL (ref 0–1)
MONOCYTES NFR BLD: 4 % (ref 5–13)
NEUTS SEG # BLD: 5.4 K/UL (ref 1.8–8)
NEUTS SEG NFR BLD: 74 % (ref 32–75)
NITRITE UR QL STRIP.AUTO: POSITIVE
NRBC # BLD: 0 K/UL (ref 0–0.01)
NRBC BLD-RTO: 0 PER 100 WBC
P-R INTERVAL, ECG05: 148 MS
PH UR STRIP: 6.5 [PH] (ref 5–8)
PLATELET # BLD AUTO: 189 K/UL (ref 150–400)
PMV BLD AUTO: 9.5 FL (ref 8.9–12.9)
POTASSIUM SERPL-SCNC: 3.6 MMOL/L (ref 3.5–5.1)
PROT SERPL-MCNC: 8 G/DL (ref 6.4–8.2)
PROT UR STRIP-MCNC: NEGATIVE MG/DL
Q-T INTERVAL, ECG07: 404 MS
QRS DURATION, ECG06: 88 MS
QTC CALCULATION (BEZET), ECG08: 460 MS
RBC # BLD AUTO: 4.29 M/UL (ref 3.8–5.2)
RBC #/AREA URNS HPF: ABNORMAL /HPF (ref 0–5)
SAMPLES BEING HELD,HOLD: NORMAL
SODIUM SERPL-SCNC: 136 MMOL/L (ref 136–145)
SP GR UR REFRACTOMETRY: 1.01 (ref 1–1.03)
TROPONIN I SERPL-MCNC: <0.05 NG/ML
UROBILINOGEN UR QL STRIP.AUTO: 0.2 EU/DL (ref 0.2–1)
VENTRICULAR RATE, ECG03: 78 BPM
WBC # BLD AUTO: 7.3 K/UL (ref 3.6–11)
WBC URNS QL MICRO: ABNORMAL /HPF (ref 0–4)

## 2019-10-10 PROCEDURE — 93005 ELECTROCARDIOGRAM TRACING: CPT

## 2019-10-10 PROCEDURE — 96360 HYDRATION IV INFUSION INIT: CPT

## 2019-10-10 PROCEDURE — 74011250637 HC RX REV CODE- 250/637: Performed by: PHYSICIAN ASSISTANT

## 2019-10-10 PROCEDURE — 74011250636 HC RX REV CODE- 250/636: Performed by: PHYSICIAN ASSISTANT

## 2019-10-10 PROCEDURE — 81001 URINALYSIS AUTO W/SCOPE: CPT

## 2019-10-10 PROCEDURE — 85025 COMPLETE CBC W/AUTO DIFF WBC: CPT

## 2019-10-10 PROCEDURE — 83690 ASSAY OF LIPASE: CPT

## 2019-10-10 PROCEDURE — 84484 ASSAY OF TROPONIN QUANT: CPT

## 2019-10-10 PROCEDURE — 36415 COLL VENOUS BLD VENIPUNCTURE: CPT

## 2019-10-10 PROCEDURE — 71046 X-RAY EXAM CHEST 2 VIEWS: CPT

## 2019-10-10 PROCEDURE — 99283 EMERGENCY DEPT VISIT LOW MDM: CPT

## 2019-10-10 PROCEDURE — 80053 COMPREHEN METABOLIC PANEL: CPT

## 2019-10-10 RX ORDER — ONDANSETRON 4 MG/1
4 TABLET, ORALLY DISINTEGRATING ORAL
Qty: 12 TAB | Refills: 0 | OUTPATIENT
Start: 2019-10-10 | End: 2020-04-20

## 2019-10-10 RX ORDER — BENZONATATE 100 MG/1
100 CAPSULE ORAL
Qty: 30 CAP | Refills: 0 | Status: SHIPPED | OUTPATIENT
Start: 2019-10-10 | End: 2019-10-20

## 2019-10-10 RX ORDER — HYDROMORPHONE HYDROCHLORIDE 2 MG/1
2 TABLET ORAL
Status: COMPLETED | OUTPATIENT
Start: 2019-10-10 | End: 2019-10-10

## 2019-10-10 RX ORDER — NAPROXEN 500 MG/1
500 TABLET ORAL 2 TIMES DAILY WITH MEALS
Qty: 30 TAB | Refills: 0 | Status: SHIPPED | OUTPATIENT
Start: 2019-10-10 | End: 2020-09-30

## 2019-10-10 RX ORDER — HYDROMORPHONE HYDROCHLORIDE 2 MG/1
2 TABLET ORAL
Qty: 12 TAB | Refills: 0 | Status: SHIPPED | OUTPATIENT
Start: 2019-10-10 | End: 2019-10-13

## 2019-10-10 RX ORDER — HYDROCORTISONE ACETATE 25 MG/1
25 SUPPOSITORY RECTAL EVERY 12 HOURS
Qty: 12 EACH | Refills: 0 | Status: SHIPPED | OUTPATIENT
Start: 2019-10-10 | End: 2019-11-30

## 2019-10-10 RX ORDER — ONDANSETRON 4 MG/1
4 TABLET, ORALLY DISINTEGRATING ORAL
Status: COMPLETED | OUTPATIENT
Start: 2019-10-10 | End: 2019-10-10

## 2019-10-10 RX ADMIN — SODIUM CHLORIDE 1000 ML: 900 INJECTION, SOLUTION INTRAVENOUS at 14:29

## 2019-10-10 RX ADMIN — ONDANSETRON 4 MG: 4 TABLET, ORALLY DISINTEGRATING ORAL at 14:29

## 2019-10-10 RX ADMIN — HYDROMORPHONE HYDROCHLORIDE 2 MG: 2 TABLET ORAL at 14:29

## 2019-10-10 NOTE — ED PROVIDER NOTES
Nelly Beck is a 52 yof with complex medical hx notable for C. Diff colitis, colovaginal fistula, T2DM on insulin, chronic abdominal pain, frequent UTIs. She was last admitted here in September for UTI and elevated lactate, intractable pain, however workup was largely benign. She was referred to her colorectal surgeon for follow up, however she does not wish to see him again and has not followed up with any provider. She does have an appointment with GYN next week evaluate for fistula. She endorses ongoing UTI symptoms. She does endorse subjective fever last night, nausea without vomiting, and recurrent back/rectal pain similar to past episodes. She is tolerating PO and passing stool, which she describes as mixture between formed and mucoid stool. She has a hx of C. Diff, last positive result was in February 2019. She has had her abdomen imaged multiple times this year for the same complaints. She is s/p hysterectomy. She also c/o cough, URI sx and pleuritic chest pain on her right side for 2 days. She denies syncope or hemoptysis. She denies exertional component, migratory pain, motorsensory dysfunction. Denies hx of CAD or VTE. The history is provided by the patient. Abdominal Pain    This is a recurrent problem. The problem occurs constantly. The problem has not changed since onset. Associated symptoms include a fever, diarrhea, nausea, dysuria and chest pain. Pertinent negatives include no vomiting and no myalgias.         Past Medical History:   Diagnosis Date    Anal fissure     Anisocoria     Asthma     LAST EPISODE     Back pain     Cerumen impaction     Chronic kidney disease     hx uti in past    Coagulation defects     ocassional rectal bleeding due to anal fissure    Colovaginal fistula     Diabetes (HCC)     NIDDM    Diabetes ([facility] [address]     Diverticulitis     Diverticulosis     Enlarged tonsils     Frequent UTI     GERD (gastroesophageal reflux disease)     H/O endoscopy with dilation    HA (headache)     Hepatic steatosis     Hx of colonoscopy with polypectomy     benign    Hypertension     Ill-defined condition     FREQUENT HIVES    Ill-defined condition     HX ELEVATED LIVER ENZYMES    Morbid obesity (HCC)     Nausea & vomiting     during diverticulitis flare    Obesity     Otitis media     Pneumonia     about 15 yrs ago    Psychiatric disorder     ANXIETY    Recurrent tonsillitis     Sinusitis     Transfusion history ~ age 35    postop hysterectomy    Unspecified sleep apnea     snores ( not diagnosed yet)     Urticaria     Urticaria        Past Surgical History:   Procedure Laterality Date    ABDOMEN SURGERY PROC UNLISTED  2018    [facility] repair at [facility]    COLONOSCOPY N/A 3/28/2019    COLONOSCOPY performed by Deepa Valentino MD at [address] [place] [address]    [facility].     HX GI  12    LAPAROSCOPIC HAND ASSISTED  POSS OPEN SIGMOID COLECTOMY POSS TEMPORARY DIVERTING LOOP ILEOSTOMY;  (no illeostomy needed)    HX GYN           HX GYN      cervical conization    HX HEENT      SINUS SURGERY LEFT X2    HX HEENT      SINUS SURGERY ON RIGHT X2    HX OTHER SURGICAL      Sphincterotomy    HX PELVIC LAPAROSCOPY      HX EMMANUEL AND BSO      HX UROLOGICAL  12     CYSTOSCOPY INSERTION URETERAL CATHETERS - Cystoscopy Insertion of bilateral ureteral stents         Family History:   Problem Relation Age of Onset    Diabetes Mother     Cancer Mother         NON-HODGKINS LYMPHOMA    Anesth Problems Mother         PONV    Diabetes Father     Heart Disease Father         CAD - STENTS, PACEMAKER    Arrhythmia Father        Social History     Socioeconomic History    Marital status:      Spouse name: Not on file    Number of children: Not on file    Years of education: Not on file    Highest education level: Not on file   Occupational History    Not on file   Social Needs    Financial resource strain: Not on file  Food insecurity:     Worry: Not on file     Inability: Not on file    Transportation needs:     Medical: Not on file     Non-medical: Not on file   Tobacco Use    Smoking status: Never Smoker    Smokeless tobacco: Never Used   Substance and Sexual Activity    Alcohol use: Yes     Comment: Rarely    Drug use: No     Types: Prescription, OTC    Sexual activity: Never   Lifestyle    Physical activity:     Days per week: Not on file     Minutes per session: Not on file    Stress: Not on file   Relationships    Social connections:     Talks on phone: Not on file     Gets together: Not on file     Attends Mu-ism service: Not on file     Active member of club or organization: Not on file     Attends meetings of clubs or organizations: Not on file     Relationship status: Not on file    Intimate partner violence:     Fear of current or ex partner: Not on file     Emotionally abused: Not on file     Physically abused: Not on file     Forced sexual activity: Not on file   Other Topics Concern    Not on file   Social History Narrative    Not on file         ALLERGIES: Aspirin; Codeine; Contrast agent [iodine]; Percocet [oxycodone-acetaminophen]; Prilosec [omeprazole magnesium]; Ketorolac; Fentanyl; and Morphine    Review of Systems   Constitutional: Positive for fever. Negative for chills and diaphoresis. HENT: Positive for rhinorrhea. Negative for sore throat, trouble swallowing and voice change. Eyes: Negative for photophobia. Respiratory: Positive for cough. Negative for shortness of breath. Cardiovascular: Positive for chest pain. Gastrointestinal: Positive for abdominal pain, diarrhea, nausea and rectal pain. Negative for vomiting. Genitourinary: Positive for dysuria. Negative for pelvic pain, vaginal bleeding and vaginal discharge. Musculoskeletal: Negative for myalgias. Skin: Negative for rash. Allergic/Immunologic: Negative for immunocompromised state.    Neurological: Negative for syncope and weakness. Vitals:    10/10/19 1211 10/10/19 1212   BP:  (!) 151/97   Pulse:  85   Resp:  18   Temp: 98.5 °F (36.9 °C)    SpO2:  97%            Physical Exam   Constitutional: She is oriented to person, place, and time. She appears well-developed and well-nourished. No distress. HENT:   Head: Normocephalic and atraumatic. Eyes: Conjunctivae are normal. No scleral icterus. Neck: Neck supple. Cardiovascular: Normal rate, regular rhythm and intact distal pulses. Pulmonary/Chest: Effort normal and breath sounds normal. No respiratory distress. Abdominal: Soft. She exhibits no distension. There is no tenderness. There is no rebound and no guarding. Genitourinary:   Genitourinary Comments: Anorectal Exam: non-thrombosed hemorrhoid, possible fissure felt at 1 o'clock, no significant pain elicited by exam   Neurological: She is alert and oriented to person, place, and time. Skin: Skin is warm and dry. Capillary refill takes less than 2 seconds. She is not diaphoretic. No pallor. Nursing note and vitals reviewed. MDM  Number of Diagnoses or Management Options  Pleurisy:   Rectal pain:   Diagnosis management comments: Stable, well appearing. Her labs and rads are unremarkable. CXR negative, ECG non-ischemic, troponin negative and she is PERC negative for PE. Her abdominal pain is acute on chronic, and I do not suspect we will elucidate new pathology by imaging her abdomen again with CT. She agrees to defer imaging on this visit and is primarily interested in pain control. She should use NSAIDs for pain and her pleuritic chest pain, Anusol for ongoing hemorrhoids and rectal pain. Given f/u with GI for management as she does not want to go back to colorectal surgery, and as stated, she has an appt with GYN in the next 7 days. We discussed returning to the ED for any new fever, worsening, severe pain, vomiting or diarrhea.  She was unable to produce a stool sample while in the department, and clinically I do not suspect C. Diff infection. Her urine did have positive nitrites in it. Her last culture grew lactobacillus. We discussed deferring ABx given her prior C. Diff infections and wait for culture as she is non-toxic and afebrile. The patient was seen, examined, and the chart/vital signs were reviewed in the ER. Appropriate laboratory and imaging studies were obtained based on the patients risk factors, history, and physical exam findings. The results were personally reviewed and interpreted and then reviewed with the patient/caregiver. Patient appears safe for discharge. Diagnosis and treatment plan explained in layman's terms. Counseled need for close f/u with PCP or specialist. Strict return precautions given. Patient verbalized understanding and agreement. All questions answered. EKG  Date/Time: 10/10/2019 1:45 PM  Performed by: Sathya Goetz PA-C  Authorized by: Sathya Goetz PA-C     ECG reviewed by ED Physician in the absence of a cardiologist: yes    Previous ECG:     Previous ECG:  Compared to current  Interpretation:     Interpretation: non-specific    Rate:     ECG rate:  78    ECG rate assessment: normal    Rhythm:     Rhythm: sinus rhythm    Ectopy:     Ectopy: none    QRS:     QRS axis:  Normal  Conduction:     Conduction: normal    ST segments:     ST segments:  Non-specific  T waves:     T waves: non-specific    Comments:      No STEMI    I was personally available for consultation in the emergency department. I have reviewed the chart and agree with the documentation recorded by the [facility], including the assessment, treatment plan, and disposition.   Jordi Murillo MD

## 2019-10-10 NOTE — ED TRIAGE NOTES
Patient reports bloody diarrhea and lower abdominal pain off and on for a month. Diarrhea x 2 daily history of C diff. Recent treatment for UTI 2 weeks ago. Painful urination and congestion that started on Tuesday.

## 2019-10-10 NOTE — LETTER
[address] 
[address] [facility] [id number] 
[phone] Work/School Note Date: 10/10/2019 To Whom It May concern: 
 
Chuy Blas was seen and treated today in the emergency room by the following provider(s): 
Attending Provider: Christal Fabian MD 
Physician Assistant: Román West PA-C. Chuy Blas may return to work on 10/11/19. Sincerely, Xochitl Jean-Baptiste PA-C

## 2019-10-10 NOTE — DISCHARGE INSTRUCTIONS
Pleurisy: Care Instructions  Your Care Instructions  Pleurisy is inflammation of the tissue that lines the inside of the chest and covers the lungs (pleura). Pleurisy is often caused by an infection, usually a virus. It also can be caused by other health problems, such as pneumonia or lupus. Pleurisy can cause sharp chest pain that gets worse when you cough or take a deep breath. You may need more tests to find out what is causing your pleurisy. Treatment depends on the cause. Pleurisy may come and go for a few days, or it may continue if the cause has not been treated. Home treatment can help ease symptoms. Follow-up care is a key part of your treatment and safety. Be sure to make and go to all appointments, and call your doctor if you are having problems. It's also a good idea to know your test results and keep a list of the medicines you take. How can you care for yourself at home? · Take an over-the-counter pain medicine, such as acetaminophen (Tylenol), ibuprofen (Advil, Motrin), or naproxen (Aleve). Read and follow all instructions on the label. · Do not take two or more pain medicines at the same time unless the doctor told you to. Many pain medicines have acetaminophen, which is Tylenol. Too much acetaminophen (Tylenol) can be harmful. · If your doctor prescribed antibiotics, take them as directed. Do not stop taking them just because you feel better. You need to take the full course of antibiotics. · Take cough medicine as directed if your doctor recommends it. · Avoid activities that make the pain worse. When should you call for help? Call 911 anytime you think you may need emergency care.  For example, call if:    · You have severe trouble breathing.     · You have severe chest pain.     · You passed out (lost consciousness).    Call your doctor now or seek immediate medical care if:    · You have a new or higher fever.    Watch closely for changes in your health, and be sure to contact your doctor if:    · You begin to cough up yellow or green mucus.     · You cough up blood.     · Your symptoms are not better in 3 or 4 days. Where can you learn more? Go to http://gladys-alia.info/. Enter F346 in the search box to learn more about \"Pleurisy: Care Instructions. \"  Current as of: June 9, 2019  Content Version: 12.2  © 5728-6915 [employer]. Care instructions adapted under license by [employer] (which disclaims liability or warranty for this information). If you have questions about a medical condition or this instruction, always ask your healthcare professional. [place] [address] any warranty or liability for your use of this information. Patient Education        Anal Pain: Care Instructions  Your Care Instructions  Pain in the opening to the rectum (anus) can be caused by diarrhea or constipation or by scratching a rectal itch. A common cause of anal pain is a tear in the lining of the lower rectum (anal fissure). This type of anal pain usually goes away when the problem clears up. Injury during anal sex or from an object being placed in the rectum also can cause pain. A rare cause of anal pain is spasms of the muscles in the rectum. Some of these conditions may cause some light bleeding. Home treatment usually can relieve anal pain. If you continue to have anal pain, your doctor may prescribe medicine to relieve pain and other symptoms. Depending on the cause, you may need other treatment. Follow-up care is a key part of your treatment and safety. Be sure to make and go to all appointments, and call your doctor if you are having problems. It's also a good idea to know your test results and keep a list of the medicines you take. How can you care for yourself at home? · Sit in a few inches of warm water (sitz bath) 3 times a day and after bowel movements. The warm water eases discomfort.  Do not put soaps, salts, or shampoos in the water.  · Drink plenty of fluids, enough so that your urine is light yellow or clear like water. If you have kidney, heart, or liver disease and have to limit fluids, talk with your doctor before you increase the amount of fluids you drink. · Include high-fiber foods, such as fruits, vegetables, beans, and whole grains, in your diet each day. · Take a fiber supplement, such as Benefiber, Citrucel, or Metamucil, every day. Read and follow all instructions on the label. · Use the toilet when you feel the urge. Or when you can, schedule time each day for a bowel movement. A daily routine may help. Take your time and do not strain when having a bowel movement. But do not sit on the toilet too long. · Support your feet with a small step stool when you sit on the toilet. This helps flex your hips and places your pelvis in a squatting position. · Your doctor may recommend an over-the-counter laxative, such as Miralax, Milk of Magnesia, or Ex-Lax. Read and follow all instructions on the label, and do not use laxatives on a long-term basis. · Do not use over-the-counter ointments or creams without talking to your doctor. Some of these may not help. · Use baby wipes or medicated pads, such as Preparation H or Tucks, instead of toilet paper to clean after a bowel movement. These products do not irritate the anus. · Be safe with medicines. Read and follow all instructions on the label. ? If the doctor gave you a prescription medicine for pain, give it as prescribed. ? If you are not taking a prescription pain medicine, ask your doctor if you can take an over-the-counter medicine. When should you call for help? Call your doctor now or seek immediate medical care if:    · You have new or worse pain.     · You have new or worse bleeding from the rectum.    Watch closely for changes in your health, and be sure to contact your doctor if:    · You have trouble passing stools.     · You do not get better as expected. Where can you learn more? Go to http://gladys-alia.info/. Enter 486 6192 in the search box to learn more about \"Anal Pain: Care Instructions. \"  Current as of: November 7, 2018  Content Version: 12.2  © 0964-9707 [employer]. Care instructions adapted under license by [employer] (which disclaims liability or warranty for this information). If you have questions about a medical condition or this instruction, always ask your healthcare professional. [place] [address] any warranty or liability for your use of this information. Patient Education        Proctitis: Care Instructions  Your Care Instructions  Proctitis is inflammation of the lining of the rectum. It can be a short-term or long-term problem. Many things can cause proctitis. It may be a side effect of medical treatments, such as radiation therapy or antibiotics. Some sexually transmitted diseases may also cause proctitis. It may be related to ulcerative colitis or to Crohn's disease. Other causes include bacterial infection, allergies, or injury or nerve problems in the rectum. Common symptoms include pain or itching in the rectum and a constant or frequent strong need to have a bowel movement. You may have a change in bowel habits; a fever; and mucus, blood, or pus in your stools. Follow-up care is a key part of your treatment and safety. Be sure to make and go to all appointments, and call your doctor if you are having problems. It's also a good idea to know your test results and keep a list of the medicines you take. How can you care for yourself at home? · Take your medicines exactly as prescribed. Call your doctor if you think you are having a problem with your medicine. You will get more details on the specific medicines your doctor prescribes. · If your doctor prescribed antibiotics, take them as directed. Do not stop taking them just because you feel better.  You need to take the full course of antibiotics. · Some complementary treatments may help. These include acupuncture, herbal remedies, and diet supplements. Be sure to talk to your doctor before you use any complementary treatment. · Avoid anal intercourse. This will prevent further damage to the anal canal and give it time to heal.  · Avoid foods that seem to make your symptoms worse. Common problem foods include dairy products, foods and drinks that contain caffeine, and high-fat foods. These foods can irritate the digestive tract and make conditions like ulcerative colitis worse. · Take steps to reduce stress. Exercises such as yoga or nahid chi can help you deal with stress. Talk to your doctor about these and other methods of reducing stress. When should you call for help? Call your doctor now or seek immediate medical care if:    · You have new or worse pain.     · You have new or worse bleeding from the rectum.    Watch closely for changes in your health, and be sure to contact your doctor if:    · You cannot pass stools or gas.     · You do not get better as expected. Where can you learn more? Go to http://gladys-alia.info/. Enter M741 in the search box to learn more about \"Proctitis: Care Instructions. \"  Current as of: November 7, 2018  Content Version: 12.2  © 9055-3685 [employer], [employer]. Care instructions adapted under license by [employer] (which disclaims liability or warranty for this information). If you have questions about a medical condition or this instruction, always ask your healthcare professional. [address] any warranty or liability for your use of this information. We hope that we have addressed all of your medical concerns. The examination and treatment you received in the Emergency Department were for an emergent problem and were not intended as complete care.  It is important that you follow up with your healthcare provider(s) for ongoing care. If your symptoms worsen or do not improve as expected, and you are unable to reach your usual health care provider(s), you should return to the Emergency Department. Today's healthcare is undergoing tremendous change, and patient satisfaction surveys are one of the many tools to assess the quality of medical care. You may receive a survey from the [employer] regarding your experience in the Emergency Department. I hope that your experience has been completely positive, particularly the medical care that I provided. As such, please participate in the survey; anything less than excellent does not meet my expectations or intentions. [facility] and [address] [place] [address] participate in nationally recognized quality of care measures. If your blood pressure is greater than 120/80, as reported below, we urge that you seek medical care to address the potential of high blood pressure, commonly known as hypertension. Hypertension can be hereditary or can be caused by certain medical conditions, pain, stress, or \"white coat syndrome. \"       Please make an appointment with your health care provider(s) for follow up of your Emergency Department visit. VITALS:   Patient Vitals for the past 8 hrs:   Temp Pulse Resp BP SpO2   10/10/19 1212 -- 85 18 (!) 151/97 97 %   10/10/19 1211 98.5 °F (36.9 °C) -- -- -- --          Thank you for allowing us to provide you with medical care today. We realize that you have many choices for your emergency care needs. Please choose us in the future for any continued health care needs.       Jess Perez, [address]   Office: [phone]            Recent Results (from the past 24 hour(s))   CBC WITH AUTOMATED DIFF    Collection Time: 10/10/19  1:34 PM   Result Value Ref Range    WBC 7.3 3.6 - 11.0 K/uL    RBC 4.29 3.80 - 5.20 M/uL    HGB 11.8 11.5 - 16.0 g/dL    HCT 34.6 (L) 35.0 - 47.0 %    MCV 80.7 80.0 - 99.0 FL    MCH 27.5 26.0 - 34.0 PG    MCHC 34.1 30.0 - 36.5 g/dL    RDW 13.7 11.5 - 14.5 %    PLATELET 807 273 - 104 K/uL    MPV 9.5 8.9 - 12.9 FL    NRBC 0.0 0  WBC    ABSOLUTE NRBC 0.00 0.00 - 0.01 K/uL    NEUTROPHILS 74 32 - 75 %    LYMPHOCYTES 20 12 - 49 %    MONOCYTES 4 (L) 5 - 13 %    EOSINOPHILS 2 0 - 7 %    BASOPHILS 0 0 - 1 %    IMMATURE GRANULOCYTES 0 0.0 - 0.5 %    ABS. NEUTROPHILS 5.4 1.8 - 8.0 K/UL    ABS. LYMPHOCYTES 1.4 0.8 - 3.5 K/UL    ABS. MONOCYTES 0.3 0.0 - 1.0 K/UL    ABS. EOSINOPHILS 0.2 0.0 - 0.4 K/UL    ABS. BASOPHILS 0.0 0.0 - 0.1 K/UL    ABS. IMM. GRANS. 0.0 0.00 - 0.04 K/UL    DF AUTOMATED     METABOLIC PANEL, COMPREHENSIVE    Collection Time: 10/10/19  1:34 PM   Result Value Ref Range    Sodium 136 136 - 145 mmol/L    Potassium 3.6 3.5 - 5.1 mmol/L    Chloride 101 97 - 108 mmol/L    CO2 28 21 - 32 mmol/L    Anion gap 7 5 - 15 mmol/L    Glucose 252 (H) 65 - 100 mg/dL    BUN 7 6 - 20 MG/DL    Creatinine 0.80 0.55 - 1.02 MG/DL    BUN/Creatinine ratio 9 (L) 12 - 20      GFR est AA >60 >60 ml/min/1.73m2    GFR est non-AA >60 >60 ml/min/1.73m2    Calcium 9.7 8.5 - 10.1 MG/DL    Bilirubin, total 0.4 0.2 - 1.0 MG/DL    ALT (SGPT) 41 12 - 78 U/L    AST (SGOT) 37 15 - 37 U/L    Alk. phosphatase 68 45 - 117 U/L    Protein, total 8.0 6.4 - 8.2 g/dL    Albumin 4.0 3.5 - 5.0 g/dL    Globulin 4.0 2.0 - 4.0 g/dL    A-G Ratio 1.0 (L) 1.1 - 2.2     LIPASE    Collection Time: 10/10/19  1:34 PM   Result Value Ref Range    Lipase 153 73 - 393 U/L   TROPONIN I    Collection Time: 10/10/19  1:34 PM   Result Value Ref Range    Troponin-I, Qt. <0.05 <0.05 ng/mL   SAMPLES BEING HELD    Collection Time: 10/10/19  1:34 PM   Result Value Ref Range    SAMPLES BEING HELD  1 RED, 1 BLUE     COMMENT        Add-on orders for these samples will be processed based on acceptable specimen integrity and analyte stability, which may vary by analyte.    EKG, 12 LEAD, INITIAL    Collection Time: 10/10/19  1:39 PM   Result Value Ref Range    Ventricular Rate 78 BPM    Atrial Rate 78 BPM    P-R Interval 148 ms    QRS Duration 88 ms    Q-T Interval 404 ms    QTC Calculation (Bezet) 460 ms    Calculated P Axis 0 degrees    Calculated R Axis 1 degrees    Calculated T Axis 7 degrees    Diagnosis       Normal sinus rhythm  Nonspecific T wave abnormality  Prolonged QT  When compared with ECG of 14-FEB-2019 18:52,  Questionable change in QRS axis  Non-specific change in ST segment in Inferior leads  T wave inversion no longer evident in Inferior leads  T wave inversion no longer evident in Anterolateral leads         Xr Chest Pa Lat    Result Date: 10/10/2019  Chest 2 views dated 10/10/2019 Comparison chest dated 2/14/2019 History is cough and chest pain PA and lateral views of the chest were obtained. These films were obtained during a slightly less than optimal degree of inspiration. Some vascular crowding is noted at lung bases. It is difficult to accurately assess the size of the cardiac silhouette. There is minimal linear atelectatic change at the lung bases. The costophrenic angles are sharp in appearance. No areas of lobar consolidation are identified. IMPRESSION: Presence of minimal bibasilar linear atelectasis. No evidence of lobar consolidation.

## 2019-11-30 ENCOUNTER — HOSPITAL ENCOUNTER (EMERGENCY)
Age: 49
Discharge: HOME | End: 2019-11-30
Attending: EMERGENCY MEDICINE
Payer: COMMERCIAL

## 2019-11-30 ENCOUNTER — APPOINTMENT (OUTPATIENT)
Dept: CT IMAGING | Age: 49
End: 2019-11-30
Attending: EMERGENCY MEDICINE
Payer: COMMERCIAL

## 2019-11-30 VITALS
DIASTOLIC BLOOD PRESSURE: 86 MMHG | HEART RATE: 87 BPM | SYSTOLIC BLOOD PRESSURE: 145 MMHG | TEMPERATURE: 97.9 F | OXYGEN SATURATION: 92 % | RESPIRATION RATE: 16 BRPM

## 2019-11-30 DIAGNOSIS — K62.89 ANAL PAIN: Primary | ICD-10-CM

## 2019-11-30 LAB
ALBUMIN SERPL-MCNC: 3.8 G/DL (ref 3.5–5)
ALBUMIN/GLOB SERPL: 1.1 {RATIO} (ref 1.1–2.2)
ALP SERPL-CCNC: 68 U/L (ref 45–117)
ALT SERPL-CCNC: 42 U/L (ref 12–78)
ANION GAP SERPL CALC-SCNC: 6 MMOL/L (ref 5–15)
APPEARANCE UR: CLEAR
AST SERPL-CCNC: 35 U/L (ref 15–37)
BACTERIA URNS QL MICRO: NEGATIVE /HPF
BASOPHILS # BLD: 0 K/UL (ref 0–0.1)
BASOPHILS NFR BLD: 1 % (ref 0–1)
BILIRUB SERPL-MCNC: 0.5 MG/DL (ref 0.2–1)
BILIRUB UR QL CFM: NEGATIVE
BUN SERPL-MCNC: 8 MG/DL (ref 6–20)
BUN/CREAT SERPL: 10 (ref 12–20)
CALCIUM SERPL-MCNC: 9.3 MG/DL (ref 8.5–10.1)
CHLORIDE SERPL-SCNC: 101 MMOL/L (ref 97–108)
CO2 SERPL-SCNC: 28 MMOL/L (ref 21–32)
COLOR UR: ABNORMAL
COMMENT, HOLDF: NORMAL
CREAT SERPL-MCNC: 0.77 MG/DL (ref 0.55–1.02)
DIFFERENTIAL METHOD BLD: NORMAL
EOSINOPHIL # BLD: 0.2 K/UL (ref 0–0.4)
EOSINOPHIL NFR BLD: 3 % (ref 0–7)
EPITH CASTS URNS QL MICRO: ABNORMAL /LPF
ERYTHROCYTE [DISTWIDTH] IN BLOOD BY AUTOMATED COUNT: 13.5 % (ref 11.5–14.5)
GLOBULIN SER CALC-MCNC: 3.6 G/DL (ref 2–4)
GLUCOSE SERPL-MCNC: 298 MG/DL (ref 65–100)
GLUCOSE UR STRIP.AUTO-MCNC: >1000 MG/DL
HCT VFR BLD AUTO: 36.9 % (ref 35–47)
HEMOCCULT STL QL: NEGATIVE
HGB BLD-MCNC: 12.6 G/DL (ref 11.5–16)
HGB UR QL STRIP: NEGATIVE
IMM GRANULOCYTES # BLD AUTO: 0 K/UL (ref 0–0.04)
IMM GRANULOCYTES NFR BLD AUTO: 0 % (ref 0–0.5)
KETONES UR QL STRIP.AUTO: 15 MG/DL
LEUKOCYTE ESTERASE UR QL STRIP.AUTO: ABNORMAL
LYMPHOCYTES # BLD: 1.4 K/UL (ref 0.8–3.5)
LYMPHOCYTES NFR BLD: 26 % (ref 12–49)
MCH RBC QN AUTO: 27.8 PG (ref 26–34)
MCHC RBC AUTO-ENTMCNC: 34.1 G/DL (ref 30–36.5)
MCV RBC AUTO: 81.5 FL (ref 80–99)
MONOCYTES # BLD: 0.3 K/UL (ref 0–1)
MONOCYTES NFR BLD: 5 % (ref 5–13)
NEUTS SEG # BLD: 3.4 K/UL (ref 1.8–8)
NEUTS SEG NFR BLD: 65 % (ref 32–75)
NITRITE UR QL STRIP.AUTO: POSITIVE
NRBC # BLD: 0 K/UL (ref 0–0.01)
NRBC BLD-RTO: 0 PER 100 WBC
PH UR STRIP: 5.5 [PH] (ref 5–8)
PLATELET # BLD AUTO: 169 K/UL (ref 150–400)
PMV BLD AUTO: 10 FL (ref 8.9–12.9)
POTASSIUM SERPL-SCNC: 3.6 MMOL/L (ref 3.5–5.1)
PROT SERPL-MCNC: 7.4 G/DL (ref 6.4–8.2)
PROT UR STRIP-MCNC: NEGATIVE MG/DL
RBC # BLD AUTO: 4.53 M/UL (ref 3.8–5.2)
RBC #/AREA URNS HPF: ABNORMAL /HPF (ref 0–5)
SAMPLES BEING HELD,HOLD: NORMAL
SODIUM SERPL-SCNC: 135 MMOL/L (ref 136–145)
SP GR UR REFRACTOMETRY: 1.03 (ref 1–1.03)
UR CULT HOLD, URHOLD: NORMAL
UROBILINOGEN UR QL STRIP.AUTO: 4 EU/DL (ref 0.2–1)
WBC # BLD AUTO: 5.3 K/UL (ref 3.6–11)
WBC URNS QL MICRO: ABNORMAL /HPF (ref 0–4)

## 2019-11-30 PROCEDURE — 36415 COLL VENOUS BLD VENIPUNCTURE: CPT

## 2019-11-30 PROCEDURE — 82272 OCCULT BLD FECES 1-3 TESTS: CPT

## 2019-11-30 PROCEDURE — 96376 TX/PRO/DX INJ SAME DRUG ADON: CPT

## 2019-11-30 PROCEDURE — 96375 TX/PRO/DX INJ NEW DRUG ADDON: CPT

## 2019-11-30 PROCEDURE — 81001 URINALYSIS AUTO W/SCOPE: CPT

## 2019-11-30 PROCEDURE — 74176 CT ABD & PELVIS W/O CONTRAST: CPT

## 2019-11-30 PROCEDURE — 99284 EMERGENCY DEPT VISIT MOD MDM: CPT

## 2019-11-30 PROCEDURE — 80053 COMPREHEN METABOLIC PANEL: CPT

## 2019-11-30 PROCEDURE — 85025 COMPLETE CBC W/AUTO DIFF WBC: CPT

## 2019-11-30 PROCEDURE — 74011250636 HC RX REV CODE- 250/636: Performed by: EMERGENCY MEDICINE

## 2019-11-30 PROCEDURE — 96374 THER/PROPH/DIAG INJ IV PUSH: CPT

## 2019-11-30 RX ORDER — HYDROMORPHONE HYDROCHLORIDE 2 MG/ML
1 INJECTION, SOLUTION INTRAMUSCULAR; INTRAVENOUS; SUBCUTANEOUS ONCE
Status: COMPLETED | OUTPATIENT
Start: 2019-11-30 | End: 2019-11-30

## 2019-11-30 RX ORDER — HYDROCORTISONE ACETATE 25 MG/1
25 SUPPOSITORY RECTAL EVERY 12 HOURS
Qty: 12 EACH | Refills: 0 | Status: SHIPPED | OUTPATIENT
Start: 2019-11-30 | End: 2021-01-30

## 2019-11-30 RX ORDER — ONDANSETRON 2 MG/ML
4 INJECTION INTRAMUSCULAR; INTRAVENOUS
Status: COMPLETED | OUTPATIENT
Start: 2019-11-30 | End: 2019-11-30

## 2019-11-30 RX ORDER — HYDROMORPHONE HYDROCHLORIDE 2 MG/ML
0.5 INJECTION, SOLUTION INTRAMUSCULAR; INTRAVENOUS; SUBCUTANEOUS ONCE
Status: COMPLETED | OUTPATIENT
Start: 2019-11-30 | End: 2019-11-30

## 2019-11-30 RX ORDER — HYDROCORTISONE 1 %
CREAM (GRAM) TOPICAL 2 TIMES DAILY
Qty: 30 G | Refills: 0 | Status: SHIPPED | OUTPATIENT
Start: 2019-11-30 | End: 2020-09-30

## 2019-11-30 RX ORDER — LORAZEPAM 2 MG/ML
1 INJECTION INTRAMUSCULAR
Status: COMPLETED | OUTPATIENT
Start: 2019-11-30 | End: 2019-11-30

## 2019-11-30 RX ORDER — HYDROMORPHONE HYDROCHLORIDE 2 MG/1
2 TABLET ORAL
Qty: 7 TAB | Refills: 0 | Status: SHIPPED | OUTPATIENT
Start: 2019-11-30 | End: 2019-12-03

## 2019-11-30 RX ADMIN — HYDROMORPHONE HYDROCHLORIDE 1 MG: 2 INJECTION INTRAMUSCULAR; INTRAVENOUS; SUBCUTANEOUS at 14:18

## 2019-11-30 RX ADMIN — HYDROMORPHONE HYDROCHLORIDE 1 MG: 2 INJECTION INTRAMUSCULAR; INTRAVENOUS; SUBCUTANEOUS at 12:42

## 2019-11-30 RX ADMIN — LORAZEPAM 1 MG: 2 INJECTION, SOLUTION INTRAMUSCULAR; INTRAVENOUS at 12:41

## 2019-11-30 RX ADMIN — ONDANSETRON 4 MG: 2 INJECTION INTRAMUSCULAR; INTRAVENOUS at 13:04

## 2019-11-30 RX ADMIN — HYDROMORPHONE HYDROCHLORIDE 0.5 MG: 2 INJECTION INTRAMUSCULAR; INTRAVENOUS; SUBCUTANEOUS at 16:28

## 2019-11-30 NOTE — ED NOTES
Pt medicated per the MAR. Pt c/o nausea and requesting medication. MD aware. Verbal order for 4mg IV Zofran.

## 2019-11-30 NOTE — DISCHARGE INSTRUCTIONS
Patient Education        Anal Pain: Care Instructions  Your Care Instructions  Pain in the opening to the rectum (anus) can be caused by diarrhea or constipation or by scratching a rectal itch. A common cause of anal pain is a tear in the lining of the lower rectum (anal fissure). This type of anal pain usually goes away when the problem clears up. Injury during anal sex or from an object being placed in the rectum also can cause pain. A rare cause of anal pain is spasms of the muscles in the rectum. Some of these conditions may cause some light bleeding. Home treatment usually can relieve anal pain. If you continue to have anal pain, your doctor may prescribe medicine to relieve pain and other symptoms. Depending on the cause, you may need other treatment. Follow-up care is a key part of your treatment and safety. Be sure to make and go to all appointments, and call your doctor if you are having problems. It's also a good idea to know your test results and keep a list of the medicines you take. How can you care for yourself at home? · Sit in a few inches of warm water (sitz bath) 3 times a day and after bowel movements. The warm water eases discomfort. Do not put soaps, salts, or shampoos in the water. · Drink plenty of fluids, enough so that your urine is light yellow or clear like water. If you have kidney, heart, or liver disease and have to limit fluids, talk with your doctor before you increase the amount of fluids you drink. · Include high-fiber foods, such as fruits, vegetables, beans, and whole grains, in your diet each day. · Take a fiber supplement, such as Benefiber, Citrucel, or Metamucil, every day. Read and follow all instructions on the label. · Use the toilet when you feel the urge. Or when you can, schedule time each day for a bowel movement. A daily routine may help. Take your time and do not strain when having a bowel movement. But do not sit on the toilet too long.   · Support your feet with a small step stool when you sit on the toilet. This helps flex your hips and places your pelvis in a squatting position. · Your doctor may recommend an over-the-counter laxative, such as Miralax, Milk of Magnesia, or Ex-Lax. Read and follow all instructions on the label, and do not use laxatives on a long-term basis. · Do not use over-the-counter ointments or creams without talking to your doctor. Some of these may not help. · Use baby wipes or medicated pads, such as Preparation H or Tucks, instead of toilet paper to clean after a bowel movement. These products do not irritate the anus. · Be safe with medicines. Read and follow all instructions on the label. ? If the doctor gave you a prescription medicine for pain, give it as prescribed. ? If you are not taking a prescription pain medicine, ask your doctor if you can take an over-the-counter medicine. When should you call for help? Call your doctor now or seek immediate medical care if:    · You have new or worse pain.     · You have new or worse bleeding from the rectum.    Watch closely for changes in your health, and be sure to contact your doctor if:    · You have trouble passing stools.     · You do not get better as expected. Where can you learn more? Go to http://gladys-alia.info/. Enter 486 2465 in the search box to learn more about \"Anal Pain: Care Instructions. \"  Current as of: November 7, 2018  Content Version: 12.2  © 7008-9560 [employer]. Care instructions adapted under license by [employer] (which disclaims liability or warranty for this information). If you have questions about a medical condition or this instruction, always ask your healthcare professional. [address] any warranty or liability for your use of this information.

## 2019-11-30 NOTE — ED NOTES
Medicated with dilaudid. sp02 95% RA. Instructed pt we will observe her for 15-20 min prior to discharge.  Daughter to drive home

## 2019-11-30 NOTE — ED TRIAGE NOTES
C/o rectal bleeding x 2 weeks and severe rectal pain & swelling. Hx thrombus hemorrhoid. +nausea. No relief with topical agents.

## 2019-12-01 ENCOUNTER — HOSPITAL ENCOUNTER (EMERGENCY)
Age: 49
Discharge: HOME | End: 2019-12-01
Attending: EMERGENCY MEDICINE
Payer: COMMERCIAL

## 2019-12-01 ENCOUNTER — HOSPITAL ENCOUNTER (EMERGENCY)
Age: 49
Discharge: HOME | End: 2019-12-01
Attending: STUDENT IN AN ORGANIZED HEALTH CARE EDUCATION/TRAINING PROGRAM | Admitting: EMERGENCY MEDICINE
Payer: COMMERCIAL

## 2019-12-01 VITALS
SYSTOLIC BLOOD PRESSURE: 166 MMHG | DIASTOLIC BLOOD PRESSURE: 87 MMHG | TEMPERATURE: 98 F | OXYGEN SATURATION: 93 % | RESPIRATION RATE: 18 BRPM | HEART RATE: 92 BPM

## 2019-12-01 VITALS
SYSTOLIC BLOOD PRESSURE: 164 MMHG | RESPIRATION RATE: 16 BRPM | HEART RATE: 79 BPM | OXYGEN SATURATION: 98 % | DIASTOLIC BLOOD PRESSURE: 80 MMHG | TEMPERATURE: 97.8 F

## 2019-12-01 DIAGNOSIS — K62.89 RECTAL PAIN: Primary | ICD-10-CM

## 2019-12-01 DIAGNOSIS — N30.00 ACUTE CYSTITIS WITHOUT HEMATURIA: ICD-10-CM

## 2019-12-01 DIAGNOSIS — T78.40XA ALLERGIC REACTION, INITIAL ENCOUNTER: Primary | ICD-10-CM

## 2019-12-01 DIAGNOSIS — K64.9 HEMORRHOIDS, UNSPECIFIED HEMORRHOID TYPE: ICD-10-CM

## 2019-12-01 LAB
AMORPH CRY URNS QL MICRO: ABNORMAL
APPEARANCE UR: ABNORMAL
BACTERIA URNS QL MICRO: NEGATIVE /HPF
BILIRUB UR QL CFM: ABNORMAL
COLOR UR: ABNORMAL
COMMENT, HOLDF: NORMAL
EPITH CASTS URNS QL MICRO: ABNORMAL /LPF
ERYTHROCYTE [DISTWIDTH] IN BLOOD BY AUTOMATED COUNT: 13.7 % (ref 11.5–14.5)
GLUCOSE UR STRIP.AUTO-MCNC: >1000 MG/DL
HCT VFR BLD AUTO: 37.7 % (ref 35–47)
HGB BLD-MCNC: 12.7 G/DL (ref 11.5–16)
HGB UR QL STRIP: NEGATIVE
KETONES UR QL STRIP.AUTO: NEGATIVE MG/DL
LEUKOCYTE ESTERASE UR QL STRIP.AUTO: NEGATIVE
MCH RBC QN AUTO: 27.9 PG (ref 26–34)
MCHC RBC AUTO-ENTMCNC: 33.7 G/DL (ref 30–36.5)
MCV RBC AUTO: 82.9 FL (ref 80–99)
MUCOUS THREADS URNS QL MICRO: ABNORMAL /LPF
NITRITE UR QL STRIP.AUTO: POSITIVE
NRBC # BLD: 0 K/UL (ref 0–0.01)
NRBC BLD-RTO: 0 PER 100 WBC
PH UR STRIP: 5 [PH] (ref 5–8)
PLATELET # BLD AUTO: 186 K/UL (ref 150–400)
PMV BLD AUTO: 10.2 FL (ref 8.9–12.9)
PROT UR STRIP-MCNC: NEGATIVE MG/DL
RBC # BLD AUTO: 4.55 M/UL (ref 3.8–5.2)
RBC #/AREA URNS HPF: ABNORMAL /HPF (ref 0–5)
SAMPLES BEING HELD,HOLD: NORMAL
SP GR UR REFRACTOMETRY: >1.03 (ref 1–1.03)
UR CULT HOLD, URHOLD: NORMAL
UROBILINOGEN UR QL STRIP.AUTO: >8 EU/DL (ref 0.2–1)
WBC # BLD AUTO: 5.3 K/UL (ref 3.6–11)
WBC URNS QL MICRO: ABNORMAL /HPF (ref 0–4)

## 2019-12-01 PROCEDURE — 85027 COMPLETE CBC AUTOMATED: CPT

## 2019-12-01 PROCEDURE — 74011250637 HC RX REV CODE- 250/637: Performed by: NURSE PRACTITIONER

## 2019-12-01 PROCEDURE — 74011250636 HC RX REV CODE- 250/636: Performed by: EMERGENCY MEDICINE

## 2019-12-01 PROCEDURE — 74011250636 HC RX REV CODE- 250/636: Performed by: NURSE PRACTITIONER

## 2019-12-01 PROCEDURE — 74011636637 HC RX REV CODE- 636/637: Performed by: EMERGENCY MEDICINE

## 2019-12-01 PROCEDURE — 81001 URINALYSIS AUTO W/SCOPE: CPT

## 2019-12-01 PROCEDURE — 36415 COLL VENOUS BLD VENIPUNCTURE: CPT

## 2019-12-01 PROCEDURE — 74011250637 HC RX REV CODE- 250/637: Performed by: EMERGENCY MEDICINE

## 2019-12-01 PROCEDURE — 87086 URINE CULTURE/COLONY COUNT: CPT

## 2019-12-01 PROCEDURE — 96361 HYDRATE IV INFUSION ADD-ON: CPT

## 2019-12-01 PROCEDURE — 96374 THER/PROPH/DIAG INJ IV PUSH: CPT

## 2019-12-01 PROCEDURE — 99284 EMERGENCY DEPT VISIT MOD MDM: CPT

## 2019-12-01 PROCEDURE — 74011000250 HC RX REV CODE- 250: Performed by: NURSE PRACTITIONER

## 2019-12-01 RX ORDER — ONDANSETRON 2 MG/ML
4 INJECTION INTRAMUSCULAR; INTRAVENOUS
Status: COMPLETED | OUTPATIENT
Start: 2019-12-01 | End: 2019-12-01

## 2019-12-01 RX ORDER — HYDROMORPHONE HYDROCHLORIDE 2 MG/ML
2 INJECTION, SOLUTION INTRAMUSCULAR; INTRAVENOUS; SUBCUTANEOUS ONCE
Status: DISCONTINUED | OUTPATIENT
Start: 2019-12-01 | End: 2019-12-01

## 2019-12-01 RX ORDER — CETIRIZINE HCL 10 MG
10 TABLET ORAL DAILY
Qty: 20 TAB | Refills: 0 | Status: SHIPPED | OUTPATIENT
Start: 2019-12-01 | End: 2021-01-05

## 2019-12-01 RX ORDER — ONDANSETRON 2 MG/ML
INJECTION INTRAMUSCULAR; INTRAVENOUS
Status: DISCONTINUED
Start: 2019-12-01 | End: 2019-12-02 | Stop reason: HOSPADM

## 2019-12-01 RX ORDER — FAMOTIDINE 20 MG/1
40 TABLET, FILM COATED ORAL
Status: COMPLETED | OUTPATIENT
Start: 2019-12-01 | End: 2019-12-01

## 2019-12-01 RX ORDER — PREDNISONE 20 MG/1
60 TABLET ORAL
Status: COMPLETED | OUTPATIENT
Start: 2019-12-01 | End: 2019-12-01

## 2019-12-01 RX ORDER — LIDOCAINE HYDROCHLORIDE 20 MG/ML
JELLY TOPICAL
Status: COMPLETED | OUTPATIENT
Start: 2019-12-01 | End: 2019-12-01

## 2019-12-01 RX ORDER — HYDROMORPHONE HYDROCHLORIDE 2 MG/1
1 TABLET ORAL
Status: DISCONTINUED | OUTPATIENT
Start: 2019-12-01 | End: 2019-12-01 | Stop reason: HOSPADM

## 2019-12-01 RX ORDER — POLYETHYLENE GLYCOL 3350 17 G/17G
17 POWDER, FOR SOLUTION ORAL DAILY
Qty: 116 G | Refills: 0 | Status: SHIPPED | OUTPATIENT
Start: 2019-12-01 | End: 2020-09-30

## 2019-12-01 RX ORDER — DOCUSATE SODIUM 100 MG/1
100 CAPSULE, LIQUID FILLED ORAL 2 TIMES DAILY
Qty: 60 CAP | Refills: 0 | Status: SHIPPED | OUTPATIENT
Start: 2019-12-01 | End: 2019-12-31

## 2019-12-01 RX ORDER — PREDNISONE 50 MG/1
50 TABLET ORAL DAILY
Qty: 3 TAB | Refills: 0 | Status: SHIPPED | OUTPATIENT
Start: 2019-12-01 | End: 2019-12-04

## 2019-12-01 RX ORDER — HYDROMORPHONE HYDROCHLORIDE 2 MG/1
2 TABLET ORAL ONCE
Status: COMPLETED | OUTPATIENT
Start: 2019-12-01 | End: 2019-12-01

## 2019-12-01 RX ORDER — EPINEPHRINE 0.3 MG/.3ML
0.3 INJECTION SUBCUTANEOUS
Qty: 2 SYRINGE | Refills: 0 | Status: SHIPPED | OUTPATIENT
Start: 2019-12-01 | End: 2019-12-01

## 2019-12-01 RX ORDER — CEPHALEXIN 500 MG/1
500 CAPSULE ORAL 2 TIMES DAILY
Qty: 14 CAP | Refills: 0 | Status: SHIPPED | OUTPATIENT
Start: 2019-12-01 | End: 2019-12-08

## 2019-12-01 RX ADMIN — LIDOCAINE HYDROCHLORIDE: 20 JELLY TOPICAL at 14:29

## 2019-12-01 RX ADMIN — PREDNISONE 60 MG: 20 TABLET ORAL at 20:17

## 2019-12-01 RX ADMIN — HYDROMORPHONE HYDROCHLORIDE 1 MG: 2 TABLET ORAL at 15:16

## 2019-12-01 RX ADMIN — FAMOTIDINE 40 MG: 20 TABLET ORAL at 20:17

## 2019-12-01 RX ADMIN — ONDANSETRON 4 MG: 2 INJECTION INTRAMUSCULAR; INTRAVENOUS at 20:17

## 2019-12-01 RX ADMIN — ONDANSETRON 4 MG: 2 INJECTION INTRAMUSCULAR; INTRAVENOUS at 14:29

## 2019-12-01 RX ADMIN — HYDROMORPHONE HYDROCHLORIDE 2 MG: 2 TABLET ORAL at 20:49

## 2019-12-01 RX ADMIN — SODIUM CHLORIDE 1000 ML: 900 INJECTION, SOLUTION INTRAVENOUS at 14:27

## 2019-12-01 NOTE — DISCHARGE INSTRUCTIONS
Thank you for allowing us to care for you today. Please follow-up with your Primary Care provider in the next 2-3 days if your symptoms do not improve. Plan for home:     Any medications as prescribed yesterday. You may use the nitroglycerin cream up to 3 times a day to help decrease the tone of your anal sphincter. Spoke with Dr. Bobbe Prader. He stated you can follow-up this week with Dr. Keysha Corey. Please call the office in the morning for an appointment as soon as possible. Please let them know you are seen here in the ER and you need to see him as soon as possible. Continue sits baths and soaks. A urine culture was sent on your urine. If the culture returns and the antibiotic you were sent home on does not treat your infection we will contact you. If it does cover your urinary tract infection we will NOT call you. If your symptoms or infection doesnt improve please follow-up with your primary care provider. Drink plenty of fluids  Take your antibiotics as prescribed. Keflex twice daily for 7 days. Keflex is available for free at [employer]. See your primary care provider if the symptoms do not improve in the next 2-3        Patient Education        Anal Pain: Care Instructions  Your Care Instructions  Pain in the opening to the rectum (anus) can be caused by diarrhea or constipation or by scratching a rectal itch. A common cause of anal pain is a tear in the lining of the lower rectum (anal fissure). This type of anal pain usually goes away when the problem clears up. Injury during anal sex or from an object being placed in the rectum also can cause pain. A rare cause of anal pain is spasms of the muscles in the rectum. Some of these conditions may cause some light bleeding. Home treatment usually can relieve anal pain. If you continue to have anal pain, your doctor may prescribe medicine to relieve pain and other symptoms.  Depending on the cause, you may need other treatment. Follow-up care is a key part of your treatment and safety. Be sure to make and go to all appointments, and call your doctor if you are having problems. It's also a good idea to know your test results and keep a list of the medicines you take. How can you care for yourself at home? · Sit in a few inches of warm water (sitz bath) 3 times a day and after bowel movements. The warm water eases discomfort. Do not put soaps, salts, or shampoos in the water. · Drink plenty of fluids, enough so that your urine is light yellow or clear like water. If you have kidney, heart, or liver disease and have to limit fluids, talk with your doctor before you increase the amount of fluids you drink. · Include high-fiber foods, such as fruits, vegetables, beans, and whole grains, in your diet each day. · Take a fiber supplement, such as Benefiber, Citrucel, or Metamucil, every day. Read and follow all instructions on the label. · Use the toilet when you feel the urge. Or when you can, schedule time each day for a bowel movement. A daily routine may help. Take your time and do not strain when having a bowel movement. But do not sit on the toilet too long. · Support your feet with a small step stool when you sit on the toilet. This helps flex your hips and places your pelvis in a squatting position. · Your doctor may recommend an over-the-counter laxative, such as Miralax, Milk of Magnesia, or Ex-Lax. Read and follow all instructions on the label, and do not use laxatives on a long-term basis. · Do not use over-the-counter ointments or creams without talking to your doctor. Some of these may not help. · Use baby wipes or medicated pads, such as Preparation H or Tucks, instead of toilet paper to clean after a bowel movement. These products do not irritate the anus. · Be safe with medicines. Read and follow all instructions on the label.   ? If the doctor gave you a prescription medicine for pain, give it as prescribed. ? If you are not taking a prescription pain medicine, ask your doctor if you can take an over-the-counter medicine. When should you call for help? Call your doctor now or seek immediate medical care if:    · You have new or worse pain.     · You have new or worse bleeding from the rectum.    Watch closely for changes in your health, and be sure to contact your doctor if:    · You have trouble passing stools.     · You do not get better as expected. Where can you learn more? Go to http://gladys-alia.info/. Enter 486 [id number] in the search box to learn more about \"Anal Pain: Care Instructions. \"  Current as of: November 7, 2018  Content Version: 12.2  © 1238-0982 [employer]. Care instructions adapted under license by [employer] (which disclaims liability or warranty for this information). If you have questions about a medical condition or this instruction, always ask your healthcare professional. [place] [address] any warranty or liability for your use of this information.

## 2019-12-01 NOTE — LETTER
NOTIFICATION RETURN TO WORK / SCHOOL 
 
12/1/2019 9:55 PM 
 
Ms. Rose Marie Cowart [facility] [address] To Whom It May Concern: 
 
Esperanza SerrareyTrace is currently under the care of [facility]. She will return to work/school on: 12/3/2019 If there are questions or concerns please have the patient contact our office. Sincerely, Rakesh Henry

## 2019-12-01 NOTE — ED PROVIDER NOTES
Initial Complaint: Rectal pain and bleeding    Started: intermittent    Endorses: worsening rectal pain since Dc yesterday and increased bleeding. nausea and chills. Nothing is helping with the pain. \"everything feels tighter\". Sharp stabbing pulsatile pain in the anal area. Now having back pain. Tried: dilaudid, nitro cream, hydrocortisone cream, tucks pads, Calmoseptine cream. Sharp stabbing pain. Having small amounts of soft stools to diarrhea. Denies: Fevers, vomiting, constipation. Made better: nothing  Made worse: nothing    No further complaints. Past Medical History:  No date: Anal fissure  No date: Anisocoria  No date: Asthma      Comment:  LAST EPISODE   No date: Back pain  No date: Cerumen impaction  No date: Chronic kidney disease      Comment:  hx uti in past  No date: Coagulation defects      Comment:  ocassional rectal bleeding due to anal fissure  No date: Colovaginal fistula  No date: Diabetes (HCC)      Comment:  NIDDM  No date: Diabetes (HCC)  No date: Diverticulitis  No date: Diverticulosis  No date: Enlarged tonsils  No date: Frequent UTI  No date: GERD (gastroesophageal reflux disease)  No date: H/O endoscopy      Comment:  with dilation  No date: HA (headache)  No date: Hepatic steatosis  No date: Hx of colonoscopy with polypectomy      Comment:  benign  No date: Hypertension  No date: Ill-defined condition      Comment:  FREQUENT HIVES  No date: Ill-defined condition      Comment:  HX ELEVATED LIVER ENZYMES  No date:  Morbid obesity ([address]  No date: Nausea & vomiting      Comment:  during diverticulitis flare  No date: Obesity  No date: Otitis media  No date: Pneumonia      Comment:  about 15 yrs ago  No date: Psychiatric disorder      Comment:  ANXIETY  No date: Recurrent tonsillitis  No date: Sinusitis  ~ age 35: Transfusion history      Comment:  postop hysterectomy  No date: Unspecified sleep apnea      Comment:  snores ( not diagnosed yet)   No date: Urticaria  No date: Urticaria  Past Surgical History:  2018: ABDOMEN SURGERY PROC UNLISTED      Comment:  hernia repair at [facility]  3/28/2019: COLONOSCOPY; N/A      Comment:  COLONOSCOPY performed by Jerrica Pressley MD at [address]: HX BREAST REDUCTION      Comment:  blake.  12: HX GI      Comment:  LAPAROSCOPIC HAND ASSISTED  POSS OPEN SIGMOID COLECTOMY                POSS TEMPORARY DIVERTING LOOP ILEOSTOMY;  (no illeostomy                needed)  No date: HX GYN      Comment:     No date: HX GYN      Comment:  cervical conization  No date: HX HEENT      Comment:  SINUS SURGERY LEFT X2  No date: HX HEENT      Comment:  SINUS SURGERY ON RIGHT X2  : HX OTHER SURGICAL      Comment:  Sphincterotomy  No date: HX PELVIC LAPAROSCOPY  No date: HX EMMANUEL AND BSO  12: HX UROLOGICAL      Comment:   CYSTOSCOPY INSERTION URETERAL CATHETERS - Cystoscopy                Insertion of bilateral ureteral stents  Reviewed      Primary care provider: Jacqueline Devlin MD      The history is provided by the patient. No  was used.       Past Medical History:   Diagnosis Date    Anal fissure     Anisocoria     Asthma     LAST EPISODE     Back pain     Cerumen impaction     Chronic kidney disease     hx uti in past    Coagulation defects     ocassional rectal bleeding due to anal fissure    Colovaginal fistula     Diabetes (HCC)     NIDDM    Diabetes ([facility] [address]     Diverticulitis     Diverticulosis     Enlarged tonsils     Frequent UTI     GERD (gastroesophageal reflux disease)     H/O endoscopy     with dilation    HA (headache)     Hepatic steatosis     Hx of colonoscopy with polypectomy     benign    Hypertension     Ill-defined condition     FREQUENT HIVES    Ill-defined condition     HX ELEVATED LIVER ENZYMES    Morbid obesity (HCC)     Nausea & vomiting     during diverticulitis flare    Obesity     Otitis media     Pneumonia     about 15 yrs ago    Psychiatric disorder     ANXIETY    Recurrent tonsillitis     Sinusitis     Transfusion history ~ age 35    postop hysterectomy    Unspecified sleep apnea     snores ( not diagnosed yet)     Urticaria     Urticaria      Past Surgical History:   Procedure Laterality Date    ABDOMEN SURGERY PROC UNLISTED  2018    hernia repair at [facility]    COLONOSCOPY N/A 3/28/2019    COLONOSCOPY performed by Glory Lepe MD at [address] [facility]    [address].     HX GI  12    LAPAROSCOPIC HAND ASSISTED  POSS OPEN SIGMOID COLECTOMY POSS TEMPORARY DIVERTING LOOP ILEOSTOMY;  (no illeostomy needed)    HX GYN           HX GYN      cervical conization    HX HEENT      SINUS SURGERY LEFT X2    HX HEENT      SINUS SURGERY ON RIGHT X2    HX OTHER SURGICAL      Sphincterotomy    HX PELVIC LAPAROSCOPY      HX EMMANUEL AND BSO      HX UROLOGICAL  12     CYSTOSCOPY INSERTION URETERAL CATHETERS - Cystoscopy Insertion of bilateral ureteral stents     Family History:   Problem Relation Age of Onset    Diabetes Mother     Cancer Mother         NON-HODGKINS LYMPHOMA    Anesth Problems Mother         PONV    Diabetes Father     Heart Disease Father         CAD - STENTS, PACEMAKER    Arrhythmia Father      Social History     Socioeconomic History    Marital status:      Spouse name: Not on file    Number of children: Not on file    Years of education: Not on file    Highest education level: Not on file   Occupational History    Not on file   Social Needs    Financial resource strain: Not on file    Food insecurity:     Worry: Not on file     Inability: Not on file    Transportation needs:     Medical: Not on file     Non-medical: Not on file   Tobacco Use    Smoking status: Never Smoker    Smokeless tobacco: Never Used   Substance and Sexual Activity    Alcohol use: Yes     Comment: Rarely    Drug use: No     Types: Prescription, OTC    Sexual activity: Never   Lifestyle    Physical activity:     Days per week: Not on file     Minutes per session: Not on file    Stress: Not on file   Relationships    Social connections:     Talks on phone: Not on file     Gets together: Not on file     Attends Jainism service: Not on file     Active member of club or organization: Not on file     Attends meetings of clubs or organizations: Not on file     Relationship status: Not on file    Intimate partner violence:     Fear of current or ex partner: Not on file     Emotionally abused: Not on file     Physically abused: Not on file     Forced sexual activity: Not on file   Other Topics Concern    Not on file   Social History Narrative    Not on file     ALLERGIES: Aspirin; Codeine; Contrast agent [iodine]; Percocet [oxycodone-acetaminophen]; Prilosec [omeprazole magnesium]; Ketorolac; Fentanyl; and Morphine    Review of Systems   Constitutional: Positive for activity change, appetite change and chills. Negative for fever. Gastrointestinal: Positive for anal bleeding and nausea. Negative for vomiting. Musculoskeletal: Positive for back pain. Psychiatric/Behavioral: Negative. All other systems reviewed and are negative. Vitals:    12/01/19 1338   BP: (!) 183/107   Pulse: 83   Resp: 16   Temp: 98.7 °F (37.1 °C)   SpO2: 97%          Physical Exam  Vitals signs and nursing note reviewed. Constitutional:       Appearance: She is well-developed. HENT:      Head: Normocephalic and atraumatic. Neck:      Musculoskeletal: Normal range of motion and neck supple. Cardiovascular:      Rate and Rhythm: Normal rate and regular rhythm. Heart sounds: Normal heart sounds. Pulmonary:      Effort: Pulmonary effort is normal. No respiratory distress. Breath sounds: Normal breath sounds. No wheezing or rales. Chest:      Chest wall: No tenderness. Abdominal:      General: Bowel sounds are normal.      Palpations: Abdomen is soft. Tenderness: There is no tenderness.  There is no guarding. Genitourinary:     Rectum: Tenderness present. Comments: Large pink tender hemorrhoid  Musculoskeletal: Normal range of motion. Skin:     General: Skin is warm and dry. Findings: No erythema. Neurological:      Mental Status: She is alert and oriented to person, place, and time. Psychiatric:         Behavior: Behavior normal.         Thought Content: Thought content normal.         Judgment: Judgment normal.      MDM     Procedures      Assessment & Plan:     Orders Placed This Encounter    URINE CULTURE HOLD SAMPLE    URINALYSIS W/MICROSCOPIC    CBC W/O DIFF    Hold Sample    LEAVE IV IN PLACE    sodium chloride 0.9 % bolus infusion 1,000 mL    ondansetron (ZOFRAN) injection 4 mg    DISCONTD: HYDROmorphone (PF) (DILAUDID) injection 2 mg    lidocaine (XYLOCAINE) 2 % jelly    IP CONSULT TO COLORECTAL SURGERY       Discussed with Esau Enrique MD,ED Provider    Rupa Bernal NP  12/01/19  1:55 PM    Spoke to Dr. Tra Byrd. Needs soaks/sitz baths with continuation of prior prescribed medications. May use nitrocream TID if needed. Recommends seeing Seng Potter this week. Rupa Bernal NP  12/01/19  2:35 PM      Patient requesting IV dilaudid for pain. Will give PO dilaudid since she is not vomiting. Rupa Bernal NP  12/01/19  3:03 PM      Positive nitrites on urine. Will treat for acute cystitis. Keflex twice daily for 7 days. Continue taking medications as prescribed yesterday. MiraLAX and docusate to keep stools loose while taking narcotic pain medication and to make it easier to pass stools. Follow-up with Dr. Seng Potter this week. PCP follow-up. Discussed return precautions. 3:56 PM  Patient re-evaluated. All questions answered. Patient appropriate for discharge. Given return precautions and follow up instructions.      LABORATORY TESTS:  Labs Reviewed   URINALYSIS W/MICROSCOPIC - Abnormal; Notable for the following components:       Result Value Appearance HAZY (*)     Specific gravity >1.030 (*)     Glucose >1,000 (*)     Urobilinogen >8.0 (*)     Nitrites POSITIVE (*)     Bilirubin UA, confirm INDETERMINATE DUE TO COLOR INTERFERENCE (*)     All other components within normal limits   URINE CULTURE HOLD SAMPLE   CULTURE, URINE   CBC W/O DIFF   SAMPLES BEING HELD       IMAGING RESULTS:  No orders to display       MEDICATIONS GIVEN:  Medications   HYDROmorphone (DILAUDID) tablet 1 mg (1 mg Oral Given 12/1/19 1516)   sodium chloride 0.9 % bolus infusion 1,000 mL (1,000 mL IntraVENous New Bag 12/1/19 1427)   ondansetron (ZOFRAN) injection 4 mg (4 mg IntraVENous Given 12/1/19 1429)   lidocaine (XYLOCAINE) 2 % jelly ( Topical Given 12/1/19 1429)       IMPRESSION:  1. Rectal pain    2. Hemorrhoids, unspecified hemorrhoid type    3. Acute cystitis without hematuria        PLAN:  1. Current Discharge Medication List      START taking these medications    Details   polyethylene glycol (MIRALAX) 17 gram/dose powder Take 17 g by mouth daily. 1 tablespoon with 8 oz of water daily to prevent constipation  Qty: 116 g, Refills: 0      docusate sodium (COLACE) 100 mg capsule Take 1 Cap by mouth two (2) times a day for 30 days. Qty: 60 Cap, Refills: 0      cephALEXin (KEFLEX) 500 mg capsule Take 1 Cap by mouth two (2) times a day for 7 days. Indications: urinary tract infection due to E. coli bacteria  Qty: 14 Cap, Refills: 0           2.    Follow-up Information     Follow up With Specialties Details Why Contact Info    Ta Coughlin MD Colon and Rectal Surgery Schedule an appointment as soon as possible for a visit ER follow-up this week [address] 1938  [phone]      Stanley Davis MD Family Practice Schedule an appointment as soon as possible for a visit ER follow-up [address]  [place] [address] [facility]  [phone]      [address] [facility]  As needed, If symptoms worsen [address] [facility] [address] [facility]  [phone]        3. Return to ED for new or worsening symptoms       Leighann Machuca NP          Please note that this dictation was completed with Factual, the computer voice recognition software. Quite often unanticipated grammatical, syntax, homophones, and other interpretive errors are inadvertently transcribed by the computer software. Please disregard these errors. Please excuse any errors that have escaped final proofreading.

## 2019-12-02 LAB
BACTERIA SPEC CULT: NORMAL
CC UR VC: NORMAL
SERVICE CMNT-IMP: NORMAL

## 2019-12-02 NOTE — DISCHARGE INSTRUCTIONS
Patient Education        Allergic Reaction: Care Instructions  Your Care Instructions    An allergic reaction is an excessive response from your immune system to a medicine, chemical, food, insect bite, or other substance. A reaction can range from mild to life-threatening. Some people have a mild rash, hives, and itching or stomach cramps. In severe reactions, swelling of your tongue and throat can close up your airway so that you cannot breathe. Follow-up care is a key part of your treatment and safety. Be sure to make and go to all appointments, and call your doctor if you are having problems. It's also a good idea to know your test results and keep a list of the medicines you take. How can you care for yourself at home? · If you know what caused your allergic reaction, be sure to avoid it. Your allergy may become more severe each time you have a reaction. · Take an over-the-counter antihistamine, such as cetirizine (Zyrtec) or loratadine (Claritin), to treat mild symptoms. Read and follow directions on the label. Some antihistamines can make you feel sleepy. Do not give antihistamines to a child unless you have checked with your doctor first. Mild symptoms include sneezing or an itchy or runny nose; an itchy mouth; a few hives or mild itching; and mild nausea or stomach discomfort. · Do not scratch hives or a rash. Put a cold, moist towel on them or take cool baths to relieve itching. Put ice packs on hives, swelling, or insect stings for 10 to 15 minutes at a time. Put a thin cloth between the ice pack and your skin. Do not take hot baths or showers. They will make the itching worse. · Your doctor may prescribe a shot of epinephrine to carry with you in case you have a severe reaction. Learn how to give yourself the shot and keep it with you at all times. Make sure it is not .   · Go to the emergency room every time you have a severe reaction, even if you have used your shot of epinephrine and are feeling better. Symptoms can come back after a shot. · Wear medical alert jewelry that lists your allergies. You can buy this at most drugstores. · If your child has a severe allergy, make sure that his or her teachers, babysitters, coaches, and other caregivers know about the allergy. They should have an epinephrine shot, know how and when to give it, and have a plan to take your child to the hospital.  When should you call for help? Give an epinephrine shot if:    · You think you are having a severe allergic reaction.     · You have symptoms in more than one body area, such as mild nausea and an itchy mouth.    After giving an epinephrine shot call 911, even if you feel better.   Call 911 if:    · You have symptoms of a severe allergic reaction. These may include:  ? Sudden raised, red areas (hives) all over your body. ? Swelling of the throat, mouth, lips, or tongue. ? Trouble breathing. ? Passing out (losing consciousness). Or you may feel very lightheaded or suddenly feel weak, confused, or restless.     · You have been given an epinephrine shot, even if you feel better.    Call your doctor now or seek immediate medical care if:    · You have symptoms of an allergic reaction, such as:  ? A rash or hives (raised, red areas on the skin). ? Itching. ? Swelling. ? Belly pain, nausea, or vomiting.    Watch closely for changes in your health, and be sure to contact your doctor if:    · You do not get better as expected. Where can you learn more? Go to http://gladys-alia.info/. Enter K173 in the search box to learn more about \"Allergic Reaction: Care Instructions. \"  Current as of: April 7, 2019  Content Version: 12.2  © 3324-1010 [employer]. Care instructions adapted under license by [employer] (which disclaims liability or warranty for this information).  If you have questions about a medical condition or this instruction, always ask your healthcare professional. [address] any warranty or liability for your use of this information.

## 2019-12-02 NOTE — ED NOTES
Dr. Gabriela Villa reviewed discharge instructions with the patient. The patient verbalized understanding. The patient ambulated out of the department in no distress. VSS.

## 2019-12-02 NOTE — ED PROVIDER NOTES
EMERGENCY DEPARTMENT HISTORY AND PHYSICAL EXAM      Date: 12/1/2019  Patient Name: Tabatha Cowart    History of Presenting Illness     Chief Complaint   Patient presents with    Allergic Reaction       History Provided By: Patient    HPI: Amandeep Souza, 52 y.o. female with PMHx as noted below presents the emergency department for evaluation of allergic reaction. Patient notes she was [facility] today and was started on Keflex for urinary tract infection. Patient noted that shortly after taking her dose of Keflex this evening she began to feel a scratchy feeling on her face and in her mouth. She took her EpiPen shortly after that with near resolution of her symptoms. She then called EMS who administered 50 mg of oral Benadryl and brought her to the emergency department for evaluation. Currently is asymptomatic. Had no associated wheezing, tongue or throat swelling, nausea, vomiting, diarrhea or upset stomach. PCP: Minor Holman MD    Current Facility-Administered Medications   Medication Dose Route Frequency Provider Last Rate Last Dose    ondansetron (ZOFRAN) 4 mg/2 mL injection              Current Outpatient Medications   Medication Sig Dispense Refill    cetirizine (ZYRTEC) 10 mg tablet Take 1 Tab by mouth daily. 20 Tab 0    predniSONE (DELTASONE) 50 mg tablet Take 1 Tab by mouth daily for 3 days. 3 Tab 0    EPINEPHrine (EPIPEN) 0.3 mg/0.3 mL injection 0.3 mL by IntraMUSCular route once as needed for Anaphylaxis or Allergic Response for up to 1 dose. 2 Syringe 0    polyethylene glycol (MIRALAX) 17 gram/dose powder Take 17 g by mouth daily. 1 tablespoon with 8 oz of water daily to prevent constipation 116 g 0    docusate sodium (COLACE) 100 mg capsule Take 1 Cap by mouth two (2) times a day for 30 days. 60 Cap 0    cephALEXin (KEFLEX) 500 mg capsule Take 1 Cap by mouth two (2) times a day for 7 days.  Indications: urinary tract infection due to E. coli bacteria 14 Cap 0    hydrocortisone (ANUSOL-HC) 25 mg supp Insert 1 Suppository into rectum every twelve (12) hours. 12 Each 0    hydrocortisone (PROCTOCORT) 1 % topical cream Apply  to affected area two (2) times a day. use thin layer 30 g 0    nitroglycerin (RECTIV) 0.4 % (w/w) ointment Insert  into rectum every twelve (12) hours every twelve (12) hours. 30 g 0    HYDROmorphone (DILAUDID) 2 mg tablet Take 1 Tab by mouth every four (4) hours as needed for Pain for up to 3 days. Max Daily Amount: 12 mg. 7 Tab 0    ondansetron (ZOFRAN ODT) 4 mg disintegrating tablet Take 1 Tab by mouth every eight (8) hours as needed for Nausea. 12 Tab 0    naproxen (NAPROSYN) 500 mg tablet Take 1 Tab by mouth two (2) times daily (with meals). 30 Tab 0    ALPRAZolam (XANAX) 0.25 mg tablet Take 0.125-0.25 mg by mouth every twelve (12) hours as needed for Anxiety or Sleep.  melatonin tab tablet Take 5 mg by mouth nightly.  simvastatin (ZOCOR) 20 mg tablet Take 20 mg by mouth nightly.  omeprazole (PRILOSEC) 20 mg capsule Take 40 mg by mouth Daily (before breakfast).  dicyclomine (BENTYL) 20 mg tablet Take 1 Tab by mouth every six (6) hours as needed (abdominal cramps) for up to 20 doses. 20 Tab 0    insulin glargine (LANTUS U-100 INSULIN) 100 unit/mL injection 38 Units by SubCUTAneous route nightly.  sertraline (ZOLOFT) 100 mg tablet Take 200 mg by mouth nightly.  losartan-hydroCHLOROthiazide (HYZAAR) 100-12.5 mg per tablet Take 1 Tab by mouth daily.  EPINEPHrine (EPIPEN) 0.3 mg/0.3 mL (1:1,000) injection 0.3 mL by IntraMUSCular route once as needed for up to 1 dose. 0.3 mL 1    albuterol (PROVENTIL, VENTOLIN) 90 mcg/Actuation inhaler Take 2 Puffs by inhalation every six (6) hours as needed.          Past History     Past Medical History:  Past Medical History:   Diagnosis Date    Anal fissure     Anisocoria     Asthma     LAST EPISODE     Back pain     Cerumen impaction     Chronic kidney disease     hx uti in past    Coagulation defects     ocassional rectal bleeding due to anal fissure    Colovaginal fistula     Diabetes (HCC)     NIDDM    Diabetes ([address]     Diverticulitis     Diverticulosis     Enlarged tonsils     Frequent UTI     GERD (gastroesophageal reflux disease)     H/O endoscopy     with dilation    HA (headache)     Hepatic steatosis     Hx of colonoscopy with polypectomy     benign    Hypertension     Ill-defined condition     FREQUENT HIVES    Ill-defined condition     HX ELEVATED LIVER ENZYMES    Morbid obesity (HCC)     Nausea & vomiting     during diverticulitis flare    Obesity     Otitis media     Pneumonia     about 15 yrs ago    Psychiatric disorder     ANXIETY    Recurrent tonsillitis     Sinusitis     Transfusion history ~ age 35    postop hysterectomy    Unspecified sleep apnea     snores ( not diagnosed yet)     Urticaria     Urticaria        Past Surgical History:  Past Surgical History:   Procedure Laterality Date    ABDOMEN SURGERY PROC UNLISTED  2018    hernia repair at [facility]    COLONOSCOPY N/A 3/28/2019    COLONOSCOPY performed by Rizwan Huggins MD at [address] [place] [address],[address]    blake.     HX GI  12    LAPAROSCOPIC HAND ASSISTED  POSS OPEN SIGMOID COLECTOMY POSS TEMPORARY DIVERTING LOOP ILEOSTOMY;  (no illeostomy needed)    HX GYN           HX GYN      cervical conization    HX HEENT      SINUS SURGERY LEFT X2    HX HEENT      SINUS SURGERY ON RIGHT X2    HX OTHER SURGICAL      Sphincterotomy    HX PELVIC LAPAROSCOPY      HX EMMANUEL AND BSO      HX UROLOGICAL  12     CYSTOSCOPY INSERTION URETERAL CATHETERS - Cystoscopy Insertion of bilateral ureteral stents       Family History:  Family History   Problem Relation Age of Onset    Diabetes Mother     Cancer Mother         NON-HODGKINS LYMPHOMA    Anesth Problems Mother         PONV    Diabetes Father     Heart Disease Father CAD - STENTS, PACEMAKER    Arrhythmia Father        Social History:  Social History     Tobacco Use    Smoking status: Never Smoker    Smokeless tobacco: Never Used   Substance Use Topics    Alcohol use: Yes     Comment: Rarely    Drug use: No     Types: Prescription, OTC       Allergies: Allergies   Allergen Reactions    Aspirin Hives and Shortness of Breath    Codeine Hives, Itching and Angioedema     Pt had itching in mouth, on face and lips    Contrast Agent [Iodine] Hives, Itching and Angioedema     Pt. had itching in mouth, on face and lips after IV contrast with the last exam.  Benadryl was given. OK if premedicated.  Flavoring Agent Anaphylaxis    Percocet [Oxycodone-Acetaminophen] Hives, Itching and Angioedema     Pt had itching in mouth, on face and lips    Prilosec [Omeprazole Magnesium] Anaphylaxis     CHERRY FLAVORED ODT; PT TAKES REGULAR PRILOSEC AND IS OK    Ketorolac Rash     \"makes my eyes spasm and causes rash on my hands\" and \"makes my skin itch and makes me nervous\"    Fentanyl Rash and Itching    Morphine Itching     Severe itching. Tolerates with Benadryl         Review of Systems   Review of Systems  Constitutional: Negative for fever, chills, and fatigue. HENT: Negative for congestion, sore throat, rhinorrhea, sneezing and neck stiffness   Eyes: Negative for discharge and redness. Respiratory: Negative for  shortness of breath, wheezing   Cardiovascular: Negative for chest pain, palpitations   Gastrointestinal: Negative for nausea, vomiting, abdominal pain, constipation, diarrhea and blood in stool. Genitourinary: Negative for dysuria, hematuria, flank pain, decreased urine volume, discharge,   Musculoskeletal: Negative for myalgias or joint pain . Skin: Positive for rash. Negative for lesions . Neurological: Negative weakness, light-headedness, numbness and headaches.        Physical Exam   Physical Exam    GENERAL: alert and oriented, no acute distress  EYES: PEERL, No injection, discharge or icterus. ENT: Mucous membranes pink and moist.  There is no swelling of the oral mucosa, tongue or oropharynx  NECK: Supple  LUNGS: Airway patent. Non-labored respirations. Breath sounds clear with good air entry bilaterally. HEART: Regular rate and rhythm. No peripheral edema  ABDOMEN: Non-distended and non-tender, without guarding or rebound. SKIN:  warm, dry, no rash  MSK/EXTREMITIES: Without swelling, tenderness or deformity, symmetric with normal ROM  NEUROLOGICAL: Alert, oriented      Diagnostic Study Results     Labs -     Recent Results (from the past 12 hour(s))   CBC W/O DIFF    Collection Time: 12/01/19  2:15 PM   Result Value Ref Range    WBC 5.3 3.6 - 11.0 K/uL    RBC 4.55 3.80 - 5.20 M/uL    HGB 12.7 11.5 - 16.0 g/dL    HCT 37.7 35.0 - 47.0 %    MCV 82.9 80.0 - 99.0 FL    MCH 27.9 26.0 - 34.0 PG    MCHC 33.7 30.0 - 36.5 g/dL    RDW 13.7 11.5 - 14.5 %    PLATELET 648 675 - 019 K/uL    MPV 10.2 8.9 - 12.9 FL    NRBC 0.0 0  WBC    ABSOLUTE NRBC 0.00 0.00 - 0.01 K/uL   SAMPLES BEING HELD    Collection Time: 12/01/19  2:15 PM   Result Value Ref Range    SAMPLES BEING HELD 1PST,1RED,1BLU     COMMENT        Add-on orders for these samples will be processed based on acceptable specimen integrity and analyte stability, which may vary by analyte.    URINALYSIS W/MICROSCOPIC    Collection Time: 12/01/19  2:25 PM   Result Value Ref Range    Color ORANGE      Appearance HAZY (A) CLEAR      Specific gravity >1.030 (H) 1.003 - 1.030    pH (UA) 5.0 5.0 - 8.0      Protein NEGATIVE  NEG mg/dL    Glucose >1,000 (A) NEG mg/dL    Ketone NEGATIVE  NEG mg/dL    Blood NEGATIVE  NEG      Urobilinogen >8.0 (H) 0.2 - 1.0 EU/dL    Nitrites POSITIVE (A) NEG      Leukocyte Esterase NEGATIVE  NEG      Bilirubin UA, confirm INDETERMINATE DUE TO COLOR INTERFERENCE (A) NEG      WBC 5-10 0 - 4 /hpf    RBC 0-5 0 - 5 /hpf    Epithelial cells MANY (A) FEW /lpf    Bacteria NEGATIVE NEG /hpf    Mucus 2+ (A) NEG /lpf    Amorphous Crystals 3+ (A) NEG   URINE CULTURE HOLD SAMPLE    Collection Time: 12/01/19  2:25 PM   Result Value Ref Range    Urine culture hold        URINE ON HOLD IN MICROBIOLOGY DEPT FOR 3 DAYS. IF UNPRESERVED URINE IS SUBMITTED, IT CANNOT BE USED FOR ADDITIONAL TESTING AFTER 24 HRS, RECOLLECTION WILL BE REQUIRED. Radiologic Studies -   No orders to display     CT Results  (Last 48 hours)               11/30/19 1559  CT ABD PELV WO CONT Final result    Impression:  IMPRESSION:   1. No acute intra-abdominal pathology. 2.  Hepatosplenomegaly unchanged. Narrative:  EXAM: CT ABD PELV WO CONT       INDICATION: Anal pain       COMPARISON: 9/11/2019       CONTRAST:  None. TECHNIQUE:    Thin axial images were obtained through the abdomen and pelvis. Coronal and   sagittal reconstructions were generated. Oral contrast was not administered. CT   dose reduction was achieved through use of a standardized protocol tailored for   this examination and automatic exposure control for dose modulation. The absence of intravenous contrast material reduces the sensitivity for   evaluation of the solid parenchymal organs of the abdomen. FINDINGS:    LUNG BASES: Clear. INCIDENTALLY IMAGED HEART AND MEDIASTINUM: Unremarkable. LIVER: Enlarged liver. No liver masses. This is unchanged   GALLBLADDER: Cholecystectomy   SPLEEN: Enlarged spleen measuring 15.6 cm. No masses identified. This is   unchanged. Calcification in the spleen   PANCREAS: No mass or ductal dilatation. ADRENALS: Unremarkable. KIDNEYS/URETERS: No mass, calculus, or hydronephrosis. STOMACH: Unremarkable. SMALL BOWEL: No dilatation or wall thickening. COLON: No dilatation or wall thickening. APPENDIX: Unremarkable. PERITONEUM: No ascites or pneumoperitoneum. RETROPERITONEUM: No lymphadenopathy or aortic aneurysm. REPRODUCTIVE ORGANS: Status post hysterectomy.    URINARY BLADDER: Unremarkable   BONES: No destructive bone lesion. ADDITIONAL COMMENTS: N/A               CXR Results  (Last 48 hours)    None            Medical Decision Making   I am the first provider for this patient. I reviewed the vital signs, available nursing notes, past medical history, past surgical history, family history and social history. Vital Signs-Reviewed the patient's vital signs. Patient Vitals for the past 12 hrs:   Temp Pulse Resp BP SpO2   12/01/19 2030 -- 99 -- -- 95 %   12/01/19 2020 -- -- -- -- 96 %   12/01/19 1957 98 °F (36.7 °C) (!) 107 18 166/87 97 %         Records Reviewed: Nursing Notes and Old Medical Records    Provider Notes (Medical Decision Making): On presentation the patient is well appearing, in NAD with stable vitals signs. Presentation most c/w allergic reaction, possibly secondary to Keflex. On review of the urinalysis, not convincingly urinary tract infection. I discussed this with the patient who is not having any urinary frequency or dysuria so I think at this time we will hold on antibiotics pending the urine culture. Patient is in agreement with this plan. Monitored without worsening of sx. Stable for d/c with return precautions and prednisone/epipen Rx. Counseled patient on diagnosis of allergic reactoin and need for follow up and instructed patient on outpatient management and treatment. Counseled on the possibility of rebound reaction and when she should return to the emergency department. Also instructed to follow up with allergist.  Patient expressed understanding. ED Course:   Initial assessment performed. The patients presenting problems have been discussed, and they are in agreement with the care plan formulated and outlined with them. I have encouraged them to ask questions as they arise throughout their visit. PROGRESS NOTE:  The patient has been re-evaluated and is ready for discharge.  Reviewed available results with patient and have counseled them on diagnosis and care plan. They have expressed understanding, and all their questions have been answered. They agree with plan and agree to have pt F/U as recommended, or return to the ED if their sxs worsen. Discharge instructions have been provided and explained to them, along with reasons to have pt return to the ED. The patient is amenable to discharge so will discharge pt at this time      Disposition:  home    PLAN:  1. Current Discharge Medication List      START taking these medications    Details   cetirizine (ZYRTEC) 10 mg tablet Take 1 Tab by mouth daily. Qty: 20 Tab, Refills: 0      predniSONE (DELTASONE) 50 mg tablet Take 1 Tab by mouth daily for 3 days. Qty: 3 Tab, Refills: 0      !! EPINEPHrine (EPIPEN) 0.3 mg/0.3 mL injection 0.3 mL by IntraMUSCular route once as needed for Anaphylaxis or Allergic Response for up to 1 dose. Qty: 2 Syringe, Refills: 0       !! - Potential duplicate medications found. Please discuss with provider. CONTINUE these medications which have NOT CHANGED    Details   !! EPINEPHrine (EPIPEN) 0.3 mg/0.3 mL (1:1,000) injection 0.3 mL by IntraMUSCular route once as needed for up to 1 dose. Qty: 0.3 mL, Refills: 1       !! - Potential duplicate medications found. Please discuss with provider. 2.   Follow-up Information    None       Return to ED if worse     Diagnosis     Clinical Impression:   1. Allergic reaction, initial encounter        Please note that this dictation was completed with Dragon, computer voice recognition software. Quite often unanticipated grammatical, syntax, homophones, and other interpretive errors are inadvertently transcribed by the computer software. Please disregard these errors. Additionally, please excuse any errors that have escaped final proofreading.

## 2019-12-02 NOTE — ED NOTES
Pt presents to ED c/o allergic reaction. Pt sts she started keflex course today for UTI and approx. 1 hr after taking medication she noticed swelling to right side of face and throat. Pt took her epi-pen and 50mg benadryl that seems to have helped symptoms some. Pt sts she has taken keflex in the past without a problem. Pt is a/ox4. Airway is patent at this time.   Pt denies CP/SOB

## 2020-01-29 ENCOUNTER — APPOINTMENT (OUTPATIENT)
Dept: CT IMAGING | Age: 50
End: 2020-01-29
Attending: EMERGENCY MEDICINE
Payer: MEDICAID

## 2020-01-29 ENCOUNTER — HOSPITAL ENCOUNTER (EMERGENCY)
Age: 50
Discharge: HOME | End: 2020-01-29
Attending: EMERGENCY MEDICINE | Admitting: EMERGENCY MEDICINE
Payer: MEDICAID

## 2020-01-29 VITALS
SYSTOLIC BLOOD PRESSURE: 151 MMHG | RESPIRATION RATE: 16 BRPM | OXYGEN SATURATION: 97 % | TEMPERATURE: 98.4 F | DIASTOLIC BLOOD PRESSURE: 86 MMHG | HEART RATE: 76 BPM

## 2020-01-29 DIAGNOSIS — R10.84 ABDOMINAL PAIN, GENERALIZED: Primary | ICD-10-CM

## 2020-01-29 DIAGNOSIS — R11.2 NAUSEA AND VOMITING, INTRACTABILITY OF VOMITING NOT SPECIFIED, UNSPECIFIED VOMITING TYPE: ICD-10-CM

## 2020-01-29 DIAGNOSIS — K62.89 RECTAL PAIN: ICD-10-CM

## 2020-01-29 LAB
ALBUMIN SERPL-MCNC: 3.9 G/DL (ref 3.5–5)
ALBUMIN/GLOB SERPL: 1 {RATIO} (ref 1.1–2.2)
ALP SERPL-CCNC: 66 U/L (ref 45–117)
ALT SERPL-CCNC: 50 U/L (ref 12–78)
ANION GAP SERPL CALC-SCNC: 7 MMOL/L (ref 5–15)
AST SERPL-CCNC: 35 U/L (ref 15–37)
BASOPHILS # BLD: 0 K/UL (ref 0–0.1)
BASOPHILS NFR BLD: 0 % (ref 0–1)
BILIRUB SERPL-MCNC: 0.5 MG/DL (ref 0.2–1)
BUN SERPL-MCNC: 8 MG/DL (ref 6–20)
BUN/CREAT SERPL: 11 (ref 12–20)
CALCIUM SERPL-MCNC: 10.1 MG/DL (ref 8.5–10.1)
CHLORIDE SERPL-SCNC: 101 MMOL/L (ref 97–108)
CO2 SERPL-SCNC: 26 MMOL/L (ref 21–32)
CREAT SERPL-MCNC: 0.76 MG/DL (ref 0.55–1.02)
DIFFERENTIAL METHOD BLD: ABNORMAL
EOSINOPHIL # BLD: 0.2 K/UL (ref 0–0.4)
EOSINOPHIL NFR BLD: 3 % (ref 0–7)
ERYTHROCYTE [DISTWIDTH] IN BLOOD BY AUTOMATED COUNT: 13.8 % (ref 11.5–14.5)
GLOBULIN SER CALC-MCNC: 3.8 G/DL (ref 2–4)
GLUCOSE SERPL-MCNC: 281 MG/DL (ref 65–100)
HCT VFR BLD AUTO: 38.2 % (ref 35–47)
HGB BLD-MCNC: 13 G/DL (ref 11.5–16)
IMM GRANULOCYTES # BLD AUTO: 0 K/UL (ref 0–0.04)
IMM GRANULOCYTES NFR BLD AUTO: 1 % (ref 0–0.5)
LIPASE SERPL-CCNC: 156 U/L (ref 73–393)
LYMPHOCYTES # BLD: 1.4 K/UL (ref 0.8–3.5)
LYMPHOCYTES NFR BLD: 25 % (ref 12–49)
MCH RBC QN AUTO: 27.3 PG (ref 26–34)
MCHC RBC AUTO-ENTMCNC: 34 G/DL (ref 30–36.5)
MCV RBC AUTO: 80.1 FL (ref 80–99)
MONOCYTES # BLD: 0.3 K/UL (ref 0–1)
MONOCYTES NFR BLD: 5 % (ref 5–13)
NEUTS SEG # BLD: 3.7 K/UL (ref 1.8–8)
NEUTS SEG NFR BLD: 66 % (ref 32–75)
NRBC # BLD: 0 K/UL (ref 0–0.01)
NRBC BLD-RTO: 0 PER 100 WBC
PLATELET # BLD AUTO: 179 K/UL (ref 150–400)
PMV BLD AUTO: 10 FL (ref 8.9–12.9)
POTASSIUM SERPL-SCNC: 3.6 MMOL/L (ref 3.5–5.1)
PROT SERPL-MCNC: 7.7 G/DL (ref 6.4–8.2)
RBC # BLD AUTO: 4.77 M/UL (ref 3.8–5.2)
SODIUM SERPL-SCNC: 134 MMOL/L (ref 136–145)
WBC # BLD AUTO: 5.5 K/UL (ref 3.6–11)

## 2020-01-29 PROCEDURE — 80053 COMPREHEN METABOLIC PANEL: CPT

## 2020-01-29 PROCEDURE — 74011250637 HC RX REV CODE- 250/637: Performed by: EMERGENCY MEDICINE

## 2020-01-29 PROCEDURE — 85025 COMPLETE CBC W/AUTO DIFF WBC: CPT

## 2020-01-29 PROCEDURE — 96375 TX/PRO/DX INJ NEW DRUG ADDON: CPT

## 2020-01-29 PROCEDURE — 36415 COLL VENOUS BLD VENIPUNCTURE: CPT

## 2020-01-29 PROCEDURE — 99283 EMERGENCY DEPT VISIT LOW MDM: CPT

## 2020-01-29 PROCEDURE — 96374 THER/PROPH/DIAG INJ IV PUSH: CPT

## 2020-01-29 PROCEDURE — 74176 CT ABD & PELVIS W/O CONTRAST: CPT

## 2020-01-29 PROCEDURE — 74011250636 HC RX REV CODE- 250/636: Performed by: EMERGENCY MEDICINE

## 2020-01-29 PROCEDURE — 83690 ASSAY OF LIPASE: CPT

## 2020-01-29 RX ORDER — ONDANSETRON 4 MG/1
4 TABLET, ORALLY DISINTEGRATING ORAL
Qty: 8 TAB | Refills: 0 | OUTPATIENT
Start: 2020-01-29 | End: 2020-04-20

## 2020-01-29 RX ORDER — ONDANSETRON 2 MG/ML
4 INJECTION INTRAMUSCULAR; INTRAVENOUS ONCE
Status: COMPLETED | OUTPATIENT
Start: 2020-01-29 | End: 2020-01-29

## 2020-01-29 RX ORDER — DIPHENHYDRAMINE HYDROCHLORIDE 50 MG/ML
25 INJECTION, SOLUTION INTRAMUSCULAR; INTRAVENOUS
Status: COMPLETED | OUTPATIENT
Start: 2020-01-29 | End: 2020-01-29

## 2020-01-29 RX ORDER — DICYCLOMINE HYDROCHLORIDE 10 MG/1
10 CAPSULE ORAL 4 TIMES DAILY
Qty: 20 CAP | Refills: 0 | Status: SHIPPED | OUTPATIENT
Start: 2020-01-29 | End: 2020-02-03

## 2020-01-29 RX ORDER — HYDROMORPHONE HYDROCHLORIDE 2 MG/1
2 TABLET ORAL
Status: COMPLETED | OUTPATIENT
Start: 2020-01-29 | End: 2020-01-29

## 2020-01-29 RX ADMIN — HYDROMORPHONE HYDROCHLORIDE 2 MG: 2 TABLET ORAL at 11:01

## 2020-01-29 RX ADMIN — ONDANSETRON 4 MG: 2 INJECTION INTRAMUSCULAR; INTRAVENOUS at 11:01

## 2020-01-29 RX ADMIN — DIPHENHYDRAMINE HYDROCHLORIDE 25 MG: 50 INJECTION, SOLUTION INTRAMUSCULAR; INTRAVENOUS at 12:17

## 2020-01-29 NOTE — ED PROVIDER NOTES
Symptoms on going for some time. Fluctuating diarrhea and regular stools. Now vomiting and abd pain. Had colonoscopy two weeks ago and told there was a lot of inflammation. Pt having difficulty using rectal suppositories. Pt using nitro paste on rectum, diltiazem, lidocaine topical.  Started throwing up yesterday. Pt felt chills last night but no measured fever. No pain medication taken at home. No medication for vomiting at home. Dr. Manjula Harris  (GI/pelvic  - [facility])           Past Medical History:   Diagnosis Date    Anal fissure     Anisocoria     Asthma     LAST EPISODE     Back pain     Cerumen impaction     Chronic kidney disease     hx uti in past    Coagulation defects     ocassional rectal bleeding due to anal fissure    Colovaginal fistula     Diabetes (HCC)     NIDDM    Diabetes ([facility] [address]     Diverticulitis     Diverticulosis     Enlarged tonsils     Frequent UTI     GERD (gastroesophageal reflux disease)     H/O endoscopy     with dilation    HA (headache)     Hepatic steatosis     Hx of colonoscopy with polypectomy     benign    Hypertension     Ill-defined condition     FREQUENT HIVES    Ill-defined condition     HX ELEVATED LIVER ENZYMES    Morbid obesity (HCC)     Nausea & vomiting     during diverticulitis flare    Obesity     Otitis media     Pneumonia     about 15 yrs ago    Psychiatric disorder     ANXIETY    Recurrent tonsillitis     Sinusitis     Transfusion history ~ age 35    postop hysterectomy    Unspecified sleep apnea     snores ( not diagnosed yet)     Urticaria     Urticaria        Past Surgical History:   Procedure Laterality Date    ABDOMEN SURGERY PROC UNLISTED  01/06/2018    hernia repair at [facility]    COLONOSCOPY N/A 3/28/2019    COLONOSCOPY performed by Shelbie Gomes MD at [address] [place] [address],[address]    [place].     HX GI  7/31/12    LAPAROSCOPIC HAND ASSISTED  POSS OPEN SIGMOID COLECTOMY POSS TEMPORARY DIVERTING LOOP ILEOSTOMY;  (no illeostomy needed)    HX GYN           HX GYN      cervical conization    HX HEENT      SINUS SURGERY LEFT X2    HX HEENT      SINUS SURGERY ON RIGHT X2    HX OTHER SURGICAL      Sphincterotomy    HX PELVIC LAPAROSCOPY      HX EMMANUEL AND BSO      HX UROLOGICAL  12     CYSTOSCOPY INSERTION URETERAL CATHETERS - Cystoscopy Insertion of bilateral ureteral stents         Family History:   Problem Relation Age of Onset    Diabetes Mother     Cancer Mother         NON-HODGKINS LYMPHOMA    Anesth Problems Mother         PONV    Diabetes Father     Heart Disease Father         CAD - STENTS, PACEMAKER    Arrhythmia Father        Social History     Socioeconomic History    Marital status:      Spouse name: Not on file    Number of children: Not on file    Years of education: Not on file    Highest education level: Not on file   Occupational History    Not on file   Social Needs    Financial resource strain: Not on file    Food insecurity:     Worry: Not on file     Inability: Not on file    Transportation needs:     Medical: Not on file     Non-medical: Not on file   Tobacco Use    Smoking status: Never Smoker    Smokeless tobacco: Never Used   Substance and Sexual Activity    Alcohol use: Yes     Comment: Rarely    Drug use: No     Types: Prescription, OTC    Sexual activity: Never   Lifestyle    Physical activity:     Days per week: Not on file     Minutes per session: Not on file    Stress: Not on file   Relationships    Social connections:     Talks on phone: Not on file     Gets together: Not on file     Attends [employer] service: Not on file     Active member of club or organization: Not on file     Attends meetings of clubs or organizations: Not on file     Relationship status: Not on file    Intimate partner violence:     Fear of current or ex partner: Not on file     Emotionally abused: Not on file     Physically abused: Not on file Forced sexual activity: Not on file   Other Topics Concern    Not on file   Social History Narrative    Not on file         ALLERGIES: Aspirin; Codeine; Contrast agent [iodine]; Flavoring agent; Percocet [oxycodone-acetaminophen]; Prilosec [omeprazole magnesium]; Ketorolac; Fentanyl; and Morphine    Review of Systems   Constitutional: Negative for fever. HENT: Negative for facial swelling. Eyes: Negative for visual disturbance. Respiratory: Negative for chest tightness. Cardiovascular: Negative for chest pain. Gastrointestinal: Negative for abdominal pain. Genitourinary: Negative for difficulty urinating and dysuria. Musculoskeletal: Negative for arthralgias. Skin: Negative for rash. Neurological: Negative for headaches. Hematological: Negative for adenopathy. Psychiatric/Behavioral: Negative for suicidal ideas. Vitals:    01/29/20 1000   BP: 151/86   Pulse: 76   Resp: 16   Temp: 98.4 °F (36.9 °C)   SpO2: 97%            Physical Exam  Vitals signs and nursing note reviewed. Constitutional:       General: She is not in acute distress. Appearance: She is well-developed. HENT:      Head: Normocephalic and atraumatic. Eyes:      General: No scleral icterus. Conjunctiva/sclera: Conjunctivae normal.      Pupils: Pupils are equal, round, and reactive to light. Neck:      Musculoskeletal: Normal range of motion and neck supple. Cardiovascular:      Rate and Rhythm: Normal rate. Heart sounds: No murmur. Pulmonary:      Effort: Pulmonary effort is normal. No respiratory distress. Abdominal:      General: Bowel sounds are normal. There is no distension. Palpations: Abdomen is soft. Tenderness: There is no abdominal tenderness. Musculoskeletal: Normal range of motion. Skin:     General: Skin is warm and dry. Findings: No rash. Neurological:      Mental Status: She is alert and oriented to person, place, and time.           MDM  Number of Diagnoses or Management Options  Abdominal pain, generalized:   Nausea and vomiting, intractability of vomiting not specified, unspecified vomiting type:   Rectal pain:   Diagnosis management comments: Assessment: Abdominal pain, vomiting, rectal pain. Patient has had these symptoms for years. Patient has had multiple CAT scans over the past 3 years most of which were unremarkable. No acute findings on CAT scan today. Vital signs are stable. Blood work is unremarkable. Patient stable for discharge home. Given patient's multiple ED visits and CT scanning, I have requested a management plan be created to better address the patient's chronic pain and medical needs.          Procedures

## 2020-01-29 NOTE — ED TRIAGE NOTES
C/o nausea, vomiting and diarrhea along with rectal pain/throbbing that's been going on \"quite awhile\" but progressed yesterday.  +chills.  Hx anal fissures     Scheduled for MRI next week to confirm Crohns disease ordered by GI doctor at [facility]

## 2020-01-29 NOTE — DISCHARGE INSTRUCTIONS

## 2020-01-29 NOTE — LETTER
Marcelina Mcneil 55 
[address] [facility] [address] [id number] [phone] Work/School Note Date: 1/29/2020 To Whom It May concern: 
 
Audrey Cowart was seen and treated today in the emergency room by the following provider(s): 
Attending Provider: Reji Cisneros MD. Nancy Quiroz may return to work on 1/30/2020.  
 
Sincerely, 
 
 
 
 
Edgard De Leon MD

## 2020-01-29 NOTE — ED NOTES
Pt reports itching on hands approx ~20 mins after oral admin of medication, pt states that \"dilauded always gives me this type of reaction\". Dilaudid added to patients allergies. MD notified. Pt also reports steel feeling nauseous. MD notified.

## 2020-02-21 ENCOUNTER — OFFICE VISIT (OUTPATIENT)
Dept: ENDOCRINOLOGY | Age: 50
End: 2020-02-21

## 2020-02-21 VITALS
DIASTOLIC BLOOD PRESSURE: 75 MMHG | WEIGHT: 207 LBS | HEIGHT: 62 IN | SYSTOLIC BLOOD PRESSURE: 123 MMHG | HEART RATE: 85 BPM | BODY MASS INDEX: 38.09 KG/M2

## 2020-02-21 DIAGNOSIS — I10 ESSENTIAL HYPERTENSION WITH GOAL BLOOD PRESSURE LESS THAN 130/80: ICD-10-CM

## 2020-02-21 DIAGNOSIS — Z79.4 TYPE 2 DIABETES MELLITUS WITHOUT COMPLICATION, WITH LONG-TERM CURRENT USE OF INSULIN (HCC): Primary | ICD-10-CM

## 2020-02-21 DIAGNOSIS — E78.5 HYPERLIPIDEMIA, UNSPECIFIED HYPERLIPIDEMIA TYPE: ICD-10-CM

## 2020-02-21 DIAGNOSIS — E11.9 TYPE 2 DIABETES MELLITUS WITHOUT COMPLICATION, WITH LONG-TERM CURRENT USE OF INSULIN (HCC): Primary | ICD-10-CM

## 2020-02-21 LAB — HBA1C MFR BLD HPLC: 10.4 %

## 2020-02-21 RX ORDER — METFORMIN HYDROCHLORIDE 500 MG/1
1000 TABLET, EXTENDED RELEASE ORAL
Qty: 360 TAB | Refills: 3 | Status: SHIPPED | OUTPATIENT
Start: 2020-02-21 | End: 2020-09-29

## 2020-02-21 RX ORDER — ALBUTEROL SULFATE 90 UG/1
AEROSOL, METERED RESPIRATORY (INHALATION)
COMMUNITY
Start: 2020-01-03 | End: 2020-09-29

## 2020-02-21 RX ORDER — GLIMEPIRIDE 4 MG/1
4 TABLET ORAL
Qty: 90 TAB | Refills: 3 | Status: SHIPPED | OUTPATIENT
Start: 2020-02-21 | End: 2020-03-05 | Stop reason: SDUPTHER

## 2020-02-21 NOTE — PROGRESS NOTES
CONSULTATION REQUESTED BY: Indy Valera MD     REASON FOR CONSULT:  Uncontrolled type 2 diabetes    CHIEF COMPLAINT: evaluation of type 2 diabetes    HISTORY OF PRESENT ILLNESS:   Madan Arriaza is a 52 y.o. female with a PMHx as noted below who presents for evaluation of uncontrolled type 2 diabetes.     Diabetes History:  Diabetes was diagnosed about 1.5 years ago, pre DM prior to this for some time,  Family History of diabetes is + in mother and father,   Last A1c prior to initial visit was reported around 8%, on prednisone,  A1c is 10.4%    Current Home Regimen:  Lantus 38 units at  (ran out last week, lapse in insurance, taking older metformin had left at home)  Taking metformin 500mg twice daily,    Review of home glucose:  AM: reports ranging 180-240  Was on prednisone about 1 week ago, hives/excema/asthma (asthma currently controlled)    Review of most recent diabetes-related labs:  Lab Results   Component Value Date    HBA1C 8.0 (H) 02/10/2019    HBA1C 8.7 (H) 02/07/2019    GFRAA >60 01/29/2020    GFRNA >60 01/29/2020     Lab Key:  [id number] = IA-2 pancreatic islet cell autoantibody  CPEPL = C-peptide level  :EXT = External Lab  GADLT = ZULEIKA-65 autoantibody   INSUL = Insulin level  MCACR (or MALBEXT) = Urine Microalbumin (or External UM)  B12LT = B12 level    PAST MEDICAL/SURGICAL HISTORY:   Past Medical History:   Diagnosis Date    Anal fissure     Anisocoria     Asthma     LAST EPISODE     Back pain     Cerumen impaction     Chronic kidney disease     hx uti in past    Coagulation defects     ocassional rectal bleeding due to anal fissure    Colovaginal fistula     Diabetes (HCC)     NIDDM    Diabetes ([facility] [address]     Diverticulitis     Diverticulosis     Enlarged tonsils     Frequent UTI     GERD (gastroesophageal reflux disease)     H/O endoscopy     with dilation    HA (headache)     Hepatic steatosis     Hx of colonoscopy with polypectomy     benign    Hypertension     Ill-defined condition     FREQUENT HIVES    Ill-defined condition     HX ELEVATED LIVER ENZYMES    Morbid obesity (HCC)     Nausea & vomiting     during diverticulitis flare    Obesity     Otitis media     Pneumonia     about 15 yrs ago    Psychiatric disorder     ANXIETY    Recurrent tonsillitis     Sinusitis     Transfusion history ~ age 35    postop hysterectomy    Unspecified sleep apnea     snores ( not diagnosed yet)     Urticaria     Urticaria      Past Surgical History:   Procedure Laterality Date    ABDOMEN SURGERY PROC UNLISTED  2018    [facility]    COLONOSCOPY N/A 3/28/2019    COLONOSCOPY performed by Breanna Orantes MD at [facility]    [place].  HX GI  12    LAPAROSCOPIC HAND ASSISTED  POSS OPEN SIGMOID COLECTOMY POSS TEMPORARY DIVERTING LOOP ILEOSTOMY;  (no illeostomy needed)    HX GYN           HX GYN      cervical conization    HX HEENT      SINUS SURGERY LEFT X2    HX HEENT      SINUS SURGERY ON RIGHT X2    HX OTHER SURGICAL      Sphincterotomy    HX PELVIC LAPAROSCOPY      HX EMMANUEL AND BSO      HX UROLOGICAL  12     CYSTOSCOPY INSERTION URETERAL CATHETERS - Cystoscopy Insertion of bilateral ureteral stents       ALLERGIES:   Allergies   Allergen Reactions    Aspirin Hives and Shortness of Breath    Codeine Hives, Itching and Angioedema     Pt had itching in mouth, on face and lips    Contrast Agent [Iodine] Hives, Itching and Angioedema     Pt. had itching in mouth, on face and lips after IV contrast with the last exam.  Benadryl was given. OK if premedicated.      Flavoring Agent Anaphylaxis    Percocet [Oxycodone-Acetaminophen] Hives, Itching and Angioedema     Pt had itching in mouth, on face and lips    Prilosec [Omeprazole Magnesium] Anaphylaxis     CHERRY FLAVORED ODT; PT TAKES REGULAR PRILOSEC AND IS OK    Dilaudid [Hydromorphone] Itching    Ketorolac Rash     \"makes my eyes spasm and causes rash on my hands\" and \"makes my skin itch and makes me nervous\"    Fentanyl Rash and Itching    Morphine Itching     Severe itching. Tolerates with Benadryl       MEDICATIONS ON ADMISSION:     Current Outpatient Medications:     PROAIR HFA 90 mcg/actuation inhaler, , Disp: , Rfl:     hydrocortisone (ANUSOL-HC) 25 mg supp, Insert 1 Suppository into rectum every twelve (12) hours. , Disp: 12 Each, Rfl: 0    hydrocortisone (PROCTOCORT) 1 % topical cream, Apply  to affected area two (2) times a day. use thin layer, Disp: 30 g, Rfl: 0    simvastatin (ZOCOR) 20 mg tablet, Take 20 mg by mouth nightly., Disp: , Rfl:     omeprazole (PRILOSEC) 20 mg capsule, Take 40 mg by mouth Daily (before breakfast). , Disp: , Rfl:     insulin glargine (LANTUS U-100 INSULIN) 100 unit/mL injection, 38 Units by SubCUTAneous route nightly., Disp: , Rfl:     sertraline (ZOLOFT) 100 mg tablet, Take 200 mg by mouth nightly., Disp: , Rfl:     losartan-hydroCHLOROthiazide (HYZAAR) 100-12.5 mg per tablet, Take 1 Tab by mouth daily. , Disp: , Rfl:     EPINEPHrine (EPIPEN) 0.3 mg/0.3 mL (1:1,000) injection, 0.3 mL by IntraMUSCular route once as needed for up to 1 dose., Disp: 0.3 mL, Rfl: 1    albuterol (PROVENTIL, VENTOLIN) 90 mcg/Actuation inhaler, Take 2 Puffs by inhalation every six (6) hours as needed. , Disp: , Rfl:     ondansetron (ZOFRAN ODT) 4 mg disintegrating tablet, Take 1 Tab by mouth every eight (8) hours as needed for Nausea., Disp: 8 Tab, Rfl: 0    polyethylene glycol (MIRALAX) 17 gram/dose powder, Take 17 g by mouth daily. 1 tablespoon with 8 oz of water daily to prevent constipation, Disp: 116 g, Rfl: 0    cetirizine (ZYRTEC) 10 mg tablet, Take 1 Tab by mouth daily. , Disp: 20 Tab, Rfl: 0    nitroglycerin (RECTIV) 0.4 % (w/w) ointment, Insert  into rectum every twelve (12) hours every twelve (12) hours. , Disp: 30 g, Rfl: 0    ondansetron (ZOFRAN ODT) 4 mg disintegrating tablet, Take 1 Tab by mouth every eight (8) hours as needed for Nausea., Disp: 12 Tab, Rfl: 0    naproxen (NAPROSYN) 500 mg tablet, Take 1 Tab by mouth two (2) times daily (with meals). , Disp: 30 Tab, Rfl: 0    ALPRAZolam (XANAX) 0.25 mg tablet, Take 0.125-0.25 mg by mouth every twelve (12) hours as needed for Anxiety or Sleep., Disp: , Rfl:     melatonin tab tablet, Take 5 mg by mouth nightly., Disp: , Rfl:     dicyclomine (BENTYL) 20 mg tablet, Take 1 Tab by mouth every six (6) hours as needed (abdominal cramps) for up to 20 doses. , Disp: 20 Tab, Rfl: 0    SOCIAL HISTORY:   Social History     Socioeconomic History    Marital status:      Spouse name: Not on file    Number of children: Not on file    Years of education: Not on file    Highest education level: Not on file   Occupational History    Not on file   Social Needs    Financial resource strain: Not on file    Food insecurity:     Worry: Not on file     Inability: Not on file    Transportation needs:     Medical: Not on file     Non-medical: Not on file   Tobacco Use    Smoking status: Never Smoker    Smokeless tobacco: Never Used   Substance and Sexual Activity    Alcohol use: Yes     Comment: Rarely    Drug use: No     Types: Prescription, OTC    Sexual activity: Never   Lifestyle    Physical activity:     Days per week: Not on file     Minutes per session: Not on file    Stress: Not on file   Relationships    Social connections:     Talks on phone: Not on file     Gets together: Not on file     Attends [employer] service: Not on file     Active member of club or organization: Not on file     Attends meetings of clubs or organizations: Not on file     Relationship status: Not on file    Intimate partner violence:     Fear of current or ex partner: Not on file     Emotionally abused: Not on file     Physically abused: Not on file     Forced sexual activity: Not on file   Other Topics Concern    Not on file   Social History Narrative    Not on file     FAMILY HISTORY:  Family History   Problem Relation Age of Onset    Diabetes Mother     Cancer Mother         NON-HODGKINS LYMPHOMA    Anesth Problems Mother         PONV    Diabetes Father     Heart Disease Father         CAD - STENTS, PACEMAKER    Arrhythmia Father        REVIEW OF SYSTEMS: Complete ROS assessed and noted for that which is described above, all else are negative. Eyes: normal  ENT: normal  CVS: normal  Resp: normal  GI: normal  : normal  GYN: normal  Endocrine: normal  Integument: normal  Musculoskeletal: normal  Neuro: normal  Psych: normal    PHYSICAL EXAMINATION:    VITAL SIGNS:  Visit Vitals  /75 (BP 1 Location: Left arm, BP Patient Position: Sitting)   Pulse 85   Ht 5' 2\" (1.575 m)   Wt 207 lb (93.9 kg)   BMI 37.86 kg/m²       GENERAL: NCAT, Sitting comfortably, NAD  EYES: EOMI, non-icteric, no proptosis  Ear/Nose/Throat: NCAT, no inflammation, no masses  LYMPH NODES: No LAD  CARDIOVASCULAR: S1 S2, RRR, No murmur, 2+ radial pulses  RESPIRATORY: CTA b/l, no wheeze/rales  GASTROINTESTINAL: NT, ND  MUSCULOSKELETAL: Normal ROM, no atrophy  SKIN: warm, no edema/rash/ or other skin changes  NEUROLOGIC: 5/5 power all extremities, no tremor, AAOx3  PSYCHIATRIC: Normal affect, Normal insight and judgement    Diabetic foot exam:     Left Foot:   Visual Exam: normal    Pulse DP: 2+ (normal)   Filament test: normal sensation    Vibratory sensation: Vibratory sensation: normal       Right Foot:   Visual Exam: normal    Pulse DP: 2+ (normal)   Filament test: normal sensation    Vibratory sensation: Vibratory sensation: normal      REVIEW OF LABORATORY AND RADIOLOGY DATA:   Labs and documentation have been reviewed as described above. ASSESSMENT AND PLAN:   Eden Engel is a 52 y.o. female with a PMHx as noted above who presents for evaluation of uncontrolled type 2 diabetes.      Problems:  Type 2 diabetes Uncontrolled  Hyperlipidemia  Hypertension    We had the pleasure of reviewing together the basics of diabetes including basic pathophysiology and diabetes care. We further discussed the importance of checking home glucose regularly and takin all of their scheduled medications in order to have the best possible outcome. I was able to answer any questions they had in clinic today and they are invited to reach me if they have any further questions. Based upon our discussion together today we have decided to make the following changes:    Patient with asthma and skin conditions that had required prednisone resulting flares of hyperglycemia. She is now off as of one week ago but anticipates to be on and off in the future for her breathing. She would prefer to use oral meds but we noted that although we can incrementally add oral medications, we should keep in mind she may need insulin due to control steroid induced hyperglycemia. For now we will plan as follows:     Note that she has a hx of GI surgery / resection, and may or may not tolerate higher doses of metformin. Advised that any lactic acidosis in the setting of infection but normal renal function is often more due to the infection and associated physiologic and metabolic stress which results from this and not the metformin. She notes that historically she had done better on metformin however, and she self restarted metformin recently after running out of insulin and is tolerating it, so very reasonable to continue it for now and see how she does. She needs a medication to help with post prandial hyperglycemia. In this case, with consideration to the oral medications they are already on or have tried in the past, the next best step would be to start a sulfonylurea to help control daytime blood sugars.  We discussed the possibility of this class of medication resulting in hypoglycemia, however considering their insulin resistance, I do not suspect this would become a frequent problem, though they are aware to reduce to half dose and let me know if this is the case.     PLAN  Type 2 Diabetes  Medications:  Increase metformin to 1000mg (2 tabs) twice per day   If not tolerating, can reduce to 2 tabs AM + 1 tab PM, or 1 tab AM and 2 tabs PM  Start glimepiride 4mg each morning before breakfast,   Reduce to 1/2 tab if sugars get low during the day  Advised to check glucose   Provided with glucose log sheets for later review. HTN: BP reviewed, stable  HLD: Fasting lipids to be obtained/reviewed, on statin    RTC: I would like to see them back in 3 months. 60 minute time slot spent with patient today of which >50% of this time was spent in counseling and coordination of care. Hali Lara.  [address] [facility]

## 2020-02-21 NOTE — PATIENT INSTRUCTIONS
Increase metformin to 1000mg (2 tabs) twice per day   If not tolerating, can reduce to:     2 tabs AM + 1 tab PM, or     1 tab AM and 2 tabs PM    Start glimepiride 4mg each morning before breakfast,   Reduce to 1/2 tab if sugars get low during the day      Please note our new policy, you must arrive to the clinic 15 minutes before your appointment time to allow enough time for proper check-in, adequate time to spend with your doctor, and also to respect the appointment time of the next patient. Not arriving 15 minutes in advance may result in having your appointment rescheduled for the next available day/time.  ----------------------------------------------------------------------------------------------------------------------    Below you will find a glucose log sheet which you can use to record your blood sugars. Without checking and recording what your home glucose levels are, it will be difficult to make any changes to your medication dose, even when significant changes may be needed. Please feel free to use the log below to record your home glucose levels. At the very least, I would like for you to login the entire 2-3 weeks just before your visit so we can make your visit much more productive and beneficial to you. GLUCOSE LOG SHEET:    Date Breakfast Lunch Dinner Bedtime Comments ? GLUCOSE LOG SHEET:    Date Breakfast Lunch Dinner Bedtime Comments ? GLUCOSE LOG SHEET:    Date Breakfast Lunch Dinner Bedtime Comments ?

## 2020-02-22 LAB
ALBUMIN SERPL-MCNC: 4.5 G/DL (ref 3.8–4.8)
ALBUMIN/CREAT UR: 8 MG/G CREAT (ref 0–29)
ALBUMIN/GLOB SERPL: 1.9 {RATIO} (ref 1.2–2.2)
ALP SERPL-CCNC: 69 IU/L (ref 39–117)
ALT SERPL-CCNC: 33 IU/L (ref 0–32)
AST SERPL-CCNC: 40 IU/L (ref 0–40)
BILIRUB SERPL-MCNC: 0.3 MG/DL (ref 0–1.2)
BUN SERPL-MCNC: 10 MG/DL (ref 6–24)
BUN/CREAT SERPL: 14 (ref 9–23)
CALCIUM SERPL-MCNC: 10.5 MG/DL (ref 8.7–10.2)
CHLORIDE SERPL-SCNC: 97 MMOL/L (ref 96–106)
CHOLEST SERPL-MCNC: 269 MG/DL (ref 100–199)
CO2 SERPL-SCNC: 23 MMOL/L (ref 20–29)
CREAT SERPL-MCNC: 0.72 MG/DL (ref 0.57–1)
CREAT UR-MCNC: 150.9 MG/DL
GLOBULIN SER CALC-MCNC: 2.4 G/DL (ref 1.5–4.5)
GLUCOSE SERPL-MCNC: 277 MG/DL (ref 65–99)
HDLC SERPL-MCNC: 48 MG/DL
INTERPRETATION, 910389: NORMAL
LDLC SERPL CALC-MCNC: 166 MG/DL (ref 0–99)
Lab: NORMAL
MICROALBUMIN UR-MCNC: 11.5 UG/ML
POTASSIUM SERPL-SCNC: 4.6 MMOL/L (ref 3.5–5.2)
PROT SERPL-MCNC: 6.9 G/DL (ref 6–8.5)
SODIUM SERPL-SCNC: 139 MMOL/L (ref 134–144)
TRIGL SERPL-MCNC: 274 MG/DL (ref 0–149)
TSH SERPL DL<=0.005 MIU/L-ACNC: 1.38 UIU/ML (ref 0.45–4.5)
VLDLC SERPL CALC-MCNC: 55 MG/DL (ref 5–40)

## 2020-02-26 ENCOUNTER — PATIENT MESSAGE (OUTPATIENT)
Dept: ENDOCRINOLOGY | Age: 50
End: 2020-02-26

## 2020-03-04 ENCOUNTER — TELEPHONE (OUTPATIENT)
Dept: ENDOCRINOLOGY | Age: 50
End: 2020-03-04

## 2020-03-04 NOTE — TELEPHONE ENCOUNTER
I called Ms. Nimco Schofield again and asked her to call her insurance company and ask them what they prefer in the same category as the Glimepiride and let me know so we can order that. She will do this and call me tomorrow.   Jasvir Trinh

## 2020-03-04 NOTE — TELEPHONE ENCOUNTER
Patient called about the Glimepiride and the insurance is not covering it. Wanted to know if the doctor could prescribe something else because he numbers are still high. Please advise.

## 2020-03-04 NOTE — TELEPHONE ENCOUNTER
I attempted to return this call and reached a voice mail. I left a message asking her to return this call.    Mikala King

## 2020-03-05 ENCOUNTER — TELEPHONE (OUTPATIENT)
Dept: ENDOCRINOLOGY | Age: 50
End: 2020-03-05

## 2020-03-05 RX ORDER — GLIMEPIRIDE 4 MG/1
4 TABLET ORAL
Qty: 90 TAB | Refills: 3 | Status: SHIPPED | OUTPATIENT
Start: 2020-03-05 | End: 2021-03-18

## 2020-03-05 NOTE — TELEPHONE ENCOUNTER
----- Message from Sydney Gibson sent at 3/5/2020 11:39 AM EST -----  Regarding: Dr Jose Angel Melendez  Patient return call/rx refill    Caller's first and last name and relationship (if not the patient):      Best contact number(s):[phone]      Whose call is being returned:Corrine,       Details to clarify the request:regarding an PA for her Glimepiride, she found she can get it at [facility] for $10, instead of dealing with the PA she would like  a rx called into [facility] [phone],  for two refills so she can  her medication and start taking her medication and get her A1C  under control    and would like this called in as soon as possible since they have been trying to get this called in for abhijit Gibson

## 2020-03-05 NOTE — TELEPHONE ENCOUNTER
----- Message from Calvin Weldon sent at 3/5/2020 11:39 AM EST -----  Regarding: Dr Behzad Carroll  Patient return call/rx refill     Caller's first and last name and relationship (if not the patient):        Best contact number(s):[phone]        Whose call is being returned:Corrine,         Details to clarify the request:regarding an PA for her Glimepiride, she found she can get it at [facility] for $10, instead of dealing with the PA she would like  a rx called into [facility] [phone],  for two refills so she can  her medication and start taking her medication and get her A1C  under control    and would like this called in as soon as possible since they have been trying to get this called in for abhijit Weldon

## 2020-03-05 NOTE — TELEPHONE ENCOUNTER
I returned this call to confirm the pharmacy and then sent a script to Dr. Damari Barakat to sign.   Ace Dawley

## 2020-03-18 NOTE — ED PROVIDER NOTES
EMERGENCY DEPARTMENT HISTORY AND PHYSICAL EXAM      Date: 4/18/2018  Patient Name: Hollis Mclean     I have seen and evaluated this patient in the Express Care portion of triage for LLQ abdominal pain x 3 days. Pt was seen a few weeks ago with diverticulitis. She took the prescribed Flagyl and Cipro with mild relief. Pt has had both diarrhea and constipation. The patients care will begin now and orders have been placed. This patient will be seen and provided further care in the Emergency Room. Written by [facility], a scribe for RAMONITA Galeas. History of Presenting Illness     Chief Complaint   Patient presents with    Abdominal Pain     pt c/o LLQ abdominal pain x3 days with constipation/diarrhea intermittently. Pt reported she was dx with diverticulitis a few weeks ago and finished her antibiotics about a week ago, was feeling better until symptoms returned on Sunday. History Provided By: Patient    HPI: Hollis Mclean, 50 y.o. female with PMHx significant for HTN, diverticulitis, GERD, frequent UTI's, asthma, DM, presents ambulatory to the ED with cc of constant, left lower quadrant abdominal pain with associated nausea x 3 days. Pt describes the pain as dull and burning. She reports that she was recently diagnosed with diverticulitis at the end of March for which she was treated with Cipro and Flagyl with relief. She reports intermittent episodes of constipation and diarrhea, noting she has noticed some blood in her stool with the diarrhea. Additionally, she is currently on Cephalexin for a UTI as prescribed by her PCP. She is s/p prior colon resection and hernia repairs. She denies any CP, SOB, fever, vomiting. PCP: BEBA Wright  GI: Dr. [facility]     There are no other complaints, changes, or physical findings at this time.     Current Facility-Administered Medications   Medication Dose Route Frequency Provider Last Rate Last Dose    sodium chloride (NS) flush 10 mL  10 mL IntraVENous RAD ONCE Humberto Brown MD         Current Outpatient Prescriptions   Medication Sig Dispense Refill    cefdinir (OMNICEF) 300 mg capsule Take 1 Cap by mouth two (2) times a day. 14 Cap 0    ondansetron (ZOFRAN ODT) 4 mg disintegrating tablet Take 1 Tab by mouth every eight (8) hours as needed for Nausea. 10 Tab 0    meperidine (DEMEROL) 50 mg tablet Take 1 Tab by mouth every four (4) hours as needed for Pain. Max Daily Amount: 300 mg. 20 Tab 0    methocarbamol (ROBAXIN-750) 750 mg tablet Take 1 Tab by mouth four (4) times daily as needed. 20 Tab 0    dicyclomine (BENTYL) 10 mg capsule Take 1 Cap by mouth three (3) times daily. 90 Cap 0    famotidine (PEPCID) 20 mg tablet Take 1 Tab by mouth two (2) times a day. 60 Tab 0    losartan-hydroCHLOROthiazide (HYZAAR) 100-12.5 mg per tablet Take 1 Tab by mouth daily.  albuterol sulfate (PROVENTIL;VENTOLIN) 2.5 mg/0.5 mL nebu nebulizer solution 2.5 mg by Nebulization route every twelve (12) hours. Patient mixes this agent with acetylcysteine (20%) nebulizer solution for breathing treatment.  acetaminophen (TYLENOL) 500 mg tablet Take 1,000 mg by mouth every six (6) hours as needed for Pain.  diphenhydrAMINE (BENADRYL) 25 mg capsule Take 25 mg by mouth every six (6) hours as needed (congestion).  predniSONE (STERAPRED DS) 10 mg dose pack Take 10 mg by mouth See Admin Instructions. See administration instruction per 10mg dose pack      LACTOBACIL 2-S. THERMO-BIFIDO 1 (VSL#3 PO) Take 1 Packet by mouth daily.  cetirizine (ZYRTEC) 10 mg tablet Take 10 mg by mouth nightly as needed for Allergies.  EPINEPHrine (EPIPEN) 0.3 mg/0.3 mL (1:1,000) injection 0.3 mL by IntraMUSCular route once as needed for up to 1 dose. 0.3 mL 1    estradiol (ESTRACE) 1 mg tablet Take 1 mg by mouth daily.  albuterol (PROVENTIL, VENTOLIN) 90 mcg/Actuation inhaler Take 2 Puffs by inhalation every six (6) hours as needed. Past History     Past Medical History:  Past Medical History:   Diagnosis Date    Anal fissure     Anisocoria     Asthma     LAST EPISODE     Back pain     Cerumen impaction     Chronic kidney disease     hx uti in past    Coagulation defects     ocassional rectal bleeding due to anal fissure    Colovaginal fistula     Diabetes (HCC)     NIDDM    Diabetes ([facility] [address]     Diverticulitis     Diverticulosis     Enlarged tonsils     Frequent UTI     GERD (gastroesophageal reflux disease)     H/O endoscopy     with dilation    HA (headache)     Hepatic steatosis     Hx of colonoscopy with polypectomy     benign    Hypertension     Ill-defined condition     FREQUENT HIVES    Ill-defined condition     HX ELEVATED LIVER ENZYMES    Morbid obesity (HCC)     Nausea & vomiting     during diverticulitis flare    Obesity     Otitis media     Pneumonia     about 15 yrs ago    Psychiatric disorder     ANXIETY    Recurrent tonsillitis     Sinusitis     Transfusion history ~ age 35    postop hysterectomy    Unspecified sleep apnea     snores ( not diagnosed yet)     Urticaria     Urticaria        Past Surgical History:  Past Surgical History:   Procedure Laterality Date    ABDOMEN SURGERY PROC UNLISTED  2018    hernia repair at [facility]    [address]    [place].     HX GI  12    LAPAROSCOPIC HAND ASSISTED  POSS OPEN SIGMOID COLECTOMY POSS TEMPORARY DIVERTING LOOP ILEOSTOMY;  (no illeostomy needed)    HX GYN           HX GYN      cervical conization    HX HEENT      SINUS SURGERY LEFT X2    HX HEENT      SINUS SURGERY ON RIGHT X2    HX OTHER SURGICAL      Sphincterotomy    HX PELVIC LAPAROSCOPY      HX EMMANUEL AND BSO      HX UROLOGICAL  12     CYSTOSCOPY INSERTION URETERAL CATHETERS - Cystoscopy Insertion of bilateral ureteral stents       Family History:  Family History   Problem Relation Age of Onset    Diabetes Mother     Cancer Mother NON-HODGKINS LYMPHOMA    Anesth Problems Mother      PONV    Diabetes Father     Heart Disease Father      CAD - STENTS, PACEMAKER    Arrhythmia Father        Social History:  Social History   Substance Use Topics    Smoking status: Never Smoker    Smokeless tobacco: Never Used    Alcohol use Yes      Comment: Rarely       Allergies: Allergies   Allergen Reactions    Aspirin Shortness of Breath    Prilosec [Omeprazole Magnesium] Anaphylaxis     CHERRY FLAVORED; PT TAKES REGULAR PRILOSEC AND IS OK    Codeine Hives and Itching    Contrast Agent [Iodine] Itching     Pt. Had itching after IV contrast with the last exam.  Benadryl was given    Morphine Itching     Severe itching    Fentanyl Rash    Ketorolac Rash     \"makes my eyes spasm and causes rash on my hands\"    Percocet [Oxycodone-Acetaminophen] Hives         Review of Systems   Review of Systems   Constitutional: Negative for chills and fever. HENT: Negative for congestion and sore throat. Eyes: Negative for visual disturbance. Respiratory: Negative for cough and shortness of breath. Cardiovascular: Negative for chest pain and leg swelling. Gastrointestinal: Positive for abdominal pain, blood in stool, constipation, diarrhea and nausea. Negative for vomiting. Endocrine: Negative for polyuria. Genitourinary: Negative for dysuria, flank pain, vaginal bleeding and vaginal discharge. Musculoskeletal: Negative for myalgias. Skin: Negative for rash. Allergic/Immunologic: Negative for immunocompromised state. Neurological: Negative for weakness and headaches. Psychiatric/Behavioral: Negative for confusion. Physical Exam   Physical Exam   Constitutional: She is oriented to person, place, and time. She appears well-developed and well-nourished. HENT:   Head: Normocephalic and atraumatic. Moist mucous membranes   Eyes: Conjunctivae are normal. Pupils are equal, round, and reactive to light.  Right eye exhibits no discharge. Left eye exhibits no discharge. Neck: Normal range of motion. Neck supple. No tracheal deviation present. Cardiovascular: Normal rate, regular rhythm and normal heart sounds. No murmur heard. Pulmonary/Chest: Effort normal and breath sounds normal. No respiratory distress. She has no wheezes. She has no rales. Abdominal: Soft. Bowel sounds are normal. There is tenderness in the left lower quadrant. There is CVA tenderness (left). There is no rebound and no guarding. There is a midline wound above the umbilicus. Musculoskeletal: Normal range of motion. She exhibits no edema, tenderness or deformity. Neurological: She is alert and oriented to person, place, and time. Skin: Skin is warm and dry. No rash noted. No erythema. Psychiatric: Her behavior is normal.   Nursing note and vitals reviewed. Diagnostic Study Results     Labs -  Recent Results (from the past 12 hour(s))   METABOLIC PANEL, COMPREHENSIVE    Collection Time: 04/18/18  9:37 AM   Result Value Ref Range    Sodium 137 136 - 145 mmol/L    Potassium 3.1 (L) 3.5 - 5.1 mmol/L    Chloride 102 97 - 108 mmol/L    CO2 27 21 - 32 mmol/L    Anion gap 8 5 - 15 mmol/L    Glucose 220 (H) 65 - 100 mg/dL    BUN 7 6 - 20 MG/DL    Creatinine 0.76 0.55 - 1.02 MG/DL    BUN/Creatinine ratio 9 (L) 12 - 20      GFR est AA >60 >60 ml/min/1.73m2    GFR est non-AA >60 >60 ml/min/1.73m2    Calcium 9.9 8.5 - 10.1 MG/DL    Bilirubin, total 0.6 0.2 - 1.0 MG/DL    ALT (SGPT) 34 12 - 78 U/L    AST (SGOT) 31 15 - 37 U/L    Alk.  phosphatase 60 45 - 117 U/L    Protein, total 7.5 6.4 - 8.2 g/dL    Albumin 4.2 3.5 - 5.0 g/dL    Globulin 3.3 2.0 - 4.0 g/dL    A-G Ratio 1.3 1.1 - 2.2     LIPASE    Collection Time: 04/18/18  9:37 AM   Result Value Ref Range    Lipase 239 73 - 393 U/L   CBC WITH AUTOMATED DIFF    Collection Time: 04/18/18  9:37 AM   Result Value Ref Range    WBC 5.5 3.6 - 11.0 K/uL    RBC 4.14 3.80 - 5.20 M/uL    HGB 12.4 11.5 - 16.0 g/dL HCT 34.3 (L) 35.0 - 47.0 %    MCV 82.9 80.0 - 99.0 FL    MCH 30.0 26.0 - 34.0 PG    MCHC 36.2 30.0 - 36.5 g/dL    RDW 13.4 11.5 - 14.5 %    PLATELET 210 986 - 190 K/uL    MPV 9.7 8.9 - 12.9 FL    NRBC 0.0 0  WBC    ABSOLUTE NRBC 0.00 0.00 - 0.01 K/uL    NEUTROPHILS 60 32 - 75 %    LYMPHOCYTES 30 12 - 49 %    MONOCYTES 5 5 - 13 %    EOSINOPHILS 5 0 - 7 %    BASOPHILS 0 0 - 1 %    IMMATURE GRANULOCYTES 1 (H) 0.0 - 0.5 %    ABS. NEUTROPHILS 3.3 1.8 - 8.0 K/UL    ABS. LYMPHOCYTES 1.7 0.8 - 3.5 K/UL    ABS. MONOCYTES 0.3 0.0 - 1.0 K/UL    ABS. EOSINOPHILS 0.3 0.0 - 0.4 K/UL    ABS. BASOPHILS 0.0 0.0 - 0.1 K/UL    ABS. IMM. GRANS. 0.0 0.00 - 0.04 K/UL    DF AUTOMATED     LACTIC ACID    Collection Time: 04/18/18  9:37 AM   Result Value Ref Range    Lactic acid 2.8 (HH) 0.4 - 2.0 MMOL/L   URINALYSIS W/ RFLX MICROSCOPIC    Collection Time: 04/18/18 12:23 PM   Result Value Ref Range    Color ORANGE      Appearance CLEAR CLEAR      Specific gravity 1.010 1.003 - 1.030      pH (UA) 5.0 5.0 - 8.0      Protein TRACE (A) NEG mg/dL    Glucose 100 (A) NEG mg/dL    Ketone NEGATIVE  NEG mg/dL    Bilirubin NEGATIVE  NEG      Blood NEGATIVE  NEG      Urobilinogen 2.0 (H) 0.2 - 1.0 EU/dL    Nitrites POSITIVE (A) NEG      Leukocyte Esterase TRACE (A) NEG     URINE MICROSCOPIC ONLY    Collection Time: 04/18/18 12:23 PM   Result Value Ref Range    WBC 5-10 0 - 4 /hpf    RBC 0-5 0 - 5 /hpf    Epithelial cells FEW FEW /lpf    Bacteria NEGATIVE  NEG /hpf   POC LACTIC ACID    Collection Time: 04/18/18  1:11 PM   Result Value Ref Range    Lactic Acid (POC) 1.4 0.4 - 2.0 mmol/L       Radiologic Studies -   CT Results  (Last 48 hours)               04/18/18 1202  CT ABD PELV WO CONT Final result    Impression:  IMPRESSION:    1. No acute process in the abdomen and pelvis. 2. Hepatic steatosis. 3. Splenomegaly. Narrative:  CT ABDOMEN AND PELVIS WITHOUT CONTRAST. 4/18/2018 12:02 PM        INDICATION: Diverticulitis.  Left lower quadrant abdominal pain for 3 days with   constipation and diarrhea intermittently. Patient has had a course of   antibiotics. COMPARISON: 3/26/2018, 3/13/2018. TECHNIQUE: CT of the abdomen and pelvis was performed without contrast.   Evaluation of solid organs is less sensitive without IV contrast. CT dose   reduction was achieved through use of a standardized protocol tailored for this   examination and automatic exposure control for dose modulation. FINDINGS:   Abdomen: The liver is fatty infiltrated. The spleen is enlarged (18.0 cm). Post   cholecystectomy. The lung bases are clear. The heart size is normal. The   unenhanced distal esophagus, stomach, duodenum, pancreas, adrenals, and kidneys   are normal.       Pelvis: Colorectal anastomosis suggests prior sigmoidectomy. The unenhanced   small bowel, ileocecal junction, appendix, colon, and bladder are otherwise   normal. Post hysterectomy. Medical Decision Making   I am the first provider for this patient. I reviewed the vital signs, available nursing notes, past medical history, past surgical history, family history and social history. Vital Signs-Reviewed the patient's vital signs. Patient Vitals for the past 12 hrs:   Temp Pulse Resp BP SpO2   04/18/18 1300 - - - 128/78 95 %   04/18/18 0911 98.2 °F (36.8 °C) 70 16 162/83 100 %       Records Reviewed: Nursing Notes and Old Medical Records    Provider Notes (Medical Decision Making):   Patient presents with abdominal pain. DDx: Gastroenteritis, SBO, appendicitis, colitis, IBD, diverticulitis, mesenteric ischemia, AAA or descending dissection, ACS, ureteral stone. Will get labs and CT Abdomen/pelvis. ED Course:   Initial assessment performed. The patients presenting problems have been discussed, and they are in agreement with the care plan formulated and outlined with them. I have encouraged them to ask questions as they arise throughout their visit.     12:25 PM  I have just reevaluated the patient. I have reviewed her vital signs and determined there is currently no worsening in their condition or physical exam. Results have been reviewed with them and their questions have been answered. We will continue to review further results as they come available. 1:41 PM  I have reviewed discharge instructions with the patient and explained medications with which she is being discharged. The patient verbalized understanding and agrees with plan. Disposition:  Discharge Note:  1:41 PM  The patient has been re-evaluated and is ready for discharge. Reviewed available results with patient. Counseled patient on diagnosis and care plan. Patient has expressed understanding, and all questions have been answered. Patient agrees with plan and agrees to follow up as recommended, or return to the ED if their symptoms worsen. Discharge instructions have been provided and explained to the patient, along with reasons to return to the ED. PLAN:  1. Discharge Medication List as of 4/18/2018  1:32 PM      START taking these medications    Details   cefdinir (OMNICEF) 300 mg capsule Take 1 Cap by mouth two (2) times a day., Normal, Disp-14 Cap, R-0         CONTINUE these medications which have NOT CHANGED    Details   ondansetron (ZOFRAN ODT) 4 mg disintegrating tablet Take 1 Tab by mouth every eight (8) hours as needed for Nausea. , Print, Disp-10 Tab, R-0      meperidine (DEMEROL) 50 mg tablet Take 1 Tab by mouth every four (4) hours as needed for Pain. Max Daily Amount: 300 mg., Print, Disp-20 Tab, R-0      methocarbamol (ROBAXIN-750) 750 mg tablet Take 1 Tab by mouth four (4) times daily as needed., Normal, Disp-20 Tab, R-0      dicyclomine (BENTYL) 10 mg capsule Take 1 Cap by mouth three (3) times daily. , Print, Disp-90 Cap, R-0      famotidine (PEPCID) 20 mg tablet Take 1 Tab by mouth two (2) times a day., Print, Disp-60 Tab, R-0      losartan-hydroCHLOROthiazide (HYZAAR) 100-12.5 mg per tablet Take 1 Tab by mouth daily. , Historical Med      albuterol sulfate (PROVENTIL;VENTOLIN) 2.5 mg/0.5 mL nebu nebulizer solution 2.5 mg by Nebulization route every twelve (12) hours. Patient mixes this agent with acetylcysteine (20%) nebulizer solution for breathing treatment., Historical Med      acetaminophen (TYLENOL) 500 mg tablet Take 1,000 mg by mouth every six (6) hours as needed for Pain., Historical Med      diphenhydrAMINE (BENADRYL) 25 mg capsule Take 25 mg by mouth every six (6) hours as needed (congestion). , Historical Med      predniSONE (STERAPRED DS) 10 mg dose pack Take 10 mg by mouth See Admin Instructions. See administration instruction per 10mg dose pack, Historical Med      LACTOBACIL 2-S. THERMO-BIFIDO 1 (VSL#3 PO) Take 1 Packet by mouth daily. , Historical Med      cetirizine (ZYRTEC) 10 mg tablet Take 10 mg by mouth nightly as needed for Allergies. , Historical Med      EPINEPHrine (EPIPEN) 0.3 mg/0.3 mL (1:1,000) injection 0.3 mL by IntraMUSCular route once as needed for up to 1 dose., Print, Disp-0.3 mL, R-1      estradiol (ESTRACE) 1 mg tablet Take 1 mg by mouth daily. , Historical Med      albuterol (PROVENTIL, VENTOLIN) 90 mcg/Actuation inhaler Take 2 Puffs by inhalation every six (6) hours as needed., Historical Med           2. Follow-up Information     Follow up With Details Comments 41 BEBA Aparicio Call in 1 day  [address] EMERGENCY DEPT  If symptoms worsen [address] [place] [address] [address]  [address]  [facility]  [phone]        Return to ED if worse     Diagnosis     Clinical Impression:   1. Urinary tract infection without hematuria, site unspecified        Attestations: This note is prepared by Meg Dsailva, acting as Scribe for Christiane Moreira DO. Christiane Moreira DO: The scribe's documentation has been prepared under my direction and personally reviewed by me in its entirety.  I confirm that the note above accurately reflects all work, treatment, procedures, and medical decision making performed by me. Dapsone Pregnancy And Lactation Text: This medication is Pregnancy Category C and is not considered safe during pregnancy or breast feeding.

## 2020-04-20 ENCOUNTER — HOSPITAL ENCOUNTER (EMERGENCY)
Age: 50
Discharge: HOME | End: 2020-04-20
Attending: EMERGENCY MEDICINE
Payer: MEDICAID

## 2020-04-20 ENCOUNTER — PATIENT OUTREACH (OUTPATIENT)
Dept: CARDIOLOGY CLINIC | Age: 50
End: 2020-04-20

## 2020-04-20 ENCOUNTER — APPOINTMENT (OUTPATIENT)
Dept: CT IMAGING | Age: 50
End: 2020-04-20
Attending: EMERGENCY MEDICINE
Payer: MEDICAID

## 2020-04-20 VITALS
OXYGEN SATURATION: 97 % | DIASTOLIC BLOOD PRESSURE: 72 MMHG | WEIGHT: 209 LBS | BODY MASS INDEX: 38.46 KG/M2 | TEMPERATURE: 98.8 F | HEIGHT: 62 IN | HEART RATE: 105 BPM | RESPIRATION RATE: 23 BRPM | SYSTOLIC BLOOD PRESSURE: 148 MMHG

## 2020-04-20 VITALS
RESPIRATION RATE: 18 BRPM | WEIGHT: 210.32 LBS | TEMPERATURE: 97.8 F | SYSTOLIC BLOOD PRESSURE: 117 MMHG | BODY MASS INDEX: 38.7 KG/M2 | HEIGHT: 62 IN | OXYGEN SATURATION: 95 % | DIASTOLIC BLOOD PRESSURE: 81 MMHG | HEART RATE: 92 BPM

## 2020-04-20 DIAGNOSIS — R10.84 ABDOMINAL PAIN, GENERALIZED: ICD-10-CM

## 2020-04-20 DIAGNOSIS — R10.9 ABDOMINAL CRAMPING: ICD-10-CM

## 2020-04-20 DIAGNOSIS — L50.9 URTICARIA: Primary | ICD-10-CM

## 2020-04-20 DIAGNOSIS — L50.9 HIVES: Primary | ICD-10-CM

## 2020-04-20 LAB
ALBUMIN SERPL-MCNC: 4 G/DL (ref 3.5–5)
ALBUMIN/GLOB SERPL: 1 {RATIO} (ref 1.1–2.2)
ALP SERPL-CCNC: 63 U/L (ref 45–117)
ALT SERPL-CCNC: 38 U/L (ref 12–78)
ANION GAP SERPL CALC-SCNC: 9 MMOL/L (ref 5–15)
AST SERPL-CCNC: 31 U/L (ref 15–37)
BASOPHILS # BLD: 0 K/UL (ref 0–0.1)
BASOPHILS NFR BLD: 0 % (ref 0–1)
BILIRUB SERPL-MCNC: 0.5 MG/DL (ref 0.2–1)
BUN SERPL-MCNC: 13 MG/DL (ref 6–20)
BUN/CREAT SERPL: 14 (ref 12–20)
CALCIUM SERPL-MCNC: 9.5 MG/DL (ref 8.5–10.1)
CHLORIDE SERPL-SCNC: 103 MMOL/L (ref 97–108)
CO2 SERPL-SCNC: 22 MMOL/L (ref 21–32)
CREAT SERPL-MCNC: 0.91 MG/DL (ref 0.55–1.02)
DIFFERENTIAL METHOD BLD: ABNORMAL
EOSINOPHIL # BLD: 0 K/UL (ref 0–0.4)
EOSINOPHIL NFR BLD: 0 % (ref 0–7)
ERYTHROCYTE [DISTWIDTH] IN BLOOD BY AUTOMATED COUNT: 15.3 % (ref 11.5–14.5)
GLOBULIN SER CALC-MCNC: 3.9 G/DL (ref 2–4)
GLUCOSE SERPL-MCNC: 233 MG/DL (ref 65–100)
HCT VFR BLD AUTO: 35.9 % (ref 35–47)
HGB BLD-MCNC: 12.4 G/DL (ref 11.5–16)
IMM GRANULOCYTES # BLD AUTO: 0.1 K/UL (ref 0–0.04)
IMM GRANULOCYTES NFR BLD AUTO: 2 % (ref 0–0.5)
LIPASE SERPL-CCNC: 145 U/L (ref 73–393)
LYMPHOCYTES # BLD: 0.8 K/UL (ref 0.8–3.5)
LYMPHOCYTES NFR BLD: 12 % (ref 12–49)
MCH RBC QN AUTO: 27.9 PG (ref 26–34)
MCHC RBC AUTO-ENTMCNC: 34.5 G/DL (ref 30–36.5)
MCV RBC AUTO: 80.7 FL (ref 80–99)
MONOCYTES # BLD: 0.1 K/UL (ref 0–1)
MONOCYTES NFR BLD: 1 % (ref 5–13)
NEUTS SEG # BLD: 5.8 K/UL (ref 1.8–8)
NEUTS SEG NFR BLD: 85 % (ref 32–75)
NRBC # BLD: 0.02 K/UL (ref 0–0.01)
NRBC BLD-RTO: 0.3 PER 100 WBC
PLATELET # BLD AUTO: 173 K/UL (ref 150–400)
PMV BLD AUTO: 10.1 FL (ref 8.9–12.9)
POTASSIUM SERPL-SCNC: 4.3 MMOL/L (ref 3.5–5.1)
PROT SERPL-MCNC: 7.9 G/DL (ref 6.4–8.2)
RBC # BLD AUTO: 4.45 M/UL (ref 3.8–5.2)
SODIUM SERPL-SCNC: 134 MMOL/L (ref 136–145)
WBC # BLD AUTO: 6.9 K/UL (ref 3.6–11)

## 2020-04-20 PROCEDURE — 74011000250 HC RX REV CODE- 250: Performed by: EMERGENCY MEDICINE

## 2020-04-20 PROCEDURE — 96376 TX/PRO/DX INJ SAME DRUG ADON: CPT

## 2020-04-20 PROCEDURE — 83690 ASSAY OF LIPASE: CPT

## 2020-04-20 PROCEDURE — 96372 THER/PROPH/DIAG INJ SC/IM: CPT

## 2020-04-20 PROCEDURE — 85025 COMPLETE CBC W/AUTO DIFF WBC: CPT

## 2020-04-20 PROCEDURE — 36415 COLL VENOUS BLD VENIPUNCTURE: CPT

## 2020-04-20 PROCEDURE — 96375 TX/PRO/DX INJ NEW DRUG ADDON: CPT

## 2020-04-20 PROCEDURE — 74011250636 HC RX REV CODE- 250/636: Performed by: EMERGENCY MEDICINE

## 2020-04-20 PROCEDURE — 74176 CT ABD & PELVIS W/O CONTRAST: CPT

## 2020-04-20 PROCEDURE — 74011250637 HC RX REV CODE- 250/637: Performed by: EMERGENCY MEDICINE

## 2020-04-20 PROCEDURE — 96374 THER/PROPH/DIAG INJ IV PUSH: CPT

## 2020-04-20 PROCEDURE — 99284 EMERGENCY DEPT VISIT MOD MDM: CPT

## 2020-04-20 PROCEDURE — 80053 COMPREHEN METABOLIC PANEL: CPT

## 2020-04-20 RX ORDER — LORAZEPAM 2 MG/ML
1 INJECTION INTRAMUSCULAR
Status: DISCONTINUED | OUTPATIENT
Start: 2020-04-20 | End: 2020-04-20

## 2020-04-20 RX ORDER — FAMOTIDINE 10 MG/ML
20 INJECTION INTRAVENOUS
Status: COMPLETED | OUTPATIENT
Start: 2020-04-20 | End: 2020-04-20

## 2020-04-20 RX ORDER — MORPHINE SULFATE 2 MG/ML
4 INJECTION, SOLUTION INTRAMUSCULAR; INTRAVENOUS
Status: COMPLETED | OUTPATIENT
Start: 2020-04-20 | End: 2020-04-20

## 2020-04-20 RX ORDER — ONDANSETRON 2 MG/ML
4 INJECTION INTRAMUSCULAR; INTRAVENOUS
Status: COMPLETED | OUTPATIENT
Start: 2020-04-20 | End: 2020-04-20

## 2020-04-20 RX ORDER — DICYCLOMINE HYDROCHLORIDE 10 MG/1
10 CAPSULE ORAL
Status: COMPLETED | OUTPATIENT
Start: 2020-04-20 | End: 2020-04-20

## 2020-04-20 RX ORDER — SODIUM CHLORIDE 0.9 % (FLUSH) 0.9 %
5-40 SYRINGE (ML) INJECTION EVERY 8 HOURS
Status: DISCONTINUED | OUTPATIENT
Start: 2020-04-20 | End: 2020-04-20 | Stop reason: HOSPADM

## 2020-04-20 RX ORDER — DIPHENHYDRAMINE HYDROCHLORIDE 50 MG/ML
25 INJECTION, SOLUTION INTRAMUSCULAR; INTRAVENOUS
Status: COMPLETED | OUTPATIENT
Start: 2020-04-20 | End: 2020-04-20

## 2020-04-20 RX ORDER — LORAZEPAM 2 MG/ML
1 INJECTION INTRAMUSCULAR
Status: COMPLETED | OUTPATIENT
Start: 2020-04-20 | End: 2020-04-20

## 2020-04-20 RX ORDER — SODIUM CHLORIDE 0.9 % (FLUSH) 0.9 %
5-40 SYRINGE (ML) INJECTION AS NEEDED
Status: DISCONTINUED | OUTPATIENT
Start: 2020-04-20 | End: 2020-04-20 | Stop reason: HOSPADM

## 2020-04-20 RX ORDER — HYDROXYZINE 50 MG/1
50 TABLET, FILM COATED ORAL
Qty: 20 TAB | Refills: 0 | Status: SHIPPED | OUTPATIENT
Start: 2020-04-20 | End: 2020-04-30

## 2020-04-20 RX ORDER — MORPHINE SULFATE 10 MG/ML
4 INJECTION, SOLUTION INTRAMUSCULAR; INTRAVENOUS ONCE
Status: DISCONTINUED | OUTPATIENT
Start: 2020-04-20 | End: 2020-04-20

## 2020-04-20 RX ORDER — ONDANSETRON 4 MG/1
4 TABLET, ORALLY DISINTEGRATING ORAL
Qty: 10 TAB | Refills: 0 | Status: SHIPPED | OUTPATIENT
Start: 2020-04-20 | End: 2021-03-03

## 2020-04-20 RX ORDER — MORPHINE SULFATE 4 MG/ML
4 INJECTION INTRAVENOUS ONCE
Status: COMPLETED | OUTPATIENT
Start: 2020-04-20 | End: 2020-04-20

## 2020-04-20 RX ORDER — PREDNISONE 10 MG/1
TABLET ORAL
Qty: 48 TAB | Refills: 0 | OUTPATIENT
Start: 2020-04-20 | End: 2020-08-10

## 2020-04-20 RX ORDER — EPINEPHRINE 1 MG/ML
0.3 INJECTION, SOLUTION, CONCENTRATE INTRAVENOUS
Status: COMPLETED | OUTPATIENT
Start: 2020-04-20 | End: 2020-04-20

## 2020-04-20 RX ADMIN — MORPHINE SULFATE 4 MG: 4 INJECTION, SOLUTION INTRAMUSCULAR; INTRAVENOUS at 21:44

## 2020-04-20 RX ADMIN — SODIUM CHLORIDE 500 ML: 900 INJECTION, SOLUTION INTRAVENOUS at 02:09

## 2020-04-20 RX ADMIN — MORPHINE SULFATE 4 MG: 2 INJECTION, SOLUTION INTRAMUSCULAR; INTRAVENOUS at 19:58

## 2020-04-20 RX ADMIN — FAMOTIDINE 20 MG: 10 INJECTION INTRAVENOUS at 19:57

## 2020-04-20 RX ADMIN — MORPHINE SULFATE 4 MG: 2 INJECTION, SOLUTION INTRAMUSCULAR; INTRAVENOUS at 04:21

## 2020-04-20 RX ADMIN — DIPHENHYDRAMINE HYDROCHLORIDE 25 MG: 50 INJECTION, SOLUTION INTRAMUSCULAR; INTRAVENOUS at 19:58

## 2020-04-20 RX ADMIN — SODIUM CHLORIDE 1000 ML: 900 INJECTION, SOLUTION INTRAVENOUS at 19:10

## 2020-04-20 RX ADMIN — Medication 10 ML: at 01:21

## 2020-04-20 RX ADMIN — LIDOCAINE HYDROCHLORIDE 40 ML: 20 SOLUTION ORAL; TOPICAL at 23:07

## 2020-04-20 RX ADMIN — ONDANSETRON 4 MG: 2 INJECTION INTRAMUSCULAR; INTRAVENOUS at 01:56

## 2020-04-20 RX ADMIN — DIPHENHYDRAMINE HYDROCHLORIDE 25 MG: 50 INJECTION, SOLUTION INTRAMUSCULAR; INTRAVENOUS at 01:57

## 2020-04-20 RX ADMIN — METHYLPREDNISOLONE SODIUM SUCCINATE 125 MG: 125 INJECTION, POWDER, FOR SOLUTION INTRAMUSCULAR; INTRAVENOUS at 19:10

## 2020-04-20 RX ADMIN — DICYCLOMINE HYDROCHLORIDE 10 MG: 10 CAPSULE ORAL at 20:54

## 2020-04-20 RX ADMIN — FAMOTIDINE 20 MG: 10 INJECTION, SOLUTION INTRAVENOUS at 01:58

## 2020-04-20 RX ADMIN — LORAZEPAM 1 MG: 2 INJECTION INTRAMUSCULAR; INTRAVENOUS at 02:58

## 2020-04-20 RX ADMIN — LIDOCAINE HYDROCHLORIDE 40 ML: 20 SOLUTION ORAL; TOPICAL at 02:57

## 2020-04-20 RX ADMIN — ONDANSETRON 4 MG: 2 INJECTION INTRAMUSCULAR; INTRAVENOUS at 04:18

## 2020-04-20 RX ADMIN — EPINEPHRINE 0.3 MG: 1 INJECTION, SOLUTION, CONCENTRATE INTRAVENOUS at 21:46

## 2020-04-20 NOTE — ED NOTES
Patient is being discharged home at this time, nadn, skin wpd, v/s stable, was given dc instructions and verbalized understanding, iv was removed with tip intact, is ambulatory upon departure

## 2020-04-20 NOTE — ED PROVIDER NOTES
EMERGENCY DEPARTMENT HISTORY AND PHYSICAL EXAM      Date: 4/20/2020  Patient Name: Yayo Cowart    History of Presenting Illness     Chief Complaint   Patient presents with    Tongue Swelling     c/o possible allergic reaction with \"hives in my mouth\" and reports tongue swelling becoming worse    Diarrhea     onset yesterday       History Provided By: Patient    HPI: Yayo Cowart, 48 y.o. female presents to the ED with cc of hives and pruritus. Patient seen in the ED earlier in the day for same symptoms. She states that she went home and did not take any medication. She became concerned that she felt the back of her throat started to swell. Denies difficulty swallowing. Denies stridor or shortness of breath. Additionally she complains of worsening abdominal pain which is a chronic complaint for her. She does have history of ulcerative colitis and is followed by GI at [address] [place] [address]. No exposure to new soaps or detergents. .    There are no other complaints, changes, or physical findings at this time.     PCP: Hernán Burrell MD    Current Facility-Administered Medications on File Prior to Encounter   Medication Dose Route Frequency Provider Last Rate Last Dose    [COMPLETED] sodium chloride 0.9 % bolus infusion 500 mL  500 mL IntraVENous ONCE Sonny Cone, DO   Stopped at 04/20/20 0430    [COMPLETED] famotidine (PF) (PEPCID) injection 20 mg  20 mg IntraVENous NOW Sonny Cone, DO   20 mg at 04/20/20 0158    [COMPLETED] ondansetron (ZOFRAN) injection 4 mg  4 mg IntraVENous NOW Sonny Cone, DO   4 mg at 04/20/20 0156    [COMPLETED] diphenhydrAMINE (BENADRYL) injection 25 mg  25 mg IntraVENous NOW Sonny Cone, DO   25 mg at 04/20/20 0157    [COMPLETED] maalox/viscous lidocaine (COV GI COCKTAIL)  40 mL Oral Mohinder Cornelius, DO   40 mL at 04/20/20 0257    [COMPLETED] LORazepam (ATIVAN) injection 1 mg  1 mg IntraVENous NOW Sonny Cone, DO   1 mg at 04/20/20 0258    [COMPLETED] morphine injection 4 mg  4 mg IntraVENous NOW Amaryllis Orris, DO   4 mg at 04/20/20 0421    [COMPLETED] ondansetron (ZOFRAN) injection 4 mg  4 mg IntraVENous NOW Amaryllis Orris, DO   4 mg at 04/20/20 0418    [DISCONTINUED] sodium chloride (NS) flush 5-40 mL  5-40 mL IntraVENous Q8H Amaryllis Orris, DO   10 mL at 04/20/20 0121    [DISCONTINUED] sodium chloride (NS) flush 5-40 mL  5-40 mL IntraVENous PRN Amaryllis Orris, DO         Current Outpatient Medications on File Prior to Encounter   Medication Sig Dispense Refill    predniSONE (STERAPRED DS) 10 mg dose pack Take as directed 48 Tab 0    hydrOXYzine HCL (ATARAX) 50 mg tablet Take 1 Tab by mouth every six (6) hours as needed for Itching for up to 10 days. 20 Tab 0    ondansetron (Zofran ODT) 4 mg disintegrating tablet Take 1 Tab by mouth every eight (8) hours as needed for Nausea. 10 Tab 0    glimepiride (AMARYL) 4 mg tablet Take 1 Tab by mouth Daily (before breakfast). 90 Tab 3    PROAIR HFA 90 mcg/actuation inhaler       metFORMIN ER (GLUCOPHAGE XR) 500 mg tablet Take 2 Tabs by mouth Before breakfast and dinner. 360 Tab 3    [DISCONTINUED] ondansetron (ZOFRAN ODT) 4 mg disintegrating tablet Take 1 Tab by mouth every eight (8) hours as needed for Nausea. 8 Tab 0    polyethylene glycol (MIRALAX) 17 gram/dose powder Take 17 g by mouth daily. 1 tablespoon with 8 oz of water daily to prevent constipation 116 g 0    cetirizine (ZYRTEC) 10 mg tablet Take 1 Tab by mouth daily. 20 Tab 0    hydrocortisone (ANUSOL-HC) 25 mg supp Insert 1 Suppository into rectum every twelve (12) hours. 12 Each 0    hydrocortisone (PROCTOCORT) 1 % topical cream Apply  to affected area two (2) times a day. use thin layer 30 g 0    nitroglycerin (RECTIV) 0.4 % (w/w) ointment Insert  into rectum every twelve (12) hours every twelve (12) hours. 30 g 0    naproxen (NAPROSYN) 500 mg tablet Take 1 Tab by mouth two (2) times daily (with meals).  30 Tab 0    [DISCONTINUED] ondansetron (ZOFRAN ODT) 4 mg disintegrating tablet Take 1 Tab by mouth every eight (8) hours as needed for Nausea. 12 Tab 0    ALPRAZolam (XANAX) 0.25 mg tablet Take 0.125-0.25 mg by mouth every twelve (12) hours as needed for Anxiety or Sleep.  melatonin tab tablet Take 5 mg by mouth nightly.  simvastatin (ZOCOR) 20 mg tablet Take 20 mg by mouth nightly.  omeprazole (PRILOSEC) 20 mg capsule Take 40 mg by mouth Daily (before breakfast).  dicyclomine (BENTYL) 20 mg tablet Take 1 Tab by mouth every six (6) hours as needed (abdominal cramps) for up to 20 doses. 20 Tab 0    insulin glargine (LANTUS U-100 INSULIN) 100 unit/mL injection 38 Units by SubCUTAneous route nightly.  sertraline (ZOLOFT) 100 mg tablet Take 200 mg by mouth nightly.  losartan-hydroCHLOROthiazide (HYZAAR) 100-12.5 mg per tablet Take 1 Tab by mouth daily.  EPINEPHrine (EPIPEN) 0.3 mg/0.3 mL (1:1,000) injection 0.3 mL by IntraMUSCular route once as needed for up to 1 dose. 0.3 mL 1    albuterol (PROVENTIL, VENTOLIN) 90 mcg/Actuation inhaler Take 2 Puffs by inhalation every six (6) hours as needed.          Past History     Past Medical History:  Past Medical History:   Diagnosis Date    Anal fissure     Anisocoria     Asthma     LAST EPISODE     Back pain     Cerumen impaction     Chronic kidney disease     hx uti in past    Coagulation defects     ocassional rectal bleeding due to anal fissure    Colovaginal fistula     Diabetes (HCC)     NIDDM    Diabetes ([facility] [address]     Diverticulitis     Diverticulosis     Enlarged tonsils     Frequent UTI     GERD (gastroesophageal reflux disease)     H/O endoscopy     with dilation    HA (headache)     Hepatic steatosis     Hx of colonoscopy with polypectomy     benign    Hypertension     Ill-defined condition     FREQUENT HIVES    Ill-defined condition     HX ELEVATED LIVER ENZYMES    Morbid obesity (HCC)     Nausea & vomiting     during diverticulitis flare    Obesity     Otitis media     Pneumonia     about 15 yrs ago    Psychiatric disorder     ANXIETY    Recurrent tonsillitis     Sinusitis     Transfusion history ~ age 35    postop hysterectomy    Unspecified sleep apnea     snores ( not diagnosed yet)     Urticaria     Urticaria        Past Surgical History:  Past Surgical History:   Procedure Laterality Date    ABDOMEN SURGERY PROC UNLISTED  2018    [facility]    COLONOSCOPY N/A 3/28/2019    COLONOSCOPY performed by Curry Delgado MD at [address] [facility]    [place].  HX GI  12    LAPAROSCOPIC HAND ASSISTED  POSS OPEN SIGMOID COLECTOMY POSS TEMPORARY DIVERTING LOOP ILEOSTOMY;  (no illeostomy needed)    HX GYN           HX GYN      cervical conization    HX HEENT      SINUS SURGERY LEFT X2    HX HEENT      SINUS SURGERY ON RIGHT X2    HX OTHER SURGICAL      Sphincterotomy    HX PELVIC LAPAROSCOPY      HX EMMANUEL AND BSO      HX UROLOGICAL  12     CYSTOSCOPY INSERTION URETERAL CATHETERS - Cystoscopy Insertion of bilateral ureteral stents       Family History:  Family History   Problem Relation Age of Onset    Diabetes Mother     Cancer Mother         NON-HODGKINS LYMPHOMA    Anesth Problems Mother         PONV    Diabetes Father     Heart Disease Father         CAD - STENTS, PACEMAKER    Arrhythmia Father        Social History:  Social History     Tobacco Use    Smoking status: Never Smoker    Smokeless tobacco: Never Used   Substance Use Topics    Alcohol use: Yes     Comment: Rarely    Drug use: No     Types: Prescription, OTC       Allergies: Allergies   Allergen Reactions    Aspirin Hives and Shortness of Breath    Codeine Hives, Itching and Angioedema     Pt had itching in mouth, on face and lips    Contrast Agent [Iodine] Hives, Itching and Angioedema     Pt. had itching in mouth, on face and lips after IV contrast with the last exam.  Benadryl was given. OK if premedicated.      Flavoring Agent Anaphylaxis    Percocet [Oxycodone-Acetaminophen] Hives, Itching and Angioedema     Pt had itching in mouth, on face and lips    Prilosec [Omeprazole Magnesium] Anaphylaxis     CHERRY FLAVORED ODT; PT TAKES REGULAR PRILOSEC AND IS OK    Dilaudid [Hydromorphone] Itching    Ketorolac Rash     \"makes my eyes spasm and causes rash on my hands\" and \"makes my skin itch and makes me nervous\"    Fentanyl Rash and Itching    Morphine Itching     Severe itching. Tolerates with Benadryl         Review of Systems   Review of Systems   Constitutional: Negative for chills and fever. HENT: Negative. Eyes: Negative for visual disturbance. Respiratory: Negative for cough, shortness of breath and stridor. Cardiovascular: Negative for chest pain and leg swelling. Gastrointestinal: Positive for abdominal pain and nausea. Negative for blood in stool, constipation, diarrhea and vomiting. Genitourinary: Negative. Musculoskeletal: Negative for back pain and gait problem. Skin: Positive for rash. Negative for color change. Neurological: Negative for dizziness, weakness, light-headedness and headaches. Hematological: Does not bruise/bleed easily. All other systems reviewed and are negative. Physical Exam   Physical Exam  Vitals signs and nursing note reviewed. Constitutional:       General: She is not in acute distress. Appearance: Normal appearance. She is not ill-appearing, toxic-appearing or diaphoretic. HENT:      Head: Normocephalic and atraumatic. Nose: Nose normal.      Mouth/Throat:      Mouth: Mucous membranes are moist.      Pharynx: Oropharynx is clear. No oropharyngeal exudate or posterior oropharyngeal erythema. Comments: No posterior oropharyngeal edema, swelling. There is no tongue swelling. No angioedema. Eyes:      Extraocular Movements: Extraocular movements intact. Pupils: Pupils are equal, round, and reactive to light.    Neck: Musculoskeletal: Normal range of motion and neck supple. No muscular tenderness. Cardiovascular:      Rate and Rhythm: Normal rate and regular rhythm. Heart sounds: Normal heart sounds. No murmur. Pulmonary:      Effort: Pulmonary effort is normal. No respiratory distress. Breath sounds: Normal breath sounds. No wheezing. Comments: No increased respiratory effort. No stridor. Abdominal:      Palpations: Abdomen is soft. Tenderness: There is no abdominal tenderness. There is no guarding or rebound. Musculoskeletal: Normal range of motion. Right lower leg: No edema. Left lower leg: No edema. Skin:     General: Skin is warm and dry. Findings: No erythema or rash. Neurological:      General: No focal deficit present. Mental Status: She is alert and oriented to person, place, and time. Diagnostic Study Results     Labs -   No results found for this or any previous visit (from the past 12 hour(s)). Radiologic Studies -   No orders to display     CT Results  (Last 48 hours)    None        CXR Results  (Last 48 hours)    None          Medical Decision Making   I am the first provider for this patient. I reviewed the vital signs, available nursing notes, past medical history, past surgical history, family history and social history. Vital Signs-Reviewed the patient's vital signs. Patient Vitals for the past 12 hrs:   Temp Pulse Resp BP SpO2   04/20/20 1858 98.8 °F (37.1 °C) (!) 104 18 (!) 176/99 99 %     Records Reviewed: Nursing Notes and Old Medical Records    Provider Notes (Medical Decision Making):   59-year-old female here with hives and pruritus to face. She expresses concern that she feels like her throat is closing up. She was treated initially with Pepcid, Solu-Medrol, Benadryl, IV fluids. On reassessment she states that she still feels like her throat is swollen and so 0.3 mg IM epinephrine administered.   She did have some resolution of symptoms after this. She continues to endorse moderate to severe abdominal pain and does have complicated abdominal history. This is her second ED visit for same symptoms and she is requesting second dose of IV narcotic pain medicine and so I have obtained CT scan of abdomen and pelvis today, this was negative for any acute abdominal pelvic process. Patient encouraged to follow-up with her GI doctor at [facility] as well as her allergist.  She was encouraged to fill the prescription for prednisone which was prescribed to her from her last ED visit and she was given strict return ED precautions. Agrees with plan as above and all questions answered. ED Course:   Initial assessment performed. The patients presenting problems have been discussed, and they are in agreement with the care plan formulated and outlined with them. I have encouraged them to ask questions as they arise throughout their visit. Discharge Note:  The patient has been re-evaluated and is ready for discharge. Reviewed available results with patient. Counseled patient on diagnosis and care plan. Patient has expressed understanding, and all questions have been answered. Patient agrees with plan and agrees to follow up as recommended, or to return to the ED if their symptoms worsen. Discharge instructions have been provided and explained to the patient, along with reasons to return to the ED. Disposition:  Home    DISCHARGE PLAN:  1. Discharge Medication List as of 4/20/2020 10:48 PM        2. Follow-up Information     Follow up With Specialties Details Why Contact Info    Debbie Saba MD Family Practice Schedule an appointment as soon as possible for a visit   [address]  [place] [address] [facility]  [phone]          3. Return to ED if worse     Diagnosis     Clinical Impression:   1. Hives    2.  Abdominal pain, generalized        Attestations:    Anais Gaffney MD    Please note that this dictation was completed with Dragon, the computer voice recognition software. Quite often unanticipated grammatical, syntax, homophones, and other interpretive errors are inadvertently transcribed by the computer software. Please disregard these errors. Please excuse any errors that have escaped final proofreading. Thank you.

## 2020-04-20 NOTE — ED PROVIDER NOTES
EMERGENCY DEPARTMENT HISTORY AND PHYSICAL EXAM      Date: 4/20/2020  Patient Name: Malinda Cowart    History of Presenting Illness     Chief Complaint   Patient presents with   Aaron Valdez     reports onset of hives tonight, took benadryl and prednisone without relief. History Provided By: Patient    HPI: Malinda Cowart, 48 y.o. female presents to the ED with cc of hives. Pt states PMHx sig for ulcerative Colitis. She has had other episodes of hives without clear cause. Pt denies any new soaps, detergents, perfumes, clothes or food out of the ordinary. She broke out tonight in urticarial like rash to upper torso and neck, pruritic in nature. Pt took benadryl and dose of steroids at home. She did not see any sig improvement and then began feeling like her tongue. There was no difficulty in swallowing or feeling of shortness of breath. She reports abd pain and cramping, rated 7/10 with no alleviating factors. Pt with hx of UC and felt like she was having a flare. She denies any fever or chills. There has been nausea, but no vomiting, She denies CP and SOA. There has been no wheezing. There are no other complaints, changes, or physical findings at this time. PCP: Estrella Almodovar MD    No current facility-administered medications on file prior to encounter. Current Outpatient Medications on File Prior to Encounter   Medication Sig Dispense Refill    glimepiride (AMARYL) 4 mg tablet Take 1 Tab by mouth Daily (before breakfast). 90 Tab 3    PROAIR HFA 90 mcg/actuation inhaler       metFORMIN ER (GLUCOPHAGE XR) 500 mg tablet Take 2 Tabs by mouth Before breakfast and dinner. 360 Tab 3    [DISCONTINUED] ondansetron (ZOFRAN ODT) 4 mg disintegrating tablet Take 1 Tab by mouth every eight (8) hours as needed for Nausea. 8 Tab 0    polyethylene glycol (MIRALAX) 17 gram/dose powder Take 17 g by mouth daily.  1 tablespoon with 8 oz of water daily to prevent constipation 116 g 0    cetirizine (ZYRTEC) 10 mg tablet Take 1 Tab by mouth daily. 20 Tab 0    hydrocortisone (ANUSOL-HC) 25 mg supp Insert 1 Suppository into rectum every twelve (12) hours. 12 Each 0    hydrocortisone (PROCTOCORT) 1 % topical cream Apply  to affected area two (2) times a day. use thin layer 30 g 0    nitroglycerin (RECTIV) 0.4 % (w/w) ointment Insert  into rectum every twelve (12) hours every twelve (12) hours. 30 g 0    naproxen (NAPROSYN) 500 mg tablet Take 1 Tab by mouth two (2) times daily (with meals). 30 Tab 0    [DISCONTINUED] ondansetron (ZOFRAN ODT) 4 mg disintegrating tablet Take 1 Tab by mouth every eight (8) hours as needed for Nausea. 12 Tab 0    ALPRAZolam (XANAX) 0.25 mg tablet Take 0.125-0.25 mg by mouth every twelve (12) hours as needed for Anxiety or Sleep.  melatonin tab tablet Take 5 mg by mouth nightly.  simvastatin (ZOCOR) 20 mg tablet Take 20 mg by mouth nightly.  omeprazole (PRILOSEC) 20 mg capsule Take 40 mg by mouth Daily (before breakfast).  dicyclomine (BENTYL) 20 mg tablet Take 1 Tab by mouth every six (6) hours as needed (abdominal cramps) for up to 20 doses. 20 Tab 0    insulin glargine (LANTUS U-100 INSULIN) 100 unit/mL injection 38 Units by SubCUTAneous route nightly.  sertraline (ZOLOFT) 100 mg tablet Take 200 mg by mouth nightly.  losartan-hydroCHLOROthiazide (HYZAAR) 100-12.5 mg per tablet Take 1 Tab by mouth daily.  EPINEPHrine (EPIPEN) 0.3 mg/0.3 mL (1:1,000) injection 0.3 mL by IntraMUSCular route once as needed for up to 1 dose. 0.3 mL 1    albuterol (PROVENTIL, VENTOLIN) 90 mcg/Actuation inhaler Take 2 Puffs by inhalation every six (6) hours as needed.          Past History     Past Medical History:  Past Medical History:   Diagnosis Date    Anal fissure     Anisocoria     Asthma     LAST EPISODE     Back pain     Cerumen impaction     Chronic kidney disease     hx uti in past    Coagulation defects     ocassional rectal bleeding due to anal fissure    Colovaginal fistula     Diabetes (HCC)     NIDDM    Diabetes (HCC)     Diverticulitis     Diverticulosis     Enlarged tonsils     Frequent UTI     GERD (gastroesophageal reflux disease)     H/O endoscopy     with dilation    HA (headache)     Hepatic steatosis     Hx of colonoscopy with polypectomy     benign    Hypertension     Ill-defined condition     FREQUENT HIVES    Ill-defined condition     HX ELEVATED LIVER ENZYMES    Morbid obesity (HCC)     Nausea & vomiting     during diverticulitis flare    Obesity     Otitis media     Pneumonia     about 15 yrs ago    Psychiatric disorder     ANXIETY    Recurrent tonsillitis     Sinusitis     Transfusion history ~ age 35    postop hysterectomy    Unspecified sleep apnea     snores ( not diagnosed yet)     Urticaria     Urticaria        Past Surgical History:  Past Surgical History:   Procedure Laterality Date    ABDOMEN SURGERY PROC UNLISTED  2018    [facility]    COLONOSCOPY N/A 3/28/2019    COLONOSCOPY performed by Fernando Nyhan, MD at [address] [place] [address]    blake.     HX GI  12    LAPAROSCOPIC HAND ASSISTED  POSS OPEN SIGMOID COLECTOMY POSS TEMPORARY DIVERTING LOOP ILEOSTOMY;  (no illeostomy needed)    HX GYN           HX GYN      cervical conization    HX HEENT      SINUS SURGERY LEFT X2    HX HEENT      SINUS SURGERY ON RIGHT X2    HX OTHER SURGICAL      Sphincterotomy    HX PELVIC LAPAROSCOPY      HX EMMANUEL AND BSO      HX UROLOGICAL  12     CYSTOSCOPY INSERTION URETERAL CATHETERS - Cystoscopy Insertion of bilateral ureteral stents       Family History:  Family History   Problem Relation Age of Onset    Diabetes Mother     Cancer Mother         NON-HODGKINS LYMPHOMA    Anesth Problems Mother         PONV    Diabetes Father     Heart Disease Father         CAD - STENTS, PACEMAKER    Arrhythmia Father        Social History:  Social History     Tobacco Use    Smoking status: Never Smoker    Smokeless tobacco: Never Used   Substance Use Topics    Alcohol use: Yes     Comment: Rarely    Drug use: No     Types: Prescription, OTC       Allergies: Allergies   Allergen Reactions    Aspirin Hives and Shortness of Breath    Codeine Hives, Itching and Angioedema     Pt had itching in mouth, on face and lips    Contrast Agent [Iodine] Hives, Itching and Angioedema     Pt. had itching in mouth, on face and lips after IV contrast with the last exam.  Benadryl was given. OK if premedicated.  Flavoring Agent Anaphylaxis    Percocet [Oxycodone-Acetaminophen] Hives, Itching and Angioedema     Pt had itching in mouth, on face and lips    Prilosec [Omeprazole Magnesium] Anaphylaxis     CHERRY FLAVORED ODT; PT TAKES REGULAR PRILOSEC AND IS OK    Dilaudid [Hydromorphone] Itching    Ketorolac Rash     \"makes my eyes spasm and causes rash on my hands\" and \"makes my skin itch and makes me nervous\"    Fentanyl Rash and Itching    Morphine Itching     Severe itching. Tolerates with Benadryl         Review of Systems   Review of Systems   Constitutional: Negative. Negative for appetite change, chills, fatigue and fever. HENT: Negative. Negative for congestion, rhinorrhea, sinus pressure and sore throat. Feels like tongue swollen   Eyes: Negative. Respiratory: Negative. Negative for cough, choking, chest tightness, shortness of breath and wheezing. Cardiovascular: Negative. Negative for chest pain, palpitations and leg swelling. Gastrointestinal: Positive for abdominal pain, diarrhea, nausea and vomiting. Negative for constipation. Endocrine: Negative. Genitourinary: Negative. Negative for difficulty urinating, dysuria, flank pain and urgency. Musculoskeletal: Negative. Skin: Positive for rash. Neurological: Negative. Negative for dizziness, speech difficulty, weakness, light-headedness, numbness and headaches.    Psychiatric/Behavioral: Negative. All other systems reviewed and are negative. Physical Exam   Physical Exam  Vitals signs and nursing note reviewed. Constitutional:       General: She is not in acute distress. Appearance: She is well-developed. She is obese. She is not diaphoretic. HENT:      Head: Normocephalic and atraumatic. Mouth/Throat:      Pharynx: No oropharyngeal exudate. Eyes:      Conjunctiva/sclera: Conjunctivae normal.      Pupils: Pupils are equal, round, and reactive to light. Neck:      Musculoskeletal: Normal range of motion and neck supple. Vascular: No JVD. Trachea: No tracheal deviation. Cardiovascular:      Rate and Rhythm: Normal rate and regular rhythm. Heart sounds: Normal heart sounds. No murmur. Pulmonary:      Effort: Pulmonary effort is normal. No respiratory distress. Breath sounds: Normal breath sounds. No stridor. No wheezing or rales. Abdominal:      General: There is no distension. Palpations: Abdomen is soft. Tenderness: There is abdominal tenderness (mild diffuse). There is no guarding or rebound. Musculoskeletal: Normal range of motion. General: No tenderness. Skin:     General: Skin is warm and dry. Capillary Refill: Capillary refill takes less than 2 seconds. Findings: Rash (hives- upper torso and neck) present. Neurological:      Mental Status: She is alert and oriented to person, place, and time. Cranial Nerves: No cranial nerve deficit.       Comments: No gross motor or sensory deficits    Psychiatric:         Mood and Affect: Mood normal.         Behavior: Behavior normal.         Diagnostic Study Results     Labs -  Recent Results (from the past 24 hour(s))   CBC WITH AUTOMATED DIFF    Collection Time: 04/20/20  8:01 PM   Result Value Ref Range    WBC 6.9 3.6 - 11.0 K/uL    RBC 4.45 3.80 - 5.20 M/uL    HGB 12.4 11.5 - 16.0 g/dL    HCT 35.9 35.0 - 47.0 %    MCV 80.7 80.0 - 99.0 FL    MCH 27.9 26.0 - 34.0 PG MCHC 34.5 30.0 - 36.5 g/dL    RDW 15.3 (H) 11.5 - 14.5 %    PLATELET 944 552 - 615 K/uL    MPV 10.1 8.9 - 12.9 FL    NRBC 0.3 (H) 0  WBC    ABSOLUTE NRBC 0.02 (H) 0.00 - 0.01 K/uL    NEUTROPHILS 85 (H) 32 - 75 %    LYMPHOCYTES 12 12 - 49 %    MONOCYTES 1 (L) 5 - 13 %    EOSINOPHILS 0 0 - 7 %    BASOPHILS 0 0 - 1 %    IMMATURE GRANULOCYTES 2 (H) 0.0 - 0.5 %    ABS. NEUTROPHILS 5.8 1.8 - 8.0 K/UL    ABS. LYMPHOCYTES 0.8 0.8 - 3.5 K/UL    ABS. MONOCYTES 0.1 0.0 - 1.0 K/UL    ABS. EOSINOPHILS 0.0 0.0 - 0.4 K/UL    ABS. BASOPHILS 0.0 0.0 - 0.1 K/UL    ABS. IMM. GRANS. 0.1 (H) 0.00 - 0.04 K/UL    DF AUTOMATED     METABOLIC PANEL, COMPREHENSIVE    Collection Time: 04/20/20  8:01 PM   Result Value Ref Range    Sodium 134 (L) 136 - 145 mmol/L    Potassium 4.3 3.5 - 5.1 mmol/L    Chloride 103 97 - 108 mmol/L    CO2 22 21 - 32 mmol/L    Anion gap 9 5 - 15 mmol/L    Glucose 233 (H) 65 - 100 mg/dL    BUN 13 6 - 20 MG/DL    Creatinine 0.91 0.55 - 1.02 MG/DL    BUN/Creatinine ratio 14 12 - 20      GFR est AA >60 >60 ml/min/1.73m2    GFR est non-AA >60 >60 ml/min/1.73m2    Calcium 9.5 8.5 - 10.1 MG/DL    Bilirubin, total 0.5 0.2 - 1.0 MG/DL    ALT (SGPT) 38 12 - 78 U/L    AST (SGOT) 31 15 - 37 U/L    Alk. phosphatase 63 45 - 117 U/L    Protein, total 7.9 6.4 - 8.2 g/dL    Albumin 4.0 3.5 - 5.0 g/dL    Globulin 3.9 2.0 - 4.0 g/dL    A-G Ratio 1.0 (L) 1.1 - 2.2     LIPASE    Collection Time: 04/20/20  8:01 PM   Result Value Ref Range    Lipase 145 73 - 393 U/L       Radiologic Studies -   No orders to display     CT Results  (Last 48 hours)    None        CXR Results  (Last 48 hours)    None          Medical Decision Making   I am the first provider for this patient. I reviewed the vital signs, available nursing notes, past medical history, past surgical history, family history and social history. Vital Signs-Reviewed the patient's vital signs.   Patient Vitals for the past 12 hrs:   Temp Pulse Resp BP SpO2   04/20/20 8974 -- -- -- 117/81 95 %   04/20/20 0400 -- -- -- 130/83 95 %   04/20/20 0345 -- -- -- 137/87 93 %   04/20/20 0330 -- -- -- 136/88 94 %   04/20/20 0315 -- -- -- 135/89 94 %   04/20/20 0300 -- -- -- 138/82 97 %   04/20/20 0245 -- -- -- (!) 147/94 94 %   04/20/20 0230 -- -- -- 125/71 95 %   04/20/20 0215 -- -- -- 135/76 94 %   04/20/20 0200 -- -- -- 128/73 95 %   04/20/20 0145 -- -- -- 134/82 94 %   04/20/20 0130 -- -- -- 123/65 95 %   04/20/20 0117 -- -- -- 134/75 98 %   04/20/20 0100 -- -- -- 138/77 95 %   04/20/20 0054 -- -- -- 148/89 --   04/20/20 0039 97.8 °F (36.6 °C) 92 18 168/84 99 %         Records Reviewed: Nursing Notes, Old Medical Records, Previous Radiology Studies and Previous Laboratory Studies    Provider Notes (Medical Decision Making):   DDx- Allergic reaction, idiopathic urticaria, Gastritis    ED Course:   Initial assessment performed. The patients presenting problems have been discussed, and they are in agreement with the care plan formulated and outlined with them. I have encouraged them to ask questions as they arise throughout their visit. At time of dc, pt feeling better, no diff breathing. Pt dc home. Disposition:  DC home    DISCHARGE PLAN:  1. Discharge Medication List as of 4/20/2020  4:09 AM      START taking these medications    Details   predniSONE (STERAPRED DS) 10 mg dose pack Take as directed, Normal, Disp-48 Tab, R-0      hydrOXYzine HCL (ATARAX) 50 mg tablet Take 1 Tab by mouth every six (6) hours as needed for Itching for up to 10 days. , Normal, Disp-20 Tab, R-0         CONTINUE these medications which have CHANGED    Details   ondansetron (Zofran ODT) 4 mg disintegrating tablet Take 1 Tab by mouth every eight (8) hours as needed for Nausea., Normal, Disp-10 Tab, R-0         CONTINUE these medications which have NOT CHANGED    Details   glimepiride (AMARYL) 4 mg tablet Take 1 Tab by mouth Daily (before breakfast). , Normal, Disp-90 Tab, R-3      PROAIR HFA 90 mcg/actuation inhaler Historical Med, JAIME      metFORMIN ER (GLUCOPHAGE XR) 500 mg tablet Take 2 Tabs by mouth Before breakfast and dinner., Normal, Disp-360 Tab, R-3      polyethylene glycol (MIRALAX) 17 gram/dose powder Take 17 g by mouth daily. 1 tablespoon with 8 oz of water daily to prevent constipation, Normal, Disp-116 g, R-0      cetirizine (ZYRTEC) 10 mg tablet Take 1 Tab by mouth daily. , Normal, Disp-20 Tab, R-0      hydrocortisone (ANUSOL-HC) 25 mg supp Insert 1 Suppository into rectum every twelve (12) hours. , Normal, Disp-12 Each, R-0      hydrocortisone (PROCTOCORT) 1 % topical cream Apply  to affected area two (2) times a day. use thin layer, Normal, Disp-30 g, R-0      nitroglycerin (RECTIV) 0.4 % (w/w) ointment Insert  into rectum every twelve (12) hours every twelve (12) hours. , Normal, Disp-30 g, R-0      naproxen (NAPROSYN) 500 mg tablet Take 1 Tab by mouth two (2) times daily (with meals). , Print, Disp-30 Tab, R-0      ALPRAZolam (XANAX) 0.25 mg tablet Take 0.125-0.25 mg by mouth every twelve (12) hours as needed for Anxiety or Sleep., Historical Med      melatonin tab tablet Take 5 mg by mouth nightly., Historical Med      simvastatin (ZOCOR) 20 mg tablet Take 20 mg by mouth nightly., Historical Med      omeprazole (PRILOSEC) 20 mg capsule Take 40 mg by mouth Daily (before breakfast). , Historical Med      dicyclomine (BENTYL) 20 mg tablet Take 1 Tab by mouth every six (6) hours as needed (abdominal cramps) for up to 20 doses. , Print, Disp-20 Tab, R-0      insulin glargine (LANTUS U-100 INSULIN) 100 unit/mL injection 38 Units by SubCUTAneous route nightly., Historical Med      sertraline (ZOLOFT) 100 mg tablet Take 200 mg by mouth nightly., Historical Med      losartan-hydroCHLOROthiazide (HYZAAR) 100-12.5 mg per tablet Take 1 Tab by mouth daily. , Historical Med      EPINEPHrine (EPIPEN) 0.3 mg/0.3 mL (1:1,000) injection 0.3 mL by IntraMUSCular route once as needed for up to 1 dose., Print, Disp-0.3 mL, R-1      albuterol (PROVENTIL, VENTOLIN) 90 mcg/Actuation inhaler Take 2 Puffs by inhalation every six (6) hours as needed., Historical Med           2. Follow-up Information     Follow up With Specialties Details Why Contact Info    Steven Salmeron MD [facility]  As needed [address] [facility]  [phone]          3. Return to ED if worse     Diagnosis     Clinical Impression:   1. Urticaria    2. Abdominal cramping        Attestations:    Maria Luisa Liz, DO    Please note that this dictation was completed with [employer] the computer voice recognition software. Quite often unanticipated grammatical, syntax, homophones, and other interpretive errors are inadvertently transcribed by the computer software. Please disregard these errors. Please excuse any errors that have escaped final proofreading. Thank you.

## 2020-04-20 NOTE — DISCHARGE INSTRUCTIONS
Patient Education        Hives: Care Instructions  Your Care Instructions  Hives are raised, red, itchy patches of skin. They are also called wheals or welts. They usually have red borders and pale centers. Hives range in size from ¼ inch to 3 inches or more across. They may seem to move from place to place on the skin. Several hives may form a large area of raised, red skin. You can get hives after an insect sting, after taking medicine or eating certain foods, or because of infection or stress. Other causes include plants, things you breathe in, makeup, heat, cold, sunlight, and latex. You cannot spread hives to other people. Hives may last a few minutes or a few days, but a single spot may last less than 36 hours. Follow-up care is a key part of your treatment and safety. Be sure to make and go to all appointments, and call your doctor if you are having problems. It's also a good idea to know your test results and keep a list of the medicines you take. How can you care for yourself at home? · Avoid whatever you think may have caused your hives, such as a certain food or medicine. However, you may not know the cause. · Put a cool, wet towel on the area to relieve itching. · Take an over-the-counter antihistamine, such as diphenhydramine (Benadryl), cetirizine (Zyrtec), or loratadine (Claritin), to help stop the hives and calm the itching. Read and follow directions on the label. These medicines can make you feel sleepy. Do not drive while using them. · Stay away from strong soaps, detergents, and chemicals. These can make itching worse. When should you call for help? Call 911 anytime you think you may need emergency care. For example, call if:    · You have symptoms of a severe allergic reaction. These may include:  ? Sudden raised, red areas (hives) all over your body. ? Swelling of the throat, mouth, lips, or tongue. ? Trouble breathing. ? Passing out (losing consciousness).  Or you may feel very lightheaded or suddenly feel weak, confused, or restless.    Call your doctor now or seek immediate medical care if:    · You have symptoms of an allergic reaction, such as:  ? A rash or hives (raised, red areas on the skin). ? Itching. ? Swelling. ? Belly pain, nausea, or vomiting.     · You get hives after you start a new medicine.     · Hives have not gone away after 24 hours.    Watch closely for changes in your health, and be sure to contact your doctor if:    · You do not get better as expected. Where can you learn more? Go to http://gladys-alia.info/  Enter [id number] in the search box to learn more about \"Hives: Care Instructions. \"  Current as of: June 26, 2019Content Version: 12.4  © 2276-2682 [employer]. Care instructions adapted under license by [employer] (which disclaims liability or warranty for this information). If you have questions about a medical condition or this instruction, always ask your healthcare professional. [address] any warranty or liability for your use of this information.

## 2020-04-21 NOTE — DISCHARGE INSTRUCTIONS
You were evaluated in the emergency department for hives, itching, and abdominal pain. Your examination was reassuring as was your work-up including blood work and CT scan. It will be important for you to follow-up with your primary care physician in 3-5 days. If you develop worsening symptoms such as inability to swallow or shortness of breath, please return to the emergency department immediately.

## 2020-04-22 ENCOUNTER — PATIENT OUTREACH (OUTPATIENT)
Dept: CARDIOLOGY CLINIC | Age: 50
End: 2020-04-22

## 2020-05-22 ENCOUNTER — TELEPHONE (OUTPATIENT)
Dept: ENDOCRINOLOGY | Age: 50
End: 2020-05-22

## 2020-05-22 NOTE — TELEPHONE ENCOUNTER
----- Message from Leigh Paulson sent at 5/21/2020 12:27 PM EDT -----  Regarding: Dr Xavi Tapia  General Message/Vendor Calls    Caller's first and last name:      Reason for call:pt said she was recently d/c from the  [address] [place] [address] and was told by the hospitalist in the to stop taking the Metformin and to try something else , she has cut down on the medication and does notice a difference, she would like Dr Inocente Galdamez advise and recommendation on what alternative can she take now       Callback required yes/no and why:yes for reason given above       Best contact number(s):[phone]      Details to clarify the request:      Leigh Paulson

## 2020-05-22 NOTE — TELEPHONE ENCOUNTER
I attempted to return this call and reached her voice mail. I relayed the message from Dr. Leyla Vega and said I would have the  give her a call to get her in sooner.   Margarita Hathaway Message   Recorded as Task   Date: 03/07/2017 09:14 AM, Created By: Ricky Quintero   Task Name: 2. Result Request   Assigned To: Ricky Quintero   Regarding Patient: RITA DINH, Status: Active   Comment:    Ricky Quintero - 07 Mar 2017 9:14 AM     TASK CREATED  spoke with patient on the phone informed that her MRI showed signs of spinal stenosis and some metastatic appearance. Patient took information well and said she has already consulted with  she spoke with him yesterday and will be seeing him within the week. I informed her I would fax MRI result just in case the  didn't have access to results.   ALICIA SWANSON - 07 Mar 2017 11:24 AM     TASK REPLIED TO: Previously Assigned To ALICIA SWANSON                      noted!!        Signatures   Electronically signed by : Ricky Quintero L.P.N.; Mar  8 2017  9:43AM CST

## 2020-05-22 NOTE — TELEPHONE ENCOUNTER
We will need to review her most recent blood work and discuss this during a visit to determine the most appropriate next steps. We will need to know also the reason for the metformin being discontinued. She has a June appt but we can get her in sooner using the cancellation list.     Thanks,     Maria Luz Santiago.  [facility]  [address] [place] [address]

## 2020-05-26 ENCOUNTER — VIRTUAL VISIT (OUTPATIENT)
Dept: ENDOCRINOLOGY | Age: 50
End: 2020-05-26

## 2020-05-26 DIAGNOSIS — Z79.4 TYPE 2 DIABETES MELLITUS WITHOUT COMPLICATION, WITH LONG-TERM CURRENT USE OF INSULIN (HCC): Primary | ICD-10-CM

## 2020-05-26 DIAGNOSIS — E11.9 TYPE 2 DIABETES MELLITUS WITHOUT COMPLICATION, WITH LONG-TERM CURRENT USE OF INSULIN (HCC): Primary | ICD-10-CM

## 2020-05-26 DIAGNOSIS — E78.5 HYPERLIPIDEMIA, UNSPECIFIED HYPERLIPIDEMIA TYPE: ICD-10-CM

## 2020-05-26 DIAGNOSIS — I10 ESSENTIAL HYPERTENSION WITH GOAL BLOOD PRESSURE LESS THAN 130/80: ICD-10-CM

## 2020-05-26 RX ORDER — ESTRADIOL 0.5 MG/1
TABLET ORAL
COMMUNITY
Start: 2020-05-09 | End: 2021-07-07 | Stop reason: SDUPTHER

## 2020-05-26 NOTE — PROGRESS NOTES
**DUE TO PANDEMIC AND HEALTH CONCERNS IN THE COMMUNITY, THIS PATIENT WAS EITHER ILL OR FOUND TO BE HIGH RISK FOR IN-PERSON EVALUATION WITHIN THE CLINIC. THE FOLLOWING IS A VIRTUAL TELEMEDICINE VIDEO ENCOUNTER VIA [employer], TO WHICH THE PATIENT AGREED. THE PURPOSE IS TO LIMIT INTERRUPTIONS IN HEALTHCARE AND TO PROVIDE FOR ONGOING URGENT NEEDS UNDER THE CURRENT CONDITIONS. CHIEF COMPLAINT: evaluation of type 2 diabetes    HISTORY OF PRESENT ILLNESS:   Sally Ferrer is a 48 y.o. female with a PMHx as noted below who presents for evaluation of uncontrolled type 2 diabetes. Seen prev for initial visit. A1c was 10.4%.      Current Home Regimen: Prior recommendations  Increase metformin to 1000mg (2 tabs) twice per day    * WAS DISCONTINUED BY [facility]   * SHE IS STILL TAKING 1 TAB ONCE DAILY   * IT WAS FELT TO BE GIVING HER GI COMPLICATIONS  Glimepiride 4mg each morning before breakfast,    Review of home glucose:  AM: ranging 100-120 on metformin and glimepiride,    Now around [address] after reducing metformin,    Review of most recent diabetes-related labs:  Lab Results   Component Value Date    HBA1C 8.0 (H) 02/10/2019    HBA1C 8.7 (H) 02/07/2019    NTF6SEPD 10.4 02/21/2020    CHOL 269 (H) 02/21/2020    LDLC 166 (H) 02/21/2020    GFRAA >60 04/20/2020    GFRNA >60 04/20/2020    MCACR 8 02/21/2020    TSH 1.380 02/21/2020     Lab Key:  [id number] = IA-2 pancreatic islet cell autoantibody  CPEPL = C-peptide level  :EXT = External Lab  GADLT = ZULEIKA-65 autoantibody   INSUL = Insulin level  MCACR (or MALBEXT) = Urine Microalbumin (or External UM)  B12LT = B12 level    PAST MEDICAL/SURGICAL HISTORY:   Past Medical History:   Diagnosis Date    Anal fissure     Anisocoria     Asthma     LAST EPISODE     Back pain     Cerumen impaction     Chronic kidney disease     hx uti in past    Coagulation defects     ocassional rectal bleeding due to anal fissure    Colovaginal fistula     Diabetes (HCC)     NIDDM    Diabetes ([facility] [address]     Diverticulitis     Diverticulosis     Enlarged tonsils     Frequent UTI     GERD (gastroesophageal reflux disease)     H/O endoscopy     with dilation    HA (headache)     Hepatic steatosis     Hx of colonoscopy with polypectomy     benign    Hypertension     Ill-defined condition     FREQUENT HIVES    Ill-defined condition     HX ELEVATED LIVER ENZYMES    Morbid obesity (HCC)     Nausea & vomiting     during diverticulitis flare    Obesity     Otitis media     Pneumonia     about 15 yrs ago    Psychiatric disorder     ANXIETY    Recurrent tonsillitis     Sinusitis     Transfusion history ~ age 35    postop hysterectomy    Unspecified sleep apnea     snores ( not diagnosed yet)     Urticaria     Urticaria      Past Surgical History:   Procedure Laterality Date    ABDOMEN SURGERY PROC UNLISTED  2018    hernia repair [facility]    COLONOSCOPY N/A 3/28/2019    COLONOSCOPY performed by Sheba Heart MD at [address] [facility]    [place].  HX GI  12    LAPAROSCOPIC HAND ASSISTED  POSS OPEN SIGMOID COLECTOMY POSS TEMPORARY DIVERTING LOOP ILEOSTOMY;  (no illeostomy needed)    HX GYN           HX GYN      cervical conization    HX HEENT      SINUS SURGERY LEFT X2    HX HEENT      SINUS SURGERY ON RIGHT X2    HX OTHER SURGICAL      Sphincterotomy    HX PELVIC LAPAROSCOPY      HX EMMANUEL AND BSO      HX UROLOGICAL  12     CYSTOSCOPY INSERTION URETERAL CATHETERS - Cystoscopy Insertion of bilateral ureteral stents       ALLERGIES:   Allergies   Allergen Reactions    Aspirin Hives and Shortness of Breath    Codeine Hives, Itching and Angioedema     Pt had itching in mouth, on face and lips    Contrast Agent [Iodine] Hives, Itching and Angioedema     Pt. had itching in mouth, on face and lips after IV contrast with the last exam.  Benadryl was given. OK if premedicated.      Flavoring Agent Anaphylaxis    Percocet [Oxycodone-Acetaminophen] Hives, Itching and Angioedema     Pt had itching in mouth, on face and lips    Prilosec [Omeprazole Magnesium] Anaphylaxis     CHERRY FLAVORED ODT; PT TAKES REGULAR PRILOSEC AND IS OK    Dilaudid [Hydromorphone] Itching    Ketorolac Rash     \"makes my eyes spasm and causes rash on my hands\" and \"makes my skin itch and makes me nervous\"    Fentanyl Rash and Itching    Morphine Itching     Severe itching. Tolerates with Benadryl       MEDICATIONS ON ADMISSION:     Current Outpatient Medications:     estradioL (ESTRACE) 0.5 mg tablet, TAKE 1 TABLET BY MOUTH ONCE DAILY, Disp: , Rfl:     glimepiride (AMARYL) 4 mg tablet, Take 1 Tab by mouth Daily (before breakfast). , Disp: 90 Tab, Rfl: 3    metFORMIN ER (GLUCOPHAGE XR) 500 mg tablet, Take 2 Tabs by mouth Before breakfast and dinner., Disp: 360 Tab, Rfl: 3    cetirizine (ZYRTEC) 10 mg tablet, Take 1 Tab by mouth daily. , Disp: 20 Tab, Rfl: 0    simvastatin (ZOCOR) 20 mg tablet, Take 20 mg by mouth nightly., Disp: , Rfl:     omeprazole (PRILOSEC) 20 mg capsule, Take 40 mg by mouth Daily (before breakfast). , Disp: , Rfl:     sertraline (ZOLOFT) 100 mg tablet, Take 200 mg by mouth nightly., Disp: , Rfl:     losartan-hydroCHLOROthiazide (HYZAAR) 100-12.5 mg per tablet, Take 1 Tab by mouth daily. , Disp: , Rfl:     EPINEPHrine (EPIPEN) 0.3 mg/0.3 mL (1:1,000) injection, 0.3 mL by IntraMUSCular route once as needed for up to 1 dose., Disp: 0.3 mL, Rfl: 1    albuterol (PROVENTIL, VENTOLIN) 90 mcg/Actuation inhaler, Take 2 Puffs by inhalation every six (6) hours as needed. , Disp: , Rfl:     predniSONE (STERAPRED DS) 10 mg dose pack, Take as directed, Disp: 48 Tab, Rfl: 0    ondansetron (Zofran ODT) 4 mg disintegrating tablet, Take 1 Tab by mouth every eight (8) hours as needed for Nausea., Disp: 10 Tab, Rfl: 0    PROAIR HFA 90 mcg/actuation inhaler, , Disp: , Rfl:     polyethylene glycol (MIRALAX) 17 gram/dose powder, Take 17 g by mouth daily. 1 tablespoon with 8 oz of water daily to prevent constipation, Disp: 116 g, Rfl: 0    hydrocortisone (ANUSOL-HC) 25 mg supp, Insert 1 Suppository into rectum every twelve (12) hours. , Disp: 12 Each, Rfl: 0    hydrocortisone (PROCTOCORT) 1 % topical cream, Apply  to affected area two (2) times a day. use thin layer, Disp: 30 g, Rfl: 0    nitroglycerin (RECTIV) 0.4 % (w/w) ointment, Insert  into rectum every twelve (12) hours every twelve (12) hours. , Disp: 30 g, Rfl: 0    naproxen (NAPROSYN) 500 mg tablet, Take 1 Tab by mouth two (2) times daily (with meals). , Disp: 30 Tab, Rfl: 0    ALPRAZolam (XANAX) 0.25 mg tablet, Take 0.125-0.25 mg by mouth every twelve (12) hours as needed for Anxiety or Sleep., Disp: , Rfl:     melatonin tab tablet, Take 5 mg by mouth nightly., Disp: , Rfl:     dicyclomine (BENTYL) 20 mg tablet, Take 1 Tab by mouth every six (6) hours as needed (abdominal cramps) for up to 20 doses. , Disp: 20 Tab, Rfl: 0    insulin glargine (LANTUS U-100 INSULIN) 100 unit/mL injection, 38 Units by SubCUTAneous route nightly., Disp: , Rfl:     SOCIAL HISTORY:   Social History     Socioeconomic History    Marital status:      Spouse name: Not on file    Number of children: Not on file    Years of education: Not on file    Highest education level: Not on file   Occupational History    Not on file   Social Needs    Financial resource strain: Not on file    Food insecurity     Worry: Not on file     Inability: Not on file    Transportation needs     Medical: Not on file     Non-medical: Not on file   Tobacco Use    Smoking status: Never Smoker    Smokeless tobacco: Never Used   Substance and Sexual Activity    Alcohol use: Yes     Comment: Rarely    Drug use: No     Types: Prescription, OTC    Sexual activity: Never   Lifestyle    Physical activity     Days per week: Not on file     Minutes per session: Not on file    Stress: Not on file   Relationships    Social connections Talks on phone: Not on file     Gets together: Not on file     Attends Confucianism service: Not on file     Active member of club or organization: Not on file     Attends meetings of clubs or organizations: Not on file     Relationship status: Not on file    Intimate partner violence     Fear of current or ex partner: Not on file     Emotionally abused: Not on file     Physically abused: Not on file     Forced sexual activity: Not on file   Other Topics Concern    Not on file   Social History Narrative    Not on file     FAMILY HISTORY:  Family History   Problem Relation Age of Onset    Diabetes Mother     Cancer Mother         NON-HODGKINS LYMPHOMA    Anesth Problems Mother         PONV    Diabetes Father     Heart Disease Father         CAD - STENTS, PACEMAKER    Arrhythmia Father        REVIEW OF SYSTEMS: Complete ROS assessed and noted for that which is described above, all else are negative. Eyes: normal  ENT: normal  CVS: normal  Resp: normal  GI: normal  : normal  GYN: normal  Endocrine: normal  Integument: normal  Musculoskeletal: normal  Neuro: normal  Psych: normal    PHYSICAL EXAMINATION:  Telemedicine Visit    GENERAL: NCAT, Appears well nourished  EYES: EOMI, non-icteric, no proptosis  Ear/Nose/Throat: NCAT, no visible inflammation or masses  CARDIOVASCULAR: no cyanosis, no visible JVD  RESPIRATORY: comfortable respirations observed, no cyanosis  MUSCULOSKELETAL: Normal ROM of upper extremities observed  SKIN: No edema, rash, or other significant changes observed  NEUROLOGIC:  AAOx3  PSYCHIATRIC: Normal affect, Normal insight and judgement    REVIEW OF LABORATORY AND RADIOLOGY DATA:   Labs and documentation have been reviewed as described above. ASSESSMENT AND PLAN:   Tyrone Price is a 48 y.o. female with a PMHx as noted above who presents for evaluation of uncontrolled type 2 diabetes.      Problems:  Type 2 diabetes Uncontrolled  Hyperlipidemia  Hypertension    At this point I agree she should just stop and stay off metformin since she tends to have a mildly elevated lactic acid elevation. It is not clear if that is due to something else but would be curious to see if she has recurring elevations in her lactic acid in the future while off metformin. Today we discussed using an SGLT-2 inhibitor. We discussed the mechanism of action for this medication in the kidney. We also discussed the possible adverse effects which include yeast infections etc however I believe that the benefit of better diabetes control far outweighs the risks of treating with this class of medications. The patient agrees to a trial which I think will produce good results and contribute to the reduction of their cardiovascular risks from uncontrolled diabetes. PLAN  Type 2 Diabetes  Medications:  Stop metformin (hx of recurrent lactic acid elevation)  Start trial of jardiance 25mg tab before breakfast,    Cautioned to stay hydrated while taking,  Continue glimepiride 4mg (a sulfonylurea) each morning before breakfast,  Preferable to avoid all incretin-based therapies (DPP-4 inhibitor, GLP-1 agonist due to hx of pancreatitis)  Patient prefers to stay off insulin     HTN: prev stable, telemedicine visit today  HLD: on simvastatin, repeating with next labs as was high prev around the time of starting therapy. RTC: I would like to see them back in 4 months, Sept 30 at 3:30,    20 minutes spent toward telemedicine visit today of which >50% of this time was spent in counseling and coordination of care. Ayana Jenkins.  [address] [facility]

## 2020-07-09 ENCOUNTER — APPOINTMENT (OUTPATIENT)
Dept: CT IMAGING | Age: 50
End: 2020-07-09
Attending: EMERGENCY MEDICINE
Payer: MEDICAID

## 2020-07-09 ENCOUNTER — HOSPITAL ENCOUNTER (EMERGENCY)
Age: 50
Discharge: HOME | End: 2020-07-09
Attending: EMERGENCY MEDICINE | Admitting: EMERGENCY MEDICINE
Payer: MEDICAID

## 2020-07-09 VITALS
BODY MASS INDEX: 38.3 KG/M2 | TEMPERATURE: 98.6 F | RESPIRATION RATE: 18 BRPM | HEART RATE: 65 BPM | HEIGHT: 62 IN | OXYGEN SATURATION: 97 % | WEIGHT: 208.11 LBS | DIASTOLIC BLOOD PRESSURE: 79 MMHG | SYSTOLIC BLOOD PRESSURE: 108 MMHG

## 2020-07-09 DIAGNOSIS — R11.2 NON-INTRACTABLE VOMITING WITH NAUSEA, UNSPECIFIED VOMITING TYPE: ICD-10-CM

## 2020-07-09 DIAGNOSIS — K51.90 ULCERATIVE COLITIS WITHOUT COMPLICATIONS, UNSPECIFIED LOCATION (HCC): Primary | ICD-10-CM

## 2020-07-09 DIAGNOSIS — R10.32 ABDOMINAL PAIN, LLQ (LEFT LOWER QUADRANT): ICD-10-CM

## 2020-07-09 DIAGNOSIS — R19.8 TENESMUS: ICD-10-CM

## 2020-07-09 LAB
ALBUMIN SERPL-MCNC: 3.9 G/DL (ref 3.5–5)
ALBUMIN/GLOB SERPL: 1 {RATIO} (ref 1.1–2.2)
ALP SERPL-CCNC: 68 U/L (ref 45–117)
ALT SERPL-CCNC: 28 U/L (ref 12–78)
ANION GAP SERPL CALC-SCNC: 8 MMOL/L (ref 5–15)
AST SERPL-CCNC: 19 U/L (ref 15–37)
BASOPHILS # BLD: 0 K/UL (ref 0–0.1)
BASOPHILS NFR BLD: 0 % (ref 0–1)
BILIRUB SERPL-MCNC: 0.4 MG/DL (ref 0.2–1)
BUN SERPL-MCNC: 13 MG/DL (ref 6–20)
BUN/CREAT SERPL: 18 (ref 12–20)
CALCIUM SERPL-MCNC: 9.8 MG/DL (ref 8.5–10.1)
CHLORIDE SERPL-SCNC: 101 MMOL/L (ref 97–108)
CO2 SERPL-SCNC: 27 MMOL/L (ref 21–32)
CREAT SERPL-MCNC: 0.72 MG/DL (ref 0.55–1.02)
CRP SERPL HS-MCNC: 7.5 MG/L
DIFFERENTIAL METHOD BLD: ABNORMAL
EOSINOPHIL # BLD: 0.1 K/UL (ref 0–0.4)
EOSINOPHIL NFR BLD: 1 % (ref 0–7)
ERYTHROCYTE [DISTWIDTH] IN BLOOD BY AUTOMATED COUNT: 14.5 % (ref 11.5–14.5)
ERYTHROCYTE [SEDIMENTATION RATE] IN BLOOD: 34 MM/HR (ref 0–30)
GLOBULIN SER CALC-MCNC: 4.1 G/DL (ref 2–4)
GLUCOSE SERPL-MCNC: 176 MG/DL (ref 65–100)
HCT VFR BLD AUTO: 37.6 % (ref 35–47)
HGB BLD-MCNC: 12.5 G/DL (ref 11.5–16)
IMM GRANULOCYTES # BLD AUTO: 0.1 K/UL (ref 0–0.04)
IMM GRANULOCYTES NFR BLD AUTO: 1 % (ref 0–0.5)
LACTATE BLD-SCNC: 1.29 MMOL/L (ref 0.4–2)
LYMPHOCYTES # BLD: 2.9 K/UL (ref 0.8–3.5)
LYMPHOCYTES NFR BLD: 33 % (ref 12–49)
MCH RBC QN AUTO: 28 PG (ref 26–34)
MCHC RBC AUTO-ENTMCNC: 33.2 G/DL (ref 30–36.5)
MCV RBC AUTO: 84.3 FL (ref 80–99)
MONOCYTES # BLD: 0.5 K/UL (ref 0–1)
MONOCYTES NFR BLD: 6 % (ref 5–13)
NEUTS SEG # BLD: 5.4 K/UL (ref 1.8–8)
NEUTS SEG NFR BLD: 59 % (ref 32–75)
NRBC # BLD: 0 K/UL (ref 0–0.01)
NRBC BLD-RTO: 0 PER 100 WBC
PLATELET # BLD AUTO: 207 K/UL (ref 150–400)
PMV BLD AUTO: 9.6 FL (ref 8.9–12.9)
POTASSIUM SERPL-SCNC: 3.1 MMOL/L (ref 3.5–5.1)
PROT SERPL-MCNC: 8 G/DL (ref 6.4–8.2)
RBC # BLD AUTO: 4.46 M/UL (ref 3.8–5.2)
SODIUM SERPL-SCNC: 136 MMOL/L (ref 136–145)
WBC # BLD AUTO: 9 K/UL (ref 3.6–11)

## 2020-07-09 PROCEDURE — 86141 C-REACTIVE PROTEIN HS: CPT

## 2020-07-09 PROCEDURE — 96375 TX/PRO/DX INJ NEW DRUG ADDON: CPT

## 2020-07-09 PROCEDURE — 85025 COMPLETE CBC W/AUTO DIFF WBC: CPT

## 2020-07-09 PROCEDURE — 74176 CT ABD & PELVIS W/O CONTRAST: CPT

## 2020-07-09 PROCEDURE — 96361 HYDRATE IV INFUSION ADD-ON: CPT

## 2020-07-09 PROCEDURE — 83605 ASSAY OF LACTIC ACID: CPT

## 2020-07-09 PROCEDURE — 99284 EMERGENCY DEPT VISIT MOD MDM: CPT

## 2020-07-09 PROCEDURE — 74011250636 HC RX REV CODE- 250/636: Performed by: EMERGENCY MEDICINE

## 2020-07-09 PROCEDURE — 85652 RBC SED RATE AUTOMATED: CPT

## 2020-07-09 PROCEDURE — 74011000255 HC RX REV CODE- 255: Performed by: EMERGENCY MEDICINE

## 2020-07-09 PROCEDURE — 36415 COLL VENOUS BLD VENIPUNCTURE: CPT

## 2020-07-09 PROCEDURE — 96374 THER/PROPH/DIAG INJ IV PUSH: CPT

## 2020-07-09 PROCEDURE — 80053 COMPREHEN METABOLIC PANEL: CPT

## 2020-07-09 RX ORDER — DIPHENHYDRAMINE HYDROCHLORIDE 50 MG/ML
12.5 INJECTION, SOLUTION INTRAMUSCULAR; INTRAVENOUS
Status: COMPLETED | OUTPATIENT
Start: 2020-07-09 | End: 2020-07-09

## 2020-07-09 RX ORDER — HYDROMORPHONE HYDROCHLORIDE 2 MG/1
2 TABLET ORAL
Qty: 20 TAB | Refills: 0 | OUTPATIENT
Start: 2020-07-09 | End: 2020-07-13

## 2020-07-09 RX ORDER — ONDANSETRON 2 MG/ML
4 INJECTION INTRAMUSCULAR; INTRAVENOUS
Status: DISCONTINUED | OUTPATIENT
Start: 2020-07-09 | End: 2020-07-09 | Stop reason: HOSPADM

## 2020-07-09 RX ORDER — BARIUM SULFATE 20 MG/ML
900 SUSPENSION ORAL ONCE
Status: COMPLETED | OUTPATIENT
Start: 2020-07-09 | End: 2020-07-09

## 2020-07-09 RX ORDER — ONDANSETRON 4 MG/1
4 TABLET, ORALLY DISINTEGRATING ORAL
Qty: 21 TAB | Refills: 0 | Status: SHIPPED | OUTPATIENT
Start: 2020-07-09 | End: 2020-07-16

## 2020-07-09 RX ORDER — HYDROMORPHONE HYDROCHLORIDE 1 MG/ML
1 INJECTION, SOLUTION INTRAMUSCULAR; INTRAVENOUS; SUBCUTANEOUS
Status: DISCONTINUED | OUTPATIENT
Start: 2020-07-09 | End: 2020-07-09 | Stop reason: HOSPADM

## 2020-07-09 RX ADMIN — SODIUM CHLORIDE 1000 ML: 900 INJECTION, SOLUTION INTRAVENOUS at 06:32

## 2020-07-09 RX ADMIN — HYDROMORPHONE HYDROCHLORIDE 1 MG: 1 INJECTION, SOLUTION INTRAMUSCULAR; INTRAVENOUS; SUBCUTANEOUS at 06:32

## 2020-07-09 RX ADMIN — ONDANSETRON 4 MG: 2 INJECTION INTRAMUSCULAR; INTRAVENOUS at 05:29

## 2020-07-09 RX ADMIN — HYDROMORPHONE HYDROCHLORIDE 1 MG: 1 INJECTION, SOLUTION INTRAMUSCULAR; INTRAVENOUS; SUBCUTANEOUS at 05:29

## 2020-07-09 RX ADMIN — BARIUM SULFATE 900 ML: 20 SUSPENSION ORAL at 05:29

## 2020-07-09 RX ADMIN — DIPHENHYDRAMINE HYDROCHLORIDE 12.5 MG: 50 INJECTION, SOLUTION INTRAMUSCULAR; INTRAVENOUS at 07:03

## 2020-07-09 NOTE — ED PROVIDER NOTES
EMERGENCY DEPARTMENT HISTORY AND PHYSICAL EXAM      Date: 7/9/2020  Patient Name: Prashanth Cowart    History of Presenting Illness     Chief Complaint   Patient presents with    Abdominal Pain     Pt reports \"I feel like my UC is flaring up- I have rectal pain, LLQ pain that radiates to epigastric. Pt reports she has gone the last two days to [facility]- put on predisone, but pain is still continuing.  Nausea     Pt reports nausea x 3 days. History Provided By: Patient    HPI: Prashanth Cowart, 48 y.o. female  With prior medical history of diabetes, ulcerative colitis, presenting today with abdominal pain and rectal pain. The patient notes that she has been seen as an outpatient by her primary GI doctor at [facility] for what is likely a ulcerative colitis flare. She is been placed on high-dose prednisone 60 mg daily, but she continues to have severe abdominal pain that has now moved to include her entire abdomen. She also notes left low back pain and rectal pressure and pain. She is been using multiple topical agents for this pain. She states that she started having nausea and vomiting which prompted her visit today. She has plans to have an MRI as an outpatient next week and to follow-up with her GI doctor as an outpatient. No fevers or chills. She was prescribed morphine immediate release tablets for her pain and has taken 1 of these with some relief of her pain,    There are no other complaints, changes, or physical findings at this time. PCP: Jacqueline Devlin MD    No current facility-administered medications on file prior to encounter. Current Outpatient Medications on File Prior to Encounter   Medication Sig Dispense Refill    estradioL (ESTRACE) 0.5 mg tablet TAKE 1 TABLET BY MOUTH ONCE DAILY      empagliflozin (Jardiance) 25 mg tablet Take 1 Tab by mouth daily.  90 Tab 3    predniSONE (STERAPRED DS) 10 mg dose pack Take as directed 48 Tab 0    ondansetron (Zofran ODT) 4 mg disintegrating tablet Take 1 Tab by mouth every eight (8) hours as needed for Nausea. 10 Tab 0    glimepiride (AMARYL) 4 mg tablet Take 1 Tab by mouth Daily (before breakfast). 90 Tab 3    PROAIR HFA 90 mcg/actuation inhaler       metFORMIN ER (GLUCOPHAGE XR) 500 mg tablet Take 2 Tabs by mouth Before breakfast and dinner. 360 Tab 3    polyethylene glycol (MIRALAX) 17 gram/dose powder Take 17 g by mouth daily. 1 tablespoon with 8 oz of water daily to prevent constipation 116 g 0    cetirizine (ZYRTEC) 10 mg tablet Take 1 Tab by mouth daily. 20 Tab 0    hydrocortisone (ANUSOL-HC) 25 mg supp Insert 1 Suppository into rectum every twelve (12) hours. 12 Each 0    hydrocortisone (PROCTOCORT) 1 % topical cream Apply  to affected area two (2) times a day. use thin layer 30 g 0    nitroglycerin (RECTIV) 0.4 % (w/w) ointment Insert  into rectum every twelve (12) hours every twelve (12) hours. 30 g 0    naproxen (NAPROSYN) 500 mg tablet Take 1 Tab by mouth two (2) times daily (with meals). 30 Tab 0    ALPRAZolam (XANAX) 0.25 mg tablet Take 0.125-0.25 mg by mouth every twelve (12) hours as needed for Anxiety or Sleep.  melatonin tab tablet Take 5 mg by mouth nightly.  simvastatin (ZOCOR) 20 mg tablet Take 20 mg by mouth nightly.  omeprazole (PRILOSEC) 20 mg capsule Take 40 mg by mouth Daily (before breakfast).  dicyclomine (BENTYL) 20 mg tablet Take 1 Tab by mouth every six (6) hours as needed (abdominal cramps) for up to 20 doses. 20 Tab 0    insulin glargine (LANTUS U-100 INSULIN) 100 unit/mL injection 38 Units by SubCUTAneous route nightly.  sertraline (ZOLOFT) 100 mg tablet Take 200 mg by mouth nightly.  losartan-hydroCHLOROthiazide (HYZAAR) 100-12.5 mg per tablet Take 1 Tab by mouth daily.  EPINEPHrine (EPIPEN) 0.3 mg/0.3 mL (1:1,000) injection 0.3 mL by IntraMUSCular route once as needed for up to 1 dose.  0.3 mL 1    albuterol (PROVENTIL, VENTOLIN) 90 mcg/Actuation inhaler Take 2 Puffs by inhalation every six (6) hours as needed. Past History     Past Medical History:  Past Medical History:   Diagnosis Date    Anal fissure     Anisocoria     Asthma     LAST EPISODE     Back pain     Cerumen impaction     Chronic kidney disease     hx uti in past    Coagulation defects     ocassional rectal bleeding due to anal fissure    Colovaginal fistula     Diabetes (HCC)     NIDDM    Diabetes ([address]     Diverticulitis     Diverticulosis     Enlarged tonsils     Frequent UTI     GERD (gastroesophageal reflux disease)     H/O endoscopy     with dilation    HA (headache)     Hepatic steatosis     Hx of colonoscopy with polypectomy     benign    Hypertension     Ill-defined condition     FREQUENT HIVES    Ill-defined condition     HX ELEVATED LIVER ENZYMES    Morbid obesity (HCC)     Nausea & vomiting     during diverticulitis flare    Obesity     Otitis media     Pneumonia     about 15 yrs ago    Psychiatric disorder     ANXIETY    Recurrent tonsillitis     Sinusitis     Transfusion history ~ age 35    postop hysterectomy    Unspecified sleep apnea     snores ( not diagnosed yet)     Urticaria     Urticaria        Past Surgical History:  Past Surgical History:   Procedure Laterality Date    ABDOMEN SURGERY PROC UNLISTED  2018    hernia repair at [facility]    COLONOSCOPY N/A 3/28/2019    COLONOSCOPY performed by Danni Espinosa MD at [address] [place] [address]    blake.     HX GI  12    LAPAROSCOPIC HAND ASSISTED  POSS OPEN SIGMOID COLECTOMY POSS TEMPORARY DIVERTING LOOP ILEOSTOMY;  (no illeostomy needed)    HX GYN           HX GYN      cervical conization    HX HEENT      SINUS SURGERY LEFT X2    HX HEENT      SINUS SURGERY ON RIGHT X2    HX OTHER SURGICAL      Sphincterotomy    HX PELVIC LAPAROSCOPY      HX EMMANUEL AND BSO      HX UROLOGICAL  12     CYSTOSCOPY INSERTION URETERAL CATHETERS - Cystoscopy Insertion of bilateral ureteral stents       Family History:  Family History   Problem Relation Age of Onset    Diabetes Mother     Cancer Mother         NON-HODGKINS LYMPHOMA    Anesth Problems Mother         PONV    Diabetes Father     Heart Disease Father         CAD - STENTS, PACEMAKER    Arrhythmia Father        Social History:  Social History     Tobacco Use    Smoking status: Never Smoker    Smokeless tobacco: Never Used   Substance Use Topics    Alcohol use: Yes     Comment: Rarely    Drug use: No     Types: Prescription, OTC       Allergies: Allergies   Allergen Reactions    Aspirin Hives and Shortness of Breath    Codeine Hives, Itching and Angioedema     Pt had itching in mouth, on face and lips    Contrast Agent [Iodine] Hives, Itching and Angioedema     Pt. had itching in mouth, on face and lips after IV contrast with the last exam.  Benadryl was given. OK if premedicated.  Flavoring Agent Anaphylaxis    Percocet [Oxycodone-Acetaminophen] Hives, Itching and Angioedema     Pt had itching in mouth, on face and lips    Prilosec [Omeprazole Magnesium] Anaphylaxis     CHERRY FLAVORED ODT; PT TAKES REGULAR PRILOSEC AND IS OK    Dilaudid [Hydromorphone] Itching    Ketorolac Rash     \"makes my eyes spasm and causes rash on my hands\" and \"makes my skin itch and makes me nervous\"    Fentanyl Rash and Itching    Morphine Itching     Severe itching.  Tolerates with Benadryl         Review of Systems   Constitutional: No  fever  Skin: No  rash  HEENT: No  nasal congestion  Resp: No cough  CV: No chest pain  GI: + vomiting  : No dysuria  MSK: No joint pain  Neuro: No numbness  Psych: No suicidal      Physical Exam     Patient Vitals for the past 12 hrs:   Temp Pulse Resp BP SpO2   07/09/20 0645 -- -- -- 108/79 97 %   07/09/20 0630 -- -- -- 101/76 94 %   07/09/20 0416 98.6 °F (37 °C) 65 18 152/76 99 %     General: alert, No acute distress  Eyes: EOMI, normal conjunctiva  ENT: dry mucous membranes. Neck: Active, full ROM of neck. Skin: No rashes. no jaundice              Lungs: Equal chest expansion. no respiratory distress. clear to auscultation bilaterally No accessory muscle usage  Heart: regular rate     no peripheral edema   2+ radial pulses and DPs bilaterally  Abd:  non distended soft, Generalized tenderness. No rebound tenderness. No guarding  Back: Full ROM  MSK: Full, active ROM in all 4 extremities. Neuro: Person, Place, Time and Situation; normal speech;   Psych: Cooperative with exam; Appropriate mood and affect             Diagnostic Study Results     Labs -     Recent Results (from the past 12 hour(s))   CBC WITH AUTOMATED DIFF    Collection Time: 07/09/20  4:31 AM   Result Value Ref Range    WBC 9.0 3.6 - 11.0 K/uL    RBC 4.46 3.80 - 5.20 M/uL    HGB 12.5 11.5 - 16.0 g/dL    HCT 37.6 35.0 - 47.0 %    MCV 84.3 80.0 - 99.0 FL    MCH 28.0 26.0 - 34.0 PG    MCHC 33.2 30.0 - 36.5 g/dL    RDW 14.5 11.5 - 14.5 %    PLATELET 318 752 - 047 K/uL    MPV 9.6 8.9 - 12.9 FL    NRBC 0.0 0  WBC    ABSOLUTE NRBC 0.00 0.00 - 0.01 K/uL    NEUTROPHILS 59 32 - 75 %    LYMPHOCYTES 33 12 - 49 %    MONOCYTES 6 5 - 13 %    EOSINOPHILS 1 0 - 7 %    BASOPHILS 0 0 - 1 %    IMMATURE GRANULOCYTES 1 (H) 0.0 - 0.5 %    ABS. NEUTROPHILS 5.4 1.8 - 8.0 K/UL    ABS. LYMPHOCYTES 2.9 0.8 - 3.5 K/UL    ABS. MONOCYTES 0.5 0.0 - 1.0 K/UL    ABS. EOSINOPHILS 0.1 0.0 - 0.4 K/UL    ABS. BASOPHILS 0.0 0.0 - 0.1 K/UL    ABS. IMM.  GRANS. 0.1 (H) 0.00 - 0.04 K/UL    DF AUTOMATED     METABOLIC PANEL, COMPREHENSIVE    Collection Time: 07/09/20  4:31 AM   Result Value Ref Range    Sodium 136 136 - 145 mmol/L    Potassium 3.1 (L) 3.5 - 5.1 mmol/L    Chloride 101 97 - 108 mmol/L    CO2 27 21 - 32 mmol/L    Anion gap 8 5 - 15 mmol/L    Glucose 176 (H) 65 - 100 mg/dL    BUN 13 6 - 20 MG/DL    Creatinine 0.72 0.55 - 1.02 MG/DL    BUN/Creatinine ratio 18 12 - 20      GFR est AA >60 >60 ml/min/1.73m2    GFR est non-AA >60 >60 ml/min/1.73m2    Calcium 9.8 8.5 - 10.1 MG/DL    Bilirubin, total 0.4 0.2 - 1.0 MG/DL    ALT (SGPT) 28 12 - 78 U/L    AST (SGOT) 19 15 - 37 U/L    Alk. phosphatase 68 45 - 117 U/L    Protein, total 8.0 6.4 - 8.2 g/dL    Albumin 3.9 3.5 - 5.0 g/dL    Globulin 4.1 (H) 2.0 - 4.0 g/dL    A-G Ratio 1.0 (L) 1.1 - 2.2     SED RATE (ESR)    Collection Time: 07/09/20  4:31 AM   Result Value Ref Range    Sed rate, automated 34 (H) 0 - 30 mm/hr   POC LACTIC ACID    Collection Time: 07/09/20  5:16 AM   Result Value Ref Range    Lactic Acid (POC) 1.29 0.40 - 2.00 mmol/L       Radiologic Studies -   CT ABD PELV WO CONT   Final Result   IMPRESSION: No Acute Disease. CT Results  (Last 48 hours)               07/09/20 0709  CT ABD PELV WO CONT Final result    Impression:  IMPRESSION: No Acute Disease. Narrative:  INDICATION: Abd pain, ulcerative colitis hx        EXAM: CT Abdomen and Pelvis without IV contrast. No oral contrast.   CT dose reduction was achieved through use of a standardized protocol tailored   for this examination and automatic exposure control for dose modulation. FINDINGS:    No urinary tract stones are seen. There is no hydroureteronephrosis. The kidneys   are normal in size. There is no perirenal fluid or ascites. Liver shows no apparent significant finding without contrast. Pancreas, adrenal   glands, spleen and aorta show no significant enlargement. No inflammation is   seen. There is no pneumoperitoneum or significant adenopathy. The bladder is unremarkable. The distal ureters are not dilated. There is no   apparent pelvic mass. The appendix is normal. Bowels are not dilated. Rectosigmoid region anastomosis shows no complication. No significant change   since 4/20/2020. CXR Results  (Last 48 hours)    None          Medical Decision Making   I am the first provider for this patient.     I reviewed the vital signs, available nursing notes, past medical history, past surgical history, family history and social history. Provider Notes (Medical Decision Making):     Differential Diagnosis: Ulcerative colitis, fecal infection, electrolyte derangement, dehydration, colitis    Initial Plan: Will obtain CT imaging of the abdomen and pelvis, treat the patient with IV analgesics, IV fluids, and obtain laboratory studies. Her symptoms sound like a ulcerative colitis flare as she is having bloody and mucousy stools as well as tenesmus and rectal pain. Will reassess after work-up. ED Course:   Initial assessment performed. The patients presenting problems have been discussed, and they are in agreement with the care plan formulated and outlined with them. I have encouraged them to ask questions as they arise throughout their visit. ED Course as of Jul 09 0818   Thu Jul 09, 2020   0554 On my interpretation of the patient's laboratory studies lactic acid is normal, CBC is unremarkable, metabolic panel with mild hypokalemia and a potassium of 3.1,    [NW]   0706 On reassessment the patient has improvement in her pain control after IV analgesics. We discussed the plan of action according to her CT scan. Will treat with oral analgesics as an outpatient if she has no evidence of fulminant colitis, will disposition per CT scan if she has more severe colitis or complication. She is already taking oral mesalamine, rectal mesalamine, rectal steroids, and rectal nitroglycerin. She follows with GI as an outpatient. She is comfortable with this plan if her CT comes back with out significant complication. [NW]   9191 CT without acute abnormality, will continue treating with oral analgesics and have the patient follow-up with her GI doctor as an outpatient. [NW]      ED Course User Index  [NW] Gunnar Slaughter MD       I, Saúl Gil MD, am the attending of record for this patient encounter. Dispo: Discharged.  The patient has been re-evaluated and is ready for discharge. Reviewed available results with patient. Counseled patient on diagnosis and care plan. Patient has expressed understanding, and all questions have been answered. Patient agrees with plan and agrees to follow up as recommended, or to return to the ED if their symptoms worsen. Discharge instructions have been provided and explained to the patient, along with reasons to return to the ED. PLAN:  Current Discharge Medication List      START taking these medications    Details   HYDROmorphone (Dilaudid) 2 mg tablet Take 1 Tab by mouth every four (4) hours as needed for Pain for up to 5 days. Max Daily Amount: 12 mg.  Qty: 20 Tab, Refills: 0    Associated Diagnoses: Ulcerative colitis without complications, unspecified location [facility])      ! ! ondansetron (ZOFRAN ODT) 4 mg disintegrating tablet Take 1 Tab by mouth every eight (8) hours as needed for Nausea for up to 7 days. Qty: 21 Tab, Refills: 0       !! - Potential duplicate medications found. Please discuss with provider. CONTINUE these medications which have NOT CHANGED    Details   !! ondansetron (Zofran ODT) 4 mg disintegrating tablet Take 1 Tab by mouth every eight (8) hours as needed for Nausea. Qty: 10 Tab, Refills: 0       !! - Potential duplicate medications found. Please discuss with provider. 1.   2.     Follow-up Information     Follow up With Specialties Details Why Contact Info    GI            3. Return to ED if worse       Diagnosis     Clinical Impression:   1. Ulcerative colitis without complications, unspecified location ([address]    2. Abdominal pain, LLQ (left lower quadrant)    3. Non-intractable vomiting with nausea, unspecified vomiting type    4. Tenesmus        Attestations:    Rachael Leroy MD    Please note that this dictation was completed with [employer], the [employer] voice recognition software.   Quite often unanticipated grammatical, syntax, homophones, and other interpretive errors are inadvertently transcribed by the computer software. Please disregard these errors. Please excuse any errors that have escaped final proofreading. Thank you.

## 2020-07-09 NOTE — LETTER
NOTIFICATION RETURN TO WORK / SCHOOL 
 
7/9/2020 8:16 AM 
 
Ms. Rose Marie Cowart [facility] [address] To Whom It May Concern: 
 
Gucci SerrareyTrace is currently under the care of \[facility]\"" EMERGENCY DEPT. She will return to work/school on: 7/16/2020 Luiz Park may return to work/school with the following restrictions: No restrictions. If there are questions or concerns please have the patient contact our office. Sincerely, Melissa Odom MD

## 2020-07-09 NOTE — ED NOTES
SHAWNA Mendez reviewed discharge instructions with the patient. The patient verbalized understanding. All questions and concerns were addressed. The patient declined a wheelchair and is discharged ambulatory in the care of family members with instructions and prescriptions in hand. Pt is alert and oriented x 4. Respirations are clear and unlabored.

## 2020-07-09 NOTE — ED NOTES
Bedside and Verbal shift change report given to Funmilayo Langston RN (oncoming nurse) by Byron Beard RN (offgoing nurse). Report included the following information SBAR, ED Summary, MAR and Recent Results.

## 2020-07-10 ENCOUNTER — TELEPHONE (OUTPATIENT)
Dept: ENDOCRINOLOGY | Age: 50
End: 2020-07-10

## 2020-07-10 NOTE — TELEPHONE ENCOUNTER
----- Message from Isidoro Dodd sent at 7/10/2020  8:51 AM EDT -----  Regarding: /Telephone  General Message/Vendor Calls    Caller's first and last name:Rose Marie Meadows      Reason for call:inform pt is on prednisone      Callback required yes/no and why:yes      Best contact number(s): [phone]      Details to clarify the request:Pt called informing she will be on prednisone for the next 14 day per request of  to  inform when ever pt is on  the medication to tweak prescribed medication .       Isidoro Dodd

## 2020-07-10 NOTE — TELEPHONE ENCOUNTER
We adjusted her therapy on prior visit. If her sugars rise on steroids she can let us know, she should stay hydrated, and should consider that if her sugar rises too high, we may need to start a once daily insulin at that time,     Naheed Magaña.  [facility]  [address] [place] [address]

## 2020-07-13 ENCOUNTER — HOSPITAL ENCOUNTER (EMERGENCY)
Age: 50
Discharge: HOME | End: 2020-07-13
Attending: EMERGENCY MEDICINE | Admitting: EMERGENCY MEDICINE
Payer: MEDICAID

## 2020-07-13 ENCOUNTER — APPOINTMENT (OUTPATIENT)
Dept: CT IMAGING | Age: 50
End: 2020-07-13
Attending: EMERGENCY MEDICINE
Payer: MEDICAID

## 2020-07-13 VITALS
RESPIRATION RATE: 18 BRPM | HEART RATE: 97 BPM | SYSTOLIC BLOOD PRESSURE: 150 MMHG | OXYGEN SATURATION: 98 % | BODY MASS INDEX: 37.73 KG/M2 | TEMPERATURE: 98.2 F | DIASTOLIC BLOOD PRESSURE: 89 MMHG | HEIGHT: 62 IN | WEIGHT: 205 LBS

## 2020-07-13 DIAGNOSIS — K51.919 ULCERATIVE COLITIS WITH COMPLICATION, UNSPECIFIED LOCATION (HCC): Primary | ICD-10-CM

## 2020-07-13 LAB
ALBUMIN SERPL-MCNC: 3.9 G/DL (ref 3.5–5)
ALBUMIN/GLOB SERPL: 1 {RATIO} (ref 1.1–2.2)
ALP SERPL-CCNC: 82 U/L (ref 45–117)
ALT SERPL-CCNC: 45 U/L (ref 12–78)
ANION GAP SERPL CALC-SCNC: 8 MMOL/L (ref 5–15)
APPEARANCE UR: CLEAR
AST SERPL-CCNC: 25 U/L (ref 15–37)
BACTERIA URNS QL MICRO: NEGATIVE /HPF
BASOPHILS # BLD: 0 K/UL (ref 0–0.1)
BASOPHILS NFR BLD: 0 % (ref 0–1)
BILIRUB SERPL-MCNC: 0.5 MG/DL (ref 0.2–1)
BILIRUB UR QL: NEGATIVE
BUN SERPL-MCNC: 16 MG/DL (ref 6–20)
BUN/CREAT SERPL: 17 (ref 12–20)
CALCIUM SERPL-MCNC: 9.5 MG/DL (ref 8.5–10.1)
CHLORIDE SERPL-SCNC: 98 MMOL/L (ref 97–108)
CO2 SERPL-SCNC: 29 MMOL/L (ref 21–32)
COLOR UR: ABNORMAL
CREAT SERPL-MCNC: 0.93 MG/DL (ref 0.55–1.02)
DIFFERENTIAL METHOD BLD: ABNORMAL
EOSINOPHIL # BLD: 0 K/UL (ref 0–0.4)
EOSINOPHIL NFR BLD: 0 % (ref 0–7)
EPITH CASTS URNS QL MICRO: ABNORMAL /LPF
ERYTHROCYTE [DISTWIDTH] IN BLOOD BY AUTOMATED COUNT: 14.4 % (ref 11.5–14.5)
GLOBULIN SER CALC-MCNC: 4.1 G/DL (ref 2–4)
GLUCOSE SERPL-MCNC: 288 MG/DL (ref 65–100)
GLUCOSE UR STRIP.AUTO-MCNC: >1000 MG/DL
HCT VFR BLD AUTO: 38.3 % (ref 35–47)
HGB BLD-MCNC: 13.1 G/DL (ref 11.5–16)
HGB UR QL STRIP: NEGATIVE
IMM GRANULOCYTES # BLD AUTO: 0.1 K/UL (ref 0–0.04)
IMM GRANULOCYTES NFR BLD AUTO: 1 % (ref 0–0.5)
KETONES UR QL STRIP.AUTO: NEGATIVE MG/DL
LEUKOCYTE ESTERASE UR QL STRIP.AUTO: ABNORMAL
LIPASE SERPL-CCNC: 168 U/L (ref 73–393)
LYMPHOCYTES # BLD: 0.9 K/UL (ref 0.8–3.5)
LYMPHOCYTES NFR BLD: 12 % (ref 12–49)
MCH RBC QN AUTO: 28 PG (ref 26–34)
MCHC RBC AUTO-ENTMCNC: 34.2 G/DL (ref 30–36.5)
MCV RBC AUTO: 81.8 FL (ref 80–99)
MONOCYTES # BLD: 0.3 K/UL (ref 0–1)
MONOCYTES NFR BLD: 3 % (ref 5–13)
NEUTS SEG # BLD: 6.8 K/UL (ref 1.8–8)
NEUTS SEG NFR BLD: 84 % (ref 32–75)
NITRITE UR QL STRIP.AUTO: POSITIVE
NRBC # BLD: 0 K/UL (ref 0–0.01)
NRBC BLD-RTO: 0 PER 100 WBC
PH UR STRIP: 6 [PH] (ref 5–8)
PLATELET # BLD AUTO: 235 K/UL (ref 150–400)
PMV BLD AUTO: 9.5 FL (ref 8.9–12.9)
POTASSIUM SERPL-SCNC: 3.7 MMOL/L (ref 3.5–5.1)
PROT SERPL-MCNC: 8 G/DL (ref 6.4–8.2)
PROT UR STRIP-MCNC: NEGATIVE MG/DL
RBC # BLD AUTO: 4.68 M/UL (ref 3.8–5.2)
RBC #/AREA URNS HPF: ABNORMAL /HPF (ref 0–5)
SODIUM SERPL-SCNC: 135 MMOL/L (ref 136–145)
SP GR UR REFRACTOMETRY: 1.03 (ref 1–1.03)
UROBILINOGEN UR QL STRIP.AUTO: 1 EU/DL (ref 0.2–1)
WBC # BLD AUTO: 8.2 K/UL (ref 3.6–11)
WBC URNS QL MICRO: ABNORMAL /HPF (ref 0–4)

## 2020-07-13 PROCEDURE — 81001 URINALYSIS AUTO W/SCOPE: CPT

## 2020-07-13 PROCEDURE — 36415 COLL VENOUS BLD VENIPUNCTURE: CPT

## 2020-07-13 PROCEDURE — 83690 ASSAY OF LIPASE: CPT

## 2020-07-13 PROCEDURE — 96375 TX/PRO/DX INJ NEW DRUG ADDON: CPT

## 2020-07-13 PROCEDURE — 85025 COMPLETE CBC W/AUTO DIFF WBC: CPT

## 2020-07-13 PROCEDURE — 74011636320 HC RX REV CODE- 636/320: Performed by: EMERGENCY MEDICINE

## 2020-07-13 PROCEDURE — 74011250636 HC RX REV CODE- 250/636: Performed by: EMERGENCY MEDICINE

## 2020-07-13 PROCEDURE — 99283 EMERGENCY DEPT VISIT LOW MDM: CPT

## 2020-07-13 PROCEDURE — 80053 COMPREHEN METABOLIC PANEL: CPT

## 2020-07-13 PROCEDURE — 96374 THER/PROPH/DIAG INJ IV PUSH: CPT

## 2020-07-13 PROCEDURE — 74176 CT ABD & PELVIS W/O CONTRAST: CPT

## 2020-07-13 RX ORDER — MORPHINE SULFATE 4 MG/ML
4 INJECTION INTRAVENOUS
Status: COMPLETED | OUTPATIENT
Start: 2020-07-13 | End: 2020-07-13

## 2020-07-13 RX ORDER — HYDROMORPHONE HYDROCHLORIDE 2 MG/1
2 TABLET ORAL
Qty: 10 TAB | Refills: 0 | OUTPATIENT
Start: 2020-07-13 | End: 2020-07-13

## 2020-07-13 RX ORDER — HYDROMORPHONE HYDROCHLORIDE 2 MG/1
2 TABLET ORAL
Qty: 10 TAB | Refills: 0 | Status: SHIPPED | OUTPATIENT
Start: 2020-07-13 | End: 2020-07-16

## 2020-07-13 RX ORDER — ONDANSETRON 2 MG/ML
4 INJECTION INTRAMUSCULAR; INTRAVENOUS
Status: COMPLETED | OUTPATIENT
Start: 2020-07-13 | End: 2020-07-13

## 2020-07-13 RX ORDER — AMOXICILLIN AND CLAVULANATE POTASSIUM 875; 125 MG/1; MG/1
1 TABLET, FILM COATED ORAL 2 TIMES DAILY
Qty: 14 TAB | Refills: 0 | Status: SHIPPED | OUTPATIENT
Start: 2020-07-13 | End: 2020-09-29

## 2020-07-13 RX ORDER — DIPHENHYDRAMINE HYDROCHLORIDE 50 MG/ML
25 INJECTION, SOLUTION INTRAMUSCULAR; INTRAVENOUS
Status: COMPLETED | OUTPATIENT
Start: 2020-07-13 | End: 2020-07-13

## 2020-07-13 RX ADMIN — METHYLPREDNISOLONE SODIUM SUCCINATE 125 MG: 125 INJECTION, POWDER, FOR SOLUTION INTRAMUSCULAR; INTRAVENOUS at 15:47

## 2020-07-13 RX ADMIN — IOHEXOL 50 ML: 240 INJECTION, SOLUTION INTRATHECAL; INTRAVASCULAR; INTRAVENOUS; ORAL at 15:16

## 2020-07-13 RX ADMIN — ONDANSETRON 4 MG: 2 INJECTION INTRAMUSCULAR; INTRAVENOUS at 15:26

## 2020-07-13 RX ADMIN — MORPHINE SULFATE 4 MG: 4 INJECTION, SOLUTION INTRAMUSCULAR; INTRAVENOUS at 15:47

## 2020-07-13 RX ADMIN — SODIUM CHLORIDE 500 ML: 900 INJECTION, SOLUTION INTRAVENOUS at 15:53

## 2020-07-13 RX ADMIN — DIPHENHYDRAMINE HYDROCHLORIDE 25 MG: 50 INJECTION, SOLUTION INTRAMUSCULAR; INTRAVENOUS at 15:47

## 2020-07-13 NOTE — DISCHARGE INSTRUCTIONS
Patient Education        Ulcerative Colitis: Care Instructions  Your Care Instructions     Ulcerative colitis is an inflammatory bowel disease (IBD). The large intestine (colon) gets inflamed and ulcers form in your colon. These ulcers can bleed. People have \"attacks\" of ulcerative colitis. Attacks can come and go. They can cause painful belly cramps and bloody diarrhea. This disease can affect part or all of the colon. How bad the disease gets will often depend on how much of the colon is affected. Bad attacks are often treated in a hospital. There you can get medicines, fluid, and nutrition through a tube in your vein, called an IV. This lets your digestive system rest and recover. If the medicines don't work well, surgery may be needed to remove the colon. At home, you can help control your ulcerative colitis. Take your medicines and try to eat well. And see your doctor as much as he or she recommends. Follow-up care is a key part of your treatment and safety. Be sure to make and go to all appointments, and call your doctor if you are having problems. It's also a good idea to know your test results and keep a list of the medicines you take. How can you care for yourself at home? · Be safe with medicines. Take your medicines exactly as prescribed. Call your doctor if you think you are having a problem with your medicine. You will get more details on the specific medicines your doctor prescribes. · Do not take anti-inflammatory medicines, such as aspirin, ibuprofen (Advil, Motrin), or naproxen (Aleve). They may make your symptoms worse. · Talk to your doctor before you take any other medicines or herbal products. · Eat healthy foods. Avoid foods that make your symptoms worse. These might include milk, alcohol, or spicy foods. · Make sure to get enough iron. Rectal bleeding may make you lose iron. Foods with a lot of iron include beef, lentils, spinach, and raisins.  They also include iron-enriched breads and cereals. · Take any nutrition supplements that your doctor prescribes. · This disease can affect all parts of your life. Get support from friends and family. You may also want to get some counseling. When should you call for help? [id number] anytime you think you may need emergency care. For example, call if:  · Your stools are maroon or very bloody. · You passed out (lost consciousness). Call your doctor now or seek immediate medical care if:  · You are vomiting. · You have new or worse belly pain. · You have a fever. · You cannot pass stools or gas. · You have new or more blood in your stools. Watch closely for changes in your health, and be sure to contact your doctor if:  · You have new or worse symptoms. · You are losing weight. · You do not get better as expected. Where can you learn more? Go to http://gladys-alia.info/  Enter F514 in the search box to learn more about \"Ulcerative Colitis: Care Instructions. \"  Current as of: August 12, 2019               Content Version: 12.5  © 2504-1638 [employer]. Care instructions adapted under license by [employer] (which disclaims liability or warranty for this information). If you have questions about a medical condition or this instruction, always ask your healthcare professional. [address] any warranty or liability for your use of this information.

## 2020-07-13 NOTE — LETTER
NOTIFICATION RETURN TO WORK  
 
7/13/2020 5:37 PM 
 
Ms. Rose Marie MEJÍA NabilHomar [address] [facility] [address] To Whom It May Concern: 
 
Alycia Arriaga Sofya is currently under the care of [facility] EMERGENCY DEPT. She will return to work/school on: 7/15/2020 Sosa Antonio may return to work with no restrictions If there are questions or concerns please have the patient contact our office. Sincerely, Tatiana Coe

## 2020-07-13 NOTE — TELEPHONE ENCOUNTER
I attempted to return this call and reached a voice mail. I left a message relaying the message from Dr. Rafita Kowalski and said to give us a call back as needed.   Brayan

## 2020-07-13 NOTE — ED PROVIDER NOTES
EMERGENCY DEPARTMENT HISTORY AND PHYSICAL EXAM      Date: 7/13/2020  Patient Name: Rene Cowart    History of Presenting Illness     Chief Complaint   Patient presents with    Inflammatory Bowel Disease     patient was recently diagnosed with ulcerative colitis, has been placed on prednisone, she states her symptoms have gotten worse and she is noticing more blood and pus in her stools. History Provided By: Patient    HPI: Armin Cazares, 48 y.o. female with PMHx significant for ulcerative colitis, multiple medical problems as stated below, presents to the ED with cc of severe and lower abdominal pain and rectal pain over the last 3 to 4 days. Patient was seen here approximately 1 week ago for complications related to her ulcerative colitis flare. Since that time patient reports she has been on tapering dose of steroids and her pain has increased. She denies any fevers, chills, vomiting but does have bloody stools. She is seeing a GI doctor as an outpatient. She has no other associated symptoms. No other exacerbating or mounting factors. There are no other complaints, changes, or physical findings at this time. PCP: Joseph Ledesma MD    No current facility-administered medications on file prior to encounter. Current Outpatient Medications on File Prior to Encounter   Medication Sig Dispense Refill    estradioL (ESTRACE) 0.5 mg tablet TAKE 1 TABLET BY MOUTH ONCE DAILY      empagliflozin (Jardiance) 25 mg tablet Take 1 Tab by mouth daily. 90 Tab 3    predniSONE (STERAPRED DS) 10 mg dose pack Take as directed 48 Tab 0    ondansetron (Zofran ODT) 4 mg disintegrating tablet Take 1 Tab by mouth every eight (8) hours as needed for Nausea. 10 Tab 0    glimepiride (AMARYL) 4 mg tablet Take 1 Tab by mouth Daily (before breakfast).  90 Tab 3    PROAIR HFA 90 mcg/actuation inhaler       metFORMIN ER (GLUCOPHAGE XR) 500 mg tablet Take 2 Tabs by mouth Before breakfast and dinner. 360 Tab 3    polyethylene glycol (MIRALAX) 17 gram/dose powder Take 17 g by mouth daily. 1 tablespoon with 8 oz of water daily to prevent constipation 116 g 0    cetirizine (ZYRTEC) 10 mg tablet Take 1 Tab by mouth daily. 20 Tab 0    hydrocortisone (ANUSOL-HC) 25 mg supp Insert 1 Suppository into rectum every twelve (12) hours. 12 Each 0    hydrocortisone (PROCTOCORT) 1 % topical cream Apply  to affected area two (2) times a day. use thin layer 30 g 0    nitroglycerin (RECTIV) 0.4 % (w/w) ointment Insert  into rectum every twelve (12) hours every twelve (12) hours. 30 g 0    naproxen (NAPROSYN) 500 mg tablet Take 1 Tab by mouth two (2) times daily (with meals). 30 Tab 0    ALPRAZolam (XANAX) 0.25 mg tablet Take 0.125-0.25 mg by mouth every twelve (12) hours as needed for Anxiety or Sleep.  melatonin tab tablet Take 5 mg by mouth nightly.  simvastatin (ZOCOR) 20 mg tablet Take 20 mg by mouth nightly.  omeprazole (PRILOSEC) 20 mg capsule Take 40 mg by mouth Daily (before breakfast).  dicyclomine (BENTYL) 20 mg tablet Take 1 Tab by mouth every six (6) hours as needed (abdominal cramps) for up to 20 doses. 20 Tab 0    insulin glargine (LANTUS U-100 INSULIN) 100 unit/mL injection 38 Units by SubCUTAneous route nightly.  sertraline (ZOLOFT) 100 mg tablet Take 200 mg by mouth nightly.  losartan-hydroCHLOROthiazide (HYZAAR) 100-12.5 mg per tablet Take 1 Tab by mouth daily.  EPINEPHrine (EPIPEN) 0.3 mg/0.3 mL (1:1,000) injection 0.3 mL by IntraMUSCular route once as needed for up to 1 dose. 0.3 mL 1    albuterol (PROVENTIL, VENTOLIN) 90 mcg/Actuation inhaler Take 2 Puffs by inhalation every six (6) hours as needed.          Past History     Past Medical History:  Past Medical History:   Diagnosis Date    Anal fissure     Anisocoria     Asthma     LAST EPISODE     Back pain     Cerumen impaction     Chronic kidney disease     hx uti in past    Coagulation defects     ocassional rectal bleeding due to anal fissure    Colovaginal fistula     Diabetes (HCC)     NIDDM    Diabetes (HCC)     Diverticulitis     Diverticulosis     Enlarged tonsils     Frequent UTI     GERD (gastroesophageal reflux disease)     H/O endoscopy     with dilation    HA (headache)     Hepatic steatosis     Hx of colonoscopy with polypectomy     benign    Hypertension     Ill-defined condition     FREQUENT HIVES    Ill-defined condition     HX ELEVATED LIVER ENZYMES    Morbid obesity (HCC)     Nausea & vomiting     during diverticulitis flare    Obesity     Otitis media     Pneumonia     about 15 yrs ago    Psychiatric disorder     ANXIETY    Recurrent tonsillitis     Sinusitis     Transfusion history ~ age 35    postop hysterectomy    Unspecified sleep apnea     snores ( not diagnosed yet)     Urticaria     Urticaria        Past Surgical History:  Past Surgical History:   Procedure Laterality Date    ABDOMEN SURGERY PROC UNLISTED  2018    [facility]    COLONOSCOPY N/A 3/28/2019    COLONOSCOPY performed by Eli Barlow MD at [address] [place] [address]    blake.     HX GI  12    LAPAROSCOPIC HAND ASSISTED  POSS OPEN SIGMOID COLECTOMY POSS TEMPORARY DIVERTING LOOP ILEOSTOMY;  (no illeostomy needed)    HX GYN           HX GYN      cervical conization    HX HEENT      SINUS SURGERY LEFT X2    HX HEENT      SINUS SURGERY ON RIGHT X2    HX OTHER SURGICAL      Sphincterotomy    HX PELVIC LAPAROSCOPY      HX EMMANUEL AND BSO      HX UROLOGICAL  12     CYSTOSCOPY INSERTION URETERAL CATHETERS - Cystoscopy Insertion of bilateral ureteral stents       Family History:  Family History   Problem Relation Age of Onset    Diabetes Mother     Cancer Mother         NON-HODGKINS LYMPHOMA    Anesth Problems Mother         PONV    Diabetes Father     Heart Disease Father         CAD - STENTS, PACEMAKER    Arrhythmia Father Social History:  Social History     Tobacco Use    Smoking status: Never Smoker    Smokeless tobacco: Never Used   Substance Use Topics    Alcohol use: Yes     Comment: Rarely    Drug use: No     Types: Prescription, OTC       Allergies: Allergies   Allergen Reactions    Aspirin Hives and Shortness of Breath    Codeine Hives, Itching and Angioedema     Pt had itching in mouth, on face and lips    Contrast Agent [Iodine] Hives, Itching and Angioedema     Pt. had itching in mouth, on face and lips after IV contrast with the last exam.  Benadryl was given. OK if premedicated.  Flavoring Agent Anaphylaxis    Percocet [Oxycodone-Acetaminophen] Hives, Itching and Angioedema     Pt had itching in mouth, on face and lips    Prilosec [Omeprazole Magnesium] Anaphylaxis     CHERRY FLAVORED ODT; PT TAKES REGULAR PRILOSEC AND IS OK    Dilaudid [Hydromorphone] Itching    Ketorolac Rash     \"makes my eyes spasm and causes rash on my hands\" and \"makes my skin itch and makes me nervous\"    Fentanyl Rash and Itching    Morphine Itching     Severe itching. Tolerates with Benadryl         Review of Systems   Review of Systems   Constitutional: Negative for chills, diaphoresis, fatigue and fever. HENT: Negative for ear pain and sore throat. Eyes: Negative for pain and redness. Respiratory: Negative for cough and shortness of breath. Cardiovascular: Negative for chest pain and leg swelling. Gastrointestinal: Positive for abdominal pain and diarrhea. Negative for nausea and vomiting. Endocrine: Negative for cold intolerance and heat intolerance. Genitourinary: Negative for flank pain and hematuria. Musculoskeletal: Negative for back pain and neck stiffness. Skin: Negative for rash and wound. Neurological: Negative for dizziness, syncope and headaches. All other systems reviewed and are negative. Physical Exam   Physical Exam  Vitals signs and nursing note reviewed.    Constitutional: General: She is in acute distress. Appearance: She is well-developed and normal weight. She is not ill-appearing. HENT:      Head: Normocephalic and atraumatic. Mouth/Throat:      Pharynx: No oropharyngeal exudate. Eyes:      Conjunctiva/sclera: Conjunctivae normal.      Pupils: Pupils are equal, round, and reactive to light. Neck:      Musculoskeletal: Normal range of motion. Cardiovascular:      Rate and Rhythm: Normal rate and regular rhythm. Heart sounds: No murmur. Pulmonary:      Effort: Pulmonary effort is normal. No respiratory distress. Breath sounds: Normal breath sounds. No wheezing. Abdominal:      General: Bowel sounds are normal. There is no distension. Palpations: Abdomen is soft. Tenderness: There is abdominal tenderness in the periumbilical area and left lower quadrant. There is no right CVA tenderness, left CVA tenderness, guarding or rebound. Musculoskeletal: Normal range of motion. General: No deformity. Skin:     General: Skin is warm and dry. Findings: No rash. Neurological:      Mental Status: She is alert and oriented to person, place, and time. Coordination: Coordination normal.   Psychiatric:         Behavior: Behavior normal.         Diagnostic Study Results     Labs -   Results for Júnior Resendiz (MRN [id number]) as of 7/20/2020 10:09   Ref.  Range 7/13/2020 13:53 7/13/2020 14:44   WBC Latest Ref Range: 3.6 - 11.0 K/uL 8.2    NRBC Latest Ref Range: 0  WBC 0.0    RBC Latest Ref Range: 3.80 - 5.20 M/uL 4.68    HGB Latest Ref Range: 11.5 - 16.0 g/dL 13.1    HCT Latest Ref Range: 35.0 - 47.0 % 38.3    MCV Latest Ref Range: 80.0 - 99.0 FL 81.8    MCH Latest Ref Range: 26.0 - 34.0 PG 28.0    MCHC Latest Ref Range: 30.0 - 36.5 g/dL 34.2    RDW Latest Ref Range: 11.5 - 14.5 % 14.4    PLATELET Latest Ref Range: 150 - 400 K/uL 235    MPV Latest Ref Range: 8.9 - 12.9 FL 9.5    NEUTROPHILS Latest Ref Range: 32 - 75 % 84 (H)    LYMPHOCYTES Latest Ref Range: 12 - 49 % 12    MONOCYTES Latest Ref Range: 5 - 13 % 3 (L)    EOSINOPHILS Latest Ref Range: 0 - 7 % 0    BASOPHILS Latest Ref Range: 0 - 1 % 0    IMMATURE GRANULOCYTES Latest Ref Range: 0.0 - 0.5 % 1 (H)    DF Latest Units:   AUTOMATED    ABSOLUTE NRBC Latest Ref Range: 0.00 - 0.01 K/uL 0.00    ABS. NEUTROPHILS Latest Ref Range: 1.8 - 8.0 K/UL 6.8    ABS. IMM. GRANS. Latest Ref Range: 0.00 - 0.04 K/UL 0.1 (H)    ABS. LYMPHOCYTES Latest Ref Range: 0.8 - 3.5 K/UL 0.9    ABS. MONOCYTES Latest Ref Range: 0.0 - 1.0 K/UL 0.3    ABS. EOSINOPHILS Latest Ref Range: 0.0 - 0.4 K/UL 0.0    ABS.  BASOPHILS Latest Ref Range: 0.0 - 0.1 K/UL 0.0    Color Latest Units:    ORANGE   Appearance Latest Ref Range: CLEAR    CLEAR   Specific gravity Latest Ref Range: 1.003 - 1.030    1.030   pH (UA) Latest Ref Range: 5.0 - 8.0    6.0   Protein Latest Ref Range: NEG mg/dL  Negative   Glucose Latest Ref Range: NEG mg/dL  >1,000 (A)   Ketone Latest Ref Range: NEG mg/dL  Negative   Blood Latest Ref Range: NEG    Negative   Bilirubin Latest Ref Range: NEG    Negative   Urobilinogen Latest Ref Range: 0.2 - 1.0 EU/dL  1.0   Nitrites Latest Ref Range: NEG    Positive (A)   Leukocyte Esterase Latest Ref Range: NEG    SMALL (A)   Epithelial cells Latest Ref Range: FEW /lpf  FEW   WBC Latest Ref Range: 0 - 4 /hpf  0-4   RBC Latest Ref Range: 0 - 5 /hpf  0-5   Bacteria Latest Ref Range: NEG /hpf  Negative   Sodium Latest Ref Range: 136 - 145 mmol/L 135 (L)    Potassium Latest Ref Range: 3.5 - 5.1 mmol/L 3.7    Chloride Latest Ref Range: 97 - 108 mmol/L 98    CO2 Latest Ref Range: 21 - 32 mmol/L 29    Anion gap Latest Ref Range: 5 - 15 mmol/L 8    Glucose Latest Ref Range: 65 - 100 mg/dL 288 (H)    BUN Latest Ref Range: 6 - 20 MG/DL 16    Creatinine Latest Ref Range: 0.55 - 1.02 MG/DL 0.93    BUN/Creatinine ratio Latest Ref Range: 12 - 20   17    Calcium Latest Ref Range: 8.5 - 10.1 MG/DL 9.5    GFR est non-AA Latest Ref Range: >60 ml/min/1.73m2 >60    GFR est AA Latest Ref Range: >60 ml/min/1.73m2 >60    Bilirubin, total Latest Ref Range: 0.2 - 1.0 MG/DL 0.5    Protein, total Latest Ref Range: 6.4 - 8.2 g/dL 8.0    Albumin Latest Ref Range: 3.5 - 5.0 g/dL 3.9    Globulin Latest Ref Range: 2.0 - 4.0 g/dL 4.1 (H)    A-G Ratio Latest Ref Range: 1.1 - 2.2   1.0 (L)    ALT Latest Ref Range: 12 - 78 U/L 45    AST Latest Ref Range: 15 - 37 U/L 25    Alk. phosphatase Latest Ref Range: 45 - 117 U/L 82    Lipase Latest Ref Range: 73 - 393 U/L 168        Radiologic Studies -   CT ABD PELV WO CONT   Final Result   IMPRESSION: No bowel obstruction, ileus or perforation. No intra-abdominal   abscess. CT Results  (Last 48 hours)    None        CXR Results  (Last 48 hours)    None            Medical Decision Making   I am the first provider for this patient. I reviewed the vital signs, available nursing notes, past medical history, past surgical history, family history and social history. Vital Signs-Reviewed the patient's vital signs. No data found. Records Reviewed: Nursing Notes and Old Medical Records    Differential Diagnosis:    Pt presents with acute abdominal pain; vital signs stable with currently a non-peritoneal exam; DDx includes: Gastroenteritis, hepatitis, pancreatitis, obstruction, appendicitis, viral illness, IBD, diverticulitis, mesenteric ischemia, AAA or descending dissection, ACS, kidney stone. Will get labs, treat symptomatically and obtain serial abdominal exams to determine if a CT is warranted. Will reassess and monitor closely. Provider Notes (Medical Decision Making):   59-year-old female presenting with diffuse abdominal pain and perirectal pain with unclear etiology. CT scan and lab work unremarkable. Likely related to her ulcerative colitis and she will follow-up closely with her GI doctor as an outpatient.   Will start on a course of antibiotics she will return to the ER with any worsening symptoms. ED Course:     Initial assessment performed. The patients presenting problems have been discussed, and they are in agreement with the care plan formulated and outlined with them. I have encouraged them to ask questions as they arise throughout their visit. ED Course as of Jul 20 1009 Mon Jul 13, 2020   1529 Urobilinogen: 1.0 [CC]      ED Course User Index  [CC] Rao Vigil MD       Critical Care Time:     None    Disposition:  10:13 AM  Rose Marie Cowart's  results have been reviewed with her. She has been counseled regarding her diagnosis. She verbally conveys understanding and agreement of the signs, symptoms, diagnosis, treatment and prognosis and additionally agrees to follow up as recommended with Dr. Angelina Mak MD in 24 - 48 hours. She also agrees with the care-plan and conveys that all of her questions have been answered. I have also put together some discharge instructions for her that include: 1) educational information regarding their diagnosis, 2) how to care for their diagnosis at home, as well a 3) list of reasons why they would want to return to the ED prior to their follow-up appointment, should their condition change. PLAN:  1. Discharge Medication List as of 7/13/2020  5:30 PM      START taking these medications    Details   amoxicillin-clavulanate (Augmentin) 875-125 mg per tablet Take 1 Tab by mouth two (2) times a day., Normal, Disp-14 Tab,R-0         CONTINUE these medications which have CHANGED    Details   HYDROmorphone (Dilaudid) 2 mg tablet Take 1 Tab by mouth every six (6) hours as needed for Pain for up to 3 days. Max Daily Amount: 8 mg. Indications: excessive pain, Print, Disp-10 Tab,R-0         CONTINUE these medications which have NOT CHANGED    Details   !! ondansetron (ZOFRAN ODT) 4 mg disintegrating tablet Take 1 Tab by mouth every eight (8) hours as needed for Nausea for up to 7 days. , Normal, Disp-21 Tab,R-0 estradioL (ESTRACE) 0.5 mg tablet TAKE 1 TABLET BY MOUTH ONCE DAILY, Historical Med      empagliflozin (Jardiance) 25 mg tablet Take 1 Tab by mouth daily. , Normal, Disp-90 Tab, R-3      predniSONE (STERAPRED DS) 10 mg dose pack Take as directed, Normal, Disp-48 Tab, R-0      !! ondansetron (Zofran ODT) 4 mg disintegrating tablet Take 1 Tab by mouth every eight (8) hours as needed for Nausea., Normal, Disp-10 Tab, R-0      glimepiride (AMARYL) 4 mg tablet Take 1 Tab by mouth Daily (before breakfast). , Normal, Disp-90 Tab, R-3      PROAIR HFA 90 mcg/actuation inhaler Historical Med, JAIME      metFORMIN ER (GLUCOPHAGE XR) 500 mg tablet Take 2 Tabs by mouth Before breakfast and dinner., Normal, Disp-360 Tab, R-3      polyethylene glycol (MIRALAX) 17 gram/dose powder Take 17 g by mouth daily. 1 tablespoon with 8 oz of water daily to prevent constipation, Normal, Disp-116 g, R-0      cetirizine (ZYRTEC) 10 mg tablet Take 1 Tab by mouth daily. , Normal, Disp-20 Tab, R-0      hydrocortisone (ANUSOL-HC) 25 mg supp Insert 1 Suppository into rectum every twelve (12) hours. , Normal, Disp-12 Each, R-0      hydrocortisone (PROCTOCORT) 1 % topical cream Apply  to affected area two (2) times a day. use thin layer, Normal, Disp-30 g, R-0      nitroglycerin (RECTIV) 0.4 % (w/w) ointment Insert  into rectum every twelve (12) hours every twelve (12) hours. , Normal, Disp-30 g, R-0      naproxen (NAPROSYN) 500 mg tablet Take 1 Tab by mouth two (2) times daily (with meals). , Print, Disp-30 Tab, R-0      ALPRAZolam (XANAX) 0.25 mg tablet Take 0.125-0.25 mg by mouth every twelve (12) hours as needed for Anxiety or Sleep., Historical Med      melatonin tab tablet Take 5 mg by mouth nightly., Historical Med      simvastatin (ZOCOR) 20 mg tablet Take 20 mg by mouth nightly., Historical Med      omeprazole (PRILOSEC) 20 mg capsule Take 40 mg by mouth Daily (before breakfast). , Historical Med      dicyclomine (BENTYL) 20 mg tablet Take 1 Tab by mouth every six (6) hours as needed (abdominal cramps) for up to 20 doses. , Print, Disp-20 Tab, R-0      insulin glargine (LANTUS U-100 INSULIN) 100 unit/mL injection 38 Units by SubCUTAneous route nightly., Historical Med      sertraline (ZOLOFT) 100 mg tablet Take 200 mg by mouth nightly., Historical Med      losartan-hydroCHLOROthiazide (HYZAAR) 100-12.5 mg per tablet Take 1 Tab by mouth daily. , Historical Med      EPINEPHrine (EPIPEN) 0.3 mg/0.3 mL (1:1,000) injection 0.3 mL by IntraMUSCular route once as needed for up to 1 dose., Print, Disp-0.3 mL, R-1      albuterol (PROVENTIL, VENTOLIN) 90 mcg/Actuation inhaler Take 2 Puffs by inhalation every six (6) hours as needed., Historical Med       !! - Potential duplicate medications found. Please discuss with provider. 2.   Follow-up Information     Follow up With Specialties Details Why Contact Info    Joseph Ledesma MD Family Practice In 2 days For a follow-up evaluation. [address]  [phone]      [facility] EMERGENCY DEPT Emergency Medicine In 1 day If symptoms worsen [address] [place] [address]  [phone]        Return to ED if worse     Diagnosis     Clinical Impression:   1.  Ulcerative colitis with complication, unspecified location ([facility]

## 2020-07-14 ENCOUNTER — PATIENT OUTREACH (OUTPATIENT)
Dept: CASE MANAGEMENT | Age: 50
End: 2020-07-14

## 2020-07-16 ENCOUNTER — PATIENT OUTREACH (OUTPATIENT)
Dept: CASE MANAGEMENT | Age: 50
End: 2020-07-16

## 2020-07-20 ENCOUNTER — APPOINTMENT (OUTPATIENT)
Dept: GENERAL RADIOLOGY | Age: 50
End: 2020-07-20
Attending: EMERGENCY MEDICINE
Payer: MEDICAID

## 2020-07-20 ENCOUNTER — HOSPITAL ENCOUNTER (EMERGENCY)
Age: 50
Discharge: HOME | End: 2020-07-20
Attending: EMERGENCY MEDICINE
Payer: MEDICAID

## 2020-07-20 ENCOUNTER — HOSPITAL ENCOUNTER (EMERGENCY)
Age: 50
Discharge: HOME | End: 2020-07-21
Attending: EMERGENCY MEDICINE
Payer: MEDICAID

## 2020-07-20 VITALS
DIASTOLIC BLOOD PRESSURE: 88 MMHG | HEIGHT: 63 IN | RESPIRATION RATE: 18 BRPM | WEIGHT: 205.25 LBS | HEART RATE: 70 BPM | OXYGEN SATURATION: 92 % | SYSTOLIC BLOOD PRESSURE: 139 MMHG | TEMPERATURE: 98.2 F | BODY MASS INDEX: 36.37 KG/M2

## 2020-07-20 DIAGNOSIS — K51.00 ULCERATIVE PANCOLITIS WITHOUT COMPLICATION (HCC): ICD-10-CM

## 2020-07-20 DIAGNOSIS — R10.84 ABDOMINAL PAIN, GENERALIZED: Primary | ICD-10-CM

## 2020-07-20 DIAGNOSIS — K51.911 ULCERATIVE COLITIS WITH RECTAL BLEEDING, UNSPECIFIED LOCATION (HCC): ICD-10-CM

## 2020-07-20 DIAGNOSIS — K85.00 IDIOPATHIC ACUTE PANCREATITIS, UNSPECIFIED COMPLICATION STATUS: Primary | ICD-10-CM

## 2020-07-20 LAB
ALBUMIN SERPL-MCNC: 3.7 G/DL (ref 3.5–5)
ALBUMIN SERPL-MCNC: 3.9 G/DL (ref 3.5–5)
ALBUMIN/GLOB SERPL: 0.9 {RATIO} (ref 1.1–2.2)
ALBUMIN/GLOB SERPL: 1.1 {RATIO} (ref 1.1–2.2)
ALP SERPL-CCNC: 61 U/L (ref 45–117)
ALP SERPL-CCNC: 62 U/L (ref 45–117)
ALT SERPL-CCNC: 37 U/L (ref 12–78)
ALT SERPL-CCNC: 40 U/L (ref 12–78)
ANION GAP SERPL CALC-SCNC: 10 MMOL/L (ref 5–15)
ANION GAP SERPL CALC-SCNC: 9 MMOL/L (ref 5–15)
APPEARANCE UR: CLEAR
AST SERPL-CCNC: 17 U/L (ref 15–37)
AST SERPL-CCNC: 33 U/L (ref 15–37)
ATRIAL RATE: 75 BPM
BACTERIA URNS QL MICRO: NEGATIVE /HPF
BASOPHILS # BLD: 0 K/UL (ref 0–0.1)
BASOPHILS # BLD: 0 K/UL (ref 0–0.1)
BASOPHILS NFR BLD: 0 % (ref 0–1)
BASOPHILS NFR BLD: 0 % (ref 0–1)
BILIRUB SERPL-MCNC: 0.4 MG/DL (ref 0.2–1)
BILIRUB SERPL-MCNC: 0.4 MG/DL (ref 0.2–1)
BILIRUB UR QL: NEGATIVE
BUN SERPL-MCNC: 13 MG/DL (ref 6–20)
BUN SERPL-MCNC: 15 MG/DL (ref 6–20)
BUN/CREAT SERPL: 14 (ref 12–20)
BUN/CREAT SERPL: 15 (ref 12–20)
CALCIUM SERPL-MCNC: 9.2 MG/DL (ref 8.5–10.1)
CALCIUM SERPL-MCNC: 9.8 MG/DL (ref 8.5–10.1)
CALCULATED P AXIS, ECG09: 5 DEGREES
CALCULATED R AXIS, ECG10: 14 DEGREES
CALCULATED T AXIS, ECG11: 31 DEGREES
CHLORIDE SERPL-SCNC: 100 MMOL/L (ref 97–108)
CHLORIDE SERPL-SCNC: 99 MMOL/L (ref 97–108)
CO2 SERPL-SCNC: 25 MMOL/L (ref 21–32)
CO2 SERPL-SCNC: 26 MMOL/L (ref 21–32)
COLOR UR: ABNORMAL
CREAT SERPL-MCNC: 0.88 MG/DL (ref 0.55–1.02)
CREAT SERPL-MCNC: 1.1 MG/DL (ref 0.55–1.02)
DIAGNOSIS, 93000: NORMAL
DIFFERENTIAL METHOD BLD: ABNORMAL
DIFFERENTIAL METHOD BLD: ABNORMAL
EOSINOPHIL # BLD: 0 K/UL (ref 0–0.4)
EOSINOPHIL # BLD: 0 K/UL (ref 0–0.4)
EOSINOPHIL NFR BLD: 0 % (ref 0–7)
EOSINOPHIL NFR BLD: 0 % (ref 0–7)
EPITH CASTS URNS QL MICRO: ABNORMAL /LPF
ERYTHROCYTE [DISTWIDTH] IN BLOOD BY AUTOMATED COUNT: 13.8 % (ref 11.5–14.5)
ERYTHROCYTE [DISTWIDTH] IN BLOOD BY AUTOMATED COUNT: 13.9 % (ref 11.5–14.5)
GLOBULIN SER CALC-MCNC: 3.7 G/DL (ref 2–4)
GLOBULIN SER CALC-MCNC: 3.9 G/DL (ref 2–4)
GLUCOSE SERPL-MCNC: 221 MG/DL (ref 65–100)
GLUCOSE SERPL-MCNC: 269 MG/DL (ref 65–100)
GLUCOSE UR STRIP.AUTO-MCNC: >1000 MG/DL
HCT VFR BLD AUTO: 38.3 % (ref 35–47)
HCT VFR BLD AUTO: 38.8 % (ref 35–47)
HGB BLD-MCNC: 13.1 G/DL (ref 11.5–16)
HGB BLD-MCNC: 13.1 G/DL (ref 11.5–16)
HGB UR QL STRIP: NEGATIVE
HYALINE CASTS URNS QL MICRO: ABNORMAL /LPF (ref 0–5)
IMM GRANULOCYTES # BLD AUTO: 0.1 K/UL (ref 0–0.04)
IMM GRANULOCYTES # BLD AUTO: 0.1 K/UL (ref 0–0.04)
IMM GRANULOCYTES NFR BLD AUTO: 1 % (ref 0–0.5)
IMM GRANULOCYTES NFR BLD AUTO: 1 % (ref 0–0.5)
KETONES UR QL STRIP.AUTO: NEGATIVE MG/DL
LEUKOCYTE ESTERASE UR QL STRIP.AUTO: NEGATIVE
LIPASE SERPL-CCNC: 224 U/L (ref 73–393)
LIPASE SERPL-CCNC: 539 U/L (ref 73–393)
LYMPHOCYTES # BLD: 1.1 K/UL (ref 0.8–3.5)
LYMPHOCYTES # BLD: 2.4 K/UL (ref 0.8–3.5)
LYMPHOCYTES NFR BLD: 21 % (ref 12–49)
LYMPHOCYTES NFR BLD: 7 % (ref 12–49)
MCH RBC QN AUTO: 27.7 PG (ref 26–34)
MCH RBC QN AUTO: 27.7 PG (ref 26–34)
MCHC RBC AUTO-ENTMCNC: 33.8 G/DL (ref 30–36.5)
MCHC RBC AUTO-ENTMCNC: 34.2 G/DL (ref 30–36.5)
MCV RBC AUTO: 81 FL (ref 80–99)
MCV RBC AUTO: 82 FL (ref 80–99)
MONOCYTES # BLD: 0.5 K/UL (ref 0–1)
MONOCYTES # BLD: 0.6 K/UL (ref 0–1)
MONOCYTES NFR BLD: 3 % (ref 5–13)
MONOCYTES NFR BLD: 5 % (ref 5–13)
NEUTS SEG # BLD: 14.6 K/UL (ref 1.8–8)
NEUTS SEG # BLD: 8 K/UL (ref 1.8–8)
NEUTS SEG NFR BLD: 73 % (ref 32–75)
NEUTS SEG NFR BLD: 89 % (ref 32–75)
NITRITE UR QL STRIP.AUTO: POSITIVE
NRBC # BLD: 0 K/UL (ref 0–0.01)
NRBC # BLD: 0 K/UL (ref 0–0.01)
NRBC BLD-RTO: 0 PER 100 WBC
NRBC BLD-RTO: 0 PER 100 WBC
P-R INTERVAL, ECG05: 132 MS
PH UR STRIP: 5.5 [PH] (ref 5–8)
PLATELET # BLD AUTO: 234 K/UL (ref 150–400)
PLATELET # BLD AUTO: 239 K/UL (ref 150–400)
PMV BLD AUTO: 9.6 FL (ref 8.9–12.9)
PMV BLD AUTO: 9.7 FL (ref 8.9–12.9)
POTASSIUM SERPL-SCNC: 3.4 MMOL/L (ref 3.5–5.1)
POTASSIUM SERPL-SCNC: 3.5 MMOL/L (ref 3.5–5.1)
PROT SERPL-MCNC: 7.6 G/DL (ref 6.4–8.2)
PROT SERPL-MCNC: 7.6 G/DL (ref 6.4–8.2)
PROT UR STRIP-MCNC: NEGATIVE MG/DL
Q-T INTERVAL, ECG07: 386 MS
QRS DURATION, ECG06: 92 MS
QTC CALCULATION (BEZET), ECG08: 431 MS
RBC # BLD AUTO: 4.73 M/UL (ref 3.8–5.2)
RBC # BLD AUTO: 4.73 M/UL (ref 3.8–5.2)
RBC #/AREA URNS HPF: ABNORMAL /HPF (ref 0–5)
SODIUM SERPL-SCNC: 134 MMOL/L (ref 136–145)
SODIUM SERPL-SCNC: 135 MMOL/L (ref 136–145)
SP GR UR REFRACTOMETRY: 1.01 (ref 1–1.03)
UA: UC IF INDICATED,UAUC: ABNORMAL
UROBILINOGEN UR QL STRIP.AUTO: 1 EU/DL (ref 0.2–1)
VENTRICULAR RATE, ECG03: 75 BPM
WBC # BLD AUTO: 11.1 K/UL (ref 3.6–11)
WBC # BLD AUTO: 16.3 K/UL (ref 3.6–11)
WBC URNS QL MICRO: ABNORMAL /HPF (ref 0–4)

## 2020-07-20 PROCEDURE — 83690 ASSAY OF LIPASE: CPT

## 2020-07-20 PROCEDURE — 80053 COMPREHEN METABOLIC PANEL: CPT

## 2020-07-20 PROCEDURE — 96374 THER/PROPH/DIAG INJ IV PUSH: CPT

## 2020-07-20 PROCEDURE — 96376 TX/PRO/DX INJ SAME DRUG ADON: CPT

## 2020-07-20 PROCEDURE — 74011250637 HC RX REV CODE- 250/637: Performed by: EMERGENCY MEDICINE

## 2020-07-20 PROCEDURE — 93005 ELECTROCARDIOGRAM TRACING: CPT

## 2020-07-20 PROCEDURE — 85025 COMPLETE CBC W/AUTO DIFF WBC: CPT

## 2020-07-20 PROCEDURE — 99284 EMERGENCY DEPT VISIT MOD MDM: CPT

## 2020-07-20 PROCEDURE — 96375 TX/PRO/DX INJ NEW DRUG ADDON: CPT

## 2020-07-20 PROCEDURE — 74011250636 HC RX REV CODE- 250/636: Performed by: EMERGENCY MEDICINE

## 2020-07-20 PROCEDURE — 36415 COLL VENOUS BLD VENIPUNCTURE: CPT

## 2020-07-20 PROCEDURE — 74018 RADEX ABDOMEN 1 VIEW: CPT

## 2020-07-20 PROCEDURE — 81001 URINALYSIS AUTO W/SCOPE: CPT

## 2020-07-20 RX ORDER — HYDROMORPHONE HYDROCHLORIDE 1 MG/ML
1 INJECTION, SOLUTION INTRAMUSCULAR; INTRAVENOUS; SUBCUTANEOUS ONCE
Status: COMPLETED | OUTPATIENT
Start: 2020-07-20 | End: 2020-07-20

## 2020-07-20 RX ORDER — PROMETHAZINE HYDROCHLORIDE 25 MG/1
25 TABLET ORAL
Qty: 12 TAB | Refills: 0 | Status: SHIPPED | OUTPATIENT
Start: 2020-07-20 | End: 2020-09-30 | Stop reason: SDUPTHER

## 2020-07-20 RX ORDER — METOCLOPRAMIDE HYDROCHLORIDE 5 MG/ML
10 INJECTION INTRAMUSCULAR; INTRAVENOUS
Status: COMPLETED | OUTPATIENT
Start: 2020-07-20 | End: 2020-07-20

## 2020-07-20 RX ORDER — HYDROMORPHONE HYDROCHLORIDE 2 MG/1
2 TABLET ORAL
Qty: 15 TAB | Refills: 0 | Status: SHIPPED | OUTPATIENT
Start: 2020-07-20 | End: 2020-07-25

## 2020-07-20 RX ORDER — ONDANSETRON 8 MG/1
8 TABLET, ORALLY DISINTEGRATING ORAL
Qty: 20 TAB | Refills: 0 | Status: SHIPPED | OUTPATIENT
Start: 2020-07-20 | End: 2021-01-05 | Stop reason: SDUPTHER

## 2020-07-20 RX ORDER — ONDANSETRON 2 MG/ML
8 INJECTION INTRAMUSCULAR; INTRAVENOUS ONCE
Status: COMPLETED | OUTPATIENT
Start: 2020-07-20 | End: 2020-07-20

## 2020-07-20 RX ORDER — HYDROMORPHONE HYDROCHLORIDE 1 MG/ML
1 INJECTION, SOLUTION INTRAMUSCULAR; INTRAVENOUS; SUBCUTANEOUS
Status: COMPLETED | OUTPATIENT
Start: 2020-07-20 | End: 2020-07-20

## 2020-07-20 RX ORDER — PREDNISONE 50 MG/1
50 TABLET ORAL DAILY
Qty: 5 TAB | Refills: 0 | Status: SHIPPED | OUTPATIENT
Start: 2020-07-20 | End: 2020-07-25

## 2020-07-20 RX ORDER — DIPHENHYDRAMINE HYDROCHLORIDE 50 MG/ML
25 INJECTION, SOLUTION INTRAMUSCULAR; INTRAVENOUS
Status: COMPLETED | OUTPATIENT
Start: 2020-07-20 | End: 2020-07-21

## 2020-07-20 RX ORDER — PREDNISONE 10 MG/1
TABLET ORAL
Qty: 21 TAB | Refills: 0 | OUTPATIENT
Start: 2020-07-20 | End: 2020-08-10

## 2020-07-20 RX ORDER — ONDANSETRON 2 MG/ML
4 INJECTION INTRAMUSCULAR; INTRAVENOUS
Status: COMPLETED | OUTPATIENT
Start: 2020-07-20 | End: 2020-07-20

## 2020-07-20 RX ORDER — HYDROMORPHONE HYDROCHLORIDE 1 MG/ML
1 INJECTION, SOLUTION INTRAMUSCULAR; INTRAVENOUS; SUBCUTANEOUS
Status: COMPLETED | OUTPATIENT
Start: 2020-07-20 | End: 2020-07-21

## 2020-07-20 RX ORDER — HYDROCODONE BITARTRATE AND ACETAMINOPHEN 10; 325 MG/1; MG/1
2 TABLET ORAL
Qty: 20 TAB | Refills: 0 | Status: SHIPPED | OUTPATIENT
Start: 2020-07-20 | End: 2020-07-23

## 2020-07-20 RX ORDER — DIPHENHYDRAMINE HCL 50 MG
50 CAPSULE ORAL
Status: COMPLETED | OUTPATIENT
Start: 2020-07-20 | End: 2020-07-20

## 2020-07-20 RX ADMIN — ONDANSETRON 4 MG: 2 INJECTION INTRAMUSCULAR; INTRAVENOUS at 23:01

## 2020-07-20 RX ADMIN — METOCLOPRAMIDE 10 MG: 5 INJECTION, SOLUTION INTRAMUSCULAR; INTRAVENOUS at 23:59

## 2020-07-20 RX ADMIN — SODIUM CHLORIDE 500 ML: 900 INJECTION, SOLUTION INTRAVENOUS at 23:01

## 2020-07-20 RX ADMIN — METHYLPREDNISOLONE SODIUM SUCCINATE 125 MG: 125 INJECTION, POWDER, FOR SOLUTION INTRAMUSCULAR; INTRAVENOUS at 04:34

## 2020-07-20 RX ADMIN — HYDROMORPHONE HYDROCHLORIDE 1 MG: 1 INJECTION, SOLUTION INTRAMUSCULAR; INTRAVENOUS; SUBCUTANEOUS at 04:34

## 2020-07-20 RX ADMIN — DIPHENHYDRAMINE HYDROCHLORIDE 50 MG: 50 CAPSULE ORAL at 06:07

## 2020-07-20 RX ADMIN — HYDROMORPHONE HYDROCHLORIDE 1 MG: 1 INJECTION, SOLUTION INTRAMUSCULAR; INTRAVENOUS; SUBCUTANEOUS at 23:01

## 2020-07-20 RX ADMIN — SODIUM CHLORIDE 1000 ML: 900 INJECTION, SOLUTION INTRAVENOUS at 05:17

## 2020-07-20 RX ADMIN — SODIUM CHLORIDE 1000 ML: 900 INJECTION, SOLUTION INTRAVENOUS at 04:32

## 2020-07-20 RX ADMIN — HYDROMORPHONE HYDROCHLORIDE 1 MG: 1 INJECTION, SOLUTION INTRAMUSCULAR; INTRAVENOUS; SUBCUTANEOUS at 05:20

## 2020-07-20 RX ADMIN — ONDANSETRON 8 MG: 2 INJECTION INTRAMUSCULAR; INTRAVENOUS at 04:34

## 2020-07-20 NOTE — ED NOTES
Pt reports mild itching to nose and chest and requests diphenhydramine prior to discharge. Dr. Kristyn Turner notified.

## 2020-07-20 NOTE — LETTER
NOTIFICATION RETURN TO WORK / SCHOOL 
 
7/20/2020 6:28 AM 
 
Ms. Rose Marie Cowart [address] [address] To Whom It May Concern: 
 
Jodie Cowart is currently under the care of [facility] EMERGENCY DEPT. She will return to work/school on: 07/23/2020 Nathalie Neighbors may return to work/school with no restrictions. If there are questions or concerns please have the patient contact our emergency department at [facility]. Sincerely, SUZETTE Tadeo MD

## 2020-07-20 NOTE — DISCHARGE INSTRUCTIONS
Patient Education        Pancreatitis: Care Instructions  Your Care Instructions     The pancreas is an organ behind the stomach. It makes hormones and enzymes to help your body digest food. But if these enzymes attack the pancreas, it can get inflamed. This is called pancreatitis. Most cases are caused by gallstones or by heavy alcohol use. If you take care of yourself at home, it will help you get better. It will also help you avoid more problems with your pancreas. Follow-up care is a key part of your treatment and safety. Be sure to make and go to all appointments, and call your doctor if you are having problems. It's also a good idea to know your test results and keep a list of the medicines you take. How can you care for yourself at home? · Drink clear liquids and eat bland foods until you feel better. [place] foods include rice, dry toast, and crackers. They also include bananas and applesauce. · Eat a low-fat diet until your doctor says your pancreas is healed. · Do not drink alcohol. Tell your doctor if you need help to quit. Counseling, support groups, and sometimes medicines can help you stay sober. · Be safe with medicines. Read and follow all instructions on the label. ? If the doctor gave you a prescription medicine for pain, take it as prescribed. ? If you are not taking a prescription pain medicine, ask your doctor if you can take an over-the-counter medicine. · If your doctor prescribed antibiotics, take them as directed. Do not stop taking them just because you feel better. You need to take the full course of antibiotics. · Get extra rest until you feel better. To prevent future problems with your pancreas  · Do not drink alcohol. · Tell your doctors and pharmacist that you've had pancreatitis. They can help you avoid medicines that may cause this problem again. When should you call for help? [id number] anytime you think you may need emergency care.  For example, call if:  · You vomit blood or what looks like coffee grounds. · Your stools are maroon or very bloody. Call your doctor now or seek immediate medical care if:  · You have new or worse belly pain. · Your stools are black and look like tar, or they have streaks of blood. · You are vomiting. Watch closely for changes in your health, and be sure to contact your doctor if:  · You do not get better as expected. Where can you learn more? Go to http://gladys-alia.info/  Enter J381 in the search box to learn more about \"Pancreatitis: Care Instructions. \"  Current as of: August 12, 2019               Content Version: 12.5  © 3310-9945 Healthwise, [employer]. Care instructions adapted under license by [employer] (which disclaims liability or warranty for this information). If you have questions about a medical condition or this instruction, always ask your healthcare professional. [address] any warranty or liability for your use of this information.

## 2020-07-20 NOTE — LETTER
[address] 
[facility] EMERGENCY DEPT 
[address] [facility] [address] [place] [id number] [phone] Work/School Note Date: 7/20/2020 To Whom It May concern: 
 
Rose Marie Cowart was seen and treated today in the emergency room by the following provider(s): 
No providers found. Rahel Adrian may return to work on [phone]. Sincerely, BEBA Werner

## 2020-07-21 ENCOUNTER — PATIENT OUTREACH (OUTPATIENT)
Dept: CASE MANAGEMENT | Age: 50
End: 2020-07-21

## 2020-07-21 VITALS
SYSTOLIC BLOOD PRESSURE: 119 MMHG | OXYGEN SATURATION: 97 % | TEMPERATURE: 98 F | BODY MASS INDEX: 37.69 KG/M2 | RESPIRATION RATE: 16 BRPM | HEIGHT: 62 IN | HEART RATE: 72 BPM | WEIGHT: 204.81 LBS | DIASTOLIC BLOOD PRESSURE: 70 MMHG

## 2020-07-21 PROCEDURE — 96376 TX/PRO/DX INJ SAME DRUG ADON: CPT

## 2020-07-21 PROCEDURE — 74011250636 HC RX REV CODE- 250/636: Performed by: EMERGENCY MEDICINE

## 2020-07-21 PROCEDURE — 96375 TX/PRO/DX INJ NEW DRUG ADDON: CPT

## 2020-07-21 RX ADMIN — DIPHENHYDRAMINE HYDROCHLORIDE 25 MG: 50 INJECTION, SOLUTION INTRAMUSCULAR; INTRAVENOUS at 00:05

## 2020-07-21 RX ADMIN — HYDROMORPHONE HYDROCHLORIDE 1 MG: 1 INJECTION, SOLUTION INTRAMUSCULAR; INTRAVENOUS; SUBCUTANEOUS at 00:06

## 2020-07-21 NOTE — ED NOTES
Patient was provided with discharge instructions. Instructions and any medications were reviewed with the patient &/or family by Dr. Mike Rojo. Questions and concerns addressed by the provider. Patient discharged in stable condition via ambulation and was unaccompanied although her daughter was here to pick her up.

## 2020-07-21 NOTE — PROGRESS NOTES
Patient contacted regarding recent discharge and COVID-19 risk. Discussed COVID-19 related testing which was not done at this time. Test results were not done. Patient informed of results, if available? n/a    Care Transition Nurse/ Ambulatory Care Manager contacted the patient by telephone to perform post discharge assessment. Verified name and  with patient as identifiers. Patient has following risk factors of: asthma and immunocompromised. CTN/ACM reviewed discharge instructions, medical action plan and red flags related to discharge diagnosis. Reviewed and educated them on any new and changed medications related to discharge diagnosis. Advised obtaining a 90-day supply of all daily and as-needed medications. Education provided regarding infection prevention, and signs and symptoms of COVID-19 and when to seek medical attention with patient who verbalized understanding. Discussed exposure protocols and quarantine from [address] you at higher risk for severe illness  and given an opportunity for questions and concerns. The patient agrees to contact the COVID-19 hotline [phone] or PCP office for questions related to their healthcare. CTN/ACM provided contact information for future reference. From CDC: Are you at higher risk for severe illness?  Wash your hands often.  Avoid close contact (6 feet, which is about two arm lengths) with people who are sick.  Put distance between yourself and other people if COVID-19 is spreading in your community.  Clean and disinfect frequently touched surfaces.  Avoid all cruise travel and non-essential air travel.  Call your healthcare professional if you have concerns about COVID-19 and your underlying condition or if you are sick.     For more information on steps you can take to protect yourself, see CDC's How to Protect Yourself      Patient/family/caregiver given information for [facility]rena and agrees to enroll yes  Patient's preferred e-mail:  [email or web address]  Patient's preferred phone number: [phone]  Based on Loop alert triggers, patient will be contacted by nurse care manager for worsening symptoms. Pt will be further monitored by COVID Loop Team based on severity of symptoms and risk factors.  DMB

## 2020-07-21 NOTE — ED PROVIDER NOTES
EMERGENCY DEPARTMENT HISTORY AND PHYSICAL EXAM      Date: 7/20/2020  Patient Name: Latisha FerrerSoMary    History of Presenting Illness     Chief Complaint   Patient presents with    Abdominal Pain     seen in ED last night for acute pancreatitis, states that she is back for pain control and continued n/v       History Provided By: Patient    HPI: Antionette Franklin, 48 y.o. female presents to the ED with cc of abd pain. Pt states hx of pancreatitis. She had been seen in the ED and placed on pain meds. She states since ED d/c her pain has not improved and has not responded to pain meds prescribed. She has dig PMHx and is followed by GI at [facility]. There has been no fever or chills. She states diffuse abdominal pain rated 9/10 with no alleviating or exacerbating factors. There has been no diarrhea. . She denies blood in stool. She denies urinary symptoms. She denies CP and SOA. There are no other complaints, changes, or physical findings at this time.     PCP: Sara Hoskins MD    Current Facility-Administered Medications on File Prior to Encounter   Medication Dose Route Frequency Provider Last Rate Last Dose    [COMPLETED] ondansetron (ZOFRAN) injection 8 mg  8 mg IntraVENous ONCE Eddie Gonsalez MD   8 mg at 07/20/20 0434    [COMPLETED] HYDROmorphone (PF) (DILAUDID) injection 1 mg  1 mg IntraVENous ONCE Eddie Gonsalez MD   1 mg at 07/20/20 0434    [COMPLETED] methylPREDNISolone (PF) (Solu-MEDROL) injection 125 mg  125 mg IntraVENous NOW Eddie Gonsalez MD   125 mg at 07/20/20 0434    [COMPLETED] sodium chloride 0.9 % bolus infusion 1,000 mL  1,000 mL IntraVENous ONCE Eddie Gonsalez MD   Stopped at 07/20/20 0518    [COMPLETED] HYDROmorphone (PF) (DILAUDID) injection 1 mg  1 mg IntraVENous ONCE Eddie Gonsalez MD   1 mg at 07/20/20 0520    [COMPLETED] sodium chloride 0.9 % bolus infusion 1,000 mL  1,000 mL IntraVENous ONCE Eddie Gonsalez MD   Stopped at 07/20/20 [address] diphenhydrAMINE (BENADRYL) capsule 50 mg  50 mg Oral NOW Gabrielle Jasso MD   50 mg at 07/20/20 0607     Current Outpatient Medications on File Prior to Encounter   Medication Sig Dispense Refill    HYDROcodone-acetaminophen (Norco)  mg tablet Take 2 Tabs by mouth every six (6) hours as needed for Pain for up to 3 days. Max Daily Amount: 8 Tabs. 20 Tab 0    ondansetron (Zofran ODT) 8 mg disintegrating tablet Take 1 Tab by mouth every eight (8) hours as needed for Nausea or Vomiting. 20 Tab 0    predniSONE (STERAPRED DS) 10 mg dose pack Follow instructions on packaging. 21 Tab 0    predniSONE (DELTASONE) 50 mg tablet Take 1 Tab by mouth daily for 5 days. 5 Tab 0    amoxicillin-clavulanate (Augmentin) 875-125 mg per tablet Take 1 Tab by mouth two (2) times a day. 14 Tab 0    estradioL (ESTRACE) 0.5 mg tablet TAKE 1 TABLET BY MOUTH ONCE DAILY      empagliflozin (Jardiance) 25 mg tablet Take 1 Tab by mouth daily. 90 Tab 3    predniSONE (STERAPRED DS) 10 mg dose pack Take as directed 48 Tab 0    ondansetron (Zofran ODT) 4 mg disintegrating tablet Take 1 Tab by mouth every eight (8) hours as needed for Nausea. 10 Tab 0    glimepiride (AMARYL) 4 mg tablet Take 1 Tab by mouth Daily (before breakfast). 90 Tab 3    PROAIR HFA 90 mcg/actuation inhaler       metFORMIN ER (GLUCOPHAGE XR) 500 mg tablet Take 2 Tabs by mouth Before breakfast and dinner. 360 Tab 3    polyethylene glycol (MIRALAX) 17 gram/dose powder Take 17 g by mouth daily. 1 tablespoon with 8 oz of water daily to prevent constipation 116 g 0    cetirizine (ZYRTEC) 10 mg tablet Take 1 Tab by mouth daily. 20 Tab 0    hydrocortisone (ANUSOL-HC) 25 mg supp Insert 1 Suppository into rectum every twelve (12) hours. 12 Each 0    hydrocortisone (PROCTOCORT) 1 % topical cream Apply  to affected area two (2) times a day.  use thin layer 30 g 0    nitroglycerin (RECTIV) 0.4 % (w/w) ointment Insert  into rectum every twelve (12) hours every twelve (12) hours. 30 g 0    naproxen (NAPROSYN) 500 mg tablet Take 1 Tab by mouth two (2) times daily (with meals). 30 Tab 0    ALPRAZolam (XANAX) 0.25 mg tablet Take 0.125-0.25 mg by mouth every twelve (12) hours as needed for Anxiety or Sleep.  melatonin tab tablet Take 5 mg by mouth nightly.  simvastatin (ZOCOR) 20 mg tablet Take 20 mg by mouth nightly.  omeprazole (PRILOSEC) 20 mg capsule Take 40 mg by mouth Daily (before breakfast).  dicyclomine (BENTYL) 20 mg tablet Take 1 Tab by mouth every six (6) hours as needed (abdominal cramps) for up to 20 doses. 20 Tab 0    insulin glargine (LANTUS U-100 INSULIN) 100 unit/mL injection 38 Units by SubCUTAneous route nightly.  sertraline (ZOLOFT) 100 mg tablet Take 200 mg by mouth nightly.  losartan-hydroCHLOROthiazide (HYZAAR) 100-12.5 mg per tablet Take 1 Tab by mouth daily.  EPINEPHrine (EPIPEN) 0.3 mg/0.3 mL (1:1,000) injection 0.3 mL by IntraMUSCular route once as needed for up to 1 dose. 0.3 mL 1    albuterol (PROVENTIL, VENTOLIN) 90 mcg/Actuation inhaler Take 2 Puffs by inhalation every six (6) hours as needed.          Past History     Past Medical History:  Past Medical History:   Diagnosis Date    Anal fissure     Anisocoria     Asthma     LAST EPISODE     Back pain     Cerumen impaction     Chronic kidney disease     hx uti in past    Coagulation defects     ocassional rectal bleeding due to anal fissure    Colovaginal fistula     Diabetes (HCC)     NIDDM    Diabetes ([facility] [address]     Diverticulitis     Diverticulosis     Enlarged tonsils     Frequent UTI     GERD (gastroesophageal reflux disease)     H/O endoscopy     with dilation    HA (headache)     Hepatic steatosis     Hx of colonoscopy with polypectomy     benign    Hypertension     Ill-defined condition     FREQUENT HIVES    Ill-defined condition     HX ELEVATED LIVER ENZYMES    Morbid obesity (HCC)     Nausea & vomiting     during diverticulitis flare  Obesity     Otitis media     Pneumonia     about 15 yrs ago    Psychiatric disorder     ANXIETY    Recurrent tonsillitis     Sinusitis     Transfusion history ~ age 35    postop hysterectomy    Unspecified sleep apnea     snores ( not diagnosed yet)     Urticaria     Urticaria        Past Surgical History:  Past Surgical History:   Procedure Laterality Date    ABDOMEN SURGERY PROC UNLISTED  2018    [facility]    COLONOSCOPY N/A 3/28/2019    COLONOSCOPY performed by Chad Badillo MD at [facility]    [place].  HX GI  12    LAPAROSCOPIC HAND ASSISTED  POSS OPEN SIGMOID COLECTOMY POSS TEMPORARY DIVERTING LOOP ILEOSTOMY;  (no illeostomy needed)    HX GYN           HX GYN      cervical conization    HX HEENT      SINUS SURGERY LEFT X2    HX HEENT      SINUS SURGERY ON RIGHT X2    HX OTHER SURGICAL      Sphincterotomy    HX PELVIC LAPAROSCOPY      HX EMMANUEL AND BSO      HX UROLOGICAL  12     CYSTOSCOPY INSERTION URETERAL CATHETERS - Cystoscopy Insertion of bilateral ureteral stents       Family History:  Family History   Problem Relation Age of Onset    Diabetes Mother     Cancer Mother         NON-HODGKINS LYMPHOMA    Anesth Problems Mother         PONV    Diabetes Father     Heart Disease Father         CAD - STENTS, PACEMAKER    Arrhythmia Father        Social History:  Social History     Tobacco Use    Smoking status: Never Smoker    Smokeless tobacco: Never Used   Substance Use Topics    Alcohol use: Yes     Comment: Rarely    Drug use: No     Types: Prescription, OTC       Allergies: Allergies   Allergen Reactions    Aspirin Hives and Shortness of Breath    Codeine Hives, Itching and Angioedema     Pt had itching in mouth, on face and lips    Contrast Agent [Iodine] Hives, Itching and Angioedema     Pt. had itching in mouth, on face and lips after IV contrast with the last exam.  Benadryl was given.  OK if premedicated.  Flavoring Agent Anaphylaxis    Percocet [Oxycodone-Acetaminophen] Hives, Itching and Angioedema     Pt had itching in mouth, on face and lips    Prilosec [Omeprazole Magnesium] Anaphylaxis     CHERRY FLAVORED ODT; PT TAKES REGULAR PRILOSEC AND IS OK    Dilaudid [Hydromorphone] Itching    Ketorolac Rash     \"makes my eyes spasm and causes rash on my hands\" and \"makes my skin itch and makes me nervous\"    Fentanyl Rash and Itching    Morphine Itching     Severe itching. Tolerates with Benadryl         Review of Systems   Review of Systems   Constitutional: Negative. Negative for appetite change, chills, fatigue and fever. HENT: Negative. Negative for congestion, rhinorrhea, sinus pressure and sore throat. Eyes: Negative. Respiratory: Negative. Negative for cough, choking, chest tightness, shortness of breath and wheezing. Cardiovascular: Negative. Negative for chest pain, palpitations and leg swelling. Gastrointestinal: Positive for abdominal pain. Negative for blood in stool, constipation, diarrhea, nausea and vomiting. Endocrine: Negative. Genitourinary: Negative. Negative for difficulty urinating, dysuria, flank pain and urgency. Musculoskeletal: Negative. Skin: Negative. Neurological: Negative. Negative for dizziness, speech difficulty, weakness, light-headedness, numbness and headaches. Psychiatric/Behavioral: Negative. All other systems reviewed and are negative. Physical Exam   Physical Exam  Vitals signs and nursing note reviewed. Constitutional:       General: She is not in acute distress. Appearance: She is well-developed. She is obese. She is not diaphoretic. Comments: Appears uncomfortable    HENT:      Head: Normocephalic and atraumatic. Mouth/Throat:      Pharynx: No oropharyngeal exudate. Eyes:      Conjunctiva/sclera: Conjunctivae normal.      Pupils: Pupils are equal, round, and reactive to light.    Neck: Musculoskeletal: Normal range of motion and neck supple. Vascular: No JVD. Trachea: No tracheal deviation. Cardiovascular:      Rate and Rhythm: Normal rate and regular rhythm. Heart sounds: Normal heart sounds. No murmur. Pulmonary:      Effort: Pulmonary effort is normal. No respiratory distress. Breath sounds: Normal breath sounds. No stridor. No wheezing or rales. Chest:      Chest wall: No tenderness. Abdominal:      General: There is no distension. Palpations: Abdomen is soft. Tenderness: There is abdominal tenderness (diffuse ). There is no guarding or rebound. Musculoskeletal: Normal range of motion. General: No tenderness. Skin:     General: Skin is warm and dry. Neurological:      Mental Status: She is alert and oriented to person, place, and time. Cranial Nerves: No cranial nerve deficit. Comments: No gross motor or sensory deficits    Psychiatric:         Behavior: Behavior normal.         Diagnostic Study Results     Labs -     Recent Results (from the past 12 hour(s))   CBC WITH AUTOMATED DIFF    Collection Time: 07/20/20  6:20 PM   Result Value Ref Range    WBC 16.3 (H) 3.6 - 11.0 K/uL    RBC 4.73 3.80 - 5.20 M/uL    HGB 13.1 11.5 - 16.0 g/dL    HCT 38.8 35.0 - 47.0 %    MCV 82.0 80.0 - 99.0 FL    MCH 27.7 26.0 - 34.0 PG    MCHC 33.8 30.0 - 36.5 g/dL    RDW 13.9 11.5 - 14.5 %    PLATELET 776 509 - 124 K/uL    MPV 9.7 8.9 - 12.9 FL    NRBC 0.0 0  WBC    ABSOLUTE NRBC 0.00 0.00 - 0.01 K/uL    NEUTROPHILS 89 (H) 32 - 75 %    LYMPHOCYTES 7 (L) 12 - 49 %    MONOCYTES 3 (L) 5 - 13 %    EOSINOPHILS 0 0 - 7 %    BASOPHILS 0 0 - 1 %    IMMATURE GRANULOCYTES 1 (H) 0.0 - 0.5 %    ABS. NEUTROPHILS 14.6 (H) 1.8 - 8.0 K/UL    ABS. LYMPHOCYTES 1.1 0.8 - 3.5 K/UL    ABS. MONOCYTES 0.5 0.0 - 1.0 K/UL    ABS. EOSINOPHILS 0.0 0.0 - 0.4 K/UL    ABS. BASOPHILS 0.0 0.0 - 0.1 K/UL    ABS. IMM.  GRANS. 0.1 (H) 0.00 - 0.04 K/UL    DF AUTOMATED     METABOLIC PANEL, COMPREHENSIVE    Collection Time: 07/20/20  6:20 PM   Result Value Ref Range    Sodium 135 (L) 136 - 145 mmol/L    Potassium 3.5 3.5 - 5.1 mmol/L    Chloride 100 97 - 108 mmol/L    CO2 25 21 - 32 mmol/L    Anion gap 10 5 - 15 mmol/L    Glucose 269 (H) 65 - 100 mg/dL    BUN 13 6 - 20 MG/DL    Creatinine 0.88 0.55 - 1.02 MG/DL    BUN/Creatinine ratio 15 12 - 20      GFR est AA >60 >60 ml/min/1.73m2    GFR est non-AA >60 >60 ml/min/1.73m2    Calcium 9.8 8.5 - 10.1 MG/DL    Bilirubin, total 0.4 0.2 - 1.0 MG/DL    ALT (SGPT) 37 12 - 78 U/L    AST (SGOT) 17 15 - 37 U/L    Alk. phosphatase 61 45 - 117 U/L    Protein, total 7.6 6.4 - 8.2 g/dL    Albumin 3.9 3.5 - 5.0 g/dL    Globulin 3.7 2.0 - 4.0 g/dL    A-G Ratio 1.1 1.1 - 2.2     LIPASE    Collection Time: 07/20/20  6:20 PM   Result Value Ref Range    Lipase 224 73 - 393 U/L       Radiologic Studies -   XR ABD (KUB)   Final Result   IMPRESSION: No acute findings. CT Results  (Last 48 hours)    None        CXR Results  (Last 48 hours)    None          Medical Decision Making   I am the first provider for this patient. I reviewed the vital signs, available nursing notes, past medical history, past surgical history, family history and social history. Vital Signs-Reviewed the patient's vital signs. Patient Vitals for the past 12 hrs:   Temp Pulse Resp BP SpO2   07/21/20 0000 98 °F (36.7 °C) 72 16 119/70 97 %   07/20/20 2300 -- -- -- 125/61 95 %   07/20/20 2004 98.1 °F (36.7 °C) 81 18 140/87 100 %   07/20/20 1807 98 °F (36.7 °C) 83 -- 130/76 97 %         Records Reviewed: Nursing Notes, Old Medical Records, Previous Radiology Studies and Previous Laboratory Studies    Provider Notes (Medical Decision Making):   DDx- pancreatitis, colitis, constipation, dehydration, electrolyte abnormality. ED Course:   Initial assessment performed.  The patients presenting problems have been discussed, and they are in agreement with the care plan formulated and outlined with them. I have encouraged them to ask questions as they arise throughout their visit. No sig abnormality identifies, lipase improving, DC home f/u with GI. Disposition:  PA home    DISCHARGE PLAN:  1. Discharge Medication List as of 7/21/2020 12:43 AM      START taking these medications    Details   promethazine (PHENERGAN) 25 mg tablet Take 1 Tab by mouth every six (6) hours as needed for Nausea., Normal, Disp-12 Tab,R-0      HYDROmorphone (Dilaudid) 2 mg tablet Take 1 Tab by mouth every four (4) hours as needed for Pain for up to 5 days. Max Daily Amount: 12 mg., Normal, Disp-15 Tab,R-0         CONTINUE these medications which have NOT CHANGED    Details   HYDROcodone-acetaminophen (Norco)  mg tablet Take 2 Tabs by mouth every six (6) hours as needed for Pain for up to 3 days. Max Daily Amount: 8 Tabs., Normal, Disp-20 Tab,R-0      !! ondansetron (Zofran ODT) 8 mg disintegrating tablet Take 1 Tab by mouth every eight (8) hours as needed for Nausea or Vomiting., Normal, Disp-20 Tab,R-0      !! predniSONE (STERAPRED DS) 10 mg dose pack Follow instructions on packaging., Normal, Disp-21 Tab,R-0      predniSONE (DELTASONE) 50 mg tablet Take 1 Tab by mouth daily for 5 days. , Normal, Disp-5 Tab,R-0      amoxicillin-clavulanate (Augmentin) 875-125 mg per tablet Take 1 Tab by mouth two (2) times a day., Normal, Disp-14 Tab,R-0      estradioL (ESTRACE) 0.5 mg tablet TAKE 1 TABLET BY MOUTH ONCE DAILY, Historical Med      empagliflozin (Jardiance) 25 mg tablet Take 1 Tab by mouth daily. , Normal, Disp-90 Tab, R-3      !! predniSONE (STERAPRED DS) 10 mg dose pack Take as directed, Normal, Disp-48 Tab, R-0      !! ondansetron (Zofran ODT) 4 mg disintegrating tablet Take 1 Tab by mouth every eight (8) hours as needed for Nausea., Normal, Disp-10 Tab, R-0      glimepiride (AMARYL) 4 mg tablet Take 1 Tab by mouth Daily (before breakfast). , Normal, Disp-90 Tab, R-3      PROAIR HFA 90 mcg/actuation inhaler Historical Med, JAIME      metFORMIN ER (GLUCOPHAGE XR) 500 mg tablet Take 2 Tabs by mouth Before breakfast and dinner., Normal, Disp-360 Tab, R-3      polyethylene glycol (MIRALAX) 17 gram/dose powder Take 17 g by mouth daily. 1 tablespoon with 8 oz of water daily to prevent constipation, Normal, Disp-116 g, R-0      cetirizine (ZYRTEC) 10 mg tablet Take 1 Tab by mouth daily. , Normal, Disp-20 Tab, R-0      hydrocortisone (ANUSOL-HC) 25 mg supp Insert 1 Suppository into rectum every twelve (12) hours. , Normal, Disp-12 Each, R-0      hydrocortisone (PROCTOCORT) 1 % topical cream Apply  to affected area two (2) times a day. use thin layer, Normal, Disp-30 g, R-0      nitroglycerin (RECTIV) 0.4 % (w/w) ointment Insert  into rectum every twelve (12) hours every twelve (12) hours. , Normal, Disp-30 g, R-0      naproxen (NAPROSYN) 500 mg tablet Take 1 Tab by mouth two (2) times daily (with meals). , Print, Disp-30 Tab, R-0      ALPRAZolam (XANAX) 0.25 mg tablet Take 0.125-0.25 mg by mouth every twelve (12) hours as needed for Anxiety or Sleep., Historical Med      melatonin tab tablet Take 5 mg by mouth nightly., Historical Med      simvastatin (ZOCOR) 20 mg tablet Take 20 mg by mouth nightly., Historical Med      omeprazole (PRILOSEC) 20 mg capsule Take 40 mg by mouth Daily (before breakfast). , Historical Med      dicyclomine (BENTYL) 20 mg tablet Take 1 Tab by mouth every six (6) hours as needed (abdominal cramps) for up to 20 doses. , Print, Disp-20 Tab, R-0      insulin glargine (LANTUS U-100 INSULIN) 100 unit/mL injection 38 Units by SubCUTAneous route nightly., Historical Med      sertraline (ZOLOFT) 100 mg tablet Take 200 mg by mouth nightly., Historical Med      losartan-hydroCHLOROthiazide (HYZAAR) 100-12.5 mg per tablet Take 1 Tab by mouth daily. , Historical Med      EPINEPHrine (EPIPEN) 0.3 mg/0.3 mL (1:1,000) injection 0.3 mL by IntraMUSCular route once as needed for up to 1 dose., Print, Disp-0.3 mL, R-1 albuterol (PROVENTIL, VENTOLIN) 90 mcg/Actuation inhaler Take 2 Puffs by inhalation every six (6) hours as needed., Historical Med       !! - Potential duplicate medications found. Please discuss with provider. 2.   Follow-up Information     Follow up With Specialties Details Why Contact Info    Minor Holman MD Family Medicine  FOLLOW UP WITH YOUR GI PHYSICIAN  [address]  [address] [facility]  [phone]          3. Return to ED if worse     Diagnosis     Clinical Impression:   1. Abdominal pain, generalized    2. Ulcerative colitis with rectal bleeding, unspecified location [facility])        Attestations:    Vish Ignacio, DO    Please note that this dictation was completed with [employer], the computer voice recognition software. Quite often unanticipated grammatical, syntax, homophones, and other interpretive errors are inadvertently transcribed by the computer software. Please disregard these errors. Please excuse any errors that have escaped final proofreading. Thank you.

## 2020-07-21 NOTE — DISCHARGE INSTRUCTIONS
Patient Education        Abdominal Pain: Care Instructions  Your Care Instructions     Abdominal pain has many possible causes. Some aren't serious and get better on their own in a few days. Others need more testing and treatment. If your pain continues or gets worse, you need to be rechecked and may need more tests to find out what is wrong. You may need surgery to correct the problem. Don't ignore new symptoms, such as fever, nausea and vomiting, urination problems, pain that gets worse, and dizziness. These may be signs of a more serious problem. Your doctor may have recommended a follow-up visit in the next 8 to 12 hours. If you are not getting better, you may need more tests or treatment. The doctor has checked you carefully, but problems can develop later. If you notice any problems or new symptoms, get medical treatment right away. Follow-up care is a key part of your treatment and safety. Be sure to make and go to all appointments, and call your doctor if you are having problems. It's also a good idea to know your test results and keep a list of the medicines you take. How can you care for yourself at home? · Rest until you feel better. · To prevent dehydration, drink plenty of fluids, enough so that your urine is light yellow or clear like water. Choose water and other caffeine-free clear liquids until you feel better. If you have kidney, heart, or liver disease and have to limit fluids, talk with your doctor before you increase the amount of fluids you drink. · If your stomach is upset, eat mild foods, such as rice, dry toast or crackers, bananas, and applesauce. Try eating several small meals instead of two or three large ones. · Wait until 48 hours after all symptoms have gone away before you have spicy foods, alcohol, and drinks that contain caffeine. · Do not eat foods that are high in fat. · Avoid anti-inflammatory medicines such as aspirin, ibuprofen (Advil, Motrin), and naproxen (Aleve). These can cause stomach upset. Talk to your doctor if you take daily aspirin for another health problem. When should you call for help? [id number] anytime you think you may need emergency care. For example, call if:  · You passed out (lost consciousness). · You pass maroon or very bloody stools. · You vomit blood or what looks like coffee grounds. · You have new, severe belly pain. Call your doctor now or seek immediate medical care if:  · Your pain gets worse, especially if it becomes focused in one area of your belly. · You have a new or higher fever. · Your stools are black and look like tar, or they have streaks of blood. · You have unexpected vaginal bleeding. · You have symptoms of a urinary tract infection. These may include:  ? Pain when you urinate. ? Urinating more often than usual.  ? Blood in your urine. · You are dizzy or lightheaded, or you feel like you may faint. Watch closely for changes in your health, and be sure to contact your doctor if:  · You are not getting better after 1 day (24 hours). Where can you learn more? Go to http://[employer].info/  Enter S857 in the search box to learn more about \"Abdominal Pain: Care Instructions. \"  Current as of: June 26, 2019               Content Version: 12.5  © 9003-0448 [employer]. Care instructions adapted under license by [employer] (which disclaims liability or warranty for this information). If you have questions about a medical condition or this instruction, always ask your healthcare professional. [place] [address] any warranty or liability for your use of this information. Patient Education        Learning About Colitis  What is colitis? Colitis is swelling (inflammation) of the colon. The colon makes up most of the large intestine. Many conditions can cause colitis. What are some types of colitis? · Infectious colitis is a type that appears suddenly.  It's caused by a bacteria or virus, such as salmonella, shigella, or campylobacter. · Ischemic colitis is caused by problems with blood flow to the colon. This can happen after surgery. It can also be caused by other health problems. · Microscopic colitis doesn't always have a clear cause. It can only be found with special tests. · Crohn's disease and ulcerative colitis are lifelong diseases. They can cause swelling, inflammation, and deep sores in the lining of the digestive tract. What are the symptoms? Symptoms may include fever, diarrhea that may be bloody, or belly pain. You may also have an urgent need to move your bowels or pain when you move your bowels. Or you may have bleeding from the rectum or weight loss. Your symptoms may depend on the type of colitis you have. For example, microscopic colitis may cause watery diarrhea. Sometimes symptoms go away on their own. If they don't go away, or if you have bleeding or severe pain, call your doctor right away. How is it diagnosed? You may need blood tests or a stool test. You also may need imaging tests like a CT scan. You may have a colonoscopy so that a doctor can look inside your colon. In some cases, the doctor may want to test a sample of tissue from the intestine. This test is called a biopsy. How is it treated? Treatment for colitis depends on the condition that is causing it. · Antibiotics may be used to treat an infection. · Diet changes may help with symptoms. · Medicines can also help to relieve inflammation and control symptoms. · In some cases, surgery to remove parts of the intestine may be needed. Follow-up care is a key part of your treatment and safety. Be sure to make and go to all appointments, and call your doctor if you are having problems. It's also a good idea to know your test results and keep a list of the medicines you take. Where can you learn more?   Go to http://www.gray.com/  Enter C130 in the search box to learn more about \"Learning About Colitis. \"  Current as of: August 12, 2019               Content Version: 12.5  © 2472-6979 [employer]. Care instructions adapted under license by [employer] (which disclaims liability or warranty for this information). If you have questions about a medical condition or this instruction, always ask your healthcare professional. [address] any warranty or liability for your use of this information.

## 2020-07-21 NOTE — ED NOTES
Call bell answered. Patient requesting update on results and also wanting pain medication.  Comment placed for MD.

## 2020-07-22 ENCOUNTER — PATIENT OUTREACH (OUTPATIENT)
Dept: CASE MANAGEMENT | Age: 50
End: 2020-07-22

## 2020-07-24 NOTE — ED PROVIDER NOTES
EMERGENCY DEPARTMENT HISTORY AND PHYSICAL EXAM    Please note that this dictation was completed with [email or web address], the computer voice recognition software. Quite often unanticipated grammatical, syntax, homophones, and other interpretive errors are inadvertently transcribed by the computer software. Please disregard these errors. Please excuse any errors that have escaped final proofreading. Date: 7/20/2020  Patient Name: Doris Bahena  Patient Age and Sex: 48 y.o. female    History of Presenting Illness     Chief Complaint   Patient presents with    Abdominal Pain     Patient reports a h/o ulcertive colitis. Reports she has been seen here frequently. Reports rectal bleeding, N/V       History Provided By: Patient    HPI: Doris Bahena, is a 48 y.o. female whose medical history is noted below presents to the ED with periumbilical and left-sided abdominal pain. Has history of UC and has recently been evaluated for a flair. Reports no longer bloody stools but still has diarrhea and mucus. No fevers or chills. +nausea but no vomiting. No cp, sob, cough. abd pain is waxing waning, between moderate and severe in intensity. Constant. Not associated with food. Pt denies any other alleviating or exacerbating factors. There are no other complaints, changes or physical findings at this time.      PCP: Acacia Rivera MD    Past History   Past Medical History:  Past Medical History:   Diagnosis Date    Anal fissure     Anisocoria     Asthma     LAST EPISODE     Back pain     Cerumen impaction     Chronic kidney disease     hx uti in past    Coagulation defects     ocassional rectal bleeding due to anal fissure    Colovaginal fistula     Diabetes ([address]     NIDDM    Diabetes ([address]     Diverticulitis     Diverticulosis     Enlarged tonsils     Frequent UTI     GERD (gastroesophageal reflux disease)     H/O endoscopy     with dilation    HA (headache)     Hepatic steatosis     Hx of colonoscopy with polypectomy     benign    Hypertension     Ill-defined condition     FREQUENT HIVES    Ill-defined condition     HX ELEVATED LIVER ENZYMES    Morbid obesity (HCC)     Nausea & vomiting     during diverticulitis flare    Obesity     Otitis media     Pneumonia     about 15 yrs ago    Psychiatric disorder     ANXIETY    Recurrent tonsillitis     Sinusitis     Transfusion history ~ age 35    postop hysterectomy    Unspecified sleep apnea     snores ( not diagnosed yet)     Urticaria     Urticaria        Past Surgical History:  Past Surgical History:   Procedure Laterality Date    ABDOMEN SURGERY PROC UNLISTED  2018    hernia repair at [facility]    COLONOSCOPY N/A 3/28/2019    COLONOSCOPY performed by Julieta Rangel MD at [address] [place] [address]    blake.  HX GI  12    LAPAROSCOPIC HAND ASSISTED  POSS OPEN SIGMOID COLECTOMY POSS TEMPORARY DIVERTING LOOP ILEOSTOMY;  (no illeostomy needed)    HX GYN           HX GYN      cervical conization    HX HEENT      SINUS SURGERY LEFT X2    HX HEENT      SINUS SURGERY ON RIGHT X2    HX OTHER SURGICAL      Sphincterotomy    HX PELVIC LAPAROSCOPY      HX EMMANUEL AND BSO      HX UROLOGICAL  12     CYSTOSCOPY INSERTION URETERAL CATHETERS - Cystoscopy Insertion of bilateral ureteral stents       Family History:  Family History   Problem Relation Age of Onset    Diabetes Mother     Cancer Mother         NON-HODGKINS LYMPHOMA    Anesth Problems Mother         PONV    Diabetes Father     Heart Disease Father         CAD - STENTS, PACEMAKER    Arrhythmia Father        Social History:  Social History     Tobacco Use    Smoking status: Never Smoker    Smokeless tobacco: Never Used   Substance Use Topics    Alcohol use: Yes     Comment: Rarely    Drug use: No     Types: Prescription, OTC       Allergies:   Allergies   Allergen Reactions    Aspirin Hives and Shortness of Breath    Codeine Hives, Itching and Angioedema     Pt had itching in mouth, on face and lips    Contrast Agent [Iodine] Hives, Itching and Angioedema     Pt. had itching in mouth, on face and lips after IV contrast with the last exam.  Benadryl was given. OK if premedicated.  Flavoring Agent Anaphylaxis    Percocet [Oxycodone-Acetaminophen] Hives, Itching and Angioedema     Pt had itching in mouth, on face and lips    Prilosec [Omeprazole Magnesium] Anaphylaxis     CHERRY FLAVORED ODT; PT TAKES REGULAR PRILOSEC AND IS OK    Dilaudid [Hydromorphone] Itching    Ketorolac Rash     \"makes my eyes spasm and causes rash on my hands\" and \"makes my skin itch and makes me nervous\"    Fentanyl Rash and Itching    Morphine Itching     Severe itching. Tolerates with Benadryl       Review of Systems     Review of Systems   Constitutional: Positive for appetite change, chills and fatigue. Negative for fever. HENT: Negative. Eyes: Negative. Respiratory: Negative for cough and shortness of breath. Cardiovascular: Negative for chest pain and palpitations. Gastrointestinal: Positive for abdominal pain, diarrhea and nausea. Negative for blood in stool and vomiting. Genitourinary: Negative for dysuria, flank pain and hematuria. Musculoskeletal: Negative for arthralgias and gait problem. Skin: Negative. Neurological: Negative for dizziness, numbness and headaches. All other systems reviewed and are negative. Physical Exam     Physical Exam  Vitals signs and nursing note reviewed. Constitutional:       Appearance: She is not toxic-appearing. HENT:      Head: Atraumatic. Mouth/Throat:      Mouth: Mucous membranes are moist.   Eyes:      General: No scleral icterus. Extraocular Movements: Extraocular movements intact. Conjunctiva/sclera: Conjunctivae normal.      Pupils: Pupils are equal, round, and reactive to light. Neck:      Musculoskeletal: Normal range of motion and neck supple.    Cardiovascular:      Rate and Rhythm: Normal rate and regular rhythm. Pulses: Normal pulses. Heart sounds: Normal heart sounds. Pulmonary:      Effort: Pulmonary effort is normal.      Breath sounds: Normal breath sounds. Abdominal:      General: Bowel sounds are normal.      Palpations: Abdomen is soft. Tenderness: There is abdominal tenderness in the epigastric area, periumbilical area and left lower quadrant. There is guarding. There is no right CVA tenderness, left CVA tenderness or rebound. Negative signs include Rutherford's sign and McBurney's sign. Hernia: No hernia is present. Musculoskeletal: Normal range of motion. Skin:     General: Skin is warm and dry. Capillary Refill: Capillary refill takes less than 2 seconds. Neurological:      General: No focal deficit present. Mental Status: She is alert. Mental status is at baseline. Psychiatric:         Mood and Affect: Mood normal.         Behavior: Behavior normal.         Diagnostic Study Results     Labs - I have personally reviewed and interpreted all laboratory results. Jared Sagastume MD, MSc    Results for Airam Myers (MRN [id number])    Ref.  Range 7/20/2020 01:57   WBC Latest Ref Range: 3.6 - 11.0 K/uL 11.1 (H)   NRBC Latest Ref Range: 0  WBC 0.0   RBC Latest Ref Range: 3.80 - 5.20 M/uL 4.73   HGB Latest Ref Range: 11.5 - 16.0 g/dL 13.1   HCT Latest Ref Range: 35.0 - 47.0 % 38.3   MCV Latest Ref Range: 80.0 - 99.0 FL 81.0   MCH Latest Ref Range: 26.0 - 34.0 PG 27.7   MCHC Latest Ref Range: 30.0 - 36.5 g/dL 34.2   RDW Latest Ref Range: 11.5 - 14.5 % 13.8   PLATELET Latest Ref Range: 150 - 400 K/uL 234   MPV Latest Ref Range: 8.9 - 12.9 FL 9.6   NEUTROPHILS Latest Ref Range: 32 - 75 % 73   LYMPHOCYTES Latest Ref Range: 12 - 49 % 21   MONOCYTES Latest Ref Range: 5 - 13 % 5   EOSINOPHILS Latest Ref Range: 0 - 7 % 0   BASOPHILS Latest Ref Range: 0 - 1 % 0   IMMATURE GRANULOCYTES Latest Ref Range: 0.0 - 0.5 % 1 (H)   DF Latest Units: AUTOMATED   ABSOLUTE NRBC Latest Ref Range: 0.00 - 0.01 K/uL 0.00   ABS. NEUTROPHILS Latest Ref Range: 1.8 - 8.0 K/UL 8.0   ABS. IMM. GRANS. Latest Ref Range: 0.00 - 0.04 K/UL 0.1 (H)   ABS. LYMPHOCYTES Latest Ref Range: 0.8 - 3.5 K/UL 2.4   ABS. MONOCYTES Latest Ref Range: 0.0 - 1.0 K/UL 0.6   ABS. EOSINOPHILS Latest Ref Range: 0.0 - 0.4 K/UL 0.0   ABS. BASOPHILS Latest Ref Range: 0.0 - 0.1 K/UL 0.0   Results for Karly Johnson (MRN [id number])    Ref. Range 7/20/2020 01:57   Sodium Latest Ref Range: 136 - 145 mmol/L 134 (L)   Potassium Latest Ref Range: 3.5 - 5.1 mmol/L 3.4 (L)   Chloride Latest Ref Range: 97 - 108 mmol/L 99   CO2 Latest Ref Range: 21 - 32 mmol/L 26   Anion gap Latest Ref Range: 5 - 15 mmol/L 9   Glucose Latest Ref Range: 65 - 100 mg/dL 221 (H)   BUN Latest Ref Range: 6 - 20 MG/DL 15   Creatinine Latest Ref Range: 0.55 - 1.02 MG/DL 1.10 (H)   BUN/Creatinine ratio Latest Ref Range: 12 - 20   14   Calcium Latest Ref Range: 8.5 - 10.1 MG/DL 9.2   GFR est non-AA Latest Ref Range: >60 ml/min/1.73m2 53 (L)   GFR est AA Latest Ref Range: >60 ml/min/1.73m2 >60   Bilirubin, total Latest Ref Range: 0.2 - 1.0 MG/DL 0.4   Protein, total Latest Ref Range: 6.4 - 8.2 g/dL 7.6   Albumin Latest Ref Range: 3.5 - 5.0 g/dL 3.7   Globulin Latest Ref Range: 2.0 - 4.0 g/dL 3.9   A-G Ratio Latest Ref Range: 1.1 - 2.2   0.9 (L)   ALT Latest Ref Range: 12 - 78 U/L 40   AST Latest Ref Range: 15 - 37 U/L 33   Alk. phosphatase Latest Ref Range: 45 - 117 U/L 62   Lipase Latest Ref Range: 73 - 393 U/L 539 (H)     Radiologic Studies - I have personally reviewed and interpreted all imaging studies and agree with radiology interpretation and report. - Queenie Monterroso MD, MSc  No orders to display         Medical Decision Making   I am the first provider for this patient.     Records Reviewed: I reviewed our electronic medical record system for any past medical records that were available that may contribute to the patient's current condition, including their PMH, surgical history, social and family history. Reviewed the nursing notes and vital signs from today's visit. Nursing notes will be reviewed as they become available in realtime while the pt has been in the ED. Domo Mukherjee MD Msc    Vital Signs-Reviewed the patient's vital signs. Provider Notes (Medical Decision Making):   Patient with history of UC presents to the ED with abdominal pain. Was seen in the ED for UC flair multiple times, twice in July. CT abd/pelv on 7/9 and 7/13 both normal. Today states that pain is more sharp and mid-abdominal.  Ddx: UC flair, hepatitis, pancreatitis, diverticular disease, abd abscess or other UC coplication  Plan: pain control, fluids, will send full set of labs. Holding on another CT imaging study given two recent studies that were both normal.    Reevaluation:  Patient feeling better after getting pain medication and fluids. Now tolerating PO. On review of her labs, what is notable is an elevated lipase, this is new for her. She reports having had one episode of pancreatitis over a year ago  Gallbladder surgically absent, cause of pancreatitis idiopathic. Abdomen on reexamination is soft, not peritoneal.   Discussed dispo with patient, she is tolerating po and at this time there is no clear indication for admission so long her pain is controlled. She states that she feels better, would like to go home. Discussed clear liquid diet as long as symptoms last.  Return precautions discussed as well. ED Course:   Initial assessment performed. The patients presenting problems have been discussed, and they are in agreement with the care plan formulated and outlined with them. I have encouraged them to ask questions as they arise throughout their visit. Lillian Britt MD, am the attending of record for this patient encounter.     ED Orders Placed/Medications Administered During ED Course:   Orders Placed This Encounter    CBC WITH AUTOMATED DIFF    METABOLIC PANEL, COMPREHENSIVE    LIPASE    URINALYSIS W/ REFLEX CULTURE    EKG, 12 LEAD, INITIAL    SAMPLE TO BLOOD BANK    INSERT PERIPHERAL IV ONE TIME STAT    SALINE LOCK IV ONE TIME STAT    ondansetron (ZOFRAN) injection 8 mg    HYDROmorphone (PF) (DILAUDID) injection 1 mg    methylPREDNISolone (PF) (Solu-MEDROL) injection 125 mg    sodium chloride 0.9 % bolus infusion 1,000 mL    HYDROmorphone (PF) (DILAUDID) injection 1 mg    sodium chloride 0.9 % bolus infusion 1,000 mL    HYDROcodone-acetaminophen (Norco)  mg tablet    ondansetron (Zofran ODT) 8 mg disintegrating tablet    predniSONE (STERAPRED DS) 10 mg dose pack    predniSONE (DELTASONE) 50 mg tablet    diphenhydrAMINE (BENADRYL) capsule 50 mg     Medications   ondansetron (ZOFRAN) injection 8 mg (8 mg IntraVENous Given 7/20/20 0434)   HYDROmorphone (PF) (DILAUDID) injection 1 mg (1 mg IntraVENous Given 7/20/20 0434)   methylPREDNISolone (PF) (Solu-MEDROL) injection 125 mg (125 mg IntraVENous Given 7/20/20 0434)   sodium chloride 0.9 % bolus infusion 1,000 mL (0 mL IntraVENous IV Completed 7/20/20 0518)   HYDROmorphone (PF) (DILAUDID) injection 1 mg (1 mg IntraVENous Given 7/20/20 0520)   sodium chloride 0.9 % bolus infusion 1,000 mL (0 mL IntraVENous IV Completed 7/20/20 0611)   diphenhydrAMINE (BENADRYL) capsule 50 mg (50 mg Oral Given 7/20/20 0607)          Progress note:  Patient has been reassessed and reports feeling considerably better, has normal vital signs and feels comfortable going home. I think this is reasonable as no findings today suggest a life-threatening condition. DISPOSITION: DISCHARGE  The patient's results have been reviewed with patient and available family and/or caregiver.  They verbally convey their understanding and agreement of the patient's signs, symptoms, diagnosis, treatment and prognosis and additionally agree to follow up as recommended in the discharge instructions or to return to the Emergency Department should the patient's condition change prior to their follow-up appointment. The patient and available family and/or caregiver verbally agree with the care plan and all of their questions have been answered. The discharge instructions have also been provided to the them with educational information regarding the patient's diagnosis as well a list of reasons why the patient would want to return to the ER prior to their follow-up appointment should any concerns arise, the patient's condition change or symptoms worsen. Xochitl Allen MD, Msc    PLAN:  Discharge Medication List as of 7/20/2020  5:48 AM      START taking these medications    Details   HYDROcodone-acetaminophen (Norco)  mg tablet Take 2 Tabs by mouth every six (6) hours as needed for Pain for up to 3 days. Max Daily Amount: 8 Tabs., Normal, Disp-20 Tab,R-0      !! ondansetron (Zofran ODT) 8 mg disintegrating tablet Take 1 Tab by mouth every eight (8) hours as needed for Nausea or Vomiting., Normal, Disp-20 Tab,R-0      !! predniSONE (STERAPRED DS) 10 mg dose pack Follow instructions on packaging., Normal, Disp-21 Tab,R-0      predniSONE (DELTASONE) 50 mg tablet Take 1 Tab by mouth daily for 5 days. , Normal, Disp-5 Tab,R-0       !! - Potential duplicate medications found. Please discuss with provider. CONTINUE these medications which have NOT CHANGED    Details   estradioL (ESTRACE) 0.5 mg tablet TAKE 1 TABLET BY MOUTH ONCE DAILY, Historical Med      PROAIR HFA 90 mcg/actuation inhaler Historical Med, JAIME      ALPRAZolam (XANAX) 0.25 mg tablet Take 0.125-0.25 mg by mouth every twelve (12) hours as needed for Anxiety or Sleep., Historical Med      melatonin tab tablet Take 5 mg by mouth nightly., Historical Med      simvastatin (ZOCOR) 20 mg tablet Take 20 mg by mouth nightly., Historical Med      omeprazole (PRILOSEC) 20 mg capsule Take 40 mg by mouth Daily (before breakfast). , Historical Med      insulin glargine (LANTUS U-100 INSULIN) 100 unit/mL injection 38 Units by SubCUTAneous route nightly., Historical Med      sertraline (ZOLOFT) 100 mg tablet Take 200 mg by mouth nightly., Historical Med      losartan-hydroCHLOROthiazide (HYZAAR) 100-12.5 mg per tablet Take 1 Tab by mouth daily. , Historical Med      albuterol (PROVENTIL, VENTOLIN) 90 mcg/Actuation inhaler Take 2 Puffs by inhalation every six (6) hours as needed., Historical Med      amoxicillin-clavulanate (Augmentin) 875-125 mg per tablet Take 1 Tab by mouth two (2) times a day., Normal, Disp-14 Tab,R-0      empagliflozin (Jardiance) 25 mg tablet Take 1 Tab by mouth daily. , Normal, Disp-90 Tab, R-3      !! predniSONE (STERAPRED DS) 10 mg dose pack Take as directed, Normal, Disp-48 Tab, R-0      !! ondansetron (Zofran ODT) 4 mg disintegrating tablet Take 1 Tab by mouth every eight (8) hours as needed for Nausea., Normal, Disp-10 Tab, R-0      glimepiride (AMARYL) 4 mg tablet Take 1 Tab by mouth Daily (before breakfast). , Normal, Disp-90 Tab, R-3      metFORMIN ER (GLUCOPHAGE XR) 500 mg tablet Take 2 Tabs by mouth Before breakfast and dinner., Normal, Disp-360 Tab, R-3      polyethylene glycol (MIRALAX) 17 gram/dose powder Take 17 g by mouth daily. 1 tablespoon with 8 oz of water daily to prevent constipation, Normal, Disp-116 g, R-0      cetirizine (ZYRTEC) 10 mg tablet Take 1 Tab by mouth daily. , Normal, Disp-20 Tab, R-0      hydrocortisone (ANUSOL-HC) 25 mg supp Insert 1 Suppository into rectum every twelve (12) hours. , Normal, Disp-12 Each, R-0      hydrocortisone (PROCTOCORT) 1 % topical cream Apply  to affected area two (2) times a day. use thin layer, Normal, Disp-30 g, R-0      nitroglycerin (RECTIV) 0.4 % (w/w) ointment Insert  into rectum every twelve (12) hours every twelve (12) hours. , Normal, Disp-30 g, R-0      naproxen (NAPROSYN) 500 mg tablet Take 1 Tab by mouth two (2) times daily (with meals). , Print, Disp-30 Tab, R-0 dicyclomine (BENTYL) 20 mg tablet Take 1 Tab by mouth every six (6) hours as needed (abdominal cramps) for up to 20 doses. , Print, Disp-20 Tab, R-0      EPINEPHrine (EPIPEN) 0.3 mg/0.3 mL (1:1,000) injection 0.3 mL by IntraMUSCular route once as needed for up to 1 dose., Print, Disp-0.3 mL, R-1       !! - Potential duplicate medications found. Please discuss with provider. 1.   2.     Follow-up Information     Follow up With Specialties Details Why Contact Info    Stanley Davis MD Family Medicine Call in 3 days  [address]  [phone]      [facility] EMERGENCY DEPT Emergency Medicine  As needed [address]  [facility] [address]   [address] [address] [phone]        3. Return to ED if worse             Diagnosis     Clinical Impression:   1. Idiopathic acute pancreatitis, unspecified complication status    2. Ulcerative pancolitis without complication ([facility] [address]        Attestation:  I personally performed the services described in this documentation on this date 7/20/2020 for patient Linn Mcclelland Sofya.   Xochitl Allen MD

## 2020-08-10 ENCOUNTER — HOSPITAL ENCOUNTER (EMERGENCY)
Age: 50
Discharge: HOME | End: 2020-08-10
Attending: EMERGENCY MEDICINE | Admitting: EMERGENCY MEDICINE
Payer: MEDICAID

## 2020-08-10 VITALS
HEART RATE: 76 BPM | RESPIRATION RATE: 16 BRPM | HEIGHT: 62 IN | TEMPERATURE: 98.2 F | DIASTOLIC BLOOD PRESSURE: 67 MMHG | WEIGHT: 209 LBS | BODY MASS INDEX: 38.46 KG/M2 | SYSTOLIC BLOOD PRESSURE: 107 MMHG | OXYGEN SATURATION: 96 %

## 2020-08-10 DIAGNOSIS — Z87.19 HISTORY OF ULCERATIVE COLITIS: ICD-10-CM

## 2020-08-10 DIAGNOSIS — R10.84 ABDOMINAL PAIN, GENERALIZED: Primary | ICD-10-CM

## 2020-08-10 DIAGNOSIS — E87.20 LACTIC ACIDOSIS: ICD-10-CM

## 2020-08-10 DIAGNOSIS — E87.6 HYPOKALEMIA: ICD-10-CM

## 2020-08-10 LAB
ALBUMIN SERPL-MCNC: 3.7 G/DL (ref 3.5–5)
ALBUMIN/GLOB SERPL: 1.1 {RATIO} (ref 1.1–2.2)
ALP SERPL-CCNC: 65 U/L (ref 45–117)
ALT SERPL-CCNC: 34 U/L (ref 12–78)
ANION GAP SERPL CALC-SCNC: 7 MMOL/L (ref 5–15)
AST SERPL-CCNC: 27 U/L (ref 15–37)
BASOPHILS # BLD: 0 K/UL (ref 0–0.1)
BASOPHILS NFR BLD: 0 % (ref 0–1)
BILIRUB SERPL-MCNC: 0.4 MG/DL (ref 0.2–1)
BUN SERPL-MCNC: 10 MG/DL (ref 6–20)
BUN/CREAT SERPL: 10 (ref 12–20)
CALCIUM SERPL-MCNC: 8.8 MG/DL (ref 8.5–10.1)
CHLORIDE SERPL-SCNC: 102 MMOL/L (ref 97–108)
CO2 SERPL-SCNC: 25 MMOL/L (ref 21–32)
COMMENT, HOLDF: NORMAL
CREAT SERPL-MCNC: 0.98 MG/DL (ref 0.55–1.02)
DIFFERENTIAL METHOD BLD: NORMAL
EOSINOPHIL # BLD: 0.1 K/UL (ref 0–0.4)
EOSINOPHIL NFR BLD: 3 % (ref 0–7)
ERYTHROCYTE [DISTWIDTH] IN BLOOD BY AUTOMATED COUNT: 13.5 % (ref 11.5–14.5)
GLOBULIN SER CALC-MCNC: 3.5 G/DL (ref 2–4)
GLUCOSE SERPL-MCNC: 296 MG/DL (ref 65–100)
HCT VFR BLD AUTO: 36.2 % (ref 35–47)
HGB BLD-MCNC: 12.4 G/DL (ref 11.5–16)
IMM GRANULOCYTES # BLD AUTO: 0 K/UL (ref 0–0.04)
IMM GRANULOCYTES NFR BLD AUTO: 0 % (ref 0–0.5)
LACTATE BLD-SCNC: 2.63 MMOL/L (ref 0.4–2)
LIPASE SERPL-CCNC: 222 U/L (ref 73–393)
LYMPHOCYTES # BLD: 1.9 K/UL (ref 0.8–3.5)
LYMPHOCYTES NFR BLD: 36 % (ref 12–49)
MCH RBC QN AUTO: 27.6 PG (ref 26–34)
MCHC RBC AUTO-ENTMCNC: 34.3 G/DL (ref 30–36.5)
MCV RBC AUTO: 80.4 FL (ref 80–99)
MONOCYTES # BLD: 0.3 K/UL (ref 0–1)
MONOCYTES NFR BLD: 5 % (ref 5–13)
NEUTS SEG # BLD: 2.8 K/UL (ref 1.8–8)
NEUTS SEG NFR BLD: 56 % (ref 32–75)
NRBC # BLD: 0 K/UL (ref 0–0.01)
NRBC BLD-RTO: 0 PER 100 WBC
PLATELET # BLD AUTO: 171 K/UL (ref 150–400)
PMV BLD AUTO: 9.8 FL (ref 8.9–12.9)
POTASSIUM SERPL-SCNC: 2.9 MMOL/L (ref 3.5–5.1)
PROT SERPL-MCNC: 7.2 G/DL (ref 6.4–8.2)
RBC # BLD AUTO: 4.5 M/UL (ref 3.8–5.2)
SAMPLES BEING HELD,HOLD: NORMAL
SODIUM SERPL-SCNC: 134 MMOL/L (ref 136–145)
WBC # BLD AUTO: 5.1 K/UL (ref 3.6–11)

## 2020-08-10 PROCEDURE — 96376 TX/PRO/DX INJ SAME DRUG ADON: CPT

## 2020-08-10 PROCEDURE — 85025 COMPLETE CBC W/AUTO DIFF WBC: CPT

## 2020-08-10 PROCEDURE — 96375 TX/PRO/DX INJ NEW DRUG ADDON: CPT

## 2020-08-10 PROCEDURE — 74011250637 HC RX REV CODE- 250/637: Performed by: EMERGENCY MEDICINE

## 2020-08-10 PROCEDURE — 87635 SARS-COV-2 COVID-19 AMP PRB: CPT

## 2020-08-10 PROCEDURE — 99284 EMERGENCY DEPT VISIT MOD MDM: CPT

## 2020-08-10 PROCEDURE — 83605 ASSAY OF LACTIC ACID: CPT

## 2020-08-10 PROCEDURE — 80053 COMPREHEN METABOLIC PANEL: CPT

## 2020-08-10 PROCEDURE — 74011250636 HC RX REV CODE- 250/636: Performed by: EMERGENCY MEDICINE

## 2020-08-10 PROCEDURE — 96365 THER/PROPH/DIAG IV INF INIT: CPT

## 2020-08-10 PROCEDURE — 36415 COLL VENOUS BLD VENIPUNCTURE: CPT

## 2020-08-10 PROCEDURE — 83690 ASSAY OF LIPASE: CPT

## 2020-08-10 RX ORDER — MORPHINE SULFATE 2 MG/ML
2 INJECTION, SOLUTION INTRAMUSCULAR; INTRAVENOUS
Status: COMPLETED | OUTPATIENT
Start: 2020-08-10 | End: 2020-08-10

## 2020-08-10 RX ORDER — DIPHENHYDRAMINE HYDROCHLORIDE 50 MG/ML
25 INJECTION, SOLUTION INTRAMUSCULAR; INTRAVENOUS
Status: COMPLETED | OUTPATIENT
Start: 2020-08-10 | End: 2020-08-10

## 2020-08-10 RX ORDER — POTASSIUM CHLORIDE 750 MG/1
40 TABLET, FILM COATED, EXTENDED RELEASE ORAL
Status: COMPLETED | OUTPATIENT
Start: 2020-08-10 | End: 2020-08-10

## 2020-08-10 RX ORDER — PREDNISONE 10 MG/1
TABLET ORAL
Qty: 48 TAB | Refills: 0 | Status: SHIPPED | OUTPATIENT
Start: 2020-08-10 | End: 2020-09-29

## 2020-08-10 RX ORDER — ONDANSETRON 2 MG/ML
4 INJECTION INTRAMUSCULAR; INTRAVENOUS
Status: COMPLETED | OUTPATIENT
Start: 2020-08-10 | End: 2020-08-10

## 2020-08-10 RX ORDER — POTASSIUM CHLORIDE 7.45 MG/ML
10 INJECTION INTRAVENOUS
Status: COMPLETED | OUTPATIENT
Start: 2020-08-10 | End: 2020-08-10

## 2020-08-10 RX ORDER — FENTANYL CITRATE 50 UG/ML
50 INJECTION, SOLUTION INTRAMUSCULAR; INTRAVENOUS
Status: COMPLETED | OUTPATIENT
Start: 2020-08-10 | End: 2020-08-10

## 2020-08-10 RX ADMIN — SODIUM CHLORIDE 500 ML: 9 INJECTION, SOLUTION INTRAVENOUS at 01:41

## 2020-08-10 RX ADMIN — DIPHENHYDRAMINE HYDROCHLORIDE 25 MG: 50 INJECTION, SOLUTION INTRAMUSCULAR; INTRAVENOUS at 01:40

## 2020-08-10 RX ADMIN — MORPHINE SULFATE 2 MG: 2 INJECTION, SOLUTION INTRAMUSCULAR; INTRAVENOUS at 03:03

## 2020-08-10 RX ADMIN — DIPHENHYDRAMINE HYDROCHLORIDE 25 MG: 50 INJECTION, SOLUTION INTRAMUSCULAR; INTRAVENOUS at 03:02

## 2020-08-10 RX ADMIN — POTASSIUM CHLORIDE 40 MEQ: 750 TABLET, FILM COATED, EXTENDED RELEASE ORAL at 03:08

## 2020-08-10 RX ADMIN — ONDANSETRON 4 MG: 2 INJECTION INTRAMUSCULAR; INTRAVENOUS at 01:40

## 2020-08-10 RX ADMIN — POTASSIUM CHLORIDE 10 MEQ: 7.46 INJECTION, SOLUTION INTRAVENOUS at 03:10

## 2020-08-10 RX ADMIN — SODIUM CHLORIDE 500 ML: 900 INJECTION, SOLUTION INTRAVENOUS at 03:10

## 2020-08-10 RX ADMIN — FENTANYL CITRATE 50 MCG: 50 INJECTION, SOLUTION INTRAMUSCULAR; INTRAVENOUS at 01:40

## 2020-08-10 NOTE — DISCHARGE INSTRUCTIONS
Patient Education        Abdominal Pain: Care Instructions  Your Care Instructions     Abdominal pain has many possible causes. Some aren't serious and get better on their own in a few days. Others need more testing and treatment. If your pain continues or gets worse, you need to be rechecked and may need more tests to find out what is wrong. You may need surgery to correct the problem. Don't ignore new symptoms, such as fever, nausea and vomiting, urination problems, pain that gets worse, and dizziness. These may be signs of a more serious problem. Your doctor may have recommended a follow-up visit in the next 8 to 12 hours. If you are not getting better, you may need more tests or treatment. The doctor has checked you carefully, but problems can develop later. If you notice any problems or new symptoms, get medical treatment right away. Follow-up care is a key part of your treatment and safety. Be sure to make and go to all appointments, and call your doctor if you are having problems. It's also a good idea to know your test results and keep a list of the medicines you take. How can you care for yourself at home? · Rest until you feel better. · To prevent dehydration, drink plenty of fluids, enough so that your urine is light yellow or clear like water. Choose water and other caffeine-free clear liquids until you feel better. If you have kidney, heart, or liver disease and have to limit fluids, talk with your doctor before you increase the amount of fluids you drink. · If your stomach is upset, eat mild foods, such as rice, dry toast or crackers, bananas, and applesauce. Try eating several small meals instead of two or three large ones. · Wait until 48 hours after all symptoms have gone away before you have spicy foods, alcohol, and drinks that contain caffeine. · Do not eat foods that are high in fat. · Avoid anti-inflammatory medicines such as aspirin, ibuprofen (Advil, Motrin), and naproxen (Aleve). These can cause stomach upset. Talk to your doctor if you take daily aspirin for another health problem. When should you call for help? [id number] anytime you think you may need emergency care. For example, call if:  · You passed out (lost consciousness). · You pass maroon or very bloody stools. · You vomit blood or what looks like coffee grounds. · You have new, severe belly pain. Call your doctor now or seek immediate medical care if:  · Your pain gets worse, especially if it becomes focused in one area of your belly. · You have a new or higher fever. · Your stools are black and look like tar, or they have streaks of blood. · You have unexpected vaginal bleeding. · You have symptoms of a urinary tract infection. These may include:  ? Pain when you urinate. ? Urinating more often than usual.  ? Blood in your urine. · You are dizzy or lightheaded, or you feel like you may faint. Watch closely for changes in your health, and be sure to contact your doctor if:  · You are not getting better after 1 day (24 hours). Where can you learn more? Go to http://www.gray.com/  Enter T815 in the search box to learn more about \"Abdominal Pain: Care Instructions. \"  Current as of: June 26, 2019               Content Version: 12.5  © 9834-7282 [employer]. Care instructions adapted under license by "[employer] (which disclaims liability or warranty for this information). If you have questions about a medical condition or this instruction, always ask your healthcare professional. [place] [address] any warranty or liability for your use of this information. Patient Education        Hypokalemia: Care Instructions  Your Care Instructions     Hypokalemia (say \"bf-ya-bzq-MARIVEL-deb-uh\") is a low level of potassium. The heart, muscles, kidneys, and nervous system all need potassium to work well. This problem has many different causes.  Kidney problems, diet, and medicines like diuretics and laxatives can cause it. So can vomiting or diarrhea. In some cases, cancer is the cause. Your doctor may do tests to find the cause of your low potassium levels. You may need medicines to bring your potassium levels back to normal. You may also need regular blood tests to check your potassium. If you have very low potassium, you may need intravenous (IV) medicines. You also may need tests to check the electrical activity of your heart. Heart problems caused by low potassium levels can be very serious. Follow-up care is a key part of your treatment and safety. Be sure to make and go to all appointments, and call your doctor if you are having problems. It's also a good idea to know your test results and keep a list of the medicines you take. How can you care for yourself at home? · If your doctor recommends it, eat foods that have a lot of potassium. These include fresh fruits, juices, and vegetables. They also include nuts, beans, and milk. · Be safe with medicines. If your doctor prescribes medicines or potassium supplements, take them exactly as directed. Call your doctor if you have any problems with your medicines. · Get your potassium levels tested as often as your doctor tells you. When should you call for help? [id number] anytime you think you may need emergency care. For example, call if:  · You feel like your heart is missing beats. Heart problems caused by low potassium can cause death. · You passed out (lost consciousness). · You have a seizure. Call your doctor now or seek immediate medical care if:  · You feel weak or unusually tired. · You have severe arm or leg cramps. · You have tingling or numbness. · You feel sick to your stomach, or you vomit. · You have belly cramps. · You feel bloated or constipated. · You have to urinate a lot. · You feel very thirsty most of the time. · You are dizzy or lightheaded, or you feel like you may faint.   · You feel depressed, or you lose touch with reality. Watch closely for changes in your health, and be sure to contact your doctor if:  · You do not get better as expected. Where can you learn more? Go to http://gladys-alia.info/  Enter G358 in the search box to learn more about \"Hypokalemia: Care Instructions. \"  Current as of: July 29, 2019               Content Version: 12.5  © 1055-7576 [employer]. Care instructions adapted under license by [employer] (which disclaims liability or warranty for this information). If you have questions about a medical condition or this instruction, always ask your healthcare professional. [place] [address] any warranty or liability for your use of this information. Patient Education        Ulcerative Colitis: Care Instructions  Your Care Instructions     Ulcerative colitis is an inflammatory bowel disease (IBD). The large intestine (colon) gets inflamed and ulcers form in your colon. These ulcers can bleed. People have \"attacks\" of ulcerative colitis. Attacks can come and go. They can cause painful belly cramps and bloody diarrhea. This disease can affect part or all of the colon. How bad the disease gets will often depend on how much of the colon is affected. Bad attacks are often treated in a hospital. There you can get medicines, fluid, and nutrition through a tube in your vein, called an IV. This lets your digestive system rest and recover. If the medicines don't work well, surgery may be needed to remove the colon. At home, you can help control your ulcerative colitis. Take your medicines and try to eat well. And see your doctor as much as he or she recommends. Follow-up care is a key part of your treatment and safety. Be sure to make and go to all appointments, and call your doctor if you are having problems. It's also a good idea to know your test results and keep a list of the medicines you take.   How can you care for yourself at home? · Be safe with medicines. Take your medicines exactly as prescribed. Call your doctor if you think you are having a problem with your medicine. You will get more details on the specific medicines your doctor prescribes. · Do not take anti-inflammatory medicines, such as aspirin, ibuprofen (Advil, Motrin), or naproxen (Aleve). They may make your symptoms worse. · Talk to your doctor before you take any other medicines or herbal products. · Eat healthy foods. Avoid foods that make your symptoms worse. These might include milk, alcohol, or spicy foods. · Make sure to get enough iron. Rectal bleeding may make you lose iron. Foods with a lot of iron include beef, lentils, spinach, and raisins. They also include iron-enriched breads and cereals. · Take any nutrition supplements that your doctor prescribes. · This disease can affect all parts of your life. Get support from friends and family. You may also want to get some counseling. When should you call for help? [id number] anytime you think you may need emergency care. For example, call if:  · Your stools are maroon or very bloody. · You passed out (lost consciousness). Call your doctor now or seek immediate medical care if:  · You are vomiting. · You have new or worse belly pain. · You have a fever. · You cannot pass stools or gas. · You have new or more blood in your stools. Watch closely for changes in your health, and be sure to contact your doctor if:  · You have new or worse symptoms. · You are losing weight. · You do not get better as expected. Where can you learn more? Go to http://www.gray.com/  Enter F514 in the search box to learn more about \"Ulcerative Colitis: Care Instructions. \"  Current as of: August 12, 2019               Content Version: 12.5  © 4804-0816 [employer].    Care instructions adapted under license by [employer] (which disclaims liability or warranty for this information). If you have questions about a medical condition or this instruction, always ask your healthcare professional. [place] [address] any warranty or liability for your use of this information.

## 2020-08-10 NOTE — LETTER
NOTIFICATION RETURN TO WORK / SCHOOL 
 
8/10/2020 4:47 AM 
 
Ms. Rose Marie Cowart [address] [place] [id number] To Whom It May Concern: 
 
Anusha Cowart is currently under the care of [facility] EMERGENCY DEPT. She will return to work/school on: Thursday - 08/13/2020. Note, this patient has a pending outpatient Covid-19 PCR swab test.  
 
Anusha Cowart may return to work/school with no restrictions. If there are questions or concerns please have the patient contact our emergency department at [facility]. Sincerely, MARYAM Yao MD

## 2020-08-10 NOTE — ED NOTES
Pt presents ambulatory to ed c/o potential flare up of UC., she reports increased in number diarrhea with intermittent bloody (10/day). She notes rectal pain as well. She reports generalized abdominal. She reports her pain at 9/10. She notes nausea. Pt also reports that she has had some loss of taste/smell that started about 2 days ago. She reports she interacted with one person who tested positive for covid. Pt reports some increase in shortness of breath at home but denies any currently. She notes that she has been taking quite a bit of prednisone is will be placed on biologics soon. Pt denies chest pain and vomiting.         1:49 AM: Pt lactic 2.63. Dr. Duran Gloss notified and she asked that the 500ml bolus be continued to the full 1,000 ml. I have reviewed discharge instructions with the patient. The patient verbalized understanding. IV removed and work note given to pt.

## 2020-08-10 NOTE — LETTER
NOTIFICATION RETURN TO WORK / SCHOOL 
 
8/10/2020 4:36 AM 
 
Ms. Rose Marie MEJÍA NabilHomar [address] [facility] [phone] To Whom It May Concern: 
 
Angeles Stevens Sofya is currently under the care of [facility] EMERGENCY DEPT. She will return to work/school on: Thursday - 08/13/2020 Paris Jacobs may return to work/school with no restrictions. If there are questions or concerns please have the patient contact our emergency department at [facility]. Sincerely, MARYAM Yao MD

## 2020-08-10 NOTE — ED PROVIDER NOTES
EMERGENCY DEPARTMENT HISTORY AND PHYSICAL EXAM      Date: 8/10/2020  Patient Name: Armin Cazares    History of Presenting Illness     Chief Complaint   Patient presents with    Melena     pt report UC flare with bloody diarrhea also notes body aches and loss of sense of taste    Generalized Body Aches       History Provided By: Patient    HPI: Armin Cazares, 48 y.o. female with PMHx significant for ulcerative colitis, diabetes, diverticulitis, GERD, anxiety presents to the ED with chief complaint of generalized abdominal pain that she thinks is an ulcerative colitis flare. She states she has been battling her ulcerative colitis for a month. She reports associated nausea but no vomiting, and has also been having diarrhea with some bright red blood in it. She was seen recently by her GI specialist at [facility] and was told that she was having rectal spasms. She states that she is due to start biologic medications to treat her ulcerative colitis in a few weeks. She has been on steroids in her GI doctor has backed off on her dose of steroids in preparation for starting the biological treatments, however patient states whenever she comes down off of her steroids it seems like her abdominal pain from her ulcerative colitis worsens. PCP: Joseph Ledesma MD    No current facility-administered medications on file prior to encounter. Current Outpatient Medications on File Prior to Encounter   Medication Sig Dispense Refill    ondansetron (Zofran ODT) 8 mg disintegrating tablet Take 1 Tab by mouth every eight (8) hours as needed for Nausea or Vomiting. 20 Tab 0    promethazine (PHENERGAN) 25 mg tablet Take 1 Tab by mouth every six (6) hours as needed for Nausea. 12 Tab 0    [DISCONTINUED] predniSONE (STERAPRED DS) 10 mg dose pack Follow instructions on packaging. 21 Tab 0    amoxicillin-clavulanate (Augmentin) 875-125 mg per tablet Take 1 Tab by mouth two (2) times a day.  14 Tab 0    estradioL (ESTRACE) 0.5 mg tablet TAKE 1 TABLET BY MOUTH ONCE DAILY      empagliflozin (Jardiance) 25 mg tablet Take 1 Tab by mouth daily. 90 Tab 3    ondansetron (Zofran ODT) 4 mg disintegrating tablet Take 1 Tab by mouth every eight (8) hours as needed for Nausea. 10 Tab 0    [DISCONTINUED] predniSONE (STERAPRED DS) 10 mg dose pack Take as directed 48 Tab 0    glimepiride (AMARYL) 4 mg tablet Take 1 Tab by mouth Daily (before breakfast). 90 Tab 3    PROAIR HFA 90 mcg/actuation inhaler       metFORMIN ER (GLUCOPHAGE XR) 500 mg tablet Take 2 Tabs by mouth Before breakfast and dinner. 360 Tab 3    polyethylene glycol (MIRALAX) 17 gram/dose powder Take 17 g by mouth daily. 1 tablespoon with 8 oz of water daily to prevent constipation 116 g 0    cetirizine (ZYRTEC) 10 mg tablet Take 1 Tab by mouth daily. 20 Tab 0    hydrocortisone (ANUSOL-HC) 25 mg supp Insert 1 Suppository into rectum every twelve (12) hours. 12 Each 0    hydrocortisone (PROCTOCORT) 1 % topical cream Apply  to affected area two (2) times a day. use thin layer 30 g 0    nitroglycerin (RECTIV) 0.4 % (w/w) ointment Insert  into rectum every twelve (12) hours every twelve (12) hours. 30 g 0    naproxen (NAPROSYN) 500 mg tablet Take 1 Tab by mouth two (2) times daily (with meals). 30 Tab 0    ALPRAZolam (XANAX) 0.25 mg tablet Take 0.125-0.25 mg by mouth every twelve (12) hours as needed for Anxiety or Sleep.  melatonin tab tablet Take 5 mg by mouth nightly.  simvastatin (ZOCOR) 20 mg tablet Take 20 mg by mouth nightly.  omeprazole (PRILOSEC) 20 mg capsule Take 40 mg by mouth Daily (before breakfast).  dicyclomine (BENTYL) 20 mg tablet Take 1 Tab by mouth every six (6) hours as needed (abdominal cramps) for up to 20 doses. 20 Tab 0    insulin glargine (LANTUS U-100 INSULIN) 100 unit/mL injection 38 Units by SubCUTAneous route nightly.  sertraline (ZOLOFT) 100 mg tablet Take 200 mg by mouth nightly.       losartan-hydroCHLOROthiazide (HYZAAR) 100-12.5 mg per tablet Take 1 Tab by mouth daily.  EPINEPHrine (EPIPEN) 0.3 mg/0.3 mL (1:1,000) injection 0.3 mL by IntraMUSCular route once as needed for up to 1 dose. 0.3 mL 1    albuterol (PROVENTIL, VENTOLIN) 90 mcg/Actuation inhaler Take 2 Puffs by inhalation every six (6) hours as needed. Past History     Past Medical History:  Past Medical History:   Diagnosis Date    Anal fissure     Anisocoria     Asthma     LAST EPISODE     Back pain     Cerumen impaction     Chronic kidney disease     hx uti in past    Coagulation defects     ocassional rectal bleeding due to anal fissure    Colovaginal fistula     Diabetes (HCC)     NIDDM    Diabetes ([address]     Diverticulitis     Diverticulosis     Enlarged tonsils     Frequent UTI     GERD (gastroesophageal reflux disease)     H/O endoscopy     with dilation    HA (headache)     Hepatic steatosis     Hx of colonoscopy with polypectomy     benign    Hypertension     Ill-defined condition     FREQUENT HIVES    Ill-defined condition     HX ELEVATED LIVER ENZYMES    Morbid obesity (HCC)     Nausea & vomiting     during diverticulitis flare    Obesity     Otitis media     Pneumonia     about 15 yrs ago    Psychiatric disorder     ANXIETY    Recurrent tonsillitis     Sinusitis     Transfusion history ~ age 35    postop hysterectomy    Unspecified sleep apnea     snores ( not diagnosed yet)     Urticaria     Urticaria        Past Surgical History:  Past Surgical History:   Procedure Laterality Date    ABDOMEN SURGERY PROC UNLISTED  2018    [facility]ia [facility] at [facility]    COLONOSCOPY N/A 3/28/2019    COLONOSCOPY performed by Rizwan Huggins MD at [address] [place] [address]    [facility].     HX GI  12    LAPAROSCOPIC HAND ASSISTED  POSS OPEN SIGMOID COLECTOMY POSS TEMPORARY DIVERTING LOOP ILEOSTOMY;  (no illeostomy needed)    HX GYN           HX GYN      cervical conization    HX HEENT      SINUS SURGERY LEFT X2    HX HEENT      SINUS SURGERY ON RIGHT X2    HX OTHER SURGICAL  11/12    Sphincterotomy    HX PELVIC LAPAROSCOPY      HX EMMANUEL AND BSO      HX UROLOGICAL  7/31/12     CYSTOSCOPY INSERTION URETERAL CATHETERS - Cystoscopy Insertion of bilateral ureteral stents       Family History:  Family History   Problem Relation Age of Onset    Diabetes Mother     Cancer Mother         NON-HODGKINS LYMPHOMA    Anesth Problems Mother         PONV    Diabetes Father     Heart Disease Father         CAD - STENTS, PACEMAKER    Arrhythmia Father        Social History:  Social History     Tobacco Use    Smoking status: Never Smoker    Smokeless tobacco: Never Used   Substance Use Topics    Alcohol use: Yes     Comment: Rarely    Drug use: No     Types: Prescription, OTC       Allergies: Allergies   Allergen Reactions    Aspirin Hives and Shortness of Breath    Codeine Hives, Itching and Angioedema     Pt had itching in mouth, on face and lips    Contrast Agent [Iodine] Hives, Itching and Angioedema     Pt. had itching in mouth, on face and lips after IV contrast with the last exam.  Benadryl was given. OK if premedicated.  Flavoring Agent Anaphylaxis    Percocet [Oxycodone-Acetaminophen] Hives, Itching and Angioedema     Pt had itching in mouth, on face and lips    Prilosec [Omeprazole Magnesium] Anaphylaxis     CHERRY FLAVORED ODT; PT TAKES REGULAR PRILOSEC AND IS OK    Dilaudid [Hydromorphone] Itching    Ketorolac Rash     \"makes my eyes spasm and causes rash on my hands\" and \"makes my skin itch and makes me nervous\"    Fentanyl Rash and Itching    Morphine Itching     Severe itching. Tolerates with Benadryl         Review of Systems   Review of Systems   Constitutional: Negative for chills and fever. HENT: Negative for congestion, ear pain and sore throat. Respiratory: Negative for cough, chest tightness, shortness of breath and wheezing. Gastrointestinal: Positive for abdominal pain, anal bleeding, blood in stool, diarrhea, nausea and rectal pain. Negative for constipation and vomiting. Genitourinary: Negative for dysuria, flank pain and hematuria. Musculoskeletal: Negative for back pain and myalgias. Skin: Negative for rash and wound. Neurological: Negative for dizziness, syncope, weakness, light-headedness and headaches. Psychiatric/Behavioral: Negative for confusion. The patient is not nervous/anxious. All other systems reviewed and are negative. Physical Exam   General appearance - well nourished, well appearing, and in no distress  Eyes - pupils equal and reactive, extraocular eye movements intact  ENT - mucous membranes moist, pharynx normal without lesions  Neck - supple, no significant adenopathy; non-tender to palpation  Chest - clear to auscultation, no wheezes, rales or rhonchi; non-tender to palpation  Heart - normal rate and regular rhythm, S1 and S2 normal, no murmurs noted  Abdomen - soft, mild generalized abdominal tenderness, no rebound or guarding, nondistended, no masses or organomegaly  Rectal-external hemorrhoid present, no visible anal fissure, digital rectal exam deferred at patient's request as patient just had a rectal exam by her GI doctor at [facility].   Musculoskeletal - no joint tenderness, deformity or swelling; normal ROM  Extremities - peripheral pulses normal, no pedal edema  Skin - normal coloration and turgor, no rashes  Neurological - alert, oriented x3, normal speech, no focal findings or movement disorder noted    Diagnostic Study Results     Labs -     Recent Results (from the past 12 hour(s))   CBC WITH AUTOMATED DIFF    Collection Time: 08/10/20  1:01 AM   Result Value Ref Range    WBC 5.1 3.6 - 11.0 K/uL    RBC 4.50 3.80 - 5.20 M/uL    HGB 12.4 11.5 - 16.0 g/dL    HCT 36.2 35.0 - 47.0 %    MCV 80.4 80.0 - 99.0 FL    MCH 27.6 26.0 - 34.0 PG    MCHC 34.3 30.0 - 36.5 g/dL    RDW 13.5 11.5 - 14.5 %    PLATELET 498 341 - 818 K/uL    MPV 9.8 8.9 - 12.9 FL    NRBC 0.0 0  WBC    ABSOLUTE NRBC 0.00 0.00 - 0.01 K/uL    NEUTROPHILS 56 32 - 75 %    LYMPHOCYTES 36 12 - 49 %    MONOCYTES 5 5 - 13 %    EOSINOPHILS 3 0 - 7 %    BASOPHILS 0 0 - 1 %    IMMATURE GRANULOCYTES 0 0.0 - 0.5 %    ABS. NEUTROPHILS 2.8 1.8 - 8.0 K/UL    ABS. LYMPHOCYTES 1.9 0.8 - 3.5 K/UL    ABS. MONOCYTES 0.3 0.0 - 1.0 K/UL    ABS. EOSINOPHILS 0.1 0.0 - 0.4 K/UL    ABS. BASOPHILS 0.0 0.0 - 0.1 K/UL    ABS. IMM. GRANS. 0.0 0.00 - 0.04 K/UL    DF AUTOMATED     METABOLIC PANEL, COMPREHENSIVE    Collection Time: 08/10/20  1:01 AM   Result Value Ref Range    Sodium 134 (L) 136 - 145 mmol/L    Potassium 2.9 (L) 3.5 - 5.1 mmol/L    Chloride 102 97 - 108 mmol/L    CO2 25 21 - 32 mmol/L    Anion gap 7 5 - 15 mmol/L    Glucose 296 (H) 65 - 100 mg/dL    BUN 10 6 - 20 MG/DL    Creatinine 0.98 0.55 - 1.02 MG/DL    BUN/Creatinine ratio 10 (L) 12 - 20      GFR est AA >60 >60 ml/min/1.73m2    GFR est non-AA >60 >60 ml/min/1.73m2    Calcium 8.8 8.5 - 10.1 MG/DL    Bilirubin, total 0.4 0.2 - 1.0 MG/DL    ALT (SGPT) 34 12 - 78 U/L    AST (SGOT) 27 15 - 37 U/L    Alk. phosphatase 65 45 - 117 U/L    Protein, total 7.2 6.4 - 8.2 g/dL    Albumin 3.7 3.5 - 5.0 g/dL    Globulin 3.5 2.0 - 4.0 g/dL    A-G Ratio 1.1 1.1 - 2.2     SAMPLES BEING HELD    Collection Time: 08/10/20  1:01 AM   Result Value Ref Range    SAMPLES BEING HELD BLUE TUBE, RED TUBE, SST TUBE     COMMENT        Add-on orders for these samples will be processed based on acceptable specimen integrity and analyte stability, which may vary by analyte.    LIPASE    Collection Time: 08/10/20  1:01 AM   Result Value Ref Range    Lipase 222 73 - 393 U/L   POC LACTIC ACID    Collection Time: 08/10/20  1:43 AM   Result Value Ref Range    Lactic Acid (POC) 2.63 (HH) 0.40 - 2.00 mmol/L   SARS-COV-2    Collection Time: 08/10/20  3:20 AM   Result Value Ref Range    Specimen source Nasopharyngeal      SARS-CoV-2 PENDING     SARS-CoV-2 PENDING     Specimen source Nasopharyngeal      COVID-19 rapid test PENDING     Specimen type NP Swab      Health status PENDING     COVID-19 PENDING        Radiologic Studies -   No orders to display     CT Results  (Last 48 hours)    None        CXR Results  (Last 48 hours)    None            Medical Decision Making   I am the first provider for this patient. I reviewed the vital signs, available nursing notes, past medical history, past surgical history, family history and social history. Vital Signs-Reviewed the patient's vital signs. Patient Vitals for the past 12 hrs:   Temp Pulse Resp BP SpO2   08/10/20 0315 -- -- -- -- 97 %   08/10/20 0130 -- -- -- 106/79 97 %   08/10/20 0046 98.5 °F (36.9 °C) 85 16 130/74 99 %           Records Reviewed: Nursing Notes and Old Medical Records    Provider Notes (Medical Decision Making):   Differential diagnosis: Ulcerative colitis, gastritis, pancreatitis, dehydration, electrolyte abnormality  We will check CBC, CMP, lactate. ED Course:   Initial assessment performed. The patients presenting problems have been discussed, and they are in agreement with the care plan formulated and outlined with them. I have encouraged them to ask questions as they arise throughout their visit. Progress Notes:  ED Course as of Aug 10 0338   Mon Aug 10, 2020   5995 Lab work shows lactic acidosis with lactate of 2.6 and also potassium is low at 2.9. Otherwise lab work is unremarkable. Patient has had numerous CT scans in the last few months for abdominal pain. Since lab work is unremarkable and patient is currently followed by her GI doctor at [facility], will hold on a CT scan today. Will treat with IV fluids and provide a prednisone taper prescription. Instructed patient to follow-up as soon as possible with her GI doctor at [facility].    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 She also states that she has been having generalized muscle aches and loss of taste and is concerned about possible covid-19 infection. Will test prior to discharge.    [AO]      ED Course User Index  [AO] Fabiana Yao MD       Disposition:  Discharge home    PLAN:  1. Current Discharge Medication List      CONTINUE these medications which have CHANGED    Details   predniSONE (STERAPRED DS) 10 mg dose pack As directed  Qty: 48 Tab, Refills: 0           2. Follow-up Information     Follow up With Specialties Details Why Contact Info    [facility] [place] [facility] Emergency Medicine  If symptoms worsen [address]    Gonzalo Blanco MD Gastroenterology Call today  [address] [place] [address]  [phone]          Return to ED if worse     Diagnosis     Clinical Impression:   1. Abdominal pain, generalized    2. History of ulcerative colitis    3. Hypokalemia    4.  Lactic acidosis

## 2020-08-11 ENCOUNTER — PATIENT OUTREACH (OUTPATIENT)
Dept: CASE MANAGEMENT | Age: 50
End: 2020-08-11

## 2020-08-11 NOTE — PROGRESS NOTES
Patient contacted regarding recent discharge and COVID-19 risk. Discussed COVID-19 related testing which was pending at this time. Test results were pending. Patient informed of results, if available? n/a  Ambulatory Care Manager contacted the patient by telephone to perform post discharge assessment. Verified name and  with patient as identifiers. Patient has following risk factors of: asthma, diabetes and ED visit 8/10/20. ACM reviewed discharge instructions, medical action plan and red flags related to discharge diagnosis. There were no new or changed medications related to discharge diagnosis. Patient denied questions or concerns regarding discharge instructions or medications. Advance Care Planning:   Does patient have an Advance Directive: not on file; education provided     Patient verified healthcare decision makers as correctly listed in chart: Gianna Ferguson (daughter) [phone] and Milagros Lainez (mother) [phone]    Patient reports having follow up with GI 20    Education provided regarding infection prevention, and signs and symptoms of COVID-19 and when to seek medical attention with patient who verbalized understanding. Discussed exposure protocols and quarantine from [address] [place] [address] you at higher risk for severe illness  and given an opportunity for questions and concerns. The patient was supplied the COVID-19 hotline 550-242-0809 and local [facility] department hotline 236-684-0504. AC recommended patient follow up with PCP and provided ACM contact information for future reference. From CDC: Are you at higher risk for severe illness?  Wash your hands often and avoid touching eyes, nose and mouth.  Avoid close contact (6 feet, which is about two arm lengths) with people who are sick.  Put distance between yourself and other people if COVID-19 is spreading in your community.  Clean and disinfect frequently touched surfaces.    Avoid all cruise travel and non-essential air travel.  Call your healthcare professional if you have concerns about COVID-19 and your underlying condition or if you are sick. For more information on steps you can take to protect yourself, see CDC's How to Protect Yourself      Patient/family/caregiver given information for [employer] and agrees to enroll no    Plan for follow-up call in 7-14 days based on severity of symptoms and risk factors.     Franc Khan RN  Ambulatory Care Manager

## 2020-08-13 LAB
COVID-19, XGCOVT: NOT DETECTED
HEALTH STATUS, XMCV2T: NORMAL
SOURCE, COVRS: NORMAL
SPECIMEN SOURCE, FCOV2M: NORMAL
SPECIMEN TYPE, XMCV1T: NORMAL

## 2020-08-17 ENCOUNTER — APPOINTMENT (OUTPATIENT)
Dept: CT IMAGING | Age: 50
End: 2020-08-17
Attending: EMERGENCY MEDICINE
Payer: MEDICAID

## 2020-08-17 ENCOUNTER — HOSPITAL ENCOUNTER (EMERGENCY)
Age: 50
Discharge: HOME | End: 2020-08-17
Attending: EMERGENCY MEDICINE
Payer: MEDICAID

## 2020-08-17 VITALS
TEMPERATURE: 97.8 F | OXYGEN SATURATION: 97 % | DIASTOLIC BLOOD PRESSURE: 78 MMHG | WEIGHT: 206.35 LBS | HEART RATE: 75 BPM | BODY MASS INDEX: 37.97 KG/M2 | HEIGHT: 62 IN | RESPIRATION RATE: 18 BRPM | SYSTOLIC BLOOD PRESSURE: 120 MMHG

## 2020-08-17 DIAGNOSIS — R10.84 ABDOMINAL PAIN, GENERALIZED: ICD-10-CM

## 2020-08-17 DIAGNOSIS — R11.2 NAUSEA AND VOMITING, INTRACTABILITY OF VOMITING NOT SPECIFIED, UNSPECIFIED VOMITING TYPE: ICD-10-CM

## 2020-08-17 DIAGNOSIS — K51.90 ULCERATIVE COLITIS WITHOUT COMPLICATIONS, UNSPECIFIED LOCATION (HCC): Primary | ICD-10-CM

## 2020-08-17 LAB
ALBUMIN SERPL-MCNC: 3.9 G/DL (ref 3.5–5)
ALBUMIN/GLOB SERPL: 1 {RATIO} (ref 1.1–2.2)
ALP SERPL-CCNC: 62 U/L (ref 45–117)
ALT SERPL-CCNC: 34 U/L (ref 12–78)
ANION GAP SERPL CALC-SCNC: 6 MMOL/L (ref 5–15)
AST SERPL-CCNC: 34 U/L (ref 15–37)
BASOPHILS # BLD: 0 K/UL (ref 0–0.1)
BASOPHILS NFR BLD: 0 % (ref 0–1)
BILIRUB SERPL-MCNC: 0.6 MG/DL (ref 0.2–1)
BUN SERPL-MCNC: 8 MG/DL (ref 6–20)
BUN/CREAT SERPL: 9 (ref 12–20)
CALCIUM SERPL-MCNC: 9.3 MG/DL (ref 8.5–10.1)
CHLORIDE SERPL-SCNC: 102 MMOL/L (ref 97–108)
CO2 SERPL-SCNC: 27 MMOL/L (ref 21–32)
CREAT SERPL-MCNC: 0.92 MG/DL (ref 0.55–1.02)
CRP SERPL-MCNC: 0.61 MG/DL (ref 0–0.6)
DIFFERENTIAL METHOD BLD: NORMAL
EOSINOPHIL # BLD: 0.2 K/UL (ref 0–0.4)
EOSINOPHIL NFR BLD: 3 % (ref 0–7)
ERYTHROCYTE [DISTWIDTH] IN BLOOD BY AUTOMATED COUNT: 14 % (ref 11.5–14.5)
ERYTHROCYTE [SEDIMENTATION RATE] IN BLOOD: 24 MM/HR (ref 0–30)
GLOBULIN SER CALC-MCNC: 3.9 G/DL (ref 2–4)
GLUCOSE SERPL-MCNC: 254 MG/DL (ref 65–100)
HCT VFR BLD AUTO: 39.7 % (ref 35–47)
HGB BLD-MCNC: 13.1 G/DL (ref 11.5–16)
IMM GRANULOCYTES # BLD AUTO: 0 K/UL (ref 0–0.04)
IMM GRANULOCYTES NFR BLD AUTO: 0 % (ref 0–0.5)
LIPASE SERPL-CCNC: 180 U/L (ref 73–393)
LIPASE SERPL-CCNC: 187 U/L (ref 73–393)
LYMPHOCYTES # BLD: 1.7 K/UL (ref 0.8–3.5)
LYMPHOCYTES NFR BLD: 31 % (ref 12–49)
MAGNESIUM SERPL-MCNC: 2.2 MG/DL (ref 1.6–2.4)
MCH RBC QN AUTO: 26.9 PG (ref 26–34)
MCHC RBC AUTO-ENTMCNC: 33 G/DL (ref 30–36.5)
MCV RBC AUTO: 81.5 FL (ref 80–99)
MONOCYTES # BLD: 0.3 K/UL (ref 0–1)
MONOCYTES NFR BLD: 5 % (ref 5–13)
NEUTS SEG # BLD: 3.3 K/UL (ref 1.8–8)
NEUTS SEG NFR BLD: 61 % (ref 32–75)
NRBC # BLD: 0 K/UL (ref 0–0.01)
NRBC BLD-RTO: 0 PER 100 WBC
PLATELET # BLD AUTO: 192 K/UL (ref 150–400)
PMV BLD AUTO: 9.9 FL (ref 8.9–12.9)
POTASSIUM SERPL-SCNC: 3.2 MMOL/L (ref 3.5–5.1)
PROT SERPL-MCNC: 7.8 G/DL (ref 6.4–8.2)
RBC # BLD AUTO: 4.87 M/UL (ref 3.8–5.2)
SODIUM SERPL-SCNC: 135 MMOL/L (ref 136–145)
WBC # BLD AUTO: 5.5 K/UL (ref 3.6–11)

## 2020-08-17 PROCEDURE — 36415 COLL VENOUS BLD VENIPUNCTURE: CPT

## 2020-08-17 PROCEDURE — 83690 ASSAY OF LIPASE: CPT

## 2020-08-17 PROCEDURE — 99284 EMERGENCY DEPT VISIT MOD MDM: CPT

## 2020-08-17 PROCEDURE — 96374 THER/PROPH/DIAG INJ IV PUSH: CPT

## 2020-08-17 PROCEDURE — 80053 COMPREHEN METABOLIC PANEL: CPT

## 2020-08-17 PROCEDURE — 74011636320 HC RX REV CODE- 636/320: Performed by: EMERGENCY MEDICINE

## 2020-08-17 PROCEDURE — 85025 COMPLETE CBC W/AUTO DIFF WBC: CPT

## 2020-08-17 PROCEDURE — 74011250636 HC RX REV CODE- 250/636: Performed by: EMERGENCY MEDICINE

## 2020-08-17 PROCEDURE — 85652 RBC SED RATE AUTOMATED: CPT

## 2020-08-17 PROCEDURE — 74177 CT ABD & PELVIS W/CONTRAST: CPT

## 2020-08-17 PROCEDURE — 83735 ASSAY OF MAGNESIUM: CPT

## 2020-08-17 PROCEDURE — 96375 TX/PRO/DX INJ NEW DRUG ADDON: CPT

## 2020-08-17 PROCEDURE — 0107U C DIFF TOX AG DETCJ IA STOOL: CPT

## 2020-08-17 PROCEDURE — 96361 HYDRATE IV INFUSION ADD-ON: CPT

## 2020-08-17 PROCEDURE — 86140 C-REACTIVE PROTEIN: CPT

## 2020-08-17 RX ORDER — DIPHENHYDRAMINE HYDROCHLORIDE 50 MG/ML
50 INJECTION, SOLUTION INTRAMUSCULAR; INTRAVENOUS
Status: COMPLETED | OUTPATIENT
Start: 2020-08-17 | End: 2020-08-17

## 2020-08-17 RX ORDER — SODIUM CHLORIDE 0.9 % (FLUSH) 0.9 %
10 SYRINGE (ML) INJECTION
Status: COMPLETED | OUTPATIENT
Start: 2020-08-17 | End: 2020-08-17

## 2020-08-17 RX ORDER — MORPHINE SULFATE 2 MG/ML
4 INJECTION, SOLUTION INTRAMUSCULAR; INTRAVENOUS ONCE
Status: COMPLETED | OUTPATIENT
Start: 2020-08-17 | End: 2020-08-17

## 2020-08-17 RX ORDER — ONDANSETRON 2 MG/ML
4 INJECTION INTRAMUSCULAR; INTRAVENOUS
Status: COMPLETED | OUTPATIENT
Start: 2020-08-17 | End: 2020-08-17

## 2020-08-17 RX ADMIN — IOHEXOL 50 ML: 240 INJECTION, SOLUTION INTRATHECAL; INTRAVASCULAR; INTRAVENOUS; ORAL at 10:51

## 2020-08-17 RX ADMIN — IOPAMIDOL 100 ML: 755 INJECTION, SOLUTION INTRAVENOUS at 12:43

## 2020-08-17 RX ADMIN — MORPHINE SULFATE 4 MG: 2 INJECTION, SOLUTION INTRAMUSCULAR; INTRAVENOUS at 12:01

## 2020-08-17 RX ADMIN — SODIUM CHLORIDE 1000 ML: 900 INJECTION, SOLUTION INTRAVENOUS at 10:34

## 2020-08-17 RX ADMIN — METHYLPREDNISOLONE SODIUM SUCCINATE 125 MG: 125 INJECTION, POWDER, FOR SOLUTION INTRAMUSCULAR; INTRAVENOUS at 10:34

## 2020-08-17 RX ADMIN — Medication 10 ML: at 10:50

## 2020-08-17 RX ADMIN — ONDANSETRON 4 MG: 2 INJECTION INTRAMUSCULAR; INTRAVENOUS at 11:59

## 2020-08-17 RX ADMIN — DIPHENHYDRAMINE HYDROCHLORIDE 50 MG: 50 INJECTION, SOLUTION INTRAMUSCULAR; INTRAVENOUS at 10:34

## 2020-08-17 NOTE — ED NOTES
Patient returned from [address]. C/o redness and itching to face. Mentioned to CT tech that she feels her \"throat is closing. \" Patient assessed. Redness noted to face where mask is. No swelling noted. Patient speaking full sentences. Patient requesting pain medication. Dr. Gabriela Villa made aware of above events. Will continue to monitor. 1330 - Patient at nurses station stating \"I feel like I'm going to throw up. \" Dr. Gabriela Villa made aware. 1 - Dr. Gabriela Villa at bedside to discuss discharge. Patient agreeable. Patient alert and oriented. Verbalized understanding. Offered w/c but declined. Ambulated with steady gait out of ED.

## 2020-08-17 NOTE — DISCHARGE INSTRUCTIONS
Patient Education        Abdominal Pain: Care Instructions  Your Care Instructions     Abdominal pain has many possible causes. Some aren't serious and get better on their own in a few days. Others need more testing and treatment. If your pain continues or gets worse, you need to be rechecked and may need more tests to find out what is wrong. You may need surgery to correct the problem. Don't ignore new symptoms, such as fever, nausea and vomiting, urination problems, pain that gets worse, and dizziness. These may be signs of a more serious problem. Your doctor may have recommended a follow-up visit in the next 8 to 12 hours. If you are not getting better, you may need more tests or treatment. The doctor has checked you carefully, but problems can develop later. If you notice any problems or new symptoms, get medical treatment right away. Follow-up care is a key part of your treatment and safety. Be sure to make and go to all appointments, and call your doctor if you are having problems. It's also a good idea to know your test results and keep a list of the medicines you take. How can you care for yourself at home? · Rest until you feel better. · To prevent dehydration, drink plenty of fluids, enough so that your urine is light yellow or clear like water. Choose water and other caffeine-free clear liquids until you feel better. If you have kidney, heart, or liver disease and have to limit fluids, talk with your doctor before you increase the amount of fluids you drink. · If your stomach is upset, eat mild foods, such as rice, dry toast or crackers, bananas, and applesauce. Try eating several small meals instead of two or three large ones. · Wait until 48 hours after all symptoms have gone away before you have spicy foods, alcohol, and drinks that contain caffeine. · Do not eat foods that are high in fat. · Avoid anti-inflammatory medicines such as aspirin, ibuprofen (Advil, Motrin), and naproxen (Aleve). These can cause stomach upset. Talk to your doctor if you take daily aspirin for another health problem. When should you call for help? [id number] anytime you think you may need emergency care. For example, call if:  · You passed out (lost consciousness). · You pass maroon or very bloody stools. · You vomit blood or what looks like coffee grounds. · You have new, severe belly pain. Call your doctor now or seek immediate medical care if:  · Your pain gets worse, especially if it becomes focused in one area of your belly. · You have a new or higher fever. · Your stools are black and look like tar, or they have streaks of blood. · You have unexpected vaginal bleeding. · You have symptoms of a urinary tract infection. These may include:  ? Pain when you urinate. ? Urinating more often than usual.  ? Blood in your urine. · You are dizzy or lightheaded, or you feel like you may faint. Watch closely for changes in your health, and be sure to contact your doctor if:  · You are not getting better after 1 day (24 hours). Where can you learn more? Go to http://www.gray.com/  Enter W889 in the search box to learn more about \"Abdominal Pain: Care Instructions. \"  Current as of: June 26, 2019               Content Version: 12.5  © 4654-1981 [employer]. Care instructions adapted under license by [employer] (which disclaims liability or warranty for this information). If you have questions about a medical condition or this instruction, always ask your healthcare professional. [place] [address] any warranty or liability for your use of this information. Patient Education        Nausea and Vomiting: Care Instructions  Your Care Instructions     When you are nauseated, you may feel weak and sweaty and notice a lot of saliva in your mouth. Nausea often leads to vomiting.  Most of the time you do not need to worry about nausea and vomiting, but they can be signs of other illnesses. Two common causes of nausea and vomiting are stomach flu and food poisoning. Nausea and vomiting from viral stomach flu will usually start to improve within 24 hours. Nausea and vomiting from food poisoning may last from 12 to 48 hours. The doctor has checked you carefully, but problems can develop later. If you notice any problems or new symptoms, get medical treatment right away. Follow-up care is a key part of your treatment and safety. Be sure to make and go to all appointments, and call your doctor if you are having problems. It's also a good idea to know your test results and keep a list of the medicines you take. How can you care for yourself at home? · To prevent dehydration, drink plenty of fluids, enough so that your urine is light yellow or clear like water. Choose water and other caffeine-free clear liquids until you feel better. If you have kidney, heart, or liver disease and have to limit fluids, talk with your doctor before you increase the amount of fluids you drink. · Rest in bed until you feel better. · When you are able to eat, try clear soups, mild foods, and liquids until all symptoms are gone for 12 to 48 hours. Other good choices include dry toast, crackers, cooked cereal, and gelatin dessert, such as Jell-O. When should you call for help? [id number] anytime you think you may need emergency care. For example, call if:  · You passed out (lost consciousness). Call your doctor now or seek immediate medical care if:  · You have symptoms of dehydration, such as:  ? Dry eyes and a dry mouth. ? Passing only a little dark urine. ? Feeling thirstier than usual.  · You have new or worsening belly pain. · You have a new or higher fever. · You vomit blood or what looks like coffee grounds. Watch closely for changes in your health, and be sure to contact your doctor if:  · You have ongoing nausea and vomiting. · Your vomiting is getting worse.   · Your vomiting lasts longer than 2 days. · You are not getting better as expected. Where can you learn more? Go to http://gladys-alia.info/  Enter H591 in the search box to learn more about \"Nausea and Vomiting: Care Instructions. \"  Current as of: June 26, 2019               Content Version: 12.5  © 3380-6439 [employer]. Care instructions adapted under license by [employer] (which disclaims liability or warranty for this information). If you have questions about a medical condition or this instruction, always ask your healthcare professional. [place] [address] any warranty or liability for your use of this information. Patient Education        Ulcerative Colitis: Care Instructions  Your Care Instructions     Ulcerative colitis is an inflammatory bowel disease (IBD). The large intestine (colon) gets inflamed and ulcers form in your colon. These ulcers can bleed. People have \"attacks\" of ulcerative colitis. Attacks can come and go. They can cause painful belly cramps and bloody diarrhea. This disease can affect part or all of the colon. How bad the disease gets will often depend on how much of the colon is affected. Bad attacks are often treated in a hospital. There you can get medicines, fluid, and nutrition through a tube in your vein, called an IV. This lets your digestive system rest and recover. If the medicines don't work well, surgery may be needed to remove the colon. At home, you can help control your ulcerative colitis. Take your medicines and try to eat well. And see your doctor as much as he or she recommends. Follow-up care is a key part of your treatment and safety. Be sure to make and go to all appointments, and call your doctor if you are having problems. It's also a good idea to know your test results and keep a list of the medicines you take. How can you care for yourself at home? · Be safe with medicines.  Take your medicines exactly as prescribed. Call your doctor if you think you are having a problem with your medicine. You will get more details on the specific medicines your doctor prescribes. · Do not take anti-inflammatory medicines, such as aspirin, ibuprofen (Advil, Motrin), or naproxen (Aleve). They may make your symptoms worse. · Talk to your doctor before you take any other medicines or herbal products. · Eat healthy foods. Avoid foods that make your symptoms worse. These might include milk, alcohol, or spicy foods. · Make sure to get enough iron. Rectal bleeding may make you lose iron. Foods with a lot of iron include beef, lentils, spinach, and raisins. They also include iron-enriched breads and cereals. · Take any nutrition supplements that your doctor prescribes. · This disease can affect all parts of your life. Get support from friends and family. You may also want to get some counseling. When should you call for help? [id number] anytime you think you may need emergency care. For example, call if:  · Your stools are maroon or very bloody. · You passed out (lost consciousness). Call your doctor now or seek immediate medical care if:  · You are vomiting. · You have new or worse belly pain. · You have a fever. · You cannot pass stools or gas. · You have new or more blood in your stools. Watch closely for changes in your health, and be sure to contact your doctor if:  · You have new or worse symptoms. · You are losing weight. · You do not get better as expected. Where can you learn more? Go to http://gladys-alia.info/  Enter F514 in the search box to learn more about \"Ulcerative Colitis: Care Instructions. \"  Current as of: August 12, 2019               Content Version: 12.5  © 5597-2465 [employer]. Care instructions adapted under license by [employer] (which disclaims liability or warranty for this information).  If you have questions about a medical condition or this instruction, always ask your healthcare professional. [place] [address] any warranty or liability for your use of this information.

## 2020-08-17 NOTE — LETTER
[address] 
[facility] EMERGENCY DEPT 
[address] [facility] [place] [id number] [phone] Work/School Note Date: 8/17/2020 To Whom It May concern: 
 
Romeo Cowart was seen and treated today in the emergency room by the following provider(s): 
Attending Provider: Dwight German MD. Doris Bahena may return to work on 08/19/2020.  
Sincerely,

## 2020-08-18 LAB
C DIFF GDH STL QL: NEGATIVE
C DIFF TOX A+B STL QL IA: NEGATIVE
INTERPRETATION: NORMAL

## 2020-08-25 NOTE — ED PROVIDER NOTES
EMERGENCY DEPARTMENT HISTORY AND PHYSICAL EXAM      Date: 8/17/2020  Patient Name: Freida Cowart    History of Presenting Illness     Chief Complaint   Patient presents with    Abdominal Pain     Pt arrived to ED from home with L sided abd pain and rectal spasms. History Provided By: Patient    HPI: Justin Galaviz, 48 y.o. female with PMHx as noted below presents the emergency department with complaints of abdominal pain, diarrhea, nausea and vomiting. Patient notes she has a history of ulcerative colitis, is followed by gastroenterology at [facility]. Patient does note she has an MRI tomorrow scheduled at [facility] began to have pain on Saturday. She describes it as a diffuse, dull crampy pain that is constant and seems to wax and wane in intensity. Pain does not radiate no relieving or exacerbating factors. Patient does report having some intermittent bloody diarrhea as well as some nausea with several episodes of nonbloody emesis. Patient does note she is currently on prednisone, 40 mg a day. PCP: Alicja Dyson MD    Current Outpatient Medications   Medication Sig Dispense Refill    predniSONE (STERAPRED DS) 10 mg dose pack As directed 48 Tab 0    ondansetron (Zofran ODT) 8 mg disintegrating tablet Take 1 Tab by mouth every eight (8) hours as needed for Nausea or Vomiting. 20 Tab 0    promethazine (PHENERGAN) 25 mg tablet Take 1 Tab by mouth every six (6) hours as needed for Nausea. 12 Tab 0    amoxicillin-clavulanate (Augmentin) 875-125 mg per tablet Take 1 Tab by mouth two (2) times a day. 14 Tab 0    estradioL (ESTRACE) 0.5 mg tablet TAKE 1 TABLET BY MOUTH ONCE DAILY      empagliflozin (Jardiance) 25 mg tablet Take 1 Tab by mouth daily. 90 Tab 3    ondansetron (Zofran ODT) 4 mg disintegrating tablet Take 1 Tab by mouth every eight (8) hours as needed for Nausea. 10 Tab 0    glimepiride (AMARYL) 4 mg tablet Take 1 Tab by mouth Daily (before breakfast).  90 Tab 3    PROAIR HFA 90 mcg/actuation inhaler       metFORMIN ER (GLUCOPHAGE XR) 500 mg tablet Take 2 Tabs by mouth Before breakfast and dinner. 360 Tab 3    polyethylene glycol (MIRALAX) 17 gram/dose powder Take 17 g by mouth daily. 1 tablespoon with 8 oz of water daily to prevent constipation 116 g 0    cetirizine (ZYRTEC) 10 mg tablet Take 1 Tab by mouth daily. 20 Tab 0    hydrocortisone (ANUSOL-HC) 25 mg supp Insert 1 Suppository into rectum every twelve (12) hours. 12 Each 0    hydrocortisone (PROCTOCORT) 1 % topical cream Apply  to affected area two (2) times a day. use thin layer 30 g 0    nitroglycerin (RECTIV) 0.4 % (w/w) ointment Insert  into rectum every twelve (12) hours every twelve (12) hours. 30 g 0    naproxen (NAPROSYN) 500 mg tablet Take 1 Tab by mouth two (2) times daily (with meals). 30 Tab 0    ALPRAZolam (XANAX) 0.25 mg tablet Take 0.125-0.25 mg by mouth every twelve (12) hours as needed for Anxiety or Sleep.  melatonin tab tablet Take 5 mg by mouth nightly.  simvastatin (ZOCOR) 20 mg tablet Take 20 mg by mouth nightly.  omeprazole (PRILOSEC) 20 mg capsule Take 40 mg by mouth Daily (before breakfast).  dicyclomine (BENTYL) 20 mg tablet Take 1 Tab by mouth every six (6) hours as needed (abdominal cramps) for up to 20 doses. 20 Tab 0    insulin glargine (LANTUS U-100 INSULIN) 100 unit/mL injection 38 Units by SubCUTAneous route nightly.  sertraline (ZOLOFT) 100 mg tablet Take 200 mg by mouth nightly.  losartan-hydroCHLOROthiazide (HYZAAR) 100-12.5 mg per tablet Take 1 Tab by mouth daily.  EPINEPHrine (EPIPEN) 0.3 mg/0.3 mL (1:1,000) injection 0.3 mL by IntraMUSCular route once as needed for up to 1 dose. 0.3 mL 1    albuterol (PROVENTIL, VENTOLIN) 90 mcg/Actuation inhaler Take 2 Puffs by inhalation every six (6) hours as needed.          Past History     Past Medical History:  Past Medical History:   Diagnosis Date    Anal fissure     Anisocoria     Asthma     LAST EPISODE     Back pain     Cerumen impaction     Chronic kidney disease     hx uti in past    Coagulation defects     ocassional rectal bleeding due to anal fissure    Colovaginal fistula     Diabetes (HCC)     NIDDM    Diabetes ([address]     Diverticulitis     Diverticulosis     Enlarged tonsils     Frequent UTI     GERD (gastroesophageal reflux disease)     H/O endoscopy     with dilation    HA (headache)     Hepatic steatosis     Hx of colonoscopy with polypectomy     benign    Hypertension     Ill-defined condition     FREQUENT HIVES    Ill-defined condition     HX ELEVATED LIVER ENZYMES    Morbid obesity ([address]     Nausea & vomiting     during diverticulitis flare    Obesity     Otitis media     Pneumonia     about 15 yrs ago    Psychiatric disorder     ANXIETY    Recurrent tonsillitis     Sinusitis     Transfusion history ~ age 35    postop hysterectomy    Unspecified sleep apnea     snores ( not diagnosed yet)     Urticaria     Urticaria        Past Surgical History:  Past Surgical History:   Procedure Laterality Date    ABDOMEN SURGERY PROC UNLISTED  2018    [facility]    COLONOSCOPY N/A 3/28/2019    COLONOSCOPY performed by Alethea Tapia MD at [address] [place] [address],[address]    blake.     HX GI  12    LAPAROSCOPIC HAND ASSISTED  POSS OPEN SIGMOID COLECTOMY POSS TEMPORARY DIVERTING LOOP ILEOSTOMY;  (no illeostomy needed)    HX GYN           HX GYN      cervical conization    HX HEENT      SINUS SURGERY LEFT X2    HX HEENT      SINUS SURGERY ON RIGHT X2    HX OTHER SURGICAL      Sphincterotomy    HX PELVIC LAPAROSCOPY      HX EMMANUEL AND BSO      HX UROLOGICAL  12     CYSTOSCOPY INSERTION URETERAL CATHETERS - Cystoscopy Insertion of bilateral ureteral stents       Family History:  Family History   Problem Relation Age of Onset    Diabetes Mother     Cancer Mother         NON-HODGKINS LYMPHOMA    Anesth Problems Mother PONV    Diabetes Father     Heart Disease Father         CAD - STENTS, PACEMAKER    Arrhythmia Father        Social History:  Social History     Tobacco Use    Smoking status: Never Smoker    Smokeless tobacco: Never Used   Substance Use Topics    Alcohol use: Yes     Comment: Rarely    Drug use: No     Types: Prescription, OTC       Allergies: Allergies   Allergen Reactions    Aspirin Hives and Shortness of Breath    Codeine Hives, Itching and Angioedema     Pt had itching in mouth, on face and lips    Contrast Agent [Iodine] Hives, Itching and Angioedema     Pt. had itching in mouth, on face and lips after IV contrast with the last exam.  Benadryl was given. OK if premedicated.  Flavoring Agent Anaphylaxis    Percocet [Oxycodone-Acetaminophen] Hives, Itching and Angioedema     Pt had itching in mouth, on face and lips    Prilosec [Omeprazole Magnesium] Anaphylaxis     CHERRY FLAVORED ODT; PT TAKES REGULAR PRILOSEC AND IS OK    Dilaudid [Hydromorphone] Itching    Ketorolac Rash     \"makes my eyes spasm and causes rash on my hands\" and \"makes my skin itch and makes me nervous\"    Fentanyl Rash and Itching    Morphine Itching     Severe itching. Tolerates with Benadryl         Review of Systems   Review of Systems  Constitutional: Negative for fever, chills, and fatigue. HENT: Negative for congestion, sore throat, rhinorrhea, sneezing and neck stiffness   Eyes: Negative for discharge and redness. Respiratory: Negative for  shortness of breath, wheezing   Cardiovascular: Negative for chest pain, palpitations   Gastrointestinal: Positive for nausea, vomiting, abdominal pain,  diarrhea  Genitourinary: Negative for dysuria, hematuria, flank pain, decreased urine volume, discharge,   Musculoskeletal: Negative for myalgias or joint pain . Skin: Negative for rash or lesions . Neurological: Negative weakness, light-headedness, numbness and headaches.        Physical Exam   Physical Exam    GENERAL: alert and oriented, no acute distress  EYES: PEERL, No injection, discharge or icterus. ENT: Mucous membranes pink and moist.  NECK: Supple  LUNGS: Airway patent. Non-labored respirations. Breath sounds clear with good air entry bilaterally. HEART: Regular rate and rhythm. No peripheral edema  ABDOMEN: Non-distended, diffusely tender, without guarding or rebound. SKIN:  warm, dry  MSK/EXTREMITIES: Without swelling, tenderness or deformity, symmetric with normal ROM  NEUROLOGICAL: Alert, oriented      Diagnostic Study Results     Labs -  Reviewed with patient    Radiologic Studies -   CT ABD PELV W CONT   Final Result   IMPRESSION:    1. No acute process. 2. Probable cirrhosis and portal hypertension. No ascites. CT Results  (Last 48 hours)    None        CXR Results  (Last 48 hours)    None            Medical Decision Making   Jose R HAYES MD am the first provider for this patient and am the attending of record for this patient encounter. I reviewed the vital signs, available nursing notes, past medical history, past surgical history, family history and social history. Vital Signs-Reviewed the patient's vital signs. Records Reviewed: Nursing Notes and Old Medical Records    Provider Notes (Medical Decision Making): The patient presents with a chief complaint of abdominal pain. Differential diagnosis considered includes ulcerative colitis flare, acute appendicitis, acute cholecystitis, pancreatitis, gastritis, PUD, diverticulitis, mesenteric ischemia, abdominal aortic aneurysm, bowel obstruction, enteritis, colitis, fecal impaction, volvulus, specific food intolerance, peritonitis, perforated viscous, malignancy, UTI, abscess, and abdominal pain NOS. Pelvic source of pain was also considered. All labs and diagnostic studies were reviewed with the patient and reassuring.   Clinical examination, history, and work-up exclude some of the more serious diagnoses as listed above. Cause of the abdominal pain was not clearly identified and lab work/imaging is not suggestive of for ulcerative colitis or any count patients. .      Pt is tolerating po. The patient states that their symptoms have improved and she feels better. There are no other new complaints at this time      There were no concerns for any acute life-threatening or surgical pathology that would warrant admission at this time. Vital signs were within acceptable limits at the time of discharge. Patient was in stable condition and felt to be reliable for outpatient management. There were no lab findings of immediate concern. The patient was advised to return to the emergency room for acutely worsening pain, persistence of pain, fevers, bloody stools, intractable vomiting or bloody emesis, inability to tolerate food/liquids, syncope, or change in mental status. Otherwise, the patient is encouraged to follow-up with a primary care physician within the week if possible. .  The patient understood the work-up and assessment and had no further questions. The patient was discharged home in stable clinical condition. ED Course:   Initial assessment performed. The patients presenting problems have been discussed, and they are in agreement with the care plan formulated and outlined with them. I have encouraged them to ask questions as they arise throughout their visit. PROGRESS  Rose Marie MEJÍA Sofya's  results have been reviewed with her. She has been counseled regarding her diagnosis. She verbally conveys understanding and agreement of the signs, symptoms, diagnosis, treatment and prognosis and additionally agrees to follow up as recommended with Dr. aCprice Souza MD in 24 - 48 hours. She also agrees with the care-plan and conveys that all of her questions have been answered.   I have also put together some discharge instructions for her that include: 1) educational information regarding their diagnosis, 2) how to care for their diagnosis at home, as well a 3) list of reasons why they would want to return to the ED prior to their follow-up appointment, should their condition change. Disposition:  home    PLAN:  1. Discharge Medication List as of 8/17/2020  2:06 PM        2. Follow-up Information     Follow up With Specialties Details Why Contact Info    \[facility]\"" EMERGENCY DEPT Emergency Medicine  If symptoms worsen [address] [place] [address]  [phone]    [facility] DEPT OF GASTROENTEROLOGY  Schedule an appointment as soon as possible for a visit in 2 days  [address] [facility] [address]  [phone]        Return to ED if worse     Diagnosis     Clinical Impression:   1. Ulcerative colitis without complications, unspecified location ([facility] [address]    2. Abdominal pain, generalized    3. Nausea and vomiting, intractability of vomiting not specified, unspecified vomiting type        Please note that this dictation was completed with Dragon, computer voice recognition software. Quite often unanticipated grammatical, syntax, homophones, and other interpretive errors are inadvertently transcribed by the computer software. Please disregard these errors. Additionally, please excuse any errors that have escaped final proofreading.

## 2020-08-29 ENCOUNTER — APPOINTMENT (OUTPATIENT)
Dept: GENERAL RADIOLOGY | Age: 50
End: 2020-08-29
Attending: EMERGENCY MEDICINE
Payer: MEDICAID

## 2020-08-29 ENCOUNTER — HOSPITAL ENCOUNTER (EMERGENCY)
Age: 50
Discharge: HOME | End: 2020-08-29
Attending: EMERGENCY MEDICINE
Payer: MEDICAID

## 2020-08-29 VITALS
RESPIRATION RATE: 16 BRPM | SYSTOLIC BLOOD PRESSURE: 134 MMHG | HEIGHT: 62 IN | TEMPERATURE: 98.3 F | HEART RATE: 77 BPM | OXYGEN SATURATION: 95 % | DIASTOLIC BLOOD PRESSURE: 83 MMHG | WEIGHT: 168.21 LBS | BODY MASS INDEX: 30.95 KG/M2

## 2020-08-29 DIAGNOSIS — K51.919 ULCERATIVE COLITIS WITH COMPLICATION, UNSPECIFIED LOCATION (HCC): ICD-10-CM

## 2020-08-29 DIAGNOSIS — K62.89 RECTAL PAIN: Primary | ICD-10-CM

## 2020-08-29 LAB
ALBUMIN SERPL-MCNC: 3.4 G/DL (ref 3.5–5)
ALBUMIN/GLOB SERPL: 1.1 {RATIO} (ref 1.1–2.2)
ALP SERPL-CCNC: 56 U/L (ref 45–117)
ALT SERPL-CCNC: 27 U/L (ref 12–78)
ANION GAP SERPL CALC-SCNC: 5 MMOL/L (ref 5–15)
AST SERPL-CCNC: 18 U/L (ref 15–37)
BASOPHILS # BLD: 0 K/UL (ref 0–0.1)
BASOPHILS NFR BLD: 0 % (ref 0–1)
BILIRUB SERPL-MCNC: 0.4 MG/DL (ref 0.2–1)
BUN SERPL-MCNC: 7 MG/DL (ref 6–20)
BUN/CREAT SERPL: 9 (ref 12–20)
CALCIUM SERPL-MCNC: 8.9 MG/DL (ref 8.5–10.1)
CHLORIDE SERPL-SCNC: 106 MMOL/L (ref 97–108)
CO2 SERPL-SCNC: 27 MMOL/L (ref 21–32)
CREAT SERPL-MCNC: 0.77 MG/DL (ref 0.55–1.02)
CRP SERPL-MCNC: 0.66 MG/DL (ref 0–0.6)
DIFFERENTIAL METHOD BLD: ABNORMAL
EOSINOPHIL # BLD: 0.2 K/UL (ref 0–0.4)
EOSINOPHIL NFR BLD: 3 % (ref 0–7)
ERYTHROCYTE [DISTWIDTH] IN BLOOD BY AUTOMATED COUNT: 13.9 % (ref 11.5–14.5)
ERYTHROCYTE [SEDIMENTATION RATE] IN BLOOD: 39 MM/HR (ref 0–30)
GLOBULIN SER CALC-MCNC: 3.2 G/DL (ref 2–4)
GLUCOSE SERPL-MCNC: 199 MG/DL (ref 65–100)
HCT VFR BLD AUTO: 33.8 % (ref 35–47)
HGB BLD-MCNC: 11.5 G/DL (ref 11.5–16)
IMM GRANULOCYTES # BLD AUTO: 0.1 K/UL (ref 0–0.04)
IMM GRANULOCYTES NFR BLD AUTO: 1 % (ref 0–0.5)
LIPASE SERPL-CCNC: 208 U/L (ref 73–393)
LYMPHOCYTES # BLD: 1.5 K/UL (ref 0.8–3.5)
LYMPHOCYTES NFR BLD: 28 % (ref 12–49)
MCH RBC QN AUTO: 27.1 PG (ref 26–34)
MCHC RBC AUTO-ENTMCNC: 34 G/DL (ref 30–36.5)
MCV RBC AUTO: 79.7 FL (ref 80–99)
MONOCYTES # BLD: 0.3 K/UL (ref 0–1)
MONOCYTES NFR BLD: 5 % (ref 5–13)
NEUTS SEG # BLD: 3.2 K/UL (ref 1.8–8)
NEUTS SEG NFR BLD: 63 % (ref 32–75)
NRBC # BLD: 0 K/UL (ref 0–0.01)
NRBC BLD-RTO: 0 PER 100 WBC
PLATELET # BLD AUTO: 177 K/UL (ref 150–400)
PMV BLD AUTO: 10 FL (ref 8.9–12.9)
POTASSIUM SERPL-SCNC: 3.1 MMOL/L (ref 3.5–5.1)
PROT SERPL-MCNC: 6.6 G/DL (ref 6.4–8.2)
RBC # BLD AUTO: 4.24 M/UL (ref 3.8–5.2)
SODIUM SERPL-SCNC: 138 MMOL/L (ref 136–145)
WBC # BLD AUTO: 5.2 K/UL (ref 3.6–11)

## 2020-08-29 PROCEDURE — 74011250636 HC RX REV CODE- 250/636: Performed by: EMERGENCY MEDICINE

## 2020-08-29 PROCEDURE — 85025 COMPLETE CBC W/AUTO DIFF WBC: CPT

## 2020-08-29 PROCEDURE — 96375 TX/PRO/DX INJ NEW DRUG ADDON: CPT

## 2020-08-29 PROCEDURE — 96374 THER/PROPH/DIAG INJ IV PUSH: CPT

## 2020-08-29 PROCEDURE — 85652 RBC SED RATE AUTOMATED: CPT

## 2020-08-29 PROCEDURE — 36415 COLL VENOUS BLD VENIPUNCTURE: CPT

## 2020-08-29 PROCEDURE — 71045 X-RAY EXAM CHEST 1 VIEW: CPT

## 2020-08-29 PROCEDURE — 93005 ELECTROCARDIOGRAM TRACING: CPT

## 2020-08-29 PROCEDURE — 80053 COMPREHEN METABOLIC PANEL: CPT

## 2020-08-29 PROCEDURE — 99284 EMERGENCY DEPT VISIT MOD MDM: CPT

## 2020-08-29 PROCEDURE — 83690 ASSAY OF LIPASE: CPT

## 2020-08-29 PROCEDURE — 86140 C-REACTIVE PROTEIN: CPT

## 2020-08-29 RX ORDER — HYDROMORPHONE HYDROCHLORIDE 1 MG/ML
0.5 INJECTION, SOLUTION INTRAMUSCULAR; INTRAVENOUS; SUBCUTANEOUS ONCE
Status: COMPLETED | OUTPATIENT
Start: 2020-08-29 | End: 2020-08-29

## 2020-08-29 RX ORDER — ONDANSETRON 2 MG/ML
4 INJECTION INTRAMUSCULAR; INTRAVENOUS
Status: COMPLETED | OUTPATIENT
Start: 2020-08-29 | End: 2020-08-29

## 2020-08-29 RX ORDER — HYOSCYAMINE SULFATE 0.12 MG/1
0.12 TABLET SUBLINGUAL
Qty: 15 TAB | Refills: 0 | Status: SHIPPED | OUTPATIENT
Start: 2020-08-29 | End: 2021-03-03

## 2020-08-29 RX ADMIN — HYDROMORPHONE HYDROCHLORIDE 0.5 MG: 1 INJECTION, SOLUTION INTRAMUSCULAR; INTRAVENOUS; SUBCUTANEOUS at 08:12

## 2020-08-29 RX ADMIN — ONDANSETRON 4 MG: 2 INJECTION INTRAMUSCULAR; INTRAVENOUS at 08:12

## 2020-08-29 NOTE — ED PROVIDER NOTES
EMERGENCY DEPARTMENT HISTORY AND PHYSICAL EXAM      Date: 8/29/2020  Patient Name: Can Hannah    Please note that this dictation was completed with [employer], the computer voice recognition software. Quite often unanticipated grammatical, syntax, homophones, and other interpretive errors are inadvertently transcribed by the computer software. Please disregard these errors. Please excuse any errors that have escaped final proofreading. History of Presenting Illness     Chief Complaint   Patient presents with    Rectal Bleeding     pt arrives to the ED with c/o rectal bleeding, pt states she has UC with increasing rectal bleeding with her hemrroids and states she thinks she might have a fisture    Anal Pain     pt states she is having anal pain, with pus and bleeding at the site       History Provided By: Patient     HPI: Can Hannah, 48 y.o. female, with a past medical history significant for ulcerative colitis, partial colectomy, diabetes, followed at [facility] for her ulcerative colitis presenting the emergency department complaining of rectal bleeding, abdominal pain, rectal pain. Symptoms have been going on for over a week. She was seen a week ago for the same at [facility], she feels as though her symptoms are slightly worse than that at that time. Complains of diffuse abdominal pain associated with nausea, but no vomiting. Nothing makes the pain better, nothing makes it worse. Rates the pain as moderate to severe. No radiation, no other exacerbating relieving factors she takes Tylenol at home. She complains of bright red blood at rectum and pain at her rectum. She reports knowing that she has hemorrhoids, but reports feeling that her rectum is \"spasm\". She is seen colorectal surgery for this in the past and they have mentioned that she may need a sphincterotomy. Patient denies any fever. She does complain of some shortness of breath, but denies any cough or chest pain.   She recently just started biologic infusions at [facility]. She is not currently on any steroids. She states she feels as though maybe she needs to go back on steroids. Denies any genitourinary complaints. No other exacerbating or relieving factors or associated symptoms at this time    PCP: Tyson Lopez MD    No current facility-administered medications on file prior to encounter. Current Outpatient Medications on File Prior to Encounter   Medication Sig Dispense Refill    predniSONE (STERAPRED DS) 10 mg dose pack As directed 48 Tab 0    ondansetron (Zofran ODT) 8 mg disintegrating tablet Take 1 Tab by mouth every eight (8) hours as needed for Nausea or Vomiting. 20 Tab 0    promethazine (PHENERGAN) 25 mg tablet Take 1 Tab by mouth every six (6) hours as needed for Nausea. 12 Tab 0    amoxicillin-clavulanate (Augmentin) 875-125 mg per tablet Take 1 Tab by mouth two (2) times a day. 14 Tab 0    estradioL (ESTRACE) 0.5 mg tablet TAKE 1 TABLET BY MOUTH ONCE DAILY      empagliflozin (Jardiance) 25 mg tablet Take 1 Tab by mouth daily. 90 Tab 3    ondansetron (Zofran ODT) 4 mg disintegrating tablet Take 1 Tab by mouth every eight (8) hours as needed for Nausea. 10 Tab 0    glimepiride (AMARYL) 4 mg tablet Take 1 Tab by mouth Daily (before breakfast). 90 Tab 3    PROAIR HFA 90 mcg/actuation inhaler       metFORMIN ER (GLUCOPHAGE XR) 500 mg tablet Take 2 Tabs by mouth Before breakfast and dinner. 360 Tab 3    polyethylene glycol (MIRALAX) 17 gram/dose powder Take 17 g by mouth daily. 1 tablespoon with 8 oz of water daily to prevent constipation 116 g 0    cetirizine (ZYRTEC) 10 mg tablet Take 1 Tab by mouth daily. 20 Tab 0    hydrocortisone (ANUSOL-HC) 25 mg supp Insert 1 Suppository into rectum every twelve (12) hours. 12 Each 0    hydrocortisone (PROCTOCORT) 1 % topical cream Apply  to affected area two (2) times a day.  use thin layer 30 g 0    nitroglycerin (RECTIV) 0.4 % (w/w) ointment Insert  into rectum every twelve (12) hours every twelve (12) hours. 30 g 0    naproxen (NAPROSYN) 500 mg tablet Take 1 Tab by mouth two (2) times daily (with meals). 30 Tab 0    ALPRAZolam (XANAX) 0.25 mg tablet Take 0.125-0.25 mg by mouth every twelve (12) hours as needed for Anxiety or Sleep.  melatonin tab tablet Take 5 mg by mouth nightly.  simvastatin (ZOCOR) 20 mg tablet Take 20 mg by mouth nightly.  omeprazole (PRILOSEC) 20 mg capsule Take 40 mg by mouth Daily (before breakfast).  dicyclomine (BENTYL) 20 mg tablet Take 1 Tab by mouth every six (6) hours as needed (abdominal cramps) for up to 20 doses. 20 Tab 0    insulin glargine (LANTUS U-100 INSULIN) 100 unit/mL injection 38 Units by SubCUTAneous route nightly.  sertraline (ZOLOFT) 100 mg tablet Take 200 mg by mouth nightly.  losartan-hydroCHLOROthiazide (HYZAAR) 100-12.5 mg per tablet Take 1 Tab by mouth daily.  EPINEPHrine (EPIPEN) 0.3 mg/0.3 mL (1:1,000) injection 0.3 mL by IntraMUSCular route once as needed for up to 1 dose. 0.3 mL 1    albuterol (PROVENTIL, VENTOLIN) 90 mcg/Actuation inhaler Take 2 Puffs by inhalation every six (6) hours as needed.          Past History     Past Medical History:  Past Medical History:   Diagnosis Date    Anal fissure     Anisocoria     Asthma     LAST EPISODE     Back pain     Cerumen impaction     Chronic kidney disease     hx uti in past    Coagulation defects     ocassional rectal bleeding due to anal fissure    Colovaginal fistula     Diabetes (HCC)     NIDDM    Diabetes ([facility] [address]     Diverticulitis     Diverticulosis     Enlarged tonsils     Frequent UTI     GERD (gastroesophageal reflux disease)     H/O endoscopy     with dilation    HA (headache)     Hepatic steatosis     Hx of colonoscopy with polypectomy     benign    Hypertension     Ill-defined condition     FREQUENT HIVES    Ill-defined condition     HX ELEVATED LIVER ENZYMES    Morbid obesity (HCC)     Nausea & vomiting     during diverticulitis flare    Obesity     Otitis media     Pneumonia     about 15 yrs ago    Psychiatric disorder     ANXIETY    Recurrent tonsillitis     Sinusitis     Transfusion history ~ age 35    postop hysterectomy    Unspecified sleep apnea     snores ( not diagnosed yet)     Urticaria     Urticaria        Past Surgical History:  Past Surgical History:   Procedure Laterality Date    ABDOMEN SURGERY PROC UNLISTED  2018    hernia repair [facility]    COLONOSCOPY N/A 3/28/2019    COLONOSCOPY performed by Bonita Minaya MD at [address] [place] [address]    [facility]ake.  HX GI  12    LAPAROSCOPIC HAND ASSISTED  POSS OPEN SIGMOID COLECTOMY POSS TEMPORARY DIVERTING LOOP ILEOSTOMY;  (no illeostomy needed)    HX GYN           HX GYN      cervical conization    HX HEENT      SINUS SURGERY LEFT X2    HX HEENT      SINUS SURGERY ON RIGHT X2    HX OTHER SURGICAL      Sphincterotomy    HX PELVIC LAPAROSCOPY      HX EMMANUEL AND BSO      HX UROLOGICAL  12     CYSTOSCOPY INSERTION URETERAL CATHETERS - Cystoscopy Insertion of bilateral ureteral stents       Family History:  Family History   Problem Relation Age of Onset    Diabetes Mother     Cancer Mother         NON-HODGKINS LYMPHOMA    Anesth Problems Mother         PONV    Diabetes Father     Heart Disease Father         CAD - STENTS, PACEMAKER    Arrhythmia Father        Social History:  Social History     Tobacco Use    Smoking status: Never Smoker    Smokeless tobacco: Never Used   Substance Use Topics    Alcohol use: Yes     Comment: Rarely    Drug use: No     Types: Prescription, OTC       Allergies:   Allergies   Allergen Reactions    Aspirin Hives and Shortness of Breath    Codeine Hives, Itching and Angioedema     Pt had itching in mouth, on face and lips    Contrast Agent [Iodine] Hives, Itching and Angioedema     Pt. had itching in mouth, on face and lips after IV contrast with the last exam.  Benadryl was given. OK if premedicated.  Flavoring Agent Anaphylaxis    Percocet [Oxycodone-Acetaminophen] Hives, Itching and Angioedema     Pt had itching in mouth, on face and lips    Prilosec [Omeprazole Magnesium] Anaphylaxis     CHERRY FLAVORED ODT; PT TAKES REGULAR PRILOSEC AND IS OK    Dilaudid [Hydromorphone] Itching    Ketorolac Rash     \"makes my eyes spasm and causes rash on my hands\" and \"makes my skin itch and makes me nervous\"    Fentanyl Rash and Itching    Morphine Itching     Severe itching. Tolerates with Benadryl         Review of Systems   Review of Systems   Constitutional: Negative for chills and fever. HENT: Negative for congestion and sore throat. Eyes: Negative for visual disturbance. Respiratory: Negative for cough and shortness of breath. Cardiovascular: Negative for chest pain and leg swelling. Gastrointestinal: Positive for abdominal pain and nausea. Negative for blood in stool, diarrhea and vomiting. Endocrine: Negative for polyuria. Genitourinary: Negative for dysuria, flank pain, vaginal bleeding and vaginal discharge. Musculoskeletal: Negative for myalgias. Skin: Negative for rash. Allergic/Immunologic: Negative for immunocompromised state. Neurological: Negative for weakness and headaches. Psychiatric/Behavioral: Negative for confusion. Physical Exam   Physical Exam  Vitals signs and nursing note reviewed. Constitutional:       Appearance: She is well-developed. HENT:      Head: Normocephalic and atraumatic. Eyes:      General:         Right eye: No discharge. Left eye: No discharge. Conjunctiva/sclera: Conjunctivae normal.      Pupils: Pupils are equal, round, and reactive to light. Neck:      Musculoskeletal: Normal range of motion and neck supple. Trachea: No tracheal deviation. Cardiovascular:      Rate and Rhythm: Normal rate and regular rhythm. Heart sounds: Normal heart sounds. No murmur. Pulmonary:      Effort: Pulmonary effort is normal. No respiratory distress. Breath sounds: Normal breath sounds. No wheezing or rales. Abdominal:      General: Bowel sounds are normal.      Palpations: Abdomen is soft. Tenderness: There is abdominal tenderness. There is no guarding or rebound. Comments: Abdomen is mildly tender throughout, but there is no guarding or rebound. Her abdomen is soft. Genitourinary:     Comments: Patient has external hemorrhoids, internal hemorrhoids palpable on digital rectal exam.  None of the external hemorrhoids appear thrombosed. There is no gross blood at the rectum. No fissures noted. Musculoskeletal: Normal range of motion. General: No tenderness or deformity. Skin:     General: Skin is warm and dry. Findings: No erythema or rash. Neurological:      Mental Status: She is alert and oriented to person, place, and time. Psychiatric:         Behavior: Behavior normal.         Diagnostic Study Results     Labs -     Recent Results (from the past 12 hour(s))   CBC WITH AUTOMATED DIFF    Collection Time: 08/29/20  7:42 AM   Result Value Ref Range    WBC 5.2 3.6 - 11.0 K/uL    RBC 4.24 3.80 - 5.20 M/uL    HGB 11.5 11.5 - 16.0 g/dL    HCT 33.8 (L) 35.0 - 47.0 %    MCV 79.7 (L) 80.0 - 99.0 FL    MCH 27.1 26.0 - 34.0 PG    MCHC 34.0 30.0 - 36.5 g/dL    RDW 13.9 11.5 - 14.5 %    PLATELET 413 728 - 311 K/uL    MPV 10.0 8.9 - 12.9 FL    NRBC 0.0 0  WBC    ABSOLUTE NRBC 0.00 0.00 - 0.01 K/uL    NEUTROPHILS 63 32 - 75 %    LYMPHOCYTES 28 12 - 49 %    MONOCYTES 5 5 - 13 %    EOSINOPHILS 3 0 - 7 %    BASOPHILS 0 0 - 1 %    IMMATURE GRANULOCYTES 1 (H) 0.0 - 0.5 %    ABS. NEUTROPHILS 3.2 1.8 - 8.0 K/UL    ABS. LYMPHOCYTES 1.5 0.8 - 3.5 K/UL    ABS. MONOCYTES 0.3 0.0 - 1.0 K/UL    ABS. EOSINOPHILS 0.2 0.0 - 0.4 K/UL    ABS. BASOPHILS 0.0 0.0 - 0.1 K/UL    ABS. IMM.  GRANS. 0.1 (H) 0.00 - 0.04 K/UL    DF AUTOMATED     METABOLIC PANEL, COMPREHENSIVE Collection Time: 08/29/20  7:42 AM   Result Value Ref Range    Sodium 138 136 - 145 mmol/L    Potassium 3.1 (L) 3.5 - 5.1 mmol/L    Chloride 106 97 - 108 mmol/L    CO2 27 21 - 32 mmol/L    Anion gap 5 5 - 15 mmol/L    Glucose 199 (H) 65 - 100 mg/dL    BUN 7 6 - 20 MG/DL    Creatinine 0.77 0.55 - 1.02 MG/DL    BUN/Creatinine ratio 9 (L) 12 - 20      GFR est AA >60 >60 ml/min/1.73m2    GFR est non-AA >60 >60 ml/min/1.73m2    Calcium 8.9 8.5 - 10.1 MG/DL    Bilirubin, total 0.4 0.2 - 1.0 MG/DL    ALT (SGPT) 27 12 - 78 U/L    AST (SGOT) 18 15 - 37 U/L    Alk. phosphatase 56 45 - 117 U/L    Protein, total 6.6 6.4 - 8.2 g/dL    Albumin 3.4 (L) 3.5 - 5.0 g/dL    Globulin 3.2 2.0 - 4.0 g/dL    A-G Ratio 1.1 1.1 - 2.2     SED RATE (ESR)    Collection Time: 08/29/20  7:42 AM   Result Value Ref Range    Sed rate, automated 39 (H) 0 - 30 mm/hr   LIPASE    Collection Time: 08/29/20  7:42 AM   Result Value Ref Range    Lipase 208 73 - 393 U/L   EKG, 12 LEAD, INITIAL    Collection Time: 08/29/20  7:51 AM   Result Value Ref Range    Ventricular Rate 70 BPM    Atrial Rate 70 BPM    P-R Interval 154 ms    QRS Duration 90 ms    Q-T Interval 422 ms    QTC Calculation (Bezet) 455 ms    Calculated P Axis -8 degrees    Calculated R Axis 3 degrees    Diagnosis       Normal sinus rhythm  Nonspecific T wave abnormality  When compared with ECG of 20-JUL-2020 02:38,  No significant change was found         Radiologic Studies -   XR CHEST PORT   Final Result   IMPRESSION:   No acute cardiopulmonary disease radiographically. .  . CT Results  (Last 48 hours)    None        CXR Results  (Last 48 hours)               08/29/20 0753  XR CHEST PORT Final result    Impression:  IMPRESSION:   No acute cardiopulmonary disease radiographically. .  . Narrative:  INDICATION:  dyspnea        EXAM: Chest single view. COMPARISON: 10/10/2019.        FINDINGS: A single frontal view of the chest at 0744 hours shows mid inspiratory   effort with no focal infiltrate or effusion. .  The heart, mediastinum and   pulmonary vasculature are stable with heart size upper normal. .  The bony   thorax is unremarkable for age. .                   Medical Decision Making   I am the first provider for this patient. I reviewed the vital signs, available nursing notes, past medical history, past surgical history, family history and social history. Vital Signs-Reviewed the patient's vital signs. Patient Vitals for the past 12 hrs:   Temp Pulse Resp BP SpO2   08/29/20 0900 -- -- -- 134/83 95 %   08/29/20 0845 -- -- -- -- 92 %   08/29/20 0830 -- -- -- 132/77 92 %   08/29/20 0815 -- -- -- -- 96 %   08/29/20 0800 -- -- -- 132/84 95 %   08/29/20 0745 -- -- -- -- 95 %   08/29/20 0730 -- -- -- 119/65 95 %   08/29/20 0728 -- -- -- -- 96 %   08/29/20 0634 98.3 °F (36.8 °C) 77 16 160/85 98 %         Records Reviewed:   Nursing notes, Prior visits     Emergency department visits from [address] [place] [address] noted from 8/24. Chart reviewed. Labs reviewed. Not significantly anemic at that time    Provider Notes (Medical Decision Making):   Patient with a long history of ulcerative colitis and multiple emergency department visits. She looks well and nontoxic. Her vital signs are reassuring. She is not tachycardic, not febrile. She does not appear pale on exam.  Clinical suspicion is for exacerbation of chronic pain and exacerbation of hemorrhoidal pain. She may have some proctitis, but this seems unlikely, certainly does not appear to be an infectious proctitis. She is recently been imaged, had CT imaging on August 17. I do not feel the need to repeat imaging at this time given patient's normal vital signs and reassuring exam.  She also has a contrast dye allergy so any further imaging needs to be done due to judiciously. Regarding her shortness of breath, will check her H&H, will check a chest x-ray and an EKG.   We will give a one-time dose of parenteral opiates here, but will not be prescribed any opiates for performing multiple treatments for pain control here in this emergency department. ED Course:   Initial assessment performed. The patients presenting problems have been discussed, and they are in agreement with the care plan formulated and outlined with them. I have encouraged them to ask questions as they arise throughout their visit. Critical Care Time:   none    Disposition:  DISCHARGE NOTE  Patients results have been reviewed with them. Patient and/or family have verbally conveyed their understanding and agreement of the patient's signs, symptoms, diagnosis, treatment and prognosis and additionally agree to follow up as recommended or return to the Emergency Room should their condition change or have any new concerns prior to their follow-up appointment. Patient verbally agrees with the care-plan and verbally conveys that all of their questions have been answered. Discharge instructions have also been provided to the patient with some educational information regarding their diagnosis as well a list of reasons why they would want to return to the ER prior to their follow-up appointment should their condition change. PLAN:  1.    Discharge Medication List as of 8/29/2020  9:00 AM      START taking these medications    Details   hyoscyamine SL (LEVSIN/SL) 0.125 mg SL tablet 1 Tab by SubLINGual route every four (4) hours as needed for Cramping., Normal, Disp-15 Tab,R-0         CONTINUE these medications which have NOT CHANGED    Details   predniSONE (STERAPRED DS) 10 mg dose pack As directed, Normal, Disp-48 Tab,R-0      !! ondansetron (Zofran ODT) 8 mg disintegrating tablet Take 1 Tab by mouth every eight (8) hours as needed for Nausea or Vomiting., Normal, Disp-20 Tab,R-0      promethazine (PHENERGAN) 25 mg tablet Take 1 Tab by mouth every six (6) hours as needed for Nausea., Normal, Disp-12 Tab,R-0      amoxicillin-clavulanate (Augmentin) 875-125 mg per tablet Take 1 Tab by mouth two (2) times a day., Normal, Disp-14 Tab,R-0      estradioL (ESTRACE) 0.5 mg tablet TAKE 1 TABLET BY MOUTH ONCE DAILY, Historical Med      empagliflozin (Jardiance) 25 mg tablet Take 1 Tab by mouth daily. , Normal, Disp-90 Tab, R-3      !! ondansetron (Zofran ODT) 4 mg disintegrating tablet Take 1 Tab by mouth every eight (8) hours as needed for Nausea., Normal, Disp-10 Tab, R-0      glimepiride (AMARYL) 4 mg tablet Take 1 Tab by mouth Daily (before breakfast). , Normal, Disp-90 Tab, R-3      PROAIR HFA 90 mcg/actuation inhaler Historical Med, JAIME      metFORMIN ER (GLUCOPHAGE XR) 500 mg tablet Take 2 Tabs by mouth Before breakfast and dinner., Normal, Disp-360 Tab, R-3      polyethylene glycol (MIRALAX) 17 gram/dose powder Take 17 g by mouth daily. 1 tablespoon with 8 oz of water daily to prevent constipation, Normal, Disp-116 g, R-0      cetirizine (ZYRTEC) 10 mg tablet Take 1 Tab by mouth daily. , Normal, Disp-20 Tab, R-0      hydrocortisone (ANUSOL-HC) 25 mg supp Insert 1 Suppository into rectum every twelve (12) hours. , Normal, Disp-12 Each, R-0      hydrocortisone (PROCTOCORT) 1 % topical cream Apply  to affected area two (2) times a day. use thin layer, Normal, Disp-30 g, R-0      nitroglycerin (RECTIV) 0.4 % (w/w) ointment Insert  into rectum every twelve (12) hours every twelve (12) hours. , Normal, Disp-30 g, R-0      naproxen (NAPROSYN) 500 mg tablet Take 1 Tab by mouth two (2) times daily (with meals). , Print, Disp-30 Tab, R-0      ALPRAZolam (XANAX) 0.25 mg tablet Take 0.125-0.25 mg by mouth every twelve (12) hours as needed for Anxiety or Sleep., Historical Med      melatonin tab tablet Take 5 mg by mouth nightly., Historical Med      simvastatin (ZOCOR) 20 mg tablet Take 20 mg by mouth nightly., Historical Med      omeprazole (PRILOSEC) 20 mg capsule Take 40 mg by mouth Daily (before breakfast). , Historical Med      dicyclomine (BENTYL) 20 mg tablet Take 1 Tab by mouth every six (6) hours as needed (abdominal cramps) for up to 20 doses. , Print, Disp-20 Tab, R-0      insulin glargine (LANTUS U-100 INSULIN) 100 unit/mL injection 38 Units by SubCUTAneous route nightly., Historical Med      sertraline (ZOLOFT) 100 mg tablet Take 200 mg by mouth nightly., Historical Med      losartan-hydroCHLOROthiazide (HYZAAR) 100-12.5 mg per tablet Take 1 Tab by mouth daily. , Historical Med      EPINEPHrine (EPIPEN) 0.3 mg/0.3 mL (1:1,000) injection 0.3 mL by IntraMUSCular route once as needed for up to 1 dose., Print, Disp-0.3 mL, R-1      albuterol (PROVENTIL, VENTOLIN) 90 mcg/Actuation inhaler Take 2 Puffs by inhalation every six (6) hours as needed., Historical Med       !! - Potential duplicate medications found. Please discuss with provider. 2.   Follow-up Information     Follow up With Specialties Details Why Contact Info    Ana Maria Morel MD Family Medicine  As needed [address] [place] [address] [address] [facility]  [phone]      Your Colorectal Surgeon  Schedule an appointment as soon as possible for a visit regarding your rectal pain     [facility] EMERGENCY DEPT Emergency Medicine  If symptoms worsen [address] [place] [address] Drive  [address] [facility]  [phone]          Return to ED if worse     Diagnosis     Clinical Impression:   1. Rectal pain    2. Ulcerative colitis with complication, unspecified location [facility])        Attestations:   This note was completed by Lena Parsons DO

## 2020-08-29 NOTE — DISCHARGE INSTRUCTIONS
Patient Education        Anal Pain: Care Instructions  Your Care Instructions  Pain in the opening to the rectum (anus) can be caused by diarrhea or constipation or by scratching a rectal itch. A common cause of anal pain is a tear in the lining of the lower rectum (anal fissure). This type of anal pain usually goes away when the problem clears up. Injury during anal sex or from an object being placed in the rectum also can cause pain. A rare cause of anal pain is spasms of the muscles in the rectum. Some of these conditions may cause some light bleeding. Home treatment usually can relieve anal pain. If you continue to have anal pain, your doctor may prescribe medicine to relieve pain and other symptoms. Depending on the cause, you may need other treatment. Follow-up care is a key part of your treatment and safety. Be sure to make and go to all appointments, and call your doctor if you are having problems. It's also a good idea to know your test results and keep a list of the medicines you take. How can you care for yourself at home? · Sit in a few inches of warm water (sitz bath) 3 times a day and after bowel movements. The warm water eases discomfort. Do not put soaps, salts, or shampoos in the water. · Drink plenty of fluids, enough so that your urine is light yellow or clear like water. If you have kidney, heart, or liver disease and have to limit fluids, talk with your doctor before you increase the amount of fluids you drink. · Include high-fiber foods, such as fruits, vegetables, beans, and whole grains, in your diet each day. · Take a fiber supplement, such as Benefiber, Citrucel, or Metamucil, every day. Read and follow all instructions on the label. · Use the toilet when you feel the urge. Or when you can, schedule time each day for a bowel movement. A daily routine may help. Take your time and do not strain when having a bowel movement. But do not sit on the toilet too long.   · Support your feet with a small step stool when you sit on the toilet. This helps flex your hips and places your pelvis in a squatting position. · Your doctor may recommend an over-the-counter laxative, such as Miralax, Milk of Magnesia, or Ex-Lax. Read and follow all instructions on the label, and do not use laxatives on a long-term basis. · Do not use over-the-counter ointments or creams without talking to your doctor. Some of these may not help. · Use baby wipes or medicated pads, such as Preparation H or Tucks, instead of toilet paper to clean after a bowel movement. These products do not irritate the anus. · Be safe with medicines. Read and follow all instructions on the label. ? If the doctor gave you a prescription medicine for pain, give it as prescribed. ? If you are not taking a prescription pain medicine, ask your doctor if you can take an over-the-counter medicine. When should you call for help? Call your doctor now or seek immediate medical care if:  · You have new or worse pain. · You have new or worse bleeding from the rectum. Watch closely for changes in your health, and be sure to contact your doctor if:  · You have trouble passing stools. · You do not get better as expected. Where can you learn more? Go to http://www.gray.com/  Enter B355 in the search box to learn more about \"Anal Pain: Care Instructions. \"  Current as of: August 12, 2019               Content Version: 12.5  © 0750-7680 [employer]. Care instructions adapted under license by [employer] (which disclaims liability or warranty for this information). If you have questions about a medical condition or this instruction, always ask your healthcare professional. [place] [address] any warranty or liability for your use of this information.          Patient Education        Diet for Inflammatory Bowel Disease: Care Instructions  Your Care Instructions     Crohn's disease and ulcerative colitis are types of inflammatory bowel disease. What you eat doesn't increase the inflammation that causes your disease. But some types of foods, such as high-fiber fruits and vegetables, may make your symptoms worse. No one diet is right for everyone with an inflammatory bowel disease. Foods that bother one person may not bother another. Your diet has to be tailored for you. Follow-up care is a key part of your treatment and safety. Be sure to make and go to all appointments, and call your doctor if you are having problems. It's also a good idea to know your test results and keep a list of the medicines you take. How can you care for yourself at home? · Keep a food diary. As soon as you know what foods make your symptoms worse, your doctor or dietitian can help you plan the right diet for you. · During a flare-up, avoid or reduce foods that make symptoms worse. ? Choose dairy products that are low in lactose, such as yogurt, lactose-reduced milk, and hard cheeses like cheddar. ? If you have fat in your stools, choose low-fat foods instead of high-fat ones. For instance, some cuts of red meat have a lot of fat. A low-fat choice would be lean beef (such as sirloin, top and bottom round, brian, or diet lean hamburger), poultry, or fish, such as cod.  ? Instead of frying foods, try baking or broiling them. ? Cook fruits and vegetables without hulls, skins, or seeds. ? Try different ways of preparing fruits and vegetables, such as steaming, stewing, or baking. ? Peel and seed fresh fruits and vegetables if these bother you, or choose canned varieties. · Get the calories and nutrients you need. ? Eat a varied, nutritious diet that is high in calories and protein. ? Try eating 3 meals plus 2 or 3 snacks a day. It may be easier to get more calories if you spread your food intake throughout the day. ? Take vitamin and mineral supplements if your doctor recommends them.   ? Try adding high-calorie liquid supplements, such as Ensure Plus or Boost Plus, if you have trouble keeping your weight up.  ? See your doctor or dietitian if your diet feels too limited or you are losing weight. · Make sure to get enough iron. Rectal bleeding may make you lose iron. Good sources of iron include:  ? Beef. ? Lentils. ? Spinach. ? Raisins. ? Iron-enriched breads and cereals. When should you call for help? Watch closely for changes in your health, and be sure to contact your doctor if you have any problems. Where can you learn more? Go to http://gladys-alia.info/  Enter G650 in the search box to learn more about \"Diet for Inflammatory Bowel Disease: Care Instructions. \"  Current as of: August 22, 2019               Content Version: 12.5  © 1219-4110 [employer]. Care instructions adapted under license by "[employer] (which disclaims liability or warranty for this information). If you have questions about a medical condition or this instruction, always ask your healthcare professional. [place] [address] any warranty or liability for your use of this information. Patient Education        Ulcerative Colitis: Care Instructions  Your Care Instructions     Ulcerative colitis is an inflammatory bowel disease (IBD). The large intestine (colon) gets inflamed and ulcers form in your colon. These ulcers can bleed. People have \"attacks\" of ulcerative colitis. Attacks can come and go. They can cause painful belly cramps and bloody diarrhea. This disease can affect part or all of the colon. How bad the disease gets will often depend on how much of the colon is affected. Bad attacks are often treated in a hospital. There you can get medicines, fluid, and nutrition through a tube in your vein, called an IV. This lets your digestive system rest and recover. If the medicines don't work well, surgery may be needed to remove the colon.   At home, you can help control your ulcerative colitis. Take your medicines and try to eat well. And see your doctor as much as he or she recommends. Follow-up care is a key part of your treatment and safety. Be sure to make and go to all appointments, and call your doctor if you are having problems. It's also a good idea to know your test results and keep a list of the medicines you take. How can you care for yourself at home? · Be safe with medicines. Take your medicines exactly as prescribed. Call your doctor if you think you are having a problem with your medicine. You will get more details on the specific medicines your doctor prescribes. · Do not take anti-inflammatory medicines, such as aspirin, ibuprofen (Advil, Motrin), or naproxen (Aleve). They may make your symptoms worse. · Talk to your doctor before you take any other medicines or herbal products. · Eat healthy foods. Avoid foods that make your symptoms worse. These might include milk, alcohol, or spicy foods. · Make sure to get enough iron. Rectal bleeding may make you lose iron. Foods with a lot of iron include beef, lentils, spinach, and raisins. They also include iron-enriched breads and cereals. · Take any nutrition supplements that your doctor prescribes. · This disease can affect all parts of your life. Get support from friends and family. You may also want to get some counseling. When should you call for help? [id number] anytime you think you may need emergency care. For example, call if:  · Your stools are maroon or very bloody. · You passed out (lost consciousness). Call your doctor now or seek immediate medical care if:  · You are vomiting. · You have new or worse belly pain. · You have a fever. · You cannot pass stools or gas. · You have new or more blood in your stools. Watch closely for changes in your health, and be sure to contact your doctor if:  · You have new or worse symptoms. · You are losing weight.   · You do not get better as expected. Where can you learn more? Go to http://gladys-alia.info/  Enter F514 in the search box to learn more about \"Ulcerative Colitis: Care Instructions. \"  Current as of: August 12, 2019               Content Version: 12.5  © 4898-0303 [employer]. Care instructions adapted under license by [employer] (which disclaims liability or warranty for this information). If you have questions about a medical condition or this instruction, always ask your healthcare professional. [address] any warranty or liability for your use of this information.

## 2020-08-31 ENCOUNTER — PATIENT OUTREACH (OUTPATIENT)
Dept: CASE MANAGEMENT | Age: 50
End: 2020-08-31

## 2020-08-31 LAB
ATRIAL RATE: 70 BPM
CALCULATED P AXIS, ECG09: -8 DEGREES
CALCULATED R AXIS, ECG10: 3 DEGREES
DIAGNOSIS, 93000: NORMAL
P-R INTERVAL, ECG05: 154 MS
Q-T INTERVAL, ECG07: 422 MS
QRS DURATION, ECG06: 90 MS
QTC CALCULATION (BEZET), ECG08: 455 MS
VENTRICULAR RATE, ECG03: 70 BPM

## 2020-08-31 NOTE — PROGRESS NOTES
ACM attempted  To reach patient for ER follow up .  Unable to reach and left VM for return call with contact info provided

## 2020-09-01 ENCOUNTER — PATIENT OUTREACH (OUTPATIENT)
Dept: CASE MANAGEMENT | Age: 50
End: 2020-09-01

## 2020-09-01 NOTE — PROGRESS NOTES
[facility]'s second attempt to reach patient and emergency contact for ER follow up was unsuccessful.  [facility] will close this episode at this time due to inability to reach the patient

## 2020-09-29 ENCOUNTER — TRANSCRIBE ORDER (OUTPATIENT)
Dept: REGISTRATION | Age: 50
End: 2020-09-29

## 2020-09-29 ENCOUNTER — HOSPITAL ENCOUNTER (EMERGENCY)
Age: 50
Discharge: HOME | End: 2020-09-29
Attending: EMERGENCY MEDICINE
Payer: MEDICAID

## 2020-09-29 ENCOUNTER — HOSPITAL ENCOUNTER (OUTPATIENT)
Dept: LAB | Age: 50
Discharge: HOME | End: 2020-09-29
Payer: MEDICAID

## 2020-09-29 VITALS
WEIGHT: 207.89 LBS | RESPIRATION RATE: 12 BRPM | TEMPERATURE: 98 F | SYSTOLIC BLOOD PRESSURE: 117 MMHG | DIASTOLIC BLOOD PRESSURE: 60 MMHG | HEART RATE: 80 BPM | HEIGHT: 62 IN | OXYGEN SATURATION: 96 % | BODY MASS INDEX: 38.26 KG/M2

## 2020-09-29 DIAGNOSIS — Z01.812 PRE-PROCEDURAL LABORATORY EXAMINATIONS: ICD-10-CM

## 2020-09-29 DIAGNOSIS — Z87.19 HISTORY OF ULCERATIVE COLITIS: ICD-10-CM

## 2020-09-29 DIAGNOSIS — K64.4 EXTERNAL HEMORRHOID: ICD-10-CM

## 2020-09-29 DIAGNOSIS — K62.89 RECTAL PAIN: Primary | ICD-10-CM

## 2020-09-29 DIAGNOSIS — Z01.812 PRE-PROCEDURAL LABORATORY EXAMINATIONS: Primary | ICD-10-CM

## 2020-09-29 LAB
ALBUMIN SERPL-MCNC: 3.5 G/DL (ref 3.5–5)
ALBUMIN/GLOB SERPL: 1 {RATIO} (ref 1.1–2.2)
ALP SERPL-CCNC: 51 U/L (ref 45–117)
ALT SERPL-CCNC: 24 U/L (ref 12–78)
ANION GAP SERPL CALC-SCNC: 4 MMOL/L (ref 5–15)
APPEARANCE UR: CLEAR
AST SERPL-CCNC: 22 U/L (ref 15–37)
BACTERIA URNS QL MICRO: NEGATIVE /HPF
BASOPHILS # BLD: 0 K/UL (ref 0–0.1)
BASOPHILS NFR BLD: 0 % (ref 0–1)
BILIRUB SERPL-MCNC: 0.5 MG/DL (ref 0.2–1)
BILIRUB UR QL: NEGATIVE
BUN SERPL-MCNC: 9 MG/DL (ref 6–20)
BUN/CREAT SERPL: 12 (ref 12–20)
CALCIUM SERPL-MCNC: 9.2 MG/DL (ref 8.5–10.1)
CHLORIDE SERPL-SCNC: 105 MMOL/L (ref 97–108)
CO2 SERPL-SCNC: 30 MMOL/L (ref 21–32)
COLOR UR: ABNORMAL
CREAT SERPL-MCNC: 0.74 MG/DL (ref 0.55–1.02)
DIFFERENTIAL METHOD BLD: ABNORMAL
EOSINOPHIL # BLD: 0.2 K/UL (ref 0–0.4)
EOSINOPHIL NFR BLD: 3 % (ref 0–7)
EPITH CASTS URNS QL MICRO: ABNORMAL /LPF
ERYTHROCYTE [DISTWIDTH] IN BLOOD BY AUTOMATED COUNT: 14.7 % (ref 11.5–14.5)
GLOBULIN SER CALC-MCNC: 3.4 G/DL (ref 2–4)
GLUCOSE SERPL-MCNC: 152 MG/DL (ref 65–100)
GLUCOSE UR STRIP.AUTO-MCNC: >1000 MG/DL
HCT VFR BLD AUTO: 36.4 % (ref 35–47)
HGB BLD-MCNC: 11.9 G/DL (ref 11.5–16)
HGB UR QL STRIP: NEGATIVE
HYALINE CASTS URNS QL MICRO: ABNORMAL /LPF (ref 0–5)
IMM GRANULOCYTES # BLD AUTO: 0 K/UL (ref 0–0.04)
IMM GRANULOCYTES NFR BLD AUTO: 0 % (ref 0–0.5)
KETONES UR QL STRIP.AUTO: NEGATIVE MG/DL
LEUKOCYTE ESTERASE UR QL STRIP.AUTO: NEGATIVE
LIPASE SERPL-CCNC: 134 U/L (ref 73–393)
LYMPHOCYTES # BLD: 1.3 K/UL (ref 0.8–3.5)
LYMPHOCYTES NFR BLD: 18 % (ref 12–49)
MCH RBC QN AUTO: 25.8 PG (ref 26–34)
MCHC RBC AUTO-ENTMCNC: 32.7 G/DL (ref 30–36.5)
MCV RBC AUTO: 79 FL (ref 80–99)
MONOCYTES # BLD: 0.3 K/UL (ref 0–1)
MONOCYTES NFR BLD: 4 % (ref 5–13)
NEUTS SEG # BLD: 5.2 K/UL (ref 1.8–8)
NEUTS SEG NFR BLD: 75 % (ref 32–75)
NITRITE UR QL STRIP.AUTO: POSITIVE
NRBC # BLD: 0 K/UL (ref 0–0.01)
NRBC BLD-RTO: 0 PER 100 WBC
PH UR STRIP: 6.5 [PH] (ref 5–8)
PLATELET # BLD AUTO: 190 K/UL (ref 150–400)
PMV BLD AUTO: 10.1 FL (ref 8.9–12.9)
POTASSIUM SERPL-SCNC: 3.2 MMOL/L (ref 3.5–5.1)
PROT SERPL-MCNC: 6.9 G/DL (ref 6.4–8.2)
PROT UR STRIP-MCNC: NEGATIVE MG/DL
RBC # BLD AUTO: 4.61 M/UL (ref 3.8–5.2)
RBC #/AREA URNS HPF: ABNORMAL /HPF (ref 0–5)
SODIUM SERPL-SCNC: 139 MMOL/L (ref 136–145)
SP GR UR REFRACTOMETRY: 1.01 (ref 1–1.03)
UA: UC IF INDICATED,UAUC: ABNORMAL
UROBILINOGEN UR QL STRIP.AUTO: 0.2 EU/DL (ref 0.2–1)
WBC # BLD AUTO: 7 K/UL (ref 3.6–11)
WBC URNS QL MICRO: ABNORMAL /HPF (ref 0–4)

## 2020-09-29 PROCEDURE — 36415 COLL VENOUS BLD VENIPUNCTURE: CPT

## 2020-09-29 PROCEDURE — 74011250636 HC RX REV CODE- 250/636: Performed by: PHYSICIAN ASSISTANT

## 2020-09-29 PROCEDURE — 96375 TX/PRO/DX INJ NEW DRUG ADDON: CPT

## 2020-09-29 PROCEDURE — 85025 COMPLETE CBC W/AUTO DIFF WBC: CPT

## 2020-09-29 PROCEDURE — 81001 URINALYSIS AUTO W/SCOPE: CPT

## 2020-09-29 PROCEDURE — 96374 THER/PROPH/DIAG INJ IV PUSH: CPT

## 2020-09-29 PROCEDURE — 80053 COMPREHEN METABOLIC PANEL: CPT

## 2020-09-29 PROCEDURE — 83690 ASSAY OF LIPASE: CPT

## 2020-09-29 PROCEDURE — 99285 EMERGENCY DEPT VISIT HI MDM: CPT

## 2020-09-29 PROCEDURE — 74011000250 HC RX REV CODE- 250: Performed by: PHYSICIAN ASSISTANT

## 2020-09-29 PROCEDURE — 87635 SARS-COV-2 COVID-19 AMP PRB: CPT

## 2020-09-29 RX ORDER — ONDANSETRON 2 MG/ML
4 INJECTION INTRAMUSCULAR; INTRAVENOUS
Status: COMPLETED | OUTPATIENT
Start: 2020-09-29 | End: 2020-09-29

## 2020-09-29 RX ORDER — LIDOCAINE HYDROCHLORIDE 20 MG/ML
JELLY TOPICAL
Status: COMPLETED | OUTPATIENT
Start: 2020-09-29 | End: 2020-09-29

## 2020-09-29 RX ORDER — HYDROMORPHONE HYDROCHLORIDE 2 MG/1
2 TABLET ORAL
Qty: 10 TAB | Refills: 0 | Status: SHIPPED | OUTPATIENT
Start: 2020-09-29 | End: 2020-10-02

## 2020-09-29 RX ORDER — PROMETHAZINE HYDROCHLORIDE 25 MG/1
25 TABLET ORAL
Qty: 10 TAB | Refills: 0 | Status: SHIPPED | OUTPATIENT
Start: 2020-09-29 | End: 2021-03-03

## 2020-09-29 RX ORDER — DIPHENHYDRAMINE HYDROCHLORIDE 50 MG/ML
50 INJECTION, SOLUTION INTRAMUSCULAR; INTRAVENOUS
Status: COMPLETED | OUTPATIENT
Start: 2020-09-29 | End: 2020-09-29

## 2020-09-29 RX ORDER — HYDROMORPHONE HYDROCHLORIDE 1 MG/ML
1 INJECTION, SOLUTION INTRAMUSCULAR; INTRAVENOUS; SUBCUTANEOUS
Status: COMPLETED | OUTPATIENT
Start: 2020-09-29 | End: 2020-09-29

## 2020-09-29 RX ADMIN — DIPHENHYDRAMINE HYDROCHLORIDE 50 MG: 50 INJECTION, SOLUTION INTRAMUSCULAR; INTRAVENOUS at 17:12

## 2020-09-29 RX ADMIN — LIDOCAINE HYDROCHLORIDE: 20 JELLY TOPICAL at 16:40

## 2020-09-29 RX ADMIN — HYDROMORPHONE HYDROCHLORIDE 1 MG: 1 INJECTION, SOLUTION INTRAMUSCULAR; INTRAVENOUS; SUBCUTANEOUS at 16:39

## 2020-09-29 RX ADMIN — ONDANSETRON 4 MG: 2 INJECTION INTRAMUSCULAR; INTRAVENOUS at 16:39

## 2020-09-29 NOTE — ED PROVIDER NOTES
EMERGENCY DEPARTMENT HISTORY AND PHYSICAL EXAM      Date: 9/29/2020  Patient Name: Aline Davila    History of Presenting Illness     Chief Complaint   Patient presents with    Abdominal Pain     Pt states she thinks she is having a UC flare up and is havuing abdominal pain and rectal pain. Sent by her GI Dr. Ricci Cardoso Diarrhea    Anal Pain       History Provided By: Patient    HPI: Aline Davila, 48 y.o. female with a history of type 2 diabetes, ulcerative colitis and others as below presents ambulatory to the ED with cc of 2 days of 10 out of 10 constant, aching and stabbing rectal pain that is much worse with attempts to insert her prescribed suppositories (mesalamine and nitroglycerin). She also endorses some mild left lower quadrant tenderness. There has been no fever lately. She has had several episodes of loose stool and has noticed a little blood on the toilet paper without grossly bloody diarrhea. She tells me she has an appointment this Friday (in 3 days) for colonoscopy with her gastroenterologist, Dr. Janice Grijlava. She tells me the diagnosis of ulcerative colitis was made this past May at [address] [place] [address] Drive were started. She tells me she had a kind of allergic reaction and those Biologics were stopped, however she is not presently taking prednisone. She denies chest pain or shortness of breath. There are no other complaints, changes, or physical findings at this time. PCP: Barbara Campbell MD    Current Outpatient Medications   Medication Sig Dispense Refill    HYDROmorphone (DILAUDID) 2 mg tablet Take 1 Tab by mouth every four (4) hours as needed for Pain for up to 3 days. Max Daily Amount: 12 mg. 10 Tab 0    promethazine (PHENERGAN) 25 mg tablet Take 1 Tab by mouth every six (6) hours as needed for Nausea. 10 Tab 0    estradioL (ESTRACE) 0.5 mg tablet TAKE 1 TABLET BY MOUTH ONCE DAILY      empagliflozin (Jardiance) 25 mg tablet Take 1 Tab by mouth daily.  90 Tab 3    ondansetron (Zofran ODT) 4 mg disintegrating tablet Take 1 Tab by mouth every eight (8) hours as needed for Nausea. 10 Tab 0    glimepiride (AMARYL) 4 mg tablet Take 1 Tab by mouth Daily (before breakfast). 90 Tab 3    cetirizine (ZYRTEC) 10 mg tablet Take 1 Tab by mouth daily. 20 Tab 0    hydrocortisone (ANUSOL-HC) 25 mg supp Insert 1 Suppository into rectum every twelve (12) hours. 12 Each 0    hydrocortisone (PROCTOCORT) 1 % topical cream Apply  to affected area two (2) times a day. use thin layer 30 g 0    nitroglycerin (RECTIV) 0.4 % (w/w) ointment Insert  into rectum every twelve (12) hours every twelve (12) hours. 30 g 0    ALPRAZolam (XANAX) 0.25 mg tablet Take 0.125-0.25 mg by mouth every twelve (12) hours as needed for Anxiety or Sleep.  melatonin tab tablet Take 5 mg by mouth nightly.  omeprazole (PRILOSEC) 20 mg capsule Take 40 mg by mouth Daily (before breakfast).  dicyclomine (BENTYL) 20 mg tablet Take 1 Tab by mouth every six (6) hours as needed (abdominal cramps) for up to 20 doses. 20 Tab 0    sertraline (ZOLOFT) 100 mg tablet Take 200 mg by mouth nightly.  losartan-hydroCHLOROthiazide (HYZAAR) 100-12.5 mg per tablet Take 1 Tab by mouth daily.  albuterol (PROVENTIL, VENTOLIN) 90 mcg/Actuation inhaler Take 2 Puffs by inhalation every six (6) hours as needed.  hyoscyamine SL (LEVSIN/SL) 0.125 mg SL tablet 1 Tab by SubLINGual route every four (4) hours as needed for Cramping. 15 Tab 0    ondansetron (Zofran ODT) 8 mg disintegrating tablet Take 1 Tab by mouth every eight (8) hours as needed for Nausea or Vomiting. 20 Tab 0    promethazine (PHENERGAN) 25 mg tablet Take 1 Tab by mouth every six (6) hours as needed for Nausea. 12 Tab 0    polyethylene glycol (MIRALAX) 17 gram/dose powder Take 17 g by mouth daily.  1 tablespoon with 8 oz of water daily to prevent constipation 116 g 0    naproxen (NAPROSYN) 500 mg tablet Take 1 Tab by mouth two (2) times daily (with meals). 30 Tab 0    EPINEPHrine (EPIPEN) 0.3 mg/0.3 mL (1:1,000) injection 0.3 mL by IntraMUSCular route once as needed for up to 1 dose. 0.3 mL 1     Past History     Past Medical History:  Past Medical History:   Diagnosis Date    Anal fissure     Anisocoria     Asthma     LAST EPISODE     Back pain     Cerumen impaction     Chronic kidney disease     hx uti in past    Coagulation defects     ocassional rectal bleeding due to anal fissure    Colovaginal fistula     Diabetes (HCC)     NIDDM    Diabetes ([facility] [address]     Diverticulitis     Diverticulosis     Enlarged tonsils     Frequent UTI     GERD (gastroesophageal reflux disease)     H/O endoscopy     with dilation    HA (headache)     Hepatic steatosis     Hx of colonoscopy with polypectomy     benign    Hypertension     Ill-defined condition     FREQUENT HIVES    Ill-defined condition     HX ELEVATED LIVER ENZYMES    Morbid obesity (HCC)     Nausea & vomiting     during diverticulitis flare    Obesity     Otitis media     Pneumonia     about 15 yrs ago    Psychiatric disorder     ANXIETY    Recurrent tonsillitis     Sinusitis     Transfusion history ~ age 35    postop hysterectomy    Unspecified sleep apnea     snores ( not diagnosed yet)     Urticaria     Urticaria        Past Surgical History:  Past Surgical History:   Procedure Laterality Date    ABDOMEN SURGERY PROC UNLISTED  2018    hernia repair at [facility]    COLONOSCOPY N/A 3/28/2019    COLONOSCOPY performed by Karen Mathews MD at [address] [facility]    [place].     HX GI  12    LAPAROSCOPIC HAND ASSISTED  POSS OPEN SIGMOID COLECTOMY POSS TEMPORARY DIVERTING LOOP ILEOSTOMY;  (no illeostomy needed)    HX GYN           HX GYN      cervical conization    HX HEENT      SINUS SURGERY LEFT X2    HX HEENT      SINUS SURGERY ON RIGHT X2    HX OTHER SURGICAL      Sphincterotomy    HX PELVIC LAPAROSCOPY      HX EMMANUEL AND BSO      HX UROLOGICAL  7/31/12     CYSTOSCOPY INSERTION URETERAL CATHETERS - Cystoscopy Insertion of bilateral ureteral stents       Family History:  Family History   Problem Relation Age of Onset    Diabetes Mother     Cancer Mother         NON-HODGKINS LYMPHOMA    Anesth Problems Mother         PONV    Diabetes Father     Heart Disease Father         CAD - STENTS, PACEMAKER    Arrhythmia Father        Social History:  Social History     Tobacco Use    Smoking status: Never Smoker    Smokeless tobacco: Never Used   Substance Use Topics    Alcohol use: Yes     Comment: Rarely    Drug use: No     Types: Prescription, OTC       Allergies: Allergies   Allergen Reactions    Aspirin Hives and Shortness of Breath    Codeine Hives, Itching and Angioedema     Pt had itching in mouth, on face and lips    Contrast Agent [Iodine] Hives, Itching and Angioedema     Pt. had itching in mouth, on face and lips after IV contrast with the last exam.  Benadryl was given. OK if premedicated.  Flavoring Agent Anaphylaxis    Percocet [Oxycodone-Acetaminophen] Hives, Itching and Angioedema     Pt had itching in mouth, on face and lips    Prilosec [Omeprazole Magnesium] Anaphylaxis     CHERRY FLAVORED ODT; PT TAKES REGULAR PRILOSEC AND IS OK    Dilaudid [Hydromorphone] Itching    Ketorolac Rash     \"makes my eyes spasm and causes rash on my hands\" and \"makes my skin itch and makes me nervous\"    Fentanyl Rash and Itching    Morphine Itching     Severe itching. Tolerates with Benadryl     Review of Systems   Review of Systems   Constitutional: Negative for fatigue and fever. HENT: Negative for ear pain and sore throat. Eyes: Negative for pain, redness and visual disturbance. Respiratory: Negative for cough and shortness of breath. Cardiovascular: Negative for chest pain and palpitations. Gastrointestinal: Positive for abdominal pain, diarrhea and rectal pain. Negative for nausea and vomiting.    Genitourinary: Negative for dysuria, frequency and urgency. Musculoskeletal: Negative for back pain, gait problem, neck pain and neck stiffness. Skin: Negative for rash and wound. Neurological: Negative for dizziness, weakness, light-headedness, numbness and headaches. Physical Exam   Physical Exam  Vitals signs and nursing note reviewed. Constitutional:       General: She is not in acute distress. Appearance: She is well-developed. She is obese. She is not toxic-appearing. HENT:      Head: Normocephalic and atraumatic. Jaw: No trismus. Right Ear: External ear normal.      Left Ear: External ear normal.      Nose: Nose normal.      Mouth/Throat:      Pharynx: Uvula midline. Eyes:      General: No scleral icterus. Conjunctiva/sclera: Conjunctivae normal.      Pupils: Pupils are equal, round, and reactive to light. Neck:      Musculoskeletal: Full passive range of motion without pain and normal range of motion. Cardiovascular:      Rate and Rhythm: Normal rate and regular rhythm. Pulmonary:      Effort: Pulmonary effort is normal. No tachypnea, accessory muscle usage or respiratory distress. Breath sounds: No decreased breath sounds or wheezing. Abdominal:      Palpations: Abdomen is soft. Tenderness: There is abdominal tenderness in the left lower quadrant. Comments:   Soft  No obvious distention  Mild left lower quadrant tenderness with palpation without grimace or guarding   Musculoskeletal: Normal range of motion. Skin:     Findings: No rash. Neurological:      Mental Status: She is alert and oriented to person, place, and time. She is not disoriented. GCS: GCS eye subscore is 4. GCS verbal subscore is 5. GCS motor subscore is 6. Cranial Nerves: No cranial nerve deficit.    Psychiatric:         Speech: Speech normal.       Diagnostic Study Results     Labs -     Recent Results (from the past 12 hour(s))   CBC WITH AUTOMATED DIFF    Collection Time: 09/29/20 3:36 PM   Result Value Ref Range    WBC 7.0 3.6 - 11.0 K/uL    RBC 4.61 3.80 - 5.20 M/uL    HGB 11.9 11.5 - 16.0 g/dL    HCT 36.4 35.0 - 47.0 %    MCV 79.0 (L) 80.0 - 99.0 FL    MCH 25.8 (L) 26.0 - 34.0 PG    MCHC 32.7 30.0 - 36.5 g/dL    RDW 14.7 (H) 11.5 - 14.5 %    PLATELET 725 391 - 915 K/uL    MPV 10.1 8.9 - 12.9 FL    NRBC 0.0 0  WBC    ABSOLUTE NRBC 0.00 0.00 - 0.01 K/uL    NEUTROPHILS 75 32 - 75 %    LYMPHOCYTES 18 12 - 49 %    MONOCYTES 4 (L) 5 - 13 %    EOSINOPHILS 3 0 - 7 %    BASOPHILS 0 0 - 1 %    IMMATURE GRANULOCYTES 0 0.0 - 0.5 %    ABS. NEUTROPHILS 5.2 1.8 - 8.0 K/UL    ABS. LYMPHOCYTES 1.3 0.8 - 3.5 K/UL    ABS. MONOCYTES 0.3 0.0 - 1.0 K/UL    ABS. EOSINOPHILS 0.2 0.0 - 0.4 K/UL    ABS. BASOPHILS 0.0 0.0 - 0.1 K/UL    ABS. IMM. GRANS. 0.0 0.00 - 0.04 K/UL    DF AUTOMATED     METABOLIC PANEL, COMPREHENSIVE    Collection Time: 09/29/20  3:36 PM   Result Value Ref Range    Sodium 139 136 - 145 mmol/L    Potassium 3.2 (L) 3.5 - 5.1 mmol/L    Chloride 105 97 - 108 mmol/L    CO2 30 21 - 32 mmol/L    Anion gap 4 (L) 5 - 15 mmol/L    Glucose 152 (H) 65 - 100 mg/dL    BUN 9 6 - 20 MG/DL    Creatinine 0.74 0.55 - 1.02 MG/DL    BUN/Creatinine ratio 12 12 - 20      GFR est AA >60 >60 ml/min/1.73m2    GFR est non-AA >60 >60 ml/min/1.73m2    Calcium 9.2 8.5 - 10.1 MG/DL    Bilirubin, total 0.5 0.2 - 1.0 MG/DL    ALT (SGPT) 24 12 - 78 U/L    AST (SGOT) 22 15 - 37 U/L    Alk.  phosphatase 51 45 - 117 U/L    Protein, total 6.9 6.4 - 8.2 g/dL    Albumin 3.5 3.5 - 5.0 g/dL    Globulin 3.4 2.0 - 4.0 g/dL    A-G Ratio 1.0 (L) 1.1 - 2.2     LIPASE    Collection Time: 09/29/20  3:36 PM   Result Value Ref Range    Lipase 134 73 - 393 U/L   URINALYSIS W/ REFLEX CULTURE    Collection Time: 09/29/20  3:36 PM    Specimen: Urine   Result Value Ref Range    Color ORANGE      Appearance CLEAR CLEAR      Specific gravity 1.013 1.003 - 1.030      pH (UA) 6.5 5.0 - 8.0      Protein Negative NEG mg/dL    Glucose >1,000 (A) NEG mg/dL    Ketone Negative NEG mg/dL    Bilirubin Negative NEG      Blood Negative NEG      Urobilinogen 0.2 0.2 - 1.0 EU/dL    Nitrites Positive (A) NEG      Leukocyte Esterase Negative NEG      UA:UC IF INDICATED CULTURE NOT INDICATED BY UA RESULT CNI      WBC 0-4 0 - 4 /hpf    RBC 0-5 0 - 5 /hpf    Epithelial cells FEW FEW /lpf    Bacteria Negative NEG /hpf    Hyaline cast 0-2 0 - 5 /lpf       Radiologic Studies -   No orders to display     CT Results  (Last 48 hours)    None        CXR Results  (Last 48 hours)    None        Medical Decision Making   I am the first provider for this patient. I reviewed the vital signs, available nursing notes, past medical history, past surgical history, family history and social history. Vital Signs-Reviewed the patient's vital signs. Patient Vitals for the past 12 hrs:   Temp Pulse Resp BP SpO2   09/29/20 1715 -- -- -- 117/60 96 %   09/29/20 1700 -- -- -- 108/86 94 %   09/29/20 1630 -- -- -- 110/72 97 %   09/29/20 1600 -- -- -- 133/67 93 %   09/29/20 1545 -- -- -- 124/79 96 %   09/29/20 1530 -- -- -- 127/77 93 %   09/29/20 1515 -- -- -- 118/74 92 %   09/29/20 1440 98 °F (36.7 °C) 80 12 139/71 98 %       Pulse Oximetry Analysis - 98% on RA    Records Reviewed: Nursing Notes, Old Medical Records, Previous Radiology Studies and Previous Laboratory Studies    Provider Notes (Medical Decision Making):   DDx: External hemorrhoids, UC flare, rectal mass, proctitis    Afebrile; well-appearing; patient is without leukocytosis and has normal renal function. Blood sugar is slightly elevated at 152 and potassium is slightly low at 3.2.  UA demonstrates orange urine as a consequence of Pyridium without bacteria or WBCs. Patient's abdomen is soft and there is some mild left lower quadrant tenderness with palpation without grimace or guarding. She has had good improvement of her pain symptoms after pain medication.   Patient is scheduled for colonoscopy on Friday of this week (3 days). Believe safe to defer additional testing or imaging. Procedure Note - Rectal Exam:   5:08 PM  Performed by: Derick Saldana PA-C  Chaperoned by: Bulmaro Melgar RN  Rectal exam performed. No stool was collected. Other findings: Soft, pink, reducible external hemorrhoid. No visible fissure. Insertion of my finger was achieved using a 2% lidocaine jelly and a Urojet. There is no rectal mass. There is no palpable stool in the vault. The procedure took 1-15 minutes, and pt tolerated well. ED Course:   Initial assessment performed. The patients presenting problems have been discussed, and they are in agreement with the care plan formulated and outlined with them. I have encouraged them to ask questions as they arise throughout their visit. Disposition:  Discharge    PLAN:  1. Discharge Medication List as of 9/29/2020  5:18 PM      START taking these medications    Details   HYDROmorphone (DILAUDID) 2 mg tablet Take 1 Tab by mouth every four (4) hours as needed for Pain for up to 3 days. Max Daily Amount: 12 mg., Normal, Disp-10 Tab,R-0      !! promethazine (PHENERGAN) 25 mg tablet Take 1 Tab by mouth every six (6) hours as needed for Nausea., Normal, Disp-10 Tab,R-0       !! - Potential duplicate medications found. Please discuss with provider. CONTINUE these medications which have NOT CHANGED    Details   estradioL (ESTRACE) 0.5 mg tablet TAKE 1 TABLET BY MOUTH ONCE DAILY, Historical Med      empagliflozin (Jardiance) 25 mg tablet Take 1 Tab by mouth daily. , Normal, Disp-90 Tab, R-3      !! ondansetron (Zofran ODT) 4 mg disintegrating tablet Take 1 Tab by mouth every eight (8) hours as needed for Nausea., Normal, Disp-10 Tab, R-0      glimepiride (AMARYL) 4 mg tablet Take 1 Tab by mouth Daily (before breakfast). , Normal, Disp-90 Tab, R-3      cetirizine (ZYRTEC) 10 mg tablet Take 1 Tab by mouth daily. , Normal, Disp-20 Tab, R-0      hydrocortisone (ANUSOL-HC) 25 mg supp Insert 1 Suppository into rectum every twelve (12) hours. , Normal, Disp-12 Each, R-0      hydrocortisone (PROCTOCORT) 1 % topical cream Apply  to affected area two (2) times a day. use thin layer, Normal, Disp-30 g, R-0      nitroglycerin (RECTIV) 0.4 % (w/w) ointment Insert  into rectum every twelve (12) hours every twelve (12) hours. , Normal, Disp-30 g, R-0      ALPRAZolam (XANAX) 0.25 mg tablet Take 0.125-0.25 mg by mouth every twelve (12) hours as needed for Anxiety or Sleep., Historical Med      melatonin tab tablet Take 5 mg by mouth nightly., Historical Med      omeprazole (PRILOSEC) 20 mg capsule Take 40 mg by mouth Daily (before breakfast). , Historical Med      dicyclomine (BENTYL) 20 mg tablet Take 1 Tab by mouth every six (6) hours as needed (abdominal cramps) for up to 20 doses. , Print, Disp-20 Tab, R-0      sertraline (ZOLOFT) 100 mg tablet Take 200 mg by mouth nightly., Historical Med      losartan-hydroCHLOROthiazide (HYZAAR) 100-12.5 mg per tablet Take 1 Tab by mouth daily. , Historical Med      albuterol (PROVENTIL, VENTOLIN) 90 mcg/Actuation inhaler Take 2 Puffs by inhalation every six (6) hours as needed., Historical Med      hyoscyamine SL (LEVSIN/SL) 0.125 mg SL tablet 1 Tab by SubLINGual route every four (4) hours as needed for Cramping., Normal, Disp-15 Tab,R-0      !! ondansetron (Zofran ODT) 8 mg disintegrating tablet Take 1 Tab by mouth every eight (8) hours as needed for Nausea or Vomiting., Normal, Disp-20 Tab,R-0      !! promethazine (PHENERGAN) 25 mg tablet Take 1 Tab by mouth every six (6) hours as needed for Nausea., Normal, Disp-12 Tab,R-0      polyethylene glycol (MIRALAX) 17 gram/dose powder Take 17 g by mouth daily. 1 tablespoon with 8 oz of water daily to prevent constipation, Normal, Disp-116 g, R-0      naproxen (NAPROSYN) 500 mg tablet Take 1 Tab by mouth two (2) times daily (with meals). , Print, Disp-30 Tab, R-0      EPINEPHrine (EPIPEN) 0.3 mg/0.3 mL (1:1,000) injection 0.3 mL by IntraMUSCular route once as needed for up to 1 dose., Print, Disp-0.3 mL, R-1       !! - Potential duplicate medications found. Please discuss with provider. 2.   Follow-up Information     Follow up With Specialties Details Why Julian Desai MD Gastroenterology Schedule an appointment as soon as possible for a visit GASTROENTEROLOGY: keep your appointment this Friday [address] [facility] [id number] [phone]          Return to ED if worse     Diagnosis     Clinical Impression:   1. Rectal pain    2. External hemorrhoid    3.  History of ulcerative colitis

## 2020-09-29 NOTE — ED NOTES
Verbal and written discharge instructions given. Signature obtained. Given dose of Benadryl for facial itching after Dilaudid. No shortness of breath. Respirations not labored. Ambulatory from department.

## 2020-09-30 ENCOUNTER — PATIENT OUTREACH (OUTPATIENT)
Dept: CASE MANAGEMENT | Age: 50
End: 2020-09-30

## 2020-09-30 ENCOUNTER — VIRTUAL VISIT (OUTPATIENT)
Dept: ENDOCRINOLOGY | Age: 50
End: 2020-09-30

## 2020-09-30 DIAGNOSIS — E11.9 TYPE 2 DIABETES MELLITUS WITHOUT COMPLICATION, WITH LONG-TERM CURRENT USE OF INSULIN (HCC): Primary | ICD-10-CM

## 2020-09-30 DIAGNOSIS — Z79.4 TYPE 2 DIABETES MELLITUS WITHOUT COMPLICATION, WITH LONG-TERM CURRENT USE OF INSULIN (HCC): Primary | ICD-10-CM

## 2020-09-30 LAB — SARS-COV-2, COV2NT: NOT DETECTED

## 2020-09-30 NOTE — PROGRESS NOTES
ACM attempted to reach patient for ER follow up. Patient reports she is currently at a doctors visit and requests to return call to [facility]. Contact infor provided for return call. 3:15 pm  ACM contacted patient again for ER follow up. Patient answered and states she is waiting on televisit call from endocrinologist who should be calling any second. She requests AC call back again tomorrow morning. AC will return call to patient in AM for ER follow up.

## 2020-09-30 NOTE — PROGRESS NOTES
Patient rescheduling visit. Notes she is in bed and not feeling up to a telemedicine visit today. I had sent her an invite but no response. Attempted to call but unable to go through using the perfect serve yovani. On the prior visit she noted she had no way to access telemedicine, however we attempted and were able to have a good telemedicine visit at that time. She is welcome to reschedule at her convenience when she is ready to complete a telemedicine visit, or potentially in-office visit when available. Onethanai Padilla.  [facility] Endocrinology  [address] [place] [address]

## 2020-09-30 NOTE — PERIOP NOTES
[address]  Endoscopy Preprocedure Instructions      1. On the day of your surgery, please report to registration located on the 2nd floor of the  [facility]. yes    2. You must have a responsible adult to drive you to the hospital, stay at the hospital during your procedure and drive you home. If they leave your procedure will not be started (no exceptions). yes    3. Do not have anything to eat or drink (including water, gum, mints, coffee, and juice) after midnight. This does not apply to the medications you were instructed to take by your physician. yesIf you are currently taking Plavix, Coumadin, Aspirin, or other blood-thinning agents, contact your physician for special instructions. yes,    4. If you are having a procedure that requires bowel prep: We recommend that you have only clear liquids the day before your procedure and begin your bowel prep by 5:00 pm.  You may continue to drink clear liquids until midnight. If for any reason you are not able to complete your prep please notify your physician immediately. yes    5. Have a list of all current medications, including vitamins, herbal supplements and any other over the counter medications. yes    6. If you wear glasses, contacts, dentures and/or hearing aids, they may be removed prior to procedure, please bring a case to store them in. yes    7. You should understand that if you do not follow these instructions your procedure may be cancelled. If your physical condition changes (I.e. fever, cold or flu) please contact your doctor as soon as possible. 8. It is important that you be on time.   If for any reason you are unable to keep your appointment please call )- the day of or your physicians office prior to your scheduled procedure

## 2020-10-01 ENCOUNTER — PATIENT OUTREACH (OUTPATIENT)
Dept: CASE MANAGEMENT | Age: 50
End: 2020-10-01

## 2020-10-01 NOTE — PROGRESS NOTES
ACM again attempted to reach the patient for ER follow up. Unable to reach the patient and left VM with request for return call with contact info provided.  M will close this episode at this time due to inability to reach the patient

## 2020-10-02 ENCOUNTER — HOSPITAL ENCOUNTER (OUTPATIENT)
Age: 50
Setting detail: OUTPATIENT SURGERY
Discharge: HOME | End: 2020-10-02
Attending: INTERNAL MEDICINE | Admitting: INTERNAL MEDICINE
Payer: MEDICAID

## 2020-10-02 ENCOUNTER — ANESTHESIA (OUTPATIENT)
Dept: ENDOSCOPY | Age: 50
End: 2020-10-02
Payer: MEDICAID

## 2020-10-02 ENCOUNTER — ANESTHESIA EVENT (OUTPATIENT)
Dept: ENDOSCOPY | Age: 50
End: 2020-10-02
Payer: MEDICAID

## 2020-10-02 VITALS
BODY MASS INDEX: 37.04 KG/M2 | SYSTOLIC BLOOD PRESSURE: 115 MMHG | DIASTOLIC BLOOD PRESSURE: 63 MMHG | WEIGHT: 201.28 LBS | RESPIRATION RATE: 21 BRPM | HEIGHT: 62 IN | OXYGEN SATURATION: 97 % | HEART RATE: 75 BPM | TEMPERATURE: 98.4 F

## 2020-10-02 LAB
GLUCOSE BLD STRIP.AUTO-MCNC: 176 MG/DL (ref 65–100)
SERVICE CMNT-IMP: ABNORMAL

## 2020-10-02 PROCEDURE — 74011250636 HC RX REV CODE- 250/636: Performed by: NURSE ANESTHETIST, CERTIFIED REGISTERED

## 2020-10-02 PROCEDURE — 76060000031 HC ANESTHESIA FIRST 0.5 HR: Performed by: INTERNAL MEDICINE

## 2020-10-02 PROCEDURE — 2709999900 HC NON-CHARGEABLE SUPPLY: Performed by: INTERNAL MEDICINE

## 2020-10-02 PROCEDURE — 76040000019: Performed by: INTERNAL MEDICINE

## 2020-10-02 PROCEDURE — 74011000250 HC RX REV CODE- 250: Performed by: NURSE ANESTHETIST, CERTIFIED REGISTERED

## 2020-10-02 PROCEDURE — 82962 GLUCOSE BLOOD TEST: CPT

## 2020-10-02 PROCEDURE — 88305 TISSUE EXAM BY PATHOLOGIST: CPT

## 2020-10-02 PROCEDURE — 77030021593 HC FCPS BIOP ENDOSC BSC -A: Performed by: INTERNAL MEDICINE

## 2020-10-02 RX ORDER — LIDOCAINE HYDROCHLORIDE 20 MG/ML
INJECTION, SOLUTION EPIDURAL; INFILTRATION; INTRACAUDAL; PERINEURAL AS NEEDED
Status: DISCONTINUED | OUTPATIENT
Start: 2020-10-02 | End: 2020-10-02 | Stop reason: HOSPADM

## 2020-10-02 RX ORDER — SODIUM CHLORIDE 9 MG/ML
50 INJECTION, SOLUTION INTRAVENOUS CONTINUOUS
Status: DISCONTINUED | OUTPATIENT
Start: 2020-10-02 | End: 2020-10-02 | Stop reason: HOSPADM

## 2020-10-02 RX ORDER — PROPOFOL 10 MG/ML
INJECTION, EMULSION INTRAVENOUS
Status: DISCONTINUED | OUTPATIENT
Start: 2020-10-02 | End: 2020-10-02 | Stop reason: HOSPADM

## 2020-10-02 RX ORDER — FLUMAZENIL 0.1 MG/ML
0.2 INJECTION INTRAVENOUS
Status: DISCONTINUED | OUTPATIENT
Start: 2020-10-02 | End: 2020-10-02 | Stop reason: HOSPADM

## 2020-10-02 RX ORDER — SODIUM CHLORIDE 9 MG/ML
INJECTION, SOLUTION INTRAVENOUS
Status: DISCONTINUED | OUTPATIENT
Start: 2020-10-02 | End: 2020-10-02 | Stop reason: HOSPADM

## 2020-10-02 RX ORDER — DEXTROMETHORPHAN/PSEUDOEPHED 2.5-7.5/.8
1.2 DROPS ORAL
Status: DISCONTINUED | OUTPATIENT
Start: 2020-10-02 | End: 2020-10-02 | Stop reason: HOSPADM

## 2020-10-02 RX ORDER — NALOXONE HYDROCHLORIDE 0.4 MG/ML
0.4 INJECTION, SOLUTION INTRAMUSCULAR; INTRAVENOUS; SUBCUTANEOUS
Status: DISCONTINUED | OUTPATIENT
Start: 2020-10-02 | End: 2020-10-02 | Stop reason: HOSPADM

## 2020-10-02 RX ORDER — EPINEPHRINE 0.1 MG/ML
1 INJECTION INTRACARDIAC; INTRAVENOUS
Status: DISCONTINUED | OUTPATIENT
Start: 2020-10-02 | End: 2020-10-02 | Stop reason: HOSPADM

## 2020-10-02 RX ORDER — PROPOFOL 10 MG/ML
INJECTION, EMULSION INTRAVENOUS AS NEEDED
Status: DISCONTINUED | OUTPATIENT
Start: 2020-10-02 | End: 2020-10-02 | Stop reason: HOSPADM

## 2020-10-02 RX ORDER — MIDAZOLAM HYDROCHLORIDE 1 MG/ML
.25-5 INJECTION, SOLUTION INTRAMUSCULAR; INTRAVENOUS
Status: DISCONTINUED | OUTPATIENT
Start: 2020-10-02 | End: 2020-10-02 | Stop reason: HOSPADM

## 2020-10-02 RX ORDER — ATROPINE SULFATE 0.1 MG/ML
0.4 INJECTION INTRAVENOUS
Status: DISCONTINUED | OUTPATIENT
Start: 2020-10-02 | End: 2020-10-02 | Stop reason: HOSPADM

## 2020-10-02 RX ADMIN — LIDOCAINE HYDROCHLORIDE 80 MG: 20 INJECTION, SOLUTION INTRAVENOUS at 14:23

## 2020-10-02 RX ADMIN — PROPOFOL 100 MG: 10 INJECTION, EMULSION INTRAVENOUS at 14:23

## 2020-10-02 RX ADMIN — PROPOFOL 120 MCG/KG/MIN: 10 INJECTION, EMULSION INTRAVENOUS at 14:23

## 2020-10-02 RX ADMIN — SODIUM CHLORIDE: 9 INJECTION, SOLUTION INTRAVENOUS at 14:05

## 2020-10-02 NOTE — PROGRESS NOTES
Anesthesia staff at patient's bedside administering anesthesia and monitoring patients vital signs throughout procedure. See anesthesia note. ABD remains soft and non-tender post procedure. Pt has no complaints at this time and tolerated the procedure well. Endoscope was pre-cleaned at bedside immediately following procedure by Melany Cummins.

## 2020-10-02 NOTE — ANESTHESIA PREPROCEDURE EVALUATION
Relevant Problems   No relevant active problems       Anesthetic History     PONV          Review of Systems / Medical History  Patient summary reviewed, nursing notes reviewed and pertinent labs reviewed    Pulmonary        Sleep apnea    Asthma        Neuro/Psych         Headaches and psychiatric history     Cardiovascular    Hypertension              Exercise tolerance: >4 METS     GI/Hepatic/Renal     GERD           Endo/Other    Diabetes    Obesity     Other Findings   Comments: Hepatic steatosis  Diverticulitis  Colovaginal fistula  Mood disorder           Physical Exam    Airway  Mallampati: II    Neck ROM: normal range of motion   Mouth opening: Normal     Cardiovascular  Regular rate and rhythm,  S1 and S2 normal,  no murmur, click, rub, or gallop  Rhythm: regular  Rate: normal         Dental  No notable dental hx       Pulmonary  Breath sounds clear to auscultation               Abdominal  GI exam deferred       Other Findings            Anesthetic Plan    ASA: 3  Anesthesia type: MAC          Induction: Intravenous  Anesthetic plan and risks discussed with: Patient

## 2020-10-02 NOTE — ANESTHESIA POSTPROCEDURE EVALUATION
Procedure(s):  COLONOSCOPY  COLON BIOPSY. MAC    Anesthesia Post Evaluation      Multimodal analgesia: multimodal analgesia not used between 6 hours prior to anesthesia start to PACU discharge  Patient location during evaluation: PACU  Patient participation: complete - patient participated  Level of consciousness: awake  Pain management: adequate  Airway patency: patent  Anesthetic complications: no  Cardiovascular status: acceptable, blood pressure returned to baseline and hemodynamically stable  Respiratory status: acceptable  Hydration status: acceptable  Post anesthesia nausea and vomiting:  controlled      INITIAL Post-op Vital signs:   Vitals Value Taken Time   /63 10/2/2020  3:05 PM   Temp 36.9 °C (98.4 °F) 10/2/2020  2:45 PM   Pulse 74 10/2/2020  3:06 PM   Resp 25 10/2/2020  3:06 PM   SpO2 97 % 10/2/2020  3:06 PM   Vitals shown include unvalidated device data.

## 2020-10-02 NOTE — DISCHARGE INSTRUCTIONS
[address] [facility]. Vipin Boston M.D.  [phone]          COLON DISCHARGE INSTRUCTIONS       10/2/2020    Rose Marie Christianson Woo  :  1970  Karen Medical Record Number:  [id number]      COLONOSCOPY FINDINGS:  Your colonoscopy showed: normal exam.    DISCOMFORT:  Redness at IV site- apply warm compress to area; if redness or soreness persist- contact your physician  There may be a slight amount of blood passed from the rectum  Gaseous discomfort- walking, belching will help relieve any discomfort  You may not operate a vehicle for 12 hours  You may not engage in an occupation involving machinery or appliances for rest of today  You may not drink alcoholic beverages for at least 12 hours  Avoid making any critical decisions for at least 24 hour  DIET:   High fiber diet. - however -  remember your colon is empty and a heavy meal will produce gas. Avoid these foods:  vegetables, fried / greasy foods, carbonated drinks for today     ACTIVITY:  You may resume your normal daily activities it is recommended that you spend the remainder of the day resting -  avoid any strenuous activity. CALL M.DBibi ANY SIGN OF:   Increasing pain, nausea, vomiting  Abdominal distension (swelling)  New increased bleeding (oral or rectal)  Fever (chills)  Pain in chest area  Bloody discharge from nose or mouth   Shortness of breath    Follow-up Instructions:   Call Dr. John Paul Norman if any questions or problems. Telephone # [phone]  Biopsy results will be available in  5 to 7 days  Should have a repeat colonoscopy in 10 years.   Call for office appointment for 4 weeks

## 2020-10-02 NOTE — ROUTINE PROCESS
Phuong Leonidas  1970  [id number]    Situation:  Verbal report received from: Herber Venegas  Procedure: Procedure(s):  COLONOSCOPY  COLON BIOPSY    Background:    Preoperative diagnosis: ULCERATIVE COLITIS  Postoperative diagnosis: Ulcerative Colitis      :  Dr. Jimmy Salomon  Assistant(s): Endoscopy Technician-1: Governor Martinez  Endoscopy RN-1: Rochelle Cruz    Specimens:   ID Type Source Tests Collected by Time Destination   1 : Right colon biopsy Preservative   Linda Contreras MD 10/2/2020 1429 Pathology   2 : left colon biopsy Sally Klein MD 10/2/2020 1431 Pathology   3 : Rectum Biopsy Preservative   Linda Contreras MD 10/2/2020 1431 Pathology     H. Pylori  no    Assessment:  Intra-procedure medications     Anesthesia gave intra-procedure sedation and medications, see anesthesia flow sheet yes    Intravenous fluids: NS@ KVO     Vital signs stable     Abdominal assessment: round and soft     Recommendation:  Discharge patient per MD order. Family daughter  Permission to share finding with family or friend yes    Dr. Jimmy Salomon discussed with daughter procedure findings and next steps.

## 2020-10-02 NOTE — PROCEDURES
Marko Hernandez M.D.  [phone]            10/2/2020          Colonoscopy Operative Report  Rose Marie Bryson  :  1970  Karen Medical Record Number:  [id number]      Indications:  ulcerative colitis with rectal bleeding     :  Bushra Wade MD    Assistant -- None  Implants -- None    Referring Provider: Ambreen Weinstein MD    Sedation:  MAC anesthesia Propofol    Pre-Procedural Exam:      Airway: clear,  No airway problems anticipated  Heart: RRR, without gallops or rubs  Lungs: clear bilaterally without wheezes, crackles, or rhonchi  Abdomen: soft, nontender, nondistended, bowel sounds present  Mental Status: awake, alert and oriented to person, place and time     Procedure Details:  After informed consent was obtained with all risks and benefits of procedure explained and preoperative exam completed, the patient was taken to the endoscopy suite and placed in the left lateral decubitus position. Upon sequential sedation as per above, a digital rectal exam was performed. The Olympus videocolonoscope  was inserted in the rectum and carefully advanced to the terminal ileum. The quality of preparation was good. The colonoscope was slowly withdrawn with careful inspection and evaluation between folds. Retroflexion in the rectum was performed. Findings:   Terminal Ileum: normal  Cecum: normal  Ascending Colon: normal  Transverse Colon: normal  Descending Colon: normal  Sigmoid: normal  Rectum: normal    Interventions:  biopsy of colon - right, left and rectum    Specimen Removed:  as above    Complications: None. EBL:  None. Impression:  Normal exam. No evidence of proctitis noted    Recommendations:  -Await pathology.   -High fiber diet.    -Resume normal medication(s). Discharge Disposition:  Home in the company of a  when able to ambulate.     Bushra Wade MD  10/2/2020  2:38 PM

## 2020-10-02 NOTE — PERIOP NOTES
Endoscopy discharge instructions have been reviewed and given to patient. The patient verbalized understanding and acceptance of instructions.

## 2020-12-02 ENCOUNTER — HOSPITAL ENCOUNTER (EMERGENCY)
Age: 50
Discharge: HOME | End: 2020-12-02
Attending: EMERGENCY MEDICINE
Payer: MEDICAID

## 2020-12-02 ENCOUNTER — APPOINTMENT (OUTPATIENT)
Dept: CT IMAGING | Age: 50
End: 2020-12-02
Attending: EMERGENCY MEDICINE
Payer: MEDICAID

## 2020-12-02 VITALS
HEIGHT: 62 IN | OXYGEN SATURATION: 94 % | HEART RATE: 92 BPM | TEMPERATURE: 97.7 F | SYSTOLIC BLOOD PRESSURE: 131 MMHG | DIASTOLIC BLOOD PRESSURE: 71 MMHG | RESPIRATION RATE: 18 BRPM | BODY MASS INDEX: 38.58 KG/M2 | WEIGHT: 209.66 LBS

## 2020-12-02 DIAGNOSIS — K29.90 GASTRITIS AND DUODENITIS: ICD-10-CM

## 2020-12-02 DIAGNOSIS — R10.13 ABDOMINAL PAIN, EPIGASTRIC: Primary | ICD-10-CM

## 2020-12-02 LAB
ALBUMIN SERPL-MCNC: 3.9 G/DL (ref 3.5–5)
ALBUMIN/GLOB SERPL: 1 {RATIO} (ref 1.1–2.2)
ALP SERPL-CCNC: 80 U/L (ref 45–117)
ALT SERPL-CCNC: 42 U/L (ref 12–78)
ANION GAP SERPL CALC-SCNC: 9 MMOL/L (ref 5–15)
AST SERPL-CCNC: 39 U/L (ref 15–37)
BASOPHILS # BLD: 0 K/UL (ref 0–0.1)
BASOPHILS NFR BLD: 0 % (ref 0–1)
BILIRUB SERPL-MCNC: 0.4 MG/DL (ref 0.2–1)
BUN SERPL-MCNC: 8 MG/DL (ref 6–20)
BUN/CREAT SERPL: 9 (ref 12–20)
CALCIUM SERPL-MCNC: 9.2 MG/DL (ref 8.5–10.1)
CHLORIDE SERPL-SCNC: 104 MMOL/L (ref 97–108)
CO2 SERPL-SCNC: 26 MMOL/L (ref 21–32)
CREAT SERPL-MCNC: 0.9 MG/DL (ref 0.55–1.02)
DIFFERENTIAL METHOD BLD: ABNORMAL
EOSINOPHIL # BLD: 0.2 K/UL (ref 0–0.4)
EOSINOPHIL NFR BLD: 3 % (ref 0–7)
ERYTHROCYTE [DISTWIDTH] IN BLOOD BY AUTOMATED COUNT: 15.9 % (ref 11.5–14.5)
GLOBULIN SER CALC-MCNC: 3.9 G/DL (ref 2–4)
GLUCOSE SERPL-MCNC: 234 MG/DL (ref 65–100)
HCT VFR BLD AUTO: 38 % (ref 35–47)
HGB BLD-MCNC: 12.8 G/DL (ref 11.5–16)
IMM GRANULOCYTES # BLD AUTO: 0 K/UL (ref 0–0.04)
IMM GRANULOCYTES NFR BLD AUTO: 0 % (ref 0–0.5)
LIPASE SERPL-CCNC: 189 U/L (ref 73–393)
LYMPHOCYTES # BLD: 1.4 K/UL (ref 0.8–3.5)
LYMPHOCYTES NFR BLD: 24 % (ref 12–49)
MCH RBC QN AUTO: 26.8 PG (ref 26–34)
MCHC RBC AUTO-ENTMCNC: 33.7 G/DL (ref 30–36.5)
MCV RBC AUTO: 79.7 FL (ref 80–99)
MONOCYTES # BLD: 0.2 K/UL (ref 0–1)
MONOCYTES NFR BLD: 4 % (ref 5–13)
NEUTS SEG # BLD: 4.1 K/UL (ref 1.8–8)
NEUTS SEG NFR BLD: 69 % (ref 32–75)
NRBC # BLD: 0 K/UL (ref 0–0.01)
NRBC BLD-RTO: 0 PER 100 WBC
PLATELET # BLD AUTO: 193 K/UL (ref 150–400)
PMV BLD AUTO: 9.8 FL (ref 8.9–12.9)
POTASSIUM SERPL-SCNC: 2.9 MMOL/L (ref 3.5–5.1)
PROT SERPL-MCNC: 7.8 G/DL (ref 6.4–8.2)
RBC # BLD AUTO: 4.77 M/UL (ref 3.8–5.2)
SODIUM SERPL-SCNC: 139 MMOL/L (ref 136–145)
WBC # BLD AUTO: 6 K/UL (ref 3.6–11)

## 2020-12-02 PROCEDURE — 80053 COMPREHEN METABOLIC PANEL: CPT

## 2020-12-02 PROCEDURE — 85025 COMPLETE CBC W/AUTO DIFF WBC: CPT

## 2020-12-02 PROCEDURE — 74011250637 HC RX REV CODE- 250/637: Performed by: EMERGENCY MEDICINE

## 2020-12-02 PROCEDURE — 96375 TX/PRO/DX INJ NEW DRUG ADDON: CPT

## 2020-12-02 PROCEDURE — 74011250636 HC RX REV CODE- 250/636: Performed by: EMERGENCY MEDICINE

## 2020-12-02 PROCEDURE — 83690 ASSAY OF LIPASE: CPT

## 2020-12-02 PROCEDURE — 74177 CT ABD & PELVIS W/CONTRAST: CPT

## 2020-12-02 PROCEDURE — 96374 THER/PROPH/DIAG INJ IV PUSH: CPT

## 2020-12-02 PROCEDURE — 36415 COLL VENOUS BLD VENIPUNCTURE: CPT

## 2020-12-02 PROCEDURE — 99284 EMERGENCY DEPT VISIT MOD MDM: CPT

## 2020-12-02 PROCEDURE — 74011000250 HC RX REV CODE- 250: Performed by: EMERGENCY MEDICINE

## 2020-12-02 PROCEDURE — 96376 TX/PRO/DX INJ SAME DRUG ADON: CPT

## 2020-12-02 PROCEDURE — 74011000636 HC RX REV CODE- 636: Performed by: EMERGENCY MEDICINE

## 2020-12-02 RX ORDER — FAMOTIDINE 20 MG/1
20 TABLET, FILM COATED ORAL 2 TIMES DAILY
Qty: 20 TAB | Refills: 0 | Status: SHIPPED | OUTPATIENT
Start: 2020-12-02 | End: 2021-03-03

## 2020-12-02 RX ORDER — POTASSIUM CHLORIDE 750 MG/1
40 TABLET, FILM COATED, EXTENDED RELEASE ORAL
Status: COMPLETED | OUTPATIENT
Start: 2020-12-02 | End: 2020-12-02

## 2020-12-02 RX ORDER — POTASSIUM CHLORIDE 7.45 MG/ML
10 INJECTION INTRAVENOUS
Status: COMPLETED | OUTPATIENT
Start: 2020-12-02 | End: 2020-12-02

## 2020-12-02 RX ORDER — MORPHINE SULFATE 4 MG/ML
4 INJECTION INTRAVENOUS
Status: COMPLETED | OUTPATIENT
Start: 2020-12-02 | End: 2020-12-02

## 2020-12-02 RX ORDER — FENTANYL CITRATE 50 UG/ML
50 INJECTION, SOLUTION INTRAMUSCULAR; INTRAVENOUS
Status: COMPLETED | OUTPATIENT
Start: 2020-12-02 | End: 2020-12-02

## 2020-12-02 RX ORDER — PREDNISONE 50 MG/1
50 TABLET ORAL DAILY
Qty: 3 TAB | Refills: 0 | Status: SHIPPED | OUTPATIENT
Start: 2020-12-02 | End: 2020-12-05

## 2020-12-02 RX ORDER — DIPHENHYDRAMINE HYDROCHLORIDE 50 MG/ML
25 INJECTION, SOLUTION INTRAMUSCULAR; INTRAVENOUS
Status: COMPLETED | OUTPATIENT
Start: 2020-12-02 | End: 2020-12-02

## 2020-12-02 RX ORDER — SUCRALFATE 1 G/1
1 TABLET ORAL 4 TIMES DAILY
Qty: 30 TAB | Refills: 0 | Status: SHIPPED | OUTPATIENT
Start: 2020-12-02 | End: 2021-01-05

## 2020-12-02 RX ADMIN — FENTANYL CITRATE 50 MCG: 50 INJECTION, SOLUTION INTRAMUSCULAR; INTRAVENOUS at 19:08

## 2020-12-02 RX ADMIN — IOPAMIDOL 100 ML: 755 INJECTION, SOLUTION INTRAVENOUS at 20:15

## 2020-12-02 RX ADMIN — METHYLPREDNISOLONE SODIUM SUCCINATE 125 MG: 125 INJECTION, POWDER, FOR SOLUTION INTRAMUSCULAR; INTRAVENOUS at 19:03

## 2020-12-02 RX ADMIN — DIPHENHYDRAMINE HYDROCHLORIDE 25 MG: 50 INJECTION, SOLUTION INTRAMUSCULAR; INTRAVENOUS at 19:05

## 2020-12-02 RX ADMIN — MORPHINE SULFATE 4 MG: 4 INJECTION INTRAVENOUS at 20:31

## 2020-12-02 RX ADMIN — POTASSIUM CHLORIDE 40 MEQ: 750 TABLET, FILM COATED, EXTENDED RELEASE ORAL at 19:54

## 2020-12-02 RX ADMIN — LIDOCAINE HYDROCHLORIDE 40 ML: 20 SOLUTION ORAL; TOPICAL at 21:51

## 2020-12-02 RX ADMIN — POTASSIUM CHLORIDE 10 MEQ: 10 INJECTION, SOLUTION INTRAVENOUS at 19:55

## 2020-12-02 RX ADMIN — DIPHENHYDRAMINE HYDROCHLORIDE 25 MG: 50 INJECTION, SOLUTION INTRAMUSCULAR; INTRAVENOUS at 20:31

## 2020-12-02 NOTE — LETTER
[facility] EMERGENCY DEPT 
[address] [facility] [place] [facility] [address] 
[phone] Work/School Note Date: 12/2/2020 To Whom It May concern: 
 
Fabby Geller was seen and treated today in the emergency room by the following provider(s): 
Attending Provider: Benjamin Damon MD. Fabby Geller may return to work on 12/04/2020. Sincerely, 
 
 
 
 
[place]

## 2020-12-02 NOTE — ED PROVIDER NOTES
EMERGENCY DEPARTMENT HISTORY AND PHYSICAL EXAM      Date: 12/2/2020  Patient Name: Armand Sarkar  Patient Age and Sex: 48 y.o. female     History of Presenting Illness     Chief Complaint   Patient presents with    Abdominal Pain     concern for pancreatitis per pt, pt also states she has UC and could be having a flair       History Provided By: Patient    HPI: Armand Sarkar is a 51-year-old female with a history of ulcerative colitis, pancreatitis, colon resection, presenting with abdominal pain. Patient states that 3 days ago began having some abdominal pain and initially thought it was related to her UC but then the pain became worse in the epigastric region with more abdominal distention in the area. She is concerned that this might be pancreatitis. Patient states that she has had her gallbladder removed. No history of liver pathology. States that she currently is on mesalamine as well as dicyclomine and is followed by [facility]. Patient denies any dysuria, hematuria, nausea, vomiting. Did have some diarrhea but no blood in the stool. There are no other complaints, changes, or physical findings at this time. PCP: None    No current facility-administered medications on file prior to encounter. Current Outpatient Medications on File Prior to Encounter   Medication Sig Dispense Refill    promethazine (PHENERGAN) 25 mg tablet Take 1 Tab by mouth every six (6) hours as needed for Nausea. 10 Tab 0    hyoscyamine SL (LEVSIN/SL) 0.125 mg SL tablet 1 Tab by SubLINGual route every four (4) hours as needed for Cramping. 15 Tab 0    ondansetron (Zofran ODT) 8 mg disintegrating tablet Take 1 Tab by mouth every eight (8) hours as needed for Nausea or Vomiting. 20 Tab 0    estradioL (ESTRACE) 0.5 mg tablet TAKE 1 TABLET BY MOUTH ONCE DAILY      empagliflozin (Jardiance) 25 mg tablet Take 1 Tab by mouth daily.  90 Tab 3    ondansetron (Zofran ODT) 4 mg disintegrating tablet Take 1 Tab by mouth every eight (8) hours as needed for Nausea. 10 Tab 0    glimepiride (AMARYL) 4 mg tablet Take 1 Tab by mouth Daily (before breakfast). 90 Tab 3    cetirizine (ZYRTEC) 10 mg tablet Take 1 Tab by mouth daily. 20 Tab 0    hydrocortisone (ANUSOL-HC) 25 mg supp Insert 1 Suppository into rectum every twelve (12) hours. 12 Each 0    nitroglycerin (RECTIV) 0.4 % (w/w) ointment Insert  into rectum every twelve (12) hours every twelve (12) hours. 30 g 0    ALPRAZolam (XANAX) 0.25 mg tablet Take 0.125-0.25 mg by mouth every twelve (12) hours as needed for Anxiety or Sleep.  melatonin tab tablet Take 5 mg by mouth nightly.  omeprazole (PRILOSEC) 20 mg capsule Take 40 mg by mouth Daily (before breakfast).  dicyclomine (BENTYL) 20 mg tablet Take 1 Tab by mouth every six (6) hours as needed (abdominal cramps) for up to 20 doses. 20 Tab 0    sertraline (ZOLOFT) 100 mg tablet Take 200 mg by mouth nightly.  losartan-hydroCHLOROthiazide (HYZAAR) 100-12.5 mg per tablet Take 1 Tab by mouth daily.  EPINEPHrine (EPIPEN) 0.3 mg/0.3 mL (1:1,000) injection 0.3 mL by IntraMUSCular route once as needed for up to 1 dose. 0.3 mL 1    albuterol (PROVENTIL, VENTOLIN) 90 mcg/Actuation inhaler Take 2 Puffs by inhalation every six (6) hours as needed.          Past History     Past Medical History:  Past Medical History:   Diagnosis Date    Anal fissure     Anisocoria     Asthma     LAST EPISODE     Back pain     Cerumen impaction     Chronic kidney disease     hx uti in past    Coagulation defects     ocassional rectal bleeding due to anal fissure    Colovaginal fistula     Diabetes (HCC)     NIDDM    Diabetes ([facility] [address]     Diverticulitis     Diverticulosis     Enlarged tonsils     Frequent UTI     GERD (gastroesophageal reflux disease)     H/O endoscopy     with dilation    HA (headache)     Hepatic steatosis     Hx of colonoscopy with polypectomy     benign    Hypertension     Ill-defined condition     FREQUENT HIVES    Ill-defined condition     HX ELEVATED LIVER ENZYMES    Morbid obesity (HCC)     Nausea & vomiting     during diverticulitis flare    Obesity     Otitis media     Pneumonia     about 15 yrs ago    Psychiatric disorder     ANXIETY    Recurrent tonsillitis     Sinusitis     Transfusion history ~ age 35    postop hysterectomy    Unspecified sleep apnea     snores ( not diagnosed yet)     Urticaria     Urticaria        Past Surgical History:  Past Surgical History:   Procedure Laterality Date    ABDOMEN SURGERY PROC UNLISTED  2018    hernia repair [facility]    COLONOSCOPY N/A 3/28/2019    COLONOSCOPY performed by Yana Stevens MD at [address] [facility] COLONOSCOPY N/A 10/2/2020    COLONOSCOPY performed by Yana Stevens MD at [address] [facility]    [place].  HX GI  12    LAPAROSCOPIC HAND ASSISTED  POSS OPEN SIGMOID COLECTOMY POSS TEMPORARY DIVERTING LOOP ILEOSTOMY;  (no illeostomy needed)    HX GYN           HX GYN      cervical conization    HX HEENT      SINUS SURGERY LEFT X2    HX HEENT      SINUS SURGERY ON RIGHT X2    HX OTHER SURGICAL      Sphincterotomy    HX PELVIC LAPAROSCOPY      HX EMMANUEL AND BSO      HX UROLOGICAL  12     CYSTOSCOPY INSERTION URETERAL CATHETERS - Cystoscopy Insertion of bilateral ureteral stents       Family History:  Family History   Problem Relation Age of Onset    Diabetes Mother     Cancer Mother         NON-HODGKINS LYMPHOMA    Anesth Problems Mother         PONV    Diabetes Father     Heart Disease Father         CAD - STENTS, PACEMAKER    Arrhythmia Father        Social History:  Social History     Tobacco Use    Smoking status: Never Smoker    Smokeless tobacco: Never Used   Substance Use Topics    Alcohol use: Yes     Comment: Rarely    Drug use: No     Types: Prescription, OTC       Allergies:   Allergies   Allergen Reactions    Aspirin Hives and Shortness of Breath    Codeine Hives, Itching and Angioedema     Pt had itching in mouth, on face and lips    Contrast Agent [Iodine] Hives, Itching and Angioedema     Pt. had itching in mouth, on face and lips after IV contrast with the last exam.  Benadryl was given. OK if premedicated with benadryl and solumedrol 1h before    Flavoring Agent Anaphylaxis    Percocet [Oxycodone-Acetaminophen] Hives, Itching and Angioedema     Pt had itching in mouth, on face and lips    Prilosec [Omeprazole Magnesium] Anaphylaxis     CHERRY FLAVORED ODT; PT TAKES REGULAR PRILOSEC AND IS OK    Dilaudid [Hydromorphone] Itching     Tolerates with benadryl    Ketorolac Rash     \"makes my eyes spasm and causes rash on my hands\" and \"makes my skin itch and makes me nervous\"    Fentanyl Rash and Itching     Has had benadryl before    Morphine Itching     Severe itching. Tolerates with Benadryl         Review of Systems   Review of Systems   Constitutional: Negative for chills and fever. Respiratory: Negative for cough and shortness of breath. Cardiovascular: Negative for chest pain. Gastrointestinal: Positive for abdominal distention, abdominal pain and diarrhea. Negative for constipation, nausea and vomiting. Genitourinary: Negative for dysuria, frequency and hematuria. Neurological: Negative for weakness and numbness. All other systems reviewed and are negative. Physical Exam   Physical Exam  Vitals signs and nursing note reviewed. Constitutional:       Appearance: She is well-developed. HENT:      Head: Normocephalic and atraumatic. Nose: Nose normal.      Mouth/Throat:      Mouth: Mucous membranes are moist.   Eyes:      Extraocular Movements: Extraocular movements intact. Conjunctiva/sclera: Conjunctivae normal.   Neck:      Musculoskeletal: Normal range of motion and neck supple. Cardiovascular:      Rate and Rhythm: Normal rate and regular rhythm.    Pulmonary:      Effort: Pulmonary effort is normal. No respiratory distress. Breath sounds: Normal breath sounds. Abdominal:      General: There is no distension. Palpations: Abdomen is soft. Tenderness: There is abdominal tenderness in the epigastric area and left lower quadrant. Comments: Mild distention of the epigastric region   Musculoskeletal: Normal range of motion. Skin:     General: Skin is warm and dry. Neurological:      General: No focal deficit present. Mental Status: She is alert and oriented to person, place, and time. Mental status is at baseline. Psychiatric:         Mood and Affect: Mood normal.          Diagnostic Study Results     Labs -     Recent Results (from the past 12 hour(s))   CBC WITH AUTOMATED DIFF    Collection Time: 12/02/20  6:25 PM   Result Value Ref Range    WBC 6.0 3.6 - 11.0 K/uL    RBC 4.77 3.80 - 5.20 M/uL    HGB 12.8 11.5 - 16.0 g/dL    HCT 38.0 35.0 - 47.0 %    MCV 79.7 (L) 80.0 - 99.0 FL    MCH 26.8 26.0 - 34.0 PG    MCHC 33.7 30.0 - 36.5 g/dL    RDW 15.9 (H) 11.5 - 14.5 %    PLATELET 706 434 - 724 K/uL    MPV 9.8 8.9 - 12.9 FL    NRBC 0.0 0  WBC    ABSOLUTE NRBC 0.00 0.00 - 0.01 K/uL    NEUTROPHILS 69 32 - 75 %    LYMPHOCYTES 24 12 - 49 %    MONOCYTES 4 (L) 5 - 13 %    EOSINOPHILS 3 0 - 7 %    BASOPHILS 0 0 - 1 %    IMMATURE GRANULOCYTES 0 0.0 - 0.5 %    ABS. NEUTROPHILS 4.1 1.8 - 8.0 K/UL    ABS. LYMPHOCYTES 1.4 0.8 - 3.5 K/UL    ABS. MONOCYTES 0.2 0.0 - 1.0 K/UL    ABS. EOSINOPHILS 0.2 0.0 - 0.4 K/UL    ABS. BASOPHILS 0.0 0.0 - 0.1 K/UL    ABS. IMM.  GRANS. 0.0 0.00 - 0.04 K/UL    DF AUTOMATED     METABOLIC PANEL, COMPREHENSIVE    Collection Time: 12/02/20  6:25 PM   Result Value Ref Range    Sodium 139 136 - 145 mmol/L    Potassium 2.9 (L) 3.5 - 5.1 mmol/L    Chloride 104 97 - 108 mmol/L    CO2 26 21 - 32 mmol/L    Anion gap 9 5 - 15 mmol/L    Glucose 234 (H) 65 - 100 mg/dL    BUN 8 6 - 20 MG/DL    Creatinine 0.90 0.55 - 1.02 MG/DL    BUN/Creatinine ratio 9 (L) 12 - 20      GFR est AA >60 >60 ml/min/1.73m2    GFR est non-AA >60 >60 ml/min/1.73m2    Calcium 9.2 8.5 - 10.1 MG/DL    Bilirubin, total 0.4 0.2 - 1.0 MG/DL    ALT (SGPT) 42 12 - 78 U/L    AST (SGOT) 39 (H) 15 - 37 U/L    Alk. phosphatase 80 45 - 117 U/L    Protein, total 7.8 6.4 - 8.2 g/dL    Albumin 3.9 3.5 - 5.0 g/dL    Globulin 3.9 2.0 - 4.0 g/dL    A-G Ratio 1.0 (L) 1.1 - 2.2     LIPASE    Collection Time: 12/02/20  6:25 PM   Result Value Ref Range    Lipase 189 73 - 393 U/L       Radiologic Studies -   CT ABD PELV W CONT   Final Result   IMPRESSION:      1. No acute abdominal or pelvic abnormality. 2. Splenomegaly and esophageal hepatic findings described previously are stable,   suggesting possible portal hypertension and chronic liver disease. CT Results  (Last 48 hours)               12/02/20 2015  CT ABD PELV W CONT Final result    Impression:  IMPRESSION:       1. No acute abdominal or pelvic abnormality. 2. Splenomegaly and esophageal hepatic findings described previously are stable,   suggesting possible portal hypertension and chronic liver disease. Narrative:  EXAM: CT ABD PELV W CONT       INDICATION: abd pain hx of UC       COMPARISON: 8/17/2020        CONTRAST: 100 mL of Isovue-370. TECHNIQUE:    Following the uneventful intravenous administration of contrast, thin axial   images were obtained through the abdomen and pelvis. Coronal and sagittal   reconstructions were generated. Oral contrast was not administered. CT dose   reduction was achieved through use of a standardized protocol tailored for this   examination and automatic exposure control for dose modulation. FINDINGS:    LOWER THORAX: No significant abnormality in the incidentally imaged lower chest.   LIVER: No mass. Subtle features described on the prior examination are stable. BILIARY TREE: The gallbladder surgically absent. CBD is not dilated. SPLEEN: The spleen is enlarged but stable at approximately 18.2 cm span. PANCREAS: No mass or ductal dilatation. ADRENALS: Unremarkable. KIDNEYS: No mass, calculus, or hydronephrosis. STOMACH: Unremarkable. SMALL BOWEL: No dilatation or wall thickening. COLON: No dilatation or wall thickening. APPENDIX: Normal   PERITONEUM: No ascites or pneumoperitoneum. RETROPERITONEUM: No lymphadenopathy or aortic aneurysm. REPRODUCTIVE ORGANS: Unremarkable for age   URINARY BLADDER: No mass or calculus. BONES: No destructive bone lesion. ABDOMINAL WALL: No mass or hernia. ADDITIONAL COMMENTS: N/A               CXR Results  (Last 48 hours)    None            Medical Decision Making   I am the first provider for this patient. I reviewed the vital signs, available nursing notes, past medical history, past surgical history, family history and social history. Vital Signs-Reviewed the patient's vital signs. Patient Vitals for the past 12 hrs:   Temp Pulse Resp BP SpO2   12/02/20 1905 -- -- -- 138/80 99 %   12/02/20 1759 97.7 °F (36.5 °C) 92 18 (!) 151/68 95 %       Records Reviewed: Nursing Notes and Old Medical Records    Provider Notes (Medical Decision Making):   Patient with a history of ulcerative colitis and pancreatitis presenting with left lower quadrant abdominal pain as well as epigastric pain. Differential includes pancreatitis, hepatitis, gastritis, small bowel obstruction, ulcerative colitis flareup. We will plan to get labs including lipase, and she will need a CT. She does have an iodine and contrast allergy but can be premedicated with Benadryl and Solu-Medrol 1 hour prior. She can also take Benadryl prior to a lot of her opioid pain medications. ED Course:   Initial assessment performed. The patients presenting problems have been discussed, and they are in agreement with the care plan formulated and outlined with them. I have encouraged them to ask questions as they arise throughout their visit.     ED Course as of Dec 02 2135   Wed Dec 02, 2020   2132 Patient's CT was negative for any acute pathology. Lipase completely normal.  Given her epigastric pain, I am concerned about possible gastritis. She states that she has had issues with her stomach lining before. We will give her GI cocktail here as well as Protonix to go home with. She already has a GI doctor who she can follow-up with. [JS]      ED Course User Index  [JS] Yeny Silva MD     Critical Care Time:   0    Disposition:  Discharge Note:  The patient has been re-evaluated and is ready for discharge. Reviewed available results with patient. Counseled patient on diagnosis and care plan. Patient has expressed understanding, and all questions have been answered. Patient agrees with plan and agrees to follow up as recommended, or to return to the ED if their symptoms worsen. Discharge instructions have been provided and explained to the patient, along with reasons to return to the ED. PLAN:  Current Discharge Medication List      START taking these medications    Details   predniSONE (DELTASONE) 50 mg tablet Take 1 Tab by mouth daily for 3 days. Qty: 3 Tab, Refills: 0      famotidine (Pepcid) 20 mg tablet Take 1 Tab by mouth two (2) times a day. Qty: 20 Tab, Refills: 0      sucralfate (Carafate) 1 gram tablet Take 1 Tab by mouth four (4) times daily. Qty: 30 Tab, Refills: 0           2. Follow-up Information     Follow up With Specialties Details Why Contact Info    Your GI Doctor  Schedule an appointment as soon as possible for a visit          3. Return to ED if worse     Diagnosis     Clinical Impression:   1. Abdominal pain, epigastric    2. Gastritis and duodenitis        Attestations:    Maxx Kendall M.D. Please note that this dictation was completed with [employer], the [employer] voice recognition software. Quite often unanticipated grammatical, syntax, homophones, and other interpretive errors are inadvertently transcribed by the computer software.   Please disregard these errors. Please excuse any errors that have escaped final proofreading. Thank you.

## 2020-12-02 NOTE — ED TRIAGE NOTES
Pt comes to ED from home with c/o abdominal pain and \"fatty stools. \" She reports having an \"extensive GI history. \" She states the pain today feels different than other \"falre ups. \"     Pt presents in NAD. She is on the monitor x2. Call bell placed within reach. Pt informed of need for urine specimen. Understanding verbalized. Dr Halina Chaudhary at bedside assessing patient.

## 2020-12-03 NOTE — ED NOTES
Patient given discharge instructions. All questions were answered and the patient verbalized understanding. Patient ambulated from the department.

## 2020-12-03 NOTE — ED NOTES
Bedside and Verbal shift change report given to Rhesa Bernheim, RN (oncoming nurse) by April Diss  (offgoing nurse). Report included the following information SBAR, ED Summary, MAR and Recent Results and plan for CT at 2000 (an hour after pre-meds given).

## 2020-12-03 NOTE — DISCHARGE INSTRUCTIONS
Patient Education        Indigestion (Dyspepsia or Heartburn): Care Instructions  Your Care Instructions  Sometimes it can be hard to pinpoint the cause of indigestion. (It is also called dyspepsia or heartburn.) Most cases of an upset stomach with bloating, burning, burping, and nausea are minor and go away within several hours. Home treatment and over-the-counter medicine often are able to control symptoms. But if you take medicine to relieve your indigestion without making diet and lifestyle changes, your symptoms are likely to return again and again. If you get indigestion often, it may be a sign of a more serious medical problem. Be sure to follow up with your doctor, who may want to do tests to be sure of the cause of your indigestion. Follow-up care is a key part of your treatment and safety. Be sure to make and go to all appointments, and call your doctor if you are having problems. It's also a good idea to know your test results and keep a list of the medicines you take. How can you care for yourself at home? · Your doctor may recommend over-the-counter medicine. For mild or occasional indigestion, antacids such as Gaviscon, Mylanta, Maalox, or Tums, may help. Be safe with medicines. Be careful when you take over-the-counter antacid medicines. Many of these medicines have aspirin in them. Read the label to make sure that you are not taking more than the recommended dose. Too much aspirin can be harmful. · Your doctor also may recommend over-the-counter acid reducers, such as Pepcid AC (famotidine), Tagamet HB (cimetidine), or Prilosec (omeprazole). Read and follow all instructions on the label. If you use these medicines often, talk with your doctor. · Change your eating habits. ? It's best to eat several small meals instead of two or three large meals. ? After you eat, wait 2 to 3 hours before you lie down. ? Chocolate, mint, and alcohol can make GERD worse. ?  Spicy foods, foods that have a lot of acid (like tomatoes and oranges), and coffee can make GERD symptoms worse in some people. If your symptoms are worse after you eat a certain food, you may want to stop eating that food to see if your symptoms get better. · Do not smoke or chew tobacco. Smoking can make GERD worse. If you need help quitting, talk to your doctor about stop-smoking programs and medicines. These can increase your chances of quitting for good. · If you have GERD symptoms at night, raise the head of your bed 6 to 8 inches. You can do this by putting the frame on blocks or placing a foam wedge under the head of your mattress. (Adding extra pillows does not work.)  · Do not wear tight clothing around your middle. · Lose weight if you need to. Losing just 5 to 10 pounds can help. · Do not take anti-inflammatory medicines, such as aspirin, ibuprofen (Advil, Motrin), or naproxen (Aleve). These can irritate the stomach. If you need a pain medicine, try acetaminophen (Tylenol), which does not cause stomach upset. When should you call for help? Call your doctor now or seek immediate medical care if:    · You have new or worse belly pain.     · You are vomiting. Watch closely for changes in your health, and be sure to contact your doctor if:    · You have new or worse symptoms of indigestion.     · You have trouble or pain swallowing.     · You are losing weight.     · You do not get better as expected. Where can you learn more? Go to http://www.gray.com/  Enter [id number] in the search box to learn more about \"Indigestion (Dyspepsia or Heartburn): Care Instructions. \"  Current as of: April 15, 2020               Content Version: 12.6  © 3033-5406 [employer]. Care instructions adapted under license by [employer] (which disclaims liability or warranty for this information).  If you have questions about a medical condition or this instruction, always ask your healthcare professional. [employer] disclaims any warranty or liability for your use of this information.

## 2021-01-05 ENCOUNTER — HOSPITAL ENCOUNTER (OUTPATIENT)
Dept: LAB | Age: 51
Discharge: HOME | End: 2021-01-05
Payer: MEDICAID

## 2021-01-05 ENCOUNTER — TRANSCRIBE ORDER (OUTPATIENT)
Dept: REGISTRATION | Age: 51
End: 2021-01-05

## 2021-01-05 DIAGNOSIS — Z01.812 PRE-PROCEDURAL LABORATORY EXAMINATIONS: Primary | ICD-10-CM

## 2021-01-05 DIAGNOSIS — Z01.812 PRE-PROCEDURAL LABORATORY EXAMINATIONS: ICD-10-CM

## 2021-01-05 PROCEDURE — 87635 SARS-COV-2 COVID-19 AMP PRB: CPT

## 2021-01-05 NOTE — PERIOP NOTES
[address]  Endoscopy Preprocedure Instructions      1. On the day of your surgery, please report to registration located on the 2nd floor of the  [facility]. yes    2. You must have a responsible adult to drive you to the hospital, stay at the hospital during your procedure and drive you home. If they leave your procedure will not be started (no exceptions). yes    3. Do not have anything to eat or drink (including water, gum, mints, coffee, and juice) after midnight. This does not apply to the medications you were instructed to take by your physician. yes  If you are currently taking Plavix, Coumadin, Aspirin, or other blood-thinning agents, contact your physician for special instructions. not applicable    4. If you are having a procedure that requires bowel prep: We recommend that you have only clear liquids the day before your procedure and begin your bowel prep by 5:00 pm.  You may continue to drink clear liquids until midnight. If for any reason you are not able to complete your prep please notify your physician immediately. not applicable    5. Have a list of all current medications, including vitamins, herbal supplements and any other over the counter medications. yes    6. If you wear glasses, contacts, dentures and/or hearing aids, they may be removed prior to procedure, please bring a case to store them in. yes    7. You should understand that if you do not follow these instructions your procedure may be cancelled. If your physical condition changes (I.e. fever, cold or flu) please contact your doctor as soon as possible. 8. It is important that you be on time. If for any reason you are unable to keep your appointment please call the day of or your physicians office prior to your scheduled procedure. Advised patient if after hours to call doctors office to speak with doctor on-call.

## 2021-01-07 ENCOUNTER — HOSPITAL ENCOUNTER (OUTPATIENT)
Age: 51
Setting detail: OUTPATIENT SURGERY
Discharge: HOME | End: 2021-01-07
Attending: INTERNAL MEDICINE | Admitting: INTERNAL MEDICINE
Payer: MEDICAID

## 2021-01-07 ENCOUNTER — ANESTHESIA EVENT (OUTPATIENT)
Dept: ENDOSCOPY | Age: 51
End: 2021-01-07
Payer: MEDICAID

## 2021-01-07 ENCOUNTER — ANESTHESIA (OUTPATIENT)
Dept: ENDOSCOPY | Age: 51
End: 2021-01-07
Payer: MEDICAID

## 2021-01-07 VITALS
TEMPERATURE: 98 F | RESPIRATION RATE: 18 BRPM | WEIGHT: 207.67 LBS | HEART RATE: 68 BPM | OXYGEN SATURATION: 97 % | DIASTOLIC BLOOD PRESSURE: 65 MMHG | HEIGHT: 62 IN | BODY MASS INDEX: 38.22 KG/M2 | SYSTOLIC BLOOD PRESSURE: 125 MMHG

## 2021-01-07 LAB
GLUCOSE BLD STRIP.AUTO-MCNC: 167 MG/DL (ref 65–100)
SARS-COV-2, COV2NT: NOT DETECTED
SERVICE CMNT-IMP: ABNORMAL

## 2021-01-07 PROCEDURE — 74011250636 HC RX REV CODE- 250/636: Performed by: NURSE ANESTHETIST, CERTIFIED REGISTERED

## 2021-01-07 PROCEDURE — 2709999900 HC NON-CHARGEABLE SUPPLY: Performed by: INTERNAL MEDICINE

## 2021-01-07 PROCEDURE — 82962 GLUCOSE BLOOD TEST: CPT

## 2021-01-07 PROCEDURE — 88305 TISSUE EXAM BY PATHOLOGIST: CPT

## 2021-01-07 PROCEDURE — 76060000031 HC ANESTHESIA FIRST 0.5 HR: Performed by: INTERNAL MEDICINE

## 2021-01-07 PROCEDURE — 74011000250 HC RX REV CODE- 250: Performed by: NURSE ANESTHETIST, CERTIFIED REGISTERED

## 2021-01-07 PROCEDURE — 77030021593 HC FCPS BIOP ENDOSC BSC -A: Performed by: INTERNAL MEDICINE

## 2021-01-07 PROCEDURE — 76040000019: Performed by: INTERNAL MEDICINE

## 2021-01-07 RX ORDER — PROPOFOL 10 MG/ML
INJECTION, EMULSION INTRAVENOUS AS NEEDED
Status: DISCONTINUED | OUTPATIENT
Start: 2021-01-07 | End: 2021-01-07 | Stop reason: HOSPADM

## 2021-01-07 RX ORDER — FLUMAZENIL 0.1 MG/ML
0.2 INJECTION INTRAVENOUS
Status: DISCONTINUED | OUTPATIENT
Start: 2021-01-07 | End: 2021-01-07 | Stop reason: HOSPADM

## 2021-01-07 RX ORDER — SODIUM CHLORIDE 9 MG/ML
50 INJECTION, SOLUTION INTRAVENOUS CONTINUOUS
Status: DISCONTINUED | OUTPATIENT
Start: 2021-01-07 | End: 2021-01-07 | Stop reason: HOSPADM

## 2021-01-07 RX ORDER — LIDOCAINE HYDROCHLORIDE 20 MG/ML
INJECTION, SOLUTION EPIDURAL; INFILTRATION; INTRACAUDAL; PERINEURAL AS NEEDED
Status: DISCONTINUED | OUTPATIENT
Start: 2021-01-07 | End: 2021-01-07 | Stop reason: HOSPADM

## 2021-01-07 RX ORDER — ATROPINE SULFATE 0.1 MG/ML
0.4 INJECTION INTRAVENOUS
Status: DISCONTINUED | OUTPATIENT
Start: 2021-01-07 | End: 2021-01-07 | Stop reason: HOSPADM

## 2021-01-07 RX ORDER — DEXTROMETHORPHAN/PSEUDOEPHED 2.5-7.5/.8
1.2 DROPS ORAL
Status: DISCONTINUED | OUTPATIENT
Start: 2021-01-07 | End: 2021-01-07 | Stop reason: HOSPADM

## 2021-01-07 RX ORDER — EPINEPHRINE 0.1 MG/ML
1 INJECTION INTRACARDIAC; INTRAVENOUS
Status: DISCONTINUED | OUTPATIENT
Start: 2021-01-07 | End: 2021-01-07 | Stop reason: HOSPADM

## 2021-01-07 RX ORDER — NALOXONE HYDROCHLORIDE 0.4 MG/ML
0.4 INJECTION, SOLUTION INTRAMUSCULAR; INTRAVENOUS; SUBCUTANEOUS
Status: DISCONTINUED | OUTPATIENT
Start: 2021-01-07 | End: 2021-01-07 | Stop reason: HOSPADM

## 2021-01-07 RX ORDER — MIDAZOLAM HYDROCHLORIDE 1 MG/ML
.25-5 INJECTION, SOLUTION INTRAMUSCULAR; INTRAVENOUS
Status: DISCONTINUED | OUTPATIENT
Start: 2021-01-07 | End: 2021-01-07 | Stop reason: HOSPADM

## 2021-01-07 RX ADMIN — PROPOFOL 30 MG: 10 INJECTION, EMULSION INTRAVENOUS at 09:54

## 2021-01-07 RX ADMIN — PROPOFOL 20 MG: 10 INJECTION, EMULSION INTRAVENOUS at 09:56

## 2021-01-07 RX ADMIN — PROPOFOL 30 MG: 10 INJECTION, EMULSION INTRAVENOUS at 10:00

## 2021-01-07 RX ADMIN — PROPOFOL 100 MG: 10 INJECTION, EMULSION INTRAVENOUS at 09:52

## 2021-01-07 RX ADMIN — PROPOFOL 20 MG: 10 INJECTION, EMULSION INTRAVENOUS at 09:55

## 2021-01-07 RX ADMIN — LIDOCAINE HYDROCHLORIDE 100 MG: 20 INJECTION, SOLUTION INTRAVENOUS at 09:52

## 2021-01-07 RX ADMIN — PROPOFOL 30 MG: 10 INJECTION, EMULSION INTRAVENOUS at 09:58

## 2021-01-07 NOTE — H&P
Antony Goodwin M.D.  [phone]            History and Physical       NAME:  iAyana Sanchez   :   1970   MRN:   [id number]       Referring Physician:  None      Consult Date: 2021 9:49 AM    Chief Complaint:  dysphagia    History of Present Illness:  Patient is a 48year old female who presents today for follow up. She was last seen in September by Bernard Souza PA-C and had reported recently being diagnosed with UC by [facility]. She was started on Entyvio but had a reaction and thus had been on steroids and mesalamine but still reported symptoms. She was started on topical and oral mesalamine and set up for restaging colonoscopy which was performed 10/2/20 and was normal with normal colon biopsies. Her records from [facility] confirm that in may she indeed had focal active colitis with superficial erosion on biopsy of the rectum. Today, she reports she was told she has laryngeal spasms in the past and this is ongoing. She will choke on liquids and had an episode Saturday night while driving. She had to pull over. In the past she also had to get her throat stretched and this helped. Her last EGD was several years ago. This does not happen with food or pills. It can happen with her own saliva. She does suffer from acid reflux- it is well controlled with omeprazole 40 mg- no breakthrough symptoms if she takes this daily. She sometimes has epigastric pain. Not described as burning. It occurs very rarely- maybe once a month. She has been nauseous lately, usually right after eating once a day on average. No vomiting. She has zofran and has had to take that. She denies black/tarry stools. She rarely takes NSAIDs. She reports 5-8 BM's a day which is normal for her. No nocturnal BM's. She has seen blood but she believes this is from frequent wiping and hemorrhoids. She is still on mesalamine. She feels like her UC is well controlled. Her weight is stable.   No alcohol, tobacco or drug use  Her grandmother had colon cancer in her 66's. Has DMII- on jardiance and glimepiride. No blood thinners. She did have her TSH and CBC checked by derm a week ago. PMH:  Past Medical History:   Diagnosis Date    Anal fissure     Anisocoria     Asthma     LAST EPISODE     Back pain     Cerumen impaction     Chronic kidney disease     hx uti in past    Coagulation defects     ocassional rectal bleeding due to anal fissure    Colovaginal fistula     Diabetes (HCC)     NIDDM    Diverticulitis     Diverticulosis     Enlarged tonsils     Frequent UTI     GERD (gastroesophageal reflux disease)     H/O endoscopy     with dilation    HA (headache)     Hepatic steatosis     History of colon resection     Hx of colonoscopy with polypectomy     benign    Hypertension     Ill-defined condition     FREQUENT HIVES    Ill-defined condition     HX ELEVATED LIVER ENZYMES    Morbid obesity (HCC)     Nausea & vomiting     during diverticulitis flare    Obesity     Otitis media     Pneumonia     about 15 yrs ago    Psychiatric disorder     ANXIETY    Recurrent tonsillitis     Sinusitis     Transfusion history ~ age 35    postop hysterectomy    Urticaria        PSH:  Past Surgical History:   Procedure Laterality Date    COLONOSCOPY N/A 3/28/2019    COL[facility] performed by Sheba [facility] at [facility] COLONOSCOPY N/A 10/2/2020    COLONOSCOPY performed by hSeba Heart MD at [address] [place] [address],[address]    blake.     HX GI  12    LAPAROSCOPIC HAND ASSISTED  POSS OPEN SIGMOID COLECTOMY POSS TEMPORARY DIVERTING LOOP ILEOSTOMY;  (no illeostomy needed)    HX GYN           HX GYN      cervical conization    HX HEENT      SINUS SURGERY LEFT X2    HX HEENT      SINUS SURGERY ON RIGHT X2    HX OTHER SURGICAL      Sphincterotomy    HX PELVIC LAPAROSCOPY      HX EMMANUEL AND BSO      HX UROLOGICAL  12     CYSTOSCOPY INSERTION URETERAL CATHETERS - Cystoscopy Insertion of bilateral ureteral stents    MD ABDOMEN SURGERY PROC UNLISTED  2018    hernia repair at [facility]       Allergies: Allergies   Allergen Reactions    Aspirin Hives and Shortness of Breath    Codeine Hives, Itching and Angioedema     Pt had itching in mouth, on face and lips    Contrast Agent [Iodine] Hives, Itching and Angioedema     Pt. had itching in mouth, on face and lips after IV contrast with the last exam.  Benadryl was given. OK if premedicated with benadryl and solumedrol 1h before    Flavoring Agent Anaphylaxis    Percocet [Oxycodone-Acetaminophen] Hives, Itching and Angioedema     Pt had itching in mouth, on face and lips    Prilosec [Omeprazole Magnesium] Anaphylaxis     CHERRY FLAVORED ODT; PT TAKES REGULAR PRILOSEC AND IS OK    Dilaudid [Hydromorphone] Itching     Tolerates with benadryl    Ketorolac Rash     \"makes my eyes spasm and causes rash on my hands\" and \"makes my skin itch and makes me nervous\"    Fentanyl Rash and Itching     Has had benadryl before    Morphine Itching     Severe itching. Tolerates with Benadryl       Home Medications:  Prior to Admission Medications   Prescriptions Last Dose Informant Patient Reported? Taking? EPINEPHrine (EPIPEN) 0.3 mg/0.3 mL (1:1,000) injection Unknown at Unknown time Self No No   Si.3 mL by IntraMUSCular route once as needed for up to 1 dose. albuterol (PROVENTIL, VENTOLIN) 90 mcg/Actuation inhaler 2021 at Unknown time Self Yes Yes   Sig: Take 2 Puffs by inhalation every six (6) hours as needed. dicyclomine (BENTYL) 20 mg tablet 2020 at Unknown time  No Yes   Sig: Take 1 Tab by mouth every six (6) hours as needed (abdominal cramps) for up to 20 doses. empagliflozin (Jardiance) 25 mg tablet 2021 at Unknown time  No Yes   Sig: Take 1 Tab by mouth daily.    estradioL (ESTRACE) 0.5 mg tablet 2021 at Unknown time  Yes Yes   Sig: TAKE 1 TABLET BY MOUTH ONCE DAILY   famotidine (Pepcid) 20 mg tablet   No Yes   Sig: Take 1 Tab by mouth two (2) times a day. glimepiride (AMARYL) 4 mg tablet 2020 at Unknown time  No Yes   Sig: Take 1 Tab by mouth Daily (before breakfast). hydrocortisone (ANUSOL-HC) 25 mg supp 2020 at Unknown time  No Yes   Sig: Insert 1 Suppository into rectum every twelve (12) hours. hyoscyamine SL (LEVSIN/SL) 0.125 mg SL tablet 2020 at Unknown time  No Yes   Si Tab by SubLINGual route every four (4) hours as needed for Cramping.   losartan-hydroCHLOROthiazide (HYZAAR) 100-12.5 mg per tablet 2021 at Unknown time Self Yes Yes   Sig: Take 1 Tab by mouth daily. melatonin tab tablet 2021  Yes Yes   Sig: Take 5 mg by mouth nightly. omeprazole (PRILOSEC) 20 mg capsule 2021 at Unknown time  Yes Yes   Sig: Take 40 mg by mouth Daily (before breakfast). ondansetron (Zofran ODT) 4 mg disintegrating tablet Unknown at Unknown time  No No   Sig: Take 1 Tab by mouth every eight (8) hours as needed for Nausea. promethazine (PHENERGAN) 25 mg tablet Unknown at Unknown time  No No   Sig: Take 1 Tab by mouth every six (6) hours as needed for Nausea. sertraline (ZOLOFT) 100 mg tablet 2021 at Unknown time  Yes Yes   Sig: Take 200 mg by mouth nightly. Facility-Administered Medications: None       Hospital Medications:  No current facility-administered medications for this encounter.         Social History:  Social History     Tobacco Use    Smoking status: Never Smoker    Smokeless tobacco: Never Used   Substance Use Topics    Alcohol use: Yes     Comment: Rarely       Family History:  Family History   Problem Relation Age of Onset    Diabetes Mother     Cancer Mother         NON-HODGKINS LYMPHOMA    Anesth Problems Mother         PONV    Diabetes Father     Heart Disease Father         CAD - STENTS, PACEMAKER    Arrhythmia Father              Review of Systems:      Constitutional: negative fever, negative chills, negative weight loss  Eyes:   negative visual changes  ENT:   negative sore throat, tongue or lip swelling  Respiratory:  negative cough, negative dyspnea  Cards:  negative for chest pain, palpitations, lower extremity edema  GI:   See HPI  :  negative for frequency, dysuria  Integument:  negative for rash and pruritus  Heme:  negative for easy bruising and gum/nose bleeding  Musculoskel: negative for myalgias,  back pain and muscle weakness  Neuro: negative for headaches, dizziness, vertigo  Psych:  negative for feelings of anxiety, depression       Objective:     Patient Vitals for the past 8 hrs:   BP Temp Pulse Resp SpO2 Height Weight   01/07/21 0932 132/80 98.1 °F (36.7 °C) 68 12 98 % 5' 2\" (1.575 m) 94.2 kg (207 lb 10.8 oz)     No intake/output data recorded. No intake/output data recorded. EXAM:     NEURO-a&o   HEENT-wnl   LUNGS-clear    COR-regular rate and rhythym     ABD-soft , no tenderness, no rebound, good bs     EXT-no edema     Data Review     No results for input(s): WBC, HGB, HCT, PLT, HGBEXT, HCTEXT, PLTEXT in the last 72 hours. No results for input(s): NA, K, CL, CO2, BUN, CREA, GLU, PHOS, CA in the last 72 hours. No results for input(s): AP, TBIL, TP, ALB, GLOB, GGT, AML, LPSE in the last 72 hours. No lab exists for component: SGOT, GPT, AMYP, HLPSE  No results for input(s): INR, PTP, APTT, INREXT in the last 72 hours. Patient Active Problem List   Diagnosis Code    Thrush B37.0    Postoperative complication S25. 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 difficile colitis A04.72    C. difficile diarrhea A04.72    Mood disorder ([facility]) F39    UTI (urinary tract infection) N39.0    Proctitis K62.89      Assessment:   · dysphagia   Plan: · EGD today.      Signed By: Lorne Mobley MD     1/7/2021  9:49 AM

## 2021-01-07 NOTE — PROGRESS NOTES
John Claudia  1970  [id number]    Situation:  Verbal report received from: Brayan Nascimento RN   Procedure: Procedure(s):  ESOPHAGOGASTRODUODENOSCOPY (EGD)  ESOPHAGEAL DILATION  ESOPHAGOGASTRODUODENAL (EGD) BIOPSY    Background:    Preoperative diagnosis: epigastric discomfort  Postoperative diagnosis: 1.- Normal EGD    :  Dr. Jamie Virgen  Assistant(s): Endoscopy Technician-1: Feng Sheth  Endoscopy RN-1: Ry Mitchell RN    Specimens:   ID Type Source Tests Collected by Time Destination   1 : Gastric Biopsies Preservative   Bebeto Segal MD 1/7/2021 3720 Pathology     H. Pylori  no    Assessment:    Anesthesia gave intra-procedure sedation and medications, see anesthesia flow sheet no    Intravenous fluids: NS@ KVO     Vital signs stable     Abdominal assessment: round and soft     Recommendation:  Discharge patient per MD order.   Return to floor  Family or Friend   Permission to share finding with family or friend yes

## 2021-01-07 NOTE — ANESTHESIA PREPROCEDURE EVALUATION
Relevant Problems   NEUROLOGY   (+) Mood disorder (HCC)      CARDIOVASCULAR   (+) Accelerated hypertension   (+) Hypertension      ENDOCRINE   (+) Steroid-induced diabetes (HCC)   (+) Type 2 diabetes mellitus (HCC)       Anesthetic History     PONV          Review of Systems / Medical History  Patient summary reviewed, nursing notes reviewed and pertinent labs reviewed    Pulmonary        Sleep apnea    Asthma        Neuro/Psych         Headaches and psychiatric history     Cardiovascular    Hypertension              Exercise tolerance: >4 METS     GI/Hepatic/Renal     GERD           Endo/Other    Diabetes    Obesity     Other Findings   Comments: Hepatic steatosis  Diverticulitis  Mood disorder           Physical Exam    Airway  Mallampati: II    Neck ROM: normal range of motion   Mouth opening: Normal     Cardiovascular  Regular rate and rhythm,  S1 and S2 normal,  no murmur, click, rub, or gallop  Rhythm: regular  Rate: normal         Dental  No notable dental hx       Pulmonary  Breath sounds clear to auscultation               Abdominal  GI exam deferred       Other Findings            Anesthetic Plan    ASA: 3  Anesthesia type: MAC          Induction: Intravenous  Anesthetic plan and risks discussed with: Patient

## 2021-01-07 NOTE — PROGRESS NOTES
Endoscopy discharge instructions have been reviewed and given to patient. The patient verbalized understanding and acceptance of instructions. Dr. Itzel Light discussed with patient procedure findings and next steps.

## 2021-01-07 NOTE — DISCHARGE INSTRUCTIONS
Glo Augustin M.D.  [phone]           2021  Aixa Potter  :  1970  [facility] Medical Record Number:  [id number]        ENDOSCOPY FINDINGS:   Your endoscopy showed normal exam.      EGD DISCHARGE INSTRUCTIONS    DISCOMFORT:  Sore throat- throat lozenges or warm salt water gargle  redness at IV site- apply warm compress to area; if redness or soreness persist- contact your physician  Gaseous discomfort- walking, belching will help relieve any discomfort  You may not operate a vehicle for 12 hours  You may not engage in an occupation involving machinery or appliances for rest of today  You may not drink alcoholic beverages for at least 12 hours  Avoid making any critical decisions for at least 24 hour    DIET:   You may resume your regular diet. ACTIVITY  Spend the remainder of the day resting -  avoid any strenuous activity. Avoid driving or operating machinery. CALL M.D. ANY SIGN OF   Increasing pain, nausea, vomiting  Abdominal distension (swelling)  New increased bleeding (oral or rectal)  Fever (chills)  Pain in chest area  Bloody discharge from nose or mouth  Shortness of breath    Follow-up Instructions:   Call Dr. Tiera Johnston for any questions or problems. Telephone # [phone]  If biopsies were obtained, the results will be available  in  5 to 7 days. We will call you to notify you of these results. Continue same medications. Follow up in the office.

## 2021-01-07 NOTE — PROGRESS NOTES

## 2021-01-07 NOTE — PROCEDURES
Temi Cortez M.D.  [phone]           2021                EGD Operative Report  Rose Marie Fontaine  :  1970  [facility] Medical Record Number:  [id number]      Indication: dysphagia     : Trevon Espino MD    Assistant -- None  Implants -- None    Referring Provider:  None      Anesthesia/Sedation:  MAC anesthesia Propofol    Airway assessment: No airway problems anticipated    Pre-Procedural Exam:      Airway: clear, no airway problems anticipated  Heart: RRR, without gallops or rubs  Lungs: clear bilaterally without wheezes, crackles, or rhonchi  Abdomen: soft, nontender, nondistended, bowel sounds present  Mental Status: awake, alert and oriented to person, place and time       Procedure Details     After infomed consent was obtained for the procedure, with all risks and benefits of procedure explained the patient was taken to the endoscopy suite and placed in the left lateral decubitus position. Following sequential administration of sedation as per above, the endoscope was inserted into the mouth and advanced under direct vision to second portion of the duodenum. A careful inspection was made as the gastroscope was withdrawn, including a retroflexed view of the proximal stomach; findings and interventions are described below. Findings:   Esophagus:normal  Stomach: normal   Duodenum/jejunum: normal    Therapies:  esophageal dilation with savary sizes 15-17mm  biopsy of stomach r/o H.pylori    Specimens: as above           Complications:   None; patient tolerated the procedure well. EBL:  None.            Impression:    1.- Normal EGD      Recommendations:    -Await pathology.  -Follow up as scheduled    Trevon Espino MD

## 2021-01-08 ENCOUNTER — APPOINTMENT (OUTPATIENT)
Dept: CT IMAGING | Age: 51
End: 2021-01-08
Attending: EMERGENCY MEDICINE
Payer: MEDICAID

## 2021-01-08 ENCOUNTER — HOSPITAL ENCOUNTER (EMERGENCY)
Age: 51
Discharge: HOME | End: 2021-01-08
Attending: EMERGENCY MEDICINE
Payer: MEDICAID

## 2021-01-08 VITALS
DIASTOLIC BLOOD PRESSURE: 80 MMHG | WEIGHT: 209.44 LBS | HEIGHT: 62 IN | RESPIRATION RATE: 18 BRPM | BODY MASS INDEX: 38.54 KG/M2 | OXYGEN SATURATION: 97 % | SYSTOLIC BLOOD PRESSURE: 132 MMHG | TEMPERATURE: 98.1 F | HEART RATE: 68 BPM

## 2021-01-08 DIAGNOSIS — R10.13 ABDOMINAL PAIN, EPIGASTRIC: Primary | ICD-10-CM

## 2021-01-08 DIAGNOSIS — R19.8 TENESMUS: ICD-10-CM

## 2021-01-08 LAB
ALBUMIN SERPL-MCNC: 3.8 G/DL (ref 3.5–5)
ALBUMIN/GLOB SERPL: 1.1 {RATIO} (ref 1.1–2.2)
ALP SERPL-CCNC: 67 U/L (ref 45–117)
ALT SERPL-CCNC: 32 U/L (ref 12–78)
ANION GAP SERPL CALC-SCNC: 7 MMOL/L (ref 5–15)
AST SERPL-CCNC: 22 U/L (ref 15–37)
BASOPHILS # BLD: 0 K/UL (ref 0–0.1)
BASOPHILS NFR BLD: 1 % (ref 0–1)
BILIRUB SERPL-MCNC: 0.6 MG/DL (ref 0.2–1)
BUN SERPL-MCNC: 8 MG/DL (ref 6–20)
BUN/CREAT SERPL: 11 (ref 12–20)
CALCIUM SERPL-MCNC: 9.1 MG/DL (ref 8.5–10.1)
CHLORIDE SERPL-SCNC: 104 MMOL/L (ref 97–108)
CO2 SERPL-SCNC: 25 MMOL/L (ref 21–32)
CREAT SERPL-MCNC: 0.71 MG/DL (ref 0.55–1.02)
DIFFERENTIAL METHOD BLD: ABNORMAL
EOSINOPHIL # BLD: 0.2 K/UL (ref 0–0.4)
EOSINOPHIL NFR BLD: 3 % (ref 0–7)
ERYTHROCYTE [DISTWIDTH] IN BLOOD BY AUTOMATED COUNT: 14.9 % (ref 11.5–14.5)
GLOBULIN SER CALC-MCNC: 3.4 G/DL (ref 2–4)
GLUCOSE SERPL-MCNC: 141 MG/DL (ref 65–100)
HCT VFR BLD AUTO: 36.3 % (ref 35–47)
HGB BLD-MCNC: 12 G/DL (ref 11.5–16)
IMM GRANULOCYTES # BLD AUTO: 0 K/UL (ref 0–0.04)
IMM GRANULOCYTES NFR BLD AUTO: 1 % (ref 0–0.5)
LIPASE SERPL-CCNC: 159 U/L (ref 73–393)
LYMPHOCYTES # BLD: 1.7 K/UL (ref 0.8–3.5)
LYMPHOCYTES NFR BLD: 30 % (ref 12–49)
MCH RBC QN AUTO: 26.3 PG (ref 26–34)
MCHC RBC AUTO-ENTMCNC: 33.1 G/DL (ref 30–36.5)
MCV RBC AUTO: 79.4 FL (ref 80–99)
MONOCYTES # BLD: 0.3 K/UL (ref 0–1)
MONOCYTES NFR BLD: 6 % (ref 5–13)
NEUTS SEG # BLD: 3.5 K/UL (ref 1.8–8)
NEUTS SEG NFR BLD: 59 % (ref 32–75)
NRBC # BLD: 0 K/UL (ref 0–0.01)
NRBC BLD-RTO: 0 PER 100 WBC
PLATELET # BLD AUTO: 193 K/UL (ref 150–400)
PMV BLD AUTO: 9.9 FL (ref 8.9–12.9)
POTASSIUM SERPL-SCNC: 3.6 MMOL/L (ref 3.5–5.1)
PROT SERPL-MCNC: 7.2 G/DL (ref 6.4–8.2)
RBC # BLD AUTO: 4.57 M/UL (ref 3.8–5.2)
SODIUM SERPL-SCNC: 136 MMOL/L (ref 136–145)
WBC # BLD AUTO: 5.8 K/UL (ref 3.6–11)

## 2021-01-08 PROCEDURE — 96375 TX/PRO/DX INJ NEW DRUG ADDON: CPT

## 2021-01-08 PROCEDURE — 83690 ASSAY OF LIPASE: CPT

## 2021-01-08 PROCEDURE — 99283 EMERGENCY DEPT VISIT LOW MDM: CPT

## 2021-01-08 PROCEDURE — 74011250636 HC RX REV CODE- 250/636: Performed by: EMERGENCY MEDICINE

## 2021-01-08 PROCEDURE — 74011250637 HC RX REV CODE- 250/637: Performed by: EMERGENCY MEDICINE

## 2021-01-08 PROCEDURE — 80053 COMPREHEN METABOLIC PANEL: CPT

## 2021-01-08 PROCEDURE — 74011000250 HC RX REV CODE- 250: Performed by: EMERGENCY MEDICINE

## 2021-01-08 PROCEDURE — 96374 THER/PROPH/DIAG INJ IV PUSH: CPT

## 2021-01-08 PROCEDURE — 85025 COMPLETE CBC W/AUTO DIFF WBC: CPT

## 2021-01-08 PROCEDURE — 36415 COLL VENOUS BLD VENIPUNCTURE: CPT

## 2021-01-08 PROCEDURE — 74176 CT ABD & PELVIS W/O CONTRAST: CPT

## 2021-01-08 RX ORDER — DIPHENHYDRAMINE HCL 25 MG
25 CAPSULE ORAL
Status: COMPLETED | OUTPATIENT
Start: 2021-01-08 | End: 2021-01-08

## 2021-01-08 RX ORDER — SUCRALFATE 1 G/1
1 TABLET ORAL 4 TIMES DAILY
Qty: 28 TAB | Refills: 0 | Status: SHIPPED | OUTPATIENT
Start: 2021-01-08 | End: 2021-03-03

## 2021-01-08 RX ORDER — OXYCODONE AND ACETAMINOPHEN 5; 325 MG/1; MG/1
1 TABLET ORAL
Status: COMPLETED | OUTPATIENT
Start: 2021-01-08 | End: 2021-01-08

## 2021-01-08 RX ORDER — FENTANYL CITRATE 50 UG/ML
50 INJECTION, SOLUTION INTRAMUSCULAR; INTRAVENOUS
Status: COMPLETED | OUTPATIENT
Start: 2021-01-08 | End: 2021-01-08

## 2021-01-08 RX ORDER — ONDANSETRON 4 MG/1
4 TABLET, ORALLY DISINTEGRATING ORAL
Qty: 20 TAB | Refills: 0 | Status: SHIPPED | OUTPATIENT
Start: 2021-01-08 | End: 2021-03-03

## 2021-01-08 RX ORDER — ONDANSETRON 2 MG/ML
4 INJECTION INTRAMUSCULAR; INTRAVENOUS
Status: COMPLETED | OUTPATIENT
Start: 2021-01-08 | End: 2021-01-08

## 2021-01-08 RX ADMIN — PROCHLORPERAZINE EDISYLATE 5 MG: 5 INJECTION INTRAMUSCULAR; INTRAVENOUS at 08:46

## 2021-01-08 RX ADMIN — OXYCODONE HYDROCHLORIDE AND ACETAMINOPHEN 1 TABLET: 5; 325 TABLET ORAL at 08:46

## 2021-01-08 RX ADMIN — ONDANSETRON 4 MG: 2 INJECTION INTRAMUSCULAR; INTRAVENOUS at 05:58

## 2021-01-08 RX ADMIN — DIPHENHYDRAMINE HYDROCHLORIDE 25 MG: 25 CAPSULE ORAL at 07:11

## 2021-01-08 RX ADMIN — FENTANYL CITRATE 50 MCG: 50 INJECTION, SOLUTION INTRAMUSCULAR; INTRAVENOUS at 07:11

## 2021-01-08 RX ADMIN — LIDOCAINE HYDROCHLORIDE 40 ML: 20 SOLUTION ORAL; TOPICAL at 07:11

## 2021-01-08 NOTE — Clinical Note
[address] 
[facility] EMERGENCY DEPT 
[address] [place] [id number] [phone] Work/School Note Date: 1/8/2021 To Whom It May concern: 
 
 
Josep Anderson was seen and treated today in the emergency room by the following provider(s): 
Attending Provider: Rio Whatley MD. Josep Anderson is excused from work/school on 01/08/21. She is clear to return to work/school on 01/09/21.    
 
 
Sincerely, 
 
 
 
 
Marisol Link MD

## 2021-01-08 NOTE — ED PROVIDER NOTES
EMERGENCY DEPARTMENT HISTORY AND PHYSICAL EXAM      Date: 1/8/2021  Patient Name: Wallace Cee    History of Presenting Illness     Chief Complaint   Patient presents with    Abdominal Pain     Pt is ambualtory to triage--had an EGD x yesterday - reports rectal pain and diffuse abdominal pain with N/V PMHX UC. History Provided By: Patient    HPI: Wallace Cee, 48 y.o. female presents to the ED with cc of abdominal pain. 26-year-old female with a history of ulcerative colitis on mesalamine, GERD, hemorrhoids and anal fissures presents emergency department with a chief complaint of abdominal and rectal pain. Patient reports over the last few weeks she has been having issues with what she describes as \"laryngospasm. \"  She reports she sometimes feels like she is choking on her saliva. Given this dysphagia sensation, patient called her GI physician who performed endoscopy yesterday. This was normal, I reviewed the report, biopsies were taken. Patient reports the last 2 days she feels like her ulcerative colitis is flaring up. She did have a colonoscopy with biopsies in the past which were normal, although patient was not in a flare. Patient reports 2 days of epigastric and left lower quadrant pain. Pain is intermittent without clear alleviating or nondistended. Patient also reports tenesmus as well as a rectal \"pressure. \"  Patient has a history of anal fissures and hemorrhoids. She does report some blood with wiping. Patient reports some nausea, no vomiting. Occasional diarrhea. There are no other complaints, changes, or physical findings at this time. PCP: None    No current facility-administered medications on file prior to encounter. Current Outpatient Medications on File Prior to Encounter   Medication Sig Dispense Refill    famotidine (Pepcid) 20 mg tablet Take 1 Tab by mouth two (2) times a day.  20 Tab 0    promethazine (PHENERGAN) 25 mg tablet Take 1 Tab by mouth every six (6) hours as needed for Nausea. 10 Tab 0    hyoscyamine SL (LEVSIN/SL) 0.125 mg SL tablet 1 Tab by SubLINGual route every four (4) hours as needed for Cramping. 15 Tab 0    estradioL (ESTRACE) 0.5 mg tablet TAKE 1 TABLET BY MOUTH ONCE DAILY      empagliflozin (Jardiance) 25 mg tablet Take 1 Tab by mouth daily. 90 Tab 3    ondansetron (Zofran ODT) 4 mg disintegrating tablet Take 1 Tab by mouth every eight (8) hours as needed for Nausea. 10 Tab 0    glimepiride (AMARYL) 4 mg tablet Take 1 Tab by mouth Daily (before breakfast). 90 Tab 3    hydrocortisone (ANUSOL-HC) 25 mg supp Insert 1 Suppository into rectum every twelve (12) hours. 12 Each 0    melatonin tab tablet Take 5 mg by mouth nightly.  omeprazole (PRILOSEC) 20 mg capsule Take 40 mg by mouth Daily (before breakfast).  dicyclomine (BENTYL) 20 mg tablet Take 1 Tab by mouth every six (6) hours as needed (abdominal cramps) for up to 20 doses. 20 Tab 0    sertraline (ZOLOFT) 100 mg tablet Take 200 mg by mouth nightly.  losartan-hydroCHLOROthiazide (HYZAAR) 100-12.5 mg per tablet Take 1 Tab by mouth daily.  EPINEPHrine (EPIPEN) 0.3 mg/0.3 mL (1:1,000) injection 0.3 mL by IntraMUSCular route once as needed for up to 1 dose. 0.3 mL 1    albuterol (PROVENTIL, VENTOLIN) 90 mcg/Actuation inhaler Take 2 Puffs by inhalation every six (6) hours as needed.          Past History     Past Medical History:  Past Medical History:   Diagnosis Date    Anal fissure     Anisocoria     Asthma     LAST EPISODE     Back pain     Cerumen impaction     Chronic kidney disease     hx uti in past    Coagulation defects     ocassional rectal bleeding due to anal fissure    Colovaginal fistula     Diabetes (HCC)     NIDDM    Diverticulitis     Diverticulosis     Enlarged tonsils     Frequent UTI     GERD (gastroesophageal reflux disease)     H/O endoscopy     with dilation    HA (headache)     Hepatic steatosis     History of colon resection     Hx of colonoscopy with polypectomy     benign    Hypertension     Ill-defined condition     FREQUENT HIVES    Ill-defined condition     HX ELEVATED LIVER ENZYMES    Morbid obesity (HCC)     Nausea & vomiting     during diverticulitis flare    Obesity     Otitis media     Pneumonia     about 15 yrs ago    Psychiatric disorder     ANXIETY    Recurrent tonsillitis     Sinusitis     Transfusion history ~ age 35    postop hysterectomy    Urticaria        Past Surgical History:  Past Surgical History:   Procedure Laterality Date    COLONOSCOPY N/A 3/28/2019    COLONOSCOPY performed by Donna Fritz MD at [address] COLONOSCOPY N/A 10/2/2020    COLONOSCOPY performed by Donna Fritz MD at [address]    [place].  HX GI  12    LAPAROSCOPIC HAND ASSISTED  POSS OPEN SIGMOID COLECTOMY POSS TEMPORARY DIVERTING LOOP ILEOSTOMY;  (no illeostomy needed)    HX GYN           HX GYN      cervical conization    HX HEENT      SINUS SURGERY LEFT X2    HX HEENT      SINUS SURGERY ON RIGHT X2    HX OTHER SURGICAL      Sphincterotomy    HX PELVIC LAPAROSCOPY      HX EMMANUEL AND BSO      HX UROLOGICAL  12     CYSTOSCOPY INSERTION URETERAL CATHETERS - Cystoscopy Insertion of bilateral ureteral stents    NE ABDOMEN SURGERY PROC UNLISTED  2018    hernia repair at [facility]       Family History:  Family History   Problem Relation Age of Onset    Diabetes Mother     Cancer Mother         NON-HODGKINS LYMPHOMA    Anesth Problems Mother         PONV    Diabetes Father     Heart Disease Father         CAD - STENTS, PACEMAKER    Arrhythmia Father        Social History:  Social History     Tobacco Use    Smoking status: Never Smoker    Smokeless tobacco: Never Used   Substance Use Topics    Alcohol use: Yes     Comment: Rarely    Drug use: No     Types: Prescription, OTC       Allergies:   Allergies   Allergen Reactions    Aspirin Hives and Shortness of Breath    Codeine Hives, Itching and Angioedema     Pt had itching in mouth, on face and lips    Contrast Agent [Iodine] Hives, Itching and Angioedema     Pt. had itching in mouth, on face and lips after IV contrast with the last exam.  Benadryl was given. OK if premedicated with benadryl and solumedrol 1h before    Flavoring Agent Anaphylaxis    Percocet [Oxycodone-Acetaminophen] Hives, Itching and Angioedema     Pt had itching in mouth, on face and lips    Prilosec [Omeprazole Magnesium] Anaphylaxis     CHERRY FLAVORED ODT; PT TAKES REGULAR PRILOSEC AND IS OK    Dilaudid [Hydromorphone] Itching     Tolerates with benadryl    Ketorolac Rash     \"makes my eyes spasm and causes rash on my hands\" and \"makes my skin itch and makes me nervous\"    Fentanyl Rash and Itching     Has had benadryl before    Morphine Itching     Severe itching. Tolerates with Benadryl         Review of Systems   Review of Systems   Constitutional: Negative for activity change, chills and fever. HENT: Positive for trouble swallowing. Negative for facial swelling and voice change. Eyes: Negative for redness. Respiratory: Negative for cough, shortness of breath and wheezing. Cardiovascular: Negative for chest pain and leg swelling. Gastrointestinal: Positive for abdominal pain, blood in stool, diarrhea and nausea. Negative for vomiting. Genitourinary: Negative for decreased urine volume. Musculoskeletal: Negative for gait problem. Skin: Negative for pallor and rash. Neurological: Negative for tremors and facial asymmetry. Psychiatric/Behavioral: Negative for agitation. All other systems reviewed and are negative. Physical Exam   Physical Exam  Vitals signs and nursing note reviewed. Constitutional:       Comments: 19-year-old female, resting in triage chair, no distress   HENT:      Head: Normocephalic and atraumatic. Neck:      Musculoskeletal: Normal range of motion.    Cardiovascular: Rate and Rhythm: Normal rate and regular rhythm. Heart sounds: No murmur. No friction rub. No gallop. Pulmonary:      Effort: Pulmonary effort is normal.      Breath sounds: Normal breath sounds. Abdominal:      General: Abdomen is flat. There is no distension. Palpations: Abdomen is soft. Tenderness: There is abdominal tenderness in the epigastric area and left lower quadrant. Genitourinary:     Comments: Deferred  Musculoskeletal: Normal range of motion. Skin:     General: Skin is warm. Capillary Refill: Capillary refill takes less than 2 seconds. Neurological:      General: No focal deficit present. Mental Status: She is alert. Psychiatric:         Mood and Affect: Mood normal.         Diagnostic Study Results     Labs -     Recent Results (from the past 12 hour(s))   CBC WITH AUTOMATED DIFF    Collection Time: 01/08/21  5:57 AM   Result Value Ref Range    WBC 5.8 3.6 - 11.0 K/uL    RBC 4.57 3.80 - 5.20 M/uL    HGB 12.0 11.5 - 16.0 g/dL    HCT 36.3 35.0 - 47.0 %    MCV 79.4 (L) 80.0 - 99.0 FL    MCH 26.3 26.0 - 34.0 PG    MCHC 33.1 30.0 - 36.5 g/dL    RDW 14.9 (H) 11.5 - 14.5 %    PLATELET 146 117 - 691 K/uL    MPV 9.9 8.9 - 12.9 FL    NRBC 0.0 0  WBC    ABSOLUTE NRBC 0.00 0.00 - 0.01 K/uL    NEUTROPHILS 59 32 - 75 %    LYMPHOCYTES 30 12 - 49 %    MONOCYTES 6 5 - 13 %    EOSINOPHILS 3 0 - 7 %    BASOPHILS 1 0 - 1 %    IMMATURE GRANULOCYTES 1 (H) 0.0 - 0.5 %    ABS. NEUTROPHILS 3.5 1.8 - 8.0 K/UL    ABS. LYMPHOCYTES 1.7 0.8 - 3.5 K/UL    ABS. MONOCYTES 0.3 0.0 - 1.0 K/UL    ABS. EOSINOPHILS 0.2 0.0 - 0.4 K/UL    ABS. BASOPHILS 0.0 0.0 - 0.1 K/UL    ABS. IMM.  GRANS. 0.0 0.00 - 0.04 K/UL    DF AUTOMATED     METABOLIC PANEL, COMPREHENSIVE    Collection Time: 01/08/21  5:57 AM   Result Value Ref Range    Sodium 136 136 - 145 mmol/L    Potassium 3.6 3.5 - 5.1 mmol/L    Chloride 104 97 - 108 mmol/L    CO2 25 21 - 32 mmol/L    Anion gap 7 5 - 15 mmol/L    Glucose 141 (H) 65 - 100 mg/dL    BUN 8 6 - 20 MG/DL    Creatinine 0.71 0.55 - 1.02 MG/DL    BUN/Creatinine ratio 11 (L) 12 - 20      GFR est AA >60 >60 ml/min/1.73m2    GFR est non-AA >60 >60 ml/min/1.73m2    Calcium 9.1 8.5 - 10.1 MG/DL    Bilirubin, total 0.6 0.2 - 1.0 MG/DL    ALT (SGPT) 32 12 - 78 U/L    AST (SGOT) 22 15 - 37 U/L    Alk. phosphatase 67 45 - 117 U/L    Protein, total 7.2 6.4 - 8.2 g/dL    Albumin 3.8 3.5 - 5.0 g/dL    Globulin 3.4 2.0 - 4.0 g/dL    A-G Ratio 1.1 1.1 - 2.2     LIPASE    Collection Time: 01/08/21  5:57 AM   Result Value Ref Range    Lipase 159 73 - 393 U/L       Radiologic Studies -   CT ABD PELV WO CONT   Final Result   IMPRESSION:   1. No evidence of focal bowel inflammation or other acute intra-abdominal   process to explain the patient's symptoms. 2.  Unchanged splenomegaly. CT Results  (Last 48 hours)               01/08/21 0724  CT ABD PELV WO CONT Final result    Impression:  IMPRESSION:   1. No evidence of focal bowel inflammation or other acute intra-abdominal   process to explain the patient's symptoms. 2.  Unchanged splenomegaly. Narrative:  EXAM: CT ABD PELV WO CONT       INDICATION: LUQ abdominal pain, tenesmus, h/o UC status post sigmoidectomy and   2012. COMPARISON: CT abdomen pelvis 12/2/2020       CONTRAST:  None. TECHNIQUE:    Thin axial images were obtained through the abdomen and pelvis. Coronal and   sagittal reformats were generated. Oral contrast was not administered. CT dose   reduction was achieved through use of a standardized protocol tailored for this   examination and automatic exposure control for dose modulation. The absence of intravenous contrast material reduces the sensitivity for   evaluation of the vasculature and solid organs. FINDINGS:    LOWER THORAX: No significant abnormality in the incidentally imaged lower chest.   LIVER: No mass. BILIARY TREE: Gallbladder is surgically absent. CBD is not dilated.    SPLEEN: Splenomegaly to 16 cm in the craniocaudal dimension, unchanged. PANCREAS: No focal abnormality. ADRENALS: Unremarkable. KIDNEYS/URETERS: No calculus or hydronephrosis. STOMACH: Unremarkable. SMALL BOWEL: No dilatation or wall thickening. COLON: Status post sigmoidectomy. Mild residual diverticulosis of the distal   colon without evidence of diverticulitis. APPENDIX: The appendix is not well-visualized. No evidence of a focal   inflammatory process in the right lower quadrant. PERITONEUM: No ascites or pneumoperitoneum. RETROPERITONEUM: No lymphadenopathy or aortic aneurysm. REPRODUCTIVE ORGANS: Status post hysterectomy. No evidence of an adnexal lesion. URINARY BLADDER: No mass or calculus. BONES: No destructive bone lesion. ABDOMINAL WALL: No mass or hernia. ADDITIONAL COMMENTS: N/A               CXR Results  (Last 48 hours)    None          Medical Decision Making   I am the first provider for this patient. I reviewed the vital signs, available nursing notes, past medical history, past surgical history, family history and social history. Vital Signs-Reviewed the patient's vital signs. Patient Vitals for the past 12 hrs:   Temp Pulse Resp BP SpO2   01/08/21 0930 -- -- -- 132/80 --   01/08/21 0928 -- -- -- -- 97 %   01/08/21 0715 -- 68 18 124/66 100 %   01/08/21 0546 98.1 °F (36.7 °C) 70 18 (!) 145/93 100 %       Records Reviewed: Nursing Notes, Old Medical Records, Previous Radiology Studies and Previous Laboratory Studies    Provider Notes (Medical Decision Making):     51-year-old female presents emergency department with a chief complaint of abdominal pain, tenesmus and rectal pain. Vitals stable, exam with tenderness but no signs of peritonitis. Given her history with multiple surgeries, and recent endoscopy, will check a noncontrast CT. Differential is broad, includes IBD, IBS, dyspepsia, gastritis, diverticulitis.   Offered patient rectal exam, patient declined stating \" I think I know what is going on, also that would send me through the roof. \"  Rectal deferred. Will give fentanyl, GI cocktail and PO benadryl for pain control. Non-con CT. Anticipate discharge with GI follow-up. ED Course:   Initial assessment performed. The patients presenting problems have been discussed, and they are in agreement with the care plan formulated and outlined with them. I have encouraged them to ask questions as they arise throughout their visit. ED Course as of Jan 08 1444   Fri Jan 08, 2021   3329 Patient reports some improvement after pain medications, labs and CT are reassuring. Will give additional dose of p.o. oxycodone, likely discharge with return precautions and Carafate. [MB]   2821 Patient is awaiting urinalysis, however she denies any urinary symptoms, no believe urinalysis will change my disposition. Patient reports improvement after Phenergan. Will discharge advised to contact her GI today. She also given a work for note with return precautions. [MB]      ED Course User Index  [MB] MD Marisol Chong MD      Disposition:    Reassessments as above. Labs and ED course reviewed. Patient was discharged home and was provided strict return precautions. Patient to follow-up with PCP or specialist per discharge instructions or return to ED for worsening symptoms. DISCHARGE PLAN:  1. Discharge Medication List as of 1/8/2021  9:28 AM      START taking these medications    Details   !! ondansetron (Zofran ODT) 4 mg disintegrating tablet 1 Tab by SubLINGual route every eight (8) hours as needed for Nausea or Vomiting., Normal, Disp-20 Tab, R-0      sucralfate (Carafate) 1 gram tablet Take 1 Tab by mouth four (4) times daily. , Normal, Disp-28 Tab, R-0       !! - Potential duplicate medications found. Please discuss with provider.       CONTINUE these medications which have NOT CHANGED    Details   famotidine (Pepcid) 20 mg tablet Take 1 Tab by mouth two (2) times a day., Normal, Disp-20 Tab,R-0      promethazine (PHENERGAN) 25 mg tablet Take 1 Tab by mouth every six (6) hours as needed for Nausea., Normal, Disp-10 Tab,R-0      hyoscyamine SL (LEVSIN/SL) 0.125 mg SL tablet 1 Tab by SubLINGual route every four (4) hours as needed for Cramping., Normal, Disp-15 Tab,R-0      estradioL (ESTRACE) 0.5 mg tablet TAKE 1 TABLET BY MOUTH ONCE DAILY, Historical Med      empagliflozin (Jardiance) 25 mg tablet Take 1 Tab by mouth daily. , Normal, Disp-90 Tab, R-3      !! ondansetron (Zofran ODT) 4 mg disintegrating tablet Take 1 Tab by mouth every eight (8) hours as needed for Nausea., Normal, Disp-10 Tab, R-0      glimepiride (AMARYL) 4 mg tablet Take 1 Tab by mouth Daily (before breakfast). , Normal, Disp-90 Tab, R-3      hydrocortisone (ANUSOL-HC) 25 mg supp Insert 1 Suppository into rectum every twelve (12) hours. , Normal, Disp-12 Each, R-0      melatonin tab tablet Take 5 mg by mouth nightly., Historical Med      omeprazole (PRILOSEC) 20 mg capsule Take 40 mg by mouth Daily (before breakfast). , Historical Med      dicyclomine (BENTYL) 20 mg tablet Take 1 Tab by mouth every six (6) hours as needed (abdominal cramps) for up to 20 doses. , Print, Disp-20 Tab, R-0      sertraline (ZOLOFT) 100 mg tablet Take 200 mg by mouth nightly., Historical Med      losartan-hydroCHLOROthiazide (HYZAAR) 100-12.5 mg per tablet Take 1 Tab by mouth daily. , Historical Med      EPINEPHrine (EPIPEN) 0.3 mg/0.3 mL (1:1,000) injection 0.3 mL by IntraMUSCular route once as needed for up to 1 dose., Print, Disp-0.3 mL, R-1      albuterol (PROVENTIL, VENTOLIN) 90 mcg/Actuation inhaler Take 2 Puffs by inhalation every six (6) hours as needed., Historical Med       !! - Potential duplicate medications found. Please discuss with provider.         2.   Follow-up Information     Follow up With Specialties Details Why Contact Info    [facility] EMERGENCY DEPT Emergency Medicine  If symptoms worsen [address] [place] [address]  [phone]    Regi Veras MD Gastroenterology In 1 week  [address] [address]  [phone]          3. Return to ED if worse     Diagnosis     Clinical Impression:   1. Abdominal pain, epigastric    2. Tenesmus        Attestations:    Marisol Link MD    Please note that this dictation was completed with [employer], the computer voice recognition software. Quite often unanticipated grammatical, syntax, homophones, and other interpretive errors are inadvertently transcribed by the computer software. Please disregard these errors. Please excuse any errors that have escaped final proofreading. Thank you.

## 2021-01-08 NOTE — ANESTHESIA POSTPROCEDURE EVALUATION
Procedure(s):  ESOPHAGOGASTRODUODENOSCOPY (EGD)  ESOPHAGEAL DILATION  ESOPHAGOGASTRODUODENAL (EGD) BIOPSY.     MAC    Anesthesia Post Evaluation      Multimodal analgesia: multimodal analgesia used between 6 hours prior to anesthesia start to PACU discharge  Patient location during evaluation: bedside  Patient participation: complete - patient participated  Level of consciousness: awake  Pain management: adequate  Airway patency: patent  Anesthetic complications: no  Cardiovascular status: acceptable  Respiratory status: acceptable  Hydration status: acceptable        INITIAL Post-op Vital signs:   Vitals Value Taken Time   /80 01/08/21 0930   Temp 36.7 °C (98.1 °F) 01/08/21 0546   Pulse 68 01/08/21 0715   Resp 18 01/08/21 0715   SpO2 97 % 01/08/21 0928

## 2021-01-08 NOTE — ED NOTES
Bedside shift report received from [facility], [address] [facility].  Pt  Just got back from CT and still needs UA

## 2021-01-08 NOTE — DISCHARGE INSTRUCTIONS
You were seen in the ER for your symptoms. Please follow-up with your GI doctor. Please continue to use your medicine for ulcerative colitis. Please return for worsening symptoms at any time.

## 2021-01-08 NOTE — ED NOTES
4531 - pt sent to radiology, CT abd without contrast     0732 - Bedside shift change report given to Eugenia Lyman  (oncoming nurse) by Jihan Lund (offgoing nurse). Report included the following information SBAR, Kardex, ED Summary, Intake/Output and MAR.      Returned back from CT abd without contrast

## 2021-01-13 ENCOUNTER — VIRTUAL VISIT (OUTPATIENT)
Dept: INTERNAL MEDICINE CLINIC | Age: 51
End: 2021-01-13
Payer: MEDICAID

## 2021-01-13 ENCOUNTER — TELEPHONE (OUTPATIENT)
Dept: INTERNAL MEDICINE CLINIC | Age: 51
End: 2021-01-13

## 2021-01-13 DIAGNOSIS — F32.A ANXIETY AND DEPRESSION: ICD-10-CM

## 2021-01-13 DIAGNOSIS — E11.22 TYPE 2 DIABETES MELLITUS WITH STAGE 3 CHRONIC KIDNEY DISEASE, WITHOUT LONG-TERM CURRENT USE OF INSULIN, UNSPECIFIED WHETHER STAGE 3A OR 3B CKD (HCC): ICD-10-CM

## 2021-01-13 DIAGNOSIS — N18.30 TYPE 2 DIABETES MELLITUS WITH STAGE 3 CHRONIC KIDNEY DISEASE, WITHOUT LONG-TERM CURRENT USE OF INSULIN, UNSPECIFIED WHETHER STAGE 3A OR 3B CKD (HCC): ICD-10-CM

## 2021-01-13 DIAGNOSIS — R79.89 ABNORMAL THYROID BLOOD TEST: ICD-10-CM

## 2021-01-13 DIAGNOSIS — G47.00 INSOMNIA, UNSPECIFIED TYPE: Primary | ICD-10-CM

## 2021-01-13 DIAGNOSIS — I10 ESSENTIAL HYPERTENSION: ICD-10-CM

## 2021-01-13 DIAGNOSIS — F41.9 ANXIETY AND DEPRESSION: ICD-10-CM

## 2021-01-13 PROCEDURE — 99204 OFFICE O/P NEW MOD 45 MIN: CPT | Performed by: INTERNAL MEDICINE

## 2021-01-13 RX ORDER — ALPRAZOLAM 0.25 MG/1
0.25 TABLET ORAL
Qty: 30 TAB | Refills: 1 | Status: SHIPPED | OUTPATIENT
Start: 2021-01-13 | End: 2021-01-29

## 2021-01-13 NOTE — PROGRESS NOTES
Luda Earl is a 48 y.o. female who was seen by synchronous (real-time) audio-video technology on 1/13/2021 for Establish Care (new pt here today to establish care ) and Insomnia (pt c.o insomnia, stating that she gets 3-4 hours of sleep at night )        Progress Note       SUBJECTIVE:   Ms. Luda Earl is a 48 y.o. female who is here for follow up of routine medical issues. Chief Complaint   Patient presents with   [address]     new pt here today to establish care     Insomnia     pt c.o insomnia, stating that she gets 3-4 hours of sleep at night      \"Not sleeping at all. \" She usually falls asleep, but often pops awake after only 3-4 hours. \"Mind racing\" at night. She has tried benadryl and melatonin. She went to [facility] about a month ago, and was given trazodone--\"I really don't like the way that makes me feel. \"   Probably due to stress and anxiety. 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 Years ago she took alprazolam.  She is on 200 mg sertraline. She saw dermatologist for hair loss. \"My thyroid came back low. \"   At this time, she is otherwise doing well and has brought no other complaints to my attention today. For a list of the medical issues addressed today, see the assessment and plan below.     PMH:   Past Medical History:   Diagnosis Date    Anal fissure     Anisocoria     Asthma     LAST EPISODE     Back pain     Cerumen impaction     Chronic kidney disease     hx uti in past    Coagulation defects     ocassional rectal bleeding due to anal fissure    Colovaginal fistula     Diabetes (HCC)     NIDDM    Diverticulitis     Diverticulosis     Enlarged tonsils     Frequent UTI     GERD (gastroesophageal reflux disease)     H/O endoscopy     with dilation    HA (headache)     Hepatic steatosis     History of colon resection     Hx of colonoscopy with polypectomy     benign    Hypertension     Ill-defined condition     FREQUENT HIVES    Ill-defined condition     HX ELEVATED LIVER ENZYMES    Morbid obesity (HCC)     Nausea & vomiting     during diverticulitis flare    Obesity     Otitis media     Pneumonia     about 15 yrs ago    Psychiatric disorder     ANXIETY    Recurrent tonsillitis     Sinusitis     Transfusion history ~ age 35    postop hysterectomy    Urticaria        Past Surgical History:   Procedure Laterality Date    COLONOSCOPY N/A 3/28/2019    COLONOSCOPY performed by Josue Flores MD at [address] COLONOSCOPY N/A 10/2/2020    COLONOSCOPY performed by Josue Flores MD at [address] [facility]    [place].  HX GI  12    LAPAROSCOPIC HAND ASSISTED  POSS OPEN SIGMOID COLECTOMY POSS TEMPORARY DIVERTING LOOP ILEOSTOMY;  (no illeostomy needed)    HX GYN           HX GYN      cervical conization    HX HEENT      SINUS SURGERY LEFT X2    HX HEENT      SINUS SURGERY ON RIGHT X2    HX OTHER SURGICAL      Sphincterotomy    HX PELVIC LAPAROSCOPY      HX EMMANUEL AND BSO      HX UROLOGICAL  12     CYSTOSCOPY INSERTION URETERAL CATHETERS - Cystoscopy Insertion of bilateral ureteral stents    LA ABDOMEN SURGERY PROC UNLISTED  2018    hernia repair at [facility]       All: She is allergic to aspirin; codeine; contrast agent [iodine]; flavoring agent; percocet [oxycodone-acetaminophen]; prilosec [omeprazole magnesium]; dilaudid [hydromorphone]; ketorolac; fentanyl; and morphine. Current Outpatient Medications   Medication Sig    ondansetron (Zofran ODT) 4 mg disintegrating tablet 1 Tab by SubLINGual route every eight (8) hours as needed for Nausea or Vomiting.  sucralfate (Carafate) 1 gram tablet Take 1 Tab by mouth four (4) times daily.  famotidine (Pepcid) 20 mg tablet Take 1 Tab by mouth two (2) times a day.  promethazine (PHENERGAN) 25 mg tablet Take 1 Tab by mouth every six (6) hours as needed for Nausea.     hyoscyamine SL (LEVSIN/SL) 0.125 mg SL tablet 1 Tab by SubLINGual route every four (4) hours as needed for Cramping.  estradioL (ESTRACE) 0.5 mg tablet TAKE 1 TABLET BY MOUTH ONCE DAILY    empagliflozin (Jardiance) 25 mg tablet Take 1 Tab by mouth daily.  ondansetron (Zofran ODT) 4 mg disintegrating tablet Take 1 Tab by mouth every eight (8) hours as needed for Nausea.  glimepiride (AMARYL) 4 mg tablet Take 1 Tab by mouth Daily (before breakfast).  hydrocortisone (ANUSOL-HC) 25 mg supp Insert 1 Suppository into rectum every twelve (12) hours.  melatonin tab tablet Take 5 mg by mouth nightly.  omeprazole (PRILOSEC) 20 mg capsule Take 40 mg by mouth Daily (before breakfast).  dicyclomine (BENTYL) 20 mg tablet Take 1 Tab by mouth every six (6) hours as needed (abdominal cramps) for up to 20 doses.  sertraline (ZOLOFT) 100 mg tablet Take 200 mg by mouth nightly.  losartan-hydroCHLOROthiazide (HYZAAR) 100-12.5 mg per tablet Take 1 Tab by mouth daily.  EPINEPHrine (EPIPEN) 0.3 mg/0.3 mL (1:1,000) injection 0.3 mL by IntraMUSCular route once as needed for up to 1 dose.  albuterol (PROVENTIL, VENTOLIN) 90 mcg/Actuation inhaler Take 2 Puffs by inhalation every six (6) hours as needed. No current facility-administered medications for this visit. FH: Her family history includes Anesth Problems in her mother; Arrhythmia in her father; Cancer in her mother; Diabetes in her father and mother; Heart Disease in her father. SH: She currently works as assistant to pain specialist. She reports that she has never smoked. She has never used smokeless tobacco. She reports current alcohol use. She reports that she does not use drugs. ROS: See above; Complete ROS otherwise negative. OBJECTIVE:   Vitals: There were no vitals taken for this visit.        Lab Results   Component Value Date/Time    Sodium 136 01/08/2021 05:57 AM    Potassium 3.6 01/08/2021 05:57 AM    Chloride 104 01/08/2021 05:57 AM    CO2 25 01/08/2021 05:57 AM    Anion gap 7 01/08/2021 05:57 AM    Glucose 141 (H) 01/08/2021 05:57 AM    BUN 8 01/08/2021 05:57 AM    Creatinine 0.71 01/08/2021 05:57 AM    BUN/Creatinine ratio 11 (L) 01/08/2021 05:57 AM    GFR est AA >60 01/08/2021 05:57 AM    GFR est non-AA >60 01/08/2021 05:57 AM    Calcium 9.1 01/08/2021 05:57 AM    Bilirubin, total 0.6 01/08/2021 05:57 AM    ALT (SGPT) 32 01/08/2021 05:57 AM    Alk. phosphatase 67 01/08/2021 05:57 AM    Protein, total 7.2 01/08/2021 05:57 AM    Albumin 3.8 01/08/2021 05:57 AM    Globulin 3.4 01/08/2021 05:57 AM    A-G Ratio 1.1 01/08/2021 05:57 AM       Lab Results   Component Value Date/Time    Cholesterol, total 269 (H) 02/21/2020 09:50 AM    HDL Cholesterol 48 02/21/2020 09:50 AM    LDL, calculated 166 (H) 02/21/2020 09:50 AM    Triglyceride 274 (H) 02/21/2020 09:50 AM        Lab Results   Component Value Date/Time    Hemoglobin A1c 8.0 (H) 02/10/2019 04:05 AM       Lab Results   Component Value Date/Time    WBC 5.8 01/08/2021 05:57 AM    Hemoglobin (POC) 9.5 (L) 12/04/2014 10:41 PM    HGB 12.0 01/08/2021 05:57 AM    Hematocrit (POC) 28 (L) 12/04/2014 10:41 PM    HCT 36.3 01/08/2021 05:57 AM    PLATELET 375 74/11/6548 05:57 AM    MCV 79.4 (L) 01/08/2021 05:57 AM         ASSESSMENT/ PLAN: Diagnoses and all orders for this visit:    1. Insomnia, unspecified type    2. Anxiety and depression  -     ALPRAZolam (XANAX) 0.25 mg tablet; Take 1 Tab by mouth nightly as needed for Anxiety. Max Daily Amount: 0.25 mg.    3. Type 2 diabetes mellitus with stage 3 chronic kidney disease, without long-term current use of insulin, unspecified whether stage 3a or 3b CKD (HCC)  -     LIPID PANEL  -     HEMOGLOBIN A1C WITH EAG  -     METABOLIC PANEL, COMPREHENSIVE  -     CBC WITH AUTOMATED DIFF  -     MICROALBUMIN, UR, RAND W/ MICROALB/CREAT RATIO    4. Essential hypertension  -     LIPID PANEL  -     METABOLIC PANEL, COMPREHENSIVE  -     CBC WITH AUTOMATED DIFF    5.  Abnormal thyroid blood test  -     T4, FREE  -     TSH 3RD GENERATION          I have reviewed the patient's medications and risks/side effects/benefits were discussed. Diagnosis(-es) explained to patient and questions answered. Literature provided where appropriate. Objective:   No flowsheet data found. [INSTRUCTIONS:  \"[x]\" Indicates a positive item  \"[]\" Indicates a negative item  -- DELETE ALL ITEMS NOT EXAMINED]    Constitutional: [x] Appears well-developed and well-nourished [x] No apparent distress      [] Abnormal -     Mental status: [x] Alert and awake  [x] Oriented to person/place/time [x] Able to follow commands    [] Abnormal -     Eyes:   EOM    [x]  Normal    [] Abnormal -   Sclera  [x]  Normal    [] Abnormal -          Discharge [x]  None visible   [] Abnormal -     HENT: [x] Normocephalic, atraumatic  [] Abnormal -   [x] Mouth/Throat: Mucous membranes are moist    External Ears [x] Normal  [] Abnormal -    Neck: [x] No visualized mass [] Abnormal -     Pulmonary/Chest: [x] Respiratory effort normal   [x] No visualized signs of difficulty breathing or respiratory distress        [] Abnormal -      Musculoskeletal:   [x] Normal gait with no signs of ataxia         [x] Normal range of motion of neck        [] Abnormal -     Neurological:        [x] No Facial Asymmetry (Cranial nerve 7 motor function) (limited exam due to video visit)          [x] No gaze palsy        [] Abnormal -          Skin:        [x] No significant exanthematous lesions or discoloration noted on facial skin         [] Abnormal -            Psychiatric:       [x] Normal Affect [] Abnormal -       Other pertinent observable physical exam findings:-        We discussed the expected course, resolution and complications of the diagnosis(es) in detail. Medication risks, benefits, costs, interactions, and alternatives were discussed as indicated.   I advised her to contact the office if her condition worsens, changes or fails to improve as anticipated. She expressed understanding with the diagnosis(es) and plan. Christopher Carter, who was evaluated through a patient-initiated, synchronous (real-time) audio-video encounter, and/or her healthcare decision maker, is aware that it is a billable service, with coverage as determined by her insurance carrier. She provided verbal consent to proceed: YES, and patient identification was verified. It was conducted pursuant to the emergency declaration under the [address] [place] [address] authority and the [employer] Act. A caregiver was present when appropriate. Ability to conduct physical exam was limited. I was in office. The patient was at home or otherwise outside the office.       Rut Olsen MD

## 2021-01-13 NOTE — PATIENT INSTRUCTIONS
Office Policies Phone calls/patient messages: Please allow up to 24 hours for someone in the office to contact you about your call or message. Be mindful your provider may be out of the office or your message may require further review. We encourage you to use [employer] for your messages as this is a faster, more efficient way to communicate with our office Medication Refills: 
         
Prescription medications require 48-72 business hours to process. We encourage you to use [employer] for your refills. For controlled medications: Please allow 72 business hours to process. Certain medications may require you to  a written prescription at our office. NO narcotic/controlled medications will be prescribed after 4pm Monday through Friday or on weekends Form/Paperwork Completion: 
         
Please note a $25 fee may incur for all paperwork for completed by our providers. We ask that you allow 7-10 business days. Pre-payment is due prior to picking up/faxing the completed form. You may also download your forms to [employer] to have your doctor print off. 
 
 
1. Have you been to the ER, urgent care clinic since your last visit? Hospitalized since your last visit? [facility] ER  
 
2. Have you seen or consulted any other health care providers outside of the [address] [place] [address] since your last visit? Include any pap smears or colon screening.  NO

## 2021-01-14 ENCOUNTER — TELEPHONE (OUTPATIENT)
Dept: INTERNAL MEDICINE CLINIC | Age: 51
End: 2021-01-14

## 2021-01-14 NOTE — TELEPHONE ENCOUNTER
Medication Refill     Caller (if not patient): N/A       Relationship of caller (if not patient):N/A       Best contact number(s): [phone]       Name of medication and dosage if known: Xanax (Generic)       Is patient out of this medication (yes/no): no       Pharmacy name: [facility]     Pharmacy listed in chart? (yes/no):   Pharmacy phone number: [phone]       Details to clarify the request: Pt is requesting a rx for an increased dosage to 1 mg of Xanax ([id number])[phone]  to be to the pharmacy on file. Pt advised that the .25 mg does not help and she has been on 1 mg previously.      Porsha Gustafson

## 2021-01-15 NOTE — TELEPHONE ENCOUNTER
Xanax 0.25 mg sent to the pharmacy on 1/13/21. Patient requesting 1 mg because that's what she has been taking.

## 2021-01-18 ENCOUNTER — TELEPHONE (OUTPATIENT)
Dept: INTERNAL MEDICINE CLINIC | Age: 51
End: 2021-01-18

## 2021-01-18 NOTE — TELEPHONE ENCOUNTER
----- Message from Nidia Dupont sent at 1/18/2021  4:33 PM EST -----  Regarding: Dr. Quiroz Passer General Message/Vendor Calls    Caller's first and last name:      Reason for call: Regarding dosage of her Rx Alprazolam .25mg  increase to 1mg what she was taking prior.        Call back required yes/no and why: Yes       Best contact number(s): [phone]      Message from [place]

## 2021-01-18 NOTE — TELEPHONE ENCOUNTER
She told me that she was on alprazolam years ago. Its appropriate to start at a small dose because her tolerance likely has waned.

## 2021-01-19 NOTE — TELEPHONE ENCOUNTER
Message was left for patient that  Dr. Ira Lopez is out of the office but she can schedule with another Providerr to discuss medication.

## 2021-01-21 ENCOUNTER — TELEPHONE (OUTPATIENT)
Dept: INTERNAL MEDICINE CLINIC | Age: 51
End: 2021-01-21

## 2021-01-21 ENCOUNTER — VIRTUAL VISIT (OUTPATIENT)
Dept: INTERNAL MEDICINE CLINIC | Age: 51
End: 2021-01-21

## 2021-01-21 RX ORDER — ALBUTEROL SULFATE 90 UG/1
1 AEROSOL, METERED RESPIRATORY (INHALATION)
Qty: 1 INHALER | Refills: 1 | Status: SHIPPED | OUTPATIENT
Start: 2021-01-21 | End: 2021-06-29

## 2021-01-21 NOTE — TELEPHONE ENCOUNTER
FYI      Patient was scheduled for an appointment today. She had called requesting increase xanax from 0.25mg to 1mg dose, after receiving the RX one day prior,  stating that the lower dose did not work. On review of the patient's PDMP, her NARX score is 650. She received Lunesta from Dr. Tanisha Coleman on December 2. She has regularly received narcotics from several different physicians including hydrocodone and hydromorphone. Patient advised that she would not be prescribed controlled substance and she cancelled appt today.

## 2021-01-21 NOTE — ED NOTES
Discharge instructions reviewed with the pt by the MD.  Pt ambulatory out of the ER. [facility] Pregnancy And Lactation Text: The risk during pregnancy and breastfeeding is uncertain with this medication.

## 2021-01-21 NOTE — TELEPHONE ENCOUNTER
Requested Prescriptions     Pending Prescriptions Disp Refills    albuterol (PROVENTIL HFA, VENTOLIN HFA, PROAIR HFA) 90 mcg/actuation inhaler 1 Inhaler      Sig: Take 1 Puff by inhalation. Future Appointments:  No future appointments. Last Appointment With Me:  Visit date not found     Requested Prescriptions     Pending Prescriptions Disp Refills    albuterol (PROVENTIL HFA, VENTOLIN HFA, PROAIR HFA) 90 mcg/actuation inhaler 1 Inhaler      Sig: Take 1 Puff by inhalation.

## 2021-01-28 ENCOUNTER — TELEPHONE (OUTPATIENT)
Dept: INTERNAL MEDICINE CLINIC | Age: 51
End: 2021-01-28

## 2021-01-28 NOTE — TELEPHONE ENCOUNTER
----- Message from Maury Yen sent at 1/28/2021 11:34 AM EST -----  Regarding: Dr. Augusto Duque: [phone]  General Message/Vendor Calls    Caller's first and last name: Pt.      Reason for call: F/up on previous medication request and concern. Callback required yes/no and why: Yes, requested      Best contact number(s): [phone]      Details to clarify the request: States she has left several messages without a return call. Did not wish to schedule for discussion at this time unless Dr. Ira Lopez has availability today or tomorrow.       Maury Yen     Copy/paste William

## 2021-01-29 ENCOUNTER — HOSPITAL ENCOUNTER (EMERGENCY)
Age: 51
Discharge: HOME | End: 2021-01-30
Attending: STUDENT IN AN ORGANIZED HEALTH CARE EDUCATION/TRAINING PROGRAM
Payer: MEDICAID

## 2021-01-29 ENCOUNTER — VIRTUAL VISIT (OUTPATIENT)
Dept: INTERNAL MEDICINE CLINIC | Age: 51
End: 2021-01-29
Payer: MEDICAID

## 2021-01-29 ENCOUNTER — APPOINTMENT (OUTPATIENT)
Dept: CT IMAGING | Age: 51
End: 2021-01-29
Attending: STUDENT IN AN ORGANIZED HEALTH CARE EDUCATION/TRAINING PROGRAM
Payer: MEDICAID

## 2021-01-29 ENCOUNTER — TELEPHONE (OUTPATIENT)
Dept: INTERNAL MEDICINE CLINIC | Age: 51
End: 2021-01-29

## 2021-01-29 VITALS
WEIGHT: 210.32 LBS | HEART RATE: 90 BPM | OXYGEN SATURATION: 98 % | DIASTOLIC BLOOD PRESSURE: 87 MMHG | RESPIRATION RATE: 18 BRPM | BODY MASS INDEX: 38.7 KG/M2 | SYSTOLIC BLOOD PRESSURE: 161 MMHG | TEMPERATURE: 97.7 F | HEIGHT: 62 IN

## 2021-01-29 DIAGNOSIS — R10.84 ABDOMINAL PAIN, GENERALIZED: Primary | ICD-10-CM

## 2021-01-29 DIAGNOSIS — F41.9 ANXIETY: Primary | ICD-10-CM

## 2021-01-29 DIAGNOSIS — B02.9 HERPES ZOSTER WITHOUT COMPLICATION: ICD-10-CM

## 2021-01-29 DIAGNOSIS — K64.4 EXTERNAL HEMORRHOID: ICD-10-CM

## 2021-01-29 LAB
ABO + RH BLD: NORMAL
ALBUMIN SERPL-MCNC: 3.8 G/DL (ref 3.5–5)
ALBUMIN/GLOB SERPL: 1.1 {RATIO} (ref 1.1–2.2)
ALP SERPL-CCNC: 68 U/L (ref 45–117)
ALT SERPL-CCNC: 34 U/L (ref 12–78)
ANION GAP SERPL CALC-SCNC: 6 MMOL/L (ref 5–15)
AST SERPL-CCNC: 24 U/L (ref 15–37)
BASOPHILS # BLD: 0 K/UL (ref 0–0.1)
BASOPHILS NFR BLD: 1 % (ref 0–1)
BILIRUB SERPL-MCNC: 0.4 MG/DL (ref 0.2–1)
BLOOD GROUP ANTIBODIES SERPL: NORMAL
BUN SERPL-MCNC: 11 MG/DL (ref 6–20)
BUN/CREAT SERPL: 14 (ref 12–20)
CALCIUM SERPL-MCNC: 9.6 MG/DL (ref 8.5–10.1)
CHLORIDE SERPL-SCNC: 107 MMOL/L (ref 97–108)
CO2 SERPL-SCNC: 27 MMOL/L (ref 21–32)
CREAT SERPL-MCNC: 0.79 MG/DL (ref 0.55–1.02)
DIFFERENTIAL METHOD BLD: ABNORMAL
EOSINOPHIL # BLD: 0.2 K/UL (ref 0–0.4)
EOSINOPHIL NFR BLD: 3 % (ref 0–7)
ERYTHROCYTE [DISTWIDTH] IN BLOOD BY AUTOMATED COUNT: 14.6 % (ref 11.5–14.5)
GLOBULIN SER CALC-MCNC: 3.4 G/DL (ref 2–4)
GLUCOSE SERPL-MCNC: 177 MG/DL (ref 65–100)
HCT VFR BLD AUTO: 36.3 % (ref 35–47)
HGB BLD-MCNC: 12 G/DL (ref 11.5–16)
IMM GRANULOCYTES # BLD AUTO: 0 K/UL (ref 0–0.04)
IMM GRANULOCYTES NFR BLD AUTO: 0 % (ref 0–0.5)
LYMPHOCYTES # BLD: 1.7 K/UL (ref 0.8–3.5)
LYMPHOCYTES NFR BLD: 28 % (ref 12–49)
MCH RBC QN AUTO: 26.3 PG (ref 26–34)
MCHC RBC AUTO-ENTMCNC: 33.1 G/DL (ref 30–36.5)
MCV RBC AUTO: 79.4 FL (ref 80–99)
MONOCYTES # BLD: 0.4 K/UL (ref 0–1)
MONOCYTES NFR BLD: 6 % (ref 5–13)
NEUTS SEG # BLD: 3.8 K/UL (ref 1.8–8)
NEUTS SEG NFR BLD: 62 % (ref 32–75)
NRBC # BLD: 0 K/UL (ref 0–0.01)
NRBC BLD-RTO: 0 PER 100 WBC
PLATELET # BLD AUTO: 176 K/UL (ref 150–400)
PMV BLD AUTO: 10 FL (ref 8.9–12.9)
POTASSIUM SERPL-SCNC: 3.7 MMOL/L (ref 3.5–5.1)
PROT SERPL-MCNC: 7.2 G/DL (ref 6.4–8.2)
RBC # BLD AUTO: 4.57 M/UL (ref 3.8–5.2)
SODIUM SERPL-SCNC: 140 MMOL/L (ref 136–145)
SPECIMEN EXP DATE BLD: NORMAL
WBC # BLD AUTO: 6.1 K/UL (ref 3.6–11)

## 2021-01-29 PROCEDURE — 85025 COMPLETE CBC W/AUTO DIFF WBC: CPT

## 2021-01-29 PROCEDURE — 96375 TX/PRO/DX INJ NEW DRUG ADDON: CPT

## 2021-01-29 PROCEDURE — 99283 EMERGENCY DEPT VISIT LOW MDM: CPT

## 2021-01-29 PROCEDURE — 36415 COLL VENOUS BLD VENIPUNCTURE: CPT

## 2021-01-29 PROCEDURE — 85652 RBC SED RATE AUTOMATED: CPT

## 2021-01-29 PROCEDURE — 80053 COMPREHEN METABOLIC PANEL: CPT

## 2021-01-29 PROCEDURE — 74011250636 HC RX REV CODE- 250/636: Performed by: STUDENT IN AN ORGANIZED HEALTH CARE EDUCATION/TRAINING PROGRAM

## 2021-01-29 PROCEDURE — 99214 OFFICE O/P EST MOD 30 MIN: CPT | Performed by: FAMILY MEDICINE

## 2021-01-29 PROCEDURE — 96374 THER/PROPH/DIAG INJ IV PUSH: CPT

## 2021-01-29 PROCEDURE — 86901 BLOOD TYPING SEROLOGIC RH(D): CPT

## 2021-01-29 PROCEDURE — 74176 CT ABD & PELVIS W/O CONTRAST: CPT

## 2021-01-29 PROCEDURE — 86141 C-REACTIVE PROTEIN HS: CPT

## 2021-01-29 RX ORDER — ONDANSETRON 2 MG/ML
4 INJECTION INTRAMUSCULAR; INTRAVENOUS
Status: COMPLETED | OUTPATIENT
Start: 2021-01-29 | End: 2021-01-29

## 2021-01-29 RX ORDER — DIPHENHYDRAMINE HYDROCHLORIDE 50 MG/ML
50 INJECTION, SOLUTION INTRAMUSCULAR; INTRAVENOUS
Status: COMPLETED | OUTPATIENT
Start: 2021-01-29 | End: 2021-01-29

## 2021-01-29 RX ORDER — VALACYCLOVIR HYDROCHLORIDE 1 G/1
1000 TABLET, FILM COATED ORAL 3 TIMES DAILY
Qty: 21 TAB | Refills: 0 | Status: SHIPPED | OUTPATIENT
Start: 2021-01-29 | End: 2021-03-03

## 2021-01-29 RX ORDER — GABAPENTIN 300 MG/1
300 CAPSULE ORAL
Qty: 30 CAP | Refills: 1 | Status: SHIPPED | OUTPATIENT
Start: 2021-01-29 | End: 2021-03-03

## 2021-01-29 RX ORDER — MORPHINE SULFATE 4 MG/ML
4 INJECTION INTRAVENOUS ONCE
Status: COMPLETED | OUTPATIENT
Start: 2021-01-29 | End: 2021-01-29

## 2021-01-29 RX ORDER — ALPRAZOLAM 1 MG/1
.5-1 TABLET ORAL
Qty: 30 TAB | Refills: 0 | Status: SHIPPED | OUTPATIENT
Start: 2021-01-29 | End: 2021-03-22

## 2021-01-29 RX ADMIN — ONDANSETRON 4 MG: 2 INJECTION INTRAMUSCULAR; INTRAVENOUS at 23:29

## 2021-01-29 RX ADMIN — MORPHINE SULFATE 4 MG: 4 INJECTION INTRAVENOUS at 23:29

## 2021-01-29 RX ADMIN — DIPHENHYDRAMINE HYDROCHLORIDE 50 MG: 50 INJECTION, SOLUTION INTRAMUSCULAR; INTRAVENOUS at 23:29

## 2021-01-29 NOTE — PROGRESS NOTES
Patrizia Grande is a 48 y.o. female who presents with concern of anxiety. Taking sertraline 200mg daily for many years for anxiety and depression. She feels that it is less effective now. Reports increased situational stress,  Work stress, pandemic, recent divorce. Has taken xanax 1mg in the past.  Was given xanax 0.25mg (1/13 #30) and it is not effective at all for her anxiety. She also reports she has had a painful rash for 3 days on right sided,below breast.  A doctor she works with looked at it thought it was shingles. Diabetic, saw Dr Aris Vásquez. Off metformin. Is taking amaryl 4mg once a day and jardiance 25mg daily. Due for labs. This is an established visit conducted via telemedicine with video. The patient has been instructed that this meets HIPAA criteria and acknowledges and agrees to this method of visitation. Pursuant to the emergency declaration under the [facility] waiver authority and the [employer] Act, this Virtual Visit was conducted, with patient's consent, to reduce the patient's risk of exposure to COVID-19 and provide continuity of care for an established patient. Services were provided through a video synchronous discussion virtually to substitute for in-person clinic visit.         Past Medical History:   Diagnosis Date    Anal fissure     Anisocoria     Asthma     LAST EPISODE     Back pain     Cerumen impaction     Chronic kidney disease     hx uti in past    Coagulation defects     ocassional rectal bleeding due to anal fissure    Colovaginal fistula     Diabetes (HCC)     NIDDM    Diverticulitis     Diverticulosis     Enlarged tonsils     Frequent UTI     GERD (gastroesophageal reflux disease)     H/O endoscopy     with dilation    HA (headache)     Hepatic steatosis     History of colon resection     Hx of colonoscopy with polypectomy     benign    Hypertension     Ill-defined condition     FREQUENT HIVES    Ill-defined condition     HX ELEVATED LIVER ENZYMES    Morbid obesity (HCC)     Nausea & vomiting     during diverticulitis flare    Obesity     Otitis media     Pneumonia     about 15 yrs ago    Psychiatric disorder     ANXIETY    Recurrent tonsillitis     Sinusitis     Transfusion history ~ age 35    postop hysterectomy    Urticaria        Family History   Problem Relation Age of Onset    Diabetes Mother     Cancer Mother         NON-HODGKINS LYMPHOMA    Anesth Problems Mother         PONV    Diabetes Father     Heart Disease Father         CAD - STENTS, PACEMAKER    Arrhythmia Father        Social History     Socioeconomic History    Marital status:      Spouse name: Not on file    Number of children: Not on file    Years of education: Not on file    Highest education level: Not on file   Occupational History    Not on file   Social Needs    Financial resource strain: Not on file    Food insecurity     Worry: Not on file     Inability: Not on file    Transportation needs     Medical: Not on file     Non-medical: Not on file   Tobacco Use    Smoking status: Never Smoker    Smokeless tobacco: Never Used   Substance and Sexual Activity    Alcohol use: Yes     Comment: Rarely    Drug use: No     Types: Prescription, OTC    Sexual activity: Never   Lifestyle    Physical activity     Days per week: Not on file     Minutes per session: Not on file    Stress: Not on file   Relationships    Social connections     Talks on phone: Not on file     Gets together: Not on file     Attends [employer] service: Not on file     Active member of club or organization: Not on file     Attends meetings of clubs or organizations: Not on file     Relationship status: Not on file    Intimate partner violence     Fear of current or ex partner: Not on file     Emotionally abused: Not on file     Physically abused: Not on file     Forced sexual activity: Not on file   Other Topics Concern    Not on file   Social History Narrative    Not on file       Current Outpatient Medications on File Prior to Visit   Medication Sig Dispense Refill    albuterol (PROVENTIL HFA, VENTOLIN HFA, PROAIR HFA) 90 mcg/actuation inhaler Take 1 Puff by inhalation every four (4) hours as needed for Wheezing. 1 Inhaler 1    ondansetron (Zofran ODT) 4 mg disintegrating tablet 1 Tab by SubLINGual route every eight (8) hours as needed for Nausea or Vomiting. 20 Tab 0    sucralfate (Carafate) 1 gram tablet Take 1 Tab by mouth four (4) times daily. 28 Tab 0    famotidine (Pepcid) 20 mg tablet Take 1 Tab by mouth two (2) times a day. 20 Tab 0    promethazine (PHENERGAN) 25 mg tablet Take 1 Tab by mouth every six (6) hours as needed for Nausea. 10 Tab 0    hyoscyamine SL (LEVSIN/SL) 0.125 mg SL tablet 1 Tab by SubLINGual route every four (4) hours as needed for Cramping. 15 Tab 0    estradioL (ESTRACE) 0.5 mg tablet TAKE 1 TABLET BY MOUTH ONCE DAILY      empagliflozin (Jardiance) 25 mg tablet Take 1 Tab by mouth daily. 90 Tab 3    ondansetron (Zofran ODT) 4 mg disintegrating tablet Take 1 Tab by mouth every eight (8) hours as needed for Nausea. 10 Tab 0    glimepiride (AMARYL) 4 mg tablet Take 1 Tab by mouth Daily (before breakfast). 90 Tab 3    hydrocortisone (ANUSOL-HC) 25 mg supp Insert 1 Suppository into rectum every twelve (12) hours. 12 Each 0    melatonin tab tablet Take 5 mg by mouth nightly.  omeprazole (PRILOSEC) 20 mg capsule Take 40 mg by mouth Daily (before breakfast).  dicyclomine (BENTYL) 20 mg tablet Take 1 Tab by mouth every six (6) hours as needed (abdominal cramps) for up to 20 doses. 20 Tab 0    sertraline (ZOLOFT) 100 mg tablet Take 200 mg by mouth nightly.  losartan-hydroCHLOROthiazide (HYZAAR) 100-12.5 mg per tablet Take 1 Tab by mouth daily.       EPINEPHrine (EPIPEN) 0.3 mg/0.3 mL (1:1,000) injection 0.3 mL by IntraMUSCular route once as needed for up to 1 dose. 0.3 mL 1    albuterol (PROVENTIL, VENTOLIN) 90 mcg/Actuation inhaler Take 2 Puffs by inhalation every six (6) hours as needed.  [DISCONTINUED] ALPRAZolam (XANAX) 0.25 mg tablet Take 1 Tab by mouth nightly as needed for Anxiety. Max Daily Amount: 0.25 mg. 30 Tab 1     No current facility-administered medications on file prior to visit. Review of Systems  Pertinent items are noted in HPI. Objective:     Gen: well appearing female  HEENT: normal conjunctiva, no audible congestion, patient does not see oral erythema, has MMM  Neck: patient does not feel enlarged or tender LAD or masses  Resp: normal respiratory effort, no audible wheezing. CV: patient does not feel palpitations or heart irregularity  Abd: patient does not feel abdominal tenderness or mass, patient does not notice distension  Extrem: patient does not see swelling in ankles or joints. Neuro: Alert and oriented, able to answer questions without difficulty, able to move all extremities and walk normally  Skin: Patient sees a blistery rash under right breast      Assessment/Plan:       ICD-10-CM ICD-9-CM    1. Anxiety  F41.9 300.00 ALPRAZolam (XANAX) 1 mg tablet   2. Herpes zoster without complication  U50.5 659.3 valACYclovir (VALTREX) 1 gram tablet      gabapentin (NEURONTIN) 300 mg capsule   For now continue sertraline 200 mg once daily. Will change Xanax to 1 mg 1/2 to 1 tablet twice a day as needed for breakthrough symptoms. Gabapentin added for shingles pain and start Valtrex today. This was a telemedicine visit with video. Perez Garcia MD    Follow-up and Dispositions    · Return in about 31 days (around 3/1/2021) for annual and fasting labs. Rodger Lambert

## 2021-01-29 NOTE — TELEPHONE ENCOUNTER
Identified patient 2 identifiers verified. Patient requesting  That Dr. Michelle Robles prescribed Xanax 1 mg. Patient aware that Dr. Michelle Robles is out of the office and may not see this encounter until he returns. Patient has a virtual visit today with  Dr. Corrine Pena.

## 2021-01-30 LAB
CRP SERPL HS-MCNC: 4.1 MG/L
ERYTHROCYTE [SEDIMENTATION RATE] IN BLOOD: 29 MM/HR (ref 0–30)

## 2021-01-30 PROCEDURE — 74011250637 HC RX REV CODE- 250/637: Performed by: STUDENT IN AN ORGANIZED HEALTH CARE EDUCATION/TRAINING PROGRAM

## 2021-01-30 RX ORDER — HYDROCORTISONE ACETATE 25 MG/1
25 SUPPOSITORY RECTAL EVERY 12 HOURS
Qty: 10 SUPPOSITORY | Refills: 0 | Status: SHIPPED | OUTPATIENT
Start: 2021-01-30 | End: 2021-03-03

## 2021-01-30 RX ORDER — ACETAMINOPHEN 325 MG/1
975 TABLET ORAL
Status: COMPLETED | OUTPATIENT
Start: 2021-01-30 | End: 2021-01-30

## 2021-01-30 RX ADMIN — ACETAMINOPHEN 975 MG: 325 TABLET ORAL at 00:56

## 2021-01-30 NOTE — ED NOTES
Discharge instructions given to patient by MD Curran.  Verbalized understanding of instructions. Patient discharged without difficulty. Patient discharged in stable condition via ambulation accompanied by daughter.

## 2021-01-30 NOTE — ED PROVIDER NOTES
EMERGENCY DEPARTMENT HISTORY AND PHYSICAL EXAM          Date: 1/29/2021  Patient Name: Roderick Frost  Attending of Record: Chiquita Pressley    History of Presenting Illness     Chief Complaint   Patient presents with    Shingles     Patient reports shingles on her waist.    Abdominal Pain    Melena     Patient reports onset 3 days ago. History Provided By: Patient    HPI: Roderick Frost is a 48 y.o. female past medical history of ulcerative colitis, anxiety, hypertension, chronic pain who presents the emergency department complaining of abdominal pain has been going on for the past 5 days. She states it is generalized and she feels that she is having a flare of her ulcerative colitis. She also noticed that she started having pain of her right upper quadrant and then developed a rash. She has been diagnosed with shingles and has been given prescription for acyclovir which she is taken 1 dose of. She also notes that she has a flareup of her hemorrhoids abdomen bothering her and she has been having some rectal bleeding with bright red blood with her bowel movements. She reports that this happens often, but she is concerned that she may have an anal fissure. She takes topical nitroglycerin at home which she feels has helped. Reports chills at home, but no objective fevers. No recent sick contacts around other respiratory infectious symptoms. PCP: Ro Clark MD    There are no other complaints, changes, or physical findings at this time. Current Facility-Administered Medications   Medication Dose Route Frequency Provider Last Rate Last Admin    acetaminophen (TYLENOL) tablet 975 mg  975 mg Oral NOW Mulugeta Curran MD         Current Outpatient Medications   Medication Sig Dispense Refill    hydrocortisone (Anusol-HC) 25 mg supp Insert 1 Suppository into rectum every twelve (12) hours.  10 Suppository 0    ALPRAZolam (XANAX) 1 mg tablet Take 0.5-1 Tabs by mouth two (2) times daily as needed for Anxiety. Max Daily Amount: 2 mg. 30 Tab 0    valACYclovir (VALTREX) 1 gram tablet Take 1 Tab by mouth three (3) times daily. 21 Tab 0    gabapentin (NEURONTIN) 300 mg capsule Take 1 Cap by mouth nightly as needed for Pain. Max Daily Amount: 300 mg. 30 Cap 1    albuterol (PROVENTIL HFA, VENTOLIN HFA, PROAIR HFA) 90 mcg/actuation inhaler Take 1 Puff by inhalation every four (4) hours as needed for Wheezing. 1 Inhaler 1    ondansetron (Zofran ODT) 4 mg disintegrating tablet 1 Tab by SubLINGual route every eight (8) hours as needed for Nausea or Vomiting. 20 Tab 0    sucralfate (Carafate) 1 gram tablet Take 1 Tab by mouth four (4) times daily. 28 Tab 0    famotidine (Pepcid) 20 mg tablet Take 1 Tab by mouth two (2) times a day. 20 Tab 0    promethazine (PHENERGAN) 25 mg tablet Take 1 Tab by mouth every six (6) hours as needed for Nausea. 10 Tab 0    hyoscyamine SL (LEVSIN/SL) 0.125 mg SL tablet 1 Tab by SubLINGual route every four (4) hours as needed for Cramping. 15 Tab 0    estradioL (ESTRACE) 0.5 mg tablet TAKE 1 TABLET BY MOUTH ONCE DAILY      empagliflozin (Jardiance) 25 mg tablet Take 1 Tab by mouth daily. 90 Tab 3    ondansetron (Zofran ODT) 4 mg disintegrating tablet Take 1 Tab by mouth every eight (8) hours as needed for Nausea. 10 Tab 0    glimepiride (AMARYL) 4 mg tablet Take 1 Tab by mouth Daily (before breakfast). 90 Tab 3    melatonin tab tablet Take 5 mg by mouth nightly.  omeprazole (PRILOSEC) 20 mg capsule Take 40 mg by mouth Daily (before breakfast).  dicyclomine (BENTYL) 20 mg tablet Take 1 Tab by mouth every six (6) hours as needed (abdominal cramps) for up to 20 doses. 20 Tab 0    sertraline (ZOLOFT) 100 mg tablet Take 200 mg by mouth nightly.  losartan-hydroCHLOROthiazide (HYZAAR) 100-12.5 mg per tablet Take 1 Tab by mouth daily.  EPINEPHrine (EPIPEN) 0.3 mg/0.3 mL (1:1,000) injection 0.3 mL by IntraMUSCular route once as needed for up to 1 dose.  0.3 mL 1    albuterol (PROVENTIL, VENTOLIN) 90 mcg/Actuation inhaler Take 2 Puffs by inhalation every six (6) hours as needed. Past History     Past Medical History:  Past Medical History:   Diagnosis Date    Anal fissure     Anisocoria     Asthma     LAST EPISODE     Back pain     Cerumen impaction     Chronic kidney disease     hx uti in past    Coagulation defects     ocassional rectal bleeding due to anal fissure    Colovaginal fistula     Diabetes (HCC)     NIDDM    Diverticulitis     Diverticulosis     Enlarged tonsils     Frequent UTI     GERD (gastroesophageal reflux disease)     H/O endoscopy     with dilation    HA (headache)     Hepatic steatosis     History of colon resection     Hx of colonoscopy with polypectomy     benign    Hypertension     Ill-defined condition     FREQUENT HIVES    Ill-defined condition     HX ELEVATED LIVER ENZYMES    Morbid obesity (HCC)     Nausea & vomiting     during diverticulitis flare    Obesity     Otitis media     Pneumonia     about 15 yrs ago    Psychiatric disorder     ANXIETY    Recurrent tonsillitis     Sinusitis     Transfusion history ~ age 35    postop hysterectomy    Urticaria        Past Surgical History:  Past Surgical History:   Procedure Laterality Date    COLONOSCOPY N/A 3/28/2019    COLONOSCOPY performed by Carmelita Newsome MD at [facility] COLONOSCOPY N/A 10/2/2020    COLONOSCOPY performed by Carmelita Newsome MD at [address] [place] [address],[address]    blake.     HX GI  12    LAPAROSCOPIC HAND ASSISTED  POSS OPEN SIGMOID COLECTOMY POSS TEMPORARY DIVERTING LOOP ILEOSTOMY;  (no illeostomy needed)    HX GYN           HX GYN      cervical conization    HX HEENT      SINUS SURGERY LEFT X2    HX HEENT      SINUS SURGERY ON RIGHT X2    HX OTHER SURGICAL      Sphincterotomy    HX PELVIC LAPAROSCOPY      HX EMMANUEL AND BSO      HX UROLOGICAL  12     CYSTOSCOPY INSERTION URETERAL CATHETERS - Cystoscopy Insertion of bilateral ureteral stents    CT ABDOMEN SURGERY PROC UNLISTED  01/06/2018    hernia repair at [facility]       Family History:  Family History   Problem Relation Age of Onset    Diabetes Mother     Cancer Mother         NON-HODGKINS LYMPHOMA    Anesth Problems Mother         PONV    Diabetes Father     Heart Disease Father         CAD - STENTS, PACEMAKER    Arrhythmia Father        Social History:  Social History     Tobacco Use    Smoking status: Never Smoker    Smokeless tobacco: Never Used   Substance Use Topics    Alcohol use: Yes     Comment: Rarely    Drug use: No     Types: Prescription, OTC       Allergies: Allergies   Allergen Reactions    Aspirin Hives and Shortness of Breath    Codeine Hives, Itching and Angioedema     Pt had itching in mouth, on face and lips    Contrast Agent [Iodine] Hives, Itching and Angioedema     Pt. had itching in mouth, on face and lips after IV contrast with the last exam.  Benadryl was given. OK if premedicated with benadryl and solumedrol 1h before    Flavoring Agent Anaphylaxis    Percocet [Oxycodone-Acetaminophen] Hives, Itching and Angioedema     Pt had itching in mouth, on face and lips    Prilosec [Omeprazole Magnesium] Anaphylaxis     CHERRY FLAVORED ODT; PT TAKES REGULAR PRILOSEC AND IS OK    Dilaudid [Hydromorphone] Itching     Tolerates with benadryl    Ketorolac Rash     \"makes my eyes spasm and causes rash on my hands\" and \"makes my skin itch and makes me nervous\"    Fentanyl Rash and Itching     Has had benadryl before    Morphine Itching     Severe itching. Tolerates with Benadryl         Review of Systems   Review of Systems   Constitutional: Positive for chills. Negative for activity change and fever. HENT: Negative for sore throat. Respiratory: Negative for shortness of breath. Cardiovascular: Negative for chest pain. Gastrointestinal: Positive for abdominal pain, anal bleeding and blood in stool.  Negative for nausea and vomiting. Genitourinary: Negative for difficulty urinating, dysuria and hematuria. Musculoskeletal: Negative for myalgias. Skin: Positive for rash. Negative for color change. Neurological: Negative for dizziness, weakness and headaches. Psychiatric/Behavioral: Negative for agitation. Physical Exam   Physical Exam  Constitutional:       Appearance: Normal appearance. HENT:      Head: Normocephalic and atraumatic. Mouth/Throat:      Mouth: Mucous membranes are moist.   Eyes:      Conjunctiva/sclera: Conjunctivae normal.      Pupils: Pupils are equal, round, and reactive to light. Neck:      Musculoskeletal: Normal range of motion and neck supple. Cardiovascular:      Rate and Rhythm: Normal rate and regular rhythm. Pulses: Normal pulses. Pulmonary:      Effort: Pulmonary effort is normal.      Breath sounds: Normal breath sounds. Abdominal:      General: Abdomen is flat. There is no distension. Palpations: Abdomen is soft. Tenderness: There is generalized abdominal tenderness. There is no guarding or rebound. Genitourinary:     Comments: External hemorrhoid, not thrombosed    No anal fissue  Musculoskeletal: Normal range of motion. Skin:     General: Skin is warm and dry. Capillary Refill: Capillary refill takes less than 2 seconds. Neurological:      General: No focal deficit present. Mental Status: She is alert.    Psychiatric:         Mood and Affect: Mood normal.       RUQ rash in dermatoma distribution, no vesicles, no drainage    Diagnostic Study Results     Labs -     Recent Results (from the past 12 hour(s))   CBC WITH AUTOMATED DIFF    Collection Time: 01/29/21  8:05 PM   Result Value Ref Range    WBC 6.1 3.6 - 11.0 K/uL    RBC 4.57 3.80 - 5.20 M/uL    HGB 12.0 11.5 - 16.0 g/dL    HCT 36.3 35.0 - 47.0 %    MCV 79.4 (L) 80.0 - 99.0 FL    MCH 26.3 26.0 - 34.0 PG    MCHC 33.1 30.0 - 36.5 g/dL    RDW 14.6 (H) 11.5 - 14.5 %    PLATELET 474 049 - 400 K/uL    MPV 10.0 8.9 - 12.9 FL    NRBC 0.0 0  WBC    ABSOLUTE NRBC 0.00 0.00 - 0.01 K/uL    NEUTROPHILS 62 32 - 75 %    LYMPHOCYTES 28 12 - 49 %    MONOCYTES 6 5 - 13 %    EOSINOPHILS 3 0 - 7 %    BASOPHILS 1 0 - 1 %    IMMATURE GRANULOCYTES 0 0.0 - 0.5 %    ABS. NEUTROPHILS 3.8 1.8 - 8.0 K/UL    ABS. LYMPHOCYTES 1.7 0.8 - 3.5 K/UL    ABS. MONOCYTES 0.4 0.0 - 1.0 K/UL    ABS. EOSINOPHILS 0.2 0.0 - 0.4 K/UL    ABS. BASOPHILS 0.0 0.0 - 0.1 K/UL    ABS. IMM. GRANS. 0.0 0.00 - 0.04 K/UL    DF AUTOMATED     METABOLIC PANEL, COMPREHENSIVE    Collection Time: 01/29/21  8:05 PM   Result Value Ref Range    Sodium 140 136 - 145 mmol/L    Potassium 3.7 3.5 - 5.1 mmol/L    Chloride 107 97 - 108 mmol/L    CO2 27 21 - 32 mmol/L    Anion gap 6 5 - 15 mmol/L    Glucose 177 (H) 65 - 100 mg/dL    BUN 11 6 - 20 MG/DL    Creatinine 0.79 0.55 - 1.02 MG/DL    BUN/Creatinine ratio 14 12 - 20      GFR est AA >60 >60 ml/min/1.73m2    GFR est non-AA >60 >60 ml/min/1.73m2    Calcium 9.6 8.5 - 10.1 MG/DL    Bilirubin, total 0.4 0.2 - 1.0 MG/DL    ALT (SGPT) 34 12 - 78 U/L    AST (SGOT) 24 15 - 37 U/L    Alk. phosphatase 68 45 - 117 U/L    Protein, total 7.2 6.4 - 8.2 g/dL    Albumin 3.8 3.5 - 5.0 g/dL    Globulin 3.4 2.0 - 4.0 g/dL    A-G Ratio 1.1 1.1 - 2.2     TYPE & SCREEN    Collection Time: 01/29/21  8:05 PM   Result Value Ref Range    Crossmatch Expiration 02/01/2021,2359     ABO/Rh(D) A NEGATIVE     Antibody screen NEG        Radiologic Studies -   CT ABD PELV WO CONT   Final Result   No acute process. Unchanged splenomegaly. CT Results  (Last 48 hours)               01/29/21 2351  CT ABD PELV WO CONT Final result    Impression:  No acute process. Unchanged splenomegaly. Narrative:  INDICATION: abdominal pain       COMPARISON: January 8, 2021       TECHNIQUE:    Noncontrast thin axial images were obtained through the abdomen and pelvis. Coronal and sagittal reconstructions were generated.  CT dose reduction was   achieved through use of a standardized protocol tailored for this examination   and automatic exposure control for dose modulation. FINDINGS:    LUNG BASES: No abnormality. LIVER: No mass or biliary dilatation. GALLBLADDER: Surgically absent. SPLEEN: Splenomegaly, measuring 16 cm in craniocaudal length. Small calcified   granuloma. PANCREAS: No mass or ductal dilatation. ADRENALS: No mass. KIDNEYS: No nephrolithiasis or hydronephrosis. GI TRACT:  Sigmoid anastomosis. No bowel obstruction. PERITONEUM: No free air or free fluid. APPENDIX: Unremarkable. RETROPERITONEUM: No aortic aneurysm. LYMPH NODES:  None enlarged. ADDITIONAL COMMENTS: N/A.       URINARY BLADDER: Unremarkable. REPRODUCTIVE ORGANS: Prior hysterectomy. LYMPH NODES:  None enlarged. FREE FLUID:  None. BONES: No destructive bone lesion. ADDITIONAL COMMENTS: N/A. CXR Results  (Last 48 hours)    None            Medical Decision Making   I am the first provider for this patient. I reviewed the vital signs, available nursing notes, past medical history, past surgical history, family history and social history. Vital Signs-Reviewed the patient's vital signs. Patient Vitals for the past 12 hrs:   Temp Pulse Resp BP SpO2   01/29/21 1936 97.7 °F (36.5 °C) 90 18 (!) 161/87 98 %     Records Reviewed: Nursing Notes and Old Medical Records    Provider Notes (Medical Decision Making):   DDx:   Abdominal pain  Ulcerative colitis flare  External hemorrhoids  Intra-abdominal Abscess    With ulcerative colitis presents with acute abdominal pain. She states that it feels somewhat like flare she has had in the past.  Overall doubt intra-abdominal pathology with reassuring abdominal exam with only minimal tenderness. Basic lab work with no abnormalities, no leukocytosis, and no anemia. Will obtain CT abdomen pelvis to further evaluate given her previous surgical history.   No thrombosed hemorrhoids on exam requiring any intervention. Patient reports hematochezia, but hemoglobin stable and patient with no tachycardia. Will treat symptoms with IV Zofran and IV morphine and will reassess after imaging. Patient with possible shingles, and is already gotten valacyclovir -I have little to no concern for disseminated infection. ED Course and Progress Notes:   Initial assessment performed. The patients presenting problems have been discussed, and they are in agreement with the care plan formulated and outlined with them. I have encouraged them to ask questions as they arise throughout their visit. ED Course as of Jan 30 0052   Sat Jan 30, 2021   0026 CT ABD PELV WO CONT [WB]   0038 Imaging unremarkable. Patient felt better with pain medications, but then her pain returned. Will give 1g of Tylenol. Have ordered inflammatory markers, but would not  here patient is afebrile, nontoxic, and with no signs of infection. I do not think she needs inpatient treatment for any ulcerative colitis flare but may help with outpatient management. Patient repeatedly asking for narcotics over the weekend, but we reassured her and recommended continuing Tylenol. Will prescribe steroid suppository for small external hemorrhoid seen on physical exam.  Patient stable for discharge and will discuss further with her GI and pain management doctor.    [WB]      ED Course User Index  [WB] Aaron Curran MD       Diagnosis     Clinical Impression:   1. Abdominal pain, generalized    2. External hemorrhoid        Disposition:  Home    DISCHARGE PLAN:   1. Current Discharge Medication List      START taking these medications    Details   hydrocortisone (Anusol-HC) 25 mg supp Insert 1 Suppository into rectum every twelve (12) hours. Qty: 10 Suppository, Refills: 0           2.    Follow-up Information     Follow up With Specialties Details Why Chaz Mckeon MD Internal Medicine In 1 week  [address] [facility]  [phone]      [facility] EMERGENCY DEPT Emergency Medicine  As needed, If symptoms worsen [address]  [address]    Donna Fritz MD Gastroenterology In 1 week  [address] [address] [phone]          3. Return to ED if worse         Resident Signature:  Diego Moreau MD, PGY4

## 2021-01-30 NOTE — ED NOTES
ESR, CRP labs sent. Contacted lab as labs were not visualized as \"in process\" and lab states they have received the specimens and that they were \"in process as this time. \"

## 2021-02-10 ENCOUNTER — HOSPITAL ENCOUNTER (EMERGENCY)
Age: 51
Discharge: HOME | End: 2021-02-11
Attending: EMERGENCY MEDICINE
Payer: MEDICAID

## 2021-02-10 DIAGNOSIS — B37.41 YEAST CYSTITIS: ICD-10-CM

## 2021-02-10 DIAGNOSIS — K64.4 EXTERNAL HEMORRHOID: ICD-10-CM

## 2021-02-10 DIAGNOSIS — K62.89 ANAL PAIN: Primary | ICD-10-CM

## 2021-02-10 PROCEDURE — 99285 EMERGENCY DEPT VISIT HI MDM: CPT

## 2021-02-10 PROCEDURE — 99284 EMERGENCY DEPT VISIT MOD MDM: CPT

## 2021-02-10 RX ORDER — DIPHENHYDRAMINE HYDROCHLORIDE 50 MG/ML
50 INJECTION, SOLUTION INTRAMUSCULAR; INTRAVENOUS
Status: COMPLETED | OUTPATIENT
Start: 2021-02-10 | End: 2021-02-11

## 2021-02-10 RX ORDER — MORPHINE SULFATE 4 MG/ML
4 INJECTION INTRAVENOUS
Status: COMPLETED | OUTPATIENT
Start: 2021-02-10 | End: 2021-02-11

## 2021-02-11 VITALS
HEART RATE: 85 BPM | SYSTOLIC BLOOD PRESSURE: 116 MMHG | TEMPERATURE: 98 F | HEIGHT: 62 IN | OXYGEN SATURATION: 91 % | BODY MASS INDEX: 38.7 KG/M2 | RESPIRATION RATE: 18 BRPM | WEIGHT: 210.32 LBS | DIASTOLIC BLOOD PRESSURE: 74 MMHG

## 2021-02-11 LAB
ALBUMIN SERPL-MCNC: 4 G/DL (ref 3.5–5)
ALBUMIN/GLOB SERPL: 1.1 {RATIO} (ref 1.1–2.2)
ALP SERPL-CCNC: 80 U/L (ref 45–117)
ALT SERPL-CCNC: 31 U/L (ref 12–78)
ANION GAP SERPL CALC-SCNC: 6 MMOL/L (ref 5–15)
APPEARANCE UR: CLEAR
AST SERPL-CCNC: 21 U/L (ref 15–37)
BACTERIA URNS QL MICRO: NEGATIVE /HPF
BASOPHILS # BLD: 0 K/UL (ref 0–0.1)
BASOPHILS NFR BLD: 1 % (ref 0–1)
BILIRUB SERPL-MCNC: 0.4 MG/DL (ref 0.2–1)
BILIRUB UR QL: NEGATIVE
BUN SERPL-MCNC: 10 MG/DL (ref 6–20)
BUN/CREAT SERPL: 11 (ref 12–20)
CALCIUM SERPL-MCNC: 9.6 MG/DL (ref 8.5–10.1)
CHLORIDE SERPL-SCNC: 102 MMOL/L (ref 97–108)
CO2 SERPL-SCNC: 28 MMOL/L (ref 21–32)
COLOR UR: ABNORMAL
CREAT SERPL-MCNC: 0.91 MG/DL (ref 0.55–1.02)
DIFFERENTIAL METHOD BLD: ABNORMAL
EOSINOPHIL # BLD: 0.2 K/UL (ref 0–0.4)
EOSINOPHIL NFR BLD: 2 % (ref 0–7)
EPITH CASTS URNS QL MICRO: ABNORMAL /LPF
ERYTHROCYTE [DISTWIDTH] IN BLOOD BY AUTOMATED COUNT: 14.2 % (ref 11.5–14.5)
GLOBULIN SER CALC-MCNC: 3.6 G/DL (ref 2–4)
GLUCOSE SERPL-MCNC: 191 MG/DL (ref 65–100)
GLUCOSE UR STRIP.AUTO-MCNC: >1000 MG/DL
HCT VFR BLD AUTO: 37.9 % (ref 35–47)
HGB BLD-MCNC: 12.6 G/DL (ref 11.5–16)
HGB UR QL STRIP: NEGATIVE
IMM GRANULOCYTES # BLD AUTO: 0 K/UL (ref 0–0.04)
IMM GRANULOCYTES NFR BLD AUTO: 0 % (ref 0–0.5)
KETONES UR QL STRIP.AUTO: NEGATIVE MG/DL
LEUKOCYTE ESTERASE UR QL STRIP.AUTO: NEGATIVE
LYMPHOCYTES # BLD: 1.9 K/UL (ref 0.8–3.5)
LYMPHOCYTES NFR BLD: 29 % (ref 12–49)
MCH RBC QN AUTO: 26.2 PG (ref 26–34)
MCHC RBC AUTO-ENTMCNC: 33.2 G/DL (ref 30–36.5)
MCV RBC AUTO: 78.8 FL (ref 80–99)
MONOCYTES # BLD: 0.3 K/UL (ref 0–1)
MONOCYTES NFR BLD: 5 % (ref 5–13)
NEUTS SEG # BLD: 4.2 K/UL (ref 1.8–8)
NEUTS SEG NFR BLD: 63 % (ref 32–75)
NITRITE UR QL STRIP.AUTO: POSITIVE
NRBC # BLD: 0 K/UL (ref 0–0.01)
NRBC BLD-RTO: 0 PER 100 WBC
PH UR STRIP: 5.5 [PH] (ref 5–8)
PLATELET # BLD AUTO: 181 K/UL (ref 150–400)
PMV BLD AUTO: 10 FL (ref 8.9–12.9)
POTASSIUM SERPL-SCNC: 3.3 MMOL/L (ref 3.5–5.1)
PROT SERPL-MCNC: 7.6 G/DL (ref 6.4–8.2)
PROT UR STRIP-MCNC: NEGATIVE MG/DL
RBC # BLD AUTO: 4.81 M/UL (ref 3.8–5.2)
RBC #/AREA URNS HPF: ABNORMAL /HPF (ref 0–5)
SODIUM SERPL-SCNC: 136 MMOL/L (ref 136–145)
SP GR UR REFRACTOMETRY: 1.01 (ref 1–1.03)
UA: UC IF INDICATED,UAUC: ABNORMAL
UROBILINOGEN UR QL STRIP.AUTO: 1 EU/DL (ref 0.2–1)
WBC # BLD AUTO: 6.6 K/UL (ref 3.6–11)
WBC URNS QL MICRO: ABNORMAL /HPF (ref 0–4)
YEAST URNS QL MICRO: PRESENT

## 2021-02-11 PROCEDURE — 80053 COMPREHEN METABOLIC PANEL: CPT

## 2021-02-11 PROCEDURE — 74011250636 HC RX REV CODE- 250/636: Performed by: PHYSICIAN ASSISTANT

## 2021-02-11 PROCEDURE — 74011250637 HC RX REV CODE- 250/637: Performed by: PHYSICIAN ASSISTANT

## 2021-02-11 PROCEDURE — 36415 COLL VENOUS BLD VENIPUNCTURE: CPT

## 2021-02-11 PROCEDURE — 96375 TX/PRO/DX INJ NEW DRUG ADDON: CPT

## 2021-02-11 PROCEDURE — 85025 COMPLETE CBC W/AUTO DIFF WBC: CPT

## 2021-02-11 PROCEDURE — 96374 THER/PROPH/DIAG INJ IV PUSH: CPT

## 2021-02-11 PROCEDURE — 81001 URINALYSIS AUTO W/SCOPE: CPT

## 2021-02-11 RX ORDER — FLUCONAZOLE 100 MG/1
100 TABLET ORAL DAILY
Qty: 6 TAB | Refills: 0 | Status: SHIPPED | OUTPATIENT
Start: 2021-02-11 | End: 2021-02-17

## 2021-02-11 RX ORDER — FLUCONAZOLE 100 MG/1
100 TABLET ORAL
Status: COMPLETED | OUTPATIENT
Start: 2021-02-11 | End: 2021-02-11

## 2021-02-11 RX ORDER — PHENAZOPYRIDINE HYDROCHLORIDE 100 MG/1
TABLET, FILM COATED ORAL
COMMUNITY
End: 2021-03-03

## 2021-02-11 RX ORDER — TRAMADOL HYDROCHLORIDE 50 MG/1
50 TABLET ORAL
Qty: 10 TAB | Refills: 0 | Status: SHIPPED | OUTPATIENT
Start: 2021-02-11 | End: 2021-02-14

## 2021-02-11 RX ADMIN — FLUCONAZOLE 100 MG: 100 TABLET ORAL at 02:38

## 2021-02-11 RX ADMIN — DIPHENHYDRAMINE HYDROCHLORIDE 50 MG: 50 INJECTION, SOLUTION INTRAMUSCULAR; INTRAVENOUS at 00:10

## 2021-02-11 RX ADMIN — MORPHINE SULFATE 4 MG: 4 INJECTION INTRAVENOUS at 00:10

## 2021-02-11 NOTE — ED NOTES
Pt discharged by [facility] to home. Pt was given discharge education and paperwork. Pt ambulated without distress or complications out of ED.

## 2021-02-11 NOTE — ED NOTES
Assumed care for pt from triage with CC of rectal pain. Pt reports that the pain began 2 days ago and have been worsening over time even with medications and interventions. Pt reports that she has a hx of internal and external hemorrhoids and anal fissures. Pt also reports that she is getting surgery to remove her hemorrhoids and fissures in approximately 1 week. Pt on monitor x2, VSS, and call bell in reach. Will continue to monitor.

## 2021-02-11 NOTE — ED PROVIDER NOTES
EMERGENCY DEPARTMENT HISTORY AND PHYSICAL EXAM      Date: 2/10/2021  Patient Name: Sánchez Rodgers    History of Presenting Illness     Chief Complaint   Patient presents with    Anal Pain     Patient reports she has scheduled surgery w Dr. Elia Yu, however they referred her here to r/o thrombolytic hemmrhoid. History Provided By: Patient    HPI: Sánchez Rodgers, 48 y.o. female with significant PMHx for ulcerative colitis, presents by POV to the ED with cc of rectal pain. She was seen in the ED about 2 weeks ago for similar pain. At that time she had a negative CT scan. She has followed up with a colorectal specialist, had an in office anoscopy a couple days ago and is scheduled to get Botox injections to see if this will help. She also had a colonoscopy in October 2020 that showed fissures but was otherwise negative. Unfortunately over the past 24 hours the pain has worsened despite using multiple rectal creams. She called and spoke to her GI who recommended coming to the ED for evaluation. She denies fever, chills, abdominal pain, nausea, vomiting, diarrhea but is concerned that she may have a UTI due to mild dysuria. There are no other complaint, changes, or physical findings at this time. Social Hx: Tobacco (denies), EtOH (deneis), Illicit drug use (denies)     PCP: Willie Robison MD   Colonrectal: Dr. Rosina Rincon  GI: Dr. Princess Solomon    No current facility-administered medications on file prior to encounter. Current Outpatient Medications on File Prior to Encounter   Medication Sig Dispense Refill    phenazopyridine (Pyridium) 100 mg tablet Take  by mouth three (3) times daily (after meals). Indications: pt stated that she took OTC for urinary discomfort      hydrocortisone (Anusol-HC) 25 mg supp Insert 1 Suppository into rectum every twelve (12) hours.  10 Suppository 0    albuterol (PROVENTIL HFA, VENTOLIN HFA, PROAIR HFA) 90 mcg/actuation inhaler Take 1 Puff by inhalation every four (4) hours as needed for Wheezing. 1 Inhaler 1    estradioL (ESTRACE) 0.5 mg tablet TAKE 1 TABLET BY MOUTH ONCE DAILY      empagliflozin (Jardiance) 25 mg tablet Take 1 Tab by mouth daily. 90 Tab 3    ondansetron (Zofran ODT) 4 mg disintegrating tablet Take 1 Tab by mouth every eight (8) hours as needed for Nausea. 10 Tab 0    glimepiride (AMARYL) 4 mg tablet Take 1 Tab by mouth Daily (before breakfast). 90 Tab 3    melatonin tab tablet Take 5 mg by mouth nightly.  omeprazole (PRILOSEC) 20 mg capsule Take 40 mg by mouth Daily (before breakfast).  dicyclomine (BENTYL) 20 mg tablet Take 1 Tab by mouth every six (6) hours as needed (abdominal cramps) for up to 20 doses. 20 Tab 0    sertraline (ZOLOFT) 100 mg tablet Take 200 mg by mouth nightly.  losartan-hydroCHLOROthiazide (HYZAAR) 100-12.5 mg per tablet Take 1 Tab by mouth daily.  albuterol (PROVENTIL, VENTOLIN) 90 mcg/Actuation inhaler Take 2 Puffs by inhalation every six (6) hours as needed.  ALPRAZolam (XANAX) 1 mg tablet Take 0.5-1 Tabs by mouth two (2) times daily as needed for Anxiety. Max Daily Amount: 2 mg. 30 Tab 0    valACYclovir (VALTREX) 1 gram tablet Take 1 Tab by mouth three (3) times daily. 21 Tab 0    gabapentin (NEURONTIN) 300 mg capsule Take 1 Cap by mouth nightly as needed for Pain. Max Daily Amount: 300 mg. 30 Cap 1    ondansetron (Zofran ODT) 4 mg disintegrating tablet 1 Tab by SubLINGual route every eight (8) hours as needed for Nausea or Vomiting. 20 Tab 0    sucralfate (Carafate) 1 gram tablet Take 1 Tab by mouth four (4) times daily. 28 Tab 0    famotidine (Pepcid) 20 mg tablet Take 1 Tab by mouth two (2) times a day. 20 Tab 0    promethazine (PHENERGAN) 25 mg tablet Take 1 Tab by mouth every six (6) hours as needed for Nausea. 10 Tab 0    hyoscyamine SL (LEVSIN/SL) 0.125 mg SL tablet 1 Tab by SubLINGual route every four (4) hours as needed for Cramping.  15 Tab 0    EPINEPHrine (EPIPEN) 0.3 mg/0.3 mL (1:1,000) injection 0.3 mL by IntraMUSCular route once as needed for up to 1 dose. 0.3 mL 1       Past History     Past Medical History:  Past Medical History:   Diagnosis Date    Anal fissure     Anisocoria     Asthma     LAST EPISODE     Back pain     Cerumen impaction     Chronic kidney disease     hx uti in past    Coagulation defects     ocassional rectal bleeding due to anal fissure    Colovaginal fistula     Diabetes (HCC)     NIDDM    Diverticulitis     Diverticulosis     Enlarged tonsils     Frequent UTI     GERD (gastroesophageal reflux disease)     H/O endoscopy     with dilation    HA (headache)     Hepatic steatosis     History of colon resection     Hx of colonoscopy with polypectomy     benign    Hypertension     Ill-defined condition     FREQUENT HIVES    Ill-defined condition     HX ELEVATED LIVER ENZYMES    Morbid obesity (HCC)     Nausea & vomiting     during diverticulitis flare    Obesity     Otitis media     Pneumonia     about 15 yrs ago    Psychiatric disorder     ANXIETY    Recurrent tonsillitis     Sinusitis     Transfusion history ~ age 35    postop hysterectomy    Urticaria        Past Surgical History:  Past Surgical History:   Procedure Laterality Date    COLONOSCOPY N/A 3/28/2019    COLONOSCOPY performed by Nicole Watt MD at [facility] COLONOSCOPY N/A 10/2/2020    COLONOSCOPY performed by Nicole Watt MD at [address] [facility]    [place].     HX GI  12    LAPAROSCOPIC HAND ASSISTED  POSS OPEN SIGMOID COLECTOMY POSS TEMPORARY DIVERTING LOOP ILEOSTOMY;  (no illeostomy needed)    HX GYN           HX GYN      cervical conization    HX HEENT      SINUS SURGERY LEFT X2    HX HEENT      SINUS SURGERY ON RIGHT X2    HX OTHER SURGICAL      Sphincterotomy    HX PELVIC LAPAROSCOPY      HX EMMANUEL AND BSO      HX UROLOGICAL  12     CYSTOSCOPY INSERTION URETERAL CATHETERS - Cystoscopy Insertion of bilateral ureteral stents    GA ABDOMEN SURGERY PROC UNLISTED  01/06/2018    hernia repair at [facility]       Family History:  Family History   Problem Relation Age of Onset    Diabetes Mother     Cancer Mother         NON-HODGKINS LYMPHOMA    Anesth Problems Mother         PONV    Diabetes Father     Heart Disease Father         CAD - STENTS, PACEMAKER    Arrhythmia Father        Social History:  Social History     Tobacco Use    Smoking status: Never Smoker    Smokeless tobacco: Never Used   Substance Use Topics    Alcohol use: Yes     Comment: Rarely    Drug use: No     Types: Prescription, OTC       Allergies: Allergies   Allergen Reactions    Aspirin Hives and Shortness of Breath    Codeine Hives, Itching and Angioedema     Pt had itching in mouth, on face and lips    Contrast Agent [Iodine] Hives, Itching and Angioedema     Pt. had itching in mouth, on face and lips after IV contrast with the last exam.  Benadryl was given. OK if premedicated with benadryl and solumedrol 1h before    Flavoring Agent Anaphylaxis    Percocet [Oxycodone-Acetaminophen] Hives, Itching and Angioedema     Pt had itching in mouth, on face and lips    Prilosec [Omeprazole Magnesium] Anaphylaxis     CHERRY FLAVORED ODT; PT TAKES REGULAR PRILOSEC AND IS OK    Dilaudid [Hydromorphone] Itching     Tolerates with benadryl    Ketorolac Rash     \"makes my eyes spasm and causes rash on my hands\" and \"makes my skin itch and makes me nervous\"    Fentanyl Rash and Itching     Has had benadryl before    Morphine Itching     Severe itching. Tolerates with Benadryl         Review of Systems   Review of Systems   Constitutional: Negative for chills, diaphoresis and fever. HENT: Negative for congestion, ear pain, rhinorrhea and sore throat. Respiratory: Negative for cough and shortness of breath. Cardiovascular: Negative for chest pain. Gastrointestinal: Positive for rectal pain.  Negative for abdominal pain, anal bleeding, blood in stool, constipation, diarrhea, nausea and vomiting. Genitourinary: Negative for difficulty urinating, dysuria, frequency and hematuria. Musculoskeletal: Negative for arthralgias and myalgias. Neurological: Negative for headaches. All other systems reviewed and are negative. Physical Exam   Physical Exam  Vitals signs and nursing note reviewed. Constitutional:       General: She is not in acute distress. Appearance: She is well-developed. She is obese. She is not diaphoretic. Comments: 48 y.o.  female    HENT:      Head: Normocephalic and atraumatic. Eyes:      General:         Right eye: No discharge. Left eye: No discharge. Conjunctiva/sclera: Conjunctivae normal.   Neck:      Musculoskeletal: Normal range of motion and neck supple. Cardiovascular:      Rate and Rhythm: Normal rate and regular rhythm. Heart sounds: Normal heart sounds. No murmur. Pulmonary:      Effort: Pulmonary effort is normal. No respiratory distress. Breath sounds: Normal breath sounds. Abdominal:      General: Abdomen is flat. There is no distension. Tenderness: There is no right CVA tenderness or left CVA tenderness. Genitourinary:     Rectum: Guaiac result negative. Tenderness and external hemorrhoid present. No anal fissure. Normal anal tone. Skin:     General: Skin is warm and dry. Neurological:      Mental Status: She is alert and oriented to person, place, and time.    Psychiatric:         Behavior: Behavior normal.         Diagnostic Study Results     Labs -     Recent Results (from the past 12 hour(s))   CBC WITH AUTOMATED DIFF    Collection Time: 02/11/21 12:05 AM   Result Value Ref Range    WBC 6.6 3.6 - 11.0 K/uL    RBC 4.81 3.80 - 5.20 M/uL    HGB 12.6 11.5 - 16.0 g/dL    HCT 37.9 35.0 - 47.0 %    MCV 78.8 (L) 80.0 - 99.0 FL    MCH 26.2 26.0 - 34.0 PG    MCHC 33.2 30.0 - 36.5 g/dL    RDW 14.2 11.5 - 14.5 %    PLATELET 506 963 - 943 K/uL MPV 10.0 8.9 - 12.9 FL    NRBC 0.0 0  WBC    ABSOLUTE NRBC 0.00 0.00 - 0.01 K/uL    NEUTROPHILS 63 32 - 75 %    LYMPHOCYTES 29 12 - 49 %    MONOCYTES 5 5 - 13 %    EOSINOPHILS 2 0 - 7 %    BASOPHILS 1 0 - 1 %    IMMATURE GRANULOCYTES 0 0.0 - 0.5 %    ABS. NEUTROPHILS 4.2 1.8 - 8.0 K/UL    ABS. LYMPHOCYTES 1.9 0.8 - 3.5 K/UL    ABS. MONOCYTES 0.3 0.0 - 1.0 K/UL    ABS. EOSINOPHILS 0.2 0.0 - 0.4 K/UL    ABS. BASOPHILS 0.0 0.0 - 0.1 K/UL    ABS. IMM. GRANS. 0.0 0.00 - 0.04 K/UL    DF AUTOMATED     METABOLIC PANEL, COMPREHENSIVE    Collection Time: 02/11/21 12:05 AM   Result Value Ref Range    Sodium 136 136 - 145 mmol/L    Potassium 3.3 (L) 3.5 - 5.1 mmol/L    Chloride 102 97 - 108 mmol/L    CO2 28 21 - 32 mmol/L    Anion gap 6 5 - 15 mmol/L    Glucose 191 (H) 65 - 100 mg/dL    BUN 10 6 - 20 MG/DL    Creatinine 0.91 0.55 - 1.02 MG/DL    BUN/Creatinine ratio 11 (L) 12 - 20      GFR est AA >60 >60 ml/min/1.73m2    GFR est non-AA >60 >60 ml/min/1.73m2    Calcium 9.6 8.5 - 10.1 MG/DL    Bilirubin, total 0.4 0.2 - 1.0 MG/DL    ALT (SGPT) 31 12 - 78 U/L    AST (SGOT) 21 15 - 37 U/L    Alk.  phosphatase 80 45 - 117 U/L    Protein, total 7.6 6.4 - 8.2 g/dL    Albumin 4.0 3.5 - 5.0 g/dL    Globulin 3.6 2.0 - 4.0 g/dL    A-G Ratio 1.1 1.1 - 2.2     URINALYSIS W/ REFLEX CULTURE    Collection Time: 02/11/21  1:25 AM    Specimen: Urine   Result Value Ref Range    Color DARK YELLOW      Appearance CLEAR CLEAR      Specific gravity 1.015 1.003 - 1.030      pH (UA) 5.5 5.0 - 8.0      Protein Negative NEG mg/dL    Glucose >1,000 (A) NEG mg/dL    Ketone Negative NEG mg/dL    Bilirubin Negative NEG      Blood Negative NEG      Urobilinogen 1.0 0.2 - 1.0 EU/dL    Nitrites Positive (A) NEG      Leukocyte Esterase Negative NEG      WBC 5-10 0 - 4 /hpf    RBC 0-5 0 - 5 /hpf    Epithelial cells FEW FEW /lpf    Bacteria Negative NEG /hpf    UA:UC IF INDICATED CULTURE NOT INDICATED BY UA RESULT CNI      Yeast PRESENT (A) NEG Radiologic Studies - none    Medical Decision Making   I am the first provider for this patient. I reviewed the vital signs, available nursing notes, past medical history, past surgical history, family history and social history. Vital Signs-Reviewed the patient's vital signs. Patient Vitals for the past 12 hrs:   Temp Pulse Resp BP SpO2   02/11/21 0200 -- 85 18 116/74 91 %   02/11/21 0100 -- -- -- 129/79 94 %   02/11/21 0025 -- -- -- -- 95 %   02/11/21 0019 98 °F (36.7 °C) 91 16 127/76 95 %   02/10/21 2345 -- -- -- 122/69 --   02/10/21 2308 -- -- -- -- 95 %   02/10/21 2300 -- -- -- 110/68 94 %   02/10/21 2253 98.7 °F (37.1 °C) 99 18 104/70 96 %   02/10/21 2224 98.2 °F (36.8 °C) (!) 111 20 (!) 171/91 98 %       Records Reviewed: Nursing Notes and Old Medical Records   Reviewed ED record from late January 2021. Patient had negative abdominal CT. Provider Notes (Medical Decision Making):   Patient presents the ED with stable vital signs for rectal pain. Differential diagnosis include hemorrhoid, prolapsed rectum, fissure, abscess, urinary tract infection, constipation. Blood work is without acute issue. Her urine is indicative of a yeast cystitis, which will need treatment antifungals. Patient also provided pain medication for her rectal pain and should follow-up with her colorectal surgeon and/or GI specialist for further evaluation and treatment. ED Course:   Initial assessment performed. The patients presenting problems have been discussed, and they are in agreement with the care plan formulated and outlined with them. I have encouraged them to ask questions as they arise throughout their visit. Critical Care Time: None    Disposition:  DISCHARGE NOTE:  3:16 AM  The pt is ready for discharge. The pt's signs, symptoms, diagnosis, and discharge instructions have been discussed and pt has conveyed their understanding.  The pt is to follow up as recommended or return to ER should their symptoms worsen. Plan has been discussed and pt is in agreement. PLAN:  1. Discharge Medication List as of 2/11/2021  2:31 AM      START taking these medications    Details   traMADoL (Ultram) 50 mg tablet Take 1 Tab by mouth every six (6) hours as needed for Pain for up to 3 days. Max Daily Amount: 200 mg., Normal, Disp-10 Tab, R-0      fluconazole (Diflucan) 100 mg tablet Take 1 Tab by mouth daily for 6 days. FDA advises cautious prescribing of oral fluconazole in pregnancy. , Normal, Disp-6 Tab, R-0         CONTINUE these medications which have NOT CHANGED    Details   phenazopyridine (Pyridium) 100 mg tablet Take  by mouth three (3) times daily (after meals). Indications: pt stated that she took OTC for urinary discomfort, Historical Med      hydrocortisone (Anusol-HC) 25 mg supp Insert 1 Suppository into rectum every twelve (12) hours. , Normal, Disp-10 Suppository, R-0      albuterol (PROVENTIL HFA, VENTOLIN HFA, PROAIR HFA) 90 mcg/actuation inhaler Take 1 Puff by inhalation every four (4) hours as needed for Wheezing., Normal, Disp-1 Inhaler, R-1      estradioL (ESTRACE) 0.5 mg tablet TAKE 1 TABLET BY MOUTH ONCE DAILY, Historical Med      empagliflozin (Jardiance) 25 mg tablet Take 1 Tab by mouth daily. , Normal, Disp-90 Tab, R-3      !! ondansetron (Zofran ODT) 4 mg disintegrating tablet Take 1 Tab by mouth every eight (8) hours as needed for Nausea., Normal, Disp-10 Tab, R-0      glimepiride (AMARYL) 4 mg tablet Take 1 Tab by mouth Daily (before breakfast). , Normal, Disp-90 Tab, R-3      melatonin tab tablet Take 5 mg by mouth nightly., Historical Med      omeprazole (PRILOSEC) 20 mg capsule Take 40 mg by mouth Daily (before breakfast). , Historical Med      dicyclomine (BENTYL) 20 mg tablet Take 1 Tab by mouth every six (6) hours as needed (abdominal cramps) for up to 20 doses. , Print, Disp-20 Tab, R-0      sertraline (ZOLOFT) 100 mg tablet Take 200 mg by mouth nightly., Historical Med losartan-hydroCHLOROthiazide (HYZAAR) 100-12.5 mg per tablet Take 1 Tab by mouth daily. , Historical Med      albuterol (PROVENTIL, VENTOLIN) 90 mcg/Actuation inhaler Take 2 Puffs by inhalation every six (6) hours as needed., Historical Med      ALPRAZolam (XANAX) 1 mg tablet Take 0.5-1 Tabs by mouth two (2) times daily as needed for Anxiety. Max Daily Amount: 2 mg., Normal, Disp-30 Tab, R-0      valACYclovir (VALTREX) 1 gram tablet Take 1 Tab by mouth three (3) times daily. , Normal, Disp-21 Tab, R-0      gabapentin (NEURONTIN) 300 mg capsule Take 1 Cap by mouth nightly as needed for Pain. Max Daily Amount: 300 mg., Normal, Disp-30 Cap, R-1      !! ondansetron (Zofran ODT) 4 mg disintegrating tablet 1 Tab by SubLINGual route every eight (8) hours as needed for Nausea or Vomiting., Normal, Disp-20 Tab, R-0      sucralfate (Carafate) 1 gram tablet Take 1 Tab by mouth four (4) times daily. , Normal, Disp-28 Tab, R-0      famotidine (Pepcid) 20 mg tablet Take 1 Tab by mouth two (2) times a day., Normal, Disp-20 Tab,R-0      promethazine (PHENERGAN) 25 mg tablet Take 1 Tab by mouth every six (6) hours as needed for Nausea., Normal, Disp-10 Tab,R-0      hyoscyamine SL (LEVSIN/SL) 0.125 mg SL tablet 1 Tab by SubLINGual route every four (4) hours as needed for Cramping., Normal, Disp-15 Tab,R-0      EPINEPHrine (EPIPEN) 0.3 mg/0.3 mL (1:1,000) injection 0.3 mL by IntraMUSCular route once as needed for up to 1 dose., Print, Disp-0.3 mL, R-1       !! - Potential duplicate medications found. Please discuss with provider.         2.   Follow-up Information     Follow up With Specialties Details Why Contact Info    Elidia Ware MD Internal Medicine In 1 week  [address]  [facility] [address] [place] [address]  [phone]      Elmira Garg MD Colon and Rectal Surgery In 1 week  [address] [facility]  [address]  [phone]          Return to ED if worse     Diagnosis     Clinical Impression:   1. Anal pain    2. External hemorrhoid    3. Yeast cystitis          Please note that this dictation was completed with [employer], the computer voice recognition software. Quite often unanticipated grammatical, syntax, homophones, and other interpretive errors are inadvertently transcribed by the computer software. Please disregards these errors. Please excuse any errors that have escaped final proofreading.

## 2021-02-12 ENCOUNTER — PATIENT OUTREACH (OUTPATIENT)
Dept: CASE MANAGEMENT | Age: 51
End: 2021-02-12

## 2021-02-12 NOTE — ACP (ADVANCE CARE PLANNING)
Advance Care Planning:   Does patient have an Advance Directive: currently not on file; education provided No changes to medical agents Keyshawn Heller.

## 2021-02-12 NOTE — PROGRESS NOTES
Patient contacted regarding recent discharge and COVID-19 risk. Discussed COVID-19 related testing which was not done at this time. Test results were not done. Patient informed of results, if available? yes    Outreach made within 2 business days of discharge: Yes    Care Transition Nurse/ Ambulatory Care Manager/ LPN Care Coordinator contacted the patient by telephone to perform post discharge assessment. Verified name and  with patient as identifiers. Patient has following risk factors of: asthma and immunocompromised. CTN/ACM/LPN reviewed discharge instructions, medical action plan and red flags related to discharge diagnosis. Reviewed and educated them on any new and changed medications related to discharge diagnosis. Advised obtaining a 90-day supply of all daily and as-needed medications. Reports has obtained all discharge medications. Advance Care Planning:   Does patient have an Advance Directive: currently not on file; education provided No changes to medical agents. Education provided regarding infection prevention, and signs and symptoms of COVID-19 and when to seek medical attention with patient who verbalized understanding. Discussed exposure protocols and quarantine from [facility] you at higher risk for severe illness  and given an opportunity for questions and concerns. The patient agrees to contact the COVID-19 hotline [phone] or PCP office for questions related to their healthcare. CTN/ACM/LPN provided contact information for future reference. Declined 800#s   Reports she has contacted PCP for follow up. [facility] contact information provided. From CDC: Are you at higher risk for severe illness?  Wash your hands often.  Avoid close contact (6 feet, which is about two arm lengths) with people who are sick.  Put distance between yourself and other people if COVID-19 is spreading in your community.    Clean and disinfect frequently touched surfaces.  Avoid all cruise travel and non-essential air travel.  Call your healthcare professional if you have concerns about COVID-19 and your underlying condition or if you are sick. For more information on steps you can take to protect yourself, see CDC's How to Protect Yourself      Patient/family/caregiver given information for GetWell Loop and agrees to enroll yes  Patient's preferred e-mail:  n/s  Patient's preferred phone number: [phone]  Based on Loop alert triggers, patient will be contacted by nurse care manager for worsening symptoms. Pt will be further monitored by COVID Loop Team, pending account activation by patient.  based on severity of symptoms and risk factors. [facility]    2/12/21 ACM attempted to contact patient at numbers provided- left message for patient call back.  CRISTOPHERB

## 2021-03-03 ENCOUNTER — HOSPITAL ENCOUNTER (EMERGENCY)
Age: 51
Discharge: HOME | End: 2021-03-03
Attending: EMERGENCY MEDICINE
Payer: MEDICAID

## 2021-03-03 VITALS
WEIGHT: 212.3 LBS | RESPIRATION RATE: 20 BRPM | HEART RATE: 83 BPM | OXYGEN SATURATION: 96 % | TEMPERATURE: 97.8 F | DIASTOLIC BLOOD PRESSURE: 77 MMHG | BODY MASS INDEX: 39.07 KG/M2 | SYSTOLIC BLOOD PRESSURE: 143 MMHG | HEIGHT: 62 IN

## 2021-03-03 DIAGNOSIS — K62.5 RECTAL BLEEDING: Primary | ICD-10-CM

## 2021-03-03 LAB
ALBUMIN SERPL-MCNC: 3.9 G/DL (ref 3.5–5)
ALBUMIN/GLOB SERPL: 1.1 {RATIO} (ref 1.1–2.2)
ALP SERPL-CCNC: 86 U/L (ref 45–117)
ALT SERPL-CCNC: 30 U/L (ref 12–78)
ANION GAP SERPL CALC-SCNC: 5 MMOL/L (ref 5–15)
APTT PPP: 23.4 SEC (ref 22.1–31)
AST SERPL-CCNC: 22 U/L (ref 15–37)
BASOPHILS # BLD: 0 K/UL (ref 0–0.1)
BASOPHILS NFR BLD: 0 % (ref 0–1)
BILIRUB SERPL-MCNC: 0.4 MG/DL (ref 0.2–1)
BUN SERPL-MCNC: 7 MG/DL (ref 6–20)
BUN/CREAT SERPL: 9 (ref 12–20)
CALCIUM SERPL-MCNC: 9.5 MG/DL (ref 8.5–10.1)
CHLORIDE SERPL-SCNC: 103 MMOL/L (ref 97–108)
CO2 SERPL-SCNC: 29 MMOL/L (ref 21–32)
CREAT SERPL-MCNC: 0.76 MG/DL (ref 0.55–1.02)
DIFFERENTIAL METHOD BLD: ABNORMAL
EOSINOPHIL # BLD: 0.3 K/UL (ref 0–0.4)
EOSINOPHIL NFR BLD: 4 % (ref 0–7)
ERYTHROCYTE [DISTWIDTH] IN BLOOD BY AUTOMATED COUNT: 14.8 % (ref 11.5–14.5)
GLOBULIN SER CALC-MCNC: 3.6 G/DL (ref 2–4)
GLUCOSE SERPL-MCNC: 166 MG/DL (ref 65–100)
HCT VFR BLD AUTO: 37.4 % (ref 35–47)
HGB BLD-MCNC: 12.4 G/DL (ref 11.5–16)
IMM GRANULOCYTES # BLD AUTO: 0.1 K/UL (ref 0–0.04)
IMM GRANULOCYTES NFR BLD AUTO: 1 % (ref 0–0.5)
INR PPP: 1 (ref 0.9–1.1)
LYMPHOCYTES # BLD: 2.1 K/UL (ref 0.8–3.5)
LYMPHOCYTES NFR BLD: 30 % (ref 12–49)
MCH RBC QN AUTO: 26.6 PG (ref 26–34)
MCHC RBC AUTO-ENTMCNC: 33.2 G/DL (ref 30–36.5)
MCV RBC AUTO: 80.1 FL (ref 80–99)
MONOCYTES # BLD: 0.4 K/UL (ref 0–1)
MONOCYTES NFR BLD: 5 % (ref 5–13)
NEUTS SEG # BLD: 4.1 K/UL (ref 1.8–8)
NEUTS SEG NFR BLD: 60 % (ref 32–75)
NRBC # BLD: 0 K/UL (ref 0–0.01)
NRBC BLD-RTO: 0 PER 100 WBC
PLATELET # BLD AUTO: 207 K/UL (ref 150–400)
PMV BLD AUTO: 9.3 FL (ref 8.9–12.9)
POTASSIUM SERPL-SCNC: 3.4 MMOL/L (ref 3.5–5.1)
PROT SERPL-MCNC: 7.5 G/DL (ref 6.4–8.2)
PROTHROMBIN TIME: 10 SEC (ref 9–11.1)
RBC # BLD AUTO: 4.67 M/UL (ref 3.8–5.2)
SODIUM SERPL-SCNC: 137 MMOL/L (ref 136–145)
THERAPEUTIC RANGE,PTTT: NORMAL SECS (ref 58–77)
WBC # BLD AUTO: 6.9 K/UL (ref 3.6–11)

## 2021-03-03 PROCEDURE — 96361 HYDRATE IV INFUSION ADD-ON: CPT

## 2021-03-03 PROCEDURE — 99283 EMERGENCY DEPT VISIT LOW MDM: CPT

## 2021-03-03 PROCEDURE — 36415 COLL VENOUS BLD VENIPUNCTURE: CPT

## 2021-03-03 PROCEDURE — 96374 THER/PROPH/DIAG INJ IV PUSH: CPT

## 2021-03-03 PROCEDURE — 85610 PROTHROMBIN TIME: CPT

## 2021-03-03 PROCEDURE — 85730 THROMBOPLASTIN TIME PARTIAL: CPT

## 2021-03-03 PROCEDURE — 85025 COMPLETE CBC W/AUTO DIFF WBC: CPT

## 2021-03-03 PROCEDURE — 96375 TX/PRO/DX INJ NEW DRUG ADDON: CPT

## 2021-03-03 PROCEDURE — 74011250636 HC RX REV CODE- 250/636: Performed by: PHYSICIAN ASSISTANT

## 2021-03-03 PROCEDURE — 80053 COMPREHEN METABOLIC PANEL: CPT

## 2021-03-03 RX ORDER — MORPHINE SULFATE 2 MG/ML
4 INJECTION, SOLUTION INTRAMUSCULAR; INTRAVENOUS
Status: COMPLETED | OUTPATIENT
Start: 2021-03-03 | End: 2021-03-03

## 2021-03-03 RX ORDER — ONDANSETRON 2 MG/ML
4 INJECTION INTRAMUSCULAR; INTRAVENOUS
Status: COMPLETED | OUTPATIENT
Start: 2021-03-03 | End: 2021-03-03

## 2021-03-03 RX ADMIN — SODIUM CHLORIDE 1000 ML: 9 INJECTION, SOLUTION INTRAVENOUS at 06:12

## 2021-03-03 RX ADMIN — MORPHINE SULFATE 4 MG: 2 INJECTION, SOLUTION INTRAMUSCULAR; INTRAVENOUS at 06:13

## 2021-03-03 RX ADMIN — ONDANSETRON 4 MG: 2 INJECTION INTRAMUSCULAR; INTRAVENOUS at 06:13

## 2021-03-03 NOTE — ED NOTES
Pt reports having a hemorrhoidectomy on Monday with colorectal surgery. She reports having excessive bleeding with clots, pain unrelieved by PO 2 mg dilauded q6-8 hours, dizziness and nausea. She was referred to ER by on call MD with colorectal to have hemoglobin checked and for pain management.      Patient alert in oriented, on monitor with call bell in reach

## 2021-03-03 NOTE — ED PROVIDER NOTES
EMERGENCY DEPARTMENT HISTORY AND PHYSICAL EXAM      Date: 3/3/2021  Patient Name: Paolo Noland    History of Presenting Illness     Chief Complaint   Patient presents with    Rectal Bleeding     Patient stated that on Monday she had a hemorrhoidectomy by Dr. Thomas Mccarty. She is now having a lot of pain and bright red and has some clots in it. She called her surgeons office and they advised her to come in and get evaluated. Took Dilaudid 2 mg at 2130 and Tylenol 1 gram around 0200 with minimal relief. Patient also complaining of dizziness and weakness. Denies any anticoagulant use prior to or after surgery. History Provided By: Patient    HPI: Paolo Noland, 48 y.o. female with significant PMHx as listed below, presents by POV to the ED with cc of rectal bleeding. The patient had a hemorrhoidectomy and Botox injection into a fistula 2 days ago ago by Dr. Scot Maldonado (colorectal surgery). Since that time she has been passing large amounts of blood with each bowel movement and had significant rectal pain that has not been relieved with the Dilaudid PO Rx that was provided for post operative pain. She reports associated Sx of headache, chills, nausea, lightheadedness, diarrhea, and mild abdominal pain, . She called the surgeon on call this morning and was directed to come to the ED for further evaluation. There are no other complaints, changes, or physical findings at this time. Social Hx: Tobacco (denies), EtOH (denies), Illicit drug use (denies)     PCP: Royer Ibarra MD    No current facility-administered medications on file prior to encounter. Current Outpatient Medications on File Prior to Encounter   Medication Sig Dispense Refill    estradioL (ESTRACE) 0.5 mg tablet TAKE 1 TABLET BY MOUTH ONCE DAILY      empagliflozin (Jardiance) 25 mg tablet Take 1 Tab by mouth daily. 90 Tab 3    glimepiride (AMARYL) 4 mg tablet Take 1 Tab by mouth Daily (before breakfast).  90 Tab 3    omeprazole (PRILOSEC) 20 mg capsule Take 40 mg by mouth Daily (before breakfast).  dicyclomine (BENTYL) 20 mg tablet Take 1 Tab by mouth every six (6) hours as needed (abdominal cramps) for up to 20 doses. 20 Tab 0    sertraline (ZOLOFT) 100 mg tablet Take 200 mg by mouth nightly.  losartan-hydroCHLOROthiazide (HYZAAR) 100-12.5 mg per tablet Take 1 Tab by mouth daily.  [DISCONTINUED] phenazopyridine (Pyridium) 100 mg tablet Take  by mouth three (3) times daily (after meals). Indications: pt stated that she took OTC for urinary discomfort      [DISCONTINUED] hydrocortisone (Anusol-HC) 25 mg supp Insert 1 Suppository into rectum every twelve (12) hours. 10 Suppository 0    ALPRAZolam (XANAX) 1 mg tablet Take 0.5-1 Tabs by mouth two (2) times daily as needed for Anxiety. Max Daily Amount: 2 mg. 30 Tab 0    [DISCONTINUED] valACYclovir (VALTREX) 1 gram tablet Take 1 Tab by mouth three (3) times daily. 21 Tab 0    [DISCONTINUED] gabapentin (NEURONTIN) 300 mg capsule Take 1 Cap by mouth nightly as needed for Pain. Max Daily Amount: 300 mg. 30 Cap 1    albuterol (PROVENTIL HFA, VENTOLIN HFA, PROAIR HFA) 90 mcg/actuation inhaler Take 1 Puff by inhalation every four (4) hours as needed for Wheezing. 1 Inhaler 1    [DISCONTINUED] ondansetron (Zofran ODT) 4 mg disintegrating tablet 1 Tab by SubLINGual route every eight (8) hours as needed for Nausea or Vomiting. 20 Tab 0    [DISCONTINUED] sucralfate (Carafate) 1 gram tablet Take 1 Tab by mouth four (4) times daily. 28 Tab 0    [DISCONTINUED] famotidine (Pepcid) 20 mg tablet Take 1 Tab by mouth two (2) times a day. 20 Tab 0    [DISCONTINUED] promethazine (PHENERGAN) 25 mg tablet Take 1 Tab by mouth every six (6) hours as needed for Nausea. 10 Tab 0    [DISCONTINUED] hyoscyamine SL (LEVSIN/SL) 0.125 mg SL tablet 1 Tab by SubLINGual route every four (4) hours as needed for Cramping.  15 Tab 0    [DISCONTINUED] ondansetron (Zofran ODT) 4 mg disintegrating tablet Take 1 Tab by mouth every eight (8) hours as needed for Nausea. 10 Tab 0    [DISCONTINUED] melatonin tab tablet Take 5 mg by mouth nightly.  EPINEPHrine (EPIPEN) 0.3 mg/0.3 mL (1:1,000) injection 0.3 mL by IntraMUSCular route once as needed for up to 1 dose. 0.3 mL 1    albuterol (PROVENTIL, VENTOLIN) 90 mcg/Actuation inhaler Take 2 Puffs by inhalation every six (6) hours as needed. Past History     Past Medical History:  Past Medical History:   Diagnosis Date    Anal fissure     Anisocoria     Asthma     LAST EPISODE     Back pain     Cerumen impaction     Chronic kidney disease     hx uti in past    Coagulation defects     ocassional rectal bleeding due to anal fissure    Colovaginal fistula     Diabetes (HCC)     NIDDM    Diverticulitis     Diverticulosis     Enlarged tonsils     Frequent UTI     GERD (gastroesophageal reflux disease)     H/O endoscopy     with dilation    HA (headache)     Hepatic steatosis     History of colon resection     Hx of colonoscopy with polypectomy     benign    Hypertension     Ill-defined condition     FREQUENT HIVES    Ill-defined condition     HX ELEVATED LIVER ENZYMES    Morbid obesity (HCC)     Nausea & vomiting     during diverticulitis flare    Obesity     Otitis media     Pneumonia     about 15 yrs ago    Psychiatric disorder     ANXIETY    Recurrent tonsillitis     Sinusitis     Transfusion history ~ age 35    postop hysterectomy    Urticaria        Past Surgical History:  Past Surgical History:   Procedure Laterality Date    COLONOSCOPY N/A 3/28/2019    COLONOSCOPY performed by Gonzalo Lyons MD at [address] [facility] COLONOSCOPY N/A 10/2/2020    COLONOSCOPY performed by Gonzalo Lyons MD at [address]    [place].     HX GI  12    LAPAROSCOPIC HAND ASSISTED  POSS OPEN SIGMOID COLECTOMY POSS TEMPORARY DIVERTING LOOP ILEOSTOMY;  (no illeostomy needed)    HX GYN           HX GYN      cervical conization    HX HEENT      SINUS SURGERY LEFT X2    HX HEENT      SINUS SURGERY ON RIGHT X2    HX OTHER SURGICAL  11/12    Sphincterotomy    HX PELVIC LAPAROSCOPY      HX EMMANUEL AND BSO      HX UROLOGICAL  7/31/12     CYSTOSCOPY INSERTION URETERAL CATHETERS - Cystoscopy Insertion of bilateral ureteral stents    NM ABDOMEN SURGERY PROC UNLISTED  01/06/2018    [facility]ia repair at [facility]       Family History:  Family History   Problem Relation Age of Onset    Diabetes Mother     Cancer Mother         NON-HODGKINS LYMPHOMA    Anesth Problems Mother         PONV    Diabetes Father     Heart Disease Father         CAD - STENTS, PACEMAKER    Arrhythmia Father        Social History:  Social History     Tobacco Use    Smoking status: Never Smoker    Smokeless tobacco: Never Used   Substance Use Topics    Alcohol use: Yes     Comment: Rarely    Drug use: No     Types: Prescription, OTC       Allergies: Allergies   Allergen Reactions    Aspirin Hives and Shortness of Breath    Codeine Hives, Itching and Angioedema     Pt had itching in mouth, on face and lips    Contrast Agent [Iodine] Hives, Itching and Angioedema     Pt. had itching in mouth, on face and lips after IV contrast with the last exam.  Benadryl was given. OK if premedicated with benadryl and solumedrol 1h before    Flavoring Agent Anaphylaxis    Percocet [Oxycodone-Acetaminophen] Hives, Itching and Angioedema     Pt had itching in mouth, on face and lips    Prilosec [Omeprazole Magnesium] Anaphylaxis     CHERRY FLAVORED ODT; PT TAKES REGULAR PRILOSEC AND IS OK    Dilaudid [Hydromorphone] Itching     Tolerates with benadryl    Ketorolac Rash     \"makes my eyes spasm and causes rash on my hands\" and \"makes my skin itch and makes me nervous\"    Fentanyl Rash and Itching     Has had benadryl before    Morphine Itching     Severe itching.  Tolerates with Benadryl         Review of Systems   Review of Systems   Constitutional: Positive for chills. Negative for diaphoresis and fever. HENT: Negative for congestion, ear pain, rhinorrhea and sore throat. Respiratory: Negative for cough and shortness of breath. Cardiovascular: Negative for chest pain. Gastrointestinal: Positive for anal bleeding, blood in stool, diarrhea and nausea. Negative for abdominal pain, constipation and vomiting. Genitourinary: Negative for difficulty urinating, dysuria, frequency and hematuria. Musculoskeletal: Negative for arthralgias and myalgias. Neurological: Positive for dizziness, light-headedness and headaches. All other systems reviewed and are negative. Physical Exam   Physical Exam  Vitals signs and nursing note reviewed. Exam conducted with a chaperone present Bjorn Zhang RN). Constitutional:       General: She is not in acute distress. Appearance: She is well-developed. She is obese. She is not diaphoretic. Comments: 48 y.o.  female    HENT:      Head: Normocephalic and atraumatic. Eyes:      General:         Right eye: No discharge. Left eye: No discharge. Conjunctiva/sclera: Conjunctivae normal.   Neck:      Musculoskeletal: Normal range of motion and neck supple. Cardiovascular:      Rate and Rhythm: Normal rate and regular rhythm. Heart sounds: Normal heart sounds. No murmur. Pulmonary:      Effort: Pulmonary effort is normal. No respiratory distress. Breath sounds: Normal breath sounds. Abdominal:      General: Abdomen is flat. There is no distension. Tenderness: There is no abdominal tenderness. Genitourinary:     Rectum: Tenderness present. No external hemorrhoid. Skin:     General: Skin is warm and dry. Neurological:      Mental Status: She is alert and oriented to person, place, and time.    Psychiatric:         Behavior: Behavior normal.         Diagnostic Study Results     Labs -     Recent Results (from the past 12 hour(s))   CBC WITH AUTOMATED DIFF    Collection Time: 03/03/21  5:23 AM   Result Value Ref Range    WBC 6.9 3.6 - 11.0 K/uL    RBC 4.67 3.80 - 5.20 M/uL    HGB 12.4 11.5 - 16.0 g/dL    HCT 37.4 35.0 - 47.0 %    MCV 80.1 80.0 - 99.0 FL    MCH 26.6 26.0 - 34.0 PG    MCHC 33.2 30.0 - 36.5 g/dL    RDW 14.8 (H) 11.5 - 14.5 %    PLATELET 576 891 - 255 K/uL    MPV 9.3 8.9 - 12.9 FL    NRBC 0.0 0  WBC    ABSOLUTE NRBC 0.00 0.00 - 0.01 K/uL    NEUTROPHILS 60 32 - 75 %    LYMPHOCYTES 30 12 - 49 %    MONOCYTES 5 5 - 13 %    EOSINOPHILS 4 0 - 7 %    BASOPHILS 0 0 - 1 %    IMMATURE GRANULOCYTES 1 (H) 0.0 - 0.5 %    ABS. NEUTROPHILS 4.1 1.8 - 8.0 K/UL    ABS. LYMPHOCYTES 2.1 0.8 - 3.5 K/UL    ABS. MONOCYTES 0.4 0.0 - 1.0 K/UL    ABS. EOSINOPHILS 0.3 0.0 - 0.4 K/UL    ABS. BASOPHILS 0.0 0.0 - 0.1 K/UL    ABS. IMM. GRANS. 0.1 (H) 0.00 - 0.04 K/UL    DF AUTOMATED     METABOLIC PANEL, COMPREHENSIVE    Collection Time: 03/03/21  5:23 AM   Result Value Ref Range    Sodium 137 136 - 145 mmol/L    Potassium 3.4 (L) 3.5 - 5.1 mmol/L    Chloride 103 97 - 108 mmol/L    CO2 29 21 - 32 mmol/L    Anion gap 5 5 - 15 mmol/L    Glucose 166 (H) 65 - 100 mg/dL    BUN 7 6 - 20 MG/DL    Creatinine 0.76 0.55 - 1.02 MG/DL    BUN/Creatinine ratio 9 (L) 12 - 20      GFR est AA >60 >60 ml/min/1.73m2    GFR est non-AA >60 >60 ml/min/1.73m2    Calcium 9.5 8.5 - 10.1 MG/DL    Bilirubin, total 0.4 0.2 - 1.0 MG/DL    ALT (SGPT) 30 12 - 78 U/L    AST (SGOT) 22 15 - 37 U/L    Alk.  phosphatase 86 45 - 117 U/L    Protein, total 7.5 6.4 - 8.2 g/dL    Albumin 3.9 3.5 - 5.0 g/dL    Globulin 3.6 2.0 - 4.0 g/dL    A-G Ratio 1.1 1.1 - 2.2     PTT    Collection Time: 03/03/21  5:23 AM   Result Value Ref Range    aPTT 23.4 22.1 - 31.0 sec    aPTT, therapeutic range     58.0 - 77.0 SECS   PROTHROMBIN TIME + INR    Collection Time: 03/03/21  5:23 AM   Result Value Ref Range    INR 1.0 0.9 - 1.1      Prothrombin time 10.0 9.0 - 11.1 sec       Radiologic Studies - None    Medical Decision Making   I am the first provider for this patient. I reviewed the vital signs, available nursing notes, past medical history, past surgical history, family history and social history. Vital Signs-Reviewed the patient's vital signs. Patient Vitals for the past 12 hrs:   Temp Pulse Resp BP SpO2   03/03/21 0535 -- 83 -- (!) 143/77 96 %   03/03/21 0534 -- 85 -- (!) 134/92 95 %   03/03/21 0533 -- 73 -- (!) 150/76 --   03/03/21 0512 97.8 °F (36.6 °C) 86 20 (!) 166/87 97 %       Records Reviewed: Nursing Notes and Old Medical Records    Provider Notes (Medical Decision Making): The patient presents to the ED with stable vital signs and concern for rectal bleeding with recent rectal surgery. DDx include GI bleed, post surgical complication, anemia, dehydration. Blood work without acute issue in the ED. Will have patient follow up with her surgeon today or tomorrow for reevaluation. ED Course:   Initial assessment performed. The patients presenting problems have been discussed, and they are in agreement with the care plan formulated and outlined with them. I have encouraged them to ask questions as they arise throughout their visit. 6:27 AM  The patient has been re-evaluated. She reports that her pain and nausea are improved. Reviewed labs. Hbg is actually slightly hirer than recent recheck. Patient's rectum re-evaluated and still without leakage of any blood. Will discharge home at the completion of IV fluids to follow up with her surgeon. Critical Care Time: None    Disposition:  DISCHARGE NOTE:  7:11 AM  The pt is ready for discharge. The pt's signs, symptoms, diagnosis, and discharge instructions have been discussed and pt has conveyed their understanding. The pt is to follow up as recommended or return to ER should their symptoms worsen. Plan has been discussed and pt is in agreement. PLAN:  1. Current Discharge Medication List        2.    Follow-up Information     Follow up With Specialties Details Why Contact Info    Devon Doan MD Colon and Rectal Surgery In 2 days  [address] [facility]  [address]  [phone]          Return to ED if worse     Diagnosis     Clinical Impression:   1. Rectal bleeding          Please note that this dictation was completed with [email or web address], the computer voice recognition software. Quite often unanticipated grammatical, syntax, homophones, and other interpretive errors are inadvertently transcribed by the computer software. Please disregards these errors. Please excuse any errors that have escaped final proofreading.

## 2021-03-05 ENCOUNTER — TRANSCRIBE ORDER (OUTPATIENT)
Dept: REGISTRATION | Age: 51
End: 2021-03-05

## 2021-03-05 DIAGNOSIS — Z12.31 VISIT FOR SCREENING MAMMOGRAM: Primary | ICD-10-CM

## 2021-03-18 RX ORDER — GLIMEPIRIDE 4 MG/1
TABLET ORAL
Qty: 90 TAB | Refills: 0 | Status: SHIPPED | OUTPATIENT
Start: 2021-03-18 | End: 2021-07-07 | Stop reason: SDUPTHER

## 2021-03-21 DIAGNOSIS — F41.9 ANXIETY: ICD-10-CM

## 2021-03-22 ENCOUNTER — TELEPHONE (OUTPATIENT)
Dept: INTERNAL MEDICINE CLINIC | Age: 51
End: 2021-03-22

## 2021-03-22 RX ORDER — ALPRAZOLAM 1 MG/1
TABLET ORAL
Qty: 30 TAB | Refills: 0 | Status: SHIPPED | OUTPATIENT
Start: 2021-03-22 | End: 2021-04-06

## 2021-03-22 NOTE — TELEPHONE ENCOUNTER
Patient states that the Pharmacy states they did not receive the request for the   ALPRAZolam Kenia Pies) 1 mg tablet    Pt requests call the pharmacy to confirm

## 2021-03-24 NOTE — TELEPHONE ENCOUNTER
Verified with pharmacy that prescription was received. Pharmacist states that patient has already picked up the prescription. Pharmacist states that patient had 2 profiles with them, which created the confusion.

## 2021-03-26 NOTE — TELEPHONE ENCOUNTER
----- Message from Beka Richards sent at 3/26/2021  2:46 PM EDT -----  Regarding: Dr. Gayle Rosario (if not patient): Pt       Relationship of caller (if not patient): self       Best contact number(s): [phone]      Name of medication and dosage if known: losartan-hydroCHLOROthiazide (HYZAAR) 100-12.5 mg per tablet       Is patient out of this medication (yes/no): yes       Pharmacy name: [facility]    Pharmacy listed in chart? (yes/no): yes   Pharmacy phone number: [phone]       Message from [facility]

## 2021-03-29 RX ORDER — LOSARTAN POTASSIUM AND HYDROCHLOROTHIAZIDE 12.5; 1 MG/1; MG/1
1 TABLET ORAL DAILY
Qty: 90 TAB | Refills: 1 | Status: SHIPPED | OUTPATIENT
Start: 2021-03-29 | End: 2021-10-16

## 2021-04-06 DIAGNOSIS — F41.9 ANXIETY: ICD-10-CM

## 2021-04-06 DIAGNOSIS — B02.9 HERPES ZOSTER WITHOUT COMPLICATION: ICD-10-CM

## 2021-04-06 RX ORDER — GABAPENTIN 300 MG/1
CAPSULE ORAL
Qty: 30 CAP | Refills: 1 | Status: SHIPPED | OUTPATIENT
Start: 2021-04-06 | End: 2021-04-06 | Stop reason: SDUPTHER

## 2021-04-06 RX ORDER — ALPRAZOLAM 1 MG/1
TABLET ORAL
Qty: 30 TAB | Refills: 0 | Status: SHIPPED | OUTPATIENT
Start: 2021-04-06 | End: 2021-04-06 | Stop reason: SDUPTHER

## 2021-04-07 RX ORDER — GABAPENTIN 300 MG/1
300 CAPSULE ORAL
Qty: 30 CAP | Refills: 1 | Status: SHIPPED | OUTPATIENT
Start: 2021-04-07 | End: 2021-10-16

## 2021-04-07 RX ORDER — ALPRAZOLAM 1 MG/1
.5-1 TABLET ORAL
Qty: 30 TAB | Refills: 0 | Status: SHIPPED | OUTPATIENT
Start: 2021-04-07 | End: 2021-04-20 | Stop reason: SDUPTHER

## 2021-04-20 ENCOUNTER — TELEPHONE (OUTPATIENT)
Dept: INTERNAL MEDICINE CLINIC | Age: 51
End: 2021-04-20

## 2021-04-20 DIAGNOSIS — F41.9 ANXIETY: ICD-10-CM

## 2021-04-20 RX ORDER — EPINEPHRINE 0.3 MG/.3ML
0.3 INJECTION SUBCUTANEOUS
Qty: 2 SYRINGE | Refills: 0 | Status: SHIPPED | OUTPATIENT
Start: 2021-04-20 | End: 2021-04-20

## 2021-04-20 RX ORDER — ALPRAZOLAM 1 MG/1
.5-1 TABLET ORAL
Qty: 60 TAB | Refills: 0 | Status: SHIPPED | OUTPATIENT
Start: 2021-04-20 | End: 2021-05-19

## 2021-04-20 NOTE — TELEPHONE ENCOUNTER
----- Message from Dash Sheldon LPN sent at 8/88/3595  8:24 AM EDT -----  Regarding: FW: Prescription Question  Contact: [phone]    ----- Message -----  From: Jose Ham  Sent: 4/20/2021   3:21 AM EDT  To: Anila Doran  Subject: Prescription Question                            Hi Dr. Karolina Jamil,    Unfortunately I don't think you have my full health history. My previous pcp prescribed a epi-pen for me, I have some sort of allergy (don't know the culprit) that has caused me to have anaphylaxis. If you could give me a refill I'd appreciate it. Secondly, the alprazolam has helped me significantly during all this uncertainty in my life. Recently just when things were getting better with my anxiety I was reduced to part time hours. So the anxiety and insomnia came back stronger then ever. With that being said my current prescription is for 1/2 tab to 1 tab bid. Which would be either #45 or #60. So I'm running out of the med in about 15 days due to getting #30. I know taking this med is not ideal, I'm well aware of the repercussions. 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 ..so I think I've seen it all. I have an appointment with you on June 7th. I'm hoping at that time my life maybe more manageable and perhaps I can do away with the alprazolam and change to a different anti-depressant. Sorry to be babbling on, I know your extremely busy. Please let me know your thoughts. Thanks so much for your time and help.    Donna Mccoy

## 2021-04-29 ENCOUNTER — TELEPHONE (OUTPATIENT)
Dept: INTERNAL MEDICINE CLINIC | Age: 51
End: 2021-04-29

## 2021-04-29 NOTE — TELEPHONE ENCOUNTER
Prior Authorization initiated through cover [facility] for Epinephrine 0.3 mg auto-inject pen. Epinephrine 0.3 mg auto-inject pen has been approved 04/22/2021 through 04/22/2022.

## 2021-05-05 ENCOUNTER — HOSPITAL ENCOUNTER (OUTPATIENT)
Age: 51
Setting detail: OBSERVATION
Discharge: HOME | End: 2021-05-06
Attending: EMERGENCY MEDICINE | Admitting: INTERNAL MEDICINE
Payer: MEDICAID

## 2021-05-05 ENCOUNTER — APPOINTMENT (OUTPATIENT)
Dept: CT IMAGING | Age: 51
End: 2021-05-05
Attending: EMERGENCY MEDICINE
Payer: MEDICAID

## 2021-05-05 DIAGNOSIS — T78.3XXA ANGIOEDEMA, INITIAL ENCOUNTER: ICD-10-CM

## 2021-05-05 DIAGNOSIS — T78.2XXA ANAPHYLACTIC REACTION TO CONTRAST MEDIA: ICD-10-CM

## 2021-05-05 DIAGNOSIS — R10.30 ABDOMINAL PAIN, LOWER: Primary | ICD-10-CM

## 2021-05-05 DIAGNOSIS — T50.8X5A ANAPHYLACTIC REACTION TO CONTRAST MEDIA: ICD-10-CM

## 2021-05-05 LAB
ALBUMIN SERPL-MCNC: 3.5 G/DL (ref 3.5–5)
ALBUMIN/GLOB SERPL: 1 {RATIO} (ref 1.1–2.2)
ALP SERPL-CCNC: 72 U/L (ref 45–117)
ALT SERPL-CCNC: 29 U/L (ref 12–78)
ANION GAP SERPL CALC-SCNC: 8 MMOL/L (ref 5–15)
APPEARANCE UR: CLEAR
AST SERPL-CCNC: 17 U/L (ref 15–37)
ATRIAL RATE: 115 BPM
BACTERIA URNS QL MICRO: NEGATIVE /HPF
BASOPHILS # BLD: 0 K/UL (ref 0–0.1)
BASOPHILS NFR BLD: 0 % (ref 0–1)
BILIRUB SERPL-MCNC: 0.6 MG/DL (ref 0.2–1)
BILIRUB UR QL: NEGATIVE
BUN SERPL-MCNC: 9 MG/DL (ref 6–20)
BUN/CREAT SERPL: 13 (ref 12–20)
CALCIUM SERPL-MCNC: 9.1 MG/DL (ref 8.5–10.1)
CALCULATED P AXIS, ECG09: 41 DEGREES
CALCULATED R AXIS, ECG10: 34 DEGREES
CALCULATED T AXIS, ECG11: 50 DEGREES
CHLORIDE SERPL-SCNC: 104 MMOL/L (ref 97–108)
CO2 SERPL-SCNC: 26 MMOL/L (ref 21–32)
COLOR UR: ABNORMAL
CREAT SERPL-MCNC: 0.72 MG/DL (ref 0.55–1.02)
DIAGNOSIS, 93000: NORMAL
DIFFERENTIAL METHOD BLD: ABNORMAL
EOSINOPHIL # BLD: 0.2 K/UL (ref 0–0.4)
EOSINOPHIL NFR BLD: 3 % (ref 0–7)
EPITH CASTS URNS QL MICRO: ABNORMAL /LPF
ERYTHROCYTE [DISTWIDTH] IN BLOOD BY AUTOMATED COUNT: 15.2 % (ref 11.5–14.5)
EST. AVERAGE GLUCOSE BLD GHB EST-MCNC: 186 MG/DL
GLOBULIN SER CALC-MCNC: 3.4 G/DL (ref 2–4)
GLUCOSE BLD STRIP.AUTO-MCNC: 288 MG/DL (ref 65–100)
GLUCOSE BLD STRIP.AUTO-MCNC: 341 MG/DL (ref 65–100)
GLUCOSE SERPL-MCNC: 202 MG/DL (ref 65–100)
GLUCOSE UR STRIP.AUTO-MCNC: >1000 MG/DL
HBA1C MFR BLD: 8.1 % (ref 4–5.6)
HCT VFR BLD AUTO: 35.9 % (ref 35–47)
HGB BLD-MCNC: 11.9 G/DL (ref 11.5–16)
HGB UR QL STRIP: NEGATIVE
IMM GRANULOCYTES # BLD AUTO: 0 K/UL (ref 0–0.04)
IMM GRANULOCYTES NFR BLD AUTO: 1 % (ref 0–0.5)
KETONES UR QL STRIP.AUTO: NEGATIVE MG/DL
LACTATE BLD-SCNC: 2.64 MMOL/L (ref 0.4–2)
LACTATE BLD-SCNC: 2.65 MMOL/L (ref 0.4–2)
LEUKOCYTE ESTERASE UR QL STRIP.AUTO: NEGATIVE
LYMPHOCYTES # BLD: 1.9 K/UL (ref 0.8–3.5)
LYMPHOCYTES NFR BLD: 34 % (ref 12–49)
MCH RBC QN AUTO: 26.3 PG (ref 26–34)
MCHC RBC AUTO-ENTMCNC: 33.1 G/DL (ref 30–36.5)
MCV RBC AUTO: 79.2 FL (ref 80–99)
MONOCYTES # BLD: 0.3 K/UL (ref 0–1)
MONOCYTES NFR BLD: 5 % (ref 5–13)
NEUTS SEG # BLD: 3.2 K/UL (ref 1.8–8)
NEUTS SEG NFR BLD: 57 % (ref 32–75)
NITRITE UR QL STRIP.AUTO: POSITIVE
NRBC # BLD: 0 K/UL (ref 0–0.01)
NRBC BLD-RTO: 0 PER 100 WBC
P-R INTERVAL, ECG05: 158 MS
PH UR STRIP: 6 [PH] (ref 5–8)
PLATELET # BLD AUTO: 198 K/UL (ref 150–400)
PMV BLD AUTO: 9.7 FL (ref 8.9–12.9)
POTASSIUM SERPL-SCNC: 3 MMOL/L (ref 3.5–5.1)
PROT SERPL-MCNC: 6.9 G/DL (ref 6.4–8.2)
PROT UR STRIP-MCNC: NEGATIVE MG/DL
Q-T INTERVAL, ECG07: 358 MS
QRS DURATION, ECG06: 92 MS
QTC CALCULATION (BEZET), ECG08: 495 MS
RBC # BLD AUTO: 4.53 M/UL (ref 3.8–5.2)
RBC #/AREA URNS HPF: ABNORMAL /HPF (ref 0–5)
SERVICE CMNT-IMP: ABNORMAL
SERVICE CMNT-IMP: ABNORMAL
SODIUM SERPL-SCNC: 138 MMOL/L (ref 136–145)
SP GR UR REFRACTOMETRY: 1.01 (ref 1–1.03)
UA: UC IF INDICATED,UAUC: ABNORMAL
UROBILINOGEN UR QL STRIP.AUTO: 1 EU/DL (ref 0.2–1)
VENTRICULAR RATE, ECG03: 115 BPM
WBC # BLD AUTO: 5.6 K/UL (ref 3.6–11)
WBC URNS QL MICRO: ABNORMAL /HPF (ref 0–4)

## 2021-05-05 PROCEDURE — 94640 AIRWAY INHALATION TREATMENT: CPT

## 2021-05-05 PROCEDURE — 96374 THER/PROPH/DIAG INJ IV PUSH: CPT

## 2021-05-05 PROCEDURE — 97165 OT EVAL LOW COMPLEX 30 MIN: CPT | Performed by: OCCUPATIONAL THERAPIST

## 2021-05-05 PROCEDURE — G0378 HOSPITAL OBSERVATION PER HR: HCPCS

## 2021-05-05 PROCEDURE — 36415 COLL VENOUS BLD VENIPUNCTURE: CPT

## 2021-05-05 PROCEDURE — 83036 HEMOGLOBIN GLYCOSYLATED A1C: CPT

## 2021-05-05 PROCEDURE — 93005 ELECTROCARDIOGRAM TRACING: CPT

## 2021-05-05 PROCEDURE — 96372 THER/PROPH/DIAG INJ SC/IM: CPT

## 2021-05-05 PROCEDURE — 74011000250 HC RX REV CODE- 250: Performed by: EMERGENCY MEDICINE

## 2021-05-05 PROCEDURE — 74011000636 HC RX REV CODE- 636: Performed by: EMERGENCY MEDICINE

## 2021-05-05 PROCEDURE — 97116 GAIT TRAINING THERAPY: CPT

## 2021-05-05 PROCEDURE — 96376 TX/PRO/DX INJ SAME DRUG ADON: CPT

## 2021-05-05 PROCEDURE — 74011000250 HC RX REV CODE- 250

## 2021-05-05 PROCEDURE — 74011250636 HC RX REV CODE- 250/636: Performed by: EMERGENCY MEDICINE

## 2021-05-05 PROCEDURE — 97161 PT EVAL LOW COMPLEX 20 MIN: CPT

## 2021-05-05 PROCEDURE — 96375 TX/PRO/DX INJ NEW DRUG ADDON: CPT

## 2021-05-05 PROCEDURE — 74177 CT ABD & PELVIS W/CONTRAST: CPT

## 2021-05-05 PROCEDURE — 77010033678 HC OXYGEN DAILY

## 2021-05-05 PROCEDURE — 74011250636 HC RX REV CODE- 250/636: Performed by: INTERNAL MEDICINE

## 2021-05-05 PROCEDURE — 74011250636 HC RX REV CODE- 250/636

## 2021-05-05 PROCEDURE — 83605 ASSAY OF LACTIC ACID: CPT

## 2021-05-05 PROCEDURE — 65620000000 HC RM CCU GENERAL

## 2021-05-05 PROCEDURE — 80053 COMPREHEN METABOLIC PANEL: CPT

## 2021-05-05 PROCEDURE — 74011636637 HC RX REV CODE- 636/637: Performed by: INTERNAL MEDICINE

## 2021-05-05 PROCEDURE — 85025 COMPLETE CBC W/AUTO DIFF WBC: CPT

## 2021-05-05 PROCEDURE — 82962 GLUCOSE BLOOD TEST: CPT

## 2021-05-05 PROCEDURE — 99285 EMERGENCY DEPT VISIT HI MDM: CPT

## 2021-05-05 PROCEDURE — 74011000250 HC RX REV CODE- 250: Performed by: INTERNAL MEDICINE

## 2021-05-05 PROCEDURE — 81001 URINALYSIS AUTO W/SCOPE: CPT

## 2021-05-05 RX ORDER — IPRATROPIUM BROMIDE AND ALBUTEROL SULFATE 2.5; .5 MG/3ML; MG/3ML
3 SOLUTION RESPIRATORY (INHALATION)
Status: COMPLETED | OUTPATIENT
Start: 2021-05-05 | End: 2021-05-05

## 2021-05-05 RX ORDER — ONDANSETRON 2 MG/ML
4 INJECTION INTRAMUSCULAR; INTRAVENOUS
Status: COMPLETED | OUTPATIENT
Start: 2021-05-05 | End: 2021-05-05

## 2021-05-05 RX ORDER — EPINEPHRINE 1 MG/ML
INJECTION, SOLUTION, CONCENTRATE INTRAVENOUS
Status: COMPLETED
Start: 2021-05-05 | End: 2021-05-05

## 2021-05-05 RX ORDER — IPRATROPIUM BROMIDE AND ALBUTEROL SULFATE 2.5; .5 MG/3ML; MG/3ML
SOLUTION RESPIRATORY (INHALATION)
Status: COMPLETED
Start: 2021-05-05 | End: 2021-05-05

## 2021-05-05 RX ORDER — ONDANSETRON 2 MG/ML
4 INJECTION INTRAMUSCULAR; INTRAVENOUS
Status: DISCONTINUED | OUTPATIENT
Start: 2021-05-05 | End: 2021-05-06 | Stop reason: HOSPADM

## 2021-05-05 RX ORDER — DIPHENHYDRAMINE HYDROCHLORIDE 50 MG/ML
50 INJECTION, SOLUTION INTRAMUSCULAR; INTRAVENOUS
Status: COMPLETED | OUTPATIENT
Start: 2021-05-05 | End: 2021-05-05

## 2021-05-05 RX ORDER — EPINEPHRINE 1 MG/ML
0.3 INJECTION, SOLUTION, CONCENTRATE INTRAVENOUS
Status: DISCONTINUED | OUTPATIENT
Start: 2021-05-05 | End: 2021-05-06 | Stop reason: HOSPADM

## 2021-05-05 RX ORDER — DEXTROSE 50 % IN WATER (D50W) INTRAVENOUS SYRINGE
25-50 AS NEEDED
Status: DISCONTINUED | OUTPATIENT
Start: 2021-05-05 | End: 2021-05-06 | Stop reason: HOSPADM

## 2021-05-05 RX ORDER — SODIUM CHLORIDE 0.9 % (FLUSH) 0.9 %
5-40 SYRINGE (ML) INJECTION EVERY 8 HOURS
Status: DISCONTINUED | OUTPATIENT
Start: 2021-05-05 | End: 2021-05-06 | Stop reason: HOSPADM

## 2021-05-05 RX ORDER — INSULIN LISPRO 100 [IU]/ML
INJECTION, SOLUTION INTRAVENOUS; SUBCUTANEOUS EVERY 6 HOURS
Status: DISCONTINUED | OUTPATIENT
Start: 2021-05-05 | End: 2021-05-06 | Stop reason: HOSPADM

## 2021-05-05 RX ORDER — ENOXAPARIN SODIUM 100 MG/ML
40 INJECTION SUBCUTANEOUS DAILY
Status: DISCONTINUED | OUTPATIENT
Start: 2021-05-05 | End: 2021-05-06 | Stop reason: HOSPADM

## 2021-05-05 RX ORDER — DIPHENHYDRAMINE HYDROCHLORIDE 50 MG/ML
50 INJECTION, SOLUTION INTRAMUSCULAR; INTRAVENOUS
Status: DISCONTINUED | OUTPATIENT
Start: 2021-05-05 | End: 2021-05-06 | Stop reason: HOSPADM

## 2021-05-05 RX ORDER — BUDESONIDE 0.25 MG/2ML
500 INHALANT ORAL
Status: DISCONTINUED | OUTPATIENT
Start: 2021-05-05 | End: 2021-05-06 | Stop reason: HOSPADM

## 2021-05-05 RX ORDER — DIPHENHYDRAMINE HYDROCHLORIDE 50 MG/ML
25 INJECTION, SOLUTION INTRAMUSCULAR; INTRAVENOUS
Status: COMPLETED | OUTPATIENT
Start: 2021-05-05 | End: 2021-05-05

## 2021-05-05 RX ORDER — FAMOTIDINE 10 MG/ML
20 INJECTION INTRAVENOUS
Status: COMPLETED | OUTPATIENT
Start: 2021-05-05 | End: 2021-05-05

## 2021-05-05 RX ORDER — MORPHINE SULFATE 2 MG/ML
2 INJECTION, SOLUTION INTRAMUSCULAR; INTRAVENOUS
Status: DISCONTINUED | OUTPATIENT
Start: 2021-05-05 | End: 2021-05-06 | Stop reason: HOSPADM

## 2021-05-05 RX ORDER — DIPHENHYDRAMINE HYDROCHLORIDE 50 MG/ML
25 INJECTION, SOLUTION INTRAMUSCULAR; INTRAVENOUS EVERY 8 HOURS
Status: COMPLETED | OUTPATIENT
Start: 2021-05-05 | End: 2021-05-06

## 2021-05-05 RX ORDER — EPINEPHRINE 1 MG/ML
0.3 INJECTION, SOLUTION, CONCENTRATE INTRAVENOUS
Status: COMPLETED | OUTPATIENT
Start: 2021-05-05 | End: 2021-05-05

## 2021-05-05 RX ORDER — SODIUM CHLORIDE, SODIUM LACTATE, POTASSIUM CHLORIDE, CALCIUM CHLORIDE 600; 310; 30; 20 MG/100ML; MG/100ML; MG/100ML; MG/100ML
100 INJECTION, SOLUTION INTRAVENOUS ONCE
Status: COMPLETED | OUTPATIENT
Start: 2021-05-05 | End: 2021-05-05

## 2021-05-05 RX ORDER — MAGNESIUM SULFATE 100 %
4 CRYSTALS MISCELLANEOUS AS NEEDED
Status: DISCONTINUED | OUTPATIENT
Start: 2021-05-05 | End: 2021-05-06 | Stop reason: HOSPADM

## 2021-05-05 RX ORDER — SODIUM CHLORIDE 0.9 % (FLUSH) 0.9 %
5-40 SYRINGE (ML) INJECTION AS NEEDED
Status: DISCONTINUED | OUTPATIENT
Start: 2021-05-05 | End: 2021-05-06 | Stop reason: HOSPADM

## 2021-05-05 RX ORDER — MORPHINE SULFATE 2 MG/ML
2 INJECTION, SOLUTION INTRAMUSCULAR; INTRAVENOUS
Status: COMPLETED | OUTPATIENT
Start: 2021-05-05 | End: 2021-05-05

## 2021-05-05 RX ORDER — DIPHENHYDRAMINE HYDROCHLORIDE 50 MG/ML
INJECTION, SOLUTION INTRAMUSCULAR; INTRAVENOUS
Status: COMPLETED
Start: 2021-05-05 | End: 2021-05-05

## 2021-05-05 RX ORDER — EPINEPHRINE 0.1 MG/ML
INJECTION INTRACARDIAC; INTRAVENOUS
Status: DISPENSED
Start: 2021-05-05 | End: 2021-05-05

## 2021-05-05 RX ORDER — BUDESONIDE 0.25 MG/2ML
INHALANT ORAL
Status: COMPLETED
Start: 2021-05-05 | End: 2021-05-05

## 2021-05-05 RX ORDER — IPRATROPIUM BROMIDE AND ALBUTEROL SULFATE 2.5; .5 MG/3ML; MG/3ML
3 SOLUTION RESPIRATORY (INHALATION)
Status: DISCONTINUED | OUTPATIENT
Start: 2021-05-05 | End: 2021-05-06 | Stop reason: HOSPADM

## 2021-05-05 RX ORDER — ARFORMOTEROL TARTRATE 15 UG/2ML
15 SOLUTION RESPIRATORY (INHALATION)
Status: DISCONTINUED | OUTPATIENT
Start: 2021-05-05 | End: 2021-05-06 | Stop reason: HOSPADM

## 2021-05-05 RX ADMIN — FAMOTIDINE 20 MG: 10 INJECTION, SOLUTION INTRAVENOUS at 09:38

## 2021-05-05 RX ADMIN — SODIUM CHLORIDE 1000 ML: 9 INJECTION, SOLUTION INTRAVENOUS at 04:08

## 2021-05-05 RX ADMIN — DIPHENHYDRAMINE HYDROCHLORIDE 50 MG: 50 INJECTION, SOLUTION INTRAMUSCULAR; INTRAVENOUS at 03:42

## 2021-05-05 RX ADMIN — DIPHENHYDRAMINE HYDROCHLORIDE 25 MG: 50 INJECTION, SOLUTION INTRAMUSCULAR; INTRAVENOUS at 05:05

## 2021-05-05 RX ADMIN — ONDANSETRON 4 MG: 2 INJECTION INTRAMUSCULAR; INTRAVENOUS at 13:54

## 2021-05-05 RX ADMIN — DIPHENHYDRAMINE HYDROCHLORIDE 25 MG: 50 INJECTION, SOLUTION INTRAMUSCULAR; INTRAVENOUS at 15:11

## 2021-05-05 RX ADMIN — DIPHENHYDRAMINE HYDROCHLORIDE 50 MG: 50 INJECTION, SOLUTION INTRAMUSCULAR; INTRAVENOUS at 20:45

## 2021-05-05 RX ADMIN — SODIUM CHLORIDE 1000 ML: 9 INJECTION, SOLUTION INTRAVENOUS at 05:12

## 2021-05-05 RX ADMIN — MORPHINE SULFATE 2 MG: 2 INJECTION, SOLUTION INTRAMUSCULAR; INTRAVENOUS at 16:01

## 2021-05-05 RX ADMIN — IPRATROPIUM BROMIDE AND ALBUTEROL SULFATE 3 ML: .5; 3 SOLUTION RESPIRATORY (INHALATION) at 05:08

## 2021-05-05 RX ADMIN — METHYLPREDNISOLONE SODIUM SUCCINATE 125 MG: 125 INJECTION, POWDER, FOR SOLUTION INTRAMUSCULAR; INTRAVENOUS at 03:42

## 2021-05-05 RX ADMIN — METHYLPREDNISOLONE SODIUM SUCCINATE 125 MG: 125 INJECTION, POWDER, FOR SOLUTION INTRAMUSCULAR; INTRAVENOUS at 05:02

## 2021-05-05 RX ADMIN — SODIUM CHLORIDE 1000 ML: 9 INJECTION, SOLUTION INTRAVENOUS at 05:11

## 2021-05-05 RX ADMIN — SODIUM CHLORIDE, SODIUM LACTATE, POTASSIUM CHLORIDE, AND CALCIUM CHLORIDE 100 ML/HR: 600; 310; 30; 20 INJECTION, SOLUTION INTRAVENOUS at 15:10

## 2021-05-05 RX ADMIN — METHYLPREDNISOLONE SODIUM SUCCINATE 40 MG: 125 INJECTION, POWDER, FOR SOLUTION INTRAMUSCULAR; INTRAVENOUS at 09:35

## 2021-05-05 RX ADMIN — FAMOTIDINE 20 MG: 10 INJECTION, SOLUTION INTRAVENOUS at 05:08

## 2021-05-05 RX ADMIN — INSULIN LISPRO 7 UNITS: 100 INJECTION, SOLUTION INTRAVENOUS; SUBCUTANEOUS at 17:54

## 2021-05-05 RX ADMIN — BUDESONIDE 500 MCG: 0.25 INHALANT RESPIRATORY (INHALATION) at 19:55

## 2021-05-05 RX ADMIN — IPRATROPIUM BROMIDE AND ALBUTEROL SULFATE 3 ML: .5; 3 SOLUTION RESPIRATORY (INHALATION) at 05:17

## 2021-05-05 RX ADMIN — MORPHINE SULFATE 2 MG: 2 INJECTION, SOLUTION INTRAMUSCULAR; INTRAVENOUS at 19:52

## 2021-05-05 RX ADMIN — INSULIN LISPRO 10 UNITS: 100 INJECTION, SOLUTION INTRAVENOUS; SUBCUTANEOUS at 12:03

## 2021-05-05 RX ADMIN — Medication 10 ML: at 15:18

## 2021-05-05 RX ADMIN — EPINEPHRINE 0.3 MG: 1 INJECTION INTRAMUSCULAR; INTRAVENOUS; SUBCUTANEOUS at 05:00

## 2021-05-05 RX ADMIN — MORPHINE SULFATE 2 MG: 2 INJECTION, SOLUTION INTRAMUSCULAR; INTRAVENOUS at 13:54

## 2021-05-05 RX ADMIN — MORPHINE SULFATE 2 MG: 2 INJECTION, SOLUTION INTRAMUSCULAR; INTRAVENOUS at 03:49

## 2021-05-05 RX ADMIN — ONDANSETRON 4 MG: 2 INJECTION INTRAMUSCULAR; INTRAVENOUS at 03:49

## 2021-05-05 RX ADMIN — Medication 10 ML: at 22:14

## 2021-05-05 RX ADMIN — METHYLPREDNISOLONE SODIUM SUCCINATE 40 MG: 125 INJECTION, POWDER, FOR SOLUTION INTRAMUSCULAR; INTRAVENOUS at 22:09

## 2021-05-05 RX ADMIN — FAMOTIDINE 20 MG: 10 INJECTION, SOLUTION INTRAVENOUS at 22:09

## 2021-05-05 RX ADMIN — ARFORMOTEROL TARTRATE 15 MCG: 15 SOLUTION RESPIRATORY (INHALATION) at 09:44

## 2021-05-05 RX ADMIN — BUDESONIDE 500 MCG: 0.25 INHALANT RESPIRATORY (INHALATION) at 09:45

## 2021-05-05 RX ADMIN — IPRATROPIUM BROMIDE AND ALBUTEROL SULFATE 3 ML: 2.5; .5 SOLUTION RESPIRATORY (INHALATION) at 05:08

## 2021-05-05 RX ADMIN — MORPHINE SULFATE 2 MG: 2 INJECTION, SOLUTION INTRAMUSCULAR; INTRAVENOUS at 09:43

## 2021-05-05 RX ADMIN — MORPHINE SULFATE 2 MG: 2 INJECTION, SOLUTION INTRAMUSCULAR; INTRAVENOUS at 22:03

## 2021-05-05 RX ADMIN — BUDESONIDE 250 MCG: 0.25 INHALANT RESPIRATORY (INHALATION) at 09:45

## 2021-05-05 RX ADMIN — ARFORMOTEROL TARTRATE 15 MCG: 15 SOLUTION RESPIRATORY (INHALATION) at 19:54

## 2021-05-05 RX ADMIN — EPINEPHRINE 0.3 MG: 1 INJECTION, SOLUTION, CONCENTRATE INTRAVENOUS at 05:23

## 2021-05-05 RX ADMIN — EPINEPHRINE 0.3 MG: 1 INJECTION INTRAMUSCULAR; INTRAVENOUS; SUBCUTANEOUS at 05:23

## 2021-05-05 RX ADMIN — DIPHENHYDRAMINE HYDROCHLORIDE 25 MG: 50 INJECTION, SOLUTION INTRAMUSCULAR; INTRAVENOUS at 09:33

## 2021-05-05 RX ADMIN — IOPAMIDOL 100 ML: 755 INJECTION, SOLUTION INTRAVENOUS at 05:04

## 2021-05-05 NOTE — PROGRESS NOTES
Speech Pathology  Orders received, chart reviewed, and spoke with RN. Patient admitted with anaphylaxis due to contrast media. Her tongue has been swollen, but is improving. RN reports that patient passed STAND and is tolerating ice chips, but is now reporting some abdominal pain. RN has requested her to hold off on any other PO due to the abdominal pain and recommended SLP hold evaluation for today. Will follow up tomorrow.

## 2021-05-05 NOTE — CONSULTS
GI CONSULTATION NOTE  Gilles Briceno NP  [phone] NP in-hospital cell phone M-F until 4:30  After 5pm or on weekends, please call  for physician on call    NAME: Agusto Marion   :  1970   MRN:  [id number]   Attending:  Dr. Marc Vicente  Primary GI:  Dr. Mendy Pierre; Dr Michael Stern covering   Date/Time:  2021 9:49 AM  Assessment:   Hematochezia   Anal fissures   S/p hemorrhoidectomy in 2021  · Reports a hx of UC, diagnosed 1.5 years ago at [address] [place] [address] on mesalamine   · Scheduled for sphincterotomy with Dr. Adry Mckeon on Monday, May 10, 2021  · Hgb 11.9  · Unremarkable CT abd/pel    GI History:  - EGD with dilation with Dr. Vanessa Jacobs with Dr. Mendy Pierre with negative biopsies  - CLN with Dr. Mendy Pierre showed sigmoid diverticulosis and no evidence of colitis on exam (biopsies negative), 2 polyps removed from ascending colon     Anaphylaxis secondary to contrast media   · Treatment per primary team    Plan:   · Follow H&H; goal for Hgb >7.0  · Monitor for s/s of bleeding; transfuse as clinically indicated   · Colorectal surgery consulted by primary team   · No direct plans for repeat endoscopic procedure at this time   · Symptomatic care per primary team  · Nothing further to add from a GI standpoint. GI signing-off. Please call with any questions. Thank you for this consult. Plan discussed with Dr. Michael Stern    Subjective:     HISTORY OF PRESENT ILLNESS:     Agusto Marion is an 46 y.o.  female who we are asked to see for complaint of hematochezia. She has a past medical history of ulcerative colitis, hypertension, diabetes, asthma, status post colon resection for colovaginal fistula and recent hemorrhoidectomy and Botox injection of anal fissure/sphincter in March. She presents with 3 days of severe lower abdominal pain that is 9 out of 10. She also has pain in her rectum that she describes as a tightness and throbbing.   She has had nausea for the last 24 hours, but denies any vomiting. She reports chills but no fever. Denies any dysuria or hematuria. She recently stopped her prednisone about 2 days ago. She is scheduled for sphincterotomy with Elayne Espinosa from colorectal surgery on Monday. She is concerned that she may be having a flare of her ulcerative colitis because her pain is so much worse than usual.  Reports her pain is worse when she is sitting. Past Medical History:   Diagnosis Date    Anal fissure     Anisocoria     Asthma     LAST EPISODE     Back pain     Cerumen impaction     Chronic kidney disease     hx uti in past    Coagulation defects     ocassional rectal bleeding due to anal fissure    Colovaginal fistula     Diabetes (HCC)     NIDDM    Diverticulitis     Diverticulosis     Enlarged tonsils     Frequent UTI     GERD (gastroesophageal reflux disease)     H/O endoscopy     with dilation    HA (headache)     Hepatic steatosis     History of colon resection     Hx of colonoscopy with polypectomy     benign    Hypertension     Ill-defined condition     FREQUENT HIVES    Ill-defined condition     HX ELEVATED LIVER ENZYMES    Morbid obesity (HCC)     Nausea & vomiting     during diverticulitis flare    Obesity     Otitis media     Pneumonia     about 15 yrs ago    Psychiatric disorder     ANXIETY    Recurrent tonsillitis     Sinusitis     Transfusion history ~ age 35    postop hysterectomy    Urticaria       Past Surgical History:   Procedure Laterality Date    COLONOSCOPY N/A 3/28/2019    COLONOSCOPY performed by Andres Eagle MD at [address] [facility] COLONOSCOPY N/A 10/2/2020    COLONOSCOPY performed by Andres Eagle MD at [address] [facility]    [place].     HX GI  12    LAPAROSCOPIC HAND ASSISTED  POSS OPEN SIGMOID COLECTOMY POSS TEMPORARY DIVERTING LOOP ILEOSTOMY;  (no illeostomy needed)    HX GYN           HX GYN      cervical conization    HX HEENT      SINUS SURGERY LEFT X2    HX HEENT      SINUS SURGERY ON RIGHT X2    HX OTHER SURGICAL  11/12    Sphincterotomy    HX PELVIC LAPAROSCOPY      HX EMMANUEL AND BSO      HX UROLOGICAL  7/31/12     CYSTOSCOPY INSERTION URETERAL CATHETERS - Cystoscopy Insertion of bilateral ureteral stents    VT ABDOMEN SURGERY PROC UNLISTED  01/06/2018    hernia repair at [facility]     Social History     Tobacco Use    Smoking status: Never Smoker    Smokeless tobacco: Never Used   Substance Use Topics    Alcohol use: Yes     Comment: Rarely      Family History   Problem Relation Age of Onset    Diabetes Mother     Cancer Mother         NON-HODGKINS LYMPHOMA    Anesth Problems Mother         PONV    Diabetes Father     Heart Disease Father         CAD - STENTS, PACEMAKER    Arrhythmia Father       Allergies   Allergen Reactions    Aspirin Hives and Shortness of Breath    Codeine Hives, Itching and Angioedema     Pt had itching in mouth, on face and lips    Contrast Agent [Iodine] Hives, Itching and Angioedema     5/5/21: pt was given benadryl and solu-medrol prior to administration but pt had severe tongue swelling and drooling, lethargy and itching. DO NOT GIVE PT    Flavoring Agent Anaphylaxis    Percocet [Oxycodone-Acetaminophen] Hives, Itching and Angioedema     Pt had itching in mouth, on face and lips    Prilosec [Omeprazole Magnesium] Anaphylaxis     CHERRY FLAVORED ODT; PT TAKES REGULAR PRILOSEC AND IS OK    Dilaudid [Hydromorphone] Itching     Tolerates with benadryl    Ketorolac Rash     \"makes my eyes spasm and causes rash on my hands\" and \"makes my skin itch and makes me nervous\"    Fentanyl Rash and Itching     Has had benadryl before    Morphine Itching     Severe itching. Tolerates with Benadryl      Prior to Admission medications    Medication Sig Start Date End Date Taking? Authorizing Provider   ALPRAZojohnathan Savage 1 mg tablet Take 0.5-1 Tabs by mouth two (2) times daily as needed for Anxiety.  Max Daily Amount: 2 mg. 4/20/21   Deepa Gallo MD   gabapentin (NEURONTIN) 300 mg capsule Take 1 Cap by mouth nightly. Max Daily Amount: 300 mg. 4/7/21   Deepa Gallo MD   losartan-hydroCHLOROthiazide [facility]) 100-12.5 mg per tablet Take 1 Tab by mouth daily. 3/29/21   Deepa Gallo MD   glimepiride (AMARYL) 4 mg tablet TAKE 1 TABLET BY MOUTH ONCE DAILY BEFORE BREAKFAST 3/18/21   Liz Camargo MD   albuterol (PROVENTIL HFA, VENTOLIN HFA, PROAIR HFA) 90 mcg/actuation inhaler Take 1 Puff by inhalation every four (4) hours as needed for Wheezing. 1/21/21   Rogerio Rodriguez MD   estradioL (ESTRACE) 0.5 mg tablet TAKE 1 TABLET BY MOUTH ONCE DAILY 5/9/20   Provider, Historical   empagliflozin (Jardiance) 25 mg tablet Take 1 Tab by mouth daily. 5/26/20   Liz Camargo MD   omeprazole (PRILOSEC) 20 mg capsule Take 40 mg by mouth Daily (before breakfast). Provider, Historical   dicyclomine (BENTYL) 20 mg tablet Take 1 Tab by mouth every six (6) hours as needed (abdominal cramps) for up to 20 doses. 8/29/19   BEBA Jeong   sertraline (ZOLOFT) 100 mg tablet Take 200 mg by mouth nightly. Provider, Historical   EPINEPHrine (EPIPEN) 0.3 mg/0.3 mL (1:1,000) injection 0.3 mL by IntraMUSCular route once as needed for up to 1 dose. 11/2/15   Fabiana Yao MD   albuterol (PROVENTIL, VENTOLIN) 90 mcg/Actuation inhaler Take 2 Puffs by inhalation every six (6) hours as needed. Other, MD [place]       Patient Active Problem List   Diagnosis Code    Thrush B37.0    Postoperative complication B87. 9XXA    Acute respiratory failure ([address] J96.00    Hypertension I10    Steroid-induced diabetes ([facility] [address] E09.9, T38.0X5A    Diarrhea R19.7    Clostridium difficile diarrhea A04.72    Bronchitis J40    Accelerated hypertension I10    Type 2 diabetes mellitus (HCC) E11.9    Lactic acidosis E87.2    C. difficile colitis A04.72    C. difficile diarrhea A04.72    Mood disorder (HCC) F39    UTI (urinary tract infection) N39.0    Proctitis K62.89    Anaphylaxis T78. 2XXA       REVIEW OF SYSTEMS:    Constitutional: negative fever, negative chills, negative weight loss  Eyes:   negative visual changes  ENT:   negative sore throat, tongue or lip swelling   Respiratory:  negative cough, negative dyspnea  Cards:  negative for chest pain, palpitations, lower extremity edema  GI:   See HPI  :  negative for frequency, dysuria  Integument:  negative for rash and pruritus  Heme:  negative for easy bruising and gum/nose bleeding  Musculoskel: negative for myalgias,  back pain and muscle weakness  Neuro: negative for headaches, dizziness, vertigo  Psych: negative for feelings of anxiety, depression     Objective:   VITALS:    Visit Vitals  /79 (BP Patient Position: At rest)   Pulse (!) 103   Temp 98.3 °F (36.8 °C)   Resp 22   Ht 5' 2\" (1.575 m)   Wt 96.8 kg (213 lb 6.5 oz)   SpO2 95%   BMI 39.03 kg/m²       PHYSICAL EXAM:   General:           Pleasant  female. NAD   Head:               Normocephalic, without obvious abnormality, atraumatic. Eyes:               Conjunctivae clear and pale, anicteric sclerae. Pupils are equal  Nose:               Nares normal. No drainage or sinus tenderness. Throat:             Lips, mucosa, and tongue normal.  No Thrush  Neck:               Supple, symmetrical,  no adenopathy, thyroid: non tender  Back:               Symmetric,  No CVA tenderness. Lungs:             CTA bilaterally. No wheezing/rhonchi/rales. Chest wall:      No tenderness or deformity. No Accessory muscle use. Heart:              Regular rate and rhythm,  no murmur, rub or gallop. Abdomen:        Soft, non-tender. Not distended. Bowel sounds normal. No masses  Extremities:     Atraumatic, No cyanosis. No edema. No clubbing  Skin:                Texture, turgor normal. No rashes/lesions/jaundice  Psych:             Good insight. Not depressed. Not anxious or agitated. Neurologic:      EOMs intact.  No facial asymmetry. No aphasia or slurred speech. Normal strength, A/O X 3. LAB DATA REVIEWED:    Recent Results (from the past 24 hour(s))   CBC WITH AUTOMATED DIFF    Collection Time: 05/05/21  3:01 AM   Result Value Ref Range    WBC 5.6 3.6 - 11.0 K/uL    RBC 4.53 3.80 - 5.20 M/uL    HGB 11.9 11.5 - 16.0 g/dL    HCT 35.9 35.0 - 47.0 %    MCV 79.2 (L) 80.0 - 99.0 FL    MCH 26.3 26.0 - 34.0 PG    MCHC 33.1 30.0 - 36.5 g/dL    RDW 15.2 (H) 11.5 - 14.5 %    PLATELET 190 978 - 218 K/uL    MPV 9.7 8.9 - 12.9 FL    NRBC 0.0 0  WBC    ABSOLUTE NRBC 0.00 0.00 - 0.01 K/uL    NEUTROPHILS 57 32 - 75 %    LYMPHOCYTES 34 12 - 49 %    MONOCYTES 5 5 - 13 %    EOSINOPHILS 3 0 - 7 %    BASOPHILS 0 0 - 1 %    IMMATURE GRANULOCYTES 1 (H) 0.0 - 0.5 %    ABS. NEUTROPHILS 3.2 1.8 - 8.0 K/UL    ABS. LYMPHOCYTES 1.9 0.8 - 3.5 K/UL    ABS. MONOCYTES 0.3 0.0 - 1.0 K/UL    ABS. EOSINOPHILS 0.2 0.0 - 0.4 K/UL    ABS. BASOPHILS 0.0 0.0 - 0.1 K/UL    ABS. IMM. GRANS. 0.0 0.00 - 0.04 K/UL    DF AUTOMATED     METABOLIC PANEL, COMPREHENSIVE    Collection Time: 05/05/21  3:01 AM   Result Value Ref Range    Sodium 138 136 - 145 mmol/L    Potassium 3.0 (L) 3.5 - 5.1 mmol/L    Chloride 104 97 - 108 mmol/L    CO2 26 21 - 32 mmol/L    Anion gap 8 5 - 15 mmol/L    Glucose 202 (H) 65 - 100 mg/dL    BUN 9 6 - 20 MG/DL    Creatinine 0.72 0.55 - 1.02 MG/DL    BUN/Creatinine ratio 13 12 - 20      GFR est AA >60 >60 ml/min/1.73m2    GFR est non-AA >60 >60 ml/min/1.73m2    Calcium 9.1 8.5 - 10.1 MG/DL    Bilirubin, total 0.6 0.2 - 1.0 MG/DL    ALT (SGPT) 29 12 - 78 U/L    AST (SGOT) 17 15 - 37 U/L    Alk.  phosphatase 72 45 - 117 U/L    Protein, total 6.9 6.4 - 8.2 g/dL    Albumin 3.5 3.5 - 5.0 g/dL    Globulin 3.4 2.0 - 4.0 g/dL    A-G Ratio 1.0 (L) 1.1 - 2.2     URINALYSIS W/ REFLEX CULTURE    Collection Time: 05/05/21  3:01 AM    Specimen: Urine   Result Value Ref Range    Color ORANGE      Appearance CLEAR CLEAR      Specific gravity 1.015 1.003 - 1.030 pH (UA) 6.0 5.0 - 8.0      Protein Negative NEG mg/dL    Glucose >1,000 (A) NEG mg/dL    Ketone Negative NEG mg/dL    Bilirubin Negative NEG      Blood Negative NEG      Urobilinogen 1.0 0.2 - 1.0 EU/dL    Nitrites Positive (A) NEG      Leukocyte Esterase Negative NEG      WBC 0-4 0 - 4 /hpf    RBC 0-5 0 - 5 /hpf    Epithelial cells MANY (A) FEW /lpf    Bacteria Negative NEG /hpf    UA:UC IF INDICATED CULTURE NOT INDICATED BY UA RESULT CNI     POC LACTIC ACID    Collection Time: 05/05/21  3:53 AM   Result Value Ref Range    Lactic Acid (POC) 2.65 (HH) 0.40 - 2.00 mmol/L   EKG, 12 LEAD, INITIAL    Collection Time: 05/05/21  5:16 AM   Result Value Ref Range    Ventricular Rate 115 BPM    Atrial Rate 115 BPM    P-R Interval 158 ms    QRS Duration 92 ms    Q-T Interval 358 ms    QTC Calculation (Bezet) 495 ms    Calculated P Axis 41 degrees    Calculated R Axis 34 degrees    Calculated T Axis 50 degrees    Diagnosis       Sinus tachycardia  When compared with ECG of 29-AUG-2020 07:51,  Vent.  rate has increased BY  45 BPM  Confirmed by Juan Luis Sanchez ([id number]) on 5/5/2021 9:38:08 AM     POC LACTIC ACID    Collection Time: 05/05/21  6:42 AM   Result Value Ref Range    Lactic Acid (POC) 2.64 (HH) 0.40 - 2.00 mmol/L       IMAGING RESULTS:  I have personally reviewed the imaging reports      Total time spent with patient: 50 minutes ________________________________________________________________________  Care Plan discussed with:  Patient x   Family     RN x              Consultant:       CT  5/5/2021:  ________________________________________________________________________    ___________________________________________________  Consulting Provider: Gayla Hlil NP      5/5/2021  9:49 AM

## 2021-05-05 NOTE — H&P
[facility]    ICU TEAM Progress Note    Name: Agusto Marion   : 1970   MRN: [id number]   Date: 2021      Assessment/Plan:     1. Allergic rxn w/ anaphylactic features and angioedema  a. Culprit appears to be IV contrast agent  b. H/o hives w/ contrast in past  c. H/o anaphylactic rxn once in the past w/ unknown culprit  d. Benadryl, Pepcid, SoluMed  e. Prn EpiPen  f. BP currently w/out compromise currently  g. Supportive care  2. Acute hypoxic respiratory failure d/t airway obstruction d/t angioedema  a. Airway monitoring  b. Supplemental O2 as needed  c. Intubation if needed - able to handle oral secretions currently  d. Nebs, bronchodilators  3. Ulcerative colitis, likely flare w/ h/o colovaginal fistula  a. Request eval by pt's GI and ColoRectal physicians   b. Pain control  c. Steroids  4. Minimally elevated lactate  a. Sepsis possible, but unlikely  b. IVFs prn  5. DM, chronic steroid-induced  a. Insulni  6. COVID: low suspicion  7. SBP Goal of: > 90 mmHg  8. MAP Goal of: > 65 mmHg  9. IVFs: prn  10. Transfusion Trigger (Hgb): <7 g/dL  11. Respiratory Goals:  a. Chlorhexidine   b. Incentive spirometry  12. Pulmonary toilet: Duo-Nebs   13. SpO2 Goal: > 92%  14. Keep K>4; Mg>2   15. PT/OT: PT consulted and on board, OT consulted and on board and Speech therapy consulted and on board   16. Discussed Plan of Care/Code Status: Full Code  17. Discussed Care Plan with Bedside RN  18. Documentation of Current Medications  19.  Further as below:    F - Feeding:  No npo  A - Analgesia: Acetaminophen and morphine  S - Sedation: N/A  T - DVT Prophylaxis: Lovenox   H - Head of Bed: > 30 Degrees  U - Ulcer Prophylaxis: Pepcid (famotidine)   G - Glycemic Control: Insulin  S - Spontaneous Breathing Trial: N/A  B - Bowel Regimen: None needed at this time  I - Indwelling Catheter:   Tubes: None  Lines: Peripheral IV  Drains: None  D - De-escalation of Antibiotics: as above    Multidisciplinary Rounds Completed: Yes    ABCDEF Bundle/Checklist Completed:  Yes    SPECIAL EQUIPMENT  None    DISPOSITION  Stay in ICU    Subjective:   Progress Note: 5/5/2021      Reason for ICU Admission: angioedema and anaphylactic reaction     HPI: 51F w/ h/o UC, colovaginal fistula s/p repair, multiple other interventions p/w worsening rectal/anal pain past 3 days c/w flare. Recent steroid use for flare. States currently scheduled for surgical procedure on Monday next week. H/o hives w/ contrast in past. CT a/p w/ con done in ED (resulted NAD), subsequently pt w/ hives that progressed rapidly to angioedema, tongue/lip swelling, dyspnea, dysphagia. BP dropped as well. Pt rec'd usual tx w/ epi SC, steroids, pepcid, benadryl w/ good effect. Currently feeling a little better. BP improved. Tongue still quite swollen. Able to speak now, but slurred d/t swelling. Itching improved. Dysphagia improved, but remains. Overnight Events: HPI      POD:  * No surgery found *    S/P:       Active Problem List:     Problem List  Date Reviewed: 1/21/2021          Codes Class    Anaphylaxis ICD-10-CM: T78. 2XXA  ICD-9-CM: 995.0         UTI (urinary tract infection) ICD-10-CM: N39.0  ICD-9-CM: 599.0         Proctitis ICD-10-CM: K62.89  ICD-9-CM: 569.49         Mood disorder ([facility] ICD-10-CM: F39  ICD-9-CM: 296.90         C. difficile diarrhea ICD-10-CM: A04.72  ICD-9-CM: 008.45         Lactic acidosis ICD-10-CM: E87.2  ICD-9-CM: 276.2         C. difficile colitis ICD-10-CM: A04.72  ICD-9-CM: 008.45         Clostridium difficile diarrhea ICD-10-CM: A04.72  ICD-9-CM: 008.45         Bronchitis ICD-10-CM: J40  ICD-9-CM: 56         Accelerated hypertension ICD-10-CM: I10  ICD-9-CM: 401.0         Type 2 diabetes mellitus (HCC) ICD-10-CM: E11.9  ICD-9-CM: 250.00         Diarrhea ICD-10-CM: R19.7  ICD-9-CM: 787.91         Thrush ICD-10-CM: B37.0  ICD-9-CM: 112.0         Postoperative complication GBM-42-WO: T87. 9XXA  ICD-9-CM: 998.9     Overview Signed 11/9/2015  3:35 PM by Inocente Duong MD     S/p tonsillectomy, now with difficulty swallowing, breathing, throat pain, fever             Acute respiratory failure ([facility] ICD-10-CM: J96.00  ICD-9-CM: 518.81         Hypertension ICD-10-CM: I10  ICD-9-CM: 401.9         Steroid-induced diabetes ([facility] (Chronic) ICD-10-CM: E09.9, T38.0X5A  ICD-9-CM: 249.00, E980.4               Past Medical History:      has a past medical history of Anal fissure, Anisocoria, Asthma, Back pain, Cerumen impaction, Chronic kidney disease, Coagulation defects, Colovaginal fistula, Diabetes ([address] Diverticulitis, Diverticulosis, Enlarged tonsils, Frequent UTI, GERD (gastroesophageal reflux disease), H/O endoscopy, HA (headache), Hepatic steatosis, History of colon resection, colonoscopy with polypectomy, Hypertension, Ill-defined condition, Ill-defined condition, Morbid obesity ([address] Nausea & vomiting, Obesity, Otitis media, Pneumonia, Psychiatric disorder, Recurrent tonsillitis, Sinusitis, Transfusion history (~ age 35), and Urticaria. She also has no past medical history of Adverse effect of anesthesia, Difficult intubation, Malignant hyperthermia due to anesthesia, or Pseudocholinesterase deficiency. Past Surgical History:      has a past surgical history that includes hx gyn; hx gyn; hx pelvic laparoscopy; hx farheen and bso; hx other surgical (11/12); hx heent; hx heent; hx urological (7/31/12); pr abdomen surgery proc unlisted (01/06/2018); hx breast reduction (1992); colonoscopy (N/A, 3/28/2019); hx gi (7/31/12); and colonoscopy (N/A, 10/2/2020). Home Medications:     Prior to Admission medications    Medication Sig Start Date End Date Taking? Authorizing Provider   ALPRAZolam Larnell Satchel) 1 mg tablet Take 0.5-1 Tabs by mouth two (2) times daily as needed for Anxiety. Max Daily Amount: 2 mg. 4/20/21   Evi Johnson MD   gabapentin (NEURONTIN) 300 mg capsule Take 1 Cap by mouth nightly.  Max Daily Amount: 300 mg. 4/7/21 Jb Yung MD   losartan-hydroCHLOROthiazide [facility]) 100-12.5 mg per tablet Take 1 Tab by mouth daily. 3/29/21   Jb Yung MD   glimepiride (AMARYL) 4 mg tablet TAKE 1 TABLET BY MOUTH ONCE DAILY BEFORE BREAKFAST 3/18/21   Vick Washburn MD   albuterol (PROVENTIL HFA, VENTOLIN HFA, PROAIR HFA) 90 mcg/actuation inhaler Take 1 Puff by inhalation every four (4) hours as needed for Wheezing. 1/21/21   Tangela Lindo MD   estradioL (ESTRACE) 0.5 mg tablet TAKE 1 TABLET BY MOUTH ONCE DAILY 5/9/20   Provider, Historical   empagliflozin (Jardiance) 25 mg tablet Take 1 Tab by mouth daily. 5/26/20   Vick Washburn MD   omeprazole (PRILOSEC) 20 mg capsule Take 40 mg by mouth Daily (before breakfast). Provider, Historical   dicyclomine (BENTYL) 20 mg tablet Take 1 Tab by mouth every six (6) hours as needed (abdominal cramps) for up to 20 doses. 8/29/19   BEBA Marcus   sertraline (ZOLOFT) 100 mg tablet Take 200 mg by mouth nightly. Provider, Historical   EPINEPHrine (EPIPEN) 0.3 mg/0.3 mL (1:1,000) injection 0.3 mL by IntraMUSCular route once as needed for up to 1 dose. 11/2/15   Fabiana Yao MD   albuterol (PROVENTIL, VENTOLIN) 90 mcg/Actuation inhaler Take 2 Puffs by inhalation every six (6) hours as needed. Other, MD [place]       Allergies/Social/Family History: Allergies   Allergen Reactions    Aspirin Hives and Shortness of Breath    Codeine Hives, Itching and Angioedema     Pt had itching in mouth, on face and lips    Contrast Agent [Iodine] Hives, Itching and Angioedema     5/5/21: pt was given benadryl and solu-medrol prior to administration but pt had severe tongue swelling and drooling, lethargy and itching.  DO NOT GIVE PT    Flavoring Agent Anaphylaxis    Percocet [Oxycodone-Acetaminophen] Hives, Itching and Angioedema     Pt had itching in mouth, on face and lips    Prilosec [Omeprazole Magnesium] Anaphylaxis     CHERRY FLAVORED ODT; PT TAKES REGULAR PRILOSEC AND IS OK    Dilaudid [Hydromorphone] Itching     Tolerates with benadryl    Ketorolac Rash     \"makes my eyes spasm and causes rash on my hands\" and \"makes my skin itch and makes me nervous\"    Fentanyl Rash and Itching     Has had benadryl before    Morphine Itching     Severe itching. Tolerates with Benadryl      Social History     Tobacco Use    Smoking status: Never Smoker    Smokeless tobacco: Never Used   Substance Use Topics    Alcohol use: Yes     Comment: Rarely      Family History   Problem Relation Age of Onset    Diabetes Mother     Cancer Mother         NON-HODGKINS LYMPHOMA    Anesth Problems Mother         PONV    Diabetes Father     Heart Disease Father         CAD - STENTS, PACEMAKER    Arrhythmia Father        Review of Systems:     A comprehensive review of systems was negative except for that written in the HPI. Objective:   Vital Signs:  Visit Vitals  /76 (BP Patient Position: At rest)   Pulse (!) 108   Temp 98.3 °F (36.8 °C)   Resp 21   Ht 5' 2\" (1.575 m)   Wt 96.8 kg (213 lb 6.5 oz)   SpO2 95%   BMI 39.03 kg/m²    O2 Flow Rate (L/min): 4 l/min O2 Device: Nasal cannula Temp (24hrs), Av °F (36.7 °C), Min:97.6 °F (36.4 °C), Max:98.3 °F (36.8 °C)           Intake/Output:   No intake or output data in the 24 hours ending 21 0841    Physical Exam:    General:  Alert, cooperative, well noursished, well developed, appears stated age   Eyes:  Sclera anicteric. Pupils equally round and reactive to light. Mouth/Throat: Lips, tongue enlarged, swollen, dry, unable to see posterior o/p (Mallampati 4), able to speak in full sentences, but slurred   Neck: Swollen, supple   Lungs:   Course, diminished, sparse wheeze   CV:  Regular rate and rhythm,no murmur, click, rub or gallop   Abdomen:   Obese, soft, non-tender.  bowel sounds normal. non-distended   Extremities: No cyanosis or edema   Skin: Flushed, splotchy   Lymph nodes: Cervical and supraclavicular normal   Musculoskeletal: No swelling or deformity   Lines/Devices:  Intact, no erythema, drainage or tenderness   Neuro/Psych: Awake, alert, oriented, follows commands, FELIX, symmetrical, nonfocal       LABS AND  DATA: Personally reviewed  Recent Labs     05/05/21 0301   WBC 5.6   HGB 11.9   HCT 35.9        Recent Labs     05/05/21 0301      K 3.0*      CO2 26   BUN 9   CREA 0.72   *   CA 9.1     Recent Labs     05/05/21 0301   AP 72   TP 6.9   ALB 3.5   GLOB 3.4     No results for input(s): INR, PTP, APTT, INREXT in the last 72 hours. No results for input(s): PHI, PCO2I, PO2I, FIO2I in the last 72 hours. No results for input(s): CPK, CKMB, TROIQ, BNPP in the last 72 hours. Hemodynamics:   PAP:   CO:     Wedge:   CI:     CVP:    SVR:       PVR:       Ventilator Settings:  Mode Rate Tidal Volume Pressure FiO2 PEEP                    Peak airway pressure:      Minute ventilation:          MEDS: Reviewed    Chest X-Ray:  CXR Results  (Last 48 hours)    None          Images:   CT a/p 5/5  IMPRESSION  No acute abdominal or pelvic pathology. No significant change. ECHO:  No priors available    CRITICAL CARE CONSULTANT NOTE  I had a face to face encounter with the patient, reviewed and interpreted patient data including clinical events, labs, images, vital signs, I/O's, and examined patient. I have discussed the case and the plan and management of the patient's care with the consulting services, the bedside nurses and the respiratory therapist.      NOTE OF PERSONAL INVOLVEMENT IN CARE   This patient has a high probability of imminent, clinically significant deterioration, which requires the highest level of preparedness to intervene urgently. I participated in the decision-making and personally managed or directed the management of the following life and organ supporting interventions that required my frequent assessment to treat or prevent imminent deterioration.     I personally spent 31 minutes of critical care time. This is time spent at this critically ill patient's bedside actively involved in patient care as well as the coordination of care and discussions with the patient's family. This does not include any procedural time which has been billed separately.     [facility] Critical Care  5/5/2021

## 2021-05-05 NOTE — ROUTINE PROCESS
Patient is being transferred to [facility] Critical Care 1, Room # [phone]. Report given to Jacinda Tidwell on [facility] for routine progression of care. Report consisted of the following information SBAR. Patient transferred to receiving unit by: Elana Dye RN (RN or tech name). Outstanding consults needed: No     Next labs due: Yes    The following personal items will be sent with the patient during transfer to the floor:     All valuables:    Cardiac monitoring ordered: Yes    The following CURRENT information was reported to the receiving RN:    Code status: Full Code at time of transfer    Last set of vital signs:  Vital Signs  Level of Consciousness: Alert (0) (05/05/21 1000)  Temp: 98.3 °F (36.8 °C) (05/05/21 0800)  Temp Source: Oral (05/05/21 0800)  Pulse (Heart Rate): (!) 109 (05/05/21 1000)  Heart Rate Source: Monitor (05/05/21 1000)  Cardiac Rhythm: Sinus Tachy (05/05/21 0800)  Resp Rate: 23 (05/05/21 1000)  BP: (!) 147/88 (05/05/21 1000)  MAP (Monitor): 106 (05/05/21 1000)  MAP (Calculated): 108 (05/05/21 1000)  BP 1 Location: Left upper arm (05/05/21 0228)  BP 1 Method: Automatic (05/05/21 1000)  BP Patient Position: At rest (05/05/21 1000)  MEWS Score: 3 (05/05/21 0800)         Oxygen Therapy  O2 Sat (%): 96 % (05/05/21 1000)  Pulse via Oximetry: 109 beats per minute (05/05/21 1000)  O2 Device: Nasal cannula (05/05/21 1000)  O2 Flow Rate (L/min): 4 l/min (05/05/21 1000)      Last pain assessment:  Pain 1  Pain Scale 1: Numeric (0 - 10)  Pain Intensity 1: 4  Patient Stated Pain Goal: 2  Pain Onset 1: 3 days  Pain Location 1: Abdomen, Rectal  Pain Orientation 1: Left, Mid  Pain Description 1: Constant  Pain Intervention(s) 1: Therapeutic presence      Wounds: No     Urinary catheter: voiding  Is there a mcmullen order: No     LDAs:       Peripheral IV 05/05/21 Right Hand (Active)   Site Assessment Clean, dry, & intact 05/05/21 0317   Phlebitis Assessment 0 05/05/21 0317   Infiltration Assessment 0 05/05/21 0317   Dressing Status Clean, dry, & intact 05/05/21 0317   Dressing Type Transparent; Topical skin adhesive 05/05/21 0317   Hub Color/Line Status Pink;Patent; Flushed;Capped 05/05/21 0317   Action Taken Blood drawn 05/05/21 0317       Peripheral IV 05/05/21 Left Arm (Active)   Site Assessment Clean, dry, & intact 05/05/21 0512   Phlebitis Assessment 0 05/05/21 0512   Infiltration Assessment 0 05/05/21 0512   Dressing Status Clean, dry, & intact 05/05/21 0512   Dressing Type Tape;Transparent 05/05/21 1640         Opportunity for questions and clarification was provided.     Cm Stafford RN

## 2021-05-05 NOTE — ED PROVIDER NOTES
EMERGENCY DEPARTMENT HISTORY AND PHYSICAL EXAM      Date: 5/5/2021  Patient Name: Ulices Vera    History of Presenting Illness     Chief Complaint   Patient presents with    Melena     x 3 days with rectal pain, hx of ulcerative colitis; pt reports that she is scheduled for surgery on Monday to have a sphincterotomy; also hemorrhoidectomy 2 months ago    Nausea    Abdominal Pain     mid to L sided Abd pain x 3 days       History Provided By: Patient    HPI: Ulices Vera, 46 y.o. female with PMHx significant for ulcerative colitis, hypertension, diabetes, asthma, status post colon resection for colovaginal fistula and recent hemorrhoidectomy and Botox injection of anal fissure/sphincter in March presents with 3 days of severe lower abdominal pain that is 9 out of 10. She also has pain in her rectum that she describes as a tightness and throbbing. She has had nausea for the last 24 hours, but denies any vomiting. She reports chills but no fever. Denies any dysuria or hematuria. She recently stopped her prednisone about 2 days ago. She is scheduled for sphincterotomy with Nik Yost from colorectal surgery on Monday. She is concerned that she may be having a flare of her ulcerative colitis because her pain is so much worse than usual.  Reports her pain is worse when she is sitting. PCP: Freya Dave MD    No current facility-administered medications on file prior to encounter. Current Outpatient Medications on File Prior to Encounter   Medication Sig Dispense Refill    ALPRAZolam (XANAX) 1 mg tablet Take 0.5-1 Tabs by mouth two (2) times daily as needed for Anxiety. Max Daily Amount: 2 mg. 60 Tab 0    gabapentin (NEURONTIN) 300 mg capsule Take 1 Cap by mouth nightly. Max Daily Amount: 300 mg. 30 Cap 1    losartan-hydroCHLOROthiazide (HYZAAR) 100-12.5 mg per tablet Take 1 Tab by mouth daily.  90 Tab 1    glimepiride (AMARYL) 4 mg tablet TAKE 1 TABLET BY MOUTH ONCE DAILY BEFORE BREAKFAST 90 Tab 0    albuterol (PROVENTIL HFA, VENTOLIN HFA, PROAIR HFA) 90 mcg/actuation inhaler Take 1 Puff by inhalation every four (4) hours as needed for Wheezing. 1 Inhaler 1    estradioL (ESTRACE) 0.5 mg tablet TAKE 1 TABLET BY MOUTH ONCE DAILY      empagliflozin (Jardiance) 25 mg tablet Take 1 Tab by mouth daily. 90 Tab 3    omeprazole (PRILOSEC) 20 mg capsule Take 40 mg by mouth Daily (before breakfast).  dicyclomine (BENTYL) 20 mg tablet Take 1 Tab by mouth every six (6) hours as needed (abdominal cramps) for up to 20 doses. 20 Tab 0    sertraline (ZOLOFT) 100 mg tablet Take 200 mg by mouth nightly.  EPINEPHrine (EPIPEN) 0.3 mg/0.3 mL (1:1,000) injection 0.3 mL by IntraMUSCular route once as needed for up to 1 dose. 0.3 mL 1    albuterol (PROVENTIL, VENTOLIN) 90 mcg/Actuation inhaler Take 2 Puffs by inhalation every six (6) hours as needed.          Past History     Past Medical History:  Past Medical History:   Diagnosis Date    Anal fissure     Anisocoria     Asthma     LAST EPISODE     Back pain     Cerumen impaction     Chronic kidney disease     hx uti in past    Coagulation defects     ocassional rectal bleeding due to anal fissure    Colovaginal fistula     Diabetes (HCC)     NIDDM    Diverticulitis     Diverticulosis     Enlarged tonsils     Frequent UTI     GERD (gastroesophageal reflux disease)     H/O endoscopy     with dilation    HA (headache)     Hepatic steatosis     History of colon resection     Hx of colonoscopy with polypectomy     benign    Hypertension     Ill-defined condition     FREQUENT HIVES    Ill-defined condition     HX ELEVATED LIVER ENZYMES    Morbid obesity (HCC)     Nausea & vomiting     during diverticulitis flare    Obesity     Otitis media     Pneumonia     about 15 yrs ago    Psychiatric disorder     ANXIETY    Recurrent tonsillitis     Sinusitis     Transfusion history ~ age 35    postop hysterectomy    Urticaria        Past Surgical History:  Past Surgical History:   Procedure Laterality Date    COLONOSCOPY N/A 3/28/2019    COLONOSCOPY performed by Lauren Mckinney MD at [address] [facility] COLONOSCOPY N/A 10/2/2020    COLONOSCOPY performed by Lauren Mckinney MD at [address] [facility] [facility]    [place].  HX GI  12    LAPAROSCOPIC HAND ASSISTED  POSS OPEN SIGMOID COLECTOMY POSS TEMPORARY DIVERTING LOOP ILEOSTOMY;  (no illeostomy needed)    HX GYN           HX GYN      cervical conization    HX HEENT      SINUS SURGERY LEFT X2    HX HEENT      SINUS SURGERY ON RIGHT X2    HX OTHER SURGICAL      Sphincterotomy    HX PELVIC LAPAROSCOPY      HX EMMANUEL AND BSO      HX UROLOGICAL  12     CYSTOSCOPY INSERTION URETERAL CATHETERS - Cystoscopy Insertion of bilateral ureteral stents    DE ABDOMEN SURGERY PROC UNLISTED  2018    [facility] repair [facility]       Family History:  Family History   Problem Relation Age of Onset    Diabetes Mother     Cancer Mother         NON-HODGKINS LYMPHOMA    Anesth Problems Mother         PONV    Diabetes Father     Heart Disease Father         CAD - STENTS, PACEMAKER    Arrhythmia Father        Social History:  Social History     Tobacco Use    Smoking status: Never Smoker    Smokeless tobacco: Never Used   Substance Use Topics    Alcohol use: Yes     Comment: Rarely    Drug use: No     Types: Prescription, OTC       Allergies: Allergies   Allergen Reactions    Aspirin Hives and Shortness of Breath    Codeine Hives, Itching and Angioedema     Pt had itching in mouth, on face and lips    Contrast Agent [Iodine] Hives, Itching and Angioedema     Pt. had itching in mouth, on face and lips after IV contrast with the last exam.  Benadryl was given.  OK if premedicated with benadryl and solumedrol 1h before    Flavoring Agent Anaphylaxis    Percocet [Oxycodone-Acetaminophen] Hives, Itching and Angioedema     Pt had itching in mouth, on face and lips    Prilosec [Omeprazole Magnesium] Anaphylaxis     CHERRY FLAVORED ODT; PT TAKES REGULAR PRILOSEC AND IS OK    Dilaudid [Hydromorphone] Itching     Tolerates with benadryl    Ketorolac Rash     \"makes my eyes spasm and causes rash on my hands\" and \"makes my skin itch and makes me nervous\"    Fentanyl Rash and Itching     Has had benadryl before    Morphine Itching     Severe itching. Tolerates with Benadryl         Review of Systems   Review of Systems   Constitutional: Positive for appetite change and chills. Negative for fever. HENT: Negative for congestion, rhinorrhea and sore throat. Eyes: Negative. Respiratory: Negative for cough, chest tightness and shortness of breath. Cardiovascular: Negative for chest pain and palpitations. Gastrointestinal: Positive for abdominal pain, blood in stool, diarrhea, nausea and rectal pain. Negative for vomiting. Genitourinary: Positive for dysuria and pelvic pain. Negative for flank pain and hematuria. Musculoskeletal: Negative for back pain, myalgias and neck pain. Skin: Negative for rash and wound. Neurological: Negative for dizziness, syncope, light-headedness and headaches. Psychiatric/Behavioral: Negative for confusion. The patient is nervous/anxious.           Physical Exam   General appearance -overweight well appearing, and i moderate pain distress   eyes - pupils equal and reactive, extraocular eye movements intact  ENT - mucous membranes moist, pharynx normal without lesions  Neck - supple, no significant adenopathy; non-tender to palpation  Chest - clear to auscultation, no wheezes, rales or rhonchi; non-tender to palpation  Heart - normal rate and regular rhythm, S1 and S2 normal, no murmurs noted  Abdomen - soft, generalized abdominal tenderness, worse across lower abdomen, no rebound or guarding nondistended, no masses or organomegaly  Rectal-tender to palpation, no visible anal fissures, pinkish material in rectal vault  Musculoskeletal - no joint tenderness, deformity or swelling; normal ROM  Extremities - peripheral pulses normal, no pedal edema  Skin - normal coloration and turgor, no rashes  Neurological - alert, oriented x3, normal speech, no focal findings or movement disorder noted    Diagnostic Study Results     Labs -     Recent Results (from the past 12 hour(s))   CBC WITH AUTOMATED DIFF    Collection Time: 05/05/21  3:01 AM   Result Value Ref Range    WBC 5.6 3.6 - 11.0 K/uL    RBC 4.53 3.80 - 5.20 M/uL    HGB 11.9 11.5 - 16.0 g/dL    HCT 35.9 35.0 - 47.0 %    MCV 79.2 (L) 80.0 - 99.0 FL    MCH 26.3 26.0 - 34.0 PG    MCHC 33.1 30.0 - 36.5 g/dL    RDW 15.2 (H) 11.5 - 14.5 %    PLATELET 664 982 - 238 K/uL    MPV 9.7 8.9 - 12.9 FL    NRBC 0.0 0  WBC    ABSOLUTE NRBC 0.00 0.00 - 0.01 K/uL    NEUTROPHILS 57 32 - 75 %    LYMPHOCYTES 34 12 - 49 %    MONOCYTES 5 5 - 13 %    EOSINOPHILS 3 0 - 7 %    BASOPHILS 0 0 - 1 %    IMMATURE GRANULOCYTES 1 (H) 0.0 - 0.5 %    ABS. NEUTROPHILS 3.2 1.8 - 8.0 K/UL    ABS. LYMPHOCYTES 1.9 0.8 - 3.5 K/UL    ABS. MONOCYTES 0.3 0.0 - 1.0 K/UL    ABS. EOSINOPHILS 0.2 0.0 - 0.4 K/UL    ABS. BASOPHILS 0.0 0.0 - 0.1 K/UL    ABS. IMM. GRANS. 0.0 0.00 - 0.04 K/UL    DF AUTOMATED     METABOLIC PANEL, COMPREHENSIVE    Collection Time: 05/05/21  3:01 AM   Result Value Ref Range    Sodium 138 136 - 145 mmol/L    Potassium 3.0 (L) 3.5 - 5.1 mmol/L    Chloride 104 97 - 108 mmol/L    CO2 26 21 - 32 mmol/L    Anion gap 8 5 - 15 mmol/L    Glucose 202 (H) 65 - 100 mg/dL    BUN 9 6 - 20 MG/DL    Creatinine 0.72 0.55 - 1.02 MG/DL    BUN/Creatinine ratio 13 12 - 20      GFR est AA >60 >60 ml/min/1.73m2    GFR est non-AA >60 >60 ml/min/1.73m2    Calcium 9.1 8.5 - 10.1 MG/DL    Bilirubin, total 0.6 0.2 - 1.0 MG/DL    ALT (SGPT) 29 12 - 78 U/L    AST (SGOT) 17 15 - 37 U/L    Alk.  phosphatase 72 45 - 117 U/L    Protein, total 6.9 6.4 - 8.2 g/dL    Albumin 3.5 3.5 - 5.0 g/dL    Globulin 3.4 2.0 - 4.0 g/dL A-G Ratio 1.0 (L) 1.1 - 2.2     URINALYSIS W/ REFLEX CULTURE    Collection Time: 05/05/21  3:01 AM    Specimen: Urine   Result Value Ref Range    Color ORANGE      Appearance CLEAR CLEAR      Specific gravity 1.015 1.003 - 1.030      pH (UA) 6.0 5.0 - 8.0      Protein Negative NEG mg/dL    Glucose >1,000 (A) NEG mg/dL    Ketone Negative NEG mg/dL    Bilirubin Negative NEG      Blood Negative NEG      Urobilinogen 1.0 0.2 - 1.0 EU/dL    Nitrites Positive (A) NEG      Leukocyte Esterase Negative NEG      WBC 0-4 0 - 4 /hpf    RBC 0-5 0 - 5 /hpf    Epithelial cells MANY (A) FEW /lpf    Bacteria Negative NEG /hpf    UA:UC IF INDICATED CULTURE NOT INDICATED BY UA RESULT CNI     POC LACTIC ACID    Collection Time: 05/05/21  3:53 AM   Result Value Ref Range    Lactic Acid (POC) 2.65 (HH) 0.40 - 2.00 mmol/L   EKG, 12 LEAD, INITIAL    Collection Time: 05/05/21  5:16 AM   Result Value Ref Range    Ventricular Rate 115 BPM    Atrial Rate 115 BPM    P-R Interval 158 ms    QRS Duration 92 ms    Q-T Interval 358 ms    QTC Calculation (Bezet) 495 ms    Calculated P Axis 41 degrees    Calculated R Axis 34 degrees    Calculated T Axis 50 degrees    Diagnosis       Sinus tachycardia  When compared with ECG of 29-AUG-2020 07:51,  Vent. rate has increased BY  45 BPM  Nonspecific T wave abnormality no longer evident in Inferior leads     POC LACTIC ACID    Collection Time: 05/05/21  6:42 AM   Result Value Ref Range    Lactic Acid (POC) 2.64 (HH) 0.40 - 2.00 mmol/L       Radiologic Studies -   CT ABD PELV W CONT   Final Result   No acute abdominal or pelvic pathology. No significant change. CT Results  (Last 48 hours)               05/05/21 0504  CT ABD PELV W CONT Final result    Impression:  No acute abdominal or pelvic pathology. No significant change. Narrative:  EXAM: CT ABD PELV W CONT       INDICATION: abd pain, rectal pain, hx UC       COMPARISON: CT January 2021        CONTRAST: 100 mL of Isovue-370. TECHNIQUE:    Following the uneventful intravenous administration of contrast, thin axial   images were obtained through the abdomen and pelvis. Coronal and sagittal   reconstructions were generated. Oral contrast was not administered. CT dose   reduction was achieved through use of a standardized protocol tailored for this   examination and automatic exposure control for dose modulation. FINDINGS:    LOWER THORAX: No significant abnormality in the incidentally imaged lower chest.   LIVER: No mass. BILIARY TREE: Status post cholecystectomy. CBD is not dilated. SPLEEN: Chronic splenomegaly   PANCREAS: No mass or ductal dilatation. ADRENALS: Unremarkable. KIDNEYS: No mass, calculus, or hydronephrosis. STOMACH: Unremarkable. SMALL BOWEL: No dilatation or wall thickening. COLON: No dilatation or wall thickening. Postsurgical changes of sigmoid colon   anastomosis. No complicating features. APPENDIX: Normal   PERITONEUM: No ascites or pneumoperitoneum. RETROPERITONEUM: No lymphadenopathy or aortic aneurysm. REPRODUCTIVE ORGANS: Status post hysterectomy   URINARY BLADDER: No mass or calculus. BONES: No destructive bone lesion. ABDOMINAL WALL: No mass or hernia. ADDITIONAL COMMENTS: N/A               CXR Results  (Last 48 hours)    None            Medical Decision Making   I am the first provider for this patient. I reviewed the vital signs, available nursing notes, past medical history, past surgical history, family history and social history. Vital Signs-Reviewed the patient's vital signs.   Patient Vitals for the past 12 hrs:   Temp Pulse Resp BP SpO2   05/05/21 0645 -- (!) 101 19 (!) 149/85 94 %   05/05/21 0615 -- 94 19 (!) 154/70 96 %   05/05/21 0545 -- 100 19 132/73 94 %   05/05/21 0535 -- (!) 102 20 (!) 140/79 95 %   05/05/21 0520 -- (!) 116 21 126/83 93 %   05/05/21 0511 -- (!) 129 24 (!) 68/24 (!) 88 %   05/05/21 0506 -- (!) 128 24 -- 91 %   05/05/21 0500 -- -- -- Susanne Graham 93/34 --   05/05/21 0430 -- 73 21 120/77 90 %   05/05/21 0400 -- -- -- 126/68 95 %   05/05/21 0228 97.6 °F (36.4 °C) 72 16 (!) 162/87 100 %       EKG: At 5:18 AM on May 5, 2021 interpreted by me: Sinus tachycardia, 115 bpm, normal axis, normal ND, QRS intervals, mildly prolonged QTc interval, no ischemic changes    Records Reviewed: Nursing Notes and Old Medical Records    Provider Notes (Medical Decision Making):   Differential diagnosis: Ulcerative colitis flare, small bowel obstruction, constipation, diverticulitis, perirectal abscess  We will obtain CBC, CMP, lactate, UA, and abdominal pelvis CT. ED Course:   Initial assessment performed. The patients presenting problems have been discussed, and they are in agreement with the care plan formulated and outlined with them. I have encouraged them to ask questions as they arise throughout their visit. Progress Notes:    ED Course as of May 05 0752   Wed May 05, 2021   4132 Patient returned from CT scan complaining of difficulty breathing and tongue swelling. She had already received premedication with Solu-Medrol 125 and Benadryl 50 mg 1 hour prior to the scan due to history of hives with IV contrast. ( Patient stated that she has tolerated IV contrast in the past with premedication.)  When she came back from CT, I ordered an additional 125 mg of Solu-Medrol and 25 mg of Benadryl, along with Pepcid 20 mg and epi 0.3 mg IM. Blood pressures dropped to 08D systolic and second IV was started. 2 L normal saline ordered. Patient complained of difficulty breathing, and wheezing noted on lung exam..  Albuterol nebs ordered x2. Patient seemed to gradually be improving, although tongue swelling was persistent. Patient complained of chest pressure. EKG ordered which showed sinus tachycardia, but no ST changes. Another epi 0.3 mg IM ordered about 15 minutes after the first dose. Patient monitored closely. Seemed to be improving slightly over the next 15 to 20 minutes. [AO]   [id number]  patient has been stable for the last hour to an hour and a half.she reports feeling much better. Blood pressure has stabilized. She is no longer diaphoretic. Heart rate has come down to the 90s, but still has some tongue swelling. Will admit to intensive care unit. Case discussed with intensivist (Dr Shahida Edouard) who will see and admit the patient.    [AO]      ED Course User Index  [AO] Fabiana Yao MD     CRITICAL CARE NOTE :          IMPENDING DETERIORATION -Airway, Respiratory, Cardiovascular and CNS    ASSOCIATED RISK FACTORS - Hypotension, Shock, Hypoxia and Dysrhythmia    MANAGEMENT- Bedside Assessment and Supervision of Care    INTERPRETATION -  Xrays, CT Scan, ECG and Blood Pressure    INTERVENTIONS - hemodynamic mngmt, Metobolic interventions and fluid resuscitations and IM Epi    CASE REVIEW - Hospitalist/Intensivist and Nursing    TREATMENT RESPONSE -Improved    PERFORMED BY - Self        NOTES   :      I have spent 75 minutes of critical care time involved in lab review, consultations with specialist, family decision- making, bedside attention and documentation. During this entire length of time I was immediately available to the patient . Jinny Beltran MD        Disposition:  Admit to ICU      Diagnosis     Clinical Impression:   1. Abdominal pain, lower    2. Anaphylactic reaction to contrast media    3.  Angioedema, initial encounter

## 2021-05-05 NOTE — ED NOTES
Pt states that she has hx of hemorrhoids, UC, anal fissures, diverticulitis, and gi issues, pt is scheduled to have a procedure Monday but began having melena and nausea yesterday. Pt states she thinks she may have a UTI due to pressure in her lower abdomnen, pt does c/o of LLQ lower abdomen pain. Pt is a/o x4, with call bell within reach. Pt states that she has had contrast and morphine previously but is given solumedrol and benedryl to help prevent reactions. Pt has only had minor itching with these allergies before and has tolerated them well with the benadryl. 0505: pt returned from ct at this time, pt c/o tongue swelling and itchiness. Sy Burnette MD at bedside with this nurse at this time. Pt desating to 85% on RA at this time. Pt placed on neb with O2 at 5 L. Pt is able to verbalize and is alert at this time but having difficulty speaking due to tongue swelling. Pt c/o chest burning at this time, EKG done at bedside. Pt states she has never had a reaction like this before just slight itching. Per Sy Burnette MD, \"do not ever do contrast again, no matter what anyone tells you\". 0545: pt resting in bed with 4L NC placed with sats 93%. Pt states her swelling is not getting worse and is just having pain in her anal area. This nurse is at bedside monitoring pt.

## 2021-05-05 NOTE — ED NOTES
Report received, care assumed, pt resting quietly with eyes closed but is easy to waken, no obvious distress noted, note tongue remains swollen but per n-shift RN swelling is better. Pt is able to speak and control oral secretions.

## 2021-05-05 NOTE — ED NOTES
Pt assisted to [address] to void and returned to stretcher without incident or complaint. Note external cath to have leaked at urinary site. Linen changed and pt did self pericare with RN in room. 18-Oct-2018

## 2021-05-05 NOTE — PROGRESS NOTES
1045  report called to ER, Lurena Schilder, [facility]    1130 patient in room, Dr. Dulce Butler notified that patient is in room. Assessment completed at this time. 1200 STAND completed on patient, patient stated that she feels the eshogus narrowing but is able to swallow without difficulty.     1355 pain and nausea medication given    1445 Patient up in chair at this time    1500 Dr. Tej Pollack at bedside, plan of care discussed, updates given    [phone] Dr. Dulce Butler at bedside, updates given, orders received for clear liquid diet    1540 assisted back to bed by Sushila Hendricks RN    1600 evening assessment at this time    1900 BSR given to Rob Olmstead, [place]

## 2021-05-05 NOTE — ED NOTES
Pt talking with family member on cell phone.   Pt asking for ice chips, pt informed that tongue swelling needs to decrease first.

## 2021-05-05 NOTE — PROGRESS NOTES
PHYSICAL THERAPY EVALUATION/DISCHARGE  Patient: Jose Rafael Rangel (62 y.o. female)  Date: 5/5/2021  Primary Diagnosis: Anaphylaxis [T78. 2XXA]       Precautions:          ASSESSMENT  Based on the objective data described below, the patient presents with good strength, intact balance, and overall baseline independent functional mobility. Gait steady overall as pt independently ambulated 90ft. NoLOB/balance deficits noted. Balance remained intact as pt retrieved item from the ground as well as during retrowalking. VSS throughout. Pt functioning at her baseline independent level and has no further skilled therapy needs. DC PT. Functional Outcome Measure: The patient scored 100/100 on the Barthel Index outcome measure which is indicative of 0% impairment in ADLs and functional mobility. Other factors to consider for discharge: improving edema     Further skilled acute physical therapy is not indicated at this time. PLAN :  Recommendation for discharge: (in order for the patient to meet his/her long term goals)  No skilled physical therapy/ follow up rehabilitation needs identified at this time.     This discharge recommendation:  Has been made in collaboration with the attending provider and/or case management    IF patient discharges home will need the following DME: none       SUBJECTIVE:   Patient stated The swelling started immediately when they gave me the contrast.    OBJECTIVE DATA SUMMARY:   HISTORY:    Past Medical History:   Diagnosis Date    Anal fissure     Anisocoria     Asthma     LAST EPISODE     Back pain     Cerumen impaction     Chronic kidney disease     hx uti in past    Coagulation defects     ocassional rectal bleeding due to anal fissure    Colovaginal fistula     Diabetes ([facility] [address]     NIDDM    Diverticulitis     Diverticulosis     Enlarged tonsils     Frequent UTI     GERD (gastroesophageal reflux disease)     H/O endoscopy     with dilation    HA (headache)  Hepatic steatosis     History of colon resection     Hx of colonoscopy with polypectomy     benign    Hypertension     Ill-defined condition     FREQUENT HIVES    Ill-defined condition     HX ELEVATED LIVER ENZYMES    Morbid obesity (HCC)     Nausea & vomiting     during diverticulitis flare    Obesity     Otitis media     Pneumonia     about 15 yrs ago    Psychiatric disorder     ANXIETY    Recurrent tonsillitis     Sinusitis     Transfusion history ~ age 35    postop hysterectomy    Urticaria      Past Surgical History:   Procedure Laterality Date    COLONOSCOPY N/A 3/28/2019    COLONOSCOPY performed by Elliot Wilson MD at [address] [facility] COLONOSCOPY N/A 10/2/2020    COLONOSCOPY performed by Elliot Wilson MD at [address] [facility]    [place].  HX GI  12    LAPAROSCOPIC HAND ASSISTED  POSS OPEN SIGMOID COLECTOMY POSS TEMPORARY DIVERTING LOOP ILEOSTOMY;  (no illeostomy needed)    HX GYN           HX GYN      cervical conization    HX HEENT      SINUS SURGERY LEFT X2    HX HEENT      SINUS SURGERY ON RIGHT X2    HX OTHER SURGICAL      Sphincterotomy    HX PELVIC LAPAROSCOPY      HX EMMANUEL AND BSO      HX UROLOGICAL  12     CYSTOSCOPY INSERTION URETERAL CATHETERS - Cystoscopy Insertion of bilateral ureteral stents    [place] ABDOMEN SURGERY PROC UNLISTED  2018    [facility]ia repair at [facility]       Prior level of function: Independent w/ ambulation and ADLs. Denies history of falls. Works full-time. Still driving. Lives with . Denies home O2 use.   Personal factors and/or comorbidities impacting plan of care:     Home Situation  Home Environment: Private residence  One/Two Story Residence: Split level  Living Alone: No  Support Systems: Child(elieser)(daughter)  Patient Expects to be Discharged to[de-identified] Private residence  Current DME Used/Available at Home: None    EXAMINATION/PRESENTATION/DECISION MAKING:   Critical Behavior:  Neurologic State: Alert  Orientation Level: Oriented X4  Cognition: Appropriate decision making, Appropriate for age attention/concentration, Appropriate safety awareness, Follows commands     Hearing: Auditory  Auditory Impairment: None  Skin:  intact  Edema: angioedema, improving  Range Of Motion:  AROM: Within functional limits                       Strength:    Strength: Within functional limits                    Tone & Sensation:   Tone: Normal              Sensation: Impaired(intermitent neuropathy.)               Coordination:  Coordination: Within functional limits  Vision:   Acuity: Within Defined Limits  Corrective Lenses: Reading glasses  Functional Mobility:  Bed Mobility:  Rolling: Independent  Supine to Sit: Independent     Scooting: Independent  Transfers:  Sit to Stand: Independent  Stand to Sit: Independent        Bed to Chair: Independent              Balance:   Sitting: Intact  Standing: Intact  Ambulation/Gait Training:  Distance (ft): 90 Feet (ft)  Assistive Device: Gait belt  Ambulation - Level of Assistance: Independent        Gait Abnormalities: Decreased step clearance              Speed/Cherri: Pace decreased (<100 feet/min)                   Functional Measure:  Barthel Index:    Bathin  Bladder: 10  Bowels: 10  Groomin  Dressing: 10  Feeding: 10  Mobility: 15  Stairs: 10  Toilet Use: 10  Transfer (Bed to Chair and Back): 15  Total: 100/100       The Barthel ADL Index: Guidelines  1. The index should be used as a record of what a patient does, not as a record of what a patient could do. 2. The main aim is to establish degree of independence from any help, physical or verbal, however minor and for whatever reason. 3. The need for supervision renders the patient not independent. 4. A patient's performance should be established using the best available evidence. Asking the patient, friends/relatives and nurses are the usual sources, but direct observation and common sense are also important. However direct testing is not needed. 5. Usually the patient's performance over the preceding 24-48 hours is important, but occasionally longer periods will be relevant. 6. Middle categories imply that the patient supplies over 50 per cent of the effort. 7. Use of aids to be independent is allowed. Katherine Chol., Barthel, D.W. ([id number]). Functional evaluation: the Barthel Index. [address] [facility] (14)2. Benita Quispe gala VASHTI Garcia, Maryse Gilman., Raguel Nissen., [facility], [address] [facility] (1999). Measuring the change indisability after inpatient rehabilitation; comparison of the responsiveness of the Barthel Index and Functional [place] Measure. Journal of Neurology, Neurosurgery, and Psychiatry, 66(4), 012-614. Teofilo Matthews, N.J.A, ALEX Sal, & Raleigh Graham MARMAND. (2004.) Assessment of post-stroke quality of life in cost-effectiveness studies: The usefulness of the Barthel Index and the EuroQoL-5D. Quality of Life Research, 15, 124-57          Physical Therapy Evaluation Charge Determination   History Examination Presentation Decision-Making   MEDIUM  Complexity : 1-2 comorbidities / personal factors will impact the outcome/ POC  MEDIUM Complexity : 3 Standardized tests and measures addressing body structure, function, activity limitation and / or participation in recreation  MEDIUM Complexity : Evolving with changing characteristics  MEDIUM Complexity : FOTO score of 26-74      Based on the above components, the patient evaluation is determined to be of the following complexity level: MEDIUM    Pain Rating:  Denied complaints of pain    Activity Tolerance:   Good      After treatment patient left in no apparent distress:   Sitting in chair and Call bell within reach    COMMUNICATION/EDUCATION:   The patients plan of care was discussed with: Occupational therapist and Registered nurse.      Fall prevention education was provided and the patient/caregiver indicated understanding., Patient/family have participated as able in goal setting and plan of care. and Patient/family agree to work toward stated goals and plan of care.     Thank you for this referral.  Judson Sanford, PT, DPT   Time Calculation: 15 mins

## 2021-05-05 NOTE — CONSULTS
Consult    Patient: Nel Ott MRN: [id number]  SSN: [id number]    YOB: 1970  Age: 46 y.o. Sex: female      Subjective:      Nel Ott is a 46 y.o. female who is being seen for anal fissure. She presented with LLQ abd pain and history of diverticulitis, so she was worried about a flareup from that standpoint. That prompted a CT which resulted in angioedema related to contrast dye. Past Medical History:   Diagnosis Date    Anal fissure     Anisocoria     Asthma     LAST EPISODE     Back pain     Cerumen impaction     Chronic kidney disease     hx uti in past    Coagulation defects     ocassional rectal bleeding due to anal fissure    Colovaginal fistula     Diabetes (HCC)     NIDDM    Diverticulitis     Diverticulosis     Enlarged tonsils     Frequent UTI     GERD (gastroesophageal reflux disease)     H/O endoscopy     with dilation    HA (headache)     Hepatic steatosis     History of colon resection     Hx of colonoscopy with polypectomy     benign    Hypertension     Ill-defined condition     FREQUENT HIVES    Ill-defined condition     HX ELEVATED LIVER ENZYMES    Morbid obesity (HCC)     Nausea & vomiting     during diverticulitis flare    Obesity     Otitis media     Pneumonia     about 15 yrs ago    Psychiatric disorder     ANXIETY    Recurrent tonsillitis     Sinusitis     Transfusion history ~ age 35    postop hysterectomy    Urticaria      Past Surgical History:   Procedure Laterality Date    COLONOSCOPY N/A 3/28/2019    COLONOSCOPY performed by Geovanna Mosley MD at [address] [facility] COLONOSCOPY N/A 10/2/2020    COLONOSCOPY performed by Geovanna Mosley MD at [address] [facility]    [place].     HX GI  12    LAPAROSCOPIC HAND ASSISTED  POSS OPEN SIGMOID COLECTOMY POSS TEMPORARY DIVERTING LOOP ILEOSTOMY;  (no illeostomy needed)    HX GYN           HX GYN      cervical conization    HX HEENT      SINUS SURGERY LEFT X2    HX HEENT      SINUS SURGERY ON RIGHT X2    HX OTHER SURGICAL  11/12    Sphincterotomy    HX PELVIC LAPAROSCOPY      HX EMMANUEL AND BSO      HX UROLOGICAL  7/31/12     CYSTOSCOPY INSERTION URETERAL CATHETERS - Cystoscopy Insertion of bilateral ureteral stents    [place] ABDOMEN SURGERY PROC UNLISTED  01/06/2018    [facility]      Family History   Problem Relation Age of Onset    Diabetes Mother     Cancer Mother         NON-HODGKINS LYMPHOMA    Anesth Problems Mother         PONV    Diabetes Father     Heart Disease Father         CAD - STENTS, PACEMAKER    Arrhythmia Father      Social History     Tobacco Use    Smoking status: Never Smoker    Smokeless tobacco: Never Used   Substance Use Topics    Alcohol use: Yes     Comment: Rarely      Current Facility-Administered Medications   Medication Dose Route Frequency Provider Last Rate Last Admin    EPINEPHrine (ADRENALIN) 0.1 mg/mL 0.1 mg/mL syringe             sodium chloride (NS) flush 5-40 mL  5-40 mL IntraVENous Q8H Mathieu Klein, DO        sodium chloride (NS) flush 5-40 mL  5-40 mL IntraVENous PRN Mathieu Perez, DO        enoxaparin (LOVENOX) injection 40 mg  40 mg SubCUTAneous DAILY Mathieu Perez, DO        ondansetron [facility]) injection 4 mg  4 mg IntraVENous Q6H PRN Nirav Heckle H, DO   4 mg at 05/05/21 1354    famotidine (PF) (PEPCID) 20 mg in 0.9% sodium chloride 10 mL injection  20 mg IntraVENous BID Nirav Heckle H, DO   20 mg at 05/05/21 5490    morphine injection 2 mg  2 mg IntraVENous Q2H PRN Nirav Heckle H, DO   2 mg at 05/05/21 1354    diphenhydrAMINE (BENADRYL) injection 25 mg  25 mg IntraVENous Q8H Mathieu Klein, DO   25 mg at 05/05/21 0933    diphenhydrAMINE (BENADRYL) injection 50 mg  50 mg IntraVENous Q6H PRN Euell Bumps, DO        methylPREDNISolone (PF) (Solu-MEDROL) injection 40 mg  40 mg IntraVENous Q12H Mathieu Klein, DO   40 mg at 05/05/21 0935    albuterol-ipratropium (DUO-NEB) 2.5 MG-0.5 MG/3 ML  3 mL Nebulization Q4H PRN Deb Rm, DO        arformoteroL (BROVANA) neb solution 15 mcg  15 mcg Nebulization BID RT Deb Rm, DO   15 mcg at 05/05/21 0944    And    budesonide (PULMICORT) 250 mcg/2ml nebulizer susp  500 mcg Nebulization BID RT Deb Rm, DO   500 mcg at 05/05/21 0945    EPINEPHrine HCl (PF) (ADRENALIN) 1 mg/mL (1 mL) injection 0.3 mg  0.3 mg IntraMUSCular Q5MIN PRN Deb Rm, DO        glucose chewable tablet 16 g  4 Tab Oral PRN Deb Rm, DO        dextrose (D50W) injection syrg 12.5-25 g  25-50 mL IntraVENous PRN Deb Rm, DO        glucagon (GLUCAGEN) injection 1 mg  1 mg IntraMUSCular PRN Deb Rm, DO        insulin lispro (HUMALOG) injection   SubCUTAneous Q6H [facility], DO   10 Units at 05/05/21 1203    lactated Ringers infusion  100 mL/hr IntraVENous ONCE Mathieu Negron, DO            Allergies   Allergen Reactions    Aspirin Hives and Shortness of Breath    Codeine Hives, Itching and Angioedema     Pt had itching in mouth, on face and lips    Contrast Agent [Iodine] Hives, Itching and Angioedema     5/5/21: pt was given benadryl and solu-medrol prior to administration but pt had severe tongue swelling and drooling, lethargy and itching. DO NOT GIVE PT    Flavoring Agent Anaphylaxis    Percocet [Oxycodone-Acetaminophen] Hives, Itching and Angioedema     Pt had itching in mouth, on face and lips    Prilosec [Omeprazole Magnesium] Anaphylaxis     CHERRY FLAVORED ODT; PT TAKES REGULAR PRILOSEC AND IS OK    Dilaudid [Hydromorphone] Itching     Tolerates with benadryl    Ketorolac Rash     \"makes my eyes spasm and causes rash on my hands\" and \"makes my skin itch and makes me nervous\"    Fentanyl Rash and Itching     Has had benadryl before    Morphine Itching     Severe itching.  Tolerates with Benadryl       Review of Systems:  A comprehensive review of systems was negative except for that written in the History of Present Illness. Objective:     Vitals:    05/05/21 1230 05/05/21 1300 05/05/21 1330 05/05/21 1440   BP: 128/68  125/73 123/67   Pulse: 93 96 87 89   Resp: 19 22 20    Temp:       SpO2: 95% 98% 97% 98%   Weight:       Height:            Physical Exam:  General:  Alert, cooperative, no distress, appears stated age. Eyes:  Conjunctivae/corneas clear. PERRL, EOMs intact. Fundi benign   Ears:  Normal TMs and external ear canals both ears. Nose: Nares normal. Septum midline. Mucosa normal. No drainage or sinus tenderness. Mouth/Throat: Lips, mucosa, and tongue normal. Teeth and gums normal.   Neck: Supple, symmetrical, trachea midline, no adenopathy, thyroid: no enlargment/tenderness/nodules, no carotid bruit and no JVD. Back:   Symmetric, no curvature. ROM normal. No CVA tenderness. Lungs:   Clear to auscultation bilaterally. Heart:  Regular rate and rhythm, S1, S2 normal, no murmur, click, rub or gallop. Abdomen:   Soft, non-tender. Bowel sounds normal. No masses,  No organomegaly. Extremities: Extremities normal, atraumatic, no cyanosis or edema. Pulses: 2+ and symmetric all extremities. Skin: Skin color, texture, turgor normal. No rashes or lesions   Lymph nodes: Cervical, supraclavicular, and axillary nodes normal.   Neurologic: CNII-XII intact. Normal strength, sensation and reflexes throughout. Assessment:     Hospital Problems  Date Reviewed: 1/21/2021          Codes Class Noted POA    Anaphylaxis ICD-10-CM: T78. 2XXA  ICD-9-CM: 995.0  5/5/2021 Unknown              Plan:     ICU management for contrast-related allergy. Seems to be improving well from that standpoint. Diet as tolerated from CRS standpoint as long as it's safe for her to swallow. No need for acute surgery at this time. No obvious pathology on CT scan. No proctitis, colitis, or abscess.   Plan for EUA and likely sphincterotomy on Monday with Dr. Allyn Cano.      Signed By: Nalini Leung MD     May 5, 2021

## 2021-05-05 NOTE — PROGRESS NOTES
OCCUPATIONAL THERAPY EVALUATION/DISCHARGE  Patient: Sharon Covington (87 y.o. female)  Date: 5/5/2021  Primary Diagnosis: Anaphylaxis [T78. 2XXA]       Precautions: none       ASSESSMENT  Based on the objective data described below, the patient presents without any decline in her strength, balance, or activity tolerance that would impede her functional performance. She is demonstrating the ability to perform ADLs and functional mobility at her independent baseline. Patient is without further OT needs, acutely and after discharge. Functional Outcome Measure: The patient scored 100/100 on the Barthel Index outcome measure which is indicative of a 0% decline in function. PLAN :  Recommendation for discharge: (in order for the patient to meet his/her long term goals)  No skilled occupational therapy/ follow up rehabilitation needs identified at this time.     Equipment recommendations for successful discharge: None       OBJECTIVE DATA SUMMARY:   HISTORY:   Past Medical History:   Diagnosis Date    Anal fissure     Anisocoria     Asthma     LAST EPISODE     Back pain     Cerumen impaction     Chronic kidney disease     hx uti in past    Coagulation defects     ocassional rectal bleeding due to anal fissure    Colovaginal fistula     Diabetes (HCC)     NIDDM    Diverticulitis     Diverticulosis     Enlarged tonsils     Frequent UTI     GERD (gastroesophageal reflux disease)     H/O endoscopy     with dilation    HA (headache)     Hepatic steatosis     History of colon resection     Hx of colonoscopy with polypectomy     benign    Hypertension     Ill-defined condition     FREQUENT HIVES    Ill-defined condition     HX ELEVATED LIVER ENZYMES    Morbid obesity (HCC)     Nausea & vomiting     during diverticulitis flare    Obesity     Otitis media     Pneumonia     about 15 yrs ago    Psychiatric disorder     ANXIETY    Recurrent tonsillitis     Sinusitis     Transfusion history ~ age 35    postop hysterectomy    Urticaria      Past Surgical History:   Procedure Laterality Date    COLONOSCOPY N/A 3/28/2019    [facility]OSCOPY performed by Kenzie Mak MD at [address] [facility] COLONOSCOPY N/A 10/2/2020    COLONOSCOPY performed by Kenzie Mak MD at [address] [facility]    [place].  HX GI  12    LAPAROSCOPIC HAND ASSISTED  POSS OPEN SIGMOID COLECTOMY POSS TEMPORARY DIVERTING LOOP ILEOSTOMY;  (no illeostomy needed)    HX GYN           HX GYN      cervical conization    HX HEENT      SINUS SURGERY LEFT X2    HX HEENT      SINUS SURGERY ON RIGHT X2    HX OTHER SURGICAL      Sphincterotomy    HX PELVIC LAPAROSCOPY      HX EMMANUEL AND BSO      HX UROLOGICAL  12     CYSTOSCOPY INSERTION URETERAL CATHETERS - Cystoscopy Insertion of bilateral ureteral stents    [place] ABDOMEN SURGERY PROC UNLISTED  2018    [facility] at [facility]       Prior Level of Function/Environment/Context: Fully independent and working. Expanded or extensive additional review of patient history:   Home Situation  Home Environment: Private residence  One/Two Story Residence: Split level  Living Alone: No  Support Systems: Child(elieser)(daughter)  Patient Expects to be Discharged to[de-identified] Private residence  Current DME Used/Available at Home: None    Hand dominance: Right    EXAMINATION OF PERFORMANCE DEFICITS:  Cognitive/Behavioral Status:  Neurologic State: Alert  Orientation Level: Oriented X4  Cognition: Appropriate decision making; Appropriate for age attention/concentration; Appropriate safety awareness; Follows commands             Hearing: Auditory  Auditory Impairment: None    Vision/Perceptual:    Acuity: Within Defined Limits    Corrective Lenses: Reading glasses    Range of Motion:  AROM: Within functional limits    Strength:  Strength:  Within functional limits  Coordination:  Coordination: Within functional limits            Tone & Sensation:  Tone: Normal  Sensation: Impaired(intermitent neuropathy.)    Balance:  Sitting: Intact  Standing: Intact    Functional Mobility and Transfers for ADLs:  Bed Mobility:  Rolling: Independent  Supine to Sit: Independent  Scooting: Independent    Transfers:  Sit to Stand: Independent  Stand to Sit: Independent  Bed to Chair: Independent    ADL Assessment:  Feeding: Independent    Oral Facial Hygiene/Grooming: Independent    Bathing: Independent    Upper Body Dressing: Independent    Lower Body Dressing: Independent    Toileting: Independent              Functional Measure:  Barthel Index:    Bathin  Bladder: 10  Bowels: 10  Groomin  Dressing: 10  Feeding: 10  Mobility: 15  Stairs: 10  Toilet Use: 10  Transfer (Bed to Chair and Back): 15  Total: 100/100        The Barthel ADL Index: Guidelines  1. The index should be used as a record of what a patient does, not as a record of what a patient could do. 2. The main aim is to establish degree of independence from any help, physical or verbal, however minor and for whatever reason. 3. The need for supervision renders the patient not independent. 4. A patient's performance should be established using the best available evidence. Asking the patient, friends/relatives and nurses are the usual sources, but direct observation and common sense are also important. However direct testing is not needed. 5. Usually the patient's performance over the preceding 24-48 hours is important, but occasionally longer periods will be relevant. 6. Middle categories imply that the patient supplies over 50 per cent of the effort. 7. Use of aids to be independent is allowed. Jennifer Victor., Barthel, DBibiW. ([id number]). Functional evaluation: the Barthel Index. [address] [facility] (14)2. VASHTI Christian, Marguerite Ceron., Ed Hall., Stefanie Gaffney, [address] [place] [address] (). Measuring the change indisability after inpatient rehabilitation; comparison of the responsiveness of the Barthel Index and Functional [place] Measure. Journal of Neurology, Neurosurgery, and Psychiatry, 66(4), 942-342. MARIUM Mendes, ALEX Sal, & Kiko Long M.A. (2004.) Assessment of post-stroke quality of life in cost-effectiveness studies: The usefulness of the Barthel Index and the EuroQoL-5D. Quality of Life Research, 13, 236-90        Activity Tolerance:   Good  All VSS  Nursing requesting patient be left on 4 L NC. After treatment patient left in no apparent distress:    Sitting in chair and Call bell within reach    COMMUNICATION/EDUCATION:   The patients plan of care was discussed with: Physical Therapist and Registered Nurse.     Thank you for this referral.  Tello Crawford, OTR/L  Time Calculation: 10 mins

## 2021-05-06 VITALS
HEIGHT: 62 IN | HEART RATE: 83 BPM | TEMPERATURE: 97.6 F | DIASTOLIC BLOOD PRESSURE: 66 MMHG | SYSTOLIC BLOOD PRESSURE: 125 MMHG | BODY MASS INDEX: 39.27 KG/M2 | OXYGEN SATURATION: 95 % | RESPIRATION RATE: 25 BRPM | WEIGHT: 213.41 LBS

## 2021-05-06 PROBLEM — N39.0 UTI (URINARY TRACT INFECTION): Status: RESOLVED | Noted: 2019-09-11 | Resolved: 2021-05-06

## 2021-05-06 PROBLEM — F39 MOOD DISORDER (HCC): Status: RESOLVED | Noted: 2019-02-09 | Resolved: 2021-05-06

## 2021-05-06 PROBLEM — A04.72 C. DIFFICILE DIARRHEA: Status: RESOLVED | Noted: 2019-02-08 | Resolved: 2021-05-06

## 2021-05-06 PROBLEM — A04.72 CLOSTRIDIUM DIFFICILE DIARRHEA: Status: RESOLVED | Noted: 2017-10-10 | Resolved: 2021-05-06

## 2021-05-06 PROBLEM — T78.2XXA ANAPHYLAXIS: Status: RESOLVED | Noted: 2021-05-05 | Resolved: 2021-05-06

## 2021-05-06 PROBLEM — E87.20 LACTIC ACIDOSIS: Status: RESOLVED | Noted: 2019-02-07 | Resolved: 2021-05-06

## 2021-05-06 PROBLEM — A04.72 C. DIFFICILE COLITIS: Status: RESOLVED | Noted: 2019-02-07 | Resolved: 2021-05-06

## 2021-05-06 LAB
ALBUMIN SERPL-MCNC: 3.5 G/DL (ref 3.5–5)
ALBUMIN/GLOB SERPL: 1 {RATIO} (ref 1.1–2.2)
ALP SERPL-CCNC: 58 U/L (ref 45–117)
ALT SERPL-CCNC: 25 U/L (ref 12–78)
ANION GAP SERPL CALC-SCNC: 10 MMOL/L (ref 5–15)
AST SERPL-CCNC: 11 U/L (ref 15–37)
BILIRUB SERPL-MCNC: 0.3 MG/DL (ref 0.2–1)
BUN SERPL-MCNC: 7 MG/DL (ref 6–20)
BUN/CREAT SERPL: 7 (ref 12–20)
CALCIUM SERPL-MCNC: 8.8 MG/DL (ref 8.5–10.1)
CHLORIDE SERPL-SCNC: 105 MMOL/L (ref 97–108)
CO2 SERPL-SCNC: 23 MMOL/L (ref 21–32)
CREAT SERPL-MCNC: 1.01 MG/DL (ref 0.55–1.02)
ERYTHROCYTE [DISTWIDTH] IN BLOOD BY AUTOMATED COUNT: 15.8 % (ref 11.5–14.5)
GLOBULIN SER CALC-MCNC: 3.4 G/DL (ref 2–4)
GLUCOSE BLD STRIP.AUTO-MCNC: 269 MG/DL (ref 65–100)
GLUCOSE BLD STRIP.AUTO-MCNC: 292 MG/DL (ref 65–100)
GLUCOSE SERPL-MCNC: 286 MG/DL (ref 65–100)
HCT VFR BLD AUTO: 35.3 % (ref 35–47)
HGB BLD-MCNC: 11.4 G/DL (ref 11.5–16)
MCH RBC QN AUTO: 26.3 PG (ref 26–34)
MCHC RBC AUTO-ENTMCNC: 32.3 G/DL (ref 30–36.5)
MCV RBC AUTO: 81.3 FL (ref 80–99)
NRBC # BLD: 0 K/UL (ref 0–0.01)
NRBC BLD-RTO: 0 PER 100 WBC
PLATELET # BLD AUTO: 166 K/UL (ref 150–400)
PMV BLD AUTO: 9.6 FL (ref 8.9–12.9)
POTASSIUM SERPL-SCNC: 3.8 MMOL/L (ref 3.5–5.1)
PROT SERPL-MCNC: 6.9 G/DL (ref 6.4–8.2)
RBC # BLD AUTO: 4.34 M/UL (ref 3.8–5.2)
SERVICE CMNT-IMP: ABNORMAL
SERVICE CMNT-IMP: ABNORMAL
SODIUM SERPL-SCNC: 138 MMOL/L (ref 136–145)
WBC # BLD AUTO: 10.4 K/UL (ref 3.6–11)

## 2021-05-06 PROCEDURE — G0378 HOSPITAL OBSERVATION PER HR: HCPCS

## 2021-05-06 PROCEDURE — 96372 THER/PROPH/DIAG INJ SC/IM: CPT

## 2021-05-06 PROCEDURE — 74011250637 HC RX REV CODE- 250/637: Performed by: NURSE PRACTITIONER

## 2021-05-06 PROCEDURE — 96376 TX/PRO/DX INJ SAME DRUG ADON: CPT

## 2021-05-06 PROCEDURE — 36415 COLL VENOUS BLD VENIPUNCTURE: CPT

## 2021-05-06 PROCEDURE — 82962 GLUCOSE BLOOD TEST: CPT

## 2021-05-06 PROCEDURE — 74011000250 HC RX REV CODE- 250: Performed by: INTERNAL MEDICINE

## 2021-05-06 PROCEDURE — 77010033678 HC OXYGEN DAILY

## 2021-05-06 PROCEDURE — 94640 AIRWAY INHALATION TREATMENT: CPT

## 2021-05-06 PROCEDURE — 85027 COMPLETE CBC AUTOMATED: CPT

## 2021-05-06 PROCEDURE — 80053 COMPREHEN METABOLIC PANEL: CPT

## 2021-05-06 PROCEDURE — 74011250636 HC RX REV CODE- 250/636: Performed by: INTERNAL MEDICINE

## 2021-05-06 PROCEDURE — 74011636637 HC RX REV CODE- 636/637: Performed by: INTERNAL MEDICINE

## 2021-05-06 RX ORDER — PREDNISONE 20 MG/1
20 TABLET ORAL
Qty: 3 TAB | Refills: 0 | Status: SHIPPED | OUTPATIENT
Start: 2021-05-06 | End: 2021-05-09

## 2021-05-06 RX ORDER — ONDANSETRON 4 MG/1
4 TABLET, FILM COATED ORAL
Qty: 20 TAB | Refills: 0 | Status: SHIPPED | OUTPATIENT
Start: 2021-05-06 | End: 2021-06-24

## 2021-05-06 RX ORDER — SERTRALINE HYDROCHLORIDE 50 MG/1
200 TABLET, FILM COATED ORAL
Status: DISCONTINUED | OUTPATIENT
Start: 2021-05-06 | End: 2021-05-06 | Stop reason: HOSPADM

## 2021-05-06 RX ORDER — ACETAMINOPHEN 325 MG/1
650 TABLET ORAL ONCE
Status: COMPLETED | OUTPATIENT
Start: 2021-05-06 | End: 2021-05-06

## 2021-05-06 RX ORDER — LANOLIN ALCOHOL/MO/W.PET/CERES
10 CREAM (GRAM) TOPICAL
Status: DISCONTINUED | OUTPATIENT
Start: 2021-05-06 | End: 2021-05-06 | Stop reason: HOSPADM

## 2021-05-06 RX ORDER — ALPRAZOLAM 0.5 MG/1
.5-1 TABLET ORAL
Status: DISCONTINUED | OUTPATIENT
Start: 2021-05-06 | End: 2021-05-06 | Stop reason: HOSPADM

## 2021-05-06 RX ORDER — GABAPENTIN 300 MG/1
300 CAPSULE ORAL
Status: DISCONTINUED | OUTPATIENT
Start: 2021-05-06 | End: 2021-05-06 | Stop reason: HOSPADM

## 2021-05-06 RX ORDER — DICYCLOMINE HYDROCHLORIDE 20 MG/1
20 TABLET ORAL
Status: DISCONTINUED | OUTPATIENT
Start: 2021-05-06 | End: 2021-05-06 | Stop reason: HOSPADM

## 2021-05-06 RX ORDER — HYDROCORTISONE ACETATE 25 MG/1
25 SUPPOSITORY RECTAL
Status: DISCONTINUED | OUTPATIENT
Start: 2021-05-06 | End: 2021-05-06 | Stop reason: HOSPADM

## 2021-05-06 RX ADMIN — DIPHENHYDRAMINE HYDROCHLORIDE 50 MG: 50 INJECTION, SOLUTION INTRAMUSCULAR; INTRAVENOUS at 03:23

## 2021-05-06 RX ADMIN — IPRATROPIUM BROMIDE AND ALBUTEROL SULFATE 3 ML: .5; 3 SOLUTION RESPIRATORY (INHALATION) at 03:53

## 2021-05-06 RX ADMIN — MORPHINE SULFATE 2 MG: 2 INJECTION, SOLUTION INTRAMUSCULAR; INTRAVENOUS at 07:33

## 2021-05-06 RX ADMIN — Medication 10 ML: at 05:56

## 2021-05-06 RX ADMIN — METHYLPREDNISOLONE SODIUM SUCCINATE 40 MG: 125 INJECTION, POWDER, FOR SOLUTION INTRAMUSCULAR; INTRAVENOUS at 07:36

## 2021-05-06 RX ADMIN — ARFORMOTEROL TARTRATE 15 MCG: 15 SOLUTION RESPIRATORY (INHALATION) at 08:04

## 2021-05-06 RX ADMIN — DIPHENHYDRAMINE HYDROCHLORIDE 25 MG: 50 INJECTION, SOLUTION INTRAMUSCULAR; INTRAVENOUS at 00:09

## 2021-05-06 RX ADMIN — ENOXAPARIN SODIUM 40 MG: 40 INJECTION SUBCUTANEOUS at 07:37

## 2021-05-06 RX ADMIN — Medication 10.5 MG: at 00:21

## 2021-05-06 RX ADMIN — ONDANSETRON 4 MG: 2 INJECTION INTRAMUSCULAR; INTRAVENOUS at 07:33

## 2021-05-06 RX ADMIN — INSULIN LISPRO 7 UNITS: 100 INJECTION, SOLUTION INTRAVENOUS; SUBCUTANEOUS at 00:21

## 2021-05-06 RX ADMIN — ACETAMINOPHEN 650 MG: 325 TABLET ORAL at 00:21

## 2021-05-06 RX ADMIN — ACETAMINOPHEN 650 MG: 325 TABLET ORAL at 05:53

## 2021-05-06 RX ADMIN — BUDESONIDE 500 MCG: 0.25 INHALANT RESPIRATORY (INHALATION) at 08:05

## 2021-05-06 RX ADMIN — MORPHINE SULFATE 2 MG: 2 INJECTION, SOLUTION INTRAMUSCULAR; INTRAVENOUS at 11:22

## 2021-05-06 RX ADMIN — MORPHINE SULFATE 2 MG: 2 INJECTION, SOLUTION INTRAMUSCULAR; INTRAVENOUS at 00:10

## 2021-05-06 RX ADMIN — MORPHINE SULFATE 2 MG: 2 INJECTION, SOLUTION INTRAMUSCULAR; INTRAVENOUS at 05:52

## 2021-05-06 RX ADMIN — FAMOTIDINE 20 MG: 10 INJECTION, SOLUTION INTRAVENOUS at 07:36

## 2021-05-06 RX ADMIN — MORPHINE SULFATE 2 MG: 2 INJECTION, SOLUTION INTRAMUSCULAR; INTRAVENOUS at 03:23

## 2021-05-06 RX ADMIN — INSULIN LISPRO 7 UNITS: 100 INJECTION, SOLUTION INTRAVENOUS; SUBCUTANEOUS at 05:53

## 2021-05-06 NOTE — PROGRESS NOTES
[facility] May 6, 2021       RE: Colletta Frisk      To Whom It May Concern,    This is to certify that Colletta Frisk may may return to work on or after Saturday 5/8/2021. Please feel free to contact my office if you have any questions or concerns. Thank you for your assistance in this matter.       Sincerely,  Fraser Burkitt, DO

## 2021-05-06 NOTE — PROGRESS NOTES
Speech Pathology Note    Chart reviewed and note \"Rapid improvement in swelling. Improved aeration w/ less wheezing. No further dysphagia, no dyspnea. Feels she is back to baseline. \" Patient was having breakfast and stated that she did not have any difficulties swallowing anymore. SLP signing off.      Doyle Bell  SLP Student

## 2021-05-06 NOTE — PROGRESS NOTES
1900 - Bedside shift change report given to Sydnee Spencer RN (oncoming nurse) by Tre Locke RN (offgoing nurse). Report included the following information SBAR, Kardex, Intake/Output, MAR, Recent Results and Cardiac Rhythm NSR.     2000 - Shift assessment complete, see flowsheets for details. Pt is A&Ox4, moves all extremities purposefully, pupils brisk and round. NSR on monitor, lung clear currently on 4L NC. ABD soft and intact. Pulses palpable in all extremities. Pt complains of pain in rectum at this time 7/10.     2045 - Pt complaining of itching and hand swelling, spoke with Jackelin Zaidi NP; ok to go ahead and give 50 mg Benadryl PRN. 0000 - Reassessment complete, see flowsheets for changes. Lung sounds coarse, no complaints of SOB at this time. 0021 - Pt complaining of headache and difficulty sleeping, order received for 10.5 mg melatonin and 650 mg Tylenol. 4596 - Pt requesting PRN Duo-neb for wheezing and mild SOB. RT notified. 0400 - Reassessment complete, no changes documented. 0700 - End of Shift Note    Bedside shift change report given to Tre oLcke RN (oncoming nurse) by Holly Mar (offgoing nurse).   Report included the following information SBAR, Kardex, Intake/Output, MAR, Recent Results and Cardiac Rhythm NSR    Shift worked:  [id number]     Shift summary and any significant changes:     See above     Concerns for physician to address:  See above     Zone phone for oncoming shift:   N/A       Activity:  Activity Level: Bath Room Privileges  Number times ambulated in hallways past shift: 0  Number of times OOB to chair past shift: 2    Cardiac:   Cardiac Monitoring: Yes      Cardiac Rhythm: Sinus Rhythm    Access:   Current line(s): PIV     Genitourinary:   Urinary status: voiding    Respiratory:   O2 Device: Nasal cannula  Chronic home O2 use?: NO  Incentive spirometer at bedside: N/A     GI:  Last Bowel Movement Date: 05/05/21  Current diet:  DIET CLEAR LIQUID  Passing flatus: YES  Tolerating current diet: YES       Pain Management:   Patient states pain is manageable on current regimen: NO    Skin:  Niall Score: 21  Interventions: turn team, increase time out of bed and nutritional support     Patient Safety:  Fall Score:  Total Score: 1  Interventions: bed/chair alarm and gripper socks       Length of Stay:  Expected LOS: 3d 16h  Actual LOS: [address]

## 2021-05-06 NOTE — PROGRESS NOTES
0700 Bedside shift report given by Nilda James RN. Report included SBAR, Kardex, MAR, Recent Results, Intake/Output, Cardiac Rhythm (NSR) and events overnight. 0730 morning assessment completed at this time. Decreased oxygen to 2L    0745 Dr. Gracy Donahue at bedside, plan of care discussed, updates given    [id number] Dr. Gracy Donahue updated on patient status, will be putting discharge orders in.    1100 Afternoon assessment completed at this time    1125 pain medication given to patient, and discharge papers reviewed extensively, no questions. Patient is waiting for daughter to come pick her before getting dressed    [id number] patient is dressed, and IV lines removed, patient is being wheelchaired out by PCT Shireen Porter.  Daughter is with mother and will pull car to front entrance

## 2021-05-06 NOTE — PROGRESS NOTES
Transition of Care Plan:    RUR:  19%  Disposition:  home  Follow up appointments: surgery next Monday/PCP  DME needed: none  Transportation at Discharge: daughter  Claudio Antonio or means to access home:   daughter     NILA Medicare letter: n/a  Caregiver Contact: daughter  Discharge Caregiver contacted prior to discharge? Pt is a 45 yo female who was admitted to CCU for allergic reaction to contrast dye when she had CT imaging in preparation for  Colorectal procedure next Monday. Pt was evaluated by GI and Surgery on 5/5. Pt was stabilized and is ready for dc home today w/ family. Pt was evaluated by PT/OT/SLP. Pt is alert and oriented; indep at baseline. Linette to transport. F/U as directed by surgery after her procedure occurs. No further dc needs identified. Care Management Interventions  PCP Verified by CM: Yes  Palliative Care Criteria Met (RRAT>21 & CHF Dx)?: No  Mode of Transport at Discharge:  Other (see comment)  Transition of Care Consult (CM Consult): Discharge Planning  Discharge Durable Medical Equipment: No  Physical Therapy Consult: Yes  Occupational Therapy Consult: Yes  Speech Therapy Consult: Yes  Current Support Network: Lives with Spouse  Confirm Follow Up Transport: Family  Discharge Location  Discharge Placement: Home with outpatient services        [address] [place] [address], [facility]

## 2021-05-06 NOTE — DISCHARGE SUMMARY
[facility]                                                                                         Discharge Summary     Patient: Sharon Covington       MRN: [id number]       YOB: 1970       Age: 46 y.o. Date of admission:  5/5/2021    Date of discharge:  5/6/2021    Primary care provider:  Rodney Marmolejo MD     Admitting provider:  Enriqueta Saldivar DO    Discharging provider(s): Enriqueta Saldivar DO - Staff [address]     Consultations  · IP CONSULT TO COLORECTAL SURGERY  · IP CONSULT TO GASTROENTEROLOGY    Procedures  · none    Discharge destination: Home with family. The patient is stable for discharge. Admission diagnosis  · Anaphylaxis [T78. 2XXA]    Please refer to the admission history and physical for details on the presenting problem. Final discharge diagnoses and brief hospital course    Allergic reaction to iv contrast medium, resolved  Acute hypoxic respiratory failure d/t airway obstruction d/t angioedema, resolved  Angioedema of the face, oropharynx, airway with anaphylactic features  Ulcerative colitis without flare  Anal fissure, with EUA/sphincterotomy pending next week  H/o colovaginal fistula  Lactic metabolic acidosis, minimal, resolved  DM2 d/t chronic steroid use         51F w/ presented to the ED with progressive, worsening anal/rectal pain for three days. Given here h/o UC, a CT a/p was obtained. She had a known reaction to iv contrast media in the past, but had done well with pretreatment. After the CT, she became flushed, complained of dyspnea, dysphagia and swelling of the tongue and lips. Her BP subsequently dropped as well. Exam consistent with angioedema and anaphylactic reaction. She received the usual therapies of pepcid, benadryl, solumedrol with good effect. She was admitted to the ICU for close airway monitoring and continuation of the same therapies. The CT a/p was returned as negative.  She was seen by GI and by colorectal surgery with no new interventions recommended. She progressed and improved rapidly, and was at or near her baseline at the time of discharge. Follow up with PCP within one week. Follow up with GI and Colorectal Surgery as previously planned. Return to the ED with any difficulty swallowing, breathing, wheezing or major concern. Physical examination at discharge  Visit Vitals  /64 (BP 1 Location: Right upper arm, BP Patient Position: At rest)   Pulse 77   Temp 98 °F (36.7 °C)   Resp 17   Ht 5' 2\" (1.575 m)   Wt 96.8 kg (213 lb 6.5 oz)   SpO2 100%   BMI 39.03 kg/m²     See daily note for exam findings     Recent Labs     05/06/21 0341 05/05/21  0301   WBC 10.4 5.6   HGB 11.4* 11.9   HCT 35.3 35.9    198     Recent Labs     05/06/21  0341 05/05/21  0301    138   K 3.8 3.0*    104   CO2 23 26   BUN 7 9   CREA 1.01 0.72   * 202*   CA 8.8 9.1     Recent Labs     05/06/21 0341 05/05/21  0301   AP 58 72   TP 6.9 6.9   ALB 3.5 3.5   GLOB 3.4 3.4     No results for input(s): INR, PTP, APTT, INREXT in the last 72 hours. No results for input(s): FE, TIBC, PSAT, FERR in the last 72 hours. No results for input(s): PH, PCO2, PO2 in the last 72 hours. No results for input(s): CPK, CKMB in the last 72 hours. No lab exists for component: TROPONINI  No components found for: [place]    Pertinent imaging studies:    CT a/p w/ con 5/5/21  IMPRESSION  No acute abdominal or pelvic pathology. No significant change.  ---------------------------------    Chronic Diagnoses:    Problem List as of 5/6/2021 Date Reviewed: 1/21/2021          Codes Class Noted - Resolved    Anaphylaxis ICD-10-CM: T78. 2XXA  ICD-9-CM: 995.0  5/5/2021 - Present        UTI (urinary tract infection) ICD-10-CM: N39.0  ICD-9-CM: 599.0  9/11/2019 - Present        Proctitis ICD-10-CM: K62.89  ICD-9-CM: 569.49  9/11/2019 - Present        Mood disorder ([facility] ICD-10-CM: N06  ICD-9-CM: 296.90 2/9/2019 - Present        C. difficile diarrhea ICD-10-CM: A04.72  ICD-9-CM: 008.45  2/8/2019 - Present        Lactic acidosis ICD-10-CM: E87.2  ICD-9-CM: 276.2  2/7/2019 - Present        C. difficile colitis ICD-10-CM: A04.72  ICD-9-CM: 008.45  2/7/2019 - Present        Clostridium difficile diarrhea ICD-10-CM: A04.72  ICD-9-CM: 008.45  10/10/2017 - Present        Bronchitis ICD-10-CM: J40  ICD-9-CM: 490  10/10/2017 - Present        Accelerated hypertension ICD-10-CM: I10  ICD-9-CM: 401.0  10/10/2017 - Present        Type 2 diabetes mellitus ([facility] ICD-10-CM: E11.9  ICD-9-CM: 250.00  10/10/2017 - Present        Diarrhea ICD-10-CM: R19.7  ICD-9-CM: 787.91  10/5/2017 - Present        Thrush ICD-10-CM: B37.0  ICD-9-CM: 112.0  11/9/2015 - Present        Postoperative complication CQW-45-XJ: C11. 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 Signed:      Veronica Hancock DO   Staff [address] [place] [address] Critical Care    5/6/2021   7:53 AM     Attending        Cc:  Maria Fernanda Medley MD

## 2021-05-06 NOTE — PROGRESS NOTES
[facility]    ICU TEAM Progress Note    Name: Destiny Casey   : 1970   MRN: [id number]   Date: 2021      Assessment/Plan:     1. Allergic rxn w/ anaphylactic features and angioedema  a. Culprit appears to be IV contrast agent  b. Rapid improvement  c. Benadryl, Pepcid, SoluMed  d. Prn EpiPen - has not needed  e. Supportive care  f. Still getting some nausea  g. Transition to po prednisone to complete 3 further days  2. Acute hypoxic respiratory failure d/t airway obstruction d/t angioedema  a. Rapid improvement in swelling - essentially back to baseline  b. Nebs, bronchodilators  3. Ulcerative colitis, unlikely flare w/ h/o colovaginal fistula  a. Appreciate eval by pt's GI and ColoRectal physicians   b. Both have s/o w/ no interventions recommended  c. Pain control  d. No contraindication that would preclude procedure as already planned on Monday   4. Minimally elevated lactate  a. Sepsis ruled out  b. IVFs prn  5. DM, chronic steroid-induced  a. Insulin  6. COVID: low suspicion  7. SBP Goal of: > 90 mmHg  8. MAP Goal of: > 65 mmHg  9. IVFs: prn  10. Transfusion Trigger (Hgb): <7 g/dL  11. Respiratory Goals:  a. Chlorhexidine   b. Incentive spirometry  12. Pulmonary toilet: Duo-Nebs   13. SpO2 Goal: > 92%  14. Keep K>4; Mg>2   15. PT/OT: PT consulted and on board, OT consulted and on board and Speech therapy consulted and on board   16. Discussed Plan of Care/Code Status: Full Code  17. Discussed Care Plan with Bedside RN  18. Documentation of Current Medications  19.  Further as below:    F - Feeding:  Yes po  A - Analgesia: Acetaminophen and morphine  S - Sedation: N/A  T - DVT Prophylaxis: Lovenox   H - Head of Bed: > 30 Degrees  U - Ulcer Prophylaxis: Pepcid (famotidine)   G - Glycemic Control: Insulin  S - Spontaneous Breathing Trial: N/A  B - Bowel Regimen: None needed at this time  I - Indwelling Catheter:   Tubes: None  Lines: Peripheral IV  Drains: None  D - De-escalation of Antibiotics: as above    Multidisciplinary Rounds Completed: Yes    ABCDEF Bundle/Checklist Completed:  Yes    SPECIAL EQUIPMENT  None    DISPOSITION  Anticipate discharge to home if eric breakfast ok. Subjective:   Progress Note: 5/6/2021      Reason for ICU Admission: angioedema and anaphylactic reaction     HPI: 51F w/ h/o UC, colovaginal fistula s/p repair, multiple other interventions p/w worsening rectal/anal pain past 3 days c/w flare. Recent steroid use for flare. States currently scheduled for surgical procedure on Monday next week. H/o hives w/ contrast in past. CT a/p w/ con done in ED (resulted NAD), subsequently pt w/ hives that progressed rapidly to angioedema, tongue/lip swelling, dyspnea, dysphagia. BP dropped as well. Pt rec'd usual tx w/ epi SC, steroids, pepcid, benadryl w/ good effect. Currently feeling a little better. BP improved. Tongue still quite swollen. Able to speak now, but slurred d/t swelling. Itching improved. Dysphagia improved, but remains. Overnight Events: No major o/n events. Rapid improvement in swelling. Improved aeration w/ less wheezing. No further dysphagia, no dyspnea. Feels she is back to baseline. POD:  * No surgery found *    S/P:       Active Problem List:     Problem List  Date Reviewed: 1/21/2021          Codes Class    Anaphylaxis ICD-10-CM: T78. 2XXA  ICD-9-CM: 995.0         UTI (urinary tract infection) ICD-10-CM: N39.0  ICD-9-CM: 599.0         Proctitis ICD-10-CM: K62.89  ICD-9-CM: 569.49         Mood disorder ([facility] ICD-10-CM: F39  ICD-9-CM: 296.90         C. difficile diarrhea ICD-10-CM: A04.72  ICD-9-CM: 008.45         Lactic acidosis ICD-10-CM: E87.2  ICD-9-CM: 276.2         C. difficile colitis ICD-10-CM: A04.72  ICD-9-CM: 008.45         Clostridium difficile diarrhea ICD-10-CM: A04.72  ICD-9-CM: 008.45         Bronchitis ICD-10-CM: J40  ICD-9-CM: 56         Accelerated hypertension ICD-10-CM: I10  ICD-9-CM: 401.0         Type 2 diabetes mellitus ([facility] ICD-10-CM: E11.9  ICD-9-CM: 250.00         Diarrhea ICD-10-CM: R19.7  ICD-9-CM: 787.91         Thrush ICD-10-CM: B37.0  ICD-9-CM: 112.0         Postoperative complication IOT-25-NX: X93. 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) ICD-10-CM: E09.9, T38.0X5A  ICD-9-CM: 249.00, E980.4               Past Medical History:      has a past medical history of Anal fissure, Anisocoria, Asthma, Back pain, Cerumen impaction, Chronic kidney disease, Coagulation defects, Colovaginal fistula, Diabetes ([facility] Diverticulitis, Diverticulosis, Enlarged tonsils, Frequent UTI, GERD (gastroesophageal reflux disease), H/O endoscopy, HA (headache), Hepatic steatosis, History of colon resection, colonoscopy with polypectomy, Hypertension, Ill-defined condition, Ill-defined condition, Morbid obesity ([facility] Nausea & vomiting, Obesity, Otitis media, Pneumonia, Psychiatric disorder, Recurrent tonsillitis, Sinusitis, Transfusion history (~ age 35), and Urticaria. She also has no past medical history of Adverse effect of anesthesia, Difficult intubation, Malignant hyperthermia due to anesthesia, or Pseudocholinesterase deficiency. Past Surgical History:      has a past surgical history that includes hx gyn; hx gyn; hx pelvic laparoscopy; hx farheen and bso; hx other surgical (11/12); hx heent; hx heent; hx urological (7/31/12); pr abdomen surgery proc unlisted (01/06/2018); hx breast reduction (1992); colonoscopy (N/A, 3/28/2019); hx gi (7/31/12); and colonoscopy (N/A, 10/2/2020). Home Medications:     Prior to Admission medications    Medication Sig Start Date End Date Taking?  Authorizing Provider   ALPRAZolam [facility]) 1 mg tablet Take 0.5-1 Tabs by mouth two (2) times daily as needed for Anxiety. Max Daily Amount: 2 mg. 4/20/21   Alvena Nyhan, MD   gabapentin (NEURONTIN) 300 mg capsule Take 1 Cap by mouth nightly. Max Daily Amount: 300 mg. 4/7/21   Alvena Nyhan, MD   losartan-hydroCHLOROthiazide [facility]) 100-12.5 mg per tablet Take 1 Tab by mouth daily. 3/29/21   Alvena Nyhan, MD   glimepiride (AMARYL) 4 mg tablet TAKE 1 TABLET BY MOUTH ONCE DAILY BEFORE BREAKFAST 3/18/21   Alyssa Montes De Oca MD   albuterol (PROVENTIL HFA, VENTOLIN HFA, PROAIR HFA) 90 mcg/actuation inhaler Take 1 Puff by inhalation every four (4) hours as needed for Wheezing. 1/21/21   Chrsitine Heart MD   estradioL (ESTRACE) 0.5 mg tablet TAKE 1 TABLET BY MOUTH ONCE DAILY 5/9/20   Provider, Historical   empagliflozin (Jardiance) 25 mg tablet Take 1 Tab by mouth daily. 5/26/20   Alyssa Montes De Oca MD   omeprazole (PRILOSEC) 20 mg capsule Take 40 mg by mouth Daily (before breakfast). Provider, Historical   dicyclomine (BENTYL) 20 mg tablet Take 1 Tab by mouth every six (6) hours as needed (abdominal cramps) for up to 20 doses. 8/29/19   BEBA Spivey   sertraline (ZOLOFT) 100 mg tablet Take 200 mg by mouth nightly. Provider, Historical   EPINEPHrine (EPIPEN) 0.3 mg/0.3 mL (1:1,000) injection 0.3 mL by IntraMUSCular route once as needed for up to 1 dose. 11/2/15   Fabiana Yao MD   albuterol (PROVENTIL, VENTOLIN) 90 mcg/Actuation inhaler Take 2 Puffs by inhalation every six (6) hours as needed. Other, MD [place]       Allergies/Social/Family History: Allergies   Allergen Reactions    Aspirin Hives and Shortness of Breath    Codeine Hives, Itching and Angioedema     Pt had itching in mouth, on face and lips    Contrast Agent [Iodine] Hives, Itching and Angioedema     5/5/21: pt was given benadryl and solu-medrol prior to administration but pt had severe tongue swelling and drooling, lethargy and itching.  DO NOT GIVE PT    Flavoring Agent Anaphylaxis    Percocet [Oxycodone-Acetaminophen] Hives, Itching and Angioedema     Pt had itching in mouth, on face and lips    Prilosec [Omeprazole Magnesium] Anaphylaxis     CHERRY FLAVORED ODT; PT TAKES REGULAR PRILOSEC AND IS OK    Dilaudid [Hydromorphone] Itching     Tolerates with benadryl    Ketorolac Rash     \"makes my eyes spasm and causes rash on my hands\" and \"makes my skin itch and makes me nervous\"    Fentanyl Rash and Itching     Has had benadryl before    Morphine Itching     Severe itching. Tolerates with Benadryl      Social History     Tobacco Use    Smoking status: Never Smoker    Smokeless tobacco: Never Used   Substance Use Topics    Alcohol use: Yes     Comment: Rarely      Family History   Problem Relation Age of Onset    Diabetes Mother     Cancer Mother         NON-HODGKINS LYMPHOMA    Anesth Problems Mother         PONV    Diabetes Father     Heart Disease Father         CAD - STENTS, PACEMAKER    Arrhythmia Father        Review of Systems:     A comprehensive review of systems was negative except for that written in the HPI. Objective:   Vital Signs:  Visit Vitals  /65   Pulse 78   Temp 97.7 °F (36.5 °C)   Resp 20   Ht 5' 2\" (1.575 m)   Wt 96.8 kg (213 lb 6.5 oz)   SpO2 97%   BMI 39.03 kg/m²    O2 Flow Rate (L/min): 4 l/min O2 Device: Nasal cannula Temp (24hrs), Av °F (36.7 °C), Min:97.7 °F (36.5 °C), Max:98.3 °F (36.8 °C)           Intake/Output:     Intake/Output Summary (Last 24 hours) at 2021 0713  Last data filed at 2021 0000  Gross per 24 hour   Intake 3100 ml   Output 1950 ml   Net 1150 ml       Physical Exam:    General:  Alert, cooperative, well noursished, well developed, appears stated age   Eyes:  Sclera anicteric. Pupils equally round and reactive to light.    Mouth/Throat: O/p clear, tongue reduced to normal size, speech clear, no slurring, full sentences   Neck: Supple, edema/swelling reduced/resolved   Lungs:  LCTAB   CV:  Regular rate and rhythm,no murmur, click, rub or gallop   Abdomen:   Obese, soft, non-tender. bowel sounds normal. non-distended   Extremities: No cyanosis or edema   Skin: WNL   Lymph nodes: Cervical and supraclavicular normal   Musculoskeletal: No swelling or deformity   Lines/Devices:  Intact, no erythema, drainage or tenderness   Neuro/Psych: Awake, alert, oriented, follows commands, FELIX, symmetrical, nonfocal       LABS AND  DATA: Personally reviewed  Recent Labs     05/06/21 0341 05/05/21  0301   WBC 10.4 5.6   HGB 11.4* 11.9   HCT 35.3 35.9    198     Recent Labs     05/06/21 0341 05/05/21  0301    138   K 3.8 3.0*    104   CO2 23 26   BUN 7 9   CREA 1.01 0.72   * 202*   CA 8.8 9.1     Recent Labs     05/06/21 0341 05/05/21  0301   AP 58 72   TP 6.9 6.9   ALB 3.5 3.5   GLOB 3.4 3.4     No results for input(s): INR, PTP, APTT, INREXT, INREXT in the last 72 hours. No results for input(s): PHI, PCO2I, PO2I, FIO2I in the last 72 hours. No results for input(s): CPK, CKMB, TROIQ, BNPP in the last 72 hours. Hemodynamics:   PAP:   CO:     Wedge:   CI:     CVP:    SVR:       PVR:       Ventilator Settings:  Mode Rate Tidal Volume Pressure FiO2 PEEP                    Peak airway pressure:      Minute ventilation:          MEDS: Reviewed    Chest X-Ray:  CXR Results  (Last 48 hours)    None          Images:   CT a/p 5/5  IMPRESSION  No acute abdominal or pelvic pathology. No significant change. ECHO:  No priors available    CRITICAL CARE CONSULTANT NOTE  I had a face to face encounter with the patient, reviewed and interpreted patient data including clinical events, labs, images, vital signs, I/O's, and examined patient.   I have discussed the case and the plan and management of the patient's care with the consulting services, the bedside nurses and the respiratory therapist.      NOTE OF PERSONAL INVOLVEMENT IN CARE   This patient has a high probability of imminent, clinically significant deterioration, which requires the highest level of preparedness to intervene urgently. I participated in the decision-making and personally managed or directed the management of the following life and organ supporting interventions that required my frequent assessment to treat or prevent imminent deterioration. I personally spent n/a minutes of critical care time. This is time spent at this critically ill patient's bedside actively involved in patient care as well as the coordination of care and discussions with the patient's family. This does not include any procedural time which has been billed separately.     [address] [facility]  5/6/2021

## 2021-05-06 NOTE — INTERDISCIPLINARY ROUNDS
Critical care interdisciplinary rounds held on 05/06/2021. Following members present, Pharmacy, Diabetes Treatment, Case Management, Respiratory Therapy, Physical Therapy and Nutrition. Led by KENA Vera RN and Dr. Marko Hayes. Plan of care discussed. See clinical pathway for plan of care and interventions and desired outcomes. For possible discharge later today.

## 2021-05-07 ENCOUNTER — PATIENT OUTREACH (OUTPATIENT)
Dept: CASE MANAGEMENT | Age: 51
End: 2021-05-07

## 2021-05-11 NOTE — PROGRESS NOTES
COVID-19 Screening Initial Follow-up Note    Patient contacted regarding recent discharge and COVID-19 risk. COVID-19 related testing was not done at this time. Test results were not done. Patient informed of results, if available? NA    Outreach made within 2 business days of discharge: Yes    CTN/ACM has been unable to reach patient after 2 unsuccessful attempts. Resolving outreach episode. Patient has following risk factors of: immunocompromised, diabetes and HTN.     Advance Care Planning:   Does patient have an Advance Directive: not on file

## 2021-05-19 DIAGNOSIS — F41.9 ANXIETY: ICD-10-CM

## 2021-05-19 RX ORDER — ALPRAZOLAM 1 MG/1
TABLET ORAL
Qty: 60 TABLET | Refills: 0 | Status: SHIPPED | OUTPATIENT
Start: 2021-05-19 | End: 2021-06-30

## 2021-06-03 ENCOUNTER — TELEPHONE (OUTPATIENT)
Dept: INTERNAL MEDICINE CLINIC | Age: 51
End: 2021-06-03

## 2021-06-09 RX ORDER — SERTRALINE HYDROCHLORIDE 100 MG/1
200 TABLET, FILM COATED ORAL
Qty: 60 TABLET | Refills: 2 | Status: SHIPPED | OUTPATIENT
Start: 2021-06-09 | End: 2021-09-29

## 2021-06-09 NOTE — TELEPHONE ENCOUNTER
Pt states she does have an upcoming appt. Pt is out of medication and not feeling too well without it. Can this be done yet today? Pt states that the person who changed her appt for her was to have requested this medication refill.

## 2021-06-09 NOTE — TELEPHONE ENCOUNTER
Future Appointments:  Future Appointments   Date Time Provider Daniel Arechigai   7/19/2021  1:30 PM Claudell Battles, MD [facility] BS AMB        Last Appointment With Me:  1/29/2021     Requested Prescriptions     Pending Prescriptions Disp Refills    sertraline (ZOLOFT) 100 mg tablet       Sig: Take 2 Tablets by mouth nightly.

## 2021-06-11 ENCOUNTER — TELEPHONE (OUTPATIENT)
Dept: INTERNAL MEDICINE CLINIC | Age: 51
End: 2021-06-11

## 2021-06-11 RX ORDER — VALACYCLOVIR HYDROCHLORIDE 1 G/1
TABLET, FILM COATED ORAL
Qty: 8 TABLET | Refills: 2 | Status: SHIPPED | OUTPATIENT
Start: 2021-06-11 | End: 2021-09-27

## 2021-06-11 NOTE — TELEPHONE ENCOUNTER
----- Message from [facility] sent at 6/11/2021  1:32 PM EDT -----  Regarding: RE: Non-Urgent Medical Question  Contact: [phone]  Hi,  I will apply the medication you suggested. I usually get something for my fever blisters, Ive been using otc abreva. But if you wouldnt mind prescribing something for them. I think I have been on valtrex in the past. They usually come on in the summer months. So if you wouldnt mind Id like to have a script on hand.

## 2021-06-22 ENCOUNTER — HOSPITAL ENCOUNTER (EMERGENCY)
Age: 51
Discharge: HOME | End: 2021-06-22
Attending: EMERGENCY MEDICINE
Payer: MEDICAID

## 2021-06-22 ENCOUNTER — APPOINTMENT (OUTPATIENT)
Dept: GENERAL RADIOLOGY | Age: 51
End: 2021-06-22
Attending: EMERGENCY MEDICINE
Payer: MEDICAID

## 2021-06-22 VITALS
BODY MASS INDEX: 36.8 KG/M2 | OXYGEN SATURATION: 95 % | RESPIRATION RATE: 18 BRPM | SYSTOLIC BLOOD PRESSURE: 147 MMHG | TEMPERATURE: 98.5 F | DIASTOLIC BLOOD PRESSURE: 81 MMHG | HEIGHT: 62 IN | WEIGHT: 200 LBS | HEART RATE: 72 BPM

## 2021-06-22 DIAGNOSIS — S81.831A PUNCTURE WOUND OF RIGHT LOWER EXTREMITY EXCLUDING THIGH, INITIAL ENCOUNTER: Primary | ICD-10-CM

## 2021-06-22 PROCEDURE — 96375 TX/PRO/DX INJ NEW DRUG ADDON: CPT

## 2021-06-22 PROCEDURE — 74011250636 HC RX REV CODE- 250/636: Performed by: EMERGENCY MEDICINE

## 2021-06-22 PROCEDURE — 90715 TDAP VACCINE 7 YRS/> IM: CPT | Performed by: EMERGENCY MEDICINE

## 2021-06-22 PROCEDURE — 99285 EMERGENCY DEPT VISIT HI MDM: CPT

## 2021-06-22 PROCEDURE — 73590 X-RAY EXAM OF LOWER LEG: CPT

## 2021-06-22 PROCEDURE — 74011250637 HC RX REV CODE- 250/637: Performed by: EMERGENCY MEDICINE

## 2021-06-22 PROCEDURE — 74011000250 HC RX REV CODE- 250: Performed by: EMERGENCY MEDICINE

## 2021-06-22 PROCEDURE — 96374 THER/PROPH/DIAG INJ IV PUSH: CPT

## 2021-06-22 PROCEDURE — 96376 TX/PRO/DX INJ SAME DRUG ADON: CPT

## 2021-06-22 PROCEDURE — 90471 IMMUNIZATION ADMIN: CPT

## 2021-06-22 RX ORDER — CEPHALEXIN 500 MG/1
500 CAPSULE ORAL 2 TIMES DAILY
Qty: 10 CAPSULE | Refills: 0 | Status: SHIPPED | OUTPATIENT
Start: 2021-06-22 | End: 2021-06-22 | Stop reason: SDUPTHER

## 2021-06-22 RX ORDER — MORPHINE SULFATE 2 MG/ML
4 INJECTION, SOLUTION INTRAMUSCULAR; INTRAVENOUS
Status: COMPLETED | OUTPATIENT
Start: 2021-06-22 | End: 2021-06-22

## 2021-06-22 RX ORDER — BACITRACIN 500 UNIT/G
1 PACKET (EA) TOPICAL
Status: COMPLETED | OUTPATIENT
Start: 2021-06-22 | End: 2021-06-22

## 2021-06-22 RX ORDER — BACITRACIN 500 [USP'U]/G
OINTMENT TOPICAL
Status: DISCONTINUED | OUTPATIENT
Start: 2021-06-22 | End: 2021-06-22

## 2021-06-22 RX ORDER — DIPHENHYDRAMINE HYDROCHLORIDE 50 MG/ML
25 INJECTION, SOLUTION INTRAMUSCULAR; INTRAVENOUS
Status: COMPLETED | OUTPATIENT
Start: 2021-06-22 | End: 2021-06-22

## 2021-06-22 RX ORDER — BACITRACIN 500 [USP'U]/G
OINTMENT TOPICAL 3 TIMES DAILY
Qty: 1 TUBE | Refills: 0 | Status: SHIPPED | OUTPATIENT
Start: 2021-06-22 | End: 2021-06-22 | Stop reason: SDUPTHER

## 2021-06-22 RX ORDER — CEPHALEXIN 500 MG/1
500 CAPSULE ORAL 2 TIMES DAILY
Qty: 10 CAPSULE | Refills: 0 | Status: SHIPPED | OUTPATIENT
Start: 2021-06-22 | End: 2021-07-02

## 2021-06-22 RX ORDER — HYDROMORPHONE HYDROCHLORIDE 2 MG/1
2 TABLET ORAL
Qty: 10 TABLET | Refills: 0 | OUTPATIENT
Start: 2021-06-22 | End: 2021-06-22

## 2021-06-22 RX ORDER — VANCOMYCIN HYDROCHLORIDE 125 MG/1
125 CAPSULE ORAL 4 TIMES DAILY
Qty: 28 CAPSULE | Refills: 0 | Status: SHIPPED | OUTPATIENT
Start: 2021-06-22 | End: 2021-09-27

## 2021-06-22 RX ORDER — HYDROMORPHONE HYDROCHLORIDE 2 MG/1
2 TABLET ORAL
Qty: 10 TABLET | Refills: 0 | Status: SHIPPED | OUTPATIENT
Start: 2021-06-22 | End: 2021-06-22 | Stop reason: SDUPTHER

## 2021-06-22 RX ORDER — ACETAMINOPHEN 500 MG
1000 TABLET ORAL ONCE
Status: COMPLETED | OUTPATIENT
Start: 2021-06-22 | End: 2021-06-22

## 2021-06-22 RX ORDER — DEXAMETHASONE SODIUM PHOSPHATE 4 MG/ML
10 INJECTION, SOLUTION INTRA-ARTICULAR; INTRALESIONAL; INTRAMUSCULAR; INTRAVENOUS; SOFT TISSUE
Status: COMPLETED | OUTPATIENT
Start: 2021-06-22 | End: 2021-06-22

## 2021-06-22 RX ORDER — HYDROMORPHONE HYDROCHLORIDE 2 MG/1
2 TABLET ORAL
Qty: 10 TABLET | Refills: 0 | Status: SHIPPED | OUTPATIENT
Start: 2021-06-22 | End: 2021-06-25

## 2021-06-22 RX ORDER — BACITRACIN 500 [USP'U]/G
OINTMENT TOPICAL 3 TIMES DAILY
Qty: 1 TUBE | Refills: 0 | Status: SHIPPED | OUTPATIENT
Start: 2021-06-22 | End: 2021-07-02

## 2021-06-22 RX ORDER — VANCOMYCIN HYDROCHLORIDE 125 MG/1
125 CAPSULE ORAL 4 TIMES DAILY
Qty: 28 CAPSULE | Refills: 0 | Status: SHIPPED | OUTPATIENT
Start: 2021-06-22 | End: 2021-06-22 | Stop reason: SDUPTHER

## 2021-06-22 RX ADMIN — ACETAMINOPHEN 1000 MG: 500 TABLET ORAL at 12:28

## 2021-06-22 RX ADMIN — DIPHENHYDRAMINE HYDROCHLORIDE 25 MG: 50 INJECTION, SOLUTION INTRAMUSCULAR; INTRAVENOUS at 14:51

## 2021-06-22 RX ADMIN — TETANUS TOXOID, REDUCED DIPHTHERIA TOXOID AND ACELLULAR PERTUSSIS VACCINE, ADSORBED 0.5 ML: 5; 2.5; 8; 8; 2.5 SUSPENSION INTRAMUSCULAR at 12:45

## 2021-06-22 RX ADMIN — MORPHINE SULFATE 4 MG: 2 INJECTION, SOLUTION INTRAMUSCULAR; INTRAVENOUS at 14:14

## 2021-06-22 RX ADMIN — MORPHINE SULFATE 4 MG: 2 INJECTION, SOLUTION INTRAMUSCULAR; INTRAVENOUS at 12:43

## 2021-06-22 RX ADMIN — BACITRACIN 1 PACKET: 500 OINTMENT TOPICAL at 14:20

## 2021-06-22 RX ADMIN — DIPHENHYDRAMINE HYDROCHLORIDE 25 MG: 50 INJECTION, SOLUTION INTRAMUSCULAR; INTRAVENOUS at 13:23

## 2021-06-22 RX ADMIN — DEXAMETHASONE SODIUM PHOSPHATE 10 MG: 4 INJECTION, SOLUTION INTRAMUSCULAR; INTRAVENOUS at 14:53

## 2021-06-22 NOTE — ED NOTES
Redness to arm appears resolved. Pt reports itching to groin area resolved NAD noted. Airway presents clear. Pt states she feels better and is comfortable with planned discharge. Patient (s)  given copy of dc instructions and 4 script(s). Patient (s)  verbalized understanding of instructions and script (s). Patient given a current medication reconciliation form and verbalized understanding of their medications. Patient (s) verbalized understanding of the importance of discussing medications with  his or her physician or clinic they will be following up with. Patient alert and oriented and in no acute distress. Patient discharged home ambulatory with all belongings and daughter.

## 2021-06-22 NOTE — ED NOTES
Bacitracin applied to RLE puncture site. Non-adherent dressing applied, covered with an ABD pad for comfort, then secured with kerlex.

## 2021-06-22 NOTE — ED TRIAGE NOTES
Pt comes to ED from home via EMS. Pt states she tripped on her deck steps and fell. Pt presents with a puncture of a deck screw with decking wood to her right lower leg. Pt reports pain 9/10, throbbing. Placed on monitor x 2. Call bell within reach. Awaiting eval by provider.

## 2021-06-22 NOTE — Clinical Note
[address]  [facility] EMERGENCY DEPT  [address] [facility] [address]  [phone]    Work/School Note    Date: 6/22/2021    To Whom It May concern:      Jaclyn Madrid was seen and treated today in the emergency room by the following provider(s):  Attending Provider: Isabel Lloyd, [address] [place] [address] Paz Fontanez is excused from work/school on 06/22/21. She is clear to return to work/school on 06/23/21.         Sincerely,          Kervin Anthony MD

## 2021-06-22 NOTE — ED NOTES
Dr Bertha Benoit at bedside. Removed deck screw from pt's leg. Pt tolerated fair.  Dr Bertha Benoit is irrigating wound with NS.

## 2021-06-22 NOTE — Clinical Note
[address]  [facility] EMERGENCY DEPT  [address] [place] [address]  Daryel Runner [address] [phone]    Work/School Note    Date: 6/22/2021    To Whom It May concern:    Keith Jeter was seen and treated today in the emergency room by the following provider(s):  Attending Provider: Charito Welch, [address] [place] [address] Ariel Raya is excused from work/school on 06/22/21 and 06/23/21. She is medically clear to return to work/school on 6/24/2021.        Sincerely,          Lara Cano MD

## 2021-06-22 NOTE — ED NOTES
Pt with redness up her right arm. C/o itching to her right arm and groin area. Dr Cameron Smith notified.

## 2021-06-22 NOTE — ED NOTES
Pt c/o arm itching after morphine administered. NAD noted. Airway presents clear. Dr Roque Mast notified pt requesting benadryl. X-ray at bedside.

## 2021-06-22 NOTE — DISCHARGE INSTRUCTIONS
It was a pleasure taking care of you in our Emergency Department today. We know that when you come to [facility], you are entrusting us with your health, comfort, and safety. Our physicians and nurses honor that trust, and truly appreciate the opportunity to care for you and your loved ones. We also value your feedback. If you receive a survey about your Emergency Department experience today, please fill it out. We care about our patients' feedback, and we listen to what you have to say. Thank you!

## 2021-06-23 ENCOUNTER — TELEPHONE (OUTPATIENT)
Dept: INTERNAL MEDICINE CLINIC | Age: 51
End: 2021-06-23

## 2021-06-23 NOTE — TELEPHONE ENCOUNTER
Accepted triage phone call, Patient was injured and fell on a screw ,went to ER and did get a tetanus shot.  She is f/u with dr Yousif Arm  as a v/v on Friday soonest open, however the area does hurt and she said toes are numb somewhat and swollen, awaiting md advisement through high priority msg

## 2021-06-23 NOTE — ED NOTES
21:45  Pt called states that her Dilaudid prescription wasn't sent to the pharmacy. Call [facility] states they haven't received the prescription. Presented info to Dr. Ana Maria Zamarripa, she resent prescription. This RN called [facility] to confirm receipt states RX received. Pt called and informed that RX is now at pharmacy on file.

## 2021-06-24 ENCOUNTER — HOSPITAL ENCOUNTER (EMERGENCY)
Age: 51
Discharge: HOME | End: 2021-06-24
Attending: EMERGENCY MEDICINE
Payer: MEDICAID

## 2021-06-24 VITALS
SYSTOLIC BLOOD PRESSURE: 131 MMHG | HEIGHT: 62 IN | RESPIRATION RATE: 20 BRPM | WEIGHT: 207.23 LBS | HEART RATE: 72 BPM | TEMPERATURE: 97.7 F | BODY MASS INDEX: 38.14 KG/M2 | DIASTOLIC BLOOD PRESSURE: 81 MMHG | OXYGEN SATURATION: 99 %

## 2021-06-24 DIAGNOSIS — Z51.89 VISIT FOR WOUND CHECK: Primary | ICD-10-CM

## 2021-06-24 DIAGNOSIS — M79.604 LEG PAIN, MEDIAL, RIGHT: ICD-10-CM

## 2021-06-24 LAB
ALBUMIN SERPL-MCNC: 4 G/DL (ref 3.5–5)
ALBUMIN/GLOB SERPL: 1.1 {RATIO} (ref 1.1–2.2)
ALP SERPL-CCNC: 67 U/L (ref 45–117)
ALT SERPL-CCNC: 25 U/L (ref 12–78)
ANION GAP SERPL CALC-SCNC: 7 MMOL/L (ref 5–15)
AST SERPL-CCNC: 16 U/L (ref 15–37)
BASOPHILS # BLD: 0 K/UL (ref 0–0.1)
BASOPHILS NFR BLD: 0 % (ref 0–1)
BILIRUB SERPL-MCNC: 0.5 MG/DL (ref 0.2–1)
BUN SERPL-MCNC: 14 MG/DL (ref 6–20)
BUN/CREAT SERPL: 19 (ref 12–20)
CALCIUM SERPL-MCNC: 9.2 MG/DL (ref 8.5–10.1)
CHLORIDE SERPL-SCNC: 105 MMOL/L (ref 97–108)
CO2 SERPL-SCNC: 27 MMOL/L (ref 21–32)
CREAT SERPL-MCNC: 0.73 MG/DL (ref 0.55–1.02)
DIFFERENTIAL METHOD BLD: ABNORMAL
EOSINOPHIL # BLD: 0.2 K/UL (ref 0–0.4)
EOSINOPHIL NFR BLD: 2 % (ref 0–7)
ERYTHROCYTE [DISTWIDTH] IN BLOOD BY AUTOMATED COUNT: 15.9 % (ref 11.5–14.5)
GLOBULIN SER CALC-MCNC: 3.5 G/DL (ref 2–4)
GLUCOSE SERPL-MCNC: 161 MG/DL (ref 65–100)
HCT VFR BLD AUTO: 37.3 % (ref 35–47)
HGB BLD-MCNC: 12.2 G/DL (ref 11.5–16)
IMM GRANULOCYTES # BLD AUTO: 0 K/UL (ref 0–0.04)
IMM GRANULOCYTES NFR BLD AUTO: 1 % (ref 0–0.5)
LACTATE BLD-SCNC: 1.23 MMOL/L (ref 0.4–2)
LYMPHOCYTES # BLD: 2 K/UL (ref 0.8–3.5)
LYMPHOCYTES NFR BLD: 27 % (ref 12–49)
MCH RBC QN AUTO: 26 PG (ref 26–34)
MCHC RBC AUTO-ENTMCNC: 32.7 G/DL (ref 30–36.5)
MCV RBC AUTO: 79.5 FL (ref 80–99)
MONOCYTES # BLD: 0.4 K/UL (ref 0–1)
MONOCYTES NFR BLD: 6 % (ref 5–13)
NEUTS SEG # BLD: 4.8 K/UL (ref 1.8–8)
NEUTS SEG NFR BLD: 64 % (ref 32–75)
NRBC # BLD: 0 K/UL (ref 0–0.01)
NRBC BLD-RTO: 0 PER 100 WBC
PLATELET # BLD AUTO: 190 K/UL (ref 150–400)
PMV BLD AUTO: 9.4 FL (ref 8.9–12.9)
POTASSIUM SERPL-SCNC: 3.2 MMOL/L (ref 3.5–5.1)
PROT SERPL-MCNC: 7.5 G/DL (ref 6.4–8.2)
RBC # BLD AUTO: 4.69 M/UL (ref 3.8–5.2)
SODIUM SERPL-SCNC: 139 MMOL/L (ref 136–145)
WBC # BLD AUTO: 7.5 K/UL (ref 3.6–11)

## 2021-06-24 PROCEDURE — 85025 COMPLETE CBC W/AUTO DIFF WBC: CPT

## 2021-06-24 PROCEDURE — 74011250637 HC RX REV CODE- 250/637: Performed by: EMERGENCY MEDICINE

## 2021-06-24 PROCEDURE — 96374 THER/PROPH/DIAG INJ IV PUSH: CPT

## 2021-06-24 PROCEDURE — 74011250636 HC RX REV CODE- 250/636: Performed by: EMERGENCY MEDICINE

## 2021-06-24 PROCEDURE — 36415 COLL VENOUS BLD VENIPUNCTURE: CPT

## 2021-06-24 PROCEDURE — 80053 COMPREHEN METABOLIC PANEL: CPT

## 2021-06-24 PROCEDURE — 87040 BLOOD CULTURE FOR BACTERIA: CPT

## 2021-06-24 PROCEDURE — 83605 ASSAY OF LACTIC ACID: CPT

## 2021-06-24 PROCEDURE — 87070 CULTURE OTHR SPECIMN AEROBIC: CPT

## 2021-06-24 PROCEDURE — 99283 EMERGENCY DEPT VISIT LOW MDM: CPT

## 2021-06-24 RX ORDER — HYDROXYZINE 25 MG/1
50 TABLET, FILM COATED ORAL
Status: COMPLETED | OUTPATIENT
Start: 2021-06-24 | End: 2021-06-24

## 2021-06-24 RX ORDER — LIDOCAINE 50 MG/G
PATCH TOPICAL
Qty: 5 EACH | Refills: 0 | Status: SHIPPED | OUTPATIENT
Start: 2021-06-24 | End: 2022-02-23

## 2021-06-24 RX ORDER — MORPHINE SULFATE 2 MG/ML
4 INJECTION, SOLUTION INTRAMUSCULAR; INTRAVENOUS
Status: COMPLETED | OUTPATIENT
Start: 2021-06-24 | End: 2021-06-24

## 2021-06-24 RX ADMIN — MORPHINE SULFATE 4 MG: 2 INJECTION, SOLUTION INTRAMUSCULAR; INTRAVENOUS at 05:01

## 2021-06-24 RX ADMIN — HYDROXYZINE HYDROCHLORIDE 50 MG: 25 TABLET, FILM COATED ORAL at 05:17

## 2021-06-24 NOTE — DISCHARGE INSTRUCTIONS
It was a pleasure taking care of you in our Emergency Department today. We know that when [facility] you are entrusting us with your health, comfort, and safety. Our physicians and nurses honor that trust, and truly appreciate the opportunity to care for you and your loved ones. We also value your feedback. If you receive a survey about your Emergency Department experience today, please fill it out. We care about our patients' feedback, and we listen to what you have to say. Thank you!       Dr. Tsering Rosas MD.

## 2021-06-24 NOTE — LETTER
Laverne Friday accompanied Alfonso Nelson to the emergency department on 6/24/2021. They may return to work on 6/25/2021. If you have any questions or concerns, please don't hesitate to call.       Sincerely,         Papo Mcknight RN

## 2021-06-24 NOTE — ED NOTES
Pt presents to the ED for a wound check. Pt states she is concerned for infection for a wound in her right lower leg. Pt states she fell through a wood step 2 days ago and a nail stabbed through her leg. Pt was seen for the wound 2 days ago and stated she received a tetanus shot. She is concerned the wound is infected due to pain and pus coming from it. She also states numbness around the site.

## 2021-06-24 NOTE — LETTER
Milagros Mehta accompanied Jean Claude Chavez to the emergency department on 6/24/2021. They may return to work on 6/25/2021      If you have any questions or concerns, please don't hesitate to call.       Sincerely,           Librado Gavin RN

## 2021-06-24 NOTE — ED PROVIDER NOTES
EMERGENCY DEPARTMENT HISTORY AND PHYSICAL EXAM     ------------------------------------------------------------------------------------------------------  Please note that this dictation was completed with [employer], the [employer] voice recognition software. Quite often unanticipated grammatical, syntax, homophones, and other interpretive errors are inadvertently transcribed by the computer software. Please disregard these errors. Please excuse any errors that have escaped final proofreading.  -----------------------------------------------------------------------------------------------------------------    Date: 6/24/2021  Patient Name: Concepcion Benites    History of Presenting Illness     Chief Complaint   Patient presents with    Wound Check     Patient had fall two days ago through rotten porch wood, had screw stuck in lower R leg. Pain has gotten worse and patient is noticing puss from wound, states she's very prone to infections. Patient walking with noticeable limp in triage. History Provided By: Patient    HPI: Concepcion Benites is a 46 y.o. female, with significant pmhx of hypertension, reflux, chronic kidney disease, anxiety, diabetes, ulcerative colitis, who presents via private vehicle to the ED with c/o drainage from previously noted wound to right mid calf. Patient reports she was seen here the other day after she stepped through a rotten deck step and had the board collapsed down onto her leg. Noted to have a puncture wound from this and was subsequently treated with Keflex and vancomycin as well as a prescription for Dilaudid for pain control. Patient reports having increased pain, swelling and redness around the puncture wound site and is concerned about drainage coming from it. Denies recent fever although having \"chills\" at times. Notes that she has not been propping up her leg as much as she could. Notes \"feel like it should not hurt as much as it does. \"  Pt also specifically denies any recent fevers, chills, CP, SOB, nausea, vomiting, diarrhea, abd pain, changes in BM, urinary sxs, or headache. PCP: Shamar Mclean MD    Social Hx: denies tobacco, denies EtOH, denies Illicit Drugs     There are no other complaints, changes, or physical findings at this time. Allergies   Allergen Reactions    Aspirin Hives and Shortness of Breath    Codeine Hives, Itching and Angioedema     Pt had itching in mouth, on face and lips    Contrast Agent [Iodine] Hives, Itching and Angioedema     5/5/21: pt was given benadryl and solu-medrol prior to administration but pt had severe tongue swelling and drooling, lethargy and itching. DO NOT GIVE PT    Flavoring Agent Anaphylaxis    Percocet [Oxycodone-Acetaminophen] Hives, Itching and Angioedema     Pt had itching in mouth, on face and lips    Prilosec [Omeprazole Magnesium] Anaphylaxis     CHERRY FLAVORED ODT; PT TAKES REGULAR PRILOSEC AND IS OK    Dilaudid [Hydromorphone] Itching     Tolerates with benadryl    Ketorolac Rash     \"makes my eyes spasm and causes rash on my hands\" and \"makes my skin itch and makes me nervous\"    Fentanyl Rash and Itching     Has had benadryl before         Current Outpatient Medications   Medication Sig Dispense Refill    lidocaine (Lidoderm) 5 % Apply patch to the affected area for 12 hours a day and remove for 12 hours a day. 5 Each 0    HYDROmorphone (Dilaudid) 2 mg tablet Take 1 Tablet by mouth every six (6) hours as needed for Pain for up to 3 days. Max Daily Amount: 8 mg. Indications: excessive pain 10 Tablet 0    cephALEXin (Keflex) 500 mg capsule Take 1 Capsule by mouth two (2) times a day. 10 Capsule 0    bacitracin (BACITRACIN) 500 unit/gram oint Apply  to affected area three (3) times daily for 10 days. Apply to affected area 1 Tube 0    vancomycin (Vancocin) 125 mg capsule Take 1 Capsule by mouth four (4) times daily.  28 Capsule 0    sertraline (ZOLOFT) 100 mg tablet Take 2 Tablets by mouth nightly. 60 Tablet 2    ALPRAZolam (XANAX) 1 mg tablet TAKE 1/2-1 TABS BY MOUTH TWO (2) TIMES DAILY AS NEEDED FOR ANXIETY. MAX DAILY AMOUNT: 2 MG. 60 Tablet 0    gabapentin (NEURONTIN) 300 mg capsule Take 1 Cap by mouth nightly. Max Daily Amount: 300 mg. 30 Cap 1    losartan-hydroCHLOROthiazide (HYZAAR) 100-12.5 mg per tablet Take 1 Tab by mouth daily. 90 Tab 1    albuterol (PROVENTIL HFA, VENTOLIN HFA, PROAIR HFA) 90 mcg/actuation inhaler Take 1 Puff by inhalation every four (4) hours as needed for Wheezing. 1 Inhaler 1    estradioL (ESTRACE) 0.5 mg tablet TAKE 1 TABLET BY MOUTH ONCE DAILY      empagliflozin (Jardiance) 25 mg tablet Take 1 Tab by mouth daily. 90 Tab 3    omeprazole (PRILOSEC) 20 mg capsule Take 40 mg by mouth Daily (before breakfast).  EPINEPHrine (EPIPEN) 0.3 mg/0.3 mL (1:1,000) injection 0.3 mL by IntraMUSCular route once as needed for up to 1 dose.  0.3 mL 1    valACYclovir (VALTREX) 1 gram tablet Take 2 tablets by mouth twice daily for 1 day as needed for flares (Patient not taking: Reported on 6/24/2021) 8 Tablet 2    glimepiride (AMARYL) 4 mg tablet TAKE 1 TABLET BY MOUTH ONCE DAILY BEFORE BREAKFAST (Patient not taking: Reported on 6/24/2021) 90 Tab 0       Past History     Past Medical History:  Past Medical History:   Diagnosis Date    Anal fissure     Anisocoria     Asthma     LAST EPISODE     Back pain     Cerumen impaction     Chronic kidney disease     hx uti in past    Coagulation defects     ocassional rectal bleeding due to anal fissure    Colovaginal fistula     Diabetes (HCC)     NIDDM    Diverticulitis     Diverticulosis     Enlarged tonsils     Frequent UTI     GERD (gastroesophageal reflux disease)     H/O endoscopy     with dilation    HA (headache)     Hepatic steatosis     History of colon resection     Hx of colonoscopy with polypectomy     benign    Hypertension     Ill-defined condition     FREQUENT HIVES    Ill-defined condition     HX ELEVATED LIVER ENZYMES    Morbid obesity ([address]     Nausea & vomiting     during diverticulitis flare    Obesity     Otitis media     Pneumonia     about 15 yrs ago    Psychiatric disorder     ANXIETY    Recurrent tonsillitis     Sinusitis     Transfusion history ~ age 35    postop hysterectomy    Urticaria        Past Surgical History:  Past Surgical History:   Procedure Laterality Date    COLONOSCOPY N/A 3/28/2019    COLONOSCOPY performed by Ashley Zamarripa MD at [facility] COLONOSCOPY N/A 10/2/2020    COLONOSCOPY performed by Ashley Zamarripa MD at [address] [facility]    [place].  HX GI  12    LAPAROSCOPIC HAND ASSISTED  POSS OPEN SIGMOID COLECTOMY POSS TEMPORARY DIVERTING LOOP ILEOSTOMY;  (no illeostomy needed)    HX GYN           HX GYN      cervical conization    HX HEENT      SINUS SURGERY LEFT X2    HX HEENT      SINUS SURGERY ON RIGHT X2    HX OTHER SURGICAL      Sphincterotomy    HX PELVIC LAPAROSCOPY      HX EMMANUEL AND BSO      HX UROLOGICAL  12     CYSTOSCOPY INSERTION URETERAL CATHETERS - Cystoscopy Insertion of bilateral ureteral stents    CO ABDOMEN SURGERY PROC UNLISTED  2018    hernia repair at [facility]       Family History:  Family History   Problem Relation Age of Onset    Diabetes Mother     Cancer Mother         NON-HODGKINS LYMPHOMA    Anesth Problems Mother         PONV    Diabetes Father     Heart Disease Father         CAD - STENTS, PACEMAKER    Arrhythmia Father        Social History:  Social History     Tobacco Use    Smoking status: Never Smoker    Smokeless tobacco: Never Used   Vaping Use    Vaping Use: Never assessed   Substance Use Topics    Alcohol use: Yes     Comment: Rarely    Drug use: No     Types: Prescription, OTC       Allergies:   Allergies   Allergen Reactions    Aspirin Hives and Shortness of Breath    Codeine Hives, Itching and Angioedema     Pt had itching in mouth, on face and lips    Contrast Agent [Iodine] Hives, Itching and Angioedema     5/5/21: pt was given benadryl and solu-medrol prior to administration but pt had severe tongue swelling and drooling, lethargy and itching. DO NOT GIVE PT    Flavoring Agent Anaphylaxis    Percocet [Oxycodone-Acetaminophen] Hives, Itching and Angioedema     Pt had itching in mouth, on face and lips    Prilosec [Omeprazole Magnesium] Anaphylaxis     CHERRY FLAVORED ODT; PT TAKES REGULAR PRILOSEC AND IS OK    Dilaudid [Hydromorphone] Itching     Tolerates with benadryl    Ketorolac Rash     \"makes my eyes spasm and causes rash on my hands\" and \"makes my skin itch and makes me nervous\"    Fentanyl Rash and Itching     Has had benadryl before         Review of Systems   Review of Systems   Constitutional: Negative. Negative for fever. Eyes: Negative. Respiratory: Negative. Negative for shortness of breath. Cardiovascular: Negative for chest pain. Gastrointestinal: Negative for abdominal pain, nausea and vomiting. Endocrine: Negative. Genitourinary: Negative. Negative for difficulty urinating, dysuria and hematuria. Musculoskeletal: Negative. Skin: Positive for wound. Neurological: Negative. Psychiatric/Behavioral: Negative for suicidal ideas. All other systems reviewed and are negative. Physical Exam   Physical Exam  Vitals and nursing note reviewed. Constitutional:       General: She is not in acute distress. Appearance: She is well-developed. She is not diaphoretic. HENT:      Head: Normocephalic and atraumatic. Nose: Nose normal.   Eyes:      General: No scleral icterus. Conjunctiva/sclera: Conjunctivae normal.   Neck:      Trachea: No tracheal deviation. Cardiovascular:      Rate and Rhythm: Normal rate and regular rhythm. Heart sounds: Normal heart sounds. No murmur heard. No friction rub. Pulmonary:      Effort: Pulmonary effort is normal. No respiratory distress. Breath sounds: Normal breath sounds. No stridor. No wheezing or rales. Abdominal:      General: Bowel sounds are normal. There is no distension. Palpations: Abdomen is soft. Tenderness: There is no abdominal tenderness. There is no rebound. Musculoskeletal:         General: Normal range of motion. Cervical back: Normal range of motion. Right lower leg: Swelling and tenderness present. Legs:       Comments: Please see picture for further details of right medial calf puncture wound with mild surrounding erythema. Noted to have minimal serosanguineous drainage with firm pressure applied, no fluctuance appreciated, no purulent drainage noted. Skin:     General: Skin is warm and dry. Findings: No rash. Neurological:      Mental Status: She is alert and oriented to person, place, and time. Cranial Nerves: No cranial nerve deficit. Psychiatric:         Speech: Speech normal.         Behavior: Behavior normal.         Thought Content: Thought content normal.         Judgment: Judgment normal.                   Diagnostic Study Results     Labs -     Recent Results (from the past 12 hour(s))   CBC WITH AUTOMATED DIFF    Collection Time: 06/24/21  4:37 AM   Result Value Ref Range    WBC 7.5 3.6 - 11.0 K/uL    RBC 4.69 3.80 - 5.20 M/uL    HGB 12.2 11.5 - 16.0 g/dL    HCT 37.3 35.0 - 47.0 %    MCV 79.5 (L) 80.0 - 99.0 FL    MCH 26.0 26.0 - 34.0 PG    MCHC 32.7 30.0 - 36.5 g/dL    RDW 15.9 (H) 11.5 - 14.5 %    PLATELET 002 518 - 439 K/uL    MPV 9.4 8.9 - 12.9 FL    NRBC 0.0 0  WBC    ABSOLUTE NRBC 0.00 0.00 - 0.01 K/uL    NEUTROPHILS 64 32 - 75 %    LYMPHOCYTES 27 12 - 49 %    MONOCYTES 6 5 - 13 %    EOSINOPHILS 2 0 - 7 %    BASOPHILS 0 0 - 1 %    IMMATURE GRANULOCYTES 1 (H) 0.0 - 0.5 %    ABS. NEUTROPHILS 4.8 1.8 - 8.0 K/UL    ABS. LYMPHOCYTES 2.0 0.8 - 3.5 K/UL    ABS. MONOCYTES 0.4 0.0 - 1.0 K/UL    ABS. EOSINOPHILS 0.2 0.0 - 0.4 K/UL    ABS.  BASOPHILS 0.0 0.0 - 0.1 K/UL ABS. IMM. GRANS. 0.0 0.00 - 0.04 K/UL    DF AUTOMATED     METABOLIC PANEL, COMPREHENSIVE    Collection Time: 06/24/21  4:37 AM   Result Value Ref Range    Sodium 139 136 - 145 mmol/L    Potassium 3.2 (L) 3.5 - 5.1 mmol/L    Chloride 105 97 - 108 mmol/L    CO2 27 21 - 32 mmol/L    Anion gap 7 5 - 15 mmol/L    Glucose 161 (H) 65 - 100 mg/dL    BUN 14 6 - 20 MG/DL    Creatinine 0.73 0.55 - 1.02 MG/DL    BUN/Creatinine ratio 19 12 - 20      GFR est AA >60 >60 ml/min/1.73m2    GFR est non-AA >60 >60 ml/min/1.73m2    Calcium 9.2 8.5 - 10.1 MG/DL    Bilirubin, total 0.5 0.2 - 1.0 MG/DL    ALT (SGPT) 25 12 - 78 U/L    AST (SGOT) 16 15 - 37 U/L    Alk. phosphatase 67 45 - 117 U/L    Protein, total 7.5 6.4 - 8.2 g/dL    Albumin 4.0 3.5 - 5.0 g/dL    Globulin 3.5 2.0 - 4.0 g/dL    A-G Ratio 1.1 1.1 - 2.2     POC LACTIC ACID    Collection Time: 06/24/21  4:40 AM   Result Value Ref Range    Lactic Acid (POC) 1.23 0.40 - 2.00 mmol/L       Radiologic Studies -   No orders to display     CT Results  (Last 48 hours)    None        CXR Results  (Last 48 hours)    None            Medical Decision Making   I am the first provider for this patient. I reviewed the vital signs, available nursing notes, past medical history, past surgical history, family history and social history. Vital Signs-Reviewed the patient's vital signs. Patient Vitals for the past 12 hrs:   Temp Pulse Resp BP SpO2   06/24/21 0341 97.7 °F (36.5 °C) 72 20 138/75 99 %       Pulse Oximetry Analysis - 99% on RA Normal    Records Reviewed/Interpretted: Nursing Notes from triage and Old Medical Records, noting recent ED visit for puncture wound    Provider Notes (Medical Decision Making):     DDX:  Cellulitis, wound infection, normal healing process, dependent edema    Plan:  Labs, lactate, blood cultures, wound swab and culture    Impression:  Wound check    ED Course:   Initial assessment performed.  The patients presenting problems have been discussed, and they are in agreement with the care plan formulated and outlined with them. I have encouraged them to ask questions as they arise throughout their visit. I reviewed our electronic medical record system for any past medical records that were available that may contribute to the patients current condition, the nursing notes and and vital signs from today's visit  Nursing notes will be reviewed as they become available in realtime while the pt has been in the ED. Gaviota Cadena MD      5:13 AM  Progress note:  Pt noted to be feeling better, ready for discharge. Discussed lab  findings with pt, specifically noting normal wbc and lactate. Pt will follow up with pcp/wound clinic as instructed. All questions have been answered, pt voiced understanding and agreement with plan. Specific return precautions provided in addition to instructions for pt to return to the ED immediately should sx worsen at any time. Gaviota Cadena MD             Critical Care Time:     none      Diagnosis     Clinical Impression:   1. Visit for wound check    2. Leg pain, medial, right        PLAN:  1. Current Discharge Medication List      START taking these medications    Details   lidocaine (Lidoderm) 5 % Apply patch to the affected area for 12 hours a day and remove for 12 hours a day. Qty: 5 Each, Refills: 0  Start date: 6/24/2021           2.    Follow-up Information     Follow up With Specialties Details Why Brigitte Sheridan MD Internal Medicine Schedule an appointment as soon as possible for a visit in 2 days  [facility]  [address]  Call today  [address]  [address] [facility]  [phone]    Denys Hernandez MD Infectious Disease, Internal Medicine  As needed, If symptoms worsen Sed[address] Pelayo [place] [id number] [phone]          Return to ED if worse Disposition:    5:14 AM   The patient's results have been reviewed with family and/or caregiver. They verbally convey their understanding and agreement of the patient's signs, symptoms, diagnosis, treatment and prognosis and additionally agree to follow up as recommended in the discharge instructions or to return to the Emergency Room should the patient's condition change prior to their follow-up appointment. The family and/or caregiver verbally agrees with the care-plan and all of their questions have been answered. The discharge instructions have also been provided to the them with educational information regarding the patient's diagnosis as well a list of reasons why the patient would want to return to the ER prior to their follow-up appointment should their condition change.   Meghana Moore MD

## 2021-06-25 NOTE — ED PROVIDER NOTES
EMERGENCY DEPARTMENT HISTORY AND PHYSICAL EXAM      Date: 6/22/2021  Patient Name: Sonali Buck    History of Presenting Illness     Chief Complaint   Patient presents with    Leg Injury     deck screw to [facility] with deck wood piece attached. History Provided By: Patient    HPI: Sonali Buck, 46 y.o. female with a past medical history significant for medical problems as stated below presents to the ED with cc of moderate to severe pain to the right lower extremity after having a screw stuck in her leg. She was walking down some steps in the backyard and board broke and she fell through with her right leg. Screw then became lodged in her leg and she was transported by EMS for evaluation. The board fragment has been secured to her leg. She has no other injuries from her fall. Reports the pain is severe in the right lower calf and worse with any movement. No other associated symptoms. No other exacerbating ameliorating factors. There are no other complaints, changes, or physical findings at this time. PCP: Anum Cruz MD    No current facility-administered medications on file prior to encounter. Current Outpatient Medications on File Prior to Encounter   Medication Sig Dispense Refill    valACYclovir (VALTREX) 1 gram tablet Take 2 tablets by mouth twice daily for 1 day as needed for flares (Patient not taking: Reported on 6/24/2021) 8 Tablet 2    sertraline (ZOLOFT) 100 mg tablet Take 2 Tablets by mouth nightly. 60 Tablet 2    ALPRAZolam (XANAX) 1 mg tablet TAKE 1/2-1 TABS BY MOUTH TWO (2) TIMES DAILY AS NEEDED FOR ANXIETY. MAX DAILY AMOUNT: 2 MG. 60 Tablet 0    gabapentin (NEURONTIN) 300 mg capsule Take 1 Cap by mouth nightly. Max Daily Amount: 300 mg. 30 Cap 1    losartan-hydroCHLOROthiazide (HYZAAR) 100-12.5 mg per tablet Take 1 Tab by mouth daily.  90 Tab 1    glimepiride (AMARYL) 4 mg tablet TAKE 1 TABLET BY MOUTH ONCE DAILY BEFORE BREAKFAST (Patient not taking: Reported on 6/24/2021) 90 Tab 0    albuterol (PROVENTIL HFA, VENTOLIN HFA, PROAIR HFA) 90 mcg/actuation inhaler Take 1 Puff by inhalation every four (4) hours as needed for Wheezing. 1 Inhaler 1    estradioL (ESTRACE) 0.5 mg tablet TAKE 1 TABLET BY MOUTH ONCE DAILY      empagliflozin (Jardiance) 25 mg tablet Take 1 Tab by mouth daily. 90 Tab 3    omeprazole (PRILOSEC) 20 mg capsule Take 40 mg by mouth Daily (before breakfast).  EPINEPHrine (EPIPEN) 0.3 mg/0.3 mL (1:1,000) injection 0.3 mL by IntraMUSCular route once as needed for up to 1 dose. 0.3 mL 1       Past History     Past Medical History:  Past Medical History:   Diagnosis Date    Anal fissure     Anisocoria     Asthma     LAST EPISODE     Back pain     Cerumen impaction     Chronic kidney disease     hx uti in past    Coagulation defects     ocassional rectal bleeding due to anal fissure    Colovaginal fistula     Diabetes (HCC)     NIDDM    Diverticulitis     Diverticulosis     Enlarged tonsils     Frequent UTI     GERD (gastroesophageal reflux disease)     H/O endoscopy     with dilation    HA (headache)     Hepatic steatosis     History of colon resection     Hx of colonoscopy with polypectomy     benign    Hypertension     Ill-defined condition     FREQUENT HIVES    Ill-defined condition     HX ELEVATED LIVER ENZYMES    Morbid obesity (HCC)     Nausea & vomiting     during diverticulitis flare    Obesity     Otitis media     Pneumonia     about 15 yrs ago    Psychiatric disorder     ANXIETY    Recurrent tonsillitis     Sinusitis     Transfusion history ~ age 35    postop hysterectomy    Urticaria        Past Surgical History:  Past Surgical History:   Procedure Laterality Date    COLONOSCOPY N/A 3/28/2019    COLONOSCOPY performed by Jed Mckeon MD at [facility] COLONOSCOPY N/A 10/2/2020    COLONOSCOPY performed by Jed Mckeon MD at [address] [place] [address],[address]    blake.     HX GI 12    LAPAROSCOPIC HAND ASSISTED  POSS OPEN SIGMOID COLECTOMY POSS TEMPORARY DIVERTING LOOP ILEOSTOMY;  (no illeostomy needed)    HX GYN           HX GYN      cervical conization    HX HEENT      SINUS SURGERY LEFT X2    HX HEENT      SINUS SURGERY ON RIGHT X2    HX OTHER SURGICAL      Sphincterotomy    HX PELVIC LAPAROSCOPY      HX EMMANUEL AND BSO      HX UROLOGICAL  12     CYSTOSCOPY INSERTION URETERAL CATHETERS - Cystoscopy Insertion of bilateral ureteral stents    [facility]BD[facility] UNLISTED  2018    [facility] repair at [facility]       Family History:  Family History   Problem Relation Age of Onset    Diabetes Mother     Cancer Mother         NON-HODGKINS LYMPHOMA    Anesth Problems Mother         PONV    Diabetes Father     Heart Disease Father         CAD - STENTS, PACEMAKER    Arrhythmia Father        Social History:  Social History     Tobacco Use    Smoking status: Never Smoker    Smokeless tobacco: Never Used   Vaping Use    Vaping Use: Never assessed   Substance Use Topics    Alcohol use: Yes     Comment: Rarely    Drug use: No     Types: Prescription, OTC       Allergies: Allergies   Allergen Reactions    Aspirin Hives and Shortness of Breath    Codeine Hives, Itching and Angioedema     Pt had itching in mouth, on face and lips    Contrast Agent [Iodine] Hives, Itching and Angioedema     21: pt was given benadryl and solu-medrol prior to administration but pt had severe tongue swelling and drooling, lethargy and itching.  DO NOT GIVE PT    Flavoring Agent Anaphylaxis    Percocet [Oxycodone-Acetaminophen] Hives, Itching and Angioedema     Pt had itching in mouth, on face and lips    Prilosec [Omeprazole Magnesium] Anaphylaxis     CHERRY FLAVORED ODT; PT TAKES REGULAR PRILOSEC AND IS OK    Dilaudid [Hydromorphone] Itching     Tolerates with benadryl    Ketorolac Rash     \"makes my eyes spasm and causes rash on my hands\" and \"makes my skin itch and makes me nervous\"    Fentanyl Rash and Itching     Has had benadryl before         Review of Systems   Review of Systems   Constitutional: Negative for chills, diaphoresis, fatigue and fever. HENT: Negative for ear pain and sore throat. Eyes: Negative for pain and redness. Respiratory: Negative for cough and shortness of breath. Cardiovascular: Negative for chest pain and leg swelling. Gastrointestinal: Negative for abdominal pain, diarrhea, nausea and vomiting. Endocrine: Negative for cold intolerance and heat intolerance. Genitourinary: Negative for flank pain and hematuria. Musculoskeletal: Negative for back pain and neck stiffness. Skin: Negative for rash and wound. Neurological: Negative for dizziness, syncope and headaches. All other systems reviewed and are negative. Physical Exam   Physical Exam  Vitals and nursing note reviewed. Constitutional:       Appearance: She is well-developed. HENT:      Head: Normocephalic and atraumatic. Mouth/Throat:      Pharynx: No oropharyngeal exudate. Eyes:      Conjunctiva/sclera: Conjunctivae normal.      Pupils: Pupils are equal, round, and reactive to light. Cardiovascular:      Rate and Rhythm: Normal rate and regular rhythm. Heart sounds: No murmur heard. Pulmonary:      Effort: Pulmonary effort is normal. No respiratory distress. Breath sounds: Normal breath sounds. No wheezing. Abdominal:      General: Bowel sounds are normal. There is no distension. Palpations: Abdomen is soft. Tenderness: There is no abdominal tenderness. Musculoskeletal:         General: No deformity. Normal range of motion. Cervical back: Normal range of motion. Legs:       Comments: Patient has a board lodged in her right lower calf. The screw was approximately inserted within the wound 5 mm. She has good distal DP and TP pulses in the lower extremities.   She has intact sensation throughout the foot and the partner of the catheter is distal to the wound. Skin:     General: Skin is warm and dry. Findings: No rash. Neurological:      Mental Status: She is alert and oriented to person, place, and time. Coordination: Coordination normal.   Psychiatric:         Behavior: Behavior normal.         Diagnostic Study Results     Labs -   No results found for this or any previous visit (from the past 24 hour(s)). Radiologic Studies -   XR TIB/FIB RT   Final Result   Screw in soft tissues of the right lower leg. No evidence for open   fracture. CT Results  (Last 48 hours)    None        CXR Results  (Last 48 hours)    None            Medical Decision Making   I am the first provider for this patient. I reviewed the vital signs, available nursing notes, past medical history, past surgical history, family history and social history. Vital Signs-Reviewed the patient's vital signs. No data found. Vitals:    06/22/21 1200 06/22/21 1300 06/22/21 1415 06/22/21 1500   BP: (!) 148/82 (!) 151/80 139/80 (!) 147/81   Pulse:       Resp:       Temp:       SpO2: 98% 97% 100% 95%   Weight:       Height:         Records Reviewed: Nursing records and medical records reviewed    MDM:      Provider Notes (Medical Decision Making):   Patient a 51-year-old female presenting with puncture wound to the right calf with screw. X-ray was performed which showed no involvement of the bone. Since patient is neurovascularly intact screws removed after pain management was achieved with IV morphine. Patient tolerated procedure well and wound was copiously irrigated with approximately 300 cc of normal saline fluid. The wound was then dressed with antibiotic ointment (bacitracin) and nonstick dressing was applied. At this time we will prescribe her a course of Keflex.   She is also said that given her high incidence of C. difficile infections in the past, her GI physician is asked that she be prescribed vancomycin in case she develops diarrhea. Given how prone she has had these infections, I will prescribe her a course of vancomycin to be used only if she develops symptoms of C. difficile colitis. Otherwise she will take a course of Keflex as prescribed she will return to the ER for any worsening pain, swelling, redness, or any other new concerns. ED Course:   Initial assessment performed. The patients presenting problems have been discussed, and they are in agreement with the care plan formulated and outlined with them. I have encouraged them to ask questions as they arise throughout their visit. Medications Administered     acetaminophen (TYLENOL) tablet 1,000 mg     Admin Date  06/22/2021 Action  Given Dose  1,000 mg Route  Oral Administered By  Gladys Benitez RN          bacitracin 500 unit/gram packet 1 Packet     Admin Date  06/22/2021 Action  Given Dose  1 Packet Route  Topical Administered By  Gladys Benitez RN          dexamethasone (DECADRON) 4 mg/mL injection 10 mg     Admin Date  06/22/2021 Action  Given Dose  10 mg Route  IntraVENous Administered By  Gladys Benitez RN          diph,Pertuss(AC),Tet Vac-PF (BOOSTRIX) suspension 0.5 mL     Admin Date  06/22/2021 Action  Given Dose  0.5 mL Route  IntraMUSCular Administered By  Gladys Benitez RN          diphenhydrAMINE (BENADRYL) injection 25 mg     Admin Date  06/22/2021 Action  Given Dose  25 mg Route  IntraVENous Administered By  Gladys Benitez RN           Admin Date  06/22/2021 Action  Given Dose  25 mg Route  IntraVENous Administered By  Gladys Benitez RN          morphine injection 4 mg     Admin Date  06/22/2021 Action  Given Dose  4 mg Route  IntraVENous Administered By  Gladys Benitez RN           Admin Date  06/22/2021 Action  Given Dose  4 mg Route  IntraVENous Administered By  Gladys Benitez RN                    Critical Care:  None      Disposition:  1:31 PM  Rose Marie Barajas  results have been reviewed with her.   She has been counseled regarding her diagnosis. She verbally conveys understanding and agreement of the signs, symptoms, diagnosis, treatment and prognosis and additionally agrees to follow up as recommended with Dr. Toni Perez MD in 24 - 48 hours. She also agrees with the care-plan and conveys that all of her questions have been answered. I have also put together some discharge instructions for her that include: 1) educational information regarding their diagnosis, 2) how to care for their diagnosis at home, as well a 3) list of reasons why they would want to return to the ED prior to their follow-up appointment, should their condition change. DISCHARGE PLAN:  1. Discharge Medication List as of 6/22/2021  2:28 PM      START taking these medications    Details   bacitracin (BACITRACIN) 500 unit/gram oint Apply  to affected area three (3) times daily for 10 days. Apply to affected area, Normal, Disp-1 Tube, R-0      cephALEXin (Keflex) 500 mg capsule Take 1 Capsule by mouth two (2) times a day., Normal, Disp-10 Capsule, R-0      vancomycin (Vancocin) 125 mg capsule Take 1 Capsule by mouth four (4) times daily. , Normal, Disp-28 Capsule, R-0      HYDROmorphone (Dilaudid) 2 mg tablet Take 1 Tablet by mouth every six (6) hours as needed for Pain for up to 3 days. Max Daily Amount: 8 mg. Indications: excessive pain, Print, Disp-10 Tablet, R-0         CONTINUE these medications which have NOT CHANGED    Details   valACYclovir (VALTREX) 1 gram tablet Take 2 tablets by mouth twice daily for 1 day as needed for flares, Normal, Disp-8 Tablet, R-2      sertraline (ZOLOFT) 100 mg tablet Take 2 Tablets by mouth nightly., Normal, Disp-60 Tablet, R-2      ALPRAZolam (XANAX) 1 mg tablet TAKE 1/2-1 TABS BY MOUTH TWO (2) TIMES DAILY AS NEEDED FOR ANXIETY.  MAX DAILY AMOUNT: 2 MG., Normal, Disp-60 Tablet, R-0Not to exceed 5 additional fills before 10/17/2021      ondansetron hcl (Zofran) 4 mg tablet Take 1 Tab by mouth every six to eight (6-8) hours as needed for Nausea or Vomiting., Normal, Disp-20 Tab, R-0      gabapentin (NEURONTIN) 300 mg capsule Take 1 Cap by mouth nightly. Max Daily Amount: 300 mg., Normal, Disp-30 Cap, R-1Not to exceed 4 additional fills before 07/28/2021      losartan-hydroCHLOROthiazide (HYZAAR) 100-12.5 mg per tablet Take 1 Tab by mouth daily. , Normal, Disp-90 Tab, R-1      glimepiride (AMARYL) 4 mg tablet TAKE 1 TABLET BY MOUTH ONCE DAILY BEFORE BREAKFAST, Normal, Disp-90 Tab, R-0      albuterol (PROVENTIL HFA, VENTOLIN HFA, PROAIR HFA) 90 mcg/actuation inhaler Take 1 Puff by inhalation every four (4) hours as needed for Wheezing., Normal, Disp-1 Inhaler, R-1      estradioL (ESTRACE) 0.5 mg tablet TAKE 1 TABLET BY MOUTH ONCE DAILY, Historical Med      empagliflozin (Jardiance) 25 mg tablet Take 1 Tab by mouth daily. , Normal, Disp-90 Tab, R-3      omeprazole (PRILOSEC) 20 mg capsule Take 40 mg by mouth Daily (before breakfast). , Historical Med      dicyclomine (BENTYL) 20 mg tablet Take 1 Tab by mouth every six (6) hours as needed (abdominal cramps) for up to 20 doses. , Print, Disp-20 Tab, R-0      EPINEPHrine (EPIPEN) 0.3 mg/0.3 mL (1:1,000) injection 0.3 mL by IntraMUSCular route once as needed for up to 1 dose., Print, Disp-0.3 mL, R-1           2. Follow-up Information     Follow up With Specialties Details Why Piter Goldsmith MD Internal Medicine In 3 days For a follow-up evaluation. [address] [facility] [address]. [phone]      [facility] EMERGENCY DEPT Emergency Medicine In 1 day If symptoms worsen [address] [place] [address]  [phone]        3. Return to ED if worse     Diagnosis     Clinical Impression:   1.  Puncture wound of right lower extremity excluding thigh, initial encounter        Attestations:    Kervin Anthony MD    Please note that this dictation was completed with Yieldr, the computer voice recognition software. Quite often unanticipated grammatical, syntax, homophones, and other interpretive errors are inadvertently transcribed by the computer software. Please disregard these errors. Please excuse any errors that have escaped final proofreading. Thank you.

## 2021-06-26 LAB
BACTERIA SPEC CULT: ABNORMAL
GRAM STN SPEC: ABNORMAL
GRAM STN SPEC: ABNORMAL
SERVICE CMNT-IMP: ABNORMAL

## 2021-06-29 DIAGNOSIS — F41.9 ANXIETY: ICD-10-CM

## 2021-06-29 LAB
BACTERIA SPEC CULT: NORMAL
SERVICE CMNT-IMP: NORMAL

## 2021-06-29 RX ORDER — ALBUTEROL SULFATE 90 UG/1
AEROSOL, METERED RESPIRATORY (INHALATION)
Qty: 18 INHALER | Refills: 1 | Status: SHIPPED | OUTPATIENT
Start: 2021-06-29 | End: 2022-01-24

## 2021-06-30 RX ORDER — ALPRAZOLAM 1 MG/1
TABLET ORAL
Qty: 60 TABLET | Refills: 0 | Status: SHIPPED | OUTPATIENT
Start: 2021-06-30 | End: 2021-08-02

## 2021-07-01 ENCOUNTER — PATIENT MESSAGE (OUTPATIENT)
Dept: INTERNAL MEDICINE CLINIC | Age: 51
End: 2021-07-01

## 2021-07-01 NOTE — TELEPHONE ENCOUNTER
According to . Patient's last refill on alprazolam 1 mg #60 was on 6/2. Her pharmacy she picked up the medicine on 6/ 8 and is not due until 7/8 pharmacy called and requested they allow refill on her medication on 7/2.

## 2021-07-01 NOTE — TELEPHONE ENCOUNTER
----- Message from Jessie Telles sent at 7/1/2021  3:24 PM EDT -----  Regarding: FW: Prescription Question  Contact: [phone]    ----- Message -----  From: Jolene Welch  Sent: 7/1/2021   3:13 PM EDT  To: Feng Doran  Subject: Prescription Question                            Hi Dr. Lulu Hilliard,  I was trying to get my alprazolam filled. I know that you authorized a refill. The last one I got from [facility] was on June 2nd. I went to pick it up and the tech stated that since I didnt pick the rx up until June 8th they have to hold it until July 7th-8th. Normally that wouldnt be a problem, but Im leaving for vacation Friday evening. I will not be back for 7 days, I will be out by then. The pharmacy said that you could call and approve it. I told them you have already refilled it! Im having some many issues with [facility].   If you could take care of this I would appreciate it. If not I understand and I will just cut the ones I have in half. Thanks so much!   Gee North   [phone]

## 2021-07-02 ENCOUNTER — HOSPITAL ENCOUNTER (EMERGENCY)
Age: 51
Discharge: HOME | End: 2021-07-02
Attending: EMERGENCY MEDICINE
Payer: MEDICAID

## 2021-07-02 ENCOUNTER — HOSPITAL ENCOUNTER (EMERGENCY)
Age: 51
Discharge: HOME | End: 2021-07-02
Attending: STUDENT IN AN ORGANIZED HEALTH CARE EDUCATION/TRAINING PROGRAM
Payer: MEDICAID

## 2021-07-02 VITALS
TEMPERATURE: 98.3 F | RESPIRATION RATE: 22 BRPM | DIASTOLIC BLOOD PRESSURE: 74 MMHG | BODY MASS INDEX: 37.73 KG/M2 | HEART RATE: 91 BPM | HEIGHT: 62 IN | OXYGEN SATURATION: 96 % | SYSTOLIC BLOOD PRESSURE: 114 MMHG | WEIGHT: 205 LBS

## 2021-07-02 VITALS
TEMPERATURE: 98 F | SYSTOLIC BLOOD PRESSURE: 124 MMHG | DIASTOLIC BLOOD PRESSURE: 73 MMHG | RESPIRATION RATE: 18 BRPM | HEIGHT: 62 IN | OXYGEN SATURATION: 97 % | WEIGHT: 205.91 LBS | BODY MASS INDEX: 37.89 KG/M2 | HEART RATE: 82 BPM

## 2021-07-02 DIAGNOSIS — T14.8XXA PUNCTURE WOUND: ICD-10-CM

## 2021-07-02 DIAGNOSIS — L03.115 CELLULITIS OF RIGHT LOWER EXTREMITY: Primary | ICD-10-CM

## 2021-07-02 DIAGNOSIS — T78.40XA ALLERGIC REACTION, INITIAL ENCOUNTER: Primary | ICD-10-CM

## 2021-07-02 PROCEDURE — 74011250636 HC RX REV CODE- 250/636: Performed by: STUDENT IN AN ORGANIZED HEALTH CARE EDUCATION/TRAINING PROGRAM

## 2021-07-02 PROCEDURE — 74011250637 HC RX REV CODE- 250/637: Performed by: EMERGENCY MEDICINE

## 2021-07-02 PROCEDURE — 99284 EMERGENCY DEPT VISIT MOD MDM: CPT

## 2021-07-02 PROCEDURE — 96375 TX/PRO/DX INJ NEW DRUG ADDON: CPT

## 2021-07-02 PROCEDURE — 74011250636 HC RX REV CODE- 250/636: Performed by: EMERGENCY MEDICINE

## 2021-07-02 PROCEDURE — 96365 THER/PROPH/DIAG IV INF INIT: CPT

## 2021-07-02 PROCEDURE — 96366 THER/PROPH/DIAG IV INF ADDON: CPT

## 2021-07-02 PROCEDURE — 74011000250 HC RX REV CODE- 250: Performed by: STUDENT IN AN ORGANIZED HEALTH CARE EDUCATION/TRAINING PROGRAM

## 2021-07-02 PROCEDURE — 96374 THER/PROPH/DIAG INJ IV PUSH: CPT

## 2021-07-02 RX ORDER — DIPHENHYDRAMINE HYDROCHLORIDE 50 MG/ML
50 INJECTION, SOLUTION INTRAMUSCULAR; INTRAVENOUS
Status: COMPLETED | OUTPATIENT
Start: 2021-07-02 | End: 2021-07-02

## 2021-07-02 RX ORDER — HYDROMORPHONE HYDROCHLORIDE 2 MG/1
2 TABLET ORAL
Qty: 8 TABLET | Refills: 0 | Status: SHIPPED | OUTPATIENT
Start: 2021-07-02 | End: 2021-07-05

## 2021-07-02 RX ORDER — EPINEPHRINE 0.3 MG/.3ML
0.3 INJECTION SUBCUTANEOUS
Qty: 1 SYRINGE | Refills: 0 | Status: SHIPPED | OUTPATIENT
Start: 2021-07-02 | End: 2021-07-02

## 2021-07-02 RX ORDER — VANCOMYCIN 2 GRAM/500 ML IN 0.9 % SODIUM CHLORIDE INTRAVENOUS
2000 ONCE
Status: COMPLETED | OUTPATIENT
Start: 2021-07-02 | End: 2021-07-02

## 2021-07-02 RX ORDER — ONDANSETRON 4 MG/1
4 TABLET, ORALLY DISINTEGRATING ORAL
Qty: 6 TABLET | Refills: 0 | Status: SHIPPED | OUTPATIENT
Start: 2021-07-02 | End: 2021-07-19 | Stop reason: SDUPTHER

## 2021-07-02 RX ORDER — HYDROMORPHONE HYDROCHLORIDE 2 MG/1
2 TABLET ORAL
Status: COMPLETED | OUTPATIENT
Start: 2021-07-02 | End: 2021-07-02

## 2021-07-02 RX ORDER — DOXYCYCLINE HYCLATE 100 MG
100 TABLET ORAL 2 TIMES DAILY
Qty: 14 TABLET | Refills: 0 | Status: SHIPPED | OUTPATIENT
Start: 2021-07-02 | End: 2021-07-09

## 2021-07-02 RX ORDER — MORPHINE SULFATE 2 MG/ML
2 INJECTION, SOLUTION INTRAMUSCULAR; INTRAVENOUS ONCE
Status: COMPLETED | OUTPATIENT
Start: 2021-07-02 | End: 2021-07-02

## 2021-07-02 RX ORDER — MORPHINE SULFATE 2 MG/ML
4 INJECTION, SOLUTION INTRAMUSCULAR; INTRAVENOUS
Status: COMPLETED | OUTPATIENT
Start: 2021-07-02 | End: 2021-07-02

## 2021-07-02 RX ADMIN — MORPHINE SULFATE 4 MG: 2 INJECTION, SOLUTION INTRAMUSCULAR; INTRAVENOUS at 04:29

## 2021-07-02 RX ADMIN — HYDROMORPHONE HYDROCHLORIDE 2 MG: 2 TABLET ORAL at 06:29

## 2021-07-02 RX ADMIN — DIPHENHYDRAMINE HYDROCHLORIDE 50 MG: 50 INJECTION, SOLUTION INTRAMUSCULAR; INTRAVENOUS at 04:42

## 2021-07-02 RX ADMIN — MORPHINE SULFATE 2 MG: 2 INJECTION, SOLUTION INTRAMUSCULAR; INTRAVENOUS at 09:35

## 2021-07-02 RX ADMIN — VANCOMYCIN HYDROCHLORIDE 2000 MG: 10 INJECTION, POWDER, LYOPHILIZED, FOR SOLUTION INTRAVENOUS at 04:50

## 2021-07-02 RX ADMIN — FAMOTIDINE 20 MG: 10 INJECTION, SOLUTION INTRAVENOUS at 08:09

## 2021-07-02 RX ADMIN — DIPHENHYDRAMINE HYDROCHLORIDE 50 MG: 50 INJECTION, SOLUTION INTRAMUSCULAR; INTRAVENOUS at 08:10

## 2021-07-02 RX ADMIN — METHYLPREDNISOLONE SODIUM SUCCINATE 125 MG: 125 INJECTION, POWDER, FOR SOLUTION INTRAMUSCULAR; INTRAVENOUS at 08:10

## 2021-07-02 NOTE — ED NOTES
Copied from triage: Wound Check (patient states she injured her leg while working on her deck a week and a half ago. came in to the ED and was placed on antibiotics but states it has not gotten better. states that sit is taking a long time to heal and starting to develope a rash)    Pt states she finished Keflex a few days ago; is having pain, redness and drainage @ the site.

## 2021-07-02 NOTE — ED PROVIDER NOTES
EMERGENCY DEPARTMENT HISTORY AND PHYSICAL EXAM      Date: 7/2/2021  Patient Name: Filippo Lizama    History of Presenting Illness     Chief Complaint   Patient presents with    Allergic Reaction     pt just discharged states throat feels like its swelling took epi post antibiotic administration. HPI: Filippo Lizama, 46 y.o. female presents to the ED with cc of throat swelling. She was just here in the emergency department, got antibiotics for an infection on her calf, was given a dose of vancomycin. She states that after she was discharged she began to have a feeling of some discomfort and swelling in her throat that feels like a \"bubble\" in her throat. She began to feel itchy as well over her scalp and groin. Denies any hives or rash. She has had allergic reactions in the past, and states that they usually start this way, so she gave herself IM epinephrine. She now feels like her symptoms are better. She denies any wheezing, reports some no other new exposures that she can think of. There are no other complaints, changes, or physical findings at this time.     PCP: Padmini Red MD    Current Facility-Administered Medications on File Prior to Encounter   Medication Dose Route Frequency Provider Last Rate Last Admin    [COMPLETED] morphine injection 4 mg  4 mg IntraVENous NOW Nataliya Mancuso MD   4 mg at 07/02/21 0429    [COMPLETED] vancomycin (VANCOCIN) 2000 mg in  ml infusion  2,000 mg IntraVENous ONCE Nataliya Mancuso MD   IV Completed at 07/02/21 0650    [COMPLETED] diphenhydrAMINE (BENADRYL) injection 50 mg  50 mg IntraVENous NOW Nataliya Mancuso MD   50 mg at 07/02/21 0442    [COMPLETED] HYDROmorphone (DILAUDID) tablet 2 mg  2 mg Oral NOW Nataliya Mancuso MD   2 mg at 07/02/21 7709     Current Outpatient Medications on File Prior to Encounter   Medication Sig Dispense Refill    doxycycline (VIBRA-TABS) 100 mg tablet Take 1 Tablet by mouth two (2) times a day for 7 days. 14 Tablet 0    HYDROmorphone (DILAUDID) 2 mg tablet Take 1 Tablet by mouth every six (6) hours as needed for Pain for up to 3 days. Max Daily Amount: 8 mg. 8 Tablet 0    ALPRAZolam (XANAX) 1 mg tablet TAKE 1/2 - 1 TABLET BY MOUTH TWICE DAILY AS NEEDED FOR ANXIETY *MAX 2 TABS DAILY* 60 Tablet 0    Ventolin HFA 90 mcg/actuation inhaler TAKE 1 PUFF BY INHALATION EVERY FOUR (4) HOURS AS NEEDED FOR WHEEZING. 18 Inhaler 1    lidocaine (Lidoderm) 5 % Apply patch to the affected area for 12 hours a day and remove for 12 hours a day. 5 Each 0    cephALEXin (Keflex) 500 mg capsule Take 1 Capsule by mouth two (2) times a day. 10 Capsule 0    bacitracin (BACITRACIN) 500 unit/gram oint Apply  to affected area three (3) times daily for 10 days. Apply to affected area 1 Tube 0    vancomycin (Vancocin) 125 mg capsule Take 1 Capsule by mouth four (4) times daily. 28 Capsule 0    valACYclovir (VALTREX) 1 gram tablet Take 2 tablets by mouth twice daily for 1 day as needed for flares (Patient not taking: Reported on 6/24/2021) 8 Tablet 2    sertraline (ZOLOFT) 100 mg tablet Take 2 Tablets by mouth nightly. 60 Tablet 2    gabapentin (NEURONTIN) 300 mg capsule Take 1 Cap by mouth nightly. Max Daily Amount: 300 mg. 30 Cap 1    losartan-hydroCHLOROthiazide (HYZAAR) 100-12.5 mg per tablet Take 1 Tab by mouth daily. 90 Tab 1    glimepiride (AMARYL) 4 mg tablet TAKE 1 TABLET BY MOUTH ONCE DAILY BEFORE BREAKFAST (Patient not taking: Reported on 6/24/2021) 90 Tab 0    estradioL (ESTRACE) 0.5 mg tablet TAKE 1 TABLET BY MOUTH ONCE DAILY      empagliflozin (Jardiance) 25 mg tablet Take 1 Tab by mouth daily. 90 Tab 3    omeprazole (PRILOSEC) 20 mg capsule Take 40 mg by mouth Daily (before breakfast).  EPINEPHrine (EPIPEN) 0.3 mg/0.3 mL (1:1,000) injection 0.3 mL by IntraMUSCular route once as needed for up to 1 dose.  0.3 mL 1       Past History     Past Medical History:  Past Medical History:   Diagnosis Date    Anal fissure     Anisocoria     Asthma     LAST EPISODE     Back pain     Cerumen impaction     Chronic kidney disease     hx uti in past    Coagulation defects     ocassional rectal bleeding due to anal fissure    Colovaginal fistula     Diabetes (HCC)     NIDDM    Diverticulitis     Diverticulosis     Enlarged tonsils     Frequent UTI     GERD (gastroesophageal reflux disease)     H/O endoscopy     with dilation    HA (headache)     Hepatic steatosis     History of colon resection     Hx of colonoscopy with polypectomy     benign    Hypertension     Ill-defined condition     FREQUENT HIVES    Ill-defined condition     HX ELEVATED LIVER ENZYMES    Morbid obesity (HCC)     Nausea & vomiting     during diverticulitis flare    Obesity     Otitis media     Pneumonia     about 15 yrs ago    Psychiatric disorder     ANXIETY    Recurrent tonsillitis     Sinusitis     Transfusion history ~ age 35    postop hysterectomy    Urticaria        Past Surgical History:  Past Surgical History:   Procedure Laterality Date    COLONOSCOPY N/A 3/28/2019    COLONOSCOPY performed by Bebeto Joe MD at [facility] COLONOSCOPY N/A 10/2/2020    COLONOSCOPY performed by Bebeto Joe MD at [address] [facility]    [place].     HX GI  12    LAPAROSCOPIC HAND ASSISTED  POSS OPEN SIGMOID COLECTOMY POSS TEMPORARY DIVERTING LOOP ILEOSTOMY;  (no illeostomy needed)    HX GYN           HX GYN      cervical conization    HX HEENT      SINUS SURGERY LEFT X2    HX HEENT      SINUS SURGERY ON RIGHT X2    HX OTHER SURGICAL      Sphincterotomy    HX PELVIC LAPAROSCOPY      HX EMMANUEL AND BSO      HX UROLOGICAL  12     CYSTOSCOPY INSERTION URETERAL CATHETERS - Cystoscopy Insertion of bilateral ureteral stents    MA ABDOMEN SURGERY PROC UNLISTED  2018    hernia repair at [facility]       Family History:  Family History   Problem Relation Age of Onset    Diabetes Mother  Cancer Mother         NON-HODGKINS LYMPHOMA    Anesth Problems Mother         PONV    Diabetes Father     Heart Disease Father         CAD - STENTS, PACEMAKER    Arrhythmia Father        Social History:  Social History     Tobacco Use    Smoking status: Never Smoker    Smokeless tobacco: Never Used   Vaping Use    Vaping Use: Never assessed   Substance Use Topics    Alcohol use: Yes     Comment: Rarely    Drug use: No     Types: Prescription, OTC       Allergies: Allergies   Allergen Reactions    Aspirin Hives and Shortness of Breath    Codeine Hives, Itching and Angioedema     Pt had itching in mouth, on face and lips    Contrast Agent [Iodine] Hives, Itching and Angioedema     5/5/21: pt was given benadryl and solu-medrol prior to administration but pt had severe tongue swelling and drooling, lethargy and itching. DO NOT GIVE PT    Flavoring Agent Anaphylaxis    Percocet [Oxycodone-Acetaminophen] Hives, Itching and Angioedema     Pt had itching in mouth, on face and lips    Prilosec [Omeprazole Magnesium] Anaphylaxis     CHERRY FLAVORED ODT; PT TAKES REGULAR PRILOSEC AND IS OK    Dilaudid [Hydromorphone] Itching     Tolerates with benadryl    Ketorolac Rash     \"makes my eyes spasm and causes rash on my hands\" and \"makes my skin itch and makes me nervous\"    Fentanyl Rash and Itching     Has had benadryl before         Review of Systems   no fever  No eye pain  No ear pain  No cough  no chest pain  no abdominal pain  no dysuria  Reports leg wound  No rash  No lymphadenopathy  No weight loss    Physical Exam   Physical Exam  Constitutional:       General: She is not in acute distress. HENT:      Head: Normocephalic and atraumatic. Mouth/Throat:      Mouth: Mucous membranes are moist.      Comments: Oropharynx without edema, no trismus, drooling or stridor  Eyes:      Extraocular Movements: Extraocular movements intact.    Cardiovascular:      Rate and Rhythm: Normal rate and regular rhythm. Pulmonary:      Effort: Pulmonary effort is normal.      Breath sounds: Normal breath sounds. Abdominal:      Palpations: Abdomen is soft. Tenderness: There is no abdominal tenderness. Musculoskeletal:         General: No deformity. Cervical back: Neck supple. Skin:     General: Skin is warm and dry. Findings: No rash. Neurological:      General: No focal deficit present. Mental Status: She is alert and oriented to person, place, and time. Psychiatric:         Mood and Affect: Mood normal.         Diagnostic Study Results     Labs -   No results found for this or any previous visit (from the past 24 hour(s)). Radiologic Studies -   No orders to display     CT Results  (Last 48 hours)    None        CXR Results  (Last 48 hours)    None            Medical Decision Making   I am the first provider for this patient. I reviewed the vital signs, available nursing notes, past medical history, past surgical history, family history and social history. Vital Signs-Reviewed the patient's vital signs. Patient Vitals for the past 24 hrs:   Temp Pulse Resp BP SpO2   07/02/21 0845 -- 92 22 112/66 95 %   07/02/21 0726 98.3 °F (36.8 °C) (!) 116 -- (!) 156/81 100 %         Provider Notes (Medical Decision Making):   59-year-old female presenting with sensation of throat swelling and itchiness after receiving antibiotic. Concern for allergic reaction. Initially tachycardic, this was after she gave her some epinephrine, the vitals are improved on reevaluation. No evidence of any airway compromise on presentation. No signs of infection on exam, she is otherwise well-appearing, states that she is feeling improved already. She will be observed, will be given IV steroids, Benadryl and Pepcid. ED Course:     Initial assessment performed. The patients presenting problems have been discussed, and they are in agreement with the care plan formulated and outlined with them.   I have encouraged them to ask questions as they arise throughout their visit. On reevaluation, patient is resting comfortably and states that they feel improved. Her vital signs are normal.  She states that her voice seems normal and she no longer has sensation of throat swelling, denies complaints. She would like to be discharged. Patient is counseled on supportive care and return precautions. Will return to the ED for any worsening swelling, shortness of breath, any new or worrisome symptoms. Will followup with primary care doctor within 5 days. Critical Care Time:         Disposition:  Home    PLAN:  1. Current Discharge Medication List        2.    Follow-up Information    None       Return to ED if worse     Diagnosis     Clinical Impression: Acute allergic reaction

## 2021-07-02 NOTE — DISCHARGE INSTRUCTIONS
It was a pleasure taking care of you in our Emergency Department today. We know that when you come to [facility], you are entrusting us with your health, comfort, and safety. Our physicians and nurses honor that trust, and truly appreciate the opportunity to care for you and your loved ones. We also value your feedback. If you receive a survey about your Emergency Department experience today, please fill it out. We care about our patients' feedback, and we listen to what you have to say.   Thank you!       - Dr. Ally Kim MD

## 2021-07-02 NOTE — ED PROVIDER NOTES
EMERGENCY DEPARTMENT HISTORY AND PHYSICAL EXAM    Please note that this dictation was completed with [employer], the computer voice recognition software. Quite often unanticipated grammatical, syntax, homophones, and other interpretive errors are inadvertently transcribed by the computer software. Please disregard these errors. Please excuse any errors that have escaped final proofreading. Date: 7/2/2021  Patient Name: Jessica Nicolas  Patient Age and Sex: 46 y.o. female    History of Presenting Illness     Chief Complaint   Patient presents with    Wound Check     patient states she injured her leg while working on her deck a week and a half ago. came in to the ED and was placed on antibiotics but states it has not gotten better. states that sit is taking a long time to heal and starting to develope a rash       History Provided By: Patient    HPI: Jessica Nicolas, is a 46 y.o. female whose medical history is noted below and includes ulcerative colitis that requires relatively frequent prednisone dosing but is currently not on prednisone, C. difficile about a year ago, recent completion of a course of Keflex for a puncture wound to her right shin which occurred on 6/22, presents with persistent pain but worsening erythema and warmth to touch around the puncture wound which she noticed gradually worsening over the past few days. She has no fevers or chills. She acquired her injury at home while walking down some steps in her backyard and stepping onto a board that ended up breaking underneath. She stepped through the board and in the process a screw became lodged in her mid shin area. She was transported by EMS to the hospital where the screw was removed. Wound was left to heal by secondary intent. The patient was prophylactically started on Keflex, which she completed just a few days ago.     She became concerned about 2 days ago when she noticed that the area around the puncture wound was getting more red, felt warmer to touch, tenderness around the wound had never improved. No swelling of her calf, ankle or thigh. Able to bear weight. She has noticed minimal serosanguineous drainage from the open puncture wound over the last few days, no large amount of purulent drainage. Pt denies any other alleviating or exacerbating factors. No other associated signs or symptoms. There are no other complaints, changes or physical findings at this time. PCP: Faizan Marquis MD    Past History   All documented elements of the [address] reviewed and verified by me. -Susan Sewell MD    Past Medical History:  Past Medical History:   Diagnosis Date    Anal fissure     Anisocoria     Asthma     LAST EPISODE     Back pain     Cerumen impaction     Chronic kidney disease     hx uti in past    Coagulation defects     ocassional rectal bleeding due to anal fissure    Colovaginal fistula     Diabetes ([address]     NIDDM    Diverticulitis     Diverticulosis     Enlarged tonsils     Frequent UTI     GERD (gastroesophageal reflux disease)     H/O endoscopy     with dilation    HA (headache)     Hepatic steatosis     History of colon resection     Hx of colonoscopy with polypectomy     benign    Hypertension     Ill-defined condition     FREQUENT HIVES    Ill-defined condition     HX ELEVATED LIVER ENZYMES    Morbid obesity ([address]     Nausea & vomiting     during diverticulitis flare    Obesity     Otitis media     Pneumonia     about 15 yrs ago    Psychiatric disorder     ANXIETY    Recurrent tonsillitis     Sinusitis     Transfusion history ~ age 35    postop hysterectomy    Urticaria        Past Surgical History:  Past Surgical History:   Procedure Laterality Date    COLONOSCOPY N/A 3/28/2019    COLONOSCOPY performed by Jacobo Samuel MD at [facility] ENDOSCOPY    COLONOSCOPY N/A 10/2/2020    COLONOSCOPY performed by Jacobo Samuel MD at [facility]    [place].  HX GI  12    LAPAROSCOPIC HAND ASSISTED  POSS OPEN SIGMOID COLECTOMY POSS TEMPORARY DIVERTING LOOP ILEOSTOMY;  (no illeostomy needed)    HX GYN           HX GYN      cervical conization    HX HEENT      SINUS SURGERY LEFT X2    HX HEENT      SINUS SURGERY ON RIGHT X2    HX OTHER SURGICAL      Sphincterotomy    HX PELVIC LAPAROSCOPY      HX EMMANUEL AND BSO      HX UROLOGICAL  12     CYSTOSCOPY INSERTION URETERAL CATHETERS - Cystoscopy Insertion of bilateral ureteral stents    HI ABDOMEN SURGERY PROC UNLISTED  2018    hernia repair at [facility]       Family History:  Family History   Problem Relation Age of Onset    Diabetes Mother     Cancer Mother         NON-HODGKINS LYMPHOMA    Anesth Problems Mother         PONV    Diabetes Father     Heart Disease Father         CAD - STENTS, PACEMAKER    Arrhythmia Father        Social History:  Social History     Tobacco Use    Smoking status: Never Smoker    Smokeless tobacco: Never Used   Vaping Use    Vaping Use: Never assessed   Substance Use Topics    Alcohol use: Yes     Comment: Rarely    Drug use: No     Types: Prescription, OTC       Allergies: Allergies   Allergen Reactions    Aspirin Hives and Shortness of Breath    Codeine Hives, Itching and Angioedema     Pt had itching in mouth, on face and lips    Contrast Agent [Iodine] Hives, Itching and Angioedema     21: pt was given benadryl and solu-medrol prior to administration but pt had severe tongue swelling and drooling, lethargy and itching.  DO NOT GIVE PT    Flavoring Agent Anaphylaxis    Percocet [Oxycodone-Acetaminophen] Hives, Itching and Angioedema     Pt had itching in mouth, on face and lips    Prilosec [Omeprazole Magnesium] Anaphylaxis     CHERRY FLAVORED ODT; PT TAKES REGULAR PRILOSEC AND IS OK    Dilaudid [Hydromorphone] Itching     Tolerates with benadryl    Ketorolac Rash     \"makes my eyes spasm and causes rash on my hands\" and \"makes my skin itch and makes me nervous\"    Fentanyl Rash and Itching     Has had benadryl before       Review of Systems   All other systems reviewed and negative    Review of Systems   Constitutional: Negative for chills and fever. HENT: Negative. Eyes: Negative. Respiratory: Negative for cough and shortness of breath. Cardiovascular: Negative for chest pain and palpitations. Gastrointestinal: Negative for abdominal pain, diarrhea, nausea and vomiting. Endocrine: Negative. Genitourinary: Negative for dysuria and flank pain. Musculoskeletal: Negative for arthralgias, back pain, gait problem and joint swelling. Skin: Positive for color change and wound. Neurological: Negative for tremors, weakness, light-headedness, numbness and headaches. Hematological: Negative. Negative for adenopathy. Does not bruise/bleed easily. All other systems reviewed and are negative. Physical Exam   Reviewed patients vital signs and nursing note    Physical Exam  Vitals and nursing note reviewed. Constitutional:       Appearance: She is not ill-appearing or diaphoretic. HENT:      Head: Atraumatic. Mouth/Throat:      Mouth: Mucous membranes are moist.   Eyes:      General: No scleral icterus. Extraocular Movements: Extraocular movements intact. Conjunctiva/sclera: Conjunctivae normal.   Cardiovascular:      Rate and Rhythm: Normal rate and regular rhythm. Pulses: Normal pulses. Heart sounds: Normal heart sounds. Pulmonary:      Effort: Pulmonary effort is normal.      Breath sounds: Normal breath sounds. Abdominal:      General: Bowel sounds are normal.      Palpations: Abdomen is soft. Tenderness: There is no abdominal tenderness. Musculoskeletal:         General: No deformity. Normal range of motion. Cervical back: Normal range of motion and neck supple. Right lower leg: No edema. Left lower leg: No edema.    Lymphadenopathy:      Cervical: No cervical adenopathy. Skin:     General: Skin is warm and dry. Capillary Refill: Capillary refill takes less than 2 seconds. Coloration: Skin is not cyanotic. Findings: Erythema and wound present. No abscess or bruising. Comments: right medial calf puncture wound is open, not actively draining, about 0.5cm in diameter and with surrounding erythema. No drainage even with application of firm pressure. No fluctuance appreciated. Neurological:      General: No focal deficit present. Mental Status: She is alert and oriented to person, place, and time. Sensory: No sensory deficit. Motor: No weakness. Gait: Gait normal.   Psychiatric:         Mood and Affect: Mood normal.         Behavior: Behavior normal.         Diagnostic Study Results     Labs - I have personally reviewed and interpreted all laboratory results. Indy Rm MD, MSc  No results found for this or any previous visit (from the past 24 hour(s)). Radiologic Studies - I have personally reviewed and interpreted all imaging studies and agree with radiology interpretation and report. - Indy Rm MD, MSc  No orders to display         Medical Decision Making   I am the first provider for this patient. Records Reviewed: I reviewed our electronic medical record system for any past medical records that were available that may contribute to the patient's current condition, including their PMH, surgical history, social and family history. Reviewed the nursing notes and vital signs from today's visit. Nursing notes will be reviewed as they become available in realtime while the pt has been in the ED. In addition, I read most recent discharge summaries, if available and reviewed prior ECGs or imaging studies for comparison purposes. Indy Rm MD Msc    Vital Signs-Reviewed the patient's vital signs.   Patient Vitals for the past 24 hrs:   Temp Pulse Resp BP SpO2   07/02/21 0305 98 °F (36.7 °C) 82 18 (!) 152/91 100 % Provider Notes (Medical Decision Making): The patient is a 80-year-old female with history of intermittent but chronic prednisone use who presents to the emergency department with worsening swelling and pain in her right lower extremity where she has a puncture wound that was left to heal via secondary intent. Vital signs are normal.  She is overall well-appearing. On exam, right lower extremity is tender to touch, warm to touch, there is erythema surrounding the puncture wound but no fluctuance. There is no swelling of the right lower extremity, including no swelling of foot or ankle. I have looked back over her medical record and on 6/24 she was seen here for wound check. There is a photograph of the wound in the chart. Currently there is no significant change from then, however, the patient also states that her symptoms and erythema initially had improved since it patient was taken before they worsened again. DDx: Cellulitis is a biggest concern particularly given the return of the erythema. Considered DVT but for very low suspicion based my clinical picture is her exam is very inconsistent with DVT. I have made sure that the patient verbalizes understanding of all symptoms of a DVT and urged her to return should any of those be present. This includes swelling of the lower extremity or persistent pain even if antibiotic treatment helps with the erythema. No concern for abscess or necrotizing fasciitis. Given worsening or recurrent symptoms after treatment with Keflex, I will broaden her antibiotics a bit today to include MRSA coverage. She will get 1 dose of IV vancomycin here in the emergency room, will discharge her home on doxycycline p.o. Wound care emphasized and demonstrated. Close follow-up recommended for another wound check, this in 2 to 3 days. ED Course:   Initial assessment performed.  The patients presenting problems have been discussed, and they are in agreement with the care plan formulated and outlined with them. I have encouraged them to ask questions as they arise throughout their visit. Progress note:  Patient has been reassessed and reports feeling considerably better, has normal vital signs and feels comfortable going home. I think this is reasonable as no findings today suggest a life-threatening condition. DISPOSITION: DISCHARGE  The patient's results have been reviewed with patient and available family and/or caregiver. They verbally convey their understanding and agreement of the patient's signs, symptoms, diagnosis, treatment and prognosis and additionally agree to follow up as recommended in the discharge instructions or to return to the Emergency Department should the patient's condition change prior to their follow-up appointment. The patient and available family and/or caregiver verbally agree with the care plan and all of their questions have been answered. The discharge instructions have also been provided to the them with educational information regarding the patient's diagnosis as well a list of reasons why the patient would want to return to the ER prior to their follow-up appointment should any concerns arise, the patient's condition change or symptoms worsen. Sumit Mueller MD, Msc    PLAN:  Current Discharge Medication List      START taking these medications    Details   doxycycline (VIBRA-TABS) 100 mg tablet Take 1 Tablet by mouth two (2) times a day for 7 days. Qty: 14 Tablet, Refills: 0  Start date: 7/2/2021, End date: 7/9/2021      HYDROmorphone (DILAUDID) 2 mg tablet Take 1 Tablet by mouth every six (6) hours as needed for Pain for up to 3 days. Max Daily Amount: 8 mg. Qty: 8 Tablet, Refills: 0  Start date: 7/2/2021, End date: 7/5/2021    Associated Diagnoses: Cellulitis of right lower extremity; Puncture wound           2.      Follow-up Information     Follow up With Specialties Details Why Maria Luisa Magaña MD Internal Medicine Schedule an appointment as soon as possible for a visit in 3 days for follow up [address] [facility]  [phone]      [facility] EMERGENCY DEPT Emergency Medicine Go to  For wound re-check in 3 days or if any symptoms worsen [address] [place] [address]  [phone]        3. Return to ED if worse       IBlayne MD, am the attending of record for this patient encounter. Diagnosis     Clinical Impression:   1. Cellulitis of right lower extremity    2. Puncture wound        Attestation:  I personally performed the services described in this documentation on this date 7/2/2021 for patient Andrez Arango.   Indy Rm MD

## 2021-07-02 NOTE — ED NOTES
Patient was provided with discharge instructions. Instructions and any medications were reviewed with the patient &/or family by Dr. Nicky Muse. Questions and concerns addressed by the provider. Patient discharged in stable condition via Brianna, RN and walked out of the ED to her ride.

## 2021-07-06 ENCOUNTER — APPOINTMENT (OUTPATIENT)
Dept: ULTRASOUND IMAGING | Age: 51
End: 2021-07-06
Attending: EMERGENCY MEDICINE
Payer: MEDICAID

## 2021-07-06 ENCOUNTER — APPOINTMENT (OUTPATIENT)
Dept: GENERAL RADIOLOGY | Age: 51
End: 2021-07-06
Attending: EMERGENCY MEDICINE
Payer: MEDICAID

## 2021-07-06 ENCOUNTER — HOSPITAL ENCOUNTER (EMERGENCY)
Age: 51
Discharge: HOME | End: 2021-07-06
Attending: EMERGENCY MEDICINE
Payer: MEDICAID

## 2021-07-06 VITALS
BODY MASS INDEX: 38.99 KG/M2 | SYSTOLIC BLOOD PRESSURE: 131 MMHG | WEIGHT: 211.86 LBS | TEMPERATURE: 98.6 F | DIASTOLIC BLOOD PRESSURE: 76 MMHG | RESPIRATION RATE: 16 BRPM | HEIGHT: 62 IN | OXYGEN SATURATION: 91 % | HEART RATE: 71 BPM

## 2021-07-06 DIAGNOSIS — S81.801D LEG WOUND, RIGHT, SUBSEQUENT ENCOUNTER: Primary | ICD-10-CM

## 2021-07-06 LAB
ALBUMIN SERPL-MCNC: 3.6 G/DL (ref 3.5–5)
ALBUMIN/GLOB SERPL: 1.1 {RATIO} (ref 1.1–2.2)
ALP SERPL-CCNC: 63 U/L (ref 45–117)
ALT SERPL-CCNC: 31 U/L (ref 12–78)
ANION GAP SERPL CALC-SCNC: 5 MMOL/L (ref 5–15)
AST SERPL-CCNC: 18 U/L (ref 15–37)
BASOPHILS # BLD: 0 K/UL (ref 0–0.1)
BASOPHILS NFR BLD: 1 % (ref 0–1)
BILIRUB SERPL-MCNC: 0.4 MG/DL (ref 0.2–1)
BUN SERPL-MCNC: 8 MG/DL (ref 6–20)
BUN/CREAT SERPL: 13 (ref 12–20)
CALCIUM SERPL-MCNC: 8.8 MG/DL (ref 8.5–10.1)
CHLORIDE SERPL-SCNC: 106 MMOL/L (ref 97–108)
CO2 SERPL-SCNC: 27 MMOL/L (ref 21–32)
CREAT SERPL-MCNC: 0.63 MG/DL (ref 0.55–1.02)
DIFFERENTIAL METHOD BLD: ABNORMAL
EOSINOPHIL # BLD: 0.2 K/UL (ref 0–0.4)
EOSINOPHIL NFR BLD: 4 % (ref 0–7)
ERYTHROCYTE [DISTWIDTH] IN BLOOD BY AUTOMATED COUNT: 14.7 % (ref 11.5–14.5)
GLOBULIN SER CALC-MCNC: 3.3 G/DL (ref 2–4)
GLUCOSE SERPL-MCNC: 201 MG/DL (ref 65–100)
HCT VFR BLD AUTO: 33.5 % (ref 35–47)
HGB BLD-MCNC: 11.1 G/DL (ref 11.5–16)
IMM GRANULOCYTES # BLD AUTO: 0 K/UL (ref 0–0.04)
IMM GRANULOCYTES NFR BLD AUTO: 1 % (ref 0–0.5)
LYMPHOCYTES # BLD: 1.9 K/UL (ref 0.8–3.5)
LYMPHOCYTES NFR BLD: 31 % (ref 12–49)
MCH RBC QN AUTO: 26 PG (ref 26–34)
MCHC RBC AUTO-ENTMCNC: 33.1 G/DL (ref 30–36.5)
MCV RBC AUTO: 78.5 FL (ref 80–99)
MONOCYTES # BLD: 0.3 K/UL (ref 0–1)
MONOCYTES NFR BLD: 5 % (ref 5–13)
NEUTS SEG # BLD: 3.8 K/UL (ref 1.8–8)
NEUTS SEG NFR BLD: 58 % (ref 32–75)
NRBC # BLD: 0 K/UL (ref 0–0.01)
NRBC BLD-RTO: 0 PER 100 WBC
PLATELET # BLD AUTO: 167 K/UL (ref 150–400)
PMV BLD AUTO: 8.9 FL (ref 8.9–12.9)
POTASSIUM SERPL-SCNC: 3.3 MMOL/L (ref 3.5–5.1)
PROT SERPL-MCNC: 6.9 G/DL (ref 6.4–8.2)
RBC # BLD AUTO: 4.27 M/UL (ref 3.8–5.2)
SODIUM SERPL-SCNC: 138 MMOL/L (ref 136–145)
WBC # BLD AUTO: 6.3 K/UL (ref 3.6–11)

## 2021-07-06 PROCEDURE — 96375 TX/PRO/DX INJ NEW DRUG ADDON: CPT

## 2021-07-06 PROCEDURE — 75810000139 HC PUNCT ASPIRATION ABCESS

## 2021-07-06 PROCEDURE — 96374 THER/PROPH/DIAG INJ IV PUSH: CPT

## 2021-07-06 PROCEDURE — 93971 EXTREMITY STUDY: CPT

## 2021-07-06 PROCEDURE — 74011000250 HC RX REV CODE- 250: Performed by: EMERGENCY MEDICINE

## 2021-07-06 PROCEDURE — 99284 EMERGENCY DEPT VISIT MOD MDM: CPT

## 2021-07-06 PROCEDURE — 73590 X-RAY EXAM OF LOWER LEG: CPT

## 2021-07-06 PROCEDURE — 85025 COMPLETE CBC W/AUTO DIFF WBC: CPT

## 2021-07-06 PROCEDURE — 80053 COMPREHEN METABOLIC PANEL: CPT

## 2021-07-06 PROCEDURE — 36415 COLL VENOUS BLD VENIPUNCTURE: CPT

## 2021-07-06 PROCEDURE — 74011250636 HC RX REV CODE- 250/636: Performed by: EMERGENCY MEDICINE

## 2021-07-06 RX ORDER — LIDOCAINE HYDROCHLORIDE AND EPINEPHRINE 10; 10 MG/ML; UG/ML
1.5 INJECTION, SOLUTION INFILTRATION; PERINEURAL ONCE
Status: COMPLETED | OUTPATIENT
Start: 2021-07-06 | End: 2021-07-06

## 2021-07-06 RX ORDER — SODIUM CHLORIDE 0.9 % (FLUSH) 0.9 %
5-40 SYRINGE (ML) INJECTION AS NEEDED
Status: DISCONTINUED | OUTPATIENT
Start: 2021-07-06 | End: 2021-07-06 | Stop reason: HOSPADM

## 2021-07-06 RX ORDER — DIPHENHYDRAMINE HYDROCHLORIDE 50 MG/ML
25 INJECTION, SOLUTION INTRAMUSCULAR; INTRAVENOUS
Status: COMPLETED | OUTPATIENT
Start: 2021-07-06 | End: 2021-07-06

## 2021-07-06 RX ORDER — MORPHINE SULFATE 2 MG/ML
2 INJECTION, SOLUTION INTRAMUSCULAR; INTRAVENOUS
Status: COMPLETED | OUTPATIENT
Start: 2021-07-06 | End: 2021-07-06

## 2021-07-06 RX ORDER — SODIUM CHLORIDE 0.9 % (FLUSH) 0.9 %
5-40 SYRINGE (ML) INJECTION EVERY 8 HOURS
Status: DISCONTINUED | OUTPATIENT
Start: 2021-07-06 | End: 2021-07-06 | Stop reason: HOSPADM

## 2021-07-06 RX ORDER — DOXYCYCLINE HYCLATE 100 MG
100 TABLET ORAL 2 TIMES DAILY
Qty: 14 TABLET | Refills: 0 | Status: SHIPPED | OUTPATIENT
Start: 2021-07-06 | End: 2021-07-13

## 2021-07-06 RX ADMIN — DIPHENHYDRAMINE HYDROCHLORIDE 25 MG: 50 INJECTION, SOLUTION INTRAMUSCULAR; INTRAVENOUS at 05:53

## 2021-07-06 RX ADMIN — LIDOCAINE HYDROCHLORIDE,EPINEPHRINE BITARTRATE 15 MG: 10; .01 INJECTION, SOLUTION INFILTRATION; PERINEURAL at 06:54

## 2021-07-06 RX ADMIN — MORPHINE SULFATE 2 MG: 2 INJECTION, SOLUTION INTRAMUSCULAR; INTRAVENOUS at 05:53

## 2021-07-06 NOTE — ED NOTES
Bedside shift change report given to J Luis Albarran (oncoming nurse) by Claudia Rios (offgoing nurse). Report included the following information SBAR, ED Summary, MAR and Recent Results.

## 2021-07-06 NOTE — ED PROVIDER NOTES
EMERGENCY DEPARTMENT HISTORY AND PHYSICAL EXAM      Date: 2021  Patient Name: Rosemary Small    History of Presenting Illness     Chief Complaint   Patient presents with    Leg Pain     pt arrives to the ED with c/o right leg pain after falling through a deck and having a screw go into her right calf, pt states she has been seen here twice and placed on two different ABX without improvement in symptoms. pt denies fever, chills       History Provided By: Patient    HPI: Rosemary Small, 46 y.o. female with PMHx significant for GERD, hypertension, Crohn's disease, type 2 diabetes, obesity presents to the ED with chief complaint of pain and swelling of her right medial lower leg. Patient had a puncture wound when a deck board broke and a screw punctured her right leg approximately 2 weeks ago. Foreign body was removed in the ED. Patient was discharged on Keflex, but continued to have swelling and drainage from the wound. She presented back to the ED on  after her antibiotics were complete and was given doxycycline. She is currently still taking her doxycycline. States that she has been still having some redness, swelling and drainage from the puncture wound site, and now she is also having pain in her right calf. Denies any fevers or chills. PCP: Jason Mejia MD    No current facility-administered medications on file prior to encounter. Current Outpatient Medications on File Prior to Encounter   Medication Sig Dispense Refill    doxycycline (VIBRA-TABS) 100 mg tablet Take 1 Tablet by mouth two (2) times a day for 7 days. 14 Tablet 0    [] HYDROmorphone (DILAUDID) 2 mg tablet Take 1 Tablet by mouth every six (6) hours as needed for Pain for up to 3 days. Max Daily Amount: 8 mg. 8 Tablet 0    ondansetron (Zofran ODT) 4 mg disintegrating tablet Take 1 Tablet by mouth every eight (8) hours as needed for Nausea.  6 Tablet 0    ALPRAZolam (XANAX) 1 mg tablet TAKE 1/2 - 1 TABLET BY MOUTH TWICE DAILY AS NEEDED FOR ANXIETY *MAX 2 TABS DAILY* 60 Tablet 0    Ventolin HFA 90 mcg/actuation inhaler TAKE 1 PUFF BY INHALATION EVERY FOUR (4) HOURS AS NEEDED FOR WHEEZING. 18 Inhaler 1    lidocaine (Lidoderm) 5 % Apply patch to the affected area for 12 hours a day and remove for 12 hours a day. 5 Each 0    vancomycin (Vancocin) 125 mg capsule Take 1 Capsule by mouth four (4) times daily. 28 Capsule 0    valACYclovir (VALTREX) 1 gram tablet Take 2 tablets by mouth twice daily for 1 day as needed for flares 8 Tablet 2    sertraline (ZOLOFT) 100 mg tablet Take 2 Tablets by mouth nightly. 60 Tablet 2    gabapentin (NEURONTIN) 300 mg capsule Take 1 Cap by mouth nightly. Max Daily Amount: 300 mg. 30 Cap 1    losartan-hydroCHLOROthiazide (HYZAAR) 100-12.5 mg per tablet Take 1 Tab by mouth daily. 90 Tab 1    glimepiride (AMARYL) 4 mg tablet TAKE 1 TABLET BY MOUTH ONCE DAILY BEFORE BREAKFAST (Patient not taking: Reported on 6/24/2021) 90 Tab 0    estradioL (ESTRACE) 0.5 mg tablet TAKE 1 TABLET BY MOUTH ONCE DAILY      empagliflozin (Jardiance) 25 mg tablet Take 1 Tab by mouth daily. 90 Tab 3    omeprazole (PRILOSEC) 20 mg capsule Take 40 mg by mouth Daily (before breakfast).  EPINEPHrine (EPIPEN) 0.3 mg/0.3 mL (1:1,000) injection 0.3 mL by IntraMUSCular route once as needed for up to 1 dose.  0.3 mL 1       Past History     Past Medical History:  Past Medical History:   Diagnosis Date    Anal fissure     Anisocoria     Asthma     LAST EPISODE     Back pain     Cerumen impaction     Chronic kidney disease     hx uti in past    Coagulation defects     ocassional rectal bleeding due to anal fissure    Colovaginal fistula     Diabetes (HCC)     NIDDM    Diverticulitis     Diverticulosis     Enlarged tonsils     Frequent UTI     GERD (gastroesophageal reflux disease)     H/O endoscopy     with dilation    HA (headache)     Hepatic steatosis     History of colon resection     Hx of colonoscopy with polypectomy     benign    Hypertension     Ill-defined condition     FREQUENT HIVES    Ill-defined condition     HX ELEVATED LIVER ENZYMES    Morbid obesity (HCC)     Nausea & vomiting     during diverticulitis flare    Obesity     Otitis media     Pneumonia     about 15 yrs ago    Psychiatric disorder     ANXIETY    Recurrent tonsillitis     Sinusitis     Transfusion history ~ age 35    postop hysterectomy    Urticaria        Past Surgical History:  Past Surgical History:   Procedure Laterality Date    COLONOSCOPY N/A 3/28/2019    COLONOSCOPY performed by Jacobo Samuel MD at [place] [address] COLONOSCOPY N/A 10/2/2020    COLONOSCOPY performed by Jacobo Samuel MD at [address] [place] [address],[address]    [facility].  HX GI  12    LAPAROSCOPIC HAND ASSISTED  POSS OPEN SIGMOID COLECTOMY POSS TEMPORARY DIVERTING LOOP ILEOSTOMY;  (no illeostomy needed)    HX GYN           HX GYN      cervical conization    HX HEENT      SINUS SURGERY LEFT X2    HX HEENT      SINUS SURGERY ON RIGHT X2    HX OTHER SURGICAL      Sphincterotomy    HX PELVIC LAPAROSCOPY      HX EMMANUEL AND BSO      HX UROLOGICAL  12     CYSTOSCOPY INSERTION URETERAL CATHETERS - Cystoscopy Insertion of bilateral ureteral stents    VT ABDOMEN SURGERY PROC UNLISTED  2018    hernia repair at [facility]       Family History:  Family History   Problem Relation Age of Onset    Diabetes Mother     Cancer Mother         NON-HODGKINS LYMPHOMA    Anesth Problems Mother         PONV    Diabetes Father     Heart Disease Father         CAD - STENTS, PACEMAKER    Arrhythmia Father        Social History:  Social History     Tobacco Use    Smoking status: Never Smoker    Smokeless tobacco: Never Used   Vaping Use    Vaping Use: Never assessed   Substance Use Topics    Alcohol use: Yes     Comment: Rarely    Drug use: No     Types: Prescription, OTC       Allergies:   Allergies   Allergen Reactions  Aspirin Hives and Shortness of Breath    Codeine Hives, Itching and Angioedema     Pt had itching in mouth, on face and lips    Contrast Agent [Iodine] Hives, Itching and Angioedema     5/5/21: pt was given benadryl and solu-medrol prior to administration but pt had severe tongue swelling and drooling, lethargy and itching. DO NOT GIVE PT    Flavoring Agent Anaphylaxis    Percocet [Oxycodone-Acetaminophen] Hives, Itching and Angioedema     Pt had itching in mouth, on face and lips    Prilosec [Omeprazole Magnesium] Anaphylaxis     CHERRY FLAVORED ODT; PT TAKES REGULAR PRILOSEC AND IS OK    Dilaudid [Hydromorphone] Itching     Tolerates with benadryl    Ketorolac Rash     \"makes my eyes spasm and causes rash on my hands\" and \"makes my skin itch and makes me nervous\"    Fentanyl Rash and Itching     Has had benadryl before         Review of Systems   Review of Systems   Constitutional: Negative for chills and fever. HENT: Negative for congestion, ear pain and sinus pain. Respiratory: Negative for cough, chest tightness, shortness of breath and wheezing. Cardiovascular: Positive for leg swelling. Negative for chest pain and palpitations. Gastrointestinal: Negative for abdominal pain, diarrhea, nausea and vomiting. Genitourinary: Negative for dysuria, flank pain and hematuria. Musculoskeletal: Positive for myalgias. Negative for back pain. Skin: Positive for wound. Neurological: Negative for dizziness, syncope, light-headedness and headaches. Psychiatric/Behavioral: Negative for confusion. The patient is not nervous/anxious. All other systems reviewed and are negative.         Physical Exam    General appearance -overweigh,, well appearing, and in no distress  Eyes - pupils equal and reactive, extraocular eye movements intact  ENT - mucous membranes moist, pharynx normal without lesions  Neck - supple, no significant adenopathy; non-tender to palpation  Chest - clear to auscultation, no wheezes, rales or rhonchi; non-tender to palpation  Heart - normal rate and regular rhythm, S1 and S2 normal, no murmurs noted  Abdomen - soft, nontender, nondistended, no masses or organomegaly  Musculoskeletal - no joint tenderness, deformity or swelling; normal ROM  Extremities - peripheral pulses normal, no pedal edema, tender to palpation posterior right calf  Skin - normal coloration and turgor, no rashes, wound on medial right lower leg with some induration and small amount of surrounding erythema, no drainage noted at this time  Neurological - alert, oriented x3, normal speech, no focal findings or movement disorder noted    Diagnostic Study Results     Labs -     Recent Results (from the past 12 hour(s))   CBC WITH AUTOMATED DIFF    Collection Time: 07/06/21  4:41 AM   Result Value Ref Range    WBC 6.3 3.6 - 11.0 K/uL    RBC 4.27 3.80 - 5.20 M/uL    HGB 11.1 (L) 11.5 - 16.0 g/dL    HCT 33.5 (L) 35.0 - 47.0 %    MCV 78.5 (L) 80.0 - 99.0 FL    MCH 26.0 26.0 - 34.0 PG    MCHC 33.1 30.0 - 36.5 g/dL    RDW 14.7 (H) 11.5 - 14.5 %    PLATELET 506 452 - 115 K/uL    MPV 8.9 8.9 - 12.9 FL    NRBC 0.0 0  WBC    ABSOLUTE NRBC 0.00 0.00 - 0.01 K/uL    NEUTROPHILS 58 32 - 75 %    LYMPHOCYTES 31 12 - 49 %    MONOCYTES 5 5 - 13 %    EOSINOPHILS 4 0 - 7 %    BASOPHILS 1 0 - 1 %    IMMATURE GRANULOCYTES 1 (H) 0.0 - 0.5 %    ABS. NEUTROPHILS 3.8 1.8 - 8.0 K/UL    ABS. LYMPHOCYTES 1.9 0.8 - 3.5 K/UL    ABS. MONOCYTES 0.3 0.0 - 1.0 K/UL    ABS. EOSINOPHILS 0.2 0.0 - 0.4 K/UL    ABS. BASOPHILS 0.0 0.0 - 0.1 K/UL    ABS. IMM.  GRANS. 0.0 0.00 - 0.04 K/UL    DF AUTOMATED     METABOLIC PANEL, COMPREHENSIVE    Collection Time: 07/06/21  4:41 AM   Result Value Ref Range    Sodium 138 136 - 145 mmol/L    Potassium 3.3 (L) 3.5 - 5.1 mmol/L    Chloride 106 97 - 108 mmol/L    CO2 27 21 - 32 mmol/L    Anion gap 5 5 - 15 mmol/L    Glucose 201 (H) 65 - 100 mg/dL    BUN 8 6 - 20 MG/DL    Creatinine 0.63 0.55 - 1.02 MG/DL BUN/Creatinine ratio 13 12 - 20      GFR est AA >60 >60 ml/min/1.73m2    GFR est non-AA >60 >60 ml/min/1.73m2    Calcium 8.8 8.5 - 10.1 MG/DL    Bilirubin, total 0.4 0.2 - 1.0 MG/DL    ALT (SGPT) 31 12 - 78 U/L    AST (SGOT) 18 15 - 37 U/L    Alk. phosphatase 63 45 - 117 U/L    Protein, total 6.9 6.4 - 8.2 g/dL    Albumin 3.6 3.5 - 5.0 g/dL    Globulin 3.3 2.0 - 4.0 g/dL    A-G Ratio 1.1 1.1 - 2.2         Radiologic Studies -   XR TIB/FIB RT   Final Result   No retained radiopaque foreign body. CT Results  (Last 48 hours)    None        CXR Results  (Last 48 hours)    None            Medical Decision Making   I am the first provider for this patient. I reviewed the vital signs, available nursing notes, past medical history, past surgical history, family history and social history. Vital Signs-Reviewed the patient's vital signs. Patient Vitals for the past 12 hrs:   Temp Pulse Resp BP SpO2   07/06/21 0645 98.6 °F (37 °C) 71 16 131/76 91 %   07/06/21 0314 98.4 °F (36.9 °C) 78 18 (!) 165/85 100 %           Records Reviewed: Nursing Notes and Old Medical Records    Provider Notes (Medical Decision Making):   Differential diagnosis: Cellulitis, abscess, retained foreign body. DVT  We will check ultrasound of the right lower extremity, x-ray to confirm no radiopaque foreign body, CBC, CMP, and will consider aspirating the wound to see if there is an abscess/fluid collection present. ED Course:   Initial assessment performed. The patients presenting problems have been discussed, and they are in agreement with the care plan formulated and outlined with them. I have encouraged them to ask questions as they arise throughout their visit. Progress Notes:  ED Course as of Jul 06 0725 Tue Jul 06, 2021 0712 Procedure note: Aspiration of leg woundArea cleaned with Betadine and anesthetized with 5 mL of 1% lidocaine with epinephrine.  Attempted to aspirate tender indurated area to see if there was any abscess/purulent fluid. No fluid was obtained. Patient tolerated procedure well. Instructed patient to continue doxycycline and I will extend her course for 5 additional days. If symptoms are persistent or patient still has nonhealing wound at the end of this week, instructed her to follow up with general surgery. It is possible that there is a non-radiopaque foreign body that is preventing her wound from healing normally. It may need to be surgically explored.    [AO]      ED Course User Index  [AO] Fabiana Yao MD       Disposition:  Discharge home    PLAN:  1. Current Discharge Medication List      START taking these medications    Details   !! doxycycline (VIBRA-TABS) 100 mg tablet Take 1 Tablet by mouth two (2) times a day for 7 days. Qty: 14 Tablet, Refills: 0  Start date: 7/6/2021, End date: 7/13/2021       !! - Potential duplicate medications found. Please discuss with provider. CONTINUE these medications which have NOT CHANGED    Details   !! doxycycline (VIBRA-TABS) 100 mg tablet Take 1 Tablet by mouth two (2) times a day for 7 days. Qty: 14 Tablet, Refills: 0       !! - Potential duplicate medications found. Please discuss with provider. 2.   Follow-up Information     Follow up With Specialties Details Why Sunny Gonzales MD Internal Medicine Call today  [address]  [facility] [address]  [facility]  [phone]      Cherelle Bingham MD General Surgery, Breast Surgery, Colon and Rectal Surgery, Endocrinology   [address] [address] [address]  [facility] [phone]          Return to ED if worse     Diagnosis     Clinical Impression:   1.  Leg wound, right, subsequent encounter

## 2021-07-07 RX ORDER — GLIMEPIRIDE 4 MG/1
TABLET ORAL
Qty: 90 TABLET | Refills: 1 | Status: SHIPPED | OUTPATIENT
Start: 2021-07-07 | End: 2022-01-24

## 2021-07-07 RX ORDER — ESTRADIOL 0.5 MG/1
TABLET ORAL
Qty: 90 TABLET | Refills: 1 | Status: SHIPPED | OUTPATIENT
Start: 2021-07-07 | End: 2022-01-25

## 2021-07-07 NOTE — TELEPHONE ENCOUNTER
Pt is out of these medications due to pharmacy sending to previous physician. Pt is now out of DM medications and will need today. Thanks.

## 2021-07-07 NOTE — TELEPHONE ENCOUNTER
Future Appointments:  Future Appointments   Date Time Provider Daniel Silvia   7/19/2021  1:30 PM Homero Wheeler MD [facility] BS AMB        Last Appointment With Me:  1/29/2021     Requested Prescriptions     Pending Prescriptions Disp Refills    empagliflozin (Jardiance) 25 mg tablet 90 Tablet 3     Sig: Take 1 Tablet by mouth daily.     glimepiride (AMARYL) 4 mg tablet 90 Tablet 0    estradioL (ESTRACE) 0.5 mg tablet

## 2021-07-16 ENCOUNTER — TELEPHONE (OUTPATIENT)
Dept: INTERNAL MEDICINE CLINIC | Age: 51
End: 2021-07-16

## 2021-07-16 NOTE — TELEPHONE ENCOUNTER
pharmacy sent request for aspirin refill   Aspirin is not in pt current meds chart   Asprin is in pt allergy list     Called pt to verify that they input a refill for this medication   Pt NA   Left VM

## 2021-07-18 NOTE — PROGRESS NOTES
Isaiah Gudino is a 46 y.o. female who presents for follow up. History of multiple ED visits. Last seen by me January 29, 2021 by virtual visit. Seen in ED 6/22 with right LE puncture wound by pasquale cunningham. Returned to ED with pain and wound infection. Given keflex, 2 rounds of doxycycline, a few pills left. Still has some redness. Prior anaphylaxis, IV contrast and vancomycin   Has an epipen. Treated for anxiety, depression,and chronic insomnia. Significant situational stress. On sertraline 200mg daily. Prior lexapro, not tolerated. Prior paxil, tolerated. Taking xanax 1mg  1/2-1 tablet twice a day for breakthrough anxiety. Had shingles, prior gabapentin 300mg at bedtime for shingles pain. Off now. Has been followed by Dr. Korin Yap, endocrinology, for diabetes. On amaryl 4mg daily and jardiance 25mg daily. HbA1C 8.1% May 5. Had had to take steroids repeatedly. Trying to follow diabetic diet. History of anemia, had IV iron with hematology. hgb 11.1, July 6. Followed by GI, Dr Jhonatan Booker. history of ulcerative colitis. History of colon polyps and diverticulosis. Prior c.diff colitis. Anal fissures, requiring botox. S/p Hemorrhoidectomy, Dr Ivis Li. Due for mammogram.  Sees gyn.       Past Medical History:   Diagnosis Date    Anal fissure     Anisocoria     Asthma     LAST EPISODE     Back pain     Cerumen impaction     Chronic kidney disease     hx uti in past    Coagulation defects     ocassional rectal bleeding due to anal fissure    Colovaginal fistula     Diabetes (HCC)     NIDDM    Diverticulitis     Diverticulosis     Enlarged tonsils     Frequent UTI     GERD (gastroesophageal reflux disease)     H/O endoscopy     with dilation    HA (headache)     Hepatic steatosis     History of colon resection     Hx of colonoscopy with polypectomy     benign    Hypertension     Ill-defined condition     FREQUENT HIVES    Ill-defined condition     HX ELEVATED LIVER ENZYMES    Morbid obesity ([facility] [facility]     Nausea & vomiting     during diverticulitis flare    Obesity     Otitis media     Pneumonia     about 15 yrs ago    Psychiatric disorder     ANXIETY    Recurrent tonsillitis     Sinusitis     Transfusion history ~ age 35    postop hysterectomy    Urticaria        Family History   Problem Relation Age of Onset    Diabetes Mother     Cancer Mother         NON-HODGKINS LYMPHOMA    Anesth Problems Mother         PONV    Diabetes Father     Heart Disease Father         CAD - STENTS, PACEMAKER    Arrhythmia Father        Social History     Socioeconomic History    Marital status:      Spouse name: Not on file    Number of children: Not on file    Years of education: Not on file    Highest education level: Not on file   Occupational History    Not on file   Tobacco Use    Smoking status: Never Smoker    Smokeless tobacco: Never Used   Vaping Use    Vaping Use: Never assessed   Substance and Sexual Activity    Alcohol use: Yes     Comment: Rarely    Drug use: No     Types: Prescription, OTC    Sexual activity: Never   Other Topics Concern    Not on file   Social History Narrative    Not on file     Social Determinants of Health     Financial Resource Strain:     Difficulty of Paying Living Expenses:    Food Insecurity:     Worried About Running Out of Food in the Last Year:     Ran Out of Food in the Last Year:    Transportation Needs:     Lack of Transportation (Medical):      Lack of Transportation (Non-Medical):    Physical Activity:     Days of Exercise per Week:     Minutes of Exercise per Session:    Stress:     Feeling of Stress :    Social Connections:     Frequency of Communication with Friends and Family:     Frequency of Social Gatherings with Friends and Family:     Attends Baptism Services:     Active Member of Clubs or Organizations:     Attends Club or Organization Meetings:     Marital Status:    Intimate Partner Violence:     Fear of Current or Ex-Partner:     Emotionally Abused:     Physically Abused:     Sexually Abused:        Current Outpatient Medications on File Prior to Visit   Medication Sig Dispense Refill    empagliflozin (Jardiance) 25 mg tablet Take 1 Tablet by mouth daily. 90 Tablet 1    glimepiride (AMARYL) 4 mg tablet TAKE 1 TABLET BY MOUTH ONCE DAILY BEFORE BREAKFAST 90 Tablet 1    estradioL (ESTRACE) 0.5 mg tablet TAKE 1 TABLET BY MOUTH ONCE DAILY 90 Tablet 1    ALPRAZolam (XANAX) 1 mg tablet TAKE 1/2 - 1 TABLET BY MOUTH TWICE DAILY AS NEEDED FOR ANXIETY *MAX 2 TABS DAILY* 60 Tablet 0    Ventolin HFA 90 mcg/actuation inhaler TAKE 1 PUFF BY INHALATION EVERY FOUR (4) HOURS AS NEEDED FOR WHEEZING. 18 Inhaler 1    lidocaine (Lidoderm) 5 % Apply patch to the affected area for 12 hours a day and remove for 12 hours a day. 5 Each 0    vancomycin (Vancocin) 125 mg capsule Take 1 Capsule by mouth four (4) times daily. 28 Capsule 0    valACYclovir (VALTREX) 1 gram tablet Take 2 tablets by mouth twice daily for 1 day as needed for flares 8 Tablet 2    sertraline (ZOLOFT) 100 mg tablet Take 2 Tablets by mouth nightly. 60 Tablet 2    gabapentin (NEURONTIN) 300 mg capsule Take 1 Cap by mouth nightly. Max Daily Amount: 300 mg. 30 Cap 1    losartan-hydroCHLOROthiazide (HYZAAR) 100-12.5 mg per tablet Take 1 Tab by mouth daily. 90 Tab 1    omeprazole (PRILOSEC) 20 mg capsule Take 40 mg by mouth Daily (before breakfast).  EPINEPHrine (EPIPEN) 0.3 mg/0.3 mL (1:1,000) injection 0.3 mL by IntraMUSCular route once as needed for up to 1 dose. 0.3 mL 1    [DISCONTINUED] ondansetron (Zofran ODT) 4 mg disintegrating tablet Take 1 Tablet by mouth every eight (8) hours as needed for Nausea. 6 Tablet 0     No current facility-administered medications on file prior to visit. Review of Systems  Pertinent items are noted in HPI.     Objective:     Visit Vitals  /73 (BP 1 Location: Right arm, BP Patient Position: Sitting, BP Cuff Size: Adult)   Pulse 68   Resp 18   Ht 5' 2\" (1.575 m)   Wt 206 lb 6.4 oz (93.6 kg)   SpO2 99%   BMI 37.75 kg/m²     Gen: well appearing female  HEENT:   PERRL,normal conjunctiva. External ear and canals normal, TMs no opacification or erythema,  OP no erythema, no exudates, MMM  Neck:  Supple. Thyroid normal size, nontender, without nodules. No masses or LAD  Resp:  No wheezing, no rhonchi, no rales. CV:  RRR, normal S1S2, no murmur. GI: soft, nontender, without masses. No hepatosplenomegaly. Extrem:  +2 pulses, no edema, warm distally, right medial lower leg with area of induration minimal erythema, no discharge      Assessment/Plan:       ICD-10-CM ICD-9-CM    1. Type 2 diabetes mellitus with stage 3 chronic kidney disease, without long-term current use of insulin, unspecified whether stage 3a or 3b CKD (HCC)  U05.07 097.05 METABOLIC PANEL, COMPREHENSIVE    N18.30 585.3 CBC W/O DIFF      MICROALBUMIN, UR, RAND W/ MICROALB/CREAT RATIO      LIPID PANEL      HEMOGLOBIN A1C WITH EAG      TSH 3RD GENERATION      METABOLIC PANEL, COMPREHENSIVE      CBC W/O DIFF      MICROALBUMIN, UR, RAND W/ MICROALB/CREAT RATIO      LIPID PANEL      HEMOGLOBIN A1C WITH EAG      TSH 3RD GENERATION   2. Essential hypertension  M39 213.0 METABOLIC PANEL, COMPREHENSIVE      CBC W/O DIFF      MICROALBUMIN, UR, RAND W/ MICROALB/CREAT RATIO      METABOLIC PANEL, COMPREHENSIVE      CBC W/O DIFF      MICROALBUMIN, UR, RAND W/ MICROALB/CREAT RATIO   3. Anemia, unspecified type  D64.9 285.9 CBC W/O DIFF      FERRITIN      IRON PROFILE      CBC W/O DIFF      FERRITIN      IRON PROFILE   4. Need for hepatitis C screening test  Z11.59 V73.89 HCV AB W/RFLX TO CHICA      HCV AB W/RFLX TO CHICA   5.  Vitamin D deficiency  E55.9 268.9 VITAMIN D, 25 HYDROXY      VITAMIN D, 25 HYDROXY   6. Class 2 severe obesity with serious comorbidity and body mass index (BMI) of 37.0 to 37.9 in adult, unspecified obesity type ([facility]  E66.01 278.01 TSH 3RD GENERATION    Z68.37 V85.37 TSH 3RD GENERATION   7. Leg wound, right, sequela  S81.801S 906.1 trimethoprim-sulfamethoxazole (BACTRIM DS, SEPTRA DS) 160-800 mg per tablet   Recommend compliance with diabetic diet. Continue medications for diabetes. Monitor blood sugar readings as discussed. Keep up on regular diabetic eye exams. Advised patient to observe the right leg wound once she is finished with doxycycline and given antibiotic to hold if symptoms of infection develop again. Follow-up and Dispositions    · Return for follow up pending labs and 6 months.          Marcus Rocha MD

## 2021-07-19 ENCOUNTER — OFFICE VISIT (OUTPATIENT)
Dept: INTERNAL MEDICINE CLINIC | Age: 51
End: 2021-07-19
Payer: MEDICAID

## 2021-07-19 VITALS
RESPIRATION RATE: 18 BRPM | HEIGHT: 62 IN | DIASTOLIC BLOOD PRESSURE: 73 MMHG | HEART RATE: 68 BPM | BODY MASS INDEX: 37.98 KG/M2 | SYSTOLIC BLOOD PRESSURE: 113 MMHG | WEIGHT: 206.4 LBS | OXYGEN SATURATION: 99 %

## 2021-07-19 DIAGNOSIS — Z11.59 NEED FOR HEPATITIS C SCREENING TEST: ICD-10-CM

## 2021-07-19 DIAGNOSIS — E66.01 CLASS 2 SEVERE OBESITY WITH SERIOUS COMORBIDITY AND BODY MASS INDEX (BMI) OF 37.0 TO 37.9 IN ADULT, UNSPECIFIED OBESITY TYPE (HCC): ICD-10-CM

## 2021-07-19 DIAGNOSIS — N18.30 TYPE 2 DIABETES MELLITUS WITH STAGE 3 CHRONIC KIDNEY DISEASE, WITHOUT LONG-TERM CURRENT USE OF INSULIN, UNSPECIFIED WHETHER STAGE 3A OR 3B CKD (HCC): Primary | ICD-10-CM

## 2021-07-19 DIAGNOSIS — I10 ESSENTIAL HYPERTENSION: ICD-10-CM

## 2021-07-19 DIAGNOSIS — E11.22 TYPE 2 DIABETES MELLITUS WITH STAGE 3 CHRONIC KIDNEY DISEASE, WITHOUT LONG-TERM CURRENT USE OF INSULIN, UNSPECIFIED WHETHER STAGE 3A OR 3B CKD (HCC): Primary | ICD-10-CM

## 2021-07-19 DIAGNOSIS — S81.801S LEG WOUND, RIGHT, SEQUELA: ICD-10-CM

## 2021-07-19 DIAGNOSIS — D64.9 ANEMIA, UNSPECIFIED TYPE: ICD-10-CM

## 2021-07-19 DIAGNOSIS — E55.9 VITAMIN D DEFICIENCY: ICD-10-CM

## 2021-07-19 PROCEDURE — 99214 OFFICE O/P EST MOD 30 MIN: CPT | Performed by: FAMILY MEDICINE

## 2021-07-19 RX ORDER — ONDANSETRON 4 MG/1
4 TABLET, ORALLY DISINTEGRATING ORAL
Qty: 12 TABLET | Refills: 1 | Status: SHIPPED | OUTPATIENT
Start: 2021-07-19 | End: 2022-07-27

## 2021-07-19 RX ORDER — SULFAMETHOXAZOLE AND TRIMETHOPRIM 800; 160 MG/1; MG/1
1 TABLET ORAL 2 TIMES DAILY
Qty: 20 TABLET | Refills: 0 | Status: SHIPPED | OUTPATIENT
Start: 2021-07-19 | End: 2021-09-27

## 2021-07-21 ENCOUNTER — HOSPITAL ENCOUNTER (EMERGENCY)
Age: 51
Discharge: HOME | End: 2021-07-22
Attending: EMERGENCY MEDICINE
Payer: MEDICAID

## 2021-07-21 VITALS
OXYGEN SATURATION: 99 % | SYSTOLIC BLOOD PRESSURE: 132 MMHG | BODY MASS INDEX: 37.65 KG/M2 | HEART RATE: 79 BPM | TEMPERATURE: 98.3 F | RESPIRATION RATE: 20 BRPM | WEIGHT: 204.59 LBS | DIASTOLIC BLOOD PRESSURE: 69 MMHG | HEIGHT: 62 IN

## 2021-07-21 DIAGNOSIS — R19.7 DIARRHEA, UNSPECIFIED TYPE: Primary | ICD-10-CM

## 2021-07-21 DIAGNOSIS — Z87.19 HISTORY OF ULCERATIVE COLITIS: ICD-10-CM

## 2021-07-21 DIAGNOSIS — R10.84 ABDOMINAL CRAMPING, GENERALIZED: ICD-10-CM

## 2021-07-21 PROCEDURE — 96375 TX/PRO/DX INJ NEW DRUG ADDON: CPT

## 2021-07-21 PROCEDURE — 99283 EMERGENCY DEPT VISIT LOW MDM: CPT

## 2021-07-21 PROCEDURE — 96374 THER/PROPH/DIAG INJ IV PUSH: CPT

## 2021-07-21 NOTE — Clinical Note
[address]  [facility] EMERGENCY DEPT  [address] [place] [address]  [facility] [id number] [phone]    Work/School Note    Date: 7/21/2021    To Whom It May concern:    Mary Alice Marroquin was seen and treated today in the emergency room by the following provider(s):  Attending Provider: John Tracy MD.      Mary Alice Marroquin is excused from work/school on 07/22/21 and 07/23/21. She is medically clear to return to work/school on 7/24/2021.        Sincerely,          Christopher Fritz MD

## 2021-07-22 LAB
25(OH)D3+25(OH)D2 SERPL-MCNC: 24.6 NG/ML (ref 30–100)
ALBUMIN SERPL-MCNC: 3.9 G/DL (ref 3.5–5)
ALBUMIN SERPL-MCNC: 4.6 G/DL (ref 3.8–4.9)
ALBUMIN/CREAT UR: <4 MG/G CREAT (ref 0–29)
ALBUMIN/GLOB SERPL: 1 {RATIO} (ref 1.1–2.2)
ALBUMIN/GLOB SERPL: 1.6 {RATIO} (ref 1.2–2.2)
ALP SERPL-CCNC: 76 IU/L (ref 48–121)
ALP SERPL-CCNC: 78 U/L (ref 45–117)
ALT SERPL-CCNC: 23 IU/L (ref 0–32)
ALT SERPL-CCNC: 37 U/L (ref 12–78)
ANION GAP SERPL CALC-SCNC: 9 MMOL/L (ref 5–15)
AST SERPL-CCNC: 23 U/L (ref 15–37)
AST SERPL-CCNC: 28 IU/L (ref 0–40)
BASOPHILS # BLD: 0 K/UL (ref 0–0.1)
BASOPHILS NFR BLD: 1 % (ref 0–1)
BILIRUB SERPL-MCNC: 0.3 MG/DL (ref 0.2–1)
BILIRUB SERPL-MCNC: 0.4 MG/DL (ref 0–1.2)
BUN SERPL-MCNC: 11 MG/DL (ref 6–20)
BUN SERPL-MCNC: 8 MG/DL (ref 6–24)
BUN/CREAT SERPL: 12 (ref 9–23)
BUN/CREAT SERPL: 14 (ref 12–20)
CALCIUM SERPL-MCNC: 10 MG/DL (ref 8.7–10.2)
CALCIUM SERPL-MCNC: 9.6 MG/DL (ref 8.5–10.1)
CHLORIDE SERPL-SCNC: 103 MMOL/L (ref 97–108)
CHLORIDE SERPL-SCNC: 99 MMOL/L (ref 96–106)
CHOLEST SERPL-MCNC: 233 MG/DL (ref 100–199)
CO2 SERPL-SCNC: 25 MMOL/L (ref 20–29)
CO2 SERPL-SCNC: 25 MMOL/L (ref 21–32)
CREAT SERPL-MCNC: 0.68 MG/DL (ref 0.57–1)
CREAT SERPL-MCNC: 0.77 MG/DL (ref 0.55–1.02)
CREAT UR-MCNC: 85.2 MG/DL
DIFFERENTIAL METHOD BLD: ABNORMAL
EOSINOPHIL # BLD: 0.2 K/UL (ref 0–0.4)
EOSINOPHIL NFR BLD: 3 % (ref 0–7)
ERYTHROCYTE [DISTWIDTH] IN BLOOD BY AUTOMATED COUNT: 15.1 % (ref 11.5–14.5)
ERYTHROCYTE [DISTWIDTH] IN BLOOD BY AUTOMATED COUNT: 15.3 % (ref 11.7–15.4)
EST. AVERAGE GLUCOSE BLD GHB EST-MCNC: 180 MG/DL
FERRITIN SERPL-MCNC: 19 NG/ML (ref 15–150)
GLOBULIN SER CALC-MCNC: 2.9 G/DL (ref 1.5–4.5)
GLOBULIN SER CALC-MCNC: 3.8 G/DL (ref 2–4)
GLUCOSE SERPL-MCNC: 172 MG/DL (ref 65–100)
GLUCOSE SERPL-MCNC: 195 MG/DL (ref 65–99)
HBA1C MFR BLD: 7.9 % (ref 4.8–5.6)
HCT VFR BLD AUTO: 37.9 % (ref 35–47)
HCT VFR BLD AUTO: 40.3 % (ref 34–46.6)
HCV AB S/CO SERPL IA: <0.1 S/CO RATIO (ref 0–0.9)
HCV AB SERPL QL IA: NORMAL
HDLC SERPL-MCNC: 46 MG/DL
HGB BLD-MCNC: 12.8 G/DL (ref 11.5–16)
HGB BLD-MCNC: 13.3 G/DL (ref 11.1–15.9)
IMM GRANULOCYTES # BLD AUTO: 0 K/UL (ref 0–0.04)
IMM GRANULOCYTES NFR BLD AUTO: 0 % (ref 0–0.5)
IRON SATN MFR SERPL: 15 % (ref 15–55)
IRON SERPL-MCNC: 54 UG/DL (ref 27–159)
LACTATE BLD-SCNC: 1.95 MMOL/L (ref 0.4–2)
LDLC SERPL CALC-MCNC: 149 MG/DL (ref 0–99)
LYMPHOCYTES # BLD: 2.2 K/UL (ref 0.8–3.5)
LYMPHOCYTES NFR BLD: 32 % (ref 12–49)
MCH RBC QN AUTO: 26.2 PG (ref 26–34)
MCH RBC QN AUTO: 26.4 PG (ref 26.6–33)
MCHC RBC AUTO-ENTMCNC: 33 G/DL (ref 31.5–35.7)
MCHC RBC AUTO-ENTMCNC: 33.8 G/DL (ref 30–36.5)
MCV RBC AUTO: 77.7 FL (ref 80–99)
MCV RBC AUTO: 80 FL (ref 79–97)
MICROALBUMIN UR-MCNC: <3 UG/ML
MONOCYTES # BLD: 0.4 K/UL (ref 0–1)
MONOCYTES NFR BLD: 6 % (ref 5–13)
NEUTS SEG # BLD: 3.8 K/UL (ref 1.8–8)
NEUTS SEG NFR BLD: 58 % (ref 32–75)
NRBC # BLD: 0 K/UL (ref 0–0.01)
NRBC BLD-RTO: 0 PER 100 WBC
PLATELET # BLD AUTO: 184 X10E3/UL (ref 150–450)
PLATELET # BLD AUTO: 191 K/UL (ref 150–400)
PMV BLD AUTO: 10.5 FL (ref 8.9–12.9)
POTASSIUM SERPL-SCNC: 3.2 MMOL/L (ref 3.5–5.1)
POTASSIUM SERPL-SCNC: 3.9 MMOL/L (ref 3.5–5.2)
PROT SERPL-MCNC: 7.5 G/DL (ref 6–8.5)
PROT SERPL-MCNC: 7.7 G/DL (ref 6.4–8.2)
RBC # BLD AUTO: 4.88 M/UL (ref 3.8–5.2)
RBC # BLD AUTO: 5.03 X10E6/UL (ref 3.77–5.28)
SODIUM SERPL-SCNC: 137 MMOL/L (ref 136–145)
SODIUM SERPL-SCNC: 139 MMOL/L (ref 134–144)
TIBC SERPL-MCNC: 353 UG/DL (ref 250–450)
TRIGL SERPL-MCNC: 210 MG/DL (ref 0–149)
TSH SERPL DL<=0.005 MIU/L-ACNC: 2.53 UIU/ML (ref 0.45–4.5)
UIBC SERPL-MCNC: 299 UG/DL (ref 131–425)
VLDLC SERPL CALC-MCNC: 38 MG/DL (ref 5–40)
WBC # BLD AUTO: 5.5 X10E3/UL (ref 3.4–10.8)
WBC # BLD AUTO: 6.7 K/UL (ref 3.6–11)

## 2021-07-22 PROCEDURE — 80053 COMPREHEN METABOLIC PANEL: CPT

## 2021-07-22 PROCEDURE — 36415 COLL VENOUS BLD VENIPUNCTURE: CPT

## 2021-07-22 PROCEDURE — 85025 COMPLETE CBC W/AUTO DIFF WBC: CPT

## 2021-07-22 PROCEDURE — 74011250636 HC RX REV CODE- 250/636: Performed by: EMERGENCY MEDICINE

## 2021-07-22 PROCEDURE — 96375 TX/PRO/DX INJ NEW DRUG ADDON: CPT

## 2021-07-22 PROCEDURE — 83605 ASSAY OF LACTIC ACID: CPT

## 2021-07-22 PROCEDURE — 96374 THER/PROPH/DIAG INJ IV PUSH: CPT

## 2021-07-22 PROCEDURE — 74011250636 HC RX REV CODE- 250/636

## 2021-07-22 PROCEDURE — 74011250637 HC RX REV CODE- 250/637: Performed by: EMERGENCY MEDICINE

## 2021-07-22 RX ORDER — HYOSCYAMINE SULFATE 0.12 MG/1
0.25 TABLET SUBLINGUAL
Status: COMPLETED | OUTPATIENT
Start: 2021-07-22 | End: 2021-07-22

## 2021-07-22 RX ORDER — DIPHENOXYLATE HYDROCHLORIDE AND ATROPINE SULFATE 2.5; .025 MG/1; MG/1
2 TABLET ORAL
Status: COMPLETED | OUTPATIENT
Start: 2021-07-22 | End: 2021-07-22

## 2021-07-22 RX ORDER — DIPHENHYDRAMINE HYDROCHLORIDE 50 MG/ML
25 INJECTION, SOLUTION INTRAMUSCULAR; INTRAVENOUS
Status: COMPLETED | OUTPATIENT
Start: 2021-07-22 | End: 2021-07-22

## 2021-07-22 RX ORDER — MORPHINE SULFATE 2 MG/ML
4 INJECTION, SOLUTION INTRAMUSCULAR; INTRAVENOUS
Status: COMPLETED | OUTPATIENT
Start: 2021-07-22 | End: 2021-07-22

## 2021-07-22 RX ORDER — DIPHENOXYLATE HYDROCHLORIDE AND ATROPINE SULFATE 2.5; .025 MG/1; MG/1
1 TABLET ORAL
Qty: 20 TABLET | Refills: 0 | Status: SHIPPED | OUTPATIENT
Start: 2021-07-22 | End: 2022-02-23

## 2021-07-22 RX ORDER — HYOSCYAMINE SULFATE 0.12 MG/1
0.12 TABLET SUBLINGUAL
Qty: 10 TABLET | Refills: 0 | Status: SHIPPED | OUTPATIENT
Start: 2021-07-22 | End: 2022-07-27

## 2021-07-22 RX ORDER — DIPHENHYDRAMINE HYDROCHLORIDE 50 MG/ML
INJECTION, SOLUTION INTRAMUSCULAR; INTRAVENOUS
Status: COMPLETED
Start: 2021-07-22 | End: 2021-07-22

## 2021-07-22 RX ADMIN — DIPHENHYDRAMINE HYDROCHLORIDE 25 MG: 50 INJECTION, SOLUTION INTRAMUSCULAR; INTRAVENOUS at 02:24

## 2021-07-22 RX ADMIN — MORPHINE SULFATE 4 MG: 2 INJECTION, SOLUTION INTRAMUSCULAR; INTRAVENOUS at 02:08

## 2021-07-22 RX ADMIN — DIPHENOXYLATE HYDROCHLORIDE AND ATROPINE SULFATE 2 TABLET: 2.5; .025 TABLET ORAL at 00:55

## 2021-07-22 RX ADMIN — HYOSCYAMINE SULFATE 0.25 MG: 0.12 TABLET ORAL; SUBLINGUAL at 00:55

## 2021-07-22 NOTE — ED PROVIDER NOTES
EMERGENCY DEPARTMENT HISTORY AND PHYSICAL EXAM     ------------------------------------------------------------------------------------------------------  Please note that this dictation was completed with [employer], the [employer] voice recognition software. Quite often unanticipated grammatical, syntax, homophones, and other interpretive errors are inadvertently transcribed by the computer software. Please disregard these errors. Please excuse any errors that have escaped final proofreading.  -----------------------------------------------------------------------------------------------------------------    Date: 7/21/2021  Patient Name: Isaiah Gudino    History of Presenting Illness     Chief Complaint   Patient presents with    Abdominal Pain     Patient is on lots of medications for infection in leg, has hx of C diff and ulcerative colitis and is worried one or both are flairing. Having diarrhea and endorses stomach pains. History Provided By: Patient    HPI: Isaiah Gudino is a 46 y.o. female, with significant pmhx of previous C. difficile, ulcerative colitis, recent multicourse antibiotic use for puncture wound to her right lower leg, who presents via private vehicle to the ED with c/o ongoing diarrhea and abdominal pain/distention for several days. That she is been having more than 10 episodes of diarrhea a day that is nonbloody, nontarry appearing. Reports that her primary care doctor urged her to take yogurt as well as over-the-counter probiotics. Notes that she is been following with her primary care doctor recently provided her with additional course of antibiotics (Bactrim) after completing courses of Cipro, doxycycline. Patient has been taking Imodium at home as well as Bentyl without much relief. Reports that she has been seen by GI in the past but has not currently contacted them for her ongoing symptoms of diarrhea with concerns for C. difficile recurrence.   Patient reports having subjective fever yesterday but not today. Pt  specifically denies any recent CP, SOB,  urinary sxs, or headache. Of note, patient has had 22 CT scans since 2018. In the last year she had one with IV contrast dye that sent her into anaphylactic shock and caused her to stay in the ICU for several days. PCP: Becky Gutierrez MD    Social Hx: denies tobacco, denies EtOH, denies recreational/ Illicit Drugs     There are no other complaints, changes, or physical findings at this time. Allergies   Allergen Reactions    Aspirin Hives, Shortness of Breath and Itching    Codeine Hives, Itching and Angioedema     Pt had itching in mouth, on face and lips    Contrast Agent [Iodine] Hives, Itching and Angioedema     5/5/21: pt was given benadryl and solu-medrol prior to administration but pt had severe tongue swelling and drooling, lethargy and itching. DO NOT GIVE PT    Flavoring Agent Anaphylaxis    Iodinated Contrast Media Anaphylaxis, Diarrhea, Hives, Nausea and Vomiting and Shortness of Breath    Percocet [Oxycodone-Acetaminophen] Hives, Itching and Angioedema     Pt had itching in mouth, on face and lips    Prilosec [Omeprazole Magnesium] Anaphylaxis     CHERRY FLAVORED ODT; PT TAKES REGULAR PRILOSEC AND IS OK    Dilaudid [Hydromorphone] Itching     Tolerates with benadryl    Ketorolac Rash     \"makes my eyes spasm and causes rash on my hands\" and \"makes my skin itch and makes me nervous\"    Morphine (Pf) Rash    Fentanyl Rash and Itching     Has had benadryl before         Current Outpatient Medications   Medication Sig Dispense Refill    diphenoxylate-atropine (LomotiL) 2.5-0.025 mg per tablet Take 1 Tablet by mouth four (4) times daily as needed for Diarrhea (1 tab after each stool for max 8 per day). Max Daily Amount: 4 Tablets.  Take after each stool for a maximum of 8 tablets daily 20 Tablet 0    hyoscyamine SL (LEVSIN/SL) 0.125 mg SL tablet 1 Tablet by SubLINGual route every four (4) hours as needed for Cramping. 10 Tablet 0    trimethoprim-sulfamethoxazole (BACTRIM DS, SEPTRA DS) 160-800 mg per tablet Take 1 Tablet by mouth two (2) times a day. 20 Tablet 0    ondansetron (Zofran ODT) 4 mg disintegrating tablet Take 1 Tablet by mouth every eight (8) hours as needed for Nausea. 12 Tablet 1    empagliflozin (Jardiance) 25 mg tablet Take 1 Tablet by mouth daily. 90 Tablet 1    glimepiride (AMARYL) 4 mg tablet TAKE 1 TABLET BY MOUTH ONCE DAILY BEFORE BREAKFAST 90 Tablet 1    estradioL (ESTRACE) 0.5 mg tablet TAKE 1 TABLET BY MOUTH ONCE DAILY 90 Tablet 1    ALPRAZolam (XANAX) 1 mg tablet TAKE 1/2 - 1 TABLET BY MOUTH TWICE DAILY AS NEEDED FOR ANXIETY *MAX 2 TABS DAILY* 60 Tablet 0    Ventolin HFA 90 mcg/actuation inhaler TAKE 1 PUFF BY INHALATION EVERY FOUR (4) HOURS AS NEEDED FOR WHEEZING. 18 Inhaler 1    lidocaine (Lidoderm) 5 % Apply patch to the affected area for 12 hours a day and remove for 12 hours a day. 5 Each 0    vancomycin (Vancocin) 125 mg capsule Take 1 Capsule by mouth four (4) times daily. 28 Capsule 0    valACYclovir (VALTREX) 1 gram tablet Take 2 tablets by mouth twice daily for 1 day as needed for flares 8 Tablet 2    sertraline (ZOLOFT) 100 mg tablet Take 2 Tablets by mouth nightly. 60 Tablet 2    gabapentin (NEURONTIN) 300 mg capsule Take 1 Cap by mouth nightly. Max Daily Amount: 300 mg. 30 Cap 1    losartan-hydroCHLOROthiazide (HYZAAR) 100-12.5 mg per tablet Take 1 Tab by mouth daily. 90 Tab 1    omeprazole (PRILOSEC) 20 mg capsule Take 40 mg by mouth Daily (before breakfast).  EPINEPHrine (EPIPEN) 0.3 mg/0.3 mL (1:1,000) injection 0.3 mL by IntraMUSCular route once as needed for up to 1 dose.  0.3 mL 1       Past History     Past Medical History:  Past Medical History:   Diagnosis Date    Anal fissure     Anisocoria     Asthma     LAST EPISODE     Back pain     Cerumen impaction     Chronic kidney disease     hx uti in past    Coagulation defects ocassional rectal bleeding due to anal fissure    Colovaginal fistula     Diabetes (HCC)     NIDDM    Diverticulitis     Diverticulosis     Enlarged tonsils     Frequent UTI     GERD (gastroesophageal reflux disease)     H/O endoscopy     with dilation    HA (headache)     Hepatic steatosis     History of colon resection     Hx of colonoscopy with polypectomy     benign    Hypertension     Ill-defined condition     FREQUENT HIVES    Ill-defined condition     HX ELEVATED LIVER ENZYMES    Morbid obesity (HCC)     Nausea & vomiting     during diverticulitis flare    Obesity     Otitis media     Pneumonia     about 15 yrs ago    Psychiatric disorder     ANXIETY    Recurrent tonsillitis     Sinusitis     Transfusion history ~ age 35    postop hysterectomy    Urticaria        Past Surgical History:  Past Surgical History:   Procedure Laterality Date    COLONOSCOPY N/A 3/28/2019    COLONOSCOPY performed by Pricila Weldon MD at [facility] COLONOSCOPY N/A 10/2/2020    COLONOSCOPY performed by Pricila Weldon MD at [facility] [facility]    [place].     HX GI  12    LAPAROSCOPIC HAND ASSISTED  POSS OPEN SIGMOID COLECTOMY POSS TEMPORARY DIVERTING LOOP ILEOSTOMY;  (no illeostomy needed)    HX GYN           HX GYN      cervical conization    HX HEENT      SINUS SURGERY LEFT X2    HX HEENT      SINUS SURGERY ON RIGHT X2    HX HEMORRHOIDECTOMY      HX OTHER SURGICAL      Sphincterotomy    HX PELVIC LAPAROSCOPY      HX EMMANUEL AND BSO      HX UROLOGICAL  12     CYSTOSCOPY INSERTION URETERAL CATHETERS - Cystoscopy Insertion of bilateral ureteral stents    ND ABDOMEN SURGERY PROC UNLISTED  2018    hernia repair at [facility]       Family History:  Family History   Problem Relation Age of Onset    Diabetes Mother     Cancer Mother         NON-HODGKINS LYMPHOMA    Anesth Problems Mother         PONV    Diabetes Father     Heart Disease Father CAD - STENTS, PACEMAKER    Arrhythmia Father        Social History:  Social History     Tobacco Use    Smoking status: Never Smoker    Smokeless tobacco: Never Used   Vaping Use    Vaping Use: Never assessed   Substance Use Topics    Alcohol use: Yes     Comment: Rarely    Drug use: No     Types: Prescription, OTC       Allergies: Allergies   Allergen Reactions    Aspirin Hives, Shortness of Breath and Itching    Codeine Hives, Itching and Angioedema     Pt had itching in mouth, on face and lips    Contrast Agent [Iodine] Hives, Itching and Angioedema     5/5/21: pt was given benadryl and solu-medrol prior to administration but pt had severe tongue swelling and drooling, lethargy and itching. DO NOT GIVE PT    Flavoring Agent Anaphylaxis    Iodinated Contrast Media Anaphylaxis, Diarrhea, Hives, Nausea and Vomiting and Shortness of Breath    Percocet [Oxycodone-Acetaminophen] Hives, Itching and Angioedema     Pt had itching in mouth, on face and lips    Prilosec [Omeprazole Magnesium] Anaphylaxis     CHERRY FLAVORED ODT; PT TAKES REGULAR PRILOSEC AND IS OK    Dilaudid [Hydromorphone] Itching     Tolerates with benadryl    Ketorolac Rash     \"makes my eyes spasm and causes rash on my hands\" and \"makes my skin itch and makes me nervous\"    Morphine (Pf) Rash    Fentanyl Rash and Itching     Has had benadryl before         Review of Systems   Review of Systems   Constitutional: Positive for fever. Eyes: Negative. Respiratory: Negative. Negative for shortness of breath. Cardiovascular: Negative for chest pain. Gastrointestinal: Positive for abdominal distention, abdominal pain and diarrhea. Negative for vomiting. Endocrine: Negative. Genitourinary: Negative. Negative for difficulty urinating, dysuria and hematuria. Musculoskeletal: Negative. Skin: Negative. Neurological: Negative. Psychiatric/Behavioral: Negative for suicidal ideas.    All other systems reviewed and are negative. Physical Exam   Physical Exam  Vitals and nursing note reviewed. Constitutional:       General: She is not in acute distress. Appearance: She is well-developed. She is obese. She is not diaphoretic. HENT:      Head: Normocephalic and atraumatic. Nose: Nose normal.   Eyes:      General: No scleral icterus. Conjunctiva/sclera: Conjunctivae normal.   Neck:      Trachea: No tracheal deviation. Cardiovascular:      Rate and Rhythm: Normal rate and regular rhythm. Heart sounds: Normal heart sounds. No murmur heard. No friction rub. Pulmonary:      Effort: Pulmonary effort is normal. No respiratory distress. Breath sounds: Normal breath sounds. No stridor. No wheezing or rales. Abdominal:      General: Bowel sounds are normal. There is no distension. Palpations: Abdomen is soft. Tenderness: There is abdominal tenderness in the right lower quadrant, suprapubic area and left lower quadrant. There is no rebound. Musculoskeletal:         General: No tenderness. Normal range of motion. Cervical back: Normal range of motion. Skin:     General: Skin is warm and dry. Findings: No rash. Neurological:      Mental Status: She is alert and oriented to person, place, and time. Cranial Nerves: No cranial nerve deficit. Psychiatric:         Speech: Speech normal.         Behavior: Behavior normal.         Thought Content:  Thought content normal.         Judgment: Judgment normal.           Diagnostic Study Results     Labs -     Recent Results (from the past 12 hour(s))   CBC WITH AUTOMATED DIFF    Collection Time: 07/22/21 12:00 AM   Result Value Ref Range    WBC 6.7 3.6 - 11.0 K/uL    RBC 4.88 3.80 - 5.20 M/uL    HGB 12.8 11.5 - 16.0 g/dL    HCT 37.9 35.0 - 47.0 %    MCV 77.7 (L) 80.0 - 99.0 FL    MCH 26.2 26.0 - 34.0 PG    MCHC 33.8 30.0 - 36.5 g/dL    RDW 15.1 (H) 11.5 - 14.5 %    PLATELET 936 982 - 919 K/uL    MPV 10.5 8.9 - 12.9 FL    NRBC 0.0 0 PER 100 WBC    ABSOLUTE NRBC 0.00 0.00 - 0.01 K/uL    NEUTROPHILS 58 32 - 75 %    LYMPHOCYTES 32 12 - 49 %    MONOCYTES 6 5 - 13 %    EOSINOPHILS 3 0 - 7 %    BASOPHILS 1 0 - 1 %    IMMATURE GRANULOCYTES 0 0.0 - 0.5 %    ABS. NEUTROPHILS 3.8 1.8 - 8.0 K/UL    ABS. LYMPHOCYTES 2.2 0.8 - 3.5 K/UL    ABS. MONOCYTES 0.4 0.0 - 1.0 K/UL    ABS. EOSINOPHILS 0.2 0.0 - 0.4 K/UL    ABS. BASOPHILS 0.0 0.0 - 0.1 K/UL    ABS. IMM. GRANS. 0.0 0.00 - 0.04 K/UL    DF AUTOMATED     METABOLIC PANEL, COMPREHENSIVE    Collection Time: 07/22/21 12:00 AM   Result Value Ref Range    Sodium 137 136 - 145 mmol/L    Potassium 3.2 (L) 3.5 - 5.1 mmol/L    Chloride 103 97 - 108 mmol/L    CO2 25 21 - 32 mmol/L    Anion gap 9 5 - 15 mmol/L    Glucose 172 (H) 65 - 100 mg/dL    BUN 11 6 - 20 MG/DL    Creatinine 0.77 0.55 - 1.02 MG/DL    BUN/Creatinine ratio 14 12 - 20      GFR est AA >60 >60 ml/min/1.73m2    GFR est non-AA >60 >60 ml/min/1.73m2    Calcium 9.6 8.5 - 10.1 MG/DL    Bilirubin, total 0.3 0.2 - 1.0 MG/DL    ALT (SGPT) 37 12 - 78 U/L    AST (SGOT) 23 15 - 37 U/L    Alk. phosphatase 78 45 - 117 U/L    Protein, total 7.7 6.4 - 8.2 g/dL    Albumin 3.9 3.5 - 5.0 g/dL    Globulin 3.8 2.0 - 4.0 g/dL    A-G Ratio 1.0 (L) 1.1 - 2.2     POC LACTIC ACID    Collection Time: 07/22/21 12:10 AM   Result Value Ref Range    Lactic Acid (POC) 1.95 0.40 - 2.00 mmol/L       Radiologic Studies -   No orders to display     CT Results  (Last 48 hours)    None        CXR Results  (Last 48 hours)    None            Medical Decision Making   I am the first provider for this patient. I reviewed the vital signs, available nursing notes, past medical history, past surgical history, family history and social history. Vital Signs-Reviewed the patient's vital signs.   Patient Vitals for the past 12 hrs:   Temp Pulse Resp BP SpO2   07/21/21 2216 98.3 °F (36.8 °C) 79 20 132/69 99 %       Pulse Oximetry Analysis - 99% on RA Normal    Records Reviewed/Interpretted: Nursing Notes from triage and Old Medical Records, noting multiple previous ER visits for wound to right lower leg as well as primary care visit for diabetes type 2 and chronic kidney disease    Provider Notes (Medical Decision Making):     DDX:  Diarrhea, dehydration, electrolyte derangement, C. difficile, ulcerative colitis flare, gastroenteritis medication side effect    Plan:  Labs, Stool studies, analgesic antispasmodic    Impression:  Diarrhea, abdominal cramping    ED Course:   Initial assessment performed. The patients presenting problems have been discussed, and they are in agreement with the care plan formulated and outlined with them. I have encouraged them to ask questions as they arise throughout their visit. I reviewed our electronic medical record system for any past medical records that were available that may contribute to the patients current condition, the nursing notes and and vital signs from today's visit  Nursing notes will be reviewed as they become available in realtime while the pt has been in the ED. Chuck Kang MD        3:24 AM  Progress note:  Pt noted to be feeling better, ready for discharge. Discussed lab findings with pt, specifically noting lack of stool produced while the emergency department making me less concerned for C. difficile. Noted that she may be having an ulcerative colitis flare versus irritation of her bowels from multiple rounds of antibiotics. Discussed difference of CT imaging at this time and patient is in agreement to the fact that she has had excessive radiation in the last 3 years. Notes that she will follow-up with her GI specialist for further management and reevaluation. .  All questions have been answered, pt voiced understanding and agreement with plan    Specific return precautions provided in addition to instructions for pt to return to the ED immediately should sx worsen at any time.   Chuck Kang MD             Critical Care Time: none      Diagnosis     Clinical Impression:   1. Diarrhea, unspecified type    2. Abdominal cramping, generalized    3. History of ulcerative colitis        PLAN:  1. Current Discharge Medication List      START taking these medications    Details   diphenoxylate-atropine (LomotiL) 2.5-0.025 mg per tablet Take 1 Tablet by mouth four (4) times daily as needed for Diarrhea (1 tab after each stool for max 8 per day). Max Daily Amount: 4 Tablets. Take after each stool for a maximum of 8 tablets daily  Qty: 20 Tablet, Refills: 0  Start date: 7/22/2021    Associated Diagnoses: Diarrhea, unspecified type      hyoscyamine SL (LEVSIN/SL) 0.125 mg SL tablet 1 Tablet by SubLINGual route every four (4) hours as needed for Cramping. Qty: 10 Tablet, Refills: 0  Start date: 7/22/2021           2. Follow-up Information     Follow up With Specialties Details Why Delmy Terrell MD Internal Medicine Schedule an appointment as soon as possible for a visit in 2 days  [address] [place] [address] [phone]      your GI specialist            Return to ED if worse     Disposition:    3:25 AM   The patient's results have been reviewed with family and/or caregiver. They verbally convey their understanding and agreement of the patient's signs, symptoms, diagnosis, treatment and prognosis and additionally agree to follow up as recommended in the discharge instructions or to return to the Emergency Room should the patient's condition change prior to their follow-up appointment. The family and/or caregiver verbally agrees with the care-plan and all of their questions have been answered. The discharge instructions have also been provided to the them with educational information regarding the patient's diagnosis as well a list of reasons why the patient would want to return to the ER prior to their follow-up appointment should their condition change.   Sarah Callahan, MD

## 2021-07-22 NOTE — DISCHARGE INSTRUCTIONS
It was a pleasure taking care of you in our Emergency Department today. We know that when you come to [facility] you are entrusting us with your health, comfort, and safety. Our physicians and nurses honor that trust, and truly appreciate the opportunity to care for you and your loved ones. We also value your feedback. If you receive a survey about your Emergency Department experience today, please fill it out. We care about our patients' feedback, and we listen to what you have to say. Thank you!       Dr. Jeimy Vasquez MD.

## 2021-07-22 NOTE — ED NOTES
Dr. Josh Duran reviewed discharge instructions with the patient. The patient verbalized understanding. All questions and concerns were addressed. The patient declined a wheelchair and is discharged ambulatory in the care of family members with instructions and prescriptions in hand. Pt is alert and oriented x 4. Respirations are clear and unlabored.

## 2021-07-27 ENCOUNTER — PATIENT MESSAGE (OUTPATIENT)
Dept: INTERNAL MEDICINE CLINIC | Age: 51
End: 2021-07-27

## 2021-07-28 ENCOUNTER — DOCUMENTATION ONLY (OUTPATIENT)
Dept: INTERNAL MEDICINE CLINIC | Age: 51
End: 2021-07-28

## 2021-07-28 ENCOUNTER — TELEPHONE (OUTPATIENT)
Dept: INTERNAL MEDICINE CLINIC | Age: 51
End: 2021-07-28

## 2021-07-28 ENCOUNTER — HOSPITAL ENCOUNTER (EMERGENCY)
Age: 51
Discharge: LEFT BEFORE DISCHARGE | End: 2021-07-28
Payer: MEDICAID

## 2021-07-28 DIAGNOSIS — R19.7 DIARRHEA, UNSPECIFIED TYPE: Primary | ICD-10-CM

## 2021-07-28 PROCEDURE — 75810000275 HC EMERGENCY DEPT VISIT NO LEVEL OF CARE

## 2021-07-28 NOTE — PROGRESS NOTES
Pt called c/o watery diarreha 10 times since last night. No fever pain n/v or blood in stool,  On 4 th round of abx for a leg infection.  Has been to ER for above and neg w/u but was not able to get stool specimen, advised pt to contact PCP today for appt -may need stool studies and or GI referral.

## 2021-07-28 NOTE — LETTER
7/28/2021 1:20 PM    Ms. [address] [address] [address] [facility] [address]      To Whom It May Concern:    Lucius Johnson is currently under the care of [facility]. She was seen in our office today. Please excuse work absence Wednesday, July 28. If there are questions or concerns please have the patient contact our office.         Sincerely,      Estelita Bumpers, MD

## 2021-07-28 NOTE — TELEPHONE ENCOUNTER
Patient called and notified lab orders ready, faxed to [facility] per her request     Patient advised on brat diet per md, hydration and imodium sparingly , she verbalized understanding.     Is requesting note for work for today ,note forwarded to md to request, patient request us to hold note for her once she is well will      Em lpn

## 2021-07-28 NOTE — TELEPHONE ENCOUNTER
Please advise patient that orders have been placed for her to have stool testing for bacterial infection including C. difficile. Patient can come by and  the orders and go to any [facility] location. They will provide her with the supplies and then she is to return specimens to them. In the meantime, advised patient to increase hydration. Avoid fats, dairy, uncooked vegetables, artificial sweeteners. Follow brat diet. Can use Imodium sparingly for watery diarrhea.

## 2021-08-01 DIAGNOSIS — F41.9 ANXIETY: ICD-10-CM

## 2021-08-02 RX ORDER — ALPRAZOLAM 1 MG/1
TABLET ORAL
Qty: 60 TABLET | Refills: 0 | Status: SHIPPED | OUTPATIENT
Start: 2021-08-02 | End: 2021-09-01

## 2021-08-05 ENCOUNTER — APPOINTMENT (OUTPATIENT)
Dept: ULTRASOUND IMAGING | Age: 51
End: 2021-08-05
Attending: EMERGENCY MEDICINE
Payer: MEDICAID

## 2021-08-05 ENCOUNTER — HOSPITAL ENCOUNTER (EMERGENCY)
Age: 51
Discharge: HOME | End: 2021-08-05
Attending: EMERGENCY MEDICINE
Payer: MEDICAID

## 2021-08-05 ENCOUNTER — APPOINTMENT (OUTPATIENT)
Dept: CT IMAGING | Age: 51
End: 2021-08-05
Attending: EMERGENCY MEDICINE
Payer: MEDICAID

## 2021-08-05 VITALS
TEMPERATURE: 98.2 F | BODY MASS INDEX: 38.14 KG/M2 | SYSTOLIC BLOOD PRESSURE: 121 MMHG | HEART RATE: 77 BPM | OXYGEN SATURATION: 97 % | HEIGHT: 62 IN | RESPIRATION RATE: 13 BRPM | DIASTOLIC BLOOD PRESSURE: 74 MMHG | WEIGHT: 207.23 LBS

## 2021-08-05 DIAGNOSIS — R19.7 DIARRHEA, UNSPECIFIED TYPE: ICD-10-CM

## 2021-08-05 DIAGNOSIS — T78.40XA ALLERGIC REACTION, INITIAL ENCOUNTER: ICD-10-CM

## 2021-08-05 DIAGNOSIS — L03.115 CELLULITIS OF RIGHT LOWER EXTREMITY: ICD-10-CM

## 2021-08-05 DIAGNOSIS — R10.9 ACUTE ABDOMINAL PAIN: Primary | ICD-10-CM

## 2021-08-05 LAB
ALBUMIN SERPL-MCNC: 4.1 G/DL (ref 3.5–5)
ALBUMIN/GLOB SERPL: 1 {RATIO} (ref 1.1–2.2)
ALP SERPL-CCNC: 74 U/L (ref 45–117)
ALT SERPL-CCNC: 37 U/L (ref 12–78)
ANION GAP SERPL CALC-SCNC: 6 MMOL/L (ref 5–15)
APPEARANCE UR: CLEAR
AST SERPL-CCNC: 28 U/L (ref 15–37)
BACTERIA URNS QL MICRO: NEGATIVE /HPF
BASOPHILS # BLD: 0 K/UL (ref 0–0.1)
BASOPHILS NFR BLD: 0 % (ref 0–1)
BILIRUB SERPL-MCNC: 0.5 MG/DL (ref 0.2–1)
BILIRUB UR QL: NEGATIVE
BUN SERPL-MCNC: 10 MG/DL (ref 6–20)
BUN/CREAT SERPL: 15 (ref 12–20)
CALCIUM SERPL-MCNC: 9.6 MG/DL (ref 8.5–10.1)
CHLORIDE SERPL-SCNC: 103 MMOL/L (ref 97–108)
CO2 SERPL-SCNC: 26 MMOL/L (ref 21–32)
COLOR UR: ABNORMAL
CREAT SERPL-MCNC: 0.65 MG/DL (ref 0.55–1.02)
DIFFERENTIAL METHOD BLD: ABNORMAL
EOSINOPHIL # BLD: 0.2 K/UL (ref 0–0.4)
EOSINOPHIL NFR BLD: 3 % (ref 0–7)
EPITH CASTS URNS QL MICRO: ABNORMAL /LPF
ERYTHROCYTE [DISTWIDTH] IN BLOOD BY AUTOMATED COUNT: 14.8 % (ref 11.5–14.5)
GLOBULIN SER CALC-MCNC: 4 G/DL (ref 2–4)
GLUCOSE SERPL-MCNC: 165 MG/DL (ref 65–100)
GLUCOSE UR STRIP.AUTO-MCNC: >1000 MG/DL
HCT VFR BLD AUTO: 37.8 % (ref 35–47)
HGB BLD-MCNC: 12.5 G/DL (ref 11.5–16)
HGB UR QL STRIP: NEGATIVE
HYALINE CASTS URNS QL MICRO: ABNORMAL /LPF (ref 0–5)
IMM GRANULOCYTES # BLD AUTO: 0 K/UL (ref 0–0.04)
IMM GRANULOCYTES NFR BLD AUTO: 0 % (ref 0–0.5)
KETONES UR QL STRIP.AUTO: NEGATIVE MG/DL
LEUKOCYTE ESTERASE UR QL STRIP.AUTO: NEGATIVE
LYMPHOCYTES # BLD: 1.4 K/UL (ref 0.8–3.5)
LYMPHOCYTES NFR BLD: 24 % (ref 12–49)
MCH RBC QN AUTO: 25.7 PG (ref 26–34)
MCHC RBC AUTO-ENTMCNC: 33.1 G/DL (ref 30–36.5)
MCV RBC AUTO: 77.6 FL (ref 80–99)
MONOCYTES # BLD: 0.3 K/UL (ref 0–1)
MONOCYTES NFR BLD: 5 % (ref 5–13)
NEUTS SEG # BLD: 3.9 K/UL (ref 1.8–8)
NEUTS SEG NFR BLD: 68 % (ref 32–75)
NITRITE UR QL STRIP.AUTO: NEGATIVE
NRBC # BLD: 0 K/UL (ref 0–0.01)
NRBC BLD-RTO: 0 PER 100 WBC
PH UR STRIP: 6 [PH] (ref 5–8)
PLATELET # BLD AUTO: 183 K/UL (ref 150–400)
PMV BLD AUTO: 10 FL (ref 8.9–12.9)
POTASSIUM SERPL-SCNC: 3.7 MMOL/L (ref 3.5–5.1)
PROT SERPL-MCNC: 8.1 G/DL (ref 6.4–8.2)
PROT UR STRIP-MCNC: NEGATIVE MG/DL
RBC # BLD AUTO: 4.87 M/UL (ref 3.8–5.2)
RBC #/AREA URNS HPF: ABNORMAL /HPF (ref 0–5)
SODIUM SERPL-SCNC: 135 MMOL/L (ref 136–145)
SP GR UR REFRACTOMETRY: 1.02 (ref 1–1.03)
UA: UC IF INDICATED,UAUC: ABNORMAL
UROBILINOGEN UR QL STRIP.AUTO: 0.2 EU/DL (ref 0.2–1)
WBC # BLD AUTO: 5.8 K/UL (ref 3.6–11)
WBC URNS QL MICRO: ABNORMAL /HPF (ref 0–4)

## 2021-08-05 PROCEDURE — 74011250636 HC RX REV CODE- 250/636: Performed by: EMERGENCY MEDICINE

## 2021-08-05 PROCEDURE — 76882 US LMTD JT/FCL EVL NVASC XTR: CPT

## 2021-08-05 PROCEDURE — 81001 URINALYSIS AUTO W/SCOPE: CPT

## 2021-08-05 PROCEDURE — 85025 COMPLETE CBC W/AUTO DIFF WBC: CPT

## 2021-08-05 PROCEDURE — 74176 CT ABD & PELVIS W/O CONTRAST: CPT

## 2021-08-05 PROCEDURE — 96375 TX/PRO/DX INJ NEW DRUG ADDON: CPT

## 2021-08-05 PROCEDURE — 74011000250 HC RX REV CODE- 250: Performed by: EMERGENCY MEDICINE

## 2021-08-05 PROCEDURE — 36415 COLL VENOUS BLD VENIPUNCTURE: CPT

## 2021-08-05 PROCEDURE — 96361 HYDRATE IV INFUSION ADD-ON: CPT

## 2021-08-05 PROCEDURE — 80053 COMPREHEN METABOLIC PANEL: CPT

## 2021-08-05 PROCEDURE — 96374 THER/PROPH/DIAG INJ IV PUSH: CPT

## 2021-08-05 PROCEDURE — 99284 EMERGENCY DEPT VISIT MOD MDM: CPT

## 2021-08-05 RX ORDER — MORPHINE SULFATE 2 MG/ML
4 INJECTION, SOLUTION INTRAMUSCULAR; INTRAVENOUS
Status: COMPLETED | OUTPATIENT
Start: 2021-08-05 | End: 2021-08-05

## 2021-08-05 RX ORDER — DICYCLOMINE HYDROCHLORIDE 20 MG/1
20 TABLET ORAL
Qty: 20 TABLET | Refills: 0 | Status: SHIPPED | OUTPATIENT
Start: 2021-08-05 | End: 2022-08-23

## 2021-08-05 RX ORDER — HYDROXYZINE 25 MG/1
25 TABLET, FILM COATED ORAL
Qty: 20 TABLET | Refills: 0 | Status: SHIPPED | OUTPATIENT
Start: 2021-08-05 | End: 2021-08-15

## 2021-08-05 RX ORDER — ONDANSETRON 4 MG/1
4 TABLET, ORALLY DISINTEGRATING ORAL
Qty: 10 TABLET | Refills: 0 | Status: SHIPPED | OUTPATIENT
Start: 2021-08-05 | End: 2021-09-27

## 2021-08-05 RX ORDER — ONDANSETRON 2 MG/ML
4 INJECTION INTRAMUSCULAR; INTRAVENOUS
Status: COMPLETED | OUTPATIENT
Start: 2021-08-05 | End: 2021-08-05

## 2021-08-05 RX ORDER — DIPHENHYDRAMINE HYDROCHLORIDE 50 MG/ML
25 INJECTION, SOLUTION INTRAMUSCULAR; INTRAVENOUS
Status: COMPLETED | OUTPATIENT
Start: 2021-08-05 | End: 2021-08-05

## 2021-08-05 RX ORDER — CLINDAMYCIN HYDROCHLORIDE 300 MG/1
300 CAPSULE ORAL 4 TIMES DAILY
Qty: 40 CAPSULE | Refills: 0 | Status: SHIPPED | OUTPATIENT
Start: 2021-08-05 | End: 2021-09-27

## 2021-08-05 RX ADMIN — ONDANSETRON 4 MG: 2 INJECTION INTRAMUSCULAR; INTRAVENOUS at 14:11

## 2021-08-05 RX ADMIN — METHYLPREDNISOLONE SODIUM SUCCINATE 125 MG: 125 INJECTION, POWDER, FOR SOLUTION INTRAMUSCULAR; INTRAVENOUS at 14:11

## 2021-08-05 RX ADMIN — SODIUM CHLORIDE 500 ML: 9 INJECTION, SOLUTION INTRAVENOUS at 14:10

## 2021-08-05 RX ADMIN — DIPHENHYDRAMINE HYDROCHLORIDE 25 MG: 50 INJECTION, SOLUTION INTRAMUSCULAR; INTRAVENOUS at 14:11

## 2021-08-05 RX ADMIN — FAMOTIDINE 20 MG: 10 INJECTION, SOLUTION INTRAVENOUS at 14:11

## 2021-08-05 RX ADMIN — MORPHINE SULFATE 4 MG: 2 INJECTION, SOLUTION INTRAMUSCULAR; INTRAVENOUS at 14:10

## 2021-08-05 NOTE — LETTER
[address]  [facility] EMERGENCY DEPT  [address] [place] [address]  Tia Barrios [id number]  [phone]    Work/School Note    Date: 8/5/2021    To Whom It May concern:    Serge Larson was seen and treated today in the emergency room by the following provider(s):  Attending Provider: Mack Multani MD.      Serge Larson may return to work on 8/9/2021.     Sincerely,          Mesfin Tejeda MD

## 2021-08-05 NOTE — ED NOTES
Skin warm and dry. Respirations even and unlabored. In no apparent distress at this time. Pt sitting in triage hallway while waiting for a room - asked focus care RN to speak with PA about medicating her for allergic reaction while waiting. No obvious signs of distress noted.

## 2021-08-05 NOTE — ED PROVIDER NOTES
EMERGENCY DEPARTMENT HISTORY AND PHYSICAL EXAM      Date: 8/5/2021  Patient Name: Fabi Bahena    History of Presenting Illness     Chief Complaint   Patient presents with    Skin Problem     Ambulatory into the ED with c/o increased pain to RLE - had a screw stick her about 1 month ago and has been on 4 rounds of abx for infection. C/o 5-6 episodes of diarrhea per day x 3 weeks - hx c-diff. C/o itching all over since taking Bactrim around 10 am - took 2 benadryl at that time; hx anaphylaxis in the past. Alert and interacting appropriately. Respirations even and unlabored; no stridor or wheezing noted.  Diarrhea    Allergic Reaction       History Provided By: Patient and Previous medical records. HPI: Fabi Bahena, 46 y.o. female presents to the ED with cc of an infection, diarrhea and allergic reaction. The patient has been having problems with a wound on her right calf since June 22. At that time, she was walking on some steps in the backyard when a board broke, and she fell through the stairs with her right leg. A screw from the board became lost in her leg. She was seen in the ER at that time, and the screw was removed. He has copious wound irrigation and was started on Keflex. She states that she has been on 4 different antibiotics for the infection since then, and that the wound is not healing up. She has a 7 out of 10 pain when she palpates the wound. She denies fever or chills. She has also had diarrhea 5-6 times a day for the last 3 weeks. She was tested for C. difficile last week, and the results were negative. She also has a 10 out of 10 abdominal pain which has been present for 3 weeks. She denies any back pain, chest pain, cough or shortness of breath. She took a Bactrim this morning, thinking that that would help her infection, and now she has a rash to both forearms and chest.  She denies throat tightness.   Took Benadryl prior to arrival.    There are no other complaints, changes, or physical findings at this time. PCP: Natasha Palmer MD    No current facility-administered medications on file prior to encounter. Current Outpatient Medications on File Prior to Encounter   Medication Sig Dispense Refill    ALPRAZolam (XANAX) 1 mg tablet TAKE 1/2 - 1 TABLET BY MOUTH TWICE DAILY AS NEEDED FOR ANXIETY *MAX 2 TABS DAILY* 60 Tablet 0    diphenoxylate-atropine (LomotiL) 2.5-0.025 mg per tablet Take 1 Tablet by mouth four (4) times daily as needed for Diarrhea (1 tab after each stool for max 8 per day). Max Daily Amount: 4 Tablets. Take after each stool for a maximum of 8 tablets daily 20 Tablet 0    hyoscyamine SL (LEVSIN/SL) 0.125 mg SL tablet 1 Tablet by SubLINGual route every four (4) hours as needed for Cramping. 10 Tablet 0    trimethoprim-sulfamethoxazole (BACTRIM DS, SEPTRA DS) 160-800 mg per tablet Take 1 Tablet by mouth two (2) times a day. 20 Tablet 0    ondansetron (Zofran ODT) 4 mg disintegrating tablet Take 1 Tablet by mouth every eight (8) hours as needed for Nausea. 12 Tablet 1    empagliflozin (Jardiance) 25 mg tablet Take 1 Tablet by mouth daily. 90 Tablet 1    glimepiride (AMARYL) 4 mg tablet TAKE 1 TABLET BY MOUTH ONCE DAILY BEFORE BREAKFAST 90 Tablet 1    estradioL (ESTRACE) 0.5 mg tablet TAKE 1 TABLET BY MOUTH ONCE DAILY 90 Tablet 1    Ventolin HFA 90 mcg/actuation inhaler TAKE 1 PUFF BY INHALATION EVERY FOUR (4) HOURS AS NEEDED FOR WHEEZING. 18 Inhaler 1    lidocaine (Lidoderm) 5 % Apply patch to the affected area for 12 hours a day and remove for 12 hours a day. 5 Each 0    vancomycin (Vancocin) 125 mg capsule Take 1 Capsule by mouth four (4) times daily. 28 Capsule 0    valACYclovir (VALTREX) 1 gram tablet Take 2 tablets by mouth twice daily for 1 day as needed for flares 8 Tablet 2    sertraline (ZOLOFT) 100 mg tablet Take 2 Tablets by mouth nightly. 60 Tablet 2    gabapentin (NEURONTIN) 300 mg capsule Take 1 Cap by mouth nightly. Max Daily Amount: 300 mg. 30 Cap 1    losartan-hydroCHLOROthiazide (HYZAAR) 100-12.5 mg per tablet Take 1 Tab by mouth daily. 90 Tab 1    omeprazole (PRILOSEC) 20 mg capsule Take 40 mg by mouth Daily (before breakfast).  EPINEPHrine (EPIPEN) 0.3 mg/0.3 mL (1:1,000) injection 0.3 mL by IntraMUSCular route once as needed for up to 1 dose. 0.3 mL 1       Past History     Past Medical History:  Past Medical History:   Diagnosis Date    Anal fissure     Anisocoria     Asthma     LAST EPISODE     Back pain     Cerumen impaction     Chronic kidney disease     hx uti in past    Coagulation defects     ocassional rectal bleeding due to anal fissure    Colovaginal fistula     Diabetes (HCC)     NIDDM    Diverticulitis     Diverticulosis     Enlarged tonsils     Frequent UTI     GERD (gastroesophageal reflux disease)     H/O endoscopy     with dilation    HA (headache)     Hepatic steatosis     History of colon resection     Hx of colonoscopy with polypectomy     benign    Hypertension     Ill-defined condition     FREQUENT HIVES    Ill-defined condition     HX ELEVATED LIVER ENZYMES    Morbid obesity (HCC)     Nausea & vomiting     during diverticulitis flare    Obesity     Otitis media     Pneumonia     about 15 yrs ago    Psychiatric disorder     ANXIETY    Recurrent tonsillitis     Sinusitis     Transfusion history ~ age 35    postop hysterectomy    Urticaria        Past Surgical History:  Past Surgical History:   Procedure Laterality Date    COLONOSCOPY N/A 3/28/2019    COLONOSCOPY performed by Kathy Matamoros MD at [facility] COLONOSCOPY N/A 10/2/2020    COLONOSCOPY performed by Kathy Matamoros MD at [address] [facility]    [place].     HX GI  12    LAPAROSCOPIC HAND ASSISTED  POSS OPEN SIGMOID COLECTOMY POSS TEMPORARY DIVERTING LOOP ILEOSTOMY;  (no illeostomy needed)    HX GYN           HX GYN      cervical conization    HX HEENT SINUS SURGERY LEFT X2    HX HEENT      SINUS SURGERY ON RIGHT X2    HX HEMORRHOIDECTOMY      HX OTHER SURGICAL  11/12    Sphincterotomy    HX PELVIC LAPAROSCOPY      HX EMMANUEL AND BSO      HX UROLOGICAL  7/31/12     CYSTOSCOPY INSERTION URETERAL CATHETERS - Cystoscopy Insertion of bilateral ureteral stents    DE ABDOMEN SURGERY PROC UNLISTED  01/06/2018    hernia repair at [facility]       Family History:  Family History   Problem Relation Age of Onset    Diabetes Mother     Cancer Mother         NON-HODGKINS LYMPHOMA    Anesth Problems Mother         PONV    Diabetes Father     Heart Disease Father         CAD - STENTS, PACEMAKER    Arrhythmia Father        Social History:  Social History     Tobacco Use    Smoking status: Never Smoker    Smokeless tobacco: Never Used   Vaping Use    Vaping Use: Never assessed   Substance Use Topics    Alcohol use: Yes     Comment: Rarely    Drug use: No     Types: Prescription, OTC       Allergies: Allergies   Allergen Reactions    Aspirin Hives, Shortness of Breath and Itching    Codeine Hives, Itching and Angioedema     Pt had itching in mouth, on face and lips    Contrast Agent [Iodine] Hives, Itching and Angioedema     5/5/21: pt was given benadryl and solu-medrol prior to administration but pt had severe tongue swelling and drooling, lethargy and itching.  DO NOT GIVE PT    Flavoring Agent Anaphylaxis     Cherry flavor    Iodinated Contrast Media Anaphylaxis, Diarrhea, Hives, Nausea and Vomiting and Shortness of Breath    Percocet [Oxycodone-Acetaminophen] Hives, Itching and Angioedema     Pt had itching in mouth, on face and lips    Prilosec [Omeprazole Magnesium] Anaphylaxis     CHERRY FLAVORED ODT; PT TAKES REGULAR PRILOSEC AND IS OK    Dilaudid [Hydromorphone] Itching     Tolerates with benadryl    Ketorolac Rash     \"makes my eyes spasm and causes rash on my hands\" and \"makes my skin itch and makes me nervous\"    Morphine (Pf) Rash    Fentanyl Rash and Itching     Has had benadryl before         Review of Systems   Review of Systems   Constitutional: Negative for fever. HENT: Negative for congestion and trouble swallowing. Eyes: Negative. Respiratory: Negative for shortness of breath. Cardiovascular: Negative for chest pain. Gastrointestinal: Positive for abdominal pain and diarrhea. Endocrine: Negative for heat intolerance. Genitourinary: Negative. Musculoskeletal: Negative for back pain. Skin: Positive for rash and wound. Allergic/Immunologic: Negative for immunocompromised state. Neurological: Negative for dizziness. Hematological: Does not bruise/bleed easily. Psychiatric/Behavioral: Negative. All other systems reviewed and are negative. Physical Exam   Physical Exam  Vitals and nursing note reviewed. Constitutional:       General: She is not in acute distress. Appearance: She is well-developed. HENT:      Head: Normocephalic. Cardiovascular:      Rate and Rhythm: Normal rate and regular rhythm. Pulses: Normal pulses. Heart sounds: Normal heart sounds. Pulmonary:      Effort: Pulmonary effort is normal.      Breath sounds: Normal breath sounds. Abdominal:      General: Bowel sounds are normal.      Palpations: Abdomen is soft. Tenderness: There is no abdominal tenderness. Musculoskeletal:         General: Normal range of motion. Cervical back: Normal range of motion and neck supple. Skin:     General: Skin is warm and dry. Findings: Erythema and rash present. Comments: Macular rash upper arms and chest.  Tender, erythematous wound medial right calf   Neurological:      General: No focal deficit present. Mental Status: She is alert.    Psychiatric:         Mood and Affect: Mood normal.         Behavior: Behavior normal.         Diagnostic Study Results     Labs -     Recent Results (from the past 12 hour(s))   CBC WITH AUTOMATED DIFF    Collection Time: 08/05/21 11:30 AM Result Value Ref Range    WBC 5.8 3.6 - 11.0 K/uL    RBC 4.87 3.80 - 5.20 M/uL    HGB 12.5 11.5 - 16.0 g/dL    HCT 37.8 35.0 - 47.0 %    MCV 77.6 (L) 80.0 - 99.0 FL    MCH 25.7 (L) 26.0 - 34.0 PG    MCHC 33.1 30.0 - 36.5 g/dL    RDW 14.8 (H) 11.5 - 14.5 %    PLATELET 468 292 - 399 K/uL    MPV 10.0 8.9 - 12.9 FL    NRBC 0.0 0  WBC    ABSOLUTE NRBC 0.00 0.00 - 0.01 K/uL    NEUTROPHILS 68 32 - 75 %    LYMPHOCYTES 24 12 - 49 %    MONOCYTES 5 5 - 13 %    EOSINOPHILS 3 0 - 7 %    BASOPHILS 0 0 - 1 %    IMMATURE GRANULOCYTES 0 0.0 - 0.5 %    ABS. NEUTROPHILS 3.9 1.8 - 8.0 K/UL    ABS. LYMPHOCYTES 1.4 0.8 - 3.5 K/UL    ABS. MONOCYTES 0.3 0.0 - 1.0 K/UL    ABS. EOSINOPHILS 0.2 0.0 - 0.4 K/UL    ABS. BASOPHILS 0.0 0.0 - 0.1 K/UL    ABS. IMM. GRANS. 0.0 0.00 - 0.04 K/UL    DF AUTOMATED     METABOLIC PANEL, COMPREHENSIVE    Collection Time: 08/05/21 11:30 AM   Result Value Ref Range    Sodium 135 (L) 136 - 145 mmol/L    Potassium 3.7 3.5 - 5.1 mmol/L    Chloride 103 97 - 108 mmol/L    CO2 26 21 - 32 mmol/L    Anion gap 6 5 - 15 mmol/L    Glucose 165 (H) 65 - 100 mg/dL    BUN 10 6 - 20 MG/DL    Creatinine 0.65 0.55 - 1.02 MG/DL    BUN/Creatinine ratio 15 12 - 20      GFR est AA >60 >60 ml/min/1.73m2    GFR est non-AA >60 >60 ml/min/1.73m2    Calcium 9.6 8.5 - 10.1 MG/DL    Bilirubin, total 0.5 0.2 - 1.0 MG/DL    ALT (SGPT) 37 12 - 78 U/L    AST (SGOT) 28 15 - 37 U/L    Alk.  phosphatase 74 45 - 117 U/L    Protein, total 8.1 6.4 - 8.2 g/dL    Albumin 4.1 3.5 - 5.0 g/dL    Globulin 4.0 2.0 - 4.0 g/dL    A-G Ratio 1.0 (L) 1.1 - 2.2     URINALYSIS W/ REFLEX CULTURE    Collection Time: 08/05/21 11:31 AM    Specimen: Urine   Result Value Ref Range    Color YELLOW/STRAW      Appearance CLEAR CLEAR      Specific gravity 1.016 1.003 - 1.030      pH (UA) 6.0 5.0 - 8.0      Protein Negative NEG mg/dL    Glucose >1,000 (A) NEG mg/dL    Ketone Negative NEG mg/dL    Bilirubin Negative NEG      Blood Negative NEG      Urobilinogen 0.2 0.2 - 1.0 EU/dL    Nitrites Negative NEG      Leukocyte Esterase Negative NEG      WBC 0-4 0 - 4 /hpf    RBC 0-5 0 - 5 /hpf    Epithelial cells FEW FEW /lpf    Bacteria Negative NEG /hpf    UA:UC IF INDICATED CULTURE NOT INDICATED BY UA RESULT CNI      Hyaline cast 0-2 0 - 5 /lpf       Radiologic Studies -   CT ABD PELV WO CONT   Final Result   No acute abnormality is identified. Patient is status post sigmoid colectomy,   hysterectomy and cholecystectomy. The appendix is difficult to separate from   adjacent bowel loop but does not appear distended and there is no definite   evidence of periappendiceal formation      US EXT NONVAS RT LTD   Final Result   No abscess. CT Results  (Last 48 hours)               08/05/21 1333  CT ABD PELV WO CONT Final result    Impression:  No acute abnormality is identified. Patient is status post sigmoid colectomy,   hysterectomy and cholecystectomy. The appendix is difficult to separate from   adjacent bowel loop but does not appear distended and there is no definite   evidence of periappendiceal formation       Narrative:  EXAM: CT ABD PELV WO CONT       INDICATION: Abdominal pain and diarrhea       COMPARISON: 5/5/2021       CONTRAST:  None. TECHNIQUE:    Thin axial images were obtained through the abdomen and pelvis. Coronal and   sagittal reformats were generated. Oral contrast was not administered. CT dose   reduction was achieved through use of a standardized protocol tailored for this   examination and automatic exposure control for dose modulation. The absence of intravenous contrast material reduces the sensitivity for   evaluation of the vasculature and solid organs. FINDINGS:    LOWER THORAX: No significant abnormality in the incidentally imaged lower chest.   LIVER: No mass. BILIARY TREE: Patient status post cholecystectomy CBD is not dilated. SPLEEN: within normal limits. PANCREAS: No focal abnormality. ADRENALS: Unremarkable. KIDNEYS/URETERS: No calculus or hydronephrosis. STOMACH: Unremarkable. SMALL BOWEL: No dilatation or wall thickening. COLON: No dilatation or wall thickening. Patient is status post sigmoid   colectomy   APPENDIX: The appendix is difficult to separate from adjacent small bowel but   does not appear distended and there is no periappendiceal inflammation. PERITONEUM: No ascites or pneumoperitoneum. RETROPERITONEUM: No lymphadenopathy or aortic aneurysm. REPRODUCTIVE ORGANS: Patient is status post hysterectomy   URINARY BLADDER: No mass or calculus. BONES: No destructive bone lesion. ABDOMINAL WALL: No mass or hernia. ADDITIONAL COMMENTS: N/A               CXR Results  (Last 48 hours)    None          Medical Decision Making   I am the first provider for this patient. I reviewed the vital signs, available nursing notes, past medical history, past surgical history, family history and social history. Vital Signs-Reviewed the patient's vital signs. Patient Vitals for the past 12 hrs:   Temp Pulse Resp BP SpO2   08/05/21 1445 -- -- -- 116/75 92 %   08/05/21 1415 -- -- -- 121/78 98 %   08/05/21 1230 -- -- -- 131/74 98 %   08/05/21 1110 98.2 °F (36.8 °C) 77 13 127/80 100 %       Records Reviewed: Nursing Notes, Old Medical Records, Previous Radiology Studies and Previous Laboratory Studies    Provider Notes (Medical Decision Making):   Cellulitis, allergic reaction, colitis,, diverticulitis,, dehydration, electrolyte abnormality    ED Course:   Initial assessment performed. The patients presenting problems have been discussed, and they are in agreement with the care plan formulated and outlined with them. I have encouraged them to ask questions as they arise throughout their visit. Progress note: The patient is feeling better. Her results were reviewed. She is advised to follow-up and return to ER if worse           Critical Care Time:   0    Disposition:  home    DISCHARGE PLAN:  1. Discharge Medication List as of 8/5/2021  3:24 PM      START taking these medications    Details   clindamycin (CLEOCIN) 300 mg capsule Take 1 Capsule by mouth four (4) times daily. , Normal, Disp-40 Capsule, R-0      hydrOXYzine HCL (ATARAX) 25 mg tablet Take 1 Tablet by mouth every six (6) hours as needed for Itching (rash) for up to 10 days. , Normal, Disp-20 Tablet, R-0      !! ondansetron (Zofran ODT) 4 mg disintegrating tablet Take 1 Tablet by mouth every eight (8) hours as needed for Nausea., Normal, Disp-10 Tablet, R-0      dicyclomine (BENTYL) 20 mg tablet Take 1 Tablet by mouth every six (6) hours as needed for Abdominal Cramps., Normal, Disp-20 Tablet, R-0       !! - Potential duplicate medications found. Please discuss with provider. CONTINUE these medications which have NOT CHANGED    Details   lidocaine (Lidoderm) 5 % Apply patch to the affected area for 12 hours a day and remove for 12 hours a day., Normal, Disp-5 Each, R-0      vancomycin (Vancocin) 125 mg capsule Take 1 Capsule by mouth four (4) times daily. , Normal, Disp-28 Capsule, R-0      sertraline (ZOLOFT) 100 mg tablet Take 2 Tablets by mouth nightly., Normal, Disp-60 Tablet, R-2      gabapentin (NEURONTIN) 300 mg capsule Take 1 Cap by mouth nightly. Max Daily Amount: 300 mg., Normal, Disp-30 Cap, R-1Not to exceed 4 additional fills before 07/28/2021      losartan-hydroCHLOROthiazide (HYZAAR) 100-12.5 mg per tablet Take 1 Tab by mouth daily. , Normal, Disp-90 Tab, R-1      omeprazole (PRILOSEC) 20 mg capsule Take 40 mg by mouth Daily (before breakfast). , Historical Med      EPINEPHrine (EPIPEN) 0.3 mg/0.3 mL (1:1,000) injection 0.3 mL by IntraMUSCular route once as needed for up to 1 dose., Print, Disp-0.3 mL, R-1      ALPRAZolam (XANAX) 1 mg tablet TAKE 1/2 - 1 TABLET BY MOUTH TWICE DAILY AS NEEDED FOR ANXIETY *MAX 2 TABS DAILY*, Normal, Disp-60 Tablet, R-0Not to exceed 5 additional fills before 12/27/2021 DX Code Needed  . diphenoxylate-atropine (LomotiL) 2.5-0.025 mg per tablet Take 1 Tablet by mouth four (4) times daily as needed for Diarrhea (1 tab after each stool for max 8 per day). Max Daily Amount: 4 Tablets. Take after each stool for a maximum of 8 tablets daily, Normal, Disp-20 Tablet, R-0      hyoscyamine SL (LEVSIN/SL) 0.125 mg SL tablet 1 Tablet by SubLINGual route every four (4) hours as needed for Cramping., Normal, Disp-10 Tablet, R-0      trimethoprim-sulfamethoxazole (BACTRIM DS, SEPTRA DS) 160-800 mg per tablet Take 1 Tablet by mouth two (2) times a day., Print, Disp-20 Tablet, R-0      !! ondansetron (Zofran ODT) 4 mg disintegrating tablet Take 1 Tablet by mouth every eight (8) hours as needed for Nausea., Normal, Disp-12 Tablet, R-1      empagliflozin (Jardiance) 25 mg tablet Take 1 Tablet by mouth daily. , Normal, Disp-90 Tablet, R-1      glimepiride (AMARYL) 4 mg tablet TAKE 1 TABLET BY MOUTH ONCE DAILY BEFORE BREAKFAST, Normal, Disp-90 Tablet, R-1      estradioL (ESTRACE) 0.5 mg tablet TAKE 1 TABLET BY MOUTH ONCE DAILY, Normal, Disp-90 Tablet, R-1      Ventolin HFA 90 mcg/actuation inhaler TAKE 1 PUFF BY INHALATION EVERY FOUR (4) HOURS AS NEEDED FOR WHEEZING., Normal, Disp-18 Inhaler, R-1      valACYclovir (VALTREX) 1 gram tablet Take 2 tablets by mouth twice daily for 1 day as needed for flares, Normal, Disp-8 Tablet, R-2       !! - Potential duplicate medications found. Please discuss with provider. 2.   Follow-up Information     Follow up With Specialties Details Why Contact Faisal Palumbo MD Internal Medicine  As needed [address] [facility]  [phone]          Keep your appointment with your gastroenterologist    [facility] EMERGENCY DEPT Emergency Medicine  If symptoms worsen [address] [place] [address] Drive  [address] [facility]  [phone]        3. Return to ED if worse     Diagnosis     Clinical Impression:   1. Acute abdominal pain    2.  Diarrhea, unspecified type    3. Cellulitis of right lower extremity    4. Allergic reaction, initial encounter        Attestations:    Anita Lam MD    Please note that this dictation was completed with [employer], the computer voice recognition software. Quite often unanticipated grammatical, syntax, homophones, and other interpretive errors are inadvertently transcribed by the computer software. Please disregard these errors. Please excuse any errors that have escaped final proofreading. Thank you.

## 2021-08-12 ENCOUNTER — HOSPITAL ENCOUNTER (EMERGENCY)
Age: 51
Discharge: HOME | End: 2021-08-12
Attending: STUDENT IN AN ORGANIZED HEALTH CARE EDUCATION/TRAINING PROGRAM
Payer: MEDICAID

## 2021-08-12 VITALS
WEIGHT: 207.67 LBS | HEIGHT: 62 IN | RESPIRATION RATE: 14 BRPM | TEMPERATURE: 98.8 F | OXYGEN SATURATION: 100 % | DIASTOLIC BLOOD PRESSURE: 81 MMHG | HEART RATE: 78 BPM | BODY MASS INDEX: 38.22 KG/M2 | SYSTOLIC BLOOD PRESSURE: 130 MMHG

## 2021-08-12 DIAGNOSIS — K62.89 IRRITATION OF SKIN OF PERIANAL REGION: ICD-10-CM

## 2021-08-12 DIAGNOSIS — R11.2 NON-INTRACTABLE VOMITING WITH NAUSEA, UNSPECIFIED VOMITING TYPE: Primary | ICD-10-CM

## 2021-08-12 DIAGNOSIS — R10.84 DIFFUSE ABDOMINAL PAIN: ICD-10-CM

## 2021-08-12 DIAGNOSIS — R19.7 FREQUENT DIARRHEA: ICD-10-CM

## 2021-08-12 LAB
ALBUMIN SERPL-MCNC: 3.7 G/DL (ref 3.5–5)
ALBUMIN/GLOB SERPL: 1 {RATIO} (ref 1.1–2.2)
ALP SERPL-CCNC: 69 U/L (ref 45–117)
ALT SERPL-CCNC: 30 U/L (ref 12–78)
ANION GAP SERPL CALC-SCNC: 5 MMOL/L (ref 5–15)
APPEARANCE UR: CLEAR
AST SERPL-CCNC: 23 U/L (ref 15–37)
BACTERIA URNS QL MICRO: NEGATIVE /HPF
BASOPHILS # BLD: 0 K/UL (ref 0–0.1)
BASOPHILS NFR BLD: 1 % (ref 0–1)
BILIRUB SERPL-MCNC: 0.4 MG/DL (ref 0.2–1)
BILIRUB UR QL: NEGATIVE
BUN SERPL-MCNC: 12 MG/DL (ref 6–20)
BUN/CREAT SERPL: 17 (ref 12–20)
C DIFF GDH STL QL: NEGATIVE
C DIFF TOX A+B STL QL IA: NEGATIVE
CALCIUM SERPL-MCNC: 9.1 MG/DL (ref 8.5–10.1)
CAMPYLOBACTER SPECIES, DNA: NEGATIVE
CHLORIDE SERPL-SCNC: 104 MMOL/L (ref 97–108)
CO2 SERPL-SCNC: 28 MMOL/L (ref 21–32)
COLOR UR: ABNORMAL
CREAT SERPL-MCNC: 0.71 MG/DL (ref 0.55–1.02)
DIFFERENTIAL METHOD BLD: ABNORMAL
ENTEROTOXIGEN E COLI, DNA: NEGATIVE
EOSINOPHIL # BLD: 0.2 K/UL (ref 0–0.4)
EOSINOPHIL NFR BLD: 4 % (ref 0–7)
EPITH CASTS URNS QL MICRO: ABNORMAL /LPF
ERYTHROCYTE [DISTWIDTH] IN BLOOD BY AUTOMATED COUNT: 14.7 % (ref 11.5–14.5)
GLOBULIN SER CALC-MCNC: 3.7 G/DL (ref 2–4)
GLUCOSE SERPL-MCNC: 210 MG/DL (ref 65–100)
GLUCOSE UR STRIP.AUTO-MCNC: >1000 MG/DL
HCT VFR BLD AUTO: 36.8 % (ref 35–47)
HGB BLD-MCNC: 12.1 G/DL (ref 11.5–16)
HGB UR QL STRIP: NEGATIVE
IMM GRANULOCYTES # BLD AUTO: 0 K/UL (ref 0–0.04)
IMM GRANULOCYTES NFR BLD AUTO: 0 % (ref 0–0.5)
INTERPRETATION: NORMAL
KETONES UR QL STRIP.AUTO: NEGATIVE MG/DL
LEUKOCYTE ESTERASE UR QL STRIP.AUTO: NEGATIVE
LYMPHOCYTES # BLD: 1.4 K/UL (ref 0.8–3.5)
LYMPHOCYTES NFR BLD: 27 % (ref 12–49)
MCH RBC QN AUTO: 26 PG (ref 26–34)
MCHC RBC AUTO-ENTMCNC: 32.9 G/DL (ref 30–36.5)
MCV RBC AUTO: 79.1 FL (ref 80–99)
MONOCYTES # BLD: 0.3 K/UL (ref 0–1)
MONOCYTES NFR BLD: 6 % (ref 5–13)
NEUTS SEG # BLD: 3.3 K/UL (ref 1.8–8)
NEUTS SEG NFR BLD: 62 % (ref 32–75)
NITRITE UR QL STRIP.AUTO: POSITIVE
NRBC # BLD: 0 K/UL (ref 0–0.01)
NRBC BLD-RTO: 0 PER 100 WBC
P SHIGELLOIDES DNA STL QL NAA+PROBE: NEGATIVE
PH UR STRIP: 5.5 [PH] (ref 5–8)
PLATELET # BLD AUTO: 185 K/UL (ref 150–400)
PMV BLD AUTO: 10 FL (ref 8.9–12.9)
POTASSIUM SERPL-SCNC: 3.6 MMOL/L (ref 3.5–5.1)
PROT SERPL-MCNC: 7.4 G/DL (ref 6.4–8.2)
PROT UR STRIP-MCNC: NEGATIVE MG/DL
RBC # BLD AUTO: 4.65 M/UL (ref 3.8–5.2)
RBC #/AREA URNS HPF: ABNORMAL /HPF (ref 0–5)
SALMONELLA SPECIES, DNA: NEGATIVE
SHIGA TOXIN PRODUCING, DNA: NEGATIVE
SHIGELLA SP+EIEC IPAH STL QL NAA+PROBE: NEGATIVE
SODIUM SERPL-SCNC: 137 MMOL/L (ref 136–145)
SP GR UR REFRACTOMETRY: 1.01 (ref 1–1.03)
UA: UC IF INDICATED,UAUC: ABNORMAL
UROBILINOGEN UR QL STRIP.AUTO: 1 EU/DL (ref 0.2–1)
VIBRIO SPECIES, DNA: NEGATIVE
WBC # BLD AUTO: 5.3 K/UL (ref 3.6–11)
WBC URNS QL MICRO: ABNORMAL /HPF (ref 0–4)
Y. ENTEROCOLITICA, DNA: NEGATIVE
YEAST BUDDING URNS QL: PRESENT

## 2021-08-12 PROCEDURE — 81001 URINALYSIS AUTO W/SCOPE: CPT

## 2021-08-12 PROCEDURE — 87506 IADNA-DNA/RNA PROBE TQ 6-11: CPT

## 2021-08-12 PROCEDURE — 85025 COMPLETE CBC W/AUTO DIFF WBC: CPT

## 2021-08-12 PROCEDURE — 80053 COMPREHEN METABOLIC PANEL: CPT

## 2021-08-12 PROCEDURE — 74011250636 HC RX REV CODE- 250/636: Performed by: PHYSICIAN ASSISTANT

## 2021-08-12 PROCEDURE — 36415 COLL VENOUS BLD VENIPUNCTURE: CPT

## 2021-08-12 PROCEDURE — 96374 THER/PROPH/DIAG INJ IV PUSH: CPT

## 2021-08-12 PROCEDURE — 96375 TX/PRO/DX INJ NEW DRUG ADDON: CPT

## 2021-08-12 PROCEDURE — 87324 CLOSTRIDIUM AG IA: CPT

## 2021-08-12 PROCEDURE — 99283 EMERGENCY DEPT VISIT LOW MDM: CPT

## 2021-08-12 PROCEDURE — 87177 OVA AND PARASITES SMEARS: CPT

## 2021-08-12 RX ORDER — DIPHENHYDRAMINE HYDROCHLORIDE 50 MG/ML
50 INJECTION, SOLUTION INTRAMUSCULAR; INTRAVENOUS
Status: COMPLETED | OUTPATIENT
Start: 2021-08-12 | End: 2021-08-12

## 2021-08-12 RX ORDER — HYDROMORPHONE HYDROCHLORIDE 2 MG/1
2 TABLET ORAL
Qty: 9 TABLET | Refills: 0 | Status: SHIPPED | OUTPATIENT
Start: 2021-08-12 | End: 2021-08-15

## 2021-08-12 RX ORDER — ONDANSETRON 2 MG/ML
4 INJECTION INTRAMUSCULAR; INTRAVENOUS
Status: COMPLETED | OUTPATIENT
Start: 2021-08-12 | End: 2021-08-12

## 2021-08-12 RX ORDER — MORPHINE SULFATE 2 MG/ML
4 INJECTION, SOLUTION INTRAMUSCULAR; INTRAVENOUS
Status: COMPLETED | OUTPATIENT
Start: 2021-08-12 | End: 2021-08-12

## 2021-08-12 RX ORDER — ONDANSETRON 4 MG/1
4 TABLET, ORALLY DISINTEGRATING ORAL
Qty: 10 TABLET | Refills: 0 | Status: SHIPPED | OUTPATIENT
Start: 2021-08-12 | End: 2021-09-27

## 2021-08-12 RX ADMIN — ONDANSETRON 4 MG: 2 INJECTION INTRAMUSCULAR; INTRAVENOUS at 08:10

## 2021-08-12 RX ADMIN — DIPHENHYDRAMINE HYDROCHLORIDE 50 MG: 50 INJECTION, SOLUTION INTRAMUSCULAR; INTRAVENOUS at 08:10

## 2021-08-12 RX ADMIN — MORPHINE SULFATE 4 MG: 2 INJECTION, SOLUTION INTRAMUSCULAR; INTRAVENOUS at 08:10

## 2021-08-12 NOTE — LETTER
[address]  [facility] EMERGENCY DEPT  [address] [place] [address]  Catherine Roberts [id number] [phone]    Work/School Note    Date: 8/12/2021    To Whom It May concern:    Oksana Dahl was seen and treated today in the emergency room by the following provider(s):  Attending Provider: Maris Lancaster MD  Physician Assistant: BEBA Rivera. Oksana Dahl may return to work on [facility].     Sincerely,          BEBA Barney

## 2021-08-12 NOTE — ED PROVIDER NOTES
EMERGENCY DEPARTMENT HISTORY AND PHYSICAL EXAM      Date: 8/12/2021  Patient Name: Olga Telles    History of Presenting Illness     Chief Complaint   Patient presents with    Abdominal Pain     generalized Abd pain x yesterday - pt reports that she has been on 5 different Abx for a wound infection to her leg since 6/22 - concerned for C diff, as she states she has had it in the past; pt also reports that she has had a colon resection previously and is concerned for a fistula, as she states \"I think I have feces coming out of my vagina\"    Diarrhea       History Provided By: Patient    HPI: Olga Telles, 46 y.o. female with a history of ulcerative colitis, C. difficile, pseudomembranous colitis and surgical history of sigmoid colectomy, cholecystectomy, hysterectomy presents ambulatory to the ED with cc of 1 day (or perhaps quite longer) of 9 out of 10 constant, achy and cramping abdominal pain associated with diarrhea. Patient explains that on June 22 she had an injury to her right leg that required antibiotics. She tells me because of the cellulitis on the leg she has been on at least 4 different types of antibiotics since June. It was as she was treating the leg infection with the antibiotics that she started to develop \"stomach issues\" and has been battling frequent nonbloody diarrhea for weeks. She tells me there is nausea and she has vomited a couple of times, but her primary symptom is diarrhea. She tells me that the skin around her buttocks, rectum and perineum feels \"raw\". She tells me she did have a hemorrhoidectomy back in May and has been using Nitropaste on the area without any good or lasting improvement. She tells me she also has a history of a colovaginal fistula and feels like stool is \"coming out my vagina\". She tells me she does have an appointment with her gastroenterologist, Dr. Angie Kumari, on August 20, \"but between the pain and diarrhea\" she felt like she could not wait. She has had no fever. She denies chest pain or shortness of breath. There are no other complaints, changes, or physical findings at this time. PCP: Jason Mejia MD    Current Outpatient Medications   Medication Sig Dispense Refill    ondansetron (Zofran ODT) 4 mg disintegrating tablet Take 1 Tablet by mouth every eight (8) hours as needed for Nausea. 10 Tablet 0    HYDROmorphone (Dilaudid) 2 mg tablet Take 1 Tablet by mouth every six (6) hours as needed for Pain for up to 3 days. Max Daily Amount: 8 mg. Indications: excessive pain 9 Tablet 0    clindamycin (CLEOCIN) 300 mg capsule Take 1 Capsule by mouth four (4) times daily. 40 Capsule 0    hydrOXYzine HCL (ATARAX) 25 mg tablet Take 1 Tablet by mouth every six (6) hours as needed for Itching (rash) for up to 10 days. 20 Tablet 0    ondansetron (Zofran ODT) 4 mg disintegrating tablet Take 1 Tablet by mouth every eight (8) hours as needed for Nausea. 10 Tablet 0    dicyclomine (BENTYL) 20 mg tablet Take 1 Tablet by mouth every six (6) hours as needed for Abdominal Cramps. 20 Tablet 0    ALPRAZolam (XANAX) 1 mg tablet TAKE 1/2 - 1 TABLET BY MOUTH TWICE DAILY AS NEEDED FOR ANXIETY *MAX 2 TABS DAILY* 60 Tablet 0    diphenoxylate-atropine (LomotiL) 2.5-0.025 mg per tablet Take 1 Tablet by mouth four (4) times daily as needed for Diarrhea (1 tab after each stool for max 8 per day). Max Daily Amount: 4 Tablets. Take after each stool for a maximum of 8 tablets daily 20 Tablet 0    hyoscyamine SL (LEVSIN/SL) 0.125 mg SL tablet 1 Tablet by SubLINGual route every four (4) hours as needed for Cramping. 10 Tablet 0    trimethoprim-sulfamethoxazole (BACTRIM DS, SEPTRA DS) 160-800 mg per tablet Take 1 Tablet by mouth two (2) times a day. 20 Tablet 0    ondansetron (Zofran ODT) 4 mg disintegrating tablet Take 1 Tablet by mouth every eight (8) hours as needed for Nausea. 12 Tablet 1    empagliflozin (Jardiance) 25 mg tablet Take 1 Tablet by mouth daily.  80 Tablet 1    glimepiride (AMARYL) 4 mg tablet TAKE 1 TABLET BY MOUTH ONCE DAILY BEFORE BREAKFAST 90 Tablet 1    estradioL (ESTRACE) 0.5 mg tablet TAKE 1 TABLET BY MOUTH ONCE DAILY 90 Tablet 1    Ventolin HFA 90 mcg/actuation inhaler TAKE 1 PUFF BY INHALATION EVERY FOUR (4) HOURS AS NEEDED FOR WHEEZING. 18 Inhaler 1    lidocaine (Lidoderm) 5 % Apply patch to the affected area for 12 hours a day and remove for 12 hours a day. 5 Each 0    vancomycin (Vancocin) 125 mg capsule Take 1 Capsule by mouth four (4) times daily. 28 Capsule 0    valACYclovir (VALTREX) 1 gram tablet Take 2 tablets by mouth twice daily for 1 day as needed for flares 8 Tablet 2    sertraline (ZOLOFT) 100 mg tablet Take 2 Tablets by mouth nightly. 60 Tablet 2    gabapentin (NEURONTIN) 300 mg capsule Take 1 Cap by mouth nightly. Max Daily Amount: 300 mg. 30 Cap 1    losartan-hydroCHLOROthiazide (HYZAAR) 100-12.5 mg per tablet Take 1 Tab by mouth daily. 90 Tab 1    omeprazole (PRILOSEC) 20 mg capsule Take 40 mg by mouth Daily (before breakfast).  EPINEPHrine (EPIPEN) 0.3 mg/0.3 mL (1:1,000) injection 0.3 mL by IntraMUSCular route once as needed for up to 1 dose.  0.3 mL 1     Past History     Past Medical History:  Past Medical History:   Diagnosis Date    Anal fissure     Anisocoria     Asthma     LAST EPISODE     Back pain     Cerumen impaction     Chronic kidney disease     hx uti in past    Coagulation defects     ocassional rectal bleeding due to anal fissure    Colovaginal fistula     Diabetes (HCC)     NIDDM    Diverticulitis     Diverticulosis     Enlarged tonsils     Frequent UTI     GERD (gastroesophageal reflux disease)     H/O endoscopy     with dilation    HA (headache)     Hepatic steatosis     History of colon resection     Hx of colonoscopy with polypectomy     benign    Hypertension     Ill-defined condition     FREQUENT HIVES    Ill-defined condition     HX ELEVATED LIVER ENZYMES    Morbid obesity (HCC)     Nausea & vomiting     during diverticulitis flare    Obesity     Otitis media     Pneumonia     about 15 yrs ago    Psychiatric disorder     ANXIETY    Recurrent tonsillitis     Sinusitis     Transfusion history ~ age 35    postop hysterectomy    Urticaria        Past Surgical History:  Past Surgical History:   Procedure Laterality Date    COLONOSCOPY N/A 3/28/2019    COLONOSCOPY performed by Margarita Trevino MD at [address] [facility] COLONOSCOPY N/A 10/2/2020    COLONOSCOPY performed by Margarita Trevino MD at [facility]    [place].  HX GI  12    LAPAROSCOPIC HAND ASSISTED  POSS OPEN SIGMOID COLECTOMY POSS TEMPORARY DIVERTING LOOP ILEOSTOMY;  (no illeostomy needed)    HX GYN           HX GYN      cervical conization    HX HEENT      SINUS SURGERY LEFT X2    HX HEENT      SINUS SURGERY ON RIGHT X2    HX HEMORRHOIDECTOMY      HX OTHER SURGICAL      Sphincterotomy    HX PELVIC LAPAROSCOPY      HX EMMANUEL AND BSO      HX UROLOGICAL  12     CYSTOSCOPY INSERTION URETERAL CATHETERS - Cystoscopy Insertion of bilateral ureteral stents    FL ABDOMEN SURGERY PROC UNLISTED  2018    hernia repair at [facility]       Family History:  Family History   Problem Relation Age of Onset    Diabetes Mother     Cancer Mother         NON-HODGKINS LYMPHOMA    Anesth Problems Mother         PONV    Diabetes Father     Heart Disease Father         CAD - STENTS, PACEMAKER    Arrhythmia Father        Social History:  Social History     Tobacco Use    Smoking status: Never Smoker    Smokeless tobacco: Never Used   Vaping Use    Vaping Use: Never assessed   Substance Use Topics    Alcohol use: Yes     Comment: Rarely    Drug use: No     Types: Prescription, OTC       Allergies:   Allergies   Allergen Reactions    Aspirin Hives, Shortness of Breath and Itching    Codeine Hives, Itching and Angioedema     Pt had itching in mouth, on face and lips    Contrast Agent [Iodine] Hives, Itching and Angioedema     5/5/21: pt was given benadryl and solu-medrol prior to administration but pt had severe tongue swelling and drooling, lethargy and itching. DO NOT GIVE PT    Flavoring Agent Anaphylaxis     Cherry flavor    Iodinated Contrast Media Anaphylaxis, Diarrhea, Hives, Nausea and Vomiting and Shortness of Breath    Percocet [Oxycodone-Acetaminophen] Hives, Itching and Angioedema     Pt had itching in mouth, on face and lips    Prilosec [Omeprazole Magnesium] Anaphylaxis     CHERRY FLAVORED ODT; PT TAKES REGULAR PRILOSEC AND IS OK    Dilaudid [Hydromorphone] Itching     Tolerates with benadryl    Ketorolac Rash     \"makes my eyes spasm and causes rash on my hands\" and \"makes my skin itch and makes me nervous\"    Morphine (Pf) Rash    Fentanyl Rash and Itching     Has had benadryl before     Review of Systems   Review of Systems   Constitutional: Negative for fatigue and fever. HENT: Negative for ear pain and sore throat. Eyes: Negative for pain, redness and visual disturbance. Respiratory: Negative for cough and shortness of breath. Cardiovascular: Negative for chest pain and palpitations. Gastrointestinal: Positive for abdominal pain, diarrhea and nausea. Negative for vomiting. Genitourinary: Negative for dysuria, frequency and urgency. Musculoskeletal: Negative for back pain, gait problem, neck pain and neck stiffness. Skin: Negative for rash and wound. Neurological: Negative for dizziness, weakness, light-headedness, numbness and headaches. Physical Exam   Physical Exam  Vitals and nursing note reviewed. Constitutional:       General: She is not in acute distress. Appearance: She is well-developed. She is obese. She is not toxic-appearing. Comments: Talkative; pleasant; good historian; no distress   HENT:      Head: Normocephalic and atraumatic. Jaw: No trismus.       Right Ear: External ear normal.      Left Ear: External ear normal.      Nose: Nose normal.      Mouth/Throat:      Pharynx: Uvula midline. Eyes:      General: No scleral icterus. Conjunctiva/sclera: Conjunctivae normal.      Pupils: Pupils are equal, round, and reactive to light. Cardiovascular:      Rate and Rhythm: Normal rate and regular rhythm. Pulmonary:      Effort: Pulmonary effort is normal. No tachypnea, accessory muscle usage or respiratory distress. Breath sounds: No decreased breath sounds or wheezing. Abdominal:      Palpations: Abdomen is soft. Tenderness: There is generalized abdominal tenderness. Comments:   Soft; nondistended  Well-healed midline surgical scar just above the umbilicus  Diffuse, mild discomfort with firm palpation   Musculoskeletal:         General: Normal range of motion. Cervical back: Full passive range of motion without pain and normal range of motion. Skin:     Findings: No rash. Neurological:      Mental Status: She is alert and oriented to person, place, and time. She is not disoriented. GCS: GCS eye subscore is 4. GCS verbal subscore is 5. GCS motor subscore is 6. Cranial Nerves: No cranial nerve deficit. Psychiatric:         Speech: Speech normal.       Diagnostic Study Results     Labs -     Recent Results (from the past 12 hour(s))   CBC WITH AUTOMATED DIFF    Collection Time: 08/12/21  5:59 AM   Result Value Ref Range    WBC 5.3 3.6 - 11.0 K/uL    RBC 4.65 3.80 - 5.20 M/uL    HGB 12.1 11.5 - 16.0 g/dL    HCT 36.8 35.0 - 47.0 %    MCV 79.1 (L) 80.0 - 99.0 FL    MCH 26.0 26.0 - 34.0 PG    MCHC 32.9 30.0 - 36.5 g/dL    RDW 14.7 (H) 11.5 - 14.5 %    PLATELET 789 261 - 138 K/uL    MPV 10.0 8.9 - 12.9 FL    NRBC 0.0 0  WBC    ABSOLUTE NRBC 0.00 0.00 - 0.01 K/uL    NEUTROPHILS 62 32 - 75 %    LYMPHOCYTES 27 12 - 49 %    MONOCYTES 6 5 - 13 %    EOSINOPHILS 4 0 - 7 %    BASOPHILS 1 0 - 1 %    IMMATURE GRANULOCYTES 0 0.0 - 0.5 %    ABS.  NEUTROPHILS 3.3 1.8 - 8.0 K/UL ABS. LYMPHOCYTES 1.4 0.8 - 3.5 K/UL    ABS. MONOCYTES 0.3 0.0 - 1.0 K/UL    ABS. EOSINOPHILS 0.2 0.0 - 0.4 K/UL    ABS. BASOPHILS 0.0 0.0 - 0.1 K/UL    ABS. IMM. GRANS. 0.0 0.00 - 0.04 K/UL    DF AUTOMATED     METABOLIC PANEL, COMPREHENSIVE    Collection Time: 08/12/21  5:59 AM   Result Value Ref Range    Sodium 137 136 - 145 mmol/L    Potassium 3.6 3.5 - 5.1 mmol/L    Chloride 104 97 - 108 mmol/L    CO2 28 21 - 32 mmol/L    Anion gap 5 5 - 15 mmol/L    Glucose 210 (H) 65 - 100 mg/dL    BUN 12 6 - 20 MG/DL    Creatinine 0.71 0.55 - 1.02 MG/DL    BUN/Creatinine ratio 17 12 - 20      GFR est AA >60 >60 ml/min/1.73m2    GFR est non-AA >60 >60 ml/min/1.73m2    Calcium 9.1 8.5 - 10.1 MG/DL    Bilirubin, total 0.4 0.2 - 1.0 MG/DL    ALT (SGPT) 30 12 - 78 U/L    AST (SGOT) 23 15 - 37 U/L    Alk. phosphatase 69 45 - 117 U/L    Protein, total 7.4 6.4 - 8.2 g/dL    Albumin 3.7 3.5 - 5.0 g/dL    Globulin 3.7 2.0 - 4.0 g/dL    A-G Ratio 1.0 (L) 1.1 - 2.2     URINALYSIS W/ REFLEX CULTURE    Collection Time: 08/12/21  8:21 AM    Specimen: Urine   Result Value Ref Range    Color ORANGE      Appearance CLEAR CLEAR      Specific gravity 1.015 1.003 - 1.030      pH (UA) 5.5 5.0 - 8.0      Protein Negative NEG mg/dL    Glucose >1,000 (A) NEG mg/dL    Ketone Negative NEG mg/dL    Bilirubin Negative NEG      Blood Negative NEG      Urobilinogen 1.0 0.2 - 1.0 EU/dL    Nitrites Positive (A) NEG      Leukocyte Esterase Negative NEG      WBC 0-4 0 - 4 /hpf    RBC 0-5 0 - 5 /hpf    Epithelial cells FEW FEW /lpf    Bacteria Negative NEG /hpf    UA:UC IF INDICATED CULTURE NOT INDICATED BY UA RESULT CNI      Budding yeast PRESENT (A) NEG         Radiologic Studies -   No orders to display     CT Results  (Last 48 hours)    None        CXR Results  (Last 48 hours)    None        Medical Decision Making   I am the first provider for this patient.     I reviewed the vital signs, available nursing notes, past medical history, past surgical history, family history and social history. Vital Signs-Reviewed the patient's vital signs. Patient Vitals for the past 12 hrs:   Temp Pulse Resp BP SpO2   08/12/21 0548 98.8 °F (37.1 °C) 78 14 130/81 100 %       Pulse Oximetry Analysis - 100% on RA    Records Reviewed: Nursing Notes, Old Medical Records, Previous Radiology Studies and Previous Laboratory Studies    Provider Notes (Medical Decision Making):   DDx includes hepatitis, pancreatitis, cholecystitis, appendicitis, diverticulitis, obstruction, UTI, pyelonephritis, gastroenteritis, gastritis. Though some of these considerations can be excluded by history and physical exam, others may require additional testing such as laboratory and imaging. ED Course:   Initial assessment performed. The patients presenting problems have been discussed, and they are in agreement with the care plan formulated and outlined with them. I have encouraged them to ask questions as they arise throughout their visit. ED Course as of Aug 12 1222   Thu Aug 12, 2021   0801 I recommend to the patient that we get her into a gown so I can examine her perineum. She asks me if I can give her some IV pain medicine, nausea medicine and an antihistamine before the exam.  She tells me she has tolerated morphine before as long as she is given Benadryl at the same time. [EJ]   [id number] Examination of the skin of the perineum, external vagina and rectum is reassuring without evidence of breakdown. Nurse Malaika Cooper is in attendance. There may be some mild redness and irritation of the skin of the perirectal area without abscess. There is stigmata of previous hemorrhoids without thrombosed hemorrhoid. I do not see any vaginal discharge. [EJ]      ED Course User Index  [EJ] BEBA Hassan       Disposition:  Discharge    PLAN:  1.    Discharge Medication List as of 8/12/2021  9:19 AM      START taking these medications    Details   !! ondansetron (Zofran ODT) 4 mg disintegrating tablet Take 1 Tablet by mouth every eight (8) hours as needed for Nausea., Normal, Disp-10 Tablet, R-0      HYDROmorphone (Dilaudid) 2 mg tablet Take 1 Tablet by mouth every six (6) hours as needed for Pain for up to 3 days. Max Daily Amount: 8 mg. Indications: excessive pain, Normal, Disp-9 Tablet, R-0       !! - Potential duplicate medications found. Please discuss with provider. CONTINUE these medications which have NOT CHANGED    Details   clindamycin (CLEOCIN) 300 mg capsule Take 1 Capsule by mouth four (4) times daily. , Normal, Disp-40 Capsule, R-0      hydrOXYzine HCL (ATARAX) 25 mg tablet Take 1 Tablet by mouth every six (6) hours as needed for Itching (rash) for up to 10 days. , Normal, Disp-20 Tablet, R-0      !! ondansetron (Zofran ODT) 4 mg disintegrating tablet Take 1 Tablet by mouth every eight (8) hours as needed for Nausea., Normal, Disp-10 Tablet, R-0      dicyclomine (BENTYL) 20 mg tablet Take 1 Tablet by mouth every six (6) hours as needed for Abdominal Cramps., Normal, Disp-20 Tablet, R-0      ALPRAZolam (XANAX) 1 mg tablet TAKE 1/2 - 1 TABLET BY MOUTH TWICE DAILY AS NEEDED FOR ANXIETY *MAX 2 TABS DAILY*, Normal, Disp-60 Tablet, R-0Not to exceed 5 additional fills before 12/27/2021 DX Code Needed  . diphenoxylate-atropine (LomotiL) 2.5-0.025 mg per tablet Take 1 Tablet by mouth four (4) times daily as needed for Diarrhea (1 tab after each stool for max 8 per day). Max Daily Amount: 4 Tablets.  Take after each stool for a maximum of 8 tablets daily, Normal, Disp-20 Tablet, R-0      hyoscyamine SL (LEVSIN/SL) 0.125 mg SL tablet 1 Tablet by SubLINGual route every four (4) hours as needed for Cramping., Normal, Disp-10 Tablet, R-0      trimethoprim-sulfamethoxazole (BACTRIM DS, SEPTRA DS) 160-800 mg per tablet Take 1 Tablet by mouth two (2) times a day., Print, Disp-20 Tablet, R-0      !! ondansetron (Zofran ODT) 4 mg disintegrating tablet Take 1 Tablet by mouth every eight (8) hours as needed for Nausea., Normal, Disp-12 Tablet, R-1      empagliflozin (Jardiance) 25 mg tablet Take 1 Tablet by mouth daily. , Normal, Disp-90 Tablet, R-1      glimepiride (AMARYL) 4 mg tablet TAKE 1 TABLET BY MOUTH ONCE DAILY BEFORE BREAKFAST, Normal, Disp-90 Tablet, R-1      estradioL (ESTRACE) 0.5 mg tablet TAKE 1 TABLET BY MOUTH ONCE DAILY, Normal, Disp-90 Tablet, R-1      Ventolin HFA 90 mcg/actuation inhaler TAKE 1 PUFF BY INHALATION EVERY FOUR (4) HOURS AS NEEDED FOR WHEEZING., Normal, Disp-18 Inhaler, R-1      lidocaine (Lidoderm) 5 % Apply patch to the affected area for 12 hours a day and remove for 12 hours a day., Normal, Disp-5 Each, R-0      vancomycin (Vancocin) 125 mg capsule Take 1 Capsule by mouth four (4) times daily. , Normal, Disp-28 Capsule, R-0      valACYclovir (VALTREX) 1 gram tablet Take 2 tablets by mouth twice daily for 1 day as needed for flares, Normal, Disp-8 Tablet, R-2      sertraline (ZOLOFT) 100 mg tablet Take 2 Tablets by mouth nightly., Normal, Disp-60 Tablet, R-2      gabapentin (NEURONTIN) 300 mg capsule Take 1 Cap by mouth nightly. Max Daily Amount: 300 mg., Normal, Disp-30 Cap, R-1Not to exceed 4 additional fills before 07/28/2021      losartan-hydroCHLOROthiazide (HYZAAR) 100-12.5 mg per tablet Take 1 Tab by mouth daily. , Normal, Disp-90 Tab, R-1      omeprazole (PRILOSEC) 20 mg capsule Take 40 mg by mouth Daily (before breakfast). , Historical Med      EPINEPHrine (EPIPEN) 0.3 mg/0.3 mL (1:1,000) injection 0.3 mL by IntraMUSCular route once as needed for up to 1 dose., Print, Disp-0.3 mL, R-1       !! - Potential duplicate medications found. Please discuss with provider.         2.   Follow-up Information     Follow up With Specialties Details Why Caprice Rivera MD Gastroenterology Call  GASTROENTEROLOGY: call today to schedule follow up [facility]      Dennise Reynolds MD Colon and Rectal Surgery Call COLORECTAL SURGERY: as needed [address] [address] [phone]          Return to ED if worse     Diagnosis     Clinical Impression:   1. Non-intractable vomiting with nausea, unspecified vomiting type    2. Frequent diarrhea    3. Diffuse abdominal pain    4.  Irritation of skin of perianal region

## 2021-08-20 LAB
O+P SPEC MICRO: NORMAL
O+P STL CONC: NORMAL
SPECIMEN SOURCE: NORMAL

## 2021-08-20 NOTE — ED NOTES
Called patient and discussed all results. He would like to try lifestyle modifications and discuss medications at next visit with    in more detail. Will route note to Dr. CRISTA FIGUEROA   Report received from off going nurse; assumed care. All tasks and orders completed to this point (per report). Can be discharged after binder placed.

## 2021-08-31 DIAGNOSIS — F41.9 ANXIETY: ICD-10-CM

## 2021-09-01 RX ORDER — ALPRAZOLAM 1 MG/1
TABLET ORAL
Qty: 60 TABLET | Refills: 0 | Status: SHIPPED | OUTPATIENT
Start: 2021-09-01 | End: 2021-10-16

## 2021-09-01 RX ORDER — NALOXONE HYDROCHLORIDE 4 MG/.1ML
SPRAY NASAL
Qty: 1 EACH | Refills: 0 | Status: SHIPPED | OUTPATIENT
Start: 2021-09-01 | End: 2021-12-14 | Stop reason: SDUPTHER

## 2021-09-13 ENCOUNTER — APPOINTMENT (OUTPATIENT)
Dept: CT IMAGING | Age: 51
End: 2021-09-13
Attending: EMERGENCY MEDICINE
Payer: MEDICAID

## 2021-09-13 ENCOUNTER — HOSPITAL ENCOUNTER (EMERGENCY)
Age: 51
Discharge: HOME | End: 2021-09-13
Attending: EMERGENCY MEDICINE
Payer: MEDICAID

## 2021-09-13 VITALS
WEIGHT: 211.64 LBS | HEIGHT: 62 IN | BODY MASS INDEX: 38.95 KG/M2 | OXYGEN SATURATION: 98 % | RESPIRATION RATE: 22 BRPM | TEMPERATURE: 98.3 F | DIASTOLIC BLOOD PRESSURE: 80 MMHG | HEART RATE: 71 BPM | SYSTOLIC BLOOD PRESSURE: 126 MMHG

## 2021-09-13 DIAGNOSIS — R10.11 ABDOMINAL PAIN, RIGHT UPPER QUADRANT: Primary | ICD-10-CM

## 2021-09-13 DIAGNOSIS — K62.89 RECTAL PAIN: ICD-10-CM

## 2021-09-13 LAB
ALBUMIN SERPL-MCNC: 3.3 G/DL (ref 3.5–5)
ALBUMIN/GLOB SERPL: 0.9 {RATIO} (ref 1.1–2.2)
ALP SERPL-CCNC: 66 U/L (ref 45–117)
ALT SERPL-CCNC: 23 U/L (ref 12–78)
ANION GAP SERPL CALC-SCNC: 6 MMOL/L (ref 5–15)
APPEARANCE UR: ABNORMAL
AST SERPL-CCNC: 16 U/L (ref 15–37)
BACTERIA URNS QL MICRO: ABNORMAL /HPF
BASOPHILS # BLD: 0 K/UL (ref 0–0.1)
BASOPHILS NFR BLD: 1 % (ref 0–1)
BILIRUB SERPL-MCNC: 0.4 MG/DL (ref 0.2–1)
BILIRUB UR QL: NEGATIVE
BUN SERPL-MCNC: 5 MG/DL (ref 6–20)
BUN/CREAT SERPL: 8 (ref 12–20)
CALCIUM SERPL-MCNC: 9 MG/DL (ref 8.5–10.1)
CHLORIDE SERPL-SCNC: 105 MMOL/L (ref 97–108)
CO2 SERPL-SCNC: 29 MMOL/L (ref 21–32)
COLOR UR: ABNORMAL
CREAT SERPL-MCNC: 0.65 MG/DL (ref 0.55–1.02)
DIFFERENTIAL METHOD BLD: ABNORMAL
EOSINOPHIL # BLD: 0.3 K/UL (ref 0–0.4)
EOSINOPHIL NFR BLD: 5 % (ref 0–7)
EPITH CASTS URNS QL MICRO: ABNORMAL /LPF
ERYTHROCYTE [DISTWIDTH] IN BLOOD BY AUTOMATED COUNT: 14.6 % (ref 11.5–14.5)
GLOBULIN SER CALC-MCNC: 3.7 G/DL (ref 2–4)
GLUCOSE SERPL-MCNC: 122 MG/DL (ref 65–100)
GLUCOSE UR STRIP.AUTO-MCNC: >1000 MG/DL
HCT VFR BLD AUTO: 34 % (ref 35–47)
HGB BLD-MCNC: 10.7 G/DL (ref 11.5–16)
HGB UR QL STRIP: NEGATIVE
IMM GRANULOCYTES # BLD AUTO: 0 K/UL (ref 0–0.04)
IMM GRANULOCYTES NFR BLD AUTO: 0 % (ref 0–0.5)
KETONES UR QL STRIP.AUTO: NEGATIVE MG/DL
LACTATE BLD-SCNC: 0.47 MMOL/L (ref 0.4–2)
LEUKOCYTE ESTERASE UR QL STRIP.AUTO: ABNORMAL
LIPASE SERPL-CCNC: 98 U/L (ref 73–393)
LYMPHOCYTES # BLD: 1.7 K/UL (ref 0.8–3.5)
LYMPHOCYTES NFR BLD: 28 % (ref 12–49)
MCH RBC QN AUTO: 25.1 PG (ref 26–34)
MCHC RBC AUTO-ENTMCNC: 31.5 G/DL (ref 30–36.5)
MCV RBC AUTO: 79.6 FL (ref 80–99)
MONOCYTES # BLD: 0.4 K/UL (ref 0–1)
MONOCYTES NFR BLD: 6 % (ref 5–13)
NEUTS SEG # BLD: 3.7 K/UL (ref 1.8–8)
NEUTS SEG NFR BLD: 60 % (ref 32–75)
NITRITE UR QL STRIP.AUTO: NEGATIVE
NRBC # BLD: 0 K/UL (ref 0–0.01)
NRBC BLD-RTO: 0 PER 100 WBC
PH UR STRIP: 7 [PH] (ref 5–8)
PLATELET # BLD AUTO: 183 K/UL (ref 150–400)
PMV BLD AUTO: 9.4 FL (ref 8.9–12.9)
POTASSIUM SERPL-SCNC: 3.4 MMOL/L (ref 3.5–5.1)
PROT SERPL-MCNC: 7 G/DL (ref 6.4–8.2)
PROT UR STRIP-MCNC: NEGATIVE MG/DL
RBC # BLD AUTO: 4.27 M/UL (ref 3.8–5.2)
RBC #/AREA URNS HPF: ABNORMAL /HPF (ref 0–5)
SODIUM SERPL-SCNC: 140 MMOL/L (ref 136–145)
SP GR UR REFRACTOMETRY: 1.02 (ref 1–1.03)
UA: UC IF INDICATED,UAUC: ABNORMAL
UROBILINOGEN UR QL STRIP.AUTO: 1 EU/DL (ref 0.2–1)
WBC # BLD AUTO: 6.1 K/UL (ref 3.6–11)
WBC URNS QL MICRO: ABNORMAL /HPF (ref 0–4)
YEAST URNS QL MICRO: PRESENT

## 2021-09-13 PROCEDURE — 81001 URINALYSIS AUTO W/SCOPE: CPT

## 2021-09-13 PROCEDURE — 83690 ASSAY OF LIPASE: CPT

## 2021-09-13 PROCEDURE — 36415 COLL VENOUS BLD VENIPUNCTURE: CPT

## 2021-09-13 PROCEDURE — 74011250636 HC RX REV CODE- 250/636: Performed by: EMERGENCY MEDICINE

## 2021-09-13 PROCEDURE — 74011250637 HC RX REV CODE- 250/637: Performed by: EMERGENCY MEDICINE

## 2021-09-13 PROCEDURE — 99284 EMERGENCY DEPT VISIT MOD MDM: CPT

## 2021-09-13 PROCEDURE — 96375 TX/PRO/DX INJ NEW DRUG ADDON: CPT

## 2021-09-13 PROCEDURE — 74176 CT ABD & PELVIS W/O CONTRAST: CPT

## 2021-09-13 PROCEDURE — 87086 URINE CULTURE/COLONY COUNT: CPT

## 2021-09-13 PROCEDURE — 96374 THER/PROPH/DIAG INJ IV PUSH: CPT

## 2021-09-13 PROCEDURE — 80053 COMPREHEN METABOLIC PANEL: CPT

## 2021-09-13 PROCEDURE — 85025 COMPLETE CBC W/AUTO DIFF WBC: CPT

## 2021-09-13 PROCEDURE — 83605 ASSAY OF LACTIC ACID: CPT

## 2021-09-13 RX ORDER — LEVOFLOXACIN 500 MG/1
500 TABLET, FILM COATED ORAL DAILY
Qty: 7 TABLET | Refills: 0 | Status: SHIPPED | OUTPATIENT
Start: 2021-09-13 | End: 2021-09-27

## 2021-09-13 RX ORDER — METRONIDAZOLE 500 MG/1
500 TABLET ORAL 3 TIMES DAILY
Qty: 21 TABLET | Refills: 0 | Status: SHIPPED | OUTPATIENT
Start: 2021-09-13 | End: 2021-09-20

## 2021-09-13 RX ORDER — HYDROCODONE BITARTRATE AND ACETAMINOPHEN 5; 325 MG/1; MG/1
1 TABLET ORAL
Status: COMPLETED | OUTPATIENT
Start: 2021-09-13 | End: 2021-09-13

## 2021-09-13 RX ORDER — DIPHENHYDRAMINE HYDROCHLORIDE 50 MG/ML
25 INJECTION, SOLUTION INTRAMUSCULAR; INTRAVENOUS
Status: COMPLETED | OUTPATIENT
Start: 2021-09-13 | End: 2021-09-13

## 2021-09-13 RX ORDER — MORPHINE SULFATE 2 MG/ML
2 INJECTION, SOLUTION INTRAMUSCULAR; INTRAVENOUS
Status: COMPLETED | OUTPATIENT
Start: 2021-09-13 | End: 2021-09-13

## 2021-09-13 RX ORDER — HYDROCODONE BITARTRATE AND ACETAMINOPHEN 5; 325 MG/1; MG/1
1 TABLET ORAL
Qty: 10 TABLET | Refills: 0 | Status: SHIPPED | OUTPATIENT
Start: 2021-09-13 | End: 2021-09-16

## 2021-09-13 RX ADMIN — HYDROCODONE BITARTRATE AND ACETAMINOPHEN 1 TABLET: 5; 325 TABLET ORAL at 07:25

## 2021-09-13 RX ADMIN — MORPHINE SULFATE 2 MG: 2 INJECTION, SOLUTION INTRAMUSCULAR; INTRAVENOUS at 05:13

## 2021-09-13 RX ADMIN — DIPHENHYDRAMINE HYDROCHLORIDE 25 MG: 50 INJECTION, SOLUTION INTRAMUSCULAR; INTRAVENOUS at 05:14

## 2021-09-13 NOTE — ED PROVIDER NOTES
EMERGENCY DEPARTMENT HISTORY AND PHYSICAL EXAM      Date: 9/13/2021  Patient Name: Serge Larson    History of Presenting Illness     Chief Complaint   Patient presents with    Chills     s/p surgery two days ago reports chills, pain at upper right abdomen site, diarrhea with blood, and hives.  Post-Op Problem    Diarrhea       History Provided By: Patient    HPI: Serge Larson, 46 y.o. female with PMHx significant for hypertension, diabetes, ulcerative colitis, morbid obesity status post recent appendectomy approximately 5 days ago [facility] presents with upper abdominal pain and rectal pain. Patient reports she also has been having rash that she thinks is related to the Dilaudid she was sent home with for pain control. She reports nausea but denies any vomiting. Reports a fever to 101.2 today and took Tylenol prior to arrival.  Patient reports that she noticed a \"knot\" on the left side of her left rectal area today. She states that this is painful. No drainage noted. She has a history of colovaginal fistula. She has been followed by Dr. Dandre Cam in the past.  PCP: Baron Chidi MD    No current facility-administered medications on file prior to encounter. Current Outpatient Medications on File Prior to Encounter   Medication Sig Dispense Refill    ALPRAZolam (XANAX) 1 mg tablet TAKE 1/2 - 1 TABLET BY MOUTH TWICE DAILY AS NEEDED FOR ANXIETY *MAX 2 TABS DAILY* 60 Tablet 0    naloxone (Narcan) 4 mg/actuation nasal spray Use 1 spray intranasally, then discard. Repeat with new spray every 2 min as needed for opioid overdose symptoms, alternating nostrils. 1 Each 0    ondansetron (Zofran ODT) 4 mg disintegrating tablet Take 1 Tablet by mouth every eight (8) hours as needed for Nausea. 10 Tablet 0    clindamycin (CLEOCIN) 300 mg capsule Take 1 Capsule by mouth four (4) times daily.  40 Capsule 0    ondansetron (Zofran ODT) 4 mg disintegrating tablet Take 1 Tablet by mouth every eight (8) hours as needed for Nausea. 10 Tablet 0    dicyclomine (BENTYL) 20 mg tablet Take 1 Tablet by mouth every six (6) hours as needed for Abdominal Cramps. 20 Tablet 0    diphenoxylate-atropine (LomotiL) 2.5-0.025 mg per tablet Take 1 Tablet by mouth four (4) times daily as needed for Diarrhea (1 tab after each stool for max 8 per day). Max Daily Amount: 4 Tablets. Take after each stool for a maximum of 8 tablets daily 20 Tablet 0    hyoscyamine SL (LEVSIN/SL) 0.125 mg SL tablet 1 Tablet by SubLINGual route every four (4) hours as needed for Cramping. 10 Tablet 0    trimethoprim-sulfamethoxazole (BACTRIM DS, SEPTRA DS) 160-800 mg per tablet Take 1 Tablet by mouth two (2) times a day. 20 Tablet 0    ondansetron (Zofran ODT) 4 mg disintegrating tablet Take 1 Tablet by mouth every eight (8) hours as needed for Nausea. 12 Tablet 1    empagliflozin (Jardiance) 25 mg tablet Take 1 Tablet by mouth daily. 90 Tablet 1    glimepiride (AMARYL) 4 mg tablet TAKE 1 TABLET BY MOUTH ONCE DAILY BEFORE BREAKFAST 90 Tablet 1    estradioL (ESTRACE) 0.5 mg tablet TAKE 1 TABLET BY MOUTH ONCE DAILY 90 Tablet 1    Ventolin HFA 90 mcg/actuation inhaler TAKE 1 PUFF BY INHALATION EVERY FOUR (4) HOURS AS NEEDED FOR WHEEZING. 18 Inhaler 1    lidocaine (Lidoderm) 5 % Apply patch to the affected area for 12 hours a day and remove for 12 hours a day. 5 Each 0    vancomycin (Vancocin) 125 mg capsule Take 1 Capsule by mouth four (4) times daily. 28 Capsule 0    valACYclovir (VALTREX) 1 gram tablet Take 2 tablets by mouth twice daily for 1 day as needed for flares 8 Tablet 2    sertraline (ZOLOFT) 100 mg tablet Take 2 Tablets by mouth nightly. 60 Tablet 2    gabapentin (NEURONTIN) 300 mg capsule Take 1 Cap by mouth nightly. Max Daily Amount: 300 mg. 30 Cap 1    losartan-hydroCHLOROthiazide (HYZAAR) 100-12.5 mg per tablet Take 1 Tab by mouth daily.  90 Tab 1    omeprazole (PRILOSEC) 20 mg capsule Take 40 mg by mouth Daily (before breakfast).  EPINEPHrine (EPIPEN) 0.3 mg/0.3 mL (1:1,000) injection 0.3 mL by IntraMUSCular route once as needed for up to 1 dose. 0.3 mL 1       Past History     Past Medical History:  Past Medical History:   Diagnosis Date    Anal fissure     Anisocoria     Asthma     LAST EPISODE     Back pain     Cerumen impaction     Chronic kidney disease     hx uti in past    Coagulation defects     ocassional rectal bleeding due to anal fissure    Colovaginal fistula     Diabetes (HCC)     NIDDM    Diverticulitis     Diverticulosis     Enlarged tonsils     Frequent UTI     GERD (gastroesophageal reflux disease)     H/O endoscopy     with dilation    HA (headache)     Hepatic steatosis     History of colon resection     Hx of colonoscopy with polypectomy     benign    Hypertension     Ill-defined condition     FREQUENT HIVES    Ill-defined condition     HX ELEVATED LIVER ENZYMES    Morbid obesity (HCC)     Nausea & vomiting     during diverticulitis flare    Obesity     Otitis media     Pneumonia     about 15 yrs ago    Psychiatric disorder     ANXIETY    Recurrent tonsillitis     Sinusitis     Transfusion history ~ age 35    postop hysterectomy    Urticaria        Past Surgical History:  Past Surgical History:   Procedure Laterality Date    COLONOSCOPY N/A 3/28/2019    COLONOSCOPY performed by Dedra Duran MD at [place] [address] COLONOSCOPY N/A 10/2/2020    COLONOSCOPY performed by Dedra Duran MD at [address] [place] [address],[address]    blake.     HX GI  12    LAPAROSCOPIC HAND ASSISTED  POSS OPEN SIGMOID COLECTOMY POSS TEMPORARY DIVERTING LOOP ILEOSTOMY;  (no illeostomy needed)    HX GYN           HX GYN      cervical conization    HX HEENT      SINUS SURGERY LEFT X2    HX HEENT      SINUS SURGERY ON RIGHT X2    HX HEMORRHOIDECTOMY      HX OTHER SURGICAL      Sphincterotomy    HX PELVIC LAPAROSCOPY      HX EMMANUEL AND BSO      HX UROLOGICAL  7/31/12     CYSTOSCOPY INSERTION URETERAL CATHETERS - Cystoscopy Insertion of bilateral ureteral stents    AK ABDOMEN SURGERY PROC UNLISTED  01/06/2018    hernia repair at [facility]       Family History:  Family History   Problem Relation Age of Onset    Diabetes Mother     Cancer Mother         NON-HODGKINS LYMPHOMA    Anesth Problems Mother         PONV    Diabetes Father     Heart Disease Father         CAD - STENTS, PACEMAKER    Arrhythmia Father        Social History:  Social History     Tobacco Use    Smoking status: Never Smoker    Smokeless tobacco: Never Used   Vaping Use    Vaping Use: Never assessed   Substance Use Topics    Alcohol use: Yes     Comment: Rarely    Drug use: No     Types: Prescription, OTC       Allergies: Allergies   Allergen Reactions    Aspirin Hives, Shortness of Breath and Itching    Codeine Hives, Itching and Angioedema     Pt had itching in mouth, on face and lips    Contrast Agent [Iodine] Hives, Itching and Angioedema     5/5/21: pt was given benadryl and solu-medrol prior to administration but pt had severe tongue swelling and drooling, lethargy and itching. DO NOT GIVE PT    Flavoring Agent Anaphylaxis     Cherry flavor    Iodinated Contrast Media Anaphylaxis, Diarrhea, Hives, Nausea and Vomiting and Shortness of Breath    Percocet [Oxycodone-Acetaminophen] Hives, Itching and Angioedema     Pt had itching in mouth, on face and lips    Prilosec [Omeprazole Magnesium] Anaphylaxis     CHERRY FLAVORED ODT; PT TAKES REGULAR PRILOSEC AND IS OK    Dilaudid [Hydromorphone] Itching     Tolerates with benadryl    Ketorolac Rash     \"makes my eyes spasm and causes rash on my hands\" and \"makes my skin itch and makes me nervous\"    Morphine (Pf) Rash     According to rn, pt can take morphine with benadryl    Fentanyl Rash and Itching     Has had benadryl before         Review of Systems   Review of Systems   Constitutional: Positive for fever.  Negative for chills and fatigue. HENT: Negative for congestion, ear pain, rhinorrhea, sore throat and trouble swallowing. Eyes: Negative for visual disturbance. Respiratory: Negative for cough, chest tightness, shortness of breath and stridor. Cardiovascular: Negative for chest pain and palpitations. Gastrointestinal: Positive for abdominal pain and nausea. Negative for vomiting. Genitourinary: Negative for dysuria, flank pain and hematuria. Musculoskeletal: Negative for back pain, myalgias and neck pain. Skin: Negative for rash and wound. Neurological: Negative for dizziness, syncope, light-headedness and headaches. Psychiatric/Behavioral: Negative for confusion. The patient is nervous/anxious. All other systems reviewed and are negative.         Physical Exam    General appearance -overweight, well appearing, and in no distress  Eyes - pupils equal and reactive, extraocular eye movements intact  ENT - mucous membranes moist, pharynx normal without lesions  Neck - supple, no significant adenopathy; non-tender to palpation  Chest - clear to auscultation, no wheezes, rales or rhonchi; non-tender to palpation  Heart - normal rate and regular rhythm, S1 and S2 normal, no murmurs noted  Abdomen - soft, tender across upper abdomen, right upper quadrant worse than left upper quadrant,, nondistended, no masses or organomegaly  Rectal exam-1 cm tender nodule on left at approximately 3:00, does not feel fluctuant  Musculoskeletal - no joint tenderness, deformity or swelling; normal ROM  Extremities - peripheral pulses normal, no pedal edema  Skin - normal coloration and turgor, no rashes, surgical incisions are clean dry and intact,  Neurological - alert, oriented x3, normal speech, no focal findings or movement disorder noted    Diagnostic Study Results     Labs -     Recent Results (from the past 12 hour(s))   CBC WITH AUTOMATED DIFF    Collection Time: 09/13/21  1:43 AM   Result Value Ref Range    WBC 6.1 3.6 - 11.0 K/uL    RBC 4.27 3.80 - 5.20 M/uL    HGB 10.7 (L) 11.5 - 16.0 g/dL    HCT 34.0 (L) 35.0 - 47.0 %    MCV 79.6 (L) 80.0 - 99.0 FL    MCH 25.1 (L) 26.0 - 34.0 PG    MCHC 31.5 30.0 - 36.5 g/dL    RDW 14.6 (H) 11.5 - 14.5 %    PLATELET 927 819 - 179 K/uL    MPV 9.4 8.9 - 12.9 FL    NRBC 0.0 0  WBC    ABSOLUTE NRBC 0.00 0.00 - 0.01 K/uL    NEUTROPHILS 60 32 - 75 %    LYMPHOCYTES 28 12 - 49 %    MONOCYTES 6 5 - 13 %    EOSINOPHILS 5 0 - 7 %    BASOPHILS 1 0 - 1 %    IMMATURE GRANULOCYTES 0 0.0 - 0.5 %    ABS. NEUTROPHILS 3.7 1.8 - 8.0 K/UL    ABS. LYMPHOCYTES 1.7 0.8 - 3.5 K/UL    ABS. MONOCYTES 0.4 0.0 - 1.0 K/UL    ABS. EOSINOPHILS 0.3 0.0 - 0.4 K/UL    ABS. BASOPHILS 0.0 0.0 - 0.1 K/UL    ABS. IMM. GRANS. 0.0 0.00 - 0.04 K/UL    DF AUTOMATED     METABOLIC PANEL, COMPREHENSIVE    Collection Time: 09/13/21  1:43 AM   Result Value Ref Range    Sodium 140 136 - 145 mmol/L    Potassium 3.4 (L) 3.5 - 5.1 mmol/L    Chloride 105 97 - 108 mmol/L    CO2 29 21 - 32 mmol/L    Anion gap 6 5 - 15 mmol/L    Glucose 122 (H) 65 - 100 mg/dL    BUN 5 (L) 6 - 20 MG/DL    Creatinine 0.65 0.55 - 1.02 MG/DL    BUN/Creatinine ratio 8 (L) 12 - 20      GFR est AA >60 >60 ml/min/1.73m2    GFR est non-AA >60 >60 ml/min/1.73m2    Calcium 9.0 8.5 - 10.1 MG/DL    Bilirubin, total 0.4 0.2 - 1.0 MG/DL    ALT (SGPT) 23 12 - 78 U/L    AST (SGOT) 16 15 - 37 U/L    Alk.  phosphatase 66 45 - 117 U/L    Protein, total 7.0 6.4 - 8.2 g/dL    Albumin 3.3 (L) 3.5 - 5.0 g/dL    Globulin 3.7 2.0 - 4.0 g/dL    A-G Ratio 0.9 (L) 1.1 - 2.2     LIPASE    Collection Time: 09/13/21  1:43 AM   Result Value Ref Range    Lipase 98 73 - 393 U/L   URINALYSIS W/ REFLEX CULTURE    Collection Time: 09/13/21  2:39 AM    Specimen: Urine   Result Value Ref Range    Color DARK YELLOW      Appearance CLOUDY (A) CLEAR      Specific gravity 1.016 1.003 - 1.030      pH (UA) 7.0 5.0 - 8.0      Protein Negative NEG mg/dL    Glucose >1,000 (A) NEG mg/dL    Ketone Negative NEG mg/dL    Bilirubin Negative NEG      Blood Negative NEG      Urobilinogen 1.0 0.2 - 1.0 EU/dL    Nitrites Negative NEG      Leukocyte Esterase MODERATE (A) NEG      WBC 10-20 0 - 4 /hpf    RBC 0-5 0 - 5 /hpf    Epithelial cells MODERATE (A) FEW /lpf    Bacteria 2+ (A) NEG /hpf    UA:UC IF INDICATED URINE CULTURE ORDERED (A) CNI      Yeast PRESENT (A) NEG     POC LACTIC ACID    Collection Time: 09/13/21  5:16 AM   Result Value Ref Range    Lactic Acid (POC) 0.47 0.40 - 2.00 mmol/L       Radiologic Studies -   CT ABD PELV WO CONT   Final Result   No acute abdominal or pelvic process. CT Results  (Last 48 hours)               09/13/21 0554  CT ABD PELV WO CONT Final result    Impression:  No acute abdominal or pelvic process. Narrative:  EXAM: CT ABD PELV WO CONT       INDICATION: abd pain, pod 5 after appendectomy        COMPARISON: August 5, 2021       CONTRAST:  None. TECHNIQUE:    Thin axial images were obtained through the abdomen and pelvis. Coronal and   sagittal reformats were generated. Oral contrast was not administered. CT dose   reduction was achieved through use of a standardized protocol tailored for this   examination and automatic exposure control for dose modulation. The absence of intravenous contrast material reduces the sensitivity for   evaluation of the vasculature and solid organs. FINDINGS:    LOWER THORAX: No significant abnormality in the incidentally imaged lower chest.   LIVER: No mass. BILIARY TREE: Gallbladder is surgically absent. CBD is not dilated. SPLEEN: Stable in size. PANCREAS: No focal abnormality. ADRENALS: Unremarkable. KIDNEYS/URETERS: No calculus or hydronephrosis. STOMACH: Unremarkable. SMALL BOWEL: No dilatation or wall thickening. COLON: No dilatation or wall thickening. APPENDIX: Surgically absent. No evidence of abscess or other surgical   complication. PERITONEUM: No ascites or pneumoperitoneum.    RETROPERITONEUM: No lymphadenopathy or aortic aneurysm. REPRODUCTIVE ORGANS: Status post hysterectomy. URINARY BLADDER: No mass or calculus. BONES: No destructive bone lesion. Unchanged L4 hemangioma. ABDOMINAL WALL: No mass or hernia. ADDITIONAL COMMENTS: N/A               CXR Results  (Last 48 hours)    None            Medical Decision Making   I am the first provider for this patient. I reviewed the vital signs, available nursing notes, past medical history, past surgical history, family history and social history. Vital Signs-Reviewed the patient's vital signs. Patient Vitals for the past 12 hrs:   Temp Pulse Resp BP SpO2   09/13/21 0415 98.5 °F (36.9 °C) 71 22 137/66 97 %   09/13/21 0136 99 °F (37.2 °C) 81 16 (!) 161/82 100 %           Records Reviewed: Nursing Notes and Old Medical Records    Provider Notes (Medical Decision Making):   Differential diagnosis: Postoperative abscess, UTI, perirectal abscess, wound infection  We will check CBC, CMP, UA, abdominal pelvis CT without contrast    ED Course:   Initial assessment performed. The patients presenting problems have been discussed, and they are in agreement with the care plan formulated and outlined with them. I have encouraged them to ask questions as they arise throughout their visit. Progress Notes:   CT does not show any acute findings. On rectal exam there is a tender nodule at 3:00. Concern for possible early perirectal abscess. No definite fluctuance. Will treat with antibiotics and encourage follow-up with colorectal surgery. Disposition:  Discharge home    PLAN:  1. Current Discharge Medication List      START taking these medications    Details   levoFLOXacin (Levaquin) 500 mg tablet Take 1 Tablet by mouth daily. Qty: 7 Tablet, Refills: 0  Start date: 9/13/2021      metroNIDAZOLE (FlagyL) 500 mg tablet Take 1 Tablet by mouth three (3) times daily for 7 days.   Qty: 21 Tablet, Refills: 0  Start date: 9/13/2021, End date: 9/20/2021 HYDROcodone-acetaminophen (Norco) 5-325 mg per tablet Take 1 Tablet by mouth every six (6) hours as needed for Pain for up to 3 days. Max Daily Amount: 4 Tablets. Qty: 10 Tablet, Refills: 0  Start date: 9/13/2021, End date: 9/16/2021    Associated Diagnoses: Rectal pain           2. Follow-up Information     Follow up With Specialties Details Why Contact Info    Elvis Lennox, MD Internal Medicine Call   [address] [facility]  [address] [address] [phone]      [facility] EMERGENCY DEPT Emergency Medicine  If symptoms worsen [address] [place] [address]  [phone]    Ally Billings MD Colon and Rectal Surgery Schedule an appointment as soon as possible for a visit   [facility]  [address] [address] [phone]      your surgeon who removed your gallbladder  Call today          Return to ED if worse     Diagnosis     Clinical Impression:   1. Abdominal pain, right upper quadrant    2.  Rectal pain

## 2021-09-13 NOTE — Clinical Note
[address]  [facility] EMERGENCY DEPT  [address] [place] [address]  Chelsy Buenrostro [address]  [phone]    Work/School Note    Date: 9/13/2021    To Whom It May concern:    Jihan Chanel was seen and treated today in the emergency room by the following provider(s):  Attending Provider: Fabiana Yao MD.      Jihan Chanel is excused from work/school on 9/13/2021 through 9/16/2021. She is medically clear to return to work/school on 9/17/2021.         Sincerely,          Wilder Mcgovern MD

## 2021-09-14 LAB
BACTERIA SPEC CULT: NORMAL
CC UR VC: NORMAL
SERVICE CMNT-IMP: NORMAL

## 2021-09-21 ENCOUNTER — TELEPHONE (OUTPATIENT)
Dept: INTERNAL MEDICINE CLINIC | Age: 51
End: 2021-09-21

## 2021-09-22 RX ORDER — ATORVASTATIN CALCIUM 20 MG/1
20 TABLET, FILM COATED ORAL DAILY
Qty: 90 TABLET | Refills: 1 | Status: SHIPPED | OUTPATIENT
Start: 2021-09-22 | End: 2022-01-24

## 2021-09-22 NOTE — TELEPHONE ENCOUNTER
Patient and I had discussed cholesterol medicine by my chart. I have sent in atorvastatin 20 mg once daily.   Please have patient schedule a 3-month follow-up and we will do fasting labs at that time

## 2021-09-22 NOTE — TELEPHONE ENCOUNTER
This nurse called patient     Notified her md has  sent in atorvastatin 20 mg once daily.   Patient scheduled f/u appt for dec , and will  Get labs at that time    She wanted md to be aware in chart that she had planned to get labs done prior she ordered but had so many health issues and surgeries come up she was unable to         Em lpn

## 2021-09-25 ENCOUNTER — HOSPITAL ENCOUNTER (INPATIENT)
Age: 51
LOS: 1 days | Discharge: HOME | DRG: 254 | End: 2021-09-27
Attending: EMERGENCY MEDICINE | Admitting: INTERNAL MEDICINE
Payer: MEDICAID

## 2021-09-25 DIAGNOSIS — K61.1 RECTAL ABSCESS: ICD-10-CM

## 2021-09-25 DIAGNOSIS — K61.0 PERIANAL ABSCESS: Primary | ICD-10-CM

## 2021-09-25 PROCEDURE — 96374 THER/PROPH/DIAG INJ IV PUSH: CPT

## 2021-09-25 PROCEDURE — 99284 EMERGENCY DEPT VISIT MOD MDM: CPT

## 2021-09-25 PROCEDURE — 96375 TX/PRO/DX INJ NEW DRUG ADDON: CPT

## 2021-09-26 ENCOUNTER — APPOINTMENT (OUTPATIENT)
Dept: CT IMAGING | Age: 51
DRG: 254 | End: 2021-09-26
Attending: EMERGENCY MEDICINE
Payer: MEDICAID

## 2021-09-26 PROBLEM — K61.1 RECTAL ABSCESS: Status: ACTIVE | Noted: 2021-09-26

## 2021-09-26 LAB
ALBUMIN SERPL-MCNC: 3.4 G/DL (ref 3.5–5)
ALBUMIN/GLOB SERPL: 0.9 {RATIO} (ref 1.1–2.2)
ALP SERPL-CCNC: 63 U/L (ref 45–117)
ALT SERPL-CCNC: 22 U/L (ref 12–78)
ANION GAP SERPL CALC-SCNC: 7 MMOL/L (ref 5–15)
AST SERPL-CCNC: 21 U/L (ref 15–37)
ATRIAL RATE: 84 BPM
ATRIAL RATE: 86 BPM
BILIRUB SERPL-MCNC: 0.3 MG/DL (ref 0.2–1)
BUN SERPL-MCNC: 12 MG/DL (ref 6–20)
BUN/CREAT SERPL: 20 (ref 12–20)
CALCIUM SERPL-MCNC: 9.1 MG/DL (ref 8.5–10.1)
CALCULATED P AXIS, ECG09: 2 DEGREES
CALCULATED P AXIS, ECG09: 24 DEGREES
CALCULATED R AXIS, ECG10: 52 DEGREES
CALCULATED R AXIS, ECG10: 53 DEGREES
CALCULATED T AXIS, ECG11: 37 DEGREES
CALCULATED T AXIS, ECG11: 51 DEGREES
CHLORIDE SERPL-SCNC: 106 MMOL/L (ref 97–108)
CO2 SERPL-SCNC: 27 MMOL/L (ref 21–32)
CREAT SERPL-MCNC: 0.59 MG/DL (ref 0.55–1.02)
DIAGNOSIS, 93000: NORMAL
DIAGNOSIS, 93000: NORMAL
ERYTHROCYTE [DISTWIDTH] IN BLOOD BY AUTOMATED COUNT: 14.7 % (ref 11.5–14.5)
GLOBULIN SER CALC-MCNC: 3.6 G/DL (ref 2–4)
GLUCOSE BLD STRIP.AUTO-MCNC: 134 MG/DL (ref 65–117)
GLUCOSE BLD STRIP.AUTO-MCNC: 140 MG/DL (ref 65–117)
GLUCOSE BLD STRIP.AUTO-MCNC: 143 MG/DL (ref 65–117)
GLUCOSE BLD STRIP.AUTO-MCNC: 168 MG/DL (ref 65–117)
GLUCOSE SERPL-MCNC: 128 MG/DL (ref 65–100)
HCT VFR BLD AUTO: 35.6 % (ref 35–47)
HGB BLD-MCNC: 11.4 G/DL (ref 11.5–16)
MCH RBC QN AUTO: 25 PG (ref 26–34)
MCHC RBC AUTO-ENTMCNC: 32 G/DL (ref 30–36.5)
MCV RBC AUTO: 78.1 FL (ref 80–99)
NRBC # BLD: 0 K/UL (ref 0–0.01)
NRBC BLD-RTO: 0 PER 100 WBC
P-R INTERVAL, ECG05: 140 MS
P-R INTERVAL, ECG05: 146 MS
PLATELET # BLD AUTO: 188 K/UL (ref 150–400)
PMV BLD AUTO: 10 FL (ref 8.9–12.9)
POTASSIUM SERPL-SCNC: 3.3 MMOL/L (ref 3.5–5.1)
PROCALCITONIN SERPL-MCNC: <0.05 NG/ML
PROT SERPL-MCNC: 7 G/DL (ref 6.4–8.2)
Q-T INTERVAL, ECG07: 388 MS
Q-T INTERVAL, ECG07: 388 MS
QRS DURATION, ECG06: 88 MS
QRS DURATION, ECG06: 94 MS
QTC CALCULATION (BEZET), ECG08: 458 MS
QTC CALCULATION (BEZET), ECG08: 464 MS
RBC # BLD AUTO: 4.56 M/UL (ref 3.8–5.2)
SERVICE CMNT-IMP: ABNORMAL
SODIUM SERPL-SCNC: 140 MMOL/L (ref 136–145)
VENTRICULAR RATE, ECG03: 84 BPM
VENTRICULAR RATE, ECG03: 86 BPM
WBC # BLD AUTO: 7.5 K/UL (ref 3.6–11)

## 2021-09-26 PROCEDURE — 85027 COMPLETE CBC AUTOMATED: CPT

## 2021-09-26 PROCEDURE — 74011250636 HC RX REV CODE- 250/636: Performed by: INTERNAL MEDICINE

## 2021-09-26 PROCEDURE — 80053 COMPREHEN METABOLIC PANEL: CPT

## 2021-09-26 PROCEDURE — 74011000258 HC RX REV CODE- 258: Performed by: EMERGENCY MEDICINE

## 2021-09-26 PROCEDURE — 74011250637 HC RX REV CODE- 250/637: Performed by: INTERNAL MEDICINE

## 2021-09-26 PROCEDURE — 93005 ELECTROCARDIOGRAM TRACING: CPT

## 2021-09-26 PROCEDURE — 2709999900 HC NON-CHARGEABLE SUPPLY

## 2021-09-26 PROCEDURE — 82962 GLUCOSE BLOOD TEST: CPT

## 2021-09-26 PROCEDURE — 84145 PROCALCITONIN (PCT): CPT

## 2021-09-26 PROCEDURE — 65270000029 HC RM PRIVATE

## 2021-09-26 PROCEDURE — 74011250636 HC RX REV CODE- 250/636: Performed by: EMERGENCY MEDICINE

## 2021-09-26 PROCEDURE — 36415 COLL VENOUS BLD VENIPUNCTURE: CPT

## 2021-09-26 PROCEDURE — 74011000250 HC RX REV CODE- 250: Performed by: SURGERY

## 2021-09-26 PROCEDURE — 0H98XZZ DRAINAGE OF BUTTOCK SKIN, EXTERNAL APPROACH: ICD-10-PCS | Performed by: SURGERY

## 2021-09-26 PROCEDURE — 74011636637 HC RX REV CODE- 636/637: Performed by: INTERNAL MEDICINE

## 2021-09-26 PROCEDURE — 74176 CT ABD & PELVIS W/O CONTRAST: CPT

## 2021-09-26 RX ORDER — DIPHENHYDRAMINE HYDROCHLORIDE 50 MG/ML
25 INJECTION, SOLUTION INTRAMUSCULAR; INTRAVENOUS
Status: COMPLETED | OUTPATIENT
Start: 2021-09-26 | End: 2021-09-26

## 2021-09-26 RX ORDER — HYDROMORPHONE HYDROCHLORIDE 1 MG/ML
0.5 INJECTION, SOLUTION INTRAMUSCULAR; INTRAVENOUS; SUBCUTANEOUS
Status: DISCONTINUED | OUTPATIENT
Start: 2021-09-26 | End: 2021-09-26

## 2021-09-26 RX ORDER — ACETAMINOPHEN 650 MG/1
650 SUPPOSITORY RECTAL
Status: DISCONTINUED | OUTPATIENT
Start: 2021-09-26 | End: 2021-09-27 | Stop reason: HOSPADM

## 2021-09-26 RX ORDER — DEXTROSE 50 % IN WATER (D50W) INTRAVENOUS SYRINGE
12.5-25 AS NEEDED
Status: DISCONTINUED | OUTPATIENT
Start: 2021-09-26 | End: 2021-09-27 | Stop reason: HOSPADM

## 2021-09-26 RX ORDER — ENOXAPARIN SODIUM 100 MG/ML
40 INJECTION SUBCUTANEOUS DAILY
Status: DISCONTINUED | OUTPATIENT
Start: 2021-09-26 | End: 2021-09-27 | Stop reason: HOSPADM

## 2021-09-26 RX ORDER — FAMOTIDINE 20 MG/1
20 TABLET, FILM COATED ORAL 2 TIMES DAILY
Status: DISCONTINUED | OUTPATIENT
Start: 2021-09-26 | End: 2021-09-27 | Stop reason: HOSPADM

## 2021-09-26 RX ORDER — METRONIDAZOLE 500 MG/100ML
500 INJECTION, SOLUTION INTRAVENOUS
Status: COMPLETED | OUTPATIENT
Start: 2021-09-26 | End: 2021-09-26

## 2021-09-26 RX ORDER — DIPHENHYDRAMINE HYDROCHLORIDE 50 MG/ML
12.5 INJECTION, SOLUTION INTRAMUSCULAR; INTRAVENOUS
Status: DISCONTINUED | OUTPATIENT
Start: 2021-09-26 | End: 2021-09-26

## 2021-09-26 RX ORDER — ONDANSETRON 4 MG/1
4 TABLET, ORALLY DISINTEGRATING ORAL
Status: DISCONTINUED | OUTPATIENT
Start: 2021-09-26 | End: 2021-09-27 | Stop reason: HOSPADM

## 2021-09-26 RX ORDER — SODIUM CHLORIDE 0.9 % (FLUSH) 0.9 %
5-40 SYRINGE (ML) INJECTION EVERY 8 HOURS
Status: DISCONTINUED | OUTPATIENT
Start: 2021-09-26 | End: 2021-09-27 | Stop reason: HOSPADM

## 2021-09-26 RX ORDER — HYDROMORPHONE HYDROCHLORIDE 1 MG/ML
1 INJECTION, SOLUTION INTRAMUSCULAR; INTRAVENOUS; SUBCUTANEOUS
Status: DISCONTINUED | OUTPATIENT
Start: 2021-09-26 | End: 2021-09-27 | Stop reason: HOSPADM

## 2021-09-26 RX ORDER — SODIUM CHLORIDE 0.9 % (FLUSH) 0.9 %
5-40 SYRINGE (ML) INJECTION AS NEEDED
Status: DISCONTINUED | OUTPATIENT
Start: 2021-09-26 | End: 2021-09-27 | Stop reason: HOSPADM

## 2021-09-26 RX ORDER — DIPHENHYDRAMINE HYDROCHLORIDE 50 MG/ML
12.5 INJECTION, SOLUTION INTRAMUSCULAR; INTRAVENOUS
Status: DISCONTINUED | OUTPATIENT
Start: 2021-09-26 | End: 2021-09-27 | Stop reason: HOSPADM

## 2021-09-26 RX ORDER — POLYETHYLENE GLYCOL 3350 17 G/17G
17 POWDER, FOR SOLUTION ORAL DAILY PRN
Status: DISCONTINUED | OUTPATIENT
Start: 2021-09-26 | End: 2021-09-27 | Stop reason: HOSPADM

## 2021-09-26 RX ORDER — DIPHENHYDRAMINE HYDROCHLORIDE 50 MG/ML
25 INJECTION, SOLUTION INTRAMUSCULAR; INTRAVENOUS
Status: DISCONTINUED | OUTPATIENT
Start: 2021-09-26 | End: 2021-09-26

## 2021-09-26 RX ORDER — MAGNESIUM SULFATE 100 %
4 CRYSTALS MISCELLANEOUS AS NEEDED
Status: DISCONTINUED | OUTPATIENT
Start: 2021-09-26 | End: 2021-09-27 | Stop reason: HOSPADM

## 2021-09-26 RX ORDER — SODIUM CHLORIDE AND POTASSIUM CHLORIDE .9; .15 G/100ML; G/100ML
SOLUTION INTRAVENOUS CONTINUOUS
Status: DISCONTINUED | OUTPATIENT
Start: 2021-09-26 | End: 2021-09-27 | Stop reason: HOSPADM

## 2021-09-26 RX ORDER — LIDOCAINE HYDROCHLORIDE 10 MG/ML
10 INJECTION, SOLUTION EPIDURAL; INFILTRATION; INTRACAUDAL; PERINEURAL
Status: COMPLETED | OUTPATIENT
Start: 2021-09-26 | End: 2021-09-26

## 2021-09-26 RX ORDER — ATORVASTATIN CALCIUM 20 MG/1
20 TABLET, FILM COATED ORAL DAILY
Status: DISCONTINUED | OUTPATIENT
Start: 2021-09-26 | End: 2021-09-27 | Stop reason: HOSPADM

## 2021-09-26 RX ORDER — ONDANSETRON 2 MG/ML
4 INJECTION INTRAMUSCULAR; INTRAVENOUS
Status: DISCONTINUED | OUTPATIENT
Start: 2021-09-26 | End: 2021-09-27 | Stop reason: HOSPADM

## 2021-09-26 RX ORDER — GABAPENTIN 300 MG/1
300 CAPSULE ORAL
Status: DISCONTINUED | OUTPATIENT
Start: 2021-09-26 | End: 2021-09-27 | Stop reason: HOSPADM

## 2021-09-26 RX ORDER — METRONIDAZOLE 500 MG/100ML
500 INJECTION, SOLUTION INTRAVENOUS EVERY 12 HOURS
Status: DISCONTINUED | OUTPATIENT
Start: 2021-09-26 | End: 2021-09-27 | Stop reason: HOSPADM

## 2021-09-26 RX ORDER — MORPHINE SULFATE 10 MG/ML
6 INJECTION, SOLUTION INTRAMUSCULAR; INTRAVENOUS
Status: COMPLETED | OUTPATIENT
Start: 2021-09-26 | End: 2021-09-26

## 2021-09-26 RX ORDER — GABAPENTIN 300 MG/1
300 CAPSULE ORAL
Status: DISCONTINUED | OUTPATIENT
Start: 2021-09-26 | End: 2021-09-26

## 2021-09-26 RX ORDER — INSULIN LISPRO 100 [IU]/ML
INJECTION, SOLUTION INTRAVENOUS; SUBCUTANEOUS
Status: DISCONTINUED | OUTPATIENT
Start: 2021-09-26 | End: 2021-09-27 | Stop reason: HOSPADM

## 2021-09-26 RX ORDER — MORPHINE SULFATE 2 MG/ML
4 INJECTION, SOLUTION INTRAMUSCULAR; INTRAVENOUS
Status: COMPLETED | OUTPATIENT
Start: 2021-09-26 | End: 2021-09-26

## 2021-09-26 RX ORDER — ACETAMINOPHEN 325 MG/1
650 TABLET ORAL
Status: DISCONTINUED | OUTPATIENT
Start: 2021-09-26 | End: 2021-09-27 | Stop reason: HOSPADM

## 2021-09-26 RX ADMIN — DIPHENHYDRAMINE HYDROCHLORIDE 25 MG: 50 INJECTION, SOLUTION INTRAMUSCULAR; INTRAVENOUS at 02:59

## 2021-09-26 RX ADMIN — DIPHENHYDRAMINE HYDROCHLORIDE 25 MG: 50 INJECTION, SOLUTION INTRAMUSCULAR; INTRAVENOUS at 01:37

## 2021-09-26 RX ADMIN — DIPHENHYDRAMINE HYDROCHLORIDE 12.5 MG: 50 INJECTION, SOLUTION INTRAMUSCULAR; INTRAVENOUS at 15:02

## 2021-09-26 RX ADMIN — DIPHENHYDRAMINE HYDROCHLORIDE 12.5 MG: 50 INJECTION, SOLUTION INTRAMUSCULAR; INTRAVENOUS at 22:34

## 2021-09-26 RX ADMIN — ONDANSETRON 4 MG: 2 INJECTION INTRAMUSCULAR; INTRAVENOUS at 14:49

## 2021-09-26 RX ADMIN — MORPHINE SULFATE 4 MG: 2 INJECTION, SOLUTION INTRAMUSCULAR; INTRAVENOUS at 01:39

## 2021-09-26 RX ADMIN — ATORVASTATIN CALCIUM 20 MG: 20 TABLET, FILM COATED ORAL at 09:50

## 2021-09-26 RX ADMIN — HYDROMORPHONE HYDROCHLORIDE 1 MG: 1 INJECTION, SOLUTION INTRAMUSCULAR; INTRAVENOUS; SUBCUTANEOUS at 22:49

## 2021-09-26 RX ADMIN — DIPHENHYDRAMINE HYDROCHLORIDE 25 MG: 50 INJECTION, SOLUTION INTRAMUSCULAR; INTRAVENOUS at 09:50

## 2021-09-26 RX ADMIN — DIPHENHYDRAMINE HYDROCHLORIDE 12.5 MG: 50 INJECTION, SOLUTION INTRAMUSCULAR; INTRAVENOUS at 13:05

## 2021-09-26 RX ADMIN — GABAPENTIN 300 MG: 300 CAPSULE ORAL at 21:03

## 2021-09-26 RX ADMIN — MORPHINE SULFATE 6 MG: 10 INJECTION, SOLUTION INTRAMUSCULAR; INTRAVENOUS at 03:01

## 2021-09-26 RX ADMIN — HYDROMORPHONE HYDROCHLORIDE 1 MG: 1 INJECTION, SOLUTION INTRAMUSCULAR; INTRAVENOUS; SUBCUTANEOUS at 19:37

## 2021-09-26 RX ADMIN — LIDOCAINE HYDROCHLORIDE 10 ML: 10 INJECTION, SOLUTION EPIDURAL; INFILTRATION; INTRACAUDAL; PERINEURAL at 13:01

## 2021-09-26 RX ADMIN — POTASSIUM CHLORIDE AND SODIUM CHLORIDE: 900; 150 INJECTION, SOLUTION INTRAVENOUS at 14:52

## 2021-09-26 RX ADMIN — Medication 10 ML: at 14:50

## 2021-09-26 RX ADMIN — INSULIN LISPRO 2 UNITS: 100 INJECTION, SOLUTION INTRAVENOUS; SUBCUTANEOUS at 13:02

## 2021-09-26 RX ADMIN — HYDROMORPHONE HYDROCHLORIDE 1 MG: 1 INJECTION, SOLUTION INTRAMUSCULAR; INTRAVENOUS; SUBCUTANEOUS at 17:11

## 2021-09-26 RX ADMIN — FAMOTIDINE 20 MG: 20 TABLET, FILM COATED ORAL at 17:11

## 2021-09-26 RX ADMIN — HYDROMORPHONE HYDROCHLORIDE 1 MG: 1 INJECTION, SOLUTION INTRAMUSCULAR; INTRAVENOUS; SUBCUTANEOUS at 14:48

## 2021-09-26 RX ADMIN — Medication 10 ML: at 06:31

## 2021-09-26 RX ADMIN — DIPHENHYDRAMINE HYDROCHLORIDE 12.5 MG: 50 INJECTION, SOLUTION INTRAMUSCULAR; INTRAVENOUS at 17:11

## 2021-09-26 RX ADMIN — DIPHENHYDRAMINE HYDROCHLORIDE 12.5 MG: 50 INJECTION, SOLUTION INTRAMUSCULAR; INTRAVENOUS at 19:37

## 2021-09-26 RX ADMIN — METRONIDAZOLE 500 MG: 500 INJECTION, SOLUTION INTRAVENOUS at 03:53

## 2021-09-26 RX ADMIN — HYDROMORPHONE HYDROCHLORIDE 0.5 MG: 1 INJECTION, SOLUTION INTRAMUSCULAR; INTRAVENOUS; SUBCUTANEOUS at 06:58

## 2021-09-26 RX ADMIN — HYDROMORPHONE HYDROCHLORIDE 1 MG: 1 INJECTION, SOLUTION INTRAMUSCULAR; INTRAVENOUS; SUBCUTANEOUS at 09:50

## 2021-09-26 RX ADMIN — DIPHENHYDRAMINE HYDROCHLORIDE 12.5 MG: 50 INJECTION, SOLUTION INTRAMUSCULAR; INTRAVENOUS at 06:57

## 2021-09-26 RX ADMIN — INSULIN LISPRO 2 UNITS: 100 INJECTION, SOLUTION INTRAVENOUS; SUBCUTANEOUS at 10:27

## 2021-09-26 RX ADMIN — METRONIDAZOLE 500 MG: 500 INJECTION, SOLUTION INTRAVENOUS at 15:05

## 2021-09-26 RX ADMIN — HYDROMORPHONE HYDROCHLORIDE 1 MG: 1 INJECTION, SOLUTION INTRAMUSCULAR; INTRAVENOUS; SUBCUTANEOUS at 12:45

## 2021-09-26 RX ADMIN — HYDROMORPHONE HYDROCHLORIDE 1 MG: 1 INJECTION, SOLUTION INTRAMUSCULAR; INTRAVENOUS; SUBCUTANEOUS at 21:02

## 2021-09-26 RX ADMIN — Medication 10 ML: at 21:24

## 2021-09-26 RX ADMIN — FAMOTIDINE 20 MG: 20 TABLET, FILM COATED ORAL at 09:50

## 2021-09-26 RX ADMIN — CEFTRIAXONE 1 G: 1 INJECTION, POWDER, FOR SOLUTION INTRAMUSCULAR; INTRAVENOUS at 03:04

## 2021-09-26 NOTE — ED NOTES
PT ambulatory to room from triage. Pt here today with complaints of worsening rectal abscess. PT states that she has a rectal abcess that she is supposed to have surgery on next week. Pt states that she noticed that she started to have pus and bleeding this evening coming from the rectum. Pt states she recently had her appendix taken out and a recent outbreak of shingles one week ago. PT states that she also had a recent hemmoroidectomy and sphincterectomoy and has had trouble with that healing. PT states that she has been on several abx recently for the appendectomy and shingles.  Pt denies chest pain, sob, fever, abd pain, n/v/d.

## 2021-09-26 NOTE — CONSULTS
Consult    Patient: Isaiah Gudino MRN: [id number]  SSN: [id number]    YOB: 1970  Age: 46 y.o. Sex: female      Subjective:      Isaiah Gudino is a 46 y.o. female who is being seen for perirectal pain and swelling. CT showed mild inflammation but no deep abscess formed. Past Medical History:   Diagnosis Date    Anal fissure     Anisocoria     Asthma     LAST EPISODE     Back pain     Cerumen impaction     Chronic kidney disease     hx uti in past    Coagulation defects     ocassional rectal bleeding due to anal fissure    Colovaginal fistula     Diabetes (HCC)     NIDDM    Diverticulitis     Diverticulosis     Enlarged tonsils     Frequent UTI     GERD (gastroesophageal reflux disease)     H/O endoscopy     with dilation    HA (headache)     Hepatic steatosis     History of colon resection     Hx of colonoscopy with polypectomy     benign    Hypertension     Ill-defined condition     FREQUENT HIVES    Ill-defined condition     HX ELEVATED LIVER ENZYMES    Morbid obesity (HCC)     Nausea & vomiting     during diverticulitis flare    Obesity     Otitis media     Pneumonia     about 15 yrs ago    Psychiatric disorder     ANXIETY    Recurrent tonsillitis     Sinusitis     Transfusion history ~ age 35    postop hysterectomy    Urticaria      Past Surgical History:   Procedure Laterality Date    COLONOSCOPY N/A 3/28/2019    COLONOSCOPY performed by Fabiola Javier MD at [address] [facility] COLONOSCOPY N/A 10/2/2020    COLONOSCOPY performed by Fabiola Javier MD at [address] [facility]    [place].     HX GI  12    LAPAROSCOPIC HAND ASSISTED  POSS OPEN SIGMOID COLECTOMY POSS TEMPORARY DIVERTING LOOP ILEOSTOMY;  (no illeostomy needed)    HX GYN           HX GYN      cervical conization    HX HEENT      SINUS SURGERY LEFT X2    HX HEENT      SINUS SURGERY ON RIGHT X2    HX HEMORRHOIDECTOMY      HX OTHER SURGICAL 11/12    Sphincterotomy    HX PELVIC LAPAROSCOPY      HX EMMANUEL AND BSO      HX UROLOGICAL  7/31/12     CYSTOSCOPY INSERTION URETERAL CATHETERS - Cystoscopy Insertion of bilateral ureteral stents    DE ABDOMEN SURGERY PROC UNLISTED  01/06/2018    hernia repair at [facility]      Family History   Problem Relation Age of Onset    Diabetes Mother     Cancer Mother         NON-HODGKINS LYMPHOMA    Anesth Problems Mother         PONV    Diabetes Father     Heart Disease Father         CAD - STENTS, PACEMAKER    Arrhythmia Father      Social History     Tobacco Use    Smoking status: Never Smoker    Smokeless tobacco: Never Used   Substance Use Topics    Alcohol use: Yes     Comment: Rarely      Current Facility-Administered Medications   Medication Dose Route Frequency Provider Last Rate Last Admin    atorvastatin (LIPITOR) tablet 20 mg  20 mg Oral DAILY Robson Smith MD   20 mg at 09/26/21 0950    insulin lispro (HUMALOG) injection   SubCUTAneous AC&HS Bayard Cheadle, MD   2 Units at 09/26/21 1302    glucose chewable tablet 16 g  4 Tablet Oral PRN Bayard Cheadle, MD        dextrose (D50W) injection syrg 12.5-25 g  12.5-25 g IntraVENous PRN Bayard Cheadle, MD        glucagon (GLUCAGEN) injection 1 mg  1 mg IntraMUSCular PRN Bayard Cheadle, MD        sodium chloride (NS) flush 5-40 mL  5-40 mL IntraVENous Q8H Robson Garibay MD   10 mL at 09/26/21 0631    sodium chloride (NS) flush 5-40 mL  5-40 mL IntraVENous PRN Bayard Cheadle, MD        acetaminophen (TYLENOL) tablet 650 mg  650 mg Oral Q6H PRN Bayard Cheadle, MD        Or    acetaminophen (TYLENOL) suppository 650 mg  650 mg Rectal Q6H PRN Bayard Cheadle, MD        polyethylene glycol (MIRALAX) packet 17 g  17 g Oral DAILY PRN Bayard Cheadle, MD        ondansetron (ZOFRAN ODT) tablet 4 mg  4 mg Oral Q8H PRN Bayard Cheadle, MD        Or    ondansetron (ZOFRAN) injection 4 mg  4 mg IntraVENous Q6H PRN Tree Elena MD        enoxaparin (LOVENOX) injection 40 mg  40 mg SubCUTAneous DAILY Priscila Garibay MD        famotidine (PEPCID) tablet 20 mg  20 mg Oral BID Tree Elena MD   20 mg at 09/26/21 0950    gabapentin (NEURONTIN) capsule 300 mg  300 mg Oral QHS Tree Elena MD        [START ON 9/27/2021] cefTRIAXone (ROCEPHIN) 1 g in 0.9% sodium chloride (MBP/ADV) 50 mL MBP  1 g IntraVENous Q24H Lena Fink MD        metroNIDAZOLE (FLAGYL) IVPB premix 500 mg  500 mg IntraVENous Q12H Lena Fink MD        HYDROmorphone (DILAUDID) injection 1 mg  1 mg IntraVENous Q2H PRN Lena Fink MD   1 mg at 09/26/21 1245    diphenhydrAMINE (BENADRYL) injection 12.5 mg  12.5 mg IntraVENous Q2.5H PRN Lena Fink MD            Allergies   Allergen Reactions    Aspirin Hives, Shortness of Breath and Itching    Codeine Hives, Itching and Angioedema     Pt had itching in mouth, on face and lips    Contrast Agent [Iodine] Hives, Itching and Angioedema     5/5/21: pt was given benadryl and solu-medrol prior to administration but pt had severe tongue swelling and drooling, lethargy and itching.  DO NOT GIVE PT    Flavoring Agent Anaphylaxis     Cherry flavor    Iodinated Contrast Media Anaphylaxis, Diarrhea, Hives, Nausea and Vomiting and Shortness of Breath    Percocet [Oxycodone-Acetaminophen] Hives, Itching and Angioedema     Pt had itching in mouth, on face and lips    Prilosec [Omeprazole Magnesium] Anaphylaxis     CHERRY FLAVORED ODT; PT TAKES REGULAR PRILOSEC AND IS OK    Dilaudid [Hydromorphone] Itching     Tolerates with benadryl    Ketorolac Rash     \"makes my eyes spasm and causes rash on my hands\" and \"makes my skin itch and makes me nervous\"    Morphine (Pf) Rash     According to rn, pt can take morphine with benadryl    Fentanyl Rash and Itching     Has had benadryl before       Review of Systems:  A comprehensive review of systems was negative except for that written in the History of Present Illness. Objective:     Vitals:    09/26/21 0400 09/26/21 0415 09/26/21 0907 09/26/21 1222   BP: (!) 120/52 122/68 120/69 (!) 145/84   Pulse:   66 77   Resp:   18 18   Temp:   98.2 °F (36.8 °C) 97.4 °F (36.3 °C)   SpO2: 96% 97% 97% 100%   Weight:       Height:            Physical Exam:  General:  Alert, cooperative, no distress, appears stated age. Eyes:  Conjunctivae/corneas clear. PERRL, EOMs intact. Fundi benign   Ears:  Normal TMs and external ear canals both ears. Nose: Nares normal. Septum midline. Mucosa normal. No drainage or sinus tenderness. Mouth/Throat: Lips, mucosa, and tongue normal. Teeth and gums normal.   Neck: Supple, symmetrical, trachea midline, no adenopathy, thyroid: no enlargment/tenderness/nodules, no carotid bruit and no JVD. Back:   Symmetric, no curvature. ROM normal. No CVA tenderness. Lungs:   Clear to auscultation bilaterally. Heart:  Regular rate and rhythm, S1, S2 normal, no murmur, click, rub or gallop. Abdomen:   Soft, non-tender. Bowel sounds normal. No masses,  No organomegaly. Extremities: Extremities normal, atraumatic, no cyanosis or edema. Pulses: 2+ and symmetric all extremities. Skin: Skin color, texture, turgor normal. No rashes or lesions   Lymph nodes: Cervical, supraclavicular, and axillary nodes normal.   Neurologic: CNII-XII intact. Normal strength, sensation and reflexes throughout. Assessment:     Hospital Problems  Date Reviewed: 7/19/2021        Codes Class Noted POA    Rectal abscess ICD-10-CM: K61.1  ICD-9-CM: 681  9/26/2021 Unknown              Plan:     Incision and drainage performed at the bedside with 1% lidocaine and #11 blade. No purulent drainage. Inflammation with cellulitis. Recommend PO Abx and follow up with me as outpatient on Wednesday. CRS to sign off.       Signed By: Chana Mckay MD     September 26, 2021

## 2021-09-26 NOTE — ED PROVIDER NOTES
EMERGENCY DEPARTMENT HISTORY AND PHYSICAL EXAM      Date: 9/25/2021  Patient Name: Mango Arvizu  Patient Age and Sex: 46 y.o. female  MRN:  [id number]  CSN:  [id number]    History of Presenting Illness     Chief Complaint   Patient presents with    Rectal Bleeding     Patient stated that she went to her surgeon because she felt a large mass in her rectum. Dr. Eliza Garcia did a sphincterotomy and hemorrhoidectomy on this patient in May. Surgeon advised that he thinks she has an abscess in her rectum and is going to go in and resolve it under anesthesia a week from now but the patient stated that the pain is unbearable. Took Tylenol 1 gram about two hours ago with no relief in pain. History Provided By: Patient    Ability to gather history was limited by:     HPI: Mango Arvizu, 46 y.o. female with history of sigmoidectomy, recent appendectomy, ulcerative colitis, anal fissure, complains of pain around the anus with a palpable tender mass or abscess which has been worsening for about a week. Of note she underwent sphincterotomy and hemorrhoidectomy about 5 months ago. No fevers. Currently taking antibiotics prescribed by her rectal surgeon. Pain is described as moderate to severe, burning in nature. Location:    Quality:      Severity:    Duration:   Timing:      Context:    Modifying factors:   Associated symptoms:     Past History      The patient's medical, surgical, and social history on file were reviewed by me today.      The family history was reviewed by me today and was non-contributory, unless otherwise specified below:    Past Medical History:  Past Medical History:   Diagnosis Date    Anal fissure     Anisocoria     Asthma     LAST EPISODE     Back pain     Cerumen impaction     Chronic kidney disease     hx uti in past    Coagulation defects     ocassional rectal bleeding due to anal fissure    Colovaginal fistula     Diabetes ([facility] [address]     NIDDM    Diverticulitis     Diverticulosis     Enlarged tonsils     Frequent UTI     GERD (gastroesophageal reflux disease)     H/O endoscopy     with dilation    HA (headache)     Hepatic steatosis     History of colon resection     Hx of colonoscopy with polypectomy     benign    Hypertension     Ill-defined condition     FREQUENT HIVES    Ill-defined condition     HX ELEVATED LIVER ENZYMES    Morbid obesity (HCC)     Nausea & vomiting     during diverticulitis flare    Obesity     Otitis media     Pneumonia     about 15 yrs ago    Psychiatric disorder     ANXIETY    Recurrent tonsillitis     Sinusitis     Transfusion history ~ age 35    postop hysterectomy    Urticaria        Past Surgical History:  Past Surgical History:   Procedure Laterality Date    COLONOSCOPY N/A 3/28/2019    COLONOSCOPY performed by Noemí Echols MD at [address] [facility] COLONOSCOPY N/A 10/2/2020    COLONOSCOPY performed by Noemí Echols MD at [address] [facility]    [place].     HX GI  12    LAPAROSCOPIC HAND ASSISTED  POSS OPEN SIGMOID COLECTOMY POSS TEMPORARY DIVERTING LOOP ILEOSTOMY;  (no illeostomy needed)    HX GYN           HX GYN      cervical conization    HX HEENT      SINUS SURGERY LEFT X2    HX HEENT      SINUS SURGERY ON RIGHT X2    HX HEMORRHOIDECTOMY      HX OTHER SURGICAL      Sphincterotomy    HX PELVIC LAPAROSCOPY      HX EMMANUEL AND BSO      HX UROLOGICAL  12     CYSTOSCOPY INSERTION URETERAL CATHETERS - Cystoscopy Insertion of bilateral ureteral stents    MS ABDOMEN SURGERY PROC UNLISTED  2018    hernia repair at [facility]       Family History:  Family History   Problem Relation Age of Onset    Diabetes Mother     Cancer Mother         NON-HODGKINS LYMPHOMA    Anesth Problems Mother         PONV    Diabetes Father     Heart Disease Father         CAD - STENTS, PACEMAKER    Arrhythmia Father        Social History:  Social History     Tobacco Use    Smoking status: Never Smoker    Smokeless tobacco: Never Used   Vaping Use    Vaping Use: Never assessed   Substance Use Topics    Alcohol use: Yes     Comment: Rarely    Drug use: No     Types: Prescription, OTC       Current Medications:  No current facility-administered medications on file prior to encounter. Current Outpatient Medications on File Prior to Encounter   Medication Sig Dispense Refill    atorvastatin (LIPITOR) 20 mg tablet Take 1 Tablet by mouth daily. 90 Tablet 1    levoFLOXacin (Levaquin) 500 mg tablet Take 1 Tablet by mouth daily. 7 Tablet 0    ALPRAZolam (XANAX) 1 mg tablet TAKE 1/2 - 1 TABLET BY MOUTH TWICE DAILY AS NEEDED FOR ANXIETY *MAX 2 TABS DAILY* 60 Tablet 0    naloxone (Narcan) 4 mg/actuation nasal spray Use 1 spray intranasally, then discard. Repeat with new spray every 2 min as needed for opioid overdose symptoms, alternating nostrils. 1 Each 0    ondansetron (Zofran ODT) 4 mg disintegrating tablet Take 1 Tablet by mouth every eight (8) hours as needed for Nausea. 10 Tablet 0    clindamycin (CLEOCIN) 300 mg capsule Take 1 Capsule by mouth four (4) times daily. 40 Capsule 0    ondansetron (Zofran ODT) 4 mg disintegrating tablet Take 1 Tablet by mouth every eight (8) hours as needed for Nausea. 10 Tablet 0    dicyclomine (BENTYL) 20 mg tablet Take 1 Tablet by mouth every six (6) hours as needed for Abdominal Cramps. 20 Tablet 0    diphenoxylate-atropine (LomotiL) 2.5-0.025 mg per tablet Take 1 Tablet by mouth four (4) times daily as needed for Diarrhea (1 tab after each stool for max 8 per day). Max Daily Amount: 4 Tablets. Take after each stool for a maximum of 8 tablets daily 20 Tablet 0    hyoscyamine SL (LEVSIN/SL) 0.125 mg SL tablet 1 Tablet by SubLINGual route every four (4) hours as needed for Cramping. 10 Tablet 0    trimethoprim-sulfamethoxazole (BACTRIM DS, SEPTRA DS) 160-800 mg per tablet Take 1 Tablet by mouth two (2) times a day.  20 Tablet 0    ondansetron (Zofran ODT) 4 mg disintegrating tablet Take 1 Tablet by mouth every eight (8) hours as needed for Nausea. 12 Tablet 1    empagliflozin (Jardiance) 25 mg tablet Take 1 Tablet by mouth daily. 90 Tablet 1    glimepiride (AMARYL) 4 mg tablet TAKE 1 TABLET BY MOUTH ONCE DAILY BEFORE BREAKFAST 90 Tablet 1    estradioL (ESTRACE) 0.5 mg tablet TAKE 1 TABLET BY MOUTH ONCE DAILY 90 Tablet 1    Ventolin HFA 90 mcg/actuation inhaler TAKE 1 PUFF BY INHALATION EVERY FOUR (4) HOURS AS NEEDED FOR WHEEZING. 18 Inhaler 1    lidocaine (Lidoderm) 5 % Apply patch to the affected area for 12 hours a day and remove for 12 hours a day. 5 Each 0    vancomycin (Vancocin) 125 mg capsule Take 1 Capsule by mouth four (4) times daily. 28 Capsule 0    valACYclovir (VALTREX) 1 gram tablet Take 2 tablets by mouth twice daily for 1 day as needed for flares 8 Tablet 2    sertraline (ZOLOFT) 100 mg tablet Take 2 Tablets by mouth nightly. 60 Tablet 2    gabapentin (NEURONTIN) 300 mg capsule Take 1 Cap by mouth nightly. Max Daily Amount: 300 mg. 30 Cap 1    losartan-hydroCHLOROthiazide (HYZAAR) 100-12.5 mg per tablet Take 1 Tab by mouth daily. 90 Tab 1    omeprazole (PRILOSEC) 20 mg capsule Take 40 mg by mouth Daily (before breakfast).  EPINEPHrine (EPIPEN) 0.3 mg/0.3 mL (1:1,000) injection 0.3 mL by IntraMUSCular route once as needed for up to 1 dose. 0.3 mL 1       Allergies: Allergies   Allergen Reactions    Aspirin Hives, Shortness of Breath and Itching    Codeine Hives, Itching and Angioedema     Pt had itching in mouth, on face and lips    Contrast Agent [Iodine] Hives, Itching and Angioedema     5/5/21: pt was given benadryl and solu-medrol prior to administration but pt had severe tongue swelling and drooling, lethargy and itching.  DO NOT GIVE PT    Flavoring Agent Anaphylaxis     Cherry flavor    Iodinated Contrast Media Anaphylaxis, Diarrhea, Hives, Nausea and Vomiting and Shortness of Breath    Percocet [Oxycodone-Acetaminophen] Hives, Itching and Angioedema     Pt had itching in mouth, on face and lips    Prilosec [Omeprazole Magnesium] Anaphylaxis     CHERRY FLAVORED ODT; PT TAKES REGULAR PRILOSEC AND IS OK    Dilaudid [Hydromorphone] Itching     Tolerates with benadryl    Ketorolac Rash     \"makes my eyes spasm and causes rash on my hands\" and \"makes my skin itch and makes me nervous\"    Morphine (Pf) Rash     According to rn, pt can take morphine with benadryl    Fentanyl Rash and Itching     Has had benadryl before     Review of Systems    A complete ROS was reviewed by me today and was negative, unless otherwise specified below:    Review of Systems   Constitutional: Negative for fatigue and fever. Respiratory: Negative for shortness of breath. Cardiovascular: Negative for chest pain. Gastrointestinal: Positive for abdominal pain and rectal pain. Negative for anal bleeding and blood in stool. Genitourinary: Negative for dysuria. All other systems reviewed and are negative. Physical Exam   Vital Signs  Patient Vitals for the past 8 hrs:   Temp Pulse Resp BP SpO2   09/26/21 0041 98 °F (36.7 °C) 85 18 136/71 98 %   09/25/21 2357 98 °F (36.7 °C) 86 18 (!) 147/82 98 %          Physical Exam  Vitals and nursing note reviewed. Constitutional:       General: She is not in acute distress. Appearance: Normal appearance. She is well-developed. She is not ill-appearing. HENT:      Head: Normocephalic and atraumatic. Mouth/Throat:      Mouth: Mucous membranes are moist.   Eyes:      General:         Right eye: No discharge. Left eye: No discharge. Conjunctiva/sclera: Conjunctivae normal.   Cardiovascular:      Rate and Rhythm: Normal rate and regular rhythm. Heart sounds: Normal heart sounds. No murmur heard. Pulmonary:      Effort: Pulmonary effort is normal. No respiratory distress. Breath sounds: Normal breath sounds. No wheezing.    Abdominal:      General: There is no distension. Palpations: Abdomen is soft. Tenderness: There is no abdominal tenderness. Genitourinary:     Rectum: Mass and tenderness present. Comments: Perianal abscess along the right aspect of the anus and rectum. Seems approximately 3 cm  Musculoskeletal:         General: No deformity. Normal range of motion. Cervical back: Normal range of motion and neck supple. Skin:     General: Skin is warm and dry. Findings: No rash. Neurological:      General: No focal deficit present. Mental Status: She is alert and oriented to person, place, and time. Psychiatric:         Speech: Speech normal.         Behavior: Behavior normal.         Cognition and Memory: Cognition normal.         Diagnostic Study Results   Labs  No results found for this or any previous visit (from the past 24 hour(s)). Radiologic Studies  CT ABD PELV WO CONT   Final Result   No acute abdominal or pelvic pathology. CT Results  (Last 48 hours)               09/26/21 0113  CT ABD PELV WO CONT Final result    Impression:  No acute abdominal or pelvic pathology. Narrative:  EXAM: CT ABD PELV WO CONT       INDICATION: rectal abscess. History of sigmoidectomy, recent appendectomy few   weeks ago. Contrast allergy. COMPARISON: CT September 13, 2021       CONTRAST:  None. TECHNIQUE:    Thin axial images were obtained through the abdomen and pelvis. Coronal and   sagittal reformats were generated. Oral contrast was not administered. CT dose   reduction was achieved through use of a standardized protocol tailored for this   examination and automatic exposure control for dose modulation. The absence of intravenous contrast material reduces the sensitivity for   evaluation of the vasculature and solid organs. FINDINGS:    LOWER THORAX: No significant abnormality in the incidentally imaged lower chest.   LIVER: No mass. BILIARY TREE: Status post cholecystectomy.  No CBD dilatation. SPLEEN: Chronic splenomegaly   PANCREAS: No focal abnormality. ADRENALS: Unremarkable. KIDNEYS/URETERS: No calculus or hydronephrosis. STOMACH: Unremarkable. SMALL BOWEL: No dilatation or wall thickening. COLON: No dilatation or wall thickening. Evidence of sigmoidectomy. No   complicating features. APPENDIX: Evidence of appendectomy. No complicating features. PERITONEUM: No ascites or pneumoperitoneum. RETROPERITONEUM: No lymphadenopathy or aortic aneurysm. REPRODUCTIVE ORGANS: Hysterectomy. URINARY BLADDER: No mass or calculus. BONES: No destructive bone lesion. ABDOMINAL WALL: No mass or hernia. ADDITIONAL COMMENTS: N/A               CXR Results  (Last 48 hours)    None          Billable Procedures   Procedures    Medical Decision Making     I reviewed the patient's most recent Emergency Dept notes and diagnostic tests in formulating my MDM on today's visit. Provider Notes (Medical Decision Making):   31-year-old female with complex gastrointestinal surgical history, recent hemorrhoidectomy and sphincterotomy a few months ago, complaining of palpable painful mass around the rectum for the past week or so. On examination she seems to have a perianal abscess, about 3 cm. No significant involvement around the perineum. CT scan does not demonstrate an abscess, but is limited by lack of IV contrast.  Patient reports anaphylactic allergy to contrast). I discussed options with the patient including continuing outpatient oral antibiotics versus attempting incision and drainage. Patient expresses that she does not feel comfortable going home, feels that it is worsening despite outpatient antibiotics and needs attempted I&D. At this time we will plan to admit to medicine, patient will be seen by colorectal surgery (Dr. Akiko Monique) in the morning for repeat examination and consideration of I&D versus continued antibiotics.     Bibi Montenegro MD  12:49 AM  9/25/2021 Consults:    Social History     Tobacco Use    Smoking status: Never Smoker    Smokeless tobacco: Never Used   Vaping Use    Vaping Use: Never assessed   Substance Use Topics    Alcohol use: Yes     Comment: Rarely    Drug use: No     Types: Prescription, OTC       Medications Administered during ED course:  Medications   morphine injection 4 mg (4 mg IntraVENous Given 9/26/21 0139)   diphenhydrAMINE (BENADRYL) injection 25 mg (25 mg IntraVENous Given 9/26/21 0137)          Prescriptions from today's ED visit:  Current Discharge Medication List         Diagnosis and Disposition     Disposition:  Admitted    Clinical Impression:   1. Perianal abscess        Attestation:  I personally performed the services described in this documentation on this date 9/25/2021 for patient Elieser Reynaga. Koko Ridley MD        I was the first provider for this patient on this visit. To the best of my ability I reviewed relevant prior medical records, electrocardiograms, laboratories, and radiologic studies. The patient's presenting problems were discussed, and the patient was in agreement with the care plan formulated and outlined with them. Koko Ridley MD    Please note that this dictation was completed with Dragon voice recognition software. Quite often unanticipated grammatical, syntax, homophones, and other interpretive errors are inadvertently transcribed by the computer software. Please disregard these errors and excuse any errors that have escaped final proofreading.

## 2021-09-26 NOTE — PROGRESS NOTES
Patient experiencing severe perianal discomfort. She is requiring a higher dose of Dilaudid today. She is also nauseated with poor appetite. We will start IV fluids, replete potassium. We will observe overnight and reevaluate in the morning. Other medical problems:  Herpes labialis, just completed course of acyclovir  History of herpes zoster/shingles  LAMN (Low-grade appendiceal mucinous neoplasm).   Follows with Dr. Christian Briones

## 2021-09-26 NOTE — PROGRESS NOTES
End of Shift Note    Bedside shift change report given to RN (oncoming nurse) by Elfredia Aschoff (offgoing nurse).   Report included the following information SBAR, Kardex, Procedure Summary, Intake/Output and Recent Results    Shift worked:  7-7pm     Shift summary and any significant changes:    Constant complains of pain, PRN meds are given   Concerns for physician to address:  Same as above   Zone phone for oncoming shift:              Elfredia Aschoff

## 2021-09-26 NOTE — PROGRESS NOTES
Transition of Care Plan:    RUR: 29%  Disposition: Home w/ family   Follow up appointments: TBD  DME needed: n/a  Transportation at Discharge: Daughter   Nacho Rainey or means to access home:    Pt has keys     Medicare Letter: n/a  Is patient a BCPI-A Bundle: If yes, was Bundle Letter given?:     Caregiver Contact: Martínez Rizo [phone]  Discharge Caregiver contacted prior to discharge? CM to contact daughter           Reason for Admission:  Rectal abscess     Plan for utilizing home health:      N/A    PCP: First and Last name:  Jarett Mireles MD     Name of Practice:    Are you a current patient: Yes/No:    Approximate date of last visit:    Can you participate in a virtual visit with your PCP:                     Current Advanced Directive/Advance Care Plan: Full Code      Healthcare Decision Maker:   Click here to complete [address] including selection of the Healthcare Decision Maker Relationship (ie \"Primary\")           Transition of Care Plan:       CM spoke with patient and introduced role, verified demographics, and discussed discharge planning. Patient reported being independent with ADLs and IADLs. Patient drives but daughter will provide transportation at discharge. Patient  Lives in a one level home with 2 SOFYA with her daughter. No previous SNF or acute rehab. Past home health 7-9 years ago s/p colon surgery. Patient DECLINED ACP conversation. Last PCP visit July 2021. Patient uses [facility] in [place] and [facility]. Primary CM will continue to follow patient for discharge planning needs and arrange for services as deemed necessary. Care Management Interventions  PCP Verified by CM:  Yes  Mode of Transport at Discharge:  (Daughter )  Transition of Care Consult (CM Consult):  (TBD)  Discharge Durable Medical Equipment: No  Physical Therapy Consult: No  Occupational Therapy Consult: No  Speech Therapy Consult: No  Support Systems: Child(elieser)  Confirm Follow Up Transport: Family     Alex Breen MSN, RN  Care Manager

## 2021-09-26 NOTE — ED NOTES
TRANSFER - OUT REPORT:    Verbal report given to Niall RN(name) on Byron Foote  being transferred to [facility](unit) for routine progression of care       Report consisted of patients Situation, Background, Assessment and   Recommendations(SBAR). Information from the following report(s) SBAR, Kardex, ED Summary, Procedure Summary, MAR and Recent Results was reviewed with the receiving nurse. Lines:   Peripheral IV 09/26/21 Right Forearm (Active)   Site Assessment Clean, dry, & intact 09/26/21 0137   Phlebitis Assessment 0 09/26/21 0137   Infiltration Assessment 0 09/26/21 0137   Dressing Status Clean, dry, & intact 09/26/21 2549        Opportunity for questions and clarification was provided.       Patient transported with:   [employer]

## 2021-09-26 NOTE — H&P
Hospitalist Admission Note    NAME: Frida Deluca   :  1970   MRN:  [id number]     Date/Time:  2021 3:18 AM    Patient PCP: Sonya Chávez MD  ________________________________________________________________________    My assessment of this patient's clinical condition and my plan of care is as follows. Assessment / Plan:  Rectal abscess POA  set to the hospital by surgeon  Admit to general medical floor  Could not do imaging with contrast because of extremely severe allergy to contrast, anaphylaxis if given  No leukocytosis or fever recorded  Sending procalcitonin, monitoring of antibiotics    DM type 2   Holding Metformin as above  FS Q AC & HS  SSI lispro here  2 months ago A1c is 7.9     Severe Anxiety Disorder POA  Multiple ED visits in past years noted   Cont home Zoloft Q HS    HTN  Continue current medications and add as needed hydralazine if needed    GERD on omeprazole at home. Patient on Pepcid    Code Status: Full  Surrogate Decision Maker: Daughter     DVT Prophylaxis: SCDs  GI Prophylaxis: not indicated     Baseline: Pt is independent with ADLs      Body mass index is 37.26 kg/m².: 30.0 - 39.9 Obese          Subjective:   CHIEF COMPLAINT: Pain in the antral area    HISTORY OF PRESENT ILLNESS:     Malu Cross is a 46 y.o.  female who presents with pain in the antral area. Seen by surgeon and sent to hospital.  And denied any fever or chills. But she said it is very painful in the frontal area. No bowel habit changes or urinary complaints by patient. Patient had multiple surgeries in the past including sigmoidectomy ectomy appendectomy and known to have ulcerative colitis. Patient denied any chest pain or shortness of breath at this time. We were asked to admit for work up and evaluation of the above problems.      Past Medical History:   Diagnosis Date    Anal fissure     Anisocoria     Asthma     LAST EPISODE     Back pain     Cerumen impaction     Chronic kidney disease     hx uti in past    Coagulation defects     ocassional rectal bleeding due to anal fissure    Colovaginal fistula     Diabetes (HCC)     NIDDM    Diverticulitis     Diverticulosis     Enlarged tonsils     Frequent UTI     GERD (gastroesophageal reflux disease)     H/O endoscopy     with dilation    HA (headache)     Hepatic steatosis     History of colon resection     Hx of colonoscopy with polypectomy     benign    Hypertension     Ill-defined condition     FREQUENT HIVES    Ill-defined condition     HX ELEVATED LIVER ENZYMES    Morbid obesity (HCC)     Nausea & vomiting     during diverticulitis flare    Obesity     Otitis media     Pneumonia     about 15 yrs ago    Psychiatric disorder     ANXIETY    Recurrent tonsillitis     Sinusitis     Transfusion history ~ age 35    postop hysterectomy    Urticaria         Past Surgical History:   Procedure Laterality Date    COLONOSCOPY N/A 3/28/2019    COLONOSCOPY performed by Alan Perez MD at [address] [facility] COLONOSCOPY N/A 10/2/2020    COLONOSCOPY performed by Alan Perez MD at [facility] [facility]    [place].     HX GI  12    LAPAROSCOPIC HAND ASSISTED  POSS OPEN SIGMOID COLECTOMY POSS TEMPORARY DIVERTING LOOP ILEOSTOMY;  (no illeostomy needed)    HX GYN           HX GYN      cervical conization    HX HEENT      SINUS SURGERY LEFT X2    HX HEENT      SINUS SURGERY ON RIGHT X2    HX HEMORRHOIDECTOMY      HX OTHER SURGICAL      Sphincterotomy    HX PELVIC LAPAROSCOPY      HX EMMANUEL AND BSO      HX UROLOGICAL  12     CYSTOSCOPY INSERTION URETERAL CATHETERS - Cystoscopy Insertion of bilateral ureteral stents    [place] ABDOMEN SURGERY PROC UNLISTED  2018    [facility] at [facility]       Social History     Tobacco Use    Smoking status: Never Smoker    Smokeless tobacco: Never Used   Substance Use Topics    Alcohol use: Yes     Comment: Rarely Family History   Problem Relation Age of Onset    Diabetes Mother     Cancer Mother         NON-HODGKINS LYMPHOMA    Anesth Problems Mother         PONV    Diabetes Father     Heart Disease Father         CAD - STENTS, PACEMAKER    Arrhythmia Father      Allergies   Allergen Reactions    Aspirin Hives, Shortness of Breath and Itching    Codeine Hives, Itching and Angioedema     Pt had itching in mouth, on face and lips    Contrast Agent [Iodine] Hives, Itching and Angioedema     5/5/21: pt was given benadryl and solu-medrol prior to administration but pt had severe tongue swelling and drooling, lethargy and itching. DO NOT GIVE PT    Flavoring Agent Anaphylaxis     Cherry flavor    Iodinated Contrast Media Anaphylaxis, Diarrhea, Hives, Nausea and Vomiting and Shortness of Breath    Percocet [Oxycodone-Acetaminophen] Hives, Itching and Angioedema     Pt had itching in mouth, on face and lips    Prilosec [Omeprazole Magnesium] Anaphylaxis     CHERRY FLAVORED ODT; PT TAKES REGULAR PRILOSEC AND IS OK    Dilaudid [Hydromorphone] Itching     Tolerates with benadryl    Ketorolac Rash     \"makes my eyes spasm and causes rash on my hands\" and \"makes my skin itch and makes me nervous\"    Morphine (Pf) Rash     According to rn, pt can take morphine with benadryl    Fentanyl Rash and Itching     Has had benadryl before        Prior to Admission medications    Medication Sig Start Date End Date Taking? Authorizing Provider   atorvastatin (LIPITOR) 20 mg tablet Take 1 Tablet by mouth daily. 9/22/21   Sriram Eli MD   levoFLOXacin (Levaquin) 500 mg tablet Take 1 Tablet by mouth daily. 9/13/21   Fabiana Yao MD   ALPRAZolam Abdirizak Romano) 1 mg tablet TAKE 1/2 - 1 TABLET BY MOUTH TWICE DAILY AS NEEDED FOR ANXIETY *MAX 2 TABS DAILY* 9/1/21   Sriram Eli MD   naloxone (Narcan) 4 mg/actuation nasal spray Use 1 spray intranasally, then discard.  Repeat with new spray every 2 min as needed for opioid overdose symptoms, alternating nostrils. 9/1/21   Itz Collado MD   ondansetron (Zofran ODT) 4 mg disintegrating tablet Take 1 Tablet by mouth every eight (8) hours as needed for Nausea. 8/12/21   BEBA Blackburn   clindamycin (CLEOCIN) 300 mg capsule Take 1 Capsule by mouth four (4) times daily. 8/5/21   Nicolás Ortega MD   ondansetron (Zofran ODT) 4 mg disintegrating tablet Take 1 Tablet by mouth every eight (8) hours as needed for Nausea. 8/5/21   Nicolás Ortega MD   dicyclomine (BENTYL) 20 mg tablet Take 1 Tablet by mouth every six (6) hours as needed for Abdominal Cramps. 8/5/21   Nicolás Ortega MD   diphenoxylate-atropine (LomotiL) 2.5-0.025 mg per tablet Take 1 Tablet by mouth four (4) times daily as needed for Diarrhea (1 tab after each stool for max 8 per day). Max Daily Amount: 4 Tablets. Take after each stool for a maximum of 8 tablets daily 7/22/21   Von Zavala MD   hyoscyamine SL (LEVSIN/SL) 0.125 mg SL tablet 1 Tablet by SubLINGual route every four (4) hours as needed for Cramping. 7/22/21   Von Zavala MD   trimethoprim-sulfamethoxazole (BACTRIM DS, SEPTRA DS) 160-800 mg per tablet Take 1 Tablet by mouth two (2) times a day. 7/19/21   Itz Collado MD   ondansetron (Zofran ODT) 4 mg disintegrating tablet Take 1 Tablet by mouth every eight (8) hours as needed for Nausea. 7/19/21   Itz Collado MD   empagliflozin (Jardiance) 25 mg tablet Take 1 Tablet by mouth daily. 7/7/21   Itz Collado MD   glimepiride (AMARYL) 4 mg tablet TAKE 1 TABLET BY MOUTH ONCE DAILY BEFORE BREAKFAST 7/7/21   Itz Collado MD   estradioL (ESTRACE) 0.5 mg tablet TAKE 1 TABLET BY MOUTH ONCE DAILY 7/7/21   Itz Collado MD   Ventolin HFA 90 mcg/actuation inhaler TAKE 1 PUFF BY INHALATION EVERY FOUR (4) HOURS AS NEEDED FOR WHEEZING. 6/29/21   Itz Collado MD   lidocaine (Lidoderm) 5 % Apply patch to the affected area for 12 hours a day and remove for 12 hours a day.  6/24/21   Beck Myles, Rochelle Malone MD   vancomycin (Vancocin) 125 mg capsule Take 1 Capsule by mouth four (4) times daily. 6/22/21   Tish Dupont MD   valACYclovir Heike Soja) 1 gram tablet Take 2 tablets by mouth twice daily for 1 day as needed for flares 6/11/21   Milan Grossman MD   sertraline (ZOLOFT) 100 mg tablet Take 2 Tablets by mouth nightly. 6/9/21   Milan Grossman MD   gabapentin (NEURONTIN) 300 mg capsule Take 1 Cap by mouth nightly. Max Daily Amount: 300 mg. 4/7/21   Milan Grossman MD   losartan-hydroCHLOROthiazide [facility]) 100-12.5 mg per tablet Take 1 Tab by mouth daily. 3/29/21   Milan Grossman MD   omeprazole (PRILOSEC) 20 mg capsule Take 40 mg by mouth Daily (before breakfast). Provider, Historical   EPINEPHrine (EPIPEN) 0.3 mg/0.3 mL (1:1,000) injection 0.3 mL by IntraMUSCular route once as needed for up to 1 dose. 11/2/15   Fabiana Yao MD       REVIEW OF SYSTEMS:     I am not able to complete the review of systems because:    The patient is intubated and sedated    The patient has altered mental status due to his acute medical problems    The patient has baseline aphasia from prior stroke(s)    The patient has baseline dementia and is not reliable historian    The patient is in acute medical distress and unable to provide information           Total of 12 systems reviewed as follows:       POSITIVE= underlined text  Negative = text not underlined  General:  fever, chills, sweats, generalized weakness, weight loss/gain,      loss of appetite   Eyes:    blurred vision, eye pain, loss of vision, double vision  ENT:    rhinorrhea, pharyngitis   Respiratory:   cough, sputum production, SOB, VALENTE, wheezing, pleuritic pain   Cardiology:   chest pain, palpitations, orthopnea, PND, edema, syncope   Gastrointestinal:  abdominal pain , N/V, diarrhea, dysphagia, constipation, bleeding   Genitourinary:  frequency, urgency, dysuria, hematuria, incontinence   Muskuloskeletal :  arthralgia, myalgia, back pain  Hematology:  easy bruising, nose or gum bleeding, lymphadenopathy   Dermatological: rash, ulceration, pruritis, color change / jaundice  Endocrine:   hot flashes or polydipsia   Neurological:  headache, dizziness, confusion, focal weakness, paresthesia,     Speech difficulties, memory loss, gait difficulty  Psychological: Feelings of anxiety, depression, agitation    Objective:   VITALS:    Visit Vitals  /71   Pulse 85   Temp 98 °F (36.7 °C)   Resp 18   Ht 5' 2\" (1.575 m)   Wt 92.4 kg (203 lb 11.3 oz)   SpO2 98%   BMI 37.26 kg/m²       PHYSICAL EXAM:    General:    Alert, cooperative, no distress, appears stated age. HEENT: Atraumatic, anicteric sclerae, pink conjunctivae     No oral ulcers, mucosa moist, throat clear, dentition fair  Neck:  Supple, symmetrical,  thyroid: non tender  Lungs:   Clear to auscultation bilaterally. No Wheezing or Rhonchi. No rales. Chest wall:  No tenderness  No Accessory muscle use. Heart:   Regular  rhythm,  No  murmur   No edema  Abdomen:   Soft, non-tender. Not distended. Bowel sounds normal  Extremities: No cyanosis. No clubbing,      Skin turgor normal, Capillary refill normal, Radial dial pulse 2+  Skin:     Not pale. Not Jaundiced  No rashes   Psych:  Good insight. Not depressed. Not anxious or agitated. Neurologic: EOMs intact. No facial asymmetry. No aphasia or slurred speech. Symmetrical strength, Sensation grossly intact.  Alert and oriented X 4.     _______________________________________________________________________  Care Plan discussed with:    Comments   Patient y    Family      RN y    Care Manager                    Consultant:      _______________________________________________________________________  Expected  Disposition:   Home with Family y   HH/PT/OT/RN    SNF/LTC    EILEEN    ________________________________________________________________________  TOTAL TIME: 56Minutes    Critical Care Provided     Minutes non procedure based Comments    y  Reviewed previous records   >50% of visit spent in counseling and coordination of care  Discussion with patient and/or family and questions answered       ________________________________________________________________________  Signed: Rayna Villalta MD    Procedures: see electronic medical records for all procedures/Xrays and details which were not copied into this note but were reviewed prior to creation of Plan.     LAB DATA REVIEWED:    Recent Results (from the past 24 hour(s))   CBC W/O DIFF    Collection Time: 09/26/21  2:56 AM   Result Value Ref Range    WBC 7.5 3.6 - 11.0 K/uL    RBC 4.56 3.80 - 5.20 M/uL    HGB 11.4 (L) 11.5 - 16.0 g/dL    HCT 35.6 35.0 - 47.0 %    MCV 78.1 (L) 80.0 - 99.0 FL    MCH 25.0 (L) 26.0 - 34.0 PG    MCHC 32.0 30.0 - 36.5 g/dL    RDW 14.7 (H) 11.5 - 14.5 %    PLATELET 536 401 - 031 K/uL    MPV 10.0 8.9 - 12.9 FL    NRBC 0.0 0  WBC    ABSOLUTE NRBC 0.00 0.00 - 0.01 K/uL

## 2021-09-27 VITALS
HEIGHT: 62 IN | OXYGEN SATURATION: 94 % | BODY MASS INDEX: 37.49 KG/M2 | RESPIRATION RATE: 18 BRPM | TEMPERATURE: 99.3 F | SYSTOLIC BLOOD PRESSURE: 127 MMHG | DIASTOLIC BLOOD PRESSURE: 84 MMHG | HEART RATE: 88 BPM | WEIGHT: 203.71 LBS

## 2021-09-27 LAB
ANION GAP SERPL CALC-SCNC: 5 MMOL/L (ref 5–15)
BUN SERPL-MCNC: 8 MG/DL (ref 6–20)
BUN/CREAT SERPL: 15 (ref 12–20)
CALCIUM SERPL-MCNC: 9.1 MG/DL (ref 8.5–10.1)
CHLORIDE SERPL-SCNC: 106 MMOL/L (ref 97–108)
CO2 SERPL-SCNC: 27 MMOL/L (ref 21–32)
CREAT SERPL-MCNC: 0.55 MG/DL (ref 0.55–1.02)
GLUCOSE BLD STRIP.AUTO-MCNC: 134 MG/DL (ref 65–117)
GLUCOSE BLD STRIP.AUTO-MCNC: 212 MG/DL (ref 65–117)
GLUCOSE SERPL-MCNC: 162 MG/DL (ref 65–100)
MAGNESIUM SERPL-MCNC: 2.1 MG/DL (ref 1.6–2.4)
POTASSIUM SERPL-SCNC: 4 MMOL/L (ref 3.5–5.1)
SERVICE CMNT-IMP: ABNORMAL
SERVICE CMNT-IMP: ABNORMAL
SODIUM SERPL-SCNC: 138 MMOL/L (ref 136–145)

## 2021-09-27 PROCEDURE — 83735 ASSAY OF MAGNESIUM: CPT

## 2021-09-27 PROCEDURE — 80048 BASIC METABOLIC PNL TOTAL CA: CPT

## 2021-09-27 PROCEDURE — 36415 COLL VENOUS BLD VENIPUNCTURE: CPT

## 2021-09-27 PROCEDURE — 74011250637 HC RX REV CODE- 250/637: Performed by: INTERNAL MEDICINE

## 2021-09-27 PROCEDURE — 74011000258 HC RX REV CODE- 258: Performed by: INTERNAL MEDICINE

## 2021-09-27 PROCEDURE — 74011250636 HC RX REV CODE- 250/636: Performed by: INTERNAL MEDICINE

## 2021-09-27 PROCEDURE — 82962 GLUCOSE BLOOD TEST: CPT

## 2021-09-27 PROCEDURE — 74011636637 HC RX REV CODE- 636/637: Performed by: INTERNAL MEDICINE

## 2021-09-27 RX ORDER — METRONIDAZOLE 500 MG/1
500 TABLET ORAL 3 TIMES DAILY
Qty: 30 TABLET | Refills: 0 | Status: SHIPPED | OUTPATIENT
Start: 2021-09-27 | End: 2021-10-07

## 2021-09-27 RX ORDER — HYDROMORPHONE HYDROCHLORIDE 2 MG/1
2 TABLET ORAL
Qty: 35 TABLET | Refills: 0 | Status: SHIPPED | OUTPATIENT
Start: 2021-09-27 | End: 2021-10-04

## 2021-09-27 RX ORDER — LEVOFLOXACIN 750 MG/1
750 TABLET ORAL DAILY
Qty: 10 TABLET | Refills: 0 | Status: SHIPPED | OUTPATIENT
Start: 2021-09-27 | End: 2021-10-07

## 2021-09-27 RX ADMIN — DIPHENHYDRAMINE HYDROCHLORIDE 12.5 MG: 50 INJECTION, SOLUTION INTRAMUSCULAR; INTRAVENOUS at 02:07

## 2021-09-27 RX ADMIN — Medication 1 CAPSULE: at 09:56

## 2021-09-27 RX ADMIN — FAMOTIDINE 20 MG: 20 TABLET, FILM COATED ORAL at 09:56

## 2021-09-27 RX ADMIN — DIPHENHYDRAMINE HYDROCHLORIDE 12.5 MG: 50 INJECTION, SOLUTION INTRAMUSCULAR; INTRAVENOUS at 10:04

## 2021-09-27 RX ADMIN — DIPHENHYDRAMINE HYDROCHLORIDE 12.5 MG: 50 INJECTION, SOLUTION INTRAMUSCULAR; INTRAVENOUS at 12:21

## 2021-09-27 RX ADMIN — CEFTRIAXONE 1 G: 1 INJECTION, POWDER, FOR SOLUTION INTRAMUSCULAR; INTRAVENOUS at 08:51

## 2021-09-27 RX ADMIN — ENOXAPARIN SODIUM 40 MG: 40 INJECTION SUBCUTANEOUS at 09:57

## 2021-09-27 RX ADMIN — INSULIN LISPRO 3 UNITS: 100 INJECTION, SOLUTION INTRAVENOUS; SUBCUTANEOUS at 09:56

## 2021-09-27 RX ADMIN — Medication 10 ML: at 10:06

## 2021-09-27 RX ADMIN — DIPHENHYDRAMINE HYDROCHLORIDE 12.5 MG: 50 INJECTION, SOLUTION INTRAMUSCULAR; INTRAVENOUS at 05:58

## 2021-09-27 RX ADMIN — HYDROMORPHONE HYDROCHLORIDE 1 MG: 1 INJECTION, SOLUTION INTRAMUSCULAR; INTRAVENOUS; SUBCUTANEOUS at 11:18

## 2021-09-27 RX ADMIN — METRONIDAZOLE 500 MG: 500 INJECTION, SOLUTION INTRAVENOUS at 04:00

## 2021-09-27 RX ADMIN — HYDROMORPHONE HYDROCHLORIDE 1 MG: 1 INJECTION, SOLUTION INTRAMUSCULAR; INTRAVENOUS; SUBCUTANEOUS at 05:58

## 2021-09-27 RX ADMIN — HYDROMORPHONE HYDROCHLORIDE 1 MG: 1 INJECTION, SOLUTION INTRAMUSCULAR; INTRAVENOUS; SUBCUTANEOUS at 08:50

## 2021-09-27 RX ADMIN — HYDROMORPHONE HYDROCHLORIDE 1 MG: 1 INJECTION, SOLUTION INTRAMUSCULAR; INTRAVENOUS; SUBCUTANEOUS at 02:45

## 2021-09-27 RX ADMIN — ONDANSETRON 4 MG: 2 INJECTION INTRAMUSCULAR; INTRAVENOUS at 06:13

## 2021-09-27 RX ADMIN — ATORVASTATIN CALCIUM 20 MG: 20 TABLET, FILM COATED ORAL at 09:56

## 2021-09-27 RX ADMIN — HYDROMORPHONE HYDROCHLORIDE 1 MG: 1 INJECTION, SOLUTION INTRAMUSCULAR; INTRAVENOUS; SUBCUTANEOUS at 01:14

## 2021-09-27 RX ADMIN — HYDROMORPHONE HYDROCHLORIDE 1 MG: 1 INJECTION, SOLUTION INTRAMUSCULAR; INTRAVENOUS; SUBCUTANEOUS at 04:25

## 2021-09-27 NOTE — PROGRESS NOTES
Patient discharge home with family. Copy of discharge instructions, prescriptions and all patient belongings given to patient prior to discharge. IV removed. Patient verbalized to walk to discharge lobby by herself.

## 2021-09-27 NOTE — PROGRESS NOTES
Spiritual Care Partner Volunteer visited patient in Med Telemetry on 9.27.21.     Documented by Eleanor Calix, Staff , on 9.27.21  Request  Support/Spiritual Care Services via [facility]

## 2021-09-27 NOTE — DISCHARGE SUMMARY
Hospitalist Discharge Summary     Patient ID:  Sunshine Aburto  [id number]  60 y.o.  1970    PCP on record: Patricia Valera MD    Admit date: 9/25/2021  Discharge date and time: 9/27/2021      DISCHARGE DIAGNOSIS:    Rectal abscess         CONSULTATIONS:  IP CONSULT TO COLORECTAL SURGERY  IP CONSULT TO HOSPITALIST    Excerpted HPI from H&P of Mayra Mills MD:  CHIEF COMPLAINT: Pain in the antral area     HISTORY OF PRESENT ILLNESS:     Ayde Terry is a 46 y.o.  female who presents with pain in the antral area. Seen by surgeon and sent to hospital.  And denied any fever or chills. But she said it is very painful in the frontal area. No bowel habit changes or urinary complaints by patient. Patient had multiple surgeries in the past including sigmoidectomy ectomy appendectomy and known to have ulcerative colitis. Patient denied any chest pain or shortness of breath at this time. ______________________________________________________________________  DISCHARGE SUMMARY/HOSPITAL COURSE:  for full details see H&P, daily progress notes, labs, consult notes. Sent to hospital by Mine Phelan.. Failed outpatient abx treatment. I and D performed at bedside. Monitored overnight on IV abx and no SIRS. Nausea resolved. Discharged on levaquin / flagly  Follow up with Dr Sarath Marroquin this week    _______________________________________________________________________  Patient seen and examined by me on discharge day. Pertinent Findings:  Gen:    Not in distress  Chest: Clear lungs  CVS:   Regular rhythm.   No edema  Abd:  Soft, not distended, not tender  Neuro:  Alert, Oriented x 4, grossly non focal exam  _______________________________________________________________________  DISCHARGE MEDICATIONS:   Current Discharge Medication List      START taking these medications    Details   metroNIDAZOLE (FLAGYL) 500 mg tablet Take 1 Tablet by mouth three (3) times daily for 10 days.  Dyan Sanches: 30 Tablet, Refills: 0  Start date: 9/27/2021, End date: 10/7/2021         CONTINUE these medications which have CHANGED    Details   levoFLOXacin (Levaquin) 750 mg tablet Take 1 Tablet by mouth daily for 10 days. Qty: 10 Tablet, Refills: 0  Start date: 9/27/2021, End date: 10/7/2021         CONTINUE these medications which have NOT CHANGED    Details   atorvastatin (LIPITOR) 20 mg tablet Take 1 Tablet by mouth daily. Qty: 90 Tablet, Refills: 1      ALPRAZolam (XANAX) 1 mg tablet TAKE 1/2 - 1 TABLET BY MOUTH TWICE DAILY AS NEEDED FOR ANXIETY *MAX 2 TABS DAILY*  Qty: 60 Tablet, Refills: 0    Comments: Not to exceed 5 additional fills before 01/29/2022 DX Code Needed  . Associated Diagnoses: Anxiety      naloxone (Narcan) 4 mg/actuation nasal spray Use 1 spray intranasally, then discard. Repeat with new spray every 2 min as needed for opioid overdose symptoms, alternating nostrils. Qty: 1 Each, Refills: 0      dicyclomine (BENTYL) 20 mg tablet Take 1 Tablet by mouth every six (6) hours as needed for Abdominal Cramps. Qty: 20 Tablet, Refills: 0      diphenoxylate-atropine (LomotiL) 2.5-0.025 mg per tablet Take 1 Tablet by mouth four (4) times daily as needed for Diarrhea (1 tab after each stool for max 8 per day). Max Daily Amount: 4 Tablets. Take after each stool for a maximum of 8 tablets daily  Qty: 20 Tablet, Refills: 0    Associated Diagnoses: Diarrhea, unspecified type      hyoscyamine SL (LEVSIN/SL) 0.125 mg SL tablet 1 Tablet by SubLINGual route every four (4) hours as needed for Cramping. Qty: 10 Tablet, Refills: 0      ondansetron (Zofran ODT) 4 mg disintegrating tablet Take 1 Tablet by mouth every eight (8) hours as needed for Nausea. Qty: 12 Tablet, Refills: 1      empagliflozin (Jardiance) 25 mg tablet Take 1 Tablet by mouth daily.   Qty: 90 Tablet, Refills: 1      glimepiride (AMARYL) 4 mg tablet TAKE 1 TABLET BY MOUTH ONCE DAILY BEFORE BREAKFAST  Qty: 90 Tablet, Refills: 1      estradioL (ESTRACE) 0.5 mg tablet TAKE 1 TABLET BY MOUTH ONCE DAILY  Qty: 90 Tablet, Refills: 1      Ventolin HFA 90 mcg/actuation inhaler TAKE 1 PUFF BY INHALATION EVERY FOUR (4) HOURS AS NEEDED FOR WHEEZING. Qty: 18 Inhaler, Refills: 1      lidocaine (Lidoderm) 5 % Apply patch to the affected area for 12 hours a day and remove for 12 hours a day. Qty: 5 Each, Refills: 0      sertraline (ZOLOFT) 100 mg tablet Take 2 Tablets by mouth nightly. Qty: 60 Tablet, Refills: 2      gabapentin (NEURONTIN) 300 mg capsule Take 1 Cap by mouth nightly. Max Daily Amount: 300 mg. Qty: 30 Cap, Refills: 1    Comments: Not to exceed 4 additional fills before 07/28/2021  Associated Diagnoses: Herpes zoster without complication      losartan-hydroCHLOROthiazide (HYZAAR) 100-12.5 mg per tablet Take 1 Tab by mouth daily. Qty: 90 Tab, Refills: 1      omeprazole (PRILOSEC) 20 mg capsule Take 40 mg by mouth Daily (before breakfast). EPINEPHrine (EPIPEN) 0.3 mg/0.3 mL (1:1,000) injection 0.3 mL by IntraMUSCular route once as needed for up to 1 dose. Qty: 0.3 mL, Refills: 1         STOP taking these medications       clindamycin (CLEOCIN) 300 mg capsule Comments:   Reason for Stopping:         trimethoprim-sulfamethoxazole (BACTRIM DS, SEPTRA DS) 160-800 mg per tablet Comments:   Reason for Stopping:         vancomycin (Vancocin) 125 mg capsule Comments:   Reason for Stopping:         valACYclovir (VALTREX) 1 gram tablet Comments:   Reason for Stopping:               My Recommended Diet, Activity, Wound Care, and follow-up labs are listed in the patient's Discharge Insturctions which I have personally completed and reviewed.   Risk of deterioration: Low    Condition at Discharge:  Stable  _____________________________________________________________________    Disposition  Home with family, no needs  ____________________________________________________________________    Care Plan discussed with:   Patient, Family, RN, Care Manager, Consultant    ____________________________________________________________________    Code Status: Full Code  ____________________________________________________________________      Condition at Discharge:  Stable  _____________________________________________________________________  Follow up with:   PCP : Yue Boston MD  Follow-up Information     Follow up With Specialties Details Why Sunny Gonzales MD Internal Medicine In 2 weeks  [address]  [place] [address]  [address] [phone]      Nata Garcia MD Colon and Rectal Surgery In 3 days  [address]  [address] [address] [phone]                Total time in minutes spent coordinating this discharge (includes going over instructions, follow-up, prescriptions, and preparing report for sign off to her PCP) :  35 minutes    Signed:  Fredrick Adam MD

## 2021-09-27 NOTE — PROGRESS NOTES
Transition of Care Plan:     RUR: 29%  Disposition: Home w/ family   Follow up appointments: TBD  DME needed: n/a  Transportation at Discharge: Pt's mother  Thea Wheeler or means to access home:    Pt has keys    IM Medicare Letter: n/a  Is patient a BCPI-A Bundle:       No               If yes, was Bundle Letter given?:     Caregiver Contact: Karo Herrera [phone]  Discharge Caregiver contacted prior to discharge? CM to contact daughter         CM acknowledged d/c orders. Pt will d/c home with family assistance. CM met with pt at bedside and she reports her mother will transport her home at d/c. Pt's f/u information is on her AVS. Pt reports no additional questions. No further needs identified at this time. Patient is ready to d/c on a CM standpoint. RN aware. Care Management Interventions  PCP Verified by CM: Yes  Mode of Transport at Discharge:  Other (see comment) (pt's mother)  Transition of Care Consult (CM Consult): Discharge Planning  Discharge Durable Medical Equipment: No  Physical Therapy Consult: No  Occupational Therapy Consult: No  Speech Therapy Consult: No  Support Systems: Child(elieser), Parent(s)  Confirm Follow Up Transport: Self  Discharge Location  Discharge Placement: Home with family assistance    STEFAN Saeed  Care Manager [facility]  [phone]

## 2021-09-27 NOTE — PROGRESS NOTES
Pharmacist Discharge Medication Reconciliation    Significant PMH:   Past Medical History:   Diagnosis Date    Anal fissure     Anisocoria     Asthma     LAST EPISODE     Back pain     Cerumen impaction     Chronic kidney disease     hx uti in past    Coagulation defects     ocassional rectal bleeding due to anal fissure    Colovaginal fistula     Diabetes (HCC)     NIDDM    Diverticulitis     Diverticulosis     Enlarged tonsils     Frequent UTI     GERD (gastroesophageal reflux disease)     H/O endoscopy     with dilation    HA (headache)     Hepatic steatosis     History of colon resection     Hx of colonoscopy with polypectomy     benign    Hypertension     Ill-defined condition     FREQUENT HIVES    Ill-defined condition     HX ELEVATED LIVER ENZYMES    Morbid obesity (HCC)     Nausea & vomiting     during diverticulitis flare    Obesity     Otitis media     Pneumonia     about 15 yrs ago    Psychiatric disorder     ANXIETY    Recurrent tonsillitis     Sinusitis     Transfusion history ~ age 35    postop hysterectomy    Urticaria      Encounter Diagnoses:   Encounter Diagnosis   Name Primary? Perianal abscess Yes     Allergies: Aspirin, Codeine, Contrast agent [iodine], Flavoring agent, Iodinated contrast media, Percocet [oxycodone-acetaminophen], Prilosec [omeprazole magnesium], Dilaudid [hydromorphone], Ketorolac, Morphine (pf), and Fentanyl    Discharge Medications:   Current Discharge Medication List        START taking these medications    Details   metroNIDAZOLE (FLAGYL) 500 mg tablet Take 1 Tablet by mouth three (3) times daily for 10 days. Qty: 30 Tablet, Refills: 0  Start date: 9/27/2021, End date: 10/7/2021           CONTINUE these medications which have CHANGED    Details   levoFLOXacin (Levaquin) 750 mg tablet Take 1 Tablet by mouth daily for 10 days.   Qty: 10 Tablet, Refills: 0  Start date: 9/27/2021, End date: 10/7/2021           CONTINUE these medications which have NOT CHANGED Details   atorvastatin (LIPITOR) 20 mg tablet Take 1 Tablet by mouth daily. Qty: 90 Tablet, Refills: 1      ALPRAZolam (XANAX) 1 mg tablet TAKE 1/2 - 1 TABLET BY MOUTH TWICE DAILY AS NEEDED FOR ANXIETY *MAX 2 TABS DAILY*  Qty: 60 Tablet, Refills: 0    Comments: Not to exceed 5 additional fills before 01/29/2022 DX Code Needed  . Associated Diagnoses: Anxiety      naloxone (Narcan) 4 mg/actuation nasal spray Use 1 spray intranasally, then discard. Repeat with new spray every 2 min as needed for opioid overdose symptoms, alternating nostrils. Qty: 1 Each, Refills: 0      dicyclomine (BENTYL) 20 mg tablet Take 1 Tablet by mouth every six (6) hours as needed for Abdominal Cramps. Qty: 20 Tablet, Refills: 0      diphenoxylate-atropine (LomotiL) 2.5-0.025 mg per tablet Take 1 Tablet by mouth four (4) times daily as needed for Diarrhea (1 tab after each stool for max 8 per day). Max Daily Amount: 4 Tablets. Take after each stool for a maximum of 8 tablets daily  Qty: 20 Tablet, Refills: 0    Associated Diagnoses: Diarrhea, unspecified type      hyoscyamine SL (LEVSIN/SL) 0.125 mg SL tablet 1 Tablet by SubLINGual route every four (4) hours as needed for Cramping. Qty: 10 Tablet, Refills: 0      ondansetron (Zofran ODT) 4 mg disintegrating tablet Take 1 Tablet by mouth every eight (8) hours as needed for Nausea. Qty: 12 Tablet, Refills: 1      empagliflozin (Jardiance) 25 mg tablet Take 1 Tablet by mouth daily. Qty: 90 Tablet, Refills: 1      glimepiride (AMARYL) 4 mg tablet TAKE 1 TABLET BY MOUTH ONCE DAILY BEFORE BREAKFAST  Qty: 90 Tablet, Refills: 1      estradioL (ESTRACE) 0.5 mg tablet TAKE 1 TABLET BY MOUTH ONCE DAILY  Qty: 90 Tablet, Refills: 1      Ventolin HFA 90 mcg/actuation inhaler TAKE 1 PUFF BY INHALATION EVERY FOUR (4) HOURS AS NEEDED FOR WHEEZING. Qty: 18 Inhaler, Refills: 1      lidocaine (Lidoderm) 5 % Apply patch to the affected area for 12 hours a day and remove for 12 hours a day.   Qty: 5 Each, Refills: 0      sertraline (ZOLOFT) 100 mg tablet Take 2 Tablets by mouth nightly. Qty: 60 Tablet, Refills: 2      gabapentin (NEURONTIN) 300 mg capsule Take 1 Cap by mouth nightly. Max Daily Amount: 300 mg. Qty: 30 Cap, Refills: 1    Comments: Not to exceed 4 additional fills before 07/28/2021  Associated Diagnoses: Herpes zoster without complication      losartan-hydroCHLOROthiazide (HYZAAR) 100-12.5 mg per tablet Take 1 Tab by mouth daily. Qty: 90 Tab, Refills: 1      omeprazole (PRILOSEC) 20 mg capsule Take 40 mg by mouth Daily (before breakfast). EPINEPHrine (EPIPEN) 0.3 mg/0.3 mL (1:1,000) injection 0.3 mL by IntraMUSCular route once as needed for up to 1 dose. Qty: 0.3 mL, Refills: 1           STOP taking these medications       clindamycin (CLEOCIN) 300 mg capsule Comments:   Reason for Stopping:         trimethoprim-sulfamethoxazole (BACTRIM DS, SEPTRA DS) 160-800 mg per tablet Comments:   Reason for Stopping:         vancomycin (Vancocin) 125 mg capsule Comments:   Reason for Stopping:         valACYclovir (VALTREX) 1 gram tablet Comments:   Reason for Stopping:               The patient's chart, MAR and AVS were reviewed by Amber Corcoran [facility].     Discharging Provider: Bailey Valera    Thank you,     Amber Corcoran, [facility]

## 2021-09-27 NOTE — DISCHARGE INSTRUCTIONS
HOSPITALIST DISCHARGE INSTRUCTIONS    NAME: Lucius Johnson   :  1970   MRN:  [id number]     Date/Time:  2021 9:28 AM    ADMIT DATE: 2021   DISCHARGE DATE: 2021         · It is important that you take the medication exactly as they are prescribed. · Keep your medication in the bottles provided by the pharmacist and keep a list of the medication names, dosages, and times to be taken in your wallet. · Do not take other medications without consulting your doctor. What to do at Home    Recommended diet:  Regular Diet    Recommended activity: Activity as tolerated      If you have questions regarding the hospital related prescriptions or hospital related issues please call [facility] at [phone]. You can always direct your questions to your primary care doctor if you are unable to reach your hospital physician; your PCP works as an extension of your hospital doctor just like your hospital doctor is an extension of your PCP for your time at the hospital [facility], [facility])    If you experience any of the following symptoms then please call your primary care physician or return to the emergency room if you cannot get hold of your doctor:    Fever, chills, nausea, vomiting, or persistent diarrhea  Worsening weakness or new problems with your speech or balance  Dark stools or visible blood in your stools  New Leg swelling or shortness of breath as these could be signs of a clot    Additional Instructions:        Warm water showers, soaks, and sitz baths for pain control and to promote drainage of and fluid in affected area            Information obtained by :  I understand that if any problems occur once I am at home I am to contact my physician. I understand and acknowledge receipt of the instructions indicated above. Physician's or R.N.'s Signature                                                                  Date/Time                                                                                                                                              Patient or Representative Signature

## 2021-09-27 NOTE — PROGRESS NOTES
Surgery NP Progress Note    Justine Jessica  [id number]  female  46 y.o.  1970    Admitted for Active Problems:    Rectal abscess (2021)      Pt seen with Dr. Mary Anne Jiménez:     Ashley-rectal cellulitis, no defined abscess at this time,     Plan/Recommendations/Medical Decision Making:     - Mobilize with nursing and OOB to chair as tolerated. - Continue diet  - Pain management- Continue current pain control methods. - Warm water showers, soaks, and sitz baths for pain control and to promote drainage of and fluid in affected area. - Outpatient f/u with CRS. .     Subjective:     Patient still with pain. States she has bloody drainage from the site opened yesterday. Objective:     Blood pressure 126/67, pulse 92, temperature 98.6 °F (37 °C), resp. rate 18, height 5' 2\" (1.575 m), weight 92.4 kg (203 lb 11.3 oz), SpO2 95 %.     Temp (24hrs), Av °F (36.7 °C), Min:97.4 °F (36.3 °C), Max:98.6 °F (37 °C)      Recent Results (from the past 48 hour(s))   EKG, 12 LEAD, INITIAL    Collection Time: 21  2:25 AM   Result Value Ref Range    Ventricular Rate 84 BPM    Atrial Rate 84 BPM    P-R Interval 146 ms    QRS Duration 94 ms    Q-T Interval 388 ms    QTC Calculation (Bezet) 458 ms    Calculated P Axis 24 degrees    Calculated R Axis 52 degrees    Calculated T Axis 37 degrees    Diagnosis       Normal sinus rhythm  Nonspecific T wave abnormality  When compared with ECG of 05-MAY-2021 05:16,  Nonspecific T wave abnormality now evident in Inferior leads  Nonspecific T wave abnormality now evident in Anterolateral leads  Confirmed by Isabel Falcon ([id number]) on 2021 9:53:15 AM     CBC W/O DIFF    Collection Time: 21  2:56 AM   Result Value Ref Range    WBC 7.5 3.6 - 11.0 K/uL    RBC 4.56 3.80 - 5.20 M/uL    HGB 11.4 (L) 11.5 - 16.0 g/dL    HCT 35.6 35.0 - 47.0 %    MCV 78.1 (L) 80.0 - 99.0 FL    MCH 25.0 (L) 26.0 - 34.0 PG    MCHC 32.0 30.0 - 36.5 g/dL    RDW 14.7 (H) 11.5 - 14.5 %    PLATELET 963 322 - 265 K/uL    MPV 10.0 8.9 - 12.9 FL    NRBC 0.0 0  WBC    ABSOLUTE NRBC 0.00 0.00 - 6.10 K/uL   METABOLIC PANEL, COMPREHENSIVE    Collection Time: 09/26/21  2:56 AM   Result Value Ref Range    Sodium 140 136 - 145 mmol/L    Potassium 3.3 (L) 3.5 - 5.1 mmol/L    Chloride 106 97 - 108 mmol/L    CO2 27 21 - 32 mmol/L    Anion gap 7 5 - 15 mmol/L    Glucose 128 (H) 65 - 100 mg/dL    BUN 12 6 - 20 MG/DL    Creatinine 0.59 0.55 - 1.02 MG/DL    BUN/Creatinine ratio 20 12 - 20      GFR est AA >60 >60 ml/min/1.73m2    GFR est non-AA >60 >60 ml/min/1.73m2    Calcium 9.1 8.5 - 10.1 MG/DL    Bilirubin, total 0.3 0.2 - 1.0 MG/DL    ALT (SGPT) 22 12 - 78 U/L    AST (SGOT) 21 15 - 37 U/L    Alk.  phosphatase 63 45 - 117 U/L    Protein, total 7.0 6.4 - 8.2 g/dL    Albumin 3.4 (L) 3.5 - 5.0 g/dL    Globulin 3.6 2.0 - 4.0 g/dL    A-G Ratio 0.9 (L) 1.1 - 2.2     EKG, 12 LEAD, INITIAL    Collection Time: 09/26/21  3:40 AM   Result Value Ref Range    Ventricular Rate 86 BPM    Atrial Rate 86 BPM    P-R Interval 140 ms    QRS Duration 88 ms    Q-T Interval 388 ms    QTC Calculation (Bezet) 464 ms    Calculated P Axis 2 degrees    Calculated R Axis 53 degrees    Calculated T Axis 51 degrees    Diagnosis       Normal sinus rhythm  No previous ECGs available  Confirmed by Adriana George ([id number]) on 9/26/2021 9:54:42 AM     PROCALCITONIN    Collection Time: 09/26/21  4:08 AM   Result Value Ref Range    Procalcitonin <0.05 ng/mL   GLUCOSE, POC    Collection Time: 09/26/21  9:49 AM   Result Value Ref Range    Glucose (POC) 143 (H) 65 - 117 mg/dL    Performed by Bridger SCHAEFFER    GLUCOSE, POC    Collection Time: 09/26/21 12:18 PM   Result Value Ref Range    Glucose (POC) 140 (H) 65 - 117 mg/dL    Performed by [facility] PCT    GLUCOSE, POC    Collection Time: 09/26/21  4:17 PM   Result Value Ref Range    Glucose (POC) 134 (H) 65 - 117 mg/dL    Performed by [facility] PCT    GLUCOSE, POC Collection Time: 09/26/21 10:20 PM   Result Value Ref Range    Glucose (POC) 168 (H) 65 - 117 mg/dL    Performed by Elsie Skelton (PCT)    METABOLIC PANEL, BASIC    Collection Time: 09/27/21  2:35 AM   Result Value Ref Range    Sodium 138 136 - 145 mmol/L    Potassium 4.0 3.5 - 5.1 mmol/L    Chloride 106 97 - 108 mmol/L    CO2 27 21 - 32 mmol/L    Anion gap 5 5 - 15 mmol/L    Glucose 162 (H) 65 - 100 mg/dL    BUN 8 6 - 20 MG/DL    Creatinine 0.55 0.55 - 1.02 MG/DL    BUN/Creatinine ratio 15 12 - 20      GFR est AA >60 >60 ml/min/1.73m2    GFR est non-AA >60 >60 ml/min/1.73m2    Calcium 9.1 8.5 - 10.1 MG/DL   MAGNESIUM    Collection Time: 09/27/21  2:35 AM   Result Value Ref Range    Magnesium 2.1 1.6 - 2.4 mg/dL   GLUCOSE, POC    Collection Time: 09/27/21  8:33 AM   Result Value Ref Range    Glucose (POC) 212 (H) 65 - 117 mg/dL    Performed by Falguni Wheat PCT        Pt resting in bed. NAD   SCDs for mechanical DVT proph while in bed    Body mass index is 37.26 kg/m². Reference: BMI greater than 30 is classified as obesity and greater than 40 is classified as morbid obesity.      Last 3 Recorded Weights in this Encounter    09/25/21 0128   Weight: 92.4 kg (203 lb 11.3 oz)         Jose Hobbs, ALLI   MSN, APRN, FNP-C, Nicolas Mullins    09/27/21

## 2021-09-27 NOTE — INTERDISCIPLINARY ROUNDS
Interdisciplinary Rounds were completed on 09/27/21 for this patient. Rounds included nursing, clinical care leader, pharmacy, and case management. Plan of care discussed. See clinical pathway and/or care plan for interventions and desired outcomes.  ]

## 2021-09-28 ENCOUNTER — HOSPITAL ENCOUNTER (EMERGENCY)
Age: 51
Discharge: HOME | End: 2021-09-28
Attending: EMERGENCY MEDICINE
Payer: MEDICAID

## 2021-09-28 VITALS
DIASTOLIC BLOOD PRESSURE: 64 MMHG | WEIGHT: 207.89 LBS | HEART RATE: 76 BPM | SYSTOLIC BLOOD PRESSURE: 135 MMHG | RESPIRATION RATE: 12 BRPM | OXYGEN SATURATION: 94 % | BODY MASS INDEX: 38.26 KG/M2 | TEMPERATURE: 98.5 F | HEIGHT: 62 IN

## 2021-09-28 DIAGNOSIS — K61.1 PERIRECTAL CELLULITIS: Primary | ICD-10-CM

## 2021-09-28 LAB
ALBUMIN SERPL-MCNC: 3.5 G/DL (ref 3.5–5)
ALBUMIN/GLOB SERPL: 0.9 {RATIO} (ref 1.1–2.2)
ALP SERPL-CCNC: 68 U/L (ref 45–117)
ALT SERPL-CCNC: 22 U/L (ref 12–78)
ANION GAP SERPL CALC-SCNC: 6 MMOL/L (ref 5–15)
APPEARANCE UR: CLEAR
AST SERPL-CCNC: 15 U/L (ref 15–37)
BACTERIA URNS QL MICRO: NEGATIVE /HPF
BASOPHILS # BLD: 0 K/UL (ref 0–0.1)
BASOPHILS NFR BLD: 1 % (ref 0–1)
BILIRUB SERPL-MCNC: 0.3 MG/DL (ref 0.2–1)
BILIRUB UR QL CFM: ABNORMAL
BUN SERPL-MCNC: 9 MG/DL (ref 6–20)
BUN/CREAT SERPL: 14 (ref 12–20)
CALCIUM SERPL-MCNC: 9.2 MG/DL (ref 8.5–10.1)
CHLORIDE SERPL-SCNC: 103 MMOL/L (ref 97–108)
CO2 SERPL-SCNC: 28 MMOL/L (ref 21–32)
COLOR UR: ABNORMAL
CREAT SERPL-MCNC: 0.65 MG/DL (ref 0.55–1.02)
DIFFERENTIAL METHOD BLD: ABNORMAL
EOSINOPHIL # BLD: 0.2 K/UL (ref 0–0.4)
EOSINOPHIL NFR BLD: 5 % (ref 0–7)
EPITH CASTS URNS QL MICRO: ABNORMAL /LPF
ERYTHROCYTE [DISTWIDTH] IN BLOOD BY AUTOMATED COUNT: 14.7 % (ref 11.5–14.5)
GLOBULIN SER CALC-MCNC: 3.8 G/DL (ref 2–4)
GLUCOSE SERPL-MCNC: 229 MG/DL (ref 65–100)
GLUCOSE UR STRIP.AUTO-MCNC: >1000 MG/DL
HCT VFR BLD AUTO: 37.1 % (ref 35–47)
HGB BLD-MCNC: 11.5 G/DL (ref 11.5–16)
HGB UR QL STRIP: NEGATIVE
IMM GRANULOCYTES # BLD AUTO: 0 K/UL (ref 0–0.04)
IMM GRANULOCYTES NFR BLD AUTO: 0 % (ref 0–0.5)
KETONES UR QL STRIP.AUTO: 15 MG/DL
LEUKOCYTE ESTERASE UR QL STRIP.AUTO: ABNORMAL
LYMPHOCYTES # BLD: 1.6 K/UL (ref 0.8–3.5)
LYMPHOCYTES NFR BLD: 37 % (ref 12–49)
MCH RBC QN AUTO: 24.9 PG (ref 26–34)
MCHC RBC AUTO-ENTMCNC: 31 G/DL (ref 30–36.5)
MCV RBC AUTO: 80.5 FL (ref 80–99)
MONOCYTES # BLD: 0.3 K/UL (ref 0–1)
MONOCYTES NFR BLD: 6 % (ref 5–13)
NEUTS SEG # BLD: 2.3 K/UL (ref 1.8–8)
NEUTS SEG NFR BLD: 51 % (ref 32–75)
NITRITE UR QL STRIP.AUTO: POSITIVE
NRBC # BLD: 0 K/UL (ref 0–0.01)
NRBC BLD-RTO: 0 PER 100 WBC
PH UR STRIP: 5 [PH] (ref 5–8)
PLATELET # BLD AUTO: 188 K/UL (ref 150–400)
PMV BLD AUTO: 10 FL (ref 8.9–12.9)
POTASSIUM SERPL-SCNC: 3.7 MMOL/L (ref 3.5–5.1)
PROT SERPL-MCNC: 7.3 G/DL (ref 6.4–8.2)
PROT UR STRIP-MCNC: NEGATIVE MG/DL
RBC # BLD AUTO: 4.61 M/UL (ref 3.8–5.2)
RBC #/AREA URNS HPF: ABNORMAL /HPF (ref 0–5)
SODIUM SERPL-SCNC: 137 MMOL/L (ref 136–145)
SP GR UR REFRACTOMETRY: 1.02 (ref 1–1.03)
UA: UC IF INDICATED,UAUC: ABNORMAL
UROBILINOGEN UR QL STRIP.AUTO: 4 EU/DL (ref 0.2–1)
WBC # BLD AUTO: 4.4 K/UL (ref 3.6–11)
WBC URNS QL MICRO: ABNORMAL /HPF (ref 0–4)
YEAST BUDDING URNS QL: PRESENT

## 2021-09-28 PROCEDURE — 99284 EMERGENCY DEPT VISIT MOD MDM: CPT

## 2021-09-28 PROCEDURE — 96374 THER/PROPH/DIAG INJ IV PUSH: CPT

## 2021-09-28 PROCEDURE — 80053 COMPREHEN METABOLIC PANEL: CPT

## 2021-09-28 PROCEDURE — 96376 TX/PRO/DX INJ SAME DRUG ADON: CPT

## 2021-09-28 PROCEDURE — 85025 COMPLETE CBC W/AUTO DIFF WBC: CPT

## 2021-09-28 PROCEDURE — 96375 TX/PRO/DX INJ NEW DRUG ADDON: CPT

## 2021-09-28 PROCEDURE — 74011250636 HC RX REV CODE- 250/636: Performed by: EMERGENCY MEDICINE

## 2021-09-28 PROCEDURE — 36415 COLL VENOUS BLD VENIPUNCTURE: CPT

## 2021-09-28 PROCEDURE — 81001 URINALYSIS AUTO W/SCOPE: CPT

## 2021-09-28 RX ORDER — HYDROMORPHONE HYDROCHLORIDE 1 MG/ML
1 INJECTION, SOLUTION INTRAMUSCULAR; INTRAVENOUS; SUBCUTANEOUS
Status: COMPLETED | OUTPATIENT
Start: 2021-09-28 | End: 2021-09-28

## 2021-09-28 RX ORDER — ONDANSETRON 2 MG/ML
4 INJECTION INTRAMUSCULAR; INTRAVENOUS
Status: COMPLETED | OUTPATIENT
Start: 2021-09-28 | End: 2021-09-28

## 2021-09-28 RX ORDER — DIPHENHYDRAMINE HYDROCHLORIDE 50 MG/ML
12.5 INJECTION, SOLUTION INTRAMUSCULAR; INTRAVENOUS
Status: COMPLETED | OUTPATIENT
Start: 2021-09-28 | End: 2021-09-28

## 2021-09-28 RX ADMIN — DIPHENHYDRAMINE HYDROCHLORIDE 12.5 MG: 50 INJECTION INTRAMUSCULAR; INTRAVENOUS at 09:47

## 2021-09-28 RX ADMIN — DIPHENHYDRAMINE HYDROCHLORIDE 12.5 MG: 50 INJECTION, SOLUTION INTRAMUSCULAR; INTRAVENOUS at 10:56

## 2021-09-28 RX ADMIN — ONDANSETRON 4 MG: 2 INJECTION INTRAMUSCULAR; INTRAVENOUS at 09:47

## 2021-09-28 RX ADMIN — HYDROMORPHONE HYDROCHLORIDE 1 MG: 1 INJECTION, SOLUTION INTRAMUSCULAR; INTRAVENOUS; SUBCUTANEOUS at 10:56

## 2021-09-28 RX ADMIN — SODIUM CHLORIDE 1000 ML: 9 INJECTION, SOLUTION INTRAVENOUS at 09:09

## 2021-09-28 RX ADMIN — HYDROMORPHONE HYDROCHLORIDE 1 MG: 1 INJECTION, SOLUTION INTRAMUSCULAR; INTRAVENOUS; SUBCUTANEOUS at 09:09

## 2021-09-28 NOTE — DISCHARGE INSTRUCTIONS
It was a pleasure taking care of you at [facility] Emergency Department today. We know that when you come to [facility], you are entrusting us with your health, comfort, and safety. Our physicians and nurses honor that trust, and we truly appreciate the opportunity to care for you and your loved ones. We also value your feedback. If you receive a survey about your Emergency Department experience today, please fill it out. We care about our patients' feedback, and we listen to what you have to say. Thank you! Please take medications as you are currently prescribed as well as your pain medications and antibiotics. We know that you may need a surgical option to your pain. Blood work and urinalysis showed no indication that we needed to change your treatment plan. If you worsen over the next 24 hours, come back to the ER and we will perform a repeat CAT scan. Otherwise please follow-up as scheduled with your outpatient colorectal providers.

## 2021-09-28 NOTE — ED NOTES
Pt reports she was here recently and discharged. Pt states she has hx of rectal abscess that was drained while here and she reports her pain has been increasing despite prescribed pain medication. She also notes she had appendectomy 2 weeks ago and thinks this is contributing to her pain. She reports feeling as though she may have a UTI. Pt also reports diarrhea for about a week as well as nausea/vomiting that began this morning.

## 2021-09-28 NOTE — ED PROVIDER NOTES
EMERGENCY DEPARTMENT HISTORY AND PHYSICAL EXAM      Date: 9/28/2021  Patient Name: Carolyn Bess    History of Presenting Illness     Chief Complaint   Patient presents with    Abdominal Pain       History Provided By: Patient    HPI: Carolyn Bess, 46 y.o. female with a past medical history significant for Chronic kidney disease, diabetes, ulcerative colitis, multiple abdominal surgeries, medical problems as stated below presents to the ED with cc of persistent perirectal pain, diffuse abdominal pain, over the last several days. Patient was admitted to the hospital 2 days ago with a possible perirectal abscess versus perirectal cellulitis. Bedside I&D was performed by Dr. Isaak Vernon and patient was discharged on Levaquin and Flagyl. She has had some persistent mild watery brown stools but no blood or mucus. She reports the pain is not being adequately controlled with her oral opiate medications. She reports a long history of similar perirectal abscesses related to her ulcerative colitis. She denies any fevers, chills, vomiting. She does have some dysuria and urinary frequency. No other associated symptoms. No other exacerbating ameliorating factors. There are no other complaints, changes, or physical findings at this time.     PCP: Edwige Moore MD    Current Facility-Administered Medications on File Prior to Encounter   Medication Dose Route Frequency Provider Last Rate Last Admin    [DISCONTINUED] atorvastatin (LIPITOR) tablet 20 mg  20 mg Oral DAILY Petra Casey MD   20 mg at 09/27/21 0956    [DISCONTINUED] insulin lispro (HUMALOG) injection   SubCUTAneous AC&HS Petra Casey MD   3 Units at 09/27/21 0956    [DISCONTINUED] glucose chewable tablet 16 g  4 Tablet Oral PRN Petra Casey MD        [DISCONTINUED] dextrose (D50W) injection syrg 12.5-25 g  12.5-25 g IntraVENous PRN Petra Casey MD        [DISCONTINUED] glucagon (GLUCAGEN) injection 1 mg  1 mg IntraMUSCular PRN Soha Chester MD        [DISCONTINUED] sodium chloride (NS) flush 5-40 mL  5-40 mL IntraVENous Q8H Robson Garibay MD   10 mL at 09/27/21 1006    [DISCONTINUED] sodium chloride (NS) flush 5-40 mL  5-40 mL IntraVENous PRN Soha Chester MD        [DISCONTINUED] acetaminophen (TYLENOL) tablet 650 mg  650 mg Oral Q6H PRN Soha Chester MD        [DISCONTINUED] acetaminophen (TYLENOL) suppository 650 mg  650 mg Rectal Q6H PRN Soha Chester MD        [DISCONTINUED] polyethylene glycol (MIRALAX) packet 17 g  17 g Oral DAILY PRN Soha Chester MD        [DISCONTINUED] ondansetron (ZOFRAN ODT) tablet 4 mg  4 mg Oral Q8H PRN Soha Chester MD        [DISCONTINUED] ondansetron [facility]) injection 4 mg  4 mg IntraVENous Q6H PRN Soha Chester MD   4 mg at 09/27/21 2734    [DISCONTINUED] enoxaparin (LOVENOX) injection 40 mg  40 mg SubCUTAneous DAILY Soha Chester MD   40 mg at 09/27/21 0957    [DISCONTINUED] famotidine (PEPCID) tablet 20 mg  20 mg Oral BID Soha Chester MD   20 mg at 09/27/21 0956    [DISCONTINUED] gabapentin (NEURONTIN) capsule 300 mg  300 mg Oral QHS Robson Kumar MD   300 mg at 09/26/21 2103    [DISCONTINUED] cefTRIAXone (ROCEPHIN) 1 g in 0.9% sodium chloride (MBP/ADV) 50 mL MBP  1 g IntraVENous Q24H Rebecca Colindres  mL/hr at 09/27/21 0851 1 g at 09/27/21 0851    [DISCONTINUED] metroNIDAZOLE (FLAGYL) IVPB premix 500 mg  500 mg IntraVENous Q12H Rebecca Colindres  mL/hr at 09/27/21 0400 500 mg at 09/27/21 0400    [DISCONTINUED] HYDROmorphone (DILAUDID) injection 1 mg  1 mg IntraVENous Q2H PRN Rebecca Colindres MD   1 mg at 09/27/21 1118    [DISCONTINUED] diphenhydrAMINE (BENADRYL) injection 12.5 mg  12.5 mg IntraVENous Q2.5H PRN Rebecca Colindres MD   12.5 mg at 09/27/21 1221    [DISCONTINUED] L.acidophilus-paracasei-S.thermophil-bifidobacter (RISAQUAD) 8 billion cell capsule  1 Capsule Oral DAILY Kwame Garibay MD   1 Capsule at 09/27/21 0956    [DISCONTINUED] 0.9% sodium chloride with KCl 20 mEq/L infusion   IntraVENous CONTINUOUS Kwame MD [place] 75 mL/hr at 09/26/21 1452 New Bag at 09/26/21 1452     Current Outpatient Medications on File Prior to Encounter   Medication Sig Dispense Refill    levoFLOXacin (Levaquin) 750 mg tablet Take 1 Tablet by mouth daily for 10 days. 10 Tablet 0    metroNIDAZOLE (FLAGYL) 500 mg tablet Take 1 Tablet by mouth three (3) times daily for 10 days. 30 Tablet 0    HYDROmorphone (Dilaudid) 2 mg tablet Take 1 Tablet by mouth every four (4) hours as needed for Pain for up to 7 days. Max Daily Amount: 12 mg. 35 Tablet 0    atorvastatin (LIPITOR) 20 mg tablet Take 1 Tablet by mouth daily. 90 Tablet 1    ALPRAZolam (XANAX) 1 mg tablet TAKE 1/2 - 1 TABLET BY MOUTH TWICE DAILY AS NEEDED FOR ANXIETY *MAX 2 TABS DAILY* 60 Tablet 0    naloxone (Narcan) 4 mg/actuation nasal spray Use 1 spray intranasally, then discard. Repeat with new spray every 2 min as needed for opioid overdose symptoms, alternating nostrils. 1 Each 0    dicyclomine (BENTYL) 20 mg tablet Take 1 Tablet by mouth every six (6) hours as needed for Abdominal Cramps. 20 Tablet 0    diphenoxylate-atropine (LomotiL) 2.5-0.025 mg per tablet Take 1 Tablet by mouth four (4) times daily as needed for Diarrhea (1 tab after each stool for max 8 per day). Max Daily Amount: 4 Tablets. Take after each stool for a maximum of 8 tablets daily 20 Tablet 0    hyoscyamine SL (LEVSIN/SL) 0.125 mg SL tablet 1 Tablet by SubLINGual route every four (4) hours as needed for Cramping. 10 Tablet 0    ondansetron (Zofran ODT) 4 mg disintegrating tablet Take 1 Tablet by mouth every eight (8) hours as needed for Nausea. 12 Tablet 1    empagliflozin (Jardiance) 25 mg tablet Take 1 Tablet by mouth daily.  90 Tablet 1    glimepiride (AMARYL) 4 mg tablet TAKE 1 TABLET BY MOUTH ONCE DAILY BEFORE BREAKFAST 90 Tablet 1    estradioL (ESTRACE) 0.5 mg tablet TAKE 1 TABLET BY MOUTH ONCE DAILY 90 Tablet 1    Ventolin HFA 90 mcg/actuation inhaler TAKE 1 PUFF BY INHALATION EVERY FOUR (4) HOURS AS NEEDED FOR WHEEZING. 18 Inhaler 1    lidocaine (Lidoderm) 5 % Apply patch to the affected area for 12 hours a day and remove for 12 hours a day. 5 Each 0    sertraline (ZOLOFT) 100 mg tablet Take 2 Tablets by mouth nightly. 60 Tablet 2    gabapentin (NEURONTIN) 300 mg capsule Take 1 Cap by mouth nightly. Max Daily Amount: 300 mg. 30 Cap 1    losartan-hydroCHLOROthiazide (HYZAAR) 100-12.5 mg per tablet Take 1 Tab by mouth daily. 90 Tab 1    omeprazole (PRILOSEC) 20 mg capsule Take 40 mg by mouth Daily (before breakfast).  EPINEPHrine (EPIPEN) 0.3 mg/0.3 mL (1:1,000) injection 0.3 mL by IntraMUSCular route once as needed for up to 1 dose.  0.3 mL 1       Past History     Past Medical History:  Past Medical History:   Diagnosis Date    Anal fissure     Anisocoria     Asthma     LAST EPISODE     Back pain     Cerumen impaction     Chronic kidney disease     hx uti in past    Coagulation defects     ocassional rectal bleeding due to anal fissure    Colovaginal fistula     Diabetes (HCC)     NIDDM    Diverticulitis     Diverticulosis     Enlarged tonsils     Frequent UTI     GERD (gastroesophageal reflux disease)     H/O endoscopy     with dilation    HA (headache)     Hepatic steatosis     History of colon resection     Hx of colonoscopy with polypectomy     benign    Hypertension     Ill-defined condition     FREQUENT HIVES    Ill-defined condition     HX ELEVATED LIVER ENZYMES    Morbid obesity (HCC)     Nausea & vomiting     during diverticulitis flare    Obesity     Otitis media     Pneumonia     about 15 yrs ago    Psychiatric disorder     ANXIETY    Recurrent tonsillitis     Sinusitis     Transfusion history ~ age 35    postop hysterectomy    Urticaria        Past Surgical History:  Past Surgical History:   Procedure Laterality Date    COLONOSCOPY N/A 3/28/2019    COLONOSCOPY performed by Pricila Weldon MD at [address] [facility] COLONOSCOPY N/A 10/2/2020    COLONOSCOPY performed by Pricila Weldon MD at [address] [facility]    [place].  HX GI  12    LAPAROSCOPIC HAND ASSISTED  POSS OPEN SIGMOID COLECTOMY POSS TEMPORARY DIVERTING LOOP ILEOSTOMY;  (no illeostomy needed)    HX GYN           HX GYN      cervical conization    HX HEENT      SINUS SURGERY LEFT X2    HX HEENT      SINUS SURGERY ON RIGHT X2    HX HEMORRHOIDECTOMY      HX OTHER SURGICAL      Sphincterotomy    HX PELVIC LAPAROSCOPY      HX EMMANUEL AND BSO      HX UROLOGICAL  12     CYSTOSCOPY INSERTION URETERAL CATHETERS - Cystoscopy Insertion of bilateral ureteral stents    NM ABDOMEN SURGERY PROC UNLISTED  2018    hernia repair at [facility]       Family History:  Family History   Problem Relation Age of Onset    Diabetes Mother     Cancer Mother         NON-HODGKINS LYMPHOMA    Anesth Problems Mother         PONV    Diabetes Father     Heart Disease Father         CAD - STENTS, PACEMAKER    Arrhythmia Father        Social History:  Social History     Tobacco Use    Smoking status: Never Smoker    Smokeless tobacco: Never Used   Vaping Use    Vaping Use: Never assessed   Substance Use Topics    Alcohol use: Yes     Comment: Rarely    Drug use: No     Types: Prescription, OTC       Allergies: Allergies   Allergen Reactions    Aspirin Hives, Shortness of Breath and Itching    Codeine Hives, Itching and Angioedema     Pt had itching in mouth, on face and lips    Contrast Agent [Iodine] Hives, Itching and Angioedema     21: pt was given benadryl and solu-medrol prior to administration but pt had severe tongue swelling and drooling, lethargy and itching.  DO NOT GIVE PT    Flavoring Agent Anaphylaxis     Cherry flavor    Iodinated Contrast Media Anaphylaxis, Diarrhea, Hives, Nausea and Vomiting and Shortness of Breath    Percocet [Oxycodone-Acetaminophen] Hives, Itching and Angioedema     Pt had itching in mouth, on face and lips    Prilosec [Omeprazole Magnesium] Anaphylaxis     CHERRY FLAVORED ODT; PT TAKES REGULAR PRILOSEC AND IS OK    Dilaudid [Hydromorphone] Itching     Tolerates with benadryl    Ketorolac Rash     \"makes my eyes spasm and causes rash on my hands\" and \"makes my skin itch and makes me nervous\"    Morphine (Pf) Rash     According to rn, pt can take morphine with benadryl    Fentanyl Rash and Itching     Has had benadryl before         Review of Systems   Review of Systems   Constitutional: Negative for chills, diaphoresis, fatigue and fever. HENT: Negative for ear pain and sore throat. Eyes: Negative for pain and redness. Respiratory: Negative for cough and shortness of breath. Cardiovascular: Negative for chest pain and leg swelling. Gastrointestinal: Positive for abdominal pain. Negative for diarrhea, nausea and vomiting. Endocrine: Negative for cold intolerance and heat intolerance. Genitourinary: Negative for flank pain and hematuria. Musculoskeletal: Negative for back pain and neck stiffness. Skin: Negative for rash and wound. Neurological: Negative for dizziness, syncope and headaches. All other systems reviewed and are negative. Physical Exam   Physical Exam  Vitals and nursing note reviewed. Constitutional:       General: She is not in acute distress. Appearance: She is well-developed. She is not ill-appearing. HENT:      Head: Normocephalic and atraumatic. Mouth/Throat:      Pharynx: No oropharyngeal exudate. Eyes:      Conjunctiva/sclera: Conjunctivae normal.      Pupils: Pupils are equal, round, and reactive to light. Cardiovascular:      Rate and Rhythm: Normal rate and regular rhythm. Heart sounds: No murmur heard.      Pulmonary:      Effort: Pulmonary effort is normal. No respiratory distress. Breath sounds: Normal breath sounds. No wheezing. Abdominal:      General: Bowel sounds are normal. There is no distension. Palpations: Abdomen is soft. Tenderness: There is generalized abdominal tenderness. There is no right CVA tenderness, left CVA tenderness, guarding or rebound. Genitourinary:     Comments: Patient is some mild tenderness and fluctuance at the 3:00 region in the perianal area. There is some mild induration. There is a slight opening at the likely surgical site of her previous I&D. There is no purulent drainage. There is no notable erythema throughout the gluteal region. Musculoskeletal:         General: No deformity. Normal range of motion. Cervical back: Normal range of motion. Skin:     General: Skin is warm and dry. Findings: No rash. Neurological:      Mental Status: She is alert and oriented to person, place, and time. Coordination: Coordination normal.   Psychiatric:         Behavior: Behavior normal.         Diagnostic Study Results     Labs -     Recent Results (from the past 24 hour(s))   CBC WITH AUTOMATED DIFF    Collection Time: 09/28/21  5:59 AM   Result Value Ref Range    WBC 4.4 3.6 - 11.0 K/uL    RBC 4.61 3.80 - 5.20 M/uL    HGB 11.5 11.5 - 16.0 g/dL    HCT 37.1 35.0 - 47.0 %    MCV 80.5 80.0 - 99.0 FL    MCH 24.9 (L) 26.0 - 34.0 PG    MCHC 31.0 30.0 - 36.5 g/dL    RDW 14.7 (H) 11.5 - 14.5 %    PLATELET 361 403 - 017 K/uL    MPV 10.0 8.9 - 12.9 FL    NRBC 0.0 0  WBC    ABSOLUTE NRBC 0.00 0.00 - 0.01 K/uL    NEUTROPHILS 51 32 - 75 %    LYMPHOCYTES 37 12 - 49 %    MONOCYTES 6 5 - 13 %    EOSINOPHILS 5 0 - 7 %    BASOPHILS 1 0 - 1 %    IMMATURE GRANULOCYTES 0 0.0 - 0.5 %    ABS. NEUTROPHILS 2.3 1.8 - 8.0 K/UL    ABS. LYMPHOCYTES 1.6 0.8 - 3.5 K/UL    ABS. MONOCYTES 0.3 0.0 - 1.0 K/UL    ABS. EOSINOPHILS 0.2 0.0 - 0.4 K/UL    ABS. BASOPHILS 0.0 0.0 - 0.1 K/UL    ABS. IMM.  GRANS. 0.0 0.00 - 0.04 K/UL DF AUTOMATED     METABOLIC PANEL, COMPREHENSIVE    Collection Time: 09/28/21  5:59 AM   Result Value Ref Range    Sodium 137 136 - 145 mmol/L    Potassium 3.7 3.5 - 5.1 mmol/L    Chloride 103 97 - 108 mmol/L    CO2 28 21 - 32 mmol/L    Anion gap 6 5 - 15 mmol/L    Glucose 229 (H) 65 - 100 mg/dL    BUN 9 6 - 20 MG/DL    Creatinine 0.65 0.55 - 1.02 MG/DL    BUN/Creatinine ratio 14 12 - 20      GFR est AA >60 >60 ml/min/1.73m2    GFR est non-AA >60 >60 ml/min/1.73m2    Calcium 9.2 8.5 - 10.1 MG/DL    Bilirubin, total 0.3 0.2 - 1.0 MG/DL    ALT (SGPT) 22 12 - 78 U/L    AST (SGOT) 15 15 - 37 U/L    Alk. phosphatase 68 45 - 117 U/L    Protein, total 7.3 6.4 - 8.2 g/dL    Albumin 3.5 3.5 - 5.0 g/dL    Globulin 3.8 2.0 - 4.0 g/dL    A-G Ratio 0.9 (L) 1.1 - 2.2     URINALYSIS W/ REFLEX CULTURE    Collection Time: 09/28/21 11:01 AM    Specimen: Miscellaneous sample; Urine    Urine specimen   Result Value Ref Range    Color ORANGE      Appearance CLEAR CLEAR      Specific gravity 1.019 1.003 - 1.030      pH (UA) 5.0 5.0 - 8.0      Protein Negative NEG mg/dL    Glucose >1,000 (A) NEG mg/dL    Ketone 15 (A) NEG mg/dL    Blood Negative NEG      Urobilinogen 4.0 (H) 0.2 - 1.0 EU/dL    Nitrites Positive (A) NEG      Leukocyte Esterase MODERATE (A) NEG      WBC 0-4 0 - 4 /hpf    RBC 0-5 0 - 5 /hpf    Epithelial cells MODERATE (A) FEW /lpf    Bacteria Negative NEG /hpf    UA:UC IF INDICATED CULTURE NOT INDICATED BY UA RESULT CNI      Budding yeast PRESENT (A) NEG     BILIRUBIN, CONFIRM    Collection Time: 09/28/21 11:01 AM   Result Value Ref Range    Bilirubin UA, confirm INDETERMINATE DUE TO COLOR INTERFERENCE (A) NEG         Radiologic Studies -   No orders to display     CT Results  (Last 48 hours)    None        CXR Results  (Last 48 hours)    None            Medical Decision Making   I am the first provider for this patient.     I reviewed the vital signs, available nursing notes, past medical history, past surgical history, family history and social history. Vital Signs-Reviewed the patient's vital signs. Patient Vitals for the past 12 hrs:   Temp Pulse Resp BP SpO2   09/28/21 1230 -- -- -- 135/64 94 %   09/28/21 1200 -- -- -- 137/71 94 %   09/28/21 1130 -- -- -- (!) 144/62 92 %   09/28/21 1100 -- -- -- (!) 141/71 97 %   09/28/21 1030 -- -- -- 126/67 92 %   09/28/21 1000 -- -- -- 136/67 95 %   09/28/21 0930 -- -- -- (!) 145/97 92 %   09/28/21 0535 98.5 °F (36.9 °C) 76 12 (!) 152/78 98 %       Records Reviewed: Nursing records and medical records reviewed    MDM:  Pt presents with acute abdominal pain; vital signs stable with currently a non-peritoneal exam; DDx includes: Gastroenteritis, hepatitis, pancreatitis, obstruction, appendicitis, viral illness, IBD, diverticulitis, mesenteric ischemia, AAA or descending dissection, ACS, kidney stone. Will get labs, treat symptomatically and obtain serial abdominal exams to determine if a CT is warranted. Will reassess and monitor closely. Differential diagnoses also include perianal abscess versus perirectal abscess versus. Rectal cellulitis  Provider Notes (Medical Decision Making):   Patient is a 49-year-old female presenting with persistent perirectal pain abdominal pain after being recently admitted for possible perirectal versus cellulitis. Patient had an I&D less than 48 hours ago and is currently on Levaquin and Flagyl. Consult placed to colorectal surgeon but have not heard back after several hours since they are in the operating room. Patient's pain is well controlled, her lab work is unremarkable and there is no evidence of any severe sepsis or septic shock. Abdominal exam is benign with no evidence of peritonitis.   We are communicating with her colorectal office and patient has close follow-up arranged and pain is well controlled and therefore she will be discharged home with close outpatient follow-up with Dr. Regla Monteiro in the colorectal team.  Strict return precautions were given. ED Course:   Initial assessment performed. The patients presenting problems have been discussed, and they are in agreement with the care plan formulated and outlined with them. I have encouraged them to ask questions as they arise throughout their visit. Critical Care:  None      Disposition:  12:48 PM  Noble Soto  results have been reviewed with her. She has been counseled regarding her diagnosis. She verbally conveys understanding and agreement of the signs, symptoms, diagnosis, treatment and prognosis and additionally agrees to follow up as recommended with Dr. Judy Ramirez MD in 24 - 48 hours. She also agrees with the care-plan and conveys that all of her questions have been answered. I have also put together some discharge instructions for her that include: 1) educational information regarding their diagnosis, 2) how to care for their diagnosis at home, as well a 3) list of reasons why they would want to return to the ED prior to their follow-up appointment, should their condition change. DISCHARGE PLAN:  1. Current Discharge Medication List        2. Follow-up Information     Follow up With Specialties Details Why Contact Info    Kelsey North MD Colon and Rectal Surgery In 2 days For a follow-up evaluation. Please call Dr. Shon Guzman or Dr. Fidencio Johns tomorrow to arrange for follow-up later this week. I will call you after I contact him today. [address] [place], [place] [id number] [phone]          3. Return to ED if worse     Diagnosis     Clinical Impression:   1. Perirectal cellulitis        Attestations:    Yohan Liu MD    Please note that this dictation was completed with [employer], the computer voice recognition software. Quite often unanticipated grammatical, syntax, homophones, and other interpretive errors are inadvertently transcribed by the computer software. Please disregard these errors.   Please excuse any errors that have escaped final proofreading. Thank you.

## 2021-09-29 ENCOUNTER — HOSPITAL ENCOUNTER (EMERGENCY)
Age: 51
Discharge: HOME | End: 2021-09-29
Attending: STUDENT IN AN ORGANIZED HEALTH CARE EDUCATION/TRAINING PROGRAM
Payer: MEDICAID

## 2021-09-29 VITALS
OXYGEN SATURATION: 95 % | HEIGHT: 62 IN | SYSTOLIC BLOOD PRESSURE: 121 MMHG | WEIGHT: 201.28 LBS | TEMPERATURE: 98.5 F | RESPIRATION RATE: 22 BRPM | HEART RATE: 99 BPM | DIASTOLIC BLOOD PRESSURE: 58 MMHG | BODY MASS INDEX: 37.04 KG/M2

## 2021-09-29 DIAGNOSIS — K62.89 PERIANAL INFECTION: Primary | ICD-10-CM

## 2021-09-29 DIAGNOSIS — F41.9 ANXIETY: ICD-10-CM

## 2021-09-29 LAB
ALBUMIN SERPL-MCNC: 3.6 G/DL (ref 3.5–5)
ALBUMIN/GLOB SERPL: 1 {RATIO} (ref 1.1–2.2)
ALP SERPL-CCNC: 64 U/L (ref 45–117)
ALT SERPL-CCNC: 21 U/L (ref 12–78)
ANION GAP SERPL CALC-SCNC: 5 MMOL/L (ref 5–15)
AST SERPL-CCNC: 13 U/L (ref 15–37)
BASOPHILS # BLD: 0 K/UL (ref 0–0.1)
BASOPHILS NFR BLD: 0 % (ref 0–1)
BILIRUB SERPL-MCNC: 0.4 MG/DL (ref 0.2–1)
BUN SERPL-MCNC: 14 MG/DL (ref 6–20)
BUN/CREAT SERPL: 19 (ref 12–20)
CALCIUM SERPL-MCNC: 9.6 MG/DL (ref 8.5–10.1)
CHLORIDE SERPL-SCNC: 104 MMOL/L (ref 97–108)
CO2 SERPL-SCNC: 27 MMOL/L (ref 21–32)
CREAT SERPL-MCNC: 0.74 MG/DL (ref 0.55–1.02)
DIFFERENTIAL METHOD BLD: ABNORMAL
EOSINOPHIL # BLD: 0.2 K/UL (ref 0–0.4)
EOSINOPHIL NFR BLD: 3 % (ref 0–7)
ERYTHROCYTE [DISTWIDTH] IN BLOOD BY AUTOMATED COUNT: 14.6 % (ref 11.5–14.5)
GLOBULIN SER CALC-MCNC: 3.7 G/DL (ref 2–4)
GLUCOSE SERPL-MCNC: 141 MG/DL (ref 65–100)
HCT VFR BLD AUTO: 34.9 % (ref 35–47)
HGB BLD-MCNC: 11.7 G/DL (ref 11.5–16)
IMM GRANULOCYTES # BLD AUTO: 0 K/UL (ref 0–0.04)
IMM GRANULOCYTES NFR BLD AUTO: 0 % (ref 0–0.5)
LYMPHOCYTES # BLD: 1.3 K/UL (ref 0.8–3.5)
LYMPHOCYTES NFR BLD: 19 % (ref 12–49)
MCH RBC QN AUTO: 25.5 PG (ref 26–34)
MCHC RBC AUTO-ENTMCNC: 33.5 G/DL (ref 30–36.5)
MCV RBC AUTO: 76 FL (ref 80–99)
MONOCYTES # BLD: 0.4 K/UL (ref 0–1)
MONOCYTES NFR BLD: 5 % (ref 5–13)
NEUTS SEG # BLD: 5.1 K/UL (ref 1.8–8)
NEUTS SEG NFR BLD: 73 % (ref 32–75)
NRBC # BLD: 0 K/UL (ref 0–0.01)
NRBC BLD-RTO: 0 PER 100 WBC
PLATELET # BLD AUTO: 199 K/UL (ref 150–400)
PMV BLD AUTO: 10 FL (ref 8.9–12.9)
POTASSIUM SERPL-SCNC: 3.5 MMOL/L (ref 3.5–5.1)
PROT SERPL-MCNC: 7.3 G/DL (ref 6.4–8.2)
RBC # BLD AUTO: 4.59 M/UL (ref 3.8–5.2)
SODIUM SERPL-SCNC: 136 MMOL/L (ref 136–145)
WBC # BLD AUTO: 7 K/UL (ref 3.6–11)

## 2021-09-29 PROCEDURE — 75810000289 HC I&D ABSCESS SIMP/COMP/MULT

## 2021-09-29 PROCEDURE — 96376 TX/PRO/DX INJ SAME DRUG ADON: CPT

## 2021-09-29 PROCEDURE — 74011250636 HC RX REV CODE- 250/636: Performed by: STUDENT IN AN ORGANIZED HEALTH CARE EDUCATION/TRAINING PROGRAM

## 2021-09-29 PROCEDURE — 99284 EMERGENCY DEPT VISIT MOD MDM: CPT

## 2021-09-29 PROCEDURE — 36415 COLL VENOUS BLD VENIPUNCTURE: CPT

## 2021-09-29 PROCEDURE — 96374 THER/PROPH/DIAG INJ IV PUSH: CPT

## 2021-09-29 PROCEDURE — 85025 COMPLETE CBC W/AUTO DIFF WBC: CPT

## 2021-09-29 PROCEDURE — 80053 COMPREHEN METABOLIC PANEL: CPT

## 2021-09-29 PROCEDURE — 96375 TX/PRO/DX INJ NEW DRUG ADDON: CPT

## 2021-09-29 RX ORDER — HYDROMORPHONE HYDROCHLORIDE 1 MG/ML
0.5 INJECTION, SOLUTION INTRAMUSCULAR; INTRAVENOUS; SUBCUTANEOUS ONCE
Status: COMPLETED | OUTPATIENT
Start: 2021-09-29 | End: 2021-09-29

## 2021-09-29 RX ORDER — ONDANSETRON 2 MG/ML
4 INJECTION INTRAMUSCULAR; INTRAVENOUS
Status: COMPLETED | OUTPATIENT
Start: 2021-09-29 | End: 2021-09-29

## 2021-09-29 RX ORDER — ALPRAZOLAM 1 MG/1
TABLET ORAL
Qty: 60 TABLET | Refills: 0 | OUTPATIENT
Start: 2021-09-29

## 2021-09-29 RX ORDER — SERTRALINE HYDROCHLORIDE 100 MG/1
200 TABLET, FILM COATED ORAL
Qty: 60 TABLET | Refills: 2 | Status: SHIPPED | OUTPATIENT
Start: 2021-09-29 | End: 2022-01-25

## 2021-09-29 RX ORDER — SERTRALINE HYDROCHLORIDE 100 MG/1
200 TABLET, FILM COATED ORAL
Qty: 60 TABLET | Refills: 0 | Status: SHIPPED | OUTPATIENT
Start: 2021-09-29 | End: 2022-02-23

## 2021-09-29 RX ORDER — DIPHENHYDRAMINE HYDROCHLORIDE 50 MG/ML
50 INJECTION, SOLUTION INTRAMUSCULAR; INTRAVENOUS
Status: COMPLETED | OUTPATIENT
Start: 2021-09-29 | End: 2021-09-29

## 2021-09-29 RX ADMIN — HYDROMORPHONE HYDROCHLORIDE 0.5 MG: 1 INJECTION, SOLUTION INTRAMUSCULAR; INTRAVENOUS; SUBCUTANEOUS at 22:03

## 2021-09-29 RX ADMIN — ONDANSETRON 4 MG: 2 INJECTION INTRAMUSCULAR; INTRAVENOUS at 20:46

## 2021-09-29 RX ADMIN — HYDROMORPHONE HYDROCHLORIDE 0.5 MG: 1 INJECTION, SOLUTION INTRAMUSCULAR; INTRAVENOUS; SUBCUTANEOUS at 20:47

## 2021-09-29 RX ADMIN — DIPHENHYDRAMINE HYDROCHLORIDE 50 MG: 50 INJECTION, SOLUTION INTRAMUSCULAR; INTRAVENOUS at 21:58

## 2021-09-29 NOTE — TELEPHONE ENCOUNTER
----- Message from Julia Betancourt. Rowdy Glen sent at 9/28/2021 11:19 PM EDT -----  Regarding: Prescription Question  Contact: [phone]  Hi Dr. Lulu Hilliard,    I wanted to see if you would refill my zoloft 100 bid and my alprazolam 1mg bid. I think you may have received a request from [facility]. I would like for all my prescriptions to be filled at [facility] 24 hour Rx Godwin Santos and Elmo lopez@ [phone]. I know [facility] had some stragglers still going to [facility] but Im trying to just have all rxs go to one pharmacy.

## 2021-09-29 NOTE — TELEPHONE ENCOUNTER
Future Appointments:  Future Appointments   Date Time Provider Daniel Vega   9/30/2021  1:30 PM Amara Urena MD [facility] BS AMB   12/16/2021  9:15 AM Kim Lamb MD [facility] BS AMB        Last Appointment With Me:  7/19/2021     Requested Prescriptions     Pending Prescriptions Disp Refills    ALPRAZolam (XANAX) 1 mg tablet 60 Tablet 0    sertraline (ZOLOFT) 100 mg tablet 60 Tablet 2     Sig: Take 2 Tablets by mouth nightly.

## 2021-09-30 ENCOUNTER — PATIENT MESSAGE (OUTPATIENT)
Dept: INTERNAL MEDICINE CLINIC | Age: 51
End: 2021-09-30

## 2021-09-30 DIAGNOSIS — B02.9 HERPES ZOSTER WITHOUT COMPLICATION: ICD-10-CM

## 2021-09-30 NOTE — ED PROVIDER NOTES
EMERGENCY DEPARTMENT HISTORY AND PHYSICAL EXAM      Date: 9/29/2021  Patient Name: Iraj Baer    History of Presenting Illness     Chief Complaint   Patient presents with    Anal Pain     Patient stated that she has a rectal abscess that she has been taking Dilaudid 2mg q4h. She was just inpatient and tried to tricia it but was unsuccessful. She is supposed to go for surgery on Monday (Dr. Cruz Gracia) but the pain is too much for her to bear and she can't wait until Monday. She is also having diarrhea, can't sit, nausea and vomiting, and chills. Had a fever of 101 this morning. Took Tylenol 1 gram around 1730 and Dilaudid 2mg around 1800 with no relief. HPI: Iraj Baer, 46 y.o. female presents to the ED with cc of rectal pain. This started as a pain in the rectal area about 2 weeks ago, has been gradually worsening, she was admitted to the hospital just recently, had a bedside I&D done by colorectal on 9/26. Since then, she states the symptoms have not improved, feels like the pain is actually worsening. She is taking 2 mg of Dilaudid every 4 hours. Today she noted some nausea and 2 episodes of emesis, she has baseline diarrhea due to her ulcerative colitis. She reports about 5 episodes of diarrhea in 1 day. Reports fever earlier today. There are no other complaints, changes, or physical findings at this time. PCP: Nicky Tobar MD    No current facility-administered medications on file prior to encounter. Current Outpatient Medications on File Prior to Encounter   Medication Sig Dispense Refill    sertraline (ZOLOFT) 100 mg tablet TAKE 2 TABLETS BY MOUTH NIGHTLY 60 Tablet 0    sertraline (ZOLOFT) 100 mg tablet Take 2 Tablets by mouth nightly. 60 Tablet 2    levoFLOXacin (Levaquin) 750 mg tablet Take 1 Tablet by mouth daily for 10 days. 10 Tablet 0    metroNIDAZOLE (FLAGYL) 500 mg tablet Take 1 Tablet by mouth three (3) times daily for 10 days.  30 Tablet 0  HYDROmorphone (Dilaudid) 2 mg tablet Take 1 Tablet by mouth every four (4) hours as needed for Pain for up to 7 days. Max Daily Amount: 12 mg. 35 Tablet 0    atorvastatin (LIPITOR) 20 mg tablet Take 1 Tablet by mouth daily. 90 Tablet 1    ALPRAZolam (XANAX) 1 mg tablet TAKE 1/2 - 1 TABLET BY MOUTH TWICE DAILY AS NEEDED FOR ANXIETY *MAX 2 TABS DAILY* 60 Tablet 0    naloxone (Narcan) 4 mg/actuation nasal spray Use 1 spray intranasally, then discard. Repeat with new spray every 2 min as needed for opioid overdose symptoms, alternating nostrils. 1 Each 0    dicyclomine (BENTYL) 20 mg tablet Take 1 Tablet by mouth every six (6) hours as needed for Abdominal Cramps. 20 Tablet 0    diphenoxylate-atropine (LomotiL) 2.5-0.025 mg per tablet Take 1 Tablet by mouth four (4) times daily as needed for Diarrhea (1 tab after each stool for max 8 per day). Max Daily Amount: 4 Tablets. Take after each stool for a maximum of 8 tablets daily 20 Tablet 0    hyoscyamine SL (LEVSIN/SL) 0.125 mg SL tablet 1 Tablet by SubLINGual route every four (4) hours as needed for Cramping. 10 Tablet 0    ondansetron (Zofran ODT) 4 mg disintegrating tablet Take 1 Tablet by mouth every eight (8) hours as needed for Nausea. 12 Tablet 1    empagliflozin (Jardiance) 25 mg tablet Take 1 Tablet by mouth daily. 90 Tablet 1    glimepiride (AMARYL) 4 mg tablet TAKE 1 TABLET BY MOUTH ONCE DAILY BEFORE BREAKFAST 90 Tablet 1    estradioL (ESTRACE) 0.5 mg tablet TAKE 1 TABLET BY MOUTH ONCE DAILY 90 Tablet 1    Ventolin HFA 90 mcg/actuation inhaler TAKE 1 PUFF BY INHALATION EVERY FOUR (4) HOURS AS NEEDED FOR WHEEZING. 18 Inhaler 1    lidocaine (Lidoderm) 5 % Apply patch to the affected area for 12 hours a day and remove for 12 hours a day. 5 Each 0    [DISCONTINUED] sertraline (ZOLOFT) 100 mg tablet Take 2 Tablets by mouth nightly. 60 Tablet 2    gabapentin (NEURONTIN) 300 mg capsule Take 1 Cap by mouth nightly.  Max Daily Amount: 300 mg. 30 Cap 1  losartan-hydroCHLOROthiazide (HYZAAR) 100-12.5 mg per tablet Take 1 Tab by mouth daily. 90 Tab 1    omeprazole (PRILOSEC) 20 mg capsule Take 40 mg by mouth Daily (before breakfast).  EPINEPHrine (EPIPEN) 0.3 mg/0.3 mL (1:1,000) injection 0.3 mL by IntraMUSCular route once as needed for up to 1 dose. 0.3 mL 1       Past History     Past Medical History:  Past Medical History:   Diagnosis Date    Anal fissure     Anisocoria     Asthma     LAST EPISODE     Back pain     Cerumen impaction     Chronic kidney disease     hx uti in past    Coagulation defects     ocassional rectal bleeding due to anal fissure    Colovaginal fistula     Diabetes (HCC)     NIDDM    Diverticulitis     Diverticulosis     Enlarged tonsils     Frequent UTI     GERD (gastroesophageal reflux disease)     H/O endoscopy     with dilation    HA (headache)     Hepatic steatosis     History of colon resection     Hx of colonoscopy with polypectomy     benign    Hypertension     Ill-defined condition     FREQUENT HIVES    Ill-defined condition     HX ELEVATED LIVER ENZYMES    Morbid obesity (HCC)     Nausea & vomiting     during diverticulitis flare    Obesity     Otitis media     Pneumonia     about 15 yrs ago    Psychiatric disorder     ANXIETY    Recurrent tonsillitis     Sinusitis     Transfusion history ~ age 35    postop hysterectomy    Urticaria        Past Surgical History:  Past Surgical History:   Procedure Laterality Date    COLONOSCOPY N/A 3/28/2019    COLONOSCOPY performed by Mark Moore MD at [address] COLONOSCOPY N/A 10/2/2020    COLONOSCOPY performed by Mark Moore MD at [address] [facility] [facility]    blake.     HX GI  12    LAPAROSCOPIC HAND ASSISTED  POSS OPEN SIGMOID COLECTOMY POSS TEMPORARY DIVERTING LOOP ILEOSTOMY;  (no illeostomy needed)    HX GYN           HX GYN      cervical conization    HX HEENT      SINUS SURGERY LEFT X2    HX HEENT SINUS SURGERY ON RIGHT X2    HX HEMORRHOIDECTOMY      HX OTHER SURGICAL  11/12    Sphincterotomy    HX PELVIC LAPAROSCOPY      HX EMMANUEL AND BSO      HX UROLOGICAL  7/31/12     CYSTOSCOPY INSERTION URETERAL CATHETERS - Cystoscopy Insertion of bilateral ureteral stents    AK ABDOMEN SURGERY PROC UNLISTED  01/06/2018    hernia repair at [facility]       Family History:  Family History   Problem Relation Age of Onset    Diabetes Mother     Cancer Mother         NON-HODGKINS LYMPHOMA    Anesth Problems Mother         PONV    Diabetes Father     Heart Disease Father         CAD - STENTS, PACEMAKER    Arrhythmia Father        Social History:  Social History     Tobacco Use    Smoking status: Never Smoker    Smokeless tobacco: Never Used   Vaping Use    Vaping Use: Never assessed   Substance Use Topics    Alcohol use: Yes     Comment: Rarely    Drug use: No     Types: Prescription, OTC       Allergies: Allergies   Allergen Reactions    Aspirin Hives, Shortness of Breath and Itching    Codeine Hives, Itching and Angioedema     Pt had itching in mouth, on face and lips    Contrast Agent [Iodine] Hives, Itching and Angioedema     5/5/21: pt was given benadryl and solu-medrol prior to administration but pt had severe tongue swelling and drooling, lethargy and itching.  DO NOT GIVE PT    Flavoring Agent Anaphylaxis     Cherry flavor    Iodinated Contrast Media Anaphylaxis, Diarrhea, Hives, Nausea and Vomiting and Shortness of Breath    Percocet [Oxycodone-Acetaminophen] Hives, Itching and Angioedema     Pt had itching in mouth, on face and lips    Prilosec [Omeprazole Magnesium] Anaphylaxis     CHERRY FLAVORED ODT; PT TAKES REGULAR PRILOSEC AND IS OK    Dilaudid [Hydromorphone] Itching     Tolerates with benadryl    Ketorolac Rash     \"makes my eyes spasm and causes rash on my hands\" and \"makes my skin itch and makes me nervous\"    Morphine (Pf) Rash     According to rn, pt can take morphine with benadryl  Fentanyl Rash and Itching     Has had benadryl before         Review of Systems   Reports fever  No ear pain  No eye pain  no shortness of breath  no chest pain  no abdominal pain  no dysuria  no leg pain  No rash  No lymphadenopathy  No weight loss    Physical Exam   Physical Exam  Constitutional:       General: She is not in acute distress. Appearance: She is not toxic-appearing. HENT:      Head: Normocephalic. Eyes:      Extraocular Movements: Extraocular movements intact. Cardiovascular:      Rate and Rhythm: Normal rate and regular rhythm. Pulmonary:      Effort: Pulmonary effort is normal.      Breath sounds: Normal breath sounds. Abdominal:      Palpations: Abdomen is soft. Tenderness: There is no abdominal tenderness. Genitourinary:     Comments: There is approximately grape sized area of tenderness and slight fullness in the left perirectal region, there is a small area of pinpoint skin opening in the center. There is no surrounding erythema, induration, warmth. There is minimal overlying erythema. No fluctuance or fullness on rectal  Skin:     General: Skin is warm. Neurological:      General: No focal deficit present. Mental Status: She is alert and oriented to person, place, and time.          Diagnostic Study Results     Labs -     Recent Results (from the past 24 hour(s))   CBC WITH AUTOMATED DIFF    Collection Time: 09/29/21  8:26 PM   Result Value Ref Range    WBC 7.0 3.6 - 11.0 K/uL    RBC 4.59 3.80 - 5.20 M/uL    HGB 11.7 11.5 - 16.0 g/dL    HCT 34.9 (L) 35.0 - 47.0 %    MCV 76.0 (L) 80.0 - 99.0 FL    MCH 25.5 (L) 26.0 - 34.0 PG    MCHC 33.5 30.0 - 36.5 g/dL    RDW 14.6 (H) 11.5 - 14.5 %    PLATELET 436 091 - 845 K/uL    MPV 10.0 8.9 - 12.9 FL    NRBC 0.0 0  WBC    ABSOLUTE NRBC 0.00 0.00 - 0.01 K/uL    NEUTROPHILS 73 32 - 75 %    LYMPHOCYTES 19 12 - 49 %    MONOCYTES 5 5 - 13 %    EOSINOPHILS 3 0 - 7 %    BASOPHILS 0 0 - 1 %    IMMATURE GRANULOCYTES 0 0.0 - 0.5 %    ABS. NEUTROPHILS 5.1 1.8 - 8.0 K/UL    ABS. LYMPHOCYTES 1.3 0.8 - 3.5 K/UL    ABS. MONOCYTES 0.4 0.0 - 1.0 K/UL    ABS. EOSINOPHILS 0.2 0.0 - 0.4 K/UL    ABS. BASOPHILS 0.0 0.0 - 0.1 K/UL    ABS. IMM. GRANS. 0.0 0.00 - 0.04 K/UL    DF AUTOMATED     METABOLIC PANEL, COMPREHENSIVE    Collection Time: 09/29/21  8:26 PM   Result Value Ref Range    Sodium 136 136 - 145 mmol/L    Potassium 3.5 3.5 - 5.1 mmol/L    Chloride 104 97 - 108 mmol/L    CO2 27 21 - 32 mmol/L    Anion gap 5 5 - 15 mmol/L    Glucose 141 (H) 65 - 100 mg/dL    BUN 14 6 - 20 MG/DL    Creatinine 0.74 0.55 - 1.02 MG/DL    BUN/Creatinine ratio 19 12 - 20      GFR est AA >60 >60 ml/min/1.73m2    GFR est non-AA >60 >60 ml/min/1.73m2    Calcium 9.6 8.5 - 10.1 MG/DL    Bilirubin, total 0.4 0.2 - 1.0 MG/DL    ALT (SGPT) 21 12 - 78 U/L    AST (SGOT) 13 (L) 15 - 37 U/L    Alk. phosphatase 64 45 - 117 U/L    Protein, total 7.3 6.4 - 8.2 g/dL    Albumin 3.6 3.5 - 5.0 g/dL    Globulin 3.7 2.0 - 4.0 g/dL    A-G Ratio 1.0 (L) 1.1 - 2.2         Radiologic Studies -   No orders to display     CT Results  (Last 48 hours)    None        CXR Results  (Last 48 hours)    None            Medical Decision Making   I am the first provider for this patient. I reviewed the vital signs, available nursing notes, past medical history, past surgical history, family history and social history. Vital Signs-Reviewed the patient's vital signs. Patient Vitals for the past 24 hrs:   Temp Pulse Resp BP SpO2   09/29/21 2209 -- -- -- (!) 133/53 --   09/29/21 1922 98.5 °F (36.9 °C) 99 22 (!) 171/94 99 %         Provider Notes (Medical Decision Making):   80-year-old female presenting with perirectal pain. She has a known area of infection in this area. Feels like it is getting worse. She has been compliant with her antibiotics. She is afebrile and nontoxic-appearing.   On my exam, the area of swelling is very localized, about the size of a grape, no signs of any surrounding cellulitis. She is given pain medications. ED Course:     Initial assessment performed. The patients presenting problems have been discussed, and they are in agreement with the care plan formulated and outlined with them. I have encouraged them to ask questions as they arise throughout their visit. CBC negative for leukocytosis or anemia, basic metabolic panel with normal renal function, no worrisome electrolyte abnormalities. I have spoken to Dr. Cuco Fleming, described the findings, he recommends that I can try a small bedside I&D, she does not warrant IV antibiotics or admission, and that she is scheduled to be seen on Monday. Procedure Note - Incision and Drainage:   Skin prepped with Alcohol. Sterile field established. Anesthesia achieved with 3 mLs of lidocaine with epinephrine using a local infiltration of 3 mL Lidocaine with epinephrine. Abscess to perirectal region was incised with # 11 blade, and 2mLs of serosanguineous drainage was expressed. Estimated blood loss: Less than 2 cc  The procedure took 1-15 minutes, and pt tolerated well. On reevaluation, patient is resting comfortably, vital signs unremarkable. Patient is counseled on supportive care and return precautions. Will return to the ED for any worsening pain, swelling, fevers, or any new or worrisome symptoms. Will followup with colorectal within 4 days. Critical Care Time:         Disposition:  Home    PLAN:  1. Current Discharge Medication List        2.    Follow-up Information     Follow up With Specialties Details Why Contact Info    Your colorectal doctor  Go to       [facility] EMERGENCY DEPT Emergency Medicine  As needed, If symptoms worsen [address] [place] [address]  [phone]        Return to ED if worse     Diagnosis     Clinical Impression: Perirectal infection

## 2021-09-30 NOTE — ED NOTES
I have reviewed discharge instructions with the patient. The patient verbalized understanding.  Pt ambulatory to ER shai, daughter driving her home

## 2021-10-01 RX ORDER — VALACYCLOVIR HYDROCHLORIDE 1 G/1
TABLET, FILM COATED ORAL
Qty: 21 TABLET | Refills: 0 | Status: SHIPPED | OUTPATIENT
Start: 2021-10-01 | End: 2022-02-23

## 2021-10-02 ENCOUNTER — HOSPITAL ENCOUNTER (EMERGENCY)
Age: 51
Discharge: LEFT BEFORE DISCHARGE | End: 2021-10-02
Attending: EMERGENCY MEDICINE
Payer: MEDICAID

## 2021-10-02 PROCEDURE — 75810000275 HC EMERGENCY DEPT VISIT NO LEVEL OF CARE

## 2021-10-06 ENCOUNTER — HOSPITAL ENCOUNTER (EMERGENCY)
Age: 51
Discharge: HOME | End: 2021-10-07
Attending: EMERGENCY MEDICINE
Payer: MEDICAID

## 2021-10-06 DIAGNOSIS — K62.89 RECTAL PAIN: ICD-10-CM

## 2021-10-06 DIAGNOSIS — K64.5 THROMBOSED HEMORRHOIDS: ICD-10-CM

## 2021-10-06 DIAGNOSIS — K62.5 RECTAL BLEEDING: Primary | ICD-10-CM

## 2021-10-06 LAB
BASOPHILS # BLD: 0 K/UL (ref 0–0.1)
BASOPHILS NFR BLD: 0 % (ref 0–1)
DIFFERENTIAL METHOD BLD: ABNORMAL
EOSINOPHIL # BLD: 0.2 K/UL (ref 0–0.4)
EOSINOPHIL NFR BLD: 3 % (ref 0–7)
ERYTHROCYTE [DISTWIDTH] IN BLOOD BY AUTOMATED COUNT: 14.9 % (ref 11.5–14.5)
HCT VFR BLD AUTO: 36 % (ref 35–47)
HGB BLD-MCNC: 11.8 G/DL (ref 11.5–16)
IMM GRANULOCYTES # BLD AUTO: 0 K/UL (ref 0–0.04)
IMM GRANULOCYTES NFR BLD AUTO: 0 % (ref 0–0.5)
LYMPHOCYTES # BLD: 2 K/UL (ref 0.8–3.5)
LYMPHOCYTES NFR BLD: 29 % (ref 12–49)
MCH RBC QN AUTO: 25.4 PG (ref 26–34)
MCHC RBC AUTO-ENTMCNC: 32.8 G/DL (ref 30–36.5)
MCV RBC AUTO: 77.4 FL (ref 80–99)
MONOCYTES # BLD: 0.4 K/UL (ref 0–1)
MONOCYTES NFR BLD: 6 % (ref 5–13)
NEUTS SEG # BLD: 4.3 K/UL (ref 1.8–8)
NEUTS SEG NFR BLD: 62 % (ref 32–75)
NRBC # BLD: 0 K/UL (ref 0–0.01)
NRBC BLD-RTO: 0 PER 100 WBC
PLATELET # BLD AUTO: 204 K/UL (ref 150–400)
PMV BLD AUTO: 10.1 FL (ref 8.9–12.9)
RBC # BLD AUTO: 4.65 M/UL (ref 3.8–5.2)
WBC # BLD AUTO: 7 K/UL (ref 3.6–11)

## 2021-10-06 PROCEDURE — 96361 HYDRATE IV INFUSION ADD-ON: CPT

## 2021-10-06 PROCEDURE — 86901 BLOOD TYPING SEROLOGIC RH(D): CPT

## 2021-10-06 PROCEDURE — 99284 EMERGENCY DEPT VISIT MOD MDM: CPT

## 2021-10-06 PROCEDURE — 36415 COLL VENOUS BLD VENIPUNCTURE: CPT

## 2021-10-06 PROCEDURE — 81001 URINALYSIS AUTO W/SCOPE: CPT

## 2021-10-06 PROCEDURE — 85025 COMPLETE CBC W/AUTO DIFF WBC: CPT

## 2021-10-06 PROCEDURE — 96374 THER/PROPH/DIAG INJ IV PUSH: CPT

## 2021-10-06 PROCEDURE — 80053 COMPREHEN METABOLIC PANEL: CPT

## 2021-10-06 PROCEDURE — 96375 TX/PRO/DX INJ NEW DRUG ADDON: CPT

## 2021-10-07 ENCOUNTER — APPOINTMENT (OUTPATIENT)
Dept: CT IMAGING | Age: 51
End: 2021-10-07
Attending: STUDENT IN AN ORGANIZED HEALTH CARE EDUCATION/TRAINING PROGRAM
Payer: MEDICAID

## 2021-10-07 VITALS
BODY MASS INDEX: 37.41 KG/M2 | DIASTOLIC BLOOD PRESSURE: 75 MMHG | WEIGHT: 203.26 LBS | TEMPERATURE: 97.9 F | RESPIRATION RATE: 15 BRPM | OXYGEN SATURATION: 95 % | HEART RATE: 89 BPM | HEIGHT: 62 IN | SYSTOLIC BLOOD PRESSURE: 121 MMHG

## 2021-10-07 LAB
ABO + RH BLD: NORMAL
ALBUMIN SERPL-MCNC: 3.6 G/DL (ref 3.5–5)
ALBUMIN/GLOB SERPL: 1 {RATIO} (ref 1.1–2.2)
ALP SERPL-CCNC: 66 U/L (ref 45–117)
ALT SERPL-CCNC: 25 U/L (ref 12–78)
ANION GAP SERPL CALC-SCNC: 5 MMOL/L (ref 5–15)
APPEARANCE UR: CLEAR
AST SERPL-CCNC: 24 U/L (ref 15–37)
BACTERIA URNS QL MICRO: NEGATIVE /HPF
BILIRUB SERPL-MCNC: 0.3 MG/DL (ref 0.2–1)
BILIRUB UR QL: NEGATIVE
BLOOD GROUP ANTIBODIES SERPL: NORMAL
BUN SERPL-MCNC: 12 MG/DL (ref 6–20)
BUN/CREAT SERPL: 18 (ref 12–20)
CALCIUM SERPL-MCNC: 9.2 MG/DL (ref 8.5–10.1)
CHLORIDE SERPL-SCNC: 104 MMOL/L (ref 97–108)
CO2 SERPL-SCNC: 28 MMOL/L (ref 21–32)
COLOR UR: ABNORMAL
CREAT SERPL-MCNC: 0.65 MG/DL (ref 0.55–1.02)
EPITH CASTS URNS QL MICRO: ABNORMAL /LPF
GLOBULIN SER CALC-MCNC: 3.7 G/DL (ref 2–4)
GLUCOSE SERPL-MCNC: 189 MG/DL (ref 65–100)
GLUCOSE UR STRIP.AUTO-MCNC: >1000 MG/DL
HGB UR QL STRIP: NEGATIVE
KETONES UR QL STRIP.AUTO: NEGATIVE MG/DL
LEUKOCYTE ESTERASE UR QL STRIP.AUTO: NEGATIVE
NITRITE UR QL STRIP.AUTO: NEGATIVE
PH UR STRIP: 6 [PH] (ref 5–8)
POTASSIUM SERPL-SCNC: 3.3 MMOL/L (ref 3.5–5.1)
PROT SERPL-MCNC: 7.3 G/DL (ref 6.4–8.2)
PROT UR STRIP-MCNC: NEGATIVE MG/DL
RBC #/AREA URNS HPF: ABNORMAL /HPF (ref 0–5)
SODIUM SERPL-SCNC: 137 MMOL/L (ref 136–145)
SP GR UR REFRACTOMETRY: 1.01 (ref 1–1.03)
SPECIMEN EXP DATE BLD: NORMAL
UA: UC IF INDICATED,UAUC: ABNORMAL
UROBILINOGEN UR QL STRIP.AUTO: 0.2 EU/DL (ref 0.2–1)
WBC URNS QL MICRO: ABNORMAL /HPF (ref 0–4)

## 2021-10-07 PROCEDURE — 96375 TX/PRO/DX INJ NEW DRUG ADDON: CPT

## 2021-10-07 PROCEDURE — 74176 CT ABD & PELVIS W/O CONTRAST: CPT

## 2021-10-07 PROCEDURE — 96376 TX/PRO/DX INJ SAME DRUG ADON: CPT

## 2021-10-07 PROCEDURE — 96374 THER/PROPH/DIAG INJ IV PUSH: CPT

## 2021-10-07 PROCEDURE — 96365 THER/PROPH/DIAG IV INF INIT: CPT

## 2021-10-07 PROCEDURE — 74011250636 HC RX REV CODE- 250/636: Performed by: EMERGENCY MEDICINE

## 2021-10-07 PROCEDURE — 74011000250 HC RX REV CODE- 250: Performed by: EMERGENCY MEDICINE

## 2021-10-07 PROCEDURE — 96361 HYDRATE IV INFUSION ADD-ON: CPT

## 2021-10-07 PROCEDURE — 74011250637 HC RX REV CODE- 250/637: Performed by: EMERGENCY MEDICINE

## 2021-10-07 PROCEDURE — 74011250636 HC RX REV CODE- 250/636: Performed by: STUDENT IN AN ORGANIZED HEALTH CARE EDUCATION/TRAINING PROGRAM

## 2021-10-07 RX ORDER — HYDROMORPHONE HYDROCHLORIDE 1 MG/ML
0.5 INJECTION, SOLUTION INTRAMUSCULAR; INTRAVENOUS; SUBCUTANEOUS ONCE
Status: COMPLETED | OUTPATIENT
Start: 2021-10-07 | End: 2021-10-07

## 2021-10-07 RX ORDER — DIPHENHYDRAMINE HYDROCHLORIDE 50 MG/ML
25 INJECTION, SOLUTION INTRAMUSCULAR; INTRAVENOUS ONCE
Status: COMPLETED | OUTPATIENT
Start: 2021-10-07 | End: 2021-10-07

## 2021-10-07 RX ORDER — POTASSIUM CHLORIDE 7.45 MG/ML
10 INJECTION INTRAVENOUS
Status: DISCONTINUED | OUTPATIENT
Start: 2021-10-07 | End: 2021-10-07

## 2021-10-07 RX ORDER — ONDANSETRON 2 MG/ML
4 INJECTION INTRAMUSCULAR; INTRAVENOUS ONCE
Status: COMPLETED | OUTPATIENT
Start: 2021-10-07 | End: 2021-10-07

## 2021-10-07 RX ORDER — HYDROCORTISONE 10 MG/G
CREAM TOPICAL AS NEEDED
Qty: 30 G | Refills: 0 | Status: SHIPPED | OUTPATIENT
Start: 2021-10-07 | End: 2021-10-07 | Stop reason: SDUPTHER

## 2021-10-07 RX ORDER — DIBUCAINE 0.28 G/28G
OINTMENT TOPICAL
Qty: 28 G | Refills: 0 | Status: SHIPPED | OUTPATIENT
Start: 2021-10-07

## 2021-10-07 RX ORDER — DIPHENHYDRAMINE HCL 25 MG
25 CAPSULE ORAL
Status: DISCONTINUED | OUTPATIENT
Start: 2021-10-07 | End: 2021-10-07

## 2021-10-07 RX ORDER — HALOPERIDOL 5 MG/ML
2.5 INJECTION INTRAMUSCULAR ONCE
Status: COMPLETED | OUTPATIENT
Start: 2021-10-07 | End: 2021-10-07

## 2021-10-07 RX ORDER — LIDOCAINE HYDROCHLORIDE 20 MG/ML
10 SOLUTION OROPHARYNGEAL
Status: COMPLETED | OUTPATIENT
Start: 2021-10-07 | End: 2021-10-07

## 2021-10-07 RX ORDER — HYDROCORTISONE 10 MG/G
CREAM TOPICAL AS NEEDED
Qty: 30 G | Refills: 0 | Status: SHIPPED | OUTPATIENT
Start: 2021-10-07 | End: 2021-10-14

## 2021-10-07 RX ORDER — HYDROMORPHONE HYDROCHLORIDE 1 MG/ML
1 INJECTION, SOLUTION INTRAMUSCULAR; INTRAVENOUS; SUBCUTANEOUS
Status: COMPLETED | OUTPATIENT
Start: 2021-10-07 | End: 2021-10-07

## 2021-10-07 RX ORDER — HYDROMORPHONE HYDROCHLORIDE 2 MG/1
2 TABLET ORAL
Status: COMPLETED | OUTPATIENT
Start: 2021-10-07 | End: 2021-10-07

## 2021-10-07 RX ADMIN — HYDROMORPHONE HYDROCHLORIDE 0.5 MG: 1 INJECTION, SOLUTION INTRAMUSCULAR; INTRAVENOUS; SUBCUTANEOUS at 00:51

## 2021-10-07 RX ADMIN — POTASSIUM CHLORIDE 10 MEQ: 7.46 INJECTION, SOLUTION INTRAVENOUS at 01:55

## 2021-10-07 RX ADMIN — LIDOCAINE HYDROCHLORIDE 10 ML: 20 SOLUTION ORAL; TOPICAL at 03:11

## 2021-10-07 RX ADMIN — HALOPERIDOL LACTATE 2.5 MG: 5 INJECTION, SOLUTION INTRAMUSCULAR at 03:49

## 2021-10-07 RX ADMIN — POTASSIUM BICARBONATE 40 MEQ: 391 TABLET, EFFERVESCENT ORAL at 03:12

## 2021-10-07 RX ADMIN — DIPHENHYDRAMINE HYDROCHLORIDE 25 MG: 50 INJECTION, SOLUTION INTRAMUSCULAR; INTRAVENOUS at 01:23

## 2021-10-07 RX ADMIN — HYDROMORPHONE HYDROCHLORIDE 2 MG: 2 TABLET ORAL at 03:12

## 2021-10-07 RX ADMIN — HYDROMORPHONE HYDROCHLORIDE 1 MG: 1 INJECTION, SOLUTION INTRAMUSCULAR; INTRAVENOUS; SUBCUTANEOUS at 02:22

## 2021-10-07 RX ADMIN — ONDANSETRON 4 MG: 2 INJECTION INTRAMUSCULAR; INTRAVENOUS at 00:51

## 2021-10-07 NOTE — DISCHARGE INSTRUCTIONS
You were seen in the emergency department today for rectal bleeding and pain. You had lab work done that was normal.  You had a CT scan done that looked normal.  Your exam, it looks like you may have a thrombosed hemorrhoid which is a hemorrhoid with a blood clot in it. You are being sent home with medications to treat this. Please contact your colorectal surgeon, Dr. Duane Lemons, first thing this morning to let him know about your visit and schedule follow-up appointment. Please contact your gastroenterologist this morning as well, as you may need your ulcerative colitis medications adjusted. Please return to the emergency department if you pass out, develop lightheadedness, shortness of breath, severe pain, fever, or any other concerning symptoms.

## 2021-10-07 NOTE — ED PROVIDER NOTES
EMERGENCY DEPARTMENT HISTORY AND PHYSICAL EXAM          Date: 10/6/2021  Patient Name: Yury Sorenson  Attending of Record: Maria Luisa Liz DO     History of Presenting Illness     Chief Complaint   Patient presents with    Post OP Complication     pt had surgery 2 days ago on fistula in her rectum. Today the pain became worse, she began to have bleeding more heavily and N+V       History Provided By: Patient    HPI: Yury Sorenson is a 46 y.o. female with PMH of DM, HLD, HTN, ulcerative colitis s/p colonic resection who is presenting to the emergency department with rectal bleeding. She recently had an I&D of a rectal abscess on Monday 10/4. She reports that she was healing well since surgery, but woke up today with nausea and vomiting. She has been unable to tolerate PO all day. She has also had approximately 10 episodes of rectal bleeding today. All of these episode filled the toilet bowl with bright red blood and clots. She has also had recurrence of pain. ROS is otherwise negative. PCP: Vivianne Spatz, MD    There are no other complaints, changes, or physical findings at this time. Current Outpatient Medications   Medication Sig Dispense Refill    dibucaine (NUPERCAINAL) 1 % rectal ointment by PeriANAL route every four (4) hours as needed for Pain. 28 g 0    hydrocortisone (PROCTOCORT) 1 % rectal cream Insert  into rectum as needed for Hemorrhoids for up to 7 days. 30 g 0    valACYclovir (VALTREX) 1 gram tablet TAKE 1 TABLET BY MOUTH THREE TIMES A DAY 21 Tablet 0    sertraline (ZOLOFT) 100 mg tablet TAKE 2 TABLETS BY MOUTH NIGHTLY 60 Tablet 0    sertraline (ZOLOFT) 100 mg tablet Take 2 Tablets by mouth nightly. 60 Tablet 2    levoFLOXacin (Levaquin) 750 mg tablet Take 1 Tablet by mouth daily for 10 days. 10 Tablet 0    metroNIDAZOLE (FLAGYL) 500 mg tablet Take 1 Tablet by mouth three (3) times daily for 10 days.  30 Tablet 0    atorvastatin (LIPITOR) 20 mg tablet Take 1 Tablet by mouth daily. 90 Tablet 1    ALPRAZolam (XANAX) 1 mg tablet TAKE 1/2 - 1 TABLET BY MOUTH TWICE DAILY AS NEEDED FOR ANXIETY *MAX 2 TABS DAILY* 60 Tablet 0    naloxone (Narcan) 4 mg/actuation nasal spray Use 1 spray intranasally, then discard. Repeat with new spray every 2 min as needed for opioid overdose symptoms, alternating nostrils. 1 Each 0    dicyclomine (BENTYL) 20 mg tablet Take 1 Tablet by mouth every six (6) hours as needed for Abdominal Cramps. 20 Tablet 0    diphenoxylate-atropine (LomotiL) 2.5-0.025 mg per tablet Take 1 Tablet by mouth four (4) times daily as needed for Diarrhea (1 tab after each stool for max 8 per day). Max Daily Amount: 4 Tablets. Take after each stool for a maximum of 8 tablets daily 20 Tablet 0    hyoscyamine SL (LEVSIN/SL) 0.125 mg SL tablet 1 Tablet by SubLINGual route every four (4) hours as needed for Cramping. 10 Tablet 0    ondansetron (Zofran ODT) 4 mg disintegrating tablet Take 1 Tablet by mouth every eight (8) hours as needed for Nausea. 12 Tablet 1    empagliflozin (Jardiance) 25 mg tablet Take 1 Tablet by mouth daily. 90 Tablet 1    glimepiride (AMARYL) 4 mg tablet TAKE 1 TABLET BY MOUTH ONCE DAILY BEFORE BREAKFAST 90 Tablet 1    estradioL (ESTRACE) 0.5 mg tablet TAKE 1 TABLET BY MOUTH ONCE DAILY 90 Tablet 1    Ventolin HFA 90 mcg/actuation inhaler TAKE 1 PUFF BY INHALATION EVERY FOUR (4) HOURS AS NEEDED FOR WHEEZING. 18 Inhaler 1    lidocaine (Lidoderm) 5 % Apply patch to the affected area for 12 hours a day and remove for 12 hours a day. 5 Each 0    gabapentin (NEURONTIN) 300 mg capsule Take 1 Cap by mouth nightly. Max Daily Amount: 300 mg. 30 Cap 1    losartan-hydroCHLOROthiazide (HYZAAR) 100-12.5 mg per tablet Take 1 Tab by mouth daily. 90 Tab 1    omeprazole (PRILOSEC) 20 mg capsule Take 40 mg by mouth Daily (before breakfast).  EPINEPHrine (EPIPEN) 0.3 mg/0.3 mL (1:1,000) injection 0.3 mL by IntraMUSCular route once as needed for up to 1 dose.  0.3 mL 1       Past History     Past Medical History:  Past Medical History:   Diagnosis Date    Anal fissure     Anisocoria     Asthma     LAST EPISODE     Back pain     Cerumen impaction     Chronic kidney disease     hx uti in past    Coagulation defects     ocassional rectal bleeding due to anal fissure    Colovaginal fistula     Diabetes (HCC)     NIDDM    Diverticulitis     Diverticulosis     Enlarged tonsils     Frequent UTI     GERD (gastroesophageal reflux disease)     H/O endoscopy     with dilation    HA (headache)     Hepatic steatosis     History of colon resection     Hx of colonoscopy with polypectomy     benign    Hypertension     Ill-defined condition     FREQUENT HIVES    Ill-defined condition     HX ELEVATED LIVER ENZYMES    Morbid obesity (HCC)     Nausea & vomiting     during diverticulitis flare    Obesity     Otitis media     Pneumonia     about 15 yrs ago    Psychiatric disorder     ANXIETY    Recurrent tonsillitis     Sinusitis     Transfusion history ~ age 35    postop hysterectomy    Urticaria        Past Surgical History:  Past Surgical History:   Procedure Laterality Date    COLONOSCOPY N/A 3/28/2019    COLONOSCOPY performed by Denice Ryder MD at [facility] COLONOSCOPY N/A 10/2/2020    COLONOSCOPY performed by Denice Ryder MD at [address] [place] [address]    blake.     HX GI  12    LAPAROSCOPIC HAND ASSISTED  POSS OPEN SIGMOID COLECTOMY POSS TEMPORARY DIVERTING LOOP ILEOSTOMY;  (no illeostomy needed)    HX GYN           HX GYN      cervical conization    HX HEENT      SINUS SURGERY LEFT X2    HX HEENT      SINUS SURGERY ON RIGHT X2    HX HEMORRHOIDECTOMY      HX OTHER SURGICAL      Sphincterotomy    HX PELVIC LAPAROSCOPY      HX EMMANUEL AND BSO      HX UROLOGICAL  12     CYSTOSCOPY INSERTION URETERAL CATHETERS - Cystoscopy Insertion of bilateral ureteral stents    VT ABDOMEN SURGERY PROC UNLISTED 01/06/2018    [facility]       Family History:  Family History   Problem Relation Age of Onset    Diabetes Mother     Cancer Mother         NON-HODGKINS LYMPHOMA    Anesth Problems Mother         PONV    Diabetes Father     Heart Disease Father         CAD - STENTS, PACEMAKER    Arrhythmia Father        Social History:  Social History     Tobacco Use    Smoking status: Never Smoker    Smokeless tobacco: Never Used   Vaping Use    Vaping Use: Never assessed   Substance Use Topics    Alcohol use: Yes     Comment: Rarely    Drug use: No     Types: Prescription, OTC       Allergies: Allergies   Allergen Reactions    Aspirin Hives, Shortness of Breath and Itching    Codeine Hives, Itching and Angioedema     Pt had itching in mouth, on face and lips    Contrast Agent [Iodine] Hives, Itching and Angioedema     5/5/21: pt was given benadryl and solu-medrol prior to administration but pt had severe tongue swelling and drooling, lethargy and itching. DO NOT GIVE PT    Flavoring Agent Anaphylaxis     Cherry flavor    Iodinated Contrast Media Anaphylaxis, Diarrhea, Hives, Nausea and Vomiting and Shortness of Breath    Percocet [Oxycodone-Acetaminophen] Hives, Itching and Angioedema     Pt had itching in mouth, on face and lips    Prilosec [Omeprazole Magnesium] Anaphylaxis     CHERRY FLAVORED ODT; PT TAKES REGULAR PRILOSEC AND IS OK    Dilaudid [Hydromorphone] Itching     Tolerates with benadryl    Ketorolac Rash     \"makes my eyes spasm and causes rash on my hands\" and \"makes my skin itch and makes me nervous\"    Morphine (Pf) Rash     According to rn, pt can take morphine with benadryl    Fentanyl Rash and Itching     Has had benadryl before         Review of Systems   Review of Systems   Constitutional: Negative for chills and fever. HENT: Negative for congestion. Eyes: Negative for visual disturbance. Respiratory: Negative for cough. Cardiovascular: Negative for chest pain. Gastrointestinal: Positive for blood in stool, diarrhea, nausea, rectal pain and vomiting. Genitourinary: Negative for dysuria and hematuria. Musculoskeletal: Negative for arthralgias and myalgias. Skin: Negative for rash. Neurological: Positive for light-headedness. Negative for dizziness and headaches. All other systems reviewed and are negative. Physical Exam   Physical Exam  Vitals reviewed. Constitutional:       General: She is not in acute distress. HENT:      Head: Normocephalic and atraumatic. Nose: Nose normal.   Eyes:      Conjunctiva/sclera: Conjunctivae normal.   Cardiovascular:      Comments: Normal peripheral perfusion  Pulmonary:      Effort: Pulmonary effort is normal. No respiratory distress. Abdominal:      General: There is no distension. Genitourinary:     Rectum: External hemorrhoid (firm, tender to palpation) present. Musculoskeletal:         General: No swelling or deformity. Cervical back: Neck supple. Skin:     General: Skin is warm and dry. Neurological:      Mental Status: She is alert and oriented to person, place, and time.       Comments: Moving all extremities spontaneously    Psychiatric:         Mood and Affect: Mood normal.         Behavior: Behavior normal.         Diagnostic Study Results     Labs -     Recent Results (from the past 12 hour(s))   CBC WITH AUTOMATED DIFF    Collection Time: 10/06/21 10:50 PM   Result Value Ref Range    WBC 7.0 3.6 - 11.0 K/uL    RBC 4.65 3.80 - 5.20 M/uL    HGB 11.8 11.5 - 16.0 g/dL    HCT 36.0 35.0 - 47.0 %    MCV 77.4 (L) 80.0 - 99.0 FL    MCH 25.4 (L) 26.0 - 34.0 PG    MCHC 32.8 30.0 - 36.5 g/dL    RDW 14.9 (H) 11.5 - 14.5 %    PLATELET 198 260 - 921 K/uL    MPV 10.1 8.9 - 12.9 FL    NRBC 0.0 0  WBC    ABSOLUTE NRBC 0.00 0.00 - 0.01 K/uL    NEUTROPHILS 62 32 - 75 %    LYMPHOCYTES 29 12 - 49 %    MONOCYTES 6 5 - 13 %    EOSINOPHILS 3 0 - 7 %    BASOPHILS 0 0 - 1 %    IMMATURE GRANULOCYTES 0 0.0 - 0.5 %    ABS. NEUTROPHILS 4.3 1.8 - 8.0 K/UL    ABS. LYMPHOCYTES 2.0 0.8 - 3.5 K/UL    ABS. MONOCYTES 0.4 0.0 - 1.0 K/UL    ABS. EOSINOPHILS 0.2 0.0 - 0.4 K/UL    ABS. BASOPHILS 0.0 0.0 - 0.1 K/UL    ABS. IMM. GRANS. 0.0 0.00 - 0.04 K/UL    DF AUTOMATED     METABOLIC PANEL, COMPREHENSIVE    Collection Time: 10/06/21 10:50 PM   Result Value Ref Range    Sodium 137 136 - 145 mmol/L    Potassium 3.3 (L) 3.5 - 5.1 mmol/L    Chloride 104 97 - 108 mmol/L    CO2 28 21 - 32 mmol/L    Anion gap 5 5 - 15 mmol/L    Glucose 189 (H) 65 - 100 mg/dL    BUN 12 6 - 20 MG/DL    Creatinine 0.65 0.55 - 1.02 MG/DL    BUN/Creatinine ratio 18 12 - 20      GFR est AA >60 >60 ml/min/1.73m2    GFR est non-AA >60 >60 ml/min/1.73m2    Calcium 9.2 8.5 - 10.1 MG/DL    Bilirubin, total 0.3 0.2 - 1.0 MG/DL    ALT (SGPT) 25 12 - 78 U/L    AST (SGOT) 24 15 - 37 U/L    Alk. phosphatase 66 45 - 117 U/L    Protein, total 7.3 6.4 - 8.2 g/dL    Albumin 3.6 3.5 - 5.0 g/dL    Globulin 3.7 2.0 - 4.0 g/dL    A-G Ratio 1.0 (L) 1.1 - 2.2     TYPE & SCREEN    Collection Time: 10/06/21 10:50 PM   Result Value Ref Range    Crossmatch Expiration 10/09/2021,2359     ABO/Rh(D) A NEGATIVE     Antibody screen NEG    URINALYSIS W/ REFLEX CULTURE    Collection Time: 10/06/21 10:57 PM    Specimen: Urine   Result Value Ref Range    Color YELLOW/STRAW      Appearance CLEAR CLEAR      Specific gravity 1.010 1.003 - 1.030      pH (UA) 6.0 5.0 - 8.0      Protein Negative NEG mg/dL    Glucose >1,000 (A) NEG mg/dL    Ketone Negative NEG mg/dL    Bilirubin Negative NEG      Blood Negative NEG      Urobilinogen 0.2 0.2 - 1.0 EU/dL    Nitrites Negative NEG      Leukocyte Esterase Negative NEG      WBC 0-4 0 - 4 /hpf    RBC 0-5 0 - 5 /hpf    Epithelial cells FEW FEW /lpf    Bacteria Negative NEG /hpf    UA:UC IF INDICATED CULTURE NOT INDICATED BY UA RESULT CNI         Radiologic Studies -   CT ABD PELV WO CONT   Final Result      1. Decreased inflammation in the left gluteal crease.  No drainable fluid   collection. 2. No bowel obstruction. 3. No nephrolithiasis or hydronephrosis. CT Results  (Last 48 hours)               10/07/21 0137  CT ABD PELV WO CONT Final result    Impression:      1. Decreased inflammation in the left gluteal crease. No drainable fluid   collection. 2. No bowel obstruction. 3. No nephrolithiasis or hydronephrosis. Narrative:  EXAM: CT ABD PELV WO CONT       INDICATION: Perirectal fluid. Previous perianal fistula. COMPARISON: CT abdomen/pelvis on 9/26/2021 and 9/13/2021. CONTRAST:  None. TECHNIQUE:    Thin axial images were obtained through the abdomen and pelvis. Coronal and   sagittal reformats were generated. Oral contrast was not administered. CT dose   reduction was achieved through use of a standardized protocol tailored for this   examination and automatic exposure control for dose modulation. The absence of intravenous contrast material reduces the sensitivity for   evaluation of the vasculature and solid organs. FINDINGS:    LOWER THORAX: No significant abnormality in the incidentally imaged lower chest.   LIVER: Mild hepatomegaly measures 21 cm in length. Smooth surface. No evidence   of mass. BILIARY TREE: Cholecystectomy. CBD is not dilated. SPLEEN: Splenomegaly measures 15 cm in length. PANCREAS: No inflammation. ADRENALS: Unremarkable. KIDNEYS/URETERS: No calculus or hydronephrosis. STOMACH: Unremarkable. SMALL BOWEL: No dilatation or wall thickening. COLON: Mild diverticulosis. No inflammation. APPENDIX: Appendectomy. PERITONEUM: No ascites or pneumoperitoneum. RETROPERITONEUM: No lymphadenopathy or aortic aneurysm. REPRODUCTIVE ORGANS: Hysterectomy. No pelvic mass. URINARY BLADDER: No mass or calculus. BONES: Multiple vertebral body hemangiomas contain fat attenuation. No   aggressive bone lesion. ABDOMINAL WALL: Previous surgery. No hernia.    ADDITIONAL COMMENTS: New calcification versus suture in the left gluteal crease   fistula. No drainable fluid collection. Decreased inflammation since 2 weeks   ago. CXR Results  (Last 48 hours)    None            Medical Decision Making   I am the first provider for this patient. I reviewed the vital signs, available nursing notes, past medical history, past surgical history, family history and social history. Vital Signs-Reviewed the patient's vital signs. Patient Vitals for the past 12 hrs:   Temp Pulse Resp BP SpO2   10/07/21 0500 -- -- -- 110/66 94 %   10/07/21 0400 -- -- -- 106/61 94 %   10/07/21 0330 -- -- -- (!) 121/59 96 %   10/07/21 0215 -- -- -- (!) 142/69 99 %   10/06/21 2229 97.9 °F (36.6 °C) 89 15 (!) 143/79 100 %       Records Reviewed: Nursing Notes and Old Medical Records    Provider Notes (Medical Decision Making):   Martínez Hernandez is a 47 yo F with hx as mentioned who is presenting to the emergency department with rectal bleeding and pain. She is nontoxic appearing on exam, and hemodynamically stable. Her exam is significant for what appears to be a thrombosed hemorrhoid. Labs ordered in triage. Will obtain CT abd/pelvis wo contrast as pt has documented severe rxn to contrast. Dispo pending workup. ED Course and Progress Notes:   Initial assessment performed. The patients presenting problems have been discussed, and they are in agreement with the care plan formulated and outlined with them. I have encouraged them to ask questions as they arise throughout their visit. ED Course as of Oct 07 0555   Thu Oct 07, 2021   0022 Potassium low will replete K+     [AC]   0122 Pt unable to tolerate PO bendaryl. Benadryl switched to IV    [AC]   0207 Pt still in pain, ordered additional dose of IV dilaudid. [AC]   [id number] Potassium burning IV, will switch to PO. Pt still complaining of severe pain will give PO dilaudid.      [AC]   0346 Pt complaining of nausea, will treat with 2.5mg IV haldol [AC]   0400 Contacted colorectal surgery. Waiting for call back    [AC]   0511 Colorectal surgery contacted again     [AC]   [phone] Spoke with colorectal surgery. They feel that given patients stable vitals and hemoglobin it is safe to send her home with close follow up in clinic. Will prescribe treatment for hemorrhoids and discharge home. Pt provided with appropriate return precautions. [AC]      ED Course User Index  [AC] Rikki Mayers MD       Diagnosis     Clinical Impression:   1. Rectal bleeding    2. Rectal pain    3. Thrombosed hemorrhoids        Disposition:  Discharged home. Discussed results and treatment plan with patient. She is understanding of the plan. She will follow up with colorectal surgery and gastroenterology in clinic. DISCHARGE PLAN:   1. Current Discharge Medication List      START taking these medications    Details   dibucaine (NUPERCAINAL) 1 % rectal ointment by PeriANAL route every four (4) hours as needed for Pain. Qty: 28 g, Refills: 0  Start date: 10/7/2021      hydrocortisone (PROCTOCORT) 1 % rectal cream Insert  into rectum as needed for Hemorrhoids for up to 7 days. Qty: 30 g, Refills: 0  Start date: 10/7/2021, End date: 10/14/2021           2. Follow-up Information     Follow up With Specialties Details Why Contact Info    Dr. Roni Hernandez, Colorectal Surgery  Today      Gastroenterology  Today          3.  Return to ED if worse         Resident Signature:   Signed By: Tino Everett MD     October 7, 2021

## 2021-10-09 NOTE — TELEPHONE ENCOUNTER
----- Message from [employer] sent at 10/1/2021  7:37 AM EDT -----  Regarding: FW:See note below  Contact: [phone]    ----- Message -----  From: Ulices Singletary  Sent: 9/30/2021  10:54 PM EDT  To: [facility] Nurse Pool  Subject: RE:See note below                                [id number] Sadie Arreola, I understand. Im having surgery Monday October 4th. I have been in and out of the hospital w/a yousif-Rectal abscess, that has caused a great deal of pain. I have been unable to take a lot of pain meds due to hives/anaphylaxis. I know that Dr. Catherine Gruber stated that this isnt the type of med that you should just stop. 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 Im aware of the seriousness of mixing these two types of meds. Therefore, as I mentioned I havent been able to take most of the opioids that have been prescribed. Please check my  Hospital record, you will see this to be true. Please ask Dr Catherine Gruber should I just completely stop the alprazolam all together ?  Thank you

## 2021-10-15 ENCOUNTER — HOSPITAL ENCOUNTER (EMERGENCY)
Age: 51
Discharge: HOME | End: 2021-10-15
Attending: EMERGENCY MEDICINE
Payer: MEDICAID

## 2021-10-15 VITALS
TEMPERATURE: 98.5 F | SYSTOLIC BLOOD PRESSURE: 115 MMHG | WEIGHT: 207.01 LBS | DIASTOLIC BLOOD PRESSURE: 60 MMHG | RESPIRATION RATE: 19 BRPM | HEIGHT: 62 IN | HEART RATE: 69 BPM | BODY MASS INDEX: 38.09 KG/M2 | OXYGEN SATURATION: 95 %

## 2021-10-15 DIAGNOSIS — F41.9 ANXIETY: ICD-10-CM

## 2021-10-15 DIAGNOSIS — K62.89 PERIANAL PAIN: ICD-10-CM

## 2021-10-15 DIAGNOSIS — R11.2 NON-INTRACTABLE VOMITING WITH NAUSEA, UNSPECIFIED VOMITING TYPE: Primary | ICD-10-CM

## 2021-10-15 DIAGNOSIS — B02.9 HERPES ZOSTER WITHOUT COMPLICATION: ICD-10-CM

## 2021-10-15 LAB
ALBUMIN SERPL-MCNC: 3.2 G/DL (ref 3.5–5)
ALBUMIN/GLOB SERPL: 1 {RATIO} (ref 1.1–2.2)
ALP SERPL-CCNC: 62 U/L (ref 45–117)
ALT SERPL-CCNC: 24 U/L (ref 12–78)
ANION GAP SERPL CALC-SCNC: 4 MMOL/L (ref 5–15)
AST SERPL-CCNC: 18 U/L (ref 15–37)
BASOPHILS # BLD: 0 K/UL (ref 0–0.1)
BASOPHILS NFR BLD: 0 % (ref 0–1)
BILIRUB SERPL-MCNC: 0.4 MG/DL (ref 0.2–1)
BUN SERPL-MCNC: 7 MG/DL (ref 6–20)
BUN/CREAT SERPL: 11 (ref 12–20)
CALCIUM SERPL-MCNC: 9.4 MG/DL (ref 8.5–10.1)
CHLORIDE SERPL-SCNC: 109 MMOL/L (ref 97–108)
CO2 SERPL-SCNC: 28 MMOL/L (ref 21–32)
CREAT SERPL-MCNC: 0.61 MG/DL (ref 0.55–1.02)
DIFFERENTIAL METHOD BLD: ABNORMAL
EOSINOPHIL # BLD: 0.2 K/UL (ref 0–0.4)
EOSINOPHIL NFR BLD: 4 % (ref 0–7)
ERYTHROCYTE [DISTWIDTH] IN BLOOD BY AUTOMATED COUNT: 14.9 % (ref 11.5–14.5)
GLOBULIN SER CALC-MCNC: 3.3 G/DL (ref 2–4)
GLUCOSE SERPL-MCNC: 172 MG/DL (ref 65–100)
HCT VFR BLD AUTO: 31.5 % (ref 35–47)
HGB BLD-MCNC: 10.7 G/DL (ref 11.5–16)
IMM GRANULOCYTES # BLD AUTO: 0 K/UL (ref 0–0.04)
IMM GRANULOCYTES NFR BLD AUTO: 0 % (ref 0–0.5)
LIPASE SERPL-CCNC: 129 U/L (ref 73–393)
LYMPHOCYTES # BLD: 1.5 K/UL (ref 0.8–3.5)
LYMPHOCYTES NFR BLD: 29 % (ref 12–49)
MCH RBC QN AUTO: 25.7 PG (ref 26–34)
MCHC RBC AUTO-ENTMCNC: 34 G/DL (ref 30–36.5)
MCV RBC AUTO: 75.7 FL (ref 80–99)
MONOCYTES # BLD: 0.3 K/UL (ref 0–1)
MONOCYTES NFR BLD: 6 % (ref 5–13)
NEUTS SEG # BLD: 3.1 K/UL (ref 1.8–8)
NEUTS SEG NFR BLD: 61 % (ref 32–75)
NRBC # BLD: 0 K/UL (ref 0–0.01)
NRBC BLD-RTO: 0 PER 100 WBC
PLATELET # BLD AUTO: 174 K/UL (ref 150–400)
PMV BLD AUTO: 9.7 FL (ref 8.9–12.9)
POTASSIUM SERPL-SCNC: 3.4 MMOL/L (ref 3.5–5.1)
PROT SERPL-MCNC: 6.5 G/DL (ref 6.4–8.2)
RBC # BLD AUTO: 4.16 M/UL (ref 3.8–5.2)
SODIUM SERPL-SCNC: 141 MMOL/L (ref 136–145)
WBC # BLD AUTO: 5.1 K/UL (ref 3.6–11)

## 2021-10-15 PROCEDURE — 96375 TX/PRO/DX INJ NEW DRUG ADDON: CPT

## 2021-10-15 PROCEDURE — 74011000250 HC RX REV CODE- 250: Performed by: EMERGENCY MEDICINE

## 2021-10-15 PROCEDURE — 85025 COMPLETE CBC W/AUTO DIFF WBC: CPT

## 2021-10-15 PROCEDURE — 96361 HYDRATE IV INFUSION ADD-ON: CPT

## 2021-10-15 PROCEDURE — 96376 TX/PRO/DX INJ SAME DRUG ADON: CPT

## 2021-10-15 PROCEDURE — 80053 COMPREHEN METABOLIC PANEL: CPT

## 2021-10-15 PROCEDURE — 96374 THER/PROPH/DIAG INJ IV PUSH: CPT

## 2021-10-15 PROCEDURE — 36415 COLL VENOUS BLD VENIPUNCTURE: CPT

## 2021-10-15 PROCEDURE — 74011250636 HC RX REV CODE- 250/636: Performed by: EMERGENCY MEDICINE

## 2021-10-15 PROCEDURE — 99284 EMERGENCY DEPT VISIT MOD MDM: CPT

## 2021-10-15 PROCEDURE — 83690 ASSAY OF LIPASE: CPT

## 2021-10-15 RX ORDER — DIPHENHYDRAMINE HYDROCHLORIDE 50 MG/ML
25 INJECTION, SOLUTION INTRAMUSCULAR; INTRAVENOUS
Status: COMPLETED | OUTPATIENT
Start: 2021-10-15 | End: 2021-10-15

## 2021-10-15 RX ORDER — PROMETHAZINE HYDROCHLORIDE 25 MG/1
25 TABLET ORAL
Qty: 20 TABLET | Refills: 0 | OUTPATIENT
Start: 2021-10-15 | End: 2022-01-24

## 2021-10-15 RX ORDER — HYDROMORPHONE HYDROCHLORIDE 1 MG/ML
1 INJECTION, SOLUTION INTRAMUSCULAR; INTRAVENOUS; SUBCUTANEOUS ONCE
Status: COMPLETED | OUTPATIENT
Start: 2021-10-15 | End: 2021-10-15

## 2021-10-15 RX ORDER — ONDANSETRON 2 MG/ML
4 INJECTION INTRAMUSCULAR; INTRAVENOUS
Status: COMPLETED | OUTPATIENT
Start: 2021-10-15 | End: 2021-10-15

## 2021-10-15 RX ORDER — PROMETHAZINE HYDROCHLORIDE 25 MG/1
25 TABLET ORAL
Qty: 12 TABLET | Refills: 0 | Status: SHIPPED | OUTPATIENT
Start: 2021-10-15 | End: 2021-10-15 | Stop reason: SDUPTHER

## 2021-10-15 RX ADMIN — SODIUM CHLORIDE 1000 ML: 9 INJECTION, SOLUTION INTRAVENOUS at 08:37

## 2021-10-15 RX ADMIN — ONDANSETRON 4 MG: 2 INJECTION INTRAMUSCULAR; INTRAVENOUS at 12:52

## 2021-10-15 RX ADMIN — HYDROMORPHONE HYDROCHLORIDE 1 MG: 1 INJECTION, SOLUTION INTRAMUSCULAR; INTRAVENOUS; SUBCUTANEOUS at 12:52

## 2021-10-15 RX ADMIN — SODIUM CHLORIDE 5 MG: 9 INJECTION INTRAMUSCULAR; INTRAVENOUS; SUBCUTANEOUS at 09:33

## 2021-10-15 RX ADMIN — DIPHENHYDRAMINE HYDROCHLORIDE 25 MG: 50 INJECTION, SOLUTION INTRAMUSCULAR; INTRAVENOUS at 08:37

## 2021-10-15 RX ADMIN — HYDROMORPHONE HYDROCHLORIDE 1 MG: 1 INJECTION, SOLUTION INTRAMUSCULAR; INTRAVENOUS; SUBCUTANEOUS at 08:37

## 2021-10-15 RX ADMIN — DIPHENHYDRAMINE HYDROCHLORIDE 25 MG: 50 INJECTION, SOLUTION INTRAMUSCULAR; INTRAVENOUS at 12:52

## 2021-10-15 RX ADMIN — ONDANSETRON 4 MG: 2 INJECTION INTRAMUSCULAR; INTRAVENOUS at 08:36

## 2021-10-15 NOTE — DISCHARGE INSTRUCTIONS
You were evaluated in the emergency department for nausea, vomiting, and perianal pain. Your examination was reassuring as was your work-up including blood work. It will be important for you to follow-up with Dr. Ulisses Shen in 2-3 days. If you develop worsening symptoms such as worsening nausea, vomiting, fevers, or pain, please return to the emergency department immediately. It was a pleasure taking care of you at [facility] Emergency Department today. We know that when you come to [address] [place] [address], you are entrusting us with your health, comfort, and safety. Our physicians and nurses honor that trust, and we truly appreciate the opportunity to care for you and your loved ones. We also value your feedback. If you receive a survey about your Emergency Department experience today, please fill it out. We care about our patients' feedback, and we listen to what you have to say. Thank you!

## 2021-10-15 NOTE — ED PROVIDER NOTES
EMERGENCY DEPARTMENT HISTORY AND PHYSICAL EXAM      Date: 10/15/2021  Patient Name: Darien Vaughan    History of Presenting Illness     Chief Complaint   Patient presents with    Vomiting     Surgery 1.5wk ago for anal fistula, worsening N/V/D with fever and chills. Was here recently for same sx but worse now, unable to control pain. Was sent home with medication for pain but has been vomiting, phenergan suppository was painful but helped vomiting episodes. Pt reports dizziness and feeling off.  Diarrhea       History Provided By: Patient    HPI: Darien Vaughan, 46 y.o. female with history of hypertension, diabetes, recent perianal fistula, cellulitis/abscess drained by Dr. Myra Denson, colorectal surgery, September 2021 presents to the ED with cc of increased rectal pain, nausea, vomiting. Patient has been having nausea with nonbloody nonbilious emesis at home over the past 2 to 3 days. She has been taking Zofran and Phenergan suppositories for relief of symptoms. She also endorses increased perianal pain which she believes is because she cannot keep down her home Dilaudid. Denies fevers, chills, abdominal pain, chest pain, shortness of breath. Pain described as moderate, constant, nonradiating. Endorses diarrhea but states this is chronic for her. Denies any urinary symptoms. Patient was evaluated in the ED with similar complaints 10/7/2021, had negative repeat CT times. There are no other complaints, changes, or physical findings at this time. PCP: Caryle Hind, MD    No current facility-administered medications on file prior to encounter. Current Outpatient Medications on File Prior to Encounter   Medication Sig Dispense Refill    dibucaine (NUPERCAINAL) 1 % rectal ointment by PeriANAL route every four (4) hours as needed for Pain.  28 g 0    valACYclovir (VALTREX) 1 gram tablet TAKE 1 TABLET BY MOUTH THREE TIMES A DAY 21 Tablet 0    sertraline (ZOLOFT) 100 mg tablet TAKE 2 TABLETS BY MOUTH NIGHTLY 60 Tablet 0    sertraline (ZOLOFT) 100 mg tablet Take 2 Tablets by mouth nightly. 60 Tablet 2    atorvastatin (LIPITOR) 20 mg tablet Take 1 Tablet by mouth daily. 90 Tablet 1    ALPRAZolam (XANAX) 1 mg tablet TAKE 1/2 - 1 TABLET BY MOUTH TWICE DAILY AS NEEDED FOR ANXIETY *MAX 2 TABS DAILY* 60 Tablet 0    naloxone (Narcan) 4 mg/actuation nasal spray Use 1 spray intranasally, then discard. Repeat with new spray every 2 min as needed for opioid overdose symptoms, alternating nostrils. 1 Each 0    dicyclomine (BENTYL) 20 mg tablet Take 1 Tablet by mouth every six (6) hours as needed for Abdominal Cramps. 20 Tablet 0    diphenoxylate-atropine (LomotiL) 2.5-0.025 mg per tablet Take 1 Tablet by mouth four (4) times daily as needed for Diarrhea (1 tab after each stool for max 8 per day). Max Daily Amount: 4 Tablets. Take after each stool for a maximum of 8 tablets daily 20 Tablet 0    hyoscyamine SL (LEVSIN/SL) 0.125 mg SL tablet 1 Tablet by SubLINGual route every four (4) hours as needed for Cramping. 10 Tablet 0    ondansetron (Zofran ODT) 4 mg disintegrating tablet Take 1 Tablet by mouth every eight (8) hours as needed for Nausea. 12 Tablet 1    empagliflozin (Jardiance) 25 mg tablet Take 1 Tablet by mouth daily. 90 Tablet 1    glimepiride (AMARYL) 4 mg tablet TAKE 1 TABLET BY MOUTH ONCE DAILY BEFORE BREAKFAST 90 Tablet 1    estradioL (ESTRACE) 0.5 mg tablet TAKE 1 TABLET BY MOUTH ONCE DAILY 90 Tablet 1    Ventolin HFA 90 mcg/actuation inhaler TAKE 1 PUFF BY INHALATION EVERY FOUR (4) HOURS AS NEEDED FOR WHEEZING. 18 Inhaler 1    lidocaine (Lidoderm) 5 % Apply patch to the affected area for 12 hours a day and remove for 12 hours a day. 5 Each 0    gabapentin (NEURONTIN) 300 mg capsule Take 1 Cap by mouth nightly. Max Daily Amount: 300 mg. 30 Cap 1    losartan-hydroCHLOROthiazide (HYZAAR) 100-12.5 mg per tablet Take 1 Tab by mouth daily.  90 Tab 1    omeprazole (PRILOSEC) 20 mg capsule Take 40 mg by mouth Daily (before breakfast).  EPINEPHrine (EPIPEN) 0.3 mg/0.3 mL (1:1,000) injection 0.3 mL by IntraMUSCular route once as needed for up to 1 dose. 0.3 mL 1       Past History     Past Medical History:  Past Medical History:   Diagnosis Date    Anal fissure     Anisocoria     Asthma     LAST EPISODE     Back pain     Cerumen impaction     Chronic kidney disease     hx uti in past    Coagulation defects     ocassional rectal bleeding due to anal fissure    Colovaginal fistula     Diabetes (HCC)     NIDDM    Diverticulitis     Diverticulosis     Enlarged tonsils     Frequent UTI     GERD (gastroesophageal reflux disease)     H/O endoscopy     with dilation    HA (headache)     Hepatic steatosis     History of colon resection     Hx of colonoscopy with polypectomy     benign    Hypertension     Ill-defined condition     FREQUENT HIVES    Ill-defined condition     HX ELEVATED LIVER ENZYMES    Morbid obesity (HCC)     Nausea & vomiting     during diverticulitis flare    Obesity     Otitis media     Pneumonia     about 15 yrs ago    Psychiatric disorder     ANXIETY    Recurrent tonsillitis     Sinusitis     Transfusion history ~ age 35    postop hysterectomy    Urticaria        Past Surgical History:  Past Surgical History:   Procedure Laterality Date    COLONOSCOPY N/A 3/28/2019    COLONOSCOPY performed by Truman Buckley MD at [facility] COLONOSCOPY N/A 10/2/2020    COLONOSCOPY performed by Truman Buckley MD at [address] [place] [address]    blake.     HX GI  12    LAPAROSCOPIC HAND ASSISTED  POSS OPEN SIGMOID COLECTOMY POSS TEMPORARY DIVERTING LOOP ILEOSTOMY;  (no illeostomy needed)    HX GYN           HX GYN      cervical conization    HX HEENT      SINUS SURGERY LEFT X2    HX HEENT      SINUS SURGERY ON RIGHT X2    HX HEMORRHOIDECTOMY      HX OTHER SURGICAL      Sphincterotomy    HX PELVIC LAPAROSCOPY  HX EMMANUEL AND BSO      HX UROLOGICAL  7/31/12     CYSTOSCOPY INSERTION URETERAL CATHETERS - Cystoscopy Insertion of bilateral ureteral stents    VT ABDOMEN SURGERY PROC UNLISTED  01/06/2018    hernia repair at [facility]       Family History:  Family History   Problem Relation Age of Onset    Diabetes Mother     Cancer Mother         NON-HODGKINS LYMPHOMA    Anesth Problems Mother         PONV    Diabetes Father     Heart Disease Father         CAD - STENTS, PACEMAKER    Arrhythmia Father        Social History:  Social History     Tobacco Use    Smoking status: Never Smoker    Smokeless tobacco: Never Used   Vaping Use    Vaping Use: Never assessed   Substance Use Topics    Alcohol use: Yes     Comment: Rarely    Drug use: No     Types: Prescription, OTC       Allergies: Allergies   Allergen Reactions    Aspirin Hives, Shortness of Breath and Itching    Codeine Hives, Itching and Angioedema     Pt had itching in mouth, on face and lips    Contrast Agent [Iodine] Hives, Itching and Angioedema     5/5/21: pt was given benadryl and solu-medrol prior to administration but pt had severe tongue swelling and drooling, lethargy and itching.  DO NOT GIVE PT    Flavoring Agent Anaphylaxis     Cherry flavor    Iodinated Contrast Media Anaphylaxis, Diarrhea, Hives, Nausea and Vomiting and Shortness of Breath    Percocet [Oxycodone-Acetaminophen] Hives, Itching and Angioedema     Pt had itching in mouth, on face and lips    Prilosec [Omeprazole Magnesium] Anaphylaxis     CHERRY FLAVORED ODT; PT TAKES REGULAR PRILOSEC AND IS OK    Dilaudid [Hydromorphone] Itching     Tolerates with benadryl    Ketorolac Rash     \"makes my eyes spasm and causes rash on my hands\" and \"makes my skin itch and makes me nervous\"    Morphine (Pf) Rash     According to rn, pt can take morphine with benadryl    Fentanyl Rash and Itching     Has had benadryl before         Review of Systems   Review of Systems   Constitutional: Negative for chills and fever. HENT: Negative. Eyes: Negative for visual disturbance. Respiratory: Negative for cough and shortness of breath. Cardiovascular: Negative for chest pain and leg swelling. Gastrointestinal: Positive for diarrhea, nausea and vomiting. Negative for abdominal pain, anal bleeding and blood in stool. +perianal pain   Genitourinary: Negative. Musculoskeletal: Negative for back pain and gait problem. Skin: Negative for color change and rash. Neurological: Negative for dizziness, weakness, light-headedness and headaches. Hematological: Does not bruise/bleed easily. All other systems reviewed and are negative. Physical Exam   Physical Exam  Vitals and nursing note reviewed. Constitutional:       General: She is not in acute distress. Appearance: Normal appearance. She is obese. She is not ill-appearing or toxic-appearing. Comments: Lying on side, no acute distress   HENT:      Head: Normocephalic and atraumatic. Nose: Nose normal.      Mouth/Throat:      Mouth: Mucous membranes are moist.   Eyes:      Extraocular Movements: Extraocular movements intact. Pupils: Pupils are equal, round, and reactive to light. Cardiovascular:      Rate and Rhythm: Normal rate and regular rhythm. Heart sounds: No murmur heard. Pulmonary:      Effort: Pulmonary effort is normal. No respiratory distress. Breath sounds: Normal breath sounds. No wheezing. Abdominal:      General: There is no distension. Palpations: Abdomen is soft. Tenderness: There is no abdominal tenderness. There is no guarding or rebound. Genitourinary:     Comments: Perianal area with healing incision at 9oclock, no surrounding erythema. No thrombosed hemorrhoids. No active bleeding. There is no crepitus, erythema, or reproducible TTP. Digital rectal exam deferred. Musculoskeletal:         General: No swelling or tenderness. Normal range of motion.       Cervical back: Normal range of motion and neck supple. Right lower leg: No edema. Left lower leg: No edema. Skin:     General: Skin is warm and dry. Coloration: Skin is not pale. Findings: No erythema. Neurological:      General: No focal deficit present. Mental Status: She is alert and oriented to person, place, and time. Diagnostic Study Results     Labs -     Labs Reviewed   CBC WITH AUTOMATED DIFF - Abnormal; Notable for the following components:       Result Value    HGB 10.7 (*)     HCT 31.5 (*)     MCV 75.7 (*)     MCH 25.7 (*)     RDW 14.9 (*)     All other components within normal limits   METABOLIC PANEL, COMPREHENSIVE - Abnormal; Notable for the following components:    Potassium 3.4 (*)     Chloride 109 (*)     Anion gap 4 (*)     Glucose 172 (*)     BUN/Creatinine ratio 11 (*)     Albumin 3.2 (*)     A-G Ratio 1.0 (*)     All other components within normal limits   LIPASE       Radiologic Studies -   No orders to display     CT Results  (Last 48 hours)    None        CXR Results  (Last 48 hours)    None          Medical Decision Making   I am the first provider for this patient. I reviewed the vital signs, available nursing notes, past medical history, past surgical history, family history and social history. Vital Signs-Reviewed the patient's vital signs. Visit Vitals  /60   Pulse 69   Temp 98.5 °F (36.9 °C)   Resp 19   Ht 5' 2\" (1.575 m)   Wt 93.9 kg (207 lb 0.2 oz)   SpO2 95%   BMI 37.86 kg/m²       Records Reviewed: Nursing Notes, Old Medical Records, Previous Radiology Studies and Previous Laboratory Studies  I personally reviewed ED provider records from 10/7/2021 with negative CT scan at that time, also reviewed colorectal surgery records from 9/26/2012.     Provider Notes (Medical Decision Making):   66-year-old female with recent perianal cellulitis and abscess status post I&D by colorectal surgery here with nausea, vomiting, inability to tolerate her medications, and increased perianal pain. She is afebrile vital signs are stable. Exam as above. She does have reassuring external exam without any obvious perineal abscess or erythema. No sign of Maria Isabel's gangrene. Laboratory evaluation largely unremarkable, there is no leukocytosis today. Patient is afebrile. Patient treated symptomatically with antiemetics and analgesia in the ED and feels better. Discussed utility of repeated CT imaging and risks of radiation. She has had multiple CT imaging recently including 8/5, 9/13, 9/26, and 10/7. Do not feel she requires repeat imaging today especially with normal external exam.  Patient comfortable with plan to not repeat CT today, treat symptomatically. She was supposed to have follow-up with colorectal surgery today and came to the ED instead. Encouraged to call colorectal surgery for close outpatient follow-up and given strict return ED precautions. All questions answered and she agrees with plan as above. ED Course:   Initial assessment performed. The patients presenting problems have been discussed, and they are in agreement with the care plan formulated and outlined with them. I have encouraged them to ask questions as they arise throughout their visit. Discharge Note:  The patient has been re-evaluated and is ready for discharge. Reviewed available results with patient. Counseled patient on diagnosis and care plan. Patient has expressed understanding, and all questions have been answered. Patient agrees with plan and agrees to follow up as recommended, or to return to the ED if their symptoms worsen. Discharge instructions have been provided and explained to the patient, along with reasons to return to the ED. Disposition:  Discharge      DISCHARGE PLAN:  1.    Discharge Medication List as of 10/15/2021 12:25 PM      CONTINUE these medications which have CHANGED    Details   promethazine (PHENERGAN) 25 mg tablet Take 1 Tablet by mouth every six (6) hours as needed for Nausea., Normal, Disp-20 Tablet, R-0         CONTINUE these medications which have NOT CHANGED    Details   dibucaine (NUPERCAINAL) 1 % rectal ointment by PeriANAL route every four (4) hours as needed for Pain., Normal, Disp-28 g, R-0      valACYclovir (VALTREX) 1 gram tablet TAKE 1 TABLET BY MOUTH THREE TIMES A DAY, Normal, Disp-21 Tablet, R-0DX Code Needed  . !! sertraline (ZOLOFT) 100 mg tablet TAKE 2 TABLETS BY MOUTH NIGHTLY, Normal, Disp-60 Tablet, R-0      !! sertraline (ZOLOFT) 100 mg tablet Take 2 Tablets by mouth nightly., Normal, Disp-60 Tablet, R-2      atorvastatin (LIPITOR) 20 mg tablet Take 1 Tablet by mouth daily. , Normal, Disp-90 Tablet, R-1      ALPRAZolam (XANAX) 1 mg tablet TAKE 1/2 - 1 TABLET BY MOUTH TWICE DAILY AS NEEDED FOR ANXIETY *MAX 2 TABS DAILY*, Normal, Disp-60 Tablet, R-0Not to exceed 5 additional fills before 01/29/2022 DX Code Needed  . naloxone (Narcan) 4 mg/actuation nasal spray Use 1 spray intranasally, then discard. Repeat with new spray every 2 min as needed for opioid overdose symptoms, alternating nostrils. , Normal, Disp-1 Each, R-0      dicyclomine (BENTYL) 20 mg tablet Take 1 Tablet by mouth every six (6) hours as needed for Abdominal Cramps., Normal, Disp-20 Tablet, R-0      diphenoxylate-atropine (LomotiL) 2.5-0.025 mg per tablet Take 1 Tablet by mouth four (4) times daily as needed for Diarrhea (1 tab after each stool for max 8 per day). Max Daily Amount: 4 Tablets. Take after each stool for a maximum of 8 tablets daily, Normal, Disp-20 Tablet, R-0      hyoscyamine SL (LEVSIN/SL) 0.125 mg SL tablet 1 Tablet by SubLINGual route every four (4) hours as needed for Cramping., Normal, Disp-10 Tablet, R-0      ondansetron (Zofran ODT) 4 mg disintegrating tablet Take 1 Tablet by mouth every eight (8) hours as needed for Nausea., Normal, Disp-12 Tablet, R-1      empagliflozin (Jardiance) 25 mg tablet Take 1 Tablet by mouth daily. , Normal, Disp-90 Tablet, R-1      glimepiride (AMARYL) 4 mg tablet TAKE 1 TABLET BY MOUTH ONCE DAILY BEFORE BREAKFAST, Normal, Disp-90 Tablet, R-1      estradioL (ESTRACE) 0.5 mg tablet TAKE 1 TABLET BY MOUTH ONCE DAILY, Normal, Disp-90 Tablet, R-1      Ventolin HFA 90 mcg/actuation inhaler TAKE 1 PUFF BY INHALATION EVERY FOUR (4) HOURS AS NEEDED FOR WHEEZING., Normal, Disp-18 Inhaler, R-1      lidocaine (Lidoderm) 5 % Apply patch to the affected area for 12 hours a day and remove for 12 hours a day., Normal, Disp-5 Each, R-0      gabapentin (NEURONTIN) 300 mg capsule Take 1 Cap by mouth nightly. Max Daily Amount: 300 mg., Normal, Disp-30 Cap, R-1Not to exceed 4 additional fills before 2021      losartan-hydroCHLOROthiazide (HYZAAR) 100-12.5 mg per tablet Take 1 Tab by mouth daily. , Normal, Disp-90 Tab, R-1      omeprazole (PRILOSEC) 20 mg capsule Take 40 mg by mouth Daily (before breakfast). , Historical Med      EPINEPHrine (EPIPEN) 0.3 mg/0.3 mL (1:1,000) injection 0.3 mL by IntraMUSCular route once as needed for up to 1 dose., Print, Disp-0.3 mL, R-1       !! - Potential duplicate medications found. Please discuss with provider. STOP taking these medications       hydrocortisone (PROCTOCORT) 1 % rectal cream Comments:   Reason for Stoppin.   Follow-up Information     Follow up With Specialties Details Why Contact Info    Nessa Oneil MD Colon and Rectal Surgery Schedule an appointment as soon as possible for a visit   [address] [facility]  [address]  [phone]      [facility] EMERGENCY [facility] Emergency Medicine Go to  As needed, If symptoms worsen [address]  [address] [facility]  [phone]        3. Return to ED if worse     Diagnosis     Clinical Impression:   1. Non-intractable vomiting with nausea, unspecified vomiting type    2. Perianal pain        Attestations:  I am the first and primary provider of record for this patient's ED encounter.  I personally performed the services described above in this documentation. Zaira Clay MD    Please note that this dictation was completed with [employer], the computer voice recognition software. Quite often unanticipated grammatical, syntax, homophones, and other interpretive errors are inadvertently transcribed by the computer software. Please disregard these errors. Please excuse any errors that have escaped final proofreading. Thank you.

## 2021-10-15 NOTE — ED NOTES
Verbal shift change report given to [facility] (oncoming nurse) by Diana Abraham (offgoing nurse). Report included the following information SBAR, ED Summary, Intake/Output and MAR.

## 2021-10-15 NOTE — ED NOTES
PT ambulatory to room from triage. PT here today with complaints of anal pain secondary to a recent surgery for an anal fistula 1.5 weeks ago. Pt states that she was recently diagnosed with a UTI and started on keflex 2 days ago. PT states that n/v/d for the past week since surgery. PT states that main concern is that the pain is getting worse not better. Pt states that she has pelvic pain. PT states history of cdiff and UC. PT denies chest pain, sob, fever, passing out. Pt ANOX4, respirations even and unlabored, skin warm dry and intact, NAD noted.

## 2021-10-16 RX ORDER — ALPRAZOLAM 1 MG/1
TABLET ORAL
Qty: 60 TABLET | Refills: 0 | Status: SHIPPED | OUTPATIENT
Start: 2021-10-16 | End: 2021-11-16

## 2021-10-16 RX ORDER — LOSARTAN POTASSIUM AND HYDROCHLOROTHIAZIDE 12.5; 1 MG/1; MG/1
TABLET ORAL
Qty: 90 TABLET | Refills: 1 | Status: SHIPPED | OUTPATIENT
Start: 2021-10-16 | End: 2022-06-17

## 2021-10-16 RX ORDER — GABAPENTIN 300 MG/1
300 CAPSULE ORAL
Qty: 30 CAPSULE | Refills: 0 | Status: SHIPPED | OUTPATIENT
Start: 2021-10-16 | End: 2022-02-23

## 2021-10-22 ENCOUNTER — HOSPITAL ENCOUNTER (EMERGENCY)
Age: 51
Discharge: HOME | End: 2021-10-22
Attending: EMERGENCY MEDICINE
Payer: MEDICAID

## 2021-10-22 VITALS
BODY MASS INDEX: 37.65 KG/M2 | RESPIRATION RATE: 21 BRPM | HEART RATE: 74 BPM | TEMPERATURE: 98.5 F | OXYGEN SATURATION: 94 % | WEIGHT: 204.59 LBS | HEIGHT: 62 IN | DIASTOLIC BLOOD PRESSURE: 88 MMHG | SYSTOLIC BLOOD PRESSURE: 155 MMHG

## 2021-10-22 DIAGNOSIS — K59.4 PAROXYSMAL PROCTALGIA: ICD-10-CM

## 2021-10-22 DIAGNOSIS — T78.40XA ALLERGIC REACTION, INITIAL ENCOUNTER: Primary | ICD-10-CM

## 2021-10-22 PROCEDURE — 96375 TX/PRO/DX INJ NEW DRUG ADDON: CPT

## 2021-10-22 PROCEDURE — 74011250637 HC RX REV CODE- 250/637: Performed by: EMERGENCY MEDICINE

## 2021-10-22 PROCEDURE — 99285 EMERGENCY DEPT VISIT HI MDM: CPT

## 2021-10-22 PROCEDURE — 74011250636 HC RX REV CODE- 250/636: Performed by: EMERGENCY MEDICINE

## 2021-10-22 PROCEDURE — 96374 THER/PROPH/DIAG INJ IV PUSH: CPT

## 2021-10-22 PROCEDURE — 74011000250 HC RX REV CODE- 250: Performed by: EMERGENCY MEDICINE

## 2021-10-22 RX ORDER — HYDROMORPHONE HYDROCHLORIDE 2 MG/1
2 TABLET ORAL
Status: COMPLETED | OUTPATIENT
Start: 2021-10-22 | End: 2021-10-22

## 2021-10-22 RX ORDER — DIBUCAINE 1 %
OINTMENT (GRAM) TOPICAL
Status: COMPLETED | OUTPATIENT
Start: 2021-10-22 | End: 2021-10-22

## 2021-10-22 RX ORDER — DIAZEPAM 2.5 MG/.5ML
2.5 GEL RECTAL
Status: DISCONTINUED | OUTPATIENT
Start: 2021-10-22 | End: 2021-10-22

## 2021-10-22 RX ORDER — FAMOTIDINE 10 MG/ML
20 INJECTION INTRAVENOUS
Status: COMPLETED | OUTPATIENT
Start: 2021-10-22 | End: 2021-10-22

## 2021-10-22 RX ORDER — EPINEPHRINE 0.3 MG/.3ML
0.3 INJECTION SUBCUTANEOUS
Qty: 2 EACH | Refills: 0 | Status: SHIPPED | OUTPATIENT
Start: 2021-10-22 | End: 2021-10-22

## 2021-10-22 RX ORDER — HYDROMORPHONE HYDROCHLORIDE 1 MG/ML
1 INJECTION, SOLUTION INTRAMUSCULAR; INTRAVENOUS; SUBCUTANEOUS ONCE
Status: COMPLETED | OUTPATIENT
Start: 2021-10-22 | End: 2021-10-22

## 2021-10-22 RX ORDER — HYDROXYZINE 50 MG/1
50 TABLET, FILM COATED ORAL
Qty: 20 TABLET | Refills: 0 | Status: SHIPPED | OUTPATIENT
Start: 2021-10-22 | End: 2021-11-01

## 2021-10-22 RX ORDER — LORAZEPAM 2 MG/ML
1 INJECTION INTRAMUSCULAR
Status: COMPLETED | OUTPATIENT
Start: 2021-10-22 | End: 2021-10-22

## 2021-10-22 RX ORDER — HYDROCODONE BITARTRATE AND ACETAMINOPHEN 7.5; 325 MG/1; MG/1
1 TABLET ORAL
Status: DISCONTINUED | OUTPATIENT
Start: 2021-10-22 | End: 2021-10-22

## 2021-10-22 RX ORDER — DIPHENHYDRAMINE HYDROCHLORIDE 50 MG/ML
50 INJECTION, SOLUTION INTRAMUSCULAR; INTRAVENOUS
Status: COMPLETED | OUTPATIENT
Start: 2021-10-22 | End: 2021-10-22

## 2021-10-22 RX ORDER — HYDROXYZINE 25 MG/1
50 TABLET, FILM COATED ORAL
Status: DISCONTINUED | OUTPATIENT
Start: 2021-10-22 | End: 2021-10-22

## 2021-10-22 RX ORDER — LIDOCAINE HYDROCHLORIDE 20 MG/ML
JELLY TOPICAL
Status: COMPLETED | OUTPATIENT
Start: 2021-10-22 | End: 2021-10-22

## 2021-10-22 RX ORDER — ONDANSETRON 2 MG/ML
4 INJECTION INTRAMUSCULAR; INTRAVENOUS
Status: COMPLETED | OUTPATIENT
Start: 2021-10-22 | End: 2021-10-22

## 2021-10-22 RX ADMIN — HYDROMORPHONE HYDROCHLORIDE 1 MG: 1 INJECTION, SOLUTION INTRAMUSCULAR; INTRAVENOUS; SUBCUTANEOUS at 01:50

## 2021-10-22 RX ADMIN — SODIUM CHLORIDE 500 ML: 900 INJECTION, SOLUTION INTRAVENOUS at 01:09

## 2021-10-22 RX ADMIN — LORAZEPAM 1 MG: 2 INJECTION INTRAMUSCULAR; INTRAVENOUS at 02:13

## 2021-10-22 RX ADMIN — HYDROMORPHONE HYDROCHLORIDE 2 MG: 2 TABLET ORAL at 03:48

## 2021-10-22 RX ADMIN — LIDOCAINE HYDROCHLORIDE: 20 JELLY TOPICAL at 03:00

## 2021-10-22 RX ADMIN — METHYLPREDNISOLONE SODIUM SUCCINATE 125 MG: 125 INJECTION, POWDER, FOR SOLUTION INTRAMUSCULAR; INTRAVENOUS at 01:22

## 2021-10-22 RX ADMIN — DIBUCAINE: 1 OINTMENT TOPICAL at 03:48

## 2021-10-22 RX ADMIN — FAMOTIDINE 20 MG: 10 INJECTION, SOLUTION INTRAVENOUS at 01:21

## 2021-10-22 RX ADMIN — ONDANSETRON 4 MG: 2 INJECTION INTRAMUSCULAR; INTRAVENOUS at 01:31

## 2021-10-22 RX ADMIN — DIPHENHYDRAMINE HYDROCHLORIDE 50 MG: 50 INJECTION, SOLUTION INTRAMUSCULAR; INTRAVENOUS at 01:21

## 2021-10-22 NOTE — DISCHARGE INSTRUCTIONS
Over the counter allegra, claritin or zyrtec for 5 days      Over the counter pepcid fo 5 days    You can apply Dibucaine 3 times a day

## 2021-10-22 NOTE — ED PROVIDER NOTES
EMERGENCY DEPARTMENT HISTORY AND PHYSICAL EXAM      Date: 10/22/2021  Patient Name: Erik Dickerson    History of Presenting Illness     Chief Complaint   Patient presents with    Allergic Reaction     Pt presents ambulatory to ed due to possible allergic reaction. Pt reports that she recently had rectal surgery (about 2-3wks ago) and was prescribed naproxen. She states she took a dose last night and around mid day today and first noticed hives on her arms, face and neck soon after this second dose. Pt reports her sx were getting worse and she felt that her throat felt as though it was swelling and so she used her epi pen abour 20 mins PTA. Pt speaking in full sentences at this time. History Provided By: Patient    HPI: Erik Dickerson, 46 y.o. female presents to the ED with cc of allergic reaction. Patient significant past medical history for colorectal issues. Patient has had 5 ED visits this month related to nausea vomiting ulcerative colitis and rectal pain. She is followed by Dr. Gavin Vallejo with colorectal surgery. She states most recently they had given her a prescription for naproxen which she states that she had taken a dose this morning and had some itching. She states she took a second dose tonight and then had hives all over and some hives developing in her mouth and she states that at that point she took her EpiPen. This was approximately 40 minutes prior to arrival.  She arrives to the emergency department stating that this is improved however she still has some hives present. There is been no fever or chills. She states that she has been having continued rectal pain and had called Dr. Mercy Andrade office today to set up a possible appointment for reevaluation. She denies any shortness of breath. She denies any chest pain. She states there is nausea but no vomiting or diarrhea. She states that she has had rectal pain and some blood in her stool.     There are no other complaints, changes, or physical findings at this time. PCP: Epifanio Minaya MD    No current facility-administered medications on file prior to encounter. Current Outpatient Medications on File Prior to Encounter   Medication Sig Dispense Refill    ALPRAZolam (XANAX) 1 mg tablet TAKE 1/2 - 1 TABLET BY MOUTH TWICE DAILY AS NEEDED FOR ANXIETY *MAX 2 TABS DAILY* 60 Tablet 0    gabapentin (NEURONTIN) 300 mg capsule TAKE 1 CAP BY MOUTH NIGHTLY. MAX DAILY AMOUNT: 300 MG. 30 Capsule 0    losartan-hydroCHLOROthiazide (HYZAAR) 100-12.5 mg per tablet TAKE 1 TABLET BY MOUTH EVERY DAY 90 Tablet 1    promethazine (PHENERGAN) 25 mg tablet Take 1 Tablet by mouth every six (6) hours as needed for Nausea. 20 Tablet 0    dibucaine (NUPERCAINAL) 1 % rectal ointment by PeriANAL route every four (4) hours as needed for Pain. 28 g 0    valACYclovir (VALTREX) 1 gram tablet TAKE 1 TABLET BY MOUTH THREE TIMES A DAY 21 Tablet 0    sertraline (ZOLOFT) 100 mg tablet TAKE 2 TABLETS BY MOUTH NIGHTLY 60 Tablet 0    sertraline (ZOLOFT) 100 mg tablet Take 2 Tablets by mouth nightly. 60 Tablet 2    atorvastatin (LIPITOR) 20 mg tablet Take 1 Tablet by mouth daily. 90 Tablet 1    naloxone (Narcan) 4 mg/actuation nasal spray Use 1 spray intranasally, then discard. Repeat with new spray every 2 min as needed for opioid overdose symptoms, alternating nostrils. 1 Each 0    dicyclomine (BENTYL) 20 mg tablet Take 1 Tablet by mouth every six (6) hours as needed for Abdominal Cramps. 20 Tablet 0    diphenoxylate-atropine (LomotiL) 2.5-0.025 mg per tablet Take 1 Tablet by mouth four (4) times daily as needed for Diarrhea (1 tab after each stool for max 8 per day). Max Daily Amount: 4 Tablets. Take after each stool for a maximum of 8 tablets daily 20 Tablet 0    hyoscyamine SL (LEVSIN/SL) 0.125 mg SL tablet 1 Tablet by SubLINGual route every four (4) hours as needed for Cramping.  10 Tablet 0    ondansetron (Zofran ODT) 4 mg disintegrating tablet Take 1 Tablet by mouth every eight (8) hours as needed for Nausea. 12 Tablet 1    empagliflozin (Jardiance) 25 mg tablet Take 1 Tablet by mouth daily. 90 Tablet 1    glimepiride (AMARYL) 4 mg tablet TAKE 1 TABLET BY MOUTH ONCE DAILY BEFORE BREAKFAST 90 Tablet 1    estradioL (ESTRACE) 0.5 mg tablet TAKE 1 TABLET BY MOUTH ONCE DAILY 90 Tablet 1    Ventolin HFA 90 mcg/actuation inhaler TAKE 1 PUFF BY INHALATION EVERY FOUR (4) HOURS AS NEEDED FOR WHEEZING. 18 Inhaler 1    lidocaine (Lidoderm) 5 % Apply patch to the affected area for 12 hours a day and remove for 12 hours a day. 5 Each 0    omeprazole (PRILOSEC) 20 mg capsule Take 40 mg by mouth Daily (before breakfast).  EPINEPHrine (EPIPEN) 0.3 mg/0.3 mL (1:1,000) injection 0.3 mL by IntraMUSCular route once as needed for up to 1 dose.  0.3 mL 1       Past History     Past Medical History:  Past Medical History:   Diagnosis Date    Anal fissure     Anisocoria     Asthma     LAST EPISODE     Back pain     Cerumen impaction     Chronic kidney disease     hx uti in past    Coagulation defects     ocassional rectal bleeding due to anal fissure    Colovaginal fistula     Diabetes (HCC)     NIDDM    Diverticulitis     Diverticulosis     Enlarged tonsils     Frequent UTI     GERD (gastroesophageal reflux disease)     H/O endoscopy     with dilation    HA (headache)     Hepatic steatosis     History of colon resection     Hx of colonoscopy with polypectomy     benign    Hypertension     Ill-defined condition     FREQUENT HIVES    Ill-defined condition     HX ELEVATED LIVER ENZYMES    Morbid obesity (HCC)     Nausea & vomiting     during diverticulitis flare    Obesity     Otitis media     Pneumonia     about 15 yrs ago    Psychiatric disorder     ANXIETY    Recurrent tonsillitis     Sinusitis     Transfusion history ~ age 35    postop hysterectomy    Urticaria        Past Surgical History:  Past Surgical History:   Procedure Laterality Date    COLONOSCOPY N/A 3/28/2019    COLONOSCOPY performed by Carmelita Newsome MD at [facility] 112 COLONOSCOPY N/A 10/2/2020    COLONOSCOPY performed by Carmelita Newsome MD at [address] [facility]    [place].  HX GI  12    LAPAROSCOPIC HAND ASSISTED  POSS OPEN SIGMOID COLECTOMY POSS TEMPORARY DIVERTING LOOP ILEOSTOMY;  (no illeostomy needed)    HX GYN           HX GYN      cervical conization    HX HEENT      SINUS SURGERY LEFT X2    HX HEENT      SINUS SURGERY ON RIGHT X2    HX HEMORRHOIDECTOMY      HX OTHER SURGICAL      Sphincterotomy    HX PELVIC LAPAROSCOPY      HX EMMANUEL AND BSO      HX UROLOGICAL  12     CYSTOSCOPY INSERTION URETERAL CATHETERS - Cystoscopy Insertion of bilateral ureteral stents    ME ABDOMEN SURGERY PROC UNLISTED  2018    hernia repair at [facility]       Family History:  Family History   Problem Relation Age of Onset    Diabetes Mother     Cancer Mother         NON-HODGKINS LYMPHOMA    Anesth Problems Mother         PONV    Diabetes Father     Heart Disease Father         CAD - STENTS, PACEMAKER    Arrhythmia Father        Social History:  Social History     Tobacco Use    Smoking status: Never Smoker    Smokeless tobacco: Never Used   Vaping Use    Vaping Use: Never assessed   Substance Use Topics    Alcohol use: Yes     Comment: Rarely    Drug use: No     Types: Prescription, OTC       Allergies: Allergies   Allergen Reactions    Aspirin Hives, Shortness of Breath and Itching    Codeine Hives, Itching and Angioedema     Pt had itching in mouth, on face and lips    Contrast Agent [Iodine] Hives, Itching and Angioedema     21: pt was given benadryl and solu-medrol prior to administration but pt had severe tongue swelling and drooling, lethargy and itching.  DO NOT GIVE PT    Flavoring Agent Anaphylaxis     Cherry flavor    Iodinated Contrast Media Anaphylaxis, Diarrhea, Hives, Nausea and Vomiting and Shortness of Breath    Percocet [Oxycodone-Acetaminophen] Hives, Itching and Angioedema     Pt had itching in mouth, on face and lips    Prilosec [Omeprazole Magnesium] Anaphylaxis     CHERRY FLAVORED ODT; PT TAKES REGULAR PRILOSEC AND IS OK    Dilaudid [Hydromorphone] Itching     Tolerates with benadryl    Ketorolac Rash     \"makes my eyes spasm and causes rash on my hands\" and \"makes my skin itch and makes me nervous\"    Morphine (Pf) Rash     According to rn, pt can take morphine with benadryl    Fentanyl Rash and Itching     Has had benadryl before         Review of Systems   Review of Systems   Constitutional: Negative. Negative for appetite change, chills, fatigue and fever. HENT: Negative. Negative for congestion, rhinorrhea, sinus pressure and sore throat. Eyes: Negative. Respiratory: Negative. Negative for cough, choking, chest tightness, shortness of breath and wheezing. Cardiovascular: Negative. Negative for chest pain, palpitations and leg swelling. Gastrointestinal: Positive for nausea. Negative for abdominal pain, constipation, diarrhea and vomiting. Rectal pain     Endocrine: Negative. Genitourinary: Negative. Negative for difficulty urinating, dysuria, flank pain and urgency. Musculoskeletal: Negative. Skin: Positive for rash. Neurological: Negative. Negative for dizziness, speech difficulty, weakness, light-headedness, numbness and headaches. Psychiatric/Behavioral: Negative. All other systems reviewed and are negative. Physical Exam   Physical Exam  Vitals and nursing note reviewed. Constitutional:       General: She is not in acute distress. Appearance: She is well-developed. She is obese. She is not diaphoretic. HENT:      Head: Normocephalic and atraumatic. Mouth/Throat:      Mouth: Mucous membranes are moist.      Pharynx: No oropharyngeal exudate. Eyes:      Extraocular Movements: Extraocular movements intact. Conjunctiva/sclera: Conjunctivae normal.      Pupils: Pupils are equal, round, and reactive to light. Neck:      Vascular: No JVD. Trachea: No tracheal deviation. Cardiovascular:      Rate and Rhythm: Normal rate and regular rhythm. Heart sounds: Normal heart sounds. No murmur heard. Pulmonary:      Effort: Pulmonary effort is normal. No respiratory distress. Breath sounds: Normal breath sounds. No stridor. No wheezing or rales. Chest:      Chest wall: No tenderness. Abdominal:      General: There is no distension. Palpations: Abdomen is soft. Tenderness: There is no abdominal tenderness. There is no guarding or rebound. Musculoskeletal:         General: No tenderness. Normal range of motion. Cervical back: Normal range of motion and neck supple. Skin:     General: Skin is warm and dry. Capillary Refill: Capillary refill takes less than 2 seconds. Findings: Rash (Scattered hives of extremities) present. Neurological:      Mental Status: She is alert and oriented to person, place, and time. Cranial Nerves: No cranial nerve deficit. Comments: No gross motor or sensory deficits    Psychiatric:         Mood and Affect: Mood normal.         Behavior: Behavior normal.         Diagnostic Study Results     Labs -  None    Radiologic Studies -   No orders to display     CT Results  (Last 48 hours)    None        CXR Results  (Last 48 hours)    None          Medical Decision Making   I am the first provider for this patient. I reviewed the vital signs, available nursing notes, past medical history, past surgical history, family history and social history. Vital Signs-Reviewed the patient's vital signs.   Patient Vitals for the past 12 hrs:   Temp Pulse Resp BP SpO2   10/22/21 0037 98.5 °F (36.9 °C) 78 18 (!) 188/99 97 %     Records Reviewed: Nursing Notes, Old Medical Records, Previous Radiology Studies and Previous Laboratory Studies, Pt 4 ED visits this month and several hospitals related to rectal pain. She sees Dr. Mateo Herring with [place]-rectal surgery       Provider Notes (Medical Decision Making):   DDx- Allergic reaction, proctalgia      ED Course:   Initial assessment performed. The patients presenting problems have been discussed, and they are in agreement with the care plan formulated and outlined with them. I have encouraged them to ask questions as they arise throughout their visit. Pt had take Epi prior to arrival. She still has a some hives present. Will dose with Benadryl, Solu-medrol and Pepcid. Pt also requesting pain medication for her rectal pain. Hydrocodone ordered which she states makers her itch, however all of the narcotics make her itch. Ordered a dose of dilaudid for her. 15 mins post administration she rang out complaining of itching from the Dilaudid. Pt given 1mg of Ativan. Pt complained again of rectal pain, Nurse used lidocaine gel with applicator, there was no bleeding or hemorrhoids noted. Pt stated she did not have any improvement. I hjad considered rectal valium however we do not have this in pharmacy here. Discussed PO dose of dilaudid here prior to dc. From an allergic reaction standpoint hives have resolved, no resp issues. Pt will need to follow up with her colorectal surgeon. 2:46 AM  Pt states that her rectal pain has returned will order Ointment/numbing cream for her     Disposition:  Discharge    DISCHARGE PLAN:  1. Discharge Medication List as of 10/22/2021  3:41 AM      START taking these medications    Details   !! EPINEPHrine (EPIPEN) 0.3 mg/0.3 mL injection 0.3 mL by IntraMUSCular route once as needed for Allergic Response for up to 1 dose., Normal, Disp-2 Each, R-0      hydrOXYzine HCL (ATARAX) 50 mg tablet Take 1 Tablet by mouth every six (6) hours as needed for Itching for up to 10 days. , Normal, Disp-20 Tablet, R-0       !! - Potential duplicate medications found.  Please discuss with provider. CONTINUE these medications which have NOT CHANGED    Details   ALPRAZolam (XANAX) 1 mg tablet TAKE 1/2 - 1 TABLET BY MOUTH TWICE DAILY AS NEEDED FOR ANXIETY *MAX 2 TABS DAILY*, Normal, Disp-60 Tablet, R-0Not to exceed 5 additional fills before 02/28/2022      gabapentin (NEURONTIN) 300 mg capsule TAKE 1 CAP BY MOUTH NIGHTLY. MAX DAILY AMOUNT: 300 MG., Normal, Disp-30 Capsule, R-0This request is for a new prescription for a controlled substance as required by Federal/State law.      losartan-hydroCHLOROthiazide (HYZAAR) 100-12.5 mg per tablet TAKE 1 TABLET BY MOUTH EVERY DAY, Normal, Disp-90 Tablet, R-1      promethazine (PHENERGAN) 25 mg tablet Take 1 Tablet by mouth every six (6) hours as needed for Nausea., Normal, Disp-20 Tablet, R-0      dibucaine (NUPERCAINAL) 1 % rectal ointment by PeriANAL route every four (4) hours as needed for Pain., Normal, Disp-28 g, R-0      valACYclovir (VALTREX) 1 gram tablet TAKE 1 TABLET BY MOUTH THREE TIMES A DAY, Normal, Disp-21 Tablet, R-0DX Code Needed  . !! sertraline (ZOLOFT) 100 mg tablet TAKE 2 TABLETS BY MOUTH NIGHTLY, Normal, Disp-60 Tablet, R-0      !! sertraline (ZOLOFT) 100 mg tablet Take 2 Tablets by mouth nightly., Normal, Disp-60 Tablet, R-2      atorvastatin (LIPITOR) 20 mg tablet Take 1 Tablet by mouth daily. , Normal, Disp-90 Tablet, R-1      naloxone (Narcan) 4 mg/actuation nasal spray Use 1 spray intranasally, then discard. Repeat with new spray every 2 min as needed for opioid overdose symptoms, alternating nostrils. , Normal, Disp-1 Each, R-0      dicyclomine (BENTYL) 20 mg tablet Take 1 Tablet by mouth every six (6) hours as needed for Abdominal Cramps., Normal, Disp-20 Tablet, R-0      diphenoxylate-atropine (LomotiL) 2.5-0.025 mg per tablet Take 1 Tablet by mouth four (4) times daily as needed for Diarrhea (1 tab after each stool for max 8 per day). Max Daily Amount: 4 Tablets.  Take after each stool for a maximum of 8 tablets daily, Normal, Disp-20 Tablet, R-0      hyoscyamine SL (LEVSIN/SL) 0.125 mg SL tablet 1 Tablet by SubLINGual route every four (4) hours as needed for Cramping., Normal, Disp-10 Tablet, R-0      ondansetron (Zofran ODT) 4 mg disintegrating tablet Take 1 Tablet by mouth every eight (8) hours as needed for Nausea., Normal, Disp-12 Tablet, R-1      empagliflozin (Jardiance) 25 mg tablet Take 1 Tablet by mouth daily. , Normal, Disp-90 Tablet, R-1      glimepiride (AMARYL) 4 mg tablet TAKE 1 TABLET BY MOUTH ONCE DAILY BEFORE BREAKFAST, Normal, Disp-90 Tablet, R-1      estradioL (ESTRACE) 0.5 mg tablet TAKE 1 TABLET BY MOUTH ONCE DAILY, Normal, Disp-90 Tablet, R-1      Ventolin HFA 90 mcg/actuation inhaler TAKE 1 PUFF BY INHALATION EVERY FOUR (4) HOURS AS NEEDED FOR WHEEZING., Normal, Disp-18 Inhaler, R-1      lidocaine (Lidoderm) 5 % Apply patch to the affected area for 12 hours a day and remove for 12 hours a day., Normal, Disp-5 Each, R-0      omeprazole (PRILOSEC) 20 mg capsule Take 40 mg by mouth Daily (before breakfast). , Historical Med      !! EPINEPHrine (EPIPEN) 0.3 mg/0.3 mL (1:1,000) injection 0.3 mL by IntraMUSCular route once as needed for up to 1 dose., Print, Disp-0.3 mL, R-1       !! - Potential duplicate medications found. Please discuss with provider. 2.   Follow-up Information     Follow up With Specialties Details Why Contact Reinhold Castleman, MD Internal Medicine  As needed [address] [facility] [address]  [phone]      Sonali Winters MD Colon and Rectal Surgery   [address]  [address] [place] [address]  [phone]          3. Return to ED if worse     Diagnosis     Clinical Impression:   1. Allergic reaction, initial encounter    2. Paroxysmal proctalgia        Attestations:    Brent Bernard, DO    Please note that this dictation was completed with "[employer] the [employer]` voice recognition software.   Quite often unanticipated grammatical, syntax, homophones, and other interpretive errors are inadvertently transcribed by the computer software. Please disregard these errors. Please excuse any errors that have escaped final proofreading. Thank you.

## 2021-11-15 DIAGNOSIS — F41.9 ANXIETY: ICD-10-CM

## 2021-11-16 ENCOUNTER — HOSPITAL ENCOUNTER (EMERGENCY)
Age: 51
Discharge: HOME | End: 2021-11-16
Attending: STUDENT IN AN ORGANIZED HEALTH CARE EDUCATION/TRAINING PROGRAM
Payer: MEDICAID

## 2021-11-16 VITALS
DIASTOLIC BLOOD PRESSURE: 81 MMHG | TEMPERATURE: 97.8 F | RESPIRATION RATE: 18 BRPM | SYSTOLIC BLOOD PRESSURE: 158 MMHG | BODY MASS INDEX: 36.96 KG/M2 | WEIGHT: 200.84 LBS | HEART RATE: 98 BPM | OXYGEN SATURATION: 99 % | HEIGHT: 62 IN

## 2021-11-16 DIAGNOSIS — K62.5 RECTAL BLEEDING: Primary | ICD-10-CM

## 2021-11-16 LAB
ALBUMIN SERPL-MCNC: 4.1 G/DL (ref 3.5–5)
ALBUMIN/GLOB SERPL: 1.1 {RATIO} (ref 1.1–2.2)
ALP SERPL-CCNC: 74 U/L (ref 45–117)
ALT SERPL-CCNC: 49 U/L (ref 12–78)
ANION GAP SERPL CALC-SCNC: 10 MMOL/L (ref 5–15)
APPEARANCE UR: CLEAR
AST SERPL-CCNC: 32 U/L (ref 15–37)
BASOPHILS # BLD: 0 K/UL (ref 0–0.1)
BASOPHILS NFR BLD: 0 % (ref 0–1)
BILIRUB SERPL-MCNC: 0.8 MG/DL (ref 0.2–1)
BILIRUB UR QL: NEGATIVE
BUN SERPL-MCNC: 11 MG/DL (ref 6–20)
BUN/CREAT SERPL: 12 (ref 12–20)
CALCIUM SERPL-MCNC: 10.2 MG/DL (ref 8.5–10.1)
CHLORIDE SERPL-SCNC: 102 MMOL/L (ref 97–108)
CO2 SERPL-SCNC: 24 MMOL/L (ref 21–32)
COLOR UR: ABNORMAL
CREAT SERPL-MCNC: 0.92 MG/DL (ref 0.55–1.02)
DIFFERENTIAL METHOD BLD: ABNORMAL
EOSINOPHIL # BLD: 0 K/UL (ref 0–0.4)
EOSINOPHIL NFR BLD: 0 % (ref 0–7)
ERYTHROCYTE [DISTWIDTH] IN BLOOD BY AUTOMATED COUNT: 16.8 % (ref 11.5–14.5)
GLOBULIN SER CALC-MCNC: 3.8 G/DL (ref 2–4)
GLUCOSE SERPL-MCNC: 265 MG/DL (ref 65–100)
GLUCOSE UR STRIP.AUTO-MCNC: >1000 MG/DL
HCT VFR BLD AUTO: 36.1 % (ref 35–47)
HGB BLD-MCNC: 12.4 G/DL (ref 11.5–16)
HGB UR QL STRIP: NEGATIVE
IMM GRANULOCYTES # BLD AUTO: 0 K/UL (ref 0–0.04)
IMM GRANULOCYTES NFR BLD AUTO: 1 % (ref 0–0.5)
KETONES UR QL STRIP.AUTO: NEGATIVE MG/DL
LEUKOCYTE ESTERASE UR QL STRIP.AUTO: NEGATIVE
LYMPHOCYTES # BLD: 0.6 K/UL (ref 0.8–3.5)
LYMPHOCYTES NFR BLD: 9 % (ref 12–49)
MCH RBC QN AUTO: 26.2 PG (ref 26–34)
MCHC RBC AUTO-ENTMCNC: 34.3 G/DL (ref 30–36.5)
MCV RBC AUTO: 76.3 FL (ref 80–99)
MONOCYTES # BLD: 0.1 K/UL (ref 0–1)
MONOCYTES NFR BLD: 2 % (ref 5–13)
NEUTS SEG # BLD: 5.6 K/UL (ref 1.8–8)
NEUTS SEG NFR BLD: 88 % (ref 32–75)
NITRITE UR QL STRIP.AUTO: NEGATIVE
NRBC # BLD: 0 K/UL (ref 0–0.01)
NRBC BLD-RTO: 0 PER 100 WBC
PH UR STRIP: 6.5 [PH] (ref 5–8)
PLATELET # BLD AUTO: 152 K/UL (ref 150–400)
PMV BLD AUTO: 10 FL (ref 8.9–12.9)
POTASSIUM SERPL-SCNC: 3.9 MMOL/L (ref 3.5–5.1)
PROT SERPL-MCNC: 7.9 G/DL (ref 6.4–8.2)
PROT UR STRIP-MCNC: NEGATIVE MG/DL
RBC # BLD AUTO: 4.73 M/UL (ref 3.8–5.2)
SODIUM SERPL-SCNC: 136 MMOL/L (ref 136–145)
SP GR UR REFRACTOMETRY: 1.01 (ref 1–1.03)
UROBILINOGEN UR QL STRIP.AUTO: 1 EU/DL (ref 0.2–1)
WBC # BLD AUTO: 6.4 K/UL (ref 3.6–11)

## 2021-11-16 PROCEDURE — 74011250637 HC RX REV CODE- 250/637: Performed by: STUDENT IN AN ORGANIZED HEALTH CARE EDUCATION/TRAINING PROGRAM

## 2021-11-16 PROCEDURE — 74011000250 HC RX REV CODE- 250: Performed by: STUDENT IN AN ORGANIZED HEALTH CARE EDUCATION/TRAINING PROGRAM

## 2021-11-16 PROCEDURE — 99283 EMERGENCY DEPT VISIT LOW MDM: CPT

## 2021-11-16 PROCEDURE — 74011250636 HC RX REV CODE- 250/636: Performed by: STUDENT IN AN ORGANIZED HEALTH CARE EDUCATION/TRAINING PROGRAM

## 2021-11-16 PROCEDURE — 81003 URINALYSIS AUTO W/O SCOPE: CPT

## 2021-11-16 PROCEDURE — 85025 COMPLETE CBC W/AUTO DIFF WBC: CPT

## 2021-11-16 PROCEDURE — 96375 TX/PRO/DX INJ NEW DRUG ADDON: CPT

## 2021-11-16 PROCEDURE — 96374 THER/PROPH/DIAG INJ IV PUSH: CPT

## 2021-11-16 PROCEDURE — 36415 COLL VENOUS BLD VENIPUNCTURE: CPT

## 2021-11-16 PROCEDURE — 74011250636 HC RX REV CODE- 250/636

## 2021-11-16 PROCEDURE — 80053 COMPREHEN METABOLIC PANEL: CPT

## 2021-11-16 RX ORDER — HYDROMORPHONE HYDROCHLORIDE 2 MG/1
1 TABLET ORAL ONCE
Status: COMPLETED | OUTPATIENT
Start: 2021-11-16 | End: 2021-11-16

## 2021-11-16 RX ORDER — DICYCLOMINE HYDROCHLORIDE 20 MG/1
20 TABLET ORAL
Status: COMPLETED | OUTPATIENT
Start: 2021-11-16 | End: 2021-11-16

## 2021-11-16 RX ORDER — ALPRAZOLAM 1 MG/1
TABLET ORAL
Qty: 60 TABLET | Refills: 0 | Status: SHIPPED | OUTPATIENT
Start: 2021-11-16 | End: 2021-12-14

## 2021-11-16 RX ORDER — ONDANSETRON 2 MG/ML
4 INJECTION INTRAMUSCULAR; INTRAVENOUS
Status: COMPLETED | OUTPATIENT
Start: 2021-11-16 | End: 2021-11-16

## 2021-11-16 RX ORDER — DIPHENHYDRAMINE HYDROCHLORIDE 50 MG/ML
25 INJECTION, SOLUTION INTRAMUSCULAR; INTRAVENOUS
Status: COMPLETED | OUTPATIENT
Start: 2021-11-16 | End: 2021-11-16

## 2021-11-16 RX ADMIN — METHYLPREDNISOLONE SODIUM SUCCINATE 125 MG: 125 INJECTION, POWDER, FOR SOLUTION INTRAMUSCULAR; INTRAVENOUS at 06:57

## 2021-11-16 RX ADMIN — FAMOTIDINE 20 MG: 10 INJECTION, SOLUTION INTRAVENOUS at 06:58

## 2021-11-16 RX ADMIN — HYDROMORPHONE HYDROCHLORIDE 1 MG: 2 TABLET ORAL at 08:16

## 2021-11-16 RX ADMIN — DIPHENHYDRAMINE HYDROCHLORIDE 25 MG: 50 INJECTION, SOLUTION INTRAMUSCULAR; INTRAVENOUS at 06:58

## 2021-11-16 RX ADMIN — DICYCLOMINE HYDROCHLORIDE 20 MG: 20 TABLET ORAL at 07:25

## 2021-11-16 RX ADMIN — ONDANSETRON 4 MG: 2 INJECTION INTRAMUSCULAR; INTRAVENOUS at 07:25

## 2021-11-16 NOTE — Clinical Note
[facility] EMERGENCY DEPT  [address]  [place] [address]  [phone]    Work/School Note    Date: 11/16/2021    To Whom It May concern:    Patricio Walker was seen and treated today in the emergency room by the following provider(s):  Attending Provider: Rebecca Wilkinson MD.      Patricio Walker is excused from work/school on 11/16/21 and 11/17/21. She is medically clear to return to work/school on 11/18/2021.        Sincerely,          Laura Francis MD

## 2021-11-16 NOTE — ED PROVIDER NOTES
EMERGENCY DEPARTMENT HISTORY AND PHYSICAL EXAM      Date: 11/16/2021  Patient Name: Erik Dickerson    History of Presenting Illness     Chief Complaint   Patient presents with    Rectal Bleeding     ED visit d/t rectal bleeding - onset of sxs, \" on and off for about 1 month \" s.p a recent had a fistula revision surgery, Oct 2021 - last visit with oncologist, pt was noted to be anemia leading Iron infusion leading to worsenins sxs - hx of UC / also reports having hives to upper body at this time / also with nausea and chills - Denies fewers / sob / cp / coughing / sick contact;;         HPI: Erik Dickerson, 46 y.o. female presents to the ED with cc of rectal bleeding. This has been going on intermittently for about the past month. Reports history of prior surgery and rectal fistula about a month ago. She reports some drips of bright red blood with bowel movements and some blood with wiping. She reports some discomfort in the rectal region that has been consistent since her fistula surgery. She denies any fevers, does report vomiting with 4 episodes yesterday. She reports some associated suprapubic discomfort, and states that she has had this discomfort in the setting of UTIs in the past.  She also reports that she gets intermittent infusions for her anemia, she thinks that they are iron infusions, and often she will feel \"bad\" after she gets the infusions. Last infusion was on Friday. Also reports a rash over her arms and mouth, states that she is worried she is having allergic reaction. No other new medications or exposures. No shortness of breath. There are no other complaints, changes, or physical findings at this time. PCP: Antionette Gosselin, MD    No current facility-administered medications on file prior to encounter.      Current Outpatient Medications on File Prior to Encounter   Medication Sig Dispense Refill    ALPRAZolam (XANAX) 1 mg tablet TAKE 1/2 - 1 TABLET BY MOUTH TWICE DAILY AS NEEDED FOR ANXIETY *MAX 2 TABS DAILY* 60 Tablet 0    gabapentin (NEURONTIN) 300 mg capsule TAKE 1 CAP BY MOUTH NIGHTLY. MAX DAILY AMOUNT: 300 MG. 30 Capsule 0    losartan-hydroCHLOROthiazide (HYZAAR) 100-12.5 mg per tablet TAKE 1 TABLET BY MOUTH EVERY DAY 90 Tablet 1    promethazine (PHENERGAN) 25 mg tablet Take 1 Tablet by mouth every six (6) hours as needed for Nausea. 20 Tablet 0    dibucaine (NUPERCAINAL) 1 % rectal ointment by PeriANAL route every four (4) hours as needed for Pain. 28 g 0    valACYclovir (VALTREX) 1 gram tablet TAKE 1 TABLET BY MOUTH THREE TIMES A DAY 21 Tablet 0    sertraline (ZOLOFT) 100 mg tablet TAKE 2 TABLETS BY MOUTH NIGHTLY 60 Tablet 0    sertraline (ZOLOFT) 100 mg tablet Take 2 Tablets by mouth nightly. 60 Tablet 2    atorvastatin (LIPITOR) 20 mg tablet Take 1 Tablet by mouth daily. 90 Tablet 1    naloxone (Narcan) 4 mg/actuation nasal spray Use 1 spray intranasally, then discard. Repeat with new spray every 2 min as needed for opioid overdose symptoms, alternating nostrils. 1 Each 0    dicyclomine (BENTYL) 20 mg tablet Take 1 Tablet by mouth every six (6) hours as needed for Abdominal Cramps. 20 Tablet 0    diphenoxylate-atropine (LomotiL) 2.5-0.025 mg per tablet Take 1 Tablet by mouth four (4) times daily as needed for Diarrhea (1 tab after each stool for max 8 per day). Max Daily Amount: 4 Tablets. Take after each stool for a maximum of 8 tablets daily 20 Tablet 0    hyoscyamine SL (LEVSIN/SL) 0.125 mg SL tablet 1 Tablet by SubLINGual route every four (4) hours as needed for Cramping. 10 Tablet 0    ondansetron (Zofran ODT) 4 mg disintegrating tablet Take 1 Tablet by mouth every eight (8) hours as needed for Nausea. 12 Tablet 1    empagliflozin (Jardiance) 25 mg tablet Take 1 Tablet by mouth daily.  90 Tablet 1    glimepiride (AMARYL) 4 mg tablet TAKE 1 TABLET BY MOUTH ONCE DAILY BEFORE BREAKFAST 90 Tablet 1    estradioL (ESTRACE) 0.5 mg tablet TAKE 1 TABLET BY MOUTH ONCE DAILY 90 Tablet 1    Ventolin HFA 90 mcg/actuation inhaler TAKE 1 PUFF BY INHALATION EVERY FOUR (4) HOURS AS NEEDED FOR WHEEZING. 18 Inhaler 1    lidocaine (Lidoderm) 5 % Apply patch to the affected area for 12 hours a day and remove for 12 hours a day. 5 Each 0    omeprazole (PRILOSEC) 20 mg capsule Take 40 mg by mouth Daily (before breakfast).  EPINEPHrine (EPIPEN) 0.3 mg/0.3 mL (1:1,000) injection 0.3 mL by IntraMUSCular route once as needed for up to 1 dose. 0.3 mL 1       Past History     Past Medical History:  Past Medical History:   Diagnosis Date    Anal fissure     Anisocoria     Asthma     LAST EPISODE     Back pain     Cerumen impaction     Chronic kidney disease     hx uti in past    Coagulation defects     ocassional rectal bleeding due to anal fissure    Colovaginal fistula     Diabetes (HCC)     NIDDM    Diverticulitis     Diverticulosis     Enlarged tonsils     Frequent UTI     GERD (gastroesophageal reflux disease)     H/O endoscopy     with dilation    HA (headache)     Hepatic steatosis     History of colon resection     Hx of colonoscopy with polypectomy     benign    Hypertension     Ill-defined condition     FREQUENT HIVES    Ill-defined condition     HX ELEVATED LIVER ENZYMES    Morbid obesity (HCC)     Nausea & vomiting     during diverticulitis flare    Obesity     Otitis media     Pneumonia     about 15 yrs ago    Psychiatric disorder     ANXIETY    Recurrent tonsillitis     Sinusitis     Transfusion history ~ age 35    postop hysterectomy    Urticaria        Past Surgical History:  Past Surgical History:   Procedure Laterality Date    COLONOSCOPY N/A 3/28/2019    COLONOSCOPY performed by Mike Clifford MD at [facility] COLONOSCOPY N/A 10/2/2020    COLONOSCOPY performed by Mike Clifford MD at [facility]    [place].     HX GI  7/31/12    LAPAROSCOPIC HAND ASSISTED  POSS OPEN SIGMOID COLECTOMY POSS TEMPORARY DIVERTING LOOP ILEOSTOMY;  (no illeostomy needed)    HX GYN           HX GYN      cervical conization    HX HEENT      SINUS SURGERY LEFT X2    HX HEENT      SINUS SURGERY ON RIGHT X2    HX HEMORRHOIDECTOMY      HX OTHER SURGICAL      Sphincterotomy    HX PELVIC LAPAROSCOPY      HX EMMANUEL AND BSO      HX UROLOGICAL  12     CYSTOSCOPY INSERTION URETERAL CATHETERS - Cystoscopy Insertion of bilateral ureteral stents    [place] ABDOMEN SUR[place] PROC UNLISTED  2018    hernia repair at [address]       Family History:  Family History   Problem Relation Age of Onset    Diabetes Mother     Cancer Mother         NON-HODGKINS LYMPHOMA    Anesth Problems Mother         PONV    Diabetes Father     Heart Disease Father         CAD - STENTS, PACEMAKER    Arrhythmia Father        Social History:  Social History     Tobacco Use    Smoking status: Never Smoker    Smokeless tobacco: Never Used   Vaping Use    Vaping Use: Not on file   Substance Use Topics    Alcohol use: Yes     Comment: Rarely    Drug use: No     Types: Prescription, OTC       Allergies: Allergies   Allergen Reactions    Aspirin Hives, Shortness of Breath and Itching    Codeine Hives, Itching and Angioedema     Pt had itching in mouth, on face and lips    Contrast Agent [Iodine] Hives, Itching and Angioedema     21: pt was given benadryl and solu-medrol prior to administration but pt had severe tongue swelling and drooling, lethargy and itching.  DO NOT GIVE PT    Flavoring Agent Anaphylaxis     Cherry flavor    Iodinated Contrast Media Anaphylaxis, Diarrhea, Hives, Nausea and Vomiting and Shortness of Breath    Percocet [Oxycodone-Acetaminophen] Hives, Itching and Angioedema     Pt had itching in mouth, on face and lips    Prilosec [Omeprazole Magnesium] Anaphylaxis     CHERRY FLAVORED ODT; PT TAKES REGULAR PRILOSEC AND IS OK    Dilaudid [Hydromorphone] Itching     Tolerates with benadryl    Ketorolac Rash     \"makes my eyes spasm and causes rash on my hands\" and \"makes my skin itch and makes me nervous\"    Morphine (Pf) Rash     According to rn, pt can take morphine with benadryl    Fentanyl Rash and Itching     Has had benadryl before         Review of Systems   no fever  No ear pain  No eye pain  no shortness of breath  no chest pain  Report suprapubic abdominal pain  Reports dysuria  no leg pain  Reports rash  No lymphadenopathy  No weight loss    Physical Exam   Physical Exam  Constitutional:       General: She is not in acute distress. Appearance: She is not toxic-appearing. HENT:      Head: Normocephalic. Mouth/Throat:      Comments: There is approximately 3 cm ulceration over the right buccal mucosa, no visualized oropharyngeal swelling  Eyes:      Extraocular Movements: Extraocular movements intact. Cardiovascular:      Rate and Rhythm: Normal rate and regular rhythm. Pulmonary:      Effort: Pulmonary effort is normal.      Breath sounds: Normal breath sounds. Abdominal:      Palpations: Abdomen is soft. Tenderness: There is no abdominal tenderness. Genitourinary:     Comments: Rectal exam with external hemorrhoid versus skin tag, otherwise without any palpable swelling, induration or fluctuance, no purulence, brown stool  Musculoskeletal:         General: No tenderness or deformity. Cervical back: Neck supple. Skin:     General: Skin is warm and dry. Comments: Over the upper extremities there is scattered erythematous somewhat circular rash, not raised   Neurological:      General: No focal deficit present. Mental Status: She is alert. Psychiatric:         Mood and Affect: Mood normal.         Diagnostic Study Results     Labs -   No results found for this or any previous visit (from the past 24 hour(s)).     Radiologic Studies -   No orders to display     CT Results  (Last 48 hours)    None        CXR Results  (Last 48 hours)    None Medical Decision Making   I am the first provider for this patient. I reviewed the vital signs, available nursing notes, past medical history, past surgical history, family history and social history. Vital Signs-Reviewed the patient's vital signs. Patient Vitals for the past 24 hrs:   Temp Pulse Resp BP SpO2   11/16/21 0610 97.8 °F (36.6 °C) 98 18 (!) 158/81 99 %         Provider Notes (Medical Decision Making):   60-year-old female presenting with rectal bleeding and rash. Concern for possible hemorrhoidal bleeding, bleeding from prior fistula surgery. No signs of any significant cellulitis, abscess, or infection on my rectal exam.  Reports symptoms have been stable for the past month. She is afebrile nontoxic-appearing, not consistent with any systemic infection. Abdominal exam benign. No melena, no active bleeding on my rectal exam, vitals unremarkable, less concerning for any significant bleeding, however given history of anemia, will assess for her hemoglobin. Her rash is consistent with possible allergic reaction, rash from her autoimmune disease, the oral lesion looks much more like an aphthous ulcer, not consistent with mucosal involvement from anaphylaxis. She will be given Solu-Medrol and Benadryl and Pepcid. ED Course:     Initial assessment performed. The patients presenting problems have been discussed, and they are in agreement with the care plan formulated and outlined with them. I have encouraged them to ask questions as they arise throughout their visit. CBC negative for leukocytosis or anemia, basic metabolic panel with normal renal function, no worrisome electrolyte abnormalities, UA is not suggestive of UTI. On reevaluation, patient is resting comfortably, vital signs unremarkable. Patient is counseled on supportive care and return precautions. Will return to the ED for any worsening pain, fevers, bleeding, or any new or worrisome symptoms.  Will followup with colorectal surgeon and primary care doctor within 7 days. Critical Care Time:         Disposition:  Home    PLAN:  1. Current Discharge Medication List        2.    Follow-up Information    None       Return to ED if worse     Diagnosis     Clinical Impression: Acute rectal bleeding, acute rash

## 2021-11-16 NOTE — ED NOTES
TRANSFER - IN REPORT:    Verbal report received from [facility] (name) on Iraj Baer. Report consisted of patients Situation, Background, Assessment and   Recommendations(SBAR). Information from the following report(s) SBAR and ED Summary was reviewed with the receiving nurse. Opportunity for questions and clarification was provided. 56 Pt reporting persistent erythematous/itching areas on her upper extremities. Pt requesting morphine w/ benadryl, will notify MD     [id number] Pt discharged by Dr Gema Angulo. Pt provided with discharge instructions Rx and instructions on follow up care.  Pt ambulatory out of ED

## 2021-11-16 NOTE — ED NOTES
Pt presents to the ED c/o rectal bleeding that began a month ago after pt had rectal surgery and fistula was created. Pt states she hasn't been able to heal properly. Pt has hx of DM and CA cells found on appendix.

## 2021-12-13 ENCOUNTER — HOSPITAL ENCOUNTER (EMERGENCY)
Age: 51
Discharge: HOME | End: 2021-12-13
Attending: EMERGENCY MEDICINE
Payer: MEDICAID

## 2021-12-13 VITALS
HEART RATE: 73 BPM | OXYGEN SATURATION: 100 % | HEIGHT: 62 IN | RESPIRATION RATE: 18 BRPM | BODY MASS INDEX: 37.85 KG/M2 | SYSTOLIC BLOOD PRESSURE: 145 MMHG | DIASTOLIC BLOOD PRESSURE: 80 MMHG | WEIGHT: 205.69 LBS

## 2021-12-13 DIAGNOSIS — K51.211 ULCERATIVE PROCTITIS WITH RECTAL BLEEDING (HCC): Primary | ICD-10-CM

## 2021-12-13 LAB
APPEARANCE UR: CLEAR
BACTERIA URNS QL MICRO: NEGATIVE /HPF
BILIRUB UR QL: NEGATIVE
COLOR UR: ABNORMAL
EPITH CASTS URNS QL MICRO: ABNORMAL /LPF
GLUCOSE UR STRIP.AUTO-MCNC: >1000 MG/DL
HGB UR QL STRIP: ABNORMAL
KETONES UR QL STRIP.AUTO: NEGATIVE MG/DL
LEUKOCYTE ESTERASE UR QL STRIP.AUTO: ABNORMAL
NITRITE UR QL STRIP.AUTO: NEGATIVE
PH UR STRIP: 5.5 [PH] (ref 5–8)
PROT UR STRIP-MCNC: NEGATIVE MG/DL
RBC #/AREA URNS HPF: ABNORMAL /HPF (ref 0–5)
SP GR UR REFRACTOMETRY: 1.02 (ref 1–1.03)
UA: UC IF INDICATED,UAUC: ABNORMAL
UROBILINOGEN UR QL STRIP.AUTO: 0.2 EU/DL (ref 0.2–1)
WBC URNS QL MICRO: ABNORMAL /HPF (ref 0–4)

## 2021-12-13 PROCEDURE — 99283 EMERGENCY DEPT VISIT LOW MDM: CPT

## 2021-12-13 PROCEDURE — 74011250637 HC RX REV CODE- 250/637: Performed by: EMERGENCY MEDICINE

## 2021-12-13 PROCEDURE — 81001 URINALYSIS AUTO W/SCOPE: CPT

## 2021-12-13 RX ORDER — HYDROMORPHONE HYDROCHLORIDE 2 MG/1
4 TABLET ORAL ONCE
Status: COMPLETED | OUTPATIENT
Start: 2021-12-13 | End: 2021-12-13

## 2021-12-13 RX ORDER — DIPHENHYDRAMINE HCL 25 MG
25 CAPSULE ORAL
Status: COMPLETED | OUTPATIENT
Start: 2021-12-13 | End: 2021-12-13

## 2021-12-13 RX ORDER — ONDANSETRON 4 MG/1
8 TABLET, ORALLY DISINTEGRATING ORAL
Status: COMPLETED | OUTPATIENT
Start: 2021-12-13 | End: 2021-12-13

## 2021-12-13 RX ADMIN — ONDANSETRON 8 MG: 4 TABLET, ORALLY DISINTEGRATING ORAL at 02:33

## 2021-12-13 RX ADMIN — HYDROMORPHONE HYDROCHLORIDE 4 MG: 2 TABLET ORAL at 02:33

## 2021-12-13 RX ADMIN — DIPHENHYDRAMINE HYDROCHLORIDE 25 MG: 25 CAPSULE ORAL at 02:33

## 2021-12-13 NOTE — ED NOTES
Pt presents to the ED c/o rectal pain. Pt states she has had colorectal surgery a month ago and has developed an anal fistula. Pt states she has been taking steroids and the fistula hasn't healed fast enough and she is feeling tenderness in that area.  Pt denies fever, chills, body aches, n/v/d.

## 2021-12-13 NOTE — ED NOTES
Pt has been discharged home, discharge and follow up care instructions given, Pt verbalized understanding

## 2021-12-13 NOTE — ED PROVIDER NOTES
EMERGENCY DEPARTMENT HISTORY AND PHYSICAL EXAM      Date: 12/13/2021  Patient Name: Reji Joe    Please note that this dictation was completed with Plix, the computer voice recognition software. Quite often unanticipated grammatical, syntax, homophones, and other interpretive errors are inadvertently transcribed by the computer software. Please disregard these errors. Please excuse any errors that have escaped final proofreading. History of Presenting Illness     Chief Complaint   Patient presents with    Vaginal Bleeding     ED visit d/t vaginal bleeding (acute, hx of hysterectomy, 15 years) and rectal bleeding (onging) - onset of sxs, today - hx of UC - hx of rectal fistula - chills and dizziness - active treatment - Denies fevers / CP        History Provided By: Patient     HPI: Reji Joe, 46 y.o. female, presenting the emergency department complaining of rectal pain, vaginal bleeding. Patient states that she has a history of ulcerative colitis and has had abscess and fistulas in the past.  She complains of some rectal pain and some bleeding from the rectum and bleeding from her vagina. She did have a history of a rectovaginal fistula, but that is been repaired. Reports that she had about 2 tablespoons of blood coming from her vagina earlier this evening. No recent vaginal intercourse. Nothing makes his symptoms better or worse. Patient denies any fevers or chills. Does report some chronic abdominal pain but no significant change there. Also complains of some chronic back pain    PCP: Epifanio Minaya MD    No current facility-administered medications on file prior to encounter. Current Outpatient Medications on File Prior to Encounter   Medication Sig Dispense Refill    ALPRAZolam (XANAX) 1 mg tablet TAKE 1/2 - 1 TABLET BY MOUTH TWICE DAILY AS NEEDED FOR ANXIETY *MAX 2 TABS DAILY* 60 Tablet 0    gabapentin (NEURONTIN) 300 mg capsule TAKE 1 CAP BY MOUTH NIGHTLY.  MAX DAILY AMOUNT: 300 MG. 30 Capsule 0    losartan-hydroCHLOROthiazide (HYZAAR) 100-12.5 mg per tablet TAKE 1 TABLET BY MOUTH EVERY DAY 90 Tablet 1    promethazine (PHENERGAN) 25 mg tablet Take 1 Tablet by mouth every six (6) hours as needed for Nausea. 20 Tablet 0    dibucaine (NUPERCAINAL) 1 % rectal ointment by PeriANAL route every four (4) hours as needed for Pain. 28 g 0    valACYclovir (VALTREX) 1 gram tablet TAKE 1 TABLET BY MOUTH THREE TIMES A DAY 21 Tablet 0    sertraline (ZOLOFT) 100 mg tablet TAKE 2 TABLETS BY MOUTH NIGHTLY 60 Tablet 0    sertraline (ZOLOFT) 100 mg tablet Take 2 Tablets by mouth nightly. 60 Tablet 2    atorvastatin (LIPITOR) 20 mg tablet Take 1 Tablet by mouth daily. 90 Tablet 1    naloxone (Narcan) 4 mg/actuation nasal spray Use 1 spray intranasally, then discard. Repeat with new spray every 2 min as needed for opioid overdose symptoms, alternating nostrils. 1 Each 0    dicyclomine (BENTYL) 20 mg tablet Take 1 Tablet by mouth every six (6) hours as needed for Abdominal Cramps. 20 Tablet 0    diphenoxylate-atropine (LomotiL) 2.5-0.025 mg per tablet Take 1 Tablet by mouth four (4) times daily as needed for Diarrhea (1 tab after each stool for max 8 per day). Max Daily Amount: 4 Tablets. Take after each stool for a maximum of 8 tablets daily 20 Tablet 0    hyoscyamine SL (LEVSIN/SL) 0.125 mg SL tablet 1 Tablet by SubLINGual route every four (4) hours as needed for Cramping. 10 Tablet 0    ondansetron (Zofran ODT) 4 mg disintegrating tablet Take 1 Tablet by mouth every eight (8) hours as needed for Nausea. 12 Tablet 1    empagliflozin (Jardiance) 25 mg tablet Take 1 Tablet by mouth daily.  90 Tablet 1    glimepiride (AMARYL) 4 mg tablet TAKE 1 TABLET BY MOUTH ONCE DAILY BEFORE BREAKFAST 90 Tablet 1    estradioL (ESTRACE) 0.5 mg tablet TAKE 1 TABLET BY MOUTH ONCE DAILY 90 Tablet 1    Ventolin HFA 90 mcg/actuation inhaler TAKE 1 PUFF BY INHALATION EVERY FOUR (4) HOURS AS NEEDED FOR WHEEZING. 18 Inhaler 1    lidocaine (Lidoderm) 5 % Apply patch to the affected area for 12 hours a day and remove for 12 hours a day. 5 Each 0    omeprazole (PRILOSEC) 20 mg capsule Take 40 mg by mouth Daily (before breakfast).  EPINEPHrine (EPIPEN) 0.3 mg/0.3 mL (1:1,000) injection 0.3 mL by IntraMUSCular route once as needed for up to 1 dose. 0.3 mL 1       Past History     Past Medical History:  Past Medical History:   Diagnosis Date    Anal fissure     Anisocoria     Asthma     LAST EPISODE     Back pain     Cerumen impaction     Chronic kidney disease     hx uti in past    Coagulation defects     ocassional rectal bleeding due to anal fissure    Colovaginal fistula     Diabetes (HCC)     NIDDM    Diverticulitis     Diverticulosis     Enlarged tonsils     Frequent UTI     GERD (gastroesophageal reflux disease)     H/O endoscopy     with dilation    HA (headache)     Hepatic steatosis     History of colon resection     Hx of colonoscopy with polypectomy     benign    Hypertension     Ill-defined condition     FREQUENT HIVES    Ill-defined condition     HX ELEVATED LIVER ENZYMES    Morbid obesity (HCC)     Nausea & vomiting     during diverticulitis flare    Obesity     Otitis media     Pneumonia     about 15 yrs ago    Psychiatric disorder     ANXIETY    Recurrent tonsillitis     Sinusitis     Transfusion history ~ age 35    postop hysterectomy    Urticaria        Past Surgical History:  Past Surgical History:   Procedure Laterality Date    COLONOSCOPY N/A 3/28/2019    COLONOSCOPY performed by Nicole Watt MD at [facility] COLONOSCOPY N/A 10/2/2020    COLONOSCOPY performed by Nicole Watt MD at [address] [facility]    [place].     HX GI  12    LAPAROSCOPIC HAND ASSISTED  POSS OPEN SIGMOID COLECTOMY POSS TEMPORARY DIVERTING LOOP ILEOSTOMY;  (no illeostomy needed)    HX GYN           HX GYN      cervical conization    HX HEENT      SINUS SURGERY LEFT X2    HX HEENT      SINUS SURGERY ON RIGHT X2    HX HEMORRHOIDECTOMY      HX OTHER SURGICAL  11/12    Sphincterotomy    HX PELVIC LAPAROSCOPY      HX EMMANUEL AND BSO      HX UROLOGICAL  7/31/12     CYSTOSCOPY INSERTION URETERAL CATHETERS - Cystoscopy Insertion of bilateral ureteral stents    LA ABDOMEN SURGERY PROC UNLISTED  01/06/2018    hernia repair at [address]       Family History:  Family History   Problem Relation Age of Onset    Diabetes Mother     Cancer Mother         NON-HODGKINS LYMPHOMA    Anesth Problems Mother         PONV    Diabetes Father     Heart Disease Father         CAD - STENTS, PACEMAKER    Arrhythmia Father        Social History:  Social History     Tobacco Use    Smoking status: Never Smoker    Smokeless tobacco: Never Used   Vaping Use    Vaping Use: Not on file   Substance Use Topics    Alcohol use: Yes     Comment: Rarely    Drug use: No     Types: Prescription, OTC       Allergies: Allergies   Allergen Reactions    Aspirin Hives, Shortness of Breath and Itching    Codeine Hives, Itching and Angioedema     Pt had itching in mouth, on face and lips    Contrast Agent [Iodine] Hives, Itching and Angioedema     5/5/21: pt was given benadryl and solu-medrol prior to administration but pt had severe tongue swelling and drooling, lethargy and itching.  DO NOT GIVE PT    Flavoring Agent Anaphylaxis     Cherry flavor    Iodinated Contrast Media Anaphylaxis, Diarrhea, Hives, Nausea and Vomiting and Shortness of Breath    Percocet [Oxycodone-Acetaminophen] Hives, Itching and Angioedema     Pt had itching in mouth, on face and lips    Prilosec [Omeprazole Magnesium] Anaphylaxis     CHERRY FLAVORED ODT; PT TAKES REGULAR PRILOSEC AND IS OK    Dilaudid [Hydromorphone] Itching     Tolerates with benadryl    Ketorolac Rash     \"makes my eyes spasm and causes rash on my hands\" and \"makes my skin itch and makes me nervous\"    Morphine (Pf) Rash     According to rn, pt can take morphine with benadryl    Fentanyl Rash and Itching     Has had benadryl before         Review of Systems   Review of Systems   Constitutional: Negative for chills and fever. HENT: Negative for congestion and sore throat. Eyes: Negative for visual disturbance. Respiratory: Negative for cough and shortness of breath. Cardiovascular: Negative for chest pain and leg swelling. Gastrointestinal: Positive for abdominal pain and anal bleeding. Negative for blood in stool, diarrhea and nausea. Endocrine: Negative for polyuria. Genitourinary: Positive for vaginal bleeding. Negative for dysuria, flank pain and vaginal discharge. Musculoskeletal: Negative for myalgias. Skin: Negative for rash. Allergic/Immunologic: Negative for immunocompromised state. Neurological: Negative for weakness and headaches. Psychiatric/Behavioral: Negative for confusion. Physical Exam   Physical Exam  Vitals and nursing note reviewed. Constitutional:       Appearance: She is well-developed. HENT:      Head: Normocephalic and atraumatic. Eyes:      General:         Right eye: No discharge. Left eye: No discharge. Conjunctiva/sclera: Conjunctivae normal.      Pupils: Pupils are equal, round, and reactive to light. Neck:      Trachea: No tracheal deviation. Cardiovascular:      Rate and Rhythm: Normal rate and regular rhythm. Heart sounds: Normal heart sounds. No murmur heard. Pulmonary:      Effort: Pulmonary effort is normal. No respiratory distress. Breath sounds: Normal breath sounds. No wheezing or rales. Abdominal:      General: Bowel sounds are normal.      Palpations: Abdomen is soft. Tenderness: There is no abdominal tenderness. There is no guarding or rebound. Genitourinary:     Comments: Rectal exam performed with nurse at bedside, there were some hemorrhoids, there appears to be some scar tissue at the anus.   No fluctuance or abscess noted.    Speculum exam performed, no blood in the vaginal vault. No evidence of bleeding  Musculoskeletal:         General: No tenderness or deformity. Normal range of motion. Cervical back: Normal range of motion and neck supple. Skin:     General: Skin is warm and dry. Findings: No erythema or rash. Neurological:      Mental Status: She is alert and oriented to person, place, and time. Psychiatric:         Behavior: Behavior normal.         Diagnostic Study Results     Labs -   No results found for this or any previous visit (from the past 12 hour(s)). Radiologic Studies -   No orders to display     CT Results  (Last 48 hours)    None        CXR Results  (Last 48 hours)    None            Medical Decision Making   I am the first provider for this patient. I reviewed the vital signs, available nursing notes, past medical history, past surgical history, family history and social history. Vital Signs-Reviewed the patient's vital signs. Patient Vitals for the past 12 hrs:   Pulse Resp BP SpO2   12/13/21 0122 73 18 (!) 145/80 100 %         Records Reviewed:   Nursing notes, Prior visits     Provider Notes (Medical Decision Making): There is no evidence of abscess on exam, no evidence of vaginal bleeding on exam, patient looks well nontoxic, I think she safe for outpatient follow-up with her colorectal surgeon Dr. Luis Antonio Ac. Will check a urine to make sure there is no evidence of hematuria. ED Course:   Initial assessment performed. The patients presenting problems have been discussed, and they are in agreement with the care plan formulated and outlined with them. I have encouraged them to ask questions as they arise throughout their visit. Critical Care Time:   none    Disposition:    DISCHARGE NOTE  Patients results have been reviewed with them.   Patient and/or family have verbally conveyed their understanding and agreement of the patient's signs, symptoms, diagnosis, treatment and prognosis and additionally agree to follow up as recommended or return to the Emergency Room should their condition change or have any new concerns prior to their follow-up appointment. Patient verbally agrees with the care-plan and verbally conveys that all of their questions have been answered. Discharge instructions have also been provided to the patient with some educational information regarding their diagnosis as well a list of reasons why they would want to return to the ER prior to their follow-up appointment should their condition change. PLAN:  1. Current Discharge Medication List        2. Follow-up Information     Follow up With Specialties Details Why Contact Info    Sonali Winters MD Colon and Rectal Surgery Schedule an appointment as soon as possible for a visit   [address] [facility]  [address]  [phone]      [facility] EMERGENCY DEPT Emergency Medicine  If symptoms worsen [address]  [address] [facility]  [phone]          Return to ED if worse     Diagnosis     Clinical Impression:   1. Ulcerative proctitis with rectal bleeding ([facility] [address]        Attestations:   This note was completed by Renee Baeza DO

## 2021-12-13 NOTE — Clinical Note
Gideon Briceño was seen and treated in our emergency department on 12/13/2021.     Can return to work on 12/15/21    Teena Ring, DO

## 2021-12-14 DIAGNOSIS — F41.9 ANXIETY: ICD-10-CM

## 2021-12-14 RX ORDER — NALOXONE HYDROCHLORIDE 4 MG/.1ML
SPRAY NASAL
Qty: 1 EACH | Refills: 0 | Status: SHIPPED | OUTPATIENT
Start: 2021-12-14

## 2021-12-14 RX ORDER — ALPRAZOLAM 1 MG/1
TABLET ORAL
Qty: 60 TABLET | Refills: 0 | Status: SHIPPED | OUTPATIENT
Start: 2021-12-14 | End: 2022-01-16

## 2021-12-16 ENCOUNTER — HOSPITAL ENCOUNTER (EMERGENCY)
Age: 51
Discharge: HOME | End: 2021-12-16
Attending: EMERGENCY MEDICINE
Payer: MEDICAID

## 2021-12-16 VITALS
SYSTOLIC BLOOD PRESSURE: 134 MMHG | TEMPERATURE: 97.5 F | OXYGEN SATURATION: 100 % | DIASTOLIC BLOOD PRESSURE: 86 MMHG | WEIGHT: 198.63 LBS | HEIGHT: 62 IN | HEART RATE: 80 BPM | RESPIRATION RATE: 16 BRPM | BODY MASS INDEX: 36.55 KG/M2

## 2021-12-16 DIAGNOSIS — N30.00 ACUTE CYSTITIS WITHOUT HEMATURIA: Primary | ICD-10-CM

## 2021-12-16 LAB
ALBUMIN SERPL-MCNC: 4 G/DL (ref 3.5–5)
ALBUMIN/GLOB SERPL: 1.1 {RATIO} (ref 1.1–2.2)
ALP SERPL-CCNC: 67 U/L (ref 45–117)
ALT SERPL-CCNC: 38 U/L (ref 12–78)
ANION GAP SERPL CALC-SCNC: 10 MMOL/L (ref 5–15)
APPEARANCE UR: CLEAR
AST SERPL-CCNC: 26 U/L (ref 15–37)
BACTERIA URNS QL MICRO: NEGATIVE /HPF
BASOPHILS # BLD: 0 K/UL (ref 0–0.1)
BASOPHILS NFR BLD: 0 % (ref 0–1)
BILIRUB SERPL-MCNC: 0.9 MG/DL (ref 0.2–1)
BILIRUB UR QL: NEGATIVE
BUN SERPL-MCNC: 10 MG/DL (ref 6–20)
BUN/CREAT SERPL: 12 (ref 12–20)
CALCIUM SERPL-MCNC: 10 MG/DL (ref 8.5–10.1)
CHLORIDE SERPL-SCNC: 102 MMOL/L (ref 97–108)
CO2 SERPL-SCNC: 25 MMOL/L (ref 21–32)
COLOR UR: ABNORMAL
CREAT SERPL-MCNC: 0.83 MG/DL (ref 0.55–1.02)
DIFFERENTIAL METHOD BLD: ABNORMAL
EOSINOPHIL # BLD: 0.1 K/UL (ref 0–0.4)
EOSINOPHIL NFR BLD: 1 % (ref 0–7)
EPITH CASTS URNS QL MICRO: ABNORMAL /LPF
ERYTHROCYTE [DISTWIDTH] IN BLOOD BY AUTOMATED COUNT: 19.9 % (ref 11.5–14.5)
GLOBULIN SER CALC-MCNC: 3.5 G/DL (ref 2–4)
GLUCOSE SERPL-MCNC: 210 MG/DL (ref 65–100)
GLUCOSE UR STRIP.AUTO-MCNC: >1000 MG/DL
HCT VFR BLD AUTO: 37.8 % (ref 35–47)
HGB BLD-MCNC: 12.9 G/DL (ref 11.5–16)
HGB UR QL STRIP: NEGATIVE
IMM GRANULOCYTES # BLD AUTO: 0.1 K/UL (ref 0–0.04)
IMM GRANULOCYTES NFR BLD AUTO: 1 % (ref 0–0.5)
KETONES UR QL STRIP.AUTO: NEGATIVE MG/DL
LEUKOCYTE ESTERASE UR QL STRIP.AUTO: ABNORMAL
LYMPHOCYTES # BLD: 1.7 K/UL (ref 0.8–3.5)
LYMPHOCYTES NFR BLD: 19 % (ref 12–49)
MCH RBC QN AUTO: 27.8 PG (ref 26–34)
MCHC RBC AUTO-ENTMCNC: 34.1 G/DL (ref 30–36.5)
MCV RBC AUTO: 81.5 FL (ref 80–99)
MONOCYTES # BLD: 0.4 K/UL (ref 0–1)
MONOCYTES NFR BLD: 5 % (ref 5–13)
NEUTS SEG # BLD: 6.4 K/UL (ref 1.8–8)
NEUTS SEG NFR BLD: 74 % (ref 32–75)
NITRITE UR QL STRIP.AUTO: NEGATIVE
NRBC # BLD: 0 K/UL (ref 0–0.01)
NRBC BLD-RTO: 0 PER 100 WBC
PH UR STRIP: 6 [PH] (ref 5–8)
PLATELET # BLD AUTO: 192 K/UL (ref 150–400)
PMV BLD AUTO: 9.1 FL (ref 8.9–12.9)
POTASSIUM SERPL-SCNC: 3.5 MMOL/L (ref 3.5–5.1)
PROT SERPL-MCNC: 7.5 G/DL (ref 6.4–8.2)
PROT UR STRIP-MCNC: NEGATIVE MG/DL
RBC # BLD AUTO: 4.64 M/UL (ref 3.8–5.2)
RBC #/AREA URNS HPF: ABNORMAL /HPF (ref 0–5)
SODIUM SERPL-SCNC: 137 MMOL/L (ref 136–145)
SP GR UR REFRACTOMETRY: 1.01 (ref 1–1.03)
UA: UC IF INDICATED,UAUC: ABNORMAL
UROBILINOGEN UR QL STRIP.AUTO: 0.2 EU/DL (ref 0.2–1)
WBC # BLD AUTO: 8.6 K/UL (ref 3.6–11)
WBC URNS QL MICRO: ABNORMAL /HPF (ref 0–4)
YEAST URNS QL MICRO: PRESENT

## 2021-12-16 PROCEDURE — 85025 COMPLETE CBC W/AUTO DIFF WBC: CPT

## 2021-12-16 PROCEDURE — 81001 URINALYSIS AUTO W/SCOPE: CPT

## 2021-12-16 PROCEDURE — 36415 COLL VENOUS BLD VENIPUNCTURE: CPT

## 2021-12-16 PROCEDURE — 96375 TX/PRO/DX INJ NEW DRUG ADDON: CPT

## 2021-12-16 PROCEDURE — 96374 THER/PROPH/DIAG INJ IV PUSH: CPT

## 2021-12-16 PROCEDURE — 74011250636 HC RX REV CODE- 250/636: Performed by: EMERGENCY MEDICINE

## 2021-12-16 PROCEDURE — 80053 COMPREHEN METABOLIC PANEL: CPT

## 2021-12-16 PROCEDURE — 99283 EMERGENCY DEPT VISIT LOW MDM: CPT

## 2021-12-16 PROCEDURE — 96376 TX/PRO/DX INJ SAME DRUG ADON: CPT

## 2021-12-16 PROCEDURE — 87086 URINE CULTURE/COLONY COUNT: CPT

## 2021-12-16 PROCEDURE — 74011250637 HC RX REV CODE- 250/637: Performed by: EMERGENCY MEDICINE

## 2021-12-16 RX ORDER — CEPHALEXIN 500 MG/1
500 CAPSULE ORAL 4 TIMES DAILY
Qty: 28 CAPSULE | Refills: 0 | Status: SHIPPED | OUTPATIENT
Start: 2021-12-16 | End: 2021-12-23

## 2021-12-16 RX ORDER — HYDROMORPHONE HYDROCHLORIDE 2 MG/1
2 TABLET ORAL ONCE
Status: COMPLETED | OUTPATIENT
Start: 2021-12-16 | End: 2021-12-16

## 2021-12-16 RX ORDER — DIPHENHYDRAMINE HYDROCHLORIDE 50 MG/ML
12.5 INJECTION, SOLUTION INTRAMUSCULAR; INTRAVENOUS
Status: COMPLETED | OUTPATIENT
Start: 2021-12-16 | End: 2021-12-16

## 2021-12-16 RX ORDER — ONDANSETRON 4 MG/1
8 TABLET, ORALLY DISINTEGRATING ORAL
Status: COMPLETED | OUTPATIENT
Start: 2021-12-16 | End: 2021-12-16

## 2021-12-16 RX ORDER — HYDROMORPHONE HYDROCHLORIDE 5 MG/5ML
2 SOLUTION ORAL
Qty: 12 ML | Refills: 0 | Status: SHIPPED | OUTPATIENT
Start: 2021-12-16 | End: 2021-12-19

## 2021-12-16 RX ORDER — ONDANSETRON 4 MG/1
4 TABLET, FILM COATED ORAL
Qty: 20 TABLET | Refills: 0 | Status: SHIPPED | OUTPATIENT
Start: 2021-12-16 | End: 2022-02-23

## 2021-12-16 RX ORDER — DIPHENHYDRAMINE HYDROCHLORIDE 50 MG/ML
25 INJECTION, SOLUTION INTRAMUSCULAR; INTRAVENOUS
Status: COMPLETED | OUTPATIENT
Start: 2021-12-16 | End: 2021-12-16

## 2021-12-16 RX ORDER — MORPHINE SULFATE 2 MG/ML
4 INJECTION, SOLUTION INTRAMUSCULAR; INTRAVENOUS ONCE
Status: COMPLETED | OUTPATIENT
Start: 2021-12-16 | End: 2021-12-16

## 2021-12-16 RX ORDER — DIPHENHYDRAMINE HCL 25 MG
25 CAPSULE ORAL
Status: COMPLETED | OUTPATIENT
Start: 2021-12-16 | End: 2021-12-16

## 2021-12-16 RX ADMIN — DIPHENHYDRAMINE HYDROCHLORIDE 25 MG: 25 CAPSULE ORAL at 08:20

## 2021-12-16 RX ADMIN — DIPHENHYDRAMINE HYDROCHLORIDE 25 MG: 50 INJECTION, SOLUTION INTRAMUSCULAR; INTRAVENOUS at 09:19

## 2021-12-16 RX ADMIN — HYDROMORPHONE HYDROCHLORIDE 2 MG: 2 TABLET ORAL at 10:09

## 2021-12-16 RX ADMIN — MORPHINE SULFATE 4 MG: 2 INJECTION, SOLUTION INTRAMUSCULAR; INTRAVENOUS at 08:26

## 2021-12-16 RX ADMIN — DIPHENHYDRAMINE HYDROCHLORIDE 12.5 MG: 50 INJECTION, SOLUTION INTRAMUSCULAR; INTRAVENOUS at 10:08

## 2021-12-16 RX ADMIN — ONDANSETRON 8 MG: 4 TABLET, ORALLY DISINTEGRATING ORAL at 08:03

## 2021-12-16 NOTE — ED NOTES
Akanksha Leon RN reviewed discharge instructions with the patient. The patient verbalized understanding. All questions and concerns were addressed. The patient declined a wheelchair and is discharged ambulatory in the care of family members with instructions and prescriptions in hand. Pt is alert and oriented x 4.

## 2021-12-16 NOTE — ED PROVIDER NOTES
EMERGENCY DEPARTMENT HISTORY AND PHYSICAL EXAM      Date: 12/16/2021  Patient Name: Martínez Hernandez  Patient Age and Sex: 46 y.o. female     History of Presenting Illness     Chief Complaint   Patient presents with    Vaginal Bleeding     Surgery at [facility] about 6 weeks ago for rectal proctitis. She continues to have rectal spasms but the last few days is having vaginal bleeding - hysterectomy about 15 years ago.  Anal Pain     History Provided By: Patient    HPI: Martínez Hernandez  is a 63-year-old history of ulcerative colitis, prior colovaginal fistula repaired presenting with vaginal bleeding, hematuria. Patient was proximally 1 week of vaginal bleeding, scant blood most noticeable with wiping as well as 2 episodes of hematuria. She reports this feels similar to her prior episode of a colovaginal fistula. Just reports rectal pain which has been ongoing for some time. She status post an appendectomy 2 months ago, was subsequently diagnosed with cancer and is pending a colonoscopy. Patient colorectal surgeon is Dr. Rafaela Espinoza. She is status post hysterectomy 15 years ago. Patient reports associated nausea and vomiting since last night. She denies any abdominal pain, localizes most of her pain to her rectum. There are no other complaints, changes, or physical findings at this time. PCP: Gary Landeros MD    No current facility-administered medications on file prior to encounter. Current Outpatient Medications on File Prior to Encounter   Medication Sig Dispense Refill    ALPRAZolam (XANAX) 1 mg tablet TAKE 1/2 - 1 TABLET BY MOUTH TWICE DAILY AS NEEDED FOR ANXIETY *MAX 2 TABS DAILY* 60 Tablet 0    naloxone (Narcan) 4 mg/actuation nasal spray Use 1 spray intranasally, then discard. Repeat with new spray every 2 min as needed for opioid overdose symptoms, alternating nostrils. 1 Each 0    gabapentin (NEURONTIN) 300 mg capsule TAKE 1 CAP BY MOUTH NIGHTLY.  MAX DAILY AMOUNT: 300 MG. 30 Capsule 0    losartan-hydroCHLOROthiazide (HYZAAR) 100-12.5 mg per tablet TAKE 1 TABLET BY MOUTH EVERY DAY 90 Tablet 1    promethazine (PHENERGAN) 25 mg tablet Take 1 Tablet by mouth every six (6) hours as needed for Nausea. 20 Tablet 0    dibucaine (NUPERCAINAL) 1 % rectal ointment by PeriANAL route every four (4) hours as needed for Pain. 28 g 0    valACYclovir (VALTREX) 1 gram tablet TAKE 1 TABLET BY MOUTH THREE TIMES A DAY 21 Tablet 0    sertraline (ZOLOFT) 100 mg tablet TAKE 2 TABLETS BY MOUTH NIGHTLY 60 Tablet 0    sertraline (ZOLOFT) 100 mg tablet Take 2 Tablets by mouth nightly. 60 Tablet 2    atorvastatin (LIPITOR) 20 mg tablet Take 1 Tablet by mouth daily. 90 Tablet 1    dicyclomine (BENTYL) 20 mg tablet Take 1 Tablet by mouth every six (6) hours as needed for Abdominal Cramps. 20 Tablet 0    diphenoxylate-atropine (LomotiL) 2.5-0.025 mg per tablet Take 1 Tablet by mouth four (4) times daily as needed for Diarrhea (1 tab after each stool for max 8 per day). Max Daily Amount: 4 Tablets. Take after each stool for a maximum of 8 tablets daily 20 Tablet 0    hyoscyamine SL (LEVSIN/SL) 0.125 mg SL tablet 1 Tablet by SubLINGual route every four (4) hours as needed for Cramping. 10 Tablet 0    ondansetron (Zofran ODT) 4 mg disintegrating tablet Take 1 Tablet by mouth every eight (8) hours as needed for Nausea. 12 Tablet 1    empagliflozin (Jardiance) 25 mg tablet Take 1 Tablet by mouth daily. 90 Tablet 1    glimepiride (AMARYL) 4 mg tablet TAKE 1 TABLET BY MOUTH ONCE DAILY BEFORE BREAKFAST 90 Tablet 1    estradioL (ESTRACE) 0.5 mg tablet TAKE 1 TABLET BY MOUTH ONCE DAILY 90 Tablet 1    Ventolin HFA 90 mcg/actuation inhaler TAKE 1 PUFF BY INHALATION EVERY FOUR (4) HOURS AS NEEDED FOR WHEEZING. 18 Inhaler 1    lidocaine (Lidoderm) 5 % Apply patch to the affected area for 12 hours a day and remove for 12 hours a day.  5 Each 0    omeprazole (PRILOSEC) 20 mg capsule Take 40 mg by mouth Daily (before breakfast).  EPINEPHrine (EPIPEN) 0.3 mg/0.3 mL (1:1,000) injection 0.3 mL by IntraMUSCular route once as needed for up to 1 dose. 0.3 mL 1       Past History     Past Medical History:  Past Medical History:   Diagnosis Date    Anal fissure     Anisocoria     Asthma     LAST EPISODE     Back pain     Cerumen impaction     Chronic kidney disease     hx uti in past    Coagulation defects     ocassional rectal bleeding due to anal fissure    Colovaginal fistula     Diabetes (HCC)     NIDDM    Diverticulitis     Diverticulosis     Enlarged tonsils     Frequent UTI     GERD (gastroesophageal reflux disease)     H/O endoscopy     with dilation    HA (headache)     Hepatic steatosis     History of colon resection     Hx of colonoscopy with polypectomy     benign    Hypertension     Ill-defined condition     FREQUENT HIVES    Ill-defined condition     HX ELEVATED LIVER ENZYMES    Morbid obesity (HCC)     Nausea & vomiting     during diverticulitis flare    Obesity     Otitis media     Pneumonia     about 15 yrs ago    Psychiatric disorder     ANXIETY    Recurrent tonsillitis     Sinusitis     Transfusion history ~ age 35    postop hysterectomy    Urticaria        Past Surgical History:  Past Surgical History:   Procedure Laterality Date    COLONOSCOPY N/A 3/28/2019    COLONOSCOPY performed by Kelton Trinh MD at [facility] [place] 2] COLONOSCOPY N/A 10/2/2020    COLONOSCOPY performed by Kelton Trinh MD at [address] [place] [address],[address]    blake.     HX GI  12    LAPAROSCOPIC HAND ASSISTED  POSS OPEN SIGMOID COLECTOMY POSS TEMPORARY DIVERTING LOOP ILEOSTOMY;  (no illeostomy needed)    HX GYN           HX GYN      cervical conization    HX HEENT      SINUS SURGERY LEFT X2    HX HEENT      SINUS SURGERY ON RIGHT X2    HX HEMORRHOIDECTOMY      HX OTHER SURGICAL      Sphincterotomy    HX PELVIC LAPAROSCOPY      HX EMMANUEL AND BSO      HX UROLOGICAL  7/31/12     CYSTOSCOPY INSERTION URETERAL CATHETERS - Cystoscopy Insertion of bilateral ureteral stents    MI ABDOMEN SURGERY PROC UNLISTED  01/06/2018    hernia repair at [facility]       Family History:  Family History   Problem Relation Age of Onset    Diabetes Mother     Cancer Mother         NON-HODGKINS LYMPHOMA    Anesth Problems Mother         PONV    Diabetes Father     Heart Disease Father         CAD - STENTS, PACEMAKER    Arrhythmia Father        Social History:  Social History     Tobacco Use    Smoking status: Never Smoker    Smokeless tobacco: Never Used   Vaping Use    Vaping Use: Not on file   Substance Use Topics    Alcohol use: Yes     Comment: Rarely    Drug use: No     Types: Prescription, OTC       Allergies: Allergies   Allergen Reactions    Aspirin Hives, Shortness of Breath and Itching    Codeine Hives, Itching and Angioedema     Pt had itching in mouth, on face and lips    Contrast Agent [Iodine] Hives, Itching and Angioedema     5/5/21: pt was given benadryl and solu-medrol prior to administration but pt had severe tongue swelling and drooling, lethargy and itching. DO NOT GIVE PT    Flavoring Agent Anaphylaxis     Cherry flavor    Iodinated Contrast Media Anaphylaxis, Diarrhea, Hives, Nausea and Vomiting and Shortness of Breath    Percocet [Oxycodone-Acetaminophen] Hives, Itching and Angioedema     Pt had itching in mouth, on face and lips    Prilosec [Omeprazole Magnesium] Anaphylaxis     CHERRY FLAVORED ODT; PT TAKES REGULAR PRILOSEC AND IS OK    Dilaudid [Hydromorphone] Itching     Tolerates with benadryl    Ketorolac Rash     \"makes my eyes spasm and causes rash on my hands\" and \"makes my skin itch and makes me nervous\"    Morphine (Pf) Rash     According to rn, pt can take morphine with benadryl    Fentanyl Rash and Itching     Has had benadryl before         Review of Systems   Review of Systems   Constitutional: Negative for chills and fever.    HENT: Negative for congestion and rhinorrhea. Respiratory: Negative for shortness of breath. Cardiovascular: Negative for chest pain. Gastrointestinal: Positive for nausea, rectal pain and vomiting. Negative for abdominal pain and blood in stool. Genitourinary: Positive for hematuria and vaginal bleeding. Negative for dysuria and frequency. Musculoskeletal: Negative for myalgias. All other systems reviewed and are negative. Physical Exam   Physical Exam  Vitals and nursing note reviewed. Constitutional:       General: She is not in acute distress. Appearance: Normal appearance. She is not ill-appearing. HENT:      Head: Normocephalic. Mouth/Throat:      Mouth: Mucous membranes are moist.   Eyes:      Conjunctiva/sclera: Conjunctivae normal.   Cardiovascular:      Rate and Rhythm: Normal rate and regular rhythm. Pulses: Normal pulses. Pulmonary:      Effort: Pulmonary effort is normal.      Breath sounds: Normal breath sounds. Abdominal:      General: Abdomen is flat. Palpations: Abdomen is soft. Genitourinary:     Comments: No vaginal bleeding, external hemorrhoids noted, significant pain with digital rectal exam.  No palpable abscesses, hemorrhoids. Musculoskeletal:         General: No deformity. Skin:     General: Skin is warm and dry. Neurological:      Mental Status: She is alert and oriented to person, place, and time. Mental status is at baseline. Psychiatric:         Behavior: Behavior normal.         Thought Content:  Thought content normal.         Diagnostic Study Results     Labs -     Recent Results (from the past 12 hour(s))   URINALYSIS W/ REFLEX CULTURE    Collection Time: 12/16/21  7:32 AM    Specimen: Urine   Result Value Ref Range    Color YELLOW/STRAW      Appearance CLEAR CLEAR      Specific gravity 1.015 1.003 - 1.030      pH (UA) 6.0 5.0 - 8.0      Protein Negative NEG mg/dL    Glucose >1,000 (A) NEG mg/dL    Ketone Negative NEG mg/dL    Bilirubin Negative NEG      Blood Negative NEG      Urobilinogen 0.2 0.2 - 1.0 EU/dL    Nitrites Negative NEG      Leukocyte Esterase SMALL (A) NEG      WBC  0 - 4 /hpf    RBC 0-5 0 - 5 /hpf    Epithelial cells FEW FEW /lpf    Bacteria Negative NEG /hpf    UA:UC IF INDICATED URINE CULTURE ORDERED (A) CNI      Yeast PRESENT (A) NEG     CBC WITH AUTOMATED DIFF    Collection Time: 12/16/21  8:22 AM   Result Value Ref Range    WBC 8.6 3.6 - 11.0 K/uL    RBC 4.64 3.80 - 5.20 M/uL    HGB 12.9 11.5 - 16.0 g/dL    HCT 37.8 35.0 - 47.0 %    MCV 81.5 80.0 - 99.0 FL    MCH 27.8 26.0 - 34.0 PG    MCHC 34.1 30.0 - 36.5 g/dL    RDW 19.9 (H) 11.5 - 14.5 %    PLATELET 630 373 - 011 K/uL    MPV 9.1 8.9 - 12.9 FL    NRBC 0.0 0  WBC    ABSOLUTE NRBC 0.00 0.00 - 0.01 K/uL    NEUTROPHILS 74 32 - 75 %    LYMPHOCYTES 19 12 - 49 %    MONOCYTES 5 5 - 13 %    EOSINOPHILS 1 0 - 7 %    BASOPHILS 0 0 - 1 %    IMMATURE GRANULOCYTES 1 (H) 0.0 - 0.5 %    ABS. NEUTROPHILS 6.4 1.8 - 8.0 K/UL    ABS. LYMPHOCYTES 1.7 0.8 - 3.5 K/UL    ABS. MONOCYTES 0.4 0.0 - 1.0 K/UL    ABS. EOSINOPHILS 0.1 0.0 - 0.4 K/UL    ABS. BASOPHILS 0.0 0.0 - 0.1 K/UL    ABS. IMM. GRANS. 0.1 (H) 0.00 - 0.04 K/UL    DF AUTOMATED     METABOLIC PANEL, COMPREHENSIVE    Collection Time: 12/16/21  8:22 AM   Result Value Ref Range    Sodium 137 136 - 145 mmol/L    Potassium 3.5 3.5 - 5.1 mmol/L    Chloride 102 97 - 108 mmol/L    CO2 25 21 - 32 mmol/L    Anion gap 10 5 - 15 mmol/L    Glucose 210 (H) 65 - 100 mg/dL    BUN 10 6 - 20 MG/DL    Creatinine 0.83 0.55 - 1.02 MG/DL    BUN/Creatinine ratio 12 12 - 20      GFR est AA >60 >60 ml/min/1.73m2    GFR est non-AA >60 >60 ml/min/1.73m2    Calcium 10.0 8.5 - 10.1 MG/DL    Bilirubin, total 0.9 0.2 - 1.0 MG/DL    ALT (SGPT) 38 12 - 78 U/L    AST (SGOT) 26 15 - 37 U/L    Alk.  phosphatase 67 45 - 117 U/L    Protein, total 7.5 6.4 - 8.2 g/dL    Albumin 4.0 3.5 - 5.0 g/dL    Globulin 3.5 2.0 - 4.0 g/dL    A-G Ratio 1.1 1.1 - 2.2 Radiologic Studies -   No orders to display     CT Results  (Last 48 hours)    None        CXR Results  (Last 48 hours)    None            Medical Decision Making   I am the first provider for this patient. I reviewed the vital signs, available nursing notes, past medical history, past surgical history, family history and social history. Vital Signs-Reviewed the patient's vital signs. Patient Vitals for the past 12 hrs:   Temp Pulse Resp BP SpO2   12/16/21 0600 97.5 °F (36.4 °C) 80 16 134/86 100 %       Records Reviewed: Nursing Notes and Old Medical Records    Provider Notes (Medical Decision Making):   Patient with recent diagnosis rectal proctitis, presenting with vaginal bleeding hematuria concern for possible colovaginal or colovesicular fistula    She is well-appearing nontoxic afebrile. Recent visit for the same, those records reviewed. Will obtain lab work including CBC CMP as well as urinalysis to evaluate for UTI, hematuria. Patient given morphine, Zofran for symptom control. If lab work urinalysis reassuring, suspect discharged with close colorectal follow-up. ED Course:   Initial assessment performed. The patients presenting problems have been discussed, and they are in agreement with the care plan formulated and outlined with them. I have encouraged them to ask questions as they arise throughout their visit.     ED Course as of 12/16/21 1251   Thu Dec 16, 2021   0845 Patient with hives, already gotten oral benadryl, will give IV benadryl [WB]   0926 Mild hyperglycemia of 210, CBC is unremarkable [WB]   0926 Urinalysis does demonstrate 5200 white blood cells with absent blood [WB]   0942 Patient with ongoing pain, will give oral pain medications to ensure she can tolerate oral intake in setting of possible UTI. [WB]      ED Course User Index  [WB] Yvan Lee MD     Critical Care Time:   0    Disposition:  Discharge Note:  The patient has been re-evaluated and is ready for discharge. Reviewed available results with patient. Counseled patient on diagnosis and care plan. Patient has expressed understanding, and all questions have been answered. Patient agrees with plan and agrees to follow up as recommended, or to return to the ED if their symptoms worsen. Discharge instructions have been provided and explained to the patient, along with reasons to return to the ED. PLAN:  Discharge Medication List as of 12/16/2021  9:53 AM      START taking these medications    Details   cephALEXin (Keflex) 500 mg capsule Take 1 Capsule by mouth four (4) times daily for 7 days. , Normal, Disp-28 Capsule, R-0      ondansetron hcl (Zofran) 4 mg tablet Take 1 Tablet by mouth every eight (8) hours as needed for Nausea., Normal, Disp-20 Tablet, R-0      HYDROmorphone (DILAUDID) 1 mg/mL liqd oral solution Take 2 mL by mouth every four (4) hours as needed for Pain for up to 3 days. Max Daily Amount: 12 mg., Normal, Disp-12 mL, R-0         CONTINUE these medications which have NOT CHANGED    Details   ALPRAZolam (XANAX) 1 mg tablet TAKE 1/2 - 1 TABLET BY MOUTH TWICE DAILY AS NEEDED FOR ANXIETY *MAX 2 TABS DAILY*, Normal, Disp-60 Tablet, R-0Not to exceed 5 additional fills before 05/15/2022 DX Code Needed  . naloxone (Narcan) 4 mg/actuation nasal spray Use 1 spray intranasally, then discard. Repeat with new spray every 2 min as needed for opioid overdose symptoms, alternating nostrils. , Normal, Disp-1 Each, R-0      gabapentin (NEURONTIN) 300 mg capsule TAKE 1 CAP BY MOUTH NIGHTLY.  MAX DAILY AMOUNT: 300 MG., Normal, Disp-30 Capsule, R-0This request is for a new prescription for a controlled substance as required by Federal/State law.      losartan-hydroCHLOROthiazide (HYZAAR) 100-12.5 mg per tablet TAKE 1 TABLET BY MOUTH EVERY DAY, Normal, Disp-90 Tablet, R-1      promethazine (PHENERGAN) 25 mg tablet Take 1 Tablet by mouth every six (6) hours as needed for Nausea., Normal, Disp-20 Tablet, R-0 dibucaine (NUPERCAINAL) 1 % rectal ointment by PeriANAL route every four (4) hours as needed for Pain., Normal, Disp-28 g, R-0      valACYclovir (VALTREX) 1 gram tablet TAKE 1 TABLET BY MOUTH THREE TIMES A DAY, Normal, Disp-21 Tablet, R-0DX Code Needed  . !! sertraline (ZOLOFT) 100 mg tablet TAKE 2 TABLETS BY MOUTH NIGHTLY, Normal, Disp-60 Tablet, R-0      !! sertraline (ZOLOFT) 100 mg tablet Take 2 Tablets by mouth nightly., Normal, Disp-60 Tablet, R-2      atorvastatin (LIPITOR) 20 mg tablet Take 1 Tablet by mouth daily. , Normal, Disp-90 Tablet, R-1      dicyclomine (BENTYL) 20 mg tablet Take 1 Tablet by mouth every six (6) hours as needed for Abdominal Cramps., Normal, Disp-20 Tablet, R-0      diphenoxylate-atropine (LomotiL) 2.5-0.025 mg per tablet Take 1 Tablet by mouth four (4) times daily as needed for Diarrhea (1 tab after each stool for max 8 per day). Max Daily Amount: 4 Tablets. Take after each stool for a maximum of 8 tablets daily, Normal, Disp-20 Tablet, R-0      hyoscyamine SL (LEVSIN/SL) 0.125 mg SL tablet 1 Tablet by SubLINGual route every four (4) hours as needed for Cramping., Normal, Disp-10 Tablet, R-0      ondansetron (Zofran ODT) 4 mg disintegrating tablet Take 1 Tablet by mouth every eight (8) hours as needed for Nausea., Normal, Disp-12 Tablet, R-1      empagliflozin (Jardiance) 25 mg tablet Take 1 Tablet by mouth daily. , Normal, Disp-90 Tablet, R-1      glimepiride (AMARYL) 4 mg tablet TAKE 1 TABLET BY MOUTH ONCE DAILY BEFORE BREAKFAST, Normal, Disp-90 Tablet, R-1      estradioL (ESTRACE) 0.5 mg tablet TAKE 1 TABLET BY MOUTH ONCE DAILY, Normal, Disp-90 Tablet, R-1      Ventolin HFA 90 mcg/actuation inhaler TAKE 1 PUFF BY INHALATION EVERY FOUR (4) HOURS AS NEEDED FOR WHEEZING., Normal, Disp-18 Inhaler, R-1      lidocaine (Lidoderm) 5 % Apply patch to the affected area for 12 hours a day and remove for 12 hours a day., Normal, Disp-5 Each, R-0      omeprazole (PRILOSEC) 20 mg capsule Take 40 mg by mouth Daily (before breakfast). , Historical Med      EPINEPHrine (EPIPEN) 0.3 mg/0.3 mL (1:1,000) injection 0.3 mL by IntraMUSCular route once as needed for up to 1 dose., Print, Disp-0.3 mL, R-1       !! - Potential duplicate medications found. Please discuss with provider. 2.   Follow-up Information     Follow up With Specialties Details Why Nisha Rivera MD Internal Medicine Schedule an appointment as soon as possible for a visit  Follow-up with PCP, GI as soon as able for colonoscopy [address] [facility] [phone]          3. Return to ED if worse     Diagnosis     Clinical Impression:   1. Acute cystitis without hematuria        Attestations:    Sanford Holman M.D. Please note that this dictation was completed with katena, the computer voice recognition software. Quite often unanticipated grammatical, syntax, homophones, and other interpretive errors are inadvertently transcribed by the computer software. Please disregard these errors. Please excuse any errors that have escaped final proofreading. Thank you.

## 2021-12-16 NOTE — LETTER
[address]  [facility] EMERGENCY DEPT  [address] [place] [address]  Tatiana Gupta [id number] [phone]    Work/School Note    Date: 12/16/2021    To Whom It May concern:    Martínez Hernandez was seen and treated today in the emergency room by the following provider(s):  Attending Provider: Suad Pierce MD.      Martínez Hernandez Return to work Monday 12/20/21.     Sincerely,          Win Arroyo RN for Jaison Boogie MD

## 2021-12-17 LAB
BACTERIA SPEC CULT: NORMAL
SERVICE CMNT-IMP: NORMAL

## 2022-01-14 DIAGNOSIS — F41.9 ANXIETY: ICD-10-CM

## 2022-01-14 LAB — COLONOSCOPY, EXTERNAL: NORMAL

## 2022-01-16 RX ORDER — ALPRAZOLAM 1 MG/1
TABLET ORAL
Qty: 60 TABLET | Refills: 0 | Status: SHIPPED | OUTPATIENT
Start: 2022-01-16 | End: 2022-02-15

## 2022-01-21 ENCOUNTER — HOSPITAL ENCOUNTER (EMERGENCY)
Age: 52
Discharge: HOME | End: 2022-01-21
Attending: STUDENT IN AN ORGANIZED HEALTH CARE EDUCATION/TRAINING PROGRAM | Admitting: STUDENT IN AN ORGANIZED HEALTH CARE EDUCATION/TRAINING PROGRAM
Payer: MEDICAID

## 2022-01-21 ENCOUNTER — APPOINTMENT (OUTPATIENT)
Dept: CT IMAGING | Age: 52
End: 2022-01-21
Attending: STUDENT IN AN ORGANIZED HEALTH CARE EDUCATION/TRAINING PROGRAM
Payer: MEDICAID

## 2022-01-21 VITALS
RESPIRATION RATE: 16 BRPM | DIASTOLIC BLOOD PRESSURE: 85 MMHG | OXYGEN SATURATION: 100 % | SYSTOLIC BLOOD PRESSURE: 162 MMHG | TEMPERATURE: 98.3 F | HEART RATE: 69 BPM

## 2022-01-21 DIAGNOSIS — R11.2 NON-INTRACTABLE VOMITING WITH NAUSEA, UNSPECIFIED VOMITING TYPE: ICD-10-CM

## 2022-01-21 DIAGNOSIS — K51.90 ULCERATIVE COLITIS WITHOUT COMPLICATIONS, UNSPECIFIED LOCATION (HCC): ICD-10-CM

## 2022-01-21 DIAGNOSIS — R10.84 ABDOMINAL PAIN, GENERALIZED: Primary | ICD-10-CM

## 2022-01-21 LAB
ALBUMIN SERPL-MCNC: 4 G/DL (ref 3.5–5)
ALBUMIN/GLOB SERPL: 1.2 {RATIO} (ref 1.1–2.2)
ALP SERPL-CCNC: 72 U/L (ref 45–117)
ALT SERPL-CCNC: 39 U/L (ref 12–78)
ANION GAP SERPL CALC-SCNC: 4 MMOL/L (ref 5–15)
AST SERPL-CCNC: 30 U/L (ref 15–37)
BASOPHILS # BLD: 0 K/UL (ref 0–0.1)
BASOPHILS NFR BLD: 1 % (ref 0–1)
BILIRUB SERPL-MCNC: 0.7 MG/DL (ref 0.2–1)
BUN SERPL-MCNC: 8 MG/DL (ref 6–20)
BUN/CREAT SERPL: 12 (ref 12–20)
CALCIUM SERPL-MCNC: 9.5 MG/DL (ref 8.5–10.1)
CHLORIDE SERPL-SCNC: 107 MMOL/L (ref 97–108)
CO2 SERPL-SCNC: 27 MMOL/L (ref 21–32)
CREAT SERPL-MCNC: 0.66 MG/DL (ref 0.55–1.02)
DIFFERENTIAL METHOD BLD: ABNORMAL
EOSINOPHIL # BLD: 0.3 K/UL (ref 0–0.4)
EOSINOPHIL NFR BLD: 5 % (ref 0–7)
ERYTHROCYTE [DISTWIDTH] IN BLOOD BY AUTOMATED COUNT: 17.7 % (ref 11.5–14.5)
GLOBULIN SER CALC-MCNC: 3.3 G/DL (ref 2–4)
GLUCOSE SERPL-MCNC: 180 MG/DL (ref 65–100)
HCT VFR BLD AUTO: 38.5 % (ref 35–47)
HGB BLD-MCNC: 13.7 G/DL (ref 11.5–16)
IMM GRANULOCYTES # BLD AUTO: 0 K/UL (ref 0–0.04)
IMM GRANULOCYTES NFR BLD AUTO: 0 % (ref 0–0.5)
LIPASE SERPL-CCNC: 172 U/L (ref 73–393)
LYMPHOCYTES # BLD: 1.6 K/UL (ref 0.8–3.5)
LYMPHOCYTES NFR BLD: 27 % (ref 12–49)
MAGNESIUM SERPL-MCNC: 2.2 MG/DL (ref 1.6–2.4)
MCH RBC QN AUTO: 29.9 PG (ref 26–34)
MCHC RBC AUTO-ENTMCNC: 35.6 G/DL (ref 30–36.5)
MCV RBC AUTO: 84.1 FL (ref 80–99)
MONOCYTES # BLD: 0.3 K/UL (ref 0–1)
MONOCYTES NFR BLD: 6 % (ref 5–13)
NEUTS SEG # BLD: 3.7 K/UL (ref 1.8–8)
NEUTS SEG NFR BLD: 61 % (ref 32–75)
NRBC # BLD: 0 K/UL (ref 0–0.01)
NRBC BLD-RTO: 0 PER 100 WBC
PLATELET # BLD AUTO: 151 K/UL (ref 150–400)
PMV BLD AUTO: 9.2 FL (ref 8.9–12.9)
POTASSIUM SERPL-SCNC: 4 MMOL/L (ref 3.5–5.1)
PROT SERPL-MCNC: 7.3 G/DL (ref 6.4–8.2)
RBC # BLD AUTO: 4.58 M/UL (ref 3.8–5.2)
SODIUM SERPL-SCNC: 138 MMOL/L (ref 136–145)
WBC # BLD AUTO: 6 K/UL (ref 3.6–11)

## 2022-01-21 PROCEDURE — 36415 COLL VENOUS BLD VENIPUNCTURE: CPT

## 2022-01-21 PROCEDURE — 99282 EMERGENCY DEPT VISIT SF MDM: CPT

## 2022-01-21 PROCEDURE — 83690 ASSAY OF LIPASE: CPT

## 2022-01-21 PROCEDURE — 74176 CT ABD & PELVIS W/O CONTRAST: CPT

## 2022-01-21 PROCEDURE — 85025 COMPLETE CBC W/AUTO DIFF WBC: CPT

## 2022-01-21 PROCEDURE — 96374 THER/PROPH/DIAG INJ IV PUSH: CPT

## 2022-01-21 PROCEDURE — 83735 ASSAY OF MAGNESIUM: CPT

## 2022-01-21 PROCEDURE — 80053 COMPREHEN METABOLIC PANEL: CPT

## 2022-01-21 PROCEDURE — 96375 TX/PRO/DX INJ NEW DRUG ADDON: CPT

## 2022-01-21 PROCEDURE — 96376 TX/PRO/DX INJ SAME DRUG ADON: CPT

## 2022-01-21 PROCEDURE — 74011250636 HC RX REV CODE- 250/636: Performed by: STUDENT IN AN ORGANIZED HEALTH CARE EDUCATION/TRAINING PROGRAM

## 2022-01-21 RX ORDER — DIPHENHYDRAMINE HYDROCHLORIDE 50 MG/ML
25 INJECTION, SOLUTION INTRAMUSCULAR; INTRAVENOUS
Status: COMPLETED | OUTPATIENT
Start: 2022-01-21 | End: 2022-01-21

## 2022-01-21 RX ORDER — HYDROCODONE BITARTRATE AND ACETAMINOPHEN 5; 325 MG/1; MG/1
1 TABLET ORAL
Qty: 12 TABLET | Refills: 0 | Status: SHIPPED | OUTPATIENT
Start: 2022-01-21 | End: 2022-01-24

## 2022-01-21 RX ORDER — MORPHINE SULFATE 10 MG/ML
6 INJECTION, SOLUTION INTRAMUSCULAR; INTRAVENOUS
Status: COMPLETED | OUTPATIENT
Start: 2022-01-21 | End: 2022-01-21

## 2022-01-21 RX ORDER — PROMETHAZINE HYDROCHLORIDE 25 MG/1
25 TABLET ORAL
Qty: 12 TABLET | Refills: 0 | OUTPATIENT
Start: 2022-01-21 | End: 2022-01-24

## 2022-01-21 RX ORDER — MORPHINE SULFATE 4 MG/ML
4 INJECTION INTRAVENOUS
Status: COMPLETED | OUTPATIENT
Start: 2022-01-21 | End: 2022-01-21

## 2022-01-21 RX ORDER — ONDANSETRON 2 MG/ML
4 INJECTION INTRAMUSCULAR; INTRAVENOUS
Status: COMPLETED | OUTPATIENT
Start: 2022-01-21 | End: 2022-01-21

## 2022-01-21 RX ORDER — DEXAMETHASONE SODIUM PHOSPHATE 10 MG/ML
10 INJECTION INTRAMUSCULAR; INTRAVENOUS ONCE
Status: COMPLETED | OUTPATIENT
Start: 2022-01-21 | End: 2022-01-21

## 2022-01-21 RX ADMIN — DEXAMETHASONE SODIUM PHOSPHATE 10 MG: 10 INJECTION INTRAMUSCULAR; INTRAVENOUS at 14:11

## 2022-01-21 RX ADMIN — DIPHENHYDRAMINE HYDROCHLORIDE 25 MG: 50 INJECTION INTRAMUSCULAR; INTRAVENOUS at 14:11

## 2022-01-21 RX ADMIN — MORPHINE SULFATE 4 MG: 4 INJECTION INTRAVENOUS at 13:05

## 2022-01-21 RX ADMIN — MORPHINE SULFATE 6 MG: 10 INJECTION INTRAVENOUS at 14:12

## 2022-01-21 RX ADMIN — SODIUM CHLORIDE, POTASSIUM CHLORIDE, SODIUM LACTATE AND CALCIUM CHLORIDE 1000 ML: 600; 310; 30; 20 INJECTION, SOLUTION INTRAVENOUS at 13:05

## 2022-01-21 RX ADMIN — DIPHENHYDRAMINE HYDROCHLORIDE 25 MG: 50 INJECTION INTRAMUSCULAR; INTRAVENOUS at 13:05

## 2022-01-21 RX ADMIN — ONDANSETRON HYDROCHLORIDE 4 MG: 2 SOLUTION INTRAMUSCULAR; INTRAVENOUS at 13:05

## 2022-01-21 NOTE — DISCHARGE INSTRUCTIONS
You presented to the ED with abdominal pain and nausea. Similar to previous ulcerative colitis flares. He also had some rash in your arms and a ulcer in the mouth. Most likely all this is a UC flare. IV pain and nausea medications were given in the ED with some improvement. CT scan showed no concerning findings. It showed improvement in postsurgical areas from fistula repair. Short course of narcotic pain medication as well as Phenergan was written for symptomatic management. Please follow-up with your GI doctor for further treatment evaluation.

## 2022-01-21 NOTE — ED PROVIDER NOTES
Patient is presenting with CC of UC flare. Complex medical history. On a prednisone taper of 40mg. Rectal pain and stomach pain. 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 Patient has some irritation at that site. Patient goes to Dr. Noemi Matthews with GI specialists as well as [facility] GI doctor. Chills but no fevers. Patient has 4 different suppositories she has tried. . 2 days of rectal pain, abdominal pain. Patient has n/v, as well as loose stool. Abdominal Pain   This is a recurrent problem. The current episode started 2 days ago. The problem occurs constantly. The problem has not changed since onset. The pain is associated with vomiting, eating and previous surgery (Ulcerative colitis). The pain is located in the periumbilical region and LLQ. The quality of the pain is dull and pressure-like. The pain is at a severity of 5/10. The pain is moderate. Associated symptoms include diarrhea, hematochezia, nausea, vomiting and back pain. Pertinent negatives include no fever, no dysuria, no frequency and no chest pain. Nothing worsens the pain. Relieved by: Steroids. Past workup includes CT scan, surgery, colonoscopy. Her past medical history is significant for ulcerative colitis. The patient's surgical history includes appendectomy and colectomy. Vomiting   Associated symptoms include chills, abdominal pain and diarrhea. Pertinent negatives include no fever.         Past Medical History:   Diagnosis Date    Anal fissure     Anisocoria     Asthma     LAST EPISODE     Back pain     Cerumen impaction     Chronic kidney disease     hx uti in past    Coagulation defects     ocassional rectal bleeding due to anal fissure    Colovaginal fistula     Diabetes (HCC)     NIDDM    Diverticulitis     Diverticulosis     Enlarged tonsils     Frequent UTI     GERD (gastroesophageal reflux disease)     H/O endoscopy with dilation    HA (headache)     Hepatic steatosis     History of colon resection     Hx of colonoscopy with polypectomy     benign    Hypertension     Ill-defined condition     FREQUENT HIVES    Ill-defined condition     HX ELEVATED LIVER ENZYMES    Morbid obesity (HCC)     Nausea & vomiting     during diverticulitis flare    Obesity     Otitis media     Pneumonia     about 15 yrs ago    Psychiatric disorder     ANXIETY    Recurrent tonsillitis     Sinusitis     Transfusion history ~ age 35    postop hysterectomy    Urticaria        Past Surgical History:   Procedure Laterality Date    COLONOSCOPY N/A 3/28/2019    COLONOSCOPY performed by Maxx Falk MD at [address] [facility] COLONOSCOPY N/A 10/2/2020    COLONOSCOPY performed by Maxx Falk MD at [address] [facility]    [place].     HX GI  12    LAPAROSCOPIC HAND ASSISTED  POSS OPEN SIGMOID COLECTOMY POSS TEMPORARY DIVERTING LOOP ILEOSTOMY;  (no illeostomy needed)    HX GYN           HX GYN      cervical conization    HX HEENT      SINUS SURGERY LEFT X2    HX HEENT      SINUS SURGERY ON RIGHT X2    HX HEMORRHOIDECTOMY      HX OTHER SURGICAL      Sphincterotomy    HX PELVIC LAPAROSCOPY      HX EMMANUEL AND BSO      HX UROLOGICAL  12     CYSTOSCOPY INSERTION URETERAL CATHETERS - Cystoscopy Insertion of bilateral ureteral stents    WA ABDOMEN SURGERY PROC UNLISTED  2018    hernia repair at [facility]         Family History:   Problem Relation Age of Onset    Diabetes Mother     Cancer Mother         NON-HODGKINS LYMPHOMA    Anesth Problems Mother         PONV    Diabetes Father     Heart Disease Father         CAD - STENTS, PACEMAKER    Arrhythmia Father        Social History     Socioeconomic History    Marital status:      Spouse name: Not on file    Number of children: Not on file    Years of education: Not on file    Highest education level: Not on file   Occupational History  Not on file   Tobacco Use    Smoking status: Never Smoker    Smokeless tobacco: Never Used   Vaping Use    Vaping Use: Not on file   Substance and Sexual Activity    Alcohol use: Yes     Comment: Rarely    Drug use: No     Types: Prescription, OTC    Sexual activity: Never   Other Topics Concern    Not on file   Social History Narrative    Not on file     Social Determinants of Health     Financial Resource Strain:     Difficulty of Paying Living Expenses: Not on file   Food Insecurity:     Worried About Running Out of Food in the Last Year: Not on file    Smiin of Food in the Last Year: Not on file   Transportation Needs:     Lack of Transportation (Medical): Not on file    Lack of Transportation (Non-Medical):  Not on file   Physical Activity:     Days of Exercise per Week: Not on file    Minutes of Exercise per Session: Not on file   Stress:     Feeling of Stress : Not on file   Social Connections:     Frequency of Communication with Friends and Family: Not on file    Frequency of Social Gatherings with Friends and Family: Not on file    Attends [employer] Services: Not on file    Active Member of [address] [place] [address] or Organizations: Not on file    Attends Club or Organization Meetings: Not on file    Marital Status: Not on file   Intimate Partner Violence:     Fear of Current or Ex-Partner: Not on file    Emotionally Abused: Not on file    Physically Abused: Not on file    Sexually Abused: Not on file   Housing Stability:     Unable to Pay for Housing in the Last Year: Not on file    Number of [place] in the Last Year: Not on file    Unstable Housing in the Last Year: Not on file         ALLERGIES: Aspirin, Codeine, Contrast agent [iodine], Flavoring agent, Iodinated contrast media, Percocet [oxycodone-acetaminophen], Prilosec [omeprazole magnesium], Dilaudid [hydromorphone], Ketorolac, Morphine (pf), and Fentanyl    Review of Systems   Constitutional: Positive for appetite change and chills. Negative for fever. HENT: Negative. Eyes: Negative. Respiratory: Negative. Cardiovascular: Negative. Negative for chest pain. Gastrointestinal: Positive for abdominal pain, diarrhea, hematochezia, nausea and vomiting. Endocrine: Negative. Genitourinary: Negative. Negative for dysuria and frequency. Musculoskeletal: Positive for back pain. Skin: Positive for rash. Allergic/Immunologic: Negative. Neurological: Negative. Hematological: Negative. Psychiatric/Behavioral: Negative. Vitals:    01/21/22 1139   BP: (!) 162/85   Pulse: 69   Resp: 16   Temp: 98.3 °F (36.8 °C)   SpO2: 100%            Physical Exam  Vitals and nursing note reviewed. Constitutional:       General: She is not in acute distress. Appearance: Normal appearance. HENT:      Head: Normocephalic and atraumatic. Right Ear: External ear normal.      Left Ear: External ear normal.      Nose: Nose normal.   Eyes:      Extraocular Movements: Extraocular movements intact. Conjunctiva/sclera: Conjunctivae normal.   Cardiovascular:      Rate and Rhythm: Normal rate. Pulses: Normal pulses. Radial pulses are 2+ on the right side and 2+ on the left side. Heart sounds: Normal heart sounds. Pulmonary:      Effort: Pulmonary effort is normal.      Breath sounds: Normal breath sounds. Chest:      Chest wall: No deformity or tenderness. Abdominal:      General: Abdomen is flat. There is no distension. Palpations: Abdomen is soft. Tenderness: There is generalized abdominal tenderness and tenderness in the periumbilical area and left lower quadrant. There is no right CVA tenderness or left CVA tenderness. Musculoskeletal:         General: No deformity or signs of injury. Normal range of motion. Cervical back: Normal range of motion and neck supple. No tenderness. Skin:     General: Skin is warm and dry.       Capillary Refill: Capillary refill takes less than 2 seconds. Neurological:      General: No focal deficit present. Mental Status: She is alert and oriented to person, place, and time. Psychiatric:         Attention and Perception: Attention normal.         Mood and Affect: Mood normal.         Behavior: Behavior normal.          [facility]  ED Course as of 01/22/22 0106   Fri Jan 21, 2022   1333 Patient reassessed, pain mildly improved on morphine. Itching and rash still present. Will give a second dose of morphine followed by an additional dose of Benadryl. As well as a dose of dexamethasone [AL]      ED Course User Index  [AL] Savanna Kilpatrick MD       LABORATORY RESULTS:  Labs Reviewed   CBC WITH AUTOMATED DIFF - Abnormal; Notable for the following components:       Result Value    RDW 17.7 (*)     All other components within normal limits   METABOLIC PANEL, COMPREHENSIVE - Abnormal; Notable for the following components:    Anion gap 4 (*)     Glucose 180 (*)     All other components within normal limits   MAGNESIUM   LIPASE       IMAGING RESULTS:  CT ABD PELV WO CONT   Final Result   1. Status post further repair of rectal fistula, now with no significant mass or   fluid collection in the gluteal crease. 2. No acute abdominal or pelvic abnormality. 3.. Stable splenomegaly. 4. Incidental and other postoperative changes described above.           MEDICATIONS GIVEN:  Medications   lactated ringers bolus infusion 1,000 mL (0 mL IntraVENous IV Completed 1/21/22 1335)   ondansetron (ZOFRAN) injection 4 mg (4 mg IntraVENous Given 1/21/22 1305)   morphine injection 4 mg (4 mg IntraVENous Given 1/21/22 1305)   diphenhydrAMINE (BENADRYL) injection 25 mg (25 mg IntraVENous Given 1/21/22 1305)   dexamethasone (PF) (DECADRON) 10 mg/mL injection 10 mg (10 mg IntraVENous Given 1/21/22 1411)   morphine 10 mg/mL injection 6 mg (6 mg IntraVENous Given 1/21/22 1412)   diphenhydrAMINE (BENADRYL) injection 25 mg (25 mg IntraVENous Given 1/21/22 1411)       Differential diagnosis: Kidney stone, diverticulitis, small bowel obstruction, ulcerative colitis flare, dehydration, electrolyte abnormality, postop problem    ED physician interpretation of laboratory results: Patient's lab work without critical values requiring emergency medical intervention. No signs of infection, kidney dysfunction. MDM: Patient is a 63-year-old female with complex medical history presenting ED with 2 days of rectal pressure abdominal pain consistent with ulcerative colitis flare. Patient's pain improved with IV pain medications, nausea mild improvement in Zofran. Patient CT scan with general improvement from previous surgery findings. Unable to get private room to perform full rectal exam on this patient. However as CT scan was reassuring patient was comfortable going home and following up with her colorectal surgeon. Rectal exam offered but patient declined. Short course of narcotic pain medication and Phenergan prescribed for patient to get her through the acute pain phase. Patient instructed to follow-up with her colorectal surgeon as well as her GI doctor. No further emergency medical interventions indicated at this time. Key discharge instructions and summary of care: You presented to the ED with abdominal pain and nausea. Similar to previous ulcerative colitis flares. He also had some rash in your arms and a ulcer in the mouth. Most likely all this is a UC flare. IV pain and nausea medications were given in the ED with some improvement. CT scan showed no concerning findings. It showed improvement in postsurgical areas from fistula repair. Short course of narcotic pain medication as well as Phenergan was written for symptomatic management. Please follow-up with your GI doctor for further treatment evaluation. The patient has been re-evaluated and feeling better. Patient is stable for discharge.   All available radiology and laboratory results have been reviewed with patient and/or available family. Patient and/or family verbally conveyed their understanding and agreement of the patient's signs, symptoms, diagnosis, treatment and prognosis and additionally agree to follow-up as recommended in the discharge instructions or to return to the Emergency Department should their condition change or worsen prior to their follow-up appointment. All questions have been answered and patient and/or available family express understanding. IMPRESSION:  1. Abdominal pain, generalized    2. Ulcerative colitis without complications, unspecified location ([facility] [address]    3. Non-intractable vomiting with nausea, unspecified vomiting type        DISPOSITION:     Paul Urias.  Kali Murillo MD        Procedures

## 2022-01-21 NOTE — ED TRIAGE NOTES
Triage: Pt arrives from home concerned for an Ulcerative Colitis flare. She reports a month ago she had a repair of a rectal fistula. She reports pain in the rectum. She also reports abdominal pain, vomiting, and diarrhea. She reports this is a normal pattern with her UC flares.

## 2022-01-24 ENCOUNTER — HOSPITAL ENCOUNTER (EMERGENCY)
Age: 52
Discharge: HOME | End: 2022-01-24
Attending: EMERGENCY MEDICINE
Payer: MEDICAID

## 2022-01-24 VITALS
HEIGHT: 62 IN | OXYGEN SATURATION: 92 % | DIASTOLIC BLOOD PRESSURE: 80 MMHG | TEMPERATURE: 98.1 F | HEART RATE: 78 BPM | RESPIRATION RATE: 18 BRPM | BODY MASS INDEX: 37.73 KG/M2 | WEIGHT: 205.03 LBS | SYSTOLIC BLOOD PRESSURE: 123 MMHG

## 2022-01-24 DIAGNOSIS — K62.89 ANAL OR RECTAL PAIN: Primary | ICD-10-CM

## 2022-01-24 DIAGNOSIS — K62.5 RECTAL BLEEDING: ICD-10-CM

## 2022-01-24 LAB
ABO + RH BLD: NORMAL
ALBUMIN SERPL-MCNC: 4.2 G/DL (ref 3.5–5)
ALBUMIN/GLOB SERPL: 1.2 {RATIO} (ref 1.1–2.2)
ALP SERPL-CCNC: 69 U/L (ref 45–117)
ALT SERPL-CCNC: 45 U/L (ref 12–78)
ANION GAP SERPL CALC-SCNC: 8 MMOL/L (ref 5–15)
APTT PPP: 24.5 SEC (ref 22.1–31)
AST SERPL-CCNC: 30 U/L (ref 15–37)
BASOPHILS # BLD: 0 K/UL (ref 0–0.1)
BASOPHILS NFR BLD: 1 % (ref 0–1)
BILIRUB SERPL-MCNC: 0.7 MG/DL (ref 0.2–1)
BLOOD GROUP ANTIBODIES SERPL: NORMAL
BUN SERPL-MCNC: 8 MG/DL (ref 6–20)
BUN/CREAT SERPL: 11 (ref 12–20)
CALCIUM SERPL-MCNC: 9.4 MG/DL (ref 8.5–10.1)
CHLORIDE SERPL-SCNC: 104 MMOL/L (ref 97–108)
CO2 SERPL-SCNC: 25 MMOL/L (ref 21–32)
CREAT SERPL-MCNC: 0.74 MG/DL (ref 0.55–1.02)
DIFFERENTIAL METHOD BLD: ABNORMAL
EOSINOPHIL # BLD: 0.2 K/UL (ref 0–0.4)
EOSINOPHIL NFR BLD: 3 % (ref 0–7)
ERYTHROCYTE [DISTWIDTH] IN BLOOD BY AUTOMATED COUNT: 18.3 % (ref 11.5–14.5)
GLOBULIN SER CALC-MCNC: 3.4 G/DL (ref 2–4)
GLUCOSE SERPL-MCNC: 189 MG/DL (ref 65–100)
HCT VFR BLD AUTO: 40.2 % (ref 35–47)
HGB BLD-MCNC: 13.8 G/DL (ref 11.5–16)
IMM GRANULOCYTES # BLD AUTO: 0 K/UL (ref 0–0.04)
IMM GRANULOCYTES NFR BLD AUTO: 0 % (ref 0–0.5)
INR PPP: 1 (ref 0.9–1.1)
LYMPHOCYTES # BLD: 2.2 K/UL (ref 0.8–3.5)
LYMPHOCYTES NFR BLD: 32 % (ref 12–49)
MCH RBC QN AUTO: 29.1 PG (ref 26–34)
MCHC RBC AUTO-ENTMCNC: 34.3 G/DL (ref 30–36.5)
MCV RBC AUTO: 84.6 FL (ref 80–99)
MONOCYTES # BLD: 0.4 K/UL (ref 0–1)
MONOCYTES NFR BLD: 6 % (ref 5–13)
NEUTS SEG # BLD: 4 K/UL (ref 1.8–8)
NEUTS SEG NFR BLD: 58 % (ref 32–75)
NRBC # BLD: 0 K/UL (ref 0–0.01)
NRBC BLD-RTO: 0 PER 100 WBC
PLATELET # BLD AUTO: 172 K/UL (ref 150–400)
PMV BLD AUTO: 8.9 FL (ref 8.9–12.9)
POTASSIUM SERPL-SCNC: 3.3 MMOL/L (ref 3.5–5.1)
PROT SERPL-MCNC: 7.6 G/DL (ref 6.4–8.2)
PROTHROMBIN TIME: 10.3 SEC (ref 9–11.1)
RBC # BLD AUTO: 4.75 M/UL (ref 3.8–5.2)
SODIUM SERPL-SCNC: 137 MMOL/L (ref 136–145)
SPECIMEN EXP DATE BLD: NORMAL
THERAPEUTIC RANGE,PTTT: NORMAL SECS (ref 58–77)
WBC # BLD AUTO: 6.9 K/UL (ref 3.6–11)

## 2022-01-24 PROCEDURE — 85730 THROMBOPLASTIN TIME PARTIAL: CPT

## 2022-01-24 PROCEDURE — 74011250636 HC RX REV CODE- 250/636: Performed by: EMERGENCY MEDICINE

## 2022-01-24 PROCEDURE — 96374 THER/PROPH/DIAG INJ IV PUSH: CPT

## 2022-01-24 PROCEDURE — 80053 COMPREHEN METABOLIC PANEL: CPT

## 2022-01-24 PROCEDURE — 36415 COLL VENOUS BLD VENIPUNCTURE: CPT

## 2022-01-24 PROCEDURE — 86900 BLOOD TYPING SEROLOGIC ABO: CPT

## 2022-01-24 PROCEDURE — 96375 TX/PRO/DX INJ NEW DRUG ADDON: CPT

## 2022-01-24 PROCEDURE — 85025 COMPLETE CBC W/AUTO DIFF WBC: CPT

## 2022-01-24 PROCEDURE — 99283 EMERGENCY DEPT VISIT LOW MDM: CPT

## 2022-01-24 PROCEDURE — 85610 PROTHROMBIN TIME: CPT

## 2022-01-24 RX ORDER — MORPHINE SULFATE 2 MG/ML
4 INJECTION, SOLUTION INTRAMUSCULAR; INTRAVENOUS
Status: COMPLETED | OUTPATIENT
Start: 2022-01-24 | End: 2022-01-24

## 2022-01-24 RX ORDER — DIPHENHYDRAMINE HYDROCHLORIDE 50 MG/ML
25 INJECTION, SOLUTION INTRAMUSCULAR; INTRAVENOUS
Status: COMPLETED | OUTPATIENT
Start: 2022-01-24 | End: 2022-01-24

## 2022-01-24 RX ORDER — ATORVASTATIN CALCIUM 20 MG/1
TABLET, FILM COATED ORAL
Qty: 90 TABLET | Refills: 1 | Status: SHIPPED | OUTPATIENT
Start: 2022-01-24

## 2022-01-24 RX ORDER — HYDROMORPHONE HYDROCHLORIDE 2 MG/1
2 TABLET ORAL
Qty: 3 TABLET | Refills: 0 | Status: SHIPPED | OUTPATIENT
Start: 2022-01-24 | End: 2022-01-27

## 2022-01-24 RX ORDER — GLIMEPIRIDE 4 MG/1
TABLET ORAL
Qty: 90 TABLET | Refills: 1 | Status: SHIPPED | OUTPATIENT
Start: 2022-01-24 | End: 2022-08-16

## 2022-01-24 RX ORDER — PROMETHAZINE HYDROCHLORIDE 25 MG/1
25 TABLET ORAL
Qty: 12 TABLET | Refills: 0 | Status: SHIPPED | OUTPATIENT
Start: 2022-01-24 | End: 2022-09-16

## 2022-01-24 RX ORDER — ALBUTEROL SULFATE 90 UG/1
AEROSOL, METERED RESPIRATORY (INHALATION)
Qty: 18 EACH | Refills: 1 | Status: SHIPPED | OUTPATIENT
Start: 2022-01-24 | End: 2022-07-15

## 2022-01-24 RX ORDER — PROCHLORPERAZINE EDISYLATE 5 MG/ML
5 INJECTION INTRAMUSCULAR; INTRAVENOUS
Status: COMPLETED | OUTPATIENT
Start: 2022-01-24 | End: 2022-01-24

## 2022-01-24 RX ORDER — HYDROMORPHONE HYDROCHLORIDE 2 MG/1
2 TABLET ORAL
Qty: 3 TABLET | Refills: 0 | Status: SHIPPED | OUTPATIENT
Start: 2022-01-24 | End: 2022-01-24 | Stop reason: SDUPTHER

## 2022-01-24 RX ADMIN — DIPHENHYDRAMINE HYDROCHLORIDE 25 MG: 50 INJECTION, SOLUTION INTRAMUSCULAR; INTRAVENOUS at 03:43

## 2022-01-24 RX ADMIN — MORPHINE SULFATE 4 MG: 2 INJECTION, SOLUTION INTRAMUSCULAR; INTRAVENOUS at 03:43

## 2022-01-24 RX ADMIN — PROCHLORPERAZINE EDISYLATE 5 MG: 5 INJECTION INTRAMUSCULAR; INTRAVENOUS at 04:41

## 2022-01-24 NOTE — Clinical Note
[facility] EMERGENCY DEPT  [address]  [place] [id number] [phone]    Work/School Note    Date: 1/24/2022    To Whom It May concern:    Bindu Arias was seen and treated today in the emergency room by the following provider(s):  Attending Provider: Fabiana Yao MD.      Bindu Arias is excused from work/school on 01/24/22 and 01/25/22. She is medically clear to return to work/school on 1/26/2022.        Sincerely,          Ramon Burnette MD

## 2022-01-24 NOTE — ED PROVIDER NOTES
EMERGENCY DEPARTMENT HISTORY AND PHYSICAL EXAM      Date: 1/24/2022  Patient Name: Socorro Albert    History of Presenting Illness     Chief Complaint   Patient presents with    Rectal Bleeding     ED visit d/t rectal bleeding - hx of fistula repair at [facility], 2 months ago - hx of UC with periods of steriods use - hx abd cancer, dxed nearly 2 wks leading to appendix removed 1.5 months - Denies fevers / N / V / D;;       History Provided By: Patient    HPI: Socorro Albert, 46 y.o. 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 She was seen at [facility] on January 21 and had abdominal pelvis CT which did not show any perirectal abscess or fluid collection. She was instructed to follow-up with Drea Garcia. States that she was given a prescription for hydrocodone but did not use it because she is allergic to it. She reports that tonight she did a sitz bath and noticed some bleeding. She was worried that her colovaginal fistula had recurred. She is not currently on antibiotics and just weaned off of steroids. She reports several episodes of nausea and vomiting today. Denies any dysuria, hematuria, urinary frequency. PCP: Dario Zuniga MD    No current facility-administered medications on file prior to encounter. Current Outpatient Medications on File Prior to Encounter   Medication Sig Dispense Refill    HYDROcodone-acetaminophen (Norco) 5-325 mg per tablet Take 1 Tablet by mouth every six (6) hours as needed for Pain for up to 3 days. Max Daily Amount: 4 Tablets.  12 Tablet 0    promethazine (PHENERGAN) 25 mg tablet Take 1 Tablet by mouth every six (6) hours as needed for Nausea. 12 Tablet 0    ALPRAZolam (XANAX) 1 mg tablet TAKE 1/2 - 1 TABLET BY MOUTH TWICE DAILY AS NEEDED FOR ANXIETY *MAX 2 TABS DAILY* 60 Tablet 0    ondansetron hcl (Zofran) 4 mg tablet Take 1 Tablet by mouth every eight (8) hours as needed for Nausea. 20 Tablet 0    naloxone (Narcan) 4 mg/actuation nasal spray Use 1 spray intranasally, then discard. Repeat with new spray every 2 min as needed for opioid overdose symptoms, alternating nostrils. 1 Each 0    gabapentin (NEURONTIN) 300 mg capsule TAKE 1 CAP BY MOUTH NIGHTLY. MAX DAILY AMOUNT: 300 MG. 30 Capsule 0    losartan-hydroCHLOROthiazide (HYZAAR) 100-12.5 mg per tablet TAKE 1 TABLET BY MOUTH EVERY DAY 90 Tablet 1    promethazine (PHENERGAN) 25 mg tablet Take 1 Tablet by mouth every six (6) hours as needed for Nausea. 20 Tablet 0    dibucaine (NUPERCAINAL) 1 % rectal ointment by PeriANAL route every four (4) hours as needed for Pain. 28 g 0    valACYclovir (VALTREX) 1 gram tablet TAKE 1 TABLET BY MOUTH THREE TIMES A DAY 21 Tablet 0    sertraline (ZOLOFT) 100 mg tablet TAKE 2 TABLETS BY MOUTH NIGHTLY 60 Tablet 0    sertraline (ZOLOFT) 100 mg tablet Take 2 Tablets by mouth nightly. 60 Tablet 2    atorvastatin (LIPITOR) 20 mg tablet Take 1 Tablet by mouth daily. 90 Tablet 1    dicyclomine (BENTYL) 20 mg tablet Take 1 Tablet by mouth every six (6) hours as needed for Abdominal Cramps. 20 Tablet 0    diphenoxylate-atropine (LomotiL) 2.5-0.025 mg per tablet Take 1 Tablet by mouth four (4) times daily as needed for Diarrhea (1 tab after each stool for max 8 per day). Max Daily Amount: 4 Tablets. Take after each stool for a maximum of 8 tablets daily 20 Tablet 0    hyoscyamine SL (LEVSIN/SL) 0.125 mg SL tablet 1 Tablet by SubLINGual route every four (4) hours as needed for Cramping. 10 Tablet 0    ondansetron (Zofran ODT) 4 mg disintegrating tablet Take 1 Tablet by mouth every eight (8) hours as needed for Nausea.  12 Tablet 1    empagliflozin (Jardiance) 25 mg tablet Take 1 Tablet by mouth daily. 90 Tablet 1    glimepiride (AMARYL) 4 mg tablet TAKE 1 TABLET BY MOUTH ONCE DAILY BEFORE BREAKFAST 90 Tablet 1    estradioL (ESTRACE) 0.5 mg tablet TAKE 1 TABLET BY MOUTH ONCE DAILY 90 Tablet 1    Ventolin HFA 90 mcg/actuation inhaler TAKE 1 PUFF BY INHALATION EVERY FOUR (4) HOURS AS NEEDED FOR WHEEZING. 18 Inhaler 1    lidocaine (Lidoderm) 5 % Apply patch to the affected area for 12 hours a day and remove for 12 hours a day. 5 Each 0    omeprazole (PRILOSEC) 20 mg capsule Take 40 mg by mouth Daily (before breakfast).  EPINEPHrine (EPIPEN) 0.3 mg/0.3 mL (1:1,000) injection 0.3 mL by IntraMUSCular route once as needed for up to 1 dose.  0.3 mL 1       Past History     Past Medical History:  Past Medical History:   Diagnosis Date    Anal fissure     Anisocoria     Asthma     LAST EPISODE     Back pain     Cerumen impaction     Chronic kidney disease     hx uti in past    Coagulation defects     ocassional rectal bleeding due to anal fissure    Colovaginal fistula     Diabetes (HCC)     NIDDM    Diverticulitis     Diverticulosis     Enlarged tonsils     Frequent UTI     GERD (gastroesophageal reflux disease)     H/O endoscopy     with dilation    HA (headache)     Hepatic steatosis     History of colon resection     Hx of colonoscopy with polypectomy     benign    Hypertension     Ill-defined condition     FREQUENT HIVES    Ill-defined condition     HX ELEVATED LIVER ENZYMES    Morbid obesity (HCC)     Nausea & vomiting     during diverticulitis flare    Obesity     Otitis media     Pneumonia     about 15 yrs ago    Psychiatric disorder     ANXIETY    Recurrent tonsillitis     Sinusitis     Transfusion history ~ age 35    postop hysterectomy    Urticaria        Past Surgical History:  Past Surgical History:   Procedure Laterality Date    COLONOSCOPY N/A 3/28/2019    COLONOSCOPY performed by Harvie Dandy, Susanna Irvin MD at [facility] COLONOSCOPY N/A 10/2/2020    COLONOSCOPY performed by Toni Ward MD at [facility]    [place].  HX GI  12    LAPAROSCOPIC HAND ASSISTED  POSS OPEN SIGMOID COLECTOMY POSS TEMPORARY DIVERTING LOOP ILEOSTOMY;  (no illeostomy needed)    HX GYN           HX GYN      cervical conization    HX HEENT      SINUS SURGERY LEFT X2    HX HEENT      SINUS SURGERY ON RIGHT X2    HX HEMORRHOIDECTOMY      HX OTHER SURGICAL      Sphincterotomy    HX PELVIC LAPAROSCOPY      HX EMMANUEL AND BSO      HX UROLOGICAL  12     CYSTOSCOPY INSERTION URETERAL CATHETERS - Cystoscopy Insertion of bilateral ureteral stents    TX ABDOMEN SURGERY PROC UNLISTED  2018    [facility] at [facility]       Family History:  Family History   Problem Relation Age of Onset    Diabetes Mother     Cancer Mother         NON-HODGKINS LYMPHOMA    Anesth Problems Mother         PONV    Diabetes Father     Heart Disease Father         CAD - STENTS, PACEMAKER    Arrhythmia Father        Social History:  Social History     Tobacco Use    Smoking status: Never Smoker    Smokeless tobacco: Never Used   Vaping Use    Vaping Use: Not on file   Substance Use Topics    Alcohol use: Yes     Comment: Rarely    Drug use: No     Types: Prescription, OTC       Allergies: Allergies   Allergen Reactions    Aspirin Hives, Shortness of Breath and Itching    Codeine Hives, Itching and Angioedema     Pt had itching in mouth, on face and lips    Contrast Agent [Iodine] Hives, Itching and Angioedema     21: pt was given benadryl and solu-medrol prior to administration but pt had severe tongue swelling and drooling, lethargy and itching.  DO NOT GIVE PT    Flavoring Agent Anaphylaxis     Cherry flavor    Iodinated Contrast Media Anaphylaxis, Diarrhea, Hives, Nausea and Vomiting and Shortness of Breath    Percocet [Oxycodone-Acetaminophen] Hives, Itching and Angioedema     Pt had itching in mouth, on face and lips    Prilosec [Omeprazole Magnesium] Anaphylaxis     CHERRY FLAVORED ODT; PT TAKES REGULAR PRILOSEC AND IS OK    Dilaudid [Hydromorphone] Itching     Tolerates with benadryl    Ketorolac Rash     \"makes my eyes spasm and causes rash on my hands\" and \"makes my skin itch and makes me nervous\"    Morphine (Pf) Rash     According to rn, pt can take morphine with benadryl    Fentanyl Rash and Itching     Has had benadryl before         Review of Systems   Review of Systems   Constitutional: Negative for chills and fever. HENT: Negative for congestion, ear pain, rhinorrhea and sore throat. Respiratory: Negative for cough, chest tightness, shortness of breath and wheezing. Cardiovascular: Negative for palpitations. Gastrointestinal: Positive for abdominal pain, anal bleeding, nausea, rectal pain and vomiting. Genitourinary: Negative for dysuria, flank pain and hematuria. Musculoskeletal: Negative for back pain and neck pain. Skin: Negative for rash and wound. Neurological: Negative for dizziness, syncope, light-headedness and headaches. Psychiatric/Behavioral: Negative for confusion. The patient is nervous/anxious. All other systems reviewed and are negative.         Physical Exam    General appearance -overweight, well appearing, and in no distress  Eyes - pupils equal and reactive, extraocular eye movements intact  ENT - mucous membranes moist, pharynx normal without lesions  Neck - supple, no significant adenopathy; non-tender to palpation  Chest - clear to auscultation, no wheezes, rales or rhonchi; non-tender to palpation  Heart - normal rate and regular rhythm, S1 and S2 normal, no murmurs noted  Abdomen - soft, nontender, nondistended, no masses or organomegaly  Rectal-there is small amount of bleeding and a small fissure-like split in the perirectal skin on the left, no palpable mass, no purulent drainage  Musculoskeletal - no joint tenderness, deformity or swelling; normal ROM  Extremities - peripheral pulses normal, no pedal edema  Skin - normal coloration and turgor, no rashes  Neurological - alert, oriented x3, normal speech, no focal findings or movement disorder noted    Diagnostic Study Results     Labs -     Recent Results (from the past 12 hour(s))   CBC WITH AUTOMATED DIFF    Collection Time: 01/24/22  1:59 AM   Result Value Ref Range    WBC 6.9 3.6 - 11.0 K/uL    RBC 4.75 3.80 - 5.20 M/uL    HGB 13.8 11.5 - 16.0 g/dL    HCT 40.2 35.0 - 47.0 %    MCV 84.6 80.0 - 99.0 FL    MCH 29.1 26.0 - 34.0 PG    MCHC 34.3 30.0 - 36.5 g/dL    RDW 18.3 (H) 11.5 - 14.5 %    PLATELET 997 007 - 337 K/uL    MPV 8.9 8.9 - 12.9 FL    NRBC 0.0 0  WBC    ABSOLUTE NRBC 0.00 0.00 - 0.01 K/uL    NEUTROPHILS 58 32 - 75 %    LYMPHOCYTES 32 12 - 49 %    MONOCYTES 6 5 - 13 %    EOSINOPHILS 3 0 - 7 %    BASOPHILS 1 0 - 1 %    IMMATURE GRANULOCYTES 0 0.0 - 0.5 %    ABS. NEUTROPHILS 4.0 1.8 - 8.0 K/UL    ABS. LYMPHOCYTES 2.2 0.8 - 3.5 K/UL    ABS. MONOCYTES 0.4 0.0 - 1.0 K/UL    ABS. EOSINOPHILS 0.2 0.0 - 0.4 K/UL    ABS. BASOPHILS 0.0 0.0 - 0.1 K/UL    ABS. IMM. GRANS. 0.0 0.00 - 0.04 K/UL    DF AUTOMATED     METABOLIC PANEL, COMPREHENSIVE    Collection Time: 01/24/22  1:59 AM   Result Value Ref Range    Sodium 137 136 - 145 mmol/L    Potassium 3.3 (L) 3.5 - 5.1 mmol/L    Chloride 104 97 - 108 mmol/L    CO2 25 21 - 32 mmol/L    Anion gap 8 5 - 15 mmol/L    Glucose 189 (H) 65 - 100 mg/dL    BUN 8 6 - 20 MG/DL    Creatinine 0.74 0.55 - 1.02 MG/DL    BUN/Creatinine ratio 11 (L) 12 - 20      GFR est AA >60 >60 ml/min/1.73m2    GFR est non-AA >60 >60 ml/min/1.73m2    Calcium 9.4 8.5 - 10.1 MG/DL    Bilirubin, total 0.7 0.2 - 1.0 MG/DL    ALT (SGPT) 45 12 - 78 U/L    AST (SGOT) 30 15 - 37 U/L    Alk.  phosphatase 69 45 - 117 U/L    Protein, total 7.6 6.4 - 8.2 g/dL    Albumin 4.2 3.5 - 5.0 g/dL    Globulin 3.4 2.0 - 4.0 g/dL    A-G Ratio 1.2 1.1 - 2.2     TYPE & SCREEN Collection Time: 01/24/22  1:59 AM   Result Value Ref Range    Crossmatch Expiration 01/27/2022,2359     ABO/Rh(D) A NEGATIVE     Antibody screen NEG    PTT    Collection Time: 01/24/22  1:59 AM   Result Value Ref Range    aPTT 24.5 22.1 - 31.0 sec    aPTT, therapeutic range     58.0 - 77.0 SECS   PROTHROMBIN TIME + INR    Collection Time: 01/24/22  1:59 AM   Result Value Ref Range    INR 1.0 0.9 - 1.1      Prothrombin time 10.3 9.0 - 11.1 sec       Radiologic Studies -   No orders to display     CT Results  (Last 48 hours)    None        CXR Results  (Last 48 hours)    None            Medical Decision Making   I am the first provider for this patient. I reviewed the vital signs, available nursing notes, past medical history, past surgical history, family history and social history. Vital Signs-Reviewed the patient's vital signs. Patient Vitals for the past 12 hrs:   Temp Pulse Resp BP SpO2   01/24/22 0149 98 °F (36.7 °C) (!) 112 18 (!) 144/91 97 %           Records Reviewed: Nursing Notes and Old Medical Records    Provider Notes (Medical Decision Making):   Patient with perirectal wound/fissure which is bleeding slightly. No purulent drainage. Patient with extensive history of colorectal surgery. Has an appointment with Emy Otero later this week. CT from January 21 showed no evidence of fluid collection or abscess. Discussed with patient that she needs to continue sitz baths and follow-up with Emy Otero. Will prescribe short course of analgesics to help with symptoms, but instructed patient to use MiraLAX and stool softeners to prevent constipation. Call colorectal surgeon or return to ED for worsening symptoms or fever. ED Course:   Initial assessment performed. The patients presenting problems have been discussed, and they are in agreement with the care plan formulated and outlined with them. I have encouraged them to ask questions as they arise throughout their visit. Disposition:  Discharge home    PLAN:  1. Current Discharge Medication List      START taking these medications    Details   HYDROmorphone (Dilaudid) 2 mg tablet Take 1 Tablet by mouth every six (6) hours as needed for Pain for up to 3 days. Max Daily Amount: 8 mg. Indications: excessive pain  Qty: 3 Tablet, Refills: 0  Start date: 1/24/2022, End date: 1/27/2022    Associated Diagnoses: Anal or rectal pain           2. Follow-up Information     Follow up With Specialties Details Why Contact Duglas Mistry MD Internal Medicine  If symptoms worsen [address] [phone]      \[facility]\"" EMERGENCY DEPT Emergency Medicine   [address] [place] [address] Drive  [address] [facility]  [phone]    Irina Aaron MD Colon and Rectal Surgery Call today  [address] [facility]  [address]  [phone]          Return to ED if worse     Diagnosis     Clinical Impression:   1. Anal or rectal pain    2.  Rectal bleeding

## 2022-01-25 RX ORDER — SERTRALINE HYDROCHLORIDE 100 MG/1
TABLET, FILM COATED ORAL
Qty: 60 TABLET | Refills: 2 | Status: ON HOLD | OUTPATIENT
Start: 2022-01-25 | End: 2022-02-10

## 2022-01-25 RX ORDER — ESTRADIOL 0.5 MG/1
TABLET ORAL
Qty: 90 TABLET | Refills: 1 | Status: SHIPPED | OUTPATIENT
Start: 2022-01-25 | End: 2022-09-16 | Stop reason: SDUPTHER

## 2022-02-07 ENCOUNTER — TRANSCRIBE ORDER (OUTPATIENT)
Dept: REGISTRATION | Age: 52
End: 2022-02-07

## 2022-02-07 ENCOUNTER — HOSPITAL ENCOUNTER (OUTPATIENT)
Dept: LAB | Age: 52
Discharge: HOME | End: 2022-02-07
Payer: MEDICAID

## 2022-02-07 DIAGNOSIS — Z01.812 PRE-PROCEDURAL LABORATORY EXAMINATIONS: ICD-10-CM

## 2022-02-07 DIAGNOSIS — Z01.812 PRE-PROCEDURAL LABORATORY EXAMINATIONS: Primary | ICD-10-CM

## 2022-02-07 PROCEDURE — U0005 INFEC AGEN DETEC AMPLI PROBE: HCPCS

## 2022-02-08 LAB
SARS-COV-2, XPLCVT: NOT DETECTED
SOURCE, COVRS: NORMAL

## 2022-02-10 ENCOUNTER — HOSPITAL ENCOUNTER (OUTPATIENT)
Age: 52
Setting detail: OUTPATIENT SURGERY
Discharge: HOME | End: 2022-02-10
Attending: INTERNAL MEDICINE | Admitting: INTERNAL MEDICINE
Payer: MEDICAID

## 2022-02-10 ENCOUNTER — ANESTHESIA (OUTPATIENT)
Dept: ENDOSCOPY | Age: 52
End: 2022-02-10
Payer: MEDICAID

## 2022-02-10 ENCOUNTER — ANESTHESIA EVENT (OUTPATIENT)
Dept: ENDOSCOPY | Age: 52
End: 2022-02-10
Payer: MEDICAID

## 2022-02-10 VITALS
HEART RATE: 80 BPM | OXYGEN SATURATION: 95 % | DIASTOLIC BLOOD PRESSURE: 66 MMHG | TEMPERATURE: 98 F | WEIGHT: 201.72 LBS | HEIGHT: 62 IN | RESPIRATION RATE: 17 BRPM | SYSTOLIC BLOOD PRESSURE: 115 MMHG | BODY MASS INDEX: 37.12 KG/M2

## 2022-02-10 PROCEDURE — 74011250636 HC RX REV CODE- 250/636: Performed by: NURSE ANESTHETIST, CERTIFIED REGISTERED

## 2022-02-10 PROCEDURE — 2709999900 HC NON-CHARGEABLE SUPPLY: Performed by: INTERNAL MEDICINE

## 2022-02-10 PROCEDURE — 76060000031 HC ANESTHESIA FIRST 0.5 HR: Performed by: INTERNAL MEDICINE

## 2022-02-10 PROCEDURE — 74011250636 HC RX REV CODE- 250/636: Performed by: ANESTHESIOLOGY

## 2022-02-10 PROCEDURE — 77030021593 HC FCPS BIOP ENDOSC BSC -A: Performed by: INTERNAL MEDICINE

## 2022-02-10 PROCEDURE — 76040000019: Performed by: INTERNAL MEDICINE

## 2022-02-10 PROCEDURE — 74011000250 HC RX REV CODE- 250: Performed by: NURSE ANESTHETIST, CERTIFIED REGISTERED

## 2022-02-10 PROCEDURE — 77030018712 HC DEV BLLN INFL BSC -B: Performed by: INTERNAL MEDICINE

## 2022-02-10 PROCEDURE — 88305 TISSUE EXAM BY PATHOLOGIST: CPT

## 2022-02-10 PROCEDURE — C1726 CATH, BAL DIL, NON-VASCULAR: HCPCS | Performed by: INTERNAL MEDICINE

## 2022-02-10 RX ORDER — DEXTROMETHORPHAN/PSEUDOEPHED 2.5-7.5/.8
1.2 DROPS ORAL
Status: DISCONTINUED | OUTPATIENT
Start: 2022-02-10 | End: 2022-02-10 | Stop reason: HOSPADM

## 2022-02-10 RX ORDER — SODIUM CHLORIDE 9 MG/ML
INJECTION, SOLUTION INTRAVENOUS
Status: DISCONTINUED | OUTPATIENT
Start: 2022-02-10 | End: 2022-02-10 | Stop reason: HOSPADM

## 2022-02-10 RX ORDER — NALOXONE HYDROCHLORIDE 0.4 MG/ML
0.4 INJECTION, SOLUTION INTRAMUSCULAR; INTRAVENOUS; SUBCUTANEOUS
Status: DISCONTINUED | OUTPATIENT
Start: 2022-02-10 | End: 2022-02-10 | Stop reason: HOSPADM

## 2022-02-10 RX ORDER — SODIUM CHLORIDE 9 MG/ML
50 INJECTION, SOLUTION INTRAVENOUS CONTINUOUS
Status: DISCONTINUED | OUTPATIENT
Start: 2022-02-10 | End: 2022-02-10 | Stop reason: HOSPADM

## 2022-02-10 RX ORDER — LIDOCAINE HYDROCHLORIDE 20 MG/ML
INJECTION, SOLUTION EPIDURAL; INFILTRATION; INTRACAUDAL; PERINEURAL AS NEEDED
Status: DISCONTINUED | OUTPATIENT
Start: 2022-02-10 | End: 2022-02-10 | Stop reason: HOSPADM

## 2022-02-10 RX ORDER — MIDAZOLAM HYDROCHLORIDE 1 MG/ML
.25-5 INJECTION, SOLUTION INTRAMUSCULAR; INTRAVENOUS
Status: DISCONTINUED | OUTPATIENT
Start: 2022-02-10 | End: 2022-02-10 | Stop reason: HOSPADM

## 2022-02-10 RX ORDER — DIPHENHYDRAMINE HYDROCHLORIDE 50 MG/ML
25 INJECTION, SOLUTION INTRAMUSCULAR; INTRAVENOUS ONCE
Status: COMPLETED | OUTPATIENT
Start: 2022-02-10 | End: 2022-02-10

## 2022-02-10 RX ORDER — PROPOFOL 10 MG/ML
INJECTION, EMULSION INTRAVENOUS AS NEEDED
Status: DISCONTINUED | OUTPATIENT
Start: 2022-02-10 | End: 2022-02-10 | Stop reason: HOSPADM

## 2022-02-10 RX ORDER — EPINEPHRINE 0.1 MG/ML
1 INJECTION INTRACARDIAC; INTRAVENOUS
Status: DISCONTINUED | OUTPATIENT
Start: 2022-02-10 | End: 2022-02-10 | Stop reason: HOSPADM

## 2022-02-10 RX ORDER — PROPOFOL 10 MG/ML
INJECTION, EMULSION INTRAVENOUS
Status: DISCONTINUED | OUTPATIENT
Start: 2022-02-10 | End: 2022-02-10 | Stop reason: HOSPADM

## 2022-02-10 RX ORDER — MELATONIN 5 MG
CAPSULE ORAL
COMMUNITY

## 2022-02-10 RX ORDER — ATROPINE SULFATE 0.1 MG/ML
0.4 INJECTION INTRAVENOUS
Status: DISCONTINUED | OUTPATIENT
Start: 2022-02-10 | End: 2022-02-10 | Stop reason: HOSPADM

## 2022-02-10 RX ORDER — FLUMAZENIL 0.1 MG/ML
0.2 INJECTION INTRAVENOUS
Status: DISCONTINUED | OUTPATIENT
Start: 2022-02-10 | End: 2022-02-10 | Stop reason: HOSPADM

## 2022-02-10 RX ADMIN — PROPOFOL 300 MCG/KG/MIN: 10 INJECTION, EMULSION INTRAVENOUS at 11:17

## 2022-02-10 RX ADMIN — LIDOCAINE HYDROCHLORIDE 80 MG: 20 INJECTION, SOLUTION INTRAVENOUS at 11:16

## 2022-02-10 RX ADMIN — DIPHENHYDRAMINE HYDROCHLORIDE 25 MG: 50 INJECTION, SOLUTION INTRAMUSCULAR; INTRAVENOUS at 09:58

## 2022-02-10 RX ADMIN — SODIUM CHLORIDE: 9 INJECTION, SOLUTION INTRAVENOUS at 11:13

## 2022-02-10 RX ADMIN — PROPOFOL INJECTABLE EMULSION 75 MG: 10 INJECTION, EMULSION INTRAVENOUS at 11:16

## 2022-02-10 NOTE — DISCHARGE INSTRUCTIONS
Althea Guy M.D.  [phone]           2/10/2022  Shai Juarez  :  1970  [facility] Medical Record Number:  [id number]        ENDOSCOPY FINDINGS:   Your endoscopy showed normal exam.      EGD DISCHARGE INSTRUCTIONS    DISCOMFORT:  Sore throat- throat lozenges or warm salt water gargle  redness at IV site- apply warm compress to area; if redness or soreness persist- contact your physician  Gaseous discomfort- walking, belching will help relieve any discomfort  You may not operate a vehicle for 12 hours  You may not engage in an occupation involving machinery or appliances for rest of today  You may not drink alcoholic beverages for at least 12 hours  Avoid making any critical decisions for at least 24 hour    DIET:   You may resume your regular diet. ACTIVITY  Spend the remainder of the day resting -  avoid any strenuous activity. Avoid driving or operating machinery. CALL M.D. ANY SIGN OF   Increasing pain, nausea, vomiting  Abdominal distension (swelling)  New increased bleeding (oral or rectal)  Fever (chills)  Pain in chest area  Bloody discharge from nose or mouth  Shortness of breath    Follow-up Instructions:   Call Dr. Angélica Negro for any questions or problems. Telephone # [phone]  If biopsies were obtained, the results will be available  in  5 to 7 days. We will call you to notify you of these results. Continue same medications. Follow up in the office.

## 2022-02-10 NOTE — PERIOP NOTES
CRE balloon dilatation of the esophagus   15 mm Balloon inflated to 3 ATMs and held for 10 seconds. 16.5 mm Balloon inflated to 4.5 ATMs and held for 10 seconds. 18 mm Balloon inflated to 7 ATMs and held for 20 seconds. No subcutaneous crepitus of the chest or cervical region was noted post dilatation.

## 2022-02-10 NOTE — H&P
Navjot Cooper M.D.  [phone]            History and Physical       NAME:  Shabana Ingram   :   1970   MRN:   [id number]       Referring Physician:  Lili Bartlett MD      Consult Date: 2/10/2022 9:19 AM    Chief Complaint:  dysphagia    History of Present Illness:  Patient is a 46 y.o. who is seen for dysphagia. Denies any ongoing GI complaints. PMH:  Past Medical History:   Diagnosis Date    Anal fissure     Anisocoria     Asthma     LAST EPISODE     Back pain     Cerumen impaction     Chronic kidney disease     hx uti in past    Coagulation defects     ocassional rectal bleeding due to anal fissure    Colovaginal fistula     Diabetes (HCC)     NIDDM    Diverticulitis     Diverticulosis     Enlarged tonsils     Frequent UTI     GERD (gastroesophageal reflux disease)     H/O endoscopy     with dilation    HA (headache)     Hepatic steatosis     History of colon resection     Hx of colonoscopy with polypectomy     benign    Hypertension     Ill-defined condition     FREQUENT HIVES    Ill-defined condition     HX ELEVATED LIVER ENZYMES    Morbid obesity (HCC)     Nausea & vomiting     during diverticulitis flare    Obesity     Otitis media     Pneumonia     about 15 yrs ago    Psychiatric disorder     ANXIETY    Recurrent tonsillitis     Sinusitis     Transfusion history ~ age 35    postop hysterectomy    Urticaria        PSH:  Past Surgical History:   Procedure Laterality Date    COLONOSCOPY N/A 3/28/2019    COLONOSCOPY performed by Umang Payne MD at [facility] COLONOSCOPY N/A 10/2/2020    COLONOSCOPY performed by Umang Payne MD at [address] [place] [address]    blake.     HX GI  12    LAPAROSCOPIC HAND ASSISTED  POSS OPEN SIGMOID COLECTOMY POSS TEMPORARY DIVERTING LOOP ILEOSTOMY;  (no illeostomy needed)    HX GYN           HX GYN      cervical conization    HX HEENT SINUS SURGERY LEFT X2    HX HEENT      SINUS SURGERY ON RIGHT X2    HX HEMORRHOIDECTOMY      HX OTHER SURGICAL      Sphincterotomy    HX PELVIC LAPAROSCOPY      HX EMMANUEL AND BSO      HX UROLOGICAL  12     CYSTOSCOPY INSERTION URETERAL CATHETERS - Cystoscopy Insertion of bilateral ureteral stents    PA ABDOMEN SURGERY PROC UNLISTED  2018    hernia repair at [facility]       Allergies: Allergies   Allergen Reactions    Aspirin Hives, Shortness of Breath and Itching    Codeine Hives, Itching and Angioedema     Pt had itching in mouth, on face and lips    Contrast Agent [Iodine] Hives, Itching and Angioedema     21: pt was given benadryl and solu-medrol prior to administration but pt had severe tongue swelling and drooling, lethargy and itching. DO NOT GIVE PT    Flavoring Agent Anaphylaxis     Cherry flavor    Iodinated Contrast Media Anaphylaxis, Diarrhea, Hives, Nausea and Vomiting and Shortness of Breath    Percocet [Oxycodone-Acetaminophen] Hives, Itching and Angioedema     Pt had itching in mouth, on face and lips    Prilosec [Omeprazole Magnesium] Anaphylaxis     CHERRY FLAVORED ODT; PT TAKES REGULAR PRILOSEC AND IS OK    Dilaudid [Hydromorphone] Itching     Tolerates with benadryl    Ketorolac Rash     \"makes my eyes spasm and causes rash on my hands\" and \"makes my skin itch and makes me nervous\"    Morphine (Pf) Rash     According to rn, pt can take morphine with benadryl    Fentanyl Rash and Itching     Has had benadryl before       Home Medications:  Prior to Admission Medications   Prescriptions Last Dose Informant Patient Reported? Taking? ALPRAZolam (XANAX) 1 mg tablet   No No   Sig: TAKE 1/2 - 1 TABLET BY MOUTH TWICE DAILY AS NEEDED FOR ANXIETY *MAX 2 TABS DAILY*   EPINEPHrine (EPIPEN) 0.3 mg/0.3 mL (1:1,000) injection  Self No No   Si.3 mL by IntraMUSCular route once as needed for up to 1 dose.    Ventolin HFA 90 mcg/actuation inhaler   No No   Sig: TAKE 1 PUFF BY INHALATION EVERY FOUR (4) HOURS AS NEEDED FOR WHEEZING. atorvastatin (LIPITOR) 20 mg tablet   No No   Sig: TAKE 1 TABLET BY MOUTH EVERY DAY   dibucaine (NUPERCAINAL) 1 % rectal ointment   No No   Sig: by PeriANAL route every four (4) hours as needed for Pain. dicyclomine (BENTYL) 20 mg tablet   No No   Sig: Take 1 Tablet by mouth every six (6) hours as needed for Abdominal Cramps. diphenoxylate-atropine (LomotiL) 2.5-0.025 mg per tablet   No No   Sig: Take 1 Tablet by mouth four (4) times daily as needed for Diarrhea (1 tab after each stool for max 8 per day). Max Daily Amount: 4 Tablets. Take after each stool for a maximum of 8 tablets daily   empagliflozin (Jardiance) 25 mg tablet   No No   Sig: Take 1 Tablet by mouth daily. estradioL (ESTRACE) 0.5 mg tablet   No No   Sig: TAKE 1 TABLET BY MOUTH EVERY DAY   gabapentin (NEURONTIN) 300 mg capsule   No No   Sig: TAKE 1 CAP BY MOUTH NIGHTLY. MAX DAILY AMOUNT: 300 MG. glimepiride (AMARYL) 4 mg tablet   No No   Sig: TAKE 1 TABLET BY MOUTH ONCE DAILY BEFORE BREAKFAST   hyoscyamine SL (LEVSIN/SL) 0.125 mg SL tablet   No No   Si Tablet by SubLINGual route every four (4) hours as needed for Cramping.   lidocaine (Lidoderm) 5 %   No No   Sig: Apply patch to the affected area for 12 hours a day and remove for 12 hours a day. losartan-hydroCHLOROthiazide (HYZAAR) 100-12.5 mg per tablet   No No   Sig: TAKE 1 TABLET BY MOUTH EVERY DAY   naloxone (Narcan) 4 mg/actuation nasal spray   No No   Sig: Use 1 spray intranasally, then discard. Repeat with new spray every 2 min as needed for opioid overdose symptoms, alternating nostrils. omeprazole (PRILOSEC) 20 mg capsule   Yes No   Sig: Take 40 mg by mouth Daily (before breakfast). ondansetron (Zofran ODT) 4 mg disintegrating tablet   No No   Sig: Take 1 Tablet by mouth every eight (8) hours as needed for Nausea.    ondansetron hcl (Zofran) 4 mg tablet   No No   Sig: Take 1 Tablet by mouth every eight (8) hours as needed for Nausea. promethazine (PHENERGAN) 25 mg tablet   No No   Sig: Take 1 Tablet by mouth every six (6) hours as needed for Nausea. sertraline (ZOLOFT) 100 mg tablet   No No   Sig: TAKE 2 TABLETS BY MOUTH NIGHTLY   sertraline (ZOLOFT) 100 mg tablet   No No   Sig: TAKE 2 TABLETS BY MOUTH EVERY DAY AT NIGHT   valACYclovir (VALTREX) 1 gram tablet   No No   Sig: TAKE 1 TABLET BY MOUTH THREE TIMES A DAY      Facility-Administered Medications: None       Hospital Medications:  No current facility-administered medications for this encounter. Social History:  Social History     Tobacco Use    Smoking status: Never Smoker    Smokeless tobacco: Never Used   Substance Use Topics    Alcohol use: Yes     Comment: Rarely       Family History:  Family History   Problem Relation Age of Onset    Diabetes Mother     Cancer Mother         NON-HODGKINS LYMPHOMA    Anesth Problems Mother         PONV    Diabetes Father     Heart Disease Father         CAD - STENTS, PACEMAKER    Arrhythmia Father              Review of Systems:      Constitutional: negative fever, negative chills, negative weight loss  Eyes:   negative visual changes  ENT:   negative sore throat, tongue or lip swelling  Respiratory:  negative cough, negative dyspnea  Cards:  negative for chest pain, palpitations, lower extremity edema  GI:   See HPI  :  negative for frequency, dysuria  Integument:  negative for rash and pruritus  Heme:  negative for easy bruising and gum/nose bleeding  Musculoskel: negative for myalgias,  back pain and muscle weakness  Neuro: negative for headaches, dizziness, vertigo  Psych:  negative for feelings of anxiety, depression       Objective:   No data found. No intake/output data recorded. No intake/output data recorded.     EXAM:     NEURO-a&o   HEENT-wnl   LUNGS-clear    COR-regular rate and rhythym     ABD-soft , no tenderness, no rebound, good bs     EXT-no edema     Data Review     No results for input(s): WBC, HGB, HCT, PLT, HGBEXT, HCTEXT, PLTEXT in the last 72 hours. No results for input(s): NA, K, CL, CO2, BUN, CREA, GLU, PHOS, CA in the last 72 hours. No results for input(s): AP, TBIL, TP, ALB, GLOB, GGT, AML, LPSE in the last 72 hours. No lab exists for component: SGOT, GPT, AMYP, HLPSE  No results for input(s): INR, PTP, APTT, INREXT in the last 72 hours. Patient Active Problem List   Diagnosis Code    Steroid-induced diabetes ([facility] [address] E09.9, T38.0X5A    Diarrhea R19.7    Bronchitis J40    Accelerated hypertension I10    Type 2 diabetes mellitus (HCC) E11.9    Proctitis K62.89    Rectal abscess K61.1      Assessment:   · dysphagia   Plan:   · EGD today.      Signed By: Theo Montalvo MD     2/10/2022  9:19 AM

## 2022-02-10 NOTE — PROCEDURES
Festus James M.D.  [phone]           2/10/2022                EGD Operative Report  Ann-Marie Pod  :  1970  [employer] Medical Record Number:  [id number]      Indication: dysphagia     : Theo Montalvo MD    Assistant -- None  Implants -- None    Referring Provider:  Deepa Gallo MD      Anesthesia/Sedation:  MAC anesthesia Propofol    Airway assessment: No airway problems anticipated    Pre-Procedural Exam:      Airway: clear, no airway problems anticipated  Heart: RRR, without gallops or rubs  Lungs: clear bilaterally without wheezes, crackles, or rhonchi  Abdomen: soft, nontender, nondistended, bowel sounds present  Mental Status: awake, alert and oriented to person, place and time       Procedure Details     After infomed consent was obtained for the procedure, with all risks and benefits of procedure explained the patient was taken to the endoscopy suite and placed in the left lateral decubitus position. Following sequential administration of sedation as per above, the endoscope was inserted into the mouth and advanced under direct vision to second portion of the duodenum. A careful inspection was made as the gastroscope was withdrawn, including a retroflexed view of the proximal stomach; findings and interventions are described below. Findings:   Esophagus:normal  Stomach: normal   Duodenum/jejunum: normal    Therapies:  esophageal dilation with 15-18mm sized balloon  biopsy of esophagus - r/o EoE    Specimens: as above           Complications:   None; patient tolerated the procedure well. EBL:  None. Impression:    Normal EGD      Recommendations:    -Await pathology.     Theo Montalvo MD

## 2022-02-10 NOTE — ANESTHESIA POSTPROCEDURE EVALUATION
Procedure(s):  ESOPHAGOGASTRODUODENOSCOPY (EGD)  ESOPHAGOGASTRODUODENAL (EGD) BIOPSY  ESOPHAGEAL DILATION. MAC    Anesthesia Post Evaluation      Multimodal analgesia: multimodal analgesia not used between 6 hours prior to anesthesia start to PACU discharge  Patient location during evaluation: PACU  Patient participation: complete - patient participated  Level of consciousness: awake  Pain management: adequate  Airway patency: patent  Anesthetic complications: no  Cardiovascular status: acceptable, blood pressure returned to baseline and hemodynamically stable  Respiratory status: acceptable  Hydration status: acceptable  Post anesthesia nausea and vomiting:  controlled      INITIAL Post-op Vital signs:   Vitals Value Taken Time   /66 02/10/22 1145   Temp     Pulse 80 02/10/22 1149   Resp 17 02/10/22 1149   SpO2 97 % 02/10/22 1147   Vitals shown include unvalidated device data.

## 2022-02-10 NOTE — ANESTHESIA PREPROCEDURE EVALUATION
Relevant Problems   CARDIOVASCULAR   (+) Accelerated hypertension      ENDOCRINE   (+) Steroid-induced diabetes (HCC)   (+) Type 2 diabetes mellitus (HCC)       Anesthetic History     PONV          Review of Systems / Medical History  Patient summary reviewed, nursing notes reviewed and pertinent labs reviewed    Pulmonary        Sleep apnea    Asthma        Neuro/Psych         Headaches and psychiatric history     Cardiovascular    Hypertension              Exercise tolerance: >4 METS     GI/Hepatic/Renal     GERD           Endo/Other    Diabetes    Obesity     Other Findings   Comments: Hepatic steatosis  Diverticulitis  Mood disorder           Physical Exam    Airway  Mallampati: II    Neck ROM: normal range of motion   Mouth opening: Normal     Cardiovascular  Regular rate and rhythm,  S1 and S2 normal,  no murmur, click, rub, or gallop  Rhythm: regular  Rate: normal         Dental  No notable dental hx       Pulmonary  Breath sounds clear to auscultation               Abdominal  GI exam deferred       Other Findings            Anesthetic Plan    ASA: 3  Anesthesia type: MAC          Induction: Intravenous  Anesthetic plan and risks discussed with: Patient

## 2022-02-14 DIAGNOSIS — F41.9 ANXIETY: ICD-10-CM

## 2022-02-15 RX ORDER — EMPAGLIFLOZIN 25 MG/1
TABLET, FILM COATED ORAL
Qty: 90 TABLET | Refills: 1 | Status: SHIPPED | OUTPATIENT
Start: 2022-02-15 | End: 2022-09-16 | Stop reason: SDUPTHER

## 2022-02-15 RX ORDER — ALPRAZOLAM 1 MG/1
TABLET ORAL
Qty: 60 TABLET | Refills: 0 | Status: SHIPPED | OUTPATIENT
Start: 2022-02-15 | End: 2022-03-15

## 2022-02-23 ENCOUNTER — TELEPHONE (OUTPATIENT)
Dept: INTERNAL MEDICINE CLINIC | Age: 52
End: 2022-02-23

## 2022-02-23 ENCOUNTER — DOCUMENTATION ONLY (OUTPATIENT)
Dept: INTERNAL MEDICINE CLINIC | Age: 52
End: 2022-02-23

## 2022-02-23 DIAGNOSIS — I10 ESSENTIAL HYPERTENSION: ICD-10-CM

## 2022-02-23 DIAGNOSIS — E55.9 VITAMIN D DEFICIENCY: ICD-10-CM

## 2022-02-23 DIAGNOSIS — E11.22 TYPE 2 DIABETES MELLITUS WITH STAGE 3 CHRONIC KIDNEY DISEASE, WITHOUT LONG-TERM CURRENT USE OF INSULIN, UNSPECIFIED WHETHER STAGE 3A OR 3B CKD (HCC): Primary | ICD-10-CM

## 2022-02-23 DIAGNOSIS — N18.30 TYPE 2 DIABETES MELLITUS WITH STAGE 3 CHRONIC KIDNEY DISEASE, WITHOUT LONG-TERM CURRENT USE OF INSULIN, UNSPECIFIED WHETHER STAGE 3A OR 3B CKD (HCC): Primary | ICD-10-CM

## 2022-02-23 NOTE — PROGRESS NOTES
2/23 called patient and advised labs have been ordered.   She can't make it in tomorrow but will next week/ wants to keep virtual for Friday/SO

## 2022-03-04 ENCOUNTER — TELEPHONE (OUTPATIENT)
Dept: INTERNAL MEDICINE CLINIC | Age: 52
End: 2022-03-04

## 2022-03-04 NOTE — TELEPHONE ENCOUNTER
----- Message from [facility] sent at 3/4/2022  9:46 AM EST -----  Subject: Appointment Request    Reason for Call: Routine Existing Condition Follow Up (Diabetes)    QUESTIONS  Type of Appointment? Established Patient  Reason for appointment request? Available appointments did not meet   patient need  Additional Information for Provider? DM follow up pt is r/s because she   haven't had labs done yet, pt would prefer a VV anytime of the week in the   afternoon from 1-2pm. Give pt a call back with the next vv available pt   screened green   ---------------------------------------------------------------------------  --------------  CALL BACK INFO  What is the best way for the office to contact you? OK to leave message on   voicemail, OK to respond with electronic message via [employer] portal (only   for patients who have registered [employer] account)  Preferred Call Back Phone Number? [phone]  ---------------------------------------------------------------------------  --------------  SCRIPT ANSWERS  Relationship to Patient? Self  Have your symptoms changed? No  Is this follow up request related to routine Diabetes Management? Yes  Have you been diagnosed with, awaiting test results for, or told that you   are suspected of having COVID-19 (Coronavirus)? (If patient has tested   negative or was tested as a requirement for work, school, or travel and   not based on symptoms, answer no)? No  Within the past 10 days have you developed any of the following symptoms   (answer no if symptoms have been present longer than 10 days or began   more than 10 days ago)? Fever or Chills, Cough, Shortness of breath or   difficulty breathing, Loss of taste or smell, Sore throat, Nasal   congestion, Sneezing or runny nose, Fatigue or generalized body aches   (answer no if pain is specific to a body part e.g. back pain), Diarrhea,   Headache? No  Have you had close contact with someone with COVID-19 in the last 7 days? No  (Service Expert  click yes below to proceed with [employer] As Usual   Scheduling)?  Yes

## 2022-03-11 ENCOUNTER — VIRTUAL VISIT (OUTPATIENT)
Dept: INTERNAL MEDICINE CLINIC | Age: 52
End: 2022-03-11
Payer: MEDICAID

## 2022-03-11 DIAGNOSIS — E66.01 CLASS 2 SEVERE OBESITY WITH SERIOUS COMORBIDITY AND BODY MASS INDEX (BMI) OF 36.0 TO 36.9 IN ADULT, UNSPECIFIED OBESITY TYPE (HCC): ICD-10-CM

## 2022-03-11 DIAGNOSIS — K51.919 ULCERATIVE COLITIS WITH COMPLICATION, UNSPECIFIED LOCATION (HCC): ICD-10-CM

## 2022-03-11 DIAGNOSIS — Z12.31 ENCOUNTER FOR SCREENING MAMMOGRAM FOR BREAST CANCER: ICD-10-CM

## 2022-03-11 DIAGNOSIS — E11.9 CONTROLLED TYPE 2 DIABETES MELLITUS WITHOUT COMPLICATION, WITHOUT LONG-TERM CURRENT USE OF INSULIN (HCC): Primary | ICD-10-CM

## 2022-03-11 DIAGNOSIS — I10 ESSENTIAL HYPERTENSION: ICD-10-CM

## 2022-03-11 DIAGNOSIS — K76.0 FATTY LIVER: ICD-10-CM

## 2022-03-11 DIAGNOSIS — E78.00 PURE HYPERCHOLESTEROLEMIA: ICD-10-CM

## 2022-03-11 PROCEDURE — 99442 PR PHYS/QHP TELEPHONE EVALUATION 11-20 MIN: CPT | Performed by: FAMILY MEDICINE

## 2022-03-11 NOTE — PROGRESS NOTES
Elise Villeda is a 46 y.o. female who presents overdue follow up. Seen at [facility] ED on 1/26 for UTI. Random glucose 226. On amaryl 4mg daily and jardiance 25mg daily. HbA1C 7.9% July 2021. Has UC and rectal fistula. A lot of diarrhea, blood in stool. On mesalamine and rectal steroid. Has had to take steroids repeatedly. Had appy cancer, seen at [facility]. Dr Abdoulaye Breaux. Removed and was contained. Saw Dr Joss Bruno, GI. Had EGD and dilation on Feb 10. Has fatty liver. This is an established visit conducted via telemedicine, by phone. The patient has been instructed that this meets HIPAA criteria and acknowledges and agrees to this method of visitation. Pursuant to the emergency declaration under the [facility] waiver authority and the [employer] and [employer] Act, this Virtual Visit was conducted, with patient's consent, to reduce the patient's risk of exposure to COVID-19 and provide continuity of care for an established patient. Services were provided by phone to substitute for in-person clinic visit.        Past Medical History:   Diagnosis Date    Anal fissure     Anisocoria     Asthma     LAST EPISODE     Back pain     Cerumen impaction     Chronic kidney disease     hx uti in past    Coagulation defects     ocassional rectal bleeding due to anal fissure    Colovaginal fistula     Diabetes (HCC)     NIDDM    Diverticulitis     Diverticulosis     Enlarged tonsils     Frequent UTI     GERD (gastroesophageal reflux disease)     H/O endoscopy     with dilation    HA (headache)     Hepatic steatosis     History of colon resection     Hx of colonoscopy with polypectomy     benign    Hypertension     Ill-defined condition     FREQUENT HIVES    Ill-defined condition     HX ELEVATED LIVER ENZYMES    Morbid obesity (HCC)     Nausea & vomiting     during diverticulitis flare    Obesity     Otitis media     Pneumonia     about 15 yrs ago    Psychiatric disorder     ANXIETY    Recurrent tonsillitis     Sinusitis     Transfusion history ~ age 35    postop hysterectomy    Urticaria        Family History   Problem Relation Age of Onset    Diabetes Mother     Cancer Mother         NON-HODGKINS LYMPHOMA    Anesth Problems Mother         PONV    Diabetes Father     Heart Disease Father         CAD - STENTS, PACEMAKER    Arrhythmia Father     Cancer Paternal Grandmother        Social History     Socioeconomic History    Marital status:      Spouse name: Not on file    Number of children: Not on file    Years of education: Not on file    Highest education level: Not on file   Occupational History    Not on file   Tobacco Use    Smoking status: Never Smoker    Smokeless tobacco: Never Used   Vaping Use    Vaping Use: Not on file   Substance and Sexual Activity    Alcohol use: Not Currently    Drug use: Yes     Types: OTC, Prescription    Sexual activity: Never   Other Topics Concern    Not on file   Social History Narrative    Not on file     Social Determinants of Health     Financial Resource Strain:     Difficulty of Paying Living Expenses: Not on file   Food Insecurity:     Worried About Running Out of Food in the Last Year: Not on file    Simin of Food in the Last Year: Not on file   Transportation Needs:     Lack of Transportation (Medical): Not on file    Lack of Transportation (Non-Medical):  Not on file   Physical Activity:     Days of Exercise per Week: Not on file    Minutes of Exercise per Session: Not on file   Stress:     Feeling of Stress : Not on file   Social Connections:     Frequency of Communication with Friends and Family: Not on file    Frequency of Social Gatherings with Friends and Family: Not on file    Attends Congregational Services: Not on file    Active Member of Clubs or Organizations: Not on file    Attends Club or Organization Meetings: Not on file    Marital Status: Not on file   Intimate Partner Violence:     Fear of Current or Ex-Partner: Not on file    Emotionally Abused: Not on file    Physically Abused: Not on file    Sexually Abused: Not on file   Housing Stability:     Unable to Pay for Housing in the Last Year: Not on file    Number of [place] in the Last Year: Not on file    Unstable Housing in the Last Year: Not on file       Current Outpatient Medications on File Prior to Visit   Medication Sig Dispense Refill    Jardiance 25 mg tablet TAKE 1 TABLET BY MOUTH EVERY DAY 90 Tablet 1    ALPRAZolam (XANAX) 1 mg tablet TAKE 1/2 - 1 TABLET BY MOUTH TWICE DAILY AS NEEDED FOR ANXIETY *MAX 2 TABS DAILY* 60 Tablet 0    melatonin 5 mg cap capsule Take  by mouth nightly.  estradioL (ESTRACE) 0.5 mg tablet TAKE 1 TABLET BY MOUTH EVERY DAY 90 Tablet 1    promethazine (PHENERGAN) 25 mg tablet Take 1 Tablet by mouth every six (6) hours as needed for Nausea. 12 Tablet 0    atorvastatin (LIPITOR) 20 mg tablet TAKE 1 TABLET BY MOUTH EVERY DAY 90 Tablet 1    Ventolin HFA 90 mcg/actuation inhaler TAKE 1 PUFF BY INHALATION EVERY FOUR (4) HOURS AS NEEDED FOR WHEEZING. 18 Each 1    glimepiride (AMARYL) 4 mg tablet TAKE 1 TABLET BY MOUTH ONCE DAILY BEFORE BREAKFAST 90 Tablet 1    naloxone (Narcan) 4 mg/actuation nasal spray Use 1 spray intranasally, then discard. Repeat with new spray every 2 min as needed for opioid overdose symptoms, alternating nostrils. 1 Each 0    losartan-hydroCHLOROthiazide (HYZAAR) 100-12.5 mg per tablet TAKE 1 TABLET BY MOUTH EVERY DAY 90 Tablet 1    dibucaine (NUPERCAINAL) 1 % rectal ointment by PeriANAL route every four (4) hours as needed for Pain. 28 g 0    dicyclomine (BENTYL) 20 mg tablet Take 1 Tablet by mouth every six (6) hours as needed for Abdominal Cramps.  20 Tablet 0    ondansetron (Zofran ODT) 4 mg disintegrating tablet Take 1 Tablet by mouth every eight (8) hours as needed for Nausea. 12 Tablet 1    omeprazole (PRILOSEC) 20 mg capsule Take 40 mg by mouth Daily (before breakfast).  EPINEPHrine (EPIPEN) 0.3 mg/0.3 mL (1:1,000) injection 0.3 mL by IntraMUSCular route once as needed for up to 1 dose. 0.3 mL 1    hyoscyamine SL (LEVSIN/SL) 0.125 mg SL tablet 1 Tablet by SubLINGual route every four (4) hours as needed for Cramping. (Patient not taking: Reported on 3/11/2022) 10 Tablet 0     No current facility-administered medications on file prior to visit. Review of Systems  Pertinent items are noted in HPI. Objective:     Gen: healthy sounding female  HEENT: no audible congestion, patient does not see oral erythema and sees MMM  Neck: patient does not feel enlarged or tender LAD or masses  Resp: normal respiratory effort, no audible wheezing. CV: patient does not feel palpitations or heart irregularity  Abd: patient does not feel abdominal tenderness or mass, patient does not notice distension  Extrem: patient does not see swelling in ankles or joints. Neuro: Alert and oriented, able to answer questions without difficulty, patient reports able to move all extremities and walk normally        Assessment/Plan:       ICD-10-CM ICD-9-CM    1. Controlled type 2 diabetes mellitus without complication, without long-term current use of insulin (HCC)  E11.9 250.00    2. Essential hypertension  I10 401.9    3. Ulcerative colitis with complication, unspecified location ([facility]  K51.919 556.9    4. Pure hypercholesterolemia  E78.00 272.0    5. Fatty liver  K76.0 571.8    6. Class 2 severe obesity with serious comorbidity and body mass index (BMI) of 36.0 to 36.9 in adult, unspecified obesity type ([facility]  E66.01 278.01     Z68.36 V85.36      Discussed stopping jardiance, pending labs and starting ozempic with reduction in amaryl dose. Await labs. This was a telemedicine visit by phone, duration 16 minutes.          Beverly Mock MD

## 2022-03-11 NOTE — PROGRESS NOTES
1. \"Have you been to the ER, urgent care clinic since your last visit? Hospitalized since your last visit? \" Yes Reason for visit: ED at [facility] for a UTI and ABD, Back Pain    2. \"Have you seen or consulted any other health care providers outside of the [address] [place] [address] since your last visit? \" No     3. For patients aged 39-70: Has the patient had a colonoscopy / FIT/ Cologuard? Yes - no Care Gap present      If the patient is female:    4. For patients aged 41-77: Has the patient had a mammogram within the past 2 years? No      5. For patients aged 21-65: Has the patient had a pap smear?  No

## 2022-03-15 DIAGNOSIS — F41.9 ANXIETY: ICD-10-CM

## 2022-03-15 RX ORDER — ALPRAZOLAM 1 MG/1
TABLET ORAL
Qty: 60 TABLET | Refills: 0 | Status: SHIPPED | OUTPATIENT
Start: 2022-03-15 | End: 2022-04-14

## 2022-03-18 PROBLEM — E11.9 TYPE 2 DIABETES MELLITUS (HCC): Status: ACTIVE | Noted: 2017-10-10

## 2022-03-18 PROBLEM — I10 ACCELERATED HYPERTENSION: Status: ACTIVE | Noted: 2017-10-10

## 2022-03-19 PROBLEM — K61.1 RECTAL ABSCESS: Status: ACTIVE | Noted: 2021-09-26

## 2022-03-19 PROBLEM — K62.89 PROCTITIS: Status: ACTIVE | Noted: 2019-09-11

## 2022-03-19 PROBLEM — J40 BRONCHITIS: Status: ACTIVE | Noted: 2017-10-10

## 2022-03-20 PROBLEM — R19.7 DIARRHEA: Status: ACTIVE | Noted: 2017-10-05

## 2022-03-29 ENCOUNTER — HOSPITAL ENCOUNTER (EMERGENCY)
Age: 52
Discharge: HOME | End: 2022-03-29
Attending: EMERGENCY MEDICINE
Payer: MEDICAID

## 2022-03-29 VITALS
HEIGHT: 62 IN | WEIGHT: 208.34 LBS | SYSTOLIC BLOOD PRESSURE: 155 MMHG | DIASTOLIC BLOOD PRESSURE: 93 MMHG | BODY MASS INDEX: 38.34 KG/M2 | HEART RATE: 89 BPM | TEMPERATURE: 98.9 F | RESPIRATION RATE: 20 BRPM | OXYGEN SATURATION: 98 %

## 2022-03-29 DIAGNOSIS — T78.40XA HYPERSENSITIVITY REACTION, INITIAL ENCOUNTER: ICD-10-CM

## 2022-03-29 DIAGNOSIS — K51.911 EXACERBATION OF ULCERATIVE COLITIS WITH RECTAL BLEEDING (HCC): Primary | ICD-10-CM

## 2022-03-29 LAB
ABO + RH BLD: NORMAL
ALBUMIN SERPL-MCNC: 3.8 G/DL (ref 3.5–5)
ALBUMIN/GLOB SERPL: 1.1 {RATIO} (ref 1.1–2.2)
ALP SERPL-CCNC: 79 U/L (ref 45–117)
ALT SERPL-CCNC: 37 U/L (ref 12–78)
ANION GAP SERPL CALC-SCNC: 9 MMOL/L (ref 5–15)
AST SERPL-CCNC: 32 U/L (ref 15–37)
BASOPHILS # BLD: 0 K/UL (ref 0–0.1)
BASOPHILS NFR BLD: 1 % (ref 0–1)
BILIRUB SERPL-MCNC: 0.4 MG/DL (ref 0.2–1)
BLOOD GROUP ANTIBODIES SERPL: NORMAL
BUN SERPL-MCNC: 9 MG/DL (ref 6–20)
BUN/CREAT SERPL: 11 (ref 12–20)
CALCIUM SERPL-MCNC: 9.1 MG/DL (ref 8.5–10.1)
CHLORIDE SERPL-SCNC: 104 MMOL/L (ref 97–108)
CO2 SERPL-SCNC: 23 MMOL/L (ref 21–32)
CREAT SERPL-MCNC: 0.83 MG/DL (ref 0.55–1.02)
DIFFERENTIAL METHOD BLD: ABNORMAL
EOSINOPHIL # BLD: 0.2 K/UL (ref 0–0.4)
EOSINOPHIL NFR BLD: 3 % (ref 0–7)
ERYTHROCYTE [DISTWIDTH] IN BLOOD BY AUTOMATED COUNT: 13.3 % (ref 11.5–14.5)
GLOBULIN SER CALC-MCNC: 3.5 G/DL (ref 2–4)
GLUCOSE SERPL-MCNC: 267 MG/DL (ref 65–100)
HCT VFR BLD AUTO: 38.4 % (ref 35–47)
HGB BLD-MCNC: 13.8 G/DL (ref 11.5–16)
IMM GRANULOCYTES # BLD AUTO: 0.1 K/UL (ref 0–0.04)
IMM GRANULOCYTES NFR BLD AUTO: 1 % (ref 0–0.5)
LYMPHOCYTES # BLD: 1.8 K/UL (ref 0.8–3.5)
LYMPHOCYTES NFR BLD: 27 % (ref 12–49)
MCH RBC QN AUTO: 30.4 PG (ref 26–34)
MCHC RBC AUTO-ENTMCNC: 35.9 G/DL (ref 30–36.5)
MCV RBC AUTO: 84.6 FL (ref 80–99)
MONOCYTES # BLD: 0.3 K/UL (ref 0–1)
MONOCYTES NFR BLD: 5 % (ref 5–13)
NEUTS SEG # BLD: 4.3 K/UL (ref 1.8–8)
NEUTS SEG NFR BLD: 63 % (ref 32–75)
NRBC # BLD: 0 K/UL (ref 0–0.01)
NRBC BLD-RTO: 0 PER 100 WBC
PLATELET # BLD AUTO: 179 K/UL (ref 150–400)
PMV BLD AUTO: 9.2 FL (ref 8.9–12.9)
POTASSIUM SERPL-SCNC: 3.3 MMOL/L (ref 3.5–5.1)
PROT SERPL-MCNC: 7.3 G/DL (ref 6.4–8.2)
RBC # BLD AUTO: 4.54 M/UL (ref 3.8–5.2)
SODIUM SERPL-SCNC: 136 MMOL/L (ref 136–145)
SPECIMEN EXP DATE BLD: NORMAL
WBC # BLD AUTO: 6.7 K/UL (ref 3.6–11)

## 2022-03-29 PROCEDURE — 96372 THER/PROPH/DIAG INJ SC/IM: CPT

## 2022-03-29 PROCEDURE — 99284 EMERGENCY DEPT VISIT MOD MDM: CPT

## 2022-03-29 PROCEDURE — 96374 THER/PROPH/DIAG INJ IV PUSH: CPT

## 2022-03-29 PROCEDURE — 80053 COMPREHEN METABOLIC PANEL: CPT

## 2022-03-29 PROCEDURE — 36415 COLL VENOUS BLD VENIPUNCTURE: CPT

## 2022-03-29 PROCEDURE — 85025 COMPLETE CBC W/AUTO DIFF WBC: CPT

## 2022-03-29 PROCEDURE — 86900 BLOOD TYPING SEROLOGIC ABO: CPT

## 2022-03-29 PROCEDURE — 74011250636 HC RX REV CODE- 250/636: Performed by: EMERGENCY MEDICINE

## 2022-03-29 PROCEDURE — 96375 TX/PRO/DX INJ NEW DRUG ADDON: CPT

## 2022-03-29 PROCEDURE — 74011000250 HC RX REV CODE- 250: Performed by: EMERGENCY MEDICINE

## 2022-03-29 PROCEDURE — 96376 TX/PRO/DX INJ SAME DRUG ADON: CPT

## 2022-03-29 RX ORDER — HYDROCODONE BITARTRATE AND ACETAMINOPHEN 5; 325 MG/1; MG/1
1 TABLET ORAL
Qty: 12 TABLET | Refills: 0 | Status: SHIPPED | OUTPATIENT
Start: 2022-03-29 | End: 2022-04-01

## 2022-03-29 RX ORDER — DIPHENHYDRAMINE HYDROCHLORIDE 50 MG/ML
25 INJECTION, SOLUTION INTRAMUSCULAR; INTRAVENOUS
Status: COMPLETED | OUTPATIENT
Start: 2022-03-29 | End: 2022-03-29

## 2022-03-29 RX ORDER — EPINEPHRINE 1 MG/ML
0.3 INJECTION, SOLUTION, CONCENTRATE INTRAVENOUS
Status: COMPLETED | OUTPATIENT
Start: 2022-03-29 | End: 2022-03-29

## 2022-03-29 RX ORDER — HYDROMORPHONE HYDROCHLORIDE 1 MG/ML
1 INJECTION, SOLUTION INTRAMUSCULAR; INTRAVENOUS; SUBCUTANEOUS ONCE
Status: COMPLETED | OUTPATIENT
Start: 2022-03-29 | End: 2022-03-29

## 2022-03-29 RX ORDER — PREDNISONE 10 MG/1
TABLET ORAL
Qty: 48 TABLET | Refills: 0 | OUTPATIENT
Start: 2022-03-29 | End: 2022-04-19

## 2022-03-29 RX ADMIN — FAMOTIDINE 20 MG: 10 INJECTION, SOLUTION INTRAVENOUS at 01:02

## 2022-03-29 RX ADMIN — HYDROMORPHONE HYDROCHLORIDE 1 MG: 1 INJECTION, SOLUTION INTRAMUSCULAR; INTRAVENOUS; SUBCUTANEOUS at 04:02

## 2022-03-29 RX ADMIN — SODIUM CHLORIDE 1000 ML: 9 INJECTION, SOLUTION INTRAVENOUS at 01:01

## 2022-03-29 RX ADMIN — EPINEPHRINE 0.3 MG: 1 INJECTION INTRAMUSCULAR; INTRAVENOUS; SUBCUTANEOUS at 02:19

## 2022-03-29 RX ADMIN — DIPHENHYDRAMINE HYDROCHLORIDE 25 MG: 50 INJECTION, SOLUTION INTRAMUSCULAR; INTRAVENOUS at 01:02

## 2022-03-29 RX ADMIN — HYDROMORPHONE HYDROCHLORIDE 1 MG: 1 INJECTION, SOLUTION INTRAMUSCULAR; INTRAVENOUS; SUBCUTANEOUS at 02:20

## 2022-03-29 RX ADMIN — METHYLPREDNISOLONE SODIUM SUCCINATE 125 MG: 125 INJECTION, POWDER, FOR SOLUTION INTRAMUSCULAR; INTRAVENOUS at 01:02

## 2022-03-29 RX ADMIN — DIPHENHYDRAMINE HYDROCHLORIDE 25 MG: 50 INJECTION, SOLUTION INTRAMUSCULAR; INTRAVENOUS at 03:15

## 2022-03-29 NOTE — ED PROVIDER NOTES
EMERGENCY DEPARTMENT HISTORY AND PHYSICAL EXAM      Date: 3/29/2022  Patient Name: Abram Graff    History of Presenting Illness     Chief Complaint   Patient presents with    Allergic Reaction     Pt presents with c/o hives that have recently started to appear today, taken benadryl, states that she feels sob, has pmh of anaphalaxis. pt has seen specialist with unknown cause, pt states that her throat is feeling tight, pt in NAD in triage, 100% on room air    Melena     pt states rectal bleeding started a couple days ago, pt has pmh of ulcerative colitis. VS stable       History Provided By: Patient    HPI: Abram Graff, 46 y.o. female  presents to the ED with cc of rectal bleeding and pain. Patient has a history of ulcerative colitis. Couple days ago she began having rectal bleeding and felt like she was getting anal fissures. No fevers or chills. She does have generalized abdominal discomfort. She takes mesalamine and dicyclomine. Today she began developing hives on her chest around her neck and the axilla. She is also states she feels like she has some tightness in her throat and shortness of breath. She does have a history of allergic reactions and anaphylaxis but the allergen is unknown. It is believed that it also may be related to the autoimmune process with her ulcerative colitis.   She did take Benadryl today prior to arrival.    Past History     Past Medical History:  Past Medical History:   Diagnosis Date    Anal fissure     Anisocoria     Asthma     LAST EPISODE     Back pain     Cerumen impaction     Chronic kidney disease     hx uti in past    Coagulation defects     ocassional rectal bleeding due to anal fissure    Colovaginal fistula     Diabetes (HCC)     NIDDM    Diverticulitis     Diverticulosis     Enlarged tonsils     Frequent UTI     GERD (gastroesophageal reflux disease)     H/O endoscopy     with dilation    HA (headache)     Hepatic steatosis  History of colon resection     Hx of colonoscopy with polypectomy     benign    Hypertension     Ill-defined condition     FREQUENT HIVES    Ill-defined condition     HX ELEVATED LIVER ENZYMES    Morbid obesity (HCC)     Nausea & vomiting     during diverticulitis flare    Obesity     Otitis media     Pneumonia     about 15 yrs ago    Psychiatric disorder     ANXIETY    Recurrent tonsillitis     Sinusitis     Transfusion history ~ age 35    postop hysterectomy    Urticaria        Past Surgical History:  Past Surgical History:   Procedure Laterality Date    COLONOSCOPY N/A 3/28/2019    COLONOSCOPY performed by Marisa Duque MD at [address] [facility] COLONOSCOPY N/A 10/2/2020    COLONOSCOPY performed by Marisa Duque MD at [facility] HX APPENDECTOMY  2021    cancer.  HX BREAST REDUCTION      [facility].  HX GI  12    LAPAROSCOPIC HAND ASSISTED  POSS OPEN SIGMOID COLECTOMY POSS TEMPORARY DIVERTING LOOP ILEOSTOMY;  (no illeostomy needed)    HX GYN           HX GYN      cervical conization    HX HEENT      SINUS SURGERY LEFT X2    HX HEENT      SINUS SURGERY ON RIGHT X2    HX HEMORRHOIDECTOMY      HX HYSTERECTOMY      HX OTHER SURGICAL  2021    Sphincterotomy    HX PELVIC LAPAROSCOPY      HX EMMANUEL AND BSO      HX UROLOGICAL  12     CYSTOSCOPY INSERTION URETERAL CATHETERS - Cystoscopy Insertion of bilateral ureteral stents    [place] ABDOMEN SURGERY PROC UNLISTED  2018    hernia repair at [facility]    [place] UNLISTED      colon resection. Medications:  No current facility-administered medications on file prior to encounter.      Current Outpatient Medications on File Prior to Encounter   Medication Sig Dispense Refill    ALPRAZolam (XANAX) 1 mg tablet TAKE 1/2 - 1 TABLET BY MOUTH TWICE DAILY AS NEEDED FOR ANXIETY *MAX 2 TABS DAILY* 60 Tablet 0    Jardiance 25 mg tablet TAKE 1 TABLET BY MOUTH EVERY DAY 90 Tablet 1    melatonin 5 mg cap capsule Take  by mouth nightly.  estradioL (ESTRACE) 0.5 mg tablet TAKE 1 TABLET BY MOUTH EVERY DAY 90 Tablet 1    promethazine (PHENERGAN) 25 mg tablet Take 1 Tablet by mouth every six (6) hours as needed for Nausea. 12 Tablet 0    atorvastatin (LIPITOR) 20 mg tablet TAKE 1 TABLET BY MOUTH EVERY DAY 90 Tablet 1    Ventolin HFA 90 mcg/actuation inhaler TAKE 1 PUFF BY INHALATION EVERY FOUR (4) HOURS AS NEEDED FOR WHEEZING. 18 Each 1    glimepiride (AMARYL) 4 mg tablet TAKE 1 TABLET BY MOUTH ONCE DAILY BEFORE BREAKFAST 90 Tablet 1    naloxone (Narcan) 4 mg/actuation nasal spray Use 1 spray intranasally, then discard. Repeat with new spray every 2 min as needed for opioid overdose symptoms, alternating nostrils. 1 Each 0    losartan-hydroCHLOROthiazide (HYZAAR) 100-12.5 mg per tablet TAKE 1 TABLET BY MOUTH EVERY DAY 90 Tablet 1    dibucaine (NUPERCAINAL) 1 % rectal ointment by PeriANAL route every four (4) hours as needed for Pain. 28 g 0    dicyclomine (BENTYL) 20 mg tablet Take 1 Tablet by mouth every six (6) hours as needed for Abdominal Cramps. 20 Tablet 0    hyoscyamine SL (LEVSIN/SL) 0.125 mg SL tablet 1 Tablet by SubLINGual route every four (4) hours as needed for Cramping. (Patient not taking: Reported on 3/11/2022) 10 Tablet 0    ondansetron (Zofran ODT) 4 mg disintegrating tablet Take 1 Tablet by mouth every eight (8) hours as needed for Nausea. 12 Tablet 1    omeprazole (PRILOSEC) 20 mg capsule Take 40 mg by mouth Daily (before breakfast).  EPINEPHrine (EPIPEN) 0.3 mg/0.3 mL (1:1,000) injection 0.3 mL by IntraMUSCular route once as needed for up to 1 dose.  0.3 mL 1       Family History:  Family History   Problem Relation Age of Onset    Diabetes Mother     Cancer Mother         NON-HODGKINS LYMPHOMA    Anesth Problems Mother         PONV    Diabetes Father     Heart Disease Father         CAD - STENTS, PACEMAKER    Arrhythmia Father     Cancer Paternal Grandmother        Social History:  Social History     Tobacco Use    Smoking status: Never Smoker    Smokeless tobacco: Never Used   Vaping Use    Vaping Use: Not on file   Substance Use Topics    Alcohol use: Not Currently    Drug use: Yes     Types: OTC, Prescription       Allergies: Allergies   Allergen Reactions    Aspirin Hives, Shortness of Breath and Itching    Codeine Hives, Itching and Angioedema     Pt had itching in mouth, on face and lips    Contrast Agent [Iodine] Hives, Itching and Angioedema     5/5/21: pt was given benadryl and solu-medrol prior to administration but pt had severe tongue swelling and drooling, lethargy and itching. DO NOT GIVE PT    Flavoring Agent Anaphylaxis     Cherry flavor    Iodinated Contrast Media Anaphylaxis, Diarrhea, Hives, Nausea and Vomiting and Shortness of Breath    Percocet [Oxycodone-Acetaminophen] Hives, Itching and Angioedema     Pt had itching in mouth, on face and lips    Prilosec [Omeprazole Magnesium] Anaphylaxis     CHERRY FLAVORED ODT; PT TAKES REGULAR PRILOSEC AND IS OK    Dilaudid [Hydromorphone] Itching     Tolerates with benadryl    Ketorolac Rash     \"makes my eyes spasm and causes rash on my hands\" and \"makes my skin itch and makes me nervous\"    Morphine (Pf) Rash     According to rn, pt can take morphine with benadryl    Fentanyl Rash and Itching     Has had benadryl before       All the above components of the past  history are auto-populated from the electronic record. They have been reviewed and the patient has been interviewed for any pertinent past history that pertains to the patient's chief complaint and reason for visit. Not all pre-populated components may be accurate at the time this note was generated. Review of Systems   Review of Systems   Constitutional: Negative for chills and fever. HENT: Negative for congestion, ear pain, rhinorrhea, sore throat and trouble swallowing. Eyes: Negative for visual disturbance.    Respiratory: Negative for cough, chest tightness and shortness of breath. Cardiovascular: Negative for chest pain and palpitations. Gastrointestinal: Positive for abdominal pain, anal bleeding, blood in stool, diarrhea and rectal pain. Negative for constipation, nausea and vomiting. Genitourinary: Negative for decreased urine volume, difficulty urinating, dysuria and frequency. Musculoskeletal: Negative for back pain and neck pain. Skin: Positive for rash. Negative for color change. Neurological: Negative for dizziness, weakness, light-headedness and headaches. Physical Exam   Physical Exam  Vitals and nursing note reviewed. Constitutional:       General: She is not in acute distress. Appearance: She is well-developed. She is obese. She is not ill-appearing. HENT:      Head: Normocephalic. Eyes:      Conjunctiva/sclera: Conjunctivae normal.   Cardiovascular:      Rate and Rhythm: Normal rate and regular rhythm. Pulmonary:      Effort: Pulmonary effort is normal. No accessory muscle usage or respiratory distress. Abdominal:      General: There is no distension. Musculoskeletal:      Cervical back: Normal range of motion. Skin:     General: Skin is warm and dry. Neurological:      Mental Status: She is alert and oriented to person, place, and time. Due to the COVID-19 pandemic, in order to reduce the spread and transmission of the virus, some basic elements of the physical exam have been deferred to reduce direct or close contact with the patient unless they are deemed to be absolutely necessary, regardless of whether the virus is highly suspected or not.       Diagnostic Study Results     Labs -     Recent Results (from the past 24 hour(s))   CBC WITH AUTOMATED DIFF    Collection Time: 03/29/22 12:56 AM   Result Value Ref Range    WBC 6.7 3.6 - 11.0 K/uL    RBC 4.54 3.80 - 5.20 M/uL    HGB 13.8 11.5 - 16.0 g/dL    HCT 38.4 35.0 - 47.0 %    MCV 84.6 80.0 - 99.0 FL    MCH 30.4 26.0 - 34.0 PG MCHC 35.9 30.0 - 36.5 g/dL    RDW 13.3 11.5 - 14.5 %    PLATELET 845 401 - 232 K/uL    MPV 9.2 8.9 - 12.9 FL    NRBC 0.0 0  WBC    ABSOLUTE NRBC 0.00 0.00 - 0.01 K/uL    NEUTROPHILS 63 32 - 75 %    LYMPHOCYTES 27 12 - 49 %    MONOCYTES 5 5 - 13 %    EOSINOPHILS 3 0 - 7 %    BASOPHILS 1 0 - 1 %    IMMATURE GRANULOCYTES 1 (H) 0.0 - 0.5 %    ABS. NEUTROPHILS 4.3 1.8 - 8.0 K/UL    ABS. LYMPHOCYTES 1.8 0.8 - 3.5 K/UL    ABS. MONOCYTES 0.3 0.0 - 1.0 K/UL    ABS. EOSINOPHILS 0.2 0.0 - 0.4 K/UL    ABS. BASOPHILS 0.0 0.0 - 0.1 K/UL    ABS. IMM. GRANS. 0.1 (H) 0.00 - 0.04 K/UL    DF AUTOMATED     METABOLIC PANEL, COMPREHENSIVE    Collection Time: 03/29/22 12:56 AM   Result Value Ref Range    Sodium 136 136 - 145 mmol/L    Potassium 3.3 (L) 3.5 - 5.1 mmol/L    Chloride 104 97 - 108 mmol/L    CO2 23 21 - 32 mmol/L    Anion gap 9 5 - 15 mmol/L    Glucose 267 (H) 65 - 100 mg/dL    BUN 9 6 - 20 MG/DL    Creatinine 0.83 0.55 - 1.02 MG/DL    BUN/Creatinine ratio 11 (L) 12 - 20      GFR est AA >60 >60 ml/min/1.73m2    GFR est non-AA >60 >60 ml/min/1.73m2    Calcium 9.1 8.5 - 10.1 MG/DL    Bilirubin, total 0.4 0.2 - 1.0 MG/DL    ALT (SGPT) 37 12 - 78 U/L    AST (SGOT) 32 15 - 37 U/L    Alk. phosphatase 79 45 - 117 U/L    Protein, total 7.3 6.4 - 8.2 g/dL    Albumin 3.8 3.5 - 5.0 g/dL    Globulin 3.5 2.0 - 4.0 g/dL    A-G Ratio 1.1 1.1 - 2.2     TYPE & SCREEN    Collection Time: 03/29/22 12:56 AM   Result Value Ref Range    Crossmatch Expiration 04/01/2022,2358     ABO/Rh(D) A NEGATIVE     Antibody screen NEG        Radiologic Studies -   No orders to display     CT Results  (Last 48 hours)    None        CXR Results  (Last 48 hours)    None            Medical Decision Making     I reviewed the vital signs, available nursing notes, past medical history, past surgical history, family history and social history.     Vital Signs-I have reviewed the vital signs that have been made available during the patient's emergency department visit.  The vital signs auto-populated below are obtained mostly by electronic means through monitoring devices that have been downloaded into the patient's chart by the nursing staff. Some vital signs are not downloaded into the chart until after the patient has been discharged and this note has been completed, therefore some vital signs may not be available to the physician for review prior to patient's discharge or admission. The physician has reviewed the patient's triage vital signs, monitored the electronic monitoring devices remotely for any significant vital sign abnormalities, and have reviewed vital signs prior to discharge. Some vital signs reviewed at bedside or remotely utilizing electronic monitoring devices may be different than the vital signs downloaded into the electronic medical record. Some vital signs may be erroneous and inaccurate since they are obtained by electronic monitoring devices, and not all vital signs are verified for accuracy by nursing staff prior to downloading into the patient's chart. Patient Vitals for the past 24 hrs:   Temp Pulse Resp BP SpO2   03/29/22 0219 -- -- -- (!) 155/93 --   03/29/22 0039 98.9 °F (37.2 °C) -- 20 (!) 150/89 98 %   03/29/22 0014 98.5 °F (36.9 °C) 89 20 (!) 174/86 100 %         Records Reviewed: Nursing notes for today's visit have been reviewed. I have also reviewed most recent medical records pertinent to today's complaints, if available in our medical record system. I have also reviewed all labs and imaging results from previous results in comparison to results obtained today. If an EKG was obtained today, it has been compared to previous EKGs, if available. If arriving via EMS, the EMS report has been reviewed if made available to us within the patient's time in the emergency department. Provider Notes (Medical Decision Making):   Patient with a history of ulcerative colitis presents with abdominal pain, rectal pain, and rectal bleeding. She also presents with a complaint of hives. She does have a history of allergic reactions which is likely an immune hypersensitivity reaction. I do not suspect a actual allergen or anaphylaxis to any type of environmental or food. I do not suspect any medication allergy. Her labs are unremarkable. She has a normal white blood cell count. I would not recommend imaging. Patient's  was reviewed and the patient does have large amount of controlled substances that has been prescribed over time. She has recently been prescribed benzos, however is not had opioids since January. I suspect she does have an exacerbation of her ulcerative colitis. Would recommend steroids. Follow-up with GI. For hypersensitivity reaction antihistamines and H2 blockers are recommended. Steroids for UC exacerbation will also help with the immune hypersensitivity reaction. ED Course:   Initial assessment performed. The patients presenting problems have been discussed, and they are in agreement with the care plan formulated and outlined with them. I have encouraged them to ask questions as they arise throughout their visit. ED Course as of 03/29/22 0347   Tue Mar 29, 2022   0345 Patient is requesting more pain medication.   She also claims that she has \"microcuts\" in her mouth from ice chips that we have given her. [WM]      ED Course User Index  [WM] Dao Bustos MD       Orders Placed This Encounter    CBC WITH AUTOMATED DIFF    METABOLIC PANEL, COMPREHENSIVE    TYPE & SCREEN    sodium chloride 0.9 % bolus infusion 1,000 mL    diphenhydrAMINE (BENADRYL) injection 25 mg    famotidine (PF) (PEPCID) 20 mg in 0.9% sodium chloride 10 mL injection    methylPREDNISolone (PF) (Solu-MEDROL) injection 125 mg    EPINEPHrine HCl (PF) (ADRENALIN) 1 mg/mL (1 mL) injection 0.3 mg    HYDROmorphone (DILAUDID) injection 1 mg    diphenhydrAMINE (BENADRYL) injection 25 mg    predniSONE (STERAPRED DS) 10 mg dose pack    HYDROcodone-acetaminophen (Lorcet, HYDROcodone,) 5-325 mg per tablet    HYDROmorphone (DILAUDID) injection 1 mg       Procedures      Critical Care Time:   0    Disposition:  Discharge    The patient's emergency department evaluation is now complete. I have reviewed all labs, imaging, and pertinent information. I have discussed all results with the patient and/or family. Based on our evaluation today I do believe that the patient is safe to be discharged home. The patient has been provided with at home instructions that are pertinent to their complaint today, although these may not be specific to the exact diagnosis. I have reviewed the patient's home medications and attempted to reconcile if not already done so by pharmacy or nursing staff. I have discussed all new prescriptions with the patient. The patient has been encouraged to follow-up with primary care doctor and/or specialist, and these have been discussed with the patient. The patient has been advised that they may return to the emergency department if they have any worsening symptoms and or new symptoms that are of concern to them. Verbal discharge instructions may have also been provided to the patient that may not be specifically contained in the written discharge instructions. The patient has been given opportunity to ask questions prior to discharge. PLAN:  1. Current Discharge Medication List      START taking these medications    Details   predniSONE (STERAPRED DS) 10 mg dose pack 12-day Dosepak use as directed  Qty: 48 Tablet, Refills: 0  Start date: 3/29/2022      HYDROcodone-acetaminophen (Lorcet, HYDROcodone,) 5-325 mg per tablet Take 1 Tablet by mouth every six (6) hours as needed for Pain for up to 3 days. Max Daily Amount: 4 Tablets. Qty: 12 Tablet, Refills: 0  Start date: 3/29/2022, End date: 4/1/2022    Associated Diagnoses: Exacerbation of ulcerative colitis with rectal bleeding ([facility] [address]           2.    Follow-up Information Follow up With Specialties Details Why Lamont Allen MD Gastroenterology Schedule an appointment as soon as possible for a visit   [address] [address]  [phone]          Return to ED if worse     Diagnosis     Clinical Impression:   1. Exacerbation of ulcerative colitis with rectal bleeding ([address]    2.  Hypersensitivity reaction, initial encounter

## 2022-03-29 NOTE — Clinical Note
[address]  [facility] EMERGENCY DEPT  [address] [facility]  [place] [id number] [phone]    Work/School Note    Date: 3/29/2022    To Whom It May concern:    Steve Barber was seen and treated today in the emergency room by the following provider(s):  Attending Provider: Dania Cabral MD.      Steve Barber is excused from work/school on 03/29/22 and 03/30/22. She is medically clear to return to work/school on 3/31/2022.        Sincerely,          Tyler Romo MD

## 2022-03-29 NOTE — DISCHARGE INSTRUCTIONS
It was a pleasure taking care of you in our Emergency Department today. We know that when you come to [facility], you are entrusting us with your health, comfort, and safety. Our physicians and nurses honor that trust, and truly appreciate the opportunity to care for you and your loved ones. We also value your feedback. If you receive a survey about your Emergency Department experience today, please fill it out. We care about our patients' feedback, and we listen to what you have to say. Please read over your discharge instructions as these contain pertinent information to help you in the healing process. These instructions include a list of prescriptions you were given today. Follow-up information is also noted on your discharge papers. There are attached instructions and information pertaining to the reason why you were seen in the emergency department today. These discharge instructions may not be for exactly why you were here, but may be the closest available instructions that we have. These include important advice for things that you can do at home to feel better, and reasons to return to the emergency department. The evaluation and treatment you received in the emergency department is not always definitive care. If follow-up with your primary care doctor or specialist was recommended, it is important that you make these appointments for follow-up care. You may need further testing, procedures, and/or medications to help you feel better. Further tests may be required that are not available in the emergency department. Failure to make these follow-up appointments may jeopardize your health. The emergency department is here for emergent stabilization and evaluation of life and limb threatening illness and/or injuries.   Further care through a specialist or primary care doctor may be required to assist in your healing and complete your treatment and/or evaluation. We may not always be able to make a diagnosis in the emergency department, or things may change that will alter your diagnosis. Our primary goal is to ensure that nothing serious is occurring and that you are stable to continue your treatment and evaluation at home as an outpatient. Of course, if things change, and you feel worse, you are always encouraged to return to the emergency department for re-evaluation. Lab Results Today:  Recent Results (from the past 8 hour(s))   CBC WITH AUTOMATED DIFF    Collection Time: 03/29/22 12:56 AM   Result Value Ref Range    WBC 6.7 3.6 - 11.0 K/uL    RBC 4.54 3.80 - 5.20 M/uL    HGB 13.8 11.5 - 16.0 g/dL    HCT 38.4 35.0 - 47.0 %    MCV 84.6 80.0 - 99.0 FL    MCH 30.4 26.0 - 34.0 PG    MCHC 35.9 30.0 - 36.5 g/dL    RDW 13.3 11.5 - 14.5 %    PLATELET 821 893 - 642 K/uL    MPV 9.2 8.9 - 12.9 FL    NRBC 0.0 0  WBC    ABSOLUTE NRBC 0.00 0.00 - 0.01 K/uL    NEUTROPHILS 63 32 - 75 %    LYMPHOCYTES 27 12 - 49 %    MONOCYTES 5 5 - 13 %    EOSINOPHILS 3 0 - 7 %    BASOPHILS 1 0 - 1 %    IMMATURE GRANULOCYTES 1 (H) 0.0 - 0.5 %    ABS. NEUTROPHILS 4.3 1.8 - 8.0 K/UL    ABS. LYMPHOCYTES 1.8 0.8 - 3.5 K/UL    ABS. MONOCYTES 0.3 0.0 - 1.0 K/UL    ABS. EOSINOPHILS 0.2 0.0 - 0.4 K/UL    ABS. BASOPHILS 0.0 0.0 - 0.1 K/UL    ABS. IMM. GRANS. 0.1 (H) 0.00 - 0.04 K/UL    DF AUTOMATED     METABOLIC PANEL, COMPREHENSIVE    Collection Time: 03/29/22 12:56 AM   Result Value Ref Range    Sodium 136 136 - 145 mmol/L    Potassium 3.3 (L) 3.5 - 5.1 mmol/L    Chloride 104 97 - 108 mmol/L    CO2 23 21 - 32 mmol/L    Anion gap 9 5 - 15 mmol/L    Glucose 267 (H) 65 - 100 mg/dL    BUN 9 6 - 20 MG/DL    Creatinine 0.83 0.55 - 1.02 MG/DL    BUN/Creatinine ratio 11 (L) 12 - 20      GFR est AA >60 >60 ml/min/1.73m2    GFR est non-AA >60 >60 ml/min/1.73m2    Calcium 9.1 8.5 - 10.1 MG/DL    Bilirubin, total 0.4 0.2 - 1.0 MG/DL    ALT (SGPT) 37 12 - 78 U/L    AST (SGOT) 32 15 - 37 U/L    Alk. phosphatase 79 45 - 117 U/L    Protein, total 7.3 6.4 - 8.2 g/dL    Albumin 3.8 3.5 - 5.0 g/dL    Globulin 3.5 2.0 - 4.0 g/dL    A-G Ratio 1.1 1.1 - 2.2     TYPE & SCREEN    Collection Time: 03/29/22 12:56 AM   Result Value Ref Range    Crossmatch Expiration 04/01/2022,2359     ABO/Rh(D) A NEGATIVE     Antibody screen NEG         Radiology Results Today:  No results found.

## 2022-03-29 NOTE — ED TRIAGE NOTES
.  Chief Complaint   Patient presents with    Allergic Reaction     Pt presents with c/o hives that have recently started to appear today, taken benadryl, states that she feels sob, has pmh of anaphalaxis. pt has seen specialist with unknown cause, pt states that her throat is feeling tight, pt in NAD in triage, 100% on room air    Melena     pt states rectal bleeding started a couple days ago, pt has pmh of ulcerative colitis.  VS stable

## 2022-03-30 ENCOUNTER — HOSPITAL ENCOUNTER (EMERGENCY)
Age: 52
Discharge: HOME | End: 2022-03-30
Attending: EMERGENCY MEDICINE
Payer: MEDICAID

## 2022-03-30 VITALS
HEIGHT: 61 IN | WEIGHT: 207.45 LBS | TEMPERATURE: 97.8 F | HEART RATE: 90 BPM | SYSTOLIC BLOOD PRESSURE: 142 MMHG | RESPIRATION RATE: 18 BRPM | OXYGEN SATURATION: 94 % | BODY MASS INDEX: 39.17 KG/M2 | DIASTOLIC BLOOD PRESSURE: 94 MMHG

## 2022-03-30 DIAGNOSIS — T78.40XA ALLERGIC REACTION, INITIAL ENCOUNTER: Primary | ICD-10-CM

## 2022-03-30 PROCEDURE — 99284 EMERGENCY DEPT VISIT MOD MDM: CPT

## 2022-03-30 PROCEDURE — 96374 THER/PROPH/DIAG INJ IV PUSH: CPT

## 2022-03-30 PROCEDURE — 96375 TX/PRO/DX INJ NEW DRUG ADDON: CPT

## 2022-03-30 PROCEDURE — 74011250636 HC RX REV CODE- 250/636: Performed by: EMERGENCY MEDICINE

## 2022-03-30 RX ORDER — DIPHENHYDRAMINE HYDROCHLORIDE 50 MG/ML
25 INJECTION, SOLUTION INTRAMUSCULAR; INTRAVENOUS
Status: COMPLETED | OUTPATIENT
Start: 2022-03-30 | End: 2022-03-30

## 2022-03-30 RX ORDER — FAMOTIDINE 10 MG/ML
20 INJECTION INTRAVENOUS
Status: COMPLETED | OUTPATIENT
Start: 2022-03-30 | End: 2022-03-30

## 2022-03-30 RX ADMIN — METHYLPREDNISOLONE SODIUM SUCCINATE 125 MG: 125 INJECTION, POWDER, FOR SOLUTION INTRAMUSCULAR; INTRAVENOUS at 04:14

## 2022-03-30 RX ADMIN — DIPHENHYDRAMINE HYDROCHLORIDE 25 MG: 50 INJECTION, SOLUTION INTRAMUSCULAR; INTRAVENOUS at 04:14

## 2022-03-30 RX ADMIN — FAMOTIDINE 20 MG: 10 INJECTION, SOLUTION INTRAVENOUS at 04:14

## 2022-03-30 NOTE — Clinical Note
[address]  [facility] EMERGENCY DEPT  [address] [place] [address]  Sudha Sotomayor [id number] [phone]    Work/School Note    Date: 3/30/2022    To Whom It May concern:    Cliff Alfonso was seen and treated today in the emergency room by the following provider(s):  Attending Provider: Fabiana Yao MD.      Cliff Alfonso is excused from work/school on 03/30/22 and 03/31/22. She is medically clear to return to work/school on 4/1/2022.        Sincerely,          Cindy Ascencio MD

## 2022-04-01 ENCOUNTER — TELEPHONE (OUTPATIENT)
Dept: INTERNAL MEDICINE CLINIC | Age: 52
End: 2022-04-01

## 2022-04-01 NOTE — TELEPHONE ENCOUNTER
----- Message from Liset Edmond sent at 4/1/2022 10:36 AM EDT -----  Subject: Hospital Follow Up    QUESTIONS  What hospital was the Patient Discharged from? [facility]  Date of Discharge? 2022-04-01  Discharge Location? Home  Reason for hospitalization as patient stated? anaphylactic reaction with   alterative colitis   What question does the patient have, if applicable?   ---------------------------------------------------------------------------  --------------  CALL BACK INFO  What is the best way for the office to contact you? Do not leave any   message, patient will call back for answer  Preferred Call Back Phone Number? [phone]  ---------------------------------------------------------------------------  --------------  SCRIPT ANSWERS  Relationship to Patient? Third Party  Third Party Type? Hospital?   Representative Name? Ange Soto  (Patient requests to see provider urgently. )? No  (Has the patient been discharged from the hospital within 2 business days   AND does not have a Telephone Encounter  Follow Up From [address] [place] [address]   documented in [facility]?)?  Yes

## 2022-04-01 NOTE — ED PROVIDER NOTES
EMERGENCY DEPARTMENT HISTORY AND PHYSICAL EXAM      Date: 3/30/2022  Patient Name: Brian Medley    History of Presenting Illness     Chief Complaint   Patient presents with    Allergic Reaction     Pt presents to ed due to allergic reaction that began about 2 hrs ago. Pt reports she was here last night for ulcerative colitis flair up and reports she was prescribed prednisone. She states she has hives on arms/chest and inside of her mouth. Pt states that she took benadryl about at onset of sx and reports slight improvement but states sx have returned. She reports that she feels that her voice is getting \"raspy\" and is concerned as she has had anaphylaxis in the past.        History Provided By: Patient    HPI: Brina Medley, 46 y.o. female with PMHx significant for ulcerative colitis, hyperlipidemia, hypertension, morbid obesity, diabetes presents to the ED with chief complaint of hives on her arms and hands and feeling like her mouth and throat are swelling. Patient had ED visit last night for ulcerative colitis flare and at that time had some mild hives on her arms. She was given an EpiPen and prescribed prednisone. Then 2 hours ago she felt like she was developing mouth sores and that her throat was closing. Denies any difficulty swallowing or breathing at this time. States her voice is \"raspy\". She took Benadryl at home with mild improvement, but symptoms seem to be coming back. Denies any chest pain reports. Reports chronic abdominal pain. PCP: Monisha Burgos MD    No current facility-administered medications on file prior to encounter. Current Outpatient Medications on File Prior to Encounter   Medication Sig Dispense Refill    predniSONE (STERAPRED DS) 10 mg dose pack 12-day Dosepak use as directed 48 Tablet 0    HYDROcodone-acetaminophen (Lorcet, HYDROcodone,) 5-325 mg per tablet Take 1 Tablet by mouth every six (6) hours as needed for Pain for up to 3 days.  Max Daily Amount: 4 Tablets. 12 Tablet 0    ALPRAZolam (XANAX) 1 mg tablet TAKE 1/2 - 1 TABLET BY MOUTH TWICE DAILY AS NEEDED FOR ANXIETY *MAX 2 TABS DAILY* 60 Tablet 0    Jardiance 25 mg tablet TAKE 1 TABLET BY MOUTH EVERY DAY 90 Tablet 1    melatonin 5 mg cap capsule Take  by mouth nightly.  estradioL (ESTRACE) 0.5 mg tablet TAKE 1 TABLET BY MOUTH EVERY DAY 90 Tablet 1    promethazine (PHENERGAN) 25 mg tablet Take 1 Tablet by mouth every six (6) hours as needed for Nausea. 12 Tablet 0    atorvastatin (LIPITOR) 20 mg tablet TAKE 1 TABLET BY MOUTH EVERY DAY 90 Tablet 1    Ventolin HFA 90 mcg/actuation inhaler TAKE 1 PUFF BY INHALATION EVERY FOUR (4) HOURS AS NEEDED FOR WHEEZING. 18 Each 1    glimepiride (AMARYL) 4 mg tablet TAKE 1 TABLET BY MOUTH ONCE DAILY BEFORE BREAKFAST 90 Tablet 1    naloxone (Narcan) 4 mg/actuation nasal spray Use 1 spray intranasally, then discard. Repeat with new spray every 2 min as needed for opioid overdose symptoms, alternating nostrils. 1 Each 0    losartan-hydroCHLOROthiazide (HYZAAR) 100-12.5 mg per tablet TAKE 1 TABLET BY MOUTH EVERY DAY 90 Tablet 1    dibucaine (NUPERCAINAL) 1 % rectal ointment by PeriANAL route every four (4) hours as needed for Pain. 28 g 0    dicyclomine (BENTYL) 20 mg tablet Take 1 Tablet by mouth every six (6) hours as needed for Abdominal Cramps. 20 Tablet 0    hyoscyamine SL (LEVSIN/SL) 0.125 mg SL tablet 1 Tablet by SubLINGual route every four (4) hours as needed for Cramping. (Patient not taking: Reported on 3/11/2022) 10 Tablet 0    ondansetron (Zofran ODT) 4 mg disintegrating tablet Take 1 Tablet by mouth every eight (8) hours as needed for Nausea. 12 Tablet 1    omeprazole (PRILOSEC) 20 mg capsule Take 40 mg by mouth Daily (before breakfast).  EPINEPHrine (EPIPEN) 0.3 mg/0.3 mL (1:1,000) injection 0.3 mL by IntraMUSCular route once as needed for up to 1 dose.  0.3 mL 1       Past History     Past Medical History:  Past Medical History:   Diagnosis Date    Anal fissure     Anisocoria     Asthma     LAST EPISODE     Back pain     Cerumen impaction     Chronic kidney disease     hx uti in past    Coagulation defects     ocassional rectal bleeding due to anal fissure    Colovaginal fistula     Diabetes (HCC)     NIDDM    Diverticulitis     Diverticulosis     Enlarged tonsils     Frequent UTI     GERD (gastroesophageal reflux disease)     H/O endoscopy     with dilation    HA (headache)     Hepatic steatosis     History of colon resection     Hx of colonoscopy with polypectomy     benign    Hypertension     Ill-defined condition     FREQUENT HIVES    Ill-defined condition     HX ELEVATED LIVER ENZYMES    Morbid obesity (HCC)     Nausea & vomiting     during diverticulitis flare    Obesity     Otitis media     Pneumonia     about 15 yrs ago    Psychiatric disorder     ANXIETY    Recurrent tonsillitis     Sinusitis     Transfusion history ~ age 35    postop hysterectomy    Urticaria        Past Surgical History:  Past Surgical History:   Procedure Laterality Date    COLONOSCOPY N/A 3/28/2019    COLONOSCOPY performed by Veronique Hector MD at [address] [place] [address] COLONOSCOPY N/A 10/2/2020    COLONOSCOPY performed by Veronique Hector MD at [address] [place] [address] HX APPENDECTOMY  2021    cancer.  HX BREAST REDUCTION  1992    blake.     HX GI  12    LAPAROSCOPIC HAND ASSISTED  POSS OPEN SIGMOID COLECTOMY POSS TEMPORARY DIVERTING LOOP ILEOSTOMY;  (no illeostomy needed)    HX GYN           HX GYN      cervical conization    HX HEENT      SINUS SURGERY LEFT X2    HX HEENT      SINUS SURGERY ON RIGHT X2    HX HEMORRHOIDECTOMY      HX HYSTERECTOMY      HX OTHER SURGICAL  2021    Sphincterotomy    HX PELVIC LAPAROSCOPY      HX EMMANUEL AND BSO      HX UROLOGICAL  12     CYSTOSCOPY INSERTION URETERAL CATHETERS - Cystoscopy Insertion of bilateral ureteral stents    UT ABDOMEN SURGERY PROC UNLISTED  2018 hernia repair at [address] UNLISTED      colon resection. Family History:  Family History   Problem Relation Age of Onset    Diabetes Mother     Cancer Mother         NON-HODGKINS LYMPHOMA    Anesth Problems Mother         PONV    Diabetes Father     Heart Disease Father         CAD - STENTS, PACEMAKER    Arrhythmia Father     Cancer Paternal Grandmother        Social History:  Social History     Tobacco Use    Smoking status: Never Smoker    Smokeless tobacco: Never Used   Vaping Use    Vaping Use: Not on file   Substance Use Topics    Alcohol use: Not Currently    Drug use: Yes     Types: OTC, Prescription       Allergies: Allergies   Allergen Reactions    Aspirin Hives, Shortness of Breath and Itching    Codeine Hives, Itching and Angioedema     Pt had itching in mouth, on face and lips    Contrast Agent [Iodine] Hives, Itching and Angioedema     5/5/21: pt was given benadryl and solu-medrol prior to administration but pt had severe tongue swelling and drooling, lethargy and itching. DO NOT GIVE PT    Flavoring Agent Anaphylaxis     Cherry flavor    Iodinated Contrast Media Anaphylaxis, Diarrhea, Hives, Nausea and Vomiting and Shortness of Breath    Percocet [Oxycodone-Acetaminophen] Hives, Itching and Angioedema     Pt had itching in mouth, on face and lips    Prilosec [Omeprazole Magnesium] Anaphylaxis     CHERRY FLAVORED ODT; PT TAKES REGULAR PRILOSEC AND IS OK    Dilaudid [Hydromorphone] Itching     Tolerates with benadryl    Ketorolac Rash     \"makes my eyes spasm and causes rash on my hands\" and \"makes my skin itch and makes me nervous\"    Morphine (Pf) Rash     According to rn, pt can take morphine with benadryl    Fentanyl Rash and Itching     Has had benadryl before         Review of Systems   Review of Systems   Constitutional: Negative for chills and fever. HENT: Positive for sore throat and voice change. Negative for congestion.     Respiratory: Negative for cough and chest tightness. Cardiovascular: Negative for chest pain. Gastrointestinal: Positive for abdominal pain. Genitourinary: Negative for dysuria and hematuria. Skin: Positive for rash. Neurological: Negative for syncope, light-headedness and headaches. Psychiatric/Behavioral: Negative for confusion. The patient is nervous/anxious. All other systems reviewed and are negative. Physical Exam    General appearance -overweight, well appearing, and in no distress  Eyes - pupils equal and reactive, extraocular eye movements intact  ENT - mucous membranes moist, pharynx normal without lesions, mild swelling of buccal aspect of right cheek, appears the patient has been biting the inside of her cheek, voice is slightly raspy, uvula is midline, there is no uvula edema or palatal edema. Tongue does not appear enlarged  Neck - supple, no significant adenopathy; non-tender to palpation  Chest - clear to auscultation, no wheezes, rales or rhonchi; non-tender to palpation  Heart - normal rate and regular rhythm, S1 and S2 normal, no murmurs noted  Abdomen - soft, nontender, nondistended, no masses or organomegaly  Musculoskeletal - no joint tenderness, deformity or swelling; normal ROM  Extremities - peripheral pulses normal, no pedal edema  Skin - normal coloration and turgor, erythematous rash on right arm at site of tape from IV last night, no definite hives  Neurological - alert, oriented x3, normal speech, no focal findings or movement disorder noted    Diagnostic Study Results     Labs -   No results found for this or any previous visit (from the past 12 hour(s)). Radiologic Studies -   No orders to display     CT Results  (Last 48 hours)    None        CXR Results  (Last 48 hours)    None            Medical Decision Making   I am the first provider for this patient.     I reviewed the vital signs, available nursing notes, past medical history, past surgical history, family history and social history. Vital Signs-Reviewed the patient's vital signs. Records Reviewed: Nursing Notes and Old Medical Records    Provider Notes (Medical Decision Making):   Differential diagnosis: Allergic reaction, dermatitis, anxiety  We will treat with Solu-Medrol and Benadryl and Pepcid. ED Course:   Initial assessment performed. The patients presenting problems have been discussed, and they are in agreement with the care plan formulated and outlined with them. I have encouraged them to ask questions as they arise throughout their visit. Progress Notes:  ED Course as of 04/01/22 1012   Wed Mar 30, 2022   0602 Patient is feeling much better after meds in the ED. requesting pain medication through her IV to help with chronic abdominal pain. Given that she has allergies to multiple pain medications and her pain is chronic, I discussed with her that I would not be giving her narcotic pain medicine in the ED because I am concerned about possible further allergic reaction and because her pain is chronic and should be treated by her usual doctors as an outpatient. She verbalized understanding. .  No tongue swelling or lip swelling. Patient is already on prednisone. Will instruct her to take Zyrtec or Benadryl and Pepcid over-the-counter. Return to ED for worsening symptoms. [AO]      ED Course User Index  [AO] Fabiana Yao MD       Disposition:  Discharge home    PLAN:  1. Discharge Medication List as of 3/30/2022  6:03 AM        2.    Follow-up Information     Follow up With Specialties Details Why Mary Carvajal MD Internal Medicine Schedule an appointment as soon as possible for a visit   [address] [facility]  [phone]      hospitals EMERGENCY DEPT Emergency Medicine  If symptoms worsen [address] [facility]    Avtar Alcantara MD Allergy Schedule an appointment as soon as possible for a visit   [address] [facility] [address]  [phone]          Return to ED if worse     Diagnosis     Clinical Impression:   1.  Allergic reaction, initial encounter

## 2022-04-01 NOTE — TELEPHONE ENCOUNTER
4/1 left vane booked first available VV for 4/12 and if that date and time doesn't work to call back to reschedule

## 2022-04-07 ENCOUNTER — APPOINTMENT (OUTPATIENT)
Dept: CT IMAGING | Age: 52
End: 2022-04-07
Attending: EMERGENCY MEDICINE
Payer: MEDICAID

## 2022-04-07 ENCOUNTER — HOSPITAL ENCOUNTER (EMERGENCY)
Age: 52
Discharge: HOME | End: 2022-04-07
Attending: EMERGENCY MEDICINE
Payer: MEDICAID

## 2022-04-07 VITALS
BODY MASS INDEX: 36.8 KG/M2 | DIASTOLIC BLOOD PRESSURE: 78 MMHG | TEMPERATURE: 98.3 F | WEIGHT: 200 LBS | OXYGEN SATURATION: 90 % | RESPIRATION RATE: 17 BRPM | SYSTOLIC BLOOD PRESSURE: 130 MMHG | HEART RATE: 85 BPM | HEIGHT: 62 IN

## 2022-04-07 DIAGNOSIS — L50.9 URTICARIA: ICD-10-CM

## 2022-04-07 DIAGNOSIS — R10.84 ABDOMINAL PAIN, GENERALIZED: Primary | ICD-10-CM

## 2022-04-07 LAB
ALBUMIN SERPL-MCNC: 3.8 G/DL (ref 3.5–5)
ALBUMIN/GLOB SERPL: 1.1 {RATIO} (ref 1.1–2.2)
ALP SERPL-CCNC: 66 U/L (ref 45–117)
ALT SERPL-CCNC: 32 U/L (ref 12–78)
ANION GAP SERPL CALC-SCNC: 7 MMOL/L (ref 5–15)
APPEARANCE UR: CLEAR
AST SERPL-CCNC: 20 U/L (ref 15–37)
BACTERIA URNS QL MICRO: NEGATIVE /HPF
BASOPHILS # BLD: 0 K/UL (ref 0–0.1)
BASOPHILS NFR BLD: 0 % (ref 0–1)
BILIRUB SERPL-MCNC: 0.7 MG/DL (ref 0.2–1)
BILIRUB UR QL: NEGATIVE
BUN SERPL-MCNC: 11 MG/DL (ref 6–20)
BUN/CREAT SERPL: 15 (ref 12–20)
CALCIUM SERPL-MCNC: 9.6 MG/DL (ref 8.5–10.1)
CHLORIDE SERPL-SCNC: 105 MMOL/L (ref 97–108)
CO2 SERPL-SCNC: 25 MMOL/L (ref 21–32)
COLOR UR: ABNORMAL
CREAT SERPL-MCNC: 0.71 MG/DL (ref 0.55–1.02)
CRP SERPL-MCNC: 0.5 MG/DL (ref 0–0.6)
DIFFERENTIAL METHOD BLD: NORMAL
EOSINOPHIL # BLD: 0.1 K/UL (ref 0–0.4)
EOSINOPHIL NFR BLD: 1 % (ref 0–7)
EPITH CASTS URNS QL MICRO: ABNORMAL /LPF
ERYTHROCYTE [DISTWIDTH] IN BLOOD BY AUTOMATED COUNT: 13.2 % (ref 11.5–14.5)
ERYTHROCYTE [SEDIMENTATION RATE] IN BLOOD: 5 MM/HR (ref 0–30)
GLOBULIN SER CALC-MCNC: 3.4 G/DL (ref 2–4)
GLUCOSE SERPL-MCNC: 180 MG/DL (ref 65–100)
GLUCOSE UR STRIP.AUTO-MCNC: >1000 MG/DL
HCT VFR BLD AUTO: 38.9 % (ref 35–47)
HGB BLD-MCNC: 13.8 G/DL (ref 11.5–16)
HGB UR QL STRIP: NEGATIVE
IMM GRANULOCYTES # BLD AUTO: 0 K/UL (ref 0–0.04)
IMM GRANULOCYTES NFR BLD AUTO: 0 % (ref 0–0.5)
KETONES UR QL STRIP.AUTO: NEGATIVE MG/DL
LEUKOCYTE ESTERASE UR QL STRIP.AUTO: NEGATIVE
LIPASE SERPL-CCNC: 145 U/L (ref 73–393)
LYMPHOCYTES # BLD: 2.5 K/UL (ref 0.8–3.5)
LYMPHOCYTES NFR BLD: 29 % (ref 12–49)
MAGNESIUM SERPL-MCNC: 2.4 MG/DL (ref 1.6–2.4)
MCH RBC QN AUTO: 30.7 PG (ref 26–34)
MCHC RBC AUTO-ENTMCNC: 35.5 G/DL (ref 30–36.5)
MCV RBC AUTO: 86.4 FL (ref 80–99)
MONOCYTES # BLD: 0.5 K/UL (ref 0–1)
MONOCYTES NFR BLD: 6 % (ref 5–13)
NEUTS SEG # BLD: 5.7 K/UL (ref 1.8–8)
NEUTS SEG NFR BLD: 64 % (ref 32–75)
NITRITE UR QL STRIP.AUTO: NEGATIVE
NRBC # BLD: 0 K/UL (ref 0–0.01)
NRBC BLD-RTO: 0 PER 100 WBC
PH UR STRIP: 5.5 [PH] (ref 5–8)
PLATELET # BLD AUTO: 187 K/UL (ref 150–400)
PMV BLD AUTO: 9.5 FL (ref 8.9–12.9)
POTASSIUM SERPL-SCNC: 3.2 MMOL/L (ref 3.5–5.1)
PROT SERPL-MCNC: 7.2 G/DL (ref 6.4–8.2)
PROT UR STRIP-MCNC: NEGATIVE MG/DL
RBC # BLD AUTO: 4.5 M/UL (ref 3.8–5.2)
RBC #/AREA URNS HPF: ABNORMAL /HPF (ref 0–5)
SODIUM SERPL-SCNC: 137 MMOL/L (ref 136–145)
SP GR UR REFRACTOMETRY: 1.02 (ref 1–1.03)
UA: UC IF INDICATED,UAUC: ABNORMAL
UROBILINOGEN UR QL STRIP.AUTO: 0.2 EU/DL (ref 0.2–1)
WBC # BLD AUTO: 8.9 K/UL (ref 3.6–11)
WBC URNS QL MICRO: ABNORMAL /HPF (ref 0–4)
YEAST URNS QL MICRO: PRESENT

## 2022-04-07 PROCEDURE — 85025 COMPLETE CBC W/AUTO DIFF WBC: CPT

## 2022-04-07 PROCEDURE — 74176 CT ABD & PELVIS W/O CONTRAST: CPT

## 2022-04-07 PROCEDURE — 81001 URINALYSIS AUTO W/SCOPE: CPT

## 2022-04-07 PROCEDURE — 96375 TX/PRO/DX INJ NEW DRUG ADDON: CPT

## 2022-04-07 PROCEDURE — 99284 EMERGENCY DEPT VISIT MOD MDM: CPT

## 2022-04-07 PROCEDURE — 87086 URINE CULTURE/COLONY COUNT: CPT

## 2022-04-07 PROCEDURE — 83735 ASSAY OF MAGNESIUM: CPT

## 2022-04-07 PROCEDURE — 83690 ASSAY OF LIPASE: CPT

## 2022-04-07 PROCEDURE — 74011250636 HC RX REV CODE- 250/636: Performed by: EMERGENCY MEDICINE

## 2022-04-07 PROCEDURE — 36415 COLL VENOUS BLD VENIPUNCTURE: CPT

## 2022-04-07 PROCEDURE — 86140 C-REACTIVE PROTEIN: CPT

## 2022-04-07 PROCEDURE — 96376 TX/PRO/DX INJ SAME DRUG ADON: CPT

## 2022-04-07 PROCEDURE — 85652 RBC SED RATE AUTOMATED: CPT

## 2022-04-07 PROCEDURE — 96374 THER/PROPH/DIAG INJ IV PUSH: CPT

## 2022-04-07 PROCEDURE — 80053 COMPREHEN METABOLIC PANEL: CPT

## 2022-04-07 RX ORDER — DIPHENHYDRAMINE HYDROCHLORIDE 50 MG/ML
50 INJECTION, SOLUTION INTRAMUSCULAR; INTRAVENOUS
Status: COMPLETED | OUTPATIENT
Start: 2022-04-07 | End: 2022-04-07

## 2022-04-07 RX ORDER — MORPHINE SULFATE 2 MG/ML
4 INJECTION, SOLUTION INTRAMUSCULAR; INTRAVENOUS ONCE
Status: COMPLETED | OUTPATIENT
Start: 2022-04-07 | End: 2022-04-07

## 2022-04-07 RX ORDER — DIPHENHYDRAMINE HYDROCHLORIDE 50 MG/ML
25 INJECTION, SOLUTION INTRAMUSCULAR; INTRAVENOUS
Status: COMPLETED | OUTPATIENT
Start: 2022-04-07 | End: 2022-04-07

## 2022-04-07 RX ORDER — HYDROMORPHONE HYDROCHLORIDE 1 MG/ML
0.5 INJECTION, SOLUTION INTRAMUSCULAR; INTRAVENOUS; SUBCUTANEOUS
Status: COMPLETED | OUTPATIENT
Start: 2022-04-07 | End: 2022-04-07

## 2022-04-07 RX ORDER — CETIRIZINE HCL 10 MG
10 TABLET ORAL DAILY
Qty: 20 TABLET | Refills: 0 | Status: SHIPPED | OUTPATIENT
Start: 2022-04-07

## 2022-04-07 RX ADMIN — MORPHINE SULFATE 4 MG: 2 INJECTION, SOLUTION INTRAMUSCULAR; INTRAVENOUS at 04:58

## 2022-04-07 RX ADMIN — DIPHENHYDRAMINE HYDROCHLORIDE 25 MG: 50 INJECTION, SOLUTION INTRAMUSCULAR; INTRAVENOUS at 08:02

## 2022-04-07 RX ADMIN — HYDROMORPHONE HYDROCHLORIDE 0.5 MG: 1 INJECTION, SOLUTION INTRAMUSCULAR; INTRAVENOUS; SUBCUTANEOUS at 07:07

## 2022-04-07 RX ADMIN — METHYLPREDNISOLONE SODIUM SUCCINATE 125 MG: 125 INJECTION, POWDER, FOR SOLUTION INTRAMUSCULAR; INTRAVENOUS at 04:56

## 2022-04-07 RX ADMIN — DIPHENHYDRAMINE HYDROCHLORIDE 50 MG: 50 INJECTION, SOLUTION INTRAMUSCULAR; INTRAVENOUS at 04:55

## 2022-04-07 NOTE — ED NOTES
Patient discharged by Dr. Rupali Hensley. Patient provided with discharge instructions Rx and instructions on follow up care. Patient out of ED ambulatory accompanied by family.

## 2022-04-07 NOTE — ED NOTES
This RN agrees with triage note. Chief Complaint   Patient presents with    Abdominal Pain     pt presents with c/o upper abdominal pain that radiates around to back, pt states that she was recently diagnosed with kidney infection and placed on antibiotics. Pt states she thinks she may be having an ulcerative colitis flare as she feels like she is getting a rash in her mouth, mouth, chest and face. Pt in NAD in triage, speaking in complete sentences and airway intact. Pt 99% on room air.  Back Pain     Pt A&Ox4 (person, place, time, and situation). Respirations even and unlabored. Skin warm, dry, and appropriate for ethnicity. PMS intact. Pt on continuous monitor. VSS. NAD noted. Stretcher in low and locked position. Denies having any further needs at this time. Call light within reach.

## 2022-04-07 NOTE — LETTER
[address]  [facility] [place] [facility] EM[facility] DEPT  [address] [place] [id number]  [phone]    Work/School Note    Date: 4/7/2022    To Whom It May concern:    Reji Wellington was seen and treated today in the emergency room by the following provider(s):  No providers found. Reji Wellington may return to work on 4/8/2022.     Sincerely,          Lord Georges RN

## 2022-04-07 NOTE — ED PROVIDER NOTES
EMERGENCY DEPARTMENT HISTORY AND PHYSICAL EXAM      Date: 4/7/2022  Patient Name: Reji Wellington    History of Presenting Illness     Chief Complaint   Patient presents with    Abdominal Pain     pt presents with c/o upper abdominal pain that radiates around to back, pt states that she was recently diagnosed with kidney infection and placed on antibiotics. Pt states she thinks she may be having an ulcerative colitis flare as she feels like she is getting a rash in her mouth, mouth, chest and face. Pt in NAD in triage, speaking in complete sentences and airway intact. Pt 99% on room air.  Back Pain       History Provided By: Patient    HPI: Reji Wellington, 46 y.o. female with PMHx as noted below presents emergency department with chief complaint of abdominal pain and hives. Patient states that she has been having pain now for the last week. She describes a diffuse, constant dull aching abdominal pain that is nonradiating. Patient states that she was just recently admitted at [facility] and diagnosed with pyelonephritis. Patient states she is continuing to have abdominal pain since being discharged several days ago. Patient also reports developing hives throughout her body over the last couple days and states this will occur when she has ulcerative colitis flares. Pt denies any other alleviating or exacerbating factors. Additionally, pt specifically denies any recent fever, chills, headache, , CP, SOB, lightheadedness, dizziness, numbness, weakness, BLE swelling, heart palpitations, urinary sxs, diarrhea, constipation, melena, hematochezia, cough, or congestion. PCP: Ofelia Mccarthy MD    Current Outpatient Medications   Medication Sig Dispense Refill    cetirizine (ZyrTEC) 10 mg tablet Take 1 Tablet by mouth daily.  20 Tablet 0    predniSONE (STERAPRED DS) 10 mg dose pack 12-day Dosepak use as directed 48 Tablet 0    ALPRAZolam (XANAX) 1 mg tablet TAKE 1/2 - 1 TABLET BY MOUTH TWICE DAILY AS NEEDED FOR ANXIETY *MAX 2 TABS DAILY* 60 Tablet 0    Jardiance 25 mg tablet TAKE 1 TABLET BY MOUTH EVERY DAY 90 Tablet 1    melatonin 5 mg cap capsule Take  by mouth nightly.  estradioL (ESTRACE) 0.5 mg tablet TAKE 1 TABLET BY MOUTH EVERY DAY 90 Tablet 1    promethazine (PHENERGAN) 25 mg tablet Take 1 Tablet by mouth every six (6) hours as needed for Nausea. 12 Tablet 0    atorvastatin (LIPITOR) 20 mg tablet TAKE 1 TABLET BY MOUTH EVERY DAY 90 Tablet 1    Ventolin HFA 90 mcg/actuation inhaler TAKE 1 PUFF BY INHALATION EVERY FOUR (4) HOURS AS NEEDED FOR WHEEZING. 18 Each 1    glimepiride (AMARYL) 4 mg tablet TAKE 1 TABLET BY MOUTH ONCE DAILY BEFORE BREAKFAST 90 Tablet 1    naloxone (Narcan) 4 mg/actuation nasal spray Use 1 spray intranasally, then discard. Repeat with new spray every 2 min as needed for opioid overdose symptoms, alternating nostrils. 1 Each 0    losartan-hydroCHLOROthiazide (HYZAAR) 100-12.5 mg per tablet TAKE 1 TABLET BY MOUTH EVERY DAY 90 Tablet 1    dibucaine (NUPERCAINAL) 1 % rectal ointment by PeriANAL route every four (4) hours as needed for Pain. 28 g 0    dicyclomine (BENTYL) 20 mg tablet Take 1 Tablet by mouth every six (6) hours as needed for Abdominal Cramps. 20 Tablet 0    hyoscyamine SL (LEVSIN/SL) 0.125 mg SL tablet 1 Tablet by SubLINGual route every four (4) hours as needed for Cramping. (Patient not taking: Reported on 3/11/2022) 10 Tablet 0    ondansetron (Zofran ODT) 4 mg disintegrating tablet Take 1 Tablet by mouth every eight (8) hours as needed for Nausea. 12 Tablet 1    omeprazole (PRILOSEC) 20 mg capsule Take 40 mg by mouth Daily (before breakfast).  EPINEPHrine (EPIPEN) 0.3 mg/0.3 mL (1:1,000) injection 0.3 mL by IntraMUSCular route once as needed for up to 1 dose.  0.3 mL 1       Past History     Past Medical History:  Past Medical History:   Diagnosis Date    Anal fissure     Anisocoria     Asthma     LAST EPISODE     Back pain     Cerumen impaction     Chronic kidney disease     hx uti in past    Coagulation defects     ocassional rectal bleeding due to anal fissure    Colovaginal fistula     Diabetes (HCC)     NIDDM    Diverticulitis     Diverticulosis     Enlarged tonsils     Frequent UTI     GERD (gastroesophageal reflux disease)     H/O endoscopy     with dilation    HA (headache)     Hepatic steatosis     History of colon resection     Hx of colonoscopy with polypectomy     benign    Hypertension     Ill-defined condition     FREQUENT HIVES    Ill-defined condition     HX ELEVATED LIVER ENZYMES    Morbid obesity (HCC)     Nausea & vomiting     during diverticulitis flare    Obesity     Otitis media     Pneumonia     about 15 yrs ago    Psychiatric disorder     ANXIETY    Recurrent tonsillitis     Sinusitis     Transfusion history ~ age 35    postop hysterectomy    Urticaria        Past Surgical History:  Past Surgical History:   Procedure Laterality Date    COLONOSCOPY N/A 3/28/2019    COL[facility] Deepti [facility] at [facility] COLONOSCOPY N/A 10/2/2020    COLONOSCOPY performed by Deepti Ashley MD at [address] [place] [address] HX APPENDECTOMY  2021    cancer.  HX BREAST REDUCTION  1992    [facility].  HX GI  12    LAPAROSCOPIC HAND ASSISTED  POSS OPEN SIGMOID COLECTOMY POSS TEMPORARY DIVERTING LOOP ILEOSTOMY;  (no illeostomy needed)    HX GYN           HX GYN      cervical conization    HX HEENT      SINUS SURGERY LEFT X2    HX HEENT      SINUS SURGERY ON RIGHT X2    HX HEMORRHOIDECTOMY      HX HYSTERECTOMY      HX OTHER SURGICAL  2021    Sphincterotomy    HX PELVIC LAPAROSCOPY      HX EMMANUEL AND BSO      HX UROLOGICAL  12     CYSTOSCOPY INSERTION URETERAL CATHETERS - Cystoscopy Insertion of bilateral ureteral stents    [place] ABDOMEN SURGERY PROC UNLISTED  2018    hernia repair at [address]    [place] UNLISTED      colon resection.         Family History:  Family History   Problem Relation Age of Onset    Diabetes Mother     Cancer Mother         NON-HODGKINS LYMPHOMA    Anesth Problems Mother         PONV    Diabetes Father     Heart Disease Father         CAD - STENTS, PACEMAKER    Arrhythmia Father     Cancer Paternal Grandmother        Social History:  Social History     Tobacco Use    Smoking status: Never Smoker    Smokeless tobacco: Never Used   Vaping Use    Vaping Use: Not on file   Substance Use Topics    Alcohol use: Not Currently    Drug use: Yes     Types: OTC, Prescription       Allergies: Allergies   Allergen Reactions    Aspirin Hives, Shortness of Breath and Itching    Codeine Hives, Itching and Angioedema     Pt had itching in mouth, on face and lips    Contrast Agent [Iodine] Hives, Itching and Angioedema     5/5/21: pt was given benadryl and solu-medrol prior to administration but pt had severe tongue swelling and drooling, lethargy and itching. DO NOT GIVE PT    Flavoring Agent Anaphylaxis     Cherry flavor    Iodinated Contrast Media Anaphylaxis, Diarrhea, Hives, Nausea and Vomiting and Shortness of Breath    Percocet [Oxycodone-Acetaminophen] Hives, Itching and Angioedema     Pt had itching in mouth, on face and lips    Prilosec [Omeprazole Magnesium] Anaphylaxis     CHERRY FLAVORED ODT; PT TAKES REGULAR PRILOSEC AND IS OK    Dilaudid [Hydromorphone] Itching     Tolerates with benadryl    Ketorolac Rash     \"makes my eyes spasm and causes rash on my hands\" and \"makes my skin itch and makes me nervous\"    Morphine (Pf) Rash     According to rn, pt can take morphine with benadryl    Fentanyl Rash and Itching     Has had benadryl before         Review of Systems   Review of Systems  Constitutional: Negative for fever, chills, and fatigue. HENT: Negative for congestion, sore throat, rhinorrhea, sneezing and neck stiffness   Eyes: Negative for discharge and redness.    Respiratory: Negative for  shortness of breath, wheezing   Cardiovascular: Negative for chest pain, palpitations   Gastrointestinal: Positive for nausea, vomiting, abdominal pain. Negative constipation, diarrhea and blood in stool. Genitourinary: Negative for dysuria, hematuria, decreased urine volume, discharge,   Musculoskeletal: Negative for myalgias or joint pain . Skin: Positive for rash. Negative lesions . Neurological: Negative weakness, light-headedness, numbness and headaches. Physical Exam   Physical Exam    GENERAL: alert and oriented, no acute distress  EYES: PEERL, No injection, discharge or icterus. ENT: Mucous membranes pink and moist.  No intraoral swelling, erythema, no mucous membrane involvement  NECK: Supple  LUNGS: Airway patent. Non-labored respirations. Breath sounds clear with good air entry bilaterally. HEART: Regular rate and rhythm. No peripheral edema  ABDOMEN: Non-distended, mild diffuse tenderness without guarding or rebound.   SKIN:  warm, dry, faint urticarial rash noted on patient's arms, 1 spot noted on the patient's neck  MSK/EXTREMITIES: Without swelling, tenderness or deformity, symmetric with normal ROM  NEUROLOGICAL: Alert, oriented      Diagnostic Study Results     Labs -     Recent Results (from the past 12 hour(s))   URINALYSIS W/ REFLEX CULTURE    Collection Time: 04/07/22  6:54 AM    Specimen: Urine   Result Value Ref Range    Color YELLOW/STRAW      Appearance CLEAR CLEAR      Specific gravity 1.020 1.003 - 1.030      pH (UA) 5.5 5.0 - 8.0      Protein Negative NEG mg/dL    Glucose >1,000 (A) NEG mg/dL    Ketone Negative NEG mg/dL    Bilirubin Negative NEG      Blood Negative NEG      Urobilinogen 0.2 0.2 - 1.0 EU/dL    Nitrites Negative NEG      Leukocyte Esterase Negative NEG      WBC 20-50 0 - 4 /hpf    RBC 0-5 0 - 5 /hpf    Epithelial cells FEW FEW /lpf    Bacteria Negative NEG /hpf    UA:UC IF INDICATED URINE CULTURE ORDERED (A) CNI      Yeast PRESENT (A) NEG         Radiologic Studies -   CT ABD PELV WO CONT   Final Result   1. No CT explanation for the patient's abdominal pain. 2. Incidental findings as above. CT Results  (Last 48 hours)               04/07/22 0642  CT ABD PELV WO CONT Final result    Impression:  1. No CT explanation for the patient's abdominal pain. 2. Incidental findings as above. Narrative:  EXAM:  CT ABDOMEN PELVIS WITHOUT CONTRAST   INDICATION:  diffuse abd pain. Additional history: Upper abdominal pain that radiates to the back   COMPARISON: CT of the abdomen and pelvis, 1/21/2022. .   TECHNIQUE:    Unenhanced multislice helical CT was performed from the diaphragm to the   symphysis pubis without intravenous contrast administration. Contiguous 5 mm   axial images were reconstructed and lung and soft tissue windows were generated. Coronal and sagittal reformations were generated. CT dose reduction was achieved through use of a standardized protocol tailored   for this examination and automatic exposure control for dose modulation. Jahaira Maid FINDINGS:   INCIDENTALLY IMAGED CHEST:   Heart/vessels: Within normal limits. Lungs/Pleura: Within normal limits. .   ABDOMEN:   Liver: Within normal limits. Gallbladder/Biliary: Status post cholecystectomy. Spleen: Calculus in the spleen suggesting remote granulomatous disease. Splenomegaly. Pancreas: Within normal limits. Adrenals: Within normal limits. Kidneys: Within normal limits. Peritoneum/Mesenteries: Surgical clips in the right lower quadrant. Surgical   clips adjacent to the spleen   Extraperitoneum: Within normal limits. Gastrointestinal tract: There is an anastomotic suture line at the rectosigmoid   junction. The appendix is not visualized   Vascular: Right gonadal vein phleboliths. Jahaira Maid PELVIS:   Extraperitoneum: The floor the pelvis suggesting phleboliths. Ureters: Within normal limits. Bladder: Within normal limits. Reproductive System: Status post hysterectomy.    .   MSK: Postsurgical changes in the anterior abdominal wall at the umbilicus. .           CXR Results  (Last 48 hours)    None            Medical Decision Making     IQuinton MD am the first provider for this patient and am the attending of record for this patient encounter. I reviewed the vital signs, available nursing notes, past medical history, past surgical history, family history and social history. Vital Signs-Reviewed the patient's vital signs. Patient Vitals for the past 12 hrs:   Resp BP SpO2   04/07/22 0830 -- 130/78 90 %   04/07/22 0815 -- 130/67 92 %   04/07/22 0757 -- -- 92 %   04/07/22 0730 -- 126/77 92 %   04/07/22 0700 17 130/83 94 %   04/07/22 0630 -- 127/73 95 %   04/07/22 0615 -- 112/74 92 %         Records Reviewed: Nursing Notes and Old Medical Records    Provider Notes (Medical Decision Making): The patient presents with a chief complaint of abdominal pain and rash, history of similar presentations, recent admission for pyelonephritis. Differential diagnosis considered includes allergic reaction, anaphylaxis, acute appendicitis, acute cholecystitis, pancreatitis, gastritis, PUD, diverticulitis, mesenteric ischemia, abdominal aortic aneurysm, bowel obstruction, enteritis, colitis, fecal impaction, volvulus, IBS, inflammatory bowel disease, specific food intolerance, peritonitis, perforated viscous, malignancy, UTI, abscess, and abdominal pain NOS. All labs and diagnostic studies were reviewed and interpreted by me, no acute findings noted. Clinical examination, history, and work-up exclude some of the more serious diagnoses as listed above. Cause of the abdominal pain was not clearly identified. Patient was given steroids, Benadryl, IV pain medication with improvement. Patient observed for several hours without any signs of anaphylaxis or worsening allergic response, no airway involvement at this time. On reevaluation pt is tolerating po.   The patient states that their symptoms have improved and he feels much better. There are no other new complaints at this time. Patient currently on steroid pack at home, also already has EpiPen's at home. There were no concerns for any acute life-threatening or surgical pathology that would warrant admission at this time. Vital signs were within acceptable limits at the time of discharge. Patient was in stable condition and felt to be reliable for outpatient management. There were no lab findings of immediate concern. The patient was advised to return to the emergency room for acutely worsening pain, persistence of pain, fevers, bloody stools, intractable vomiting or bloody emesis, inability to tolerate food/liquids, syncope, or change in mental status. Otherwise, the patient is encouraged to follow-up with a primary care physician within the week if possible. .  The patient understood the work-up and assessment and had no further questions. The patient was discharged home in stable clinical condition. ED Course:   Initial assessment performed. The patients presenting problems have been discussed, and they are in agreement with the care plan formulated and outlined with them. I have encouraged them to ask questions as they arise throughout their visit. Medications   morphine injection 4 mg (4 mg IntraVENous Given 4/7/22 0458)   diphenhydrAMINE (BENADRYL) injection 50 mg (50 mg IntraVENous Given 4/7/22 0455)   methylPREDNISolone (PF) (Solu-MEDROL) injection 125 mg (125 mg IntraVENous Given 4/7/22 0456)   HYDROmorphone (DILAUDID) injection 0.5 mg (0.5 mg IntraVENous Given 4/7/22 0707)   diphenhydrAMINE (BENADRYL) injection 25 mg (25 mg IntraVENous Given 4/7/22 0802)         CHELLE Bennett  results have been reviewed with her. She has been counseled regarding her diagnosis.   She verbally conveys understanding and agreement of the signs, symptoms, diagnosis, treatment and prognosis and additionally agrees to follow up as recommended with Dr. Patricia Valera MD in 24 - 48 hours. She also agrees with the care-plan and conveys that all of her questions have been answered. I have also put together some discharge instructions for her that include: 1) educational information regarding their diagnosis, 2) how to care for their diagnosis at home, as well a 3) list of reasons why they would want to return to the ED prior to their follow-up appointment, should their condition change. Disposition:  home    PLAN:  1. Discharge Medication List as of 4/7/2022  7:28 AM      START taking these medications    Details   cetirizine (ZyrTEC) 10 mg tablet Take 1 Tablet by mouth daily. , Normal, Disp-20 Tablet, R-0         CONTINUE these medications which have NOT CHANGED    Details   predniSONE (STERAPRED DS) 10 mg dose pack 12-day Dosepak use as directed, Normal, Disp-48 Tablet, R-0      ALPRAZolam (XANAX) 1 mg tablet TAKE 1/2 - 1 TABLET BY MOUTH TWICE DAILY AS NEEDED FOR ANXIETY *MAX 2 TABS DAILY*, Normal, Disp-60 Tablet, R-0Not to exceed 5 additional fills before 08/14/2022      Jardiance 25 mg tablet TAKE 1 TABLET BY MOUTH EVERY DAY, Normal, Disp-90 Tablet, R-1      melatonin 5 mg cap capsule Take  by mouth nightly., Historical Med      estradioL (ESTRACE) 0.5 mg tablet TAKE 1 TABLET BY MOUTH EVERY DAY, Normal, Disp-90 Tablet, R-1      promethazine (PHENERGAN) 25 mg tablet Take 1 Tablet by mouth every six (6) hours as needed for Nausea., Normal, Disp-12 Tablet, R-0      atorvastatin (LIPITOR) 20 mg tablet TAKE 1 TABLET BY MOUTH EVERY DAY, Normal, Disp-90 Tablet, R-1      Ventolin HFA 90 mcg/actuation inhaler TAKE 1 PUFF BY INHALATION EVERY FOUR (4) HOURS AS NEEDED FOR WHEEZING., Normal, Disp-18 Each, R-1      glimepiride (AMARYL) 4 mg tablet TAKE 1 TABLET BY MOUTH ONCE DAILY BEFORE BREAKFAST, Normal, Disp-90 Tablet, R-1      naloxone (Narcan) 4 mg/actuation nasal spray Use 1 spray intranasally, then discard.  Repeat with new spray every 2 min as needed for opioid overdose symptoms, alternating nostrils. , Normal, Disp-1 Each, R-0      losartan-hydroCHLOROthiazide (HYZAAR) 100-12.5 mg per tablet TAKE 1 TABLET BY MOUTH EVERY DAY, Normal, Disp-90 Tablet, R-1      dibucaine (NUPERCAINAL) 1 % rectal ointment by PeriANAL route every four (4) hours as needed for Pain., Normal, Disp-28 g, R-0      dicyclomine (BENTYL) 20 mg tablet Take 1 Tablet by mouth every six (6) hours as needed for Abdominal Cramps., Normal, Disp-20 Tablet, R-0      hyoscyamine SL (LEVSIN/SL) 0.125 mg SL tablet 1 Tablet by SubLINGual route every four (4) hours as needed for Cramping., Normal, Disp-10 Tablet, R-0      ondansetron (Zofran ODT) 4 mg disintegrating tablet Take 1 Tablet by mouth every eight (8) hours as needed for Nausea., Normal, Disp-12 Tablet, R-1      omeprazole (PRILOSEC) 20 mg capsule Take 40 mg by mouth Daily (before breakfast). , Historical Med      EPINEPHrine (EPIPEN) 0.3 mg/0.3 mL (1:1,000) injection 0.3 mL by IntraMUSCular route once as needed for up to 1 dose., Print, Disp-0.3 mL, R-1           2. Follow-up Information     Follow up With Specialties Details Why King Aristeo MD Internal Medicine Schedule an appointment as soon as possible for a visit in 2 days  [address] [place] [address]  [phone]      [facility] EMERGENCY DEPT Emergency Medicine  If symptoms worsen [address] [place] [address] Drive  [address] [facility]  [phone]    [place] [facility] Allergy Schedule an appointment as soon as possible for a visit in 2 days  [address] [facility]  [phone]        Return to ED if worse     Diagnosis     Clinical Impression:   1. Abdominal pain, generalized    2. Urticaria        Please note that this dictation was completed with Dragon, computer voice recognition software.   Quite often unanticipated grammatical, syntax, homophones, and other interpretive errors are inadvertently transcribed by the computer software. Please disregard these errors. Additionally, please excuse any errors that have escaped final proofreading.

## 2022-04-07 NOTE — ED TRIAGE NOTES
.  Chief Complaint   Patient presents with    Abdominal Pain     pt presents with c/o upper abdominal pain that radiates around to back, pt states that she was recently diagnosed with kidney infection and placed on antibiotics. Pt states she thinks she may be having an ulcerative colitis flare as she feels like she is getting a rash in her mouth, mouth, chest and face. Pt in NAD in triage, speaking in complete sentences and airway intact. Pt 99% on room air.      Back Pain

## 2022-04-07 NOTE — ED NOTES
Pt states morphine helped her pain initially but pain is now back to an 8/10. Pt aware of plan for CT and states she will attempt to provide urine sample after scan.

## 2022-04-08 ENCOUNTER — DOCUMENTATION ONLY (OUTPATIENT)
Dept: INTERNAL MEDICINE CLINIC | Age: 52
End: 2022-04-08

## 2022-04-08 LAB
BACTERIA SPEC CULT: NORMAL
SERVICE CMNT-IMP: NORMAL

## 2022-04-12 ENCOUNTER — HOSPITAL ENCOUNTER (EMERGENCY)
Age: 52
Discharge: HOME | End: 2022-04-12
Attending: STUDENT IN AN ORGANIZED HEALTH CARE EDUCATION/TRAINING PROGRAM
Payer: MEDICAID

## 2022-04-12 VITALS
BODY MASS INDEX: 37.93 KG/M2 | HEIGHT: 62 IN | OXYGEN SATURATION: 92 % | HEART RATE: 88 BPM | WEIGHT: 206.13 LBS | DIASTOLIC BLOOD PRESSURE: 86 MMHG | SYSTOLIC BLOOD PRESSURE: 140 MMHG | RESPIRATION RATE: 19 BRPM | TEMPERATURE: 98.2 F

## 2022-04-12 DIAGNOSIS — N30.90 CYSTITIS: ICD-10-CM

## 2022-04-12 DIAGNOSIS — T78.40XA ALLERGIC REACTION, INITIAL ENCOUNTER: Primary | ICD-10-CM

## 2022-04-12 LAB
ALBUMIN SERPL-MCNC: 3.6 G/DL (ref 3.5–5)
ALBUMIN/GLOB SERPL: 1.1 {RATIO} (ref 1.1–2.2)
ALP SERPL-CCNC: 79 U/L (ref 45–117)
ALT SERPL-CCNC: 26 U/L (ref 12–78)
ANION GAP SERPL CALC-SCNC: 9 MMOL/L (ref 5–15)
APPEARANCE UR: CLEAR
AST SERPL-CCNC: 16 U/L (ref 15–37)
BACTERIA URNS QL MICRO: NEGATIVE /HPF
BASOPHILS # BLD: 0 K/UL (ref 0–0.1)
BASOPHILS NFR BLD: 0 % (ref 0–1)
BILIRUB SERPL-MCNC: 0.7 MG/DL (ref 0.2–1)
BILIRUB UR QL: NEGATIVE
BUN SERPL-MCNC: 6 MG/DL (ref 6–20)
BUN/CREAT SERPL: 7 (ref 12–20)
CALCIUM SERPL-MCNC: 9.4 MG/DL (ref 8.5–10.1)
CHLORIDE SERPL-SCNC: 102 MMOL/L (ref 97–108)
CO2 SERPL-SCNC: 26 MMOL/L (ref 21–32)
COLOR UR: ABNORMAL
CREAT SERPL-MCNC: 0.82 MG/DL (ref 0.55–1.02)
DIFFERENTIAL METHOD BLD: NORMAL
EOSINOPHIL # BLD: 0.2 K/UL (ref 0–0.4)
EOSINOPHIL NFR BLD: 3 % (ref 0–7)
EPITH CASTS URNS QL MICRO: ABNORMAL /LPF
ERYTHROCYTE [DISTWIDTH] IN BLOOD BY AUTOMATED COUNT: 12.8 % (ref 11.5–14.5)
GLOBULIN SER CALC-MCNC: 3.3 G/DL (ref 2–4)
GLUCOSE SERPL-MCNC: 310 MG/DL (ref 65–100)
GLUCOSE UR STRIP.AUTO-MCNC: >1000 MG/DL
HCT VFR BLD AUTO: 39.8 % (ref 35–47)
HGB BLD-MCNC: 14 G/DL (ref 11.5–16)
HGB UR QL STRIP: NEGATIVE
IMM GRANULOCYTES # BLD AUTO: 0 K/UL (ref 0–0.04)
IMM GRANULOCYTES NFR BLD AUTO: 0 % (ref 0–0.5)
KETONES UR QL STRIP.AUTO: NEGATIVE MG/DL
LEUKOCYTE ESTERASE UR QL STRIP.AUTO: NEGATIVE
LIPASE SERPL-CCNC: 162 U/L (ref 73–393)
LYMPHOCYTES # BLD: 1.6 K/UL (ref 0.8–3.5)
LYMPHOCYTES NFR BLD: 20 % (ref 12–49)
MCH RBC QN AUTO: 30.1 PG (ref 26–34)
MCHC RBC AUTO-ENTMCNC: 35.2 G/DL (ref 30–36.5)
MCV RBC AUTO: 85.6 FL (ref 80–99)
MONOCYTES # BLD: 0.4 K/UL (ref 0–1)
MONOCYTES NFR BLD: 5 % (ref 5–13)
NEUTS SEG # BLD: 5.7 K/UL (ref 1.8–8)
NEUTS SEG NFR BLD: 72 % (ref 32–75)
NITRITE UR QL STRIP.AUTO: POSITIVE
NRBC # BLD: 0 K/UL (ref 0–0.01)
NRBC BLD-RTO: 0 PER 100 WBC
PH UR STRIP: 6 [PH] (ref 5–8)
PLATELET # BLD AUTO: 183 K/UL (ref 150–400)
PMV BLD AUTO: 9.6 FL (ref 8.9–12.9)
POTASSIUM SERPL-SCNC: 3.1 MMOL/L (ref 3.5–5.1)
PROT SERPL-MCNC: 6.9 G/DL (ref 6.4–8.2)
PROT UR STRIP-MCNC: NEGATIVE MG/DL
RBC # BLD AUTO: 4.65 M/UL (ref 3.8–5.2)
RBC #/AREA URNS HPF: ABNORMAL /HPF (ref 0–5)
SODIUM SERPL-SCNC: 137 MMOL/L (ref 136–145)
SP GR UR REFRACTOMETRY: <1.005 (ref 1–1.03)
UROBILINOGEN UR QL STRIP.AUTO: 0.2 EU/DL (ref 0.2–1)
WBC # BLD AUTO: 8 K/UL (ref 3.6–11)
WBC URNS QL MICRO: ABNORMAL /HPF (ref 0–4)

## 2022-04-12 PROCEDURE — 74011000250 HC RX REV CODE- 250: Performed by: STUDENT IN AN ORGANIZED HEALTH CARE EDUCATION/TRAINING PROGRAM

## 2022-04-12 PROCEDURE — 96375 TX/PRO/DX INJ NEW DRUG ADDON: CPT

## 2022-04-12 PROCEDURE — 36415 COLL VENOUS BLD VENIPUNCTURE: CPT

## 2022-04-12 PROCEDURE — 87086 URINE CULTURE/COLONY COUNT: CPT

## 2022-04-12 PROCEDURE — 85025 COMPLETE CBC W/AUTO DIFF WBC: CPT

## 2022-04-12 PROCEDURE — 81001 URINALYSIS AUTO W/SCOPE: CPT

## 2022-04-12 PROCEDURE — 74011250637 HC RX REV CODE- 250/637: Performed by: STUDENT IN AN ORGANIZED HEALTH CARE EDUCATION/TRAINING PROGRAM

## 2022-04-12 PROCEDURE — 99284 EMERGENCY DEPT VISIT MOD MDM: CPT

## 2022-04-12 PROCEDURE — 83690 ASSAY OF LIPASE: CPT

## 2022-04-12 PROCEDURE — 96374 THER/PROPH/DIAG INJ IV PUSH: CPT

## 2022-04-12 PROCEDURE — 74011250636 HC RX REV CODE- 250/636: Performed by: STUDENT IN AN ORGANIZED HEALTH CARE EDUCATION/TRAINING PROGRAM

## 2022-04-12 PROCEDURE — 80053 COMPREHEN METABOLIC PANEL: CPT

## 2022-04-12 RX ORDER — METOCLOPRAMIDE HYDROCHLORIDE 5 MG/ML
10 INJECTION INTRAMUSCULAR; INTRAVENOUS
Status: DISCONTINUED | OUTPATIENT
Start: 2022-04-12 | End: 2022-04-12

## 2022-04-12 RX ORDER — PROCHLORPERAZINE EDISYLATE 5 MG/ML
10 INJECTION INTRAMUSCULAR; INTRAVENOUS
Status: COMPLETED | OUTPATIENT
Start: 2022-04-12 | End: 2022-04-12

## 2022-04-12 RX ORDER — DIPHENHYDRAMINE HYDROCHLORIDE 50 MG/ML
25 INJECTION, SOLUTION INTRAMUSCULAR; INTRAVENOUS
Status: COMPLETED | OUTPATIENT
Start: 2022-04-12 | End: 2022-04-12

## 2022-04-12 RX ORDER — CIPROFLOXACIN 500 MG/1
500 TABLET ORAL 2 TIMES DAILY
Qty: 14 TABLET | Refills: 0 | Status: SHIPPED | OUTPATIENT
Start: 2022-04-12 | End: 2022-04-19

## 2022-04-12 RX ORDER — ONDANSETRON 2 MG/ML
4 INJECTION INTRAMUSCULAR; INTRAVENOUS
Status: COMPLETED | OUTPATIENT
Start: 2022-04-12 | End: 2022-04-12

## 2022-04-12 RX ORDER — POTASSIUM CHLORIDE 20 MEQ/1
40 TABLET, EXTENDED RELEASE ORAL
Status: COMPLETED | OUTPATIENT
Start: 2022-04-12 | End: 2022-04-12

## 2022-04-12 RX ORDER — HYDROMORPHONE HYDROCHLORIDE 1 MG/ML
0.5 INJECTION, SOLUTION INTRAMUSCULAR; INTRAVENOUS; SUBCUTANEOUS ONCE
Status: COMPLETED | OUTPATIENT
Start: 2022-04-12 | End: 2022-04-12

## 2022-04-12 RX ADMIN — FAMOTIDINE 20 MG: 10 INJECTION, SOLUTION INTRAVENOUS at 10:17

## 2022-04-12 RX ADMIN — POTASSIUM CHLORIDE 40 MEQ: 20 TABLET, EXTENDED RELEASE ORAL at 11:23

## 2022-04-12 RX ADMIN — DIPHENHYDRAMINE HYDROCHLORIDE 25 MG: 50 INJECTION, SOLUTION INTRAMUSCULAR; INTRAVENOUS at 10:16

## 2022-04-12 RX ADMIN — HYDROMORPHONE HYDROCHLORIDE 0.5 MG: 1 INJECTION, SOLUTION INTRAMUSCULAR; INTRAVENOUS; SUBCUTANEOUS at 10:14

## 2022-04-12 RX ADMIN — ONDANSETRON 4 MG: 2 INJECTION INTRAMUSCULAR; INTRAVENOUS at 10:12

## 2022-04-12 RX ADMIN — PROCHLORPERAZINE EDISYLATE 10 MG: 5 INJECTION INTRAMUSCULAR; INTRAVENOUS at 11:22

## 2022-04-12 NOTE — ED PROVIDER NOTES
EMERGENCY DEPARTMENT HISTORY AND PHYSICAL EXAM      Date: 4/12/2022  Patient Name: Oksana Dahl    History of Presenting Illness     Chief Complaint   Patient presents with    Abdominal Pain     pt with hx of ulcerative colitis has been getting hives randomly. this morning pt started with hives in her mouth. She was given an epi pen at work. Now she is vomiting. She also has abdominal pain with diarrhea an vomiting, which has been ongoing since being seen here the last time.  Skin Problem         HPI: Oksana Dahl, 46 y.o. female presents to the ED with cc of allergic reaction. She states that she has had these multiple times in the past, unclear exactly what causes them. Today she began to have hives, and felt that there was one in her mouth that was trying to get big with some associated chest tightness so she took an EpiPen. States that the symptoms have since improved. She reports a history of ulcerative colitis, and has been having chronic issues with this with an epigastric abdominal pain and a left upper quadrant pain. She reports associated nausea and vomiting 3 times, nonbloody as well as about a week of nonbloody diarrhea. She denies fevers. Is currently taking antibiotics for UTI. There are no other complaints, changes, or physical findings at this time. PCP: Sriram Eli MD    No current facility-administered medications on file prior to encounter. Current Outpatient Medications on File Prior to Encounter   Medication Sig Dispense Refill    cetirizine (ZyrTEC) 10 mg tablet Take 1 Tablet by mouth daily. 20 Tablet 0    predniSONE (STERAPRED DS) 10 mg dose pack 12-day Dosepak use as directed 48 Tablet 0    ALPRAZolam (XANAX) 1 mg tablet TAKE 1/2 - 1 TABLET BY MOUTH TWICE DAILY AS NEEDED FOR ANXIETY *MAX 2 TABS DAILY* 60 Tablet 0    Jardiance 25 mg tablet TAKE 1 TABLET BY MOUTH EVERY DAY 90 Tablet 1    melatonin 5 mg cap capsule Take  by mouth nightly.  estradioL (ESTRACE) 0.5 mg tablet TAKE 1 TABLET BY MOUTH EVERY DAY 90 Tablet 1    promethazine (PHENERGAN) 25 mg tablet Take 1 Tablet by mouth every six (6) hours as needed for Nausea. 12 Tablet 0    atorvastatin (LIPITOR) 20 mg tablet TAKE 1 TABLET BY MOUTH EVERY DAY 90 Tablet 1    Ventolin HFA 90 mcg/actuation inhaler TAKE 1 PUFF BY INHALATION EVERY FOUR (4) HOURS AS NEEDED FOR WHEEZING. 18 Each 1    glimepiride (AMARYL) 4 mg tablet TAKE 1 TABLET BY MOUTH ONCE DAILY BEFORE BREAKFAST 90 Tablet 1    naloxone (Narcan) 4 mg/actuation nasal spray Use 1 spray intranasally, then discard. Repeat with new spray every 2 min as needed for opioid overdose symptoms, alternating nostrils. 1 Each 0    losartan-hydroCHLOROthiazide (HYZAAR) 100-12.5 mg per tablet TAKE 1 TABLET BY MOUTH EVERY DAY 90 Tablet 1    dibucaine (NUPERCAINAL) 1 % rectal ointment by PeriANAL route every four (4) hours as needed for Pain. 28 g 0    dicyclomine (BENTYL) 20 mg tablet Take 1 Tablet by mouth every six (6) hours as needed for Abdominal Cramps. 20 Tablet 0    hyoscyamine SL (LEVSIN/SL) 0.125 mg SL tablet 1 Tablet by SubLINGual route every four (4) hours as needed for Cramping. (Patient not taking: Reported on 3/11/2022) 10 Tablet 0    ondansetron (Zofran ODT) 4 mg disintegrating tablet Take 1 Tablet by mouth every eight (8) hours as needed for Nausea. 12 Tablet 1    omeprazole (PRILOSEC) 20 mg capsule Take 40 mg by mouth Daily (before breakfast).  EPINEPHrine (EPIPEN) 0.3 mg/0.3 mL (1:1,000) injection 0.3 mL by IntraMUSCular route once as needed for up to 1 dose.  0.3 mL 1       Past History     Past Medical History:  Past Medical History:   Diagnosis Date    Anal fissure     Anisocoria     Asthma     LAST EPISODE     Back pain     Cerumen impaction     Chronic kidney disease     hx uti in past    Coagulation defects     ocassional rectal bleeding due to anal fissure    Colovaginal fistula     Diabetes ([address] NIDDM    Diverticulitis     Diverticulosis     Enlarged tonsils     Frequent UTI     GERD (gastroesophageal reflux disease)     H/O endoscopy     with dilation    HA (headache)     Hepatic steatosis     History of colon resection     Hx of colonoscopy with polypectomy     benign    Hypertension     Ill-defined condition     FREQUENT HIVES    Ill-defined condition     HX ELEVATED LIVER ENZYMES    Morbid obesity (HCC)     Nausea & vomiting     during diverticulitis flare    Obesity     Otitis media     Pneumonia     about 15 yrs ago    Psychiatric disorder     ANXIETY    Recurrent tonsillitis     Sinusitis     Transfusion history ~ age 35    postop hysterectomy    Urticaria        Past Surgical History:  Past Surgical History:   Procedure Laterality Date    COLONOSCOPY N/A 3/28/2019    COLONOSCOPY performed by Chente Katz MD at [address] [facility] COLONOSCOPY N/A 10/2/2020    COLONOSCOPY performed by Chente Katz MD at [facility] HX APPENDECTOMY  2021    cancer.  HX BREAST REDUCTION  1992    [facility].  HX GI  12    LAPAROSCOPIC HAND ASSISTED  POSS OPEN SIGMOID COLECTOMY POSS TEMPORARY DIVERTING LOOP ILEOSTOMY;  (no illeostomy needed)    HX GYN           HX GYN      cervical conization    HX HEENT      SINUS SURGERY LEFT X2    HX HEENT      SINUS SURGERY ON RIGHT X2    HX HEMORRHOIDECTOMY      HX HYSTERECTOMY      HX OTHER SURGICAL  2021    Sphincterotomy    HX PELVIC LAPAROSCOPY      HX EMMANUEL AND BSO      HX UROLOGICAL  12     CYSTOSCOPY INSERTION URETERAL CATHETERS - Cystoscopy Insertion of bilateral ureteral stents    GA ABDOMEN SURGERY PROC UNLISTED  2018    hernia repair at [facility] EM[facility]Y [facility]    [place] UNLISTED      colon resection.         Family History:  Family History   Problem Relation Age of Onset    Diabetes Mother     Cancer Mother         NON-HODGKINS LYMPHOMA    Anesth Problems Mother         PONV    Diabetes Father  Heart Disease Father         CAD - STENTS, PACEMAKER    Arrhythmia Father     Cancer Paternal Grandmother        Social History:  Social History     Tobacco Use    Smoking status: Never Smoker    Smokeless tobacco: Never Used   Vaping Use    Vaping Use: Not on file   Substance Use Topics    Alcohol use: Not Currently    Drug use: Yes     Types: OTC, Prescription       Allergies: Allergies   Allergen Reactions    Aspirin Hives, Shortness of Breath and Itching    Codeine Hives, Itching and Angioedema     Pt had itching in mouth, on face and lips    Contrast Agent [Iodine] Hives, Itching and Angioedema     5/5/21: pt was given benadryl and solu-medrol prior to administration but pt had severe tongue swelling and drooling, lethargy and itching. DO NOT GIVE PT    Flavoring Agent Anaphylaxis     Cherry flavor    Iodinated Contrast Media Anaphylaxis, Diarrhea, Hives, Nausea and Vomiting and Shortness of Breath    Percocet [Oxycodone-Acetaminophen] Hives, Itching and Angioedema     Pt had itching in mouth, on face and lips    Prilosec [Omeprazole Magnesium] Anaphylaxis     CHERRY FLAVORED ODT; PT TAKES REGULAR PRILOSEC AND IS OK    Dilaudid [Hydromorphone] Itching     Tolerates with benadryl    Ketorolac Rash     \"makes my eyes spasm and causes rash on my hands\" and \"makes my skin itch and makes me nervous\"    Morphine (Pf) Rash     According to rn, pt can take morphine with benadryl    Fentanyl Rash and Itching     Has had benadryl before         Review of Systems   no fever  No ear pain  No eye pain  no shortness of breath  no chest pain  Reports abdominal pain  no leg pain  Reports resolved  No lymphadenopathy  No weight loss    Physical Exam   Physical Exam  Constitutional:       General: She is not in acute distress. Appearance: She is not toxic-appearing. HENT:      Head: Normocephalic.       Mouth/Throat:      Comments: Oropharynx is widely patent, no drooling, trismus or stridor  Eyes: Extraocular Movements: Extraocular movements intact. Cardiovascular:      Rate and Rhythm: Normal rate and regular rhythm. Pulmonary:      Effort: Pulmonary effort is normal.      Breath sounds: Normal breath sounds. Abdominal:      Palpations: Abdomen is soft. Tenderness: There is abdominal tenderness. Comments: Mild tenderness to deep palpation in the epigastric region without guarding or rebound tenderness   Musculoskeletal:         General: No tenderness or deformity. Cervical back: Neck supple. Skin:     General: Skin is warm and dry. Comments: No visible rash or hives   Neurological:      General: No focal deficit present. Mental Status: She is alert. Psychiatric:         Mood and Affect: Mood normal.         Diagnostic Study Results     Labs -   No results found for this or any previous visit (from the past 24 hour(s)). Radiologic Studies -   No orders to display     CT Results  (Last 48 hours)    None        CXR Results  (Last 48 hours)    None            Medical Decision Making   I am the first provider for this patient. I reviewed the vital signs, available nursing notes, past medical history, past surgical history, family history and social history. Vital Signs-Reviewed the patient's vital signs. Patient Vitals for the past 24 hrs:   Temp Pulse Resp BP SpO2   04/12/22 0929 -- 88 19 -- 97 %   04/12/22 0914 -- -- -- (!) 140/86 --   04/12/22 0909 98.2 °F (36.8 °C) 99 16 (!) 164/81 98 %         Provider Notes (Medical Decision Making):   49-year-old female presenting with rash. Concern for allergic reaction. Other than skin changes, no other findings on exam, unlikely anaphylaxis. She is afebrile and nontoxic-appearing. Reports history of chronic abdominal issues in setting of ulcerative colitis, however is afebrile and nontoxic-appearing, abdominal exam is reassuring here today with only mild tenderness to deep palpation in the epigastric region.   No new bloody stools. Shared decision making made with patient, and given her history ofSignificant radiation exposure, reassuring exam, and chronic symptoms, unlikely to benefit from CT scan today. She was seen here recently with similar symptoms, and CT scan was reassuring per chart review on 4/7. She requests pain medications, states that Dilaudid works best.    ED Course:     Initial assessment performed. The patients presenting problems have been discussed, and they are in agreement with the care plan formulated and outlined with them. I have encouraged them to ask questions as they arise throughout their visit. CBC negative for leukocytosis or anemia, basic metabolic panel with normal renal function, no worrisome electrolyte abnormalities other than hypokalemia which will be replaced orally. UA suggestive of UTI, she will be brought into fluoroquinolone for cystitis. Afebrile as above, nontoxic-appearing, no leukocytosis, not consistent with pyelonephritis-. On reevaluation, patient is resting comfortably and states that they feel improved. Vitals unremarkable. Patient is counseled on supportive care and return precautions. Will return to the ED for any worsening swelling, rash, difficulty breathing, pain, fevers, or any new or worrisome symptoms. Will followup with primary care doctor within 3 days. Critical Care Time:         Disposition:  Home    PLAN:  1. Current Discharge Medication List        2.    Follow-up Information    None       Return to ED if worse     Diagnosis     Clinical Impression: Acute hives, acute cystitis, acute on chronic abdominal pain

## 2022-04-12 NOTE — ED NOTES
Assumed care of pt, pt placed on monitor x 3. Pt last seen on 4/7 for abdominal pain, n/v/d. Pt reporting sx persisted after discharge, however pain and nausea worse this am. Pt currently on Prednisone for UC flair. Pt reporting generalized urticaria that was present when she was seen on 4/7, hives returned to mouth/arms/neck this am while at work and felt swelling in the base of her throat - epi pen administered at work and then she presented to ED. Additionally, pt having dysuria x few weeks, currently taking second abx for UTI     MD at bedside     1200 Pt resting in stretcher, reporting unchanged nausea but improved pain.

## 2022-04-12 NOTE — Clinical Note
[address]  [facility] EMERGENCY DEPT  [address] [place] [address]  [place] [id number]  [phone]    Work/School Note    Date: 4/12/2022    To Whom It May concern:      Troy Mackay was seen and treated today in the emergency room by the following provider(s):  Attending Provider: Henrry Kang MD.      Troy Mackay is excused from work/school on 04/12/22. She is clear to return to work/school on 04/13/22.         Sincerely,          Laura Heredia MD

## 2022-04-13 ENCOUNTER — HOSPITAL ENCOUNTER (EMERGENCY)
Age: 52
Discharge: HOME | End: 2022-04-13
Attending: STUDENT IN AN ORGANIZED HEALTH CARE EDUCATION/TRAINING PROGRAM
Payer: MEDICAID

## 2022-04-13 ENCOUNTER — APPOINTMENT (OUTPATIENT)
Dept: ULTRASOUND IMAGING | Age: 52
End: 2022-04-13
Attending: PHYSICIAN ASSISTANT
Payer: MEDICAID

## 2022-04-13 VITALS
DIASTOLIC BLOOD PRESSURE: 72 MMHG | BODY MASS INDEX: 38.18 KG/M2 | HEART RATE: 82 BPM | WEIGHT: 207.45 LBS | HEIGHT: 62 IN | OXYGEN SATURATION: 97 % | SYSTOLIC BLOOD PRESSURE: 141 MMHG | TEMPERATURE: 98.8 F | RESPIRATION RATE: 12 BRPM

## 2022-04-13 DIAGNOSIS — R10.84 ABDOMINAL PAIN, GENERALIZED: Primary | ICD-10-CM

## 2022-04-13 DIAGNOSIS — R21 RASH: ICD-10-CM

## 2022-04-13 DIAGNOSIS — N39.0 ACUTE UTI: ICD-10-CM

## 2022-04-13 LAB
ALBUMIN SERPL-MCNC: 3.5 G/DL (ref 3.5–5)
ALBUMIN/GLOB SERPL: 1 {RATIO} (ref 1.1–2.2)
ALP SERPL-CCNC: 75 U/L (ref 45–117)
ALT SERPL-CCNC: 27 U/L (ref 12–78)
ANION GAP SERPL CALC-SCNC: 9 MMOL/L (ref 5–15)
APPEARANCE UR: ABNORMAL
AST SERPL-CCNC: 30 U/L (ref 15–37)
BACTERIA SPEC CULT: NORMAL
BACTERIA URNS QL MICRO: ABNORMAL /HPF
BASOPHILS # BLD: 0 K/UL (ref 0–0.1)
BASOPHILS NFR BLD: 1 % (ref 0–1)
BILIRUB SERPL-MCNC: 0.7 MG/DL (ref 0.2–1)
BILIRUB UR QL: NEGATIVE
BUN SERPL-MCNC: 8 MG/DL (ref 6–20)
BUN/CREAT SERPL: 10 (ref 12–20)
CALCIUM SERPL-MCNC: 9.6 MG/DL (ref 8.5–10.1)
CHLORIDE SERPL-SCNC: 102 MMOL/L (ref 97–108)
CO2 SERPL-SCNC: 25 MMOL/L (ref 21–32)
COLOR UR: ABNORMAL
CREAT SERPL-MCNC: 0.79 MG/DL (ref 0.55–1.02)
DIFFERENTIAL METHOD BLD: ABNORMAL
EOSINOPHIL # BLD: 0.2 K/UL (ref 0–0.4)
EOSINOPHIL NFR BLD: 3 % (ref 0–7)
EPITH CASTS URNS QL MICRO: ABNORMAL /LPF
ERYTHROCYTE [DISTWIDTH] IN BLOOD BY AUTOMATED COUNT: 12.9 % (ref 11.5–14.5)
GLOBULIN SER CALC-MCNC: 3.5 G/DL (ref 2–4)
GLUCOSE SERPL-MCNC: 303 MG/DL (ref 65–100)
GLUCOSE UR STRIP.AUTO-MCNC: >1000 MG/DL
HCT VFR BLD AUTO: 35.7 % (ref 35–47)
HGB BLD-MCNC: 12.4 G/DL (ref 11.5–16)
HGB UR QL STRIP: ABNORMAL
HYALINE CASTS URNS QL MICRO: ABNORMAL /LPF (ref 0–5)
IMM GRANULOCYTES # BLD AUTO: 0 K/UL (ref 0–0.04)
IMM GRANULOCYTES NFR BLD AUTO: 1 % (ref 0–0.5)
KETONES UR QL STRIP.AUTO: NEGATIVE MG/DL
LACTATE BLD-SCNC: 1.14 MMOL/L (ref 0.4–2)
LACTATE BLD-SCNC: 2.58 MMOL/L (ref 0.4–2)
LEUKOCYTE ESTERASE UR QL STRIP.AUTO: ABNORMAL
LIPASE SERPL-CCNC: 161 U/L (ref 73–393)
LYMPHOCYTES # BLD: 1 K/UL (ref 0.8–3.5)
LYMPHOCYTES NFR BLD: 17 % (ref 12–49)
MCH RBC QN AUTO: 30.5 PG (ref 26–34)
MCHC RBC AUTO-ENTMCNC: 34.7 G/DL (ref 30–36.5)
MCV RBC AUTO: 87.7 FL (ref 80–99)
MONOCYTES # BLD: 0.3 K/UL (ref 0–1)
MONOCYTES NFR BLD: 5 % (ref 5–13)
NEUTS SEG # BLD: 4.5 K/UL (ref 1.8–8)
NEUTS SEG NFR BLD: 73 % (ref 32–75)
NITRITE UR QL STRIP.AUTO: NEGATIVE
NRBC # BLD: 0 K/UL (ref 0–0.01)
NRBC BLD-RTO: 0 PER 100 WBC
PH UR STRIP: 5.5 [PH] (ref 5–8)
PLATELET # BLD AUTO: 155 K/UL (ref 150–400)
PMV BLD AUTO: 9.2 FL (ref 8.9–12.9)
POTASSIUM SERPL-SCNC: 3.5 MMOL/L (ref 3.5–5.1)
PROT SERPL-MCNC: 7 G/DL (ref 6.4–8.2)
PROT UR STRIP-MCNC: NEGATIVE MG/DL
RBC # BLD AUTO: 4.07 M/UL (ref 3.8–5.2)
RBC #/AREA URNS HPF: ABNORMAL /HPF (ref 0–5)
SERVICE CMNT-IMP: NORMAL
SODIUM SERPL-SCNC: 136 MMOL/L (ref 136–145)
SP GR UR REFRACTOMETRY: 1.01 (ref 1–1.03)
UA: UC IF INDICATED,UAUC: ABNORMAL
UROBILINOGEN UR QL STRIP.AUTO: 0.2 EU/DL (ref 0.2–1)
WBC # BLD AUTO: 6 K/UL (ref 3.6–11)
WBC URNS QL MICRO: >100 /HPF (ref 0–4)

## 2022-04-13 PROCEDURE — 85025 COMPLETE CBC W/AUTO DIFF WBC: CPT

## 2022-04-13 PROCEDURE — 87086 URINE CULTURE/COLONY COUNT: CPT

## 2022-04-13 PROCEDURE — 80053 COMPREHEN METABOLIC PANEL: CPT

## 2022-04-13 PROCEDURE — 74011250636 HC RX REV CODE- 250/636: Performed by: PHYSICIAN ASSISTANT

## 2022-04-13 PROCEDURE — 96376 TX/PRO/DX INJ SAME DRUG ADON: CPT

## 2022-04-13 PROCEDURE — 99284 EMERGENCY DEPT VISIT MOD MDM: CPT

## 2022-04-13 PROCEDURE — 83605 ASSAY OF LACTIC ACID: CPT

## 2022-04-13 PROCEDURE — 81001 URINALYSIS AUTO W/SCOPE: CPT

## 2022-04-13 PROCEDURE — 96375 TX/PRO/DX INJ NEW DRUG ADDON: CPT

## 2022-04-13 PROCEDURE — 76770 US EXAM ABDO BACK WALL COMP: CPT

## 2022-04-13 PROCEDURE — 83690 ASSAY OF LIPASE: CPT

## 2022-04-13 PROCEDURE — 96374 THER/PROPH/DIAG INJ IV PUSH: CPT

## 2022-04-13 PROCEDURE — 36415 COLL VENOUS BLD VENIPUNCTURE: CPT

## 2022-04-13 PROCEDURE — 74011000250 HC RX REV CODE- 250: Performed by: PHYSICIAN ASSISTANT

## 2022-04-13 RX ORDER — HYDROMORPHONE HYDROCHLORIDE 1 MG/ML
0.5 INJECTION, SOLUTION INTRAMUSCULAR; INTRAVENOUS; SUBCUTANEOUS ONCE
Status: COMPLETED | OUTPATIENT
Start: 2022-04-13 | End: 2022-04-13

## 2022-04-13 RX ORDER — DIPHENHYDRAMINE HYDROCHLORIDE 50 MG/ML
25 INJECTION, SOLUTION INTRAMUSCULAR; INTRAVENOUS
Status: COMPLETED | OUTPATIENT
Start: 2022-04-13 | End: 2022-04-13

## 2022-04-13 RX ORDER — ONDANSETRON 2 MG/ML
4 INJECTION INTRAMUSCULAR; INTRAVENOUS ONCE
Status: COMPLETED | OUTPATIENT
Start: 2022-04-13 | End: 2022-04-13

## 2022-04-13 RX ORDER — FAMOTIDINE 10 MG/ML
20 INJECTION INTRAVENOUS
Status: DISCONTINUED | OUTPATIENT
Start: 2022-04-13 | End: 2022-04-13 | Stop reason: CLARIF

## 2022-04-13 RX ADMIN — PROCHLORPERAZINE EDISYLATE 10 MG: 5 INJECTION INTRAMUSCULAR; INTRAVENOUS at 11:40

## 2022-04-13 RX ADMIN — SODIUM CHLORIDE 1000 ML: 9 INJECTION, SOLUTION INTRAVENOUS at 09:13

## 2022-04-13 RX ADMIN — HYDROMORPHONE HYDROCHLORIDE 0.5 MG: 1 INJECTION, SOLUTION INTRAMUSCULAR; INTRAVENOUS; SUBCUTANEOUS at 09:22

## 2022-04-13 RX ADMIN — HYDROMORPHONE HYDROCHLORIDE 0.5 MG: 1 INJECTION, SOLUTION INTRAMUSCULAR; INTRAVENOUS; SUBCUTANEOUS at 10:44

## 2022-04-13 RX ADMIN — ONDANSETRON 4 MG: 2 INJECTION INTRAMUSCULAR; INTRAVENOUS at 09:16

## 2022-04-13 RX ADMIN — DIPHENHYDRAMINE HYDROCHLORIDE 25 MG: 50 INJECTION, SOLUTION INTRAMUSCULAR; INTRAVENOUS at 09:14

## 2022-04-13 RX ADMIN — FAMOTIDINE 20 MG: 10 INJECTION, SOLUTION INTRAVENOUS at 09:18

## 2022-04-13 NOTE — ED PROVIDER NOTES
EMERGENCY DEPARTMENT HISTORY AND PHYSICAL EXAM      Date: 4/13/2022  Patient Name: Michael Purvis    History of Presenting Illness     Chief Complaint   Patient presents with    Vomiting     Pt ambulatory into triage with a cc of vomiting and diarrhea x 2-5 days; pt has hx of U.C.; pt is also complaining of hives on bilateral arms and \"hives\" in mouth with redness and swelling to lips; pt took epi pen prior to arrival; pt denies sob, difficulty breathing and cp. Respirations unlabored in triage; pt was seen here twice this week for similar symtpoms    Diarrhea    Allergic Reaction       History Provided By: Patient    HPI: Michael Purvis, 46 y.o. female significant PMHx for Ulcerative colitis followed by GI, CKD, GERD, DMII, and HTN, per patient and record review, presents to the ED for evaluation of nausea, vomiting, diarrhea, abdominal pain, dysuria and rash. Patient states the symptoms are all somewhat an acute on chronic issue for her. States she had mild, constant generalized left-sided abdominal pain for the past 5 days. States she was put on prednisone for this/her ulcerative colitis. She was also recently seen at [facility] and diagnosed with a UTI given her urinary frequency and intermittent dysuria, placed on Keflex which she has been taking with minimal symptom improvement. Denies hematuria, vaginal discharge or bleeding, concern for STDs or STIs. Endorses nausea and intermittent episodes of non-bilious non-bloody vomiting, although overall able to tolerate PO well. Also endorses multiple loose stools daily denies any dark tarry or bright red bloody stools. States the symptoms are somewhat baseline for her given her ulcerative colitis. She has taken mesalamine, dicyclomine, and rectal suppositories. She has been taking 60 mg of prednisone for two weeks. She is scheduled to see oncology on Friday.  She has history of C. diff infection x3 and states stool does not smell similar to previous episodes. Surgical hx significant for sigmoid colon resection, hysterectomy, appendectomy, and cholecystectomy. Patient also presents for evaluation of mild, intermittent hives of bilateral arms, face, and mouth. States she was seen yesterday for this and was doing better. And then this morning she had an episode of vomiting and then shortly after she noticed the hives. She took an EpiPen 45 minutes prior to arrival with improvement. States she has been seen twice before for similar symptoms. She is scheduled to see immunology later this week. Denies any difficulty speaking or swallowing, sore throat, cough, chest pain or SOB. Denies any new medications. Denies any abnormal exposures. Denies any new soaps, lotions or detergents. Denies fevers or chills, headache, palpitations, blood in urine or stool, dizziness, weakness or LOC. There are no other complaints, changes, or physical findings at this time. PCP: Dior Tavera MD    No current facility-administered medications on file prior to encounter. Current Outpatient Medications on File Prior to Encounter   Medication Sig Dispense Refill    ciprofloxacin HCl (Cipro) 500 mg tablet Take 1 Tablet by mouth two (2) times a day for 7 days. 14 Tablet 0    cetirizine (ZyrTEC) 10 mg tablet Take 1 Tablet by mouth daily. 20 Tablet 0    predniSONE (STERAPRED DS) 10 mg dose pack 12-day Dosepak use as directed 48 Tablet 0    ALPRAZolam (XANAX) 1 mg tablet TAKE 1/2 - 1 TABLET BY MOUTH TWICE DAILY AS NEEDED FOR ANXIETY *MAX 2 TABS DAILY* 60 Tablet 0    Jardiance 25 mg tablet TAKE 1 TABLET BY MOUTH EVERY DAY 90 Tablet 1    melatonin 5 mg cap capsule Take  by mouth nightly.  estradioL (ESTRACE) 0.5 mg tablet TAKE 1 TABLET BY MOUTH EVERY DAY 90 Tablet 1    promethazine (PHENERGAN) 25 mg tablet Take 1 Tablet by mouth every six (6) hours as needed for Nausea.  12 Tablet 0    atorvastatin (LIPITOR) 20 mg tablet TAKE 1 TABLET BY MOUTH EVERY DAY 90 Tablet 1  Ventolin HFA 90 mcg/actuation inhaler TAKE 1 PUFF BY INHALATION EVERY FOUR (4) HOURS AS NEEDED FOR WHEEZING. 18 Each 1    glimepiride (AMARYL) 4 mg tablet TAKE 1 TABLET BY MOUTH ONCE DAILY BEFORE BREAKFAST 90 Tablet 1    naloxone (Narcan) 4 mg/actuation nasal spray Use 1 spray intranasally, then discard. Repeat with new spray every 2 min as needed for opioid overdose symptoms, alternating nostrils. 1 Each 0    losartan-hydroCHLOROthiazide (HYZAAR) 100-12.5 mg per tablet TAKE 1 TABLET BY MOUTH EVERY DAY 90 Tablet 1    dibucaine (NUPERCAINAL) 1 % rectal ointment by PeriANAL route every four (4) hours as needed for Pain. 28 g 0    dicyclomine (BENTYL) 20 mg tablet Take 1 Tablet by mouth every six (6) hours as needed for Abdominal Cramps. 20 Tablet 0    hyoscyamine SL (LEVSIN/SL) 0.125 mg SL tablet 1 Tablet by SubLINGual route every four (4) hours as needed for Cramping. (Patient not taking: Reported on 3/11/2022) 10 Tablet 0    ondansetron (Zofran ODT) 4 mg disintegrating tablet Take 1 Tablet by mouth every eight (8) hours as needed for Nausea. 12 Tablet 1    omeprazole (PRILOSEC) 20 mg capsule Take 40 mg by mouth Daily (before breakfast).  EPINEPHrine (EPIPEN) 0.3 mg/0.3 mL (1:1,000) injection 0.3 mL by IntraMUSCular route once as needed for up to 1 dose.  0.3 mL 1       Past History     Past Medical History:  Past Medical History:   Diagnosis Date    Anal fissure     Anisocoria     Asthma     LAST EPISODE     Back pain     Cerumen impaction     Chronic kidney disease     hx uti in past    Coagulation defects     ocassional rectal bleeding due to anal fissure    Colovaginal fistula     Diabetes (HCC)     NIDDM    Diverticulitis     Diverticulosis     Enlarged tonsils     Frequent UTI     GERD (gastroesophageal reflux disease)     H/O endoscopy     with dilation    HA (headache)     Hepatic steatosis     History of colon resection     Hx of colonoscopy with polypectomy benign    Hypertension     Ill-defined condition     FREQUENT HIVES    Ill-defined condition     HX ELEVATED LIVER ENZYMES    Morbid obesity (HCC)     Nausea & vomiting     during diverticulitis flare    Obesity     Otitis media     Pneumonia     about 15 yrs ago    Psychiatric disorder     ANXIETY    Recurrent tonsillitis     Sinusitis     Transfusion history ~ age 35    postop hysterectomy    Urticaria        Past Surgical History:  Past Surgical History:   Procedure Laterality Date    COLONOSCOPY N/A 3/28/2019    COLONOSCOPY performed by Caroline Dupont MD at [address] [place] [address] COLONOSCOPY N/A 10/2/2020    COLONOSCOPY performed by Caroline Dupont MD at [address] [place] [address] HX APPENDECTOMY  2021    cancer.  HX BREAST REDUCTION      [facility].  HX GI  12    LAPAROSCOPIC HAND ASSISTED  POSS OPEN SIGMOID COLECTOMY POSS TEMPORARY DIVERTING LOOP ILEOSTOMY;  (no illeostomy needed)    HX GYN           HX GYN      cervical conization    HX HEENT      SINUS SURGERY LEFT X2    HX HEENT      SINUS SURGERY ON RIGHT X2    HX HEMORRHOIDECTOMY      HX HYSTERECTOMY      HX OTHER SURGICAL  2021    Sphincterotomy    HX PELVIC LAPAROSCOPY      HX EMMANUEL AND BSO      HX UROLOGICAL  12     CYSTOSCOPY INSERTION URETERAL CATHETERS - Cystoscopy Insertion of bilateral ureteral stents    IN ABDOMEN SURGERY PROC UNLISTED  2018    hernia repair at [facility]    [place] UNLISTED      colon resection.         Family History:  Family History   Problem Relation Age of Onset    Diabetes Mother     Cancer Mother         NON-HODGKINS LYMPHOMA    Anesth Problems Mother         PONV    Diabetes Father     Heart Disease Father         CAD - STENTS, PACEMAKER    Arrhythmia Father     Cancer Paternal Grandmother        Social History:  Social History     Tobacco Use    Smoking status: Never Smoker    Smokeless tobacco: Never Used   Vaping Use    Vaping Use: Not on file   Substance Use Topics    Alcohol use: Not Currently    Drug use: Yes     Types: OTC, Prescription       Allergies: Allergies   Allergen Reactions    Aspirin Hives, Shortness of Breath and Itching    Codeine Hives, Itching and Angioedema     Pt had itching in mouth, on face and lips    Contrast Agent [Iodine] Hives, Itching and Angioedema     5/5/21: pt was given benadryl and solu-medrol prior to administration but pt had severe tongue swelling and drooling, lethargy and itching. DO NOT GIVE PT    Flavoring Agent Anaphylaxis     Cherry flavor    Iodinated Contrast Media Anaphylaxis, Diarrhea, Hives, Nausea and Vomiting and Shortness of Breath    Percocet [Oxycodone-Acetaminophen] Hives, Itching and Angioedema     Pt had itching in mouth, on face and lips    Prilosec [Omeprazole Magnesium] Anaphylaxis     CHERRY FLAVORED ODT; PT TAKES REGULAR PRILOSEC AND IS OK    Dilaudid [Hydromorphone] Itching     Tolerates with benadryl    Ketorolac Rash     \"makes my eyes spasm and causes rash on my hands\" and \"makes my skin itch and makes me nervous\"    Morphine (Pf) Rash     According to rn, pt can take morphine with benadryl    Fentanyl Rash and Itching     Has had benadryl before         Review of Systems   Review of Systems   Constitutional: Negative for appetite change, chills and fever. HENT: Negative for congestion, sore throat, trouble swallowing and voice change. Eyes: Negative for pain. Respiratory: Negative for cough and shortness of breath. Cardiovascular: Negative for chest pain. Gastrointestinal: Positive for abdominal pain, diarrhea, nausea and vomiting. Negative for blood in stool and constipation. Genitourinary: Positive for flank pain, frequency and urgency. Negative for decreased urine volume, difficulty urinating, dysuria, hematuria, pelvic pain and vaginal discharge. Musculoskeletal: Negative for gait problem, neck pain and neck stiffness. Skin: Positive for rash.    Neurological: Negative for syncope, weakness, light-headedness and headaches. All other systems reviewed and are negative. Physical Exam   Physical Exam  Vitals and nursing note reviewed. Constitutional:       General: She is not in acute distress. Appearance: Normal appearance. She is not ill-appearing, toxic-appearing or diaphoretic. Comments: 46 y.o. female   HENT:      Head: Normocephalic and atraumatic. Nose: Nose normal.      Mouth/Throat:      Mouth: Mucous membranes are moist.      Comments: Oropharynx normal, no angioedema, no ulcerations or rashes, uvula midline, tolerating secretions well, normal phonation. No drooling. Clear and coherent speech. Eyes:      Extraocular Movements: Extraocular movements intact. Conjunctiva/sclera: Conjunctivae normal.   Cardiovascular:      Rate and Rhythm: Normal rate and regular rhythm. Heart sounds: Normal heart sounds. No murmur heard. No friction rub. No gallop. Pulmonary:      Effort: Pulmonary effort is normal. No respiratory distress. Breath sounds: Normal breath sounds. No wheezing. Abdominal:      General: A surgical scar is present. There is no distension. Palpations: Abdomen is soft. There is no mass. Tenderness: There is abdominal tenderness. There is no guarding or rebound. Hernia: No hernia is present. Comments: Mild, generalized LUQ  and left midepigastric tenderness. Soft, non-distended. No guarding, no masses. No rebound tenderness. Musculoskeletal:         General: Normal range of motion. Cervical back: Normal range of motion. Skin:     General: Skin is warm and dry. Findings: No abscess, ecchymosis, erythema, lesion, petechiae or wound. Rash is not crusting, nodular, purpuric, pustular, scaling or vesicular. Comments: Few scattered, generalized erythematous rash to bilateral upper extremities. No exfoliative lesions or mucous membrane involvement.    Neurological:      General: No focal deficit present. Mental Status: She is alert and oriented to person, place, and time. Psychiatric:         Mood and Affect: Mood normal.         Behavior: Behavior normal.         Diagnostic Study Results     Labs -     Recent Results (from the past 12 hour(s))   METABOLIC PANEL, COMPREHENSIVE    Collection Time: 04/13/22  9:02 AM   Result Value Ref Range    Sodium 136 136 - 145 mmol/L    Potassium 3.5 3.5 - 5.1 mmol/L    Chloride 102 97 - 108 mmol/L    CO2 25 21 - 32 mmol/L    Anion gap 9 5 - 15 mmol/L    Glucose 303 (H) 65 - 100 mg/dL    BUN 8 6 - 20 MG/DL    Creatinine 0.79 0.55 - 1.02 MG/DL    BUN/Creatinine ratio 10 (L) 12 - 20      GFR est AA >60 >60 ml/min/1.73m2    GFR est non-AA >60 >60 ml/min/1.73m2    Calcium 9.6 8.5 - 10.1 MG/DL    Bilirubin, total 0.7 0.2 - 1.0 MG/DL    ALT (SGPT) 27 12 - 78 U/L    AST (SGOT) 30 15 - 37 U/L    Alk.  phosphatase 75 45 - 117 U/L    Protein, total 7.0 6.4 - 8.2 g/dL    Albumin 3.5 3.5 - 5.0 g/dL    Globulin 3.5 2.0 - 4.0 g/dL    A-G Ratio 1.0 (L) 1.1 - 2.2     LIPASE    Collection Time: 04/13/22  9:02 AM   Result Value Ref Range    Lipase 161 73 - 393 U/L   POC LACTIC ACID    Collection Time: 04/13/22  9:08 AM   Result Value Ref Range    Lactic Acid (POC) 2.58 (HH) 0.40 - 2.00 mmol/L   URINALYSIS W/ REFLEX CULTURE    Collection Time: 04/13/22  9:24 AM    Specimen: Urine   Result Value Ref Range    Color YELLOW/STRAW      Appearance CLOUDY (A) CLEAR      Specific gravity 1.010 1.003 - 1.030      pH (UA) 5.5 5.0 - 8.0      Protein Negative NEG mg/dL    Glucose >1,000 (A) NEG mg/dL    Ketone Negative NEG mg/dL    Bilirubin Negative NEG      Blood TRACE (A) NEG      Urobilinogen 0.2 0.2 - 1.0 EU/dL    Nitrites Negative NEG      Leukocyte Esterase MODERATE (A) NEG      UA:UC IF INDICATED URINE CULTURE ORDERED (A) CNI      WBC >100 (H) 0 - 4 /hpf    RBC 20-50 0 - 5 /hpf    Epithelial cells MANY (A) FEW /lpf    Bacteria 1+ (A) NEG /hpf    Hyaline cast 0-2 0 - 5 /lpf CBC WITH AUTOMATED DIFF    Collection Time: 04/13/22  9:52 AM   Result Value Ref Range    WBC 6.0 3.6 - 11.0 K/uL    RBC 4.07 3.80 - 5.20 M/uL    HGB 12.4 11.5 - 16.0 g/dL    HCT 35.7 35.0 - 47.0 %    MCV 87.7 80.0 - 99.0 FL    MCH 30.5 26.0 - 34.0 PG    MCHC 34.7 30.0 - 36.5 g/dL    RDW 12.9 11.5 - 14.5 %    PLATELET 105 384 - 278 K/uL    MPV 9.2 8.9 - 12.9 FL    NRBC 0.0 0  WBC    ABSOLUTE NRBC 0.00 0.00 - 0.01 K/uL    NEUTROPHILS 73 32 - 75 %    LYMPHOCYTES 17 12 - 49 %    MONOCYTES 5 5 - 13 %    EOSINOPHILS 3 0 - 7 %    BASOPHILS 1 0 - 1 %    IMMATURE GRANULOCYTES 1 (H) 0.0 - 0.5 %    ABS. NEUTROPHILS 4.5 1.8 - 8.0 K/UL    ABS. LYMPHOCYTES 1.0 0.8 - 3.5 K/UL    ABS. MONOCYTES 0.3 0.0 - 1.0 K/UL    ABS. EOSINOPHILS 0.2 0.0 - 0.4 K/UL    ABS. BASOPHILS 0.0 0.0 - 0.1 K/UL    ABS. IMM. GRANS. 0.0 0.00 - 0.04 K/UL    DF AUTOMATED     POC LACTIC ACID    Collection Time: 04/13/22  1:16 PM   Result Value Ref Range    Lactic Acid (POC) 1.14 0.40 - 2.00 mmol/L       Radiologic Studies -   US RETROPERITONEUM COMP   Final Result      Normal kidneys   Hepatosplenomegaly with hepatic steatosis                    CT Results  (Last 48 hours)    None        CXR Results  (Last 48 hours)    None            Medical Decision Making   I am the first provider for this patient. I reviewed the vital signs, available nursing notes, past medical history, past surgical history, family history and social history. Vital Signs-Reviewed the patient's vital signs. Patient Vitals for the past 12 hrs:   Temp Pulse Resp BP SpO2   04/13/22 1030 -- 82 12 (!) 141/72 97 %   04/13/22 1015 -- 81 22 -- 93 %   04/13/22 1000 -- 84 24 (!) 145/78 97 %   04/13/22 0815 98.8 °F (37.1 °C) 87 18 (!) 175/79 99 %       Records Reviewed: Nursing Notes, Old Medical Records, Previous Radiology Studies and Previous Laboratory Studies    Provider Notes (Medical Decision Making):     Patient presents to the ED for evaluation as noted above.  Benign abdominal exam, shared decision-making performed care plan created together; patient defers CT scan at this time (given multiple recent CT scans and no significant new/worsening symptoms in regards to her abdominal pain. US unremarkable. Urinalysis still consistent with UTI; patient had not yet started her Cipro that she was prescribed yesterday, discussed this with her. Urine culture sent. In regards to her vomiting and diarrhea, she had no episodes of vomiting while here in the ER and tolerated p.o. well. Discussed obtaining a stool culture/C. difficile testing, although patient had no bowel movements while here in the ER, reassuring. Monitored here in the ER; no anaphylaxis; rash without exfoliative lesions or mucus membrane involvement; discussed allergy and immunology follow-up (patient states GI is aware of rash and suspect it is related to her UC.)  Patient endorses symptom improvement after medication administration. Repeat lactic improved; Tolerating PO well    Work-up and imaging reassuring, no significant leukocytosis or electrolyte abnormalities. Benign abdominal exam/nonsurgical abdomen. Do not suspect bowel perforation, mesenteric ischemia, acute surgical intra-abdominal etiology, sepsis, SJS/TEN, anaphylaxis, MI, PE, or other emergent conditions requiring further evaluation/management acutely at this time. Shared decision making performed and care plan created together, discussed work-up and imaging results, diagnosis and treatment plan. Counseled symptomatic management techniques. PCP, GI, oncology, immunology follow-up. Verbal return precautions advised. Patient verbalizes understanding and agreement of current plan of care. ED Course:   Initial assessment performed. The patients presenting problems have been discussed, and they are in agreement with the care plan formulated and outlined with them.   I have encouraged them to ask questions as they arise throughout their visit.    ED Course as of 04/13/22 1832 Wed Apr 13, 2022 2039 Patient states she tolerated dilaudid well yesterday without reactions and it improved her pain. [TL]      ED Course User Index  [TL] BEBA Grimaldo       Critical Care Time: None    Disposition:  Discharge     PLAN:  1. Discharge Medication List as of 4/13/2022  1:29 PM        2. Follow-up Information     Follow up With Specialties Details Why Dion Hammans, MD Internal Medicine In 1 week  [address] [facility] [phone]      [facility] EMERGENCY DEPT Emergency Medicine  As needed, If symptoms worsen [address]  [address] [facility]  [phone]    [facility] [address]  [place] [address] [facility] [address]        Return to ED if worse     Diagnosis     Clinical Impression:   1. Abdominal pain, generalized    2. Rash    3. Acute UTI          Please note that this dictation was completed with [employer], the computer voice recognition software. Quite often unanticipated grammatical, syntax, homophones, and other interpretive errors are inadvertently transcribed by the computer software. Please disregards these errors. Please excuse any errors that have escaped final proofreading.

## 2022-04-13 NOTE — DISCHARGE INSTRUCTIONS
Thank You! It was a pleasure taking care of you in our Emergency Department today. We know that when you come to [address] [place] [address], you are entrusting us with your health, comfort, and safety. Our clinicians honor that trust, and truly appreciate the opportunity to care for you and your loved ones. We also value your feedback. If you receive a survey about your Emergency Department experience today, please fill it out. We care about our patients' feedback, and we listen to what you have to say. Thank you.          ____________________________________________________________________  I have included a copy of your lab results and/or radiologic studies from today's visit so you can have them easily available at your follow-up visit. We hope you feel better and please do not hesitate to contact the ED if you have any questions at all! Recent Results (from the past 12 hour(s))   METABOLIC PANEL, COMPREHENSIVE    Collection Time: 04/13/22  9:02 AM   Result Value Ref Range    Sodium 136 136 - 145 mmol/L    Potassium 3.5 3.5 - 5.1 mmol/L    Chloride 102 97 - 108 mmol/L    CO2 25 21 - 32 mmol/L    Anion gap 9 5 - 15 mmol/L    Glucose 303 (H) 65 - 100 mg/dL    BUN 8 6 - 20 MG/DL    Creatinine 0.79 0.55 - 1.02 MG/DL    BUN/Creatinine ratio 10 (L) 12 - 20      GFR est AA >60 >60 ml/min/1.73m2    GFR est non-AA >60 >60 ml/min/1.73m2    Calcium 9.6 8.5 - 10.1 MG/DL    Bilirubin, total 0.7 0.2 - 1.0 MG/DL    ALT (SGPT) 27 12 - 78 U/L    AST (SGOT) 30 15 - 37 U/L    Alk.  phosphatase 75 45 - 117 U/L    Protein, total 7.0 6.4 - 8.2 g/dL    Albumin 3.5 3.5 - 5.0 g/dL    Globulin 3.5 2.0 - 4.0 g/dL    A-G Ratio 1.0 (L) 1.1 - 2.2     LIPASE    Collection Time: 04/13/22  9:02 AM   Result Value Ref Range    Lipase 161 73 - 393 U/L   POC LACTIC ACID    Collection Time: 04/13/22  9:08 AM   Result Value Ref Range    Lactic Acid (POC) 2.58 (HH) 0.40 - 2.00 mmol/L   URINALYSIS W/ REFLEX CULTURE    Collection Time: 04/13/22  9:24 AM    Specimen: Urine   Result Value Ref Range    Color YELLOW/STRAW      Appearance CLOUDY (A) CLEAR      Specific gravity 1.010 1.003 - 1.030      pH (UA) 5.5 5.0 - 8.0      Protein Negative NEG mg/dL    Glucose >1,000 (A) NEG mg/dL    Ketone Negative NEG mg/dL    Bilirubin Negative NEG      Blood TRACE (A) NEG      Urobilinogen 0.2 0.2 - 1.0 EU/dL    Nitrites Negative NEG      Leukocyte Esterase MODERATE (A) NEG      UA:UC IF INDICATED URINE CULTURE ORDERED (A) CNI      WBC >100 (H) 0 - 4 /hpf    RBC 20-50 0 - 5 /hpf    Epithelial cells MANY (A) FEW /lpf    Bacteria 1+ (A) NEG /hpf    Hyaline cast 0-2 0 - 5 /lpf   CBC WITH AUTOMATED DIFF    Collection Time: 04/13/22  9:52 AM   Result Value Ref Range    WBC 6.0 3.6 - 11.0 K/uL    RBC 4.07 3.80 - 5.20 M/uL    HGB 12.4 11.5 - 16.0 g/dL    HCT 35.7 35.0 - 47.0 %    MCV 87.7 80.0 - 99.0 FL    MCH 30.5 26.0 - 34.0 PG    MCHC 34.7 30.0 - 36.5 g/dL    RDW 12.9 11.5 - 14.5 %    PLATELET 687 368 - 381 K/uL    MPV 9.2 8.9 - 12.9 FL    NRBC 0.0 0  WBC    ABSOLUTE NRBC 0.00 0.00 - 0.01 K/uL    NEUTROPHILS 73 32 - 75 %    LYMPHOCYTES 17 12 - 49 %    MONOCYTES 5 5 - 13 %    EOSINOPHILS 3 0 - 7 %    BASOPHILS 1 0 - 1 %    IMMATURE GRANULOCYTES 1 (H) 0.0 - 0.5 %    ABS. NEUTROPHILS 4.5 1.8 - 8.0 K/UL    ABS. LYMPHOCYTES 1.0 0.8 - 3.5 K/UL    ABS. MONOCYTES 0.3 0.0 - 1.0 K/UL    ABS. EOSINOPHILS 0.2 0.0 - 0.4 K/UL    ABS. BASOPHILS 0.0 0.0 - 0.1 K/UL    ABS. IMM. GRANS. 0.0 0.00 - 0.04 K/UL    DF AUTOMATED     POC LACTIC ACID    Collection Time: 04/13/22  1:16 PM   Result Value Ref Range    Lactic Acid (POC) 1.14 0.40 - 2.00 mmol/L       US RETROPERITONEUM COMP   Final Result      Normal kidneys   Hepatosplenomegaly with hepatic steatosis                    CT Results  (Last 48 hours)      None          The exam and treatment you received in the Emergency Department were for an urgent problem and are not intended as complete care.  It is important that you follow up with a doctor, nurse practitioner, or physician assistant for ongoing care. If your symptoms become worse or you do not improve as expected and you are unable to reach your usual health care provider, you should return to the Emergency Department. We are available 24 hours a day. Please take your discharge instructions with you when you go to your follow-up appointment. If a prescription has been provided, please have it filled as soon as possible to prevent a delay in treatment. Read the entire medication instruction sheet provided to you by the pharmacy. If you have any questions or reservations about taking the medication due to side effects or interactions with other medications, please call your primary care physician or contact the ER to speak with the charge nurse. Please make an appointment with your family doctor or the physician you were referred to for follow-up of this visit as instructed on your discharge paperwork. Return to the ER if you are unable to be seen or if you are unable to be seen in a timely manner. If you have any problem arranging the follow-up visit, contact the Emergency Department immediately.

## 2022-04-13 NOTE — Clinical Note
[address]  [facility] EMERGENCY DEPT  [address] [place] [address]  Owen Padilla [id number] [phone]    Work/School Note    Date: 4/13/2022    To Whom It May concern:    Antoinette Mcadams was seen and treated today in the emergency room by the following provider(s):  Attending Provider: Juliet Eddy MD  Physician Assistant: Shilpa Starr. Antoinette Mcadams is excused from work/school on 04/13/22 and 04/14/22. She is medically clear to return to work/school on 4/15/2022.        Sincerely,          BEBA Tam

## 2022-04-13 NOTE — ED NOTES
Pt was unable to provide stool on this visit to obtain a c-diff test.     I have reviewed discharge instructions with the patient. The patient verbalized understanding.

## 2022-04-14 ENCOUNTER — HOSPITAL ENCOUNTER (EMERGENCY)
Age: 52
Discharge: HOME | End: 2022-04-14
Attending: EMERGENCY MEDICINE
Payer: MEDICAID

## 2022-04-14 ENCOUNTER — APPOINTMENT (OUTPATIENT)
Dept: CT IMAGING | Age: 52
End: 2022-04-14
Attending: EMERGENCY MEDICINE
Payer: MEDICAID

## 2022-04-14 VITALS
BODY MASS INDEX: 38.26 KG/M2 | HEIGHT: 62 IN | WEIGHT: 207.89 LBS | RESPIRATION RATE: 16 BRPM | OXYGEN SATURATION: 98 % | HEART RATE: 85 BPM | SYSTOLIC BLOOD PRESSURE: 155 MMHG | DIASTOLIC BLOOD PRESSURE: 94 MMHG | TEMPERATURE: 98.8 F

## 2022-04-14 DIAGNOSIS — R10.12 ABDOMINAL PAIN, LUQ (LEFT UPPER QUADRANT): Primary | ICD-10-CM

## 2022-04-14 DIAGNOSIS — R73.9 HYPERGLYCEMIA: ICD-10-CM

## 2022-04-14 DIAGNOSIS — F41.9 ANXIETY: ICD-10-CM

## 2022-04-14 DIAGNOSIS — T78.40XA ALLERGIC REACTION, INITIAL ENCOUNTER: ICD-10-CM

## 2022-04-14 LAB
ALBUMIN SERPL-MCNC: 3.6 G/DL (ref 3.5–5)
ALBUMIN/GLOB SERPL: 1 {RATIO} (ref 1.1–2.2)
ALP SERPL-CCNC: 70 U/L (ref 45–117)
ALT SERPL-CCNC: 29 U/L (ref 12–78)
ANION GAP SERPL CALC-SCNC: 7 MMOL/L (ref 5–15)
APPEARANCE UR: CLEAR
AST SERPL-CCNC: 32 U/L (ref 15–37)
BACTERIA SPEC CULT: NORMAL
BACTERIA URNS QL MICRO: NEGATIVE /HPF
BASOPHILS # BLD: 0 K/UL (ref 0–0.1)
BASOPHILS NFR BLD: 0 % (ref 0–1)
BILIRUB SERPL-MCNC: 0.7 MG/DL (ref 0.2–1)
BILIRUB UR QL: NEGATIVE
BUN SERPL-MCNC: 8 MG/DL (ref 6–20)
BUN/CREAT SERPL: 10 (ref 12–20)
CALCIUM SERPL-MCNC: 10.1 MG/DL (ref 8.5–10.1)
CC UR VC: NORMAL
CHLORIDE SERPL-SCNC: 101 MMOL/L (ref 97–108)
CO2 SERPL-SCNC: 27 MMOL/L (ref 21–32)
COLOR UR: ABNORMAL
CREAT SERPL-MCNC: 0.77 MG/DL (ref 0.55–1.02)
DIFFERENTIAL METHOD BLD: ABNORMAL
EOSINOPHIL # BLD: 0.2 K/UL (ref 0–0.4)
EOSINOPHIL NFR BLD: 3 % (ref 0–7)
EPITH CASTS URNS QL MICRO: ABNORMAL /LPF
ERYTHROCYTE [DISTWIDTH] IN BLOOD BY AUTOMATED COUNT: 12.8 % (ref 11.5–14.5)
GLOBULIN SER CALC-MCNC: 3.7 G/DL (ref 2–4)
GLUCOSE SERPL-MCNC: 243 MG/DL (ref 65–100)
GLUCOSE UR STRIP.AUTO-MCNC: >1000 MG/DL
HCT VFR BLD AUTO: 39.4 % (ref 35–47)
HGB BLD-MCNC: 13.6 G/DL (ref 11.5–16)
HGB UR QL STRIP: NEGATIVE
IMM GRANULOCYTES # BLD AUTO: 0.1 K/UL (ref 0–0.04)
IMM GRANULOCYTES NFR BLD AUTO: 1 % (ref 0–0.5)
KETONES UR QL STRIP.AUTO: NEGATIVE MG/DL
LEUKOCYTE ESTERASE UR QL STRIP.AUTO: NEGATIVE
LIPASE SERPL-CCNC: 173 U/L (ref 73–393)
LYMPHOCYTES # BLD: 1.6 K/UL (ref 0.8–3.5)
LYMPHOCYTES NFR BLD: 27 % (ref 12–49)
MCH RBC QN AUTO: 30.1 PG (ref 26–34)
MCHC RBC AUTO-ENTMCNC: 34.5 G/DL (ref 30–36.5)
MCV RBC AUTO: 87.2 FL (ref 80–99)
MONOCYTES # BLD: 0.3 K/UL (ref 0–1)
MONOCYTES NFR BLD: 5 % (ref 5–13)
NEUTS SEG # BLD: 4 K/UL (ref 1.8–8)
NEUTS SEG NFR BLD: 64 % (ref 32–75)
NITRITE UR QL STRIP.AUTO: POSITIVE
NRBC # BLD: 0.03 K/UL (ref 0–0.01)
NRBC BLD-RTO: 0.5 PER 100 WBC
PH UR STRIP: 6.5 [PH] (ref 5–8)
PLATELET # BLD AUTO: 185 K/UL (ref 150–400)
PMV BLD AUTO: 9.9 FL (ref 8.9–12.9)
POTASSIUM SERPL-SCNC: 3.9 MMOL/L (ref 3.5–5.1)
PROT SERPL-MCNC: 7.3 G/DL (ref 6.4–8.2)
PROT UR STRIP-MCNC: NEGATIVE MG/DL
RBC # BLD AUTO: 4.52 M/UL (ref 3.8–5.2)
RBC #/AREA URNS HPF: ABNORMAL /HPF (ref 0–5)
SERVICE CMNT-IMP: NORMAL
SODIUM SERPL-SCNC: 135 MMOL/L (ref 136–145)
SP GR UR REFRACTOMETRY: 1.02 (ref 1–1.03)
UA: UC IF INDICATED,UAUC: ABNORMAL
UROBILINOGEN UR QL STRIP.AUTO: 0.2 EU/DL (ref 0.2–1)
WBC # BLD AUTO: 6.1 K/UL (ref 3.6–11)
WBC URNS QL MICRO: ABNORMAL /HPF (ref 0–4)

## 2022-04-14 PROCEDURE — 81001 URINALYSIS AUTO W/SCOPE: CPT

## 2022-04-14 PROCEDURE — 80053 COMPREHEN METABOLIC PANEL: CPT

## 2022-04-14 PROCEDURE — 85025 COMPLETE CBC W/AUTO DIFF WBC: CPT

## 2022-04-14 PROCEDURE — 36415 COLL VENOUS BLD VENIPUNCTURE: CPT

## 2022-04-14 PROCEDURE — 74011250636 HC RX REV CODE- 250/636: Performed by: EMERGENCY MEDICINE

## 2022-04-14 PROCEDURE — 74011000250 HC RX REV CODE- 250: Performed by: EMERGENCY MEDICINE

## 2022-04-14 PROCEDURE — 83690 ASSAY OF LIPASE: CPT

## 2022-04-14 PROCEDURE — 96375 TX/PRO/DX INJ NEW DRUG ADDON: CPT

## 2022-04-14 PROCEDURE — 96374 THER/PROPH/DIAG INJ IV PUSH: CPT

## 2022-04-14 PROCEDURE — 99284 EMERGENCY DEPT VISIT MOD MDM: CPT

## 2022-04-14 PROCEDURE — 74176 CT ABD & PELVIS W/O CONTRAST: CPT

## 2022-04-14 RX ORDER — DIPHENHYDRAMINE HYDROCHLORIDE 50 MG/ML
25 INJECTION, SOLUTION INTRAMUSCULAR; INTRAVENOUS
Status: COMPLETED | OUTPATIENT
Start: 2022-04-14 | End: 2022-04-14

## 2022-04-14 RX ORDER — NALOXONE HYDROCHLORIDE 1 MG/ML
1 INJECTION INTRAMUSCULAR; INTRAVENOUS; SUBCUTANEOUS
Qty: 2 ML | Refills: 0 | Status: SHIPPED | OUTPATIENT
Start: 2022-04-14 | End: 2022-04-14

## 2022-04-14 RX ORDER — ALPRAZOLAM 1 MG/1
TABLET ORAL
Qty: 60 TABLET | Refills: 0 | Status: SHIPPED | OUTPATIENT
Start: 2022-04-14 | End: 2022-05-19

## 2022-04-14 RX ORDER — HYDROMORPHONE HYDROCHLORIDE 1 MG/ML
0.5 INJECTION, SOLUTION INTRAMUSCULAR; INTRAVENOUS; SUBCUTANEOUS ONCE
Status: COMPLETED | OUTPATIENT
Start: 2022-04-14 | End: 2022-04-14

## 2022-04-14 RX ORDER — MORPHINE SULFATE 2 MG/ML
4 INJECTION, SOLUTION INTRAMUSCULAR; INTRAVENOUS
Status: COMPLETED | OUTPATIENT
Start: 2022-04-14 | End: 2022-04-14

## 2022-04-14 RX ADMIN — DIPHENHYDRAMINE HYDROCHLORIDE 25 MG: 50 INJECTION, SOLUTION INTRAMUSCULAR; INTRAVENOUS at 12:49

## 2022-04-14 RX ADMIN — FAMOTIDINE 20 MG: 10 INJECTION, SOLUTION INTRAVENOUS at 12:49

## 2022-04-14 RX ADMIN — METHYLPREDNISOLONE SODIUM SUCCINATE 125 MG: 125 INJECTION, POWDER, FOR SOLUTION INTRAMUSCULAR; INTRAVENOUS at 12:49

## 2022-04-14 RX ADMIN — MORPHINE SULFATE 4 MG: 2 INJECTION, SOLUTION INTRAMUSCULAR; INTRAVENOUS at 12:50

## 2022-04-14 RX ADMIN — HYDROMORPHONE HYDROCHLORIDE 0.5 MG: 1 INJECTION, SOLUTION INTRAMUSCULAR; INTRAVENOUS; SUBCUTANEOUS at 14:34

## 2022-04-14 NOTE — Clinical Note
[address]  [facility] EMERGENCY DEPT  [address] [facility]  [place] [id number] [phone]    Work/School Note    Date: 4/14/2022    To Whom It May concern:    Oksana Dahl was seen and treated today in the emergency room by the following provider(s):  Attending Provider: Dao Souza MD.      Oksana Dahl is excused from work/school on 04/14/22 and 04/15/22. She is medically clear to return to work/school on 4/16/2022.        Sincerely,          Theo Osorio RN

## 2022-04-14 NOTE — DISCHARGE INSTRUCTIONS
You were evaluated in the emergency department for abdominal pain and allergic reaction. Your examination was reassuring as was your work-up including blood work, urinalysis, and CT scan. It will be important for you to follow-up with your GI doctor in 2-3 days. If you develop worsening symptoms such as worsening abdominal pain or fevers, please return to the emergency department immediately.

## 2022-04-14 NOTE — ED PROVIDER NOTES
EMERGENCY DEPARTMENT HISTORY AND PHYSICAL EXAM      Date: 4/14/2022  Patient Name: Samia Lewis    History of Presenting Illness     Chief Complaint   Patient presents with    Allergic Reaction     itching and burning worse today. pt face, arms and chest are red    Abdominal Pain     left upper quadrant pain radiates to her back; pt having diarrhea     Diarrhea       History Provided By: Patient    HPI: Samia Lewis, 46 y.o. female history ulcerative colitis, CKD, hypertension, diabetes presents to the ED with cc of left-sided abdominal pain and rash. This has been an ongoing problem for the patient. She has been seen in the ED multiple times over the past week on 4/7, 4/12, and 4/13. She last had CT abdomen pelvis on the seventh and ultrasound on the 13th. She believes that she is having an ulcerative colitis flare but she has not been able to make an appointment with her GI physician. She denies any fevers but endorses generalized sharp stabbing left-sided abdominal pain which radiates throughout the entire left side abdomen and around to the left flank. No aggravating or alleviating factors. Denies nausea, vomiting but she does endorse diarrhea which is a chronic problem for her. Denies any blood in her stools. Additionally she complains of a rash described as a flushing sensation located throughout her face, trunk, bilateral upper extremities. She states that she develops this rash when she is having a UC flare and sometimes it is controlled by steroids and Benadryl but nothing has helped recently. There are no other complaints, changes, or physical findings at this time. PCP: Dior Tavera MD    No current facility-administered medications on file prior to encounter.      Current Outpatient Medications on File Prior to Encounter   Medication Sig Dispense Refill    ALPRAZolam (XANAX) 1 mg tablet TAKE 1/2 - 1 TABLET BY MOUTH TWICE DAILY AS NEEDED FOR ANXIETY *MAX 2 TABS DAILY* 60 Tablet 0    [] naloxone (NARCAN) 1 mg/mL injection 1 mL by IntraMUSCular route once as needed for Overdose for up to 1 dose. 2 mL 0    ciprofloxacin HCl (Cipro) 500 mg tablet Take 1 Tablet by mouth two (2) times a day for 7 days. 14 Tablet 0    cetirizine (ZyrTEC) 10 mg tablet Take 1 Tablet by mouth daily. 20 Tablet 0    predniSONE (STERAPRED DS) 10 mg dose pack 12-day Dosepak use as directed 48 Tablet 0    Jardiance 25 mg tablet TAKE 1 TABLET BY MOUTH EVERY DAY 90 Tablet 1    melatonin 5 mg cap capsule Take  by mouth nightly.  estradioL (ESTRACE) 0.5 mg tablet TAKE 1 TABLET BY MOUTH EVERY DAY 90 Tablet 1    promethazine (PHENERGAN) 25 mg tablet Take 1 Tablet by mouth every six (6) hours as needed for Nausea. 12 Tablet 0    atorvastatin (LIPITOR) 20 mg tablet TAKE 1 TABLET BY MOUTH EVERY DAY 90 Tablet 1    Ventolin HFA 90 mcg/actuation inhaler TAKE 1 PUFF BY INHALATION EVERY FOUR (4) HOURS AS NEEDED FOR WHEEZING. 18 Each 1    glimepiride (AMARYL) 4 mg tablet TAKE 1 TABLET BY MOUTH ONCE DAILY BEFORE BREAKFAST 90 Tablet 1    naloxone (Narcan) 4 mg/actuation nasal spray Use 1 spray intranasally, then discard. Repeat with new spray every 2 min as needed for opioid overdose symptoms, alternating nostrils. 1 Each 0    losartan-hydroCHLOROthiazide (HYZAAR) 100-12.5 mg per tablet TAKE 1 TABLET BY MOUTH EVERY DAY 90 Tablet 1    dibucaine (NUPERCAINAL) 1 % rectal ointment by PeriANAL route every four (4) hours as needed for Pain. 28 g 0    dicyclomine (BENTYL) 20 mg tablet Take 1 Tablet by mouth every six (6) hours as needed for Abdominal Cramps. 20 Tablet 0    hyoscyamine SL (LEVSIN/SL) 0.125 mg SL tablet 1 Tablet by SubLINGual route every four (4) hours as needed for Cramping. (Patient not taking: Reported on 3/11/2022) 10 Tablet 0    ondansetron (Zofran ODT) 4 mg disintegrating tablet Take 1 Tablet by mouth every eight (8) hours as needed for Nausea.  12 Tablet 1    omeprazole (PRILOSEC) 20 mg capsule Take 40 mg by mouth Daily (before breakfast).  EPINEPHrine (EPIPEN) 0.3 mg/0.3 mL (1:1,000) injection 0.3 mL by IntraMUSCular route once as needed for up to 1 dose. 0.3 mL 1       Past History     Past Medical History:  Past Medical History:   Diagnosis Date    Anal fissure     Anisocoria     Asthma     LAST EPISODE     Back pain     Cerumen impaction     Chronic kidney disease     hx uti in past    Coagulation defects     ocassional rectal bleeding due to anal fissure    Colovaginal fistula     Diabetes (HCC)     NIDDM    Diverticulitis     Diverticulosis     Enlarged tonsils     Frequent UTI     GERD (gastroesophageal reflux disease)     H/O endoscopy     with dilation    HA (headache)     Hepatic steatosis     History of colon resection     Hx of colonoscopy with polypectomy     benign    Hypertension     Ill-defined condition     FREQUENT HIVES    Ill-defined condition     HX ELEVATED LIVER ENZYMES    Morbid obesity (HCC)     Nausea & vomiting     during diverticulitis flare    Obesity     Otitis media     Pneumonia     about 15 yrs ago    Psychiatric disorder     ANXIETY    Recurrent tonsillitis     Sinusitis     Transfusion history ~ age 35    postop hysterectomy    Urticaria        Past Surgical History:  Past Surgical History:   Procedure Laterality Date    COLONOSCOPY N/A 3/28/2019    COLONOSCOPY performed by Veronique Hector MD at [address] [place] [address] COLONOSCOPY N/A 10/2/2020    COLONOSCOPY performed by Veronique Hector MD at [address] [place] [address] HX APPENDECTOMY  2021    cancer.  HX BREAST REDUCTION      blake.     HX GI  12    LAPAROSCOPIC HAND ASSISTED  POSS OPEN SIGMOID COLECTOMY POSS TEMPORARY DIVERTING LOOP ILEOSTOMY;  (no illeostomy needed)    HX GYN           HX GYN      cervical conization    HX HEENT      SINUS SURGERY LEFT X2    HX HEENT      SINUS SURGERY ON RIGHT X2    HX HEMORRHOIDECTOMY      HX HYSTERECTOMY      HX OTHER SURGICAL  11/12/2021    Sphincterotomy    HX PELVIC LAPAROSCOPY      HX EMMANUEL AND BSO      HX UROLOGICAL  7/31/12     CYSTOSCOPY INSERTION URETERAL CATHETERS - Cystoscopy Insertion of bilateral ureteral stents    MN ABDOMEN SURGERY PROC UNLISTED  01/06/2018    hernia repair at [address] UNLISTED      colon resection. Family History:  Family History   Problem Relation Age of Onset    Diabetes Mother     Cancer Mother         NON-HODGKINS LYMPHOMA    Anesth Problems Mother         PONV    Diabetes Father     Heart Disease Father         CAD - STENTS, PACEMAKER    Arrhythmia Father     Cancer Paternal Grandmother        Social History:  Social History     Tobacco Use    Smoking status: Never Smoker    Smokeless tobacco: Never Used   Vaping Use    Vaping Use: Not on file   Substance Use Topics    Alcohol use: Not Currently    Drug use: Yes     Types: OTC, Prescription       Allergies: Allergies   Allergen Reactions    Aspirin Hives, Shortness of Breath and Itching    Codeine Hives, Itching and Angioedema     Pt had itching in mouth, on face and lips    Contrast Agent [Iodine] Hives, Itching and Angioedema     5/5/21: pt was given benadryl and solu-medrol prior to administration but pt had severe tongue swelling and drooling, lethargy and itching.  DO NOT GIVE PT    Flavoring Agent Anaphylaxis     Cherry flavor    Iodinated Contrast Media Anaphylaxis, Diarrhea, Hives, Nausea and Vomiting and Shortness of Breath    Percocet [Oxycodone-Acetaminophen] Hives, Itching and Angioedema     Pt had itching in mouth, on face and lips    Prilosec [Omeprazole Magnesium] Anaphylaxis     CHERRY FLAVORED ODT; PT TAKES REGULAR PRILOSEC AND IS OK    Dilaudid [Hydromorphone] Itching     Tolerates with benadryl    Ketorolac Rash     \"makes my eyes spasm and causes rash on my hands\" and \"makes my skin itch and makes me nervous\"    Morphine (Pf) Rash According to rn, pt can take morphine with benadryl    Fentanyl Rash and Itching     Has had benadryl before         Review of Systems   Review of Systems   Constitutional: Negative for chills and fever. Respiratory: Negative for cough and shortness of breath. Cardiovascular: Negative for chest pain and leg swelling. Gastrointestinal: Positive for abdominal pain and diarrhea. Negative for blood in stool, nausea and vomiting. Genitourinary: Negative. Musculoskeletal: Positive for back pain. Negative for gait problem. Skin: Positive for rash. Negative for color change. Neurological: Negative for dizziness, weakness, light-headedness and headaches. All other systems reviewed and are negative. Physical Exam   Physical Exam  Vitals and nursing note reviewed. Constitutional:       General: She is not in acute distress. Appearance: Normal appearance. She is not ill-appearing or toxic-appearing. HENT:      Head: Normocephalic and atraumatic. Nose: Nose normal.      Mouth/Throat:      Mouth: Mucous membranes are moist.   Eyes:      Extraocular Movements: Extraocular movements intact. Pupils: Pupils are equal, round, and reactive to light. Cardiovascular:      Rate and Rhythm: Normal rate and regular rhythm. Heart sounds: No murmur heard. Pulmonary:      Effort: Pulmonary effort is normal. No respiratory distress. Breath sounds: Normal breath sounds. No wheezing. Abdominal:      General: There is no distension. Palpations: Abdomen is soft. Tenderness: There is abdominal tenderness in the epigastric area, periumbilical area, left upper quadrant and left lower quadrant. There is no guarding or rebound. Musculoskeletal:         General: No swelling or tenderness. Normal range of motion. Cervical back: Normal range of motion and neck supple. Right lower leg: No edema. Left lower leg: No edema. Skin:     General: Skin is warm and dry. Coloration: Skin is not pale. Findings: Erythema (Flushing noted to bilateral upper extremities, trunk, face without hives, urticaria, or rash.) present. Neurological:      General: No focal deficit present. Mental Status: She is alert and oriented to person, place, and time. Diagnostic Study Results     Labs -  Labs Reviewed   CBC WITH AUTOMATED DIFF - Abnormal; Notable for the following components:       Result Value    NRBC 0.5 (*)     ABSOLUTE NRBC 0.03 (*)     IMMATURE GRANULOCYTES 1 (*)     ABS. IMM. GRANS. 0.1 (*)     All other components within normal limits   METABOLIC PANEL, COMPREHENSIVE - Abnormal; Notable for the following components:    Sodium 135 (*)     Glucose 243 (*)     BUN/Creatinine ratio 10 (*)     A-G Ratio 1.0 (*)     All other components within normal limits   URINALYSIS W/ REFLEX CULTURE - Abnormal; Notable for the following components:    Glucose >1,000 (*)     Nitrites Positive (*)     All other components within normal limits   LIPASE       Radiologic Studies -   CT ABD PELV WO CONT   Final Result   *  No acute changes. Incidentals as above        CT Results  (Last 48 hours)               04/14/22 1414  CT ABD PELV WO CONT Final result    Impression:  *  No acute changes. Incidentals as above       Narrative:  EXAM: CT ABD PELV WO CONT       INDICATION: L sided abd pain, h/o UC       COMPARISON: April 7, 2022       IV CONTRAST: None. ORAL CONTRAST none       TECHNIQUE:    Thin axial images were obtained through the abdomen and pelvis. Coronal and   sagittal reformats were generated. CT dose reduction was achieved through use of   a standardized protocol tailored for this examination and automatic exposure   control for dose modulation. The absence of intravenous contrast material reduces the sensitivity for   evaluation of the vasculature and solid organs. FINDINGS:    LOWER THORAX: Small right lung base infiltrate. LIVER: No mass.    BILIARY TREE: Cholecystectomy. CBD is not dilated. SPLEEN: Stable enlarged. PANCREAS: No focal abnormality. ADRENALS: Unremarkable. KIDNEYS/URETERS: No calculus or hydronephrosis. STOMACH: Unremarkable. SMALL BOWEL: No dilatation or wall thickening. COLON: No dilatation or wall thickening.: colon Resection   APPENDIX: Appendectomy   PERITONEUM: No ascites or pneumoperitoneum. RETROPERITONEUM: No lymphadenopathy or aortic aneurysm. REPRODUCTIVE ORGANS: Total hysterectomy   URINARY BLADDER: No mass or calculus. BONES: No destructive bone lesion. ABDOMINAL WALL: No mass or hernia. ADDITIONAL COMMENTS: N/A               CXR Results  (Last 48 hours)    None          Medical Decision Making   I am the first provider for this patient. I reviewed the vital signs, available nursing notes, past medical history, past surgical history, family history and social history. Vital Signs-Reviewed the patient's vital signs. Visit Vitals  BP (!) 155/94 (BP 1 Location: Left upper arm)   Pulse 85   Temp 98.8 °F (37.1 °C)   Resp 16   Ht 5' 2\" (1.575 m)   Wt 94.3 kg (207 lb 14.3 oz)   SpO2 98%   BMI 38.02 kg/m²       Records Reviewed: Nursing Notes and Old Medical Records   Reviewed prior ED evaluations including 4/7, 4/12, 4/13. Provider Notes (Medical Decision Making):   26-year-old female with history of UC and above history presenting now for the fourth time in the same week for same symptoms including left-sided abdominal pain and rash. Exam as above. She does not have any peritoneal findings. She is afebrile and vital signs appear stable and she is tolerating p.o. However given that this is now her fourth visit for the same symptoms will repeat CT today to make sure there is no acute surgical process. Laboratory evaluation unremarkable. CT negative for acute process. Patient able to tolerate p.o. No findings suggestive of an active flare, sign of acute infection, or other acute process.   I spent a significant amount of time explaining to the patient the role of the emergency department and that she needs to follow-up with her GI team.  She has had multiple negative evaluations, 4 times in the past 1 week and I feel her symptoms are best managed as an outpatient at this time by her primary team.  All questions answered and patient agrees with plan as above. ED Course:   Initial assessment performed. The patients presenting problems have been discussed, and they are in agreement with the care plan formulated and outlined with them. I have encouraged them to ask questions as they arise throughout their visit. Discharge Note:  The patient has been re-evaluated and is ready for discharge. Reviewed available results with patient. Counseled patient on diagnosis and care plan. Patient has expressed understanding, and all questions have been answered. Patient agrees with plan and agrees to follow up as recommended, or to return to the ED if their symptoms worsen. Discharge instructions have been provided and explained to the patient, along with reasons to return to the ED. Disposition:  Discharge    DISCHARGE PLAN:  1. Discharge Medication List as of 4/14/2022  2:48 PM        2. Follow-up Information     Follow up With Specialties Details Why Contact Info    Forrest Martinez MD Gastroenterology Schedule an appointment as soon as possible for a visit   Mervin   [phone]      [facility] EMERGENCY DEPT Emergency Medicine Go to  As needed, If symptoms worsen [address]  [address] [facility]  [phone]        3. Return to ED if worse     Diagnosis     Clinical Impression:   1. Abdominal pain, LUQ (left upper quadrant)    2. Allergic reaction, initial encounter    3. Hyperglycemia        Attestations:  I am the first and primary provider of record for this patient's ED encounter.  I personally performed the services described above in this documentation. Jonathan Silvestre MD    Please note that this dictation was completed with Driv[employer], the computer voice recognition software. Quite often unanticipated grammatical, syntax, homophones, and other interpretive errors are inadvertently transcribed by the computer software. Please disregard these errors. Please excuse any errors that have escaped final proofreading. Thank you.

## 2022-04-14 NOTE — Clinical Note
[address]  [facility] EMERGENCY DEPT  [address] [facility]  [place] [id number] [phone]    Work/School Note    Date: 4/14/2022    To Whom It May concern:    Oksana Dahl was seen and treated today in the emergency room by the following provider(s):  Attending Provider: Dao Souza MD.      Oksana Dahl is excused from work/school on 04/14/22 and 04/15/22. She is medically clear to return to work/school on 4/16/2022.        Sincerely,          Meggan Jackson MD

## 2022-04-19 ENCOUNTER — HOSPITAL ENCOUNTER (EMERGENCY)
Age: 52
Discharge: HOME | End: 2022-04-19
Attending: EMERGENCY MEDICINE
Payer: MEDICAID

## 2022-04-19 VITALS
DIASTOLIC BLOOD PRESSURE: 87 MMHG | TEMPERATURE: 98.4 F | HEART RATE: 81 BPM | RESPIRATION RATE: 16 BRPM | SYSTOLIC BLOOD PRESSURE: 147 MMHG | OXYGEN SATURATION: 100 % | HEIGHT: 62 IN | BODY MASS INDEX: 37.73 KG/M2 | WEIGHT: 205 LBS

## 2022-04-19 DIAGNOSIS — L50.9 URTICARIA: ICD-10-CM

## 2022-04-19 DIAGNOSIS — K51.90 ULCERATIVE COLITIS WITHOUT COMPLICATIONS, UNSPECIFIED LOCATION (HCC): Primary | ICD-10-CM

## 2022-04-19 LAB
ALBUMIN SERPL-MCNC: 4 G/DL (ref 3.5–5)
ALBUMIN/GLOB SERPL: 1.1 {RATIO} (ref 1.1–2.2)
ALP SERPL-CCNC: 74 U/L (ref 45–117)
ALT SERPL-CCNC: 36 U/L (ref 12–78)
ANION GAP SERPL CALC-SCNC: 7 MMOL/L (ref 5–15)
APPEARANCE UR: CLEAR
AST SERPL-CCNC: 21 U/L (ref 15–37)
BACTERIA URNS QL MICRO: NEGATIVE /HPF
BILIRUB SERPL-MCNC: 0.7 MG/DL (ref 0.2–1)
BILIRUB UR QL: NEGATIVE
BUN SERPL-MCNC: 7 MG/DL (ref 6–20)
BUN/CREAT SERPL: 9 (ref 12–20)
CALCIUM SERPL-MCNC: 9.6 MG/DL (ref 8.5–10.1)
CHLORIDE SERPL-SCNC: 100 MMOL/L (ref 97–108)
CO2 SERPL-SCNC: 29 MMOL/L (ref 21–32)
COLOR UR: ABNORMAL
COMMENT, HOLDF: NORMAL
CREAT SERPL-MCNC: 0.8 MG/DL (ref 0.55–1.02)
EPITH CASTS URNS QL MICRO: ABNORMAL /LPF
ERYTHROCYTE [DISTWIDTH] IN BLOOD BY AUTOMATED COUNT: 12.7 % (ref 11.5–14.5)
GLOBULIN SER CALC-MCNC: 3.5 G/DL (ref 2–4)
GLUCOSE SERPL-MCNC: 244 MG/DL (ref 65–100)
GLUCOSE UR STRIP.AUTO-MCNC: >1000 MG/DL
HCT VFR BLD AUTO: 42.1 % (ref 35–47)
HGB BLD-MCNC: 15 G/DL (ref 11.5–16)
HGB UR QL STRIP: NEGATIVE
KETONES UR QL STRIP.AUTO: NEGATIVE MG/DL
LEUKOCYTE ESTERASE UR QL STRIP.AUTO: NEGATIVE
LIPASE SERPL-CCNC: 217 U/L (ref 73–393)
MCH RBC QN AUTO: 30.5 PG (ref 26–34)
MCHC RBC AUTO-ENTMCNC: 35.6 G/DL (ref 30–36.5)
MCV RBC AUTO: 85.7 FL (ref 80–99)
NITRITE UR QL STRIP.AUTO: NEGATIVE
NRBC # BLD: 0 K/UL (ref 0–0.01)
NRBC BLD-RTO: 0 PER 100 WBC
PH UR STRIP: 6.5 [PH] (ref 5–8)
PLATELET # BLD AUTO: 171 K/UL (ref 150–400)
PMV BLD AUTO: 9 FL (ref 8.9–12.9)
POTASSIUM SERPL-SCNC: 3.5 MMOL/L (ref 3.5–5.1)
PROT SERPL-MCNC: 7.5 G/DL (ref 6.4–8.2)
PROT UR STRIP-MCNC: NEGATIVE MG/DL
RBC # BLD AUTO: 4.91 M/UL (ref 3.8–5.2)
RBC #/AREA URNS HPF: ABNORMAL /HPF (ref 0–5)
SAMPLES BEING HELD,HOLD: NORMAL
SODIUM SERPL-SCNC: 136 MMOL/L (ref 136–145)
SP GR UR REFRACTOMETRY: 1.02 (ref 1–1.03)
UA: UC IF INDICATED,UAUC: ABNORMAL
UROBILINOGEN UR QL STRIP.AUTO: 0.2 EU/DL (ref 0.2–1)
WBC # BLD AUTO: 6.9 K/UL (ref 3.6–11)
WBC URNS QL MICRO: ABNORMAL /HPF (ref 0–4)

## 2022-04-19 PROCEDURE — 96376 TX/PRO/DX INJ SAME DRUG ADON: CPT

## 2022-04-19 PROCEDURE — 99284 EMERGENCY DEPT VISIT MOD MDM: CPT

## 2022-04-19 PROCEDURE — 74011250636 HC RX REV CODE- 250/636: Performed by: EMERGENCY MEDICINE

## 2022-04-19 PROCEDURE — 83690 ASSAY OF LIPASE: CPT

## 2022-04-19 PROCEDURE — 96374 THER/PROPH/DIAG INJ IV PUSH: CPT

## 2022-04-19 PROCEDURE — 81001 URINALYSIS AUTO W/SCOPE: CPT

## 2022-04-19 PROCEDURE — 36415 COLL VENOUS BLD VENIPUNCTURE: CPT

## 2022-04-19 PROCEDURE — 80053 COMPREHEN METABOLIC PANEL: CPT

## 2022-04-19 PROCEDURE — 85027 COMPLETE CBC AUTOMATED: CPT

## 2022-04-19 PROCEDURE — 74011250636 HC RX REV CODE- 250/636: Performed by: PHYSICIAN ASSISTANT

## 2022-04-19 PROCEDURE — 96375 TX/PRO/DX INJ NEW DRUG ADDON: CPT

## 2022-04-19 RX ORDER — HYDROMORPHONE HYDROCHLORIDE 1 MG/ML
1 INJECTION, SOLUTION INTRAMUSCULAR; INTRAVENOUS; SUBCUTANEOUS
Status: COMPLETED | OUTPATIENT
Start: 2022-04-19 | End: 2022-04-19

## 2022-04-19 RX ORDER — DIPHENHYDRAMINE HYDROCHLORIDE 50 MG/ML
25 INJECTION, SOLUTION INTRAMUSCULAR; INTRAVENOUS
Status: COMPLETED | OUTPATIENT
Start: 2022-04-19 | End: 2022-04-19

## 2022-04-19 RX ORDER — DIPHENHYDRAMINE HYDROCHLORIDE 50 MG/ML
50 INJECTION, SOLUTION INTRAMUSCULAR; INTRAVENOUS
Status: COMPLETED | OUTPATIENT
Start: 2022-04-19 | End: 2022-04-19

## 2022-04-19 RX ORDER — DEXAMETHASONE SODIUM PHOSPHATE 4 MG/ML
10 INJECTION, SOLUTION INTRA-ARTICULAR; INTRALESIONAL; INTRAMUSCULAR; INTRAVENOUS; SOFT TISSUE
Status: COMPLETED | OUTPATIENT
Start: 2022-04-19 | End: 2022-04-19

## 2022-04-19 RX ORDER — PREDNISONE 20 MG/1
80 TABLET ORAL DAILY
Qty: 12 TABLET | Refills: 0 | Status: SHIPPED | OUTPATIENT
Start: 2022-04-19 | End: 2022-04-22

## 2022-04-19 RX ORDER — FAMOTIDINE 10 MG/ML
20 INJECTION INTRAVENOUS
Status: COMPLETED | OUTPATIENT
Start: 2022-04-19 | End: 2022-04-19

## 2022-04-19 RX ORDER — HYDROXYZINE 50 MG/1
50 TABLET, FILM COATED ORAL
Qty: 20 TABLET | Refills: 0 | Status: SHIPPED | OUTPATIENT
Start: 2022-04-19 | End: 2022-04-29

## 2022-04-19 RX ADMIN — DIPHENHYDRAMINE HYDROCHLORIDE 25 MG: 50 INJECTION, SOLUTION INTRAMUSCULAR; INTRAVENOUS at 16:39

## 2022-04-19 RX ADMIN — METHYLPREDNISOLONE SODIUM SUCCINATE 125 MG: 125 INJECTION, POWDER, FOR SOLUTION INTRAMUSCULAR; INTRAVENOUS at 15:29

## 2022-04-19 RX ADMIN — SODIUM CHLORIDE 500 ML: 9 INJECTION, SOLUTION INTRAVENOUS at 15:29

## 2022-04-19 RX ADMIN — FAMOTIDINE 20 MG: 10 INJECTION, SOLUTION INTRAVENOUS at 14:35

## 2022-04-19 RX ADMIN — DIPHENHYDRAMINE HYDROCHLORIDE 50 MG: 50 INJECTION, SOLUTION INTRAMUSCULAR; INTRAVENOUS at 14:07

## 2022-04-19 RX ADMIN — DEXAMETHASONE SODIUM PHOSPHATE 10 MG: 4 INJECTION, SOLUTION INTRAMUSCULAR; INTRAVENOUS at 14:35

## 2022-04-19 RX ADMIN — HYDROMORPHONE HYDROCHLORIDE 1 MG: 1 INJECTION, SOLUTION INTRAMUSCULAR; INTRAVENOUS; SUBCUTANEOUS at 15:29

## 2022-04-19 NOTE — DISCHARGE INSTRUCTIONS
It was a pleasure taking care of you at [facility] Emergency Department today. We know that when you come to [address] [facility], you are entrusting us with your health, comfort, and safety. Our physicians and nurses honor that trust, and we truly appreciate the opportunity to care for you and your loved ones. We also value your feedback. If you receive a survey about your Emergency Department experience today, please fill it out. We care about our patients' feedback, and we listen to what you have to say. Thank you!

## 2022-04-19 NOTE — ED NOTES
Pt arrives to ED room from triage with a cc of abdominal pain and allergic reaction that started 2 weeks ago. Pt states that she has hx of U.C. and has been having nausea, vomiting and diarrhea x 2 weeks. Pt also is complaining of hives that started before arrival to ED; pt states she took 2 epi pen treatments. Pt states throat is itching but is not complaining of difficulty breathing. Pt is alert and oriented x 4, resting comfortably in stretcher with side rails up and call bell within reach.

## 2022-04-19 NOTE — LETTER
Noe Mackey was seen and treated in our emergency department on 4/19/2022. She may return to work on 4/20/22      If you have any questions or concerns, please don't hesitate to call.       Deepa العلي RN

## 2022-05-18 DIAGNOSIS — F41.9 ANXIETY: ICD-10-CM

## 2022-05-19 ENCOUNTER — HOSPITAL ENCOUNTER (EMERGENCY)
Age: 52
Discharge: HOME | End: 2022-05-19
Attending: EMERGENCY MEDICINE
Payer: MEDICAID

## 2022-05-19 VITALS
TEMPERATURE: 98.1 F | WEIGHT: 205.69 LBS | HEART RATE: 80 BPM | RESPIRATION RATE: 19 BRPM | DIASTOLIC BLOOD PRESSURE: 74 MMHG | HEIGHT: 62 IN | SYSTOLIC BLOOD PRESSURE: 124 MMHG | BODY MASS INDEX: 37.85 KG/M2 | OXYGEN SATURATION: 92 %

## 2022-05-19 DIAGNOSIS — L29.9 ITCHING: ICD-10-CM

## 2022-05-19 DIAGNOSIS — R10.32 ABDOMINAL PAIN, CHRONIC, LEFT LOWER QUADRANT: Primary | ICD-10-CM

## 2022-05-19 DIAGNOSIS — G89.29 ABDOMINAL PAIN, CHRONIC, LEFT LOWER QUADRANT: Primary | ICD-10-CM

## 2022-05-19 DIAGNOSIS — Z87.19 HISTORY OF ULCERATIVE COLITIS: ICD-10-CM

## 2022-05-19 DIAGNOSIS — R68.89 SENSATION OF SWOLLEN THROAT: ICD-10-CM

## 2022-05-19 LAB
ALBUMIN SERPL-MCNC: 4.2 G/DL (ref 3.5–5)
ALBUMIN/GLOB SERPL: 1.3 {RATIO} (ref 1.1–2.2)
ALP SERPL-CCNC: 74 U/L (ref 45–117)
ALT SERPL-CCNC: 32 U/L (ref 12–78)
ANION GAP SERPL CALC-SCNC: 9 MMOL/L (ref 5–15)
AST SERPL-CCNC: 23 U/L (ref 15–37)
BASOPHILS # BLD: 0 K/UL (ref 0–0.1)
BASOPHILS NFR BLD: 1 % (ref 0–1)
BILIRUB SERPL-MCNC: 0.9 MG/DL (ref 0.2–1)
BUN SERPL-MCNC: 9 MG/DL (ref 6–20)
BUN/CREAT SERPL: 12 (ref 12–20)
CALCIUM SERPL-MCNC: 9.8 MG/DL (ref 8.5–10.1)
CHLORIDE SERPL-SCNC: 103 MMOL/L (ref 97–108)
CO2 SERPL-SCNC: 26 MMOL/L (ref 21–32)
CREAT SERPL-MCNC: 0.77 MG/DL (ref 0.55–1.02)
DIFFERENTIAL METHOD BLD: ABNORMAL
EOSINOPHIL # BLD: 0.2 K/UL (ref 0–0.4)
EOSINOPHIL NFR BLD: 3 % (ref 0–7)
ERYTHROCYTE [DISTWIDTH] IN BLOOD BY AUTOMATED COUNT: 13.7 % (ref 11.5–14.5)
GLOBULIN SER CALC-MCNC: 3.2 G/DL (ref 2–4)
GLUCOSE SERPL-MCNC: 211 MG/DL (ref 65–100)
HCT VFR BLD AUTO: 38.6 % (ref 35–47)
HGB BLD-MCNC: 13.7 G/DL (ref 11.5–16)
IMM GRANULOCYTES # BLD AUTO: 0 K/UL (ref 0–0.04)
IMM GRANULOCYTES NFR BLD AUTO: 1 % (ref 0–0.5)
LYMPHOCYTES # BLD: 1.9 K/UL (ref 0.8–3.5)
LYMPHOCYTES NFR BLD: 31 % (ref 12–49)
MCH RBC QN AUTO: 30.2 PG (ref 26–34)
MCHC RBC AUTO-ENTMCNC: 35.5 G/DL (ref 30–36.5)
MCV RBC AUTO: 85.2 FL (ref 80–99)
MONOCYTES # BLD: 0.3 K/UL (ref 0–1)
MONOCYTES NFR BLD: 5 % (ref 5–13)
NEUTS SEG # BLD: 3.7 K/UL (ref 1.8–8)
NEUTS SEG NFR BLD: 59 % (ref 32–75)
NRBC # BLD: 0 K/UL (ref 0–0.01)
NRBC BLD-RTO: 0 PER 100 WBC
PLATELET # BLD AUTO: 153 K/UL (ref 150–400)
PMV BLD AUTO: 9.4 FL (ref 8.9–12.9)
POTASSIUM SERPL-SCNC: 3.1 MMOL/L (ref 3.5–5.1)
PROT SERPL-MCNC: 7.4 G/DL (ref 6.4–8.2)
RBC # BLD AUTO: 4.53 M/UL (ref 3.8–5.2)
SODIUM SERPL-SCNC: 138 MMOL/L (ref 136–145)
WBC # BLD AUTO: 6.1 K/UL (ref 3.6–11)

## 2022-05-19 PROCEDURE — 96374 THER/PROPH/DIAG INJ IV PUSH: CPT

## 2022-05-19 PROCEDURE — 74011250637 HC RX REV CODE- 250/637: Performed by: EMERGENCY MEDICINE

## 2022-05-19 PROCEDURE — 36415 COLL VENOUS BLD VENIPUNCTURE: CPT

## 2022-05-19 PROCEDURE — 80053 COMPREHEN METABOLIC PANEL: CPT

## 2022-05-19 PROCEDURE — 74011250636 HC RX REV CODE- 250/636: Performed by: EMERGENCY MEDICINE

## 2022-05-19 PROCEDURE — 85025 COMPLETE CBC W/AUTO DIFF WBC: CPT

## 2022-05-19 PROCEDURE — 99284 EMERGENCY DEPT VISIT MOD MDM: CPT

## 2022-05-19 RX ORDER — DIPHENHYDRAMINE HYDROCHLORIDE 50 MG/ML
25 INJECTION, SOLUTION INTRAMUSCULAR; INTRAVENOUS
Status: COMPLETED | OUTPATIENT
Start: 2022-05-19 | End: 2022-05-19

## 2022-05-19 RX ORDER — ALPRAZOLAM 1 MG/1
TABLET ORAL
Qty: 60 TABLET | Refills: 0 | Status: SHIPPED | OUTPATIENT
Start: 2022-05-19 | End: 2022-06-16

## 2022-05-19 RX ORDER — HYDROMORPHONE HYDROCHLORIDE 2 MG/1
1 TABLET ORAL
Status: COMPLETED | OUTPATIENT
Start: 2022-05-19 | End: 2022-05-19

## 2022-05-19 RX ADMIN — DIPHENHYDRAMINE HYDROCHLORIDE 25 MG: 50 INJECTION, SOLUTION INTRAMUSCULAR; INTRAVENOUS at 02:48

## 2022-05-19 RX ADMIN — HYDROMORPHONE HYDROCHLORIDE 1 MG: 2 TABLET ORAL at 02:48

## 2022-05-19 NOTE — ED NOTES
Pt discharged to home in [place], pain decreased to 6/10, states understanding of dc instructions and followup, pain mgmt clinics provided, pt's daughter is driving her home.

## 2022-05-19 NOTE — DISCHARGE INSTRUCTIONS
List of Pain Management Specialists:    Luisa Haile M.D. [phone] Pain Management  Marlo Obrien M.D. [phone] Physical Medicine and Leonila Calvillo M.D. [phone] Physical Medicine and Ledy Hernandez M.D. [phone] Pain Management  Kieran Soni M.D. [phone] Pain Management    Dr. [employer]                                        Dr. Charles [facility], [facility]                                         [address] [place] [address], [address], [address] [place] [address]  [phone]    [facility]                            BETSY Alvarado MD DO  [address] [facility] [address] [facility] [address]                               [address] [facility], [address]  [phone]                                                  [phone]    Dr. Arceo Bachelor                                         Dr. Dajuan Wolf. [address]                                     [place] [id number]                                                                            Dr. Izzy Castillo, [facility] [address] [facility]  [address] [place] [id number] [address] [facility], [address] [facility], [facility] [address]  www. [employer]                                            [phone] Dr. Koffi Boothe, [address]  [place], [address]       [phone]              Dr. Goyo Burchcinsotero Esteban, [address] [place] [address]  21       Dr. Aaron Romero  [place] [address], [facility] 25. [address] [facility]. [address] [place] [address], [address] [place] [address]  [phone]  [phone]     Please also consider natural alternatives to pain relief such as physical therapy, massage therapy, acupuncture, chiropractors, reflexology, and trigger point injections.

## 2022-05-19 NOTE — ED PROVIDER NOTES
EMERGENCY DEPARTMENT HISTORY AND PHYSICAL EXAM     ------------------------------------------------------------------------------------------------------  Please note that this dictation was completed with [email or web address], the [employer] voice recognition software. Quite often unanticipated grammatical, syntax, homophones, and other interpretive errors are inadvertently transcribed by the computer software. Please disregard these errors. Please excuse any errors that have escaped final proofreading.  -----------------------------------------------------------------------------------------------------------------    Date: 5/19/2022  Patient Name: Gosia Mcwilliams    History of Presenting Illness     Chief Complaint   Patient presents with    Diarrhea     Hx of colitis has had a current flare up x 1 week with bloody stool and llq abdominal pain. also gets inflamed rash with cololitis flares. Took her steroids with no relief and unable to see GI for another month. +nausea denies vomiting or fever has no to low appetite.  Abdominal Pain    Rash       History Provided By: Patient    HPI: Gosia Mcwilliams is a 46 y.o. female, with significant pmhx of hypertension, diverticulosis, ulcerative colitis, obesity, frequent UTIs, anxiety, who presents via private vehicle to the ED with c/o \"ulcerative colitis flare\" for the last week with associated bloody stool and left lower quadrant abdominal pain. Notes taking previously prescribed steroids but does not have relief of her symptoms notes that she has not been able to get an appointment with GI for the next month. Notes associated nausea without vomiting or fever. Decreased appetite over the last week as well. She reports that she has these itching sensations with feelings as though her throat is swelling.   Notes that her right side of her jaw was swollen earlier today and took Benadryl which improved her symptoms but then felt as though they were coming back prompting her to come the emergency department for further evaluation. Notes that she gets nervous when she feels like she is swelling because she has had anaphylaxis when she received IV contrast in the past.  Patient was recently seen 5 days ago at the [facility] ER for the same complaints. Had normal blood work was given medication which alleviated her symptoms and she was subsequently discharged. Patient noted to this ED as well having 12 ER visits over the last 6 months. Her visits are typical of today's complaints without significant findings on work-up or CT scan. Pt also specifically denies any recent fevers, chills, CP, SOB, nausea, vomiting, diarrhea, abd pain, changes in BM, urinary sxs, or headache. PCP: Paul Wilson MD    Social Hx: denies tobacco, denies EtOH, denies recreational/ Illicit Drugs     There are no other complaints, changes, or physical findings at this time. Allergies   Allergen Reactions    Aspirin Hives, Shortness of Breath and Itching    Codeine Hives, Itching and Angioedema     Pt had itching in mouth, on face and lips    Contrast Agent [Iodine] Hives, Itching and Angioedema     5/5/21: pt was given benadryl and solu-medrol prior to administration but pt had severe tongue swelling and drooling, lethargy and itching.  DO NOT GIVE PT    Flavoring Agent Anaphylaxis     Cherry flavor    Iodinated Contrast Media Anaphylaxis, Diarrhea, Hives, Nausea and Vomiting and Shortness of Breath    Percocet [Oxycodone-Acetaminophen] Hives, Itching and Angioedema     Pt had itching in mouth, on face and lips    Prilosec [Omeprazole Magnesium] Anaphylaxis     CHERRY FLAVORED ODT; PT TAKES REGULAR PRILOSEC AND IS OK    Dilaudid [Hydromorphone] Itching     Tolerates with benadryl    Ketorolac Rash     \"makes my eyes spasm and causes rash on my hands\" and \"makes my skin itch and makes me nervous\"    Morphine (Pf) Rash     According to rn, pt can take morphine with benadryl  Fentanyl Rash and Itching     Has had benadryl before         Current Outpatient Medications   Medication Sig Dispense Refill    ALPRAZolam (XANAX) 1 mg tablet TAKE 1/2 - 1 TABLET BY MOUTH TWICE DAILY AS NEEDED FOR ANXIETY *MAX 2 TABS DAILY* 60 Tablet 0    cetirizine (ZyrTEC) 10 mg tablet Take 1 Tablet by mouth daily. 20 Tablet 0    Jardiance 25 mg tablet TAKE 1 TABLET BY MOUTH EVERY DAY 90 Tablet 1    melatonin 5 mg cap capsule Take  by mouth nightly.  estradioL (ESTRACE) 0.5 mg tablet TAKE 1 TABLET BY MOUTH EVERY DAY 90 Tablet 1    promethazine (PHENERGAN) 25 mg tablet Take 1 Tablet by mouth every six (6) hours as needed for Nausea. 12 Tablet 0    atorvastatin (LIPITOR) 20 mg tablet TAKE 1 TABLET BY MOUTH EVERY DAY 90 Tablet 1    Ventolin HFA 90 mcg/actuation inhaler TAKE 1 PUFF BY INHALATION EVERY FOUR (4) HOURS AS NEEDED FOR WHEEZING. 18 Each 1    glimepiride (AMARYL) 4 mg tablet TAKE 1 TABLET BY MOUTH ONCE DAILY BEFORE BREAKFAST 90 Tablet 1    naloxone (Narcan) 4 mg/actuation nasal spray Use 1 spray intranasally, then discard. Repeat with new spray every 2 min as needed for opioid overdose symptoms, alternating nostrils. 1 Each 0    losartan-hydroCHLOROthiazide (HYZAAR) 100-12.5 mg per tablet TAKE 1 TABLET BY MOUTH EVERY DAY 90 Tablet 1    dibucaine (NUPERCAINAL) 1 % rectal ointment by PeriANAL route every four (4) hours as needed for Pain. 28 g 0    dicyclomine (BENTYL) 20 mg tablet Take 1 Tablet by mouth every six (6) hours as needed for Abdominal Cramps. 20 Tablet 0    hyoscyamine SL (LEVSIN/SL) 0.125 mg SL tablet 1 Tablet by SubLINGual route every four (4) hours as needed for Cramping. (Patient not taking: Reported on 3/11/2022) 10 Tablet 0    ondansetron (Zofran ODT) 4 mg disintegrating tablet Take 1 Tablet by mouth every eight (8) hours as needed for Nausea. 12 Tablet 1    omeprazole (PRILOSEC) 20 mg capsule Take 40 mg by mouth Daily (before breakfast).       EPINEPHrine (EPIPEN) 0.3 mg/0.3 mL (1:1,000) injection 0.3 mL by IntraMUSCular route once as needed for up to 1 dose. 0.3 mL 1       Past History     Past Medical History:  Past Medical History:   Diagnosis Date    Anal fissure     Anisocoria     Asthma     LAST EPISODE     Back pain     Cerumen impaction     Chronic kidney disease     hx uti in past    Coagulation defects     ocassional rectal bleeding due to anal fissure    Colovaginal fistula     Diabetes (HCC)     NIDDM    Diverticulitis     Diverticulosis     Enlarged tonsils     Frequent UTI     GERD (gastroesophageal reflux disease)     H/O endoscopy     with dilation    HA (headache)     Hepatic steatosis     History of colon resection     Hx of colonoscopy with polypectomy     benign    Hypertension     Ill-defined condition     FREQUENT HIVES    Ill-defined condition     HX ELEVATED LIVER ENZYMES    Morbid obesity (HCC)     Nausea & vomiting     during diverticulitis flare    Obesity     Otitis media     Pneumonia     about 15 yrs ago    Psychiatric disorder     ANXIETY    Recurrent tonsillitis     Sinusitis     Transfusion history ~ age 35    postop hysterectomy    Urticaria        Past Surgical History:  Past Surgical History:   Procedure Laterality Date    COLONOSCOPY N/A 3/28/2019    COLONOSCOPY performed by Maria Del Rosario Reyes MD at [address] [place] [address] COLONOSCOPY N/A 10/2/2020    COLONOSCOPY performed by Maria Del Rosario Reyes MD at [address] [place] [address] HX APPENDECTOMY  2021    cancer.  HX BREAST REDUCTION      blake.     HX GI  12    LAPAROSCOPIC HAND ASSISTED  POSS OPEN SIGMOID COLECTOMY POSS TEMPORARY DIVERTING LOOP ILEOSTOMY;  (no illeostomy needed)    HX GYN           HX GYN      cervical conization    HX HEENT      SINUS SURGERY LEFT X2    HX HEENT      SINUS SURGERY ON RIGHT X2    HX HEMORRHOIDECTOMY      HX HYSTERECTOMY      HX OTHER SURGICAL  2021    Sphincterotomy    HX PELVIC LAPAROSCOPY      HX EMMANUEL AND BSO      HX UROLOGICAL  7/31/12     CYSTOSCOPY INSERTION URETERAL CATHETERS - Cystoscopy Insertion of bilateral ureteral stents    [place] ABDOMEN SURGERY PROC UNLISTED  01/06/2018    hernia repair at [address] UNLISTED      colon resection. Family History:  Family History   Problem Relation Age of Onset    Diabetes Mother     Cancer Mother         NON-HODGKINS LYMPHOMA    Anesth Problems Mother         PONV    Diabetes Father     Heart Disease Father         CAD - STENTS, PACEMAKER    Arrhythmia Father     Cancer Paternal Grandmother        Social History:  Social History     Tobacco Use    Smoking status: Never Smoker    Smokeless tobacco: Never Used   Vaping Use    Vaping Use: Not on file   Substance Use Topics    Alcohol use: Not Currently    Drug use: Yes     Types: OTC, Prescription       Allergies: Allergies   Allergen Reactions    Aspirin Hives, Shortness of Breath and Itching    Codeine Hives, Itching and Angioedema     Pt had itching in mouth, on face and lips    Contrast Agent [Iodine] Hives, Itching and Angioedema     5/5/21: pt was given benadryl and solu-medrol prior to administration but pt had severe tongue swelling and drooling, lethargy and itching.  DO NOT GIVE PT    Flavoring Agent Anaphylaxis     Cherry flavor    Iodinated Contrast Media Anaphylaxis, Diarrhea, Hives, Nausea and Vomiting and Shortness of Breath    Percocet [Oxycodone-Acetaminophen] Hives, Itching and Angioedema     Pt had itching in mouth, on face and lips    Prilosec [Omeprazole Magnesium] Anaphylaxis     CHERRY FLAVORED ODT; PT TAKES REGULAR PRILOSEC AND IS OK    Dilaudid [Hydromorphone] Itching     Tolerates with benadryl    Ketorolac Rash     \"makes my eyes spasm and causes rash on my hands\" and \"makes my skin itch and makes me nervous\"    Morphine (Pf) Rash     According to rn, pt can take morphine with benadryl    Fentanyl Rash and Itching     Has had benadryl before Review of Systems   Review of Systems   Constitutional: Negative. Negative for fever. Eyes: Negative. Respiratory: Negative. Negative for shortness of breath. Cardiovascular: Negative for chest pain. Gastrointestinal: Positive for abdominal pain and blood in stool. Negative for nausea and vomiting. Endocrine: Negative. Genitourinary: Negative. Negative for difficulty urinating, dysuria and hematuria. Musculoskeletal: Negative. Skin: Negative. Neurological: Negative. Psychiatric/Behavioral: Negative for suicidal ideas. All other systems reviewed and are negative. Physical Exam   Physical Exam  Vitals and nursing note reviewed. Constitutional:       General: She is not in acute distress. Appearance: She is well-developed. She is obese. She is not diaphoretic. HENT:      Head: Normocephalic and atraumatic. Nose: Nose normal.      Mouth/Throat:      Pharynx: No pharyngeal swelling, oropharyngeal exudate, posterior oropharyngeal erythema or uvula swelling. Tonsils: No tonsillar exudate or tonsillar abscesses. Comments: Patient without evidence of swelling or asymmetry of her face. Appears to have bit the inside of her cheek on the right side. No active bleeding or signs of laceration, no signs of posterior pharyngeal swelling, erythema, no uvula edema, no changes in phonation. Able to handle secretions without issue. Eyes:      General: No scleral icterus. Conjunctiva/sclera: Conjunctivae normal.   Neck:      Trachea: No tracheal deviation. Cardiovascular:      Rate and Rhythm: Normal rate and regular rhythm. Heart sounds: Normal heart sounds. No murmur heard. No friction rub. Pulmonary:      Effort: Pulmonary effort is normal. No respiratory distress. Breath sounds: Normal breath sounds. No stridor. No wheezing or rales. Abdominal:      General: Bowel sounds are normal. There is no distension. Palpations: Abdomen is soft. Tenderness: There is no abdominal tenderness. There is no rebound. Musculoskeletal:         General: No tenderness. Normal range of motion. Cervical back: Normal range of motion. Skin:     General: Skin is warm and dry. Findings: No rash. Comments: Without observable rash. Noted to have excoriated marks to her chest and neck where she has been actively scratching throughout my interview and exam.   Neurological:      Mental Status: She is alert and oriented to person, place, and time. Cranial Nerves: No cranial nerve deficit. Psychiatric:         Speech: Speech normal.         Behavior: Behavior normal.         Thought Content: Thought content normal.         Judgment: Judgment normal.           Diagnostic Study Results     Labs -     Recent Results (from the past 12 hour(s))   CBC WITH AUTOMATED DIFF    Collection Time: 05/19/22  1:32 AM   Result Value Ref Range    WBC 6.1 3.6 - 11.0 K/uL    RBC 4.53 3.80 - 5.20 M/uL    HGB 13.7 11.5 - 16.0 g/dL    HCT 38.6 35.0 - 47.0 %    MCV 85.2 80.0 - 99.0 FL    MCH 30.2 26.0 - 34.0 PG    MCHC 35.5 30.0 - 36.5 g/dL    RDW 13.7 11.5 - 14.5 %    PLATELET 451 476 - 942 K/uL    MPV 9.4 8.9 - 12.9 FL    NRBC 0.0 0  WBC    ABSOLUTE NRBC 0.00 0.00 - 0.01 K/uL    NEUTROPHILS 59 32 - 75 %    LYMPHOCYTES 31 12 - 49 %    MONOCYTES 5 5 - 13 %    EOSINOPHILS 3 0 - 7 %    BASOPHILS 1 0 - 1 %    IMMATURE GRANULOCYTES 1 (H) 0.0 - 0.5 %    ABS. NEUTROPHILS 3.7 1.8 - 8.0 K/UL    ABS. LYMPHOCYTES 1.9 0.8 - 3.5 K/UL    ABS. MONOCYTES 0.3 0.0 - 1.0 K/UL    ABS. EOSINOPHILS 0.2 0.0 - 0.4 K/UL    ABS. BASOPHILS 0.0 0.0 - 0.1 K/UL    ABS. IMM.  GRANS. 0.0 0.00 - 0.04 K/UL    DF AUTOMATED     METABOLIC PANEL, COMPREHENSIVE    Collection Time: 05/19/22  1:32 AM   Result Value Ref Range    Sodium 138 136 - 145 mmol/L    Potassium 3.1 (L) 3.5 - 5.1 mmol/L    Chloride 103 97 - 108 mmol/L    CO2 26 21 - 32 mmol/L    Anion gap 9 5 - 15 mmol/L    Glucose 211 (H) 65 - 100 mg/dL    BUN 9 6 - 20 MG/DL    Creatinine 0.77 0.55 - 1.02 MG/DL    BUN/Creatinine ratio 12 12 - 20      GFR est AA >60 >60 ml/min/1.73m2    GFR est non-AA >60 >60 ml/min/1.73m2    Calcium 9.8 8.5 - 10.1 MG/DL    Bilirubin, total 0.9 0.2 - 1.0 MG/DL    ALT (SGPT) 32 12 - 78 U/L    AST (SGOT) 23 15 - 37 U/L    Alk. phosphatase 74 45 - 117 U/L    Protein, total 7.4 6.4 - 8.2 g/dL    Albumin 4.2 3.5 - 5.0 g/dL    Globulin 3.2 2.0 - 4.0 g/dL    A-G Ratio 1.3 1.1 - 2.2         Radiologic Studies -   No orders to display     CT Results  (Last 48 hours)    None        CXR Results  (Last 48 hours)    None            Medical Decision Making   I am the first provider for this patient. I reviewed the vital signs, available nursing notes, past medical history, past surgical history, family history and social history. Vital Signs-Reviewed the patient's vital signs. Patient Vitals for the past 12 hrs:   Temp Pulse Resp BP SpO2   05/19/22 0300 -- -- -- 132/79 93 %   05/19/22 0251 -- -- -- 126/79 94 %   05/19/22 0215 -- -- -- (!) 153/85 93 %   05/19/22 0121 98.1 °F (36.7 °C) 80 19 (!) 157/81 100 %       Pulse Oximetry Analysis - 93% on RA Normal    Records Reviewed/Interpretted: Nursing Notes from triage and Old Medical Records, noting multiple previous ER visits for similar complaints without significant findings. Provider Notes (Medical Decision Making):     DDX:  Ulcerative colitis flare, anemia, electrolyte derangement, dehydration, allergic reaction, angioedema, chronic pain, anxiety liver dysfunction    Plan:  Labs    Impression:  Chronic pain, history of ulcerative colitis, itching     ED Course:   Initial assessment performed. The patients presenting problems have been discussed, and they are in agreement with the care plan formulated and outlined with them. I have encouraged them to ask questions as they arise throughout their visit.     I reviewed our electronic medical record system for any past medical records that were available that may contribute to the patients current condition, the nursing notes and and vital signs from today's visit  Nursing notes will be reviewed as they become available in realtime while the pt has been in the ED. Andi Villanueva MD      3:19 AM  Progress note:  Pt noted to be feeling better, ready for discharge. Discussed lab findings with pt, specifically noting labs that were unremarkable for anemia, leukocytosis or liver dysfunction. Pt will follow up with a specialist and pain management as instructed. All questions have been answered, pt voiced understanding and agreement with plan. Specific return precautions provided in addition to instructions for pt to return to the ED immediately should sx worsen at any time. Andi Villanueva MD             Critical Care Time:     none      Diagnosis     Clinical Impression:   1. Abdominal pain, chronic, left lower quadrant    2. History of ulcerative colitis    3. Sensation of swollen throat    4. Itching        PLAN:  1. Current Discharge Medication List        2. Follow-up Information     Follow up With Specialties Details Why Eric Byrd MD Internal Medicine Physician Schedule an appointment as soon as possible for a visit in 2 days  [address] [place] [address]  [address] [facility] Shantell Nicholas MD Gastroenterology Call today  [place] [address]  [phone]      [facility] EMERGENCY DEPT Emergency Medicine  As needed [address] [place] [address]  [address] [facility]  [phone]        Return to ED if worse     Disposition:    3:19 AM   The patient's results have been reviewed with family and/or caregiver.  They verbally convey their understanding and agreement of the patient's signs, symptoms, diagnosis, treatment and prognosis and additionally agree to follow up as recommended in the discharge instructions or to return to the Emergency Room should the patient's condition change prior to their follow-up appointment. The family and/or caregiver verbally agrees with the care-plan and all of their questions have been answered. The discharge instructions have also been provided to the them with educational information regarding the patient's diagnosis as well a list of reasons why the patient would want to return to the ER prior to their follow-up appointment should their condition change.   Janet Ugalde MD

## 2022-05-19 NOTE — Clinical Note
[address]  [facility] EMERGENCY DEPT  [address] [facility]  [place] [address]  [phone]    Work/School Note    Date: 5/19/2022    To Whom It May concern:      Rosemary Small was seen and treated today in the emergency room by the following provider(s):  Attending Provider: Randy Gomez MD.      Rosemary Small is excused from work/school on 05/19/22. She is clear to return to work/school on 05/20/22.         Sincerely,          Fam Cisneros MD

## 2022-05-19 NOTE — Clinical Note
[address]  [facility] EMERGENCY DEPT  [address] [place] [id number] [phone]    Work/School Note    Date: 5/19/2022    To Whom It May concern:      Samia Lewis was seen and treated today in the emergency room by the following provider(s):  Attending Provider: Erinn Liu MD.      Samia Lewis is excused from work/school on 05/19/22. She is clear to return to work/school on 05/20/22.         Sincerely,          Janell Salvador MD

## 2022-05-25 ENCOUNTER — APPOINTMENT (OUTPATIENT)
Dept: CT IMAGING | Age: 52
End: 2022-05-25
Attending: PHYSICIAN ASSISTANT
Payer: MEDICAID

## 2022-05-25 ENCOUNTER — HOSPITAL ENCOUNTER (EMERGENCY)
Age: 52
Discharge: HOME | End: 2022-05-25
Attending: EMERGENCY MEDICINE
Payer: MEDICAID

## 2022-05-25 VITALS
TEMPERATURE: 97.9 F | WEIGHT: 204.59 LBS | RESPIRATION RATE: 18 BRPM | DIASTOLIC BLOOD PRESSURE: 85 MMHG | HEART RATE: 79 BPM | OXYGEN SATURATION: 93 % | HEIGHT: 62 IN | BODY MASS INDEX: 37.65 KG/M2 | SYSTOLIC BLOOD PRESSURE: 138 MMHG

## 2022-05-25 DIAGNOSIS — R10.32 ABDOMINAL PAIN, CHRONIC, LEFT LOWER QUADRANT: Primary | ICD-10-CM

## 2022-05-25 DIAGNOSIS — Z87.19 HISTORY OF ULCERATIVE COLITIS: ICD-10-CM

## 2022-05-25 DIAGNOSIS — G89.29 ABDOMINAL PAIN, CHRONIC, LEFT LOWER QUADRANT: Primary | ICD-10-CM

## 2022-05-25 LAB
ALBUMIN SERPL-MCNC: 4.3 G/DL (ref 3.5–5)
ALBUMIN/GLOB SERPL: 1.2 {RATIO} (ref 1.1–2.2)
ALP SERPL-CCNC: 78 U/L (ref 45–117)
ALT SERPL-CCNC: 33 U/L (ref 12–78)
ANION GAP SERPL CALC-SCNC: 8 MMOL/L (ref 5–15)
APPEARANCE UR: CLEAR
AST SERPL-CCNC: 26 U/L (ref 15–37)
BACTERIA URNS QL MICRO: NEGATIVE /HPF
BASOPHILS # BLD: 0 K/UL (ref 0–0.1)
BASOPHILS NFR BLD: 0 % (ref 0–1)
BILIRUB SERPL-MCNC: 0.9 MG/DL (ref 0.2–1)
BILIRUB UR QL: NEGATIVE
BUN SERPL-MCNC: 16 MG/DL (ref 6–20)
BUN/CREAT SERPL: 17 (ref 12–20)
CALCIUM SERPL-MCNC: 10 MG/DL (ref 8.5–10.1)
CHLORIDE SERPL-SCNC: 100 MMOL/L (ref 97–108)
CO2 SERPL-SCNC: 28 MMOL/L (ref 21–32)
COLOR UR: ABNORMAL
CREAT SERPL-MCNC: 0.94 MG/DL (ref 0.55–1.02)
DIFFERENTIAL METHOD BLD: ABNORMAL
EOSINOPHIL # BLD: 0 K/UL (ref 0–0.4)
EOSINOPHIL NFR BLD: 0 % (ref 0–7)
EPITH CASTS URNS QL MICRO: ABNORMAL /LPF
ERYTHROCYTE [DISTWIDTH] IN BLOOD BY AUTOMATED COUNT: 13.9 % (ref 11.5–14.5)
GLOBULIN SER CALC-MCNC: 3.5 G/DL (ref 2–4)
GLUCOSE SERPL-MCNC: 351 MG/DL (ref 65–100)
GLUCOSE UR STRIP.AUTO-MCNC: >1000 MG/DL
HCT VFR BLD AUTO: 43.6 % (ref 35–47)
HGB BLD-MCNC: 15.5 G/DL (ref 11.5–16)
HGB UR QL STRIP: NEGATIVE
HYALINE CASTS URNS QL MICRO: ABNORMAL /LPF (ref 0–2)
IMM GRANULOCYTES # BLD AUTO: 0.1 K/UL (ref 0–0.04)
IMM GRANULOCYTES NFR BLD AUTO: 1 % (ref 0–0.5)
KETONES UR QL STRIP.AUTO: NEGATIVE MG/DL
LEUKOCYTE ESTERASE UR QL STRIP.AUTO: NEGATIVE
LIPASE SERPL-CCNC: 139 U/L (ref 73–393)
LYMPHOCYTES # BLD: 0.6 K/UL (ref 0.8–3.5)
LYMPHOCYTES NFR BLD: 6 % (ref 12–49)
MCH RBC QN AUTO: 30.6 PG (ref 26–34)
MCHC RBC AUTO-ENTMCNC: 35.6 G/DL (ref 30–36.5)
MCV RBC AUTO: 86 FL (ref 80–99)
MONOCYTES # BLD: 0.1 K/UL (ref 0–1)
MONOCYTES NFR BLD: 1 % (ref 5–13)
NEUTS SEG # BLD: 9.9 K/UL (ref 1.8–8)
NEUTS SEG NFR BLD: 92 % (ref 32–75)
NITRITE UR QL STRIP.AUTO: NEGATIVE
NRBC # BLD: 0 K/UL (ref 0–0.01)
NRBC BLD-RTO: 0 PER 100 WBC
PH UR STRIP: 6 [PH] (ref 5–8)
PLATELET # BLD AUTO: 163 K/UL (ref 150–400)
PMV BLD AUTO: 10.2 FL (ref 8.9–12.9)
POTASSIUM SERPL-SCNC: 4 MMOL/L (ref 3.5–5.1)
PROT SERPL-MCNC: 7.8 G/DL (ref 6.4–8.2)
PROT UR STRIP-MCNC: NEGATIVE MG/DL
RBC # BLD AUTO: 5.07 M/UL (ref 3.8–5.2)
RBC #/AREA URNS HPF: ABNORMAL /HPF (ref 0–5)
RBC MORPH BLD: ABNORMAL
SODIUM SERPL-SCNC: 136 MMOL/L (ref 136–145)
SP GR UR REFRACTOMETRY: 1.01 (ref 1–1.03)
UA: UC IF INDICATED,UAUC: ABNORMAL
UROBILINOGEN UR QL STRIP.AUTO: 1 EU/DL (ref 0.2–1)
WBC # BLD AUTO: 10.7 K/UL (ref 3.6–11)
WBC URNS QL MICRO: ABNORMAL /HPF (ref 0–4)

## 2022-05-25 PROCEDURE — 80053 COMPREHEN METABOLIC PANEL: CPT

## 2022-05-25 PROCEDURE — 99284 EMERGENCY DEPT VISIT MOD MDM: CPT

## 2022-05-25 PROCEDURE — 85025 COMPLETE CBC W/AUTO DIFF WBC: CPT

## 2022-05-25 PROCEDURE — 74176 CT ABD & PELVIS W/O CONTRAST: CPT

## 2022-05-25 PROCEDURE — 83690 ASSAY OF LIPASE: CPT

## 2022-05-25 PROCEDURE — 81001 URINALYSIS AUTO W/SCOPE: CPT

## 2022-05-25 PROCEDURE — 96374 THER/PROPH/DIAG INJ IV PUSH: CPT

## 2022-05-25 PROCEDURE — 74011250636 HC RX REV CODE- 250/636: Performed by: EMERGENCY MEDICINE

## 2022-05-25 PROCEDURE — 74011250636 HC RX REV CODE- 250/636: Performed by: PHYSICIAN ASSISTANT

## 2022-05-25 PROCEDURE — 96375 TX/PRO/DX INJ NEW DRUG ADDON: CPT

## 2022-05-25 PROCEDURE — 36415 COLL VENOUS BLD VENIPUNCTURE: CPT

## 2022-05-25 PROCEDURE — 74011636637 HC RX REV CODE- 636/637: Performed by: PHYSICIAN ASSISTANT

## 2022-05-25 RX ORDER — DEXAMETHASONE SODIUM PHOSPHATE 4 MG/ML
10 INJECTION, SOLUTION INTRA-ARTICULAR; INTRALESIONAL; INTRAMUSCULAR; INTRAVENOUS; SOFT TISSUE
Status: COMPLETED | OUTPATIENT
Start: 2022-05-25 | End: 2022-05-25

## 2022-05-25 RX ORDER — HYDROMORPHONE HYDROCHLORIDE 1 MG/ML
1 INJECTION, SOLUTION INTRAMUSCULAR; INTRAVENOUS; SUBCUTANEOUS
Status: COMPLETED | OUTPATIENT
Start: 2022-05-25 | End: 2022-05-25

## 2022-05-25 RX ORDER — DIPHENHYDRAMINE HYDROCHLORIDE 50 MG/ML
50 INJECTION, SOLUTION INTRAMUSCULAR; INTRAVENOUS
Status: COMPLETED | OUTPATIENT
Start: 2022-05-25 | End: 2022-05-25

## 2022-05-25 RX ORDER — ONDANSETRON 2 MG/ML
4 INJECTION INTRAMUSCULAR; INTRAVENOUS
Status: COMPLETED | OUTPATIENT
Start: 2022-05-25 | End: 2022-05-25

## 2022-05-25 RX ORDER — FAMOTIDINE 10 MG/ML
20 INJECTION INTRAVENOUS
Status: COMPLETED | OUTPATIENT
Start: 2022-05-25 | End: 2022-05-25

## 2022-05-25 RX ADMIN — HYDROMORPHONE HYDROCHLORIDE 1 MG: 1 INJECTION, SOLUTION INTRAMUSCULAR; INTRAVENOUS; SUBCUTANEOUS at 12:41

## 2022-05-25 RX ADMIN — FAMOTIDINE 20 MG: 10 INJECTION INTRAVENOUS at 12:06

## 2022-05-25 RX ADMIN — ONDANSETRON 4 MG: 2 INJECTION INTRAMUSCULAR; INTRAVENOUS at 10:50

## 2022-05-25 RX ADMIN — DEXAMETHASONE SODIUM PHOSPHATE 10 MG: 4 INJECTION, SOLUTION INTRAMUSCULAR; INTRAVENOUS at 12:44

## 2022-05-25 RX ADMIN — Medication 5 UNITS: at 12:43

## 2022-05-25 RX ADMIN — DIPHENHYDRAMINE HYDROCHLORIDE 50 MG: 50 INJECTION, SOLUTION INTRAMUSCULAR; INTRAVENOUS at 12:06

## 2022-05-25 RX ADMIN — SODIUM CHLORIDE 1000 ML: 9 INJECTION, SOLUTION INTRAVENOUS at 11:17

## 2022-05-25 NOTE — LETTER
[address]  [facility] EMERGENCY DEPT  [address] [place] [id number]  [phone]    Work/School Note    Date: 5/25/2022    To Whom It May concern:    Michael Purvis was seen and treated today in the emergency room by the following provider(s):  Attending Provider: Javier Costa MD  Physician Assistant: Shilpa Leonardo. Michael Purvis is excused from work/school on 05/25/22 and 05/26/22. She may return to work/school on 5/27/2022.        Sincerely,          Norbert Herring RN

## 2022-05-25 NOTE — ED NOTES
Left message updating pt that order was sent.    Pt actively vomiting, verbal orders received from Dr. Devin Royal

## 2022-05-25 NOTE — ED PROVIDER NOTES
EMERGENCY DEPARTMENT HISTORY AND PHYSICAL EXAM      Date: 5/25/2022  Patient Name: Reji Wellington    History of Presenting Illness     Chief Complaint   Patient presents with    Medication Reaction     pt ambulatory into triage with cc of skin rash, redness irritation after starting new med for ulcerative collitis, pt alos complaing of LLQ pain and anal pain    Abdominal Pain       History Provided By: Patient    HPI: Reji Wellington, 46 y.o. female with significant PMHx for as below, presents by POV to the ED with cc of worsening of her chronic abdominal pain. The pain has a history of ulcerative colitis and notes that her GI is having a hard time finding a drug regime for her. Her pain is located to the left lower abdomen. It is severe, constant and non-radiating. She has associated nausea, vomiting, anal pain and low grade fevers. She also notes that over the last several days her face and been swelling and turning red. She is concerned she may be having an allergic reaction to something. She had to use her Epi pen last night and at this time she has not facial issues. There are no other complaints, changes, or physical findings at this time. Social Hx: Tobacco (denies), EtOH (denies), Illicit drug use (denies)     PCP: Ofelia Mccarthy MD    No current facility-administered medications on file prior to encounter. Current Outpatient Medications on File Prior to Encounter   Medication Sig Dispense Refill    ALPRAZolam (XANAX) 1 mg tablet TAKE 0.5-1 TABLET BY MOUTH TWICE DAILY AS NEEDED FOR ANXIETY *MAX 2 TABS DAILY* 60 Tablet 0    cetirizine (ZyrTEC) 10 mg tablet Take 1 Tablet by mouth daily. 20 Tablet 0    Jardiance 25 mg tablet TAKE 1 TABLET BY MOUTH EVERY DAY 90 Tablet 1    melatonin 5 mg cap capsule Take  by mouth nightly.       estradioL (ESTRACE) 0.5 mg tablet TAKE 1 TABLET BY MOUTH EVERY DAY 90 Tablet 1    promethazine (PHENERGAN) 25 mg tablet Take 1 Tablet by mouth every six (6) hours as needed for Nausea. 12 Tablet 0    atorvastatin (LIPITOR) 20 mg tablet TAKE 1 TABLET BY MOUTH EVERY DAY 90 Tablet 1    Ventolin HFA 90 mcg/actuation inhaler TAKE 1 PUFF BY INHALATION EVERY FOUR (4) HOURS AS NEEDED FOR WHEEZING. 18 Each 1    glimepiride (AMARYL) 4 mg tablet TAKE 1 TABLET BY MOUTH ONCE DAILY BEFORE BREAKFAST 90 Tablet 1    naloxone (Narcan) 4 mg/actuation nasal spray Use 1 spray intranasally, then discard. Repeat with new spray every 2 min as needed for opioid overdose symptoms, alternating nostrils. 1 Each 0    losartan-hydroCHLOROthiazide (HYZAAR) 100-12.5 mg per tablet TAKE 1 TABLET BY MOUTH EVERY DAY 90 Tablet 1    dibucaine (NUPERCAINAL) 1 % rectal ointment by PeriANAL route every four (4) hours as needed for Pain. 28 g 0    dicyclomine (BENTYL) 20 mg tablet Take 1 Tablet by mouth every six (6) hours as needed for Abdominal Cramps. 20 Tablet 0    hyoscyamine SL (LEVSIN/SL) 0.125 mg SL tablet 1 Tablet by SubLINGual route every four (4) hours as needed for Cramping. (Patient not taking: Reported on 3/11/2022) 10 Tablet 0    ondansetron (Zofran ODT) 4 mg disintegrating tablet Take 1 Tablet by mouth every eight (8) hours as needed for Nausea. 12 Tablet 1    omeprazole (PRILOSEC) 20 mg capsule Take 40 mg by mouth Daily (before breakfast).  EPINEPHrine (EPIPEN) 0.3 mg/0.3 mL (1:1,000) injection 0.3 mL by IntraMUSCular route once as needed for up to 1 dose.  0.3 mL 1       Past History     Past Medical History:  Past Medical History:   Diagnosis Date    Anal fissure     Anisocoria     Asthma     LAST EPISODE     Back pain     Cerumen impaction     Chronic kidney disease     hx uti in past    Coagulation defects     ocassional rectal bleeding due to anal fissure    Colovaginal fistula     Diabetes (HCC)     NIDDM    Diverticulitis     Diverticulosis     Enlarged tonsils     Frequent UTI     GERD (gastroesophageal reflux disease)     H/O endoscopy     with dilation    HA (headache)     Hepatic steatosis     History of colon resection     Hx of colonoscopy with polypectomy     benign    Hypertension     Ill-defined condition     FREQUENT HIVES    Ill-defined condition     HX ELEVATED LIVER ENZYMES    Morbid obesity (HCC)     Nausea & vomiting     during diverticulitis flare    Obesity     Otitis media     Pneumonia     about 15 yrs ago    Psychiatric disorder     ANXIETY    Recurrent tonsillitis     Sinusitis     Transfusion history ~ age 35    postop hysterectomy    Urticaria        Past Surgical History:  Past Surgical History:   Procedure Laterality Date    COLONOSCOPY N/A 3/28/2019    COLONOSCOPY performed by Lulú Mcnamara MD at [address] [place] [address] COLONOSCOPY N/A 10/2/2020    COLONOSCOPY performed by Lulú Mcnamara MD at [address] [place] [address] HX APPENDECTOMY  2021    cancer.  HX BREAST REDUCTION  1992    [facility].  HX GI  12    LAPAROSCOPIC HAND ASSISTED  POSS OPEN SIGMOID COLECTOMY POSS TEMPORARY DIVERTING LOOP ILEOSTOMY;  (no illeostomy needed)    HX GYN           HX GYN      cervical conization    HX HEENT      SINUS SURGERY LEFT X2    HX HEENT      SINUS SURGERY ON RIGHT X2    HX HEMORRHOIDECTOMY      HX HYSTERECTOMY      HX OTHER SURGICAL  2021    Sphincterotomy    HX PELVIC LAPAROSCOPY      HX EMMANUEL AND BSO      HX UROLOGICAL  12     CYSTOSCOPY INSERTION URETERAL CATHETERS - Cystoscopy Insertion of bilateral ureteral stents    DC ABDOMEN SURGERY PROC UNLISTED  2018    hernia repair at [facility]    [place] UNLISTED      colon resection.         Family History:  Family History   Problem Relation Age of Onset    Diabetes Mother     Cancer Mother         NON-HODGKINS LYMPHOMA    Anesth Problems Mother         PONV    Diabetes Father     Heart Disease Father         CAD - STENTS, PACEMAKER    Arrhythmia Father     Cancer Paternal Grandmother        Social History:  Social History     Tobacco Use    Smoking status: Never Smoker    Smokeless tobacco: Never Used   Vaping Use    Vaping Use: Not on file   Substance Use Topics    Alcohol use: Not Currently    Drug use: Yes     Types: OTC, Prescription       Allergies: Allergies   Allergen Reactions    Aspirin Hives, Shortness of Breath and Itching    Codeine Hives, Itching and Angioedema     Pt had itching in mouth, on face and lips    Contrast Agent [Iodine] Hives, Itching and Angioedema     5/5/21: pt was given benadryl and solu-medrol prior to administration but pt had severe tongue swelling and drooling, lethargy and itching. DO NOT GIVE PT    Flavoring Agent Anaphylaxis     Cherry flavor    Iodinated Contrast Media Anaphylaxis, Diarrhea, Hives, Nausea and Vomiting and Shortness of Breath    Percocet [Oxycodone-Acetaminophen] Hives, Itching and Angioedema     Pt had itching in mouth, on face and lips    Prilosec [Omeprazole Magnesium] Anaphylaxis     CHERRY FLAVORED ODT; PT TAKES REGULAR PRILOSEC AND IS OK    Dilaudid [Hydromorphone] Itching     Tolerates with benadryl    Ketorolac Rash     \"makes my eyes spasm and causes rash on my hands\" and \"makes my skin itch and makes me nervous\"    Morphine (Pf) Rash     According to rn, pt can take morphine with benadryl    Fentanyl Rash and Itching     Has had benadryl before         Review of Systems   Review of Systems   Constitutional: Positive for fever. Negative for chills and diaphoresis. HENT: Negative for congestion, ear pain, rhinorrhea and sore throat. Respiratory: Negative for cough and shortness of breath. Cardiovascular: Negative for chest pain. Gastrointestinal: Positive for abdominal pain, diarrhea, nausea and vomiting. Negative for constipation. Genitourinary: Negative for difficulty urinating, dysuria, frequency and hematuria. Musculoskeletal: Negative for arthralgias and myalgias. Neurological: Negative for headaches.    All other systems reviewed and are negative. Physical Exam   Physical Exam  Vitals and nursing note reviewed. Constitutional:       General: She is not in acute distress. Appearance: She is well-developed. She is not diaphoretic. Comments: 46 y.o.  female    HENT:      Head: Normocephalic and atraumatic. Eyes:      General:         Right eye: No discharge. Left eye: No discharge. Conjunctiva/sclera: Conjunctivae normal.   Cardiovascular:      Rate and Rhythm: Normal rate and regular rhythm. Heart sounds: Normal heart sounds. No murmur heard. Pulmonary:      Effort: Pulmonary effort is normal. No respiratory distress. Breath sounds: Normal breath sounds. Abdominal:      Tenderness: There is generalized abdominal tenderness. There is no right CVA tenderness, left CVA tenderness, guarding or rebound. Musculoskeletal:      Cervical back: Normal range of motion and neck supple. Skin:     General: Skin is warm and dry. Neurological:      Mental Status: She is alert and oriented to person, place, and time. Psychiatric:         Behavior: Behavior normal.         Diagnostic Study Results     Labs -     Recent Results (from the past 12 hour(s))   METABOLIC PANEL, COMPREHENSIVE    Collection Time: 05/25/22 10:22 AM   Result Value Ref Range    Sodium 136 136 - 145 mmol/L    Potassium 4.0 3.5 - 5.1 mmol/L    Chloride 100 97 - 108 mmol/L    CO2 28 21 - 32 mmol/L    Anion gap 8 5 - 15 mmol/L    Glucose 351 (H) 65 - 100 mg/dL    BUN 16 6 - 20 MG/DL    Creatinine 0.94 0.55 - 1.02 MG/DL    BUN/Creatinine ratio 17 12 - 20      GFR est AA >60 >60 ml/min/1.73m2    GFR est non-AA >60 >60 ml/min/1.73m2    Calcium 10.0 8.5 - 10.1 MG/DL    Bilirubin, total 0.9 0.2 - 1.0 MG/DL    ALT (SGPT) 33 12 - 78 U/L    AST (SGOT) 26 15 - 37 U/L    Alk.  phosphatase 78 45 - 117 U/L    Protein, total 7.8 6.4 - 8.2 g/dL    Albumin 4.3 3.5 - 5.0 g/dL    Globulin 3.5 2.0 - 4.0 g/dL    A-G Ratio 1.2 1.1 - 2.2     LIPASE Collection Time: 05/25/22 10:22 AM   Result Value Ref Range    Lipase 139 73 - 393 U/L   CBC WITH AUTOMATED DIFF    Collection Time: 05/25/22 10:22 AM   Result Value Ref Range    WBC 10.7 3.6 - 11.0 K/uL    RBC 5.07 3.80 - 5.20 M/uL    HGB 15.5 11.5 - 16.0 g/dL    HCT 43.6 35.0 - 47.0 %    MCV 86.0 80.0 - 99.0 FL    MCH 30.6 26.0 - 34.0 PG    MCHC 35.6 30.0 - 36.5 g/dL    RDW 13.9 11.5 - 14.5 %    PLATELET 437 307 - 544 K/uL    MPV 10.2 8.9 - 12.9 FL    NRBC 0.0 0  WBC    ABSOLUTE NRBC 0.00 0.00 - 0.01 K/uL    NEUTROPHILS 92 (H) 32 - 75 %    LYMPHOCYTES 6 (L) 12 - 49 %    MONOCYTES 1 (L) 5 - 13 %    EOSINOPHILS 0 0 - 7 %    BASOPHILS 0 0 - 1 %    IMMATURE GRANULOCYTES 1 (H) 0.0 - 0.5 %    ABS. NEUTROPHILS 9.9 (H) 1.8 - 8.0 K/UL    ABS. LYMPHOCYTES 0.6 (L) 0.8 - 3.5 K/UL    ABS. MONOCYTES 0.1 0.0 - 1.0 K/UL    ABS. EOSINOPHILS 0.0 0.0 - 0.4 K/UL    ABS. BASOPHILS 0.0 0.0 - 0.1 K/UL    ABS. IMM. GRANS. 0.1 (H) 0.00 - 0.04 K/UL    DF SMEAR SCANNED      RBC COMMENTS NORMOCYTIC, NORMOCHROMIC         Radiologic Studies -     EXAM: CT ABD PELV WO CONT     INDICATION: pain     COMPARISON:  4 /18 / 2022     IV CONTRAST: None.     ORAL CONTRAST: None     TECHNIQUE:   Thin axial images were obtained through the abdomen and pelvis. Coronal and  sagittal reformats were generated. CT dose reduction was achieved through use of  a standardized protocol tailored for this examination and automatic exposure  control for dose modulation.      The absence of intravenous contrast material reduces the sensitivity for  evaluation of the vasculature and solid organs.     FINDINGS:   LOWER THORAX: No significant abnormality in the incidentally imaged lower chest.  LIVER: No mass. BILIARY TREE: cholecystectomy. CBD is not dilated. SPLEEN: within normal limits. PANCREAS: No focal abnormality. ADRENALS: Unremarkable. KIDNEYS/URETERS: No calculus or hydronephrosis. STOMACH: Unremarkable.   SMALL BOWEL: No dilatation or wall thickening. COLON: No dilatation or wall thickening. Prior colon resection. APPENDIX: Appendectomy  PERITONEUM: No ascites or pneumoperitoneum. RETROPERITONEUM: No lymphadenopathy or aortic aneurysm. REPRODUCTIVE ORGANS: Hysterectomy oophorectomy  URINARY BLADDER: No mass or calculus. BONES: No destructive bone lesion. ABDOMINAL WALL: Prior hernia repair  ADDITIONAL COMMENTS: N/A     IMPRESSION  No acute changes, incidentals as above. Medical Decision Making   I am the first provider for this patient. I reviewed the vital signs, available nursing notes, past medical history, past surgical history, family history and social history. Vital Signs-Reviewed the patient's vital signs. Patient Vitals for the past 12 hrs:   Temp Pulse Resp BP SpO2   05/25/22 1011 97.9 °F (36.6 °C) (!) 102 18 (!) 158/82 98 %       Records Reviewed: Nursing Notes    Provider Notes (Medical Decision Making):   Patient presents the ED with known ulcerative colitis for intractable abdominal pain. Differential diagnosis include but are not limited to ulcerative colitis flare, diverticulitis, appendicitis, constipation, bowel obstruction, chronic pain. ED work-up is without acute issues. Patient encouraged to follow-up with her primary care doctor, pain management, or GI. She can always return to the ED for deterioration. ED Course:   Initial assessment performed. The patients presenting problems have been discussed, and they are in agreement with the care plan formulated and outlined with them. I have encouraged them to ask questions as they arise throughout their visit. Critical Care Time: None    Disposition:  DISCHARGE NOTE:  The pt is ready for discharge. The pt's signs, symptoms, diagnosis, and discharge instructions have been discussed and pt has conveyed their understanding. The pt is to follow up as recommended or return to ER should their symptoms worsen. Plan has been discussed and pt is in agreement. PLAN:  1. Discharge Medication List as of 5/25/2022  2:29 PM        2. Follow-up Information     Follow up With Specialties Details Why Ruthy So MD Internal Medicine Physician In 1 week  [address] [facility]  [phone]      Your GI   as soon as possible     [facility] EMERGENCY DEPT Emergency Medicine  If symptoms worsen [address]  [address] [facility]  [phone]        Return to ED if worse     Diagnosis     Clinical Impression:   1. Abdominal pain, chronic, left lower quadrant    2. History of ulcerative colitis          Please note that this dictation was completed with [employer], the computer voice recognition software. Quite often unanticipated grammatical, syntax, homophones, and other interpretive errors are inadvertently transcribed by the computer software. Please disregards these errors. Please excuse any errors that have escaped final proofreading.

## 2022-05-25 NOTE — ED NOTES
I have reviewed verbal and written discharge instructions with the patient. The patient verbalized understanding. IV removed. Pt alert and ambulatory upon d/c.

## 2022-05-25 NOTE — ED NOTES
Bedside shift change report given to Migdalia (oncoming nurse) by Radha Liu (offgoing nurse).  Report included the following information SBAR, Kardex, ED Summary and MAR.

## 2022-05-25 NOTE — ED NOTES
Pt called out and reported itching of the face and mouth, This RN was unable to locate PA, Notified MD pam ROMERO advising to keep pt on pulse ox and continue to monitor at this time.

## 2022-05-25 NOTE — DISCHARGE INSTRUCTIONS
It was a pleasure taking care of you at [facility] Emergency Department today. We know that when you come to [facility], you are entrusting us with your health, comfort, and safety. Our physicians and nurses honor that trust, and we truly appreciate the opportunity to care for you and your loved ones. We also value your feedback. If you receive a survey about your Emergency Department experience today, please fill it out. We care about our patients' feedback, and we listen to what you have to say. Thank you!

## 2022-05-26 RX ORDER — SERTRALINE HYDROCHLORIDE 100 MG/1
TABLET, FILM COATED ORAL
Qty: 60 TABLET | Refills: 2 | Status: SHIPPED | OUTPATIENT
Start: 2022-05-26 | End: 2022-09-16 | Stop reason: SDUPTHER

## 2022-06-16 DIAGNOSIS — F41.9 ANXIETY: ICD-10-CM

## 2022-06-16 RX ORDER — ALPRAZOLAM 1 MG/1
TABLET ORAL
Qty: 60 TABLET | Refills: 0 | Status: SHIPPED | OUTPATIENT
Start: 2022-06-16 | End: 2022-07-15

## 2022-06-17 RX ORDER — LOSARTAN POTASSIUM AND HYDROCHLOROTHIAZIDE 12.5; 1 MG/1; MG/1
TABLET ORAL
Qty: 90 TABLET | Refills: 1 | Status: SHIPPED | OUTPATIENT
Start: 2022-06-17

## 2022-07-15 DIAGNOSIS — F41.9 ANXIETY: ICD-10-CM

## 2022-07-15 RX ORDER — ALPRAZOLAM 1 MG/1
TABLET ORAL
Qty: 60 TABLET | Refills: 0 | Status: SHIPPED | OUTPATIENT
Start: 2022-07-15 | End: 2022-08-16

## 2022-07-15 RX ORDER — ALBUTEROL SULFATE 90 UG/1
AEROSOL, METERED RESPIRATORY (INHALATION)
Qty: 18 EACH | Refills: 1 | Status: SHIPPED | OUTPATIENT
Start: 2022-07-15

## 2022-07-21 ENCOUNTER — HOSPITAL ENCOUNTER (EMERGENCY)
Age: 52
Discharge: LEFT BEFORE DISCHARGE | End: 2022-07-21

## 2022-07-27 ENCOUNTER — HOSPITAL ENCOUNTER (EMERGENCY)
Age: 52
Discharge: HOME | End: 2022-07-27
Attending: EMERGENCY MEDICINE
Payer: MEDICAID

## 2022-07-27 VITALS
DIASTOLIC BLOOD PRESSURE: 58 MMHG | SYSTOLIC BLOOD PRESSURE: 111 MMHG | BODY MASS INDEX: 37.97 KG/M2 | TEMPERATURE: 98.2 F | HEIGHT: 62 IN | WEIGHT: 206.35 LBS | OXYGEN SATURATION: 96 % | RESPIRATION RATE: 16 BRPM | HEART RATE: 74 BPM

## 2022-07-27 DIAGNOSIS — R10.84 ABDOMINAL PAIN, GENERALIZED: ICD-10-CM

## 2022-07-27 DIAGNOSIS — K51.919 ULCERATIVE COLITIS WITH COMPLICATION, UNSPECIFIED LOCATION (HCC): Primary | ICD-10-CM

## 2022-07-27 LAB
ALBUMIN SERPL-MCNC: 3.7 G/DL (ref 3.5–5)
ALBUMIN/GLOB SERPL: 1.1 {RATIO} (ref 1.1–2.2)
ALP SERPL-CCNC: 63 U/L (ref 45–117)
ALT SERPL-CCNC: 25 U/L (ref 12–78)
ANION GAP SERPL CALC-SCNC: 10 MMOL/L (ref 5–15)
AST SERPL-CCNC: 20 U/L (ref 15–37)
BASOPHILS # BLD: 0 K/UL (ref 0–0.1)
BASOPHILS NFR BLD: 1 % (ref 0–1)
BILIRUB SERPL-MCNC: 1 MG/DL (ref 0.2–1)
BUN SERPL-MCNC: 13 MG/DL (ref 6–20)
BUN/CREAT SERPL: 16 (ref 12–20)
CALCIUM SERPL-MCNC: 9.6 MG/DL (ref 8.5–10.1)
CHLORIDE SERPL-SCNC: 102 MMOL/L (ref 97–108)
CO2 SERPL-SCNC: 25 MMOL/L (ref 21–32)
CREAT SERPL-MCNC: 0.83 MG/DL (ref 0.55–1.02)
DIFFERENTIAL METHOD BLD: NORMAL
EOSINOPHIL # BLD: 0.2 K/UL (ref 0–0.4)
EOSINOPHIL NFR BLD: 3 % (ref 0–7)
ERYTHROCYTE [DISTWIDTH] IN BLOOD BY AUTOMATED COUNT: 13.5 % (ref 11.5–14.5)
GLOBULIN SER CALC-MCNC: 3.3 G/DL (ref 2–4)
GLUCOSE SERPL-MCNC: 279 MG/DL (ref 65–100)
HCT VFR BLD AUTO: 39 % (ref 35–47)
HGB BLD-MCNC: 13.8 G/DL (ref 11.5–16)
IMM GRANULOCYTES # BLD AUTO: 0 K/UL (ref 0–0.04)
IMM GRANULOCYTES NFR BLD AUTO: 0 % (ref 0–0.5)
LIPASE SERPL-CCNC: 282 U/L (ref 73–393)
LYMPHOCYTES # BLD: 1.5 K/UL (ref 0.8–3.5)
LYMPHOCYTES NFR BLD: 30 % (ref 12–49)
MCH RBC QN AUTO: 30.1 PG (ref 26–34)
MCHC RBC AUTO-ENTMCNC: 35.4 G/DL (ref 30–36.5)
MCV RBC AUTO: 85.2 FL (ref 80–99)
MONOCYTES # BLD: 0.3 K/UL (ref 0–1)
MONOCYTES NFR BLD: 6 % (ref 5–13)
NEUTS SEG # BLD: 3.1 K/UL (ref 1.8–8)
NEUTS SEG NFR BLD: 60 % (ref 32–75)
NRBC # BLD: 0 K/UL (ref 0–0.01)
NRBC BLD-RTO: 0 PER 100 WBC
PLATELET # BLD AUTO: 158 K/UL (ref 150–400)
PMV BLD AUTO: 9.8 FL (ref 8.9–12.9)
POTASSIUM SERPL-SCNC: 3.1 MMOL/L (ref 3.5–5.1)
PROT SERPL-MCNC: 7 G/DL (ref 6.4–8.2)
RBC # BLD AUTO: 4.58 M/UL (ref 3.8–5.2)
SODIUM SERPL-SCNC: 137 MMOL/L (ref 136–145)
WBC # BLD AUTO: 5.1 K/UL (ref 3.6–11)

## 2022-07-27 PROCEDURE — 74011250636 HC RX REV CODE- 250/636: Performed by: EMERGENCY MEDICINE

## 2022-07-27 PROCEDURE — 99284 EMERGENCY DEPT VISIT MOD MDM: CPT

## 2022-07-27 PROCEDURE — 96375 TX/PRO/DX INJ NEW DRUG ADDON: CPT

## 2022-07-27 PROCEDURE — 85025 COMPLETE CBC W/AUTO DIFF WBC: CPT

## 2022-07-27 PROCEDURE — 36415 COLL VENOUS BLD VENIPUNCTURE: CPT

## 2022-07-27 PROCEDURE — 96376 TX/PRO/DX INJ SAME DRUG ADON: CPT

## 2022-07-27 PROCEDURE — 74011000250 HC RX REV CODE- 250: Performed by: EMERGENCY MEDICINE

## 2022-07-27 PROCEDURE — 80053 COMPREHEN METABOLIC PANEL: CPT

## 2022-07-27 PROCEDURE — 96374 THER/PROPH/DIAG INJ IV PUSH: CPT

## 2022-07-27 PROCEDURE — 74011250637 HC RX REV CODE- 250/637: Performed by: EMERGENCY MEDICINE

## 2022-07-27 PROCEDURE — 83690 ASSAY OF LIPASE: CPT

## 2022-07-27 RX ORDER — DIPHENHYDRAMINE HYDROCHLORIDE 50 MG/ML
25 INJECTION, SOLUTION INTRAMUSCULAR; INTRAVENOUS
Status: COMPLETED | OUTPATIENT
Start: 2022-07-27 | End: 2022-07-27

## 2022-07-27 RX ORDER — ONDANSETRON 4 MG/1
4 TABLET, ORALLY DISINTEGRATING ORAL
Qty: 20 TABLET | Refills: 0 | Status: SHIPPED | OUTPATIENT
Start: 2022-07-27

## 2022-07-27 RX ORDER — HYDROMORPHONE HYDROCHLORIDE 1 MG/ML
1 INJECTION, SOLUTION INTRAMUSCULAR; INTRAVENOUS; SUBCUTANEOUS
Status: COMPLETED | OUTPATIENT
Start: 2022-07-27 | End: 2022-07-27

## 2022-07-27 RX ORDER — ONDANSETRON 2 MG/ML
4 INJECTION INTRAMUSCULAR; INTRAVENOUS ONCE
Status: COMPLETED | OUTPATIENT
Start: 2022-07-27 | End: 2022-07-27

## 2022-07-27 RX ORDER — PREDNISONE 5 MG/1
TABLET ORAL
Qty: 21 TABLET | Refills: 0 | Status: SHIPPED | OUTPATIENT
Start: 2022-07-27

## 2022-07-27 RX ORDER — HYDROMORPHONE HYDROCHLORIDE 1 MG/ML
1 INJECTION, SOLUTION INTRAMUSCULAR; INTRAVENOUS; SUBCUTANEOUS ONCE
Status: DISCONTINUED | OUTPATIENT
Start: 2022-07-27 | End: 2022-07-27

## 2022-07-27 RX ORDER — MORPHINE SULFATE 2 MG/ML
4 INJECTION, SOLUTION INTRAMUSCULAR; INTRAVENOUS ONCE
Status: COMPLETED | OUTPATIENT
Start: 2022-07-27 | End: 2022-07-27

## 2022-07-27 RX ORDER — DIPHENHYDRAMINE HYDROCHLORIDE 50 MG/ML
12.5 INJECTION, SOLUTION INTRAMUSCULAR; INTRAVENOUS
Status: COMPLETED | OUTPATIENT
Start: 2022-07-27 | End: 2022-07-27

## 2022-07-27 RX ORDER — HYOSCYAMINE SULFATE 0.125 MG
125 TABLET ORAL
Qty: 16 TABLET | Refills: 0 | Status: SHIPPED | OUTPATIENT
Start: 2022-07-27 | End: 2022-07-31

## 2022-07-27 RX ORDER — POTASSIUM CHLORIDE 750 MG/1
40 TABLET, FILM COATED, EXTENDED RELEASE ORAL
Status: COMPLETED | OUTPATIENT
Start: 2022-07-27 | End: 2022-07-27

## 2022-07-27 RX ADMIN — METHYLPREDNISOLONE SODIUM SUCCINATE 125 MG: 125 INJECTION, POWDER, FOR SOLUTION INTRAMUSCULAR; INTRAVENOUS at 09:50

## 2022-07-27 RX ADMIN — SODIUM CHLORIDE 1000 ML: 9 INJECTION, SOLUTION INTRAVENOUS at 09:52

## 2022-07-27 RX ADMIN — ONDANSETRON 4 MG: 2 INJECTION INTRAMUSCULAR; INTRAVENOUS at 09:50

## 2022-07-27 RX ADMIN — HYDROMORPHONE HYDROCHLORIDE 1 MG: 1 INJECTION, SOLUTION INTRAMUSCULAR; INTRAVENOUS; SUBCUTANEOUS at 10:58

## 2022-07-27 RX ADMIN — FAMOTIDINE 20 MG: 10 INJECTION, SOLUTION INTRAVENOUS at 09:49

## 2022-07-27 RX ADMIN — DIPHENHYDRAMINE HYDROCHLORIDE 12.5 MG: 50 INJECTION, SOLUTION INTRAMUSCULAR; INTRAVENOUS at 12:13

## 2022-07-27 RX ADMIN — POTASSIUM CHLORIDE 40 MEQ: 750 TABLET, FILM COATED, EXTENDED RELEASE ORAL at 10:58

## 2022-07-27 RX ADMIN — MORPHINE SULFATE 4 MG: 2 INJECTION, SOLUTION INTRAMUSCULAR; INTRAVENOUS at 09:49

## 2022-07-27 RX ADMIN — DIPHENHYDRAMINE HYDROCHLORIDE 25 MG: 50 INJECTION, SOLUTION INTRAMUSCULAR; INTRAVENOUS at 09:50

## 2022-07-27 NOTE — ED PROVIDER NOTES
EMERGENCY DEPARTMENT HISTORY AND PHYSICAL EXAM      Date: 7/27/2022  Patient Name: Ulices Singletary    History of Presenting Illness     Chief Complaint   Patient presents with    Allergic Reaction     One hour ago noted flush face and face burning with itching; she gets this when she has a flare of her diverticulitis or Ulcerative Colitis and last night she had vomiting diarrhea and pain left side last night. She vomited a little bile today    Abdominal Pain    Vomiting       History Provided By: Patient    HPI: Ulices Singletary, 46 y.o. female  presents to the ED with cc of abdominal pain. Patient states she has a history of ulcerative colitis. Patient states that she has had abdominal pain in the left lower quadrant. Is been associated with nausea vomiting and loose watery stools. She states she has had blood in her stools. Patient in the past 12 months has had 64 emergency department visits and 10+ CT scans of her abdomen. She states she just completed a course of prednisone for this condition. She sees gastrointestinal specialist and also a GI specialist at [facility]. No fevers or chills. She has been taking mesalamine and dicyclomine at home but feels they have become ineffective. She also complains of redness, flushed, swollen cheeks and face. This has occurred with previous flares of her ulcerative colitis.     Past History     Past Medical History:  Past Medical History:   Diagnosis Date    Anal fissure     Anisocoria     Asthma     LAST EPISODE     Back pain     Cerumen impaction     Chronic kidney disease     hx uti in past    Coagulation defects     ocassional rectal bleeding due to anal fissure    Colovaginal fistula     Diabetes (HCC)     NIDDM    Diverticulitis     Diverticulosis     Enlarged tonsils     Frequent UTI     GERD (gastroesophageal reflux disease)     H/O endoscopy     with dilation    HA (headache)     Hepatic steatosis     History of colon resection Hx of colonoscopy with polypectomy     benign    Hypertension     Ill-defined condition     FREQUENT HIVES    Ill-defined condition     HX ELEVATED LIVER ENZYMES    Morbid obesity (HCC)     Nausea & vomiting     during diverticulitis flare    Obesity     Otitis media     Pneumonia     about 15 yrs ago    Psychiatric disorder     ANXIETY    Recurrent tonsillitis     Sinusitis     Transfusion history ~ age 35    postop hysterectomy    Urticaria        Past Surgical History:  Past Surgical History:   Procedure Laterality Date    COLONOSCOPY N/A 3/28/2019    COLONOSCOPY performed by Austin Christiansen MD at [facility] N/A 10/2/2020    COLONOSCOPY performed by Austin Christiansen MD at [address]  2021    cancer. HX BREAST REDUCTION      blake. HX GI  12    LAPAROSCOPIC HAND ASSISTED  POSS OPEN SIGMOID COLECTOMY POSS TEMPORARY DIVERTING LOOP ILEOSTOMY;  (no illeostomy needed)    HX GYN           HX GYN      cervical conization    HX HEENT      SINUS SURGERY LEFT X2    HX HEENT      SINUS SURGERY ON RIGHT X2    HX HEMORRHOIDECTOMY      HX HYSTERECTOMY      HX OTHER SURGICAL  2021    Sphincterotomy    HX PELVIC LAPAROSCOPY      HX EMMANUEL AND BSO      HX UROLOGICAL  12     CYSTOSCOPY INSERTION URETERAL CATHETERS - Cystoscopy Insertion of bilateral ureteral stents    MA ABDOMEN SURGERY PROC UNLISTED  2018    hernia repair at [address] UNLISTED      colon resection. Medications:  No current facility-administered medications on file prior to encounter.      Current Outpatient Medications on File Prior to Encounter   Medication Sig Dispense Refill    ALPRAZolam (XANAX) 1 mg tablet TAKE 1/2 TO 1 TABLET BY MOUTH TWICE DAILY AS NEEDED FOR ANXIETY 60 Tablet 0    Ventolin HFA 90 mcg/actuation inhaler INHALE 1 PUFF BY MOUTH EVERY 4 HOURS AS NEEDED FOR WHEEZE 18 Each 1    losartan-hydroCHLOROthiazide (HYZAAR) 100-12.5 mg per tablet TAKE 1 TABLET BY MOUTH EVERY DAY 90 Tablet 1    sertraline (ZOLOFT) 100 mg tablet TAKE 2 TABLETS BY MOUTH EVERY NIGHT 60 Tablet 2    cetirizine (ZyrTEC) 10 mg tablet Take 1 Tablet by mouth daily. 20 Tablet 0    Jardiance 25 mg tablet TAKE 1 TABLET BY MOUTH EVERY DAY 90 Tablet 1    melatonin 5 mg cap capsule Take  by mouth nightly. estradioL (ESTRACE) 0.5 mg tablet TAKE 1 TABLET BY MOUTH EVERY DAY 90 Tablet 1    promethazine (PHENERGAN) 25 mg tablet Take 1 Tablet by mouth every six (6) hours as needed for Nausea. 12 Tablet 0    atorvastatin (LIPITOR) 20 mg tablet TAKE 1 TABLET BY MOUTH EVERY DAY 90 Tablet 1    glimepiride (AMARYL) 4 mg tablet TAKE 1 TABLET BY MOUTH ONCE DAILY BEFORE BREAKFAST 90 Tablet 1    naloxone (Narcan) 4 mg/actuation nasal spray Use 1 spray intranasally, then discard. Repeat with new spray every 2 min as needed for opioid overdose symptoms, alternating nostrils. 1 Each 0    dibucaine (NUPERCAINAL) 1 % rectal ointment by PeriANAL route every four (4) hours as needed for Pain. 28 g 0    dicyclomine (BENTYL) 20 mg tablet Take 1 Tablet by mouth every six (6) hours as needed for Abdominal Cramps. 20 Tablet 0    [DISCONTINUED] hyoscyamine SL (LEVSIN/SL) 0.125 mg SL tablet 1 Tablet by SubLINGual route every four (4) hours as needed for Cramping. (Patient not taking: Reported on 3/11/2022) 10 Tablet 0    [DISCONTINUED] ondansetron (Zofran ODT) 4 mg disintegrating tablet Take 1 Tablet by mouth every eight (8) hours as needed for Nausea. 12 Tablet 1    omeprazole (PRILOSEC) 20 mg capsule Take 40 mg by mouth Daily (before breakfast). EPINEPHrine (EPIPEN) 0.3 mg/0.3 mL (1:1,000) injection 0.3 mL by IntraMUSCular route once as needed for up to 1 dose.  0.3 mL 1       Family History:  Family History   Problem Relation Age of Onset    Diabetes Mother     Cancer Mother         NON-HODGKINS LYMPHOMA    Anesth Problems Mother         PONV    Diabetes Father     Heart Disease Father         CAD - STENTS, PACEMAKER    Arrhythmia Father     Cancer Paternal Grandmother        Social History:  Social History     Tobacco Use    Smoking status: Never    Smokeless tobacco: Never   Substance Use Topics    Alcohol use: Not Currently    Drug use: Yes     Types: OTC, Prescription       Allergies: Allergies   Allergen Reactions    Aspirin Hives, Shortness of Breath and Itching    Codeine Hives, Itching and Angioedema     Pt had itching in mouth, on face and lips    Contrast Agent [Iodine] Hives, Itching and Angioedema     5/5/21: pt was given benadryl and solu-medrol prior to administration but pt had severe tongue swelling and drooling, lethargy and itching. DO NOT GIVE PT    Flavoring Agent Anaphylaxis     Cherry flavor    Iodinated Contrast Media Anaphylaxis, Diarrhea, Hives, Nausea and Vomiting and Shortness of Breath    Percocet [Oxycodone-Acetaminophen] Hives, Itching and Angioedema     Pt had itching in mouth, on face and lips    Prilosec [Omeprazole Magnesium] Anaphylaxis     CHERRY FLAVORED ODT; PT TAKES REGULAR PRILOSEC AND IS OK    Dilaudid [Hydromorphone] Itching     Tolerates with benadryl    Ketorolac Rash     \"makes my eyes spasm and causes rash on my hands\" and \"makes my skin itch and makes me nervous\"    Morphine (Pf) Rash     According to rn, pt can take morphine with benadryl    Fentanyl Rash and Itching     Has had benadryl before       All the above components of the past  history are auto-populated from the electronic record. They have been reviewed and the patient has been interviewed for any pertinent past history that pertains to the patient's chief complaint and reason for visit. Not all pre-populated components may be accurate at the time this note was generated. Review of Systems   Review of Systems   Constitutional:  Negative for chills and fever. HENT:  Positive for facial swelling. Negative for congestion, ear pain, rhinorrhea, sore throat and trouble swallowing.     Eyes:  Negative for visual disturbance. Respiratory:  Negative for cough, chest tightness and shortness of breath. Cardiovascular:  Negative for chest pain and palpitations. Gastrointestinal:  Positive for abdominal pain, blood in stool, diarrhea, nausea and vomiting. Genitourinary:  Negative for decreased urine volume, difficulty urinating, dysuria and frequency. Musculoskeletal:  Negative for back pain and neck pain. Skin:  Positive for color change and rash. Neurological:  Negative for dizziness, weakness, light-headedness and headaches. Physical Exam   Physical Exam  Vitals and nursing note reviewed. Constitutional:       General: She is not in acute distress. Appearance: She is well-developed. She is not ill-appearing. HENT:      Head: Normocephalic. Mouth/Throat:      Mouth: Mucous membranes are moist.      Comments: Swelling buccal surface of left cheek  Eyes:      Conjunctiva/sclera: Conjunctivae normal.   Cardiovascular:      Rate and Rhythm: Normal rate and regular rhythm. Pulmonary:      Effort: Pulmonary effort is normal. No accessory muscle usage or respiratory distress. Abdominal:      General: There is no distension. Tenderness: There is abdominal tenderness in the left lower quadrant. There is no guarding or rebound. Musculoskeletal:      Cervical back: Normal range of motion. Skin:     General: Skin is warm and dry. Neurological:      Mental Status: She is alert and oriented to person, place, and time.        Diagnostic Study Results     Labs -     Recent Results (from the past 24 hour(s))   METABOLIC PANEL, COMPREHENSIVE    Collection Time: 07/27/22  9:24 AM   Result Value Ref Range    Sodium 137 136 - 145 mmol/L    Potassium 3.1 (L) 3.5 - 5.1 mmol/L    Chloride 102 97 - 108 mmol/L    CO2 25 21 - 32 mmol/L    Anion gap 10 5 - 15 mmol/L    Glucose 279 (H) 65 - 100 mg/dL    BUN 13 6 - 20 MG/DL    Creatinine 0.83 0.55 - 1.02 MG/DL    BUN/Creatinine ratio 16 12 - 20      GFR est AA >60 >60 ml/min/1.73m2    GFR est non-AA >60 >60 ml/min/1.73m2    Calcium 9.6 8.5 - 10.1 MG/DL    Bilirubin, total 1.0 0.2 - 1.0 MG/DL    ALT (SGPT) 25 12 - 78 U/L    AST (SGOT) 20 15 - 37 U/L    Alk. phosphatase 63 45 - 117 U/L    Protein, total 7.0 6.4 - 8.2 g/dL    Albumin 3.7 3.5 - 5.0 g/dL    Globulin 3.3 2.0 - 4.0 g/dL    A-G Ratio 1.1 1.1 - 2.2     CBC WITH AUTOMATED DIFF    Collection Time: 07/27/22  9:24 AM   Result Value Ref Range    WBC 5.1 3.6 - 11.0 K/uL    RBC 4.58 3.80 - 5.20 M/uL    HGB 13.8 11.5 - 16.0 g/dL    HCT 39.0 35.0 - 47.0 %    MCV 85.2 80.0 - 99.0 FL    MCH 30.1 26.0 - 34.0 PG    MCHC 35.4 30.0 - 36.5 g/dL    RDW 13.5 11.5 - 14.5 %    PLATELET 379 768 - 214 K/uL    MPV 9.8 8.9 - 12.9 FL    NRBC 0.0 0  WBC    ABSOLUTE NRBC 0.00 0.00 - 0.01 K/uL    NEUTROPHILS 60 32 - 75 %    LYMPHOCYTES 30 12 - 49 %    MONOCYTES 6 5 - 13 %    EOSINOPHILS 3 0 - 7 %    BASOPHILS 1 0 - 1 %    IMMATURE GRANULOCYTES 0 0.0 - 0.5 %    ABS. NEUTROPHILS 3.1 1.8 - 8.0 K/UL    ABS. LYMPHOCYTES 1.5 0.8 - 3.5 K/UL    ABS. MONOCYTES 0.3 0.0 - 1.0 K/UL    ABS. EOSINOPHILS 0.2 0.0 - 0.4 K/UL    ABS. BASOPHILS 0.0 0.0 - 0.1 K/UL    ABS. IMM. GRANS. 0.0 0.00 - 0.04 K/UL    DF AUTOMATED     LIPASE    Collection Time: 07/27/22  9:24 AM   Result Value Ref Range    Lipase 282 73 - 393 U/L       Radiologic Studies -   No orders to display     CT Results  (Last 48 hours)      None          CXR Results  (Last 48 hours)      None              Medical Decision Making     I reviewed the vital signs, available nursing notes, past medical history, past surgical history, family history and social history. Vital Signs-I have reviewed the vital signs that have been made available during the patient's emergency department visit. The vital signs auto-populated below are obtained mostly by electronic means through monitoring devices that have been downloaded into the patient's chart by the nursing staff.   Some vital signs are not downloaded into the chart until after the patient has been discharged and this note has been completed, therefore some vital signs may not be available to the physician for review prior to patient's discharge or admission. The physician has reviewed the patient's triage vital signs, monitored the electronic monitoring devices remotely for any significant vital sign abnormalities, and have reviewed vital signs prior to discharge. Some vital signs reviewed at bedside or remotely utilizing electronic monitoring devices may be different than the vital signs downloaded into the electronic medical record. Some vital signs may be erroneous and inaccurate since they are obtained by electronic monitoring devices, and not all vital signs are verified for accuracy by nursing staff prior to downloading into the patient's chart. Patient Vitals for the past 24 hrs:   Temp Pulse Resp BP SpO2   07/27/22 1030 -- -- -- (!) 111/58 96 %   07/27/22 1020 -- -- -- 124/62 94 %   07/27/22 0831 98.2 °F (36.8 °C) 74 16 136/83 100 %         Records Reviewed: Nursing notes for today's visit have been reviewed. I have also reviewed most recent medical records pertinent to today's complaints, if available in our medical record system. I have also reviewed all labs and imaging results from previous results in comparison to results obtained today. If an EKG was obtained today, it has been compared to previous EKGs, if available. If arriving via EMS, the EMS report has been reviewed if made available to us within the patient's time in the emergency department. Provider Notes (Medical Decision Making):   Patient with innumerable ER visits and CT scans for abdominal pain and ulcerative colitis presents with abdominal pain, diarrhea, and hives. I do not suspect allergic reaction. I suspect she has histamine release related to her ulcerative colitis and inflammatory disease. Vital signs are within acceptable ranges today.   She has no leukocytosis. Potassium is mildly low and was placed orally which was also her p.o. challenge and she passed. Patient has allergies to multiple opioids. States that she has to have Benadryl with opioids for itching. She has had multiple CT scans which never seem to really show active disease. She gets into this vicious cycle of needing opioids and then Benadryl, opioids and then Benadryl. I have strong suspicion for opioid use disorder. Will recommend that she follow-up with her gastroenterologist.    ED Course:   Initial assessment performed. The patients presenting problems have been discussed, and they are in agreement with the care plan formulated and outlined with them. I have encouraged them to ask questions as they arise throughout their visit. Orders Placed This Encounter    COMPREHENSIVE METABOLIC PANEL    CBC WITH AUTOMATED DIFF    LIPASE    SALINE LOCK IV ONE TIME STAT    sodium chloride 0.9 % bolus infusion 1,000 mL    diphenhydrAMINE (BENADRYL) injection 25 mg    methylPREDNISolone (PF) (Solu-MEDROL) injection 125 mg    famotidine (PF) (PEPCID) 20 mg in 0.9% sodium chloride 10 mL injection    ondansetron (ZOFRAN) injection 4 mg    morphine injection 4 mg    DISCONTD: HYDROmorphone (DILAUDID) injection 1 mg    potassium chloride SR (KLOR-CON 10) tablet 40 mEq    HYDROmorphone (DILAUDID) injection 1 mg    diphenhydrAMINE (BENADRYL) injection 12.5 mg    hyoscyamine (Levsin) 0.125 mg tablet    predniSONE (STERAPRED) 5 mg dose pack    ondansetron (Zofran ODT) 4 mg disintegrating tablet       Procedures      Critical Care Time:   0    Disposition:  Discharge    The patient's emergency department evaluation is now complete. I have reviewed all labs, imaging, and pertinent information. I have discussed all results with the patient and/or family. Based on our evaluation today I do believe that the patient is safe to be discharged home.   The patient has been provided with at home instructions that are pertinent to their complaint today, although these may not be specific to the exact diagnosis. I have reviewed the patient's home medications and attempted to reconcile if not already done so by pharmacy or nursing staff. I have discussed all new prescriptions with the patient. The patient has been encouraged to follow-up with primary care doctor and/or specialist, and these have been discussed with the patient. The patient has been advised that they may return to the emergency department if they have any worsening symptoms and or new symptoms that are of concern to them. Verbal discharge instructions may have also been provided to the patient that may not be specifically contained in the written discharge instructions. The patient has been given opportunity to ask questions prior to discharge. PLAN:  1. Current Discharge Medication List        START taking these medications    Details   hyoscyamine (Levsin) 0.125 mg tablet Take 1 Tablet by mouth every six (6) hours as needed for Cramping or Diarrhea for up to 4 days. Qty: 16 Tablet, Refills: 0  Start date: 2022, End date: 2022      predniSONE (STERAPRED) 5 mg dose pack See administration instruction per 5mg dose pack  Qty: 21 Tablet, Refills: 0  Start date: 2022           CONTINUE these medications which have CHANGED    Details   ondansetron (Zofran ODT) 4 mg disintegrating tablet Take 1 Tablet by mouth every eight (8) hours as needed for Nausea.   Qty: 20 Tablet, Refills: 0  Start date: 2022           STOP taking these medications       hyoscyamine SL (LEVSIN/SL) 0.125 mg SL tablet Comments:   Reason for Stoppin.   Follow-up Information       Follow up With Specialties Details Why Contact Info    [facility]  Schedule an appointment as soon as possible for a visit   [address]          Return to ED if worse     Diagnosis     Clinical Impression:   1.  Ulcerative colitis with complication, unspecified location ([facility] [address]    2. Abdominal pain, generalized

## 2022-07-27 NOTE — LETTER
[facility] EMERGENCY DEPT  [address] [facility]  [place] [id number] [phone]    Work/School Note    Date: 7/27/2022    To Whom It May concern:    Bailey Valentine was seen and treated today in the emergency room by the following provider(s):  Attending Provider: Jayson Snyder MD.      Bailey Valentine may return to work on 7/28/2022.     Sincerely,          Honorio Hoover RN

## 2022-07-27 NOTE — ED NOTES
Pt presents to ER w/ ABD pain in LUQ, LLQ x1 week. Pain increases w/ activity, denies upon palpation. HX- diverticulosis, colitis, and reports that she feels these symptoms are similar to previous flares.

## 2022-07-27 NOTE — DISCHARGE INSTRUCTIONS
It was a pleasure taking care of you in our Emergency Department today. We know that when you come to [facility], you are entrusting us with your health, comfort, and safety. Our physicians and nurses honor that trust, and truly appreciate the opportunity to care for you and your loved ones. We also value your feedback. If you receive a survey about your Emergency Department experience today, please fill it out. We care about our patients' feedback, and we listen to what you have to say. Please read over your discharge instructions as these contain pertinent information to help you in the healing process. These instructions include a list of prescriptions you were given today. Follow-up information is also noted on your discharge papers. There are attached instructions and information pertaining to the reason why you were seen in the emergency department today. These discharge instructions may not be for exactly why you were here, but may be the closest available instructions that we have. These include important advice for things that you can do at home to feel better, and reasons to return to the emergency department. The evaluation and treatment you received in the emergency department is not always definitive care. If follow-up with your primary care doctor or specialist was recommended, it is important that you make these appointments for follow-up care. You may need further testing, procedures, and/or medications to help you feel better. Further tests may be required that are not available in the emergency department. Failure to make these follow-up appointments may jeopardize your health. The emergency department is here for emergent stabilization and evaluation of life and limb threatening illness and/or injuries.   Further care through a specialist or primary care doctor may be required to assist in your healing and complete your treatment and/or evaluation. We may not always be able to make a diagnosis in the emergency department, or things may change that will alter your diagnosis. Our primary goal is to ensure that nothing serious is occurring and that you are stable to continue your treatment and evaluation at home as an outpatient. Of course, if things change, and you feel worse, you are always encouraged to return to the emergency department for re-evaluation. Lab Results Today:  Recent Results (from the past 8 hour(s))   METABOLIC PANEL, COMPREHENSIVE    Collection Time: 07/27/22  9:24 AM   Result Value Ref Range    Sodium 137 136 - 145 mmol/L    Potassium 3.1 (L) 3.5 - 5.1 mmol/L    Chloride 102 97 - 108 mmol/L    CO2 25 21 - 32 mmol/L    Anion gap 10 5 - 15 mmol/L    Glucose 279 (H) 65 - 100 mg/dL    BUN 13 6 - 20 MG/DL    Creatinine 0.83 0.55 - 1.02 MG/DL    BUN/Creatinine ratio 16 12 - 20      GFR est AA >60 >60 ml/min/1.73m2    GFR est non-AA >60 >60 ml/min/1.73m2    Calcium 9.6 8.5 - 10.1 MG/DL    Bilirubin, total 1.0 0.2 - 1.0 MG/DL    ALT (SGPT) 25 12 - 78 U/L    AST (SGOT) 20 15 - 37 U/L    Alk. phosphatase 63 45 - 117 U/L    Protein, total 7.0 6.4 - 8.2 g/dL    Albumin 3.7 3.5 - 5.0 g/dL    Globulin 3.3 2.0 - 4.0 g/dL    A-G Ratio 1.1 1.1 - 2.2     CBC WITH AUTOMATED DIFF    Collection Time: 07/27/22  9:24 AM   Result Value Ref Range    WBC 5.1 3.6 - 11.0 K/uL    RBC 4.58 3.80 - 5.20 M/uL    HGB 13.8 11.5 - 16.0 g/dL    HCT 39.0 35.0 - 47.0 %    MCV 85.2 80.0 - 99.0 FL    MCH 30.1 26.0 - 34.0 PG    MCHC 35.4 30.0 - 36.5 g/dL    RDW 13.5 11.5 - 14.5 %    PLATELET 978 613 - 512 K/uL    MPV 9.8 8.9 - 12.9 FL    NRBC 0.0 0  WBC    ABSOLUTE NRBC 0.00 0.00 - 0.01 K/uL    NEUTROPHILS 60 32 - 75 %    LYMPHOCYTES 30 12 - 49 %    MONOCYTES 6 5 - 13 %    EOSINOPHILS 3 0 - 7 %    BASOPHILS 1 0 - 1 %    IMMATURE GRANULOCYTES 0 0.0 - 0.5 %    ABS. NEUTROPHILS 3.1 1.8 - 8.0 K/UL    ABS. LYMPHOCYTES 1.5 0.8 - 3.5 K/UL    ABS.  MONOCYTES 0.3 0.0 - 1.0 K/UL    ABS. EOSINOPHILS 0.2 0.0 - 0.4 K/UL    ABS. BASOPHILS 0.0 0.0 - 0.1 K/UL    ABS. IMM. GRANS. 0.0 0.00 - 0.04 K/UL    DF AUTOMATED     LIPASE    Collection Time: 07/27/22  9:24 AM   Result Value Ref Range    Lipase 282 73 - 393 U/L        Radiology Results Today:  No results found.

## 2022-08-05 ENCOUNTER — TRANSCRIBE ORDER (OUTPATIENT)
Dept: SCHEDULING | Age: 52
End: 2022-08-05

## 2022-08-05 DIAGNOSIS — Z12.31 ENCOUNTER FOR MAMMOGRAM TO ESTABLISH BASELINE MAMMOGRAM: Primary | ICD-10-CM

## 2022-08-16 DIAGNOSIS — F41.9 ANXIETY: ICD-10-CM

## 2022-08-16 RX ORDER — GLIMEPIRIDE 4 MG/1
TABLET ORAL
Qty: 90 TABLET | Refills: 1 | Status: SHIPPED | OUTPATIENT
Start: 2022-08-16

## 2022-08-16 RX ORDER — ALPRAZOLAM 1 MG/1
TABLET ORAL
Qty: 60 TABLET | Refills: 0 | Status: SHIPPED | OUTPATIENT
Start: 2022-08-16 | End: 2022-09-18

## 2022-08-23 ENCOUNTER — HOSPITAL ENCOUNTER (EMERGENCY)
Age: 52
Discharge: HOME | End: 2022-08-23
Attending: EMERGENCY MEDICINE
Payer: MEDICAID

## 2022-08-23 VITALS
WEIGHT: 206.79 LBS | OXYGEN SATURATION: 97 % | HEART RATE: 74 BPM | DIASTOLIC BLOOD PRESSURE: 78 MMHG | HEIGHT: 62 IN | BODY MASS INDEX: 38.05 KG/M2 | RESPIRATION RATE: 18 BRPM | TEMPERATURE: 97.7 F | SYSTOLIC BLOOD PRESSURE: 143 MMHG

## 2022-08-23 DIAGNOSIS — E87.6 HYPOKALEMIA: ICD-10-CM

## 2022-08-23 DIAGNOSIS — K51.90 EXACERBATION OF ULCERATIVE COLITIS WITHOUT COMPLICATION (HCC): Primary | ICD-10-CM

## 2022-08-23 DIAGNOSIS — N39.0 URINARY TRACT INFECTION WITHOUT HEMATURIA, SITE UNSPECIFIED: ICD-10-CM

## 2022-08-23 LAB
ALBUMIN SERPL-MCNC: 3.8 G/DL (ref 3.5–5)
ALBUMIN/GLOB SERPL: 1.2 {RATIO} (ref 1.1–2.2)
ALP SERPL-CCNC: 65 U/L (ref 45–117)
ALT SERPL-CCNC: 32 U/L (ref 12–78)
ANION GAP SERPL CALC-SCNC: 7 MMOL/L (ref 5–15)
APPEARANCE UR: CLEAR
AST SERPL-CCNC: 25 U/L (ref 15–37)
BACTERIA URNS QL MICRO: ABNORMAL /HPF
BASOPHILS # BLD: 0 K/UL (ref 0–0.1)
BASOPHILS NFR BLD: 1 % (ref 0–1)
BILIRUB SERPL-MCNC: 0.7 MG/DL (ref 0.2–1)
BILIRUB UR QL: NEGATIVE
BUN SERPL-MCNC: 9 MG/DL (ref 6–20)
BUN/CREAT SERPL: 10 (ref 12–20)
CALCIUM SERPL-MCNC: 9.6 MG/DL (ref 8.5–10.1)
CHLORIDE SERPL-SCNC: 104 MMOL/L (ref 97–108)
CO2 SERPL-SCNC: 27 MMOL/L (ref 21–32)
COLOR UR: ABNORMAL
CREAT SERPL-MCNC: 0.88 MG/DL (ref 0.55–1.02)
DIFFERENTIAL METHOD BLD: NORMAL
EOSINOPHIL # BLD: 0.2 K/UL (ref 0–0.4)
EOSINOPHIL NFR BLD: 4 % (ref 0–7)
EPITH CASTS URNS QL MICRO: ABNORMAL /LPF
ERYTHROCYTE [DISTWIDTH] IN BLOOD BY AUTOMATED COUNT: 13.4 % (ref 11.5–14.5)
GLOBULIN SER CALC-MCNC: 3.3 G/DL (ref 2–4)
GLUCOSE SERPL-MCNC: 259 MG/DL (ref 65–100)
GLUCOSE UR STRIP.AUTO-MCNC: >1000 MG/DL
HCT VFR BLD AUTO: 40.9 % (ref 35–47)
HGB BLD-MCNC: 14.5 G/DL (ref 11.5–16)
HGB UR QL STRIP: NEGATIVE
HYALINE CASTS URNS QL MICRO: ABNORMAL /LPF (ref 0–2)
IMM GRANULOCYTES # BLD AUTO: 0 K/UL (ref 0–0.04)
IMM GRANULOCYTES NFR BLD AUTO: 0 % (ref 0–0.5)
KETONES UR QL STRIP.AUTO: NEGATIVE MG/DL
LEUKOCYTE ESTERASE UR QL STRIP.AUTO: ABNORMAL
LIPASE SERPL-CCNC: 199 U/L (ref 73–393)
LYMPHOCYTES # BLD: 1.6 K/UL (ref 0.8–3.5)
LYMPHOCYTES NFR BLD: 31 % (ref 12–49)
MCH RBC QN AUTO: 30 PG (ref 26–34)
MCHC RBC AUTO-ENTMCNC: 35.5 G/DL (ref 30–36.5)
MCV RBC AUTO: 84.7 FL (ref 80–99)
MONOCYTES # BLD: 0.3 K/UL (ref 0–1)
MONOCYTES NFR BLD: 6 % (ref 5–13)
NEUTS SEG # BLD: 3.1 K/UL (ref 1.8–8)
NEUTS SEG NFR BLD: 58 % (ref 32–75)
NITRITE UR QL STRIP.AUTO: NEGATIVE
NRBC # BLD: 0 K/UL (ref 0–0.01)
NRBC BLD-RTO: 0 PER 100 WBC
PH UR STRIP: 5.5 [PH] (ref 5–8)
PLATELET # BLD AUTO: 163 K/UL (ref 150–400)
PMV BLD AUTO: 9.7 FL (ref 8.9–12.9)
POTASSIUM SERPL-SCNC: 3.3 MMOL/L (ref 3.5–5.1)
PROT SERPL-MCNC: 7.1 G/DL (ref 6.4–8.2)
PROT UR STRIP-MCNC: NEGATIVE MG/DL
RBC # BLD AUTO: 4.83 M/UL (ref 3.8–5.2)
RBC #/AREA URNS HPF: ABNORMAL /HPF (ref 0–5)
SODIUM SERPL-SCNC: 138 MMOL/L (ref 136–145)
SP GR UR REFRACTOMETRY: 1.01 (ref 1–1.03)
UA: UC IF INDICATED,UAUC: ABNORMAL
UROBILINOGEN UR QL STRIP.AUTO: 0.2 EU/DL (ref 0.2–1)
WBC # BLD AUTO: 5.3 K/UL (ref 3.6–11)
WBC URNS QL MICRO: ABNORMAL /HPF (ref 0–4)

## 2022-08-23 PROCEDURE — 85025 COMPLETE CBC W/AUTO DIFF WBC: CPT

## 2022-08-23 PROCEDURE — 80053 COMPREHEN METABOLIC PANEL: CPT

## 2022-08-23 PROCEDURE — 99284 EMERGENCY DEPT VISIT MOD MDM: CPT

## 2022-08-23 PROCEDURE — 96375 TX/PRO/DX INJ NEW DRUG ADDON: CPT

## 2022-08-23 PROCEDURE — 36415 COLL VENOUS BLD VENIPUNCTURE: CPT

## 2022-08-23 PROCEDURE — 74011250637 HC RX REV CODE- 250/637: Performed by: EMERGENCY MEDICINE

## 2022-08-23 PROCEDURE — 81001 URINALYSIS AUTO W/SCOPE: CPT

## 2022-08-23 PROCEDURE — 96374 THER/PROPH/DIAG INJ IV PUSH: CPT

## 2022-08-23 PROCEDURE — 87086 URINE CULTURE/COLONY COUNT: CPT

## 2022-08-23 PROCEDURE — 96376 TX/PRO/DX INJ SAME DRUG ADON: CPT

## 2022-08-23 PROCEDURE — 83690 ASSAY OF LIPASE: CPT

## 2022-08-23 PROCEDURE — 96361 HYDRATE IV INFUSION ADD-ON: CPT

## 2022-08-23 PROCEDURE — 74011250636 HC RX REV CODE- 250/636: Performed by: EMERGENCY MEDICINE

## 2022-08-23 RX ORDER — POTASSIUM CHLORIDE 750 MG/1
40 TABLET, FILM COATED, EXTENDED RELEASE ORAL
Status: COMPLETED | OUTPATIENT
Start: 2022-08-23 | End: 2022-08-23

## 2022-08-23 RX ORDER — DEXAMETHASONE SODIUM PHOSPHATE 4 MG/ML
10 INJECTION, SOLUTION INTRA-ARTICULAR; INTRALESIONAL; INTRAMUSCULAR; INTRAVENOUS; SOFT TISSUE
Status: COMPLETED | OUTPATIENT
Start: 2022-08-23 | End: 2022-08-23

## 2022-08-23 RX ORDER — DICYCLOMINE HYDROCHLORIDE 20 MG/1
20 TABLET ORAL EVERY 6 HOURS
Qty: 20 TABLET | Refills: 0 | Status: SHIPPED | OUTPATIENT
Start: 2022-08-23

## 2022-08-23 RX ORDER — DIPHENHYDRAMINE HYDROCHLORIDE 50 MG/ML
25 INJECTION, SOLUTION INTRAMUSCULAR; INTRAVENOUS
Status: COMPLETED | OUTPATIENT
Start: 2022-08-23 | End: 2022-08-23

## 2022-08-23 RX ORDER — ONDANSETRON 4 MG/1
4 TABLET, FILM COATED ORAL
Qty: 20 TABLET | Refills: 0 | Status: SHIPPED | OUTPATIENT
Start: 2022-08-23

## 2022-08-23 RX ORDER — ONDANSETRON 2 MG/ML
4 INJECTION INTRAMUSCULAR; INTRAVENOUS ONCE
Status: COMPLETED | OUTPATIENT
Start: 2022-08-23 | End: 2022-08-23

## 2022-08-23 RX ORDER — AMOXICILLIN AND CLAVULANATE POTASSIUM 875; 125 MG/1; MG/1
1 TABLET, FILM COATED ORAL 2 TIMES DAILY
Qty: 20 TABLET | Refills: 0 | Status: SHIPPED | OUTPATIENT
Start: 2022-08-23

## 2022-08-23 RX ORDER — MORPHINE SULFATE 2 MG/ML
4 INJECTION, SOLUTION INTRAMUSCULAR; INTRAVENOUS ONCE
Status: COMPLETED | OUTPATIENT
Start: 2022-08-23 | End: 2022-08-23

## 2022-08-23 RX ORDER — MORPHINE SULFATE 2 MG/ML
2 INJECTION, SOLUTION INTRAMUSCULAR; INTRAVENOUS ONCE
Status: COMPLETED | OUTPATIENT
Start: 2022-08-23 | End: 2022-08-23

## 2022-08-23 RX ADMIN — POTASSIUM CHLORIDE 40 MEQ: 750 TABLET, FILM COATED, EXTENDED RELEASE ORAL at 09:07

## 2022-08-23 RX ADMIN — MORPHINE SULFATE 4 MG: 2 INJECTION, SOLUTION INTRAMUSCULAR; INTRAVENOUS at 09:06

## 2022-08-23 RX ADMIN — DIPHENHYDRAMINE HYDROCHLORIDE 25 MG: 50 INJECTION, SOLUTION INTRAMUSCULAR; INTRAVENOUS at 10:01

## 2022-08-23 RX ADMIN — SODIUM CHLORIDE 1000 ML: 9 INJECTION, SOLUTION INTRAVENOUS at 09:06

## 2022-08-23 RX ADMIN — MORPHINE SULFATE 2 MG: 2 INJECTION, SOLUTION INTRAMUSCULAR; INTRAVENOUS at 10:00

## 2022-08-23 RX ADMIN — ONDANSETRON 4 MG: 2 INJECTION INTRAMUSCULAR; INTRAVENOUS at 09:07

## 2022-08-23 RX ADMIN — DIPHENHYDRAMINE HYDROCHLORIDE 25 MG: 50 INJECTION, SOLUTION INTRAMUSCULAR; INTRAVENOUS at 09:16

## 2022-08-23 RX ADMIN — DEXAMETHASONE SODIUM PHOSPHATE 10 MG: 4 INJECTION, SOLUTION INTRAMUSCULAR; INTRAVENOUS at 10:01

## 2022-08-23 NOTE — Clinical Note
[address]  [facility] EMERGENCY DEPT  [address] [facility]  [place] [id number] [phone]    Work/School Note    Date: 8/23/2022    To Whom It May concern:    Tamra José was seen and treated today in the emergency room by the following provider(s):  Attending Provider: Ezekiel Cummings MD.      Tamra José is excused from work/school on 8/23/2022 through 8/25/2022. She is medically clear to return to work/school on 8/26/2022.          Sincerely,          Moise De Leon MD

## 2022-08-23 NOTE — ED PROVIDER NOTES
EMERGENCY DEPARTMENT HISTORY AND PHYSICAL EXAM      Date: 8/23/2022  Patient Name: Cathy Oakes    History of Presenting Illness     Chief Complaint   Patient presents with    Vomiting    Diarrhea     Pt ambulatory into triage with a cc of left lower abdominal pain that radiates to back x 3 days; pt has hx of U.C. and is unsure if this is a flare up ; pt has been vomiting with diarrhea and low grade fever       History Provided By: Patient and Patient's     HPI: Cathy Oakes, 46 y.o. female with a history of ulcerative colitis, multiple bowel surgeries, diabetes, presenting with symptoms of abdominal pain, nausea, vomiting, diarrhea, and hematochezia over the last 3 days. Patient reports that she has similar symptoms almost every month and is started taking oral prednisone on her own as an outpatient over the last few days. She reports she has a similar flare every month and has for many years. She has tried multiple chronic medications to control her ulcerative colitis flares, however, she has not tolerated them due to side effects. She tried to call her GI physician today, , who was unable to see her so she came to the ER. She denies any fevers or chills. Her abdominal pain now is mild but she feels very dehydrated. She denies any other associated symptoms. No other exacerbating or mounting factors. There are no other complaints, changes, or physical findings at this time. PCP: Radha Munoz MD    No current facility-administered medications on file prior to encounter.      Current Outpatient Medications on File Prior to Encounter   Medication Sig Dispense Refill    glimepiride (AMARYL) 4 mg tablet TAKE 1 TABLET BY MOUTH ONCE DAILY BEFORE BREAKFAST 90 Tablet 1    ALPRAZolam (XANAX) 1 mg tablet TAKE 1/2 TO 1 TABLET BY MOUTH TWICE DAILY AS NEEDED FOR ANXIETY 60 Tablet 0    predniSONE (STERAPRED) 5 mg dose pack See administration instruction per 5mg dose pack 21 Tablet 0    ondansetron (Zofran ODT) 4 mg disintegrating tablet Take 1 Tablet by mouth every eight (8) hours as needed for Nausea. 20 Tablet 0    Ventolin HFA 90 mcg/actuation inhaler INHALE 1 PUFF BY MOUTH EVERY 4 HOURS AS NEEDED FOR WHEEZE 18 Each 1    losartan-hydroCHLOROthiazide (HYZAAR) 100-12.5 mg per tablet TAKE 1 TABLET BY MOUTH EVERY DAY 90 Tablet 1    sertraline (ZOLOFT) 100 mg tablet TAKE 2 TABLETS BY MOUTH EVERY NIGHT 60 Tablet 2    cetirizine (ZyrTEC) 10 mg tablet Take 1 Tablet by mouth daily. 20 Tablet 0    Jardiance 25 mg tablet TAKE 1 TABLET BY MOUTH EVERY DAY 90 Tablet 1    melatonin 5 mg cap capsule Take  by mouth nightly. estradioL (ESTRACE) 0.5 mg tablet TAKE 1 TABLET BY MOUTH EVERY DAY 90 Tablet 1    promethazine (PHENERGAN) 25 mg tablet Take 1 Tablet by mouth every six (6) hours as needed for Nausea. 12 Tablet 0    atorvastatin (LIPITOR) 20 mg tablet TAKE 1 TABLET BY MOUTH EVERY DAY 90 Tablet 1    naloxone (Narcan) 4 mg/actuation nasal spray Use 1 spray intranasally, then discard. Repeat with new spray every 2 min as needed for opioid overdose symptoms, alternating nostrils. 1 Each 0    dibucaine (NUPERCAINAL) 1 % rectal ointment by PeriANAL route every four (4) hours as needed for Pain. 28 g 0    [DISCONTINUED] dicyclomine (BENTYL) 20 mg tablet Take 1 Tablet by mouth every six (6) hours as needed for Abdominal Cramps. 20 Tablet 0    omeprazole (PRILOSEC) 20 mg capsule Take 40 mg by mouth Daily (before breakfast). EPINEPHrine (EPIPEN) 0.3 mg/0.3 mL (1:1,000) injection 0.3 mL by IntraMUSCular route once as needed for up to 1 dose.  0.3 mL 1       Past History     Past Medical History:  Past Medical History:   Diagnosis Date    Anal fissure     Anisocoria     Asthma     LAST EPISODE     Back pain     Cerumen impaction     Chronic kidney disease     hx uti in past    Coagulation defects     ocassional rectal bleeding due to anal fissure    Colovaginal fistula     Diabetes (HCC)     NIDDM Diverticulitis     Diverticulosis     Enlarged tonsils     Frequent UTI     GERD (gastroesophageal reflux disease)     H/O endoscopy     with dilation    HA (headache)     Hepatic steatosis     History of colon resection     Hx of colonoscopy with polypectomy     benign    Hypertension     Ill-defined condition     FREQUENT HIVES    Ill-defined condition     HX ELEVATED LIVER ENZYMES    Morbid obesity (HCC)     Nausea & vomiting     during diverticulitis flare    Obesity     Otitis media     Pneumonia     about 15 yrs ago    Psychiatric disorder     ANXIETY    Recurrent tonsillitis     Sinusitis     Transfusion history ~ age 35    postop hysterectomy    Urticaria        Past Surgical History:  Past Surgical History:   Procedure Laterality Date    COLONOSCOPY N/A 3/28/2019    COLONOSCOPY performed by Charis Alicea MD at [facility]    COLONOSCOPY N/A 10/2/2020    COLONOSCOPY performed by Charis Alicea MD at [address] [place] [address]  2021    cancer. HX BREAST REDUCTION      blake. HX GI  12    LAPAROSCOPIC HAND ASSISTED  POSS OPEN SIGMOID COLECTOMY POSS TEMPORARY DIVERTING LOOP ILEOSTOMY;  (no illeostomy needed)    HX GYN           HX GYN      cervical conization    HX HEENT      SINUS SURGERY LEFT X2    HX HEENT      SINUS SURGERY ON RIGHT X2    HX HEMORRHOIDECTOMY      HX HYSTERECTOMY      HX OTHER SURGICAL  2021    Sphincterotomy    HX PELVIC LAPAROSCOPY      HX EMMANUEL AND BSO      HX UROLOGICAL  12     CYSTOSCOPY INSERTION URETERAL CATHETERS - Cystoscopy Insertion of bilateral ureteral stents    [place] ABDOMEN SURGERY PROC UNLISTED  2018    hernia repair at [address] UNLISTED      colon resection.         Family History:  Family History   Problem Relation Age of Onset    Diabetes Mother     Cancer Mother         NON-HODGKINS LYMPHOMA    Anesth Problems Mother         PONV    Diabetes Father     Heart Disease Father         CAD - STENTS, PACEMAKER Arrhythmia Father     Cancer Paternal Grandmother        Social History:  Social History     Tobacco Use    Smoking status: Never    Smokeless tobacco: Never   Substance Use Topics    Alcohol use: Not Currently    Drug use: Yes     Types: OTC, Prescription       Allergies: Allergies   Allergen Reactions    Aspirin Hives, Shortness of Breath and Itching    Codeine Hives, Itching and Angioedema     Pt had itching in mouth, on face and lips    Contrast Agent [Iodine] Hives, Itching and Angioedema     5/5/21: pt was given benadryl and solu-medrol prior to administration but pt had severe tongue swelling and drooling, lethargy and itching. DO NOT GIVE PT    Flavoring Agent Anaphylaxis     Cherry flavor    Iodinated Contrast Media Anaphylaxis, Diarrhea, Hives, Nausea and Vomiting and Shortness of Breath    Percocet [Oxycodone-Acetaminophen] Hives, Itching and Angioedema     Pt had itching in mouth, on face and lips    Prilosec [Omeprazole Magnesium] Anaphylaxis     CHERRY FLAVORED ODT; PT TAKES REGULAR PRILOSEC AND IS OK    Dilaudid [Hydromorphone] Itching     Tolerates with benadryl    Ketorolac Rash     \"makes my eyes spasm and causes rash on my hands\" and \"makes my skin itch and makes me nervous\"    Morphine (Pf) Rash     According to rn, pt can take morphine with benadryl    Fentanyl Rash and Itching     Has had benadryl before         Review of Systems   Review of Systems   Constitutional:  Negative for chills, diaphoresis, fatigue and fever. HENT:  Negative for ear pain and sore throat. Eyes:  Negative for pain and redness. Respiratory:  Negative for cough and shortness of breath. Cardiovascular:  Negative for chest pain and leg swelling. Gastrointestinal:  Positive for abdominal pain, blood in stool, diarrhea, nausea and vomiting. Endocrine: Negative for cold intolerance and heat intolerance. Genitourinary:  Negative for flank pain and hematuria.    Musculoskeletal:  Negative for back pain and neck stiffness. Skin:  Negative for rash and wound. Neurological:  Negative for dizziness, syncope and headaches. All other systems reviewed and are negative. Physical Exam   Physical Exam  Vitals and nursing note reviewed. Constitutional:       General: She is in acute distress. Appearance: She is well-developed. She is not ill-appearing. HENT:      Head: Normocephalic and atraumatic. Mouth/Throat:      Mouth: Mucous membranes are dry. Pharynx: No oropharyngeal exudate. Eyes:      Conjunctiva/sclera: Conjunctivae normal.      Pupils: Pupils are equal, round, and reactive to light. Cardiovascular:      Rate and Rhythm: Normal rate and regular rhythm. Heart sounds: No murmur heard. Pulmonary:      Effort: Pulmonary effort is normal. No respiratory distress. Breath sounds: Normal breath sounds. No wheezing. Abdominal:      General: Bowel sounds are normal. There is no distension. Palpations: Abdomen is soft. Tenderness: There is generalized abdominal tenderness. There is no right CVA tenderness, left CVA tenderness, guarding or rebound. Musculoskeletal:         General: No deformity. Normal range of motion. Cervical back: Normal range of motion. Skin:     General: Skin is warm and dry. Findings: No rash. Neurological:      Mental Status: She is alert and oriented to person, place, and time. Cranial Nerves: No cranial nerve deficit. Sensory: No sensory deficit. Motor: No weakness.       Coordination: Coordination normal.      Gait: Gait normal.   Psychiatric:         Behavior: Behavior normal.       Diagnostic Study Results     Labs -     Recent Results (from the past 24 hour(s))   METABOLIC PANEL, COMPREHENSIVE    Collection Time: 08/23/22  8:00 AM   Result Value Ref Range    Sodium 138 136 - 145 mmol/L    Potassium 3.3 (L) 3.5 - 5.1 mmol/L    Chloride 104 97 - 108 mmol/L    CO2 27 21 - 32 mmol/L    Anion gap 7 5 - 15 mmol/L Glucose 259 (H) 65 - 100 mg/dL    BUN 9 6 - 20 MG/DL    Creatinine 0.88 0.55 - 1.02 MG/DL    BUN/Creatinine ratio 10 (L) 12 - 20      GFR est AA >60 >60 ml/min/1.73m2    GFR est non-AA >60 >60 ml/min/1.73m2    Calcium 9.6 8.5 - 10.1 MG/DL    Bilirubin, total 0.7 0.2 - 1.0 MG/DL    ALT (SGPT) 32 12 - 78 U/L    AST (SGOT) 25 15 - 37 U/L    Alk. phosphatase 65 45 - 117 U/L    Protein, total 7.1 6.4 - 8.2 g/dL    Albumin 3.8 3.5 - 5.0 g/dL    Globulin 3.3 2.0 - 4.0 g/dL    A-G Ratio 1.2 1.1 - 2.2     LIPASE    Collection Time: 08/23/22  8:00 AM   Result Value Ref Range    Lipase 199 73 - 393 U/L   CBC WITH AUTOMATED DIFF    Collection Time: 08/23/22  8:00 AM   Result Value Ref Range    WBC 5.3 3.6 - 11.0 K/uL    RBC 4.83 3.80 - 5.20 M/uL    HGB 14.5 11.5 - 16.0 g/dL    HCT 40.9 35.0 - 47.0 %    MCV 84.7 80.0 - 99.0 FL    MCH 30.0 26.0 - 34.0 PG    MCHC 35.5 30.0 - 36.5 g/dL    RDW 13.4 11.5 - 14.5 %    PLATELET 124 367 - 650 K/uL    MPV 9.7 8.9 - 12.9 FL    NRBC 0.0 0  WBC    ABSOLUTE NRBC 0.00 0.00 - 0.01 K/uL    NEUTROPHILS 58 32 - 75 %    LYMPHOCYTES 31 12 - 49 %    MONOCYTES 6 5 - 13 %    EOSINOPHILS 4 0 - 7 %    BASOPHILS 1 0 - 1 %    IMMATURE GRANULOCYTES 0 0.0 - 0.5 %    ABS. NEUTROPHILS 3.1 1.8 - 8.0 K/UL    ABS. LYMPHOCYTES 1.6 0.8 - 3.5 K/UL    ABS. MONOCYTES 0.3 0.0 - 1.0 K/UL    ABS. EOSINOPHILS 0.2 0.0 - 0.4 K/UL    ABS. BASOPHILS 0.0 0.0 - 0.1 K/UL    ABS. IMM.  GRANS. 0.0 0.00 - 0.04 K/UL    DF AUTOMATED     URINALYSIS W/ REFLEX CULTURE    Collection Time: 08/23/22  8:00 AM    Specimen: Urine   Result Value Ref Range    Color YELLOW/STRAW      Appearance CLEAR CLEAR      Specific gravity 1.015 1.003 - 1.030      pH (UA) 5.5 5.0 - 8.0      Protein Negative NEG mg/dL    Glucose >1,000 (A) NEG mg/dL    Ketone Negative NEG mg/dL    Bilirubin Negative NEG      Blood Negative NEG      Urobilinogen 0.2 0.2 - 1.0 EU/dL    Nitrites Negative NEG      Leukocyte Esterase TRACE (A) NEG      UA:UC IF INDICATED URINE CULTURE ORDERED (A) CNI      WBC 20-50 0 - 4 /hpf    RBC 0-5 0 - 5 /hpf    Epithelial cells MANY (A) FEW /lpf    Bacteria 1+ (A) NEG /hpf    Hyaline cast 0-2 0 - 2 /lpf       Radiologic Studies -   No orders to display     CT Results  (Last 48 hours)      None          CXR Results  (Last 48 hours)      None              Medical Decision Making   I am the first provider for this patient. I reviewed the vital signs, available nursing notes, past medical history, past surgical history, family history and social history. Vital Signs-Reviewed the patient's vital signs. Patient Vitals for the past 12 hrs:   Temp Pulse Resp BP SpO2   08/23/22 0749 97.7 °F (36.5 °C) 74 18 (!) 143/78 97 %       Records Reviewed: Nursing records and medical records reviewed    MDM:  Pt presents with acute abdominal pain; vital signs stable with currently a non-peritoneal exam; DDx includes: Gastroenteritis, hepatitis, pancreatitis, obstruction, appendicitis, viral illness, IBD, diverticulitis, mesenteric ischemia, AAA or descending dissection, ACS, kidney stone. Will get labs, treat symptomatically and obtain serial abdominal exams to determine if a CT is warranted. Will reassess and monitor closely. Provider Notes (Medical Decision Making):     Patient is a 40-year-old female presenting with symptoms consistent with mild to moderate ulcerative colitis flare. No signs of peritonitis, and feeling better after treatment in the ER, lab work unremarkable. Patient is found to have a mild hypokalemia and this was replaced in the ER and will be replaced dietarily as an outpatient. She will go on the brat diet as this is typically helped her in the past.  We will prescribe her Bentyl, Zofran, and a course of antibiotics to treat her for urinary tract infection versus early pyelonephritis.   She will follow-up with her GI physician and her primary and return to the ER if she worsens in any way for further imaging and possible admission. ED Course:   Initial assessment performed. The patients presenting problems have been discussed, and they are in agreement with the care plan formulated and outlined with them. I have encouraged them to ask questions as they arise throughout their visit. Medications Administered       morphine injection 4 mg       Admin Date  08/23/2022 Action  Given Dose  4 mg Route  IntraVENous Administered By  Noemi Land RN              ondansetron [facility]) injection 4 mg       Admin Date  08/23/2022 Action  Given Dose  4 mg Route  IntraVENous Administered By  Noemi Land RN              potassium chloride SR (KLOR-CON 10) tablet 40 mEq       Admin Date  08/23/2022 Action  Given Dose  40 mEq Route  Oral Administered By  Noemi Land RN              sodium chloride 0.9 % bolus infusion 1,000 mL       Admin Date  08/23/2022 Action  New Bag Dose  1,000 mL Rate  1,000 mL/hr Route  IntraVENous Administered By  Noemi Land RN                        Critical Care:  None        DISCHARGE PLAN:  1. Current Discharge Medication List        START taking these medications    Details   amoxicillin-clavulanate (Augmentin) 875-125 mg per tablet Take 1 Tablet by mouth two (2) times a day. Qty: 20 Tablet, Refills: 0  Start date: 8/23/2022      ondansetron hcl (Zofran) 4 mg tablet Take 1 Tablet by mouth every eight (8) hours as needed for Nausea. Qty: 20 Tablet, Refills: 0  Start date: 8/23/2022           CONTINUE these medications which have CHANGED    Details   dicyclomine (BENTYL) 20 mg tablet Take 1 Tablet by mouth every six (6) hours. Qty: 20 Tablet, Refills: 0  Start date: 8/23/2022           2. Follow-up Information       Follow up With Specialties Details Omid Tom MD Internal Medicine Physician In 3 days For a follow-up evaluation.  [address] Shoshana Runner, MD Gastroenterology In 3 days For a follow-up evaluation. Mervin Tobar  [phone]      [facility] EMERGENCY DEPT Emergency Medicine In 2 days If symptoms worsen [address]  [address] [facility]  [phone]          3. Return to ED if worse     Diagnosis     Clinical Impression:   1. Exacerbation of ulcerative colitis without complication ([facility] [address]    2. Urinary tract infection without hematuria, site unspecified    3. Hypokalemia        Attestations:    Dewey Booker MD    Please note that this dictation was completed with [employer], the [employer] voice recognition software. Quite often unanticipated grammatical, syntax, homophones, and other interpretive errors are inadvertently transcribed by the computer software. Please disregard these errors. Please excuse any errors that have escaped final proofreading. Thank you.

## 2022-08-23 NOTE — DISCHARGE INSTRUCTIONS
It was a pleasure taking care of you at [facility] Emergency Department today. We know that when you come to [facility], you are entrusting us with your health, comfort, and safety. Our physicians and nurses honor that trust, and we truly appreciate the opportunity to care for you and your loved ones. We also value your feedback. If you receive a survey about your Emergency Department experience today, please fill it out. We care about our patients' feedback, and we listen to what you have to say. Thank you!
26 y/o F w/ no pertinent PMHx c/o left sided breast mass with pain. Pt states that she had a recent ultrasound [place] [facility], which she reports showed lumps in her breast. The pt was told that "they aren't cancerous so don't worry about it" although it is unclear as to what was actually found in the ultrasound. Pt's LMP was 1 month ago and she denies any medical history of ovarian cancer or breast cancer. However, the pt reports a family history of ovarian cancer as her aunt had ovarian cancer at age 35. I, Dr. Elana Black, explained the limitations of the ED in evaluating lumps in the breast and that I will not be able to give the final diagnosis here and that the pt will have to follow-up with her PMD.    Denies N/V/abd pain/ HA/ fever/ chest pain/ SOB/ dysuria/ urgency/ vaginal dc/ extremity issue

## 2022-08-24 LAB
BACTERIA SPEC CULT: NORMAL
SERVICE CMNT-IMP: NORMAL

## 2022-08-29 ENCOUNTER — HOSPITAL ENCOUNTER (EMERGENCY)
Age: 52
Discharge: HOME | End: 2022-08-29
Attending: STUDENT IN AN ORGANIZED HEALTH CARE EDUCATION/TRAINING PROGRAM
Payer: MEDICAID

## 2022-08-29 VITALS
DIASTOLIC BLOOD PRESSURE: 86 MMHG | RESPIRATION RATE: 16 BRPM | BODY MASS INDEX: 37.24 KG/M2 | SYSTOLIC BLOOD PRESSURE: 138 MMHG | OXYGEN SATURATION: 95 % | HEART RATE: 73 BPM | HEIGHT: 62 IN | TEMPERATURE: 97.8 F | WEIGHT: 202.38 LBS

## 2022-08-29 DIAGNOSIS — R10.84 ABDOMINAL PAIN, GENERALIZED: ICD-10-CM

## 2022-08-29 DIAGNOSIS — R21 RASH: Primary | ICD-10-CM

## 2022-08-29 LAB
ALBUMIN SERPL-MCNC: 3.9 G/DL (ref 3.5–5)
ALBUMIN/GLOB SERPL: 1.2 {RATIO} (ref 1.1–2.2)
ALP SERPL-CCNC: 61 U/L (ref 45–117)
ALT SERPL-CCNC: 31 U/L (ref 12–78)
ANION GAP SERPL CALC-SCNC: 7 MMOL/L (ref 5–15)
APPEARANCE UR: CLEAR
AST SERPL-CCNC: 31 U/L (ref 15–37)
BACTERIA URNS QL MICRO: ABNORMAL /HPF
BASOPHILS # BLD: 0 K/UL (ref 0–0.1)
BASOPHILS NFR BLD: 1 % (ref 0–1)
BILIRUB SERPL-MCNC: 1 MG/DL (ref 0.2–1)
BILIRUB UR QL: NEGATIVE
BUN SERPL-MCNC: 9 MG/DL (ref 6–20)
BUN/CREAT SERPL: 15 (ref 12–20)
CALCIUM SERPL-MCNC: 10 MG/DL (ref 8.5–10.1)
CHLORIDE SERPL-SCNC: 105 MMOL/L (ref 97–108)
CO2 SERPL-SCNC: 26 MMOL/L (ref 21–32)
COLOR UR: ABNORMAL
CREAT SERPL-MCNC: 0.62 MG/DL (ref 0.55–1.02)
DIFFERENTIAL METHOD BLD: ABNORMAL
EOSINOPHIL # BLD: 0.2 K/UL (ref 0–0.4)
EOSINOPHIL NFR BLD: 4 % (ref 0–7)
EPITH CASTS URNS QL MICRO: ABNORMAL /LPF
ERYTHROCYTE [DISTWIDTH] IN BLOOD BY AUTOMATED COUNT: 13.2 % (ref 11.5–14.5)
GLOBULIN SER CALC-MCNC: 3.2 G/DL (ref 2–4)
GLUCOSE SERPL-MCNC: 191 MG/DL (ref 65–100)
GLUCOSE UR STRIP.AUTO-MCNC: >1000 MG/DL
HCT VFR BLD AUTO: 43.2 % (ref 35–47)
HGB BLD-MCNC: 15.5 G/DL (ref 11.5–16)
HGB UR QL STRIP: ABNORMAL
IMM GRANULOCYTES # BLD AUTO: 0 K/UL (ref 0–0.04)
IMM GRANULOCYTES NFR BLD AUTO: 1 % (ref 0–0.5)
KETONES UR QL STRIP.AUTO: NEGATIVE MG/DL
LEUKOCYTE ESTERASE UR QL STRIP.AUTO: ABNORMAL
LYMPHOCYTES # BLD: 2 K/UL (ref 0.8–3.5)
LYMPHOCYTES NFR BLD: 34 % (ref 12–49)
MCH RBC QN AUTO: 30.3 PG (ref 26–34)
MCHC RBC AUTO-ENTMCNC: 35.9 G/DL (ref 30–36.5)
MCV RBC AUTO: 84.4 FL (ref 80–99)
MONOCYTES # BLD: 0.4 K/UL (ref 0–1)
MONOCYTES NFR BLD: 6 % (ref 5–13)
NEUTS SEG # BLD: 3.3 K/UL (ref 1.8–8)
NEUTS SEG NFR BLD: 54 % (ref 32–75)
NITRITE UR QL STRIP.AUTO: NEGATIVE
NRBC # BLD: 0 K/UL (ref 0–0.01)
NRBC BLD-RTO: 0 PER 100 WBC
PH UR STRIP: 6 [PH] (ref 5–8)
PLATELET # BLD AUTO: 166 K/UL (ref 150–400)
PMV BLD AUTO: 9.4 FL (ref 8.9–12.9)
POTASSIUM SERPL-SCNC: 3.5 MMOL/L (ref 3.5–5.1)
PROT SERPL-MCNC: 7.1 G/DL (ref 6.4–8.2)
PROT UR STRIP-MCNC: NEGATIVE MG/DL
RBC # BLD AUTO: 5.12 M/UL (ref 3.8–5.2)
RBC #/AREA URNS HPF: ABNORMAL /HPF (ref 0–5)
SODIUM SERPL-SCNC: 138 MMOL/L (ref 136–145)
SP GR UR REFRACTOMETRY: 1.01 (ref 1–1.03)
UA: UC IF INDICATED,UAUC: ABNORMAL
UROBILINOGEN UR QL STRIP.AUTO: 0.2 EU/DL (ref 0.2–1)
WBC # BLD AUTO: 6 K/UL (ref 3.6–11)
WBC URNS QL MICRO: ABNORMAL /HPF (ref 0–4)
YEAST URNS QL MICRO: PRESENT

## 2022-08-29 PROCEDURE — 74011250637 HC RX REV CODE- 250/637: Performed by: STUDENT IN AN ORGANIZED HEALTH CARE EDUCATION/TRAINING PROGRAM

## 2022-08-29 PROCEDURE — 74011250636 HC RX REV CODE- 250/636: Performed by: STUDENT IN AN ORGANIZED HEALTH CARE EDUCATION/TRAINING PROGRAM

## 2022-08-29 PROCEDURE — 99284 EMERGENCY DEPT VISIT MOD MDM: CPT

## 2022-08-29 PROCEDURE — 85025 COMPLETE CBC W/AUTO DIFF WBC: CPT

## 2022-08-29 PROCEDURE — 80053 COMPREHEN METABOLIC PANEL: CPT

## 2022-08-29 PROCEDURE — 87086 URINE CULTURE/COLONY COUNT: CPT

## 2022-08-29 PROCEDURE — 81001 URINALYSIS AUTO W/SCOPE: CPT

## 2022-08-29 PROCEDURE — 96375 TX/PRO/DX INJ NEW DRUG ADDON: CPT

## 2022-08-29 PROCEDURE — 96374 THER/PROPH/DIAG INJ IV PUSH: CPT

## 2022-08-29 PROCEDURE — 36415 COLL VENOUS BLD VENIPUNCTURE: CPT

## 2022-08-29 RX ORDER — HYDROCODONE BITARTRATE AND ACETAMINOPHEN 5; 325 MG/1; MG/1
1 TABLET ORAL ONCE
Status: DISCONTINUED | OUTPATIENT
Start: 2022-08-29 | End: 2022-08-29 | Stop reason: HOSPADM

## 2022-08-29 RX ORDER — ONDANSETRON 4 MG/1
4 TABLET, FILM COATED ORAL
Qty: 10 TABLET | Refills: 0 | Status: SHIPPED | OUTPATIENT
Start: 2022-08-29

## 2022-08-29 RX ORDER — MORPHINE SULFATE 2 MG/ML
2 INJECTION, SOLUTION INTRAMUSCULAR; INTRAVENOUS ONCE
Status: COMPLETED | OUTPATIENT
Start: 2022-08-29 | End: 2022-08-29

## 2022-08-29 RX ORDER — PROCHLORPERAZINE EDISYLATE 5 MG/ML
10 INJECTION INTRAMUSCULAR; INTRAVENOUS ONCE
Status: COMPLETED | OUTPATIENT
Start: 2022-08-29 | End: 2022-08-29

## 2022-08-29 RX ORDER — ONDANSETRON 2 MG/ML
4 INJECTION INTRAMUSCULAR; INTRAVENOUS
Status: COMPLETED | OUTPATIENT
Start: 2022-08-29 | End: 2022-08-29

## 2022-08-29 RX ORDER — DIPHENHYDRAMINE HCL 25 MG
25 CAPSULE ORAL
Status: DISCONTINUED | OUTPATIENT
Start: 2022-08-29 | End: 2022-08-29

## 2022-08-29 RX ORDER — DIPHENHYDRAMINE HCL 25 MG
25 CAPSULE ORAL
Status: COMPLETED | OUTPATIENT
Start: 2022-08-29 | End: 2022-08-29

## 2022-08-29 RX ADMIN — MORPHINE SULFATE 2 MG: 2 INJECTION, SOLUTION INTRAMUSCULAR; INTRAVENOUS at 08:17

## 2022-08-29 RX ADMIN — SODIUM CHLORIDE 500 ML: 9 INJECTION, SOLUTION INTRAVENOUS at 08:18

## 2022-08-29 RX ADMIN — PROCHLORPERAZINE EDISYLATE 10 MG: 5 INJECTION INTRAMUSCULAR; INTRAVENOUS at 10:18

## 2022-08-29 RX ADMIN — DIPHENHYDRAMINE HYDROCHLORIDE 25 MG: 25 CAPSULE ORAL at 08:36

## 2022-08-29 RX ADMIN — ONDANSETRON 4 MG: 2 INJECTION INTRAMUSCULAR; INTRAVENOUS at 07:59

## 2022-08-29 NOTE — ED NOTES
- Assumed care of pt. Pt reports woke up this morning with face and upper chest red, itching and burning also a slight scratchy feeling in throat, denies any trouble breathing or swallowing-did not take any medication pta, pt also reporting abdominal pain with hx of colitis-scheduled colonoscopy in a week or two but pain over the last week has gotten worse and when was here a week ago was told had a UTI thinking infection has gone to kidney cause is now having left flank pain. Pt AOX4, PWD, VSS, reports pain 8/10.

## 2022-08-29 NOTE — ED PROVIDER NOTES
EMERGENCY DEPARTMENT HISTORY AND PHYSICAL EXAM      Date: 8/29/2022  Patient Name: Hector Sarmiento    History of Presenting Illness     Chief Complaint   Patient presents with    Allergic Reaction    Abdominal Pain         HPI: Hector Sarmiento, 46 y.o. female presents to the ED with cc of allergic reaction. Patient states that she noticed some redness and itching of her faceDown to her chest.  She has a history of recurrent urticaria and allergic reactions. Had an EpiPen that she would take frequently, and was told by her physician that she should not take it as frequently as she had been. She initially thought she had some swelling on the right side of her tongue, that has improved. She denies any difficulty breathing. Has not been able to figure out what the triggers for her allergic reactions are recently. She also states that she has been having issues with her ulcerative colitis over the past several weeks, especially over the past week, has been having frequent diarrhea about 5-6 episodes per day with some streaks of bright red blood. She reports continued abdominal pain, this has been going on for over a week, diffuse lower that radiates to the back. Last week was diagnosed with UTI and was given prescription for Augmentin. She denies dysuria or hematuria, no fevers. Vomited 3 times yesterday, denies any vomiting today, reports nausea. There are no other complaints, changes, or physical findings at this time. PCP: Lupe Fields MD    No current facility-administered medications on file prior to encounter. Current Outpatient Medications on File Prior to Encounter   Medication Sig Dispense Refill    amoxicillin-clavulanate (Augmentin) 875-125 mg per tablet Take 1 Tablet by mouth two (2) times a day. 20 Tablet 0    ondansetron hcl (Zofran) 4 mg tablet Take 1 Tablet by mouth every eight (8) hours as needed for Nausea.  20 Tablet 0    dicyclomine (BENTYL) 20 mg tablet Take 1 Tablet by mouth every six (6) hours. 20 Tablet 0    glimepiride (AMARYL) 4 mg tablet TAKE 1 TABLET BY MOUTH ONCE DAILY BEFORE BREAKFAST 90 Tablet 1    ALPRAZolam (XANAX) 1 mg tablet TAKE 1/2 TO 1 TABLET BY MOUTH TWICE DAILY AS NEEDED FOR ANXIETY 60 Tablet 0    predniSONE (STERAPRED) 5 mg dose pack See administration instruction per 5mg dose pack 21 Tablet 0    ondansetron (Zofran ODT) 4 mg disintegrating tablet Take 1 Tablet by mouth every eight (8) hours as needed for Nausea. 20 Tablet 0    Ventolin HFA 90 mcg/actuation inhaler INHALE 1 PUFF BY MOUTH EVERY 4 HOURS AS NEEDED FOR WHEEZE 18 Each 1    losartan-hydroCHLOROthiazide (HYZAAR) 100-12.5 mg per tablet TAKE 1 TABLET BY MOUTH EVERY DAY 90 Tablet 1    sertraline (ZOLOFT) 100 mg tablet TAKE 2 TABLETS BY MOUTH EVERY NIGHT 60 Tablet 2    cetirizine (ZyrTEC) 10 mg tablet Take 1 Tablet by mouth daily. 20 Tablet 0    Jardiance 25 mg tablet TAKE 1 TABLET BY MOUTH EVERY DAY 90 Tablet 1    melatonin 5 mg cap capsule Take  by mouth nightly. estradioL (ESTRACE) 0.5 mg tablet TAKE 1 TABLET BY MOUTH EVERY DAY 90 Tablet 1    promethazine (PHENERGAN) 25 mg tablet Take 1 Tablet by mouth every six (6) hours as needed for Nausea. 12 Tablet 0    atorvastatin (LIPITOR) 20 mg tablet TAKE 1 TABLET BY MOUTH EVERY DAY 90 Tablet 1    naloxone (Narcan) 4 mg/actuation nasal spray Use 1 spray intranasally, then discard. Repeat with new spray every 2 min as needed for opioid overdose symptoms, alternating nostrils. 1 Each 0    dibucaine (NUPERCAINAL) 1 % rectal ointment by PeriANAL route every four (4) hours as needed for Pain. 28 g 0    omeprazole (PRILOSEC) 20 mg capsule Take 40 mg by mouth Daily (before breakfast). EPINEPHrine (EPIPEN) 0.3 mg/0.3 mL (1:1,000) injection 0.3 mL by IntraMUSCular route once as needed for up to 1 dose.  0.3 mL 1       Past History     Past Medical History:  Past Medical History:   Diagnosis Date    Anal fissure     Anisocoria     Asthma     LAST EPISODE 10-    Back pain     Cerumen impaction     Chronic kidney disease     hx uti in past    Coagulation defects     ocassional rectal bleeding due to anal fissure    Colovaginal fistula     Diabetes (HCC)     NIDDM    Diverticulitis     Diverticulosis     Enlarged tonsils     Frequent UTI     GERD (gastroesophageal reflux disease)     H/O endoscopy     with dilation    HA (headache)     Hepatic steatosis     History of colon resection     Hx of colonoscopy with polypectomy     benign    Hypertension     Ill-defined condition     FREQUENT HIVES    Ill-defined condition     HX ELEVATED LIVER ENZYMES    Morbid obesity (HCC)     Nausea & vomiting     during diverticulitis flare    Obesity     Otitis media     Pneumonia     about 15 yrs ago    Psychiatric disorder     ANXIETY    Recurrent tonsillitis     Sinusitis     Transfusion history ~ age 35    postop hysterectomy    Urticaria        Past Surgical History:  Past Surgical History:   Procedure Laterality Date    COLONOSCOPY N/A 3/28/2019    COLONOSCOPY performed by Charis Alciea MD at [facility] ENDOSCOPY    COLONOSCOPY N/A 10/2/2020    COLONOSCOPY performed by Charis Alicea MD at [address] [place] [address]  2021    cancer. HX BREAST REDUCTION      [facility]ake. HX GI  12    LAPAROSCOPIC HAND ASSISTED  POSS OPEN SIGMOID COLECTOMY POSS TEMPORARY DIVERTING LOOP ILEOSTOMY;  (no illeostomy needed)    HX GYN           HX GYN      cervical conization    HX HEENT      SINUS SURGERY LEFT X2    HX HEENT      SINUS SURGERY ON RIGHT X2    HX HEMORRHOIDECTOMY      HX HYSTERECTOMY      HX OTHER SURGICAL  2021    Sphincterotomy    HX PELVIC LAPAROSCOPY      HX EMMANUEL AND BSO      HX UROLOGICAL  12     CYSTOSCOPY INSERTION URETERAL CATHETERS - Cystoscopy Insertion of bilateral ureteral stents    RI ABDOMEN SURGERY PROC UNLISTED  2018    hernia repair at [address] UNLISTED      colon resection.         Family History:  Family History   Problem Relation Age of Onset    Diabetes Mother     Cancer Mother         NON-HODGKINS LYMPHOMA    Anesth Problems Mother         PONV    Diabetes Father     Heart Disease Father         CAD - STENTS, PACEMAKER    Arrhythmia Father     Cancer Paternal Grandmother        Social History:  Social History     Tobacco Use    Smoking status: Never    Smokeless tobacco: Never   Substance Use Topics    Alcohol use: Not Currently    Drug use: Yes     Types: OTC, Prescription       Allergies: Allergies   Allergen Reactions    Aspirin Hives, Shortness of Breath and Itching    Codeine Hives, Itching and Angioedema     Pt had itching in mouth, on face and lips    Contrast Agent [Iodine] Hives, Itching and Angioedema     5/5/21: pt was given benadryl and solu-medrol prior to administration but pt had severe tongue swelling and drooling, lethargy and itching. DO NOT GIVE PT    Flavoring Agent Anaphylaxis     Cherry flavor    Iodinated Contrast Media Anaphylaxis, Diarrhea, Hives, Nausea and Vomiting and Shortness of Breath    Percocet [Oxycodone-Acetaminophen] Hives, Itching and Angioedema     Pt had itching in mouth, on face and lips    Prilosec [Omeprazole Magnesium] Anaphylaxis     CHERRY FLAVORED ODT; PT TAKES REGULAR PRILOSEC AND IS OK    Dilaudid [Hydromorphone] Itching     Tolerates with benadryl    Ketorolac Rash     \"makes my eyes spasm and causes rash on my hands\" and \"makes my skin itch and makes me nervous\"    Morphine (Pf) Rash     According to rn, pt can take morphine with benadryl    Fentanyl Rash and Itching     Has had benadryl before         Review of Systems   no fever  No ear pain  No eye pain  no shortness of breath  no chest pain  Reports abdominal pain  no dysuria  no leg pain  Reports rash  No lymphadenopathy  No weight loss    Physical Exam   Physical Exam  Constitutional:       General: She is not in acute distress. Appearance: She is not toxic-appearing.    HENT:      Head: Normocephalic. Mouth/Throat:      Comments: No drooling trismus or stridor, oropharynx widely patent, no visible oropharyngeal swelling or swelling of the tongue or lips  Eyes:      Extraocular Movements: Extraocular movements intact. Cardiovascular:      Rate and Rhythm: Normal rate and regular rhythm. Pulmonary:      Effort: Pulmonary effort is normal.      Breath sounds: Normal breath sounds. Abdominal:      Palpations: Abdomen is soft. Tenderness: There is no abdominal tenderness. Musculoskeletal:         General: No tenderness or deformity. Cervical back: Neck supple. Skin:     General: Skin is warm and dry. Comments: Her face and neck look erythematous, however no raised rash   Neurological:      General: No focal deficit present. Mental Status: She is alert. Psychiatric:         Mood and Affect: Mood normal.       Diagnostic Study Results     Labs -   No results found for this or any previous visit (from the past 24 hour(s)). Radiologic Studies -   No orders to display     CT Results  (Last 48 hours)      None          CXR Results  (Last 48 hours)      None              Medical Decision Making   I am the first provider for this patient. I reviewed the vital signs, available nursing notes, past medical history, past surgical history, family history and social history. Vital Signs-Reviewed the patient's vital signs. Patient Vitals for the past 24 hrs:   Temp Pulse Resp BP SpO2   08/29/22 0643 -- -- -- 138/86 95 %   08/29/22 0628 97.8 °F (36.6 °C) 73 16 (!) 140/89 99 %         Provider Notes (Medical Decision Making):   55-year-old female presenting with rash. Erythema and itching of the skin is concerning for possible allergic reaction, urticaria, contact dermatitis. No signs of oropharyngeal swelling, lungs clear to auscultation,Patient is not tachycardic or hypoxic, unlikely anaphylaxis.   She also reports continued abdominal discomfort in the setting of known ulcerative colitis. Has an appointment with her GI doctor this coming week. She is afebrile and nontoxic-appearing with benign abdominal exam, unlikely acute intra-abdominal infection or obstruction otherwise. Differential includes mild ulcerative colitis flare, gastritis, colitis, dyspepsia, peptic ulcer disease. She has had multiple emergency department visits and over the past year repeated radiation exposure. Explained the risks and benefits of repeated CT scans, and with shared decision making with the patient, agreed against further CT imaging today with her benign exam.    ED Course:     Initial assessment performed. The patients presenting problems have been discussed, and they are in agreement with the care plan formulated and outlined with them. I have encouraged them to ask questions as they arise throughout their visit. Patient is given Zofran, IV fluids. She will be given Norco, she has multiple reported allergies but cannot tolerate Tylenol, and also cannot tolerate Norco with Benadryl. CBC negative for leukocytosis or anemia, basic metabolic panel with normal renal function, no worrisome electrolyte abnormalities. She is resting comfortably on reevaluation, no further emesis. UA is not suggestive of UTI with negative nitrites, and contamination with many epithelial cells. Patient is counseled on supportive care and return precautions. Will return to the ED for any worsening pain, fevers, vomiting, rash, shortness of breath, swelling, or any new or worrisome symptoms. Will followup with primary care doctor, gastroenterology within 3 days. Critical Care Time:         Disposition:  Home    PLAN:  1. Current Discharge Medication List        2.    Follow-up Information    None       Return to ED if worse     Diagnosis     Clinical Impression: Acute rash, acute on chronic abdominal pain

## 2022-08-29 NOTE — LETTER
[address]  [facility] EMERGENCY DEPT  [address] [place] [address]  [facility] [address]  [phone]    Work/School Note    Date: 8/29/2022    To Whom It May concern:    Nahomy Doty was seen and treated today in the emergency room by the following provider(s):  Attending Provider: Errol Lewis MD.      Nahomy Doty may return to work on 8/31/22.     Sincerely,          Meg Chau RN

## 2022-08-29 NOTE — ED NOTES
This rn at bedside, pt stated that she vomited the pill and is still itching pt says she is having hives. No hives noted on pt.

## 2022-08-29 NOTE — ED NOTES
Pt reports to ed complaining of allergic reaction/redness on face, neck, and chest onset this morning, also reports continued abdominal pain, pt has UC and has also had diverticulitis ([facility] physician and Gastrointestinal Specialist)

## 2022-08-30 LAB
BACTERIA SPEC CULT: ABNORMAL
CC UR VC: ABNORMAL
SERVICE CMNT-IMP: ABNORMAL

## 2022-09-04 ENCOUNTER — HOSPITAL ENCOUNTER (EMERGENCY)
Age: 52
Discharge: HOME | End: 2022-09-04
Attending: EMERGENCY MEDICINE
Payer: MEDICAID

## 2022-09-04 VITALS
OXYGEN SATURATION: 94 % | HEART RATE: 72 BPM | RESPIRATION RATE: 18 BRPM | DIASTOLIC BLOOD PRESSURE: 72 MMHG | SYSTOLIC BLOOD PRESSURE: 110 MMHG | BODY MASS INDEX: 38.74 KG/M2 | HEIGHT: 62 IN | TEMPERATURE: 98.4 F | WEIGHT: 210.54 LBS

## 2022-09-04 DIAGNOSIS — K51.90 ULCERATIVE COLITIS WITHOUT COMPLICATIONS, UNSPECIFIED LOCATION (HCC): Primary | ICD-10-CM

## 2022-09-04 DIAGNOSIS — R10.32 ABDOMINAL PAIN, LLQ (LEFT LOWER QUADRANT): ICD-10-CM

## 2022-09-04 LAB
ALBUMIN SERPL-MCNC: 3.8 G/DL (ref 3.5–5)
ALBUMIN/GLOB SERPL: 1.2 {RATIO} (ref 1.1–2.2)
ALP SERPL-CCNC: 67 U/L (ref 45–117)
ALT SERPL-CCNC: 36 U/L (ref 12–78)
ANION GAP SERPL CALC-SCNC: 7 MMOL/L (ref 5–15)
APPEARANCE UR: CLEAR
AST SERPL-CCNC: 24 U/L (ref 15–37)
BASOPHILS # BLD: 0 K/UL (ref 0–0.1)
BASOPHILS NFR BLD: 1 % (ref 0–1)
BILIRUB SERPL-MCNC: 0.6 MG/DL (ref 0.2–1)
BILIRUB UR QL: NEGATIVE
BUN SERPL-MCNC: 9 MG/DL (ref 6–20)
BUN/CREAT SERPL: 10 (ref 12–20)
C DIFF GDH STL QL: NEGATIVE
C DIFF TOX A+B STL QL IA: NEGATIVE
CALCIUM SERPL-MCNC: 9.7 MG/DL (ref 8.5–10.1)
CHLORIDE SERPL-SCNC: 100 MMOL/L (ref 97–108)
CO2 SERPL-SCNC: 28 MMOL/L (ref 21–32)
COLOR UR: ABNORMAL
CREAT SERPL-MCNC: 0.89 MG/DL (ref 0.55–1.02)
DIFFERENTIAL METHOD BLD: ABNORMAL
EOSINOPHIL # BLD: 0.3 K/UL (ref 0–0.4)
EOSINOPHIL NFR BLD: 4 % (ref 0–7)
ERYTHROCYTE [DISTWIDTH] IN BLOOD BY AUTOMATED COUNT: 13.4 % (ref 11.5–14.5)
GLOBULIN SER CALC-MCNC: 3.1 G/DL (ref 2–4)
GLUCOSE BLD STRIP.AUTO-MCNC: 189 MG/DL (ref 65–117)
GLUCOSE SERPL-MCNC: 315 MG/DL (ref 65–100)
GLUCOSE UR STRIP.AUTO-MCNC: >1000 MG/DL
HCT VFR BLD AUTO: 38 % (ref 35–47)
HGB BLD-MCNC: 13.3 G/DL (ref 11.5–16)
HGB UR QL STRIP: NEGATIVE
IMM GRANULOCYTES # BLD AUTO: 0.1 K/UL (ref 0–0.04)
IMM GRANULOCYTES NFR BLD AUTO: 1 % (ref 0–0.5)
INTERPRETATION: NORMAL
KETONES UR QL STRIP.AUTO: NEGATIVE MG/DL
LEUKOCYTE ESTERASE UR QL STRIP.AUTO: NEGATIVE
LIPASE SERPL-CCNC: 149 U/L (ref 73–393)
LYMPHOCYTES # BLD: 2.1 K/UL (ref 0.8–3.5)
LYMPHOCYTES NFR BLD: 32 % (ref 12–49)
MCH RBC QN AUTO: 30.2 PG (ref 26–34)
MCHC RBC AUTO-ENTMCNC: 35 G/DL (ref 30–36.5)
MCV RBC AUTO: 86.4 FL (ref 80–99)
MONOCYTES # BLD: 0.4 K/UL (ref 0–1)
MONOCYTES NFR BLD: 6 % (ref 5–13)
NEUTS SEG # BLD: 3.8 K/UL (ref 1.8–8)
NEUTS SEG NFR BLD: 56 % (ref 32–75)
NITRITE UR QL STRIP.AUTO: NEGATIVE
NRBC # BLD: 0 K/UL (ref 0–0.01)
NRBC BLD-RTO: 0 PER 100 WBC
PH UR STRIP: 6 [PH] (ref 5–8)
PLATELET # BLD AUTO: 163 K/UL (ref 150–400)
PMV BLD AUTO: 9.7 FL (ref 8.9–12.9)
POTASSIUM SERPL-SCNC: 3.6 MMOL/L (ref 3.5–5.1)
PROT SERPL-MCNC: 6.9 G/DL (ref 6.4–8.2)
PROT UR STRIP-MCNC: NEGATIVE MG/DL
RBC # BLD AUTO: 4.4 M/UL (ref 3.8–5.2)
SERVICE CMNT-IMP: ABNORMAL
SODIUM SERPL-SCNC: 135 MMOL/L (ref 136–145)
SP GR UR REFRACTOMETRY: 1.01 (ref 1–1.03)
UROBILINOGEN UR QL STRIP.AUTO: 0.2 EU/DL (ref 0.2–1)
WBC # BLD AUTO: 6.5 K/UL (ref 3.6–11)

## 2022-09-04 PROCEDURE — 85025 COMPLETE CBC W/AUTO DIFF WBC: CPT

## 2022-09-04 PROCEDURE — 96375 TX/PRO/DX INJ NEW DRUG ADDON: CPT

## 2022-09-04 PROCEDURE — 36415 COLL VENOUS BLD VENIPUNCTURE: CPT

## 2022-09-04 PROCEDURE — 80053 COMPREHEN METABOLIC PANEL: CPT

## 2022-09-04 PROCEDURE — 87324 CLOSTRIDIUM AG IA: CPT

## 2022-09-04 PROCEDURE — 99284 EMERGENCY DEPT VISIT MOD MDM: CPT

## 2022-09-04 PROCEDURE — 83690 ASSAY OF LIPASE: CPT

## 2022-09-04 PROCEDURE — 81003 URINALYSIS AUTO W/O SCOPE: CPT

## 2022-09-04 PROCEDURE — 74011250636 HC RX REV CODE- 250/636: Performed by: EMERGENCY MEDICINE

## 2022-09-04 PROCEDURE — 96374 THER/PROPH/DIAG INJ IV PUSH: CPT

## 2022-09-04 PROCEDURE — 82962 GLUCOSE BLOOD TEST: CPT

## 2022-09-04 PROCEDURE — 74011250637 HC RX REV CODE- 250/637: Performed by: EMERGENCY MEDICINE

## 2022-09-04 RX ORDER — HYDROMORPHONE HYDROCHLORIDE 1 MG/ML
0.5 INJECTION, SOLUTION INTRAMUSCULAR; INTRAVENOUS; SUBCUTANEOUS ONCE
Status: COMPLETED | OUTPATIENT
Start: 2022-09-04 | End: 2022-09-04

## 2022-09-04 RX ORDER — DIPHENHYDRAMINE HYDROCHLORIDE 50 MG/ML
12.5 INJECTION, SOLUTION INTRAMUSCULAR; INTRAVENOUS
Status: COMPLETED | OUTPATIENT
Start: 2022-09-04 | End: 2022-09-04

## 2022-09-04 RX ORDER — MORPHINE SULFATE 2 MG/ML
6 INJECTION, SOLUTION INTRAMUSCULAR; INTRAVENOUS
Status: COMPLETED | OUTPATIENT
Start: 2022-09-04 | End: 2022-09-04

## 2022-09-04 RX ORDER — ONDANSETRON 2 MG/ML
4 INJECTION INTRAMUSCULAR; INTRAVENOUS ONCE
Status: COMPLETED | OUTPATIENT
Start: 2022-09-04 | End: 2022-09-04

## 2022-09-04 RX ORDER — DIPHENHYDRAMINE HCL 25 MG
25 CAPSULE ORAL
Status: COMPLETED | OUTPATIENT
Start: 2022-09-04 | End: 2022-09-04

## 2022-09-04 RX ADMIN — DIPHENHYDRAMINE HYDROCHLORIDE 12.5 MG: 50 INJECTION, SOLUTION INTRAMUSCULAR; INTRAVENOUS at 06:51

## 2022-09-04 RX ADMIN — MORPHINE SULFATE 6 MG: 2 INJECTION, SOLUTION INTRAMUSCULAR; INTRAVENOUS at 07:34

## 2022-09-04 RX ADMIN — SODIUM CHLORIDE, POTASSIUM CHLORIDE, SODIUM LACTATE AND CALCIUM CHLORIDE 1000 ML: 600; 310; 30; 20 INJECTION, SOLUTION INTRAVENOUS at 06:51

## 2022-09-04 RX ADMIN — HYDROMORPHONE HYDROCHLORIDE 0.5 MG: 1 INJECTION, SOLUTION INTRAMUSCULAR; INTRAVENOUS; SUBCUTANEOUS at 09:20

## 2022-09-04 RX ADMIN — ONDANSETRON 4 MG: 2 INJECTION INTRAMUSCULAR; INTRAVENOUS at 06:51

## 2022-09-04 RX ADMIN — ALUMINUM HYDROXIDE AND MAGNESIUM HYDROXIDE 30 ML: 200; 200 SUSPENSION ORAL at 06:51

## 2022-09-04 RX ADMIN — DIPHENHYDRAMINE HYDROCHLORIDE 25 MG: 25 CAPSULE ORAL at 11:37

## 2022-09-04 NOTE — ED NOTES
DC papers reviewed and in hand, pt verbalized understanding. Ambulatory to lobby, no acute distress noted.

## 2022-09-04 NOTE — DISCHARGE INSTRUCTIONS
It was a pleasure taking care of you in our Emergency Department today. We know that when you come to [facility], you are entrusting us with your health, comfort, and safety. Our physicians and nurses honor that trust, and truly appreciate the opportunity to care for you and your loved ones. We also value your feedback. If you receive a survey about your Emergency Department experience today, please fill it out. We care about our patients' feedback, and we listen to what you have to say. Please read over your discharge instructions as these contain pertinent information to help you in the healing process. These instructions include a list of prescriptions you were given today. Follow-up information is also noted on your discharge papers. There are attached instructions and information pertaining to the reason why you were seen in the emergency department today. These discharge instructions may not be for exactly why you were here, but may be the closest available instructions that we have. These include important advice for things that you can do at home to feel better, and reasons to return to the emergency department. The evaluation and treatment you received in the emergency department is not always definitive care. If follow-up with your primary care doctor or specialist was recommended, it is important that you make these appointments for follow-up care. You may need further testing, procedures, and/or medications to help you feel better. Further tests may be required that are not available in the emergency department. Failure to make these follow-up appointments may jeopardize your health. The emergency department is here for emergent stabilization and evaluation of life and limb threatening illness and/or injuries.   Further care through a specialist or primary care doctor may be required to assist in your healing and complete your treatment and/or evaluation. We may not always be able to make a diagnosis in the emergency department, or things may change that will alter your diagnosis. Our primary goal is to ensure that nothing serious is occurring and that you are stable to continue your treatment and evaluation at home as an outpatient. Of course, if things change, and you feel worse, you are always encouraged to return to the emergency department for re-evaluation. Lab Results Today:  Recent Results (from the past 8 hour(s))   CBC WITH AUTOMATED DIFF    Collection Time: 09/04/22  4:36 AM   Result Value Ref Range    WBC 6.5 3.6 - 11.0 K/uL    RBC 4.40 3.80 - 5.20 M/uL    HGB 13.3 11.5 - 16.0 g/dL    HCT 38.0 35.0 - 47.0 %    MCV 86.4 80.0 - 99.0 FL    MCH 30.2 26.0 - 34.0 PG    MCHC 35.0 30.0 - 36.5 g/dL    RDW 13.4 11.5 - 14.5 %    PLATELET 951 962 - 836 K/uL    MPV 9.7 8.9 - 12.9 FL    NRBC 0.0 0  WBC    ABSOLUTE NRBC 0.00 0.00 - 0.01 K/uL    NEUTROPHILS 56 32 - 75 %    LYMPHOCYTES 32 12 - 49 %    MONOCYTES 6 5 - 13 %    EOSINOPHILS 4 0 - 7 %    BASOPHILS 1 0 - 1 %    IMMATURE GRANULOCYTES 1 (H) 0.0 - 0.5 %    ABS. NEUTROPHILS 3.8 1.8 - 8.0 K/UL    ABS. LYMPHOCYTES 2.1 0.8 - 3.5 K/UL    ABS. MONOCYTES 0.4 0.0 - 1.0 K/UL    ABS. EOSINOPHILS 0.3 0.0 - 0.4 K/UL    ABS. BASOPHILS 0.0 0.0 - 0.1 K/UL    ABS. IMM. GRANS. 0.1 (H) 0.00 - 0.04 K/UL    DF AUTOMATED     METABOLIC PANEL, COMPREHENSIVE    Collection Time: 09/04/22  4:36 AM   Result Value Ref Range    Sodium 135 (L) 136 - 145 mmol/L    Potassium 3.6 3.5 - 5.1 mmol/L    Chloride 100 97 - 108 mmol/L    CO2 28 21 - 32 mmol/L    Anion gap 7 5 - 15 mmol/L    Glucose 315 (H) 65 - 100 mg/dL    BUN 9 6 - 20 MG/DL    Creatinine 0.89 0.55 - 1.02 MG/DL    BUN/Creatinine ratio 10 (L) 12 - 20      GFR est AA >60 >60 ml/min/1.73m2    GFR est non-AA >60 >60 ml/min/1.73m2    Calcium 9.7 8.5 - 10.1 MG/DL    Bilirubin, total 0.6 0.2 - 1.0 MG/DL    ALT (SGPT) 36 12 - 78 U/L    AST (SGOT) 24 15 - 37 U/L    Alk. phosphatase 67 45 - 117 U/L    Protein, total 6.9 6.4 - 8.2 g/dL    Albumin 3.8 3.5 - 5.0 g/dL    Globulin 3.1 2.0 - 4.0 g/dL    A-G Ratio 1.2 1.1 - 2.2     LIPASE    Collection Time: 09/04/22  4:36 AM   Result Value Ref Range    Lipase 149 73 - 393 U/L   URINALYSIS W/ RFLX MICROSCOPIC    Collection Time: 09/04/22  4:51 AM   Result Value Ref Range    Color YELLOW/STRAW      Appearance CLEAR CLEAR      Specific gravity 1.010 1.003 - 1.030      pH (UA) 6.0 5.0 - 8.0      Protein Negative NEG mg/dL    Glucose >1,000 (A) NEG mg/dL    Ketone Negative NEG mg/dL    Bilirubin Negative NEG      Blood Negative NEG      Urobilinogen 0.2 0.2 - 1.0 EU/dL    Nitrites Negative NEG      Leukocyte Esterase Negative NEG     GLUCOSE, POC    Collection Time: 09/04/22 11:03 AM   Result Value Ref Range    Glucose (POC) 189 (H) 65 - 117 mg/dL    Performed by Landen Pack \"Naomi\" EDT (CON)         Radiology Results Today:  No results found.

## 2022-09-04 NOTE — ED PROVIDER NOTES
EMERGENCY DEPARTMENT HISTORY AND PHYSICAL EXAM      Date: 9/4/2022  Patient Name: Carlene Robison    History of Presenting Illness     Chief Complaint   Patient presents with    Abdominal Pain     Dx with UTI 5 days ago started on Amoxicillin. F/U'd with PCP who added Metronizole and Cipro to regimen. Now with increased abdominal pain, diarrhea with n/v started 3 days ago. Pt has hx of colitis and C diff for antx use. History Provided By: Patient    HPI: Carlene Robison, 46 y.o. female  presents to the ED with cc of abdominal pain. Patient has a history of ulcerative colitis. Recently diagnosed with UTI and was started on Augmentin. This was later changed to Cipro and Flagyl. Patient states that she has pain in the left lower quadrant. She has had bloody diarrhea. Also reports nausea and vomiting. No fevers or chills. She does have a history of C. difficile.     Past History     Past Medical History:  Past Medical History:   Diagnosis Date    Anal fissure     Anisocoria     Asthma     LAST EPISODE     Back pain     Cerumen impaction     Chronic kidney disease     hx uti in past    Coagulation defects     ocassional rectal bleeding due to anal fissure    Colovaginal fistula     Diabetes (HCC)     NIDDM    Diverticulitis     Diverticulosis     Enlarged tonsils     Frequent UTI     GERD (gastroesophageal reflux disease)     H/O endoscopy     with dilation    HA (headache)     Hepatic steatosis     History of colon resection     Hx of colonoscopy with polypectomy     benign    Hypertension     Ill-defined condition     FREQUENT HIVES    Ill-defined condition     HX ELEVATED LIVER ENZYMES    Morbid obesity (HCC)     Nausea & vomiting     during diverticulitis flare    Obesity     Otitis media     Pneumonia     about 15 yrs ago    Psychiatric disorder     ANXIETY    Recurrent tonsillitis     Sinusitis     Transfusion history ~ age 35    postop hysterectomy    Urticaria        Past Surgical History:  Past Surgical History:   Procedure Laterality Date    COLONOSCOPY N/A 3/28/2019    COLONOSCOPY performed by Truman Buckley MD at [facility] ENDOSCOPY    COLONOSCOPY N/A 10/2/2020    COLONOSCOPY performed by Truman Buckley MD at [address] [place] [address]  2021    cancer. HX BREAST REDUCTION      blake. HX GI  12    LAPAROSCOPIC HAND ASSISTED  POSS OPEN SIGMOID COLECTOMY POSS TEMPORARY DIVERTING LOOP ILEOSTOMY;  (no illeostomy needed)    HX GYN           HX GYN      cervical conization    HX HEENT      SINUS SURGERY LEFT X2    HX HEENT      SINUS SURGERY ON RIGHT X2    HX HEMORRHOIDECTOMY      HX HYSTERECTOMY      HX OTHER SURGICAL  2021    Sphincterotomy    HX PELVIC LAPAROSCOPY      HX EMMANUEL AND BSO      HX UROLOGICAL  12     CYSTOSCOPY INSERTION URETERAL CATHETERS - Cystoscopy Insertion of bilateral ureteral stents    ID ABDOMEN SURGERY PROC UNLISTED  2018    hernia repair at [address] UNLISTED      colon resection. Medications:  No current facility-administered medications on file prior to encounter. Current Outpatient Medications on File Prior to Encounter   Medication Sig Dispense Refill    ondansetron hcl (Zofran) 4 mg tablet Take 1 Tablet by mouth every eight (8) hours as needed for Nausea. 10 Tablet 0    amoxicillin-clavulanate (Augmentin) 875-125 mg per tablet Take 1 Tablet by mouth two (2) times a day. 20 Tablet 0    ondansetron hcl (Zofran) 4 mg tablet Take 1 Tablet by mouth every eight (8) hours as needed for Nausea. 20 Tablet 0    dicyclomine (BENTYL) 20 mg tablet Take 1 Tablet by mouth every six (6) hours.  20 Tablet 0    glimepiride (AMARYL) 4 mg tablet TAKE 1 TABLET BY MOUTH ONCE DAILY BEFORE BREAKFAST 90 Tablet 1    ALPRAZolam (XANAX) 1 mg tablet TAKE 1/2 TO 1 TABLET BY MOUTH TWICE DAILY AS NEEDED FOR ANXIETY 60 Tablet 0    predniSONE (STERAPRED) 5 mg dose pack See administration instruction per 5mg dose pack 21 Tablet 0 ondansetron (Zofran ODT) 4 mg disintegrating tablet Take 1 Tablet by mouth every eight (8) hours as needed for Nausea. 20 Tablet 0    Ventolin HFA 90 mcg/actuation inhaler INHALE 1 PUFF BY MOUTH EVERY 4 HOURS AS NEEDED FOR WHEEZE 18 Each 1    losartan-hydroCHLOROthiazide (HYZAAR) 100-12.5 mg per tablet TAKE 1 TABLET BY MOUTH EVERY DAY 90 Tablet 1    sertraline (ZOLOFT) 100 mg tablet TAKE 2 TABLETS BY MOUTH EVERY NIGHT 60 Tablet 2    cetirizine (ZyrTEC) 10 mg tablet Take 1 Tablet by mouth daily. 20 Tablet 0    Jardiance 25 mg tablet TAKE 1 TABLET BY MOUTH EVERY DAY 90 Tablet 1    melatonin 5 mg cap capsule Take  by mouth nightly. estradioL (ESTRACE) 0.5 mg tablet TAKE 1 TABLET BY MOUTH EVERY DAY 90 Tablet 1    promethazine (PHENERGAN) 25 mg tablet Take 1 Tablet by mouth every six (6) hours as needed for Nausea. 12 Tablet 0    atorvastatin (LIPITOR) 20 mg tablet TAKE 1 TABLET BY MOUTH EVERY DAY 90 Tablet 1    naloxone (Narcan) 4 mg/actuation nasal spray Use 1 spray intranasally, then discard. Repeat with new spray every 2 min as needed for opioid overdose symptoms, alternating nostrils. 1 Each 0    dibucaine (NUPERCAINAL) 1 % rectal ointment by PeriANAL route every four (4) hours as needed for Pain. 28 g 0    omeprazole (PRILOSEC) 20 mg capsule Take 40 mg by mouth Daily (before breakfast). EPINEPHrine (EPIPEN) 0.3 mg/0.3 mL (1:1,000) injection 0.3 mL by IntraMUSCular route once as needed for up to 1 dose.  0.3 mL 1       Family History:  Family History   Problem Relation Age of Onset    Diabetes Mother     Cancer Mother         NON-HODGKINS LYMPHOMA    Anesth Problems Mother         PONV    Diabetes Father     Heart Disease Father         CAD - STENTS, PACEMAKER    Arrhythmia Father     Cancer Paternal Grandmother        Social History:  Social History     Tobacco Use    Smoking status: Never    Smokeless tobacco: Never   Substance Use Topics    Alcohol use: Not Currently    Drug use: Yes     Types: OTC, Prescription       Allergies: Allergies   Allergen Reactions    Aspirin Hives, Shortness of Breath and Itching    Codeine Hives, Itching and Angioedema     Pt had itching in mouth, on face and lips    Contrast Agent [Iodine] Hives, Itching and Angioedema     5/5/21: pt was given benadryl and solu-medrol prior to administration but pt had severe tongue swelling and drooling, lethargy and itching. DO NOT GIVE PT    Flavoring Agent Anaphylaxis     Cherry flavor    Iodinated Contrast Media Anaphylaxis, Diarrhea, Hives, Nausea and Vomiting and Shortness of Breath    Percocet [Oxycodone-Acetaminophen] Hives, Itching and Angioedema     Pt had itching in mouth, on face and lips    Prilosec [Omeprazole Magnesium] Anaphylaxis     CHERRY FLAVORED ODT; PT TAKES REGULAR PRILOSEC AND IS OK    Dilaudid [Hydromorphone] Itching     Tolerates with benadryl    Ketorolac Rash     \"makes my eyes spasm and causes rash on my hands\" and \"makes my skin itch and makes me nervous\"    Morphine (Pf) Rash     According to rn, pt can take morphine with benadryl    Fentanyl Rash and Itching     Has had benadryl before       All the above components of the past  history are auto-populated from the electronic record. They have been reviewed and the patient has been interviewed for any pertinent past history that pertains to the patient's chief complaint and reason for visit. Not all pre-populated components may be accurate at the time this note was generated. Review of Systems   Review of Systems   Constitutional:  Negative for chills and fever. HENT:  Negative for congestion, ear pain, rhinorrhea, sore throat and trouble swallowing. Eyes:  Negative for visual disturbance. Respiratory:  Negative for cough, chest tightness and shortness of breath. Cardiovascular:  Negative for chest pain and palpitations. Gastrointestinal:  Positive for abdominal pain, blood in stool, diarrhea, nausea and vomiting. Negative for constipation. Genitourinary:  Positive for dysuria. Negative for decreased urine volume, difficulty urinating and frequency. Musculoskeletal:  Negative for back pain and neck pain. Skin:  Negative for color change and rash. Neurological:  Negative for dizziness, weakness, light-headedness and headaches. Physical Exam   Physical Exam  Vitals and nursing note reviewed. Constitutional:       General: She is not in acute distress. Appearance: She is well-developed. She is not ill-appearing. HENT:      Head: Normocephalic. Eyes:      Conjunctiva/sclera: Conjunctivae normal.   Cardiovascular:      Rate and Rhythm: Normal rate and regular rhythm. Pulmonary:      Effort: Pulmonary effort is normal. No accessory muscle usage or respiratory distress. Abdominal:      General: There is no distension. Tenderness: There is abdominal tenderness in the left lower quadrant. There is no guarding or rebound. Musculoskeletal:      Cervical back: Normal range of motion. Skin:     General: Skin is warm and dry. Neurological:      Mental Status: She is alert and oriented to person, place, and time. Diagnostic Study Results     Labs -     Recent Results (from the past 24 hour(s))   CBC WITH AUTOMATED DIFF    Collection Time: 09/04/22  4:36 AM   Result Value Ref Range    WBC 6.5 3.6 - 11.0 K/uL    RBC 4.40 3.80 - 5.20 M/uL    HGB 13.3 11.5 - 16.0 g/dL    HCT 38.0 35.0 - 47.0 %    MCV 86.4 80.0 - 99.0 FL    MCH 30.2 26.0 - 34.0 PG    MCHC 35.0 30.0 - 36.5 g/dL    RDW 13.4 11.5 - 14.5 %    PLATELET 911 487 - 865 K/uL    MPV 9.7 8.9 - 12.9 FL    NRBC 0.0 0  WBC    ABSOLUTE NRBC 0.00 0.00 - 0.01 K/uL    NEUTROPHILS 56 32 - 75 %    LYMPHOCYTES 32 12 - 49 %    MONOCYTES 6 5 - 13 %    EOSINOPHILS 4 0 - 7 %    BASOPHILS 1 0 - 1 %    IMMATURE GRANULOCYTES 1 (H) 0.0 - 0.5 %    ABS. NEUTROPHILS 3.8 1.8 - 8.0 K/UL    ABS. LYMPHOCYTES 2.1 0.8 - 3.5 K/UL    ABS. MONOCYTES 0.4 0.0 - 1.0 K/UL    ABS.  EOSINOPHILS 0.3 0.0 - 0.4 K/UL    ABS. BASOPHILS 0.0 0.0 - 0.1 K/UL    ABS. IMM. GRANS. 0.1 (H) 0.00 - 0.04 K/UL    DF AUTOMATED     METABOLIC PANEL, COMPREHENSIVE    Collection Time: 09/04/22  4:36 AM   Result Value Ref Range    Sodium 135 (L) 136 - 145 mmol/L    Potassium 3.6 3.5 - 5.1 mmol/L    Chloride 100 97 - 108 mmol/L    CO2 28 21 - 32 mmol/L    Anion gap 7 5 - 15 mmol/L    Glucose 315 (H) 65 - 100 mg/dL    BUN 9 6 - 20 MG/DL    Creatinine 0.89 0.55 - 1.02 MG/DL    BUN/Creatinine ratio 10 (L) 12 - 20      GFR est AA >60 >60 ml/min/1.73m2    GFR est non-AA >60 >60 ml/min/1.73m2    Calcium 9.7 8.5 - 10.1 MG/DL    Bilirubin, total 0.6 0.2 - 1.0 MG/DL    ALT (SGPT) 36 12 - 78 U/L    AST (SGOT) 24 15 - 37 U/L    Alk. phosphatase 67 45 - 117 U/L    Protein, total 6.9 6.4 - 8.2 g/dL    Albumin 3.8 3.5 - 5.0 g/dL    Globulin 3.1 2.0 - 4.0 g/dL    A-G Ratio 1.2 1.1 - 2.2     LIPASE    Collection Time: 09/04/22  4:36 AM   Result Value Ref Range    Lipase 149 73 - 393 U/L   URINALYSIS W/ RFLX MICROSCOPIC    Collection Time: 09/04/22  4:51 AM   Result Value Ref Range    Color YELLOW/STRAW      Appearance CLEAR CLEAR      Specific gravity 1.010 1.003 - 1.030      pH (UA) 6.0 5.0 - 8.0      Protein Negative NEG mg/dL    Glucose >1,000 (A) NEG mg/dL    Ketone Negative NEG mg/dL    Bilirubin Negative NEG      Blood Negative NEG      Urobilinogen 0.2 0.2 - 1.0 EU/dL    Nitrites Negative NEG      Leukocyte Esterase Negative NEG     GLUCOSE, POC    Collection Time: 09/04/22 11:03 AM   Result Value Ref Range    Glucose (POC) 189 (H) 65 - 117 mg/dL    Performed by Jannice Cooks \"Naomi\" EDT (CON)        Radiologic Studies -   No orders to display     CT Results  (Last 48 hours)      None          CXR Results  (Last 48 hours)      None              Medical Decision Making     I reviewed the vital signs, available nursing notes, past medical history, past surgical history, family history and social history.     Vital Signs-I have reviewed the vital signs that have been made available during the patient's emergency department visit. The vital signs auto-populated below are obtained mostly by electronic means through monitoring devices that have been downloaded into the patient's chart by the nursing staff. Some vital signs are not downloaded into the chart until after the patient has been discharged and this note has been completed, therefore some vital signs may not be available to the physician for review prior to patient's discharge or admission. The physician has reviewed the patient's triage vital signs, monitored the electronic monitoring devices remotely for any significant vital sign abnormalities, and have reviewed vital signs prior to discharge. Some vital signs reviewed at bedside or remotely utilizing electronic monitoring devices may be different than the vital signs downloaded into the electronic medical record. Some vital signs may be erroneous and inaccurate since they are obtained by electronic monitoring devices, and not all vital signs are verified for accuracy by nursing staff prior to downloading into the patient's chart. Patient Vitals for the past 24 hrs:   Temp Pulse Resp BP SpO2   09/04/22 1100 -- -- -- 115/64 92 %   09/04/22 0945 -- -- -- (!) 146/99 93 %   09/04/22 0915 -- -- -- (!) 145/90 98 %   09/04/22 0713 -- -- -- (!) 144/95 93 %   09/04/22 0643 -- -- -- (!) 146/131 95 %   09/04/22 0628 -- -- -- (!) 153/96 99 %   09/04/22 0613 -- -- -- (!) 146/89 94 %   09/04/22 0428 98.4 °F (36.9 °C) 72 18 (!) 160/88 99 %         Records Reviewed: Nursing notes for today's visit have been reviewed. I have also reviewed most recent medical records pertinent to today's complaints, if available in our medical record system. I have also reviewed all labs and imaging results from previous results in comparison to results obtained today. If an EKG was obtained today, it has been compared to previous EKGs, if available.   If arriving via EMS, the EMS report has been reviewed if made available to us within the patient's time in the emergency department. ED Course and Medical Decision Making:       MDM  Number of Diagnoses or Management Options  Abdominal pain, LLQ (left lower quadrant)  Ulcerative colitis without complications, unspecified location [facility])  Diagnosis management comments: Patient with chronic abdominal pain and ulcerative colitis presents with pain left lower quadrant. Vital signs are all normal.  She is afebrile. Abdominal exam is benign with no peritoneal signs. Patient has normal lab work including a normal white blood cell count. I do not feel that she needs imaging today. She has a history of multiple CT scans in the past as well as multiple ED visits to multiple emergency departments. Imaging in the past is never found any acute disease. She is currently on antibiotics which she feels is made her pain worse. She states she does have a history of C. difficile however with lack of leukocytosis I have low suspicion. We will send off a stool sample for C. difficile testing. If positive I would take her off of her Cipro and Flagyl and put her on oral vancomycin. She does not need admission to the hospital.  She requested Benadryl and opioids here in the emergency department. She has multiple allergies. Given her frequent ER visits with no acute abnormalities majority of the time I again suspect opioid use disorder. Amount and/or Complexity of Data Reviewed  Clinical lab tests: ordered and reviewed  Review and summarize past medical records: yes    Risk of Complications, Morbidity, and/or Mortality  Presenting problems: low  Diagnostic procedures: low  Management options: low    Patient Progress  Patient progress: stable           Orders Placed This Encounter    C.  DIFFICILE AG & TOXIN A/B    CBC WITH AUTOMATED DIFF    METABOLIC PANEL, COMPREHENSIVE    URINALYSIS W/ RFLX MICROSCOPIC    LIPASE    DIET NPO    ABD PAIN PANEL TRACKING (DO NOT DESELECT)    POC GLUCOSE    ENTERIC CONTACT ISOLATION    GLUCOSE, POC    lactated ringers bolus infusion 1,000 mL    ondansetron (ZOFRAN) injection 4 mg    aluminum-magnesium hydroxide (MAALOX) oral suspension 30 mL    FOLLOWED BY Linked Order Group     diphenhydrAMINE (BENADRYL) injection 12.5 mg     morphine injection 6 mg    HYDROmorphone (DILAUDID) injection 0.5 mg    diphenhydrAMINE (BENADRYL) capsule 25 mg       Procedures      Critical Care Time:   0    Disposition:  Discharge    The patient's emergency department evaluation is now complete. I have reviewed all labs, imaging, and pertinent information. I have discussed all results with the patient and/or family. Based on our evaluation today I do believe that the patient is safe to be discharged home. The patient has been provided with at home instructions that are pertinent to their complaint today, although these may not be specific to the exact diagnosis. I have reviewed the patient's home medications and attempted to reconcile if not already done so by pharmacy or nursing staff. I have discussed all new prescriptions with the patient. The patient has been encouraged to follow-up with primary care doctor and/or specialist, and these have been discussed with the patient. The patient has been advised that they may return to the emergency department if they have any worsening symptoms and or new symptoms that are of concern to them. Verbal discharge instructions may have also been provided to the patient that may not be specifically contained in the written discharge instructions. The patient has been given opportunity to ask questions prior to discharge. PLAN:  1. Current Discharge Medication List        2.    Follow-up Information       Follow up With Specialties Details Why Estella Dong MD Internal Medicine Physician Schedule an appointment as soon as possible for a visit in 1 week  [address] [place] [address] Road  [facility] [address]  [address] [address]  [phone]            Return to ED if worse     Diagnosis     Clinical Impression:   1.  Ulcerative colitis without complications, unspecified location ([facility]    2. Abdominal pain, LLQ (left lower quadrant)

## 2022-09-07 ENCOUNTER — DOCUMENTATION ONLY (OUTPATIENT)
Dept: INTERNAL MEDICINE CLINIC | Age: 52
End: 2022-09-07

## 2022-09-07 ENCOUNTER — TELEPHONE (OUTPATIENT)
Dept: INTERNAL MEDICINE CLINIC | Age: 52
End: 2022-09-07

## 2022-09-07 NOTE — TELEPHONE ENCOUNTER
Patient transferred on the triage line due to hand and face swelling, also states diarrhea and her blood sugars have been extremely elevated. Patient has an appointment with Dr Nayeli Nicole on Friday 9/16 but wanted to be seen earlier. Advised the only open spot would be a virtual on the 13th. Patient wanted to be seen this week due to having the week off from the ER visit on 9/4. Will send urgent message to  and  to see if she can be squeezed in. Will follow up as needed.

## 2022-09-08 NOTE — TELEPHONE ENCOUNTER
#[phone]  pt states that she has had a kidney infection for a week and it is just getting worse. What is she to do if she can't be seen? Should she go to the ED again? I advised that she had been added to doctor's wait list and would get a call back to advise.

## 2022-09-15 ENCOUNTER — PATIENT MESSAGE (OUTPATIENT)
Dept: INTERNAL MEDICINE CLINIC | Age: 52
End: 2022-09-15

## 2022-09-16 DIAGNOSIS — F41.9 ANXIETY: ICD-10-CM

## 2022-09-16 RX ORDER — ESTRADIOL 0.5 MG/1
TABLET ORAL
Qty: 90 TABLET | Refills: 1 | Status: SHIPPED | OUTPATIENT
Start: 2022-09-16

## 2022-09-16 RX ORDER — SERTRALINE HYDROCHLORIDE 100 MG/1
TABLET, FILM COATED ORAL
Qty: 180 TABLET | Refills: 1 | Status: SHIPPED | OUTPATIENT
Start: 2022-09-16

## 2022-09-16 NOTE — TELEPHONE ENCOUNTER
Future Appointments:  Future Appointments   Date Time Provider Daniel Silvia   10/7/2022  4:30 PM ED [facility] RAFAEL STEREO 1 [facility]        Last Appointment With Me:  Visit date not found     Requested Prescriptions     Pending Prescriptions Disp Refills    empagliflozin (Jardiance) 25 mg tablet 90 Tablet 1     Sig: Take 1 Tablet by mouth daily.     sertraline (ZOLOFT) 100 mg tablet 60 Tablet 2    estradioL (ESTRACE) 0.5 mg tablet 90 Tablet 1     Sig: TAKE 1 TABLET BY MOUTH EVERY DAY

## 2022-09-18 RX ORDER — ALPRAZOLAM 1 MG/1
TABLET ORAL
Qty: 60 TABLET | Refills: 0 | Status: SHIPPED | OUTPATIENT
Start: 2022-09-18 | End: 2022-10-23

## 2022-09-20 NOTE — TELEPHONE ENCOUNTER
----- Message from [phone] sent at 9/19/2022  9:13 AM EDT -----  Regarding: FW: Appointment tomorrow at 0;91    ----- Message -----  From: Tremayne Handler  Sent: 9/16/2022   8:31 PM EDT  To: Aliya Doran  Subject: Appointment tomorrow at [facility], I understand,  I was at [facility]. I didnt get a refill on the alprazolam, the pharmacy said it was faxed over with the other refills. Some went to [facility] and I believe some went to [facility]. Which is fine at this pointbut Im trying to switch everything to the [facility] and [facility]. I dont know if the alprazolam is one safe to just stop.  ?    Thank you,  Lorelei Ledesma

## 2022-09-26 ENCOUNTER — HOSPITAL ENCOUNTER (EMERGENCY)
Age: 52
Discharge: HOME | End: 2022-09-26
Attending: STUDENT IN AN ORGANIZED HEALTH CARE EDUCATION/TRAINING PROGRAM
Payer: MEDICAID

## 2022-09-26 VITALS
SYSTOLIC BLOOD PRESSURE: 107 MMHG | DIASTOLIC BLOOD PRESSURE: 63 MMHG | BODY MASS INDEX: 37.69 KG/M2 | TEMPERATURE: 98 F | OXYGEN SATURATION: 95 % | HEART RATE: 68 BPM | RESPIRATION RATE: 21 BRPM | HEIGHT: 62 IN | WEIGHT: 204.81 LBS

## 2022-09-26 DIAGNOSIS — T78.40XA ALLERGIC REACTION, INITIAL ENCOUNTER: Primary | ICD-10-CM

## 2022-09-26 LAB
ALBUMIN SERPL-MCNC: 4.2 G/DL (ref 3.5–5)
ALBUMIN/GLOB SERPL: 1.3 {RATIO} (ref 1.1–2.2)
ALP SERPL-CCNC: 67 U/L (ref 45–117)
ALT SERPL-CCNC: 35 U/L (ref 12–78)
ANION GAP SERPL CALC-SCNC: 7 MMOL/L (ref 5–15)
APPEARANCE UR: CLEAR
AST SERPL-CCNC: 22 U/L (ref 15–37)
BACTERIA URNS QL MICRO: NEGATIVE /HPF
BASOPHILS # BLD: 0 K/UL (ref 0–0.1)
BASOPHILS NFR BLD: 1 % (ref 0–1)
BILIRUB SERPL-MCNC: 0.9 MG/DL (ref 0.2–1)
BILIRUB UR QL: NEGATIVE
BUN SERPL-MCNC: 10 MG/DL (ref 6–20)
BUN/CREAT SERPL: 11 (ref 12–20)
CALCIUM SERPL-MCNC: 9.8 MG/DL (ref 8.5–10.1)
CHLORIDE SERPL-SCNC: 100 MMOL/L (ref 97–108)
CO2 SERPL-SCNC: 28 MMOL/L (ref 21–32)
COLOR UR: ABNORMAL
CREAT SERPL-MCNC: 0.88 MG/DL (ref 0.55–1.02)
DIFFERENTIAL METHOD BLD: NORMAL
EOSINOPHIL # BLD: 0.3 K/UL (ref 0–0.4)
EOSINOPHIL NFR BLD: 5 % (ref 0–7)
EPITH CASTS URNS QL MICRO: ABNORMAL /LPF
ERYTHROCYTE [DISTWIDTH] IN BLOOD BY AUTOMATED COUNT: 13.5 % (ref 11.5–14.5)
GLOBULIN SER CALC-MCNC: 3.3 G/DL (ref 2–4)
GLUCOSE SERPL-MCNC: 261 MG/DL (ref 65–100)
GLUCOSE UR STRIP.AUTO-MCNC: >1000 MG/DL
HCT VFR BLD AUTO: 40.4 % (ref 35–47)
HGB BLD-MCNC: 14.6 G/DL (ref 11.5–16)
HGB UR QL STRIP: NEGATIVE
IMM GRANULOCYTES # BLD AUTO: 0 K/UL (ref 0–0.04)
IMM GRANULOCYTES NFR BLD AUTO: 0 % (ref 0–0.5)
KETONES UR QL STRIP.AUTO: NEGATIVE MG/DL
LEUKOCYTE ESTERASE UR QL STRIP.AUTO: NEGATIVE
LYMPHOCYTES # BLD: 2 K/UL (ref 0.8–3.5)
LYMPHOCYTES NFR BLD: 31 % (ref 12–49)
MCH RBC QN AUTO: 30.2 PG (ref 26–34)
MCHC RBC AUTO-ENTMCNC: 36.1 G/DL (ref 30–36.5)
MCV RBC AUTO: 83.6 FL (ref 80–99)
MONOCYTES # BLD: 0.3 K/UL (ref 0–1)
MONOCYTES NFR BLD: 5 % (ref 5–13)
NEUTS SEG # BLD: 3.8 K/UL (ref 1.8–8)
NEUTS SEG NFR BLD: 58 % (ref 32–75)
NITRITE UR QL STRIP.AUTO: NEGATIVE
NRBC # BLD: 0 K/UL (ref 0–0.01)
NRBC BLD-RTO: 0 PER 100 WBC
PH UR STRIP: 6.5 [PH] (ref 5–8)
PLATELET # BLD AUTO: 180 K/UL (ref 150–400)
PMV BLD AUTO: 9.8 FL (ref 8.9–12.9)
POTASSIUM SERPL-SCNC: 3 MMOL/L (ref 3.5–5.1)
PROT SERPL-MCNC: 7.5 G/DL (ref 6.4–8.2)
PROT UR STRIP-MCNC: NEGATIVE MG/DL
RBC # BLD AUTO: 4.83 M/UL (ref 3.8–5.2)
RBC #/AREA URNS HPF: ABNORMAL /HPF (ref 0–5)
SODIUM SERPL-SCNC: 135 MMOL/L (ref 136–145)
SP GR UR REFRACTOMETRY: 1.01 (ref 1–1.03)
UROBILINOGEN UR QL STRIP.AUTO: 0.2 EU/DL (ref 0.2–1)
WBC # BLD AUTO: 6.4 K/UL (ref 3.6–11)
WBC URNS QL MICRO: ABNORMAL /HPF (ref 0–4)

## 2022-09-26 PROCEDURE — 81003 URINALYSIS AUTO W/O SCOPE: CPT

## 2022-09-26 PROCEDURE — 74011000250 HC RX REV CODE- 250: Performed by: STUDENT IN AN ORGANIZED HEALTH CARE EDUCATION/TRAINING PROGRAM

## 2022-09-26 PROCEDURE — 80053 COMPREHEN METABOLIC PANEL: CPT

## 2022-09-26 PROCEDURE — 99284 EMERGENCY DEPT VISIT MOD MDM: CPT

## 2022-09-26 PROCEDURE — 85025 COMPLETE CBC W/AUTO DIFF WBC: CPT

## 2022-09-26 PROCEDURE — 96361 HYDRATE IV INFUSION ADD-ON: CPT

## 2022-09-26 PROCEDURE — 96375 TX/PRO/DX INJ NEW DRUG ADDON: CPT

## 2022-09-26 PROCEDURE — 93005 ELECTROCARDIOGRAM TRACING: CPT

## 2022-09-26 PROCEDURE — 74011250636 HC RX REV CODE- 250/636: Performed by: STUDENT IN AN ORGANIZED HEALTH CARE EDUCATION/TRAINING PROGRAM

## 2022-09-26 PROCEDURE — 36415 COLL VENOUS BLD VENIPUNCTURE: CPT

## 2022-09-26 PROCEDURE — 96374 THER/PROPH/DIAG INJ IV PUSH: CPT

## 2022-09-26 PROCEDURE — 74011250637 HC RX REV CODE- 250/637: Performed by: STUDENT IN AN ORGANIZED HEALTH CARE EDUCATION/TRAINING PROGRAM

## 2022-09-26 RX ORDER — ACETAMINOPHEN 325 MG/1
975 TABLET ORAL
Status: COMPLETED | OUTPATIENT
Start: 2022-09-26 | End: 2022-09-26

## 2022-09-26 RX ORDER — POTASSIUM CHLORIDE 20 MEQ/1
40 TABLET, EXTENDED RELEASE ORAL
Status: COMPLETED | OUTPATIENT
Start: 2022-09-26 | End: 2022-09-26

## 2022-09-26 RX ORDER — DIPHENHYDRAMINE HYDROCHLORIDE 50 MG/ML
50 INJECTION, SOLUTION INTRAMUSCULAR; INTRAVENOUS
Status: COMPLETED | OUTPATIENT
Start: 2022-09-26 | End: 2022-09-26

## 2022-09-26 RX ADMIN — METHYLPREDNISOLONE SODIUM SUCCINATE 125 MG: 125 INJECTION, POWDER, FOR SOLUTION INTRAMUSCULAR; INTRAVENOUS at 14:54

## 2022-09-26 RX ADMIN — POTASSIUM CHLORIDE 40 MEQ: 20 TABLET, EXTENDED RELEASE ORAL at 17:33

## 2022-09-26 RX ADMIN — SODIUM CHLORIDE 1000 ML: 9 INJECTION, SOLUTION INTRAVENOUS at 14:53

## 2022-09-26 RX ADMIN — DIPHENHYDRAMINE HYDROCHLORIDE 50 MG: 50 INJECTION, SOLUTION INTRAMUSCULAR; INTRAVENOUS at 14:55

## 2022-09-26 RX ADMIN — FAMOTIDINE 20 MG: 10 INJECTION INTRAVENOUS at 14:54

## 2022-09-26 RX ADMIN — ACETAMINOPHEN 975 MG: 325 TABLET ORAL at 15:57

## 2022-09-26 NOTE — ED PROVIDER NOTES
EMERGENCY DEPARTMENT HISTORY AND PHYSICAL EXAM      Date: 9/26/2022  Patient Name: Cathy Oakes    History of Presenting Illness     Chief Complaint   Patient presents with    Allergic Reaction     Reports new onset of itchiness and redness covering her entire upper body about 2 hours after taking Keflex for a UTI. Pt also reports feeling her throat is becoming irritated. No swelling around her face on tongue, able to handler secretions. Has not taken any Benadryl. History Provided By: Patient    HPI: Cathy Oakes, 46 y.o. female with a past medical history significant for UC, recurrent UTI, suspected opioid-seeking behavior presents to the ED with cc of allergic reaction following taking her Keflex dose. She reports that she is has erythema and itching of her arms up both sides. She reports that this began about an hour after taking her medications. She was given Keflex as a prescription for urinary tract infection. She reports that she still having some pain of her left side of her abdomen and her urine related to a urinary tract infection. She said that she feels some scratchiness of her throat, but that things have been getting better so she has been in the emergency department. There are no other complaints, changes, or physical findings at this time. PCP: Radha Munoz MD    No current facility-administered medications on file prior to encounter. Current Outpatient Medications on File Prior to Encounter   Medication Sig Dispense Refill    ALPRAZolam (XANAX) 1 mg tablet TAKE 1/2 TO 1 TABLET BY MOUTH TWICE DAILY AS NEEDED FOR ANXIETY 60 Tablet 0    empagliflozin (Jardiance) 25 mg tablet Take 1 Tablet by mouth daily.  90 Tablet 1    sertraline (ZOLOFT) 100 mg tablet TAKE 2 TABLETS BY MOUTH EVERY NIGHT 180 Tablet 1    estradioL (ESTRACE) 0.5 mg tablet TAKE 1 TABLET BY MOUTH EVERY DAY 90 Tablet 1    ondansetron hcl (Zofran) 4 mg tablet Take 1 Tablet by mouth every eight (8) hours as needed for Nausea. 10 Tablet 0    amoxicillin-clavulanate (Augmentin) 875-125 mg per tablet Take 1 Tablet by mouth two (2) times a day. 20 Tablet 0    ondansetron hcl (Zofran) 4 mg tablet Take 1 Tablet by mouth every eight (8) hours as needed for Nausea. 20 Tablet 0    dicyclomine (BENTYL) 20 mg tablet Take 1 Tablet by mouth every six (6) hours. 20 Tablet 0    glimepiride (AMARYL) 4 mg tablet TAKE 1 TABLET BY MOUTH ONCE DAILY BEFORE BREAKFAST 90 Tablet 1    predniSONE (STERAPRED) 5 mg dose pack See administration instruction per 5mg dose pack 21 Tablet 0    ondansetron (Zofran ODT) 4 mg disintegrating tablet Take 1 Tablet by mouth every eight (8) hours as needed for Nausea. 20 Tablet 0    Ventolin HFA 90 mcg/actuation inhaler INHALE 1 PUFF BY MOUTH EVERY 4 HOURS AS NEEDED FOR WHEEZE 18 Each 1    losartan-hydroCHLOROthiazide (HYZAAR) 100-12.5 mg per tablet TAKE 1 TABLET BY MOUTH EVERY DAY 90 Tablet 1    cetirizine (ZyrTEC) 10 mg tablet Take 1 Tablet by mouth daily. 20 Tablet 0    melatonin 5 mg cap capsule Take  by mouth nightly. atorvastatin (LIPITOR) 20 mg tablet TAKE 1 TABLET BY MOUTH EVERY DAY 90 Tablet 1    naloxone (Narcan) 4 mg/actuation nasal spray Use 1 spray intranasally, then discard. Repeat with new spray every 2 min as needed for opioid overdose symptoms, alternating nostrils. 1 Each 0    dibucaine (NUPERCAINAL) 1 % rectal ointment by PeriANAL route every four (4) hours as needed for Pain. 28 g 0    omeprazole (PRILOSEC) 20 mg capsule Take 40 mg by mouth Daily (before breakfast). EPINEPHrine (EPIPEN) 0.3 mg/0.3 mL (1:1,000) injection 0.3 mL by IntraMUSCular route once as needed for up to 1 dose.  0.3 mL 1       Past History     Past Medical History:  Past Medical History:   Diagnosis Date    Anal fissure     Anisocoria     Asthma     LAST EPISODE     Back pain     Cerumen impaction     Chronic kidney disease     hx uti in past    Coagulation defects     ocassional rectal bleeding due to anal fissure    Colovaginal fistula     Diabetes (HCC)     NIDDM    Diverticulitis     Diverticulosis     Enlarged tonsils     Frequent UTI     GERD (gastroesophageal reflux disease)     H/O endoscopy     with dilation    HA (headache)     Hepatic steatosis     History of colon resection     Hx of colonoscopy with polypectomy     benign    Hypertension     Ill-defined condition     FREQUENT HIVES    Ill-defined condition     HX ELEVATED LIVER ENZYMES    Morbid obesity (HCC)     Nausea & vomiting     during diverticulitis flare    Obesity     Otitis media     Pneumonia     about 15 yrs ago    Psychiatric disorder     ANXIETY    Recurrent tonsillitis     Sinusitis     Transfusion history ~ age 35    postop hysterectomy    Urticaria        Past Surgical History:  Past Surgical History:   Procedure Laterality Date    COLONOSCOPY N/A 3/28/2019    COLONOSCOPY performed by Laura Rene MD at [facility] [place] ENDOSCOPY    COLONOSCOPY N/A 10/2/2020    COLONOSCOPY performed by Laura Rene MD at [address] [place] [address]  2021    cancer. HX BREAST REDUCTION      blake. HX GI  12    LAPAROSCOPIC HAND ASSISTED  POSS OPEN SIGMOID COLECTOMY POSS TEMPORARY DIVERTING LOOP ILEOSTOMY;  (no illeostomy needed)    HX GYN           HX GYN      cervical conization    HX HEENT      SINUS SURGERY LEFT X2    HX HEENT      SINUS SURGERY ON RIGHT X2    HX HEMORRHOIDECTOMY      HX HYSTERECTOMY      HX OTHER SURGICAL  2021    Sphincterotomy    HX PELVIC LAPAROSCOPY      HX EMMANUEL AND BSO      HX UROLOGICAL  12     CYSTOSCOPY INSERTION URETERAL CATHETERS - Cystoscopy Insertion of bilateral ureteral stents    [place] ABDOMEN SURGERY PROC UNLISTED  2018    hernia repair at [address] UNLISTED      colon resection.         Family History:  Family History   Problem Relation Age of Onset    Diabetes Mother     Cancer Mother         NON-HODGKINS LYMPHOMA    Anesth Problems Mother         PONV Diabetes Father     Heart Disease Father         CAD - STENTS, PACEMAKER    Arrhythmia Father     Cancer Paternal Grandmother        Social History:  Social History     Tobacco Use    Smoking status: Never    Smokeless tobacco: Never   Substance Use Topics    Alcohol use: Not Currently    Drug use: Yes     Types: OTC, Prescription       Allergies: Allergies   Allergen Reactions    Aspirin Hives, Shortness of Breath and Itching    Codeine Hives, Itching and Angioedema     Pt had itching in mouth, on face and lips    Contrast Agent [Iodine] Hives, Itching and Angioedema     5/5/21: pt was given benadryl and solu-medrol prior to administration but pt had severe tongue swelling and drooling, lethargy and itching. DO NOT GIVE PT    Flavoring Agent Anaphylaxis     Cherry flavor    Iodinated Contrast Media Anaphylaxis, Diarrhea, Hives, Nausea and Vomiting and Shortness of Breath    Percocet [Oxycodone-Acetaminophen] Hives, Itching and Angioedema     Pt had itching in mouth, on face and lips    Prilosec [Omeprazole Magnesium] Anaphylaxis     CHERRY FLAVORED ODT; PT TAKES REGULAR PRILOSEC AND IS OK    Dilaudid [Hydromorphone] Itching     Tolerates with benadryl    Cephalexin Hives    Ketorolac Rash     \"makes my eyes spasm and causes rash on my hands\" and \"makes my skin itch and makes me nervous\"    Morphine (Pf) Rash     According to rn, pt can take morphine with benadryl    Fentanyl Rash and Itching     Has had benadryl before       Review of Systems   Review of Systems   Constitutional:  Negative for activity change, chills and fever. HENT:  Negative for sore throat. Respiratory:  Negative for shortness of breath. Cardiovascular:  Negative for chest pain. Gastrointestinal:  Positive for abdominal pain, nausea and vomiting. Genitourinary:  Positive for dysuria. Negative for difficulty urinating and hematuria. Musculoskeletal:  Negative for myalgias. Skin:  Positive for rash. Negative for color change. Neurological:  Negative for dizziness, weakness and headaches. Psychiatric/Behavioral:  Negative for agitation. Physical Exam   Physical Exam  Constitutional:       Appearance: Normal appearance. HENT:      Head: Normocephalic and atraumatic. Mouth/Throat:      Mouth: Mucous membranes are moist.      Comments: No oropharyngeal erythema or edema  Eyes:      Pupils: Pupils are equal, round, and reactive to light. Cardiovascular:      Rate and Rhythm: Normal rate and regular rhythm. Heart sounds: Normal heart sounds. Pulmonary:      Effort: Pulmonary effort is normal.      Breath sounds: Normal breath sounds. No stridor. No wheezing. Abdominal:      General: Abdomen is flat. Bowel sounds are normal.      Comments: Tenderness to left abdomen, without r/g   Musculoskeletal:         General: Normal range of motion. Cervical back: Normal range of motion and neck supple. Skin:     General: Skin is warm and dry. Capillary Refill: Capillary refill takes less than 2 seconds. Comments: Erytematous rash over forearms bilaterall; none elswhwere   Neurological:      General: No focal deficit present. Mental Status: She is alert. Sensory: No sensory deficit. Motor: No weakness.       Gait: Gait normal.       Diagnostic Study Results     Labs -     Recent Results (from the past 24 hour(s))   EKG, 12 LEAD, INITIAL    Collection Time: 09/26/22  1:15 PM   Result Value Ref Range    Ventricular Rate 81 BPM    Atrial Rate 81 BPM    P-R Interval 150 ms    QRS Duration 88 ms    Q-T Interval 380 ms    QTC Calculation (Bezet) 441 ms    Calculated P Axis 6 degrees    Calculated R Axis 4 degrees    Calculated T Axis 29 degrees    Diagnosis       Normal sinus rhythm  Nonspecific T wave abnormality  When compared with ECG of 26-SEP-2021 03:40,  Non-specific change in ST segment in Anterior leads  Nonspecific T wave abnormality now evident in Inferior leads  Nonspecific T wave abnormality now evident in Anterior leads     CBC WITH AUTOMATED DIFF    Collection Time: 09/26/22  2:53 PM   Result Value Ref Range    WBC 6.4 3.6 - 11.0 K/uL    RBC 4.83 3.80 - 5.20 M/uL    HGB 14.6 11.5 - 16.0 g/dL    HCT 40.4 35.0 - 47.0 %    MCV 83.6 80.0 - 99.0 FL    MCH 30.2 26.0 - 34.0 PG    MCHC 36.1 30.0 - 36.5 g/dL    RDW 13.5 11.5 - 14.5 %    PLATELET 306 697 - 818 K/uL    MPV 9.8 8.9 - 12.9 FL    NRBC 0.0 0  WBC    ABSOLUTE NRBC 0.00 0.00 - 0.01 K/uL    NEUTROPHILS 58 32 - 75 %    LYMPHOCYTES 31 12 - 49 %    MONOCYTES 5 5 - 13 %    EOSINOPHILS 5 0 - 7 %    BASOPHILS 1 0 - 1 %    IMMATURE GRANULOCYTES 0 0.0 - 0.5 %    ABS. NEUTROPHILS 3.8 1.8 - 8.0 K/UL    ABS. LYMPHOCYTES 2.0 0.8 - 3.5 K/UL    ABS. MONOCYTES 0.3 0.0 - 1.0 K/UL    ABS. EOSINOPHILS 0.3 0.0 - 0.4 K/UL    ABS. BASOPHILS 0.0 0.0 - 0.1 K/UL    ABS. IMM. GRANS. 0.0 0.00 - 0.04 K/UL    DF AUTOMATED     METABOLIC PANEL, COMPREHENSIVE    Collection Time: 09/26/22  2:53 PM   Result Value Ref Range    Sodium 135 (L) 136 - 145 mmol/L    Potassium 3.0 (L) 3.5 - 5.1 mmol/L    Chloride 100 97 - 108 mmol/L    CO2 28 21 - 32 mmol/L    Anion gap 7 5 - 15 mmol/L    Glucose 261 (H) 65 - 100 mg/dL    BUN 10 6 - 20 MG/DL    Creatinine 0.88 0.55 - 1.02 MG/DL    BUN/Creatinine ratio 11 (L) 12 - 20      GFR est AA >60 >60 ml/min/1.73m2    GFR est non-AA >60 >60 ml/min/1.73m2    Calcium 9.8 8.5 - 10.1 MG/DL    Bilirubin, total 0.9 0.2 - 1.0 MG/DL    ALT (SGPT) 35 12 - 78 U/L    AST (SGOT) 22 15 - 37 U/L    Alk.  phosphatase 67 45 - 117 U/L    Protein, total 7.5 6.4 - 8.2 g/dL    Albumin 4.2 3.5 - 5.0 g/dL    Globulin 3.3 2.0 - 4.0 g/dL    A-G Ratio 1.3 1.1 - 2.2     URINALYSIS W/ RFLX MICROSCOPIC    Collection Time: 09/26/22  4:33 PM   Result Value Ref Range    Color YELLOW/STRAW      Appearance CLEAR CLEAR      Specific gravity 1.010 1.003 - 1.030      pH (UA) 6.5 5.0 - 8.0      Protein Negative NEG mg/dL    Glucose >1,000 (A) NEG mg/dL    Ketone Negative NEG mg/dL Bilirubin Negative NEG      Blood Negative NEG      Urobilinogen 0.2 0.2 - 1.0 EU/dL    Nitrites Negative NEG      Leukocyte Esterase Negative NEG      WBC 0-4 0 - 4 /hpf    RBC 0-5 0 - 5 /hpf    Epithelial cells FEW FEW /lpf    Bacteria Negative NEG /hpf       Radiologic Studies -   No orders to display     CT Results  (Last 48 hours)      None          CXR Results  (Last 48 hours)      None              Medical Decision Making   I am the first provider for this patient. I reviewed the vital signs, available nursing notes, past medical history, past surgical history, family history and social history. Vital Signs-Reviewed the patient's vital signs. Patient Vitals for the past 12 hrs:   Temp Pulse Resp BP SpO2   09/26/22 1603 -- 68 21 107/63 95 %   09/26/22 1548 -- 71 22 (!) 105/57 95 %   09/26/22 1533 -- 71 23 101/65 96 %   09/26/22 1518 -- 74 19 119/70 98 %   09/26/22 1503 -- 80 26 105/63 96 %   09/26/22 1448 -- 82 23 105/64 --   09/26/22 1433 -- 87 20 103/71 94 %   09/26/22 1318 98 °F (36.7 °C) 87 18 112/73 97 %       Records Reviewed: Nursing records and medical records reviewed    Provider Notes (Medical Decision Making):   Patient presents with an allergic reaction to Keflex. Doubt anaphylaxis. Only 1 organ system involvement. No nausea vomiting diarrhea abdominal pain that is new she has been having this that has been ongoing for a while. No oropharyngeal erythema or edema. Will treat with IV methylprednisolone, IV famotidine, and IV Benadryl. Will obtain further work-up because she has continued abdominal pain, nausea, and vomiting that is being treated by physician. We will repeat a urinalysis as she says she still has pain with urinating. ED Course:   Initial assessment performed. The patients presenting problems have been discussed, and they are in agreement with the care plan formulated and outlined with them.   I have encouraged them to ask questions as they arise throughout their visit.    ED Course as of 09/26/22 2216   [facility] Sep 26, 2022   1702 Patient's labs are unremarkable other than mild hypokalemia. We will replete this with p.o. potassium. No evidence of urinary tract infection. Patient stable for discharge at this time. I reevaluated her erythema of her arms and this has resolved. She has no swelling of her oropharynx whatsoever. Discussed customary return precautions and importance of following up with her previous prescriber about changing her antibiotics as I do not know her previous urinalysis results and it sounds like she has completed two weeks of abx. Repeat abdominal exam benign. [WB]      ED Course User Index  [WB] Shamika Curran MD       Medications Administered       acetaminophen (TYLENOL) tablet 975 mg       Admin Date  09/26/2022 Action  Given Dose  975 mg Route  Oral Administered By  Deanne Martins RN              diphenhydrAMINE (BENADRYL) injection 50 mg       Admin Date  09/26/2022 Action  Given Dose  50 mg Route  IntraVENous Administered By  Deanne Martins RN              famotidine (PF) (PEPCID) 20 mg in 0.9% sodium chloride 10 mL injection       Admin Date  09/26/2022 Action  Given Dose  20 mg Route  IntraVENous Administered By  Deanne Martins RN              methylPREDNISolone (PF) (Solu-MEDROL) injection 125 mg       Admin Date  09/26/2022 Action  Given Dose  125 mg Route  IntraVENous Administered By  Daenne Martins RN              sodium chloride 0.9 % bolus infusion 1,000 mL       Admin Date  09/26/2022 Action  New Bag Dose  1,000 mL Route  IntraVENous Administered By  Deanne Martins RN                    Critical Care:  None      Disposition:  Home    DISCHARGE PLAN:  1. Discharge Medication List as of 9/26/2022  5:18 PM        2.    Follow-up Information       Follow up With Specialties Details Why Raissa Herr MD Internal Medicine Physician Schedule an appointment as soon as possible for a visit   [address] [facility] [address] [facility].  [place]  [phone]      [facility] EMERGENCY DEPT Emergency Medicine  As needed, If symptoms worsen [address] [place] [address]  [phone]          3. Return to ED if worse     Diagnosis     Clinical Impression:   1. Allergic reaction, initial encounter        Attestations:    Cristhian Blas MD    Please note that this dictation was completed with [employer], the computer voice recognition software. Quite often unanticipated grammatical, syntax, homophones, and other interpretive errors are inadvertently transcribed by the computer software. Please disregard these errors. Please excuse any errors that have escaped final proofreading. Thank you.

## 2022-09-26 NOTE — ED NOTES
Pt presents to ER w/ red, itchy rash on upper body. She reports that she took Keflex x2 hours ago as treatment for a chronic UTI but has had this medication w/ no reaction in the past. Assisted to position of comfort, call bell within reach.

## 2022-09-26 NOTE — LETTER
[facility] EMERGENCY [facility]PT  [address] [place] [id number] [phone]    Work/School Note    Date: 9/26/2022    To Whom It May concern:    Colt Santizo was seen and treated today in the emergency room by the following provider(s):  Attending Provider: Hallie Velázquez MD.      Colt Santizo may return to work on 9/28/22.     Sincerely,          Philip Salazar RN

## 2022-09-28 LAB
ATRIAL RATE: 81 BPM
CALCULATED P AXIS, ECG09: 6 DEGREES
CALCULATED R AXIS, ECG10: 4 DEGREES
CALCULATED T AXIS, ECG11: 29 DEGREES
DIAGNOSIS, 93000: NORMAL
P-R INTERVAL, ECG05: 150 MS
Q-T INTERVAL, ECG07: 380 MS
QRS DURATION, ECG06: 88 MS
QTC CALCULATION (BEZET), ECG08: 441 MS
VENTRICULAR RATE, ECG03: 81 BPM

## 2022-10-21 DIAGNOSIS — F41.9 ANXIETY: ICD-10-CM

## 2022-10-23 RX ORDER — ALPRAZOLAM 1 MG/1
TABLET ORAL
Qty: 60 TABLET | Refills: 0 | Status: SHIPPED | OUTPATIENT
Start: 2022-10-23

## 2022-10-25 ENCOUNTER — TRANSCRIBE ORDER (OUTPATIENT)
Dept: SCHEDULING | Age: 52
End: 2022-10-25

## 2022-10-25 ENCOUNTER — ANESTHESIA (OUTPATIENT)
Dept: ENDOSCOPY | Age: 52
End: 2022-10-25
Payer: MEDICAID

## 2022-10-25 ENCOUNTER — HOSPITAL ENCOUNTER (OUTPATIENT)
Age: 52
Setting detail: OUTPATIENT SURGERY
Discharge: HOME | End: 2022-10-25
Attending: INTERNAL MEDICINE | Admitting: INTERNAL MEDICINE
Payer: MEDICAID

## 2022-10-25 ENCOUNTER — ANESTHESIA EVENT (OUTPATIENT)
Dept: ENDOSCOPY | Age: 52
End: 2022-10-25
Payer: MEDICAID

## 2022-10-25 VITALS
OXYGEN SATURATION: 99 % | WEIGHT: 198.41 LBS | SYSTOLIC BLOOD PRESSURE: 119 MMHG | DIASTOLIC BLOOD PRESSURE: 66 MMHG | TEMPERATURE: 98.6 F | HEIGHT: 62 IN | HEART RATE: 65 BPM | RESPIRATION RATE: 20 BRPM | BODY MASS INDEX: 36.51 KG/M2

## 2022-10-25 DIAGNOSIS — Z12.31 VISIT FOR SCREENING MAMMOGRAM: Primary | ICD-10-CM

## 2022-10-25 LAB
GLUCOSE BLD STRIP.AUTO-MCNC: 157 MG/DL (ref 65–117)
SERVICE CMNT-IMP: ABNORMAL

## 2022-10-25 PROCEDURE — 82962 GLUCOSE BLOOD TEST: CPT

## 2022-10-25 PROCEDURE — 77030021593 HC FCPS BIOP ENDOSC BSC -A: Performed by: INTERNAL MEDICINE

## 2022-10-25 PROCEDURE — 74011250636 HC RX REV CODE- 250/636: Performed by: NURSE ANESTHETIST, CERTIFIED REGISTERED

## 2022-10-25 PROCEDURE — 74011000250 HC RX REV CODE- 250: Performed by: NURSE ANESTHETIST, CERTIFIED REGISTERED

## 2022-10-25 PROCEDURE — 76060000031 HC ANESTHESIA FIRST 0.5 HR: Performed by: INTERNAL MEDICINE

## 2022-10-25 PROCEDURE — 76040000019: Performed by: INTERNAL MEDICINE

## 2022-10-25 PROCEDURE — 2709999900 HC NON-CHARGEABLE SUPPLY: Performed by: INTERNAL MEDICINE

## 2022-10-25 PROCEDURE — 74011000258 HC RX REV CODE- 258: Performed by: NURSE ANESTHETIST, CERTIFIED REGISTERED

## 2022-10-25 PROCEDURE — 88305 TISSUE EXAM BY PATHOLOGIST: CPT

## 2022-10-25 RX ORDER — MIDAZOLAM HYDROCHLORIDE 1 MG/ML
.25-5 INJECTION, SOLUTION INTRAMUSCULAR; INTRAVENOUS
Status: DISCONTINUED | OUTPATIENT
Start: 2022-10-25 | End: 2022-10-25 | Stop reason: HOSPADM

## 2022-10-25 RX ORDER — SODIUM CHLORIDE 9 MG/ML
INJECTION, SOLUTION INTRAVENOUS
Status: DISCONTINUED | OUTPATIENT
Start: 2022-10-25 | End: 2022-10-25 | Stop reason: HOSPADM

## 2022-10-25 RX ORDER — ATROPINE SULFATE 0.1 MG/ML
0.4 INJECTION INTRAVENOUS
Status: DISCONTINUED | OUTPATIENT
Start: 2022-10-25 | End: 2022-10-25 | Stop reason: HOSPADM

## 2022-10-25 RX ORDER — FLUMAZENIL 0.1 MG/ML
0.2 INJECTION INTRAVENOUS
Status: DISCONTINUED | OUTPATIENT
Start: 2022-10-25 | End: 2022-10-25 | Stop reason: HOSPADM

## 2022-10-25 RX ORDER — SODIUM CHLORIDE 9 MG/ML
50 INJECTION, SOLUTION INTRAVENOUS CONTINUOUS
Status: DISCONTINUED | OUTPATIENT
Start: 2022-10-25 | End: 2022-10-25 | Stop reason: HOSPADM

## 2022-10-25 RX ORDER — DEXTROMETHORPHAN/PSEUDOEPHED 2.5-7.5/.8
1.2 DROPS ORAL
Status: DISCONTINUED | OUTPATIENT
Start: 2022-10-25 | End: 2022-10-25 | Stop reason: HOSPADM

## 2022-10-25 RX ORDER — PROPOFOL 10 MG/ML
INJECTION, EMULSION INTRAVENOUS
Status: DISCONTINUED | OUTPATIENT
Start: 2022-10-25 | End: 2022-10-25 | Stop reason: HOSPADM

## 2022-10-25 RX ORDER — NALOXONE HYDROCHLORIDE 0.4 MG/ML
0.4 INJECTION, SOLUTION INTRAMUSCULAR; INTRAVENOUS; SUBCUTANEOUS
Status: DISCONTINUED | OUTPATIENT
Start: 2022-10-25 | End: 2022-10-25 | Stop reason: HOSPADM

## 2022-10-25 RX ORDER — LIDOCAINE HYDROCHLORIDE 20 MG/ML
INJECTION, SOLUTION EPIDURAL; INFILTRATION; INTRACAUDAL; PERINEURAL AS NEEDED
Status: DISCONTINUED | OUTPATIENT
Start: 2022-10-25 | End: 2022-10-25 | Stop reason: HOSPADM

## 2022-10-25 RX ORDER — PROPOFOL 10 MG/ML
INJECTION, EMULSION INTRAVENOUS AS NEEDED
Status: DISCONTINUED | OUTPATIENT
Start: 2022-10-25 | End: 2022-10-25 | Stop reason: HOSPADM

## 2022-10-25 RX ORDER — EPINEPHRINE 0.1 MG/ML
1 INJECTION INTRACARDIAC; INTRAVENOUS
Status: DISCONTINUED | OUTPATIENT
Start: 2022-10-25 | End: 2022-10-25 | Stop reason: HOSPADM

## 2022-10-25 RX ADMIN — PROPOFOL 50 MG: 10 INJECTION, EMULSION INTRAVENOUS at 09:02

## 2022-10-25 RX ADMIN — PROPOFOL 150 MCG/KG/MIN: 10 INJECTION, EMULSION INTRAVENOUS at 09:00

## 2022-10-25 RX ADMIN — PROPOFOL 50 MG: 10 INJECTION, EMULSION INTRAVENOUS at 08:57

## 2022-10-25 RX ADMIN — LIDOCAINE HYDROCHLORIDE 60 MG: 20 INJECTION, SOLUTION EPIDURAL; INFILTRATION; INTRACAUDAL; PERINEURAL at 08:51

## 2022-10-25 RX ADMIN — PROPOFOL 100 MG: 10 INJECTION, EMULSION INTRAVENOUS at 08:51

## 2022-10-25 RX ADMIN — SODIUM CHLORIDE: 9 INJECTION, SOLUTION INTRAVENOUS at 08:44

## 2022-10-25 NOTE — PROCEDURES
Alessia Khan M.D.  [phone]            10/25/2022          Colonoscopy Operative Report  Kirit Regalado  :  1970  [facility] Medical Record Number:  [id number]      Indications:   h/o ulcerative proctitis and polyps      :  Edda Nissen, MD    Assistant -- None  Implants -- None    Referring Provider: Kylah Vitale MD    Sedation:  MAC anesthesia Propofol    Pre-Procedural Exam:      Airway: clear,  No airway problems anticipated  Heart: RRR, without gallops or rubs  Lungs: clear bilaterally without wheezes, crackles, or rhonchi  Abdomen: soft, nontender, nondistended, bowel sounds present  Mental Status: awake, alert and oriented to person, place and time     Procedure Details:  After informed consent was obtained with all risks and benefits of procedure explained and preoperative exam completed, the patient was taken to the endoscopy suite and placed in the left lateral decubitus position. Upon sequential sedation as per above, a digital rectal exam was performed. The Olympus videocolonoscope  was inserted in the rectum and carefully advanced to the terminal ileum. The quality of preparation was good. The colonoscope was slowly withdrawn with careful inspection and evaluation between folds. Retroflexion in the rectum was performed. Findings:   Terminal Ileum: normal  Cecum: normal  Ascending Colon: 1  Sessile polyp(s), the largest 6 mm in size;  Transverse Colon: normal  Descending Colon: normal  Sigmoid: 1  Sessile polyp(s), the largest 6 mm in size; Rectum: normal    Interventions:  biopsy of colon Ti, right colon, left colon and rectum  2 complete polypectomy were performed using cold snare  and the polyps were  retrieved    Specimen Removed:   as above    Complications: None. EBL:  None. Impression:  2 polyps removed as above                        Normal colonoscopy.  No evidence of IBD noted on exam    Recommendations:  -Await pathology. -Repeat colonoscopy in 5 years. Discharge Disposition:  Home in the company of a  when able to ambulate.     Hetal Perkins MD  10/25/2022  9:17 AM

## 2022-10-25 NOTE — PROGRESS NOTES
Endoscopy discharge instructions have been reviewed and given to patient. The patient verbalized understanding and acceptance of instructions. Dr. Debbie Trejo discussed with patient procedure findings and next steps.

## 2022-10-25 NOTE — ANESTHESIA PREPROCEDURE EVALUATION
Relevant Problems   CARDIOVASCULAR   (+) Accelerated hypertension      ENDOCRINE   (+) Steroid-induced diabetes (HCC)   (+) Type 2 diabetes mellitus (HCC)       Anesthetic History     PONV          Review of Systems / Medical History  Patient summary reviewed and nursing notes reviewed    Pulmonary        Sleep apnea    Asthma        Neuro/Psych         Headaches and psychiatric history     Cardiovascular    Hypertension              Exercise tolerance: >4 METS     GI/Hepatic/Renal     GERD      Liver disease    Comments: Ulcerative colitis versus crohn's Endo/Other    Diabetes: poorly controlled, type 2    Obesity    Comments: Hgb A1c = 9 Other Findings   Comments:            Physical Exam    Airway  Mallampati: II    Neck ROM: normal range of motion   Mouth opening: Normal     Cardiovascular    Rhythm: regular  Rate: normal         Dental  No notable dental hx       Pulmonary  Breath sounds clear to auscultation               Abdominal         Other Findings            Anesthetic Plan    ASA: 3  Anesthesia type: MAC          Induction: Intravenous  Anesthetic plan and risks discussed with: Patient      Informed consent obtained.

## 2022-10-25 NOTE — PROGRESS NOTES
Tripp Favor  1970  [id number]    Situation:  Verbal report received from: Tonya MATOS  Procedure: Procedure(s):  COLONOSCOPY AND EGD  ESOPHAGOGASTRODUODENOSCOPY (EGD)  ESOPHAGOGASTRODUODENAL (EGD) BIOPSY  COLON BIOPSY    Background:    Preoperative diagnosis: Ulcerative proctitis with complication ([facility] [address] [address] [R60.037]  Personal history of colonic polyps [Z86.010]  Postoperative diagnosis: normal egd  ascending and sigmoid polyps    :  Dr. Campbell Nugent  Assistant(s): Endoscopy Technician-1: Bella Enriquez  Endoscopy RN-1: Miah aDvis RN    Specimens:   ID Type Source Tests Collected by Time Destination   1 : biopsy duodenum Preservative Duodenum  Oanh Rome MD 10/25/2022 7465 Pathology   2 : gastric biopsy Preservative Gastric  Oanh Rome MD 10/25/2022 3658 Pathology   3 : biopsy terminal ileum Preservative   Oanh Rome MD 10/25/2022 0902 Pathology   4 : biopsy right colon Preservative   Oanh Rome MD 10/25/2022 5038 Pathology   5 : polyp Preservative Colon, Ascending  Oanh Rome MD 10/25/2022 6298 Pathology   6 : biopsy left colon Preservative   Oanh Rome MD 10/25/2022 6846 Pathology   7 : polyp Preservative Sigmoid  Oanh Rome MD 10/25/2022 0908 Pathology   8 : biopsy Preservative Rectum  Oanh Rome MD 10/25/2022 0911 Pathology     H. Pylori  no    Assessment:  Intra-procedure medications   Anesthesia gave intra-procedure sedation and medications, see anesthesia flow sheet yes    Intravenous fluids: NS@ KVO     Vital signs stable yes    Abdominal assessment: round and soft yes    Recommendation:  Discharge patient per MD order yes.   Family or Friend mother  Permission to share finding with family or friend yes

## 2022-10-25 NOTE — H&P
Sanju Simmons M.D.  [phone]            History and Physical       NAME:  Romeo Booker   :   1970   MRN:   [id number]       Referring Physician:  Chayo Hook MD      Consult Date: 10/25/2022 8:40 AM    Chief Complaint:  ulcerative proctitis history and diarrhea    History of Present Illness:  54yo female with hx UC diagnosed last yr at [facility], is seen in clinic for abd pain and diarrhea. Says she has been on and off steroids for months for allergic reactions, last took them about 1 week ago for hives. Says that she has tried to be off of mesalamine because she thought it was making her symptoms worse and has just been taking steroids often. Says she will take steroids that she has left over when she starts to have a rash or other signs of an allergic reaction. Denies having rectal bleeding, n/v, or any other complaints. Records from [facility] [place] [facility] reviewed - see notes for full detail. No blood thinners.       PMH:  Past Medical History:   Diagnosis Date    Anal fissure     Anisocoria     Asthma     LAST EPISODE     Back pain     Cerumen impaction     Chronic kidney disease     hx uti in past    Coagulation defects     ocassional rectal bleeding due to anal fissure    Colovaginal fistula     Diabetes (HCC)     NIDDM    Diverticulitis     Diverticulosis     Enlarged tonsils     Frequent UTI     GERD (gastroesophageal reflux disease)     H/O endoscopy     with dilation    HA (headache)     Hepatic steatosis     History of colon resection     Hx of colonoscopy with polypectomy     benign    Hypertension     Ill-defined condition     FREQUENT HIVES    Ill-defined condition     HX ELEVATED LIVER ENZYMES    Morbid obesity (HCC)     Nausea & vomiting     during diverticulitis flare    Obesity     Otitis media     Pneumonia     about 15 yrs ago    Psychiatric disorder     ANXIETY    Recurrent tonsillitis     Sinusitis     Transfusion history ~ age 35    postop hysterectomy    Urticaria        PSH:  Past Surgical History:   Procedure Laterality Date    COLONOSCOPY N/A 2019    COLONOSCOPY performed by Kalros Sweeney MD at [facility] ENDOSCOPY    COLONOSCOPY N/A 10/02/2020    COLONOSCOPY performed by Karlos Sweeney MD at [facility]  2021    cancer. HX BREAST REDUCTION  1992    [facility]. HX GI  2012    LAPAROSCOPIC HAND ASSISTED  POSS OPEN SIGMOID COLECTOMY POSS TEMPORARY DIVERTING LOOP ILEOSTOMY;  (no illeostomy needed)    HX GYN           HX GYN      cervical conization    HX HEENT      SINUS SURGERY LEFT X2    HX HEENT      SINUS SURGERY ON RIGHT X2    HX HEMORRHOIDECTOMY      HX HYSTERECTOMY      HX OTHER SURGICAL  2021    Sphincterotomy of rectum    HX PELVIC LAPAROSCOPY      HX EMMANUEL AND BSO      HX UROLOGICAL  2012     CYSTOSCOPY INSERTION URETERAL CATHETERS - Cystoscopy Insertion of bilateral ureteral stents    NC ABDOMEN SURGERY PROC UNLISTED  2018    hernia repair at [address] UNLISTED      colon resection. Allergies: Allergies   Allergen Reactions    Aspirin Hives, Shortness of Breath and Itching    Codeine Hives, Itching and Angioedema     Pt had itching in mouth, on face and lips    Contrast Agent [Iodine] Hives, Itching and Angioedema     21: pt was given benadryl and solu-medrol prior to administration but pt had severe tongue swelling and drooling, lethargy and itching.  DO NOT GIVE PT    Flavoring Agent Anaphylaxis     Cherry flavor    Iodinated Contrast Media Anaphylaxis, Diarrhea, Hives, Nausea and Vomiting and Shortness of Breath    Percocet [Oxycodone-Acetaminophen] Hives, Itching and Angioedema     Pt had itching in mouth, on face and lips    Prilosec [Omeprazole Magnesium] Anaphylaxis     CHERRY FLAVORED ODT; PT TAKES REGULAR PRILOSEC AND IS OK    Dilaudid [Hydromorphone] Itching     Tolerates with benadryl    Cephalexin Hives    Ketorolac Rash     \"makes my eyes spasm and causes rash on my hands\" and \"makes my skin itch and makes me nervous\"    Morphine (Pf) Rash     According to rn, pt can take morphine with benadryl    Fentanyl Rash and Itching     Has had benadryl before       Home Medications:  Prior to Admission Medications   Prescriptions Last Dose Informant Patient Reported? Taking? ALPRAZolam (XANAX) 1 mg tablet 10/23/2022  No No   Sig: TAKE 1/2 TO 1 TABLET BY MOUTH TWICE DAILY AS NEEDED FOR ANXIETY   EPINEPHrine (EPIPEN) 0.3 mg/0.3 mL (1:1,000) injection Unknown Self No No   Si.3 mL by IntraMUSCular route once as needed for up to 1 dose. Patient not taking: Reported on 10/25/2022   Ventolin HFA 90 mcg/actuation inhaler Unknown  No No   Sig: INHALE 1 PUFF BY MOUTH EVERY 4 HOURS AS NEEDED FOR WHEEZE   amoxicillin-clavulanate (Augmentin) 875-125 mg per tablet Not Taking  No No   Sig: Take 1 Tablet by mouth two (2) times a day. Patient not taking: Reported on 10/25/2022   atorvastatin (LIPITOR) 20 mg tablet 10/24/2022  No Yes   Sig: TAKE 1 TABLET BY MOUTH EVERY DAY   cetirizine (ZyrTEC) 10 mg tablet   No No   Sig: Take 1 Tablet by mouth daily. dibucaine (NUPERCAINAL) 1 % rectal ointment   No No   Sig: by PeriANAL route every four (4) hours as needed for Pain. dicyclomine (BENTYL) 20 mg tablet 10/18/2022  No Yes   Sig: Take 1 Tablet by mouth every six (6) hours. empagliflozin (Jardiance) 25 mg tablet 10/24/2022  No Yes   Sig: Take 1 Tablet by mouth daily. estradioL (ESTRACE) 0.5 mg tablet 10/24/2022  No Yes   Sig: TAKE 1 TABLET BY MOUTH EVERY DAY   glimepiride (AMARYL) 4 mg tablet 10/24/2022  No Yes   Sig: TAKE 1 TABLET BY MOUTH ONCE DAILY BEFORE BREAKFAST   losartan-hydroCHLOROthiazide (HYZAAR) 100-12.5 mg per tablet 10/24/2022  No Yes   Sig: TAKE 1 TABLET BY MOUTH EVERY DAY   melatonin 5 mg cap capsule   Yes No   Sig: Take  by mouth nightly.    naloxone (Narcan) 4 mg/actuation nasal spray Not Taking  No No   Sig: Use 1 spray intranasally, then discard. Repeat with new spray every 2 min as needed for opioid overdose symptoms, alternating nostrils. Patient not taking: Reported on 10/25/2022   omeprazole (PRILOSEC) 20 mg capsule 10/25/2022  Yes Yes   Sig: Take 40 mg by mouth Daily (before breakfast). ondansetron (Zofran ODT) 4 mg disintegrating tablet 10/24/2022  No Yes   Sig: Take 1 Tablet by mouth every eight (8) hours as needed for Nausea. ondansetron hcl (Zofran) 4 mg tablet Unknown  No No   Sig: Take 1 Tablet by mouth every eight (8) hours as needed for Nausea. Patient not taking: Reported on 10/25/2022   ondansetron hcl (Zofran) 4 mg tablet Unknown  No No   Sig: Take 1 Tablet by mouth every eight (8) hours as needed for Nausea. predniSONE (STERAPRED) 5 mg dose pack 10/11/2022  No No   Sig: See administration instruction per 5mg dose pack   sertraline (ZOLOFT) 100 mg tablet 10/24/2022  No Yes   Sig: TAKE 2 TABLETS BY MOUTH EVERY NIGHT      Facility-Administered Medications: None       Hospital Medications:  No current facility-administered medications for this encounter.        Social History:  Social History     Tobacco Use    Smoking status: Never    Smokeless tobacco: Never   Substance Use Topics    Alcohol use: Not Currently       Family History:  Family History   Problem Relation Age of Onset    Diabetes Mother     Cancer Mother         NON-HODGKINS LYMPHOMA    Anesth Problems Mother         PONV    Diabetes Father     Heart Disease Father         CAD - STENTS, PACEMAKER    Arrhythmia Father     Cancer Paternal Grandmother              Review of Systems:      Constitutional: negative fever, negative chills, negative weight loss  Eyes:   negative visual changes  ENT:   negative sore throat, tongue or lip swelling  Respiratory:  negative cough, negative dyspnea  Cards:  negative for chest pain, palpitations, lower extremity edema  GI:   See HPI  :  negative for frequency, dysuria  Integument:  negative for rash and pruritus  Heme:  negative for easy bruising and gum/nose bleeding  Musculoskel: negative for myalgias,  back pain and muscle weakness  Neuro: negative for headaches, dizziness, vertigo  Psych:  negative for feelings of anxiety, depression       Objective:   Patient Vitals for the past 8 hrs:   BP Temp Pulse Resp SpO2 Height Weight   10/25/22 0746 119/67 98.1 °F (36.7 °C) 60 19 98 % 5' 2\" (1.575 m) 90 kg (198 lb 6.6 oz)     No intake/output data recorded. No intake/output data recorded. EXAM:     NEURO-a&o   HEENT-wnl   LUNGS-clear    COR-regular rate and rhythym     ABD-soft , no tenderness, no rebound, good bs     EXT-no edema     Data Review     No results for input(s): WBC, HGB, HCT, PLT, HGBEXT, HCTEXT, PLTEXT in the last 72 hours. No results for input(s): NA, K, CL, CO2, BUN, CREA, GLU, PHOS, CA in the last 72 hours. No results for input(s): AP, TBIL, TP, ALB, GLOB, GGT, AML, LPSE in the last 72 hours. No lab exists for component: SGOT, GPT, AMYP, HLPSE  No results for input(s): INR, PTP, APTT, INREXT in the last 72 hours. 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      Signed By: Marilu Mock MD     10/25/2022  8:40 AM

## 2022-10-25 NOTE — PERIOP NOTES
Post procedure report received from Donald Kerr CRNA.   Patient transported to recovery area and report given to Stephani Tao RN

## 2022-10-25 NOTE — PERIOP NOTES
Report/handoff received from Irving Narvaez RN. Anesthesia staff at patient's bedside administering anesthesia and monitoring patients vital signs throughout procedure. See anesthesia note.

## 2022-10-25 NOTE — ANESTHESIA POSTPROCEDURE EVALUATION
Procedure(s):  COLONOSCOPY AND EGD  ESOPHAGOGASTRODUODENOSCOPY (EGD)  ESOPHAGOGASTRODUODENAL (EGD) BIOPSY  COLON BIOPSY. MAC    Anesthesia Post Evaluation      Multimodal analgesia: multimodal analgesia not used between 6 hours prior to anesthesia start to PACU discharge  Patient location during evaluation: PACU  Patient participation: complete - patient participated  Level of consciousness: sleepy but conscious  Pain score: 0  Pain management: adequate  Airway patency: patent  Anesthetic complications: no  Cardiovascular status: hemodynamically stable and acceptable  Respiratory status: acceptable  Hydration status: acceptable  Comments: Patient seen and evaluated; no concerns. Post anesthesia nausea and vomiting:  none      INITIAL Post-op Vital signs:   Vitals Value Taken Time   /69 10/25/22 0939   Temp     Pulse 60 10/25/22 0941   Resp 18 10/25/22 0941   SpO2 97 % 10/25/22 0941   Vitals shown include unvalidated device data. No

## 2022-10-25 NOTE — PROCEDURES
Sveta Andres M.D.  [phone]           10/25/2022                EGD Operative Report  Tej Lu  :  1970  [employer] Medical Record Number:  [id number]      Indication:  diarrhea      : Jean-Claude Powell MD    Assistant -- None  Implants -- None    Referring Provider:  Anisha Hills MD      Anesthesia/Sedation:  MAC anesthesia Propofol    Airway assessment: No airway problems anticipated    Pre-Procedural Exam:      Airway: clear, no airway problems anticipated  Heart: RRR, without gallops or rubs  Lungs: clear bilaterally without wheezes, crackles, or rhonchi  Abdomen: soft, nontender, nondistended, bowel sounds present  Mental Status: awake, alert and oriented to person, place and time       Procedure Details     After infomed consent was obtained for the procedure, with all risks and benefits of procedure explained the patient was taken to the endoscopy suite and placed in the left lateral decubitus position. Following sequential administration of sedation as per above, the endoscope was inserted into the mouth and advanced under direct vision to second portion of the duodenum. A careful inspection was made as the gastroscope was withdrawn, including a retroflexed view of the proximal stomach; findings and interventions are described below. Findings:   Esophagus:normal  Stomach: normal   Duodenum/jejunum: normal    Therapies:  biopsy of stomach  - r/o H.pylori  biopsy of duodenal - r/o celiac disease    Specimens: as above           Complications:   None; patient tolerated the procedure well. EBL:  None.            Impression:    normal egd        Recommendations:    -Await pathology.  -Proceed with colonoscopy today as planned    Jean-Claude Powell MD

## 2022-10-25 NOTE — DISCHARGE INSTRUCTIONS
[facility]. Yung Heredia M.D.  [phone]                 COLON and EGD DISCHARGE INSTRUCTIONS    10/25/2022    Bindu Arias  :  1970  [facility] Medical Record Number:  [id number]      DISCOMFORT:  Sore throat- throat lozenges or warm salt water gargle  Redness at IV site- apply warm compress to area; if redness or soreness persist- contact your physician  There may be a slight amount of blood passed from the rectum  Gaseous discomfort- walking, belching will help relieve any discomfort  You may not operate a vehicle for 12 hours  You may not engage in an occupation involving machinery or appliances for rest of today  You may not drink alcoholic beverages for at least 12 hours  Avoid making any critical decisions for at least 24 hour  DIET:   High fiber diet. - however -  remember your colon is empty and a heavy meal will produce gas. Avoid these foods:  vegetables, fried / greasy foods, carbonated drinks for today     ACTIVITY:  You may  resume your normal daily activities it is recommended that you spend the remainder of the day resting -  avoid any strenuous activity and driving. CALL M.D. ANY SIGN OF:   Increasing pain, nausea, vomiting  Abdominal distension (swelling)  New increased bleeding (oral or rectal)  Fever (chills)  Pain in chest area  Bloody discharge from nose or mouth  Shortness of breath      Follow-up Instructions:   Call Dr. Gisell Duong if any questions at [phone]. Results of procedure / biopsy in 7 to 10 days, we will call you with these results.   Your endoscopy showed normal exam   Your colonoscopy showed 2 polyps, otherwise normal exam

## 2022-10-25 NOTE — PROGRESS NOTES
10/25/2022            RE: [address] [place] [address] N. [facility] [address]              To Whom It May Concern,      Due to medical reasons, Luis Weinstein may  return to work on 10/26/2022.         Sincerely,          Dr Franklin Sic

## 2022-11-21 DIAGNOSIS — B02.9 HERPES ZOSTER WITHOUT COMPLICATION: ICD-10-CM

## 2022-11-21 DIAGNOSIS — F41.9 ANXIETY: ICD-10-CM

## 2022-11-21 RX ORDER — ALPRAZOLAM 1 MG/1
TABLET ORAL
Qty: 60 TABLET | Refills: 0 | Status: SHIPPED | OUTPATIENT
Start: 2022-11-21

## 2022-11-21 RX ORDER — GABAPENTIN 300 MG/1
CAPSULE ORAL
Qty: 30 CAPSULE | Refills: 1 | Status: SHIPPED | OUTPATIENT
Start: 2022-11-21

## 2022-12-13 PROCEDURE — 80053 COMPREHEN METABOLIC PANEL: CPT

## 2022-12-13 PROCEDURE — 96376 TX/PRO/DX INJ SAME DRUG ADON: CPT

## 2022-12-13 PROCEDURE — 99284 EMERGENCY DEPT VISIT MOD MDM: CPT

## 2022-12-13 PROCEDURE — 83735 ASSAY OF MAGNESIUM: CPT

## 2022-12-13 PROCEDURE — 36415 COLL VENOUS BLD VENIPUNCTURE: CPT

## 2022-12-13 PROCEDURE — 96375 TX/PRO/DX INJ NEW DRUG ADDON: CPT

## 2022-12-13 PROCEDURE — 85025 COMPLETE CBC W/AUTO DIFF WBC: CPT

## 2022-12-13 PROCEDURE — 96374 THER/PROPH/DIAG INJ IV PUSH: CPT

## 2022-12-14 ENCOUNTER — HOSPITAL ENCOUNTER (EMERGENCY)
Age: 52
Discharge: HOME | End: 2022-12-14
Attending: EMERGENCY MEDICINE
Payer: MEDICAID

## 2022-12-14 VITALS
OXYGEN SATURATION: 99 % | HEIGHT: 62 IN | WEIGHT: 205.69 LBS | SYSTOLIC BLOOD PRESSURE: 142 MMHG | BODY MASS INDEX: 37.85 KG/M2 | HEART RATE: 78 BPM | TEMPERATURE: 98.1 F | RESPIRATION RATE: 17 BRPM | DIASTOLIC BLOOD PRESSURE: 78 MMHG

## 2022-12-14 DIAGNOSIS — K62.89 RECTAL OR ANAL PAIN: Primary | ICD-10-CM

## 2022-12-14 LAB
ALBUMIN SERPL-MCNC: 4.1 G/DL (ref 3.5–5)
ALBUMIN/GLOB SERPL: 1.2 {RATIO} (ref 1.1–2.2)
ALP SERPL-CCNC: 66 U/L (ref 45–117)
ALT SERPL-CCNC: 44 U/L (ref 12–78)
ANION GAP SERPL CALC-SCNC: 8 MMOL/L (ref 5–15)
APPEARANCE UR: CLEAR
AST SERPL-CCNC: 25 U/L (ref 15–37)
BACTERIA URNS QL MICRO: NEGATIVE /HPF
BASOPHILS # BLD: 0 K/UL (ref 0–0.1)
BASOPHILS NFR BLD: 1 % (ref 0–1)
BILIRUB SERPL-MCNC: 0.6 MG/DL (ref 0.2–1)
BILIRUB UR QL: NEGATIVE
BUN SERPL-MCNC: 10 MG/DL (ref 6–20)
BUN/CREAT SERPL: 12 (ref 12–20)
CALCIUM SERPL-MCNC: 10.5 MG/DL (ref 8.5–10.1)
CAOX CRY URNS QL MICRO: ABNORMAL
CHLORIDE SERPL-SCNC: 100 MMOL/L (ref 97–108)
CO2 SERPL-SCNC: 30 MMOL/L (ref 21–32)
COLOR UR: ABNORMAL
CREAT SERPL-MCNC: 0.81 MG/DL (ref 0.55–1.02)
DIFFERENTIAL METHOD BLD: ABNORMAL
EOSINOPHIL # BLD: 0.2 K/UL (ref 0–0.4)
EOSINOPHIL NFR BLD: 4 % (ref 0–7)
EPITH CASTS URNS QL MICRO: ABNORMAL /LPF
ERYTHROCYTE [DISTWIDTH] IN BLOOD BY AUTOMATED COUNT: 12.9 % (ref 11.5–14.5)
GLOBULIN SER CALC-MCNC: 3.5 G/DL (ref 2–4)
GLUCOSE SERPL-MCNC: 274 MG/DL (ref 65–100)
GLUCOSE UR STRIP.AUTO-MCNC: >1000 MG/DL
HCT VFR BLD AUTO: 38.4 % (ref 35–47)
HGB BLD-MCNC: 13.6 G/DL (ref 11.5–16)
HGB UR QL STRIP: ABNORMAL
IMM GRANULOCYTES # BLD AUTO: 0 K/UL (ref 0–0.04)
IMM GRANULOCYTES NFR BLD AUTO: 1 % (ref 0–0.5)
KETONES UR QL STRIP.AUTO: ABNORMAL MG/DL
LEUKOCYTE ESTERASE UR QL STRIP.AUTO: ABNORMAL
LYMPHOCYTES # BLD: 1.6 K/UL (ref 0.8–3.5)
LYMPHOCYTES NFR BLD: 26 % (ref 12–49)
MAGNESIUM SERPL-MCNC: 1.7 MG/DL (ref 1.6–2.4)
MCH RBC QN AUTO: 29.3 PG (ref 26–34)
MCHC RBC AUTO-ENTMCNC: 35.4 G/DL (ref 30–36.5)
MCV RBC AUTO: 82.8 FL (ref 80–99)
MONOCYTES # BLD: 0.3 K/UL (ref 0–1)
MONOCYTES NFR BLD: 5 % (ref 5–13)
NEUTS SEG # BLD: 4 K/UL (ref 1.8–8)
NEUTS SEG NFR BLD: 63 % (ref 32–75)
NITRITE UR QL STRIP.AUTO: NEGATIVE
NRBC # BLD: 0 K/UL (ref 0–0.01)
NRBC BLD-RTO: 0 PER 100 WBC
PH UR STRIP: 6 [PH] (ref 5–8)
PLATELET # BLD AUTO: 175 K/UL (ref 150–400)
PMV BLD AUTO: 10.3 FL (ref 8.9–12.9)
POTASSIUM SERPL-SCNC: 3.2 MMOL/L (ref 3.5–5.1)
PROT SERPL-MCNC: 7.6 G/DL (ref 6.4–8.2)
PROT UR STRIP-MCNC: NEGATIVE MG/DL
RBC # BLD AUTO: 4.64 M/UL (ref 3.8–5.2)
RBC #/AREA URNS HPF: ABNORMAL /HPF (ref 0–5)
SODIUM SERPL-SCNC: 138 MMOL/L (ref 136–145)
SP GR UR REFRACTOMETRY: 1.02
UA: UC IF INDICATED,UAUC: ABNORMAL
UROBILINOGEN UR QL STRIP.AUTO: 0.2 EU/DL (ref 0.2–1)
WBC # BLD AUTO: 6.1 K/UL (ref 3.6–11)
WBC URNS QL MICRO: ABNORMAL /HPF (ref 0–4)

## 2022-12-14 PROCEDURE — 74011250637 HC RX REV CODE- 250/637: Performed by: EMERGENCY MEDICINE

## 2022-12-14 PROCEDURE — 96374 THER/PROPH/DIAG INJ IV PUSH: CPT

## 2022-12-14 PROCEDURE — 96361 HYDRATE IV INFUSION ADD-ON: CPT

## 2022-12-14 PROCEDURE — 81001 URINALYSIS AUTO W/SCOPE: CPT

## 2022-12-14 PROCEDURE — 87086 URINE CULTURE/COLONY COUNT: CPT

## 2022-12-14 PROCEDURE — 74011250636 HC RX REV CODE- 250/636: Performed by: EMERGENCY MEDICINE

## 2022-12-14 PROCEDURE — 96375 TX/PRO/DX INJ NEW DRUG ADDON: CPT

## 2022-12-14 PROCEDURE — 96376 TX/PRO/DX INJ SAME DRUG ADON: CPT

## 2022-12-14 RX ORDER — DIPHENHYDRAMINE HYDROCHLORIDE 50 MG/ML
25 INJECTION, SOLUTION INTRAMUSCULAR; INTRAVENOUS
Status: COMPLETED | OUTPATIENT
Start: 2022-12-14 | End: 2022-12-14

## 2022-12-14 RX ORDER — POTASSIUM CHLORIDE 750 MG/1
40 TABLET, FILM COATED, EXTENDED RELEASE ORAL
Status: COMPLETED | OUTPATIENT
Start: 2022-12-14 | End: 2022-12-14

## 2022-12-14 RX ORDER — OXYCODONE HYDROCHLORIDE 5 MG/1
5 TABLET ORAL
Qty: 9 TABLET | Refills: 0 | Status: SHIPPED | OUTPATIENT
Start: 2022-12-14 | End: 2022-12-17

## 2022-12-14 RX ORDER — MORPHINE SULFATE 2 MG/ML
2 INJECTION, SOLUTION INTRAMUSCULAR; INTRAVENOUS
Status: COMPLETED | OUTPATIENT
Start: 2022-12-14 | End: 2022-12-14

## 2022-12-14 RX ORDER — ONDANSETRON 2 MG/ML
4 INJECTION INTRAMUSCULAR; INTRAVENOUS
Status: COMPLETED | OUTPATIENT
Start: 2022-12-14 | End: 2022-12-14

## 2022-12-14 RX ADMIN — DIPHENHYDRAMINE HYDROCHLORIDE 25 MG: 50 INJECTION, SOLUTION INTRAMUSCULAR; INTRAVENOUS at 02:31

## 2022-12-14 RX ADMIN — MORPHINE SULFATE 2 MG: 2 INJECTION, SOLUTION INTRAMUSCULAR; INTRAVENOUS at 04:55

## 2022-12-14 RX ADMIN — POTASSIUM CHLORIDE 40 MEQ: 750 TABLET, FILM COATED, EXTENDED RELEASE ORAL at 04:55

## 2022-12-14 RX ADMIN — DIPHENHYDRAMINE HYDROCHLORIDE 25 MG: 50 INJECTION, SOLUTION INTRAMUSCULAR; INTRAVENOUS at 04:55

## 2022-12-14 RX ADMIN — SODIUM CHLORIDE 1000 ML: 9 INJECTION, SOLUTION INTRAVENOUS at 02:32

## 2022-12-14 RX ADMIN — ONDANSETRON 4 MG: 2 INJECTION INTRAMUSCULAR; INTRAVENOUS at 02:56

## 2022-12-14 RX ADMIN — MORPHINE SULFATE 2 MG: 2 INJECTION, SOLUTION INTRAMUSCULAR; INTRAVENOUS at 02:31

## 2022-12-14 NOTE — Clinical Note
[address]  [facility] EMERGENCY DEPT  [address] [address] [phone]    Work/School Note    Date: 12/13/2022    To Whom It May concern:      Dulce Reyes was seen and treated today in the emergency room by the following provider(s):  Attending Provider: Fabiana Yao MD.      Dulce Reyes is excused from work/school on 12/14/22. She is clear to return to work/school on 12/15/22.         Sincerely,          Stella Fields MD

## 2022-12-15 ENCOUNTER — APPOINTMENT (OUTPATIENT)
Dept: CT IMAGING | Age: 52
End: 2022-12-15
Attending: EMERGENCY MEDICINE
Payer: MEDICAID

## 2022-12-15 ENCOUNTER — HOSPITAL ENCOUNTER (EMERGENCY)
Age: 52
Discharge: HOME | End: 2022-12-15
Attending: EMERGENCY MEDICINE
Payer: MEDICAID

## 2022-12-15 VITALS
RESPIRATION RATE: 18 BRPM | TEMPERATURE: 98.6 F | HEART RATE: 80 BPM | DIASTOLIC BLOOD PRESSURE: 87 MMHG | SYSTOLIC BLOOD PRESSURE: 151 MMHG | OXYGEN SATURATION: 98 %

## 2022-12-15 DIAGNOSIS — T78.40XA ALLERGIC REACTION, INITIAL ENCOUNTER: ICD-10-CM

## 2022-12-15 DIAGNOSIS — K60.2 ANAL FISSURE: Primary | ICD-10-CM

## 2022-12-15 LAB
ALBUMIN SERPL-MCNC: 3.9 G/DL (ref 3.5–5)
ALBUMIN/GLOB SERPL: 1.1 {RATIO} (ref 1.1–2.2)
ALP SERPL-CCNC: 67 U/L (ref 45–117)
ALT SERPL-CCNC: 40 U/L (ref 12–78)
ANION GAP SERPL CALC-SCNC: 8 MMOL/L (ref 5–15)
APPEARANCE UR: CLEAR
AST SERPL-CCNC: 28 U/L (ref 15–37)
BACTERIA SPEC CULT: NORMAL
BACTERIA URNS QL MICRO: NEGATIVE /HPF
BILIRUB SERPL-MCNC: 0.5 MG/DL (ref 0.2–1)
BILIRUB UR QL: NEGATIVE
BUN SERPL-MCNC: 8 MG/DL (ref 6–20)
BUN/CREAT SERPL: 10 (ref 12–20)
CALCIUM SERPL-MCNC: 9.8 MG/DL (ref 8.5–10.1)
CHLORIDE SERPL-SCNC: 101 MMOL/L (ref 97–108)
CO2 SERPL-SCNC: 30 MMOL/L (ref 21–32)
COLOR UR: ABNORMAL
CREAT SERPL-MCNC: 0.78 MG/DL (ref 0.55–1.02)
EPITH CASTS URNS QL MICRO: ABNORMAL /LPF
GLOBULIN SER CALC-MCNC: 3.5 G/DL (ref 2–4)
GLUCOSE SERPL-MCNC: 204 MG/DL (ref 65–100)
GLUCOSE UR STRIP.AUTO-MCNC: >1000 MG/DL
HGB UR QL STRIP: NEGATIVE
KETONES UR QL STRIP.AUTO: NEGATIVE MG/DL
LEUKOCYTE ESTERASE UR QL STRIP.AUTO: ABNORMAL
NITRITE UR QL STRIP.AUTO: POSITIVE
PH UR STRIP: 6 [PH] (ref 5–8)
POTASSIUM SERPL-SCNC: 3.4 MMOL/L (ref 3.5–5.1)
PROT SERPL-MCNC: 7.4 G/DL (ref 6.4–8.2)
PROT UR STRIP-MCNC: NEGATIVE MG/DL
RBC #/AREA URNS HPF: ABNORMAL /HPF (ref 0–5)
SERVICE CMNT-IMP: NORMAL
SODIUM SERPL-SCNC: 139 MMOL/L (ref 136–145)
SP GR UR REFRACTOMETRY: 1.01
UA: UC IF INDICATED,UAUC: ABNORMAL
UROBILINOGEN UR QL STRIP.AUTO: 0.2 EU/DL (ref 0.2–1)
WBC URNS QL MICRO: ABNORMAL /HPF (ref 0–4)

## 2022-12-15 PROCEDURE — 80053 COMPREHEN METABOLIC PANEL: CPT

## 2022-12-15 PROCEDURE — 87086 URINE CULTURE/COLONY COUNT: CPT

## 2022-12-15 PROCEDURE — 36415 COLL VENOUS BLD VENIPUNCTURE: CPT

## 2022-12-15 PROCEDURE — 74011636637 HC RX REV CODE- 636/637: Performed by: EMERGENCY MEDICINE

## 2022-12-15 PROCEDURE — 99284 EMERGENCY DEPT VISIT MOD MDM: CPT

## 2022-12-15 PROCEDURE — 81001 URINALYSIS AUTO W/SCOPE: CPT

## 2022-12-15 PROCEDURE — 74011250636 HC RX REV CODE- 250/636: Performed by: EMERGENCY MEDICINE

## 2022-12-15 PROCEDURE — 74176 CT ABD & PELVIS W/O CONTRAST: CPT

## 2022-12-15 PROCEDURE — 74011250637 HC RX REV CODE- 250/637: Performed by: EMERGENCY MEDICINE

## 2022-12-15 RX ORDER — DIPHENHYDRAMINE HYDROCHLORIDE 50 MG/ML
25 INJECTION, SOLUTION INTRAMUSCULAR; INTRAVENOUS
Status: DISCONTINUED | OUTPATIENT
Start: 2022-12-15 | End: 2022-12-15

## 2022-12-15 RX ORDER — LIDOCAINE HYDROCHLORIDE 20 MG/ML
1 JELLY TOPICAL AS NEEDED
Qty: 30 ML | Refills: 0 | Status: SHIPPED | OUTPATIENT
Start: 2022-12-15

## 2022-12-15 RX ORDER — DIPHENHYDRAMINE HCL 50 MG
50 CAPSULE ORAL
Status: COMPLETED | OUTPATIENT
Start: 2022-12-15 | End: 2022-12-15

## 2022-12-15 RX ORDER — PREDNISONE 20 MG/1
60 TABLET ORAL
Status: COMPLETED | OUTPATIENT
Start: 2022-12-15 | End: 2022-12-15

## 2022-12-15 RX ORDER — ONDANSETRON 2 MG/ML
4 INJECTION INTRAMUSCULAR; INTRAVENOUS
Status: DISCONTINUED | OUTPATIENT
Start: 2022-12-15 | End: 2022-12-15

## 2022-12-15 RX ORDER — ONDANSETRON 4 MG/1
4 TABLET, ORALLY DISINTEGRATING ORAL
Status: COMPLETED | OUTPATIENT
Start: 2022-12-15 | End: 2022-12-15

## 2022-12-15 RX ADMIN — ONDANSETRON 4 MG: 4 TABLET, ORALLY DISINTEGRATING ORAL at 10:04

## 2022-12-15 RX ADMIN — DIPHENHYDRAMINE HYDROCHLORIDE 50 MG: 50 CAPSULE ORAL at 10:04

## 2022-12-15 RX ADMIN — PREDNISONE 60 MG: 20 TABLET ORAL at 10:04

## 2022-12-15 NOTE — ED PROVIDER NOTES
EMERGENCY DEPARTMENT HISTORY AND PHYSICAL EXAM      Date: 12/15/2022  Patient Name: Sara Shaver    History of Presenting Illness     Chief Complaint   Patient presents with    Allergic Reaction     Ambulatory into triage, cc of red, itchy skin which developed after taking first dose of oxycodone today. Hx of anaphylaxis to codeine. 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 +vomiting on zofran    Rectal Pain       History Provided By: Patient    HPI: Sara Shaver, 46 y.o. female with PMHx significant for IBD who presents with a chief complaint of rectal pain, rectal bleeding, and an allergic reaction. Patient was just seen in this emergency department yesterday for rectal pain, prescribed pain medication. She tells me she took a dose of oxycodone this morning and then developed flushing and a rash to her neck. She tells me she has multiple medication allergies. She states she has been having some bleeding from what she thinks is a recurrent anal fissure versus fistula. She does have a colorectal surgeon that she states she was supposed to see but was unable to do so because he canceled the appointment due to a family emergency. She reports she had a CT scan a couple of weeks ago at an [facility] that was unremarkable. She also tells me she has been on Keflex for a while for urinary tract infection but continues to have burning with urination and would like to be \"rechecked. \"  She additionally reports some nausea and vomiting for which she did take a Zofran at home. She denies chest pain, shortness of breath. PCP: Chandler Ambrocio MD    There are no other associated signs or symptoms. No other exacerbating or alleviating factors. There are no other complaints, changes, or physical findings at this time.     Current Outpatient Medications   Medication Sig Dispense Refill lidocaine (XYLOCAINE) 2 % jelly 1 mL by Mucous Membrane route as needed for Pain. 30 mL 0    oxyCODONE IR (Roxicodone) 5 mg immediate release tablet Take 1 Tablet by mouth every eight (8) hours as needed for Pain for up to 3 days. Max Daily Amount: 15 mg. 9 Tablet 0    ALPRAZolam (XANAX) 1 mg tablet TAKE 1/2 TO 1 TABLET BY MOUTH TWICE DAILY AS NEEDED FOR ANXIETY 60 Tablet 0    gabapentin (NEURONTIN) 300 mg capsule TAKE 1 CAPSULE BY MOUTH NIGHTLY -MAX DAILY AMOUNT: 300 MG. 30 Capsule 1    empagliflozin (Jardiance) 25 mg tablet Take 1 Tablet by mouth daily. 90 Tablet 1    sertraline (ZOLOFT) 100 mg tablet TAKE 2 TABLETS BY MOUTH EVERY NIGHT 180 Tablet 1    estradioL (ESTRACE) 0.5 mg tablet TAKE 1 TABLET BY MOUTH EVERY DAY 90 Tablet 1    ondansetron hcl (Zofran) 4 mg tablet Take 1 Tablet by mouth every eight (8) hours as needed for Nausea. 10 Tablet 0    amoxicillin-clavulanate (Augmentin) 875-125 mg per tablet Take 1 Tablet by mouth two (2) times a day. (Patient not taking: Reported on 10/25/2022) 20 Tablet 0    ondansetron hcl (Zofran) 4 mg tablet Take 1 Tablet by mouth every eight (8) hours as needed for Nausea. (Patient not taking: Reported on 10/25/2022) 20 Tablet 0    dicyclomine (BENTYL) 20 mg tablet Take 1 Tablet by mouth every six (6) hours. 20 Tablet 0    glimepiride (AMARYL) 4 mg tablet TAKE 1 TABLET BY MOUTH ONCE DAILY BEFORE BREAKFAST 90 Tablet 1    predniSONE (STERAPRED) 5 mg dose pack See administration instruction per 5mg dose pack 21 Tablet 0    ondansetron (Zofran ODT) 4 mg disintegrating tablet Take 1 Tablet by mouth every eight (8) hours as needed for Nausea. 20 Tablet 0    Ventolin HFA 90 mcg/actuation inhaler INHALE 1 PUFF BY MOUTH EVERY 4 HOURS AS NEEDED FOR WHEEZE 18 Each 1    losartan-hydroCHLOROthiazide (HYZAAR) 100-12.5 mg per tablet TAKE 1 TABLET BY MOUTH EVERY DAY 90 Tablet 1    cetirizine (ZyrTEC) 10 mg tablet Take 1 Tablet by mouth daily.  20 Tablet 0    melatonin 5 mg cap capsule Take  by mouth nightly. atorvastatin (LIPITOR) 20 mg tablet TAKE 1 TABLET BY MOUTH EVERY DAY 90 Tablet 1    naloxone (Narcan) 4 mg/actuation nasal spray Use 1 spray intranasally, then discard. Repeat with new spray every 2 min as needed for opioid overdose symptoms, alternating nostrils. (Patient not taking: Reported on 10/25/2022) 1 Each 0    dibucaine (NUPERCAINAL) 1 % rectal ointment by PeriANAL route every four (4) hours as needed for Pain. 28 g 0    omeprazole (PRILOSEC) 20 mg capsule Take 40 mg by mouth Daily (before breakfast). EPINEPHrine (EPIPEN) 0.3 mg/0.3 mL (1:1,000) injection 0.3 mL by IntraMUSCular route once as needed for up to 1 dose.  (Patient not taking: Reported on 10/25/2022) 0.3 mL 1     Past History     Past Medical History:  Past Medical History:   Diagnosis Date    Anal fissure     Anisocoria     Asthma     LAST EPISODE     Back pain     Cerumen impaction     Chronic kidney disease     hx uti in past    Coagulation defects     ocassional rectal bleeding due to anal fissure    Colovaginal fistula     Diabetes (HCC)     NIDDM    Diverticulitis     Diverticulosis     Enlarged tonsils     Frequent UTI     GERD (gastroesophageal reflux disease)     H/O endoscopy     with dilation    HA (headache)     Hepatic steatosis     History of colon resection     Hx of colonoscopy with polypectomy     benign    Hypertension     Ill-defined condition     FREQUENT HIVES    Ill-defined condition     HX ELEVATED LIVER ENZYMES    Morbid obesity (HCC)     Nausea & vomiting     during diverticulitis flare    Obesity     Otitis media     Pneumonia     about 15 yrs ago    Psychiatric disorder     ANXIETY    Recurrent tonsillitis     Sinusitis     Transfusion history ~ age 35    postop hysterectomy    Urticaria      Past Surgical History:  Past Surgical History:   Procedure Laterality Date    COLONOSCOPY N/A 03/28/2019    COLONOSCOPY performed by Faustina García MD at [facility] ENDOSCOPY    COLONOSCOPY N/A 10/02/2020 COLONOSCOPY performed by Karlos Sweeney MD at [address] N/A 10/25/2022    COLONOSCOPY AND EGD performed by Karlos Sweeney MD at [facility] [facility]  2021    cancer. HX BREAST REDUCTION  1992    [facility]. HX GI  2012    LAPAROSCOPIC HAND ASSISTED  POSS OPEN SIGMOID COLECTOMY POSS TEMPORARY DIVERTING LOOP ILEOSTOMY;  (no illeostomy needed)    HX GYN           HX GYN      cervical conization    HX HEENT      SINUS SURGERY LEFT X2    HX HEENT      SINUS SURGERY ON RIGHT X2    HX HEMORRHOIDECTOMY      HX HYSTERECTOMY      HX OTHER SURGICAL  2021    Sphincterotomy of rectum    HX PELVIC LAPAROSCOPY      HX EMMANUEL AND BSO      HX UROLOGICAL  2012     CYSTOSCOPY INSERTION URETERAL CATHETERS - Cystoscopy Insertion of bilateral ureteral stents    [place] ABDOMEN SURGERY PROC UNLISTED  2018    hernia repair at [address] UNLISTED      colon resection. Family History:  Family History   Problem Relation Age of Onset    Diabetes Mother     Cancer Mother         NON-HODGKINS LYMPHOMA    Anesth Problems Mother         PONV    Diabetes Father     Heart Disease Father         CAD - STENTS, PACEMAKER    Arrhythmia Father     Cancer Paternal Grandmother      Social History:  Social History     Tobacco Use    Smoking status: Never    Smokeless tobacco: Never   Substance Use Topics    Alcohol use: Not Currently    Drug use: Yes     Types: OTC, Prescription     Allergies: Allergies   Allergen Reactions    Aspirin Hives, Shortness of Breath and Itching    Codeine Hives, Itching and Angioedema     Pt had itching in mouth, on face and lips    Contrast Agent [Iodine] Hives, Itching and Angioedema     21: pt was given benadryl and solu-medrol prior to administration but pt had severe tongue swelling and drooling, lethargy and itching.  DO NOT GIVE PT    Flavoring Agent Anaphylaxis     Cherry flavor    Iodinated Contrast Media Anaphylaxis, Diarrhea, Hives, Nausea and Vomiting and Shortness of Breath    Percocet [Oxycodone-Acetaminophen] Hives, Itching and Angioedema     Pt had itching in mouth, on face and lips    Prilosec [Omeprazole Magnesium] Anaphylaxis     CHERRY FLAVORED ODT; PT TAKES REGULAR PRILOSEC AND IS OK    Dilaudid [Hydromorphone] Itching     Tolerates with benadryl    Cephalexin Hives    Ketorolac Rash     \"makes my eyes spasm and causes rash on my hands\" and \"makes my skin itch and makes me nervous\"    Morphine (Pf) Rash     According to rn, pt can take morphine with benadryl    Fentanyl Rash and Itching     Has had benadryl before     Review of Systems   Review of Systems   Constitutional:  Negative for chills and fever. HENT:  Negative for congestion, rhinorrhea and sore throat. Respiratory:  Negative for cough and shortness of breath. Cardiovascular:  Negative for chest pain. Gastrointestinal:  Positive for abdominal pain, blood in stool, rectal pain and vomiting. Negative for nausea. Genitourinary:  Positive for dysuria and flank pain. Negative for urgency. Skin:  Negative for rash. Neurological:  Negative for dizziness, light-headedness and headaches. All other systems reviewed and are negative. Physical Exam   Physical Exam  Vitals and nursing note reviewed. Exam conducted with a chaperone present. Constitutional:       General: She is not in acute distress. Appearance: She is well-developed. HENT:      Head: Normocephalic and atraumatic. Eyes:      Conjunctiva/sclera: Conjunctivae normal.      Pupils: Pupils are equal, round, and reactive to light. Cardiovascular:      Rate and Rhythm: Normal rate and regular rhythm. Pulmonary:      Effort: Pulmonary effort is normal. No respiratory distress. Breath sounds: Normal breath sounds. No stridor. Abdominal:      General: There is no distension. Palpations: Abdomen is soft. Tenderness: There is abdominal tenderness in the suprapubic area. Genitourinary:     Rectum: Anal fissure (no active bleeding noted) present. Musculoskeletal:         General: Normal range of motion. Cervical back: Normal range of motion. Skin:     General: Skin is warm and dry. Neurological:      Mental Status: She is alert and oriented to person, place, and time. Psychiatric:         Mood and Affect: Mood normal.         Thought Content: Thought content normal.     Diagnostic Study Results   Labs -     Recent Results (from the past 12 hour(s))   URINALYSIS W/ REFLEX CULTURE    Collection Time: 12/15/22  9:11 AM    Specimen: Urine   Result Value Ref Range    Color DARK YELLOW      Appearance CLEAR CLEAR      Specific gravity 1.010      pH (UA) 6.0 5.0 - 8.0      Protein Negative NEG mg/dL    Glucose >1,000 (A) NEG mg/dL    Ketone Negative NEG mg/dL    Bilirubin Negative NEG      Blood Negative NEG      Urobilinogen 0.2 0.2 - 1.0 EU/dL    Nitrites Positive (A) NEG      Leukocyte Esterase SMALL (A) NEG      WBC 0-4 0 - 4 /hpf    RBC 0-5 0 - 5 /hpf    Epithelial cells FEW FEW /lpf    Bacteria Negative NEG /hpf    UA:UC IF INDICATED CULTURE NOT INDICATED BY UA RESULT CNI     METABOLIC PANEL, COMPREHENSIVE    Collection Time: 12/15/22  9:37 AM   Result Value Ref Range    Sodium 139 136 - 145 mmol/L    Potassium 3.4 (L) 3.5 - 5.1 mmol/L    Chloride 101 97 - 108 mmol/L    CO2 30 21 - 32 mmol/L    Anion gap 8 5 - 15 mmol/L    Glucose 204 (H) 65 - 100 mg/dL    BUN 8 6 - 20 MG/DL    Creatinine 0.78 0.55 - 1.02 MG/DL    BUN/Creatinine ratio 10 (L) 12 - 20      eGFR >60 >60 ml/min/1.73m2    Calcium 9.8 8.5 - 10.1 MG/DL    Bilirubin, total 0.5 0.2 - 1.0 MG/DL    ALT (SGPT) 40 12 - 78 U/L    AST (SGOT) 28 15 - 37 U/L    Alk. phosphatase 67 45 - 117 U/L    Protein, total 7.4 6.4 - 8.2 g/dL    Albumin 3.9 3.5 - 5.0 g/dL    Globulin 3.5 2.0 - 4.0 g/dL    A-G Ratio 1.1 1.1 - 2.2         Radiologic Studies -   CT ABD PELV WO CONT   Final Result      1. Splenomegaly.  Otherwise no acute abnormality        CT ABD PELV WO CONT    Result Date: 12/15/2022  1. Splenomegaly. Otherwise no acute abnormality   Medical Decision Making   I am the first provider for this patient. I reviewed the vital signs, available nursing notes, past medical history, past surgical history, family history and social history. Vital Signs-Reviewed the patient's vital signs. Patient Vitals for the past 12 hrs:   Temp Pulse Resp BP SpO2   12/15/22 0905 98.6 °F (37 °C) 80 18 (!) 151/87 98 %       Pulse Oximetry Analysis - 98% on ra      Records Reviewed: Nursing Notes and Old Medical Records    Provider Notes (Medical Decision Making):   Patient presents with a chief complaint of rectal pain, rectal bleeding, flank pain, abdominal pain, nausea, vomiting. On my evaluation she is nontoxic-appearing, mildly hypertensive otherwise stable vital signs. Very minimal tenderness palpation in the lower abdomen. She did not receive CT imaging when she was seen yesterday so we will CT today. She has anaphylaxis to contrast dye so will scan without. We will additionally send repeat urine sample, basic lab work. I did review her lab work from yesterday, within normal limits    ED Course:   Initial assessment performed. The patients presenting problems have been discussed, and they are in agreement with the care plan formulated and outlined with them. I have encouraged them to ask questions as they arise throughout their visit. CBC clotted, patient does not wish to have this redrawn. I think this is reasonable given normal white count and normal hemoglobin yesterday. Urinalysis nitrite positive today we will repeat urine culture. CT abdomen without acute findings. She does have a fissure on exam.  Will represcribe lidocaine gel and advised follow-up with her surgeon.        Procedures:  Procedures    Medications Given in ED:  Medications   predniSONE (DELTASONE) tablet 60 mg (60 mg Oral Given 12/15/22 1004) diphenhydrAMINE (BENADRYL) capsule 50 mg (50 mg Oral Given 12/15/22 1004)   ondansetron (ZOFRAN ODT) tablet 4 mg (4 mg Oral Given 12/15/22 1004)         Critical Care:  none    Disposition:  Discharge Note:  The patient has been re-evaluated and is ready for discharge. Reviewed available results with patient. Counseled patient on diagnosis and care plan. Patient has expressed understanding, and all questions have been answered. Patient agrees with plan and agrees to follow up as recommended, or to return to the ED if their symptoms worsen. Discharge instructions have been provided and explained to the patient, along with reasons to return to the ED. PLAN:  1. Current Discharge Medication List        START taking these medications    Details   lidocaine (XYLOCAINE) 2 % jelly 1 mL by Mucous Membrane route as needed for Pain. Qty: 30 mL, Refills: 0  Start date: 12/15/2022           2. Follow-up Information       Follow up With Specialties Details Why Contact Info    Thahn Rubalcava MD Colon and Rectal Surgery Schedule an appointment as soon as possible for a visit   [address] [facility] [address]      Lesvia Baldwin MD Internal Medicine Physician Schedule an appointment as soon as possible for a visit   [address]  [facility] [address]  [phone]            Return to ED if worse     Diagnosis     Clinical Impression:   1. Anal fissure    2. Allergic reaction, initial encounter            Please note that this dictation was completed with [employer], the computer voice recognition software. Quite often unanticipated grammatical, syntax, homophones, and other interpretive errors are inadvertently transcribed by the computer software. Please disregard these errors.   Please excuse any errors that have escaped final proofreading

## 2022-12-15 NOTE — Clinical Note
[address]  [facility] EMERGENCY DEPT  [address] [facility]  [place] [id number] [phone]    Work/School Note    Date: 12/15/2022    To Whom It May concern:    Alexandr Quiroz was seen and treated today in the emergency room by the following provider(s):  Attending Provider: Isidro Slater MD.      Alexandr Quiroz is excused from work/school on 12/15/2022 through 12/17/2022. She is medically clear to return to work/school on 12/18/2022.          Sincerely,          Ronan Delarosa MD

## 2022-12-16 LAB
BACTERIA SPEC CULT: NORMAL
SERVICE CMNT-IMP: NORMAL

## 2022-12-16 NOTE — ED PROVIDER NOTES
EMERGENCY DEPARTMENT HISTORY AND PHYSICAL EXAM      Date: 12/14/2022  Patient Name: Sara Shaver    History of Presenting Illness     Chief Complaint   Patient presents with    Anal Pain     Patient stated that she is having intense rectal pain for the last two weeks. She made an appointment with her GI doc but he cancelled her appointment because his son was sick. Patient stated she has had three anal fistulas in the past. Patient also having pelvic pain and vomiting. Took Tylenol 1 gram around 1800, Ibuprofen 400mg around 1200, Bentyl around 1900 and rectal cream but she stated that the cream burned really bad. She also notes rectal/vaginal bleeding, concerning for colovaginal fistula       History Provided By: Patient    HPI: Sara Shaver, 46 y.o. female with PMHx significant for inflammatory bowel disease, anal fissure, colovesicular fistula, anxiety/depression, hypertension presents to the ED with chief complaint of rectal pain and some vaginal clots. Symptoms started about 3 weeks ago. Patient reports she tried to make an appointment with her colorectal surgeon but has not been able to see them yet. She has history of multiple previous surgeries for fistulas and abscess. She took Tylenol 1 g at 6 PM and ibuprofen 400 mg at noon. She also took Bentyl and used Lidodaine cream that was prescribed for her rectal area but states that cream burned. Denies any fevers or chills. Denies any nausea or vomiting. She reports mild lower abdominal pain. Today she had some nausea, vomiting and diarrhea and has had some pain in her left flank. She is concerned about a possible UTI. She recently was treated for UTI and completed her course of Keflex. Reports small amount of vaginal bleeding as well. PCP: Chandler Ambrocio MD    No current facility-administered medications on file prior to encounter.      Current Outpatient Medications on File Prior to Encounter   Medication Sig Dispense Refill ALPRAZolam (XANAX) 1 mg tablet TAKE 1/2 TO 1 TABLET BY MOUTH TWICE DAILY AS NEEDED FOR ANXIETY 60 Tablet 0    gabapentin (NEURONTIN) 300 mg capsule TAKE 1 CAPSULE BY MOUTH NIGHTLY -MAX DAILY AMOUNT: 300 MG. 30 Capsule 1    empagliflozin (Jardiance) 25 mg tablet Take 1 Tablet by mouth daily. 90 Tablet 1    sertraline (ZOLOFT) 100 mg tablet TAKE 2 TABLETS BY MOUTH EVERY NIGHT 180 Tablet 1    estradioL (ESTRACE) 0.5 mg tablet TAKE 1 TABLET BY MOUTH EVERY DAY 90 Tablet 1    ondansetron hcl (Zofran) 4 mg tablet Take 1 Tablet by mouth every eight (8) hours as needed for Nausea. 10 Tablet 0    amoxicillin-clavulanate (Augmentin) 875-125 mg per tablet Take 1 Tablet by mouth two (2) times a day. (Patient not taking: Reported on 10/25/2022) 20 Tablet 0    ondansetron hcl (Zofran) 4 mg tablet Take 1 Tablet by mouth every eight (8) hours as needed for Nausea. (Patient not taking: Reported on 10/25/2022) 20 Tablet 0    dicyclomine (BENTYL) 20 mg tablet Take 1 Tablet by mouth every six (6) hours. 20 Tablet 0    glimepiride (AMARYL) 4 mg tablet TAKE 1 TABLET BY MOUTH ONCE DAILY BEFORE BREAKFAST 90 Tablet 1    predniSONE (STERAPRED) 5 mg dose pack See administration instruction per 5mg dose pack 21 Tablet 0    ondansetron (Zofran ODT) 4 mg disintegrating tablet Take 1 Tablet by mouth every eight (8) hours as needed for Nausea. 20 Tablet 0    Ventolin HFA 90 mcg/actuation inhaler INHALE 1 PUFF BY MOUTH EVERY 4 HOURS AS NEEDED FOR WHEEZE 18 Each 1    losartan-hydroCHLOROthiazide (HYZAAR) 100-12.5 mg per tablet TAKE 1 TABLET BY MOUTH EVERY DAY 90 Tablet 1    cetirizine (ZyrTEC) 10 mg tablet Take 1 Tablet by mouth daily. 20 Tablet 0    melatonin 5 mg cap capsule Take  by mouth nightly. atorvastatin (LIPITOR) 20 mg tablet TAKE 1 TABLET BY MOUTH EVERY DAY 90 Tablet 1    naloxone (Narcan) 4 mg/actuation nasal spray Use 1 spray intranasally, then discard.  Repeat with new spray every 2 min as needed for opioid overdose symptoms, alternating nostrils. (Patient not taking: Reported on 10/25/2022) 1 Each 0    dibucaine (NUPERCAINAL) 1 % rectal ointment by PeriANAL route every four (4) hours as needed for Pain. 28 g 0    omeprazole (PRILOSEC) 20 mg capsule Take 40 mg by mouth Daily (before breakfast). EPINEPHrine (EPIPEN) 0.3 mg/0.3 mL (1:1,000) injection 0.3 mL by IntraMUSCular route once as needed for up to 1 dose. (Patient not taking: Reported on 10/25/2022) 0.3 mL 1       Past History     Past Medical History:  Past Medical History:   Diagnosis Date    Anal fissure     Anisocoria     Asthma     LAST EPISODE     Back pain     Cerumen impaction     Chronic kidney disease     hx uti in past    Coagulation defects     ocassional rectal bleeding due to anal fissure    Colovaginal fistula     Diabetes (HCC)     NIDDM    Diverticulitis     Diverticulosis     Enlarged tonsils     Frequent UTI     GERD (gastroesophageal reflux disease)     H/O endoscopy     with dilation    HA (headache)     Hepatic steatosis     History of colon resection     Hx of colonoscopy with polypectomy     benign    Hypertension     Ill-defined condition     FREQUENT HIVES    Ill-defined condition     HX ELEVATED LIVER ENZYMES    Morbid obesity (HCC)     Nausea & vomiting     during diverticulitis flare    Obesity     Otitis media     Pneumonia     about 15 yrs ago    Psychiatric disorder     ANXIETY    Recurrent tonsillitis     Sinusitis     Transfusion history ~ age 35    postop hysterectomy    Urticaria        Past Surgical History:  Past Surgical History:   Procedure Laterality Date    COLONOSCOPY N/A 03/28/2019    COLONOSCOPY performed by Luz Elena Noel MD at [facility] ENDOSCOPY    COLONOSCOPY N/A 10/02/2020    COLONOSCOPY performed by Luz Elena Noel MD at [facility] ENDOSCOPY    COLONOSCOPY N/A 10/25/2022    COLONOSCOPY AND EGD performed by Luz Elena Noel MD at [address] [place] [address]  11/2021    cancer. HX BREAST REDUCTION  1992    [facility].     HX GI  07/31/2012 LAPAROSCOPIC HAND ASSISTED  POSS OPEN SIGMOID COLECTOMY POSS TEMPORARY DIVERTING LOOP ILEOSTOMY;  (no illeostomy needed)    HX GYN           HX GYN      cervical conization    HX HEENT      SINUS SURGERY LEFT X2    HX HEENT      SINUS SURGERY ON RIGHT X2    HX HEMORRHOIDECTOMY      HX HYSTERECTOMY      HX OTHER SURGICAL  2021    Sphincterotomy of rectum    HX PELVIC LAPAROSCOPY      HX EMMANUEL AND BSO      HX UROLOGICAL  2012     CYSTOSCOPY INSERTION URETERAL CATHETERS - Cystoscopy Insertion of bilateral ureteral stents    MA ABDOMEN SURGERY PROC UNLISTED  2018    hernia repair at [address] UNLISTED      colon resection. Family History:  Family History   Problem Relation Age of Onset    Diabetes Mother     Cancer Mother         NON-HODGKINS LYMPHOMA    Anesth Problems Mother         PONV    Diabetes Father     Heart Disease Father         CAD - STENTS, PACEMAKER    Arrhythmia Father     Cancer Paternal Grandmother        Social History:  Social History     Tobacco Use    Smoking status: Never    Smokeless tobacco: Never   Substance Use Topics    Alcohol use: Not Currently    Drug use: Yes     Types: OTC, Prescription       Allergies: Allergies   Allergen Reactions    Aspirin Hives, Shortness of Breath and Itching    Codeine Hives, Itching and Angioedema     Pt had itching in mouth, on face and lips    Contrast Agent [Iodine] Hives, Itching and Angioedema     21: pt was given benadryl and solu-medrol prior to administration but pt had severe tongue swelling and drooling, lethargy and itching.  DO NOT GIVE PT    Flavoring Agent Anaphylaxis     Cherry flavor    Iodinated Contrast Media Anaphylaxis, Diarrhea, Hives, Nausea and Vomiting and Shortness of Breath    Percocet [Oxycodone-Acetaminophen] Hives, Itching and Angioedema     Pt had itching in mouth, on face and lips    Prilosec [Omeprazole Magnesium] Anaphylaxis     CHERRY FLAVORED ODT; PT TAKES REGULAR PRILOSEC AND IS OK    Dilaudid [Hydromorphone] Itching     Tolerates with benadryl    Cephalexin Hives    Ketorolac Rash     \"makes my eyes spasm and causes rash on my hands\" and \"makes my skin itch and makes me nervous\"    Morphine (Pf) Rash     According to rn, pt can take morphine with benadryl    Fentanyl Rash and Itching     Has had benadryl before         Review of Systems   Review of Systems   Constitutional:  Negative for chills and fever. HENT: Negative. Respiratory:  Negative for cough, chest tightness, shortness of breath and wheezing. Cardiovascular:  Negative for chest pain and palpitations. Gastrointestinal:  Positive for abdominal pain, diarrhea, nausea and vomiting. Negative for constipation. Genitourinary:  Positive for flank pain and vaginal bleeding. Negative for difficulty urinating, dysuria and hematuria. Musculoskeletal:  Negative for back pain, myalgias and neck pain. Skin:  Negative for rash and wound. Neurological:  Negative for dizziness, syncope, light-headedness and headaches. Psychiatric/Behavioral:  Negative for confusion. The patient is nervous/anxious. All other systems reviewed and are negative. Physical Exam   General appearance - well nourished, well appearing, and in no distress  Eyes - pupils equal and reactive, extraocular eye movements intact  ENT - mucous membranes moist, pharynx normal without lesions  Neck - supple, no significant adenopathy; non-tender to palpation  Chest - clear to auscultation, no wheezes, rales or rhonchi; non-tender to palpation  Heart - normal rate and regular rhythm, S1 and S2 normal, no murmurs noted  Abdomen - soft,   mild suprapubic, nondistended, no masses or organomegaly  Rectal-tender to palpation perirectal area, no visible hemorrhoids,.   No definite fissure or fistula noted,postsurgical changes  Musculoskeletal - no joint tenderness, deformity or swelling; normal ROM  Extremities - peripheral pulses normal, no pedal edema  Skin - normal coloration and turgor, no rashes  Neurological - alert, oriented x3, normal speech, no focal findings or movement disorder noted    Diagnostic Study Results     Labs -     Recent Results (from the past 72 hour(s))   CBC WITH AUTOMATED DIFF    Collection Time: 12/13/22 11:47 PM   Result Value Ref Range    WBC 6.1 3.6 - 11.0 K/uL    RBC 4.64 3.80 - 5.20 M/uL    HGB 13.6 11.5 - 16.0 g/dL    HCT 38.4 35.0 - 47.0 %    MCV 82.8 80.0 - 99.0 FL    MCH 29.3 26.0 - 34.0 PG    MCHC 35.4 30.0 - 36.5 g/dL    RDW 12.9 11.5 - 14.5 %    PLATELET 710 472 - 307 K/uL    MPV 10.3 8.9 - 12.9 FL    NRBC 0.0 0  WBC    ABSOLUTE NRBC 0.00 0.00 - 0.01 K/uL    NEUTROPHILS 63 32 - 75 %    LYMPHOCYTES 26 12 - 49 %    MONOCYTES 5 5 - 13 %    EOSINOPHILS 4 0 - 7 %    BASOPHILS 1 0 - 1 %    IMMATURE GRANULOCYTES 1 (H) 0.0 - 0.5 %    ABS. NEUTROPHILS 4.0 1.8 - 8.0 K/UL    ABS. LYMPHOCYTES 1.6 0.8 - 3.5 K/UL    ABS. MONOCYTES 0.3 0.0 - 1.0 K/UL    ABS. EOSINOPHILS 0.2 0.0 - 0.4 K/UL    ABS. BASOPHILS 0.0 0.0 - 0.1 K/UL    ABS. IMM. GRANS. 0.0 0.00 - 0.04 K/UL    DF AUTOMATED     METABOLIC PANEL, COMPREHENSIVE    Collection Time: 12/13/22 11:47 PM   Result Value Ref Range    Sodium 138 136 - 145 mmol/L    Potassium 3.2 (L) 3.5 - 5.1 mmol/L    Chloride 100 97 - 108 mmol/L    CO2 30 21 - 32 mmol/L    Anion gap 8 5 - 15 mmol/L    Glucose 274 (H) 65 - 100 mg/dL    BUN 10 6 - 20 MG/DL    Creatinine 0.81 0.55 - 1.02 MG/DL    BUN/Creatinine ratio 12 12 - 20      eGFR >60 >60 ml/min/1.73m2    Calcium 10.5 (H) 8.5 - 10.1 MG/DL    Bilirubin, total 0.6 0.2 - 1.0 MG/DL    ALT (SGPT) 44 12 - 78 U/L    AST (SGOT) 25 15 - 37 U/L    Alk.  phosphatase 66 45 - 117 U/L    Protein, total 7.6 6.4 - 8.2 g/dL    Albumin 4.1 3.5 - 5.0 g/dL    Globulin 3.5 2.0 - 4.0 g/dL    A-G Ratio 1.2 1.1 - 2.2     MAGNESIUM    Collection Time: 12/13/22 11:47 PM   Result Value Ref Range    Magnesium 1.7 1.6 - 2.4 mg/dL   URINALYSIS W/ REFLEX CULTURE Collection Time: 12/14/22  1:52 AM    Specimen: Urine   Result Value Ref Range    Color YELLOW/STRAW      Appearance CLEAR CLEAR      Specific gravity 1.024      pH (UA) 6.0 5.0 - 8.0      Protein Negative NEG mg/dL    Glucose >1,000 (A) NEG mg/dL    Ketone TRACE (A) NEG mg/dL    Bilirubin Negative NEG      Blood TRACE (A) NEG      Urobilinogen 0.2 0.2 - 1.0 EU/dL    Nitrites Negative NEG      Leukocyte Esterase MODERATE (A) NEG      WBC 10-20 0 - 4 /hpf    RBC 0-5 0 - 5 /hpf    Epithelial cells MANY (A) FEW /lpf    Bacteria Negative NEG /hpf    UA:UC IF INDICATED URINE CULTURE ORDERED (A) CNI      CA Oxalate crystals 2+ (A) NEG   CULTURE, URINE    Collection Time: 12/14/22  1:52 AM    Specimen: Urine   Result Value Ref Range    Special Requests: NO SPECIAL REQUESTS  Reflexed from [id number]        Culture result: No growth (<1,000 CFU/ML)     URINALYSIS W/ REFLEX CULTURE    Collection Time: 12/15/22  9:11 AM    Specimen: Urine   Result Value Ref Range    Color DARK YELLOW      Appearance CLEAR CLEAR      Specific gravity 1.010      pH (UA) 6.0 5.0 - 8.0      Protein Negative NEG mg/dL    Glucose >1,000 (A) NEG mg/dL    Ketone Negative NEG mg/dL    Bilirubin Negative NEG      Blood Negative NEG      Urobilinogen 0.2 0.2 - 1.0 EU/dL    Nitrites Positive (A) NEG      Leukocyte Esterase SMALL (A) NEG      WBC 0-4 0 - 4 /hpf    RBC 0-5 0 - 5 /hpf    Epithelial cells FEW FEW /lpf    Bacteria Negative NEG /hpf    UA:UC IF INDICATED CULTURE NOT INDICATED BY UA RESULT CNI     METABOLIC PANEL, COMPREHENSIVE    Collection Time: 12/15/22  9:37 AM   Result Value Ref Range    Sodium 139 136 - 145 mmol/L    Potassium 3.4 (L) 3.5 - 5.1 mmol/L    Chloride 101 97 - 108 mmol/L    CO2 30 21 - 32 mmol/L    Anion gap 8 5 - 15 mmol/L    Glucose 204 (H) 65 - 100 mg/dL    BUN 8 6 - 20 MG/DL    Creatinine 0.78 0.55 - 1.02 MG/DL    BUN/Creatinine ratio 10 (L) 12 - 20      eGFR >60 >60 ml/min/1.73m2    Calcium 9.8 8.5 - 10.1 MG/DL Bilirubin, total 0.5 0.2 - 1.0 MG/DL    ALT (SGPT) 40 12 - 78 U/L    AST (SGOT) 28 15 - 37 U/L    Alk. phosphatase 67 45 - 117 U/L    Protein, total 7.4 6.4 - 8.2 g/dL    Albumin 3.9 3.5 - 5.0 g/dL    Globulin 3.5 2.0 - 4.0 g/dL    A-G Ratio 1.1 1.1 - 2.2         Radiologic Studies -   No orders to display     CT Results  (Last 48 hours)                 12/15/22 0944  CT ABD PELV WO CONT Final result    Impression:      1. Splenomegaly. Otherwise no acute abnormality       Narrative:  EXAM: CT ABD PELV WO CONT       INDICATION: abd pain, flank pain, rectal pain       COMPARISON: 5/25/2022       IV CONTRAST: None. ORAL CONTRAST: None       TECHNIQUE:    Thin axial images were obtained through the abdomen and pelvis. Coronal and   sagittal reformats were generated. CT dose reduction was achieved through use of   a standardized protocol tailored for this examination and automatic exposure   control for dose modulation. The absence of intravenous contrast material reduces the sensitivity for   evaluation of the vasculature and solid organs. FINDINGS:    LOWER THORAX: No significant abnormality in the incidentally imaged lower chest.   LIVER: No mass. BILIARY TREE: Surgically absent CBD is not dilated. SPLEEN: Prior splenic granulomatosis. Spleen is enlarged at 17 cm unchanged   PANCREAS: No focal abnormality. ADRENALS: Unremarkable. KIDNEYS/URETERS: No calculus or hydronephrosis. STOMACH: Unremarkable. SMALL BOWEL: No dilatation or wall thickening. COLON: No dilatation or wall thickening. Few scattered left-sided diverticula. No acute diverticulitis   APPENDIX: Surgically absent   PERITONEUM: No ascites or pneumoperitoneum. RETROPERITONEUM: No lymphadenopathy or aortic aneurysm. Calcifications   REPRODUCTIVE ORGANS: Surgically absent   URINARY BLADDER: No mass or calculus. BONES: No destructive bone lesion.    ABDOMINAL WALL: Tiny umbilical hernia containing fat   ADDITIONAL COMMENTS: N/A                 CXR Results  (Last 48 hours)      None              Medical Decision Making   I am the first provider for this patient. I reviewed the vital signs, available nursing notes, past medical history, past surgical history, family history and social history. Vital Signs-Reviewed the patient's vital signs. Records Reviewed: Nursing Notes and Old Medical Records    Provider Notes (Medical Decision Making):   Patient presents to the ED with rectal pain and lower abdominal pain. History of multiple fistulas and abscesses. Recently treated for UTI. Differential diagnosis includes perirectal abscess, fistula, UTI  We will check CBC, CMP, UA. If white count is elevated, will consider CT, however patient tells me she has had multiple CTs in the past which have been unremarkable and she has anaphylactic reaction to IV contrast which makes visualization of potential abscesses difficult on CT. ED Course:   Initial assessment performed. The patients presenting problems have been discussed, and they are in agreement with the care plan formulated and outlined with them. I have encouraged them to ask questions as they arise throughout their visit. Progress Notes:  Labs and imaging reviewed. CBC is unremarkable. CMP shows hypokalemia with potassium of 3.2.  UA shows many epithelial cells with 10-20 white blood cells but no bacteria. Urine sent for culture. Patient instructed to follow-up with colorectal surgery. Prescribed oxycodone (patient tells me she is able to take this medicine despite reported allergy)    Disposition:  Discharge home    PLAN:  1. Discharge Medication List as of 12/14/2022  4:34 AM        2.    Follow-up Information       Follow up With Specialties Details Why Contact Info    Rose Marin MD Colon and Rectal Surgery Call today  [address] [facility] [id number] [phone]      \[facility] [place] [facility]\"" EMERGENCY DEPT Emergency Medicine  If symptoms worsen [address] [place] [address]  [phone]          Return to ED if worse     Diagnosis     Clinical Impression:   1.  Rectal or anal pain

## 2022-12-21 DIAGNOSIS — F41.9 ANXIETY: ICD-10-CM

## 2022-12-22 RX ORDER — ALPRAZOLAM 1 MG/1
TABLET ORAL
Qty: 60 TABLET | Refills: 0 | Status: SHIPPED | OUTPATIENT
Start: 2022-12-22

## 2022-12-24 ENCOUNTER — HOSPITAL ENCOUNTER (OUTPATIENT)
Dept: MAMMOGRAPHY | Age: 52
End: 2022-12-24
Attending: FAMILY MEDICINE
Payer: MEDICAID

## 2022-12-24 DIAGNOSIS — Z12.31 VISIT FOR SCREENING MAMMOGRAM: ICD-10-CM

## 2022-12-24 PROCEDURE — 77063 BREAST TOMOSYNTHESIS BI: CPT

## 2022-12-28 ENCOUNTER — TELEPHONE (OUTPATIENT)
Dept: INTERNAL MEDICINE CLINIC | Age: 52
End: 2022-12-28

## 2022-12-28 DIAGNOSIS — J98.01 BRONCHOSPASM: Primary | ICD-10-CM

## 2022-12-28 RX ORDER — NEBULIZER AND COMPRESSOR
EACH MISCELLANEOUS
Qty: 1 EACH | Refills: 0 | Status: SHIPPED | OUTPATIENT
Start: 2022-12-28

## 2022-12-28 RX ORDER — ALBUTEROL SULFATE 0.83 MG/ML
2.5 SOLUTION RESPIRATORY (INHALATION)
Qty: 50 EACH | Refills: 1 | Status: SHIPPED | OUTPATIENT
Start: 2022-12-28

## 2022-12-28 NOTE — TELEPHONE ENCOUNTER
Pt states has covid and the ED prescribed nebulizer meds but states when she got home she found her nebulizer wont turn on and wires are frayed    Pt requests a new order for a nebulizer machine please    [facility]/pharmacy #[id number]- [address]  [phone]        (Fyi Pt accepted soonest and  scheduled a follow up for 1/3/23 as a vv)      Caller was advised that Med order requests will be refilled as soon as possible, however there can be a 48-72 business hour turn around on refill requests.

## 2022-12-29 NOTE — TELEPHONE ENCOUNTER
Called, spoke to pt. Two pt identifiers confirmed. Patient informed per Dr. Brittani Delgado    See message below. Needs to have someone take it back to pharmacy and get it replaced. Does not need a new order. Patient states the machine is 21years old and [facility] does not carry that machine. Patient states only pharmacy's such as [facility] and [facility] ect carries the machine. Patient is requesting a new machine. Patient advised her request would be resubmitted to Dr. Brittani Delgado. Pt verbalized understanding of information discussed w/ no further questions at this time.          Keyana Cannon LPN

## 2023-01-02 NOTE — PROGRESS NOTES
Bailey Valentine is a 46 y.o. female who presents for overdue follow-up. In ED  at [facility] now with SOB, has Covid. No pneumonia. Being discharged. To repeat steroid course    Seen in ED on 12/15 with allergic reaction to oxycodone. Patient has a history of codeine allergy. Patient has been treated for a perirectal fistula, has ulcerative colitis. Sees Dr. Tad Gomez, GI and colorectal surgeon, Dr Jenifer Chanel. 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 Has had rectal pain and rectal bleeding. CT abdomen pelvis without contrast on 12/15 with splenomegaly otherwise normal.  In the ED given topical lidocaine 2% jelly for her rectal pain and advised to follow-up with her colorectal specialist.    Recent UTI treated with Keflex. Diabetic. On Amaryl and Jardiance. Has been on steroids, glucose high. Not checking glucose at home. Treated for anxiety, depression,and chronic insomnia. Significant situational stress. On sertraline 200mg daily. Prior lexapro, not tolerated. Prior paxil, tolerated. Taking xanax 1mg  1/2-1 tablet twice a day for breakthrough anxiety. This is an established visit conducted via telemedicine with video. The patient has been instructed that this meets HIPAA criteria and acknowledges and agrees to this method of visitation. Pursuant to the emergency declaration under the [facility], 1135 waiver authority and the [employer] Act, this Virtual Visit was conducted, with patient's consent, to reduce the patient's risk of exposure to COVID-19 and provide continuity of care for an established patient. Services were provided through a video synchronous discussion virtually to substitute for in-person clinic visit.         Past Medical History:   Diagnosis Date    Anal fissure     Anisocoria     Asthma     LAST EPISODE     Back pain     Cerumen impaction     Chronic kidney disease     hx uti in past    Coagulation defects     ocassional rectal bleeding due to anal fissure    Colovaginal fistula     Diabetes (HCC)     NIDDM    Diverticulitis     Diverticulosis     Enlarged tonsils     Frequent UTI     GERD (gastroesophageal reflux disease)     H/O endoscopy     with dilation    HA (headache)     Hepatic steatosis     History of colon resection     Hx of colonoscopy with polypectomy     benign    Hypertension     Ill-defined condition     FREQUENT HIVES    Ill-defined condition     HX ELEVATED LIVER ENZYMES    Morbid obesity (HCC)     Nausea & vomiting     during diverticulitis flare    Obesity     Otitis media     Pneumonia     about 15 yrs ago    Psychiatric disorder     ANXIETY    Recurrent tonsillitis     Sinusitis     Transfusion history ~ age 35    postop hysterectomy    Urticaria        Family History   Problem Relation Age of Onset    Diabetes Mother     Cancer Mother         NON-HODGKINS LYMPHOMA    Anesth Problems Mother         PONV    Breast Cancer Sister 48    Cancer Paternal Grandmother     Diabetes Father     Heart Disease Father         CAD - STENTS, PACEMAKER    Arrhythmia Father        Social History     Socioeconomic History    Marital status:      Spouse name: Not on file    Number of children: Not on file    Years of education: Not on file    Highest education level: Not on file   Occupational History    Not on file   Tobacco Use    Smoking status: Never    Smokeless tobacco: Never   Vaping Use    Vaping Use: Not on file   Substance and Sexual Activity    Alcohol use: Not Currently    Drug use: Yes     Types: OTC, Prescription    Sexual activity: Never   Other Topics Concern    Not on file   Social History Narrative    Not on file     Social Determinants of Health     Financial Resource Strain: Not on file   Food Insecurity: Not on file   Transportation Needs: Not on file   Physical Activity: Not on file   Stress: Not on file   Social Connections: Not on file   Intimate Partner Violence: Not on file   Housing Stability: Not on file       Current Outpatient Medications on File Prior to Visit   Medication Sig Dispense Refill    Nebulizer & Compressor machine Use as directed 1 Each 0    Nebulizer Accessories kit Use as directed 1 Kit 0    albuterol (PROVENTIL VENTOLIN) 2.5 mg /3 mL (0.083 %) nebu 3 mL by Nebulization route every six (6) hours as needed for Wheezing. 50 Each 1    ALPRAZolam (XANAX) 1 mg tablet TAKE 1/2-1 TABLET BY MOUTH TWICE DAILY AS NEEDED FOR ANXIETY 60 Tablet 0    empagliflozin (Jardiance) 25 mg tablet Take 1 Tablet by mouth daily. 90 Tablet 1    sertraline (ZOLOFT) 100 mg tablet TAKE 2 TABLETS BY MOUTH EVERY NIGHT 180 Tablet 1    estradioL (ESTRACE) 0.5 mg tablet TAKE 1 TABLET BY MOUTH EVERY DAY 90 Tablet 1    ondansetron hcl (Zofran) 4 mg tablet Take 1 Tablet by mouth every eight (8) hours as needed for Nausea. 10 Tablet 0    dicyclomine (BENTYL) 20 mg tablet Take 1 Tablet by mouth every six (6) hours. 20 Tablet 0    ondansetron (Zofran ODT) 4 mg disintegrating tablet Take 1 Tablet by mouth every eight (8) hours as needed for Nausea. 20 Tablet 0    Ventolin HFA 90 mcg/actuation inhaler INHALE 1 PUFF BY MOUTH EVERY 4 HOURS AS NEEDED FOR WHEEZE 18 Each 1    losartan-hydroCHLOROthiazide (HYZAAR) 100-12.5 mg per tablet TAKE 1 TABLET BY MOUTH EVERY DAY 90 Tablet 1    atorvastatin (LIPITOR) 20 mg tablet TAKE 1 TABLET BY MOUTH EVERY DAY 90 Tablet 1    naloxone (Narcan) 4 mg/actuation nasal spray Use 1 spray intranasally, then discard. Repeat with new spray every 2 min as needed for opioid overdose symptoms, alternating nostrils. 1 Each 0    omeprazole (PRILOSEC) 20 mg capsule Take 40 mg by mouth Daily (before breakfast). [DISCONTINUED] lidocaine (XYLOCAINE) 2 % jelly 1 mL by Mucous Membrane route as needed for Pain. 30 mL 0    [DISCONTINUED] gabapentin (NEURONTIN) 300 mg capsule TAKE 1 CAPSULE BY MOUTH NIGHTLY -MAX DAILY AMOUNT: 300 MG.  [address] [address] Capsule 1    amoxicillin-clavulanate (Augmentin) 875-125 mg per tablet Take 1 Tablet by mouth two (2) times a day. (Patient not taking: No sig reported) 20 Tablet 0    ondansetron hcl (Zofran) 4 mg tablet Take 1 Tablet by mouth every eight (8) hours as needed for Nausea. (Patient not taking: No sig reported) 20 Tablet 0    glimepiride (AMARYL) 4 mg tablet TAKE 1 TABLET BY MOUTH ONCE DAILY BEFORE BREAKFAST 90 Tablet 1    [DISCONTINUED] predniSONE (STERAPRED) 5 mg dose pack See administration instruction per 5mg dose pack 21 Tablet 0    [DISCONTINUED] cetirizine (ZyrTEC) 10 mg tablet Take 1 Tablet by mouth daily. 20 Tablet 0    [DISCONTINUED] melatonin 5 mg cap capsule Take  by mouth nightly. [DISCONTINUED] dibucaine (NUPERCAINAL) 1 % rectal ointment by PeriANAL route every four (4) hours as needed for Pain. 28 g 0    EPINEPHrine (EPIPEN) 0.3 mg/0.3 mL (1:1,000) injection 0.3 mL by IntraMUSCular route once as needed for up to 1 dose. (Patient not taking: No sig reported) 0.3 mL 1     No current facility-administered medications on file prior to visit. Review of Systems  Pertinent items are noted in HPI. Objective:     Gen: mildly ill appearing female, no SOB   HEENT: normal conjunctiva, no audible congestion, patient does not see oral erythema, has MMM  Neck: patient does not feel enlarged or tender LAD or masses  Resp: normal respiratory effort, no audible wheezing. CV: patient does not feel palpitations or heart irregularity  Abd: patient does not feel abdominal tenderness or mass, patient does not notice distension  Extrem: patient does not see swelling in ankles or joints. Neuro: Alert and oriented, able to answer questions without difficulty      Assessment/Plan:       ICD-10-CM ICD-9-CM    1. Type 2 diabetes mellitus with stage 3 chronic kidney disease, without long-term current use of insulin, unspecified whether stage 3a or 3b CKD (HCC)  E11.22 250.40     N18.30 585.3       2.  Pure hypercholesterolemia  E78.00 272.0       3. COVID-19  U07.1 079.89       4. Essential hypertension  I10 401.9       5. Rectal pain  K62.89 569.42 lidocaine (XYLOCAINE) 2 % jelly      To increase amaryl to 4mg BID while on steroids. To follow up in office. This was a telemedicine visit with video. Vargas Olivares MD    Follow-up and Dispositions    Return for follow up in office. Carlos Lopez

## 2023-01-03 ENCOUNTER — VIRTUAL VISIT (OUTPATIENT)
Dept: INTERNAL MEDICINE CLINIC | Age: 53
End: 2023-01-03
Payer: MEDICAID

## 2023-01-03 ENCOUNTER — TELEPHONE (OUTPATIENT)
Dept: INTERNAL MEDICINE CLINIC | Age: 53
End: 2023-01-03

## 2023-01-03 DIAGNOSIS — K62.89 RECTAL PAIN: ICD-10-CM

## 2023-01-03 DIAGNOSIS — E78.00 PURE HYPERCHOLESTEROLEMIA: ICD-10-CM

## 2023-01-03 DIAGNOSIS — U07.1 COVID-19: ICD-10-CM

## 2023-01-03 DIAGNOSIS — I10 ESSENTIAL HYPERTENSION: ICD-10-CM

## 2023-01-03 DIAGNOSIS — E11.22 TYPE 2 DIABETES MELLITUS WITH STAGE 3 CHRONIC KIDNEY DISEASE, WITHOUT LONG-TERM CURRENT USE OF INSULIN, UNSPECIFIED WHETHER STAGE 3A OR 3B CKD (HCC): Primary | ICD-10-CM

## 2023-01-03 DIAGNOSIS — N18.30 TYPE 2 DIABETES MELLITUS WITH STAGE 3 CHRONIC KIDNEY DISEASE, WITHOUT LONG-TERM CURRENT USE OF INSULIN, UNSPECIFIED WHETHER STAGE 3A OR 3B CKD (HCC): Primary | ICD-10-CM

## 2023-01-03 PROCEDURE — 99213 OFFICE O/P EST LOW 20 MIN: CPT | Performed by: FAMILY MEDICINE

## 2023-01-03 RX ORDER — LIDOCAINE HYDROCHLORIDE 20 MG/ML
JELLY TOPICAL
Qty: 30 ML | Refills: 1 | Status: SHIPPED | OUTPATIENT
Start: 2023-01-03

## 2023-01-03 RX ORDER — LIDOCAINE HYDROCHLORIDE 20 MG/ML
1 JELLY TOPICAL AS NEEDED
Qty: 30 ML | Refills: 1 | Status: SHIPPED | OUTPATIENT
Start: 2023-01-03 | End: 2023-01-03 | Stop reason: SDUPTHER

## 2023-01-03 NOTE — TELEPHONE ENCOUNTER
MRN:  [id number] pt had prescription sent into pharmacy. .. should medication be used by rectum? Also what is the frequency?

## 2023-01-03 NOTE — PROGRESS NOTES
1. \"Have you been to the ER, urgent care clinic since your last visit? Hospitalized since your last visit? \" Yes 1/3/2022 chest xray    2. \"Have you seen or consulted any other health care providers outside of the [address] [place] [address] since your last visit? \" No     3. For patients aged 39-70: Has the patient had a colonoscopy / FIT/ Cologuard? Yes - Care Gap present. Most recent result on file      If the patient is female:    4. For patients aged 41-77: Has the patient had a mammogram within the past 2 years? Yes - Care Gap present. Most recent result on file      5. For patients aged 21-65: Has the patient had a pap smear? Yes - Care Gap present.  Most recent result on file

## 2023-01-03 NOTE — TELEPHONE ENCOUNTER
Pharmacy called asking about frequency and where to use the lidocaine (XYLOCAINE) 2 % jelly. Please reach out to pharmacy:  [facility] [address], [address] [facility] AT [address] [address] [place] [address]   Phone:  [phone]  Fax:  [phone]       Please advise.

## 2023-01-18 ENCOUNTER — APPOINTMENT (OUTPATIENT)
Dept: GENERAL RADIOLOGY | Age: 53
End: 2023-01-18
Attending: EMERGENCY MEDICINE
Payer: MEDICAID

## 2023-01-18 ENCOUNTER — HOSPITAL ENCOUNTER (EMERGENCY)
Age: 53
Discharge: HOME | End: 2023-01-18
Attending: STUDENT IN AN ORGANIZED HEALTH CARE EDUCATION/TRAINING PROGRAM
Payer: MEDICAID

## 2023-01-18 VITALS
WEIGHT: 200.62 LBS | TEMPERATURE: 98.1 F | HEART RATE: 85 BPM | DIASTOLIC BLOOD PRESSURE: 73 MMHG | BODY MASS INDEX: 36.92 KG/M2 | RESPIRATION RATE: 17 BRPM | SYSTOLIC BLOOD PRESSURE: 107 MMHG | HEIGHT: 62 IN | OXYGEN SATURATION: 91 %

## 2023-01-18 DIAGNOSIS — R05.2 SUBACUTE COUGH: Primary | ICD-10-CM

## 2023-01-18 DIAGNOSIS — K60.2 ANAL FISSURE: ICD-10-CM

## 2023-01-18 LAB
ALBUMIN SERPL-MCNC: 3.9 G/DL (ref 3.5–5)
ALBUMIN/GLOB SERPL: 0.9 (ref 1.1–2.2)
ALP SERPL-CCNC: 90 U/L (ref 45–117)
ALT SERPL-CCNC: 31 U/L (ref 12–78)
ANION GAP SERPL CALC-SCNC: 12 MMOL/L (ref 5–15)
AST SERPL-CCNC: 24 U/L (ref 15–37)
BASOPHILS # BLD: 0.1 K/UL (ref 0–0.1)
BASOPHILS NFR BLD: 1 % (ref 0–1)
BILIRUB SERPL-MCNC: 0.4 MG/DL (ref 0.2–1)
BUN SERPL-MCNC: 19 MG/DL (ref 6–20)
BUN/CREAT SERPL: 18 (ref 12–20)
CALCIUM SERPL-MCNC: 9.6 MG/DL (ref 8.5–10.1)
CHLORIDE SERPL-SCNC: 96 MMOL/L (ref 97–108)
CO2 SERPL-SCNC: 25 MMOL/L (ref 21–32)
COMMENT, HOLDF: NORMAL
CREAT SERPL-MCNC: 1.03 MG/DL (ref 0.55–1.02)
DIFFERENTIAL METHOD BLD: NORMAL
EOSINOPHIL # BLD: 0.4 K/UL (ref 0–0.4)
EOSINOPHIL NFR BLD: 4 % (ref 0–7)
ERYTHROCYTE [DISTWIDTH] IN BLOOD BY AUTOMATED COUNT: 13.7 % (ref 11.5–14.5)
GLOBULIN SER CALC-MCNC: 4.2 G/DL (ref 2–4)
GLUCOSE SERPL-MCNC: 316 MG/DL (ref 65–100)
HCT VFR BLD AUTO: 42.5 % (ref 35–47)
HGB BLD-MCNC: 14.8 G/DL (ref 11.5–16)
IMM GRANULOCYTES # BLD AUTO: 0 K/UL (ref 0–0.04)
IMM GRANULOCYTES NFR BLD AUTO: 0 % (ref 0–0.5)
LYMPHOCYTES # BLD: 2.1 K/UL (ref 0.8–3.5)
LYMPHOCYTES NFR BLD: 24 % (ref 12–49)
MCH RBC QN AUTO: 28.6 PG (ref 26–34)
MCHC RBC AUTO-ENTMCNC: 34.8 G/DL (ref 30–36.5)
MCV RBC AUTO: 82.2 FL (ref 80–99)
MONOCYTES # BLD: 0.5 K/UL (ref 0–1)
MONOCYTES NFR BLD: 5 % (ref 5–13)
NEUTS SEG # BLD: 6 K/UL (ref 1.8–8)
NEUTS SEG NFR BLD: 66 % (ref 32–75)
NRBC # BLD: 0 K/UL (ref 0–0.01)
NRBC BLD-RTO: 0 PER 100 WBC
PLATELET # BLD AUTO: 237 K/UL (ref 150–400)
PMV BLD AUTO: 9.2 FL (ref 8.9–12.9)
POTASSIUM SERPL-SCNC: 3.4 MMOL/L (ref 3.5–5.1)
PROT SERPL-MCNC: 8.1 G/DL (ref 6.4–8.2)
RBC # BLD AUTO: 5.17 M/UL (ref 3.8–5.2)
SAMPLES BEING HELD,HOLD: NORMAL
SODIUM SERPL-SCNC: 133 MMOL/L (ref 136–145)
WBC # BLD AUTO: 9 K/UL (ref 3.6–11)

## 2023-01-18 PROCEDURE — 36415 COLL VENOUS BLD VENIPUNCTURE: CPT

## 2023-01-18 PROCEDURE — 99284 EMERGENCY DEPT VISIT MOD MDM: CPT

## 2023-01-18 PROCEDURE — 71046 X-RAY EXAM CHEST 2 VIEWS: CPT

## 2023-01-18 PROCEDURE — 85025 COMPLETE CBC W/AUTO DIFF WBC: CPT

## 2023-01-18 PROCEDURE — 96374 THER/PROPH/DIAG INJ IV PUSH: CPT

## 2023-01-18 PROCEDURE — 74011250636 HC RX REV CODE- 250/636: Performed by: STUDENT IN AN ORGANIZED HEALTH CARE EDUCATION/TRAINING PROGRAM

## 2023-01-18 PROCEDURE — 74011250637 HC RX REV CODE- 250/637: Performed by: STUDENT IN AN ORGANIZED HEALTH CARE EDUCATION/TRAINING PROGRAM

## 2023-01-18 PROCEDURE — 80053 COMPREHEN METABOLIC PANEL: CPT

## 2023-01-18 RX ORDER — AZITHROMYCIN 250 MG/1
250 TABLET, FILM COATED ORAL SEE ADMIN INSTRUCTIONS
Qty: 6 TABLET | Refills: 0 | Status: SHIPPED | OUTPATIENT
Start: 2023-01-18

## 2023-01-18 RX ORDER — ONDANSETRON 2 MG/ML
4 INJECTION INTRAMUSCULAR; INTRAVENOUS ONCE
Status: COMPLETED | OUTPATIENT
Start: 2023-01-18 | End: 2023-01-18

## 2023-01-18 RX ORDER — ACETAMINOPHEN 325 MG/1
975 TABLET ORAL ONCE
Status: COMPLETED | OUTPATIENT
Start: 2023-01-18 | End: 2023-01-18

## 2023-01-18 RX ADMIN — ACETAMINOPHEN 975 MG: 325 TABLET ORAL at 14:23

## 2023-01-18 RX ADMIN — ONDANSETRON 4 MG: 2 INJECTION INTRAMUSCULAR; INTRAVENOUS at 14:22

## 2023-01-18 NOTE — ED PROVIDER NOTES
[facility] EMERGENCY DEPT  EMERGENCY DEPARTMENT ENCOUNTER       Pt Name: Miller Lowery  MRN: [id number]  [place] 1970  Date of evaluation: 1/18/2023  Provider: Jeff Kirkland DO   PCP: Cait Courtney MD  Note Started: 3:31 PM 1/18/23     CHIEF COMPLAINT       Chief Complaint   Patient presents with    Rectal Bleeding     Has noticed fecal matter coming out of vagina and blood coming from rectum; hx two previous rectal-vaginal fistulas, has appt to see Dr. Juarez Handley on Monday    Cough     Tested positive for COVID about a week ago and has had lingering productive cough with sinus congestion; tested positive again by home test 3 days ago        HISTORY OF PRESENT ILLNESS: 1 or more elements      History From: Patient  HPI Limitations : None     Miller Lowery is a 46 y.o. female who presents to the emergency department with chief complaint of cough, states that she tested positive for COVID around [place] and has had a lingering cough and congestion since then. She has been seen in the emergency department several times for this. She states she is coughing up green mucus and has history of asthma. She additionally reports history of anal fissures and states she has been having worsening bleeding from her anal fissures. She has 2 previous histories of rectovaginal fistulas, states that she noticed a stool from her vagina today. She has an appointment with colorectal surgery on Monday. She was seen at the [facility] ED for this yesterday and was discharged home with colorectal follow-up. She denies any fever, chills, nausea, vomiting, diarrhea. Nursing Notes were all reviewed and agreed with or any disagreements were addressed in the HPI. REVIEW OF SYSTEMS      Review of Systems   Constitutional:  Negative for chills and fever. HENT:  Positive for congestion. Respiratory:  Positive for cough. Gastrointestinal:  Positive for anal bleeding.       Positives and Pertinent negatives as per HPI. PAST HISTORY     Past Medical History:  Past Medical History:   Diagnosis Date    Anal fissure     Anisocoria     Asthma     LAST EPISODE     Back pain     Cerumen impaction     Chronic kidney disease     hx uti in past    Coagulation defects     ocassional rectal bleeding due to anal fissure    Colovaginal fistula     Diabetes (HCC)     NIDDM    Diverticulitis     Diverticulosis     Enlarged tonsils     Frequent UTI     GERD (gastroesophageal reflux disease)     H/O endoscopy     with dilation    HA (headache)     Hepatic steatosis     History of colon resection     Hx of colonoscopy with polypectomy     benign    Hypertension     Ill-defined condition     FREQUENT HIVES    Ill-defined condition     HX ELEVATED LIVER ENZYMES    Morbid obesity (HCC)     Nausea & vomiting     during diverticulitis flare    Obesity     Otitis media     Pneumonia     about 15 yrs ago    Psychiatric disorder     ANXIETY    Recurrent tonsillitis     Sinusitis     Transfusion history ~ age 35    postop hysterectomy    Urticaria        Past Surgical History:  Past Surgical History:   Procedure Laterality Date    COLONOSCOPY N/A 2019    COLONOSCOPY performed by Sterling Bauman MD at [facility] ENDOSCOPY    COLONOSCOPY N/A 10/02/2020    COLONOSCOPY performed by [facility] Bauman MD at [facility]    COLONOSCOPY N/A 10/25/2022    COLONOSCOPY AND EGD performed by Sterling Bauman MD at [address] [place] [address]  2021    cancer. HX BREAST REDUCTION  1992    [facility].     HX GI  2012    LAPAROSCOPIC HAND ASSISTED  POSS OPEN SIGMOID COLECTOMY POSS TEMPORARY DIVERTING LOOP ILEOSTOMY;  (no illeostomy needed)    HX GYN           HX GYN      cervical conization    HX HEENT      SINUS SURGERY LEFT X2    HX HEENT      SINUS SURGERY ON RIGHT X2    HX HEMORRHOIDECTOMY      HX HYSTERECTOMY      HX OTHER SURGICAL  2021    Sphincterotomy of rectum    HX PELVIC LAPAROSCOPY      HX EMMANUEL AND BSO      HX UROLOGICAL  07/31/2012     CYSTOSCOPY INSERTION URETERAL CATHETERS - Cystoscopy Insertion of bilateral ureteral stents    VT ABDOMEN SURGERY PROC UNLISTED  01/06/2018    hernia repair at [address] UNLISTED      colon resection. Family History:  Family History   Problem Relation Age of Onset    Diabetes Mother     Cancer Mother         NON-HODGKINS LYMPHOMA    Anesth Problems Mother         PONV    Breast Cancer Sister 48    Cancer Paternal Grandmother     Diabetes Father     Heart Disease Father         CAD - STENTS, PACEMAKER    Arrhythmia Father        Social History:  Social History     Tobacco Use    Smoking status: Never    Smokeless tobacco: Never   Substance Use Topics    Alcohol use: Not Currently    Drug use: Yes     Types: OTC, Prescription       Allergies: Allergies   Allergen Reactions    Aspirin Hives, Shortness of Breath and Itching    Codeine Hives, Itching and Angioedema     Pt had itching in mouth, on face and lips    Contrast Agent [Iodine] Hives, Itching and Angioedema     5/5/21: pt was given benadryl and solu-medrol prior to administration but pt had severe tongue swelling and drooling, lethargy and itching.  DO NOT GIVE PT    Flavoring Agent Anaphylaxis     Cherry flavor    Iodinated Contrast Media Anaphylaxis, Diarrhea, Hives, Nausea and Vomiting and Shortness of Breath    Percocet [Oxycodone-Acetaminophen] Hives, Itching and Angioedema     Pt had itching in mouth, on face and lips    Prilosec [Omeprazole Magnesium] Anaphylaxis     CHERRY FLAVORED ODT; PT TAKES REGULAR PRILOSEC AND IS OK    Dilaudid [Hydromorphone] Itching     Tolerates with benadryl    Cephalexin Hives    Ketorolac Rash     \"makes my eyes spasm and causes rash on my hands\" and \"makes my skin itch and makes me nervous\"    Morphine (Pf) Rash     According to rn, pt can take morphine with benadryl    Fentanyl Rash and Itching     Has had benadryl before       CURRENT MEDICATIONS      Previous Medications    ALBUTEROL (PROVENTIL VENTOLIN) 2.5 MG /3 ML (0.083 %) NEBU    3 mL by Nebulization route every six (6) hours as needed for Wheezing. ALPRAZOLAM (XANAX) 1 MG TABLET    TAKE 1/2-1 TABLET BY MOUTH TWICE DAILY AS NEEDED FOR ANXIETY    AMOXICILLIN-CLAVULANATE (AUGMENTIN) 875-125 MG PER TABLET    Take 1 Tablet by mouth two (2) times a day. ATORVASTATIN (LIPITOR) 20 MG TABLET    TAKE 1 TABLET BY MOUTH EVERY DAY    DICYCLOMINE (BENTYL) 20 MG TABLET    Take 1 Tablet by mouth every six (6) hours. EMPAGLIFLOZIN (JARDIANCE) 25 MG TABLET    Take 1 Tablet by mouth daily. EPINEPHRINE (EPIPEN) 0.3 MG/0.3 ML (1:1,000) INJECTION    0.3 mL by IntraMUSCular route once as needed for up to 1 dose. ESTRADIOL (ESTRACE) 0.5 MG TABLET    TAKE 1 TABLET BY MOUTH EVERY DAY    GLIMEPIRIDE (AMARYL) 4 MG TABLET    TAKE 1 TABLET BY MOUTH ONCE DAILY BEFORE BREAKFAST    LIDOCAINE (XYLOCAINE) 2 % JELLY    Apply as directed externally for rectal pain every 2-3 hours as needed. LOSARTAN-HYDROCHLOROTHIAZIDE (HYZAAR) 100-12.5 MG PER TABLET    TAKE 1 TABLET BY MOUTH EVERY DAY    NALOXONE (NARCAN) 4 MG/ACTUATION NASAL SPRAY    Use 1 spray intranasally, then discard. Repeat with new spray every 2 min as needed for opioid overdose symptoms, alternating nostrils. NEBULIZER & COMPRESSOR MACHINE    Use as directed    NEBULIZER ACCESSORIES KIT    Use as directed    OMEPRAZOLE (PRILOSEC) 20 MG CAPSULE    Take 40 mg by mouth Daily (before breakfast). ONDANSETRON (ZOFRAN ODT) 4 MG DISINTEGRATING TABLET    Take 1 Tablet by mouth every eight (8) hours as needed for Nausea. ONDANSETRON HCL (ZOFRAN) 4 MG TABLET    Take 1 Tablet by mouth every eight (8) hours as needed for Nausea. ONDANSETRON HCL (ZOFRAN) 4 MG TABLET    Take 1 Tablet by mouth every eight (8) hours as needed for Nausea.     SERTRALINE (ZOLOFT) 100 MG TABLET    TAKE 2 TABLETS BY MOUTH EVERY NIGHT    VENTOLIN HFA 90 MCG/ACTUATION INHALER    INHALE 1 PUFF BY MOUTH EVERY 4 HOURS AS NEEDED FOR WHEEZE       SCREENINGS               No data recorded         PHYSICAL EXAM      ED Triage Vitals [01/18/23 1247]   ED Encounter Vitals Group      /86      Pulse (Heart Rate) 89      Resp Rate 16      Temp 98.1 °F (36.7 °C)      Temp src       O2 Sat (%) 99 %      Weight 200 lb 9.9 oz      Height 5' 2\"        Physical Exam  Vitals and nursing note reviewed. Constitutional:       Appearance: Normal appearance. HENT:      Head: Normocephalic and atraumatic. Mouth/Throat:      Mouth: Mucous membranes are moist.   Eyes:      Conjunctiva/sclera: Conjunctivae normal.   Cardiovascular:      Rate and Rhythm: Normal rate and regular rhythm. Pulmonary:      Effort: Pulmonary effort is normal.      Breath sounds: Normal breath sounds. Abdominal:      General: Abdomen is flat. Palpations: Abdomen is soft. Genitourinary:     Comments: Evidence of bleeding around the rectum with small linear anal fissures surrounding the rectum, dried blood on the buttocks, no obvious stool feculent material coming from vagina, no bleeding obvious from vagina. Internal exam deferred. Musculoskeletal:      Right lower leg: No edema. Left lower leg: No edema. Skin:     General: Skin is warm. Capillary Refill: Capillary refill takes less than 2 seconds. Neurological:      Mental Status: She is alert. Mental status is at baseline.         DIAGNOSTIC RESULTS   LABS:     Recent Results (from the past 12 hour(s))   CBC WITH AUTOMATED DIFF    Collection Time: 01/18/23 12:53 PM   Result Value Ref Range    WBC 9.0 3.6 - 11.0 K/uL    RBC 5.17 3.80 - 5.20 M/uL    HGB 14.8 11.5 - 16.0 g/dL    HCT 42.5 35.0 - 47.0 %    MCV 82.2 80.0 - 99.0 FL    MCH 28.6 26.0 - 34.0 PG    MCHC 34.8 30.0 - 36.5 g/dL    RDW 13.7 11.5 - 14.5 %    PLATELET 484 393 - 299 K/uL    MPV 9.2 8.9 - 12.9 FL    NRBC 0.0 0  WBC    ABSOLUTE NRBC 0.00 0.00 - 0.01 K/uL    NEUTROPHILS 66 32 - 75 %    LYMPHOCYTES 24 12 - 49 %    MONOCYTES 5 5 - 13 %    EOSINOPHILS 4 0 - 7 %    BASOPHILS 1 0 - 1 %    IMMATURE GRANULOCYTES 0 0.0 - 0.5 %    ABS. NEUTROPHILS 6.0 1.8 - 8.0 K/UL    ABS. LYMPHOCYTES 2.1 0.8 - 3.5 K/UL    ABS. MONOCYTES 0.5 0.0 - 1.0 K/UL    ABS. EOSINOPHILS 0.4 0.0 - 0.4 K/UL    ABS. BASOPHILS 0.1 0.0 - 0.1 K/UL    ABS. IMM. GRANS. 0.0 0.00 - 0.04 K/UL    DF AUTOMATED     METABOLIC PANEL, COMPREHENSIVE    Collection Time: 01/18/23 12:53 PM   Result Value Ref Range    Sodium 133 (L) 136 - 145 mmol/L    Potassium 3.4 (L) 3.5 - 5.1 mmol/L    Chloride 96 (L) 97 - 108 mmol/L    CO2 25 21 - 32 mmol/L    Anion gap 12 5 - 15 mmol/L    Glucose 316 (H) 65 - 100 mg/dL    BUN 19 6 - 20 MG/DL    Creatinine 1.03 (H) 0.55 - 1.02 MG/DL    BUN/Creatinine ratio 18 12 - 20      eGFR >60 >60 ml/min/1.73m2    Calcium 9.6 8.5 - 10.1 MG/DL    Bilirubin, total 0.4 0.2 - 1.0 MG/DL    ALT (SGPT) 31 12 - 78 U/L    AST (SGOT) 24 15 - 37 U/L    Alk. phosphatase 90 45 - 117 U/L    Protein, total 8.1 6.4 - 8.2 g/dL    Albumin 3.9 3.5 - 5.0 g/dL    Globulin 4.2 (H) 2.0 - 4.0 g/dL    A-G Ratio 0.9 (L) 1.1 - 2.2     SAMPLES BEING HELD    Collection Time: 01/18/23 12:53 PM   Result Value Ref Range    SAMPLES BEING HELD pink     COMMENT        Add-on orders for these samples will be processed based on acceptable specimen integrity and analyte stability, which may vary by analyte. RADIOLOGY:  Non-plain film images such as CT, Ultrasound and MRI are read by the radiologist. Plain radiographic images are visualized and preliminarily interpreted by the ED Provider with the below findings:  Interpretation per the Radiologist below, if available at the time of this note:     XR CHEST PA LAT    Result Date: 1/18/2023  INDICATION:  productive cough Exam: Chest 2 views. Comparison: 8/29/2020. Findings: Cardiomediastinal silhouette is normal. Pulmonary vasculature is not engorged. No focal parenchymal opacities, effusions, or pneumothorax.  Bony thorax is intact. 1. No acute cardiopulmonary disease         PROCEDURES   Unless otherwise noted below, none  Procedures     CRITICAL CARE TIME       EMERGENCY DEPARTMENT COURSE and DIFFERENTIAL DIAGNOSIS/MDM   Vitals:    Vitals:    01/18/23 1402 01/18/23 1417 01/18/23 1432 01/18/23 1447   BP: 101/68 112/64 105/66 107/73   Pulse: 86 87 88 85   Resp: 22 26 17 17   Temp:       SpO2: 95% 93% 90% 91%   Weight:       Height:            Patient was given the following medications:  Medications   ondansetron (ZOFRAN) injection 4 mg (4 mg IntraVENous Given 1/18/23 1422)   acetaminophen (TYLENOL) tablet 975 mg (975 mg Oral Given 1/18/23 1423)       CONSULTS: (Who and What was discussed)  None    Chronic Conditions:  Ulcerative colitis, chronic anal fissures    Social Determinants affecting Dx or Tx: None    Records Reviewed (source and summary of external notes): Nursing Notes, Previous Radiology Studies, and Previous Laboratory Studies    CC/HPI Summary, DDx, ED Course, and Reassessment: Patient presenting to the emergency department with chief complaint of pain relating to her anal fissures as well as cough in the setting of recent COVID diagnosis. She is afebrile, hemodynamically stable. Differentials include pneumonia versus bronchitis versus asthma exacerbation, less suspicion for this given lack of wheezing. Lower suspicion for PE given not tachycardic, tachypneic, no complaints of chest pain or shortness of breath. As far as patient's anal fissures and rectovaginal fistula, she has follow-up with colorectal surgery on Monday for this, has been seen several times at an outside ED for this as well and given medications including lidocaine and mesalamine. I will speak with her colorectal surgeon to determine if patient requires any advanced imaging here. She has multiple contrast allergies on multiple medication allergies that make obtaining a CT scan with contrast to assess for fistula very difficult.     Chest x-ray without evidence of pneumonia, due to lingering productive cough, will prescribe Z-Jason. I spoke to Dr. Buffy Velez colorectal surgery, he did not recommend any additional work-up or imaging including CT scan at this time to assess the rectovaginal fistula. He is in agreement with plan of care for follow-up on Monday. Disposition Considerations (Tests not done, Shared Decision Making, Pt Expectation of Test or Tx.): I considered CT scan as noted above but given patient's multiple contrast allergies and low utility as per colorectal surgery to evaluate fistula, will defer at this time. FINAL IMPRESSION     1. Subacute cough    2. Anal fissure          DISPOSITION/PLAN   Discharged    Discharge Note: The patient is stable for discharge home. The signs, symptoms, diagnosis, and discharge instructions have been discussed, understanding conveyed, and agreed upon. The patient is to follow up as recommended or return to ER should their symptoms worsen. PATIENT REFERRED TO:  Follow-up Information       Follow up With Specialties Details Why Fern Velásquez MD Internal Medicine Physician Schedule an appointment as soon as possible for a visit in 3 days If symptoms worsen [address] [facility] [address].  [phone]      [facility] EMERGENCY DEPT Emergency Medicine  If symptoms worsen [address] [place] [address]  [phone]              DISCHARGE MEDICATIONS:  Current Discharge Medication List        START taking these medications    Details   azithromycin (ZITHROMAX) 250 mg tablet Take 1 Tablet by mouth See Admin Instructions. Qty: 6 Tablet, Refills: 0  Start date: 1/18/2023               DISCONTINUED MEDICATIONS:  Current Discharge Medication List            (Please note that parts of this dictation were completed with voice recognition software.  Quite often unanticipated grammatical, syntax, homophones, and other interpretive errors are inadvertently transcribed by the computer software. Please disregards these errors.  Please excuse any errors that have escaped final proofreading.)    Martha Garcia, DO

## 2023-01-18 NOTE — DISCHARGE INSTRUCTIONS
If your symptoms change or worsen, return to the ER as soon as possible. Please follow up with colorectal surgery on Monday as directed.

## 2023-01-23 DIAGNOSIS — F41.9 ANXIETY: ICD-10-CM

## 2023-01-24 RX ORDER — ATORVASTATIN CALCIUM 20 MG/1
TABLET, FILM COATED ORAL
Qty: 90 TABLET | Refills: 1 | Status: SHIPPED | OUTPATIENT
Start: 2023-01-24

## 2023-01-24 RX ORDER — ALPRAZOLAM 1 MG/1
TABLET ORAL
Qty: 60 TABLET | Refills: 0 | Status: SHIPPED | OUTPATIENT
Start: 2023-01-24

## 2023-01-27 ENCOUNTER — TELEPHONE (OUTPATIENT)
Dept: INTERNAL MEDICINE CLINIC | Age: 53
End: 2023-01-27

## 2023-01-27 NOTE — TELEPHONE ENCOUNTER
Pt states seen in ED 1/3/22 after testing positive for COVID and given azithromycin and prednisone. Pt states that helped lessen the symptoms. Once finishing prescription symptoms returned. Offered same day VV appointment pt declined stated she will continue taking over the counter medications and call if appointment is needed. Pt currently scheduled for 2/3/23. Pt will call back to reschedule if needed.

## 2023-01-27 NOTE — TELEPHONE ENCOUNTER
----- Message from Mortimer Reed sent at 1/27/2023 12:05 PM EST -----  Subject: Message to Provider    QUESTIONS  Information for Provider? Patient states that she missed todays   appointment due to having a fever. had Covid 2 weeks prior and not sure   what is going on/  ---------------------------------------------------------------------------  --------------  Carla WINN  [phone]; OK to leave message on voicemail  ---------------------------------------------------------------------------  --------------  SCRIPT ANSWERS  Relationship to Patient?  Self

## 2023-02-21 DIAGNOSIS — F41.9 ANXIETY: ICD-10-CM

## 2023-02-21 RX ORDER — ALBUTEROL SULFATE 90 UG/1
1 AEROSOL, METERED RESPIRATORY (INHALATION)
Qty: 18 G | Refills: 2 | Status: SHIPPED | OUTPATIENT
Start: 2023-02-21

## 2023-02-21 RX ORDER — EPINEPHRINE 0.3 MG/.3ML
0.3 INJECTION SUBCUTANEOUS
Qty: 0.3 ML | Refills: 1 | Status: SHIPPED | OUTPATIENT
Start: 2023-02-21 | End: 2023-02-21

## 2023-02-21 RX ORDER — ALPRAZOLAM 1 MG/1
TABLET ORAL
Qty: 60 TABLET | Refills: 0 | Status: SHIPPED | OUTPATIENT
Start: 2023-02-21

## 2023-02-21 NOTE — TELEPHONE ENCOUNTER
Future Appointments:  Future Appointments   Date Time Provider Daniel Vega   3/3/2023  3:15 PM Cedric Strong MD [facility] AMB        Last Appointment With Me:  1/27/2023     Requested Prescriptions     Pending Prescriptions Disp Refills    ALPRAZolam (XANAX) 1 mg tablet 60 Tablet 0

## 2023-02-21 NOTE — TELEPHONE ENCOUNTER
----- Message from Niurka Soto. Laith Gann sent at 2023 10:36 AM EST -----  Regarding: Medication   Hi Dr. Bakari Arriaga,    I need a refill on my albuteral inhaler, epi pen, also I wanted to see if you could give me a prescription for motion sickness and maybe a short course of prednisone? Im going on a cruise and dont anticipate any issues. But sometimes get hives really badly. I dislike prednisone, but I know it has been a lifesaver for me on more than one occasion.      Thank you,  Rose Marie Cowart  - 1970

## 2023-03-16 RX ORDER — LOSARTAN POTASSIUM AND HYDROCHLOROTHIAZIDE 12.5; 1 MG/1; MG/1
TABLET ORAL
Qty: 90 TABLET | Refills: 1 | Status: SHIPPED | OUTPATIENT
Start: 2023-03-16

## 2023-03-17 ENCOUNTER — OFFICE VISIT (OUTPATIENT)
Dept: INTERNAL MEDICINE CLINIC | Age: 53
End: 2023-03-17

## 2023-03-17 ENCOUNTER — TELEPHONE (OUTPATIENT)
Dept: INTERNAL MEDICINE CLINIC | Age: 53
End: 2023-03-17

## 2023-03-17 DIAGNOSIS — E78.00 PURE HYPERCHOLESTEROLEMIA: ICD-10-CM

## 2023-03-17 DIAGNOSIS — I10 ESSENTIAL HYPERTENSION: Primary | ICD-10-CM

## 2023-03-17 DIAGNOSIS — E11.9 TYPE 2 DIABETES MELLITUS WITHOUT COMPLICATION, WITHOUT LONG-TERM CURRENT USE OF INSULIN (HCC): ICD-10-CM

## 2023-03-17 DIAGNOSIS — E11.9 TYPE 2 DIABETES MELLITUS WITHOUT COMPLICATION, WITHOUT LONG-TERM CURRENT USE OF INSULIN (HCC): Primary | ICD-10-CM

## 2023-03-17 NOTE — TELEPHONE ENCOUNTER
----- Message from Anderson Albarran sent at 3/17/2023 12:14 PM EDT -----  Subject: Referral Request    Reason for referral request? Pt is requesting to have her labs ordered so   she can have them completed prior to her appt on 4/7/23. Please advise. Provider patient wants to be referred to(if known):     Provider Phone Number(if known):     Additional Information for Provider?   ---------------------------------------------------------------------------  --------------  [id number]    [phone]; OK to leave message on voicemail  ---------------------------------------------------------------------------  --------------

## 2023-03-20 RX ORDER — EMPAGLIFLOZIN 25 MG/1
TABLET, FILM COATED ORAL
Qty: 90 TABLET | Refills: 1 | Status: SHIPPED | OUTPATIENT
Start: 2023-03-20

## 2023-03-22 DIAGNOSIS — F41.9 ANXIETY: ICD-10-CM

## 2023-03-23 RX ORDER — ALPRAZOLAM 1 MG/1
TABLET ORAL
Qty: 60 TABLET | Refills: 0 | Status: SHIPPED | OUTPATIENT
Start: 2023-03-23

## 2023-04-01 ENCOUNTER — HOSPITAL ENCOUNTER (EMERGENCY)
Age: 53
Discharge: HOME | End: 2023-04-01
Attending: STUDENT IN AN ORGANIZED HEALTH CARE EDUCATION/TRAINING PROGRAM
Payer: MEDICAID

## 2023-04-01 ENCOUNTER — APPOINTMENT (OUTPATIENT)
Dept: MRI IMAGING | Age: 53
End: 2023-04-01
Attending: STUDENT IN AN ORGANIZED HEALTH CARE EDUCATION/TRAINING PROGRAM
Payer: MEDICAID

## 2023-04-01 VITALS
OXYGEN SATURATION: 98 % | TEMPERATURE: 98.1 F | HEIGHT: 62 IN | WEIGHT: 207.89 LBS | BODY MASS INDEX: 38.26 KG/M2 | SYSTOLIC BLOOD PRESSURE: 178 MMHG | DIASTOLIC BLOOD PRESSURE: 83 MMHG | HEART RATE: 92 BPM | RESPIRATION RATE: 18 BRPM

## 2023-04-01 DIAGNOSIS — K62.89 RECTAL PAIN: Primary | ICD-10-CM

## 2023-04-01 LAB
ALBUMIN SERPL-MCNC: 4.2 G/DL (ref 3.5–5)
ALBUMIN/GLOB SERPL: 1.1 (ref 1.1–2.2)
ALP SERPL-CCNC: 73 U/L (ref 45–117)
ALT SERPL-CCNC: 44 U/L (ref 12–78)
ANION GAP SERPL CALC-SCNC: 4 MMOL/L (ref 5–15)
APPEARANCE UR: CLEAR
AST SERPL-CCNC: 28 U/L (ref 15–37)
BACTERIA URNS QL MICRO: NEGATIVE /HPF
BASOPHILS # BLD: 0 K/UL (ref 0–0.1)
BASOPHILS NFR BLD: 0 % (ref 0–1)
BILIRUB SERPL-MCNC: 0.6 MG/DL (ref 0.2–1)
BILIRUB UR QL: NEGATIVE
BUN SERPL-MCNC: 11 MG/DL (ref 6–20)
BUN/CREAT SERPL: 13 (ref 12–20)
CALCIUM SERPL-MCNC: 9.9 MG/DL (ref 8.5–10.1)
CHLORIDE SERPL-SCNC: 105 MMOL/L (ref 97–108)
CO2 SERPL-SCNC: 28 MMOL/L (ref 21–32)
COLOR UR: ABNORMAL
CREAT SERPL-MCNC: 0.87 MG/DL (ref 0.55–1.02)
DIFFERENTIAL METHOD BLD: ABNORMAL
EOSINOPHIL # BLD: 0.3 K/UL (ref 0–0.4)
EOSINOPHIL NFR BLD: 4 % (ref 0–7)
EPITH CASTS URNS QL MICRO: ABNORMAL /LPF
ERYTHROCYTE [DISTWIDTH] IN BLOOD BY AUTOMATED COUNT: 14.5 % (ref 11.5–14.5)
GLOBULIN SER CALC-MCNC: 3.7 G/DL (ref 2–4)
GLUCOSE SERPL-MCNC: 274 MG/DL (ref 65–100)
GLUCOSE UR STRIP.AUTO-MCNC: >1000 MG/DL
HCT VFR BLD AUTO: 41.1 % (ref 35–47)
HGB BLD-MCNC: 14.1 G/DL (ref 11.5–16)
HGB UR QL STRIP: NEGATIVE
IMM GRANULOCYTES # BLD AUTO: 0.1 K/UL (ref 0–0.04)
IMM GRANULOCYTES NFR BLD AUTO: 1 % (ref 0–0.5)
KETONES UR QL STRIP.AUTO: NEGATIVE MG/DL
LEUKOCYTE ESTERASE UR QL STRIP.AUTO: NEGATIVE
LYMPHOCYTES # BLD: 1.6 K/UL (ref 0.8–3.5)
LYMPHOCYTES NFR BLD: 24 % (ref 12–49)
MCH RBC QN AUTO: 29.2 PG (ref 26–34)
MCHC RBC AUTO-ENTMCNC: 34.3 G/DL (ref 30–36.5)
MCV RBC AUTO: 85.1 FL (ref 80–99)
MONOCYTES # BLD: 0.3 K/UL (ref 0–1)
MONOCYTES NFR BLD: 5 % (ref 5–13)
NEUTS SEG # BLD: 4.5 K/UL (ref 1.8–8)
NEUTS SEG NFR BLD: 66 % (ref 32–75)
NITRITE UR QL STRIP.AUTO: NEGATIVE
NRBC # BLD: 0 K/UL (ref 0–0.01)
NRBC BLD-RTO: 0 PER 100 WBC
PH UR STRIP: 7.5 (ref 5–8)
PLATELET # BLD AUTO: 192 K/UL (ref 150–400)
PMV BLD AUTO: 10.2 FL (ref 8.9–12.9)
POTASSIUM SERPL-SCNC: 3.9 MMOL/L (ref 3.5–5.1)
PROT SERPL-MCNC: 7.9 G/DL (ref 6.4–8.2)
PROT UR STRIP-MCNC: NEGATIVE MG/DL
RBC # BLD AUTO: 4.83 M/UL (ref 3.8–5.2)
RBC #/AREA URNS HPF: ABNORMAL /HPF (ref 0–5)
SODIUM SERPL-SCNC: 137 MMOL/L (ref 136–145)
SP GR UR REFRACTOMETRY: 1.03
UA: UC IF INDICATED,UAUC: ABNORMAL
UROBILINOGEN UR QL STRIP.AUTO: 0.2 EU/DL (ref 0.2–1)
WBC # BLD AUTO: 6.9 K/UL (ref 3.6–11)
WBC URNS QL MICRO: ABNORMAL /HPF (ref 0–4)

## 2023-04-01 PROCEDURE — 81001 URINALYSIS AUTO W/SCOPE: CPT

## 2023-04-01 PROCEDURE — 74011250636 HC RX REV CODE- 250/636: Performed by: STUDENT IN AN ORGANIZED HEALTH CARE EDUCATION/TRAINING PROGRAM

## 2023-04-01 PROCEDURE — 96374 THER/PROPH/DIAG INJ IV PUSH: CPT

## 2023-04-01 PROCEDURE — 80053 COMPREHEN METABOLIC PANEL: CPT

## 2023-04-01 PROCEDURE — 85025 COMPLETE CBC W/AUTO DIFF WBC: CPT

## 2023-04-01 PROCEDURE — 74011250637 HC RX REV CODE- 250/637: Performed by: STUDENT IN AN ORGANIZED HEALTH CARE EDUCATION/TRAINING PROGRAM

## 2023-04-01 PROCEDURE — 72197 MRI PELVIS W/O & W/DYE: CPT

## 2023-04-01 PROCEDURE — A9576 INJ PROHANCE MULTIPACK: HCPCS | Performed by: STUDENT IN AN ORGANIZED HEALTH CARE EDUCATION/TRAINING PROGRAM

## 2023-04-01 PROCEDURE — 96375 TX/PRO/DX INJ NEW DRUG ADDON: CPT

## 2023-04-01 PROCEDURE — 96376 TX/PRO/DX INJ SAME DRUG ADON: CPT

## 2023-04-01 PROCEDURE — 99284 EMERGENCY DEPT VISIT MOD MDM: CPT

## 2023-04-01 PROCEDURE — 36415 COLL VENOUS BLD VENIPUNCTURE: CPT

## 2023-04-01 RX ORDER — LORAZEPAM 1 MG/1
1 TABLET ORAL
Status: COMPLETED | OUTPATIENT
Start: 2023-04-01 | End: 2023-04-01

## 2023-04-01 RX ORDER — MORPHINE SULFATE 2 MG/ML
2 INJECTION, SOLUTION INTRAMUSCULAR; INTRAVENOUS
Status: COMPLETED | OUTPATIENT
Start: 2023-04-01 | End: 2023-04-01

## 2023-04-01 RX ORDER — DIPHENHYDRAMINE HYDROCHLORIDE 50 MG/ML
12.5 INJECTION, SOLUTION INTRAMUSCULAR; INTRAVENOUS
Status: COMPLETED | OUTPATIENT
Start: 2023-04-01 | End: 2023-04-01

## 2023-04-01 RX ORDER — LIDOCAINE HYDROCHLORIDE 20 MG/ML
JELLY TOPICAL
Qty: 30 ML | Refills: 1 | Status: SHIPPED
Start: 2023-04-01

## 2023-04-01 RX ORDER — DIPHENHYDRAMINE HYDROCHLORIDE 50 MG/ML
25 INJECTION, SOLUTION INTRAMUSCULAR; INTRAVENOUS
Status: COMPLETED | OUTPATIENT
Start: 2023-04-01 | End: 2023-04-01

## 2023-04-01 RX ORDER — ACETAMINOPHEN 325 MG/1
975 TABLET ORAL ONCE
Status: CANCELLED | OUTPATIENT
Start: 2023-04-01 | End: 2023-04-02

## 2023-04-01 RX ADMIN — DIPHENHYDRAMINE HYDROCHLORIDE 12.5 MG: 50 INJECTION, SOLUTION INTRAMUSCULAR; INTRAVENOUS at 20:52

## 2023-04-01 RX ADMIN — DIPHENHYDRAMINE HYDROCHLORIDE 25 MG: 50 INJECTION, SOLUTION INTRAMUSCULAR; INTRAVENOUS at 22:05

## 2023-04-01 RX ADMIN — LORAZEPAM 1 MG: 1 TABLET ORAL at 20:24

## 2023-04-01 RX ADMIN — MORPHINE SULFATE 2 MG: 2 INJECTION, SOLUTION INTRAMUSCULAR; INTRAVENOUS at 20:53

## 2023-04-01 RX ADMIN — GADOTERIDOL 20 ML: 279.3 INJECTION, SOLUTION INTRAVENOUS at 21:35

## 2023-04-01 NOTE — ED NOTES
Pt comes after leaving [facility] for no treatment. States she was supposed to be premedicated for a CT scan due to Iodine allergy. Pt comes fort 9/10 abdominal pain, vaginal bleeding recent colorectal vaginal fistulation. Being followed by her Colorectal surgeon Dr. Jose Manuel Orellana.

## 2023-04-01 NOTE — ED PROVIDER NOTES
[facility] EMERGENCY DEPT  EMERGENCY DEPARTMENT ENCOUNTER       Pt Name: Amisha Gonsalves  MRN: [id number]  [place] 1970  Date of evaluation: 4/1/2023  Provider: José Antonio Saldaña DO   PCP: Santana Alfonso MD  Note Started: 7:27 PM 4/1/23     CHIEF COMPLAINT       Chief Complaint   Patient presents with    Abdominal Pain     Pt. States that she was admitted to [facility] and states she either has a vaginal fistula or a colon fistula. States she was at a free standing [facility] and transferred to the one in the city, states she left last night due to not receiving the care that was needed. Was attempting to get in contact with her colorectal surgery but has had not any success. States she is here today for the severe pain. States 8/10 pain in vagina and rectum. on and off chills. Taken one dose of prescribed antibiotic for a new UTI. HISTORY OF PRESENT ILLNESS: 1 or more elements      History From: Patient  HPI Limitations : None     Amisha Gonsalves is a 48 y.o. female who presents for concern for a rectovaginal fistula. Patient reports hx of ulcerative colitis, states she has been having severe rectal pain, vaginal pain and intermittent chills. She reports she was dx'd with a UTI and just started antibiotic. She was admitted to [facility] but left yesterday as she did not want to wait until Monday to be seen by urogyn and felt as if she was not receiving care. She denies any fever, nausea, vomiting or diarrhea. She reports that she was seen at [facility], had a speculum exam in which the ED provider was concerned for feculent material in her vagina so she was transferred to [facility] downto for CT w/ IV contrast with pre-medication protocol due to her anaphylactic allergy to iodinated contrast material. She saw her colorectal surgeon last month but states she was not having these symptoms then. She plans to follow up again Monday but was concerned due to the severe pain in her vaginal and rectal area.       Nursing Notes were all reviewed and agreed with or any disagreements were addressed in the HPI. REVIEW OF SYSTEMS      Review of Systems   Constitutional:  Negative for chills and fever. Respiratory:  Negative for shortness of breath. Gastrointestinal:  Positive for anal bleeding and rectal pain. Negative for abdominal pain, diarrhea, nausea and vomiting. Genitourinary:  Positive for vaginal discharge. Skin:  Negative for rash. Neurological:  Negative for weakness and headaches. Positives and Pertinent negatives as per HPI.     PAST HISTORY     Past Medical History:  Past Medical History:   Diagnosis Date    Anal fissure     Anisocoria     Asthma     LAST EPISODE     Back pain     Cerumen impaction     Chronic kidney disease     hx uti in past    Coagulation defects     ocassional rectal bleeding due to anal fissure    Colovaginal fistula     Diabetes (HCC)     NIDDM    Diverticulitis     Diverticulosis     Enlarged tonsils     Frequent UTI     GERD (gastroesophageal reflux disease)     H/O endoscopy     with dilation    HA (headache)     Hepatic steatosis     History of colon resection     Hx of colonoscopy with polypectomy     benign    Hypertension     Ill-defined condition     FREQUENT HIVES    Ill-defined condition     HX ELEVATED LIVER ENZYMES    Morbid obesity (HCC)     Nausea & vomiting     during diverticulitis flare    Obesity     Otitis media     Pneumonia     about 15 yrs ago    Psychiatric disorder     ANXIETY    Recurrent tonsillitis     Sinusitis     Transfusion history ~ age 35    postop hysterectomy    Urticaria        Past Surgical History:  Past Surgical History:   Procedure Laterality Date    COLONOSCOPY N/A 03/28/2019    COLONOSCOPY performed by Jeet Mancuso MD at [facility] ENDOSCOPY    COLONOSCOPY N/A 10/02/2020    COLONOSCOPY performed by Jeet Mancuso MD at [facility] ENDOSCOPY    COLONOSCOPY N/A 10/25/2022    COLONOSCOPY AND EGD performed by Jeet Mancuso MD at [address] [place] [address],[address] APPENDECTOMY  2021    cancer. HX BREAST REDUCTION  1992    [facility]. HX GI  2012    LAPAROSCOPIC HAND ASSISTED  POSS OPEN SIGMOID COLECTOMY POSS TEMPORARY DIVERTING LOOP ILEOSTOMY;  (no illeostomy needed)    HX GYN           HX GYN      cervical conization    HX HEENT      SINUS SURGERY LEFT X2    HX HEENT      SINUS SURGERY ON RIGHT X2    HX HEMORRHOIDECTOMY      HX HYSTERECTOMY      HX OTHER SURGICAL  2021    Sphincterotomy of rectum    HX PELVIC LAPAROSCOPY      HX EMMANUEL AND BSO      HX UROLOGICAL  2012     CYSTOSCOPY INSERTION URETERAL CATHETERS - Cystoscopy Insertion of bilateral ureteral stents    NC ABDOMEN SURGERY PROC UNLISTED  2018    hernia repair at [address] UNLISTED      colon resection. Family History:  Family History   Problem Relation Age of Onset    Diabetes Mother     Cancer Mother         NON-HODGKINS LYMPHOMA    Anesth Problems Mother         PONV    Breast Cancer Sister 48    Cancer Paternal Grandmother     Diabetes Father     Heart Disease Father         CAD - STENTS, PACEMAKER    Arrhythmia Father        Social History:  Social History     Tobacco Use    Smoking status: Never    Smokeless tobacco: Never   Substance Use Topics    Alcohol use: Not Currently    Drug use: Yes     Types: OTC, Prescription       Allergies: Allergies   Allergen Reactions    Aspirin Hives, Shortness of Breath and Itching    Codeine Hives, Itching and Angioedema     Pt had itching in mouth, on face and lips    Contrast Agent [Iodine] Hives, Itching and Angioedema     21: pt was given benadryl and solu-medrol prior to administration but pt had severe tongue swelling and drooling, lethargy and itching.  DO NOT GIVE PT    Flavoring Agent Anaphylaxis     Cherry flavor    Iodinated Contrast Media Anaphylaxis, Diarrhea, Hives, Nausea and Vomiting and Shortness of Breath    Percocet [Oxycodone-Acetaminophen] Hives, Itching and Angioedema     Pt had itching in mouth, on face and lips    Prilosec [Omeprazole Magnesium] Anaphylaxis     CHERRY FLAVORED ODT; PT TAKES REGULAR PRILOSEC AND IS OK    Dilaudid [Hydromorphone] Itching     Tolerates with benadryl    Cephalexin Hives    Ketorolac Rash     \"makes my eyes spasm and causes rash on my hands\" and \"makes my skin itch and makes me nervous\"    Morphine (Pf) Rash     According to rn, pt can take morphine with benadryl    Fentanyl Rash and Itching     Has had benadryl before       CURRENT MEDICATIONS      Discharge Medication List as of 4/1/2023 10:58 PM        CONTINUE these medications which have NOT CHANGED    Details   ALPRAZolam (XANAX) 1 mg tablet TAKE 1/2-1 TABLET BY MOUTH TWICE DAILY AS NEEDED FOR ANXIETY, Normal, Disp-60 Tablet, R-0Not to exceed 5 additional fills before 08/20/2023      Jardiance 25 mg tablet TAKE 1 TABLET BY MOUTH EVERY DAY, Normal, Disp-90 Tablet, R-1      losartan-hydroCHLOROthiazide (HYZAAR) 100-12.5 mg per tablet TAKE 1 TABLET BY MOUTH EVERY DAY, Normal, Disp-90 Tablet, R-1      !! albuterol (PROVENTIL HFA, VENTOLIN HFA, PROAIR HFA) 90 mcg/actuation inhaler Take 1 Puff by inhalation every four (4) hours as needed for Wheezing., Normal, Disp-18 g, R-2      atorvastatin (LIPITOR) 20 mg tablet TAKE 1 TABLET BY MOUTH EVERY DAY, Normal, Disp-90 Tablet, R-1      azithromycin (ZITHROMAX) 250 mg tablet Take 1 Tablet by mouth See Admin Instructions. , Normal, Disp-6 Tablet, R-0      Nebulizer & Compressor machine Use as directed, Normal, Disp-1 Each, R-0      Nebulizer Accessories kit Use as directed, Normal, Disp-1 Kit, R-0      albuterol (PROVENTIL VENTOLIN) 2.5 mg /3 mL (0.083 %) nebu 3 mL by Nebulization route every six (6) hours as needed for Wheezing., Normal, Disp-50 Each, R-1      sertraline (ZOLOFT) 100 mg tablet TAKE 2 TABLETS BY MOUTH EVERY NIGHT, Normal, Disp-180 Tablet, R-1      estradioL (ESTRACE) 0.5 mg tablet TAKE 1 TABLET BY MOUTH EVERY DAY, Normal, Disp-90 Tablet, R-1 amoxicillin-clavulanate (Augmentin) 875-125 mg per tablet Take 1 Tablet by mouth two (2) times a day., Normal, Disp-20 Tablet, R-0      dicyclomine (BENTYL) 20 mg tablet Take 1 Tablet by mouth every six (6) hours. , Normal, Disp-20 Tablet, R-0      glimepiride (AMARYL) 4 mg tablet TAKE 1 TABLET BY MOUTH ONCE DAILY BEFORE BREAKFAST, Normal, Disp-90 Tablet, R-1      ondansetron (Zofran ODT) 4 mg disintegrating tablet Take 1 Tablet by mouth every eight (8) hours as needed for Nausea., Normal, Disp-20 Tablet, R-0      !! Ventolin HFA 90 mcg/actuation inhaler INHALE 1 PUFF BY MOUTH EVERY 4 HOURS AS NEEDED FOR WHEEZE, Normal, Disp-18 Each, R-1      omeprazole (PRILOSEC) 20 mg capsule Take 40 mg by mouth Daily (before breakfast). , Historical Med       !! - Potential duplicate medications found. Please discuss with provider. SCREENINGS               No data recorded         PHYSICAL EXAM      ED Triage Vitals   ED Encounter Vitals Group      BP 04/01/23 1742 (!) 178/83      Pulse (Heart Rate) 04/01/23 1742 92      Resp Rate 04/01/23 1742 18      Temp 04/01/23 1742 98.1 °F (36.7 °C)      Temp src --       O2 Sat (%) 04/01/23 1742 98 %      Weight 04/01/23 1737 207 lb 14.3 oz      Height 04/01/23 1737 5' 2\"        Physical Exam  Vitals and nursing note reviewed. Constitutional:       Appearance: Normal appearance. Comments: Well-appearing   HENT:      Head: Normocephalic and atraumatic. Mouth/Throat:      Mouth: Mucous membranes are moist.   Eyes:      Conjunctiva/sclera: Conjunctivae normal.   Cardiovascular:      Rate and Rhythm: Normal rate and regular rhythm. Pulmonary:      Effort: Pulmonary effort is normal.      Breath sounds: Normal breath sounds. Abdominal:      General: Abdomen is flat. Palpations: Abdomen is soft. Musculoskeletal:      Right lower leg: No edema. Left lower leg: No edema. Skin:     General: Skin is warm.       Capillary Refill: Capillary refill takes less than 2 seconds. Neurological:      Mental Status: She is alert. Mental status is at baseline. DIAGNOSTIC RESULTS   LABS:     Recent Results (from the past 12 hour(s))   CBC WITH AUTOMATED DIFF    Collection Time: 04/01/23  6:06 PM   Result Value Ref Range    WBC 6.9 3.6 - 11.0 K/uL    RBC 4.83 3.80 - 5.20 M/uL    HGB 14.1 11.5 - 16.0 g/dL    HCT 41.1 35.0 - 47.0 %    MCV 85.1 80.0 - 99.0 FL    MCH 29.2 26.0 - 34.0 PG    MCHC 34.3 30.0 - 36.5 g/dL    RDW 14.5 11.5 - 14.5 %    PLATELET 482 174 - 468 K/uL    MPV 10.2 8.9 - 12.9 FL    NRBC 0.0 0  WBC    ABSOLUTE NRBC 0.00 0.00 - 0.01 K/uL    NEUTROPHILS 66 32 - 75 %    LYMPHOCYTES 24 12 - 49 %    MONOCYTES 5 5 - 13 %    EOSINOPHILS 4 0 - 7 %    BASOPHILS 0 0 - 1 %    IMMATURE GRANULOCYTES 1 (H) 0.0 - 0.5 %    ABS. NEUTROPHILS 4.5 1.8 - 8.0 K/UL    ABS. LYMPHOCYTES 1.6 0.8 - 3.5 K/UL    ABS. MONOCYTES 0.3 0.0 - 1.0 K/UL    ABS. EOSINOPHILS 0.3 0.0 - 0.4 K/UL    ABS. BASOPHILS 0.0 0.0 - 0.1 K/UL    ABS. IMM. GRANS. 0.1 (H) 0.00 - 0.04 K/UL    DF AUTOMATED     METABOLIC PANEL, COMPREHENSIVE    Collection Time: 04/01/23  6:06 PM   Result Value Ref Range    Sodium 137 136 - 145 mmol/L    Potassium 3.9 3.5 - 5.1 mmol/L    Chloride 105 97 - 108 mmol/L    CO2 28 21 - 32 mmol/L    Anion gap 4 (L) 5 - 15 mmol/L    Glucose 274 (H) 65 - 100 mg/dL    BUN 11 6 - 20 MG/DL    Creatinine 0.87 0.55 - 1.02 MG/DL    BUN/Creatinine ratio 13 12 - 20      eGFR >60 >60 ml/min/1.73m2    Calcium 9.9 8.5 - 10.1 MG/DL    Bilirubin, total 0.6 0.2 - 1.0 MG/DL    ALT (SGPT) 44 12 - 78 U/L    AST (SGOT) 28 15 - 37 U/L    Alk.  phosphatase 73 45 - 117 U/L    Protein, total 7.9 6.4 - 8.2 g/dL    Albumin 4.2 3.5 - 5.0 g/dL    Globulin 3.7 2.0 - 4.0 g/dL    A-G Ratio 1.1 1.1 - 2.2     URINALYSIS W/ REFLEX CULTURE    Collection Time: 04/01/23  8:14 PM    Specimen: Urine   Result Value Ref Range    Color YELLOW/STRAW      Appearance CLEAR CLEAR      Specific gravity 1.028      pH (UA) 7.5 5.0 - 8.0      Protein Negative NEG mg/dL    Glucose >1,000 (A) NEG mg/dL    Ketone Negative NEG mg/dL    Bilirubin Negative NEG      Blood Negative NEG      Urobilinogen 0.2 0.2 - 1.0 EU/dL    Nitrites Negative NEG      Leukocyte Esterase Negative NEG      WBC 5-10 0 - 4 /hpf    RBC 0-5 0 - 5 /hpf    Epithelial cells FEW FEW /lpf    Bacteria Negative NEG /hpf    UA:UC IF INDICATED CULTURE NOT INDICATED BY UA RESULT CNI           RADIOLOGY:  Non-plain film images such as CT, Ultrasound and MRI are read by the radiologist. Plain radiographic images are visualized and preliminarily interpreted by the ED Provider with the below findings:          Interpretation per the Radiologist below, if available at the time of this note:     MRI PELV W WO CONT    Result Date: 4/1/2023  *PRELIMINARY REPORT* Exam: MRI pelvis with and without contrast. INDICATION: Rectovaginal fistula? Preliminary findings: No acute pelvic abnormality. The uterus appears surgically absent. Evaluation for rectovaginal fistula, which is not grossly obvious, will be performed and reported at a later date. Preliminary report was provided by Dr. Sydnee Saul, the on-call radiologist, at [phone] hours Final report to follow.  *END PRELIMINARY REPORT*        PROCEDURES   Unless otherwise noted below, none  Procedures     CRITICAL CARE TIME       EMERGENCY DEPARTMENT COURSE and DIFFERENTIAL DIAGNOSIS/[facility]   Vitals:    Vitals:    04/01/23 1737 04/01/23 1742   BP:  (!) 178/83   Pulse:  92   Resp:  18   Temp:  98.1 °F (36.7 °C)   SpO2:  98%   Weight: 94.3 kg (207 lb 14.3 oz)    Height: 5' 2\" (1.575 m)         Patient was given the following medications:  Medications   LORazepam (ATIVAN) tablet 1 mg (1 mg Oral Given 4/1/23 2024)   morphine injection 2 mg (2 mg IntraVENous Given 4/1/23 2053)   diphenhydrAMINE (BENADRYL) injection 12.5 mg (12.5 mg IntraVENous Given 4/1/23 2052)   gadoteridoL (PROHANCE) 279.3 mg/mL contrast solution 20 mL (20 mL IntraVENous Given 4/1/23 2135) diphenhydrAMINE (BENADRYL) injection 25 mg (25 mg IntraVENous Given 4/1/23 1090)       CONSULTS: (Who and What was discussed)  None    Chronic Conditions: htn, gerd, hx of ulcerative colitis , diverticulitis     Social Determinants affecting Dx or Tx: None    Records Reviewed (source and summary of external notes): Prior medical records, Previous Radiology studies, and Previous Laboratory studies    CC/HPI Summary, DDx, ED Course, and Reassessment: Patient presenting with c/c of concerns for rectovaginal fistula. She was admitted to [facility] from 3/27-3/31 after presenting to OSH for c/o feculent vaginal discharge and rectal bleeding. She reported hx of ulcerative colitis btu her [facility] note she has no evidence of inflammatory bowel disorder. She was reportedly admitted to pre-medicate her for a CT w/ IV contrast to assess for fistula and to be evaluated by OBGYN. She left AMA prior to having CT done. They were planning on setting her up for an MRI w/ IV contrast to assess for this. She is presenting today for the same complaints. She was started on flagyl for BV. Her culture had no growth to date. Below is the discharge summary from [facility]:  you were admitted to [facility] for a concern for a new fistula. You initially treated with pain medications. Also, the diagnosis of Inflammatory Bowel Disease found to not be accurate. The most likely cause of several fistulas you had is the history of several abdominal and pelvic surgeries. You have decided to leave against medical advice. Please be aware that outpatient follow ups will be scheduled as below. Also, Pelvic Imaging (MR) is to be scheduled. Note that you were started on treatment for a vaginal infection (Bacterial Vaginosis) with Metronidazole. Please continue the course for total of 7 days. Lastly, you have a urine culture that is still pending. She has no leukocytosis, fever or tachycardia concerning for sepsis, abscess.  She has multiple ED visits for anal fissures, rectal bleeding, chronic abdominal pain. I had spoken to Dr. Chan Jacob when patient presented with similar complaints in January but he had no record her being a patient. The patient is presenting with the same complaints. She apparently had anaphylaxis after receiving pre-medication in 2021 for a CT scan. Due to her stable vitals and overall well-appearance as well as chronicity of symptoms I do not believe she warrants inpatient admission at this time for pre-medication. Will order MRI of the pelvis, she has received gadolinium in 2013 w/o any anaphylactic reaction. Will order MRI and pending results will d/w colorectal surgery vs admission. ED Course as of 04/02/23 0255   Sat Apr 01, 2023 2000 Patient stating she is claustrophobic and needs something for MRI as she \"freaked out\" the last time she had one/  [NM]   2039 Patient requesting pain medication, states she does not believe she will able to lay flat for the MRI. Pain medication ordered PRN for MRI. She is allergic to toradol. She is also intolerant of all narcotics unless she receives benadryl with them. She just received oral ativan, will administer pain medication in MRI [NM]   2047 URINALYSIS W/ REFLEX CULTURE(!):    Color YELLOW/STRAW   Appearance CLEAR   Specific gravity 1.028   pH (UA) 7.5   Protein Negative   Glucose >1,000(!)   Ketone Negative   Bilirubin Negative   Blood Negative   Urobilinogen 0.2   Nitrites Negative   Leukocyte Esterase Negative   WBC PENDING   RBC PENDING   Epithelial cells PENDING   Bacteria PENDING   UA:UC IF INDICATED PENDING  UA does not appear consistent with UTI [NM]   2157 Patient stated she became itchy after MRI, she requested another \"bump\" of benadryl due to the itching. [NM]   8137 Preliminary findings: No acute pelvic abnormality. The uterus appears surgically  absent. Evaluation for rectovaginal fistula, which is not grossly obvious, will  be performed and reported at a later date.  I spoke to radiology, Dr. Rogelio Singleton as MRI read appears to not fully evaluate for rectovaginal fistula. Dr. Rogelio Singleton stated there is no acute abnormality, she does not see any evidence of fistula, she stated patient can be discharged home. The preliminary report will be amended Sunday or Monday and if there is a discrepancy they will updated [NM]      ED Course User Index  [NM] DO Marylu Campa discharged home with rx for lidocaine, outpatient follow up with colorectal surgery. Updatd on MRI as prelim report and if discrepancy, will be called with results    Disposition Considerations (Tests not done, Shared Decision Making, Pt Expectation of Test or Tx.):      FINAL IMPRESSION     1.  Rectal pain          DISPOSITION/PLAN   Discharged         PATIENT REFERRED TO:  Follow-up Information       Follow up With Specialties Details Why Contact Info    Zeina Moreau MD Internal Medicine Physician Schedule an appointment as soon as possible for a visit in 2 days  [facility]  [phone]      [facility] EMERGENCY DEPT Emergency Medicine  If symptoms worsen [address] [place] [address]  [phone]              DISCHARGE MEDICATIONS:  Discharge Medication List as of 4/1/2023 10:58 PM        CONTINUE these medications which have CHANGED    Details   lidocaine (XYLOCAINE) 2 % jelly Apply as directed externally for rectal pain every 2-3 hours as needed., Normal, Disp-30 mL, R-1           CONTINUE these medications which have NOT CHANGED    Details   ALPRAZolam (XANAX) 1 mg tablet TAKE 1/2-1 TABLET BY MOUTH TWICE DAILY AS NEEDED FOR ANXIETY, Normal, Disp-60 Tablet, R-0Not to exceed 5 additional fills before 08/20/2023      Jardiance 25 mg tablet TAKE 1 TABLET BY MOUTH EVERY DAY, Normal, Disp-90 Tablet, R-1      losartan-hydroCHLOROthiazide (HYZAAR) 100-12.5 mg per tablet TAKE 1 TABLET BY MOUTH EVERY DAY, Normal, Disp-90 Tablet, R-1      !! albuterol (PROVENTIL HFA, VENTOLIN HFA, PROAIR HFA) 90 mcg/actuation inhaler Take 1 Puff by inhalation every four (4) hours as needed for Wheezing., Normal, Disp-18 g, R-2      atorvastatin (LIPITOR) 20 mg tablet TAKE 1 TABLET BY MOUTH EVERY DAY, Normal, Disp-90 Tablet, R-1      azithromycin (ZITHROMAX) 250 mg tablet Take 1 Tablet by mouth See Admin Instructions. , Normal, Disp-6 Tablet, R-0      Nebulizer & Compressor machine Use as directed, Normal, Disp-1 Each, R-0      Nebulizer Accessories kit Use as directed, Normal, Disp-1 Kit, R-0      albuterol (PROVENTIL VENTOLIN) 2.5 mg /3 mL (0.083 %) nebu 3 mL by Nebulization route every six (6) hours as needed for Wheezing., Normal, Disp-50 Each, R-1      sertraline (ZOLOFT) 100 mg tablet TAKE 2 TABLETS BY MOUTH EVERY NIGHT, Normal, Disp-180 Tablet, R-1      estradioL (ESTRACE) 0.5 mg tablet TAKE 1 TABLET BY MOUTH EVERY DAY, Normal, Disp-90 Tablet, R-1      amoxicillin-clavulanate (Augmentin) 875-125 mg per tablet Take 1 Tablet by mouth two (2) times a day., Normal, Disp-20 Tablet, R-0      dicyclomine (BENTYL) 20 mg tablet Take 1 Tablet by mouth every six (6) hours. , Normal, Disp-20 Tablet, R-0      glimepiride (AMARYL) 4 mg tablet TAKE 1 TABLET BY MOUTH ONCE DAILY BEFORE BREAKFAST, Normal, Disp-90 Tablet, R-1      ondansetron (Zofran ODT) 4 mg disintegrating tablet Take 1 Tablet by mouth every eight (8) hours as needed for Nausea., Normal, Disp-20 Tablet, R-0      !! Ventolin HFA 90 mcg/actuation inhaler INHALE 1 PUFF BY MOUTH EVERY 4 HOURS AS NEEDED FOR WHEEZE, Normal, Disp-18 Each, R-1      omeprazole (PRILOSEC) 20 mg capsule Take 40 mg by mouth Daily (before breakfast). , Historical Med       !! - Potential duplicate medications found. Please discuss with provider. DISCONTINUED MEDICATIONS:  Discharge Medication List as of 4/1/2023 10:58 PM            (Please note that parts of this dictation were completed with voice recognition software.  Quite often unanticipated grammatical, syntax, homophones, and other interpretive errors are inadvertently transcribed by the computer software. Please disregards these errors.  Please excuse any errors that have escaped final proofreading.)    Sendy Randhawa, DO

## 2023-04-02 RX ORDER — GLIMEPIRIDE 4 MG/1
TABLET ORAL
Qty: 90 TABLET | Refills: 1 | Status: SHIPPED | OUTPATIENT
Start: 2023-04-02

## 2023-04-02 NOTE — DISCHARGE INSTRUCTIONS
You have no evidence on your MRI of a rectovaginal fistula or any other acute pelvic abnormality. Your MRI will be read by another radiologist tomorrow or Monday and if there are any other abnormalities noted you will be contacted. Your lab work is listed below. You have no evidence of a urinary tract infection. Please continue the flagyl as prescribed by [facility]. You should follow up with your colorectal surgeon on Monday. Recent Results (from the past 12 hour(s))   CBC WITH AUTOMATED DIFF    Collection Time: 04/01/23  6:06 PM   Result Value Ref Range    WBC 6.9 3.6 - 11.0 K/uL    RBC 4.83 3.80 - 5.20 M/uL    HGB 14.1 11.5 - 16.0 g/dL    HCT 41.1 35.0 - 47.0 %    MCV 85.1 80.0 - 99.0 FL    MCH 29.2 26.0 - 34.0 PG    MCHC 34.3 30.0 - 36.5 g/dL    RDW 14.5 11.5 - 14.5 %    PLATELET 496 584 - 902 K/uL    MPV 10.2 8.9 - 12.9 FL    NRBC 0.0 0  WBC    ABSOLUTE NRBC 0.00 0.00 - 0.01 K/uL    NEUTROPHILS 66 32 - 75 %    LYMPHOCYTES 24 12 - 49 %    MONOCYTES 5 5 - 13 %    EOSINOPHILS 4 0 - 7 %    BASOPHILS 0 0 - 1 %    IMMATURE GRANULOCYTES 1 (H) 0.0 - 0.5 %    ABS. NEUTROPHILS 4.5 1.8 - 8.0 K/UL    ABS. LYMPHOCYTES 1.6 0.8 - 3.5 K/UL    ABS. MONOCYTES 0.3 0.0 - 1.0 K/UL    ABS. EOSINOPHILS 0.3 0.0 - 0.4 K/UL    ABS. BASOPHILS 0.0 0.0 - 0.1 K/UL    ABS. IMM. GRANS. 0.1 (H) 0.00 - 0.04 K/UL    DF AUTOMATED     METABOLIC PANEL, COMPREHENSIVE    Collection Time: 04/01/23  6:06 PM   Result Value Ref Range    Sodium 137 136 - 145 mmol/L    Potassium 3.9 3.5 - 5.1 mmol/L    Chloride 105 97 - 108 mmol/L    CO2 28 21 - 32 mmol/L    Anion gap 4 (L) 5 - 15 mmol/L    Glucose 274 (H) 65 - 100 mg/dL    BUN 11 6 - 20 MG/DL    Creatinine 0.87 0.55 - 1.02 MG/DL    BUN/Creatinine ratio 13 12 - 20      eGFR >60 >60 ml/min/1.73m2    Calcium 9.9 8.5 - 10.1 MG/DL    Bilirubin, total 0.6 0.2 - 1.0 MG/DL    ALT (SGPT) 44 12 - 78 U/L    AST (SGOT) 28 15 - 37 U/L    Alk.  phosphatase 73 45 - 117 U/L    Protein, total 7.9 6.4 - 8.2 g/dL Albumin 4.2 3.5 - 5.0 g/dL    Globulin 3.7 2.0 - 4.0 g/dL    A-G Ratio 1.1 1.1 - 2.2     URINALYSIS W/ REFLEX CULTURE    Collection Time: 04/01/23  8:14 PM    Specimen: Urine   Result Value Ref Range    Color YELLOW/STRAW      Appearance CLEAR CLEAR      Specific gravity 1.028      pH (UA) 7.5 5.0 - 8.0      Protein Negative NEG mg/dL    Glucose >1,000 (A) NEG mg/dL    Ketone Negative NEG mg/dL    Bilirubin Negative NEG      Blood Negative NEG      Urobilinogen 0.2 0.2 - 1.0 EU/dL    Nitrites Negative NEG      Leukocyte Esterase Negative NEG      WBC 5-10 0 - 4 /hpf    RBC 0-5 0 - 5 /hpf    Epithelial cells FEW FEW /lpf    Bacteria Negative NEG /hpf    UA:UC IF INDICATED CULTURE NOT INDICATED BY UA RESULT CNI

## 2023-04-04 ENCOUNTER — VIRTUAL VISIT (OUTPATIENT)
Dept: INTERNAL MEDICINE CLINIC | Age: 53
End: 2023-04-04
Payer: MEDICAID

## 2023-04-04 ENCOUNTER — TELEPHONE (OUTPATIENT)
Dept: INTERNAL MEDICINE CLINIC | Age: 53
End: 2023-04-04

## 2023-04-04 PROCEDURE — 99213 OFFICE O/P EST LOW 20 MIN: CPT | Performed by: FAMILY MEDICINE

## 2023-04-04 NOTE — PROGRESS NOTES
1. \"Have you been to the ER, urgent care clinic since your last visit? Hospitalized since your last visit? \" Yes recently [facility]     2. \"Have you seen or consulted any other health care providers outside of the [address] [place] [address] since your last visit? \" No     3. For patients aged 39-70: Has the patient had a colonoscopy / FIT/ Cologuard? Yes - Care Gap present. Most recent result on file      If the patient is female:    4. For patients aged 41-77: Has the patient had a mammogram within the past 2 years? Yes - Care Gap present. Most recent result on file      5. For patients aged 21-65: Has the patient had a pap smear?  No

## 2023-04-04 NOTE — PROGRESS NOTES
Jamie Marrufo is a 48 y.o. female who presents for follow-up after ED visit 4/1, in for rectal pain and vaginal pain. .      Prior to that patient had been admitted to [facility]. Has a history of rectovaginal fistula. Followed by colorectal surgery. MRI of the pelvis with and without contrast was ordered on 4/1. 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 He is on glimepiride 4 mg daily, and Jardiance 25 mg daily for diabetes. HbA1C 7.1%  March 31. Weight 207#. Had a UTI, treated. Reports taking azo otc for symptoms frequently. This is an established visit conducted via telemedicine with video. The patient has been instructed that this meets HIPAA criteria and acknowledges and agrees to this method of visitation. Pursuant to the emergency declaration under the [address] [place] [address] waiver authority and the [employer] and [employer] Act, this Virtual Visit was conducted, with patient's consent, to reduce the patient's risk of exposure to COVID-19 and provide continuity of care for an established patient. Services were provided through a video synchronous discussion virtually to substitute for in-person clinic visit.         Past Medical History:   Diagnosis Date    Anal fissure     Anisocoria     Asthma     LAST EPISODE     Back pain     Cerumen impaction     Chronic kidney disease     hx uti in past    Coagulation defects     ocassional rectal bleeding due to anal fissure    Colovaginal fistula     Diabetes (HCC)     NIDDM    Diverticulitis     Diverticulosis     Enlarged tonsils     Frequent UTI     GERD (gastroesophageal reflux disease)     H/O endoscopy     with dilation    HA (headache)     Hepatic steatosis     History of colon resection     Hx of colonoscopy with polypectomy     benign    Hypertension     Ill-defined condition     FREQUENT HIVES Ill-defined condition     HX ELEVATED LIVER ENZYMES    Morbid obesity (HCC)     Nausea & vomiting     during diverticulitis flare    Obesity     Otitis media     Pneumonia     about 15 yrs ago    Psychiatric disorder     ANXIETY    Recurrent tonsillitis     Sinusitis     Transfusion history ~ age 35    postop hysterectomy    Urticaria        Family History   Problem Relation Age of Onset    Diabetes Mother     Cancer Mother         NON-HODGKINS LYMPHOMA    Anesth Problems Mother         PONV    Breast Cancer Sister 48    Cancer Paternal Grandmother     Diabetes Father     Heart Disease Father         CAD - STENTS, PACEMAKER    Arrhythmia Father        Social History     Socioeconomic History    Marital status:      Spouse name: Not on file    Number of children: Not on file    Years of education: Not on file    Highest education level: Not on file   Occupational History    Not on file   Tobacco Use    Smoking status: Never    Smokeless tobacco: Never   Vaping Use    Vaping Use: Not on file   Substance and Sexual Activity    Alcohol use: Not Currently    Drug use: Yes     Types: OTC, Prescription    Sexual activity: Never   Other Topics Concern    Not on file   Social History Narrative    Not on file     Social Determinants of Health     Financial Resource Strain: Low Risk     Difficulty of Paying Living Expenses: Not very hard   Food Insecurity: No Food Insecurity    Worried About Running Out of Food in the Last Year: Never true    Ran Out of Food in the Last Year: Never true   Transportation Needs: Not on file   Physical Activity: Not on file   Stress: Not on file   Social Connections: Not on file   Intimate Partner Violence: Not on file   Housing Stability: Not on file       Current Outpatient Medications on File Prior to Visit   Medication Sig Dispense Refill    glimepiride (AMARYL) 4 mg tablet TAKE 1 TABLET BY MOUTH ONCE DAILY BEFORE BREAKFAST 90 Tablet 1    lidocaine (XYLOCAINE) 2 % jelly Apply as directed externally for rectal pain every 2-3 hours as needed. 30 mL 1    ALPRAZolam (XANAX) 1 mg tablet TAKE 1/2-1 TABLET BY MOUTH TWICE DAILY AS NEEDED FOR ANXIETY 60 Tablet 0    Jardiance 25 mg tablet TAKE 1 TABLET BY MOUTH EVERY DAY 90 Tablet 1    losartan-hydroCHLOROthiazide (HYZAAR) 100-12.5 mg per tablet TAKE 1 TABLET BY MOUTH EVERY DAY 90 Tablet 1    albuterol (PROVENTIL HFA, VENTOLIN HFA, PROAIR HFA) 90 mcg/actuation inhaler Take 1 Puff by inhalation every four (4) hours as needed for Wheezing. 18 g 2    atorvastatin (LIPITOR) 20 mg tablet TAKE 1 TABLET BY MOUTH EVERY DAY 90 Tablet 1    azithromycin (ZITHROMAX) 250 mg tablet Take 1 Tablet by mouth See Admin Instructions. 6 Tablet 0    Nebulizer & Compressor machine Use as directed 1 Each 0    Nebulizer Accessories kit Use as directed 1 Kit 0    albuterol (PROVENTIL VENTOLIN) 2.5 mg /3 mL (0.083 %) nebu 3 mL by Nebulization route every six (6) hours as needed for Wheezing. 50 Each 1    sertraline (ZOLOFT) 100 mg tablet TAKE 2 TABLETS BY MOUTH EVERY NIGHT 180 Tablet 1    estradioL (ESTRACE) 0.5 mg tablet TAKE 1 TABLET BY MOUTH EVERY DAY 90 Tablet 1    dicyclomine (BENTYL) 20 mg tablet Take 1 Tablet by mouth every six (6) hours. 20 Tablet 0    ondansetron (Zofran ODT) 4 mg disintegrating tablet Take 1 Tablet by mouth every eight (8) hours as needed for Nausea. 20 Tablet 0    Ventolin HFA 90 mcg/actuation inhaler INHALE 1 PUFF BY MOUTH EVERY 4 HOURS AS NEEDED FOR WHEEZE 18 Each 1    omeprazole (PRILOSEC) 20 mg capsule Take 40 mg by mouth Daily (before breakfast). amoxicillin-clavulanate (Augmentin) 875-125 mg per tablet Take 1 Tablet by mouth two (2) times a day. (Patient not taking: No sig reported) 20 Tablet 0     No current facility-administered medications on file prior to visit. Review of Systems  Pertinent items are noted in HPI.     Objective:     Gen: well appearing female  HEENT: normal conjunctiva, no audible congestion, patient does not see oral erythema, has MMM  Neck: patient does not feel enlarged or tender LAD or masses  Resp: normal respiratory effort, no audible wheezing. CV: patient does not feel palpitations or heart irregularity  Abd: patient does not feel abdominal tenderness or mass, patient does not notice distension  Extrem: patient does not see swelling in ankles or joints. Assessment/Plan:       ICD-10-CM ICD-9-CM    1. Type 2 diabetes mellitus without complication, without long-term current use of insulin (HCC)  E11.9 250.00       2. Class 2 severe obesity with serious comorbidity and body mass index (BMI) of 38.0 to 38.9 in adult, unspecified obesity type ([facility] [address]  E66.01 278.01     Z68.38 V85.38         Stop jardiance. Start Ozempic titration. On amaryl 4mg daily. Recommend diabetic diet. Continue to work on weight reduction. Follow-up in the office. Follow-up with colorectal regarding rectovaginal fistula    This was a telemedicine visit with video.         Kelsey Diaz MD

## 2023-04-04 NOTE — TELEPHONE ENCOUNTER
Magalis Lieberman (Cha: Sheridan Rooney)  Rx #: [id number]  Ozempic (0.25 or 0.5 MG/DOSE) 2MG/1.5ML pen-injectors     Form  [employer] Electronic [place] Form (0207 NCPDP)

## 2023-04-05 ENCOUNTER — HOSPITAL ENCOUNTER (OUTPATIENT)
Age: 53
End: 2023-04-05
Attending: STUDENT IN AN ORGANIZED HEALTH CARE EDUCATION/TRAINING PROGRAM
Payer: MEDICAID

## 2023-04-05 LAB
ALBUMIN SERPL-MCNC: 4.2 G/DL (ref 3.5–5)
ALBUMIN/GLOB SERPL: 1.2 (ref 1.1–2.2)
ALP SERPL-CCNC: 70 U/L (ref 45–117)
ALT SERPL-CCNC: 43 U/L (ref 12–78)
ANION GAP SERPL CALC-SCNC: 6 MMOL/L (ref 5–15)
APPEARANCE UR: CLEAR
AST SERPL-CCNC: 27 U/L (ref 15–37)
BACTERIA URNS QL MICRO: NEGATIVE /HPF
BASOPHILS # BLD: 0 K/UL (ref 0–0.1)
BASOPHILS NFR BLD: 1 % (ref 0–1)
BILIRUB SERPL-MCNC: 0.8 MG/DL (ref 0.2–1)
BILIRUB UR QL: NEGATIVE
BUN SERPL-MCNC: 15 MG/DL (ref 6–20)
BUN/CREAT SERPL: 18 (ref 12–20)
CALCIUM SERPL-MCNC: 9.6 MG/DL (ref 8.5–10.1)
CHLORIDE SERPL-SCNC: 101 MMOL/L (ref 97–108)
CO2 SERPL-SCNC: 28 MMOL/L (ref 21–32)
COLOR UR: ABNORMAL
CREAT SERPL-MCNC: 0.85 MG/DL (ref 0.55–1.02)
DIFFERENTIAL METHOD BLD: ABNORMAL
EOSINOPHIL # BLD: 0.3 K/UL (ref 0–0.4)
EOSINOPHIL NFR BLD: 5 % (ref 0–7)
EPITH CASTS URNS QL MICRO: ABNORMAL /LPF
ERYTHROCYTE [DISTWIDTH] IN BLOOD BY AUTOMATED COUNT: 14.7 % (ref 11.5–14.5)
GLOBULIN SER CALC-MCNC: 3.6 G/DL (ref 2–4)
GLUCOSE SERPL-MCNC: 299 MG/DL (ref 65–100)
GLUCOSE UR STRIP.AUTO-MCNC: >1000 MG/DL
HCT VFR BLD AUTO: 42.1 % (ref 35–47)
HGB BLD-MCNC: 14.9 G/DL (ref 11.5–16)
HGB UR QL STRIP: ABNORMAL
HYALINE CASTS URNS QL MICRO: ABNORMAL /LPF (ref 0–2)
IMM GRANULOCYTES # BLD AUTO: 0 K/UL (ref 0–0.04)
IMM GRANULOCYTES NFR BLD AUTO: 1 % (ref 0–0.5)
KETONES UR QL STRIP.AUTO: NEGATIVE MG/DL
LEUKOCYTE ESTERASE UR QL STRIP.AUTO: ABNORMAL
LYMPHOCYTES # BLD: 1.7 K/UL (ref 0.8–3.5)
LYMPHOCYTES NFR BLD: 25 % (ref 12–49)
MCH RBC QN AUTO: 30 PG (ref 26–34)
MCHC RBC AUTO-ENTMCNC: 35.4 G/DL (ref 30–36.5)
MCV RBC AUTO: 84.7 FL (ref 80–99)
MONOCYTES # BLD: 0.4 K/UL (ref 0–1)
MONOCYTES NFR BLD: 5 % (ref 5–13)
NEUTS SEG # BLD: 4.1 K/UL (ref 1.8–8)
NEUTS SEG NFR BLD: 63 % (ref 32–75)
NITRITE UR QL STRIP.AUTO: NEGATIVE
NRBC # BLD: 0 K/UL (ref 0–0.01)
NRBC BLD-RTO: 0 PER 100 WBC
PH UR STRIP: 5.5 (ref 5–8)
PLATELET # BLD AUTO: 193 K/UL (ref 150–400)
PMV BLD AUTO: 10.3 FL (ref 8.9–12.9)
POTASSIUM SERPL-SCNC: 3.7 MMOL/L (ref 3.5–5.1)
PROT SERPL-MCNC: 7.8 G/DL (ref 6.4–8.2)
PROT UR STRIP-MCNC: NEGATIVE MG/DL
RBC # BLD AUTO: 4.97 M/UL (ref 3.8–5.2)
RBC #/AREA URNS HPF: ABNORMAL /HPF (ref 0–5)
SODIUM SERPL-SCNC: 135 MMOL/L (ref 136–145)
SP GR UR REFRACTOMETRY: 1.01
UA: UC IF INDICATED,UAUC: ABNORMAL
UROBILINOGEN UR QL STRIP.AUTO: 0.2 EU/DL (ref 0.2–1)
WBC # BLD AUTO: 6.5 K/UL (ref 3.6–11)
WBC URNS QL MICRO: ABNORMAL /HPF (ref 0–4)

## 2023-04-05 PROCEDURE — 74011250636 HC RX REV CODE- 250/636: Performed by: STUDENT IN AN ORGANIZED HEALTH CARE EDUCATION/TRAINING PROGRAM

## 2023-04-05 PROCEDURE — 80053 COMPREHEN METABOLIC PANEL: CPT

## 2023-04-05 PROCEDURE — 36415 COLL VENOUS BLD VENIPUNCTURE: CPT

## 2023-04-05 PROCEDURE — 96374 THER/PROPH/DIAG INJ IV PUSH: CPT

## 2023-04-05 PROCEDURE — 81001 URINALYSIS AUTO W/SCOPE: CPT

## 2023-04-05 PROCEDURE — 96375 TX/PRO/DX INJ NEW DRUG ADDON: CPT

## 2023-04-05 PROCEDURE — 74011250637 HC RX REV CODE- 250/637: Performed by: STUDENT IN AN ORGANIZED HEALTH CARE EDUCATION/TRAINING PROGRAM

## 2023-04-05 PROCEDURE — 96376 TX/PRO/DX INJ SAME DRUG ADON: CPT

## 2023-04-05 PROCEDURE — 85025 COMPLETE CBC W/AUTO DIFF WBC: CPT

## 2023-04-05 PROCEDURE — 99284 EMERGENCY DEPT VISIT MOD MDM: CPT

## 2023-04-05 RX ORDER — MORPHINE SULFATE 2 MG/ML
4 INJECTION, SOLUTION INTRAMUSCULAR; INTRAVENOUS ONCE
Status: COMPLETED
Start: 2023-04-05 | End: 2023-04-05

## 2023-04-05 RX ORDER — DIPHENHYDRAMINE HCL 25 MG
25 CAPSULE ORAL
Status: COMPLETED
Start: 2023-04-05 | End: 2023-04-05

## 2023-04-05 RX ORDER — PHENAZOPYRIDINE HYDROCHLORIDE 200 MG/1
200 TABLET, FILM COATED ORAL 3 TIMES DAILY
Qty: 6 TABLET | Refills: 0 | Status: SHIPPED
Start: 2023-04-05 | End: 2023-04-07

## 2023-04-05 RX ORDER — ACETAMINOPHEN 325 MG/1
650 TABLET ORAL
Qty: 20 TABLET | Refills: 0 | Status: SHIPPED
Start: 2023-04-05

## 2023-04-05 RX ORDER — OXYCODONE HYDROCHLORIDE 5 MG/1
5 TABLET ORAL
Qty: 8 TABLET | Refills: 0 | Status: SHIPPED
Start: 2023-04-05 | End: 2023-04-07

## 2023-04-05 RX ORDER — ONDANSETRON 2 MG/ML
4 INJECTION INTRAMUSCULAR; INTRAVENOUS ONCE
Status: COMPLETED
Start: 2023-04-05 | End: 2023-04-05

## 2023-04-05 RX ORDER — PROCHLORPERAZINE EDISYLATE 5 MG/ML
10 INJECTION INTRAMUSCULAR; INTRAVENOUS ONCE
Status: COMPLETED
Start: 2023-04-05 | End: 2023-04-05

## 2023-04-05 RX ADMIN — MORPHINE SULFATE 4 MG: 2 INJECTION, SOLUTION INTRAMUSCULAR; INTRAVENOUS at 15:26

## 2023-04-05 RX ADMIN — MORPHINE SULFATE 4 MG: 2 INJECTION, SOLUTION INTRAMUSCULAR; INTRAVENOUS at 13:37

## 2023-04-05 RX ADMIN — ONDANSETRON 4 MG: 2 INJECTION INTRAMUSCULAR; INTRAVENOUS at 13:34

## 2023-04-05 RX ADMIN — DIPHENHYDRAMINE HYDROCHLORIDE 25 MG: 25 CAPSULE ORAL at 13:35

## 2023-04-05 RX ADMIN — PROCHLORPERAZINE EDISYLATE 10 MG: 5 INJECTION INTRAMUSCULAR; INTRAVENOUS at 15:24

## 2023-04-05 NOTE — ED PROVIDER NOTES
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 Pt pending an appointment with Dr. Maria Del Rosario Marquez. Pt also believes she has an anal fissure. Bleeding and pus reported from vagina and rectum. HISTORY OF PRESENT ILLNESS: 1 or more elements    History From: Patient  HPI Limitations : None     Rose Marie Porter is a 48 y.o. female with Pmhx listed below     Patient is a 51-year-old female with history of possible inflammatory bowel disease, hypertension, GERD, colovaginal and colocutaneous fistula presenting with concern of worsening abdominal pain, nausea, vomiting, diarrhea, patient states that she is followed by GI as well as colorectal for ongoing fistula concerns as well as GI concerns, patient states that she does not have definitive diagnosis of Crohn's or ulcerative colitis but they believe that she has underlying inflammatory condition, patient takes Bentyl, Zofran and supposed to take mesalamine. Patient states that she was in her usual state of health but having worsening rectal pain, had MRI done 5 days ago which showed fistula, patient states that since the MRI she has had worsening diarrhea, abdominal pain and cramping, nausea not amendable to home therapies. Patient states that she scheduled to see colorectal outpatient next week for possible surgical planning of fistulous, states that the pain is so severe she does not feel she can make her appointment, is also followed by GI. Denies any fevers, chills and URI symptoms or other acute symptoms today. Nursing Notes were all reviewed and agreed with or any disagreements were addressed in the HPI.      REVIEW OF SYSTEMS Positives and Pertinent negatives as per HPI. PAST HISTORY     Past Medical History:  Past Medical History:   Diagnosis Date    Anal fissure     Anisocoria     Asthma     LAST EPISODE     Back pain     Cerumen impaction     Chronic kidney disease     hx uti in past    Coagulation defects     ocassional rectal bleeding due to anal fissure    Colovaginal fistula     Diabetes (HCC)     NIDDM    Diverticulitis     Diverticulosis     Enlarged tonsils     Frequent UTI     GERD (gastroesophageal reflux disease)     H/O endoscopy     with dilation    HA (headache)     Hepatic steatosis     History of colon resection     Hx of colonoscopy with polypectomy     benign    Hypertension     Ill-defined condition     FREQUENT HIVES    Ill-defined condition     HX ELEVATED LIVER ENZYMES    Morbid obesity (HCC)     Nausea & vomiting     during diverticulitis flare    Obesity     Otitis media     Pneumonia     about 15 yrs ago    Psychiatric disorder     ANXIETY    Recurrent tonsillitis     Sinusitis     Transfusion history ~ age 35    postop hysterectomy    Urticaria      Previous Medications    ALBUTEROL (PROVENTIL HFA, VENTOLIN HFA, PROAIR HFA) 90 MCG/ACTUATION INHALER    Take 1 Puff by inhalation every four (4) hours as needed for Wheezing. ALBUTEROL (PROVENTIL VENTOLIN) 2.5 MG /3 ML (0.083 %) NEBU    3 mL by Nebulization route every six (6) hours as needed for Wheezing. ALPRAZOLAM (XANAX) 1 MG TABLET    TAKE 1/2-1 TABLET BY MOUTH TWICE DAILY AS NEEDED FOR ANXIETY    AMOXICILLIN-CLAVULANATE (AUGMENTIN) 875-125 MG PER TABLET    Take 1 Tablet by mouth two (2) times a day. ATORVASTATIN (LIPITOR) 20 MG TABLET    TAKE 1 TABLET BY MOUTH EVERY DAY    AZITHROMYCIN (ZITHROMAX) 250 MG TABLET    Take 1 Tablet by mouth See Admin Instructions. DICYCLOMINE (BENTYL) 20 MG TABLET    Take 1 Tablet by mouth every six (6) hours.     ESTRADIOL (ESTRACE) 0.5 MG TABLET    TAKE 1 TABLET BY MOUTH EVERY DAY    GLIMEPIRIDE (AMARYL) 4 MG TABLET    TAKE 1 TABLET BY MOUTH ONCE DAILY BEFORE BREAKFAST    JARDIANCE 25 MG TABLET    TAKE 1 TABLET BY MOUTH EVERY DAY    LIDOCAINE (XYLOCAINE) 2 % JELLY    Apply as directed externally for rectal pain every 2-3 hours as needed. LOSARTAN-HYDROCHLOROTHIAZIDE (HYZAAR) 100-12.5 MG PER TABLET    TAKE 1 TABLET BY MOUTH EVERY DAY    NEBULIZER & COMPRESSOR MACHINE    Use as directed    NEBULIZER ACCESSORIES KIT    Use as directed    OMEPRAZOLE (PRILOSEC) 20 MG CAPSULE    Take 40 mg by mouth Daily (before breakfast). ONDANSETRON (ZOFRAN ODT) 4 MG DISINTEGRATING TABLET    Take 1 Tablet by mouth every eight (8) hours as needed for Nausea. SEMAGLUTIDE (OZEMPIC) 0.25 MG OR 0.5 MG/DOSE (2 MG/1.5 ML) SUBQ PEN    0.25 mg by SubCUTAneous route every seven (7) days. For 4 weeks. Then increase to 0.5mg SC every 7 days and continue . SERTRALINE (ZOLOFT) 100 MG TABLET    TAKE 2 TABLETS BY MOUTH EVERY NIGHT    VENTOLIN HFA 90 MCG/ACTUATION INHALER    INHALE 1 PUFF BY MOUTH EVERY 4 HOURS AS NEEDED FOR WHEEZE       Past Surgical History:  Past Surgical History:   Procedure Laterality Date    COLONOSCOPY N/A 2019    COLONOSCOPY performed by Naveen Rahman MD at [facility] ENDOSCOPY    COLONOSCOPY N/A 10/02/2020    COLONOSCOPY performed by Naveen Rahman MD at [address] N/A 10/25/2022    COLONOSCOPY AND EGD performed by Naveen Rahman MD at [address] [place] [address]  2021    cancer. HX BREAST REDUCTION  1992    [facility].     HX GI  2012    LAPAROSCOPIC HAND ASSISTED  POSS OPEN SIGMOID COLECTOMY POSS TEMPORARY DIVERTING LOOP ILEOSTOMY;  (no illeostomy needed)    HX GYN           HX GYN      cervical conization    HX HEENT      SINUS SURGERY LEFT X2    HX HEENT      SINUS SURGERY ON RIGHT X2    HX HEMORRHOIDECTOMY      HX HYSTERECTOMY      HX OTHER SURGICAL  2021    Sphincterotomy of rectum    HX PELVIC LAPAROSCOPY      HX EMMANUEL AND BSO      HX UROLOGICAL  2012 CYSTOSCOPY INSERTION URETERAL CATHETERS - Cystoscopy Insertion of bilateral ureteral stents    NE UNLISTED PROCEDURE ABDOMEN PERITONEUM & OMENTUM  01/06/2018    hernia repair at [address]      colon resection. Family History:  Family History   Problem Relation Age of Onset    Diabetes Mother     Cancer Mother         NON-HODGKINS LYMPHOMA    Anesth Problems Mother         PONV    Breast Cancer Sister 48    Cancer Paternal Grandmother     Diabetes Father     Heart Disease Father         CAD - STENTS, PACEMAKER    Arrhythmia Father        Social History:  Social History     Tobacco Use    Smoking status: Never    Smokeless tobacco: Never   Substance Use Topics    Alcohol use: Not Currently    Drug use: Yes     Types: OTC, Prescription       Allergies: Allergies   Allergen Reactions    Aspirin Hives, Shortness of Breath and Itching    Codeine Hives, Itching and Angioedema     Pt had itching in mouth, on face and lips    Contrast Agent [Iodine] Hives, Itching and Angioedema     5/5/21: pt was given benadryl and solu-medrol prior to administration but pt had severe tongue swelling and drooling, lethargy and itching.  DO NOT GIVE PT    Flavoring Agent Anaphylaxis     Cherry flavor    Iodinated Contrast Media Anaphylaxis, Diarrhea, Hives, Nausea and Vomiting and Shortness of Breath    Percocet [Oxycodone-Acetaminophen] Hives, Itching and Angioedema     Pt had itching in mouth, on face and lips    Prilosec [Omeprazole Magnesium] Anaphylaxis     CHERRY FLAVORED ODT; PT TAKES REGULAR PRILOSEC AND IS OK    Dilaudid [Hydromorphone] Itching     Tolerates with benadryl    Cephalexin Hives    Ketorolac Rash     \"makes my eyes spasm and causes rash on my hands\" and \"makes my skin itch and makes me nervous\"    Morphine (Pf) Rash     According to rn, pt can take morphine with benadryl    Fentanyl Rash and Itching     Has had benadryl before       PHYSICAL EXAM      ED Triage Vitals [04/05/23 1240]   ED Encounter Vitals Group      BP (!) 129/107      Pulse (Heart Rate) 93      Resp Rate 18      Temp 98 °F (36.7 °C)      Temp src       O2 Sat (%) 98 %      Weight 201 lb 15.1 oz      Height 5' 2\"        Physical Exam  Vitals reviewed. Constitutional:       General: She is not in acute distress. Appearance: Normal appearance. She is not ill-appearing, toxic-appearing or diaphoretic. HENT:      Head: Normocephalic. Mouth/Throat:      Mouth: Mucous membranes are moist.   Eyes:      Conjunctiva/sclera: Conjunctivae normal.   Cardiovascular:      Rate and Rhythm: Normal rate. Pulmonary:      Effort: Pulmonary effort is normal. No respiratory distress. Abdominal:      General: Abdomen is flat. Tenderness: There is abdominal tenderness. There is no guarding or rebound. Genitourinary:     Comments: Significant bleeding around fistula near anus  Musculoskeletal:         General: No deformity. Cervical back: Neck supple. Skin:     General: Skin is warm and dry. Neurological:      General: No focal deficit present. Mental Status: She is alert.    Psychiatric:         Mood and Affect: Mood normal.        EMERGENCY DEPARTMENT COURSE and DIFFERENTIAL DIAGNOSIS/MDM   CC/HPI Summary, DDx, ED Course, and Reassessment:     MDM  Number of Diagnoses or Management Options  Abdominal pain, generalized  Nausea and vomiting, unspecified vomiting type  Rectal pain  Diagnosis management comments: Patient is a 20-year-old female with history inflammatory bowel disease, multiple fistulas including anal vaginal as well as anocutaneous which is new presenting with concern of rectal pain, abdominal pain diarrhea, nausea vomiting, patient states that she was previously on steroids on and off as well as supposed to be takingmesalamine for inflammatory bowel disease, states that she is also supposed to be seeing colorectal outpatient next week for new fistulas, states that she is having worsening pain does not feel that she can make it to his appointments, patient present stable intermittent stress, afebrile, given that she had recent MRI do not feel that further imaging is indicated today, given new onset symptoms concerned that this may be related to a flare given that she has not been taking her medications consistently and no recent history of steroid use, given that she is supposed be seeing colorectal in the upcoming weeks we will discuss this case with them to determine if they have any surgical plan for patient and also consider discussion with GI, will plan on pain control, nausea meds and basic lab work including CBC, CMP and UA in the interim. ED Course as of 04/05/23 1621   Wed Apr 05, 2023   1356 Spoke with Dr. Dejuan Bishop with colorectal surgery, states that noted imaging is needed at this time, has appointment next week in clinic and would see her and evaluate her then, if patient is having inflammatory bowel disease would avoid surgical plan in this patient. We will discuss this case with GI as well [RN]   [phone] GI has seen evaluated the patient, awaiting consult recommendations [RN]   8641 Spoke to [facility] NP from GI specialist, states that they have seen and evaluated the patient, reviewed her colonoscopy report from a few months ago which was completely normal, unsure if patient has underlying IBS or if this is unrelated to that, does not recommend steroids at this time until C. difficile can be collected, recommends patient continuing her Mesalamine at home as well as pain control would consider small dose of opioids for home therapy as well as per admitting due to concern of possible bladder spasms involvement, patient is follow-up with Dr. Madan Johnston with colorectal next week who can review case given that it is less likely be due to inflammatory nature.   Patient's breast understanding, will plan on challenge, stool collection and discharge at this time [RN]   [id number] I reviewed patient's  and has not had any opioids prescribed in over 3 months, due to this we will trial short course while she is awaiting final plan given active fistula and skin irritation with active rectal pain and abdominal pain [RN]      ED Course User Index  [RN] Grant Berger MD       Vitals:    04/05/23 1240   BP: (!) 129/107   Pulse: 93   Resp: 18   Temp: 98 °F (36.7 °C)   SpO2: 98%   Weight: 91.6 kg (201 lb 15.1 oz)   Height: 5' 2\" (1.575 m)        Patient was given the following medications:  Medications   morphine injection 4 mg (4 mg IntraVENous Given 4/5/23 1337)   diphenhydrAMINE (BENADRYL) capsule 25 mg (25 mg Oral Given 4/5/23 1335)   ondansetron (ZOFRAN) injection 4 mg (4 mg IntraVENous Given 4/5/23 1334)   morphine injection 4 mg (4 mg IntraVENous Given 4/5/23 1526)   prochlorperazine (COMPAZINE) injection 10 mg (10 mg IntraVENous Given 4/5/23 1524)       CONSULTS:  IP CONSULT TO COLORECTAL SURGERY  IP CONSULT TO GASTROENTEROLOGY    Social Determinants affecting Dx or Tx: None    Records Reviewed (source and summary of external notes): Prior medical records  DIAGNOSTIC RESULTS   LABS:     Recent Results (from the past 12 hour(s))   CBC WITH AUTOMATED DIFF    Collection Time: 04/05/23 12:56 PM   Result Value Ref Range    WBC 6.5 3.6 - 11.0 K/uL    RBC 4.97 3.80 - 5.20 M/uL    HGB 14.9 11.5 - 16.0 g/dL    HCT 42.1 35.0 - 47.0 %    MCV 84.7 80.0 - 99.0 FL    MCH 30.0 26.0 - 34.0 PG    MCHC 35.4 30.0 - 36.5 g/dL    RDW 14.7 (H) 11.5 - 14.5 %    PLATELET 043 476 - 780 K/uL    MPV 10.3 8.9 - 12.9 FL    NRBC 0.0 0  WBC    ABSOLUTE NRBC 0.00 0.00 - 0.01 K/uL    NEUTROPHILS 63 32 - 75 %    LYMPHOCYTES 25 12 - 49 %    MONOCYTES 5 5 - 13 %    EOSINOPHILS 5 0 - 7 %    BASOPHILS 1 0 - 1 %    IMMATURE GRANULOCYTES 1 (H) 0.0 - 0.5 %    ABS. NEUTROPHILS 4.1 1.8 - 8.0 K/UL    ABS. LYMPHOCYTES 1.7 0.8 - 3.5 K/UL    ABS. MONOCYTES 0.4 0.0 - 1.0 K/UL    ABS. EOSINOPHILS 0.3 0.0 - 0.4 K/UL    ABS.  BASOPHILS 0.0 0.0 - 0.1 K/UL    ABS. IMM. GRANS. 0.0 0.00 - 0.04 K/UL    DF AUTOMATED     METABOLIC PANEL, COMPREHENSIVE    Collection Time: 04/05/23 12:56 PM   Result Value Ref Range    Sodium 135 (L) 136 - 145 mmol/L    Potassium 3.7 3.5 - 5.1 mmol/L    Chloride 101 97 - 108 mmol/L    CO2 28 21 - 32 mmol/L    Anion gap 6 5 - 15 mmol/L    Glucose 299 (H) 65 - 100 mg/dL    BUN 15 6 - 20 MG/DL    Creatinine 0.85 0.55 - 1.02 MG/DL    BUN/Creatinine ratio 18 12 - 20      eGFR >60 >60 ml/min/1.73m2    Calcium 9.6 8.5 - 10.1 MG/DL    Bilirubin, total 0.8 0.2 - 1.0 MG/DL    ALT (SGPT) 43 12 - 78 U/L    AST (SGOT) 27 15 - 37 U/L    Alk. phosphatase 70 45 - 117 U/L    Protein, total 7.8 6.4 - 8.2 g/dL    Albumin 4.2 3.5 - 5.0 g/dL    Globulin 3.6 2.0 - 4.0 g/dL    A-G Ratio 1.2 1.1 - 2.2     URINALYSIS W/ REFLEX CULTURE    Collection Time: 04/05/23  2:25 PM    Specimen: Urine   Result Value Ref Range    Color YELLOW/STRAW      Appearance CLEAR CLEAR      Specific gravity 1.012      pH (UA) 5.5 5.0 - 8.0      Protein Negative NEG mg/dL    Glucose >1,000 (A) NEG mg/dL    Ketone Negative NEG mg/dL    Bilirubin Negative NEG      Blood LARGE (A) NEG      Urobilinogen 0.2 0.2 - 1.0 EU/dL    Nitrites Negative NEG      Leukocyte Esterase TRACE (A) NEG      UA:UC IF INDICATED CULTURE NOT INDICATED BY UA RESULT CNI      WBC 5-10 0 - 4 /hpf    RBC 5-10 0 - 5 /hpf    Epithelial cells MANY (A) FEW /lpf    Bacteria Negative NEG /hpf    Hyaline cast 0-2 0 - 2 /lpf        EKG interpreted by me:      RADIOLOGY:  Non-plain film images such as CT, Ultrasound and MRI are read by the radiologist.   Plain radiographic images are often visualized and preliminarily interpreted by the ED, if an interpretation was completed the findings will be listed below:        Interpretation per the Radiologist below, if available at the time of this note:     No results found.       PROCEDURES   Unless otherwise noted below, none  Procedures     CRITICAL CARE TIME       FINAL IMPRESSION     1. Abdominal pain, generalized    2. Rectal pain    3. Nausea and vomiting, unspecified vomiting type          DISPOSITION/PLAN   Discharged    Discharge Note: The patient is stable for discharge home. The signs, symptoms, diagnosis, and discharge instructions have been discussed, understanding conveyed, and agreed upon. The patient is to follow up as recommended or return to ER should their symptoms worsen. PATIENT REFERRED TO:  Follow-up Information       Follow up With Specialties Details Why Contact Info    Jayleen Bustos MD Internal Medicine Physician   [address]  [facility] [address]  [facility]  [phone]      [facility] EMERGENCY DEPT Emergency Medicine   [address]  [address] [facility]  [phone]              DISCHARGE MEDICATIONS:  Current Discharge Medication List        START taking these medications    Details   oxyCODONE IR (ROXICODONE) 5 mg immediate release tablet Take 1 Tablet by mouth every six (6) hours as needed for Pain for up to 2 days. Max Daily Amount: 20 mg.  Qty: 8 Tablet, Refills: 0  Start date: 4/5/2023, End date: 4/7/2023    Associated Diagnoses: Abdominal pain, generalized; Rectal pain      acetaminophen (TYLENOL) 325 mg tablet Take 2 Tablets by mouth every four (4) hours as needed for Pain. Qty: 20 Tablet, Refills: 0  Start date: 4/5/2023      phenazopyridine (Pyridium) 200 mg tablet Take 1 Tablet by mouth three (3) times daily for 2 days. Qty: 6 Tablet, Refills: 0  Start date: 4/5/2023, End date: 4/7/2023               DISCONTINUED MEDICATIONS:  Current Discharge Medication List          I am the Primary Clinician of Record. Signed By: Dina Santos MD     April 5, 2023      (Please note that parts of this dictation were completed with voice recognition software. Quite often unanticipated grammatical, syntax, homophones, and other interpretive errors are inadvertently transcribed by the computer software.  Please disregards these errors.  Please excuse any errors that have escaped final proofreading.)

## 2023-04-05 NOTE — DISCHARGE INSTRUCTIONS
These begin taking Tylenol, Bentyl and over-the-counter pain medicine as needed, use oxycodone only as needed for severe pain, please continue wound care on your rectal area and follow-up with Dr. Noemí Jaimes in clinic next week for your fistula.   Please also follow-up with GI and continue taking your prescribed medications including mesalamine you can trial Pyridium for bladder spasms

## 2023-04-05 NOTE — CONSULTS
GI CONSULTATION NOTE  Smooth Bolden NP  [phone] NP in-hospital cell phone M-F until 4:30  After 5pm or on weekends, please call  for physician on call    NAME: Mark Reddy   :  1970   MRN:  [id number]   Attending:  Dr. Keily Hilario  Primary GI:  Dr. Dolores Dent   Date/Time:  2023 3:26 PM  Assessment:   Rectovaginal fistula  Ulcerative colitis   2023:  MRI pelvis showed findings concerning for rectovaginal and anocutaneous fistulas  No leukocytosis   On mesalamine, had a reaction to Entyvio  Last CLN on 10/25/2022 with 2 polyps removed and otherwise normal colonoscopy. No evidence of IBD    Plan:   Recommend CRS consultation  Stools studies pending   No direct plans for repeat CLN at this time. CLN in October showed not evidence of IBD. Recommend outpatient follow-up with Dr. Dolores Dent   Continue mesalamine   NPO for now  Symptomatic care per primary team  Plan discussed with Dr. Dianne Altman:     HISTORY OF PRESENT ILLNESS:     Mark Reddy is an 48 y.o.  female who we are asked to see for complaint of rectal pain. Patient presents with a hx of inflammatory bowel disease, hypertension, GERD, colovaginal and colocutaneous fistula presenting with concern of worsening abdominal pain, nausea, vomiting, diarrhea, patient states that she is followed by GI as well as colorectal for ongoing fistula concerns as well as GI concerns, patient states that she does not have definitive diagnosis of Crohn's or ulcerative colitis but they believe that she has underlying inflammatory condition, patient takes Bentyl, Zofran and supposed to take mesalamine. Patient states that she was in her usual state of health but having worsening rectal pain, had MRI done 5 days ago which showed fistula, patient states that since the MRI she has had worsening diarrhea, abdominal pain and cramping, nausea not amendable to home therapies.   Patient states that she scheduled to see colorectal outpatient next week for possible surgical planning of fistulous, states that the pain is so severe she does not feel she can make her appointment, is also followed by GI. Denies any fevers, chills and URI symptoms or other acute symptoms today. Past Medical History:   Diagnosis Date    Anal fissure     Anisocoria     Asthma     LAST EPISODE     Back pain     Cerumen impaction     Chronic kidney disease     hx uti in past    Coagulation defects     ocassional rectal bleeding due to anal fissure    Colovaginal fistula     Diabetes (HCC)     NIDDM    Diverticulitis     Diverticulosis     Enlarged tonsils     Frequent UTI     GERD (gastroesophageal reflux disease)     H/O endoscopy     with dilation    HA (headache)     Hepatic steatosis     History of colon resection     Hx of colonoscopy with polypectomy     benign    Hypertension     Ill-defined condition     FREQUENT HIVES    Ill-defined condition     HX ELEVATED LIVER ENZYMES    Morbid obesity (HCC)     Nausea & vomiting     during diverticulitis flare    Obesity     Otitis media     Pneumonia     about 15 yrs ago    Psychiatric disorder     ANXIETY    Recurrent tonsillitis     Sinusitis     Transfusion history ~ age 35    postop hysterectomy    Urticaria       Past Surgical History:   Procedure Laterality Date    COLONOSCOPY N/A 2019    COLONOSCOPY performed by Oma Astorga MD at [facility] ENDOSCOPY    COLONOSCOPY N/A 10/02/2020    COLONOSCOPY performed by Oma Astorga MD at [address] N/A 10/25/2022    COLONOSCOPY AND EGD performed by [facility] MD [facility]  2021    cancer. HX BREAST REDUCTION      [facility].     HX GI  2012    LAPAROSCOPIC HAND ASSISTED  POSS OPEN SIGMOID COLECTOMY POSS TEMPORARY DIVERTING LOOP ILEOSTOMY;  (no illeostomy needed)    HX GYN           HX GYN      cervical conization    HX HEENT      SINUS SURGERY LEFT X2    HX HEENT      SINUS SURGERY ON RIGHT X2    HX HEMORRHOIDECTOMY      HX HYSTERECTOMY      HX OTHER SURGICAL  11/12/2021    Sphincterotomy of rectum    HX PELVIC LAPAROSCOPY      HX EMMANUEL AND BSO      HX UROLOGICAL  07/31/2012     CYSTOSCOPY INSERTION URETERAL CATHETERS - Cystoscopy Insertion of bilateral ureteral stents    [place] UNLISTED PROCEDURE ABDOMEN PERITONEUM & OMENTUM  01/06/2018    hernia repair at [address] [facility]      colon resection. Social History     Tobacco Use    Smoking status: Never    Smokeless tobacco: Never   Substance Use Topics    Alcohol use: Not Currently      Family History   Problem Relation Age of Onset    Diabetes Mother     Cancer Mother         NON-HODGKINS LYMPHOMA    Anesth Problems Mother         PONV    Breast Cancer Sister 48    Cancer Paternal Grandmother     Diabetes Father     Heart Disease Father         CAD - STENTS, PACEMAKER    Arrhythmia Father       Allergies   Allergen Reactions    Aspirin Hives, Shortness of Breath and Itching    Codeine Hives, Itching and Angioedema     Pt had itching in mouth, on face and lips    Contrast Agent [Iodine] Hives, Itching and Angioedema     5/5/21: pt was given benadryl and solu-medrol prior to administration but pt had severe tongue swelling and drooling, lethargy and itching.  DO NOT GIVE PT    Flavoring Agent Anaphylaxis     Cherry flavor    Iodinated Contrast Media Anaphylaxis, Diarrhea, Hives, Nausea and Vomiting and Shortness of Breath    Percocet [Oxycodone-Acetaminophen] Hives, Itching and Angioedema     Pt had itching in mouth, on face and lips    Prilosec [Omeprazole Magnesium] Anaphylaxis     CHERRY FLAVORED ODT; PT TAKES REGULAR PRILOSEC AND IS OK    Dilaudid [Hydromorphone] Itching     Tolerates with benadryl    Cephalexin Hives    Ketorolac Rash     \"makes my eyes spasm and causes rash on my hands\" and \"makes my skin itch and makes me nervous\"    Morphine (Pf) Rash     According to rn, pt can take morphine with benadryl    Fentanyl Rash and Itching     Has had benadryl before      Prior to Admission medications    Medication Sig Start Date End Date Taking? Authorizing Provider   semaglutide (OZEMPIC) 0.25 mg or 0.5 mg/dose (2 mg/1.5 ml) subq pen 0.25 mg by SubCUTAneous route every seven (7) days. For 4 weeks. Then increase to 0.5mg SC every 7 days and continue . 4/4/23   Gogo Weinberg MD   glimepiride (AMARYL) 4 mg tablet TAKE 1 TABLET BY MOUTH ONCE DAILY BEFORE BREAKFAST 4/2/23   Gogo Weinberg MD   lidocaine (XYLOCAINE) 2 % jelly Apply as directed externally for rectal pain every 2-3 hours as needed. 4/1/23   Martin Ward, DO   ALPRAZolam Jhon [place]) 1 mg tablet TAKE 1/2-1 TABLET BY MOUTH TWICE DAILY AS NEEDED FOR ANXIETY 3/23/23   Gogo Weinberg MD   Jardiance 25 mg tablet TAKE 1 TABLET BY MOUTH EVERY DAY 3/20/23   Gogo Weinberg MD   losartan-hydroCHLOROthiazide [facility]) 100-12.5 mg per tablet TAKE 1 TABLET BY MOUTH EVERY DAY 3/16/23   Gogo Weinberg MD   albuterol (PROVENTIL HFA, VENTOLIN HFA, PROAIR HFA) 90 mcg/actuation inhaler Take 1 Puff by inhalation every four (4) hours as needed for Wheezing. 2/21/23   Gogo Weinberg MD   atorvastatin (LIPITOR) 20 mg tablet TAKE 1 TABLET BY MOUTH EVERY DAY 1/24/23   Gogo Weinberg MD   azithromycin (ZITHROMAX) 250 mg tablet Take 1 Tablet by mouth See Admin Instructions. 1/18/23   Irma Gonsalves DO   Nebulizer & Compressor machine Use as directed 12/28/22   Gogo Weinberg MD   Nebulizer Accessories kit Use as directed 12/28/22   Gogo Weinberg MD   albuterol (PROVENTIL VENTOLIN) 2.5 mg /3 mL (0.083 %) nebu 3 mL by Nebulization route every six (6) hours as needed for Wheezing.  12/28/22   Gogo Weinberg MD   sertraline (ZOLOFT) 100 mg tablet TAKE 2 TABLETS BY MOUTH EVERY NIGHT 9/16/22   Gogo Weinberg MD   estradioL (ESTRACE) 0.5 mg tablet TAKE 1 TABLET BY MOUTH EVERY DAY 9/16/22   Gogo Weinberg MD   amoxicillin-clavulanate (Augmentin) 875-125 mg per tablet Take 1 Tablet by mouth two (2) times a day. Patient not taking: No sig reported 8/23/22   Graciela Cardoza MD   dicyclomine (BENTYL) 20 mg tablet Take 1 Tablet by mouth every six (6) hours. 8/23/22   Graciela Cardoza MD   ondansetron (Zofran ODT) 4 mg disintegrating tablet Take 1 Tablet by mouth every eight (8) hours as needed for Nausea. 7/27/22   Celena HAYES MD   Ventolin HFA 90 mcg/actuation inhaler INHALE 1 PUFF BY MOUTH EVERY 4 HOURS AS NEEDED FOR WHEEZE 7/15/22   Michelle Mathew MD   omeprazole (PRILOSEC) 20 mg capsule Take 40 mg by mouth Daily (before breakfast). Provider, Historical       Patient Active Problem List   Diagnosis Code    Steroid-induced diabetes ([facility] E09.9, T38.0X5A    Diarrhea R19.7    Bronchitis J40    Accelerated hypertension I10    Type 2 diabetes mellitus ([facility] E11.9    Proctitis K62.89    Rectal abscess K61.1       REVIEW OF SYSTEMS:    Constitutional: negative fever, negative chills, negative weight loss  Eyes:   negative visual changes  ENT:   negative sore throat, tongue or lip swelling   Respiratory:  negative cough, negative dyspnea  Cards:  negative for chest pain, palpitations, lower extremity edema  GI:   See HPI  :  negative for frequency, dysuria  Integument:  negative for rash and pruritus  Heme:  negative for easy bruising and gum/nose bleeding  Musculoskel: negative for myalgias,  back pain and muscle weakness  Neuro: negative for headaches, dizziness, vertigo  Psych: negative for feelings of anxiety, depression     Objective:   VITALS:    Visit Vitals  BP (!) 129/107 (BP 1 Location: Right arm, BP Patient Position: At rest)   Pulse 93   Temp 98 °F (36.7 °C)   Resp 18   Ht 5' 2\" (1.575 m)   Wt 91.6 kg (201 lb 15.1 oz)   SpO2 98%   BMI 36.94 kg/m²       PHYSICAL EXAM:   General:           Pleasant  female. NAD  Head:               Normocephalic, without obvious abnormality, atraumatic.   Eyes: Conjunctivae clear and pale, anicteric sclerae. Pupils are equal  Nose:               Nares normal. No drainage or sinus tenderness. Throat:             Lips, mucosa, and tongue normal.  No Thrush  Neck:               Supple, symmetrical,  no adenopathy, thyroid: non tender  Back:               Symmetric,  No CVA tenderness. Lungs:             CTA bilaterally. No wheezing/rhonchi/rales. Chest wall:      No tenderness or deformity. No Accessory muscle use. Heart:              Regular rate and rhythm,  no murmur, rub or gallop. Abdomen:        Soft, non-tender. Not distended. Bowel sounds normal. No masses  Extremities:     Atraumatic, No cyanosis. No edema. No clubbing  Skin:                Texture, turgor normal. No rashes/lesions/jaundice  Psych:             Good insight. Not depressed. Not anxious or agitated. Neurologic:      EOMs intact. No facial asymmetry. No aphasia or slurred speech. A/O X 3. LAB DATA REVIEWED:    Recent Results (from the past 24 hour(s))   CBC WITH AUTOMATED DIFF    Collection Time: 04/05/23 12:56 PM   Result Value Ref Range    WBC 6.5 3.6 - 11.0 K/uL    RBC 4.97 3.80 - 5.20 M/uL    HGB 14.9 11.5 - 16.0 g/dL    HCT 42.1 35.0 - 47.0 %    MCV 84.7 80.0 - 99.0 FL    MCH 30.0 26.0 - 34.0 PG    MCHC 35.4 30.0 - 36.5 g/dL    RDW 14.7 (H) 11.5 - 14.5 %    PLATELET 395 857 - 557 K/uL    MPV 10.3 8.9 - 12.9 FL    NRBC 0.0 0  WBC    ABSOLUTE NRBC 0.00 0.00 - 0.01 K/uL    NEUTROPHILS 63 32 - 75 %    LYMPHOCYTES 25 12 - 49 %    MONOCYTES 5 5 - 13 %    EOSINOPHILS 5 0 - 7 %    BASOPHILS 1 0 - 1 %    IMMATURE GRANULOCYTES 1 (H) 0.0 - 0.5 %    ABS. NEUTROPHILS 4.1 1.8 - 8.0 K/UL    ABS. LYMPHOCYTES 1.7 0.8 - 3.5 K/UL    ABS. MONOCYTES 0.4 0.0 - 1.0 K/UL    ABS. EOSINOPHILS 0.3 0.0 - 0.4 K/UL    ABS. BASOPHILS 0.0 0.0 - 0.1 K/UL    ABS. IMM.  GRANS. 0.0 0.00 - 0.04 K/UL    DF AUTOMATED     METABOLIC PANEL, COMPREHENSIVE    Collection Time: 04/05/23 12:56 PM   Result Value Ref Range Sodium 135 (L) 136 - 145 mmol/L    Potassium 3.7 3.5 - 5.1 mmol/L    Chloride 101 97 - 108 mmol/L    CO2 28 21 - 32 mmol/L    Anion gap 6 5 - 15 mmol/L    Glucose 299 (H) 65 - 100 mg/dL    BUN 15 6 - 20 MG/DL    Creatinine 0.85 0.55 - 1.02 MG/DL    BUN/Creatinine ratio 18 12 - 20      eGFR >60 >60 ml/min/1.73m2    Calcium 9.6 8.5 - 10.1 MG/DL    Bilirubin, total 0.8 0.2 - 1.0 MG/DL    ALT (SGPT) 43 12 - 78 U/L    AST (SGOT) 27 15 - 37 U/L    Alk.  phosphatase 70 45 - 117 U/L    Protein, total 7.8 6.4 - 8.2 g/dL    Albumin 4.2 3.5 - 5.0 g/dL    Globulin 3.6 2.0 - 4.0 g/dL    A-G Ratio 1.2 1.1 - 2.2     URINALYSIS W/ REFLEX CULTURE    Collection Time: 04/05/23  2:25 PM    Specimen: Urine   Result Value Ref Range    Color YELLOW/STRAW      Appearance CLEAR CLEAR      Specific gravity 1.012      pH (UA) 5.5 5.0 - 8.0      Protein Negative NEG mg/dL    Glucose >1,000 (A) NEG mg/dL    Ketone Negative NEG mg/dL    Bilirubin Negative NEG      Blood LARGE (A) NEG      Urobilinogen 0.2 0.2 - 1.0 EU/dL    Nitrites Negative NEG      Leukocyte Esterase TRACE (A) NEG      UA:UC IF INDICATED CULTURE NOT INDICATED BY UA RESULT CNI      WBC 5-10 0 - 4 /hpf    RBC 5-10 0 - 5 /hpf    Epithelial cells MANY (A) FEW /lpf    Bacteria Negative NEG /hpf    Hyaline cast 0-2 0 - 2 /lpf       IMAGING RESULTS:  I have personally reviewed the imaging reports      Total time spent with patient: 25 minutes ________________________________________________________________________  Care Plan discussed with:  Patient x   Family     RN x              Consultant:       CT  4/5/2023:  ________________________________________________________________________    ___________________________________________________  Consulting Provider: Ivan Gan NP      4/5/2023  3:26 PM

## 2023-04-05 NOTE — Clinical Note
[address]  [facility] EMERGENCY DEPT  [address]  [place] [address] [phone]    Work/School Note    Date: 4/5/2023    To Whom It May concern:    Tracy Parsons was seen and treated today in the emergency room by the following provider(s):  Attending Provider: Tasha Wen MD.      Tracy Parsons is excused from work/school on 4/5/2023 through 4/7/2023. She is medically clear to return to work/school on 4/8/2023.          Sincerely,          Uriel Hernandez MD

## 2023-04-08 PROBLEM — R11.2 NAUSEA & VOMITING: Status: ACTIVE | Noted: 2023-04-08

## 2023-04-08 PROBLEM — R10.9 RECURRENT ABDOMINAL PAIN: Status: ACTIVE | Noted: 2023-04-08

## 2023-04-13 ENCOUNTER — TELEPHONE (OUTPATIENT)
Dept: INTERNAL MEDICINE CLINIC | Age: 53
End: 2023-04-13

## 2023-04-21 DIAGNOSIS — Z12.31 ENCOUNTER FOR MAMMOGRAM TO ESTABLISH BASELINE MAMMOGRAM: Primary | ICD-10-CM

## 2023-04-25 DIAGNOSIS — F41.9 ANXIETY: ICD-10-CM

## 2023-04-27 DIAGNOSIS — F41.9 ANXIETY: ICD-10-CM

## 2023-04-27 RX ORDER — ALPRAZOLAM 1 MG/1
TABLET ORAL
Qty: 60 TABLET | Refills: 0 | Status: SHIPPED | OUTPATIENT
Start: 2023-04-27

## 2023-04-27 RX ORDER — ALPRAZOLAM 1 MG/1
TABLET ORAL
Qty: 60 TABLET | Refills: 0 | OUTPATIENT
Start: 2023-04-27

## 2023-04-27 RX ORDER — NALOXONE HYDROCHLORIDE 4 MG/.1ML
SPRAY NASAL
Qty: 1 EACH | Refills: 0 | Status: SHIPPED | OUTPATIENT
Start: 2023-04-27

## 2023-04-27 NOTE — TELEPHONE ENCOUNTER
----- Message from Maddy Osborn. Niki [place] sent at 4/27/2023 11:26 AM EDT -----  Regarding: Alprazolam  Contact: [phone]  Hi Dr. Adam Burnette,     I m completely out of the alprazolam. Im not taking them together of course. But I only have a couple left. My surgeon is telling me to only take pain meds when necessary. So please advise as what to do. Thank you!   Rodrigo Morejon

## 2023-04-27 NOTE — TELEPHONE ENCOUNTER
Future Appointments:  Future Appointments   Date Time Provider Daniel Silvia   5/5/2023  4:30 PM Ros Rowan MD [facility] BS AMB        Last Appointment With Me:  4/4/2023     Requested Prescriptions     Pending Prescriptions Disp Refills    ALPRAZolam (XANAX) 1 mg tablet 60 Tablet 0

## 2023-05-03 ENCOUNTER — HOSPITAL ENCOUNTER (EMERGENCY)
Age: 53
Discharge: HOME | End: 2023-05-03
Attending: EMERGENCY MEDICINE
Payer: MEDICAID

## 2023-05-03 ENCOUNTER — APPOINTMENT (OUTPATIENT)
Dept: CT IMAGING | Age: 53
End: 2023-05-03
Attending: EMERGENCY MEDICINE
Payer: MEDICAID

## 2023-05-03 VITALS
DIASTOLIC BLOOD PRESSURE: 62 MMHG | RESPIRATION RATE: 17 BRPM | HEIGHT: 62 IN | TEMPERATURE: 97.9 F | SYSTOLIC BLOOD PRESSURE: 119 MMHG | HEART RATE: 74 BPM | BODY MASS INDEX: 37.32 KG/M2 | OXYGEN SATURATION: 96 % | WEIGHT: 202.82 LBS

## 2023-05-03 DIAGNOSIS — K62.89 RECTAL PAIN: Primary | ICD-10-CM

## 2023-05-03 LAB
ALBUMIN SERPL-MCNC: 4.1 G/DL (ref 3.5–5)
ALBUMIN/GLOB SERPL: 1 (ref 1.1–2.2)
ALP SERPL-CCNC: 84 U/L (ref 45–117)
ALT SERPL-CCNC: 27 U/L (ref 12–78)
ANION GAP SERPL CALC-SCNC: 5 MMOL/L (ref 5–15)
APPEARANCE UR: CLEAR
AST SERPL-CCNC: 18 U/L (ref 15–37)
BACTERIA URNS QL MICRO: NEGATIVE /HPF
BASOPHILS # BLD: 0 K/UL (ref 0–0.1)
BASOPHILS NFR BLD: 1 % (ref 0–1)
BILIRUB SERPL-MCNC: 1.1 MG/DL (ref 0.2–1)
BILIRUB UR QL: NEGATIVE
BUN SERPL-MCNC: 9 MG/DL (ref 6–20)
BUN/CREAT SERPL: 13 (ref 12–20)
CALCIUM SERPL-MCNC: 9.2 MG/DL (ref 8.5–10.1)
CHLORIDE SERPL-SCNC: 100 MMOL/L (ref 97–108)
CO2 SERPL-SCNC: 29 MMOL/L (ref 21–32)
COLOR UR: ABNORMAL
CREAT SERPL-MCNC: 0.69 MG/DL (ref 0.55–1.02)
DIFFERENTIAL METHOD BLD: ABNORMAL
EOSINOPHIL # BLD: 0.4 K/UL (ref 0–0.4)
EOSINOPHIL NFR BLD: 5 % (ref 0–7)
EPITH CASTS URNS QL MICRO: ABNORMAL /LPF
ERYTHROCYTE [DISTWIDTH] IN BLOOD BY AUTOMATED COUNT: 13.3 % (ref 11.5–14.5)
GLOBULIN SER CALC-MCNC: 4 G/DL (ref 2–4)
GLUCOSE SERPL-MCNC: 225 MG/DL (ref 65–100)
GLUCOSE UR STRIP.AUTO-MCNC: 250 MG/DL
HCT VFR BLD AUTO: 39.1 % (ref 35–47)
HGB BLD-MCNC: 13.4 G/DL (ref 11.5–16)
HGB UR QL STRIP: NEGATIVE
IMM GRANULOCYTES # BLD AUTO: 0 K/UL (ref 0–0.04)
IMM GRANULOCYTES NFR BLD AUTO: 0 % (ref 0–0.5)
KETONES UR QL STRIP.AUTO: NEGATIVE MG/DL
LACTATE BLD-SCNC: 1.62 MMOL/L (ref 0.4–2)
LACTATE BLD-SCNC: 2.45 MMOL/L (ref 0.4–2)
LEUKOCYTE ESTERASE UR QL STRIP.AUTO: ABNORMAL
LYMPHOCYTES # BLD: 2.3 K/UL (ref 0.8–3.5)
LYMPHOCYTES NFR BLD: 32 % (ref 12–49)
MCH RBC QN AUTO: 28.7 PG (ref 26–34)
MCHC RBC AUTO-ENTMCNC: 34.3 G/DL (ref 30–36.5)
MCV RBC AUTO: 83.7 FL (ref 80–99)
MONOCYTES # BLD: 0.3 K/UL (ref 0–1)
MONOCYTES NFR BLD: 4 % (ref 5–13)
NEUTS SEG # BLD: 4.1 K/UL (ref 1.8–8)
NEUTS SEG NFR BLD: 58 % (ref 32–75)
NITRITE UR QL STRIP.AUTO: POSITIVE
NRBC # BLD: 0 K/UL (ref 0–0.01)
NRBC BLD-RTO: 0 PER 100 WBC
PH UR STRIP: 5.5 (ref 5–8)
PLATELET # BLD AUTO: 194 K/UL (ref 150–400)
PMV BLD AUTO: 9.5 FL (ref 8.9–12.9)
POTASSIUM SERPL-SCNC: 3 MMOL/L (ref 3.5–5.1)
PROT SERPL-MCNC: 8.1 G/DL (ref 6.4–8.2)
PROT UR STRIP-MCNC: NEGATIVE MG/DL
RBC # BLD AUTO: 4.67 M/UL (ref 3.8–5.2)
RBC #/AREA URNS HPF: ABNORMAL /HPF (ref 0–5)
SODIUM SERPL-SCNC: 134 MMOL/L (ref 136–145)
SP GR UR REFRACTOMETRY: 1.02
UA: UC IF INDICATED,UAUC: ABNORMAL
UROBILINOGEN UR QL STRIP.AUTO: 1 EU/DL (ref 0.2–1)
WBC # BLD AUTO: 7.1 K/UL (ref 3.6–11)
WBC URNS QL MICRO: ABNORMAL /HPF (ref 0–4)

## 2023-05-03 PROCEDURE — 96376 TX/PRO/DX INJ SAME DRUG ADON: CPT

## 2023-05-03 PROCEDURE — 81001 URINALYSIS AUTO W/SCOPE: CPT

## 2023-05-03 PROCEDURE — 96361 HYDRATE IV INFUSION ADD-ON: CPT

## 2023-05-03 PROCEDURE — 96375 TX/PRO/DX INJ NEW DRUG ADDON: CPT

## 2023-05-03 PROCEDURE — 99284 EMERGENCY DEPT VISIT MOD MDM: CPT

## 2023-05-03 PROCEDURE — 36415 COLL VENOUS BLD VENIPUNCTURE: CPT

## 2023-05-03 PROCEDURE — 72192 CT PELVIS W/O DYE: CPT

## 2023-05-03 PROCEDURE — 83605 ASSAY OF LACTIC ACID: CPT

## 2023-05-03 PROCEDURE — 80053 COMPREHEN METABOLIC PANEL: CPT

## 2023-05-03 PROCEDURE — 85025 COMPLETE CBC W/AUTO DIFF WBC: CPT

## 2023-05-03 PROCEDURE — 74011250636 HC RX REV CODE- 250/636: Performed by: EMERGENCY MEDICINE

## 2023-05-03 PROCEDURE — 96374 THER/PROPH/DIAG INJ IV PUSH: CPT

## 2023-05-03 RX ORDER — ONDANSETRON 2 MG/ML
4 INJECTION INTRAMUSCULAR; INTRAVENOUS
Status: COMPLETED | OUTPATIENT
Start: 2023-05-03 | End: 2023-05-03

## 2023-05-03 RX ORDER — DIPHENHYDRAMINE HYDROCHLORIDE 50 MG/ML
25 INJECTION, SOLUTION INTRAMUSCULAR; INTRAVENOUS
Status: COMPLETED | OUTPATIENT
Start: 2023-05-03 | End: 2023-05-03

## 2023-05-03 RX ORDER — HYDROMORPHONE HYDROCHLORIDE 1 MG/ML
0.5 INJECTION, SOLUTION INTRAMUSCULAR; INTRAVENOUS; SUBCUTANEOUS
Status: COMPLETED | OUTPATIENT
Start: 2023-05-03 | End: 2023-05-03

## 2023-05-03 RX ADMIN — HYDROMORPHONE HYDROCHLORIDE 0.5 MG: 1 INJECTION, SOLUTION INTRAMUSCULAR; INTRAVENOUS; SUBCUTANEOUS at 03:16

## 2023-05-03 RX ADMIN — ONDANSETRON 4 MG: 2 INJECTION INTRAMUSCULAR; INTRAVENOUS at 01:31

## 2023-05-03 RX ADMIN — SODIUM CHLORIDE 1000 ML: 9 INJECTION, SOLUTION INTRAVENOUS at 01:29

## 2023-05-03 RX ADMIN — DIPHENHYDRAMINE HYDROCHLORIDE 25 MG: 50 INJECTION, SOLUTION INTRAMUSCULAR; INTRAVENOUS at 03:16

## 2023-05-03 RX ADMIN — DIPHENHYDRAMINE HYDROCHLORIDE 25 MG: 50 INJECTION, SOLUTION INTRAMUSCULAR; INTRAVENOUS at 01:31

## 2023-05-03 RX ADMIN — HYDROMORPHONE HYDROCHLORIDE 0.5 MG: 1 INJECTION, SOLUTION INTRAMUSCULAR; INTRAVENOUS; SUBCUTANEOUS at 01:34

## 2023-05-03 RX ADMIN — ONDANSETRON 4 MG: 2 INJECTION INTRAMUSCULAR; INTRAVENOUS at 03:16

## 2023-05-15 RX ORDER — ESTRADIOL 0.5 MG/1
TABLET ORAL
Qty: 90 TABLET | Refills: 1 | Status: SHIPPED | OUTPATIENT
Start: 2023-05-15

## 2023-05-15 RX ORDER — SERTRALINE HYDROCHLORIDE 100 MG/1
TABLET, FILM COATED ORAL
Qty: 180 TABLET | Refills: 1 | Status: SHIPPED | OUTPATIENT
Start: 2023-05-15

## 2023-05-15 NOTE — TELEPHONE ENCOUNTER
PCP: Sidney Aguilera MD    Last appt:   4/4/2023    Future Appointments   Date Time Provider Rober Coyne   5/26/2023 12:00 PM Mahnaz Montemayor MD [facility] BS AMB       Requested Prescriptions     Pending Prescriptions Disp Refills    estradiol (ESTRACE) 0.5 MG tablet [Pharmacy Med Name: ESTRADIOL 0.5 MG TABLET] 90 tablet 1     Sig: TAKE 1 TABLET BY MOUTH EVERY DAY    sertraline (ZOLOFT) 100 MG tablet [Pharmacy Med Name: SERTRALINE  MG TABLET] 180 tablet 1     Sig: TAKE 2 TABLETS BY MOUTH EVERY NIGHT

## 2023-05-28 DIAGNOSIS — F41.9 ANXIETY DISORDER, UNSPECIFIED: ICD-10-CM

## 2023-05-30 RX ORDER — ALPRAZOLAM 1 MG/1
TABLET ORAL
Qty: 60 TABLET | Refills: 0 | Status: SHIPPED | OUTPATIENT
Start: 2023-05-30 | End: 2023-06-29

## 2023-06-05 ENCOUNTER — TELEPHONE (OUTPATIENT)
Age: 53
End: 2023-06-05

## 2023-06-06 ENCOUNTER — HOSPITAL ENCOUNTER (INPATIENT)
Facility: HOSPITAL | Age: 53
LOS: 2 days | Discharge: HOME | DRG: 254 | End: 2023-06-08
Attending: EMERGENCY MEDICINE | Admitting: STUDENT IN AN ORGANIZED HEALTH CARE EDUCATION/TRAINING PROGRAM
Payer: MEDICAID

## 2023-06-06 ENCOUNTER — APPOINTMENT (OUTPATIENT)
Facility: HOSPITAL | Age: 53
DRG: 254 | End: 2023-06-06
Payer: MEDICAID

## 2023-06-06 DIAGNOSIS — R10.30 LOWER ABDOMINAL PAIN: Primary | ICD-10-CM

## 2023-06-06 DIAGNOSIS — R10.2 VAGINAL PAIN: ICD-10-CM

## 2023-06-06 DIAGNOSIS — K62.89 RECTAL PAIN: ICD-10-CM

## 2023-06-06 LAB
ALBUMIN SERPL-MCNC: 3.6 G/DL (ref 3.5–5)
ALBUMIN/GLOB SERPL: 1 (ref 1.1–2.2)
ALP SERPL-CCNC: 66 U/L (ref 45–117)
ALT SERPL-CCNC: 36 U/L (ref 12–78)
ANION GAP SERPL CALC-SCNC: 5 MMOL/L (ref 5–15)
APPEARANCE UR: ABNORMAL
AST SERPL-CCNC: 25 U/L (ref 15–37)
BACTERIA URNS QL MICRO: ABNORMAL /HPF
BASOPHILS # BLD: 0 K/UL (ref 0–0.1)
BASOPHILS NFR BLD: 1 % (ref 0–1)
BILIRUB SERPL-MCNC: 0.5 MG/DL (ref 0.2–1)
BILIRUB UR QL: NEGATIVE
BUN SERPL-MCNC: 9 MG/DL (ref 6–20)
BUN/CREAT SERPL: 12 (ref 12–20)
CALCIUM SERPL-MCNC: 9.7 MG/DL (ref 8.5–10.1)
CHLORIDE SERPL-SCNC: 102 MMOL/L (ref 97–108)
CLUE CELLS VAG QL WET PREP: NORMAL
CO2 SERPL-SCNC: 28 MMOL/L (ref 21–32)
COLOR UR: ABNORMAL
COMMENT:: NORMAL
CREAT SERPL-MCNC: 0.73 MG/DL (ref 0.55–1.02)
DIFFERENTIAL METHOD BLD: NORMAL
EOSINOPHIL # BLD: 0.3 K/UL (ref 0–0.4)
EOSINOPHIL NFR BLD: 5 % (ref 0–7)
EPITH CASTS URNS QL MICRO: ABNORMAL /LPF
ERYTHROCYTE [DISTWIDTH] IN BLOOD BY AUTOMATED COUNT: 13.2 % (ref 11.5–14.5)
GLOBULIN SER CALC-MCNC: 3.5 G/DL (ref 2–4)
GLUCOSE BLD STRIP.AUTO-MCNC: 135 MG/DL (ref 65–117)
GLUCOSE BLD STRIP.AUTO-MCNC: 141 MG/DL (ref 65–117)
GLUCOSE SERPL-MCNC: 252 MG/DL (ref 65–100)
GLUCOSE UR STRIP.AUTO-MCNC: 500 MG/DL
HCT VFR BLD AUTO: 36.3 % (ref 35–47)
HGB BLD-MCNC: 12.5 G/DL (ref 11.5–16)
HGB UR QL STRIP: NEGATIVE
IMM GRANULOCYTES # BLD AUTO: 0 K/UL (ref 0–0.04)
IMM GRANULOCYTES NFR BLD AUTO: 0 % (ref 0–0.5)
KETONES UR QL STRIP.AUTO: NEGATIVE MG/DL
KOH PREP SPEC: NORMAL
LEUKOCYTE ESTERASE UR QL STRIP.AUTO: ABNORMAL
LIPASE SERPL-CCNC: 172 U/L (ref 73–393)
LYMPHOCYTES # BLD: 1.5 K/UL (ref 0.8–3.5)
LYMPHOCYTES NFR BLD: 29 % (ref 12–49)
MCH RBC QN AUTO: 28.5 PG (ref 26–34)
MCHC RBC AUTO-ENTMCNC: 34.4 G/DL (ref 30–36.5)
MCV RBC AUTO: 82.9 FL (ref 80–99)
MONOCYTES # BLD: 0.3 K/UL (ref 0–1)
MONOCYTES NFR BLD: 6 % (ref 5–13)
NEUTS SEG # BLD: 3 K/UL (ref 1.8–8)
NEUTS SEG NFR BLD: 59 % (ref 32–75)
NITRITE UR QL STRIP.AUTO: NEGATIVE
NRBC # BLD: 0 K/UL (ref 0–0.01)
NRBC BLD-RTO: 0 PER 100 WBC
PH UR STRIP: 6 (ref 5–8)
PLATELET # BLD AUTO: 174 K/UL (ref 150–400)
PMV BLD AUTO: 10.1 FL (ref 8.9–12.9)
POTASSIUM SERPL-SCNC: 3.7 MMOL/L (ref 3.5–5.1)
PROCALCITONIN SERPL-MCNC: <0.05 NG/ML
PROT SERPL-MCNC: 7.1 G/DL (ref 6.4–8.2)
PROT UR STRIP-MCNC: NEGATIVE MG/DL
RBC # BLD AUTO: 4.38 M/UL (ref 3.8–5.2)
RBC #/AREA URNS HPF: ABNORMAL /HPF (ref 0–5)
SERVICE CMNT-IMP: ABNORMAL
SERVICE CMNT-IMP: ABNORMAL
SERVICE CMNT-IMP: NORMAL
SODIUM SERPL-SCNC: 135 MMOL/L (ref 136–145)
SP GR UR REFRACTOMETRY: 1.03
SPECIMEN HOLD: NORMAL
T VAGINALIS VAG QL WET PREP: NORMAL
UROBILINOGEN UR QL STRIP.AUTO: 1 EU/DL (ref 0.2–1)
WBC # BLD AUTO: 5.1 K/UL (ref 3.6–11)
WBC URNS QL MICRO: ABNORMAL /HPF (ref 0–4)

## 2023-06-06 PROCEDURE — A9579 GAD-BASE MR CONTRAST NOS,1ML: HCPCS | Performed by: EMERGENCY MEDICINE

## 2023-06-06 PROCEDURE — 84145 PROCALCITONIN (PCT): CPT

## 2023-06-06 PROCEDURE — 87591 N.GONORRHOEAE DNA AMP PROB: CPT

## 2023-06-06 PROCEDURE — 2500000003 HC RX 250 WO HCPCS: Performed by: EMERGENCY MEDICINE

## 2023-06-06 PROCEDURE — 85025 COMPLETE CBC W/AUTO DIFF WBC: CPT

## 2023-06-06 PROCEDURE — 6360000004 HC RX CONTRAST MEDICATION: Performed by: EMERGENCY MEDICINE

## 2023-06-06 PROCEDURE — 96374 THER/PROPH/DIAG INJ IV PUSH: CPT

## 2023-06-06 PROCEDURE — 81001 URINALYSIS AUTO W/SCOPE: CPT

## 2023-06-06 PROCEDURE — 2580000003 HC RX 258: Performed by: EMERGENCY MEDICINE

## 2023-06-06 PROCEDURE — 87491 CHLMYD TRACH DNA AMP PROBE: CPT

## 2023-06-06 PROCEDURE — 6360000002 HC RX W HCPCS: Performed by: STUDENT IN AN ORGANIZED HEALTH CARE EDUCATION/TRAINING PROGRAM

## 2023-06-06 PROCEDURE — 87210 SMEAR WET MOUNT SALINE/INK: CPT

## 2023-06-06 PROCEDURE — 2500000003 HC RX 250 WO HCPCS: Performed by: STUDENT IN AN ORGANIZED HEALTH CARE EDUCATION/TRAINING PROGRAM

## 2023-06-06 PROCEDURE — 6360000002 HC RX W HCPCS: Performed by: EMERGENCY MEDICINE

## 2023-06-06 PROCEDURE — 6370000000 HC RX 637 (ALT 250 FOR IP): Performed by: STUDENT IN AN ORGANIZED HEALTH CARE EDUCATION/TRAINING PROGRAM

## 2023-06-06 PROCEDURE — 96376 TX/PRO/DX INJ SAME DRUG ADON: CPT

## 2023-06-06 PROCEDURE — 36415 COLL VENOUS BLD VENIPUNCTURE: CPT

## 2023-06-06 PROCEDURE — 87086 URINE CULTURE/COLONY COUNT: CPT

## 2023-06-06 PROCEDURE — 82962 GLUCOSE BLOOD TEST: CPT

## 2023-06-06 PROCEDURE — 80053 COMPREHEN METABOLIC PANEL: CPT

## 2023-06-06 PROCEDURE — 83690 ASSAY OF LIPASE: CPT

## 2023-06-06 PROCEDURE — 72197 MRI PELVIS W/O & W/DYE: CPT

## 2023-06-06 PROCEDURE — 2580000003 HC RX 258: Performed by: STUDENT IN AN ORGANIZED HEALTH CARE EDUCATION/TRAINING PROGRAM

## 2023-06-06 PROCEDURE — 1100000000 HC RM PRIVATE

## 2023-06-06 PROCEDURE — 96375 TX/PRO/DX INJ NEW DRUG ADDON: CPT

## 2023-06-06 PROCEDURE — 99285 EMERGENCY DEPT VISIT HI MDM: CPT

## 2023-06-06 RX ORDER — DEXTROSE MONOHYDRATE 100 MG/ML
INJECTION, SOLUTION INTRAVENOUS CONTINUOUS PRN
Status: DISCONTINUED | OUTPATIENT
Start: 2023-06-06 | End: 2023-06-08 | Stop reason: HOSPADM

## 2023-06-06 RX ORDER — SEMAGLUTIDE 1.34 MG/ML
INJECTION, SOLUTION SUBCUTANEOUS
COMMUNITY

## 2023-06-06 RX ORDER — LORAZEPAM 2 MG/ML
1 INJECTION INTRAMUSCULAR ONCE
Status: COMPLETED | OUTPATIENT
Start: 2023-06-06 | End: 2023-06-06

## 2023-06-06 RX ORDER — HYDROMORPHONE HYDROCHLORIDE 1 MG/ML
0.25 INJECTION, SOLUTION INTRAMUSCULAR; INTRAVENOUS; SUBCUTANEOUS
Status: COMPLETED | OUTPATIENT
Start: 2023-06-06 | End: 2023-06-06

## 2023-06-06 RX ORDER — PANTOPRAZOLE SODIUM 40 MG/1
40 TABLET, DELAYED RELEASE ORAL
Status: DISCONTINUED | OUTPATIENT
Start: 2023-06-07 | End: 2023-06-08 | Stop reason: HOSPADM

## 2023-06-06 RX ORDER — POLYETHYLENE GLYCOL 3350 17 G/17G
17 POWDER, FOR SOLUTION ORAL DAILY PRN
Status: DISCONTINUED | OUTPATIENT
Start: 2023-06-06 | End: 2023-06-08 | Stop reason: HOSPADM

## 2023-06-06 RX ORDER — DIPHENHYDRAMINE HYDROCHLORIDE 50 MG/ML
25 INJECTION INTRAMUSCULAR; INTRAVENOUS
Status: COMPLETED | OUTPATIENT
Start: 2023-06-06 | End: 2023-06-06

## 2023-06-06 RX ORDER — ACETAMINOPHEN 325 MG/1
650 TABLET ORAL EVERY 6 HOURS PRN
Status: DISCONTINUED | OUTPATIENT
Start: 2023-06-06 | End: 2023-06-08 | Stop reason: HOSPADM

## 2023-06-06 RX ORDER — ONDANSETRON 2 MG/ML
4 INJECTION INTRAMUSCULAR; INTRAVENOUS ONCE
Status: COMPLETED | OUTPATIENT
Start: 2023-06-06 | End: 2023-06-06

## 2023-06-06 RX ORDER — ESTRADIOL 1 MG/1
0.5 TABLET ORAL DAILY
Status: DISCONTINUED | OUTPATIENT
Start: 2023-06-07 | End: 2023-06-08 | Stop reason: HOSPADM

## 2023-06-06 RX ORDER — 0.9 % SODIUM CHLORIDE 0.9 %
500 INTRAVENOUS SOLUTION INTRAVENOUS ONCE
Status: COMPLETED | OUTPATIENT
Start: 2023-06-06 | End: 2023-06-06

## 2023-06-06 RX ORDER — ENOXAPARIN SODIUM 100 MG/ML
40 INJECTION SUBCUTANEOUS DAILY
Status: DISCONTINUED | OUTPATIENT
Start: 2023-06-06 | End: 2023-06-08 | Stop reason: HOSPADM

## 2023-06-06 RX ORDER — LOSARTAN POTASSIUM AND HYDROCHLOROTHIAZIDE 12.5; 1 MG/1; MG/1
1 TABLET ORAL DAILY
Status: DISCONTINUED | OUTPATIENT
Start: 2023-06-06 | End: 2023-06-06

## 2023-06-06 RX ORDER — ATORVASTATIN CALCIUM 20 MG/1
20 TABLET, FILM COATED ORAL DAILY
Status: DISCONTINUED | OUTPATIENT
Start: 2023-06-07 | End: 2023-06-08 | Stop reason: HOSPADM

## 2023-06-06 RX ORDER — SODIUM CHLORIDE 0.9 % (FLUSH) 0.9 %
5-40 SYRINGE (ML) INJECTION PRN
Status: DISCONTINUED | OUTPATIENT
Start: 2023-06-06 | End: 2023-06-08 | Stop reason: HOSPADM

## 2023-06-06 RX ORDER — LOSARTAN POTASSIUM 100 MG/1
100 TABLET ORAL DAILY
Status: DISCONTINUED | OUTPATIENT
Start: 2023-06-07 | End: 2023-06-08 | Stop reason: HOSPADM

## 2023-06-06 RX ORDER — DIPHENHYDRAMINE HCL 25 MG
50 CAPSULE ORAL EVERY 6 HOURS PRN
Status: DISCONTINUED | OUTPATIENT
Start: 2023-06-06 | End: 2023-06-08 | Stop reason: HOSPADM

## 2023-06-06 RX ORDER — SODIUM CHLORIDE 9 MG/ML
INJECTION, SOLUTION INTRAVENOUS PRN
Status: DISCONTINUED | OUTPATIENT
Start: 2023-06-06 | End: 2023-06-08 | Stop reason: HOSPADM

## 2023-06-06 RX ORDER — INSULIN LISPRO 100 [IU]/ML
0-4 INJECTION, SOLUTION INTRAVENOUS; SUBCUTANEOUS NIGHTLY
Status: DISCONTINUED | OUTPATIENT
Start: 2023-06-06 | End: 2023-06-08 | Stop reason: HOSPADM

## 2023-06-06 RX ORDER — SODIUM CHLORIDE 0.9 % (FLUSH) 0.9 %
5-40 SYRINGE (ML) INJECTION EVERY 12 HOURS SCHEDULED
Status: DISCONTINUED | OUTPATIENT
Start: 2023-06-06 | End: 2023-06-08 | Stop reason: HOSPADM

## 2023-06-06 RX ORDER — HYDROCHLOROTHIAZIDE 25 MG/1
12.5 TABLET ORAL DAILY
Status: DISCONTINUED | OUTPATIENT
Start: 2023-06-07 | End: 2023-06-08 | Stop reason: HOSPADM

## 2023-06-06 RX ORDER — HYDROMORPHONE HYDROCHLORIDE 1 MG/ML
0.5 INJECTION, SOLUTION INTRAMUSCULAR; INTRAVENOUS; SUBCUTANEOUS
Status: COMPLETED | OUTPATIENT
Start: 2023-06-06 | End: 2023-06-06

## 2023-06-06 RX ORDER — ACETAMINOPHEN 650 MG/1
650 SUPPOSITORY RECTAL EVERY 6 HOURS PRN
Status: DISCONTINUED | OUTPATIENT
Start: 2023-06-06 | End: 2023-06-08 | Stop reason: HOSPADM

## 2023-06-06 RX ORDER — ONDANSETRON 2 MG/ML
INJECTION INTRAMUSCULAR; INTRAVENOUS
Status: DISPENSED
Start: 2023-06-06 | End: 2023-06-06

## 2023-06-06 RX ORDER — HYDROCODONE BITARTRATE AND ACETAMINOPHEN 5; 325 MG/1; MG/1
1 TABLET ORAL EVERY 4 HOURS PRN
Status: DISCONTINUED | OUTPATIENT
Start: 2023-06-06 | End: 2023-06-08 | Stop reason: HOSPADM

## 2023-06-06 RX ORDER — INSULIN LISPRO 100 [IU]/ML
0-4 INJECTION, SOLUTION INTRAVENOUS; SUBCUTANEOUS
Status: DISCONTINUED | OUTPATIENT
Start: 2023-06-06 | End: 2023-06-08 | Stop reason: HOSPADM

## 2023-06-06 RX ORDER — DIPHENHYDRAMINE HYDROCHLORIDE 50 MG/ML
INJECTION INTRAMUSCULAR; INTRAVENOUS
Status: DISPENSED
Start: 2023-06-06 | End: 2023-06-06

## 2023-06-06 RX ORDER — ALBUTEROL SULFATE 2.5 MG/3ML
2.5 SOLUTION RESPIRATORY (INHALATION) EVERY 6 HOURS PRN
Status: DISCONTINUED | OUTPATIENT
Start: 2023-06-06 | End: 2023-06-08 | Stop reason: HOSPADM

## 2023-06-06 RX ORDER — ONDANSETRON 2 MG/ML
4 INJECTION INTRAMUSCULAR; INTRAVENOUS EVERY 6 HOURS PRN
Status: DISCONTINUED | OUTPATIENT
Start: 2023-06-06 | End: 2023-06-08 | Stop reason: HOSPADM

## 2023-06-06 RX ORDER — HYDROMORPHONE HYDROCHLORIDE 1 MG/ML
0.25 INJECTION, SOLUTION INTRAMUSCULAR; INTRAVENOUS; SUBCUTANEOUS EVERY 4 HOURS PRN
Status: DISCONTINUED | OUTPATIENT
Start: 2023-06-06 | End: 2023-06-07

## 2023-06-06 RX ORDER — ONDANSETRON 4 MG/1
4 TABLET, ORALLY DISINTEGRATING ORAL EVERY 8 HOURS PRN
Status: DISCONTINUED | OUTPATIENT
Start: 2023-06-06 | End: 2023-06-08 | Stop reason: HOSPADM

## 2023-06-06 RX ORDER — PROCHLORPERAZINE EDISYLATE 5 MG/ML
10 INJECTION INTRAMUSCULAR; INTRAVENOUS EVERY 6 HOURS PRN
Status: DISCONTINUED | OUTPATIENT
Start: 2023-06-06 | End: 2023-06-08 | Stop reason: HOSPADM

## 2023-06-06 RX ORDER — ALPRAZOLAM 0.5 MG/1
1 TABLET ORAL 2 TIMES DAILY PRN
Status: DISCONTINUED | OUTPATIENT
Start: 2023-06-06 | End: 2023-06-08 | Stop reason: HOSPADM

## 2023-06-06 RX ADMIN — GADOTERIDOL 16 ML: 279.3 INJECTION, SOLUTION INTRAVENOUS at 13:49

## 2023-06-06 RX ADMIN — HYDROMORPHONE HYDROCHLORIDE 0.5 MG: 1 INJECTION, SOLUTION INTRAMUSCULAR; INTRAVENOUS; SUBCUTANEOUS at 09:48

## 2023-06-06 RX ADMIN — ENOXAPARIN SODIUM 40 MG: 100 INJECTION SUBCUTANEOUS at 18:36

## 2023-06-06 RX ADMIN — HYDROMORPHONE HYDROCHLORIDE 0.25 MG: 1 INJECTION, SOLUTION INTRAMUSCULAR; INTRAVENOUS; SUBCUTANEOUS at 09:19

## 2023-06-06 RX ADMIN — DIPHENHYDRAMINE HYDROCHLORIDE 25 MG: 50 INJECTION, SOLUTION INTRAMUSCULAR; INTRAVENOUS at 09:48

## 2023-06-06 RX ADMIN — DIPHENHYDRAMINE HYDROCHLORIDE 25 MG: 50 INJECTION, SOLUTION INTRAMUSCULAR; INTRAVENOUS at 10:58

## 2023-06-06 RX ADMIN — HYDROMORPHONE HYDROCHLORIDE 0.25 MG: 1 INJECTION, SOLUTION INTRAMUSCULAR; INTRAVENOUS; SUBCUTANEOUS at 22:39

## 2023-06-06 RX ADMIN — HYDROMORPHONE HYDROCHLORIDE 0.25 MG: 1 INJECTION, SOLUTION INTRAMUSCULAR; INTRAVENOUS; SUBCUTANEOUS at 15:50

## 2023-06-06 RX ADMIN — HYDROCODONE BITARTRATE AND ACETAMINOPHEN 1 TABLET: 5; 325 TABLET ORAL at 20:18

## 2023-06-06 RX ADMIN — HYDROMORPHONE HYDROCHLORIDE 0.25 MG: 1 INJECTION, SOLUTION INTRAMUSCULAR; INTRAVENOUS; SUBCUTANEOUS at 12:20

## 2023-06-06 RX ADMIN — ONDANSETRON 4 MG: 2 INJECTION INTRAMUSCULAR; INTRAVENOUS at 09:13

## 2023-06-06 RX ADMIN — ONDANSETRON 4 MG: 2 INJECTION INTRAMUSCULAR; INTRAVENOUS at 20:18

## 2023-06-06 RX ADMIN — SODIUM CHLORIDE 500 ML: 9 INJECTION, SOLUTION INTRAVENOUS at 09:13

## 2023-06-06 RX ADMIN — SODIUM CHLORIDE, PRESERVATIVE FREE 10 ML: 5 INJECTION INTRAVENOUS at 20:20

## 2023-06-06 RX ADMIN — PROCHLORPERAZINE EDISYLATE 10 MG: 5 INJECTION INTRAMUSCULAR; INTRAVENOUS at 17:11

## 2023-06-06 RX ADMIN — LORAZEPAM 1 MG: 2 INJECTION INTRAMUSCULAR; INTRAVENOUS at 12:53

## 2023-06-06 ASSESSMENT — PAIN DESCRIPTION - ORIENTATION
ORIENTATION: INNER
ORIENTATION: INNER

## 2023-06-06 ASSESSMENT — PAIN DESCRIPTION - LOCATION
LOCATION: RECTUM;VAGINA
LOCATION: RECTUM;VAGINA
LOCATION: ABDOMEN
LOCATION: RECTUM;PELVIS;VAGINA
LOCATION: RECTUM;VAGINA

## 2023-06-06 ASSESSMENT — PAIN SCALES - GENERAL
PAINLEVEL_OUTOF10: 8
PAINLEVEL_OUTOF10: 3
PAINLEVEL_OUTOF10: 8
PAINLEVEL_OUTOF10: 7
PAINLEVEL_OUTOF10: 8
PAINLEVEL_OUTOF10: 8

## 2023-06-06 ASSESSMENT — PAIN DESCRIPTION - DESCRIPTORS
DESCRIPTORS: ACHING

## 2023-06-06 ASSESSMENT — PAIN - FUNCTIONAL ASSESSMENT: PAIN_FUNCTIONAL_ASSESSMENT: 0-10

## 2023-06-06 NOTE — H&P
INDICATION: Pelvic pain, history of fistulas. COMPARISON: 4/1/2023; CT 5/3/2023 TECHNIQUE: Multisequence and multiplanar MRI of the pelvis was performed before and after the administration of 16 cc IV gadoteridol (ProHance). FINDINGS: The intersphincteric anal fistula arising at 2:00-3:00 (and extending 12:00-4:00) is again noted. In its superior portion, a small (8 x 16 x 11 mm) abscess is associated. The tract extends inferiorly, and probably opens on the skin near the anal verge at 5:00 (9-56). There is a questionable tract between the perianal fistula and the vaginal introitus (8-50). A sinus tract from the inferior aspect of the fistula tract (near the anal verge) extends from 3:00 toward the left Bartholin gland (9-49). Communication with the left Bartholin gland is not excluded. There is circumferential enhancement/inflammation of the external anal verge (9-52, 9-53). Post hysterectomy. The bladder and visualized portions of the bowel are otherwise normal. No pelvic lymphadenopathy. The ovaries are not visualized. 1. Intersphincteric perianal fistula. 2. Questionable tract to the vaginal introitus. 3. Tract extending to and possibly communicating with the left Bartholin gland. 4. Circumferential inflammation of the external anal verge. _______________________________________________________________________    TOTAL TIME:  76 Minutes    Critical Care Provided     Minutes non procedure based    Signed: Karthikeyna Al MD    Procedures: see electronic medical records for all procedures/Xrays and details which were not copied into this note but were reviewed prior to creation of Plan.

## 2023-06-06 NOTE — ED PROVIDER NOTES
azithromycin 250 MG tablet  Commonly known as: ZITHROMAX     cetirizine 10 MG tablet  Commonly known as: ZYRTEC     dicyclomine 20 MG tablet  Commonly known as: BENTYL     empagliflozin 25 MG tablet  Commonly known as: JARDIANCE     estradiol 0.5 MG tablet  Commonly known as: ESTRACE  TAKE 1 TABLET BY MOUTH EVERY DAY     glimepiride 4 MG tablet  Commonly known as: AMARYL     lidocaine 2 % jelly  Commonly known as: XYLOCAINE     losartan-hydroCHLOROthiazide 100-12.5 MG per tablet  Commonly known as: HYZAAR     omeprazole 20 MG delayed release capsule  Commonly known as: PRILOSEC     ondansetron 4 MG disintegrating tablet  Commonly known as: ZOFRAN-ODT     ondansetron 4 MG tablet  Commonly known as: ZOFRAN     predniSONE 5 MG tablet  Commonly known as: DELTASONE     sertraline 100 MG tablet  Commonly known as: ZOLOFT  TAKE 2 TABLETS BY MOUTH EVERY NIGHT           * This list has 3 medication(s) that are the same as other medications prescribed for you. Read the directions carefully, and ask your doctor or other care provider to review them with you. DISCONTINUED MEDICATIONS:  Current Discharge Medication List          I am the Primary Clinician of Record. Dionna Barrera MD (electronically signed)    (Please note that parts of this dictation were completed with voice recognition software. Quite often unanticipated grammatical, syntax, homophones, and other interpretive errors are inadvertently transcribed by the computer software. Please disregards these errors.  Please excuse any errors that have escaped final proofreading.)        Dionna Barrera MD  06/07/23 0564

## 2023-06-07 LAB
ANION GAP SERPL CALC-SCNC: 6 MMOL/L (ref 5–15)
BACTERIA SPEC CULT: NORMAL
BASOPHILS # BLD: 0 K/UL (ref 0–0.1)
BASOPHILS NFR BLD: 1 % (ref 0–1)
BUN SERPL-MCNC: 9 MG/DL (ref 6–20)
BUN/CREAT SERPL: 14 (ref 12–20)
C TRACH DNA SPEC QL NAA+PROBE: ABNORMAL
CALCIUM SERPL-MCNC: 9.1 MG/DL (ref 8.5–10.1)
CHLORIDE SERPL-SCNC: 106 MMOL/L (ref 97–108)
CO2 SERPL-SCNC: 28 MMOL/L (ref 21–32)
CREAT SERPL-MCNC: 0.64 MG/DL (ref 0.55–1.02)
DIFFERENTIAL METHOD BLD: NORMAL
EOSINOPHIL # BLD: 0.3 K/UL (ref 0–0.4)
EOSINOPHIL NFR BLD: 5 % (ref 0–7)
ERYTHROCYTE [DISTWIDTH] IN BLOOD BY AUTOMATED COUNT: 13.2 % (ref 11.5–14.5)
GLUCOSE BLD STRIP.AUTO-MCNC: 149 MG/DL (ref 65–117)
GLUCOSE BLD STRIP.AUTO-MCNC: 152 MG/DL (ref 65–117)
GLUCOSE BLD STRIP.AUTO-MCNC: 174 MG/DL (ref 65–117)
GLUCOSE BLD STRIP.AUTO-MCNC: 185 MG/DL (ref 65–117)
GLUCOSE SERPL-MCNC: 157 MG/DL (ref 65–100)
HCT VFR BLD AUTO: 36.5 % (ref 35–47)
HGB BLD-MCNC: 12.5 G/DL (ref 11.5–16)
IMM GRANULOCYTES # BLD AUTO: 0 K/UL (ref 0–0.04)
IMM GRANULOCYTES NFR BLD AUTO: 0 % (ref 0–0.5)
LYMPHOCYTES # BLD: 1.7 K/UL (ref 0.8–3.5)
LYMPHOCYTES NFR BLD: 32 % (ref 12–49)
MCH RBC QN AUTO: 28.5 PG (ref 26–34)
MCHC RBC AUTO-ENTMCNC: 34.2 G/DL (ref 30–36.5)
MCV RBC AUTO: 83.1 FL (ref 80–99)
MONOCYTES # BLD: 0.3 K/UL (ref 0–1)
MONOCYTES NFR BLD: 5 % (ref 5–13)
N GONORRHOEA DNA SPEC QL NAA+PROBE: ABNORMAL
NEUTS SEG # BLD: 3 K/UL (ref 1.8–8)
NEUTS SEG NFR BLD: 57 % (ref 32–75)
NRBC # BLD: 0 K/UL (ref 0–0.01)
NRBC BLD-RTO: 0 PER 100 WBC
PLATELET # BLD AUTO: 162 K/UL (ref 150–400)
PMV BLD AUTO: 9.2 FL (ref 8.9–12.9)
POTASSIUM SERPL-SCNC: 3.7 MMOL/L (ref 3.5–5.1)
RBC # BLD AUTO: 4.39 M/UL (ref 3.8–5.2)
SAMPLE TYPE: ABNORMAL
SERVICE CMNT-IMP: ABNORMAL
SERVICE CMNT-IMP: NORMAL
SODIUM SERPL-SCNC: 140 MMOL/L (ref 136–145)
SPECIMEN SOURCE: ABNORMAL
WBC # BLD AUTO: 5.3 K/UL (ref 3.6–11)

## 2023-06-07 PROCEDURE — 36415 COLL VENOUS BLD VENIPUNCTURE: CPT

## 2023-06-07 PROCEDURE — 82962 GLUCOSE BLOOD TEST: CPT

## 2023-06-07 PROCEDURE — 2580000003 HC RX 258: Performed by: STUDENT IN AN ORGANIZED HEALTH CARE EDUCATION/TRAINING PROGRAM

## 2023-06-07 PROCEDURE — 2500000003 HC RX 250 WO HCPCS: Performed by: PHYSICIAN ASSISTANT

## 2023-06-07 PROCEDURE — 6360000002 HC RX W HCPCS: Performed by: STUDENT IN AN ORGANIZED HEALTH CARE EDUCATION/TRAINING PROGRAM

## 2023-06-07 PROCEDURE — 2500000003 HC RX 250 WO HCPCS: Performed by: NURSE PRACTITIONER

## 2023-06-07 PROCEDURE — 85025 COMPLETE CBC W/AUTO DIFF WBC: CPT

## 2023-06-07 PROCEDURE — 80048 BASIC METABOLIC PNL TOTAL CA: CPT

## 2023-06-07 PROCEDURE — 6370000000 HC RX 637 (ALT 250 FOR IP): Performed by: STUDENT IN AN ORGANIZED HEALTH CARE EDUCATION/TRAINING PROGRAM

## 2023-06-07 PROCEDURE — 2500000003 HC RX 250 WO HCPCS: Performed by: STUDENT IN AN ORGANIZED HEALTH CARE EDUCATION/TRAINING PROGRAM

## 2023-06-07 PROCEDURE — 1100000000 HC RM PRIVATE

## 2023-06-07 RX ORDER — HYDROMORPHONE HYDROCHLORIDE 1 MG/ML
0.5 INJECTION, SOLUTION INTRAMUSCULAR; INTRAVENOUS; SUBCUTANEOUS EVERY 4 HOURS PRN
Status: DISCONTINUED | OUTPATIENT
Start: 2023-06-07 | End: 2023-06-08 | Stop reason: HOSPADM

## 2023-06-07 RX ORDER — DEXTROSE, SODIUM CHLORIDE, AND POTASSIUM CHLORIDE 5; .45; .15 G/100ML; G/100ML; G/100ML
INJECTION INTRAVENOUS CONTINUOUS
Status: DISCONTINUED | OUTPATIENT
Start: 2023-06-07 | End: 2023-06-08 | Stop reason: HOSPADM

## 2023-06-07 RX ORDER — DIPHENHYDRAMINE HYDROCHLORIDE 50 MG/ML
25 INJECTION INTRAMUSCULAR; INTRAVENOUS EVERY 6 HOURS PRN
Status: DISCONTINUED | OUTPATIENT
Start: 2023-06-07 | End: 2023-06-08 | Stop reason: HOSPADM

## 2023-06-07 RX ADMIN — HYDROMORPHONE HYDROCHLORIDE 0.5 MG: 1 INJECTION, SOLUTION INTRAMUSCULAR; INTRAVENOUS; SUBCUTANEOUS at 20:47

## 2023-06-07 RX ADMIN — HYDROMORPHONE HYDROCHLORIDE 0.25 MG: 1 INJECTION, SOLUTION INTRAMUSCULAR; INTRAVENOUS; SUBCUTANEOUS at 03:20

## 2023-06-07 RX ADMIN — DIPHENHYDRAMINE HYDROCHLORIDE 25 MG: 50 INJECTION, SOLUTION INTRAMUSCULAR; INTRAVENOUS at 23:14

## 2023-06-07 RX ADMIN — DEXTROSE MONOHYDRATE, SODIUM CHLORIDE, AND POTASSIUM CHLORIDE: 50; 4.5; 1.49 INJECTION, SOLUTION INTRAVENOUS at 13:43

## 2023-06-07 RX ADMIN — ONDANSETRON 4 MG: 2 INJECTION INTRAMUSCULAR; INTRAVENOUS at 08:14

## 2023-06-07 RX ADMIN — SODIUM CHLORIDE, PRESERVATIVE FREE 10 ML: 5 INJECTION INTRAVENOUS at 20:51

## 2023-06-07 RX ADMIN — HYDROMORPHONE HYDROCHLORIDE 0.5 MG: 1 INJECTION, SOLUTION INTRAMUSCULAR; INTRAVENOUS; SUBCUTANEOUS at 07:04

## 2023-06-07 RX ADMIN — DIPHENHYDRAMINE HYDROCHLORIDE 25 MG: 50 INJECTION, SOLUTION INTRAMUSCULAR; INTRAVENOUS at 06:57

## 2023-06-07 RX ADMIN — HYDROMORPHONE HYDROCHLORIDE 0.5 MG: 1 INJECTION, SOLUTION INTRAMUSCULAR; INTRAVENOUS; SUBCUTANEOUS at 16:36

## 2023-06-07 RX ADMIN — DIPHENHYDRAMINE HYDROCHLORIDE 25 MG: 50 INJECTION, SOLUTION INTRAMUSCULAR; INTRAVENOUS at 16:36

## 2023-06-07 RX ADMIN — PROCHLORPERAZINE EDISYLATE 10 MG: 5 INJECTION INTRAMUSCULAR; INTRAVENOUS at 20:46

## 2023-06-07 RX ADMIN — HYDROMORPHONE HYDROCHLORIDE 0.5 MG: 1 INJECTION, SOLUTION INTRAMUSCULAR; INTRAVENOUS; SUBCUTANEOUS at 11:57

## 2023-06-07 RX ADMIN — PROCHLORPERAZINE EDISYLATE 10 MG: 5 INJECTION INTRAMUSCULAR; INTRAVENOUS at 12:03

## 2023-06-07 RX ADMIN — SODIUM CHLORIDE, PRESERVATIVE FREE 10 ML: 5 INJECTION INTRAVENOUS at 08:17

## 2023-06-07 RX ADMIN — DEXTROSE MONOHYDRATE, SODIUM CHLORIDE, AND POTASSIUM CHLORIDE: 50; 4.5; 1.49 INJECTION, SOLUTION INTRAVENOUS at 23:56

## 2023-06-07 RX ADMIN — ENOXAPARIN SODIUM 40 MG: 100 INJECTION SUBCUTANEOUS at 08:14

## 2023-06-07 ASSESSMENT — PAIN DESCRIPTION - LOCATION
LOCATION: RECTUM;VAGINA
LOCATION: OTHER (COMMENT)
LOCATION: RECTUM;VAGINA
LOCATION: OTHER (COMMENT)
LOCATION: RECTUM;VAGINA

## 2023-06-07 ASSESSMENT — PAIN DESCRIPTION - DESCRIPTORS
DESCRIPTORS: ACHING

## 2023-06-07 ASSESSMENT — PAIN SCALES - GENERAL
PAINLEVEL_OUTOF10: 6
PAINLEVEL_OUTOF10: 8
PAINLEVEL_OUTOF10: 6
PAINLEVEL_OUTOF10: 7
PAINLEVEL_OUTOF10: 5
PAINLEVEL_OUTOF10: 7

## 2023-06-07 ASSESSMENT — PAIN DESCRIPTION - ORIENTATION
ORIENTATION: INNER
ORIENTATION: POSTERIOR
ORIENTATION: PROXIMAL

## 2023-06-07 NOTE — PROGRESS NOTES
Hospitalist Progress Note    NAME:   Son Ann   : 1970   MRN: [id number]     Date/Time: 2023 2:58 PM  Patient PCP: Bj Hernandez MD    Estimated discharge date:  Barriers:       Assessment / Plan:  Recurrent lower abdominal pain (secondary to)  Rectovaginal and anocutaneous fistula  Diarrhea  Nausea and vomiting  MRI pelvis WWO contrast (23): Findings concerning for rectovaginal and anocutaneous fistulas  S/p fistula repair approximately end of April with Dr. Monica Rico. MRI pelvis WWO contrast (23):  1. Intersphincteric perianal fistula. 2. Questionable tract to the vaginal introitus. 3. Tract extending to and possibly communicating with the left Bartholin gland. 4. Circumferential inflammation of the external anal verge. Plan  Admit to medicine  Symptom management, would keep a close eye on titration of pain medication as patient has allergy/sensitivity to most all pain management options except dilaudid. Started with po norco and low dose IV dilaudid, with benadryl available prn. Colorectal surgery consult, appreciate expertise  NPO  Follow up procal  I.V fluid      Asthma  Stable  O2 support and neb treatment PRN     DM   6.9% A1C  SSI coverage  POC glucose checks     Hypertension  Hyperlipidemia  Continue home antihypertensives  Continue Lipitor     Anxiety  continue home ativan and Zoloft        Medical Decision Making:   I personally reviewed labs: CBC and BMP  I personally reviewed imaging: MRI  Toxic drug monitoring: Lovenox, Dilaudid  Discussed case with: Pt        Code Status: Full code  DVT Prophylaxis: Lovenox  GI Prophylaxis: Protonix    Subjective:     Chief Complaint / Reason for Physician Visit  \"Patient seen today, stated that she still have rectal pain, NPO\". Discussed with RN events overnight. Objective:     VITALS:   Last 24hrs VS reviewed since prior progress note.  Most recent are:  Patient Vitals for the past 24 hrs:   BP Temp Temp src

## 2023-06-07 NOTE — CONSULTS
UNKNOWN)      OTHER SURGICAL HISTORY  11/12/2021    Sphincterotomy of rectum    PELVIC LAPAROSCOPY      [place] UNLISTED PROCEDURE ABDOMEN PERITONEUM & OMENTUM  01/06/2018    hernia repair at [facility]      colon resection. AMBER AND BSO (CERVIX REMOVED)      UROLOGICAL SURGERY  07/31/2012     CYSTOSCOPY INSERTION URETERAL CATHETERS - Cystoscopy Insertion of bilateral ureteral stents      Family History   Problem Relation Age of Onset    Diabetes Mother     Cancer Mother         NON-HODGKINS LYMPHOMA    Anesth Problems Mother         PONV    Breast Cancer Sister 48    Cancer Paternal Grandmother     Diabetes Father     Arrhythmia Father     Heart Disease Father         CAD - STENTS, PACEMAKER     Social History     Tobacco Use    Smoking status: Never    Smokeless tobacco: Never   Substance Use Topics    Alcohol use: Not Currently      Prior to Admission medications    Medication Sig Start Date End Date Taking?  Authorizing Provider   Semaglutide,0.25 or 0.5MG/DOS, (OZEMPIC, 0.25 OR 0.5 MG/DOSE,) 2 MG/1.5ML SOPN Inject into the skin   Yes Historical Provider, MD   ALPRAZolam Alma Fry) 1 MG tablet TAKE 1/2-1 TABLET BY MOUTH TWICE DAILY AS NEEDED FOR ANXIETY 5/30/23 6/29/23 Yes John Rojas MD   albuterol sulfate HFA (PROVENTIL;VENTOLIN;PROAIR) 108 (90 Base) MCG/ACT inhaler Inhale 1 puff into the lungs every 4 hours as needed 2/21/23  Yes Ar Automatic Reconciliation   albuterol sulfate HFA (PROVENTIL;VENTOLIN;PROAIR) 108 (90 Base) MCG/ACT inhaler INHALE 1 PUFF BY MOUTH EVERY 4 HOURS AS NEEDED FOR WHEEZE 7/15/22  Yes Ar Automatic Reconciliation   albuterol (PROVENTIL) (2.5 MG/3ML) 0.083% nebulizer solution Inhale 3 mLs into the lungs every 6 hours as needed 12/28/22  Yes Ar Automatic Reconciliation   atorvastatin (LIPITOR) 20 MG tablet TAKE 1 TABLET BY MOUTH EVERY DAY 1/24/23  Yes Ar Automatic Reconciliation   estradiol (ESTRACE) 0.5 MG tablet TAKE 1 TABLET BY MOUTH EVERY

## 2023-06-07 NOTE — PROGRESS NOTES
End of Shift Note    Bedside shift change report given to Taiwo Freeman RN (oncoming nurse) by Marvene Prader, RN (offgoing nurse). Report included the following information SBAR, Kardex, ED Summary, Procedure Summary, Intake/Output, MAR, Recent Results, and Med Rec Status    Shift worked:  [id number]     Shift summary and any significant changes:    -colo rectal consult called, Dr. Genevieve Daley given as needed  -IVF started per order.      Concerns for physician to address:       Zone phone for oncoming shift:   [phone]             Marvene Prader, RN

## 2023-06-07 NOTE — PROGRESS NOTES
End of Shift Note    Bedside shift change report given to Calvin Vu RN (oncoming nurse) by Lizeth Ornelas RN (offgoing nurse). Report included the following information SBAR, Kardex, ED Summary, Procedure Summary, Intake/Output, MAR, Recent Results, and Med Rec Status    Shift worked:  3741-2413     Shift summary and any significant changes:     Pt arrived to unit at approximately 1930, pt made comfortable and oriented to unit. AM labs drawn. Pt compliant with NPO orders after midnight.      Concerns for physician to address:  Fist[place]     Zone phone for oncoming shift:   [phone]             Lizeth Ornelas, RN

## 2023-06-08 ENCOUNTER — TELEPHONE (OUTPATIENT)
Age: 53
End: 2023-06-08

## 2023-06-08 VITALS
HEIGHT: 62 IN | RESPIRATION RATE: 16 BRPM | DIASTOLIC BLOOD PRESSURE: 89 MMHG | TEMPERATURE: 97.7 F | OXYGEN SATURATION: 94 % | SYSTOLIC BLOOD PRESSURE: 150 MMHG | BODY MASS INDEX: 37.36 KG/M2 | HEART RATE: 65 BPM | WEIGHT: 203.04 LBS

## 2023-06-08 LAB
ALBUMIN SERPL-MCNC: 3.4 G/DL (ref 3.5–5)
ALBUMIN/GLOB SERPL: 1 (ref 1.1–2.2)
ALP SERPL-CCNC: 61 U/L (ref 45–117)
ALT SERPL-CCNC: 36 U/L (ref 12–78)
ANION GAP SERPL CALC-SCNC: 6 MMOL/L (ref 5–15)
AST SERPL-CCNC: 28 U/L (ref 15–37)
BILIRUB SERPL-MCNC: 0.5 MG/DL (ref 0.2–1)
BUN SERPL-MCNC: 8 MG/DL (ref 6–20)
BUN/CREAT SERPL: 14 (ref 12–20)
CALCIUM SERPL-MCNC: 8.9 MG/DL (ref 8.5–10.1)
CHLORIDE SERPL-SCNC: 108 MMOL/L (ref 97–108)
CO2 SERPL-SCNC: 26 MMOL/L (ref 21–32)
CREAT SERPL-MCNC: 0.56 MG/DL (ref 0.55–1.02)
ERYTHROCYTE [DISTWIDTH] IN BLOOD BY AUTOMATED COUNT: 13.1 % (ref 11.5–14.5)
GLOBULIN SER CALC-MCNC: 3.5 G/DL (ref 2–4)
GLUCOSE BLD STRIP.AUTO-MCNC: 159 MG/DL (ref 65–117)
GLUCOSE SERPL-MCNC: 155 MG/DL (ref 65–100)
HCT VFR BLD AUTO: 36.5 % (ref 35–47)
HGB BLD-MCNC: 12.2 G/DL (ref 11.5–16)
MCH RBC QN AUTO: 28 PG (ref 26–34)
MCHC RBC AUTO-ENTMCNC: 33.4 G/DL (ref 30–36.5)
MCV RBC AUTO: 83.7 FL (ref 80–99)
NRBC # BLD: 0 K/UL (ref 0–0.01)
NRBC BLD-RTO: 0 PER 100 WBC
PLATELET # BLD AUTO: 163 K/UL (ref 150–400)
PMV BLD AUTO: 9.5 FL (ref 8.9–12.9)
POTASSIUM SERPL-SCNC: 3.8 MMOL/L (ref 3.5–5.1)
PROT SERPL-MCNC: 6.9 G/DL (ref 6.4–8.2)
RBC # BLD AUTO: 4.36 M/UL (ref 3.8–5.2)
SERVICE CMNT-IMP: ABNORMAL
SODIUM SERPL-SCNC: 140 MMOL/L (ref 136–145)
WBC # BLD AUTO: 4.6 K/UL (ref 3.6–11)

## 2023-06-08 PROCEDURE — 2500000003 HC RX 250 WO HCPCS: Performed by: PHYSICIAN ASSISTANT

## 2023-06-08 PROCEDURE — 36415 COLL VENOUS BLD VENIPUNCTURE: CPT

## 2023-06-08 PROCEDURE — 85027 COMPLETE CBC AUTOMATED: CPT

## 2023-06-08 PROCEDURE — 2580000003 HC RX 258: Performed by: STUDENT IN AN ORGANIZED HEALTH CARE EDUCATION/TRAINING PROGRAM

## 2023-06-08 PROCEDURE — 2580000003 HC RX 258: Performed by: PHYSICIAN ASSISTANT

## 2023-06-08 PROCEDURE — 80053 COMPREHEN METABOLIC PANEL: CPT

## 2023-06-08 PROCEDURE — A4216 STERILE WATER/SALINE, 10 ML: HCPCS | Performed by: PHYSICIAN ASSISTANT

## 2023-06-08 PROCEDURE — 6360000002 HC RX W HCPCS: Performed by: STUDENT IN AN ORGANIZED HEALTH CARE EDUCATION/TRAINING PROGRAM

## 2023-06-08 PROCEDURE — C9113 INJ PANTOPRAZOLE SODIUM, VIA: HCPCS | Performed by: PHYSICIAN ASSISTANT

## 2023-06-08 PROCEDURE — 82962 GLUCOSE BLOOD TEST: CPT

## 2023-06-08 PROCEDURE — 6360000002 HC RX W HCPCS: Performed by: PHYSICIAN ASSISTANT

## 2023-06-08 RX ADMIN — HYDROMORPHONE HYDROCHLORIDE 0.5 MG: 1 INJECTION, SOLUTION INTRAMUSCULAR; INTRAVENOUS; SUBCUTANEOUS at 03:09

## 2023-06-08 RX ADMIN — PROCHLORPERAZINE EDISYLATE 10 MG: 5 INJECTION INTRAMUSCULAR; INTRAVENOUS at 08:22

## 2023-06-08 RX ADMIN — SODIUM CHLORIDE, PRESERVATIVE FREE 10 ML: 5 INJECTION INTRAVENOUS at 08:27

## 2023-06-08 RX ADMIN — HYDROMORPHONE HYDROCHLORIDE 0.5 MG: 1 INJECTION, SOLUTION INTRAMUSCULAR; INTRAVENOUS; SUBCUTANEOUS at 07:50

## 2023-06-08 RX ADMIN — ENOXAPARIN SODIUM 40 MG: 100 INJECTION SUBCUTANEOUS at 08:19

## 2023-06-08 RX ADMIN — SODIUM CHLORIDE 40 MG: 9 INJECTION INTRAMUSCULAR; INTRAVENOUS; SUBCUTANEOUS at 08:19

## 2023-06-08 RX ADMIN — DIPHENHYDRAMINE HYDROCHLORIDE 25 MG: 50 INJECTION, SOLUTION INTRAMUSCULAR; INTRAVENOUS at 07:51

## 2023-06-08 ASSESSMENT — PAIN DESCRIPTION - ORIENTATION
ORIENTATION: INNER
ORIENTATION: INNER

## 2023-06-08 ASSESSMENT — PAIN DESCRIPTION - DESCRIPTORS
DESCRIPTORS: ACHING
DESCRIPTORS: ACHING

## 2023-06-08 ASSESSMENT — PAIN SCALES - GENERAL
PAINLEVEL_OUTOF10: 7
PAINLEVEL_OUTOF10: 7

## 2023-06-08 ASSESSMENT — PAIN DESCRIPTION - LOCATION
LOCATION: VAGINA;RECTUM
LOCATION: RECTUM;VAGINA

## 2023-06-08 NOTE — PROGRESS NOTES
DISCHARGE NOTE FROM [facility] NURSE    Patient determined to be stable for discharge by attending provider. I have reviewed the discharge instructions and follow-up appointments with the Patient. They verbalized understanding and all questions were answered to their satisfaction. No complaints or further questions were expressed. Medications sent to pharmacy Appropriate educational materials and medication side effect teaching were provided. PIV were removed prior to discharge. Patient did not discharge with any line, weeks, or drain. All personal items collected during admission were returned to the patient prior to discharge.     Post-op patient: Luann Gilbert RN

## 2023-06-08 NOTE — CARE COORDINATION
Care Management Initial Assessment       RUR: 9%  Readmission? No  1st IM letter given? No  1st  letter given: No        06/08/23 1131   Service Assessment   Patient Orientation Alert and Oriented   Cognition Alert   History Provided By Patient   Primary Caregiver Self   Support Systems Children;Family Members   PCP Verified by CM Yes   Last Visit to PCP Within last 6 months   Prior Functional Level Independent in ADLs/IADLs   Current Functional Level Independent in ADLs/IADLs   Can patient return to prior living arrangement Yes   Family able to assist with home care needs: Yes   Financial Resources Medicaid   Social/Functional History   Lives With Family  (mother lives w/pt)   Type of Home House  (one story w/3 ZAIN[address])   Home Equipment None   Active  Yes     CM completed assessment w/pt via phone. No history of HH/rehab or DME use. Patient uses [facility] on [address]   Patient is d/c today without any needs    Anmol Veras  Ext [phone]

## 2023-06-08 NOTE — PROGRESS NOTES
6/8/2023        RE: [address] [address] [address] [facility] [address]          To Whom It May Concern,      Due to medical reasons, patient was admitted to the hospital from 6/6-6/8 Litzymariya Ross may go back to work on 6/12  Without any restriction        Sincerely,          Cheyenne Miranda MD show

## 2023-06-08 NOTE — DISCHARGE SUMMARY
Patient Follow Up Instructions: Activity: activity as tolerated  Diet: regular diet  Wound Care: keep wound clean and dry                          Follow-up with  in 1 week. Follow-up tests/labs     Follow-up Information    None       ________________________________________________________________    Risk of deterioration: Low    Condition at Discharge:  Stable  __________________________________________________________________    Disposition  Home with family, no needs    ____________________________________________________________________    Code Status: Full Code  ___________________________________________________________________      Total time in minutes spent coordinating this discharge (includes going over instructions, follow-up, prescriptions, and preparing report for sign off to her PCP) :  35 minutes    Signed:   Tanvi Rehman MD

## 2023-06-08 NOTE — PLAN OF CARE
Problem: Discharge Planning  Goal: Discharge to home or other facility with appropriate resources  6/8/2023 0940 by Oz Durham RN  Outcome: Progressing  6/8/2023 0123 by Taurus Callahan RN  Outcome: Progressing  Flowsheets (Taken 6/7/2023 1945)  Discharge to home or other facility with appropriate resources:   Identify barriers to discharge with patient and caregiver   Arrange for needed discharge resources and transportation as appropriate   Identify discharge learning needs (meds, wound care, etc)
Problem: Discharge Planning  Goal: Discharge to home or other facility with appropriate resources  Outcome: Progressing  Flowsheets  Taken 6/7/2023 0338  Discharge to home or other facility with appropriate resources:   Identify barriers to discharge with patient and caregiver   Arrange for needed discharge resources and transportation as appropriate   Identify discharge learning needs (meds, wound care, etc)  Taken 6/6/2023 2002  Discharge to home or other facility with appropriate resources:   Identify barriers to discharge with patient and caregiver   Arrange for needed discharge resources and transportation as appropriate   Identify discharge learning needs (meds, wound care, etc)     Problem: Pain  Goal: Verbalizes/displays adequate comfort level or baseline comfort level  Outcome: Progressing     Problem: Safety - Adult  Goal: Free from fall injury  Outcome: Progressing     Problem: ABCDS Injury Assessment  Goal: Absence of physical injury  Outcome: Progressing
Problem: Safety - Adult  Goal: Free from fall injury  6/7/2023 1054 by Portia Krabbe, RN  Outcome: Progressing  6/7/2023 0529 by Maria Elena Gan RN  Outcome: Progressing
symptoms of infection   Monitor lab/diagnostic results   Monitor all insertion sites i.e., indwelling lines, tubes and drains   Administer medications as ordered   Instruct and encourage patient and family to use good hand hygiene technique  Goal: Absence of infection during hospitalization  Outcome: Progressing  Flowsheets (Taken 6/7/2023 1945)  Absence of infection during hospitalization: Assess and monitor for signs and symptoms of infection     Problem: Metabolic/Fluid and Electrolytes - Adult  Goal: Electrolytes maintained within normal limits  Outcome: Progressing  Flowsheets (Taken 6/7/2023 1945)  Electrolytes maintained within normal limits:   Monitor labs and assess patient for signs and symptoms of electrolyte imbalances   Administer electrolyte replacement as ordered   Monitor response to electrolyte replacements, including repeat lab results as appropriate  Goal: Hemodynamic stability and optimal renal function maintained  Outcome: Progressing     Problem: Hematologic - Adult  Goal: Maintains hematologic stability  Outcome: Progressing  Flowsheets (Taken 6/7/2023 1945)  Maintains hematologic stability:   Assess for signs and symptoms of bleeding or hemorrhage   Monitor labs for bleeding or clotting disorders

## 2023-06-08 NOTE — PROGRESS NOTES
1126 - PCP hospital follow-up transitional care appointment has been scheduled with Dr. David Degroot on 6/16/23 1030. Pending patient discharge. Escobar Hough, Care Management Assistant     Attempted to schedule hospital follow up for PCP appointment. Sent a message to PCP office to find patient an appointment. Awaiting callback from PCP office.  Naomy Kimbrough

## 2023-06-08 NOTE — PROGRESS NOTES
End of Shift Note    Bedside shift change report given to Gretchen Sotelo RN (oncoming nurse) by Priscila Back RN (offgoing nurse). Report included the following information SBAR, Kardex, ED Summary, Procedure Summary, Intake/Output, MAR, Recent Results, and Med Rec Status    Shift worked:  2308-6283     Shift summary and any significant changes:     Uneventful shift, vital signs stable, no significant events, pt compliant with NPO orders, IV fluids hanging, AM labs drawn. Pain managed w/ IV dilaudid.      Concerns for physician to address:  Pt c/o itching - current benadryl orders insufficient     Zone phone for oncoming shift:   [phone]             Priscila Back RN

## 2023-06-09 ENCOUNTER — HOSPITAL ENCOUNTER (EMERGENCY)
Facility: HOSPITAL | Age: 53
Discharge: HOME | End: 2023-06-09
Payer: MEDICAID

## 2023-06-09 VITALS
DIASTOLIC BLOOD PRESSURE: 93 MMHG | TEMPERATURE: 98.2 F | OXYGEN SATURATION: 97 % | SYSTOLIC BLOOD PRESSURE: 144 MMHG | HEART RATE: 87 BPM | RESPIRATION RATE: 18 BRPM

## 2023-06-09 DIAGNOSIS — R10.32 CHRONIC BILATERAL LOWER ABDOMINAL PAIN: ICD-10-CM

## 2023-06-09 DIAGNOSIS — R10.31 CHRONIC BILATERAL LOWER ABDOMINAL PAIN: ICD-10-CM

## 2023-06-09 DIAGNOSIS — G89.29 CHRONIC BILATERAL LOWER ABDOMINAL PAIN: ICD-10-CM

## 2023-06-09 DIAGNOSIS — K62.89 OTHER SPECIFIED DISEASES OF ANUS AND RECTUM: Primary | ICD-10-CM

## 2023-06-09 LAB
ALBUMIN SERPL-MCNC: 3.8 G/DL (ref 3.5–5)
ALBUMIN/GLOB SERPL: 1.1 (ref 1.1–2.2)
ALP SERPL-CCNC: 64 U/L (ref 45–117)
ALT SERPL-CCNC: 45 U/L (ref 12–78)
ANION GAP SERPL CALC-SCNC: 5 MMOL/L (ref 5–15)
APPEARANCE UR: CLEAR
AST SERPL-CCNC: 36 U/L (ref 15–37)
BACTERIA URNS QL MICRO: ABNORMAL /HPF
BASOPHILS # BLD: 0 K/UL (ref 0–0.1)
BASOPHILS NFR BLD: 0 % (ref 0–1)
BILIRUB SERPL-MCNC: 0.5 MG/DL (ref 0.2–1)
BILIRUB UR QL: NEGATIVE
BUN SERPL-MCNC: 7 MG/DL (ref 6–20)
BUN/CREAT SERPL: 10 (ref 12–20)
CALCIUM SERPL-MCNC: 9.6 MG/DL (ref 8.5–10.1)
CHLORIDE SERPL-SCNC: 104 MMOL/L (ref 97–108)
CO2 SERPL-SCNC: 29 MMOL/L (ref 21–32)
COLOR UR: ABNORMAL
CREAT SERPL-MCNC: 0.7 MG/DL (ref 0.55–1.02)
DIFFERENTIAL METHOD BLD: NORMAL
EOSINOPHIL # BLD: 0.3 K/UL (ref 0–0.4)
EOSINOPHIL NFR BLD: 5 % (ref 0–7)
EPITH CASTS URNS QL MICRO: ABNORMAL /LPF
ERYTHROCYTE [DISTWIDTH] IN BLOOD BY AUTOMATED COUNT: 13.3 % (ref 11.5–14.5)
GLOBULIN SER CALC-MCNC: 3.5 G/DL (ref 2–4)
GLUCOSE SERPL-MCNC: 177 MG/DL (ref 65–100)
GLUCOSE UR STRIP.AUTO-MCNC: NEGATIVE MG/DL
HCT VFR BLD AUTO: 38.4 % (ref 35–47)
HGB BLD-MCNC: 13 G/DL (ref 11.5–16)
HGB UR QL STRIP: NEGATIVE
HYALINE CASTS URNS QL MICRO: ABNORMAL /LPF (ref 0–2)
IMM GRANULOCYTES # BLD AUTO: 0 K/UL (ref 0–0.04)
IMM GRANULOCYTES NFR BLD AUTO: 0 % (ref 0–0.5)
KETONES UR QL STRIP.AUTO: NEGATIVE MG/DL
LEUKOCYTE ESTERASE UR QL STRIP.AUTO: ABNORMAL
LIPASE SERPL-CCNC: 242 U/L (ref 73–393)
LYMPHOCYTES # BLD: 1.5 K/UL (ref 0.8–3.5)
LYMPHOCYTES NFR BLD: 25 % (ref 12–49)
MCH RBC QN AUTO: 28.1 PG (ref 26–34)
MCHC RBC AUTO-ENTMCNC: 33.9 G/DL (ref 30–36.5)
MCV RBC AUTO: 82.9 FL (ref 80–99)
MONOCYTES # BLD: 0.3 K/UL (ref 0–1)
MONOCYTES NFR BLD: 6 % (ref 5–13)
NEUTS SEG # BLD: 3.8 K/UL (ref 1.8–8)
NEUTS SEG NFR BLD: 64 % (ref 32–75)
NITRITE UR QL STRIP.AUTO: NEGATIVE
NRBC # BLD: 0 K/UL (ref 0–0.01)
NRBC BLD-RTO: 0 PER 100 WBC
PH UR STRIP: 7 (ref 5–8)
PLATELET # BLD AUTO: 166 K/UL (ref 150–400)
PMV BLD AUTO: 9.4 FL (ref 8.9–12.9)
POTASSIUM SERPL-SCNC: 3.9 MMOL/L (ref 3.5–5.1)
PROT SERPL-MCNC: 7.3 G/DL (ref 6.4–8.2)
PROT UR STRIP-MCNC: NEGATIVE MG/DL
RBC # BLD AUTO: 4.63 M/UL (ref 3.8–5.2)
RBC #/AREA URNS HPF: ABNORMAL /HPF (ref 0–5)
SODIUM SERPL-SCNC: 138 MMOL/L (ref 136–145)
SP GR UR REFRACTOMETRY: 1.01
URINE CULTURE IF INDICATED: ABNORMAL
UROBILINOGEN UR QL STRIP.AUTO: 0.2 EU/DL (ref 0.2–1)
WBC # BLD AUTO: 5.9 K/UL (ref 3.6–11)
WBC URNS QL MICRO: ABNORMAL /HPF (ref 0–4)

## 2023-06-09 PROCEDURE — 83690 ASSAY OF LIPASE: CPT

## 2023-06-09 PROCEDURE — 85025 COMPLETE CBC W/AUTO DIFF WBC: CPT

## 2023-06-09 PROCEDURE — 80053 COMPREHEN METABOLIC PANEL: CPT

## 2023-06-09 PROCEDURE — 6360000002 HC RX W HCPCS: Performed by: PHYSICIAN ASSISTANT

## 2023-06-09 PROCEDURE — 87086 URINE CULTURE/COLONY COUNT: CPT

## 2023-06-09 PROCEDURE — 2580000003 HC RX 258: Performed by: PHYSICIAN ASSISTANT

## 2023-06-09 PROCEDURE — 2500000003 HC RX 250 WO HCPCS: Performed by: PHYSICIAN ASSISTANT

## 2023-06-09 PROCEDURE — 36415 COLL VENOUS BLD VENIPUNCTURE: CPT

## 2023-06-09 PROCEDURE — 81001 URINALYSIS AUTO W/SCOPE: CPT

## 2023-06-09 RX ORDER — DIPHENHYDRAMINE HYDROCHLORIDE 50 MG/ML
25 INJECTION INTRAMUSCULAR; INTRAVENOUS
Status: COMPLETED | OUTPATIENT
Start: 2023-06-09 | End: 2023-06-09

## 2023-06-09 RX ORDER — MORPHINE SULFATE 2 MG/ML
4 INJECTION, SOLUTION INTRAMUSCULAR; INTRAVENOUS
Status: COMPLETED | OUTPATIENT
Start: 2023-06-09 | End: 2023-06-09

## 2023-06-09 RX ORDER — 0.9 % SODIUM CHLORIDE 0.9 %
1000 INTRAVENOUS SOLUTION INTRAVENOUS ONCE
Status: COMPLETED | OUTPATIENT
Start: 2023-06-09 | End: 2023-06-09

## 2023-06-09 RX ORDER — OXYCODONE HYDROCHLORIDE 5 MG/1
5 TABLET ORAL EVERY 8 HOURS PRN
Qty: 8 TABLET | Refills: 0 | Status: SHIPPED | OUTPATIENT
Start: 2023-06-09 | End: 2023-06-12

## 2023-06-09 RX ORDER — HYDROMORPHONE HYDROCHLORIDE 1 MG/ML
0.25 INJECTION, SOLUTION INTRAMUSCULAR; INTRAVENOUS; SUBCUTANEOUS
Status: DISCONTINUED | OUTPATIENT
Start: 2023-06-09 | End: 2023-06-09

## 2023-06-09 RX ORDER — HYDROMORPHONE HYDROCHLORIDE 1 MG/ML
0.5 INJECTION, SOLUTION INTRAMUSCULAR; INTRAVENOUS; SUBCUTANEOUS
Status: COMPLETED | OUTPATIENT
Start: 2023-06-09 | End: 2023-06-09

## 2023-06-09 RX ORDER — ONDANSETRON 2 MG/ML
4 INJECTION INTRAMUSCULAR; INTRAVENOUS ONCE
Status: COMPLETED | OUTPATIENT
Start: 2023-06-09 | End: 2023-06-09

## 2023-06-09 RX ADMIN — MORPHINE SULFATE 4 MG: 2 INJECTION, SOLUTION INTRAMUSCULAR; INTRAVENOUS at 14:27

## 2023-06-09 RX ADMIN — HYDROMORPHONE HYDROCHLORIDE 0.5 MG: 1 INJECTION, SOLUTION INTRAMUSCULAR; INTRAVENOUS; SUBCUTANEOUS at 15:43

## 2023-06-09 RX ADMIN — DIPHENHYDRAMINE HYDROCHLORIDE 25 MG: 50 INJECTION, SOLUTION INTRAMUSCULAR; INTRAVENOUS at 14:24

## 2023-06-09 RX ADMIN — ONDANSETRON 4 MG: 2 INJECTION INTRAMUSCULAR; INTRAVENOUS at 14:25

## 2023-06-09 RX ADMIN — DIPHENHYDRAMINE HYDROCHLORIDE 25 MG: 50 INJECTION, SOLUTION INTRAMUSCULAR; INTRAVENOUS at 15:43

## 2023-06-09 RX ADMIN — SODIUM CHLORIDE 1000 ML: 9 INJECTION, SOLUTION INTRAVENOUS at 14:15

## 2023-06-09 ASSESSMENT — PAIN SCALES - GENERAL
PAINLEVEL_OUTOF10: 8
PAINLEVEL_OUTOF10: 8
PAINLEVEL_OUTOF10: 7

## 2023-06-09 ASSESSMENT — ENCOUNTER SYMPTOMS
DIARRHEA: 1
ABDOMINAL PAIN: 1

## 2023-06-09 ASSESSMENT — PAIN - FUNCTIONAL ASSESSMENT: PAIN_FUNCTIONAL_ASSESSMENT: 0-10

## 2023-06-09 ASSESSMENT — PAIN DESCRIPTION - LOCATION: LOCATION: ABDOMEN;RECTUM;VAGINA

## 2023-06-09 NOTE — ED PROVIDER NOTES
up as recommended or return to ER should their symptoms worsen. PATIENT REFERRED TO:  Anastacio Grant MD  [address]  [facility] [address]  [facility]  [phone]    Call   For follow up    Your gastroenterologist and/or colorectal surgeon at [address] [place] [address], :     Medication List        START taking these medications      oxyCODONE 5 MG immediate release tablet  Commonly known as: Roxicodone  Take 1 tablet by mouth every 8 hours as needed for Pain for up to 3 days. Intended supply: 3 days. Take lowest dose possible to manage pain Max Daily Amount: 15 mg            ASK your doctor about these medications      * albuterol sulfate  (90 Base) MCG/ACT inhaler  Commonly known as: PROVENTIL;VENTOLIN;PROAIR     * albuterol (2.5 MG/3ML) 0.083% nebulizer solution  Commonly known as: PROVENTIL     * albuterol sulfate  (90 Base) MCG/ACT inhaler  Commonly known as: PROVENTIL;VENTOLIN;PROAIR     ALPRAZolam 1 MG tablet  Commonly known as: XANAX  TAKE 1/2-1 TABLET BY MOUTH TWICE DAILY AS NEEDED FOR ANXIETY     atorvastatin 20 MG tablet  Commonly known as: LIPITOR     estradiol 0.5 MG tablet  Commonly known as: ESTRACE  TAKE 1 TABLET BY MOUTH EVERY DAY     losartan-hydroCHLOROthiazide 100-12.5 MG per tablet  Commonly known as: HYZAAR     omeprazole 20 MG delayed release capsule  Commonly known as: PRILOSEC     Ozempic (0.25 or 0.5 MG/DOSE) 2 MG/1.5ML Sopn  Generic drug: Semaglutide(0.25 or 0.5MG/DOS)     sertraline 100 MG tablet  Commonly known as: ZOLOFT  TAKE 2 TABLETS BY MOUTH EVERY NIGHT           * This list has 3 medication(s) that are the same as other medications prescribed for you. Read the directions carefully, and ask your doctor or other care provider to review them with you.                    Where to Get Your Medications        These medications were sent to [facility]/pharmacy #[id number], [address] Clarissa Marvin - P [phone] - F

## 2023-06-10 LAB
BACTERIA SPEC CULT: NORMAL
SERVICE CMNT-IMP: NORMAL

## 2023-06-15 ENCOUNTER — TELEPHONE (OUTPATIENT)
Age: 53
End: 2023-06-15

## 2023-06-16 ENCOUNTER — TELEPHONE (OUTPATIENT)
Age: 53
End: 2023-06-16

## 2023-06-22 ENCOUNTER — APPOINTMENT (OUTPATIENT)
Facility: HOSPITAL | Age: 53
End: 2023-06-22
Payer: MEDICAID

## 2023-06-22 ENCOUNTER — HOSPITAL ENCOUNTER (EMERGENCY)
Facility: HOSPITAL | Age: 53
Discharge: HOME | End: 2023-06-22
Attending: EMERGENCY MEDICINE
Payer: MEDICAID

## 2023-06-22 VITALS
SYSTOLIC BLOOD PRESSURE: 126 MMHG | BODY MASS INDEX: 35.62 KG/M2 | RESPIRATION RATE: 19 BRPM | TEMPERATURE: 98.2 F | OXYGEN SATURATION: 94 % | WEIGHT: 193.56 LBS | HEART RATE: 73 BPM | HEIGHT: 62 IN | DIASTOLIC BLOOD PRESSURE: 58 MMHG

## 2023-06-22 DIAGNOSIS — K50.90 EXACERBATION OF CROHN'S DISEASE WITHOUT COMPLICATION (HCC): Primary | ICD-10-CM

## 2023-06-22 LAB
ALBUMIN SERPL-MCNC: 4.2 G/DL (ref 3.5–5)
ALBUMIN/GLOB SERPL: 1.2 (ref 1.1–2.2)
ALP SERPL-CCNC: 63 U/L (ref 45–117)
ALT SERPL-CCNC: 37 U/L (ref 12–78)
ANION GAP SERPL CALC-SCNC: 7 MMOL/L (ref 5–15)
APPEARANCE UR: CLEAR
AST SERPL-CCNC: 26 U/L (ref 15–37)
BACTERIA URNS QL MICRO: NEGATIVE /HPF
BASOPHILS # BLD: 0 K/UL (ref 0–0.1)
BASOPHILS NFR BLD: 1 % (ref 0–1)
BILIRUB SERPL-MCNC: 0.6 MG/DL (ref 0.2–1)
BILIRUB UR QL: NEGATIVE
BUN SERPL-MCNC: 8 MG/DL (ref 6–20)
BUN/CREAT SERPL: 13 (ref 12–20)
CALCIUM SERPL-MCNC: 9.7 MG/DL (ref 8.5–10.1)
CHLORIDE SERPL-SCNC: 103 MMOL/L (ref 97–108)
CO2 SERPL-SCNC: 28 MMOL/L (ref 21–32)
COLOR UR: ABNORMAL
CREAT SERPL-MCNC: 0.64 MG/DL (ref 0.55–1.02)
DIFFERENTIAL METHOD BLD: NORMAL
EOSINOPHIL # BLD: 0.2 K/UL (ref 0–0.4)
EOSINOPHIL NFR BLD: 3 % (ref 0–7)
EPITH CASTS URNS QL MICRO: ABNORMAL /LPF
ERYTHROCYTE [DISTWIDTH] IN BLOOD BY AUTOMATED COUNT: 13.2 % (ref 11.5–14.5)
GLOBULIN SER CALC-MCNC: 3.4 G/DL (ref 2–4)
GLUCOSE SERPL-MCNC: 146 MG/DL (ref 65–100)
GLUCOSE UR STRIP.AUTO-MCNC: NEGATIVE MG/DL
HCT VFR BLD AUTO: 38.4 % (ref 35–47)
HGB BLD-MCNC: 13.4 G/DL (ref 11.5–16)
HGB UR QL STRIP: NEGATIVE
HYALINE CASTS URNS QL MICRO: ABNORMAL /LPF (ref 0–2)
IMM GRANULOCYTES # BLD AUTO: 0 K/UL (ref 0–0.04)
IMM GRANULOCYTES NFR BLD AUTO: 0 % (ref 0–0.5)
KETONES UR QL STRIP.AUTO: NEGATIVE MG/DL
LEUKOCYTE ESTERASE UR QL STRIP.AUTO: ABNORMAL
LIPASE SERPL-CCNC: 147 U/L (ref 73–393)
LYMPHOCYTES # BLD: 1.9 K/UL (ref 0.8–3.5)
LYMPHOCYTES NFR BLD: 33 % (ref 12–49)
MCH RBC QN AUTO: 28.5 PG (ref 26–34)
MCHC RBC AUTO-ENTMCNC: 34.9 G/DL (ref 30–36.5)
MCV RBC AUTO: 81.5 FL (ref 80–99)
MONOCYTES # BLD: 0.3 K/UL (ref 0–1)
MONOCYTES NFR BLD: 5 % (ref 5–13)
NEUTS SEG # BLD: 3.4 K/UL (ref 1.8–8)
NEUTS SEG NFR BLD: 58 % (ref 32–75)
NITRITE UR QL STRIP.AUTO: NEGATIVE
NRBC # BLD: 0 K/UL (ref 0–0.01)
NRBC BLD-RTO: 0 PER 100 WBC
PH UR STRIP: 6.5 (ref 5–8)
PLATELET # BLD AUTO: 170 K/UL (ref 150–400)
PMV BLD AUTO: 10.2 FL (ref 8.9–12.9)
POTASSIUM SERPL-SCNC: 3.2 MMOL/L (ref 3.5–5.1)
PROT SERPL-MCNC: 7.6 G/DL (ref 6.4–8.2)
PROT UR STRIP-MCNC: NEGATIVE MG/DL
RBC # BLD AUTO: 4.71 M/UL (ref 3.8–5.2)
RBC #/AREA URNS HPF: ABNORMAL /HPF (ref 0–5)
SODIUM SERPL-SCNC: 138 MMOL/L (ref 136–145)
SP GR UR REFRACTOMETRY: <1.005
TROPONIN I SERPL HS-MCNC: 4 NG/L (ref 0–51)
URINE CULTURE IF INDICATED: ABNORMAL
UROBILINOGEN UR QL STRIP.AUTO: 0.2 EU/DL (ref 0.2–1)
WBC # BLD AUTO: 5.8 K/UL (ref 3.6–11)
WBC URNS QL MICRO: ABNORMAL /HPF (ref 0–4)

## 2023-06-22 PROCEDURE — 93005 ELECTROCARDIOGRAM TRACING: CPT | Performed by: EMERGENCY MEDICINE

## 2023-06-22 PROCEDURE — 2500000003 HC RX 250 WO HCPCS: Performed by: EMERGENCY MEDICINE

## 2023-06-22 PROCEDURE — 96374 THER/PROPH/DIAG INJ IV PUSH: CPT

## 2023-06-22 PROCEDURE — 96375 TX/PRO/DX INJ NEW DRUG ADDON: CPT

## 2023-06-22 PROCEDURE — 85025 COMPLETE CBC W/AUTO DIFF WBC: CPT

## 2023-06-22 PROCEDURE — 74176 CT ABD & PELVIS W/O CONTRAST: CPT

## 2023-06-22 PROCEDURE — 36415 COLL VENOUS BLD VENIPUNCTURE: CPT

## 2023-06-22 PROCEDURE — 99284 EMERGENCY DEPT VISIT MOD MDM: CPT

## 2023-06-22 PROCEDURE — 6360000002 HC RX W HCPCS: Performed by: EMERGENCY MEDICINE

## 2023-06-22 PROCEDURE — 81001 URINALYSIS AUTO W/SCOPE: CPT

## 2023-06-22 PROCEDURE — 2580000003 HC RX 258: Performed by: EMERGENCY MEDICINE

## 2023-06-22 PROCEDURE — 80053 COMPREHEN METABOLIC PANEL: CPT

## 2023-06-22 PROCEDURE — 84484 ASSAY OF TROPONIN QUANT: CPT

## 2023-06-22 PROCEDURE — 83690 ASSAY OF LIPASE: CPT

## 2023-06-22 RX ORDER — DICYCLOMINE HYDROCHLORIDE 10 MG/1
10 CAPSULE ORAL 4 TIMES DAILY
Qty: 360 CAPSULE | Refills: 1 | Status: SHIPPED | OUTPATIENT
Start: 2023-06-22

## 2023-06-22 RX ORDER — HYDROMORPHONE HYDROCHLORIDE 1 MG/ML
1 INJECTION, SOLUTION INTRAMUSCULAR; INTRAVENOUS; SUBCUTANEOUS
Status: COMPLETED | OUTPATIENT
Start: 2023-06-22 | End: 2023-06-22

## 2023-06-22 RX ORDER — ONDANSETRON 4 MG/1
4 TABLET, ORALLY DISINTEGRATING ORAL 3 TIMES DAILY PRN
Qty: 21 TABLET | Refills: 0 | Status: SHIPPED | OUTPATIENT
Start: 2023-06-22

## 2023-06-22 RX ORDER — 0.9 % SODIUM CHLORIDE 0.9 %
500 INTRAVENOUS SOLUTION INTRAVENOUS ONCE
Status: COMPLETED | OUTPATIENT
Start: 2023-06-22 | End: 2023-06-22

## 2023-06-22 RX ORDER — AMOXICILLIN AND CLAVULANATE POTASSIUM 875; 125 MG/1; MG/1
1 TABLET, FILM COATED ORAL 2 TIMES DAILY
Qty: 14 TABLET | Refills: 0 | Status: SHIPPED | OUTPATIENT
Start: 2023-06-22 | End: 2023-06-29

## 2023-06-22 RX ORDER — DIPHENHYDRAMINE HYDROCHLORIDE 50 MG/ML
25 INJECTION INTRAMUSCULAR; INTRAVENOUS
Status: COMPLETED | OUTPATIENT
Start: 2023-06-22 | End: 2023-06-22

## 2023-06-22 RX ORDER — PREDNISONE 10 MG/1
TABLET ORAL
Qty: 42 TABLET | Refills: 0 | Status: SHIPPED | OUTPATIENT
Start: 2023-06-22

## 2023-06-22 RX ADMIN — METHYLPREDNISOLONE SODIUM SUCCINATE 125 MG: 125 INJECTION, POWDER, FOR SOLUTION INTRAMUSCULAR; INTRAVENOUS at 13:01

## 2023-06-22 RX ADMIN — SODIUM CHLORIDE 500 ML: 9 INJECTION, SOLUTION INTRAVENOUS at 12:54

## 2023-06-22 RX ADMIN — HYDROMORPHONE HYDROCHLORIDE 1 MG: 1 INJECTION, SOLUTION INTRAMUSCULAR; INTRAVENOUS; SUBCUTANEOUS at 13:01

## 2023-06-22 RX ADMIN — DIPHENHYDRAMINE HYDROCHLORIDE 25 MG: 50 INJECTION, SOLUTION INTRAMUSCULAR; INTRAVENOUS at 14:05

## 2023-06-22 ASSESSMENT — PAIN DESCRIPTION - LOCATION: LOCATION: VAGINA;RECTUM

## 2023-06-22 ASSESSMENT — PAIN SCALES - GENERAL
PAINLEVEL_OUTOF10: 9
PAINLEVEL_OUTOF10: 9

## 2023-06-22 NOTE — ED PROVIDER NOTES
[facility] EMERGENCY DEPT  EMERGENCY DEPARTMENT ENCOUNTER       Pt Name: Marko Grant  MRN: [id number]  [place] 1970  Date of evaluation: 6/22/2023  Provider: Emily Vail MD   PCP: Rachel Beckham MD  Note Started: 12:32 PM EDT 6/22/23     CHIEF COMPLAINT       Chief Complaint   Patient presents with    Post-op Problem     Patient had fistula surgery 2 months ago and has been having problems since. She recently had MRI that showed fistula and abscess. Over the last few days she began to have n/v/d. HISTORY OF PRESENT ILLNESS: 1 or more elements      History From: Patient  HPI Limitations: None     Marko Grant is a 48 y.o. female who presents with symptoms of perianal pain, gluteal pain over the past few days. Recently having new discharge coming out of perianal fistula. Also having n/v/d as well. Experiencing weight loss. Dr. Eduar Jones did fistula surgery on April 20th (2023). 2 weeks later placed on antibiotics for potential infection, mild improvement, now symptoms have returned. Nursing Notes were all reviewed and agreed with or any disagreements were addressed in the HPI. REVIEW OF SYSTEMS      Review of Systems     Positives and Pertinent negatives as per HPI.     PAST HISTORY     Past Medical History:  Past Medical History:   Diagnosis Date    Anal fissure     Anisocoria     Anxiety     ANXIETY    Asthma     LAST EPISODE     Back pain     Cerumen impaction     Colovaginal fistula     Diabetes (HCC)     NIDDM    Diverticulitis     Diverticulosis     Enlarged tonsils     Frequent UTI     GERD (gastroesophageal reflux disease)     H/O endoscopy     with dilation    HA (headache)     Hepatic steatosis     History of colon resection     Hx of colonoscopy with polypectomy     benign    Hypertension     Ill-defined condition     HX ELEVATED LIVER ENZYMES    Morbid obesity ([address]     Obesity     Recurrent tonsillitis     Sinusitis     Transfusion history      age

## 2023-06-22 NOTE — ED NOTES
Pt discharged by Radha Patel RN. Discharge instructions discussed and pt given opportunity to ask questions.  Pt ambulatory out of ED        Cristobal Schilder, RN  06/22/23 5683

## 2023-06-23 LAB
EKG ATRIAL RATE: 77 BPM
EKG DIAGNOSIS: NORMAL
EKG P AXIS: 14 DEGREES
EKG P-R INTERVAL: 150 MS
EKG Q-T INTERVAL: 398 MS
EKG QRS DURATION: 92 MS
EKG QTC CALCULATION (BAZETT): 450 MS
EKG R AXIS: 21 DEGREES
EKG T AXIS: 25 DEGREES
EKG VENTRICULAR RATE: 77 BPM

## 2023-06-30 ENCOUNTER — TELEPHONE (OUTPATIENT)
Age: 53
End: 2023-06-30

## 2023-06-30 DIAGNOSIS — E66.01 CLASS 2 SEVERE OBESITY WITH SERIOUS COMORBIDITY AND BODY MASS INDEX (BMI) OF 36.0 TO 36.9 IN ADULT, UNSPECIFIED OBESITY TYPE (HCC): ICD-10-CM

## 2023-06-30 DIAGNOSIS — E11.9 TYPE 2 DIABETES MELLITUS WITHOUT COMPLICATION, WITHOUT LONG-TERM CURRENT USE OF INSULIN (HCC): Primary | ICD-10-CM

## 2023-06-30 RX ORDER — SEMAGLUTIDE 1.34 MG/ML
1 INJECTION, SOLUTION SUBCUTANEOUS
Qty: 3 ML | Refills: 2 | Status: SHIPPED | OUTPATIENT
Start: 2023-06-30

## 2023-07-03 DIAGNOSIS — F41.9 ANXIETY DISORDER, UNSPECIFIED: ICD-10-CM

## 2023-07-03 RX ORDER — ALPRAZOLAM 1 MG/1
TABLET ORAL
Qty: 60 TABLET | Refills: 0 | OUTPATIENT
Start: 2023-07-03 | End: 2023-08-02

## 2023-07-05 DIAGNOSIS — F41.9 ANXIETY: Primary | ICD-10-CM

## 2023-07-05 RX ORDER — ALPRAZOLAM 1 MG/1
1 TABLET ORAL 2 TIMES DAILY PRN
Qty: 60 TABLET | Refills: 0 | Status: SHIPPED | OUTPATIENT
Start: 2023-07-05 | End: 2023-08-04

## 2023-07-05 RX ORDER — VALACYCLOVIR HYDROCHLORIDE 500 MG/1
500 TABLET, FILM COATED ORAL 2 TIMES DAILY
Qty: 30 TABLET | Refills: 1 | Status: SHIPPED | OUTPATIENT
Start: 2023-07-05

## 2023-07-05 RX ORDER — ALPRAZOLAM 1 MG/1
TABLET ORAL 2 TIMES DAILY PRN
COMMUNITY
End: 2023-07-05 | Stop reason: SDUPTHER

## 2023-07-05 NOTE — TELEPHONE ENCOUNTER
Patient's last narcotic RX 6/16. Patient has been advised to not take benzos with narcotics. Renewed xanax.

## 2023-07-05 NOTE — TELEPHONE ENCOUNTER
----- Message from Claudeen Countess sent at 7/5/2023  5:02 PM EDT -----  Subject: Message to Provider    QUESTIONS  Information for Provider? pt would like a nurse to call her regarding   medication, pt is allergic to one medication and unable to take, out of   medication for 5 days  ---------------------------------------------------------------------------  --------------  Jerry Strong INFO  [phone]; OK to leave message on voicemail  ---------------------------------------------------------------------------  --------------  SCRIPT ANSWERS  Relationship to Patient?  Self

## 2023-07-05 NOTE — TELEPHONE ENCOUNTER
Called patient. Two patient identifiers verified. Spoke with patient    Patient states she is not taking any narcotics and she is getting side of affects from not taking the Xanax for so long.

## 2023-07-13 RX ORDER — VALACYCLOVIR HYDROCHLORIDE 500 MG/1
TABLET, FILM COATED ORAL
Qty: 30 TABLET | Refills: 1 | Status: SHIPPED | OUTPATIENT
Start: 2023-07-13

## 2023-07-30 DIAGNOSIS — F41.9 ANXIETY: ICD-10-CM

## 2023-07-31 RX ORDER — ALPRAZOLAM 1 MG/1
1 TABLET ORAL 2 TIMES DAILY PRN
Qty: 60 TABLET | Refills: 0 | OUTPATIENT
Start: 2023-07-31 | End: 2023-08-30

## 2023-07-31 RX ORDER — VALACYCLOVIR HYDROCHLORIDE 500 MG/1
500 TABLET, FILM COATED ORAL DAILY
Qty: 90 TABLET | Refills: 1 | Status: SHIPPED | OUTPATIENT
Start: 2023-07-31

## 2023-07-31 RX ORDER — VALACYCLOVIR HYDROCHLORIDE 500 MG/1
500 TABLET, FILM COATED ORAL 2 TIMES DAILY
Qty: 90 TABLET | Refills: 1 | Status: SHIPPED | OUTPATIENT
Start: 2023-07-31 | End: 2023-07-31 | Stop reason: SDUPTHER

## 2023-07-31 NOTE — TELEPHONE ENCOUNTER
PCP: Juan Martínez MD    Last appt  6/16/2023   Future Appointments   Date Time Provider 4600  [address]   9/29/2023 11:00 AM Jenae Gallardo MD [facility] BS AMB       Requested Prescriptions     Pending Prescriptions Disp Refills    valACYclovir (VALTREX) 500 MG tablet 90 tablet 1     Sig: Take 1 tablet by mouth 2 times daily

## 2023-08-03 DIAGNOSIS — F41.9 ANXIETY: ICD-10-CM

## 2023-08-03 RX ORDER — ALPRAZOLAM 1 MG/1
1 TABLET ORAL 2 TIMES DAILY PRN
Qty: 60 TABLET | Refills: 0 | Status: SHIPPED | OUTPATIENT
Start: 2023-08-03 | End: 2023-09-02

## 2023-08-04 RX ORDER — ALPRAZOLAM 1 MG/1
1 TABLET ORAL 2 TIMES DAILY PRN
Qty: 60 TABLET | Refills: 0 | OUTPATIENT
Start: 2023-08-04 | End: 2023-09-03

## 2023-08-24 DIAGNOSIS — F41.9 ANXIETY: ICD-10-CM

## 2023-08-25 RX ORDER — ALPRAZOLAM 1 MG/1
1 TABLET ORAL 2 TIMES DAILY PRN
Qty: 60 TABLET | Refills: 0 | Status: SHIPPED | OUTPATIENT
Start: 2023-08-25 | End: 2023-09-24

## 2023-09-07 NOTE — PROGRESS NOTES
Adrianna Serra is a 48 y.o. female who presents for follow-up    Patient has been followed by colorectal surgery and GYN for rectovaginal fistula. Diabetic, steroid-induced. On Ozempic 1mg weekly. Was to add glipizide but held off since ozempic tolerated. Tolerating without nausea, some constipation. Diet is better. HbA1C 7.4% in June. HbA1C 5.5%. Weight 193# to 175#. Treated for hypertension. On losartan/HCTZ. BP controlled. On omeprazole for acid reflux. On Lipitor 20 mg for hyperlipidemia. Due for lipid check. Treated for anxiety with sertraline 200mg daily and Xanax 1mg BID prn. Caring for elderly parents. Lives with parents. Siblings helpful. Had fatty liver, prior biopsy at [address].        Past Medical History:   Diagnosis Date    Anal fissure     Anisocoria     Anxiety     ANXIETY    Asthma     LAST EPISODE     Back pain     Cerumen impaction     Colovaginal fistula     Diabetes (HCC)     NIDDM    Diverticulitis     Diverticulosis     Enlarged tonsils     Frequent UTI     GERD (gastroesophageal reflux disease)     H/O endoscopy     with dilation    HA (headache)     Hepatic steatosis     History of colon resection     Hx of colonoscopy with polypectomy     benign    Hypertension     Ill-defined condition     HX ELEVATED LIVER ENZYMES    Morbid obesity ([address])     Obesity     Recurrent tonsillitis     Sinusitis     Transfusion history      age 35    Urticaria        Family History   Problem Relation Age of Onset    Diabetes Mother     Cancer Mother         NON-HODGKINS LYMPHOMA    Anesth Problems Mother         PONV    Breast Cancer Sister 48    Cancer Paternal Grandmother     Diabetes Father     Arrhythmia Father     Heart Disease Father         CAD - STENTS, PACEMAKER        Social History     Socioeconomic History    Marital status:      Spouse name: Not on file    Number of children: Not on file    Years of education: Not on file    Highest education level: Not

## 2023-09-08 ENCOUNTER — OFFICE VISIT (OUTPATIENT)
Age: 53
End: 2023-09-08
Payer: MEDICAID

## 2023-09-08 VITALS
HEART RATE: 61 BPM | DIASTOLIC BLOOD PRESSURE: 67 MMHG | SYSTOLIC BLOOD PRESSURE: 107 MMHG | RESPIRATION RATE: 16 BRPM | HEIGHT: 62 IN | TEMPERATURE: 97.4 F | WEIGHT: 175 LBS | BODY MASS INDEX: 32.2 KG/M2 | OXYGEN SATURATION: 100 %

## 2023-09-08 DIAGNOSIS — E78.00 PURE HYPERCHOLESTEROLEMIA: ICD-10-CM

## 2023-09-08 DIAGNOSIS — F41.9 ANXIETY: ICD-10-CM

## 2023-09-08 DIAGNOSIS — I10 ESSENTIAL (PRIMARY) HYPERTENSION: ICD-10-CM

## 2023-09-08 DIAGNOSIS — E11.9 TYPE 2 DIABETES MELLITUS WITHOUT COMPLICATION, WITHOUT LONG-TERM CURRENT USE OF INSULIN (HCC): Primary | ICD-10-CM

## 2023-09-08 DIAGNOSIS — E55.9 VITAMIN D DEFICIENCY: ICD-10-CM

## 2023-09-08 DIAGNOSIS — E66.9 CLASS 1 OBESITY WITH SERIOUS COMORBIDITY AND BODY MASS INDEX (BMI) OF 32.0 TO 32.9 IN ADULT, UNSPECIFIED OBESITY TYPE: ICD-10-CM

## 2023-09-08 LAB — HBA1C MFR BLD: 5.5 %

## 2023-09-08 PROCEDURE — 99214 OFFICE O/P EST MOD 30 MIN: CPT | Performed by: FAMILY MEDICINE

## 2023-09-08 PROCEDURE — 3074F SYST BP LT 130 MM HG: CPT | Performed by: FAMILY MEDICINE

## 2023-09-08 PROCEDURE — 83036 HEMOGLOBIN GLYCOSYLATED A1C: CPT | Performed by: FAMILY MEDICINE

## 2023-09-08 PROCEDURE — 3044F HG A1C LEVEL LT 7.0%: CPT | Performed by: FAMILY MEDICINE

## 2023-09-08 PROCEDURE — PBSHW AMB POC HEMOGLOBIN A1C: Performed by: FAMILY MEDICINE

## 2023-09-08 PROCEDURE — 3078F DIAST BP <80 MM HG: CPT | Performed by: FAMILY MEDICINE

## 2023-09-08 SDOH — ECONOMIC STABILITY: FOOD INSECURITY: WITHIN THE PAST 12 MONTHS, YOU WORRIED THAT YOUR FOOD WOULD RUN OUT BEFORE YOU GOT MONEY TO BUY MORE.: NEVER TRUE

## 2023-09-08 SDOH — ECONOMIC STABILITY: HOUSING INSECURITY
IN THE LAST 12 MONTHS, WAS THERE A TIME WHEN YOU DID NOT HAVE A STEADY PLACE TO SLEEP OR SLEPT IN A SHELTER (INCLUDING NOW)?: NO

## 2023-09-08 SDOH — ECONOMIC STABILITY: INCOME INSECURITY: HOW HARD IS IT FOR YOU TO PAY FOR THE VERY BASICS LIKE FOOD, HOUSING, MEDICAL CARE, AND HEATING?: NOT HARD AT ALL

## 2023-09-08 SDOH — ECONOMIC STABILITY: FOOD INSECURITY: WITHIN THE PAST 12 MONTHS, THE FOOD YOU BOUGHT JUST DIDN'T LAST AND YOU DIDN'T HAVE MONEY TO GET MORE.: NEVER TRUE

## 2023-09-08 ASSESSMENT — PATIENT HEALTH QUESTIONNAIRE - PHQ9
SUM OF ALL RESPONSES TO PHQ QUESTIONS 1-9: 0
2. FEELING DOWN, DEPRESSED OR HOPELESS: 0
SUM OF ALL RESPONSES TO PHQ QUESTIONS 1-9: 0
SUM OF ALL RESPONSES TO PHQ QUESTIONS 1-9: 0
1. LITTLE INTEREST OR PLEASURE IN DOING THINGS: 0
SUM OF ALL RESPONSES TO PHQ QUESTIONS 1-9: 0
SUM OF ALL RESPONSES TO PHQ9 QUESTIONS 1 & 2: 0

## 2023-09-08 NOTE — PROGRESS NOTES
1. \"Have you been to the ER, urgent care clinic since your last visit? Hospitalized since your last visit? \" No     2. \"Have you seen or consulted any other health care providers outside of the [address] [place] [address] since your last visit? \" No      3. For patients aged 43-73: Has the patient had a colonoscopy / FIT/ Cologuard? Yes      If the patient is female:    4. For patients aged 43-66: Has the patient had a mammogram within the past 2 years? No      5. For patients aged 21-65: Has the patient had a pap smear?  No

## 2023-09-09 LAB
25(OH)D3+25(OH)D2 SERPL-MCNC: 29.8 NG/ML (ref 30–100)
ALBUMIN SERPL-MCNC: 4.8 G/DL (ref 3.8–4.9)
ALBUMIN/GLOB SERPL: 2.2 {RATIO} (ref 1.2–2.2)
ALP SERPL-CCNC: 51 IU/L (ref 44–121)
ALT SERPL-CCNC: 36 IU/L (ref 0–32)
AST SERPL-CCNC: 40 IU/L (ref 0–40)
BILIRUB SERPL-MCNC: 0.8 MG/DL (ref 0–1.2)
BUN SERPL-MCNC: 5 MG/DL (ref 6–24)
BUN/CREAT SERPL: 7 (ref 9–23)
CALCIUM SERPL-MCNC: 10.3 MG/DL (ref 8.7–10.2)
CHLORIDE SERPL-SCNC: 100 MMOL/L (ref 96–106)
CHOLEST SERPL-MCNC: 148 MG/DL (ref 100–199)
CO2 SERPL-SCNC: 28 MMOL/L (ref 20–29)
CREAT SERPL-MCNC: 0.69 MG/DL (ref 0.57–1)
EGFRCR SERPLBLD CKD-EPI 2021: 104 ML/MIN/1.73
GLOBULIN SER CALC-MCNC: 2.2 G/DL (ref 1.5–4.5)
GLUCOSE SERPL-MCNC: 96 MG/DL (ref 70–99)
HDLC SERPL-MCNC: 38 MG/DL
LDLC SERPL CALC-MCNC: 81 MG/DL (ref 0–99)
POTASSIUM SERPL-SCNC: 3.8 MMOL/L (ref 3.5–5.2)
PROT SERPL-MCNC: 7 G/DL (ref 6–8.5)
SODIUM SERPL-SCNC: 143 MMOL/L (ref 134–144)
TRIGL SERPL-MCNC: 165 MG/DL (ref 0–149)
VLDLC SERPL CALC-MCNC: 29 MG/DL (ref 5–40)

## 2023-09-10 LAB
ALBUMIN/CREAT UR: 3 MG/G CREAT (ref 0–29)
CREAT UR-MCNC: 141.8 MG/DL
MICROALBUMIN UR-MCNC: 4.7 UG/ML

## 2023-09-25 DIAGNOSIS — F41.9 ANXIETY: ICD-10-CM

## 2023-09-26 RX ORDER — ALPRAZOLAM 1 MG/1
1 TABLET ORAL 2 TIMES DAILY PRN
Qty: 60 TABLET | Refills: 0 | OUTPATIENT
Start: 2023-09-26 | End: 2023-10-26

## 2023-10-03 DIAGNOSIS — F41.9 ANXIETY: ICD-10-CM

## 2023-10-04 RX ORDER — ALPRAZOLAM 1 MG/1
1 TABLET ORAL 2 TIMES DAILY PRN
Qty: 60 TABLET | Refills: 0 | Status: SHIPPED | OUTPATIENT
Start: 2023-10-04 | End: 2023-11-03

## 2023-10-04 RX ORDER — ALPRAZOLAM 1 MG/1
1 TABLET ORAL NIGHTLY PRN
Qty: 30 TABLET | Refills: 0 | OUTPATIENT
Start: 2023-10-04 | End: 2023-11-03

## 2023-10-04 NOTE — TELEPHONE ENCOUNTER
Pt states that she is out of medication. She has been out for two days. She is having side effects from this.      This is for alprazolam 1 mg 2/day    Pt is asking for this to be refilled at [facility] #[phone]    Pt has tried to get from pharm and sent a my chart message on 10-1-23

## 2023-10-04 NOTE — TELEPHONE ENCOUNTER
----- Message from Elijah Frederick sent at 10/4/2023  9:10 AM EDT -----  Subject: Refill Request    QUESTIONS  Name of Medication? ALPRAZolam (XANAX) 1 MG tablet  Patient-reported dosage and instructions? 1 MG Twice daily   How many days do you have left? 0  Preferred Pharmacy? [facility]/PHARMACY #[id number]  Pharmacy phone number (if available)? [phone]  Additional Information for Provider? Patient has been out of this   medication for 2 days her pharmacy has reached out and no refills have   been sent  ---------------------------------------------------------------------------  --------------  [address]  What is the best way for the office to contact you? OK to leave message on   voicemail  Preferred Call Back Phone Number? [phone]  ---------------------------------------------------------------------------  --------------  SCRIPT ANSWERS  Relationship to Patient?  Self

## 2023-10-12 ENCOUNTER — TELEPHONE (OUTPATIENT)
Age: 53
End: 2023-10-12

## 2023-10-12 NOTE — TELEPHONE ENCOUNTER
Pt states no one has ozempic. What is the plan now? Pt just took last injection on 10-5-23. Please call pt to advise.

## 2023-10-13 ENCOUNTER — NURSE TRIAGE (OUTPATIENT)
Dept: OTHER | Facility: CLINIC | Age: 53
End: 2023-10-13

## 2023-10-13 ENCOUNTER — TELEPHONE (OUTPATIENT)
Age: 53
End: 2023-10-13

## 2023-10-13 DIAGNOSIS — E11.9 TYPE 2 DIABETES MELLITUS WITHOUT COMPLICATION, WITHOUT LONG-TERM CURRENT USE OF INSULIN (HCC): Primary | ICD-10-CM

## 2023-10-13 RX ORDER — SEMAGLUTIDE 1.34 MG/ML
1 INJECTION, SOLUTION SUBCUTANEOUS
Qty: 6 ML | Refills: 0 | Status: CANCELLED | OUTPATIENT
Start: 2023-10-13

## 2023-10-13 RX ORDER — SEMAGLUTIDE 1.34 MG/ML
2 INJECTION, SOLUTION SUBCUTANEOUS
Qty: 6 ML | Refills: 1 | Status: SHIPPED | OUTPATIENT
Start: 2023-10-13

## 2023-10-13 NOTE — TELEPHONE ENCOUNTER
Pt states being unable to take Ozempic medication for over a week and is experiencing dizzniess, unstable blood sugars and feeling overall out of it. Pt requesting call back or alternative medication sent in since Ozempic is out of all pharmacies.

## 2023-10-13 NOTE — TELEPHONE ENCOUNTER
Pt would like the Ozempic changed to 1 mg 2 pens and sent to [facility]    She would like this done today.       [facility] #[phone]

## 2023-10-13 NOTE — TELEPHONE ENCOUNTER
On-call page:    Pt out of ozempic, going on about 9 days since last dose. Her pharmacy is out of the medication and she has been searching around for a pharmacy that has it and found one this afternoon, [facility]. They only have the 1 mg pens, pt states she is ok giving two 1 mg injections weekly for now. Rx sent.      Laura Rossi MD

## 2023-10-13 NOTE — TELEPHONE ENCOUNTER
Future Appointments:  Future Appointments   Date Time Provider 4600  46[facility]   3/8/2024  1:30 PM Ijeoma Nuno MD [facility] BS AMB        Last Appointment With Me:  9/8/2023     Requested Prescriptions     Pending Prescriptions Disp Refills    Semaglutide, 1 MG/DOSE, (OZEMPIC, 1 MG/DOSE,) 4 MG/3ML SOPN 6 mL 0     Sig: Inject 1 mg into the skin every 7 days

## 2023-10-26 DIAGNOSIS — E11.9 TYPE 2 DIABETES MELLITUS WITHOUT COMPLICATION, WITHOUT LONG-TERM CURRENT USE OF INSULIN (HCC): ICD-10-CM

## 2023-10-27 ENCOUNTER — TELEPHONE (OUTPATIENT)
Age: 53
End: 2023-10-27

## 2023-10-27 DIAGNOSIS — F41.9 ANXIETY: ICD-10-CM

## 2023-10-27 RX ORDER — ALPRAZOLAM 1 MG/1
1 TABLET ORAL 2 TIMES DAILY PRN
Qty: 60 TABLET | Refills: 0 | Status: SHIPPED | OUTPATIENT
Start: 2023-10-27 | End: 2023-11-26

## 2023-10-27 NOTE — TELEPHONE ENCOUNTER
----- Message from [address] [place] [address] sent at 10/27/2023  7:57 AM EDT -----  Regarding: FW: Cynthia Dia: [phone]    ----- Message -----  From: Tian Morrissey  Sent: 10/26/2023   2:05 PM EDT  To: #  Subject: Ozempic                                          I also need a refill of my alprazolam that can be found in to the [facility] as well. [facility] seems to be out of everything lately. Thank you.

## 2023-10-29 RX ORDER — SEMAGLUTIDE 1.34 MG/ML
2 INJECTION, SOLUTION SUBCUTANEOUS
Qty: 6 ML | Refills: 2 | Status: SHIPPED | OUTPATIENT
Start: 2023-10-29

## 2023-11-03 RX ORDER — ATORVASTATIN CALCIUM 20 MG/1
20 TABLET, FILM COATED ORAL DAILY
Qty: 30 TABLET | Refills: 3 | Status: SHIPPED | OUTPATIENT
Start: 2023-11-03

## 2023-11-03 NOTE — TELEPHONE ENCOUNTER
Future Appointments:  Future Appointments   Date Time Provider 4600  46[facility]   3/8/2024  1:30 PM Mauricio Nuno MD [facility] BS AMB        Last Appointment With Me:  9/8/2023     Requested Prescriptions     Pending Prescriptions Disp Refills    atorvastatin (LIPITOR) 20 MG tablet 30 tablet      Sig: Take 1 tablet by mouth daily

## 2023-11-06 ENCOUNTER — TELEPHONE (OUTPATIENT)
Age: 53
End: 2023-11-06

## 2024-12-02 NOTE — ED NOTES
Pt medicated as ordered; see MAR. Bacteremia Possible 1.7cm L renal/perirenal abscess, fever diabetes

## (undated) DEVICE — Device

## (undated) DEVICE — SET ADMIN 16ML TBNG L100IN 2 Y INJ SITE IV PIGGY BK DISP

## (undated) DEVICE — BAG SPEC BIOHZRD 10 X 10 IN --

## (undated) DEVICE — SOLIDIFIER MEDC 1200ML -- CONVERT TO 356117

## (undated) DEVICE — JELLY,LUBE,STERILE,FLIP TOP,TUBE,4-OZ: Brand: MEDLINE

## (undated) DEVICE — FORCEPS BX L240CM JAW DIA2.8MM L CAP W/ NDL MIC MESH TOOTH

## (undated) DEVICE — KIT COLON W/ 1.1OZ LUB AND 2 END

## (undated) DEVICE — FCPS RAD JAW 4LC 240CM W/NDL -- BX/40

## (undated) DEVICE — SYR 5ML 1/5 GRAD LL NSAF LF --

## (undated) DEVICE — CONTAINER SPEC 20 ML LID NEUT BUFF FORMALIN 10 % POLYPR STS

## (undated) DEVICE — 1200 GUARD II KIT W/5MM TUBE W/O VAC TUBE: Brand: GUARDIAN

## (undated) DEVICE — SNARE ENDOSCP M L240CM W27MM SHTH DIA2.4MM CHN 2.8MM OVL

## (undated) DEVICE — ADULT SPO2 SENSOR: Brand: NELLCOR

## (undated) DEVICE — SIMPLICITY FLUFF UNDERPAD 23X36, MODERATE: Brand: SIMPLICITY

## (undated) DEVICE — BITEBLOCK ENDOSCP 60FR MAXI WHT POLYETH STURDY W/ VELC WVN

## (undated) DEVICE — CATH IV AUTOGRD BC BLU 22GA 25 -- INSYTE

## (undated) DEVICE — CANNULA CUSH AD W/ 14FT TBG

## (undated) DEVICE — CATH IV AUTOGRD BC PNK 20GA 25 -- INSYTE

## (undated) DEVICE — NDL FLTR TIP 5 MIC 18GX1.5IN --

## (undated) DEVICE — SET ADMIN 16ML TBNG L100IN 2 Y INJ SITE IV PIGGY BK DISP (ORDER IN MULIPLES OF 48)

## (undated) DEVICE — BLUNTFILL WITH FILTER: Brand: MONOJECT

## (undated) DEVICE — ELECTRODE,RADIOTRANSLUCENT,FOAM,3PK: Brand: MEDLINE

## (undated) DEVICE — KENDALL RADIOLUCENT FOAM MONITORING ELECTRODE -RECTANGULAR SHAPE: Brand: KENDALL

## (undated) DEVICE — POLYP TRAP: Brand: TRAPEASE®

## (undated) DEVICE — SYR 3ML LL TIP 1/10ML GRAD --

## (undated) DEVICE — BLUNTFILL: Brand: MONOJECT

## (undated) DEVICE — SOLIDIFIER FLD 2OZ 1500CC N DISINF IN BTL DISP SAFESORB

## (undated) DEVICE — BAG BELONG PT PERS CLEAR HANDL

## (undated) DEVICE — SET GRAV CK VLV NEEDLESS ST 3 GANGED 4WAY STPCOCK HI FLO 10

## (undated) DEVICE — CANN NASAL O2 CAPNOGRAPHY AD -- FILTERLINE

## (undated) DEVICE — SYR ASSEMB INFL BLLN 60ML --

## (undated) DEVICE — 3M™ CUROS™ DISINFECTING CAP FOR NEEDLELESS CONNECTORS 270/CARTON 20 CARTONS/CASE CFF1-270: Brand: CUROS™

## (undated) DEVICE — BASIN EMSIS 16OZ GRAPHITE PLAS KID SHP MOLD GRAD FOR ORAL

## (undated) DEVICE — ESOPHAGEAL/PYLORIC/COLONIC/BILIARY WIREGUIDED BALLOON DILATATION CATHETER: Brand: CRE™ PRO